# Patient Record
Sex: MALE | Race: WHITE | NOT HISPANIC OR LATINO | Employment: OTHER | URBAN - METROPOLITAN AREA
[De-identification: names, ages, dates, MRNs, and addresses within clinical notes are randomized per-mention and may not be internally consistent; named-entity substitution may affect disease eponyms.]

---

## 2017-01-07 RX ORDER — DIAZEPAM 5 MG/1
TABLET ORAL
Qty: 60 TABLET | Refills: 5 | Status: SHIPPED | OUTPATIENT
Start: 2017-01-07 | End: 2017-02-11 | Stop reason: SDUPTHER

## 2017-01-12 ENCOUNTER — TELEPHONE (OUTPATIENT)
Dept: FAMILY MEDICINE | Facility: CLINIC | Age: 82
End: 2017-01-12

## 2017-01-12 RX ORDER — DIAZEPAM 5 MG/1
TABLET ORAL
Qty: 60 TABLET | OUTPATIENT
Start: 2017-01-12

## 2017-01-12 NOTE — TELEPHONE ENCOUNTER
Looks like we received another request for his diazepam which was approved 5 or 6 days ago.  Probably was approved to be called in so please double check that refill from a few days ago and call in if necessary

## 2017-02-13 RX ORDER — DIAZEPAM 5 MG/1
TABLET ORAL
Qty: 60 TABLET | Refills: 5 | Status: SHIPPED | OUTPATIENT
Start: 2017-02-13 | End: 2017-07-03 | Stop reason: SDUPTHER

## 2017-03-16 DIAGNOSIS — E11.9 TYPE 2 DIABETES MELLITUS WITHOUT COMPLICATION: ICD-10-CM

## 2017-03-29 DIAGNOSIS — E11.9 TYPE 2 DIABETES MELLITUS WITHOUT COMPLICATION: ICD-10-CM

## 2017-03-30 DIAGNOSIS — E11.9 TYPE 2 DIABETES MELLITUS WITHOUT COMPLICATION: ICD-10-CM

## 2017-03-30 RX ORDER — SILDENAFIL CITRATE 20 MG/1
TABLET ORAL
Qty: 30 TABLET | Refills: 11 | Status: SHIPPED | OUTPATIENT
Start: 2017-03-30 | End: 2017-03-30 | Stop reason: SDUPTHER

## 2017-03-30 RX ORDER — SILDENAFIL CITRATE 20 MG/1
TABLET ORAL
Qty: 30 TABLET | Refills: 11 | Status: SHIPPED | OUTPATIENT
Start: 2017-03-30 | End: 2018-05-10 | Stop reason: SDUPTHER

## 2017-03-30 NOTE — TELEPHONE ENCOUNTER
----- Message from Ariadna Gamboa sent at 3/30/2017  9:40 AM CDT -----  Contact: self  Pt is requesting a refill for sildenafil (REVATIO) 20 mg Tab. [phone] [facility] pharmacy on [facility].

## 2017-03-31 DIAGNOSIS — E11.9 TYPE 2 DIABETES MELLITUS WITHOUT COMPLICATION: ICD-10-CM

## 2017-04-28 ENCOUNTER — HOSPITAL ENCOUNTER (EMERGENCY)
Facility: HOSPITAL | Age: 82
Discharge: HOME | End: 2017-04-28
Attending: EMERGENCY MEDICINE
Payer: MEDICARE

## 2017-04-28 VITALS
OXYGEN SATURATION: 97 % | HEART RATE: 52 BPM | DIASTOLIC BLOOD PRESSURE: 78 MMHG | HEIGHT: 67 IN | RESPIRATION RATE: 16 BRPM | WEIGHT: 160 LBS | SYSTOLIC BLOOD PRESSURE: 149 MMHG | BODY MASS INDEX: 25.11 KG/M2 | TEMPERATURE: 98 F

## 2017-04-28 DIAGNOSIS — M25.511 ACUTE PAIN OF RIGHT SHOULDER: Primary | ICD-10-CM

## 2017-04-28 DIAGNOSIS — R52 PAIN: ICD-10-CM

## 2017-04-28 LAB
ALBUMIN SERPL BCP-MCNC: 4 G/DL
ALP SERPL-CCNC: 82 U/L
ALT SERPL W/O P-5'-P-CCNC: 17 U/L
ANION GAP SERPL CALC-SCNC: 11 MMOL/L
AST SERPL-CCNC: 32 U/L
BASOPHILS # BLD AUTO: 0.04 K/UL
BASOPHILS NFR BLD: 0.4 %
BILIRUB SERPL-MCNC: 0.4 MG/DL
BUN SERPL-MCNC: 18 MG/DL
CALCIUM SERPL-MCNC: 9.8 MG/DL
CHLORIDE SERPL-SCNC: 104 MMOL/L
CO2 SERPL-SCNC: 22 MMOL/L
CREAT SERPL-MCNC: 1.4 MG/DL
DIFFERENTIAL METHOD: ABNORMAL
EOSINOPHIL # BLD AUTO: 0.3 K/UL
EOSINOPHIL NFR BLD: 3.4 %
ERYTHROCYTE [DISTWIDTH] IN BLOOD BY AUTOMATED COUNT: 13.8 %
EST. GFR  (AFRICAN AMERICAN): 55 ML/MIN/1.73 M^2
EST. GFR  (NON AFRICAN AMERICAN): 47 ML/MIN/1.73 M^2
GLUCOSE SERPL-MCNC: 122 MG/DL
HCT VFR BLD AUTO: 40.6 %
HGB BLD-MCNC: 14.1 G/DL
INR PPP: 0.9
LYMPHOCYTES # BLD AUTO: 2.2 K/UL
LYMPHOCYTES NFR BLD: 23.6 %
MCH RBC QN AUTO: 32.6 PG
MCHC RBC AUTO-ENTMCNC: 34.7 %
MCV RBC AUTO: 94 FL
MONOCYTES # BLD AUTO: 0.8 K/UL
MONOCYTES NFR BLD: 8.8 %
NEUTROPHILS # BLD AUTO: 5.8 K/UL
NEUTROPHILS NFR BLD: 63.6 %
PLATELET # BLD AUTO: 241 K/UL
PMV BLD AUTO: 9.9 FL
POTASSIUM SERPL-SCNC: 5.1 MMOL/L
PROT SERPL-MCNC: 8.8 G/DL
PROTHROMBIN TIME: 9.7 SEC
RBC # BLD AUTO: 4.32 M/UL
SODIUM SERPL-SCNC: 137 MMOL/L
TROPONIN I SERPL DL<=0.01 NG/ML-MCNC: 0.01 NG/ML
WBC # BLD AUTO: 9.1 K/UL

## 2017-04-28 PROCEDURE — 80053 COMPREHEN METABOLIC PANEL: CPT

## 2017-04-28 PROCEDURE — 96374 THER/PROPH/DIAG INJ IV PUSH: CPT

## 2017-04-28 PROCEDURE — 25000003 PHARM REV CODE 250: Performed by: EMERGENCY MEDICINE

## 2017-04-28 PROCEDURE — 85025 COMPLETE CBC W/AUTO DIFF WBC: CPT

## 2017-04-28 PROCEDURE — 84484 ASSAY OF TROPONIN QUANT: CPT

## 2017-04-28 PROCEDURE — 85610 PROTHROMBIN TIME: CPT

## 2017-04-28 PROCEDURE — 63600175 PHARM REV CODE 636 W HCPCS: Performed by: EMERGENCY MEDICINE

## 2017-04-28 PROCEDURE — 99284 EMERGENCY DEPT VISIT MOD MDM: CPT | Mod: 25

## 2017-04-28 PROCEDURE — 93005 ELECTROCARDIOGRAM TRACING: CPT

## 2017-04-28 RX ORDER — MORPHINE SULFATE 10 MG/ML
4 INJECTION INTRAMUSCULAR; INTRAVENOUS; SUBCUTANEOUS
Status: COMPLETED | OUTPATIENT
Start: 2017-04-28 | End: 2017-04-28

## 2017-04-28 RX ORDER — TRAMADOL HYDROCHLORIDE 50 MG/1
50 TABLET ORAL EVERY 6 HOURS PRN
Qty: 12 TABLET | Refills: 0 | Status: SHIPPED | OUTPATIENT
Start: 2017-04-28 | End: 2017-05-08

## 2017-04-28 RX ORDER — METHOCARBAMOL 500 MG/1
500 TABLET, FILM COATED ORAL
Status: COMPLETED | OUTPATIENT
Start: 2017-04-28 | End: 2017-04-28

## 2017-04-28 RX ORDER — HYDROCODONE BITARTRATE AND ACETAMINOPHEN 5; 325 MG/1; MG/1
1 TABLET ORAL
Status: COMPLETED | OUTPATIENT
Start: 2017-04-28 | End: 2017-04-28

## 2017-04-28 RX ORDER — METHOCARBAMOL 500 MG/1
500 TABLET, FILM COATED ORAL 3 TIMES DAILY PRN
Qty: 15 TABLET | Refills: 0 | Status: SHIPPED | OUTPATIENT
Start: 2017-04-28 | End: 2017-05-03

## 2017-04-28 RX ADMIN — MORPHINE SULFATE 4 MG: 10 INJECTION INTRAVENOUS at 08:04

## 2017-04-28 RX ADMIN — METHOCARBAMOL 500 MG: 500 TABLET ORAL at 10:04

## 2017-04-28 RX ADMIN — HYDROCODONE BITARTRATE AND ACETAMINOPHEN 1 TABLET: 5; 325 TABLET ORAL at 07:04

## 2017-04-28 NOTE — ED PROVIDER NOTES
"Encounter Date: 4/28/2017    SCRIBE #1 NOTE: I, Jaymie Bradford, am scribing for, and in the presence of,  Keo Chamorro MD. I have scribed the following portions of the note - Other sections scribed: HPI/ROS.       History     Chief Complaint   Patient presents with    Shoulder Pain     right side and spreads down to right arm. Denies trauma. Started yesterday.      Review of patient's allergies indicates:   Allergen Reactions    Demerol  [meperidine]      Other reaction(s): Unknown     HPI Comments: HPI: 81 y.o. M smoker with a PMHx of PVD, CAD, DM, HTN, HLD, TIA, and bypass surgery presents to the ED c/o worsening, intermitent R shoulder blade pain that wraps up to his R shoulder x 13 hours PTA. Pt states the pain "hits and then eases up for a little." Pain is severe. No prior tx reported. Pain is not exacerbated with palpation. No recent trauma. Pt denies weakness, numbness, neck pain, CP, abdominal pain, nausea, and emesis.     The history is provided by the patient.     Past Medical History:   Diagnosis Date    Actinic keratosis     Anxiety     B12 deficiency 12/8/2014    Dx 6/14    Cancer     skin    Cataract     Coronary artery disease     Diabetes mellitus type II     Diabetes mellitus with peripheral circulatory disorder 6/18/2014    ED (erectile dysfunction)     Hyperlipidemia     Hypertension     Kidney stone     Peripheral vascular disease      Past Surgical History:   Procedure Laterality Date    APPENDECTOMY      EYE SURGERY      cataracts     Family History   Problem Relation Age of Onset    Stroke Mother      Social History   Substance Use Topics    Smoking status: Current Every Day Smoker    Smokeless tobacco: None    Alcohol use No     Review of Systems   Constitutional: Negative for fever.   HENT: Negative for sore throat.    Respiratory: Negative for shortness of breath.    Cardiovascular: Negative for chest pain.   Gastrointestinal: Negative for nausea.   Genitourinary: " Negative for dysuria.   Musculoskeletal: Positive for arthralgias. Negative for back pain, joint swelling and neck pain.        (+)right shoulder pain   Skin: Negative for rash.   Neurological: Negative for weakness.   Hematological: Does not bruise/bleed easily.       Physical Exam   Initial Vitals   BP Pulse Resp Temp SpO2   04/28/17 0502 04/28/17 0502 04/28/17 0502 04/28/17 0502 04/28/17 0502   156/76 67 18 97.7 °F (36.5 °C) 93 %     Physical Exam    Constitutional: He appears well-developed and well-nourished. He is not diaphoretic. No distress.   HENT:   Head: Normocephalic and atraumatic.   Nose: Nose normal.   Mouth/Throat: Oropharynx is clear and moist. No oropharyngeal exudate.   Eyes: Conjunctivae and EOM are normal. Pupils are equal, round, and reactive to light. No scleral icterus.   Neck: Normal range of motion. Neck supple. No thyromegaly present. No tracheal deviation present.   Cardiovascular: Normal rate, regular rhythm and normal heart sounds. Exam reveals no gallop and no friction rub.    No murmur heard.  Pulmonary/Chest: Breath sounds normal. No respiratory distress. He has no wheezes. He has no rhonchi. He has no rales.   Abdominal: Soft. Bowel sounds are normal. He exhibits no distension and no mass. There is no tenderness. There is no rebound and no guarding.   Musculoskeletal: Normal range of motion. He exhibits no edema or tenderness.   Lymphadenopathy:     He has no cervical adenopathy.   Neurological: He is alert and oriented to person, place, and time. He has normal strength. No cranial nerve deficit or sensory deficit.   Skin: Skin is warm and dry. No rash noted. No erythema. No pallor.   Psychiatric: He has a normal mood and affect. His behavior is normal. Thought content normal.         ED Course   Procedures  Labs Reviewed   CBC W/ AUTO DIFFERENTIAL - Abnormal; Notable for the following:        Result Value    RBC 4.32 (*)     MCH 32.6 (*)     All other components within normal  limits    Narrative:     Recoll. [id number] by ERP at 04/28/2017 06:12, reason: Specimen   clotted; Specimen hemolyzed   COMPREHENSIVE METABOLIC PANEL - Abnormal; Notable for the following:     CO2 22 (*)     Glucose 122 (*)     Total Protein 8.8 (*)     eGFR if  55 (*)     eGFR if non  47 (*)     All other components within normal limits    Narrative:     Recoll. [id number] by CAG at 04/28/2017 07:35, reason: Specimen   [email or web address].Tube has been discarded. Called to Bree Isbell   04/28/2017  07:35   PROTIME-INR    Narrative:     Recoll. [id number] by CAG at 04/28/2017 07:35, reason: Specimen   [email or web address].Tube has been discarded. Called to Bree Isbell   04/28/2017  07:35   TROPONIN I    Narrative:     Recoll. [id number] by CAG at 04/28/2017 07:35, reason: Specimen   [email or web address].Tube has been discarded. Called to Bree Isbell   04/28/2017  07:35             Medical Decision Making:   Initial Assessment:   81-year-old male with a history of coronary artery disease, CABG, long-term smoking presents complaining of pain that begins under the right shoulder blade, wraps around under the axilla and then up into the anterior shoulder.  He denies chest pain, abdominal pain, nausea, vomiting, diaphoresis, shortness of breath, radiation of pain down the arm, numbness in the arm, weakness, neck pain.  Physical examination is not reveal any tenderness to palpation to the back, chest, abdomen, or right upper extremity.  Neurologic exam is unremarkable.  Differential Diagnosis:   Unclear etiology of symptoms.  Very low likelihood of referred pain from the abdomen, radiculopathy, neurovascular compromise.  No rash to suggest shingles.  Given patient's cardiac history will screen for acute coronary syndrome as a cause of his symptoms.  Will also screen for underlying pulmonary pathology/malignancy.       AOC 7:44 AM this is Dr. Van mckenzie, and I have assumed this patient's care from   Pedro Pablo.  He is a 81-year-old male who presents emergency department complaining of 1 day history of right shoulder pain that originates from the right scapula around to right lateral chest wall. He has history of CABG. therefore cardiac workup initiated.  He has required multiple lab re-draws, thus delaying final dispo. Po Norco ordered. Awaiting labs.     10:00 PM labs reveal troponin 0.006.  Coags are normal.  Hemoglobin and hematocrit are normal.  No leukocytosis.  Cr 1.4.  Chest x-ray independently interpreted by me is negative for focal consolidation.  No pneumothorax or pleural effusion.  No cardiomegaly.  No widened mediastinum. EKG independently interpreted by me, is negative for acute ischemia.  Patient states Fisher transiently improved his pain, but returned.  He notes the pain is worse with range of motion of right shoulder.  Right shoulder x-rays show no evidence of acute fracture or dislocation.  Patient given IV morphine and states that this is helpful.  I will add on muscle relaxant, first dose given in the emergency department.  Patient states he would like to go home.  Doubt acute life-threatening event at this time.  Pulse ox 97% on room air.  Heart rate in the 50s.  He specifically denies chest pain or shortness of breath.  Doubt acute cardiac cause of symptoms.  He is not tachypneic, tachycardic, or hypoxic at this time.  I have considered but doubt acute PE, as the patient's symptoms are reproducible on exam with range of motion.  He has no fever.  No leukocytosis.  Doubt infectious cause.  Septic joint and pneumonia unlikely.  Plan to treat with Robaxin, tramadol, and encouraged close follow-up with primary care doctor and orthopedist if not improved.  Patient given strict return warnings.  He states understanding discharge plan and is comfortable going home at this time.              Scribe Attestation:   Scribe #1: I performed the above scribed service and the documentation accurately  describes the services I performed. I attest to the accuracy of the note.    Attending Attestation:           Physician Attestation for Scribe:  Physician Attestation Statement for Scribe #1: I, Keo Chamorro MD, reviewed documentation, as scribed by Jaymie Bradford in my presence, and it is both accurate and complete.                 ED Course     Clinical Impression:   The primary encounter diagnosis was Acute pain of right shoulder. A diagnosis of Pain was also pertinent to this visit.          Claribel Krause MD  04/28/17 1032       Claribel Krause MD  04/28/17 1033

## 2017-04-28 NOTE — ED NOTES
Report recevied from Janessa GILLETTE RN. Pt rounding performed at this time. Pt assessed for pain, need of repositioning and given opportunity to use the restroom. Pt rates pain (8/10 to right shoulder), denies need to use the restroom and can position for comfort. Room assessed for safety measures and cleanliness, no action needed at this time. Pt denies any additional needs at this time. Pt is up to date on plan of care and denies questions or concerns. Pt informed I will return in one hour, if needed before then, pt given call bell and demonstrates use. Pt verbalizes understanding.   Family at the bedside.

## 2017-04-28 NOTE — ED AVS SNAPSHOT
[facility]  [address] [place] [address]               Narciso Yanez   2017  5:05 AM   ED    Description:  Male : 1935   Department:  [facility]           Your Care was Coordinated By:     Provider Role From To    Claribel Krause MD Attending Provider 17 0613 --      Reason for Visit     Shoulder Pain           Diagnoses this Visit        Comments    Acute pain of right shoulder    -  Primary     Pain           ED Disposition     ED Disposition Condition Comment    Discharge             To Do List           Follow-up Information     Follow up with Marcelino Del Toro MD In 3 days.    Specialty:  Internal Medicine    Why:  call today to schedule an appointment to be seen early next week or ASAP    Contact information:    [address] [place] [address]  [phone]          Follow up with Justin Suero MD In 1 week.    Specialty:  Orthopedic Surgery    Why:  If symptoms worsen or do not improve    Contact information:    [address]  [place] [address]  [phone]         These Medications        Disp Refills Start End    methocarbamol (ROBAXIN) 500 MG Tab 15 tablet 0 2017 5/3/2017    Take 1 tablet (500 mg total) by mouth 3 (three) times daily as needed. - Oral    Pharmacy: [facility] [address] Ph #: [phone]       tramadol (ULTRAM) 50 mg tablet 12 tablet 0 2017    Take 1 tablet (50 mg total) by mouth every 6 (six) hours as needed. - Oral    Pharmacy: [facility] [address] Ph #: [phone]         [facility]     [facility] -  Assistance  Unless otherwise directed by your provider, please contact [facility], our nurse care line that is available for  assistance.     Registered nurses in the [facility] provide: appointment scheduling,  clinical advisement, health education, and other advisory services.  Call: [phone] (toll free)               Medications           Message regarding Medications     Verify the changes and/or additions to your medication regime listed below are the same as discussed with your clinician today.  If any of these changes or additions are incorrect, please notify your healthcare provider.        START taking these NEW medications        Refills    methocarbamol (ROBAXIN) 500 MG Tab 0    Sig: Take 1 tablet (500 mg total) by mouth 3 (three) times daily as needed.    Class: Print    Route: Oral    tramadol (ULTRAM) 50 mg tablet 0    Sig: Take 1 tablet (50 mg total) by mouth every 6 (six) hours as needed.    Class: Print    Route: Oral      These medications were administered today        Dose Freq    hydrocodone-acetaminophen 5-325mg per tablet 1 tablet 1 tablet ED 1 Time    Sig: Take 1 tablet by mouth ED 1 Time.    Class: Normal    Route: Oral    morphine injection 4 mg 4 mg ED 1 Time    Sig: Inject 0.4 mLs (4 mg total) into the vein ED 1 Time.    Class: Normal    Route: Intravenous    methocarbamol tablet 500 mg 500 mg ED 1 Time    Sig: Take 1 tablet (500 mg total) by mouth ED 1 Time.    Class: Normal    Route: Oral           Verify that the below list of medications is an accurate representation of the medications you are currently taking.  If none reported, the list may be blank. If incorrect, please contact your healthcare provider. Carry this list with you in case of emergency.           Current Medications     clopidogrel (PLAVIX) 75 mg tablet Take 1 tablet (75 mg total) by mouth once daily.    CRESTOR 20 mg tablet TAKE ONE TABLET BY MOUTH EVERY EVENING    metformin (GLUCOPHAGE-XR) 750 MG 24 hr tablet Take 2 tablets (1,500 mg total) by mouth once daily.    metoprolol succinate (TOPROL-XL) 25 MG 24 hr tablet Take 1 tablet (25 mg total) by mouth once daily.    ciprofloxacin HCl (CILOXAN) 0.3 % ophthalmic solution  "    diazePAM (VALIUM) 5 MG tablet TAKE ONE TABLET BY MOUTH EVERY 8 HOURS AS NEEDED    FLUZONE HIGH-DOSE 2014-15, PF, 180 mcg/0.5 mL Syrg     methocarbamol (ROBAXIN) 500 MG Tab Take 1 tablet (500 mg total) by mouth 3 (three) times daily as needed.    methocarbamol tablet 500 mg Take 1 tablet (500 mg total) by mouth ED 1 Time.    sildenafil (REVATIO) 20 mg Tab 1-5 pills at a time per day prn    tramadol (ULTRAM) 50 mg tablet Take 1 tablet (50 mg total) by mouth every 6 (six) hours as needed.           Clinical Reference Information           Your Vitals Were     BP Pulse Temp Resp Height Weight    149/78 52 98 °F (36.7 °C) (Oral) 16 5' 7" (1.702 m) 72.6 kg (160 lb)    SpO2 BMI             97% 25.06 kg/m2         Allergies as of 4/28/2017        Reactions    Demerol  [Meperidine]     Other reaction(s): Unknown      Immunizations Administered on Date of Encounter - 4/28/2017     None      ED Micro, Lab, POCT     Start Ordered       Status Ordering Provider    04/28/17 0531 04/28/17 0531  CBC auto differential  STAT      Final result     04/28/17 0531 04/28/17 0531  Comprehensive metabolic panel  STAT      Final result     04/28/17 0531 04/28/17 0531  Protime-INR  STAT      Final result     04/28/17 0531 04/28/17 0531  Troponin I  STAT      Final result       ED Imaging Orders     Start Ordered       Status Ordering Provider    04/28/17 0756 04/28/17 0755  X-Ray Shoulder Trauma Right  1 time imaging      Final result     04/28/17 0531 04/28/17 0531  X-Ray Chest PA And Lateral  1 time imaging      Final result         Discharge Instructions       Follow-up with primary care doctor and orthopedic specialist if not improved.  Try Ultram and Robaxin as needed for pain.  Return to emergency department for worsening symptoms, severe pain, fever, chest pain, shortness of breath, or any other concerns.    Discharge References/Attachments     SHOULDER PAIN, UNCERTAIN CAUSE (ENGLISH)      [facility] Sign-Up     Activating your " [facility] account is as easy as 1-2-3!     1) Visit [email or web address], select Sign Up Now, enter this activation code and your date of birth, then select Next.  [email or web address]  Expires: 6/12/2017 10:11 AM      2) Create a username and password to use when you visit [facility] in the future and select a security question in case you lose your password and select Next.    3) Enter your e-mail address and click Sign Up!    Additional Information  If you have questions, please e-mail [email or web address] or call [phone] to talk to our [facility] staff. Remember, [facility] is NOT to be used for urgent needs. For medical emergencies, dial 911.         Smoking Cessation     If you would like to quit smoking:   You may be eligible for free services if you are a [place] resident and started smoking cigarettes before September 1, 1988.  Call the Smoking Cessation Trust ([facility]) toll free at [phone] or [phone].   Call 5-492-QUIT-NOW if you do not meet the above criteria.   Contact us via email: [email or web address]   View our website for more information: [email or web address]         [facility] complies with applicable Federal civil rights laws and does not discriminate on the basis of race, color, national origin, age, disability, or sex.        Language Assistance Services     ATTENTION: Language assistance services are available, free of charge. Please call [phone].      ATENCIÓN: Si habla español, tiene a rincon disposición servicios gratuitos de asistencia lingüística. Llame al [phone].     CHÚ Ý: N?u b?n nói Ti?ng Vi?t, có các d?ch v? h? tr? ngôn ng? mi?n phí dành cho b?n. G?i s? [phone].

## 2017-04-28 NOTE — DISCHARGE INSTRUCTIONS
Follow-up with primary care doctor and orthopedic specialist if not improved.  Try Ultram and Robaxin as needed for pain.  Return to emergency department for worsening symptoms, severe pain, fever, chest pain, shortness of breath, or any other concerns.

## 2017-04-28 NOTE — ED PROVIDER NOTES
Encounter Date: 4/28/2017    SCRIBE #1 NOTE: I, Jaymie Bradford, am scribing for, and in the presence of, Keo Chamorro .       History     Chief Complaint   Patient presents with    Shoulder Pain     right side and spreads down to right arm. Denies trauma. Started yesterday.      Review of patient's allergies indicates:   Allergen Reactions    Demerol  [meperidine]      Other reaction(s): Unknown     HPI  Past Medical History:   Diagnosis Date    Actinic keratosis     Anxiety     B12 deficiency 12/8/2014    Dx 6/14    Cancer     skin    Cataract     Coronary artery disease     Diabetes mellitus type II     Diabetes mellitus with peripheral circulatory disorder 6/18/2014    ED (erectile dysfunction)     Hyperlipidemia     Hypertension     Kidney stone     Peripheral vascular disease      Past Surgical History:   Procedure Laterality Date    APPENDECTOMY      EYE SURGERY      cataracts     Family History   Problem Relation Age of Onset    Stroke Mother      Social History   Substance Use Topics    Smoking status: Current Every Day Smoker    Smokeless tobacco: None    Alcohol use No     Review of Systems    Physical Exam   Initial Vitals   BP Pulse Resp Temp SpO2   04/28/17 0502 04/28/17 0502 04/28/17 0502 04/28/17 0502 04/28/17 0502   156/76 67 18 97.7 °F (36.5 °C) 93 %     Physical Exam    ED Course   Procedures  Labs Reviewed - No data to display                     Scribe Attestation:   Scribe #1: I performed the above scribed service and the documentation accurately describes the services I performed. I attest to the accuracy of the note.    Attending Attestation:           Physician Attestation for Scribe:  Physician Attestation Statement for Scribe #1: I, Keo Chamorro MD, reviewed documentation, as scribed by Jaymie Bradford in my presence, and it is both accurate and complete.                 ED Course     Clinical Impression:   There were no encounter diagnoses.

## 2017-04-28 NOTE — ED TRIAGE NOTES
Patient presents with shoulder pain on the right side. Patient states the pain radiates down his right arm. The patient denies chest pain and SOB. Patient denies any recent trauma. Patient denies any tingling or numbness.

## 2017-06-01 RX ORDER — METOPROLOL SUCCINATE 25 MG/1
TABLET, EXTENDED RELEASE ORAL
Qty: 90 TABLET | Refills: 1 | Status: SHIPPED | OUTPATIENT
Start: 2017-06-01 | End: 2017-08-28 | Stop reason: SDUPTHER

## 2017-07-05 RX ORDER — DIAZEPAM 5 MG/1
TABLET ORAL
Qty: 60 TABLET | Refills: 3 | Status: SHIPPED | OUTPATIENT
Start: 2017-07-05 | End: 2018-09-10 | Stop reason: SDUPTHER

## 2017-07-13 ENCOUNTER — HOSPITAL ENCOUNTER (EMERGENCY)
Facility: HOSPITAL | Age: 82
Discharge: HOME | End: 2017-07-14
Attending: EMERGENCY MEDICINE
Payer: MEDICARE

## 2017-07-13 DIAGNOSIS — N30.91 HEMORRHAGIC CYSTITIS: Primary | ICD-10-CM

## 2017-07-13 DIAGNOSIS — D72.829 LEUKOCYTOSIS, UNSPECIFIED: ICD-10-CM

## 2017-07-13 DIAGNOSIS — R00.0 SINUS TACHYCARDIA: ICD-10-CM

## 2017-07-13 LAB
ALBUMIN SERPL BCP-MCNC: 3.4 G/DL
ALP SERPL-CCNC: 59 U/L
ALT SERPL W/O P-5'-P-CCNC: 12 U/L
ANION GAP SERPL CALC-SCNC: 11 MMOL/L
APTT BLDCRRT: 30.3 SEC
AST SERPL-CCNC: 22 U/L
BACTERIA #/AREA URNS HPF: ABNORMAL /HPF
BASOPHILS # BLD AUTO: 0.03 K/UL
BASOPHILS NFR BLD: 0.2 %
BILIRUB SERPL-MCNC: 1 MG/DL
BILIRUB UR QL STRIP: NEGATIVE
BUN SERPL-MCNC: 14 MG/DL
CALCIUM SERPL-MCNC: 9.2 MG/DL
CHLORIDE SERPL-SCNC: 103 MMOL/L
CLARITY UR: ABNORMAL
CO2 SERPL-SCNC: 22 MMOL/L
COLOR UR: ABNORMAL
CREAT SERPL-MCNC: 1.1 MG/DL
DIFFERENTIAL METHOD: ABNORMAL
EOSINOPHIL # BLD AUTO: 0.1 K/UL
EOSINOPHIL NFR BLD: 0.4 %
ERYTHROCYTE [DISTWIDTH] IN BLOOD BY AUTOMATED COUNT: 13.8 %
EST. GFR  (AFRICAN AMERICAN): >60 ML/MIN/1.73 M^2
EST. GFR  (NON AFRICAN AMERICAN): >60 ML/MIN/1.73 M^2
GLUCOSE SERPL-MCNC: 119 MG/DL
GLUCOSE UR QL STRIP: NEGATIVE
HCT VFR BLD AUTO: 41.5 %
HGB BLD-MCNC: 14.5 G/DL
HGB UR QL STRIP: ABNORMAL
HYALINE CASTS #/AREA URNS LPF: 0 /LPF
INR PPP: 1
KETONES UR QL STRIP: ABNORMAL
LACTATE SERPL-SCNC: 0.9 MMOL/L
LEUKOCYTE ESTERASE UR QL STRIP: ABNORMAL
LYMPHOCYTES # BLD AUTO: 2.2 K/UL
LYMPHOCYTES NFR BLD: 13.1 %
MCH RBC QN AUTO: 33.3 PG
MCHC RBC AUTO-ENTMCNC: 34.9 %
MCV RBC AUTO: 95 FL
MICROSCOPIC COMMENT: ABNORMAL
MONOCYTES # BLD AUTO: 1.6 K/UL
MONOCYTES NFR BLD: 9.3 %
NEUTROPHILS # BLD AUTO: 13.1 K/UL
NEUTROPHILS NFR BLD: 77 %
NITRITE UR QL STRIP: NEGATIVE
PH UR STRIP: 5 [PH] (ref 5–8)
PLATELET # BLD AUTO: 186 K/UL
PMV BLD AUTO: 9.7 FL
POTASSIUM SERPL-SCNC: 4.1 MMOL/L
PROT SERPL-MCNC: 7.5 G/DL
PROT UR QL STRIP: ABNORMAL
PROTHROMBIN TIME: 10.9 SEC
RBC # BLD AUTO: 4.35 M/UL
RBC #/AREA URNS HPF: >100 /HPF (ref 0–4)
SODIUM SERPL-SCNC: 136 MMOL/L
SP GR UR STRIP: 1.02 (ref 1–1.03)
SQUAMOUS #/AREA URNS HPF: 4 /HPF
URN SPEC COLLECT METH UR: ABNORMAL
UROBILINOGEN UR STRIP-ACNC: NEGATIVE EU/DL
WBC # BLD AUTO: 16.96 K/UL
WBC #/AREA URNS HPF: 25 /HPF (ref 0–5)

## 2017-07-13 PROCEDURE — 25000003 PHARM REV CODE 250: Performed by: EMERGENCY MEDICINE

## 2017-07-13 PROCEDURE — 87077 CULTURE AEROBIC IDENTIFY: CPT

## 2017-07-13 PROCEDURE — 99283 EMERGENCY DEPT VISIT LOW MDM: CPT | Mod: 25

## 2017-07-13 PROCEDURE — 63600175 PHARM REV CODE 636 W HCPCS: Performed by: EMERGENCY MEDICINE

## 2017-07-13 PROCEDURE — 96365 THER/PROPH/DIAG IV INF INIT: CPT

## 2017-07-13 PROCEDURE — 87086 URINE CULTURE/COLONY COUNT: CPT

## 2017-07-13 PROCEDURE — 85610 PROTHROMBIN TIME: CPT

## 2017-07-13 PROCEDURE — 87040 BLOOD CULTURE FOR BACTERIA: CPT

## 2017-07-13 PROCEDURE — 96361 HYDRATE IV INFUSION ADD-ON: CPT

## 2017-07-13 PROCEDURE — 83605 ASSAY OF LACTIC ACID: CPT

## 2017-07-13 PROCEDURE — 81000 URINALYSIS NONAUTO W/SCOPE: CPT

## 2017-07-13 PROCEDURE — 87088 URINE BACTERIA CULTURE: CPT

## 2017-07-13 PROCEDURE — 87186 SC STD MICRODIL/AGAR DIL: CPT

## 2017-07-13 PROCEDURE — 80053 COMPREHEN METABOLIC PANEL: CPT

## 2017-07-13 PROCEDURE — 85025 COMPLETE CBC W/AUTO DIFF WBC: CPT

## 2017-07-13 PROCEDURE — 85730 THROMBOPLASTIN TIME PARTIAL: CPT

## 2017-07-13 RX ORDER — CEPHALEXIN 500 MG/1
1000 CAPSULE ORAL EVERY 12 HOURS
Qty: 28 CAPSULE | Refills: 0 | Status: SHIPPED | OUTPATIENT
Start: 2017-07-13 | End: 2017-07-20

## 2017-07-13 RX ORDER — CIPROFLOXACIN 500 MG/1
500 TABLET ORAL 2 TIMES DAILY
Qty: 20 TABLET | Refills: 0 | Status: SHIPPED | OUTPATIENT
Start: 2017-07-13 | End: 2017-07-23

## 2017-07-13 RX ADMIN — CEFTRIAXONE 2 G: 2 INJECTION, SOLUTION INTRAVENOUS at 11:07

## 2017-07-13 RX ADMIN — SODIUM CHLORIDE 1000 ML: 0.9 INJECTION, SOLUTION INTRAVENOUS at 11:07

## 2017-07-13 NOTE — ED TRIAGE NOTES
Pt states that he woke up last night and was dizzy.  Pt states was up every 10 min .  Pt states having history of kidney stones.

## 2017-07-14 VITALS
BODY MASS INDEX: 23.86 KG/M2 | OXYGEN SATURATION: 95 % | HEIGHT: 67 IN | SYSTOLIC BLOOD PRESSURE: 152 MMHG | WEIGHT: 152 LBS | HEART RATE: 78 BPM | DIASTOLIC BLOOD PRESSURE: 69 MMHG | RESPIRATION RATE: 20 BRPM | TEMPERATURE: 99 F

## 2017-07-14 NOTE — ED PROVIDER NOTES
Encounter Date: 7/13/2017    SCRIBE #1 NOTE: I, Minh Cabrera, am scribing for, and in the presence of,  Keo Amaral MD. I have scribed the following portions of the note - Other sections scribed: HPI, ROS.       History     Chief Complaint   Patient presents with    Hematuria     blood in urine this evening.  denies trauma.    Dizziness     woke up yesterday evening with dizziness which remains today.     CC: Hematuria; Dizziness     HPI: This 81 y.o. Male smoker with a medical history of Actinic keratosis; Anxiety; B12 deficiency (12/8/2014); Cancer; Cataract; Coronary artery disease; Diabetes mellitus type II; Diabetes mellitus with peripheral circulatory disorder (6/18/2014); ED (erectile dysfunction); Hyperlipidemia; Hypertension; Kidney stone; and Peripheral vascular disease presents to the ED c/o dizziness, urinary frequency increase, dysuria, and subjective fever that began 1 day ago with hematuria beginning 5 hours ago. Urinary frequency is an episode every 10 minutes. Pain is not similar to history of kidney stones. Pt is also c/o 1x episode of diarrhea earlier this morning. Symptoms are acute in onset. Denies drinking any water today. Denies ear pain, visual disturbance, headaches, or any other associated symptoms. Pt has no modifying factors. Pt has no prior treatment.       The history is provided by the patient. No  was used.     Review of patient's allergies indicates:   Allergen Reactions    Demerol  [meperidine]      Other reaction(s): Unknown     Past Medical History:   Diagnosis Date    Actinic keratosis     Anxiety     B12 deficiency 12/8/2014    Dx 6/14    Cancer     skin    Cataract     Coronary artery disease     Diabetes mellitus type II     Diabetes mellitus with peripheral circulatory disorder 6/18/2014    ED (erectile dysfunction)     Hyperlipidemia     Hypertension     Kidney stone     Peripheral vascular disease      Past Surgical History:    Procedure Laterality Date    APPENDECTOMY      CARDIAC SURGERY      EXTERNAL EAR SURGERY      EYE SURGERY      cataracts     Family History   Problem Relation Age of Onset    Stroke Mother      Social History   Substance Use Topics    Smoking status: Current Every Day Smoker     Types: Cigarettes    Smokeless tobacco: Never Used    Alcohol use No     Review of Systems   Constitutional: Positive for fever (subjective). Negative for chills.   HENT: Negative for congestion, ear pain, rhinorrhea and sore throat.    Eyes: Negative for pain and visual disturbance.   Respiratory: Negative for cough and shortness of breath.    Cardiovascular: Negative for chest pain.   Gastrointestinal: Positive for diarrhea (1x episode). Negative for abdominal pain, nausea and vomiting.   Genitourinary: Positive for dysuria, frequency and hematuria.   Musculoskeletal: Negative for back pain.        (-) arm or leg problems   Skin: Negative for rash.   Neurological: Positive for dizziness. Negative for headaches.       Physical Exam     Initial Vitals [07/13/17 1801]   BP Pulse Resp Temp SpO2   132/77 105 18 98.7 °F (37.1 °C) (!) 92 %      MAP       95.33         Physical Exam  The patient was examined specifically for the following:   General:No significant distress, Good color, Warm and dry. Head and neck:Scalp atraumatic, Neck supple. Neurological:Appropriate conversation, Gross motor deficits. Eyes:Conjugate gaze, Clear corneas. ENT: No epistaxis. Cardiac: Regular rate and rhythm, Grossly normal heart tones. Pulmonary: Wheezing, Rales. Gastrointestinal: Abdominal tenderness, Abdominal distention. Musculoskeletal: Extremity deformity, Apparent pain with range of motion of the joints. Skin: Rash.   The findings on examination were normal except for the following: The patient's heart rate is 105.  There is no evidence of any respiratory distress.  Lungs are clear and free wheezing rales of the rhonchi.  Heart tones are normal.   The patient has a regular rate and rhythm.  The abdomen is completely nontender there is no suprapubic tenderness.  ED Course   Procedures  Labs Reviewed   COMPREHENSIVE METABOLIC PANEL - Abnormal; Notable for the following:        Result Value    CO2 22 (*)     Glucose 119 (*)     Albumin 3.4 (*)     All other components within normal limits   CBC W/ AUTO DIFFERENTIAL - Abnormal; Notable for the following:     WBC 16.96 (*)     RBC 4.35 (*)     MCH 33.3 (*)     Gran # 13.1 (*)     Mono # 1.6 (*)     Gran% 77.0 (*)     Lymph% 13.1 (*)     All other components within normal limits   URINALYSIS - Abnormal; Notable for the following:     Color, UA Red (*)     Appearance, UA Cloudy (*)     Protein, UA 2+ (*)     Ketones, UA Trace (*)     Occult Blood UA 2+ (*)     Leukocytes, UA 2+ (*)     All other components within normal limits   URINALYSIS MICROSCOPIC - Abnormal; Notable for the following:     RBC, UA >100 (*)     WBC, UA 25 (*)     Bacteria, UA Few (*)     All other components within normal limits   CULTURE, URINE   CULTURE, BLOOD   CULTURE, BLOOD   APTT   PROTIME-INR   LACTIC ACID, PLASMA     EKG Readings: (Independently Interpreted)   This patient is in a normal sinus rhythm with a heart rate in 90.  The MT QRS and QT intervals are normal.  There is no evidence of acute myocardial infarction or malignant arrhythmia.  The patient has a normal axis.     Medical decision making: Given the above this patient presented emergency room with what appears to be a hemorrhagic cystitis.  His 25 leukocytes) field in his urine dysuria and red cells.  He had an elevated white blood count but no fever.  He had some tachycardia of arrival.  He was rehydrated treated with 2 g of Rocephin IV blood cultures were done.  The patient's lactate was normal.  The patient looks well felt well.  I consulted , who felt comfortable discharging the patient to home.  I offered the patient admission he declined.  I will discharge the  patient he is comfortable returning if he gets worse or if new problems develop.  I will treat outpatient with Cipro.  I will have him follow-up with primary care.  I will encourage lots of liquids.  His systolic blood pressure was 170 at discharge.  His heart rate was 78 at discharge.                  Scribe Attestation:   Scribe #1: I performed the above scribed service and the documentation accurately describes the services I performed. I attest to the accuracy of the note.    Attending Attestation:           Physician Attestation for Scribe:  Physician Attestation Statement for Scribe #1: I, Keo Amaral MD, reviewed documentation, as scribed by Minh Cabrera in my presence, and it is both accurate and complete.                 ED Course     Clinical Impression:   The primary encounter diagnosis was Hemorrhagic cystitis. Diagnoses of Leukocytosis, unspecified and Sinus tachycardia were also pertinent to this visit.                           Keo Amaral MD  07/15/17 0107

## 2017-07-14 NOTE — DISCHARGE INSTRUCTIONS
Lots of liquids.  Please return immediately if you get worse or if new problems develop, especially measured fever.  Please follow-up with her primary care doctor in 72 hours.  Rest.  Antibiotics as prescribed.

## 2017-07-15 ENCOUNTER — TELEPHONE (OUTPATIENT)
Dept: FAMILY MEDICINE | Facility: CLINIC | Age: 82
End: 2017-07-15

## 2017-07-15 LAB — BACTERIA UR CULT: NORMAL

## 2017-07-15 NOTE — TELEPHONE ENCOUNTER
----- Message from Rosemarie Chandler sent at 7/14/2017  1:26 PM CDT -----  Contact: Self   Patient says he went to the ED for a bladder infection and was told to call Dr. Del Toro today. Please call to advise at [phone]

## 2017-07-17 ENCOUNTER — OFFICE VISIT (OUTPATIENT)
Dept: FAMILY MEDICINE | Facility: CLINIC | Age: 82
End: 2017-07-17
Payer: MEDICARE

## 2017-07-17 VITALS
SYSTOLIC BLOOD PRESSURE: 136 MMHG | OXYGEN SATURATION: 95 % | WEIGHT: 152.13 LBS | TEMPERATURE: 98 F | HEART RATE: 82 BPM | DIASTOLIC BLOOD PRESSURE: 68 MMHG | BODY MASS INDEX: 23.88 KG/M2 | HEIGHT: 67 IN

## 2017-07-17 DIAGNOSIS — N30.90 CYSTITIS: Primary | ICD-10-CM

## 2017-07-17 PROCEDURE — 99499 UNLISTED E&M SERVICE: CPT | Mod: S$GLB,,, | Performed by: NURSE PRACTITIONER

## 2017-07-17 PROCEDURE — 99214 OFFICE O/P EST MOD 30 MIN: CPT | Mod: S$GLB,,, | Performed by: NURSE PRACTITIONER

## 2017-07-17 PROCEDURE — 99999 PR PBB SHADOW E&M-EST. PATIENT-LVL IV: CPT | Mod: PBBFAC,,, | Performed by: NURSE PRACTITIONER

## 2017-07-17 PROCEDURE — 1126F AMNT PAIN NOTED NONE PRSNT: CPT | Mod: S$GLB,,, | Performed by: NURSE PRACTITIONER

## 2017-07-17 PROCEDURE — 1159F MED LIST DOCD IN RCRD: CPT | Mod: S$GLB,,, | Performed by: NURSE PRACTITIONER

## 2017-07-17 NOTE — PROGRESS NOTES
"This dictation has been generated using Dragon Dictation some phonetic errors may occur.     Narciso was seen today for hospital follow up and urinary tract infection.    Diagnoses and all orders for this visit:    Cystitis    Uncontrolled type 2 diabetes mellitus with other circulatory complication, without long-term current use of insulin  -     Hemoglobin A1c; Future  -     Urinalysis; Future  -     TSH; Future  -     Lipid panel; Future    Other orders  -     Cancel: Pneumococcal Polysaccharide Vaccine (23 Valent) (SQ/IM)  -     Cancel: Ambulatory referral to Podiatry      No bld cont. abx  Diabetes reassess control.  Check labs as above.  I'll review and address accordingly.    Return if symptoms worsen or fail to improve.      ________________________________________________________________  ________________________________________________________________        Chief Complaint   Patient presents with    Hospital Follow Up    Urinary Tract Infection     History of present illness  This 81 y.o. presents today for complaint of follow up from the ER.  Recent visit dx cystitis.  At that visit he noted: "urinary frequency increase, dysuria, and subjective fever that began 1 day ago with hematuria beginning 5 hours ago. Urinary frequency is an episode every 10 minutes. Pain is not similar to history of kidney stones. Pt is also c/o 1x episode of diarrhea earlier this morning. Symptoms are acute in onset."     Patient presents for follow-up and states improvement.  He's feeling better.  He indicates to be taking a few days off.  We'll get fasting labs and check up on his diabetes.  Patient states home glucometers look good.  Review of systems  No fever chills malaise body aches  No chest pain or shortness of breath neck son no nausea vomiting or diarrhea  No urinary urgency frequency dysuria or hematuria  Patient denies a rash    Past medical and social history reviewed    Past Medical History:   Diagnosis Date    " Actinic keratosis     Anxiety     B12 deficiency 12/8/2014    Dx 6/14    Cancer     skin    Cataract     Coronary artery disease     Diabetes mellitus type II     Diabetes mellitus with peripheral circulatory disorder 6/18/2014    ED (erectile dysfunction)     Hyperlipidemia     Hypertension     Kidney stone     Peripheral vascular disease        Past Surgical History:   Procedure Laterality Date    APPENDECTOMY      CARDIAC SURGERY      EXTERNAL EAR SURGERY      EYE SURGERY      cataracts       Family History   Problem Relation Age of Onset    Stroke Mother        Social History     Social History    Marital status:      Spouse name: N/A    Number of children: N/A    Years of education: N/A     Social History Main Topics    Smoking status: Current Every Day Smoker     Types: Cigarettes    Smokeless tobacco: Never Used    Alcohol use No    Drug use: No    Sexual activity: Not Asked     Other Topics Concern    None     Social History Narrative    .  4 children.  Smokes 2 ppd.  Still drives trucks for entertainment industry.        Current Outpatient Prescriptions   Medication Sig Dispense Refill    cephALEXin (KEFLEX) 500 MG capsule Take 2 capsules (1,000 mg total) by mouth every 12 (twelve) hours. 28 capsule 0    ciprofloxacin HCl (CILOXAN) 0.3 % ophthalmic solution       ciprofloxacin HCl (CIPRO) 500 MG tablet Take 1 tablet (500 mg total) by mouth 2 (two) times daily. 20 tablet 0    clopidogrel (PLAVIX) 75 mg tablet Take 1 tablet (75 mg total) by mouth once daily. 90 tablet 4    CRESTOR 20 mg tablet TAKE ONE TABLET BY MOUTH EVERY EVENING 90 tablet 12    diazePAM (VALIUM) 5 MG tablet TAKE ONE Tablet BY MOUTH EVERY 8 HOURS AS NEEDED 60 tablet 3    FLUZONE HIGH-DOSE 2014-15, PF, 180 mcg/0.5 mL Syrg   0    metoprolol succinate (TOPROL-XL) 25 MG 24 hr tablet TAKE ONE Tablet BY MOUTH once DAILY 90 tablet 1    sildenafil (REVATIO) 20 mg Tab 1-5 pills at a time per day prn 30  tablet 11     No current facility-administered medications for this visit.        Review of patient's allergies indicates:   Allergen Reactions    Demerol  [meperidine]      Other reaction(s): Unknown       Physical examination  Vitals Reviewed  Gen. Well-dressed well-nourished no apparent distress  Skin warm dry and intact.  No rashes noted.  Neck is supple without adenopathy  Chest.  Respirations are even unlabored.   Abdomen is soft and not distended.  Bowel sounds are present.  No tenderness during palpation of the abdomen.  No Hepatosplenomegaly noted.  No hernia noted.  No CVA tenderness to percussion.    Neuro. Awake alert oriented x4.  Normal judgment and cognition noted.  Extremities no clubbing cyanosis or edema noted.     Call or return to clinic prn if these symptoms worsen or fail to improve as anticipated.

## 2017-07-19 ENCOUNTER — LAB VISIT (OUTPATIENT)
Dept: LAB | Facility: HOSPITAL | Age: 82
End: 2017-07-19
Attending: INTERNAL MEDICINE
Payer: MEDICARE

## 2017-07-19 ENCOUNTER — TELEPHONE (OUTPATIENT)
Dept: FAMILY MEDICINE | Facility: CLINIC | Age: 82
End: 2017-07-19

## 2017-07-19 DIAGNOSIS — E11.9 TYPE 2 DIABETES MELLITUS WITHOUT COMPLICATION: ICD-10-CM

## 2017-07-19 LAB
BACTERIA #/AREA URNS AUTO: ABNORMAL /HPF
BACTERIA BLD CULT: NORMAL
BACTERIA BLD CULT: NORMAL
BILIRUB UR QL STRIP: NEGATIVE
CLARITY UR REFRACT.AUTO: ABNORMAL
COLOR UR AUTO: YELLOW
CREAT UR-MCNC: 198 MG/DL
GLUCOSE UR QL STRIP: NEGATIVE
HGB UR QL STRIP: NEGATIVE
HYALINE CASTS UR QL AUTO: 4 /LPF
KETONES UR QL STRIP: NEGATIVE
LEUKOCYTE ESTERASE UR QL STRIP: ABNORMAL
MICROALBUMIN UR DL<=1MG/L-MCNC: 51 UG/ML
MICROALBUMIN/CREATININE RATIO: 25.8 UG/MG
MICROSCOPIC COMMENT: ABNORMAL
NITRITE UR QL STRIP: NEGATIVE
PH UR STRIP: 5 [PH] (ref 5–8)
PROT UR QL STRIP: NEGATIVE
RBC #/AREA URNS AUTO: 1 /HPF (ref 0–4)
SP GR UR STRIP: 1.02 (ref 1–1.03)
URN SPEC COLLECT METH UR: ABNORMAL
UROBILINOGEN UR STRIP-ACNC: NEGATIVE EU/DL
WBC #/AREA URNS AUTO: 6 /HPF (ref 0–5)

## 2017-07-19 PROCEDURE — 81001 URINALYSIS AUTO W/SCOPE: CPT

## 2017-07-19 PROCEDURE — 82570 ASSAY OF URINE CREATININE: CPT

## 2017-07-20 NOTE — TELEPHONE ENCOUNTER
Urine test was normal.   Blood test were ok except thyroid. TSH was UP. I am waiting on another blood test. The T4  We will likely repeat them again in 2-3 months.

## 2017-08-28 RX ORDER — METOPROLOL SUCCINATE 25 MG/1
TABLET, EXTENDED RELEASE ORAL
Qty: 90 TABLET | Refills: 1 | Status: SHIPPED | OUTPATIENT
Start: 2017-08-28 | End: 2018-06-28 | Stop reason: SDUPTHER

## 2017-09-07 ENCOUNTER — HOSPITAL ENCOUNTER (EMERGENCY)
Facility: HOSPITAL | Age: 82
Discharge: HOME | End: 2017-09-07
Attending: EMERGENCY MEDICINE
Payer: MEDICARE

## 2017-09-07 VITALS
SYSTOLIC BLOOD PRESSURE: 162 MMHG | WEIGHT: 151 LBS | HEART RATE: 64 BPM | BODY MASS INDEX: 23.65 KG/M2 | OXYGEN SATURATION: 98 % | DIASTOLIC BLOOD PRESSURE: 75 MMHG | TEMPERATURE: 100 F | RESPIRATION RATE: 22 BRPM

## 2017-09-07 DIAGNOSIS — S16.1XXA CERVICAL STRAIN, INITIAL ENCOUNTER: Primary | ICD-10-CM

## 2017-09-07 PROCEDURE — 99283 EMERGENCY DEPT VISIT LOW MDM: CPT

## 2017-09-07 PROCEDURE — 93005 ELECTROCARDIOGRAM TRACING: CPT

## 2017-09-07 PROCEDURE — 25000003 PHARM REV CODE 250: Performed by: EMERGENCY MEDICINE

## 2017-09-07 RX ORDER — HYDROCODONE BITARTRATE AND ACETAMINOPHEN 5; 325 MG/1; MG/1
1 TABLET ORAL EVERY 6 HOURS PRN
Qty: 20 TABLET | Refills: 0 | Status: SHIPPED | OUTPATIENT
Start: 2017-09-07 | End: 2017-09-13 | Stop reason: SDUPTHER

## 2017-09-07 RX ORDER — CYCLOBENZAPRINE HCL 5 MG
5 TABLET ORAL ONCE
Status: COMPLETED | OUTPATIENT
Start: 2017-09-07 | End: 2017-09-07

## 2017-09-07 RX ORDER — HYDROCODONE BITARTRATE AND ACETAMINOPHEN 5; 325 MG/1; MG/1
1 TABLET ORAL
Status: COMPLETED | OUTPATIENT
Start: 2017-09-07 | End: 2017-09-07

## 2017-09-07 RX ORDER — CYCLOBENZAPRINE HCL 5 MG
5 TABLET ORAL 3 TIMES DAILY PRN
Qty: 20 TABLET | Refills: 0 | Status: SHIPPED | OUTPATIENT
Start: 2017-09-07 | End: 2017-09-13 | Stop reason: SDUPTHER

## 2017-09-07 RX ADMIN — HYDROCODONE BITARTRATE AND ACETAMINOPHEN 1 TABLET: 5; 325 TABLET ORAL at 05:09

## 2017-09-07 RX ADMIN — CYCLOBENZAPRINE HYDROCHLORIDE 5 MG: 5 TABLET, FILM COATED ORAL at 05:09

## 2017-09-07 NOTE — ED PROVIDER NOTES
Encounter Date: 9/7/2017    SCRIBE #1 NOTE: I, Lora Santos, am scribing for, and in the presence of,  Brendan Virk MD. I have scribed the following portions of the note - Other sections scribed: HPI, ROS.       History     Chief Complaint   Patient presents with    Chest Pain     Pt reports midsternal chest pain onset 3 days ago radiating into neck & left shoulder becoming worse during the night. Pt reports SOB with N/V. Hx of CABG, b /l lower ext swelling pt also reports feeling weak.     CC: Neck Pain    HPI: 82 year old male smoker with HTN, HLD, DM type II, CAD, PVD, and anticoagulant use on Plavix presents to the ED c/o constant, worsening L-sided neck pain that radiates to the L shoulder x 4 days. Pain is 6/10. Pain is exacerbated with positional changes. Pt denies recent fall or trauma. He reports waking up with pain. Pt states he was followed by Dr. Christianson (ortho) for R-sided neck pain and tingling to the R arm 4 months ago and received a cortisone injection. Pt was scheduled to get an MRI done after but has not been able to. Pt is not on home O2. Per daughter, pt's baseline O2 sats range between 89-90% at rest and 95% with movement. Daughter reports pt appeared to be off balance when getting up today secondary to the pain. Pt otherwise denies fever, chills, chest pain, SOB, abdominal pain, N/V/D, headaches, numbness, weakness, and any other associated symptoms. No alleviating factors. No hx of neck problems and lung disease.      The history is provided by the patient. No  was used.     Review of patient's allergies indicates:   Allergen Reactions    Demerol  [meperidine]      Other reaction(s): Unknown     Past Medical History:   Diagnosis Date    Actinic keratosis     Anxiety     B12 deficiency 12/8/2014    Dx 6/14    Cancer     skin    Cataract     Coronary artery disease     Diabetes mellitus type II     Diabetes mellitus with peripheral circulatory disorder 6/18/2014     ED (erectile dysfunction)     Hyperlipidemia     Hypertension     Kidney stone     Peripheral vascular disease      Past Surgical History:   Procedure Laterality Date    APPENDECTOMY      CARDIAC SURGERY      EXTERNAL EAR SURGERY      EYE SURGERY      cataracts     Family History   Problem Relation Age of Onset    Stroke Mother      Social History   Substance Use Topics    Smoking status: Current Every Day Smoker     Types: Cigarettes    Smokeless tobacco: Never Used    Alcohol use No     Review of Systems   Constitutional: Negative for chills, diaphoresis and fever.   HENT: Negative for ear pain, sore throat and trouble swallowing.    Eyes: Negative for photophobia and visual disturbance.   Respiratory: Negative for cough and shortness of breath.    Cardiovascular: Negative for chest pain.   Gastrointestinal: Negative for abdominal pain, diarrhea, nausea and vomiting.   Genitourinary: Negative for dysuria.   Musculoskeletal: Positive for neck pain (L-sided neck pain that radiatees to the L shoulder). Negative for back pain.   Skin: Negative for rash.   Neurological: Negative for headaches.       Physical Exam     Initial Vitals [09/07/17 0358]   BP Pulse Resp Temp SpO2   (!) 134/94 80 (!) 22 99.9 °F (37.7 °C) (!) 90 %      MAP       107.33         Physical Exam    Nursing note and vitals reviewed.  Constitutional: He appears well-developed and well-nourished.   HENT:   Head: Atraumatic.   Eyes: EOM are normal. Pupils are equal, round, and reactive to light.   Neck: Normal range of motion. Neck supple. No JVD present.   FROM neck. Bilateral Neck muscle spasm and ttp. No meningeal signs.    Cardiovascular: Normal rate, regular rhythm, normal heart sounds and intact distal pulses. Exam reveals no gallop and no friction rub.    No murmur heard.  Pulmonary/Chest: Breath sounds normal.   Abdominal: Soft. Bowel sounds are normal. There is no tenderness.   Musculoskeletal: Normal range of motion.    Lymphadenopathy:     He has no cervical adenopathy.   Neurological: He is alert and oriented to person, place, and time. He has normal strength and normal reflexes. He displays normal reflexes. No cranial nerve deficit or sensory deficit.   Skin: Skin is warm and dry. Capillary refill takes less than 2 seconds.   Psychiatric: He has a normal mood and affect. Thought content normal.         ED Course   Procedures  Labs Reviewed - No data to display  EKG Readings: (Independently Interpreted)   Initial Reading: No STEMI. Previous EKG: Compared with most recent EKG Rhythm: Normal Sinus Rhythm. Heart Rate: 65. Ectopy: No Ectopy. Conduction: 1st Degree AV Block. ST Segments: Normal ST Segments.     Patient has a history of cardiopulmonary problems presents with no acute problems related to cardiopulmonary status.  Patient's initial oxygen saturation 90% this baseline for him per patient and per his daughter patient presents with neck pain.  History of neck pain .  History of cervical radiculopathy.  Saw a orthopedic doctor in May for similar.  Was supposed to get an MRI scheduled.  States he had improvement with Flexeril.  Patient given pain medication Flexeril with improvement tonight.  We'll prescribe these medications and have patient follow-up with the orthopedic.                Scribe Attestation:   Scribe #1: I performed the above scribed service and the documentation accurately describes the services I performed. I attest to the accuracy of the note.    Attending Attestation:           Physician Attestation for Scribe:  Physician Attestation Statement for Scribe #1: I, Brendan Virk MD, reviewed documentation, as scribed by Lora Santos in my presence, and it is both accurate and complete.                 ED Course      Clinical Impression:   The encounter diagnosis was Cervical strain, initial encounter.                           Brendan Virk MD  09/07/17 0553

## 2017-09-12 ENCOUNTER — TELEPHONE (OUTPATIENT)
Dept: FAMILY MEDICINE | Facility: CLINIC | Age: 82
End: 2017-09-12

## 2017-09-12 NOTE — TELEPHONE ENCOUNTER
Call patient and reassure him that Dr. ELDER reviewed the chart and all the recent appointments and the recent ER visit.  Does not look like there is anything in the system about an MRI.  The emergency room records indicate it looks like you had pulled your neck but did not mention any need for an MRI        Up until this point, there is nothing in the current chart about an MRI      What has been going on lately?  Anything new?

## 2017-09-12 NOTE — TELEPHONE ENCOUNTER
----- Message from Alanis Castro sent at 9/8/2017  2:00 PM CDT -----  Contact: self  Patient called upset that he can't speak directly to Dr ELDER Staff when he calls in. Patient says he is still having pain in neck and requesting a MRI. Please contact him at [phone].    Thanks!

## 2017-09-13 ENCOUNTER — OFFICE VISIT (OUTPATIENT)
Dept: FAMILY MEDICINE | Facility: CLINIC | Age: 82
End: 2017-09-13
Payer: MEDICARE

## 2017-09-13 VITALS
SYSTOLIC BLOOD PRESSURE: 134 MMHG | BODY MASS INDEX: 24.56 KG/M2 | HEIGHT: 67 IN | DIASTOLIC BLOOD PRESSURE: 70 MMHG | OXYGEN SATURATION: 95 % | WEIGHT: 156.5 LBS | TEMPERATURE: 98 F | HEART RATE: 76 BPM

## 2017-09-13 DIAGNOSIS — M54.12 CERVICAL RADICULOPATHY: ICD-10-CM

## 2017-09-13 DIAGNOSIS — S16.1XXA CERVICAL STRAIN, INITIAL ENCOUNTER: Primary | ICD-10-CM

## 2017-09-13 PROCEDURE — 99999 PR PBB SHADOW E&M-EST. PATIENT-LVL III: CPT | Mod: PBBFAC,,, | Performed by: INTERNAL MEDICINE

## 2017-09-13 PROCEDURE — 3078F DIAST BP <80 MM HG: CPT | Mod: S$GLB,,, | Performed by: INTERNAL MEDICINE

## 2017-09-13 PROCEDURE — 3008F BODY MASS INDEX DOCD: CPT | Mod: S$GLB,,, | Performed by: INTERNAL MEDICINE

## 2017-09-13 PROCEDURE — 3075F SYST BP GE 130 - 139MM HG: CPT | Mod: S$GLB,,, | Performed by: INTERNAL MEDICINE

## 2017-09-13 PROCEDURE — 99214 OFFICE O/P EST MOD 30 MIN: CPT | Mod: S$GLB,,, | Performed by: INTERNAL MEDICINE

## 2017-09-13 PROCEDURE — 1159F MED LIST DOCD IN RCRD: CPT | Mod: S$GLB,,, | Performed by: INTERNAL MEDICINE

## 2017-09-13 RX ORDER — CYCLOBENZAPRINE HCL 5 MG
5 TABLET ORAL 3 TIMES DAILY PRN
Qty: 60 TABLET | Refills: 0 | Status: SHIPPED | OUTPATIENT
Start: 2017-09-13 | End: 2017-09-23

## 2017-09-13 RX ORDER — HYDROCODONE BITARTRATE AND ACETAMINOPHEN 5; 325 MG/1; MG/1
1 TABLET ORAL EVERY 6 HOURS PRN
Qty: 45 TABLET | Refills: 0 | Status: SHIPPED | OUTPATIENT
Start: 2017-09-13 | End: 2017-10-30 | Stop reason: SDUPTHER

## 2017-09-13 NOTE — TELEPHONE ENCOUNTER
Pt called stated that he is still in pain and he does not like taking a lot of pain meds. Pt is almost of of meds and will have enough til tomorrow. Pt also stated that his paper does state that he needs a MRI.

## 2017-09-13 NOTE — PROGRESS NOTES
Chief complaint neck pain    81-year-old white male still smoking.  Patient called today stating he needed an MRI because the emergency room said he might need an MRI.  I had him come into an opening this afternoon.  Story appears to be that back in May.  Pain and numbness radiating down the right arm to the third fourth and fifth fingers.  Trigger point injection it sounds like we did not give him any help.  He then called back and they said he would be having an MRI.  The imaging center however did not call until this month and obviously it's fairly much better if not resolved by this point we just some very mild residual numbness.  His most recent problem however was about one week ago he awoke with pain in the left neck.  It is over the trapezius muscle.  Moderate severity.  He had no numbness or pain radiating down any further.  He was seen in the emergency room and diagnosed with cervical strain.  He was given some hydrocodone and Flexeril.  He works as a  and; take either the Flexeril or the hydrocodone during the day as both together cause sedation.  He'll take the other at night.  He was given a small supply and was about to run out of that was part of his concern and his call for the appointment.  Labs reviewed and his A1c in July was 6.5.  We discussed reassessment of his labs in mid October        ROS:   CONST: No other myalgias arthralgias, no perceived weakness or any other neurological symptoms except as above    PAST MEDICAL HISTORY:                                                        1. Hyperlipidemia.                                                           2. Coronary disease, seen prior by Dr. Roy, 4-vessel bypass in 1999.   3. Peripheral vascular disease, stented in both legs 2002 and been on Plavix since.                                                                4. Kidney stones, last episode January 2007.                                 5. Right renal cyst apparently.  Saw  Dr. Labadie and then second opinion at [facility] by a Dr. Shipman, currently going to be followed.                      6. Appendectomy.                                                             7. Actinic keratosis, saw Dr. Ambriz.                                      8. Left facial numbness, probable TIA, admit [facility] 2007. Carotid ultrasound apparently clear  9. Cataracts  10. Skin cancer  11. HYPERTENSION  12.  Diabetes, uncontrolled, with circulatory disease  13.  Low HDL  14. Declines Colonoscopy  15. Pneumovax after 67 done, Prevnar 13 given   16.  B12 deficiency diagnosed     17    early satiety                                                                                       SOCIAL HISTORY:  He smokes 1 pack per day and does not drink.  He was about   52 years but now a .  Retired deputy in the court, 4          children.                                                                                                                                                 FAMILY HISTORY:  Father  at 63 of heart disease and stroke.  Mother       at 86, 4 brothers, 2 sisters living. Brother with strokes.      Gen: no distress  Musculoskeletal: The the left trapezius muscle is tender but not tight.  He has minimally restricted range of motion left and right secondary to some stiffness but no pain.  Both arms full range of motion with no defects to Dr. phoenix and strength 5 out of 5.  His biceps triceps and patellar reflexes are +2.  He has clubbing of the digits but no cyanosis.    Narciso was seen today for discuss health.    Diagnoses and all orders for this visit:    Cervical strain, initial encounter, on the left, explained pathophysiology and that an MRI would not be helpful for this problem nor indicated.  Recommend he use an anti-inflammatory such as one or 2 Aleve in the daytime for pain and inflammation and he can reserve the Flexeril and hydrocodone at  nighttime.  He needs to apply frequent heat and explained him that may take 6-8 weeks to heal    Cervical radiculopathy, down the right arm and possibly a lower cervical nerve impingement-type picture.  Seems to be getting better as expected over the appropriate period of time.  We discussed that an MRI would be indicated if indeed it worsens in the MRI would then refer him to pain management.  Since at this point given the improved severity, he would not pursue an injection either way, this really no need for an MRI at this point he agrees.    Other orders  -     hydrocodone-acetaminophen 5-325mg (NORCO) 5-325 mg per tablet; Take 1 tablet by mouth every 6 (six) hours as needed for Pain.  -     cyclobenzaprine (FLEXERIL) 5 MG tablet; Take 1 tablet (5 mg total) by mouth 3 (three) times daily as needed for Muscle spasms.         Prior labs reviewed

## 2017-09-18 NOTE — TELEPHONE ENCOUNTER
"Reassure patient that "Dr. ELDER feels that you pulled a neck muscle which can take 6-8 weeks to fully heal.      The paper report stating that you needed an MRI was the opinion of the emergency room doctor and does not necessarily mean someone truly needs an MRI.      Dr. ELDER says that by the recent examination, the results of an MRI likely would not  but he would be happy to order one if you feel absolutely necessary.  The MRI is done to look for pinched nerves and that is NOT suspected in your case.    How often are you taking the pain medication?  Dr. ELDER gave you another 45 pills the other day, on the 12th.  Happy to give more if necessary until this heals  "

## 2017-09-29 ENCOUNTER — OFFICE VISIT (OUTPATIENT)
Dept: FAMILY MEDICINE | Facility: CLINIC | Age: 82
End: 2017-09-29
Payer: MEDICARE

## 2017-09-29 VITALS
DIASTOLIC BLOOD PRESSURE: 78 MMHG | OXYGEN SATURATION: 99 % | SYSTOLIC BLOOD PRESSURE: 122 MMHG | BODY MASS INDEX: 24.24 KG/M2 | HEART RATE: 67 BPM | WEIGHT: 154.75 LBS

## 2017-09-29 DIAGNOSIS — E11.51 DIABETES MELLITUS WITH PERIPHERAL CIRCULATORY DISORDER: ICD-10-CM

## 2017-09-29 DIAGNOSIS — I25.10 CORONARY ARTERY DISEASE, ANGINA PRESENCE UNSPECIFIED, UNSPECIFIED VESSEL OR LESION TYPE, UNSPECIFIED WHETHER NATIVE OR TRANSPLANTED HEART: ICD-10-CM

## 2017-09-29 DIAGNOSIS — Z00.00 ENCOUNTER FOR PREVENTIVE HEALTH EXAMINATION: Primary | ICD-10-CM

## 2017-09-29 DIAGNOSIS — I77.1 TORTUOUS AORTA: ICD-10-CM

## 2017-09-29 DIAGNOSIS — Z23 NEED FOR INFLUENZA VACCINATION: ICD-10-CM

## 2017-09-29 PROCEDURE — 90662 IIV NO PRSV INCREASED AG IM: CPT | Mod: S$GLB,,, | Performed by: NURSE PRACTITIONER

## 2017-09-29 PROCEDURE — 99499 UNLISTED E&M SERVICE: CPT | Mod: S$GLB,,, | Performed by: NURSE PRACTITIONER

## 2017-09-29 PROCEDURE — G0439 PPPS, SUBSEQ VISIT: HCPCS | Mod: S$GLB,,, | Performed by: NURSE PRACTITIONER

## 2017-09-29 PROCEDURE — 99999 PR PBB SHADOW E&M-EST. PATIENT-LVL IV: CPT | Mod: PBBFAC,,, | Performed by: NURSE PRACTITIONER

## 2017-09-29 PROCEDURE — G0008 ADMIN INFLUENZA VIRUS VAC: HCPCS | Mod: S$GLB,,, | Performed by: NURSE PRACTITIONER

## 2017-09-29 NOTE — PATIENT INSTRUCTIONS
Counseling and Referral of Other Preventative  (Italic type indicates deductible and co-insurance are waived)    Patient Name: Narciso Yanez  Today's Date: 9/29/2017      SERVICE LIMITATIONS RECOMMENDATION    Vaccines    · Pneumococcal (once after 65)    · Influenza (annually)    · Hepatitis B (if medium/high risk)    · Prevnar 13      Hepatitis B medium/high risk factors:       - End-stage renal disease       - Hemophiliacs who received Factor VII or         IX concentrates       - Clients of institutions for the mentally             retarded       - Persons who live in the same house as          a HepB carrier       - Homosexual men       - Illicit injectable drug abusers     Pneumococcal: discussed with patient     Influenza: discussed with patient     Hepatitis B: N/A     Prevnar 13: discussed with patient      Prostate cancer screening (annually to age 75)     Prostate specific antigen (PSA) Shared decision making with Provider. Sometimes a co-pay may be required if the patient decides to have this test. The USPSTF no longer recommends prostate cancer screening routinely in medicine:     Colorectal cancer screening (to age 75)    · Fecal occult blood test (annual)  · Flexible sigmoidoscopy (5y)  · Screening colonoscopy (10y)  · Barium enema  discussed with patient, refused.     Diabetes self-management training (no USPSTF recommendations)  Requires referral by treating physician for patient with diabetes or renal disease. 10 hours of initial DSMT sessions of no less than 30 minutes each in a continuous 12-month period. 2 hours of follow-up DSMT in subsequent years.  Requires referral by treating physician for patient with diabetes or renal disease.    Glaucoma screening (no USPSTF recommendation)  Diabetes mellitus, family history   , age 50 or over    American, age 65 or over  Recommend follow up with eye care professional regularly    Medical nutrition therapy for diabetes or renal  disease (no recommended schedule)  Requires referral by treating physician for patient with diabetes or renal disease or kidney transplant within the past 3 years.  Can be provided in same year as diabetes self-management training (DSMT), and CMS recommends medical nutrition therapy take place after DSMT. Up to 3 hours for initial year and 2 hours in subsequent years.  Requires referral by treating physician for patient with diabetes or renal disease.    Cardiovascular screening blood tests (every 5 years)  · Fasting lipid panel  Order as a panel if possible  Done this year, repeat every year    Diabetes screening tests (at least every 3 years, Medicare covers annually or at 6-month intervals for prediabetic patients)  · Fasting blood sugar (FBS) or glucose tolerance test (GTT)  Patient must be diagnosed with one of the following:       - Hypertension       - Dyslipidemia       - Obesity (BMI 30kg/m2)       - Previous elevated impaired FBS or GTT       ... or any two of the following:       - Overweight (BMI 25 but <30)       - Family history of diabetes       - Age 65 or older       - History of gestational diabetes or birth of baby weighing more than 9 pounds  Done this year, repeat every 6 months     Abdominal aortic aneurysm screening (once)  · Sonogram   Limited to patients who meet one of the following criteria:       - Men who are 65-75 years old and have smoked more than 100 cigarette in their lifetime       - Anyone with a family history of abdominal aortic aneurysm       - Anyone recommended for screening by the USPSTF      HIV screening (annually for increased risk patients)  · HIV-1 and HIV-2 by EIA, or RANDY, rapid antibody test or oral mucosa transudate  Patients must be at increased risk for HIV infection per USPSTF guidelines or pregnant. Tests covered annually for patient at increased risk or as requested by the patient. Pregnant patients may receive up to 3 tests during pregnancy.  no clinical risk  factors      Smoking cessation counseling (up to 8 sessions per year)  Patients must be asymptomatic of tobacco-related conditions to receive as a preventative service.  aware of risk factors     Subsequent annual wellness visit  At least 12 months since last AWV  Return in one year     The following information is provided to all patients.  This information is to help you find resources for any of the problems found today that may be affecting your health:                Living healthy guide: www.[facility].[place].[facility]      Understanding Diabetes: www.diabetes.org      Eating healthy: www.cdc.gov/healthyweight      [facility] home safety checklist: www.cdc.gov/steadi/patient.html      Agency on Aging: www.goea.[place].[facility]      Alcoholics anonymous (AA): www.aa.org      Physical Activity: www.javed.nih.gov/ca7djbd      Tobacco use: www.quitwithusla.org

## 2017-09-29 NOTE — PROGRESS NOTES
Patient tolerated injection well. VIS given to patient. Patient instructed to wait 15 min before leaving. Patient verbalized understanding.

## 2017-09-29 NOTE — PROGRESS NOTES
Narciso Yanez presented for a  Medicare AWV and comprehensive Health Risk Assessment today. The following components were reviewed and updated:    · Medical history  · Family History  · Social history  · Allergies and Current Medications  · Health Risk Assessment  · Health Maintenance  · Care Team     ** See Completed Assessments for Annual Wellness Visit within the encounter summary.**       The following assessments were completed:  · Living Situation  · CAGE  · Depression Screening  · Timed Get Up and Go  · Whisper Test  · Cognitive Function Screening  · Nutrition Screening  · ADL Screening  · PAQ Screening    Vitals:    09/29/17 1219   BP: 122/78   BP Location: Left arm   Patient Position: Sitting   BP Method: Medium (Manual)   Pulse: 67   SpO2: 99%   Weight: 70.2 kg (154 lb 12.2 oz)     Body mass index is 24.24 kg/m².  Physical Exam   Constitutional: He appears well-developed and well-nourished.   Cardiovascular: Normal rate, regular rhythm and normal heart sounds.    Pulses:       Dorsalis pedis pulses are 1+ on the right side, and 1+ on the left side.        Posterior tibial pulses are 1+ on the right side, and 1+ on the left side.   Pulmonary/Chest: Effort normal and breath sounds normal.   Musculoskeletal: Normal range of motion.        Right foot: There is normal range of motion and no deformity.        Left foot: There is normal range of motion and no deformity.   Feet:   Right Foot:   Protective Sensation: 6 sites tested. 6 sites sensed.   Skin Integrity: Negative for ulcer, blister, skin breakdown, erythema, warmth, callus or dry skin.   Left Foot:   Protective Sensation: 6 sites tested. 6 sites sensed.   Skin Integrity: Negative for ulcer, blister, skin breakdown, erythema, warmth, callus or dry skin.   Neurological: He is alert.   Psychiatric: He has a normal mood and affect. His behavior is normal. Thought content normal.   Vitals reviewed.        Diagnoses and health risks identified today and  associated recommendations/orders:    1. Encounter for preventive health examination  Education provided about preventive health examinations and procedures; addressed and discussed patient's health concerns. Additionally, reviewed medical record for risk factors and documented the results during this encounter.    2. Diabetes mellitus with peripheral circulatory disorder  Education provided about diabetes, management of blood glucose with diet and activities, monitoring for worsening effects of diabetes.  Reviewed most recent Ha1c and informed patient of complications associated with uncontrolled diabetes.     3. 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  Reviewed most recent Ha1c and informed patient of complications associated with uncontrolled diabetes.     4. Tortuous aorta  Stable, benefiting from clopidogrel and beta blocker; monitor    5. Coronary artery disease, angina presence unspecified, unspecified vessel or lesion type, unspecified whether native or transplanted heart  Stable, benefiting from clopidogrel and beta blocker; monitor    6. Need for influenza vaccination  - Influenza - High Dose (65+) (PF) (IM)      Reviewed health maintenance with patient, educated about recommended examinations, procedures (labs & images), and immunizations.      Provided Narciso with a 5-10 year written screening schedule and personal prevention plan. Recommendations were developed using the USPSTF age appropriate recommendations. Education, counseling, and referrals were provided as needed. After Visit Summary printed and given to patient which includes a list of additional screenings\tests needed.    Return in about 1 year (around 9/29/2018) for risk assessment .    Aleksey Olguin Jr, NP

## 2017-10-30 NOTE — TELEPHONE ENCOUNTER
LOV  9/13/17Spoke with patient.  With complaint of left neck pain radiating to the left shoulder x 2 weeks.  Denies chest pain.  Denies recent injury or trauma.  Also, requesting new prescription of Flexeril.

## 2017-10-30 NOTE — TELEPHONE ENCOUNTER
----- Message from Polly Ochoa sent at 10/27/2017  2:44 PM CDT -----  Contact: self  [phone]  Pt is requesting refill on Flexeril and Hydrocodone . Pls call pt [phone]. Thanks........Francheska

## 2017-11-01 RX ORDER — CYCLOBENZAPRINE HCL 10 MG
10 TABLET ORAL 3 TIMES DAILY PRN
Qty: 30 TABLET | Refills: 5 | Status: SHIPPED | OUTPATIENT
Start: 2017-11-01 | End: 2017-11-11

## 2017-11-01 RX ORDER — HYDROCODONE BITARTRATE AND ACETAMINOPHEN 5; 325 MG/1; MG/1
1 TABLET ORAL EVERY 6 HOURS PRN
Qty: 45 TABLET | Refills: 0 | Status: SHIPPED | OUTPATIENT
Start: 2017-11-01 | End: 2017-11-13 | Stop reason: ALTCHOICE

## 2017-11-06 ENCOUNTER — TELEPHONE (OUTPATIENT)
Dept: FAMILY MEDICINE | Facility: CLINIC | Age: 82
End: 2017-11-06

## 2017-11-06 NOTE — TELEPHONE ENCOUNTER
----- Message from Chhaya Hoffmann sent at 11/6/2017 10:39 AM CST -----  Contact: Pavithra/COLIN [facility]  Pavithra is requesting a copy of pt's stress and echo test within the past two years. Please contact her at [phone].    Thanks

## 2017-11-13 ENCOUNTER — OFFICE VISIT (OUTPATIENT)
Dept: CARDIOLOGY | Facility: CLINIC | Age: 82
End: 2017-11-13
Payer: MEDICARE

## 2017-11-13 VITALS
HEART RATE: 100 BPM | HEIGHT: 67 IN | SYSTOLIC BLOOD PRESSURE: 129 MMHG | BODY MASS INDEX: 23.94 KG/M2 | WEIGHT: 152.56 LBS | OXYGEN SATURATION: 95 % | RESPIRATION RATE: 15 BRPM | DIASTOLIC BLOOD PRESSURE: 82 MMHG

## 2017-11-13 DIAGNOSIS — I25.810 CORONARY ARTERY DISEASE INVOLVING CORONARY BYPASS GRAFT OF NATIVE HEART WITHOUT ANGINA PECTORIS: Primary | ICD-10-CM

## 2017-11-13 DIAGNOSIS — Z72.0 TOBACCO ABUSE: ICD-10-CM

## 2017-11-13 DIAGNOSIS — E78.6 HIGH-DENSITY LIPOPROTEIN DEFICIENCY: ICD-10-CM

## 2017-11-13 DIAGNOSIS — I73.9 PAD (PERIPHERAL ARTERY DISEASE): ICD-10-CM

## 2017-11-13 PROCEDURE — 99499 UNLISTED E&M SERVICE: CPT | Mod: S$GLB,,, | Performed by: INTERNAL MEDICINE

## 2017-11-13 PROCEDURE — 99204 OFFICE O/P NEW MOD 45 MIN: CPT | Mod: S$GLB,,, | Performed by: INTERNAL MEDICINE

## 2017-11-13 PROCEDURE — 93010 ELECTROCARDIOGRAM REPORT: CPT | Mod: S$GLB,,, | Performed by: INTERNAL MEDICINE

## 2017-11-13 PROCEDURE — 99999 PR PBB SHADOW E&M-EST. PATIENT-LVL III: CPT | Mod: PBBFAC,,, | Performed by: INTERNAL MEDICINE

## 2017-11-13 NOTE — PROGRESS NOTES
CARDIOVASCULAR CONSULTATION    REASON FOR CONSULT:   Narciso Yanez is a 82 y.o. male who presents for ongoing mgmt of CAD.    PCP: Ehrensing  HISTORY OF PRESENT ILLNESS:   Previously followed by JANE    The patient is a very pleasant 82-year-old  man who comes in today for cardiovascular evaluation.  He previously was followed by heart clinic, but has not been seen in some time.  He was also seen recently by occupational medicine for a 's license, was told that needed to see cardiology for stress testing.  The patient reports a generally symptom medic status without angina or dyspnea.  He has no palpitations, lightheadedness, dizziness, or syncope.  There is been no PND, orthopnea, or lower extremity edema.  He denies melena, hematuria, or claudicant symptoms.    Family history appears to be negative for premature CAD in first-degree relatives.    The patient is a current cigarette smoker, and appears to be wholly uninterested in quitting.    CARDIOVASCULAR HISTORY:   CAD/CABG 1999  PAD s/p LE PTA 2002    PAST MEDICAL HISTORY:     Past Medical History:   Diagnosis Date    Actinic keratosis     Anxiety     B12 deficiency 12/8/2014    Dx 6/14    Cancer     skin    Cataract     Coronary artery disease     Diabetes mellitus type II     Diabetes mellitus with peripheral circulatory disorder 6/18/2014    ED (erectile dysfunction)     Hyperlipidemia     Hypertension     Kidney stone     Peripheral vascular disease     Tobacco dependence        PAST SURGICAL HISTORY:     Past Surgical History:   Procedure Laterality Date    APPENDECTOMY      CARDIAC SURGERY      EXTERNAL EAR SURGERY      EYE SURGERY      cataracts       ALLERGIES AND MEDICATION:     Review of patient's allergies indicates:   Allergen Reactions    Demerol  [meperidine]      Other reaction(s): Unknown     Previous Medications    CIPROFLOXACIN HCL (CILOXAN) 0.3 % OPHTHALMIC SOLUTION        CLOPIDOGREL (PLAVIX) 75  MG TABLET    Take 1 tablet (75 mg total) by mouth once daily.    CRESTOR 20 MG TABLET    TAKE ONE TABLET BY MOUTH EVERY EVENING    DIAZEPAM (VALIUM) 5 MG TABLET    TAKE ONE Tablet BY MOUTH EVERY 8 HOURS AS NEEDED    FLUZONE HIGH-DOSE 2014-15, PF, 180 MCG/0.5 ML SYRG        METOPROLOL SUCCINATE (TOPROL-XL) 25 MG 24 HR TABLET    TAKE ONE Tablet BY MOUTH DAILY    SILDENAFIL (REVATIO) 20 MG TAB    1-5 pills at a time per day prn       SOCIAL HISTORY:     Social History     Social History    Marital status:      Spouse name: N/A    Number of children: N/A    Years of education: N/A     Occupational History    Not on file.     Social History Main Topics    Smoking status: Current Every Day Smoker     Types: Cigarettes    Smokeless tobacco: Never Used    Alcohol use No    Drug use: No    Sexual activity: Not Currently     Other Topics Concern    Not on file     Social History Narrative    .  4 children.  Smokes 2 ppd.  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        FAMILY HISTORY:     Family History   Problem Relation Age of Onset    Stroke Mother        REVIEW OF SYSTEMS:   Review of Systems   Constitutional: Negative for chills, diaphoresis and fever.   HENT: Negative for nosebleeds.    Eyes: Negative for blurred vision, double vision and photophobia.   Respiratory: Negative for hemoptysis, shortness of breath and wheezing.    Cardiovascular: Negative for chest pain, palpitations, orthopnea, claudication, leg swelling and PND.   Gastrointestinal: Negative for abdominal pain, blood in stool, heartburn, melena, nausea and vomiting.   Genitourinary: Negative for flank pain and hematuria.   Musculoskeletal: Negative for falls, myalgias and neck pain.   Skin: Negative for rash.   Neurological: Negative for dizziness, seizures, loss of consciousness, weakness and headaches.   Endo/Heme/Allergies: Negative for polydipsia. Does not bruise/bleed easily.   Psychiatric/Behavioral: Negative for  "depression and memory loss. The patient is not nervous/anxious.        PHYSICAL EXAM:     Vitals:    11/13/17 1504   BP: 129/82   Pulse: 100   Resp: 15    Body mass index is 23.89 kg/m².  Weight: 69.2 kg (152 lb 8.9 oz)   Height: 5' 7" (170.2 cm)     Physical Exam   Constitutional: He is oriented to person, place, and time. He appears well-developed and well-nourished. He is cooperative.  Non-toxic appearance. No distress.   HENT:   Head: Normocephalic and atraumatic.   Eyes: Conjunctivae and EOM are normal. Pupils are equal, round, and reactive to light. No scleral icterus.   Neck: Trachea normal. Neck supple. Normal carotid pulses and no JVD present. Carotid bruit is not present. No neck rigidity. No edema present. No thyromegaly present.   Cardiovascular: Normal rate, regular rhythm, S1 normal and S2 normal.  PMI is not displaced.  Exam reveals no gallop and no friction rub.    No murmur heard.  Pulses:       Carotid pulses are 2+ on the right side, and 2+ on the left side.  Pulmonary/Chest: Effort normal and breath sounds normal. No respiratory distress. He has no wheezes. He has no rales. He exhibits no tenderness.   Abdominal: Soft. Bowel sounds are normal. He exhibits no distension and no mass. There is no hepatosplenomegaly. There is no tenderness.   Musculoskeletal: He exhibits no edema or tenderness.   Feet:   Right Foot:   Skin Integrity: Negative for ulcer.   Left Foot:   Skin Integrity: Negative for ulcer.   Neurological: He is alert and oriented to person, place, and time. No cranial nerve deficit.   Skin: Skin is warm and dry. No rash noted. No erythema.   Psychiatric: He has a normal mood and affect. His speech is normal and behavior is normal.   Vitals reviewed.      DATA:   EKG: (personally reviewed tracing)  11/13/17 SR 97    Laboratory:  CBC:    Recent Labs  Lab 05/10/16  1551 04/28/17  0719 07/13/17  2051   WHITE BLOOD CELL COUNT 8.73 9.10 16.96 H   HEMOGLOBIN 14.4 14.1 14.5   HEMATOCRIT 42.7 " 40.6 41.5   PLATELETS 220 241 186       CHEMISTRIES:    Recent Labs  Lab 05/10/16  1551 04/28/17  0810 07/13/17 2051   GLUCOSE 95 122 H 119 H   SODIUM 141 137 136   POTASSIUM 4.4 5.1 4.1   BUN BLD 15 18 14   CREATININE 1.1 1.4 1.1   EGFR IF  >60.0 55 A >60   EGFR IF NON- >60.0 47 A >60   CALCIUM 9.5 9.8 9.2       CARDIAC BIOMARKERS:    Recent Labs  Lab 04/28/17  0810   TROPONIN I 0.006       COAGS:    Recent Labs  Lab 04/28/17  0810 07/13/17 2051   INR 0.9 1.0       LIPIDS/LFTS:    Recent Labs  Lab 08/26/15  1549 05/10/16  1551 04/28/17  0810 07/13/17 2051 07/19/17  0758   CHOLESTEROL 121  --   --   --  132   TRIGLYCERIDES 166 H  --   --   --  95   HDL 33 L  --   --   --  39 L   LDL CHOLESTEROL 54.8 L  --   --   --  74.0   NON-HDL CHOLESTEROL 88  --   --   --  93   AST  --  27 32 22  --    ALT  --  17 17 12  --        Cardiovascular Testing:  Ordered  Ex MPI  Echo  LE art US/LACIE    ASSESSMENT:   # CAD s/p CABG without recent ischemic assessment  # CDL clearance requested  # HTN, controlled  # HLP on crestor 20mg  # PAD s/p PTA (?iliac arteru stents per pt description)  # Tob abuse, pt uninterested in cessation    PLAN:   Cont med rx  Ex MPI  Echo  LE art US/LACIE  Obtain records from [facility]  RTC 1 month for CDL clearance      Wiley Vargas MD, FACC

## 2017-12-01 RX ORDER — ROSUVASTATIN CALCIUM 20 MG/1
20 TABLET, COATED ORAL NIGHTLY
Qty: 90 TABLET | Refills: 12 | Status: SHIPPED | OUTPATIENT
Start: 2017-12-01 | End: 2019-03-16 | Stop reason: SDUPTHER

## 2017-12-01 RX ORDER — CLOPIDOGREL BISULFATE 75 MG/1
75 TABLET ORAL DAILY
Qty: 90 TABLET | Refills: 4 | Status: SHIPPED | OUTPATIENT
Start: 2017-12-01 | End: 2019-01-05 | Stop reason: SDUPTHER

## 2017-12-01 NOTE — TELEPHONE ENCOUNTER
----- Message from Norah Bautista sent at 12/1/2017  3:34 PM CST -----  Contact: [phone]  Refill:clopidogrel (PLAVIX) 75 mg tablet and CRESTOR 20 mg tablet [place] [facility] [place] [address] [place] [address]

## 2017-12-18 ENCOUNTER — OFFICE VISIT (OUTPATIENT)
Dept: FAMILY MEDICINE | Facility: CLINIC | Age: 82
End: 2017-12-18
Payer: MEDICARE

## 2017-12-18 VITALS
SYSTOLIC BLOOD PRESSURE: 140 MMHG | WEIGHT: 155 LBS | HEART RATE: 94 BPM | BODY MASS INDEX: 24.33 KG/M2 | DIASTOLIC BLOOD PRESSURE: 78 MMHG | TEMPERATURE: 98 F | OXYGEN SATURATION: 93 % | HEIGHT: 67 IN

## 2017-12-18 DIAGNOSIS — I25.810 CORONARY ARTERY DISEASE INVOLVING CORONARY BYPASS GRAFT OF NATIVE HEART WITHOUT ANGINA PECTORIS: Primary | ICD-10-CM

## 2017-12-18 DIAGNOSIS — C44.90 SKIN CANCER: ICD-10-CM

## 2017-12-18 DIAGNOSIS — I73.9 PAD (PERIPHERAL ARTERY DISEASE): ICD-10-CM

## 2017-12-18 PROCEDURE — 99214 OFFICE O/P EST MOD 30 MIN: CPT | Mod: S$GLB,,, | Performed by: INTERNAL MEDICINE

## 2017-12-18 PROCEDURE — 99999 PR PBB SHADOW E&M-EST. PATIENT-LVL III: CPT | Mod: PBBFAC,,, | Performed by: INTERNAL MEDICINE

## 2017-12-18 PROCEDURE — 99499 UNLISTED E&M SERVICE: CPT | Mod: S$GLB,,, | Performed by: INTERNAL MEDICINE

## 2017-12-18 NOTE — PROGRESS NOTES
Chief complaint spot on head and cardiac testing    82-year-old white male still smoking.  Patient had a CDL physical with a new physician and he was recommended to have cardiac testing.  He is seen cardiology who ordered all appropriate testing for both his heart disease and his peripheral vascular disease.  He has history of bypass and peripheral stenting.  He has no symptoms referable to this.  He does have significant ongoing risk factors including diabetes hypertension hyperlipidemia and continued smoking with no desire or plans to quit.  We discussed all these issues at length.  He also for 6 months as an enlarging lump on the vertex of his scalp.  On exam today it is likely a 1 x 1 cm protruding skin cancer and he does have an appointment with Dr. Nicole.  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  We discussed all these issues at great length today and it was therapeutic for him to discuss all the issues he's had with prior cardiology and is frustration with getting his CDL physical done.  We discussed having repeat CDL physical with one of our providers here so that they can review all the cardiac information and so forth, review if and when he is cleared by cardiology etc.Total time over 25 minutes with over 50% counseling.      ROS:   CONST: No other myalgias arthralgias, no perceived weakness or any other neurological symptoms except as above    PAST MEDICAL HISTORY:                                                        1. Hyperlipidemia.                                                           2. Coronary disease, seen prior by Dr. Roy, 4-vessel bypass in 1999.   3. Peripheral vascular disease, stented in both legs 2002 and been on Plavix since.                                                                4. Kidney stones, last episode January 2007.                                 5. Right renal cyst apparently.  Saw Dr. Labadie and then second opinion at [facility] by a Dr. Shipman, currently  going to be followed.                      6. Appendectomy.                                                             7. Actinic keratosis, saw Dr. Ambriz.                                      8. Left facial numbness, probable TIA, admit [facility] 2007. Carotid ultrasound apparently clear  9. Cataracts  10. Skin cancer  11. HYPERTENSION  12.  Diabetes, uncontrolled, with circulatory disease  13.  Low HDL  14. Declines Colonoscopy  15. Pneumovax after 67 done, Prevnar 13 given   16.  B12 deficiency diagnosed 2014    early satiety                                                                                       SOCIAL HISTORY:  He smokes 1 pack per day and does not drink.  He was about   52 years but now a .  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                                                                                                                                                 FAMILY HISTORY:  Father  at 63 of heart disease and stroke.  Mother       at 86, 4 brothers, 2 sisters living. Brother with strokes.      Gen: no distress  Narciso was seen today for lump on head.    Diagnoses and all orders for this visit:    Coronary artery disease involving coronary bypass graft of native heart without angina pectoris, asymptomatic but agree with the cardiac workup    Skin cancer, new problem, likely will need to be removed    PAD (peripheral artery disease), agree with workup

## 2017-12-20 ENCOUNTER — HOSPITAL ENCOUNTER (OUTPATIENT)
Dept: RADIOLOGY | Facility: HOSPITAL | Age: 82
Discharge: HOME | End: 2017-12-20
Attending: INTERNAL MEDICINE
Payer: MEDICARE

## 2017-12-20 ENCOUNTER — HOSPITAL ENCOUNTER (OUTPATIENT)
Dept: CARDIOLOGY | Facility: HOSPITAL | Age: 82
Discharge: HOME | End: 2017-12-20
Attending: INTERNAL MEDICINE
Payer: MEDICARE

## 2017-12-20 DIAGNOSIS — I25.810 CORONARY ARTERY DISEASE INVOLVING CORONARY BYPASS GRAFT OF NATIVE HEART WITHOUT ANGINA PECTORIS: ICD-10-CM

## 2017-12-20 DIAGNOSIS — I73.9 PAD (PERIPHERAL ARTERY DISEASE): ICD-10-CM

## 2017-12-20 LAB
DIASTOLIC DYSFUNCTION: NO
DIASTOLIC DYSFUNCTION: NO
ESTIMATED PA SYSTOLIC PRESSURE: 26.62
GLOBAL PERICARDIAL EFFUSION: NORMAL
MITRAL VALVE MOBILITY: NORMAL
RETIRED EF AND QEF - SEE NOTES: 55 (ref 55–65)
TRICUSPID VALVE REGURGITATION: NORMAL

## 2017-12-20 PROCEDURE — 93925 LOWER EXTREMITY STUDY: CPT | Mod: TC

## 2017-12-20 PROCEDURE — 78452 HT MUSCLE IMAGE SPECT MULT: CPT | Mod: 26,,, | Performed by: INTERNAL MEDICINE

## 2017-12-20 PROCEDURE — 93306 TTE W/DOPPLER COMPLETE: CPT

## 2017-12-20 PROCEDURE — 93922 UPR/L XTREMITY ART 2 LEVELS: CPT | Mod: 26,,, | Performed by: RADIOLOGY

## 2017-12-20 PROCEDURE — 93925 LOWER EXTREMITY STUDY: CPT | Mod: 26,,, | Performed by: RADIOLOGY

## 2017-12-20 PROCEDURE — 93017 CV STRESS TEST TRACING ONLY: CPT

## 2017-12-20 PROCEDURE — 93016 CV STRESS TEST SUPVJ ONLY: CPT | Mod: ,,, | Performed by: INTERNAL MEDICINE

## 2017-12-20 PROCEDURE — 93018 CV STRESS TEST I&R ONLY: CPT | Mod: ,,, | Performed by: INTERNAL MEDICINE

## 2017-12-20 PROCEDURE — A9502 TC99M TETROFOSMIN: HCPCS

## 2017-12-20 PROCEDURE — 78452 HT MUSCLE IMAGE SPECT MULT: CPT | Mod: TC

## 2017-12-20 PROCEDURE — 93306 TTE W/DOPPLER COMPLETE: CPT | Mod: 26,,, | Performed by: INTERNAL MEDICINE

## 2017-12-22 ENCOUNTER — OFFICE VISIT (OUTPATIENT)
Dept: CARDIOLOGY | Facility: CLINIC | Age: 82
End: 2017-12-22
Payer: MEDICARE

## 2017-12-22 VITALS
HEIGHT: 67 IN | WEIGHT: 155.63 LBS | HEART RATE: 89 BPM | OXYGEN SATURATION: 96 % | RESPIRATION RATE: 15 BRPM | BODY MASS INDEX: 24.43 KG/M2 | DIASTOLIC BLOOD PRESSURE: 77 MMHG | SYSTOLIC BLOOD PRESSURE: 134 MMHG

## 2017-12-22 DIAGNOSIS — E11.51 DIABETES MELLITUS WITH PERIPHERAL CIRCULATORY DISORDER: ICD-10-CM

## 2017-12-22 DIAGNOSIS — E78.6 HIGH-DENSITY LIPOPROTEIN DEFICIENCY: ICD-10-CM

## 2017-12-22 DIAGNOSIS — I73.9 PAD (PERIPHERAL ARTERY DISEASE): ICD-10-CM

## 2017-12-22 DIAGNOSIS — Z72.0 TOBACCO ABUSE: ICD-10-CM

## 2017-12-22 DIAGNOSIS — I25.810 CORONARY ARTERY DISEASE INVOLVING CORONARY BYPASS GRAFT OF NATIVE HEART WITHOUT ANGINA PECTORIS: Primary | ICD-10-CM

## 2017-12-22 PROCEDURE — 99499 UNLISTED E&M SERVICE: CPT | Mod: S$GLB,,, | Performed by: INTERNAL MEDICINE

## 2017-12-22 PROCEDURE — 99215 OFFICE O/P EST HI 40 MIN: CPT | Mod: S$GLB,,, | Performed by: INTERNAL MEDICINE

## 2017-12-22 PROCEDURE — 99999 PR PBB SHADOW E&M-EST. PATIENT-LVL III: CPT | Mod: PBBFAC,,, | Performed by: INTERNAL MEDICINE

## 2017-12-22 NOTE — PROGRESS NOTES
CARDIOVASCULAR CONSULTATION    REASON FOR CONSULT:   Narciso Yanez is a 82 y.o. male who presents for ongoing mgmt of CAD, PAD, f/u testing.    PCP: Ehrensing  HISTORY OF PRESENT ILLNESS:   The patient returns for follow-up of his testing.  He reports an asymptomatic status without angina or dyspnea.  There's been no palpitations, lightheadedness, dizziness, or syncope.  He denies PND, orthopnea, melena, hematuria, or claudicant symptoms, or lower extremity edema.  The patient tells me that he did get short winded during his stress test, but not have any angina, and that he could've continued exercising, however the staff stopped him due to his dyspnea.    Records from [facility] were reviewed:  1/10/1999: CABG with LIMA-LAD, SVG-LCx, SVG-RCA  5/1999: Angio revealed Patent LIMA-LAD, SVGx2 occluded  6/2003: PTA with bilat YUE stents and R EIA stent    I reviewed the patient's cardiac and vascular testing.  His nuclear stress test revealed normal perfusion at a somewhat low workload.  The patient had no anginal symptoms during the treadmill stress test, but did have dyspnea, as well as a drop in his systolic blood pressure with exercise.  The echocardiogram was normal.  The lower extremity arterial ultrasound did not reveal any significant disease.    We reviewed the hypertensive response during treadmill exercise.  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    CARDIOVASCULAR HISTORY:   CAD/CABG 1/10/1999: CABG with LIMA-LAD, SVG-LCx, SVG-RCA ([facility] records)    5/1999: Angio revealed Patent LIMA-LAD, SVGx2 occluded ([facility] records)    PAD s/p LE PTA (6/2003: PTA with bilat YUE stents and R EIA stent)    PAST MEDICAL HISTORY:     Past Medical History:   Diagnosis Date    Actinic keratosis     Anxiety     B12 deficiency 12/8/2014    Dx 6/14    Cancer     skin     Cataract     Coronary artery disease     Diabetes mellitus type II     Diabetes mellitus with peripheral circulatory disorder 6/18/2014    ED (erectile dysfunction)     Hyperlipidemia     Hypertension     Kidney stone     Peripheral vascular disease     Tobacco dependence        PAST SURGICAL HISTORY:     Past Surgical History:   Procedure Laterality Date    APPENDECTOMY      CARDIAC SURGERY      EXTERNAL EAR SURGERY      EYE SURGERY      cataracts       ALLERGIES AND MEDICATION:     Review of patient's allergies indicates:   Allergen Reactions    Demerol  [meperidine]      Other reaction(s): Unknown     Previous Medications    CIPROFLOXACIN HCL (CILOXAN) 0.3 % OPHTHALMIC SOLUTION        CLOPIDOGREL (PLAVIX) 75 MG TABLET    Take 1 tablet (75 mg total) by mouth once daily.    DIAZEPAM (VALIUM) 5 MG TABLET    TAKE ONE Tablet BY MOUTH EVERY 8 HOURS AS NEEDED    FLUZONE HIGH-DOSE 2014-15, PF, 180 MCG/0.5 ML SYRG        METOPROLOL SUCCINATE (TOPROL-XL) 25 MG 24 HR TABLET    TAKE ONE Tablet BY MOUTH DAILY    ROSUVASTATIN (CRESTOR) 20 MG TABLET    Take 1 tablet (20 mg total) by mouth every evening.    SILDENAFIL (REVATIO) 20 MG TAB    1-5 pills at a time per day prn       SOCIAL HISTORY:     Social History     Social History    Marital status:      Spouse name: N/A    Number of children: N/A    Years of education: N/A     Occupational History    Not on file.     Social History Main Topics    Smoking status: Current Every Day Smoker     Types: Cigarettes    Smokeless tobacco: Never Used    Alcohol use No    Drug use: No    Sexual activity: Not Currently     Other Topics Concern    Not on file     Social History Narrative    .  4 children.  Smokes 2 ppd.  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        FAMILY HISTORY:     Family History   Problem Relation Age of Onset    Stroke Mother        REVIEW OF SYSTEMS:   Review of Systems   Constitutional: Negative for chills,  "diaphoresis and fever.   HENT: Negative for nosebleeds.    Eyes: Negative for blurred vision, double vision and photophobia.   Respiratory: Negative for hemoptysis, shortness of breath and wheezing.    Cardiovascular: Negative for chest pain, palpitations, orthopnea, claudication, leg swelling and PND.   Gastrointestinal: Negative for abdominal pain, blood in stool, heartburn, melena, nausea and vomiting.   Genitourinary: Negative for flank pain and hematuria.   Musculoskeletal: Negative for falls, myalgias and neck pain.   Skin: Negative for rash.   Neurological: Negative for dizziness, seizures, loss of consciousness, weakness and headaches.   Endo/Heme/Allergies: Negative for polydipsia. Does not bruise/bleed easily.   Psychiatric/Behavioral: Negative for depression and memory loss. The patient is not nervous/anxious.        PHYSICAL EXAM:     Vitals:    12/22/17 0927   BP: 134/77   Pulse: 89   Resp: 15    Body mass index is 24.38 kg/m².  Weight: 70.6 kg (155 lb 10.3 oz)   Height: 5' 7" (170.2 cm)     Physical Exam   Constitutional: He is oriented to person, place, and time. He appears well-developed and well-nourished. He is cooperative.  Non-toxic appearance. No distress.   HENT:   Head: Normocephalic and atraumatic.   Eyes: Conjunctivae and EOM are normal. Pupils are equal, round, and reactive to light. No scleral icterus.   Neck: Trachea normal. Neck supple. Normal carotid pulses and no JVD present. Carotid bruit is not present. No neck rigidity. No edema present. No thyromegaly present.   Cardiovascular: Normal rate, regular rhythm, S1 normal and S2 normal.  PMI is not displaced.  Exam reveals no gallop and no friction rub.    No murmur heard.  Pulses:       Carotid pulses are 2+ on the right side, and 2+ on the left side.  Pulmonary/Chest: Effort normal and breath sounds normal. No respiratory distress. He has no wheezes. He has no rales. He exhibits no tenderness.   Abdominal: Soft. Bowel sounds are " normal. He exhibits no distension and no mass. There is no hepatosplenomegaly. There is no tenderness.   Musculoskeletal: He exhibits no edema or tenderness.   Feet:   Right Foot:   Skin Integrity: Negative for ulcer.   Left Foot:   Skin Integrity: Negative for ulcer.   Neurological: He is alert and oriented to person, place, and time. No cranial nerve deficit.   Skin: Skin is warm and dry. No rash noted. No erythema.   Psychiatric: He has a normal mood and affect. His speech is normal and behavior is normal.   Vitals reviewed.      DATA:   EKG: (personally reviewed tracing)  11/13/17 SR 97    Laboratory:  CBC:    Recent Labs  Lab 05/10/16  1551 04/28/17  0719 07/13/17 2051   WHITE BLOOD CELL COUNT 8.73 9.10 16.96 H   HEMOGLOBIN 14.4 14.1 14.5   HEMATOCRIT 42.7 40.6 41.5   PLATELETS 220 241 186       CHEMISTRIES:    Recent Labs  Lab 05/10/16  1551 04/28/17  0810 07/13/17 2051   GLUCOSE 95 122 H 119 H   SODIUM 141 137 136   POTASSIUM 4.4 5.1 4.1   BUN BLD 15 18 14   CREATININE 1.1 1.4 1.1   EGFR IF  >60.0 55 A >60   EGFR IF NON- >60.0 47 A >60   CALCIUM 9.5 9.8 9.2       CARDIAC BIOMARKERS:    Recent Labs  Lab 04/28/17  0810   TROPONIN I 0.006       COAGS:    Recent Labs  Lab 04/28/17  0810 07/13/17 2051   INR 0.9 1.0       LIPIDS/LFTS:    Recent Labs  Lab 08/26/15  1549 05/10/16  1551 04/28/17  0810 07/13/17 2051 07/19/17  0758   CHOLESTEROL 121  --   --   --  132   TRIGLYCERIDES 166 H  --   --   --  95   HDL 33 L  --   --   --  39 L   LDL CHOLESTEROL 54.8 L  --   --   --  74.0   NON-HDL CHOLESTEROL 88  --   --   --  93   AST  --  27 32 22  --    ALT  --  17 17 12  --        Cardiovascular Testing:  Ex MPI 12/20/17 (low workload, drop in BP (144->105 systolic) noted during ETT)  The patient exercised for 3.02 minutes on a Sixto protocol, corresponding to a functional capacity of 5 estimated METS, achieving a peak heart rate of 126 bpm, which is 90% of the age predicted maximum  heart rate. -CP. At peak exercise, EKG revealed < 1mm of horizontal ST segment depression at a maximum heart rate of 126 bpm.   Nuclear Quantitative Functional Analysis:   LVEF: 61 %  LVED Volume: 59 ml  LVES Volume: 23 ml  Impression: NORMAL MYOCARDIAL PERFUSION  1. The perfusion scan is free of evidence for myocardial ischemia or injury.   2. Resting wall motion is physiologic.   3. Resting LV function is normal.   4. The ventricular volumes are normal at rest and stress.   5. The extracardiac distribution of radioactivity is normal.     Echo 12/20/17    1 - Normal left ventricular systolic function (EF 55-60%).     2 - No wall motion abnormalities.     3 - Concentric remodeling.     4 - Trivial tricuspid regurgitation.     5 - The estimated PA systolic pressure is 27 mmHg.     LE art US/LACIE 12/20/17  Right LACIE: DPA 0.99, PTA 0.72.  Left LACIE: DPA 0.73, PTA 0.91.  1.  Mild Moderate plaquing femoral popliteal arteries without stenosis greater than 50 percent on ultrasound.  2.  LACIE results: Right PTA and left DPA, suggest mild/moderate occlusive disease change, not confirmed on color Doppler, grayscale, duplex evaluation, most likely due to resistance of vascular walls.     [facility] Records reviewed:  1/10/1999: CABG with LIMA-LAD, SVG-LCx, SVG-RCA  5/1999: Angio revealed Patent LIMA-LAD, SVGx2 occluded  6/2003: PTA with bilat YUE stents and R EIA stent      ASSESSMENT:   # CAD s/p CABG, MPI/echo 12/2017 normal (although low exercise capacity and ?hypotensive response during TMET), pt reports no sxs with his usual activities.  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  RTC 6 months  If anginal/CHF sxs intervene, consider loan.    Wiley Vargas MD, FACC

## 2017-12-26 ENCOUNTER — OFFICE VISIT (OUTPATIENT)
Dept: FAMILY MEDICINE | Facility: CLINIC | Age: 82
End: 2017-12-26
Payer: MEDICARE

## 2017-12-26 VITALS
HEART RATE: 72 BPM | OXYGEN SATURATION: 97 % | WEIGHT: 156 LBS | HEIGHT: 67 IN | BODY MASS INDEX: 24.48 KG/M2 | RESPIRATION RATE: 18 BRPM | DIASTOLIC BLOOD PRESSURE: 74 MMHG | TEMPERATURE: 98 F | SYSTOLIC BLOOD PRESSURE: 146 MMHG

## 2017-12-26 DIAGNOSIS — Z02.4 ENCOUNTER FOR CDL (COMMERCIAL DRIVING LICENSE) EXAM: Primary | ICD-10-CM

## 2017-12-26 LAB
BILIRUB SERPL-MCNC: NEGATIVE MG/DL
BLOOD URINE, POC: NEGATIVE
COLOR, POC UA: YELLOW
GLUCOSE UR QL STRIP: NORMAL
KETONES UR QL STRIP: NEGATIVE
LEUKOCYTE ESTERASE URINE, POC: NEGATIVE
NITRITE, POC UA: NEGATIVE
PH, POC UA: 5
PROTEIN, POC: NORMAL
SPECIFIC GRAVITY, POC UA: 1.02
UROBILINOGEN, POC UA: NORMAL

## 2017-12-26 PROCEDURE — 99214 OFFICE O/P EST MOD 30 MIN: CPT | Mod: 25,S$GLB,, | Performed by: NURSE PRACTITIONER

## 2017-12-26 PROCEDURE — 81001 URINALYSIS AUTO W/SCOPE: CPT | Mod: S$GLB,,, | Performed by: NURSE PRACTITIONER

## 2017-12-26 PROCEDURE — 99999 PR PBB SHADOW E&M-EST. PATIENT-LVL III: CPT | Mod: PBBFAC,,, | Performed by: NURSE PRACTITIONER

## 2017-12-26 NOTE — PROGRESS NOTES
Subjective:       Patient ID: Narciso Yanez is a 82 y.o. male.    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  He feels well today, reports medication compliance, denies any h/o LOC, seizures or insulin use.  He drives locally.  Review of Systems   Constitutional: Negative for fever.   Respiratory: Negative.    Cardiovascular: Negative.        Objective:      Physical Exam   Constitutional: He is oriented to person, place, and time. He appears well-developed and well-nourished. He does not appear ill. No distress.   HENT:   Head: Normocephalic and atraumatic.   Right Ear: Tympanic membrane normal.   Left Ear: Tympanic membrane normal.   Mouth/Throat: Uvula is midline, oropharynx is clear and moist and mucous membranes are normal.   Forced whisper normal from 5 feet   Eyes: EOM are normal. Pupils are equal, round, and reactive to light.   Corrected visual acuity under hearing/vision tab  Horizontal field of vision 70 degrees  Able to distinguish colors on the flag   Neck: Normal range of motion. Neck supple.   Cardiovascular: Normal rate, regular rhythm and normal heart sounds.  Exam reveals no friction rub.    No murmur heard.  Pulmonary/Chest: Effort normal and breath sounds normal. No respiratory distress. He has no decreased breath sounds. He has no wheezes. He has no rhonchi. He has no rales.   Abdominal: Soft. Bowel sounds are normal. He exhibits no distension. There is no hepatosplenomegaly. There is no tenderness. There is no guarding.   Musculoskeletal: Normal range of motion.   BUE 5/5  BLE 5/5  b hand  equal and strong   Neurological: He is alert and oriented to person, place, and time.   Skin: Skin is warm and dry.   Vitals reviewed.      Assessment:       1. Encounter for CDL (commercial driving license) exam        Plan:       Encounter for CDL (commercial driving license) exam  -     POCT urinalysis, dipstick or tablet reag    He  is cleared for 2 years, he must wear his glasses when driving, report any new medications or conditions to provider    F/u with PCP as previously directed.  ER for new worse or concerning symptoms

## 2017-12-26 NOTE — PATIENT INSTRUCTIONS
Follow up with primary care provider as previously directed  Go to ER for new, worse or concerning symptoms    Why Do You Smoke?  The more you know about why you smoke, the easier it will be to quit. You may reach for a cigarette during a stressful commute. Or you may want to smoke when you first wake up in the morning. Learn what your smoking triggers are, and how to handle them.    Common triggers  · Frustration  · Fatigue  · Anger  · Stress  · Hunger  · Boredom or loneliness  · Drinking or socializing  · Watching others smoke  · Smelling cigarette smoke  Track your smoking habits  To learn about your smoking habits, track them for a week. Attach a small notebook or piece of paper to your cigarette pack. With each cigarette you smoke, write down the time, where you are, who youre with, and how you feel.  How to cope with your triggers  · Change the habits that lead you to smoke. For instance, if you often smoke at a morning break, go for a walk instead.  · Distract yourself from smoking. Keep your hands busy by playing with a paper clip or doodling. Keep your mouth busy by chewing on gum or a carrot stick.  · Limit contact with people who are smoking. When you eat out, sit in the nonsmoking section.     For more information  · https://smokefree.gov/cpqv-gy-zk-expert  · National [employer] [place] Smoking Quitline: 877-44U-QUIT (859-401-3374)      Date Last Reviewed: 2/1/2017  © 8857-2159 [employer] [address] [place] [address], [place] [address]. All rights reserved. This information is not intended as a substitute for professional medical care. Always follow your healthcare professional's instructions.

## 2018-05-07 ENCOUNTER — PES CALL (OUTPATIENT)
Dept: ADMINISTRATIVE | Facility: CLINIC | Age: 83
End: 2018-05-07

## 2018-05-11 RX ORDER — SILDENAFIL CITRATE 20 MG/1
TABLET ORAL
Qty: 30 TABLET | Refills: 1 | Status: SHIPPED | OUTPATIENT
Start: 2018-05-11 | End: 2018-07-10 | Stop reason: SDUPTHER

## 2018-06-28 RX ORDER — METOPROLOL SUCCINATE 25 MG/1
TABLET, EXTENDED RELEASE ORAL
Qty: 90 TABLET | Refills: 1 | Status: SHIPPED | OUTPATIENT
Start: 2018-06-28 | End: 2019-02-14 | Stop reason: SDUPTHER

## 2018-07-10 RX ORDER — SILDENAFIL CITRATE 20 MG/1
TABLET ORAL
Qty: 30 TABLET | Refills: 3 | Status: SHIPPED | OUTPATIENT
Start: 2018-07-10 | End: 2018-09-10 | Stop reason: SDUPTHER

## 2018-09-10 ENCOUNTER — LAB VISIT (OUTPATIENT)
Dept: LAB | Facility: HOSPITAL | Age: 83
End: 2018-09-10
Attending: INTERNAL MEDICINE
Payer: MEDICARE

## 2018-09-10 ENCOUNTER — OFFICE VISIT (OUTPATIENT)
Dept: FAMILY MEDICINE | Facility: CLINIC | Age: 83
End: 2018-09-10
Payer: MEDICARE

## 2018-09-10 VITALS
HEART RATE: 82 BPM | BODY MASS INDEX: 25.57 KG/M2 | OXYGEN SATURATION: 96 % | DIASTOLIC BLOOD PRESSURE: 70 MMHG | TEMPERATURE: 98 F | HEIGHT: 67 IN | WEIGHT: 162.94 LBS | SYSTOLIC BLOOD PRESSURE: 138 MMHG

## 2018-09-10 DIAGNOSIS — R79.89 ABNORMAL THYROID BLOOD TEST: ICD-10-CM

## 2018-09-10 DIAGNOSIS — Z00.00 ROUTINE MEDICAL EXAM: Primary | ICD-10-CM

## 2018-09-10 DIAGNOSIS — R42 VERTIGO: ICD-10-CM

## 2018-09-10 DIAGNOSIS — I73.9 PAD (PERIPHERAL ARTERY DISEASE): ICD-10-CM

## 2018-09-10 DIAGNOSIS — I77.1 TORTUOUS AORTA: ICD-10-CM

## 2018-09-10 DIAGNOSIS — E11.51 DIABETES MELLITUS WITH PERIPHERAL CIRCULATORY DISORDER: ICD-10-CM

## 2018-09-10 DIAGNOSIS — E53.8 B12 DEFICIENCY: ICD-10-CM

## 2018-09-10 DIAGNOSIS — Z00.00 ROUTINE MEDICAL EXAM: ICD-10-CM

## 2018-09-10 DIAGNOSIS — I25.10 CORONARY ARTERY DISEASE, ANGINA PRESENCE UNSPECIFIED, UNSPECIFIED VESSEL OR LESION TYPE, UNSPECIFIED WHETHER NATIVE OR TRANSPLANTED HEART: ICD-10-CM

## 2018-09-10 LAB
ALBUMIN SERPL BCP-MCNC: 3.6 G/DL
ALP SERPL-CCNC: 74 U/L
ALT SERPL W/O P-5'-P-CCNC: 14 U/L
ANION GAP SERPL CALC-SCNC: 8 MMOL/L
AST SERPL-CCNC: 24 U/L
BASOPHILS # BLD AUTO: 0.05 K/UL
BASOPHILS NFR BLD: 0.6 %
BILIRUB SERPL-MCNC: 0.4 MG/DL
BUN SERPL-MCNC: 15 MG/DL
CALCIUM SERPL-MCNC: 9.3 MG/DL
CHLORIDE SERPL-SCNC: 107 MMOL/L
CO2 SERPL-SCNC: 25 MMOL/L
CREAT SERPL-MCNC: 1.4 MG/DL
DIFFERENTIAL METHOD: ABNORMAL
EOSINOPHIL # BLD AUTO: 0.3 K/UL
EOSINOPHIL NFR BLD: 3.7 %
ERYTHROCYTE [DISTWIDTH] IN BLOOD BY AUTOMATED COUNT: 17.2 %
EST. GFR  (AFRICAN AMERICAN): 54.1 ML/MIN/1.73 M^2
EST. GFR  (NON AFRICAN AMERICAN): 46.8 ML/MIN/1.73 M^2
GLUCOSE SERPL-MCNC: 102 MG/DL
HCT VFR BLD AUTO: 34.6 %
HGB BLD-MCNC: 11 G/DL
IMM GRANULOCYTES # BLD AUTO: 0.02 K/UL
IMM GRANULOCYTES NFR BLD AUTO: 0.2 %
LYMPHOCYTES # BLD AUTO: 3.2 K/UL
LYMPHOCYTES NFR BLD: 36.5 %
MCH RBC QN AUTO: 28.5 PG
MCHC RBC AUTO-ENTMCNC: 31.8 G/DL
MCV RBC AUTO: 90 FL
MONOCYTES # BLD AUTO: 0.9 K/UL
MONOCYTES NFR BLD: 10.8 %
NEUTROPHILS # BLD AUTO: 4.2 K/UL
NEUTROPHILS NFR BLD: 48.2 %
NRBC BLD-RTO: 0 /100 WBC
PLATELET # BLD AUTO: 264 K/UL
PMV BLD AUTO: 10.2 FL
POTASSIUM SERPL-SCNC: 4.5 MMOL/L
PROT SERPL-MCNC: 7.8 G/DL
RBC # BLD AUTO: 3.86 M/UL
SODIUM SERPL-SCNC: 140 MMOL/L
T4 FREE SERPL-MCNC: 0.79 NG/DL
TSH SERPL DL<=0.005 MIU/L-ACNC: 5.04 UIU/ML
VIT B12 SERPL-MCNC: 345 PG/ML
WBC # BLD AUTO: 8.71 K/UL

## 2018-09-10 PROCEDURE — 83036 HEMOGLOBIN GLYCOSYLATED A1C: CPT

## 2018-09-10 PROCEDURE — 99214 OFFICE O/P EST MOD 30 MIN: CPT | Mod: 25,S$PBB,, | Performed by: INTERNAL MEDICINE

## 2018-09-10 PROCEDURE — 84439 ASSAY OF FREE THYROXINE: CPT

## 2018-09-10 PROCEDURE — 84443 ASSAY THYROID STIM HORMONE: CPT

## 2018-09-10 PROCEDURE — 36415 COLL VENOUS BLD VENIPUNCTURE: CPT | Mod: PO

## 2018-09-10 PROCEDURE — 80053 COMPREHEN METABOLIC PANEL: CPT

## 2018-09-10 PROCEDURE — 85025 COMPLETE CBC W/AUTO DIFF WBC: CPT

## 2018-09-10 PROCEDURE — 1101F PT FALLS ASSESS-DOCD LE1/YR: CPT | Mod: CPTII,,, | Performed by: INTERNAL MEDICINE

## 2018-09-10 PROCEDURE — 99397 PER PM REEVAL EST PAT 65+ YR: CPT | Mod: 25,S$PBB,, | Performed by: INTERNAL MEDICINE

## 2018-09-10 PROCEDURE — 3078F DIAST BP <80 MM HG: CPT | Mod: CPTII,,, | Performed by: INTERNAL MEDICINE

## 2018-09-10 PROCEDURE — 99213 OFFICE O/P EST LOW 20 MIN: CPT | Mod: PBBFAC,PO | Performed by: INTERNAL MEDICINE

## 2018-09-10 PROCEDURE — 99499 UNLISTED E&M SERVICE: CPT | Mod: HCNC,S$GLB,, | Performed by: INTERNAL MEDICINE

## 2018-09-10 PROCEDURE — 3075F SYST BP GE 130 - 139MM HG: CPT | Mod: CPTII,,, | Performed by: INTERNAL MEDICINE

## 2018-09-10 PROCEDURE — 99999 PR PBB SHADOW E&M-EST. PATIENT-LVL III: CPT | Mod: PBBFAC,,, | Performed by: INTERNAL MEDICINE

## 2018-09-10 PROCEDURE — 82607 VITAMIN B-12: CPT

## 2018-09-10 RX ORDER — DIAZEPAM 5 MG/1
5 TABLET ORAL EVERY 8 HOURS PRN
Qty: 60 TABLET | Refills: 3 | Status: SHIPPED | OUTPATIENT
Start: 2018-09-10 | End: 2019-10-11 | Stop reason: SDUPTHER

## 2018-09-10 RX ORDER — SILDENAFIL CITRATE 20 MG/1
TABLET ORAL
Qty: 30 TABLET | Refills: 11 | Status: SHIPPED | OUTPATIENT
Start: 2018-09-10 | End: 2020-07-23 | Stop reason: CLARIF

## 2018-09-10 NOTE — PROGRESS NOTES
Chief complaint physical exam    83-year-old white male still smoking.  No plans to quit.  Does not need any further colon or prostate cancer screening based on his age and he is always declined colonoscopy.  He is up-to-date on his pneumonia vaccines.      ROS:   CONST: weight stable. EYES: no vision change. ENT: no sore throat. CV: no chest pain w/ exertion. RESP: no shortness of breath. GI: no nausea, vomiting, diarrhea. No dysphagia. : no urinary issues. MUSCULOSKELETAL: no new myalgias or arthralgias. SKIN: no new changes. NEURO: no focal deficits. PSYCH: no new issues. ENDOCRINE: no polyuria. HEME: no lymph nodes. ALLERGY: no general pruritis.    PAST MEDICAL HISTORY:                                                        1. Hyperlipidemia.                                                           2. Coronary disease, seen prior by Dr. Roy, 4-vessel bypass in 1999.   3. Peripheral vascular disease, stented in both legs 2002 and been on Plavix since.                                                                4. Kidney stones, last episode January 2007.                                 5. Right renal cyst apparently.  Saw Dr. Labadie and then second opinion at [facility] by a Dr. Shipman, currently going to be followed.                      6. Appendectomy.                                                             7. Actinic keratosis, saw Dr. Ambriz.                                      8. Left facial numbness, probable TIA, admit [facility] June 2007. Carotid ultrasound apparently clear  9. Cataracts  10. Skin cancer  11. HYPERTENSION  12.  Diabetes, uncontrolled, with circulatory disease  13.  Low HDL  14. Declines Colonoscopy  15. Pneumovax after 67 done, Prevnar 13 given 2016  16.  B12 deficiency diagnosed 2014    17    early satiety 2016                                                                                      SOCIAL HISTORY:  He smokes 1 pack per day and does not drink.  He was  about   52 years but now a .  Retired deputy in the court, 4          children.                                                                                                                                                 FAMILY HISTORY:  Father  at 63 of heart disease and stroke.  Mother       at 86, 4 brothers, 2 sisters living. Brother with strokes.            Gen: no distress  EYES: conjunctiva clear, non-icteric, PERRL  ENT: nose clear, nasal mucosa normal, oropharynx clear and moist, teeth good  NECK:supple, thyroid non-palpable  RESP: effort is good, lungs clear  CV: heart RRR w/o murmur, gallops or rubs; no carotid bruits, no edema the, pedal pulses +2 bilaterally  GI: abdomen soft, non-distended, non-tender, no hepatosplenomegaly  MS: gait normal, no clubbing or cyanosis of the digits  SKIN: no rashes, warm to touch    Narciso was seen today for medication refill.    Diagnoses and all orders for this visit:    Routine medical exam, no plans to quit smoking and his age, update all his blood work  -     Hemoglobin A1c; Future  -     Comprehensive metabolic panel; Future  -     Vitamin B12; Future  -     CBC auto differential; Future  -     TSH; Future  -     T4, free; Future                                            Additional evaluation and management issues:    Additionally, patient has numerous other medical issues to address.  He does have diabetes and a year ago was his last blood work unfortunately stable then at 6.5 and hopefully still good.  He basically eats whatever he wants although less than in prior years.  He has heart disease with no active anginal symptoms.  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  He has peripheral vascular disease with a history of stenting [facility].  He has no symptoms of claudication in his pedal pulses are very good.  A tortuous aorta is noted in the records.  Labs last year  showed some elevation of the TSH which we will reassess.  For 3 weeks now he has had some vertigo symptoms.  He did not have any Valium on hand does need a refill.  It is getting better although a little bit still dizzy when he goes to get up and he chooses not to go up stairs that do not have a railing to hold onto and I think that would be mazariegos.  There may be other medications to suppress vertigo if indeed it continues to be symptomatic.  No other new focal neurological deficits.  I did point out to him that his B12 level  was low years ago and it does look like he took a supplement and the B12 level did rise.  He was not aware that her could remember that however and so has currently not been on a B12 supplement.  We will reassess the level today and advised him to start 1000 units of B12.  All the separate issues reviewed patient counseled and is evaluation and management will be based upon time counseling.Total time over 25 minutes with over 50% counseling.     Assessment and plan:     Diabetes mellitus with peripheral circulatory disorder, overdue for assessment  -     Hemoglobin A1c; Future  -     Comprehensive metabolic panel; Future  -     Vitamin B12; Future  -     CBC auto differential; Future  -     TSH; Future  -     T4, free; Future    Uncontrolled type 2 diabetes mellitus with other circulatory complication, without long-term current use of insulin, overdue    Coronary artery disease, angina presence unspecified, unspecified vessel or lesion type, unspecified whether native or transplanted heart, ongoing risk factors but no current symptoms of angina warrant any immediate workup    PAD (peripheral artery disease) no claudication    Tortuous aorta, noted    Abnormal thyroid blood test, reassess  -     TSH; Future  -     T4, free; Future    Vertigo, recently active and slowly resolving, refill Valium for both anxiety and vertigo    B12 deficiency, reassess  -     Vitamin B12; Future  -     CBC auto  differential; Future    Other orders  -     sildenafil (REVATIO) 20 mg Tab; TAKE 1-5 TABLETS BY MOUTH once per DAILY AS NEEDED  -     diazePAM (VALIUM) 5 MG tablet; Take 1 tablet (5 mg total) by mouth every 8 (eight) hours as needed.

## 2018-09-11 ENCOUNTER — TELEPHONE (OUTPATIENT)
Dept: FAMILY MEDICINE | Facility: CLINIC | Age: 83
End: 2018-09-11

## 2018-09-11 DIAGNOSIS — D64.9 ANEMIA, UNSPECIFIED TYPE: ICD-10-CM

## 2018-09-11 DIAGNOSIS — E11.51 DIABETES MELLITUS WITH PERIPHERAL CIRCULATORY DISORDER: Primary | ICD-10-CM

## 2018-09-11 LAB
ESTIMATED AVG GLUCOSE: 148 MG/DL
HBA1C MFR BLD HPLC: 6.8 %

## 2018-09-11 NOTE — TELEPHONE ENCOUNTER
Please call with results since there has been some changes    Dr. ELDER says the recent labs show that the blood count is a little bit lower this time, some anemia this time.  Need to watch for unseen blood loss and keep a closer eye on the blood count.  He recommends repeating some labs in 6 weeks    The B12 level was okay    Kidneys are still little bit elevated so drink more water during the summertime.  Electrolytes and liver all normal.    Diabetes test about the same as usual at 6.8    Lastly, the thyroid continues to get a little bit more underactive I this also needs to be rechecked in about 6 weeks.  Might need a pill to boost the thyroid.      Lab orders will be put in case patient wants to schedule but I know he has an unpredictable work schedule

## 2018-10-26 ENCOUNTER — LAB VISIT (OUTPATIENT)
Dept: LAB | Facility: HOSPITAL | Age: 83
End: 2018-10-26
Attending: INTERNAL MEDICINE
Payer: MEDICARE

## 2018-10-26 ENCOUNTER — PES CALL (OUTPATIENT)
Dept: ADMINISTRATIVE | Facility: CLINIC | Age: 83
End: 2018-10-26

## 2018-10-26 DIAGNOSIS — D64.9 ANEMIA, UNSPECIFIED TYPE: ICD-10-CM

## 2018-10-26 DIAGNOSIS — E11.51 DIABETES MELLITUS WITH PERIPHERAL CIRCULATORY DISORDER: ICD-10-CM

## 2018-10-26 LAB
ANION GAP SERPL CALC-SCNC: 4 MMOL/L
BASOPHILS # BLD AUTO: 0.06 K/UL
BASOPHILS NFR BLD: 0.7 %
BUN SERPL-MCNC: 15 MG/DL
CALCIUM SERPL-MCNC: 9 MG/DL
CHLORIDE SERPL-SCNC: 108 MMOL/L
CO2 SERPL-SCNC: 27 MMOL/L
CREAT SERPL-MCNC: 1.1 MG/DL
DIFFERENTIAL METHOD: ABNORMAL
EOSINOPHIL # BLD AUTO: 0.4 K/UL
EOSINOPHIL NFR BLD: 4.1 %
ERYTHROCYTE [DISTWIDTH] IN BLOOD BY AUTOMATED COUNT: 18.4 %
EST. GFR  (AFRICAN AMERICAN): >60 ML/MIN/1.73 M^2
EST. GFR  (NON AFRICAN AMERICAN): >60 ML/MIN/1.73 M^2
FERRITIN SERPL-MCNC: 19 NG/ML
GLUCOSE SERPL-MCNC: 136 MG/DL
HCT VFR BLD AUTO: 34.9 %
HGB BLD-MCNC: 11.3 G/DL
IMM GRANULOCYTES # BLD AUTO: 0.01 K/UL
IMM GRANULOCYTES NFR BLD AUTO: 0.1 %
IRON SERPL-MCNC: 29 UG/DL
LYMPHOCYTES # BLD AUTO: 3.1 K/UL
LYMPHOCYTES NFR BLD: 34.9 %
MCH RBC QN AUTO: 28.8 PG
MCHC RBC AUTO-ENTMCNC: 32.4 G/DL
MCV RBC AUTO: 89 FL
MONOCYTES # BLD AUTO: 1 K/UL
MONOCYTES NFR BLD: 11.4 %
NEUTROPHILS # BLD AUTO: 4.3 K/UL
NEUTROPHILS NFR BLD: 48.8 %
NRBC BLD-RTO: 0 /100 WBC
PLATELET # BLD AUTO: 284 K/UL
PMV BLD AUTO: 10.3 FL
POTASSIUM SERPL-SCNC: 4.2 MMOL/L
RBC # BLD AUTO: 3.93 M/UL
SATURATED IRON: 7 %
SODIUM SERPL-SCNC: 139 MMOL/L
T4 FREE SERPL-MCNC: 0.73 NG/DL
TOTAL IRON BINDING CAPACITY: 428 UG/DL
TRANSFERRIN SERPL-MCNC: 289 MG/DL
TSH SERPL DL<=0.005 MIU/L-ACNC: 4.9 UIU/ML
WBC # BLD AUTO: 8.87 K/UL

## 2018-10-26 PROCEDURE — 83540 ASSAY OF IRON: CPT

## 2018-10-26 PROCEDURE — 84439 ASSAY OF FREE THYROXINE: CPT

## 2018-10-26 PROCEDURE — 84443 ASSAY THYROID STIM HORMONE: CPT

## 2018-10-26 PROCEDURE — 80048 BASIC METABOLIC PNL TOTAL CA: CPT

## 2018-10-26 PROCEDURE — 36415 COLL VENOUS BLD VENIPUNCTURE: CPT | Mod: PO

## 2018-10-26 PROCEDURE — 85025 COMPLETE CBC W/AUTO DIFF WBC: CPT

## 2018-10-26 PROCEDURE — 84165 PROTEIN E-PHORESIS SERUM: CPT

## 2018-10-26 PROCEDURE — 82728 ASSAY OF FERRITIN: CPT

## 2018-10-26 PROCEDURE — 84165 PROTEIN E-PHORESIS SERUM: CPT | Mod: 26,,, | Performed by: PATHOLOGY

## 2018-10-29 LAB
ALBUMIN SERPL ELPH-MCNC: 3.74 G/DL
ALPHA1 GLOB SERPL ELPH-MCNC: 0.29 G/DL
ALPHA2 GLOB SERPL ELPH-MCNC: 0.9 G/DL
B-GLOBULIN SERPL ELPH-MCNC: 1.04 G/DL
GAMMA GLOB SERPL ELPH-MCNC: 1.43 G/DL
PATHOLOGIST INTERPRETATION SPE: NORMAL
PROT SERPL-MCNC: 7.4 G/DL

## 2018-11-07 ENCOUNTER — TELEPHONE (OUTPATIENT)
Dept: FAMILY MEDICINE | Facility: CLINIC | Age: 83
End: 2018-11-07

## 2018-11-07 DIAGNOSIS — Z12.11 SCREENING FOR COLORECTAL CANCER: Primary | ICD-10-CM

## 2018-11-07 DIAGNOSIS — Z12.12 SCREENING FOR COLORECTAL CANCER: Primary | ICD-10-CM

## 2018-11-07 NOTE — TELEPHONE ENCOUNTER
----- Message from Blessing Cheugn sent at 11/7/2018 10:22 AM CST -----  Contact: pt            Name of Who is Calling: pt      What is the request in detail: pt needs lab results. Please call pt      Can the clinic reply by [facility]: no      What Number to [facility] Back [facility]: [phone]

## 2018-11-08 ENCOUNTER — TELEPHONE (OUTPATIENT)
Dept: FAMILY MEDICINE | Facility: CLINIC | Age: 83
End: 2018-11-08

## 2018-11-08 NOTE — TELEPHONE ENCOUNTER
The anemia is slight and stable but IRON levels are low which may indicate losing blood slowly from somewhere    Dr ELDER wants you to come by and  a fit kit which you do at home and mail in to check the stool for unseen blood. (explain process)     You can start eating more dark green vegatables for iron and repeat the levels in 6-8 wks      Kidneys are back to normal.. Thyroid testing a little better than prior -back to your baseline

## 2018-11-08 NOTE — TELEPHONE ENCOUNTER
Informed patient of information below. Verbalized understanding. FitKit on desk for patient to  Saturday.

## 2018-11-08 NOTE — TELEPHONE ENCOUNTER
Marcelino Del Toro MD       6:26 AM   Note      The anemia is slight and stable but IRON levels are low which may indicate losing blood slowly from somewhere     Dr ELDER wants you to come by and  a fit kit which you do at home and mail in to check the stool for unseen blood. (explain process)      You can start eating more dark green vegatables for iron and repeat the levels in 6-8 wks        Kidneys are back to normal.. Thyroid testing a little better than prior -back to your baseline

## 2018-11-08 NOTE — TELEPHONE ENCOUNTER
----- Message from Renee Mirza sent at 11/8/2018  9:19 AM CST -----  Contact: Self/[phone]  Patient returned staffs call.  Thank you.

## 2018-11-23 ENCOUNTER — TELEPHONE (OUTPATIENT)
Dept: FAMILY MEDICINE | Facility: CLINIC | Age: 83
End: 2018-11-23

## 2018-11-23 ENCOUNTER — LAB VISIT (OUTPATIENT)
Dept: LAB | Facility: HOSPITAL | Age: 83
End: 2018-11-23
Attending: INTERNAL MEDICINE
Payer: MEDICARE

## 2018-11-23 DIAGNOSIS — Z12.11 SCREENING FOR COLORECTAL CANCER: ICD-10-CM

## 2018-11-23 DIAGNOSIS — Z12.12 SCREENING FOR COLORECTAL CANCER: ICD-10-CM

## 2018-11-23 LAB — HEMOCCULT STL QL IA: NEGATIVE

## 2018-11-23 PROCEDURE — 82274 ASSAY TEST FOR BLOOD FECAL: CPT | Mod: HCNC

## 2018-12-04 DIAGNOSIS — E11.9 DIABETES MELLITUS WITHOUT COMPLICATION: Primary | ICD-10-CM

## 2018-12-17 ENCOUNTER — OFFICE VISIT (OUTPATIENT)
Dept: FAMILY MEDICINE | Facility: CLINIC | Age: 83
End: 2018-12-17
Payer: MEDICARE

## 2018-12-17 ENCOUNTER — LAB VISIT (OUTPATIENT)
Dept: LAB | Facility: HOSPITAL | Age: 83
End: 2018-12-17
Attending: INTERNAL MEDICINE
Payer: MEDICARE

## 2018-12-17 VITALS
OXYGEN SATURATION: 95 % | DIASTOLIC BLOOD PRESSURE: 80 MMHG | TEMPERATURE: 98 F | HEART RATE: 73 BPM | BODY MASS INDEX: 25.19 KG/M2 | HEIGHT: 67 IN | SYSTOLIC BLOOD PRESSURE: 142 MMHG | WEIGHT: 160.5 LBS

## 2018-12-17 DIAGNOSIS — R79.89 ABNORMAL THYROID BLOOD TEST: ICD-10-CM

## 2018-12-17 DIAGNOSIS — E61.1 IRON DEFICIENCY: ICD-10-CM

## 2018-12-17 DIAGNOSIS — J41.0 SMOKERS' COUGH: ICD-10-CM

## 2018-12-17 DIAGNOSIS — E11.51 DIABETES MELLITUS WITH PERIPHERAL CIRCULATORY DISORDER: Primary | ICD-10-CM

## 2018-12-17 DIAGNOSIS — D64.9 ANEMIA, UNSPECIFIED TYPE: ICD-10-CM

## 2018-12-17 DIAGNOSIS — E11.51 DIABETES MELLITUS WITH PERIPHERAL CIRCULATORY DISORDER: ICD-10-CM

## 2018-12-17 LAB
ALBUMIN SERPL BCP-MCNC: 3.6 G/DL
ALP SERPL-CCNC: 77 U/L
ALT SERPL W/O P-5'-P-CCNC: 16 U/L
ANION GAP SERPL CALC-SCNC: 9 MMOL/L
AST SERPL-CCNC: 26 U/L
BASOPHILS # BLD AUTO: 0.07 K/UL
BASOPHILS NFR BLD: 0.7 %
BILIRUB SERPL-MCNC: 0.6 MG/DL
BUN SERPL-MCNC: 14 MG/DL
CALCIUM SERPL-MCNC: 9.2 MG/DL
CHLORIDE SERPL-SCNC: 105 MMOL/L
CHOLEST SERPL-MCNC: 130 MG/DL
CHOLEST/HDLC SERPL: 2.8 {RATIO}
CO2 SERPL-SCNC: 25 MMOL/L
CREAT SERPL-MCNC: 1 MG/DL
DIFFERENTIAL METHOD: ABNORMAL
EOSINOPHIL # BLD AUTO: 0.2 K/UL
EOSINOPHIL NFR BLD: 2 %
ERYTHROCYTE [DISTWIDTH] IN BLOOD BY AUTOMATED COUNT: 17.2 %
EST. GFR  (AFRICAN AMERICAN): >60 ML/MIN/1.73 M^2
EST. GFR  (NON AFRICAN AMERICAN): >60 ML/MIN/1.73 M^2
ESTIMATED AVG GLUCOSE: 151 MG/DL
FERRITIN SERPL-MCNC: 20 NG/ML
GLUCOSE SERPL-MCNC: 109 MG/DL
HBA1C MFR BLD HPLC: 6.9 %
HCT VFR BLD AUTO: 38.5 %
HDLC SERPL-MCNC: 46 MG/DL
HDLC SERPL: 35.4 %
HGB BLD-MCNC: 12 G/DL
IMM GRANULOCYTES # BLD AUTO: 0.03 K/UL
IMM GRANULOCYTES NFR BLD AUTO: 0.3 %
IRON SERPL-MCNC: 75 UG/DL
LDLC SERPL CALC-MCNC: 70.8 MG/DL
LYMPHOCYTES # BLD AUTO: 3 K/UL
LYMPHOCYTES NFR BLD: 27.7 %
MCH RBC QN AUTO: 28.6 PG
MCHC RBC AUTO-ENTMCNC: 31.2 G/DL
MCV RBC AUTO: 92 FL
MONOCYTES # BLD AUTO: 0.9 K/UL
MONOCYTES NFR BLD: 8 %
NEUTROPHILS # BLD AUTO: 6.6 K/UL
NEUTROPHILS NFR BLD: 61.3 %
NONHDLC SERPL-MCNC: 84 MG/DL
NRBC BLD-RTO: 0 /100 WBC
PLATELET # BLD AUTO: 293 K/UL
PMV BLD AUTO: 10.1 FL
POTASSIUM SERPL-SCNC: 4.3 MMOL/L
PROT SERPL-MCNC: 7.7 G/DL
RBC # BLD AUTO: 4.19 M/UL
SATURATED IRON: 16 %
SODIUM SERPL-SCNC: 139 MMOL/L
T4 FREE SERPL-MCNC: 0.78 NG/DL
TOTAL IRON BINDING CAPACITY: 456 UG/DL
TRANSFERRIN SERPL-MCNC: 308 MG/DL
TRIGL SERPL-MCNC: 66 MG/DL
TSH SERPL DL<=0.005 MIU/L-ACNC: 3.76 UIU/ML
WBC # BLD AUTO: 10.76 K/UL

## 2018-12-17 PROCEDURE — 80061 LIPID PANEL: CPT | Mod: HCNC

## 2018-12-17 PROCEDURE — 99214 OFFICE O/P EST MOD 30 MIN: CPT | Mod: HCNC,S$GLB,, | Performed by: INTERNAL MEDICINE

## 2018-12-17 PROCEDURE — 85025 COMPLETE CBC W/AUTO DIFF WBC: CPT | Mod: HCNC

## 2018-12-17 PROCEDURE — 84439 ASSAY OF FREE THYROXINE: CPT | Mod: HCNC

## 2018-12-17 PROCEDURE — 99999 PR PBB SHADOW E&M-EST. PATIENT-LVL III: CPT | Mod: PBBFAC,HCNC,, | Performed by: INTERNAL MEDICINE

## 2018-12-17 PROCEDURE — 80053 COMPREHEN METABOLIC PANEL: CPT | Mod: HCNC

## 2018-12-17 PROCEDURE — 84443 ASSAY THYROID STIM HORMONE: CPT | Mod: HCNC

## 2018-12-17 PROCEDURE — 3077F SYST BP >= 140 MM HG: CPT | Mod: CPTII,HCNC,S$GLB, | Performed by: INTERNAL MEDICINE

## 2018-12-17 PROCEDURE — 83540 ASSAY OF IRON: CPT | Mod: HCNC

## 2018-12-17 PROCEDURE — 36415 COLL VENOUS BLD VENIPUNCTURE: CPT | Mod: HCNC,PO

## 2018-12-17 PROCEDURE — 99499 UNLISTED E&M SERVICE: CPT | Mod: HCNC,S$GLB,, | Performed by: INTERNAL MEDICINE

## 2018-12-17 PROCEDURE — 83036 HEMOGLOBIN GLYCOSYLATED A1C: CPT | Mod: HCNC

## 2018-12-17 PROCEDURE — 82728 ASSAY OF FERRITIN: CPT | Mod: HCNC

## 2018-12-17 PROCEDURE — 1101F PT FALLS ASSESS-DOCD LE1/YR: CPT | Mod: CPTII,HCNC,S$GLB, | Performed by: INTERNAL MEDICINE

## 2018-12-17 PROCEDURE — 3079F DIAST BP 80-89 MM HG: CPT | Mod: CPTII,HCNC,S$GLB, | Performed by: INTERNAL MEDICINE

## 2018-12-17 NOTE — PROGRESS NOTES
Chief complaint follow-up on cough and labs    83-year-old white male still smoking.  No plans to quit.  Recently had some increasing cough but now it is back to his baseline smoker's cough production chills maybe every other night but no associated fever needed check.  None of that has recurred and it has been gone over a week.  He presented to labs today but only bulk orders were ordered so fortunately did not have the labs done and I will put in all necessary labs.    He does have diabetes and his last blood work was stable then at 6.5 and last 6.8.  He basically eats whatever he wants although less than in prior years.  He has heart disease with no active anginal symptoms.  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  He has peripheral vascular disease with a history of stenting [facility].  He has no symptoms of claudication in his pedal pulses are very good.  A tortuous aorta is noted in the records.  Labs last year showed some elevation of the TSH which is slight and was not worsening.         I did point out to him that his B12 level  was low years ago and it does look like he took a supplement and the B12 level did rise.  He was not aware that her could remember that however and so has currently not been on a B12 supplement.  Level ok 9/18.  He did have iron deficiency in his stool testing was negative.  Anemia was stable but will reassess blood level and iron levels today.      All issues reviewed patient counseled and is evaluation and management will be based upon time counseling.Total time over 25 minutes with over 50% counseling.    ROS:   CONST: weight stable. EYES: no vision change. ENT: no sore throat. CV: no chest pain w/ exertion. RESP: no shortness of breath. GI: no nausea, vomiting, diarrhea. No dysphagia. : no urinary issues. MUSCULOSKELETAL: no new myalgias or arthralgias. SKIN: no new changes. NEURO: no focal deficits.  PSYCH: no new issues. ENDOCRINE: no polyuria. HEME: no lymph nodes. ALLERGY: no general pruritis.    PAST MEDICAL HISTORY:                                                        1. Hyperlipidemia.                                                           2. Coronary disease, seen prior by Dr. Roy, 4-vessel bypass in .   3. Peripheral vascular disease, stented in both legs  and been on Plavix since.                                                                4. Kidney stones, last episode 2007.                                 5. Right renal cyst apparently.  Saw Dr. Labadie and then second opinion at [facility] by a Dr. Shipman, currently going to be followed.                      6. Appendectomy.                                                             7. Actinic keratosis, saw Dr. Ambriz.                                      8. Left facial numbness, probable TIA, admit [facility] 2007. Carotid ultrasound apparently clear  9. Cataracts  10. Skin cancer  11. HYPERTENSION  12.  Diabetes, uncontrolled, with circulatory disease  13.  Low HDL  14. Declines Colonoscopy  15. Pneumovax after 67 done, Prevnar 13 given   16.  B12 deficiency diagnosed 2014    early satiety                                                                                       SOCIAL HISTORY:  He smokes 1 pack per day and does not drink.  He was about   52 years but now a .  Retired deputy in the court, 4          children.                                                                                                                                                 FAMILY HISTORY:  Father  at 63 of heart disease and stroke.  Mother       at 86, 4 brothers, 2 sisters living. Brother with strokes.            Gen: no distress  EYES: conjunctiva clear, non-icteric, PERRL  ENT: nose clear, nasal mucosa normal, oropharynx clear and moist, teeth good  NECK:supple, thyroid  non-palpable  RESP: effort is good, lungs clear  CV: heart RRR w/o murmur, gallops or rubs; no carotid bruits, no edema   GI: abdomen soft, non-distended, non-tender, no hepatosplenomegaly  MS: gait normal, no clubbing or cyanosis of the digits  SKIN: no rashes, warm to touch             Assessment and plan:     Narciso was seen today for chills and cough.    Diagnoses and all orders for this visit:    Diabetes mellitus with peripheral circulatory disorder, due for reassessment  -     Hemoglobin A1c; Future  -     Comprehensive metabolic panel; Future  -     CBC auto differential; Future  -     Iron and TIBC; Future  -     Ferritin; Future  -     TSH; Future  -     T4, free; Future  -     Lipid panel; Future    Anemia, unspecified type, reassess  -     Hemoglobin A1c; Future  -     Comprehensive metabolic panel; Future  -     CBC auto differential; Future  -     Iron and TIBC; Future  -     Ferritin; Future  -     TSH; Future  -     T4, free; Future  -     Lipid panel; Future    Abnormal thyroid blood test, reassess  -     TSH; Future  -     T4, free; Future    Iron deficiency, reassess  -     Iron and TIBC; Future  -     Ferritin; Future    Smokers' cough, had some chills but no documented fever and all symptoms resolved with cough back to baseline.

## 2018-12-18 ENCOUNTER — TELEPHONE (OUTPATIENT)
Dept: FAMILY MEDICINE | Facility: CLINIC | Age: 83
End: 2018-12-18

## 2018-12-18 NOTE — TELEPHONE ENCOUNTER
----- Message from Marcelino Del Toro MD sent at 12/17/2018  5:53 PM CST -----  Please call with results    Dr. ELDER says everything on the blood work looks like it is getting better.  The thyroid is back to normal.  The A1c sugar test is about the same at 6.9.  The iron levels are getting better slowly.    The anemia is holding steady in the same as before.  Cholesterol looks excellent.  Kidney and liver function is all normal.

## 2019-01-06 RX ORDER — CLOPIDOGREL BISULFATE 75 MG/1
TABLET ORAL
Qty: 90 TABLET | Refills: 4 | Status: SHIPPED | OUTPATIENT
Start: 2019-01-06 | End: 2019-12-19 | Stop reason: SDUPTHER

## 2019-01-16 ENCOUNTER — PES CALL (OUTPATIENT)
Dept: ADMINISTRATIVE | Facility: CLINIC | Age: 84
End: 2019-01-16

## 2019-01-25 ENCOUNTER — OFFICE VISIT (OUTPATIENT)
Dept: FAMILY MEDICINE | Facility: CLINIC | Age: 84
End: 2019-01-25
Payer: MEDICARE

## 2019-01-25 ENCOUNTER — HOSPITAL ENCOUNTER (OUTPATIENT)
Dept: RADIOLOGY | Facility: HOSPITAL | Age: 84
Discharge: HOME | End: 2019-01-25
Attending: INTERNAL MEDICINE
Payer: MEDICARE

## 2019-01-25 ENCOUNTER — TELEPHONE (OUTPATIENT)
Dept: FAMILY MEDICINE | Facility: CLINIC | Age: 84
End: 2019-01-25

## 2019-01-25 VITALS
HEIGHT: 67 IN | TEMPERATURE: 98 F | DIASTOLIC BLOOD PRESSURE: 78 MMHG | HEART RATE: 92 BPM | WEIGHT: 161.63 LBS | SYSTOLIC BLOOD PRESSURE: 130 MMHG | OXYGEN SATURATION: 95 % | BODY MASS INDEX: 25.37 KG/M2

## 2019-01-25 DIAGNOSIS — R05.9 COUGH: ICD-10-CM

## 2019-01-25 DIAGNOSIS — I25.810 CORONARY ARTERY DISEASE INVOLVING CORONARY BYPASS GRAFT OF NATIVE HEART WITHOUT ANGINA PECTORIS: ICD-10-CM

## 2019-01-25 DIAGNOSIS — F17.210 NICOTINE DEPENDENCE, CIGARETTES, UNCOMPLICATED: ICD-10-CM

## 2019-01-25 DIAGNOSIS — J44.1 COPD WITH ACUTE EXACERBATION: Primary | ICD-10-CM

## 2019-01-25 DIAGNOSIS — E11.51 DIABETES MELLITUS WITH PERIPHERAL CIRCULATORY DISORDER: ICD-10-CM

## 2019-01-25 PROCEDURE — 71046 XR CHEST PA AND LATERAL: ICD-10-PCS | Mod: 26,HCNC,, | Performed by: RADIOLOGY

## 2019-01-25 PROCEDURE — 99999 PR PBB SHADOW E&M-EST. PATIENT-LVL IV: ICD-10-PCS | Mod: PBBFAC,HCNC,, | Performed by: INTERNAL MEDICINE

## 2019-01-25 PROCEDURE — 99999 PR PBB SHADOW E&M-EST. PATIENT-LVL IV: CPT | Mod: PBBFAC,HCNC,, | Performed by: INTERNAL MEDICINE

## 2019-01-25 PROCEDURE — 99214 PR OFFICE/OUTPT VISIT, EST, LEVL IV, 30-39 MIN: ICD-10-PCS | Mod: HCNC,S$GLB,, | Performed by: INTERNAL MEDICINE

## 2019-01-25 PROCEDURE — 1101F PR PT FALLS ASSESS DOC 0-1 FALLS W/OUT INJ PAST YR: ICD-10-PCS | Mod: HCNC,CPTII,S$GLB, | Performed by: INTERNAL MEDICINE

## 2019-01-25 PROCEDURE — 1101F PT FALLS ASSESS-DOCD LE1/YR: CPT | Mod: HCNC,CPTII,S$GLB, | Performed by: INTERNAL MEDICINE

## 2019-01-25 PROCEDURE — 99499 UNLISTED E&M SERVICE: CPT | Mod: HCNC,S$GLB,, | Performed by: INTERNAL MEDICINE

## 2019-01-25 PROCEDURE — 3075F PR MOST RECENT SYSTOLIC BLOOD PRESS GE 130-139MM HG: ICD-10-PCS | Mod: HCNC,CPTII,S$GLB, | Performed by: INTERNAL MEDICINE

## 2019-01-25 PROCEDURE — 3078F DIAST BP <80 MM HG: CPT | Mod: HCNC,CPTII,S$GLB, | Performed by: INTERNAL MEDICINE

## 2019-01-25 PROCEDURE — 3078F PR MOST RECENT DIASTOLIC BLOOD PRESSURE < 80 MM HG: ICD-10-PCS | Mod: HCNC,CPTII,S$GLB, | Performed by: INTERNAL MEDICINE

## 2019-01-25 PROCEDURE — 71046 X-RAY EXAM CHEST 2 VIEWS: CPT | Mod: TC,HCNC,FY,PO

## 2019-01-25 PROCEDURE — 99214 OFFICE O/P EST MOD 30 MIN: CPT | Mod: HCNC,S$GLB,, | Performed by: INTERNAL MEDICINE

## 2019-01-25 PROCEDURE — 71046 X-RAY EXAM CHEST 2 VIEWS: CPT | Mod: 26,HCNC,, | Performed by: RADIOLOGY

## 2019-01-25 PROCEDURE — 3075F SYST BP GE 130 - 139MM HG: CPT | Mod: HCNC,CPTII,S$GLB, | Performed by: INTERNAL MEDICINE

## 2019-01-25 PROCEDURE — 99499 RISK ADDL DX/OHS AUDIT: ICD-10-PCS | Mod: HCNC,S$GLB,, | Performed by: INTERNAL MEDICINE

## 2019-01-25 RX ORDER — ALBUTEROL SULFATE 90 UG/1
2 AEROSOL, METERED RESPIRATORY (INHALATION) EVERY 6 HOURS PRN
Qty: 18 G | Refills: 0 | Status: SHIPPED | OUTPATIENT
Start: 2019-01-25 | End: 2019-02-28 | Stop reason: SDUPTHER

## 2019-01-25 RX ORDER — PREDNISONE 20 MG/1
40 TABLET ORAL DAILY
Qty: 10 TABLET | Refills: 0 | Status: SHIPPED | OUTPATIENT
Start: 2019-01-25 | End: 2019-01-30

## 2019-01-25 RX ORDER — AZITHROMYCIN 250 MG/1
TABLET, FILM COATED ORAL
Qty: 6 TABLET | Refills: 0 | Status: SHIPPED | OUTPATIENT
Start: 2019-01-25 | End: 2019-01-30

## 2019-01-25 NOTE — TELEPHONE ENCOUNTER
Chest x-ray revealed no acute cardiopulmonary process  Called pt to instruct regarding acute bronchitis/COPD exacerbation  Patient expressed understanding

## 2019-01-25 NOTE — PROGRESS NOTES
Subjective:       Chief Complaint  Chief Complaint   Patient presents with    Cough    Fall       HPI  Narciso Yanez is a 83 y.o. male with multiple medical diagnoses as listed in the medical history and problem list that presents for cough and fall.     Fall  This morning fell out of the bed and ht his nightstand - bruised his left cheekbone, right arm, elbow, and kneees  Some mild soreness/discomfort    Cough  1 month of cough symptoms - using several OTC meds (Vicks, Alk-[place]  Productive of thick sputum, yellow    CAD  Still smoking cigarettes - last relapse in 2005  2 packs per day on average    Patient Care Team:  Marcelino Del Toro MD as PCP - General (Internal Medicine)  David Robles MD as Consulting Physician (Ophthalmology)      PAST MEDICAL HISTORY:  Past Medical History:   Diagnosis Date    Actinic keratosis     Anxiety     B12 deficiency 12/8/2014    Dx 6/14    Cancer     skin    Cataract     Coronary artery disease     Diabetes mellitus type II     Diabetes mellitus with peripheral circulatory disorder 6/18/2014    ED (erectile dysfunction)     Hyperlipidemia     Hypertension     Kidney stone     Peripheral vascular disease     Tobacco dependence        PAST SURGICAL HISTORY:  Past Surgical History:   Procedure Laterality Date    APPENDECTOMY      CARDIAC SURGERY      ESOPHAGOGASTRODUODENOSCOPY (EGD) N/A 10/10/2016    [facility]    EXTERNAL EAR SURGERY      EYE SURGERY      cataracts       SOCIAL HISTORY:  Social History     Socioeconomic History    Marital status:      Spouse name: Not on file    Number of children: Not on file    Years of education: Not on file    Highest education level: Not on file   Social Needs    Financial resource strain: Not on file    Food insecurity - worry: Not on file    Food insecurity - inability: Not on file    Transportation needs - medical: Not on file    Transportation needs -  non-medical: Not on file   Occupational History    Not on file   Tobacco Use    Smoking status: Current Every Day Smoker     Types: Cigarettes    Smokeless tobacco: Never Used   Substance and Sexual Activity    Alcohol use: No    Drug use: No    Sexual activity: Not Currently   Other Topics Concern    Not on file   Social History Narrative    .  4 children.  Smokes 2 ppd.  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        FAMILY HISTORY:  Family History   Problem Relation Age of Onset    Stroke Mother        ALLERGIES AND MEDICATIONS: updated and reviewed.  Review of patient's allergies indicates:   Allergen Reactions    Demerol  [meperidine]      Other reaction(s): Unknown     Current Outpatient Medications   Medication Sig Dispense Refill    clopidogrel (PLAVIX) 75 mg tablet TAKE ONE TABLET BY MOUTH EVERY DAY 90 tablet 4    diazePAM (VALIUM) 5 MG tablet Take 1 tablet (5 mg total) by mouth every 8 (eight) hours as needed. 60 tablet 3    FLUZONE HIGH-DOSE 2014-15, PF, 180 mcg/0.5 mL Syrg   0    FLUZONE HIGH-DOSE 2018-19, PF, 180 mcg/0.5 mL vaccine       metoprolol succinate (TOPROL-XL) 25 MG 24 hr tablet TAKE ONE TABLET BY MOUTH EVERY DAY 90 tablet 1    rosuvastatin (CRESTOR) 20 MG tablet Take 1 tablet (20 mg total) by mouth every evening. 90 tablet 12    sildenafil (REVATIO) 20 mg Tab TAKE 1-5 TABLETS BY MOUTH once per DAILY AS NEEDED 30 tablet 11    albuterol (VENTOLIN HFA) 90 mcg/actuation inhaler Inhale 2 puffs into the lungs every 6 (six) hours as needed for Wheezing or Shortness of Breath. Rescue 18 g 0    azithromycin (Z-JAYDEN) 250 MG tablet Take 2 tablets on day 1 then 1 tablet daily thereafter. For COPD exacerbation 6 tablet 0    inhalation spacing device Use as directed for inhalation. 1 Device 0    predniSONE (DELTASONE) 20 MG tablet Take 2 tablets (40 mg total) by mouth once daily. for 5 days 10 tablet 0     No current facility-administered medications for this visit.   "        ROS  Review of Systems   Constitutional: Negative for chills, diaphoresis and fever.   HENT: Negative for congestion and rhinorrhea.    Respiratory: Positive for cough.    Cardiovascular: Negative for chest pain.   Gastrointestinal: Negative for abdominal pain, constipation, diarrhea and nausea.   Genitourinary: Negative for difficulty urinating, dysuria and enuresis.   Musculoskeletal: Negative for arthralgias and joint swelling.   Skin: Negative for rash and wound.   Neurological: Negative for dizziness and headaches.   Psychiatric/Behavioral: Negative for dysphoric mood. The patient is not nervous/anxious.          Objective:       Physical Exam  Vitals:    01/25/19 1022   BP: 130/78   Pulse: 92   Temp: 97.6 °F (36.4 °C)   TempSrc: Oral   SpO2: 95%   Weight: 73.3 kg (161 lb 9.6 oz)   Height: 5' 7" (1.702 m)    Body mass index is 25.31 kg/m².  Weight: 73.3 kg (161 lb 9.6 oz)   Height: 5' 7" (170.2 cm)   Physical Exam   Constitutional: He appears well-developed and well-nourished. No distress.   HENT:   Head: Normocephalic and atraumatic.   Eyes: Conjunctivae and EOM are normal. Pupils are equal, round, and reactive to light.   Cardiovascular: Normal rate.   Pulmonary/Chest: No accessory muscle usage or stridor. No tachypnea. No respiratory distress. He has decreased breath sounds. He has no rales.   Musculoskeletal: He exhibits tenderness (L cheekbone).   Neurological: He is alert. He displays normal reflexes. No cranial nerve deficit or sensory deficit. He exhibits normal muscle tone.   Skin: Skin is warm and dry. No rash noted.   Bruising of right elbow region, right cheekbone   Psychiatric: He has a normal mood and affect. His behavior is normal.   Vitals reviewed.          Assessment:     1. COPD with acute exacerbation    2. Coronary artery disease involving coronary bypass graft of native heart without angina pectoris    3. Nicotine dependence, cigarettes, uncomplicated    4. Cough    5. Diabetes " mellitus with peripheral circulatory disorder      Plan:     Narciso was seen today for cough and fall.    Diagnoses and all orders for this visit:    COPD with acute exacerbation  Cough  Patient with obvious clinical history and signs/symptoms of emphysema with flare. Patient experiencing productive cough with yellow sputum  Discussed symptomatology of Acute exacerbation of chronic bronchitis and Emphysema, including education regarding potential triggers, assessment of chronic lung disease control/status, and role of short acting bronchodilator therapy. Rescue steroid prescribed as per orders. XR to rule out consolidation/occult cardiopulmonary process  -     X-Ray Chest PA And Lateral; Future    Coronary artery disease involving coronary bypass graft of native heart without angina pectoris  Asymtptomatic. The current medical regimen is effective;  continue present plan and medications.    Nicotine dependence, cigarettes, uncomplicated  The patient was counseled on the dangers of tobacco use, and was Counseled for 3-10 minutes.. The patient was advised to quit       Diabetes mellitus with peripheral circulatory disorder  Last A1c 6.9% December 2018    Health Maintenance       Date Due Completion Date    TETANUS VACCINE 09/03/1953 ---    Pneumococcal Vaccine (65+ High/Highest Risk) (2 of 2 - PPSV23) 07/05/2016 5/10/2016    Influenza Vaccine 08/01/2018 9/29/2017    Override on 9/6/2015: Done    Override on 11/8/2014: Done    Foot Exam 09/29/2018 9/29/2017    Override on 9/29/2017: Done    Eye Exam 01/02/2019 1/2/2018    Override on 4/11/2017: Done    Urine Microalbumin 12/26/2018 12/26/2017    Hemoglobin A1c 06/17/2019 12/17/2018    Lipid Panel 12/17/2019 12/17/2018    Aspirin/Antiplatelet Therapy 01/06/2020 1/6/2019            Health Maintenance reviewed, addressed as per orders    Follow-up if symptoms worsen or fail to improve.    The patient expressed understanding and no barriers to adherence were identified.      1. The patient indicates understanding of these issues and agrees with the plan. Brief care plan is updated and reviewed with the patient as applicable.     2. The patient is given an After Visit Summary that lists all medications with directions, allergies, orders placed during this encounter and follow-up instructions.     3. I have reviewed the patient's medical information including past medical, family, and social history sections including the medications and allergies.     4. We discussed the patient's current medications. I reconciled the patient's medication list and prepared and supplied needed refills.       Vega May MD  Internal Medicine-Pediatrics

## 2019-01-25 NOTE — PATIENT INSTRUCTIONS
I will contact you with your X-RAY results    Thank you!      COPD Flare    You have had a flare-up of your COPD.  COPD, or chronic obstructive pulmonary disease, is a common lung disease. It causes your airways to become irritated and narrower. This makes it harder for you to breathe. Emphysema and chronic bronchitis are both types of COPD. This is a chronic condition, which means you always have it. Sometimes it gets worse. When this happens, it is called a flare-up.  Symptoms of COPD  People with COPD may have symptoms most of the time. In a flare-up, your symptoms get worse. These symptoms may mean you are having a flare-up:  · Shortness of breath, shallow or rapid breathing, or wheezing that gets worse  · Lung infection  · Cough that gets worse  · More mucus, thicker mucus or mucus of a different color  · Tiredness, decreased energy, or trouble doing your usual activities  · Fever  · Chest tightness  · Your symptoms dont get better even when you use your usual medicines, inhalers, and nebulizer  · Trouble talking  · You feel confused  Causes of flare-ups  Unfortunately, a flare-up can happen even though you did everything right, and you followed your doctors instructions. Some causes of flare-ups are:  · Smoking or secondhand smoke  · Colds, the flu, or respiratory infections  · Air pollution  · Sudden change in the weather  · Dust, irritating chemicals, or strong fumes  · Not taking your medicines as prescribed  Home care  Here are some things you can do at home to treat a flare-up:  · Try not to panic. This makes it harder to breathe, and keeps you from doing the right things.  · Dont smoke or be around others who are smoking.  · Try to drink more fluids than usual during a flare-up, unless your doctor has told you not to because of heart and kidney problems. More fluids can help loosen the mucus.  · Use your inhalers and nebulizer, if you have one, as you have been told to.  · If you were given  antibiotics, take them until they are used up or your doctor tells you to stop. Its important to finish the antibiotics, even though you feel better. This will make sure the infection has cleared.  · If you were given prednisone or another steroid, finish it even if you feel better.  Preventing a flare-up  Even though flare-ups happen, the best way to treat one is to prevent it before it starts. Here are some pointers:  · Dont smoke or be around others who are smoking.  · Take your medicines as you have been told.  · Talk with your doctor about getting a flu shot every year. Also find out if you need a pneumonia shot.  · If there is a weather advisory warning to stay indoors, try to stay inside when possible.  · Try to eat healthy and get plenty of sleep.  · Try to avoid things that usually set you off, like dust, chemical fumes, hairsprays, or strong perfumes.  Follow-up care  Follow up with your healthcare provider, or as advised.  If a culture was done, you will be told if your treatment needs to be changed. You can call as directed for the results.  If X-rays were done, you will be notified of any new findings that may affect your care.  Call 911  Call 911 if any of these occur:  · You have trouble breathing  · You feel confused or its difficult to wake you up  · You faint or lose consciousness  · You have a rapid heart rate  · You have new pain in your chest, arm, shoulder, neck or upper back  When to seek medical advice  Call your healthcare provider right away if any of these occur:  · Wheezing or shortness of breath gets worse  · You need to use your inhalers more often than usual without relief  · Fever of 100.4°F (38ºC) or higher, or as directed by your healthcare provider  · Coughing up lots of dark-colored or bloody mucus (sputum)  · Chest pain with each breath  · You do not start to get better within 24 hours  · Swelling of your ankles gets worse  · Dizziness or weakness  Date Last Reviewed:  9/1/2016  © 9887-6882 [employer]. [address] [place] [address], [place] [address]. All rights reserved. This information is not intended as a substitute for professional medical care. Always follow your healthcare professional's instructions.

## 2019-01-31 ENCOUNTER — TELEPHONE (OUTPATIENT)
Dept: FAMILY MEDICINE | Facility: CLINIC | Age: 84
End: 2019-01-31

## 2019-01-31 DIAGNOSIS — J44.1 COPD EXACERBATION: Primary | ICD-10-CM

## 2019-01-31 RX ORDER — METHYLPREDNISOLONE 4 MG/1
TABLET ORAL
Qty: 1 PACKAGE | Refills: 0 | Status: SHIPPED | OUTPATIENT
Start: 2019-01-31 | End: 2019-02-28

## 2019-01-31 NOTE — TELEPHONE ENCOUNTER
Patient states that the prednisone and z-neal has provided partial relief of his symptoms, but he is still coughing.  He reports being advised to contact this office when he completed his medication.  Please advise.

## 2019-01-31 NOTE — TELEPHONE ENCOUNTER
----- Message from Blessing Cheung sent at 1/31/2019 11:08 AM CST -----  Contact: pt  Name of Who is Calling: pt      What is the request in detail: pt needs to discuss medications. Patient completed meds and the doctor told the patient to call him when finished. Call pt      Can the clinic reply by [facility]: no      [facility]: [phone]

## 2019-01-31 NOTE — TELEPHONE ENCOUNTER
Recommend resumption of steroid only - will provide MEDROL DOSE PACK for 7 day course in lieu of prednisone. Sent medication refill to patient's preferred pharmacy on file. RTC if symptoms still not improved after this time

## 2019-02-14 RX ORDER — METOPROLOL SUCCINATE 25 MG/1
25 TABLET, EXTENDED RELEASE ORAL DAILY
Qty: 90 TABLET | Refills: 1 | Status: SHIPPED | OUTPATIENT
Start: 2019-02-14 | End: 2019-09-07 | Stop reason: SDUPTHER

## 2019-02-18 ENCOUNTER — OFFICE VISIT (OUTPATIENT)
Dept: FAMILY MEDICINE | Facility: CLINIC | Age: 84
End: 2019-02-18
Payer: MEDICARE

## 2019-02-18 VITALS
SYSTOLIC BLOOD PRESSURE: 128 MMHG | OXYGEN SATURATION: 98 % | TEMPERATURE: 98 F | DIASTOLIC BLOOD PRESSURE: 86 MMHG | HEART RATE: 83 BPM | HEIGHT: 67 IN | WEIGHT: 161.63 LBS | BODY MASS INDEX: 25.37 KG/M2

## 2019-02-18 DIAGNOSIS — S76.212A GROIN STRAIN, LEFT, INITIAL ENCOUNTER: Primary | ICD-10-CM

## 2019-02-18 DIAGNOSIS — I77.1 TORTUOUS AORTA: ICD-10-CM

## 2019-02-18 DIAGNOSIS — E78.6 HIGH-DENSITY LIPOPROTEIN DEFICIENCY: ICD-10-CM

## 2019-02-18 PROCEDURE — 96372 THER/PROPH/DIAG INJ SC/IM: CPT | Mod: HCNC,S$GLB,, | Performed by: PHYSICIAN ASSISTANT

## 2019-02-18 PROCEDURE — 3074F SYST BP LT 130 MM HG: CPT | Mod: HCNC,CPTII,S$GLB, | Performed by: PHYSICIAN ASSISTANT

## 2019-02-18 PROCEDURE — 99214 OFFICE O/P EST MOD 30 MIN: CPT | Mod: 25,HCNC,S$GLB, | Performed by: PHYSICIAN ASSISTANT

## 2019-02-18 PROCEDURE — 99214 PR OFFICE/OUTPT VISIT, EST, LEVL IV, 30-39 MIN: ICD-10-PCS | Mod: 25,HCNC,S$GLB, | Performed by: PHYSICIAN ASSISTANT

## 2019-02-18 PROCEDURE — 1101F PT FALLS ASSESS-DOCD LE1/YR: CPT | Mod: HCNC,CPTII,S$GLB, | Performed by: PHYSICIAN ASSISTANT

## 2019-02-18 PROCEDURE — 99999 PR PBB SHADOW E&M-EST. PATIENT-LVL IV: ICD-10-PCS | Mod: PBBFAC,HCNC,, | Performed by: PHYSICIAN ASSISTANT

## 2019-02-18 PROCEDURE — 1101F PR PT FALLS ASSESS DOC 0-1 FALLS W/OUT INJ PAST YR: ICD-10-PCS | Mod: HCNC,CPTII,S$GLB, | Performed by: PHYSICIAN ASSISTANT

## 2019-02-18 PROCEDURE — 3074F PR MOST RECENT SYSTOLIC BLOOD PRESSURE < 130 MM HG: ICD-10-PCS | Mod: HCNC,CPTII,S$GLB, | Performed by: PHYSICIAN ASSISTANT

## 2019-02-18 PROCEDURE — 3079F PR MOST RECENT DIASTOLIC BLOOD PRESSURE 80-89 MM HG: ICD-10-PCS | Mod: HCNC,CPTII,S$GLB, | Performed by: PHYSICIAN ASSISTANT

## 2019-02-18 PROCEDURE — 99999 PR PBB SHADOW E&M-EST. PATIENT-LVL IV: CPT | Mod: PBBFAC,HCNC,, | Performed by: PHYSICIAN ASSISTANT

## 2019-02-18 PROCEDURE — 96372 PR INJECTION,THERAP/PROPH/DIAG2ST, IM OR SUBCUT: ICD-10-PCS | Mod: HCNC,S$GLB,, | Performed by: PHYSICIAN ASSISTANT

## 2019-02-18 PROCEDURE — 3079F DIAST BP 80-89 MM HG: CPT | Mod: HCNC,CPTII,S$GLB, | Performed by: PHYSICIAN ASSISTANT

## 2019-02-18 RX ORDER — CYCLOBENZAPRINE HCL 5 MG
5 TABLET ORAL 3 TIMES DAILY PRN
Qty: 30 TABLET | Refills: 0 | Status: SHIPPED | OUTPATIENT
Start: 2019-02-18 | End: 2019-02-28 | Stop reason: SDUPTHER

## 2019-02-18 RX ORDER — KETOROLAC TROMETHAMINE 30 MG/ML
30 INJECTION, SOLUTION INTRAMUSCULAR; INTRAVENOUS
Status: COMPLETED | OUTPATIENT
Start: 2019-02-18 | End: 2019-02-18

## 2019-02-18 RX ORDER — TIZANIDINE 2 MG/1
2 TABLET ORAL EVERY 8 HOURS PRN
Qty: 30 TABLET | Refills: 0 | Status: CANCELLED | OUTPATIENT
Start: 2019-02-18 | End: 2019-02-28

## 2019-02-18 RX ADMIN — KETOROLAC TROMETHAMINE 30 MG: 30 INJECTION, SOLUTION INTRAMUSCULAR; INTRAVENOUS at 12:02

## 2019-02-18 NOTE — PROGRESS NOTES
Patient Name: Narciso Yanez    : 1935  MRN: [id number]    Subjective:  Narciso is a 83 y.o. male who presents today for:    Chief Complaint   Patient presents with    Groin Pain    Shoulder Pain     both       HPI  Patient has multiple medical diagnoses as listed below in the history. Patient complains of pain in his left groin for approximately one week. He reports slipping at work causing his left leg to abduct. He says he did not immediately have pain. The pain was gradual over the course of the week. He describes it as a pulling while he is walking. He is walking with a slight limp. He reports the pain to get better as the day goes on and he moves around more. He has not tried anything for the pain. He reports the pain to be better today than over the weekend. He denies history of hernia, penile or scrotal pain, radiating pain, numbness or tingling.     Past Medical History  Past Medical History:   Diagnosis Date    Actinic keratosis     Anxiety     B12 deficiency 2014    Dx     Cancer     skin    Cataract     Coronary artery disease     Diabetes mellitus type II     Diabetes mellitus with peripheral circulatory disorder 2014    ED (erectile dysfunction)     Hyperlipidemia     Hypertension     Kidney stone     Peripheral vascular disease     Tobacco dependence        Past Surgical History  Past Surgical History:   Procedure Laterality Date    APPENDECTOMY      CARDIAC SURGERY      ESOPHAGOGASTRODUODENOSCOPY (EGD) N/A 10/10/2016    [facility] by [facility] at [facility]    EXTERNAL EAR SURGERY      EYE SURGERY      cataracts    ILIAC ARTERY STENT Bilateral 2003    also Right External Iliac stent       Family History  Family History   Problem Relation Age of Onset    Stroke Mother        Social History  Social History     Socioeconomic History    Marital status:      Spouse name: Not on file    Number of children: Not on file    Years of education:  Not on file    Highest education level: Not on file   Social Needs    Financial resource strain: Not on file    Food insecurity - worry: Not on file    Food insecurity - inability: Not on file    Transportation needs - medical: Not on file    Transportation needs - non-medical: Not on file   Occupational History    Not on file   Tobacco Use    Smoking status: Current Every Day Smoker     Types: Cigarettes    Smokeless tobacco: Never Used   Substance and Sexual Activity    Alcohol use: No    Drug use: No    Sexual activity: Not Currently   Other Topics Concern    Not on file   Social History Narrative    .  4 children.  Smokes 2 ppd.  Still drives trucks for entertainment industry.        Current Medications  Current Outpatient Medications on File Prior to Visit   Medication Sig Dispense Refill    albuterol (VENTOLIN HFA) 90 mcg/actuation inhaler Inhale 2 puffs into the lungs every 6 (six) hours as needed for Wheezing or Shortness of Breath. Rescue 18 g 0    clopidogrel (PLAVIX) 75 mg tablet TAKE ONE TABLET BY MOUTH EVERY DAY 90 tablet 4    diazePAM (VALIUM) 5 MG tablet Take 1 tablet (5 mg total) by mouth every 8 (eight) hours as needed. 60 tablet 3    FLUZONE HIGH-DOSE 2014-15, PF, 180 mcg/0.5 mL Syrg   0    FLUZONE HIGH-DOSE 2018-19, PF, 180 mcg/0.5 mL vaccine       inhalation spacing device Use as directed for inhalation. 1 Device 0    methylPREDNISolone (MEDROL DOSEPACK) 4 mg tablet use as directed 1 Package 0    metoprolol succinate (TOPROL-XL) 25 MG 24 hr tablet Take 1 tablet (25 mg total) by mouth once daily. 90 tablet 1    rosuvastatin (CRESTOR) 20 MG tablet Take 1 tablet (20 mg total) by mouth every evening. 90 tablet 12    sildenafil (REVATIO) 20 mg Tab TAKE 1-5 TABLETS BY MOUTH once per DAILY AS NEEDED 30 tablet 11     No current facility-administered medications on file prior to visit.        Allergies   Review of patient's allergies indicates:   Allergen Reactions    Demerol   "[meperidine]      Other reaction(s): Unknown         ROS  Review of Systems   Constitutional: Negative for fatigue, fever and unexpected weight change.   Respiratory: Negative for cough and shortness of breath.    Cardiovascular: Negative for chest pain and palpitations.   Gastrointestinal: Negative for abdominal pain and nausea.   Genitourinary: Negative for difficulty urinating, discharge, dysuria, hematuria, penile pain, scrotal swelling and testicular pain.   Musculoskeletal: Positive for gait problem and myalgias. Negative for arthralgias.        Ambulatory with a limp   Skin: Negative for rash.   Neurological: Negative for light-headedness and headaches.   Hematological: Negative for adenopathy. Does not bruise/bleed easily.   Psychiatric/Behavioral: Negative for agitation. The patient is not nervous/anxious.          Objective:    /86   Pulse 83   Temp 97.5 °F (36.4 °C) (Oral)   Ht 5' 7" (1.702 m)   Wt 73.3 kg (161 lb 9.6 oz)   SpO2 98%   BMI 25.31 kg/m²     Physical Exam   Constitutional: He is oriented to person, place, and time. Vital signs are normal. He does not appear ill.   HENT:   Head: Normocephalic.   Mouth/Throat: Uvula is midline, oropharynx is clear and moist and mucous membranes are normal. No tonsillar exudate.   Eyes: Conjunctivae, EOM and lids are normal. Pupils are equal, round, and reactive to light.   Neck: Full passive range of motion without pain. Carotid bruit is not present.   Cardiovascular: Normal rate, regular rhythm, S1 normal and S2 normal. Exam reveals no gallop and no friction rub.   No murmur heard.  Pulmonary/Chest: Effort normal and breath sounds normal. He has no wheezes. He has no rhonchi. He has no rales.   Abdominal: Soft. Normal appearance and bowel sounds are normal. There is no hepatosplenomegaly. There is no tenderness. No hernia. Hernia confirmed negative in the right inguinal area and confirmed negative in the left inguinal area.   Genitourinary: Testes " normal and penis normal. Circumcised.   Lymphadenopathy:     He has no cervical adenopathy.        Right: No inguinal and no supraclavicular adenopathy present.        Left: No inguinal and no supraclavicular adenopathy present.   Neurological: He is alert and oriented to person, place, and time.   Skin: Skin is warm and dry. No rash noted.   Psychiatric: He has a normal mood and affect.       Assessment/Plan:  Narciso Yanez is a 83 y.o. male who presents today for :    Narciso was seen today for groin pain and shoulder pain.    Diagnoses and all orders for this visit:    Groin strain, left, initial encounter, no evidence of hernia on exam  -     ketorolac injection 30 mg  -     cyclobenzaprine (FLEXERIL) 5 MG tablet; Take 1 tablet (5 mg total) by mouth 3 (three) times daily as needed for Muscle spasms.  Counseled patient on the clinical course of condition including symptomatology, treatment and prevention  Counseled regarding risks, benefits, and limitations of medications  Sent patient with informational material about diagnosis and proper home care  Advised patient to seek urgent/emergent care if symptoms intensify or new symptoms develop  Patient gave verbal understanding and agreement of plan  Home care  · Avoid heavy lifting, straining, or any activities that cause groin pain.  · You may use Tylenol/NSAIDs to control pain, unless another pain medicine was prescribed. If you have chronic liver or kidney disease or ever had a stomach ulcer or GI bleeding, talk with your healthcare provider before using these medicines.          Problem list issues addresses during this visit    High-density lipoprotein deficiency  chronic and stable as reviewed in record, controlled with medication   followed by PCP  Continue current treatment plan      Tortuous aorta  CXR 2010, stable as reviewed in record, on home medication   followed by PCP  Continue current treatment plan       Health maintenance reviewed and disussed,  up to date       Encouraged to call/return to clinic if symptoms persist or worsen    Chata Eckert PA-C  [facility] Family [facility]/ Internal Med/ Peds

## 2019-02-18 NOTE — ASSESSMENT & PLAN NOTE
CXR 2010, stable as reviewed in record, on home medication   followed by PCP  Continue current treatment plan

## 2019-02-18 NOTE — PATIENT INSTRUCTIONS
Groin Strain (Adult)     A groin strain is a stretching or partial tearing of the muscle in the lower abdomen or upper thigh. This may happen because of too much coughing, heavy lifting, or active sports. The pain may last for several days to weeks, depending on how bad the stretch or tear is. It will generally get better with rest, ice, and anti-inflammatory medicines.  A groin strain can lead to a groin hernia. This is also called an inguinal hernia. A hernia is a complete tear of the abdominal muscle. This allows fat or the intestines to bulge out and create a visible bump just above the thigh crease. This is a more serious problem and may need surgery to repair it. When you lie down, the bump should get smaller or disappear completely. If it doesnt, and you are not able to flatten it with your hand, you need medical attention right away.  Home care  · Avoid heavy lifting, straining, or any activities that cause groin pain.  · You may use over-the-counter pain medicine to control pain, unless another pain medicine was prescribed. If you have chronic liver or kidney disease or ever had a stomach ulcer or GI bleeding, talk with your healthcare provider before using these medicines.  Follow-up care  Follow up with your healthcare provider, or as advised. Make an appointment with your healthcare provider if you develop a bump in the area of the groin strain.  When to seek medical advice  Call your healthcare provider right away if any of these occur:  · Increasing pain in the area of the groin strain  · Tender bump just above the groin crease that does not flatten when you lie down or press on it  · Overall abdominal swelling or pain  · Fever of 100.4°F (38°C) or above lasting for 24 to 48 hours  · Repeated vomiting  · Pain that moves to the lower right abdomen, just below the waistline, or spreads to the back  Date Last Reviewed: 11/19/2015  © 2426-7397 [employer]. [address] [place] [address], [place] [address]. All rights reserved. This information is not intended as a substitute for professional medical care. Always follow your healthcare professional's instructions.

## 2019-02-28 ENCOUNTER — OFFICE VISIT (OUTPATIENT)
Dept: FAMILY MEDICINE | Facility: CLINIC | Age: 84
End: 2019-02-28
Payer: MEDICARE

## 2019-02-28 VITALS
TEMPERATURE: 98 F | HEART RATE: 89 BPM | OXYGEN SATURATION: 97 % | HEIGHT: 67 IN | WEIGHT: 161.25 LBS | BODY MASS INDEX: 25.31 KG/M2 | SYSTOLIC BLOOD PRESSURE: 132 MMHG | DIASTOLIC BLOOD PRESSURE: 80 MMHG | RESPIRATION RATE: 17 BRPM

## 2019-02-28 DIAGNOSIS — R29.898 LEG WEAKNESS, BILATERAL: ICD-10-CM

## 2019-02-28 DIAGNOSIS — D50.9 IRON DEFICIENCY ANEMIA, UNSPECIFIED IRON DEFICIENCY ANEMIA TYPE: ICD-10-CM

## 2019-02-28 DIAGNOSIS — I73.9 PAD (PERIPHERAL ARTERY DISEASE): Primary | ICD-10-CM

## 2019-02-28 DIAGNOSIS — S76.212A GROIN STRAIN, LEFT, INITIAL ENCOUNTER: ICD-10-CM

## 2019-02-28 DIAGNOSIS — J44.1 COPD WITH ACUTE EXACERBATION: ICD-10-CM

## 2019-02-28 PROCEDURE — 3079F DIAST BP 80-89 MM HG: CPT | Mod: HCNC,CPTII,S$GLB, | Performed by: NURSE PRACTITIONER

## 2019-02-28 PROCEDURE — 3288F PR FALLS RISK ASSESSMENT DOCUMENTED: ICD-10-PCS | Mod: HCNC,CPTII,S$GLB, | Performed by: NURSE PRACTITIONER

## 2019-02-28 PROCEDURE — 3075F SYST BP GE 130 - 139MM HG: CPT | Mod: HCNC,CPTII,S$GLB, | Performed by: NURSE PRACTITIONER

## 2019-02-28 PROCEDURE — 99999 PR PBB SHADOW E&M-EST. PATIENT-LVL IV: CPT | Mod: PBBFAC,HCNC,, | Performed by: NURSE PRACTITIONER

## 2019-02-28 PROCEDURE — 1100F PTFALLS ASSESS-DOCD GE2>/YR: CPT | Mod: HCNC,CPTII,S$GLB, | Performed by: NURSE PRACTITIONER

## 2019-02-28 PROCEDURE — 3075F PR MOST RECENT SYSTOLIC BLOOD PRESS GE 130-139MM HG: ICD-10-PCS | Mod: HCNC,CPTII,S$GLB, | Performed by: NURSE PRACTITIONER

## 2019-02-28 PROCEDURE — 99214 OFFICE O/P EST MOD 30 MIN: CPT | Mod: HCNC,S$GLB,, | Performed by: NURSE PRACTITIONER

## 2019-02-28 PROCEDURE — 99999 PR PBB SHADOW E&M-EST. PATIENT-LVL IV: ICD-10-PCS | Mod: PBBFAC,HCNC,, | Performed by: NURSE PRACTITIONER

## 2019-02-28 PROCEDURE — 3288F FALL RISK ASSESSMENT DOCD: CPT | Mod: HCNC,CPTII,S$GLB, | Performed by: NURSE PRACTITIONER

## 2019-02-28 PROCEDURE — 1100F PR PT FALLS ASSESS DOC 2+ FALLS/FALL W/INJURY/YR: ICD-10-PCS | Mod: HCNC,CPTII,S$GLB, | Performed by: NURSE PRACTITIONER

## 2019-02-28 PROCEDURE — 3079F PR MOST RECENT DIASTOLIC BLOOD PRESSURE 80-89 MM HG: ICD-10-PCS | Mod: HCNC,CPTII,S$GLB, | Performed by: NURSE PRACTITIONER

## 2019-02-28 PROCEDURE — 99214 PR OFFICE/OUTPT VISIT, EST, LEVL IV, 30-39 MIN: ICD-10-PCS | Mod: HCNC,S$GLB,, | Performed by: NURSE PRACTITIONER

## 2019-02-28 RX ORDER — FERROUS SULFATE 325(65) MG
325 TABLET ORAL
Qty: 120 TABLET | Refills: 0 | Status: SHIPPED | OUTPATIENT
Start: 2019-02-28 | End: 2019-06-28

## 2019-02-28 RX ORDER — CYCLOBENZAPRINE HCL 5 MG
5 TABLET ORAL 3 TIMES DAILY PRN
Qty: 30 TABLET | Refills: 0 | Status: SHIPPED | OUTPATIENT
Start: 2019-02-28 | End: 2019-03-10

## 2019-02-28 RX ORDER — ASPIRIN 81 MG/1
81 TABLET ORAL DAILY
Status: ON HOLD | COMMUNITY
End: 2021-05-26 | Stop reason: HOSPADM

## 2019-02-28 RX ORDER — ASCORBIC ACID 250 MG
250 TABLET ORAL DAILY
Qty: 120 TABLET | Refills: 0 | Status: ON HOLD | OUTPATIENT
Start: 2019-02-28 | End: 2023-10-23 | Stop reason: HOSPADM

## 2019-02-28 RX ORDER — ALBUTEROL SULFATE 90 UG/1
2 AEROSOL, METERED RESPIRATORY (INHALATION) EVERY 6 HOURS PRN
Qty: 18 G | Refills: 0 | Status: SHIPPED | OUTPATIENT
Start: 2019-02-28 | End: 2020-07-23 | Stop reason: CLARIF

## 2019-03-01 ENCOUNTER — TELEPHONE (OUTPATIENT)
Dept: PULMONOLOGY | Facility: CLINIC | Age: 84
End: 2019-03-01

## 2019-03-01 NOTE — TELEPHONE ENCOUNTER
----- Message from Bri Veloz NP sent at 3/1/2019  7:59 AM CST -----  Please schedule patient for PFT, six minute walk, ultrasound legs. Thanks.   Bri

## 2019-03-04 ENCOUNTER — HOSPITAL ENCOUNTER (OUTPATIENT)
Dept: RESPIRATORY THERAPY | Facility: HOSPITAL | Age: 84
Discharge: HOME | End: 2019-03-04
Attending: NURSE PRACTITIONER
Payer: MEDICARE

## 2019-03-04 ENCOUNTER — HOSPITAL ENCOUNTER (OUTPATIENT)
Dept: CARDIOLOGY | Facility: HOSPITAL | Age: 84
Discharge: HOME | End: 2019-03-04
Attending: NURSE PRACTITIONER
Payer: MEDICARE

## 2019-03-04 ENCOUNTER — TELEPHONE (OUTPATIENT)
Dept: FAMILY MEDICINE | Facility: CLINIC | Age: 84
End: 2019-03-04

## 2019-03-04 VITALS — RESPIRATION RATE: 18 BRPM | OXYGEN SATURATION: 98 % | HEART RATE: 91 BPM

## 2019-03-04 DIAGNOSIS — J44.1 COPD WITH ACUTE EXACERBATION: ICD-10-CM

## 2019-03-04 DIAGNOSIS — I73.9 PAD (PERIPHERAL ARTERY DISEASE): ICD-10-CM

## 2019-03-04 LAB
LEFT ANT TIBIAL SYS PSV: 72 CM/S
LEFT CFA PSV: 241 CM/S
LEFT EXTERNAL ILIAC PSV: 126 CM/S
LEFT PERONEAL SYS PSV: 76 CM/S
LEFT POPLITEAL PSV: 100 CM/S
LEFT POST TIBIAL SYS PSV: 33 CM/S
LEFT PROFUNDA SYS PSV: 101 CM/S
LEFT SUPER FEMORAL DIST SYS PSV: 71 CM/S
LEFT SUPER FEMORAL MID SYS PSV: 108 CM/S
LEFT SUPER FEMORAL OSTIAL SYS PSV: 100 CM/S
LEFT SUPER FEMORAL PROX SYS PSV: 108 CM/S
LEFT TIB/PER TRUNK SYS PSV: 97 CM/S
OHS CV LEFT LOWER EXTREMITY ABI (NO CALC): 1
OHS CV LOWER EXTREMITY STENT MEASUREMENTS - LEFT - CIL S1 - DIST: 175
OHS CV LOWER EXTREMITY STENT MEASUREMENTS - LEFT - CIL S1 - MID: 158
OHS CV LOWER EXTREMITY STENT MEASUREMENTS - LEFT - CIL S1 - PROX: 150
OHS CV LOWER EXTREMITY STENT MEASUREMENTS - RIGHT - CIL S1 - DIST: 133
OHS CV LOWER EXTREMITY STENT MEASUREMENTS - RIGHT - CIL S1 - MID: 92
OHS CV LOWER EXTREMITY STENT MEASUREMENTS - RIGHT - CIL S1 - PROX: 106
OHS CV RIGHT ABI LOWER EXTREMITY (NO CALC): 1
RIGHT ANT TIBIAL SYS PSV: 93 CM/S
RIGHT CFA PSV: 127 CM/S
RIGHT EXTERNAL ILLIAC PSV: 95 CM/S
RIGHT PERONEAL SYS PSV: 58 CM/S
RIGHT POPLITEAL PSV: 71 CM/S
RIGHT POST TIBIAL SYS PSV: 46 CM/S
RIGHT PROFUNDA SYS PSV: 181 CM/S
RIGHT SUPER FEMORAL DIST SYS PSV: 58 CM/S
RIGHT SUPER FEMORAL MID SYS PSV: 82 CM/S
RIGHT SUPER FEMORAL OSTIAL SYS PSV: 164 CM/S
RIGHT SUPER FEMORAL PROX SYS PSV: 143 CM/S
RIGHT TIB/PER TRUNK SYS PSV: 77 CM/S

## 2019-03-04 PROCEDURE — 93925 CV US DOPPLER ARTERIAL LEGS BILATERAL (CUPID ONLY): ICD-10-PCS | Mod: 26,HCNC,, | Performed by: INTERNAL MEDICINE

## 2019-03-04 PROCEDURE — 94618 PULMONARY STRESS TESTING: CPT | Mod: HCNC

## 2019-03-04 PROCEDURE — 93925 LOWER EXTREMITY STUDY: CPT | Mod: 26,HCNC,, | Performed by: INTERNAL MEDICINE

## 2019-03-04 PROCEDURE — 25000242 PHARM REV CODE 250 ALT 637 W/ HCPCS: Mod: HCNC | Performed by: NURSE PRACTITIONER

## 2019-03-04 PROCEDURE — 94060 EVALUATION OF WHEEZING: CPT | Mod: HCNC,59

## 2019-03-04 PROCEDURE — 93925 LOWER EXTREMITY STUDY: CPT | Mod: 50,HCNC

## 2019-03-04 RX ORDER — ALBUTEROL SULFATE 2.5 MG/.5ML
2.5 SOLUTION RESPIRATORY (INHALATION) ONCE
Status: COMPLETED | OUTPATIENT
Start: 2019-03-04 | End: 2019-03-04

## 2019-03-04 RX ADMIN — ALBUTEROL SULFATE 2.5 MG: 2.5 SOLUTION RESPIRATORY (INHALATION) at 02:03

## 2019-03-12 ENCOUNTER — TELEPHONE (OUTPATIENT)
Dept: PULMONOLOGY | Facility: CLINIC | Age: 84
End: 2019-03-12

## 2019-03-12 DIAGNOSIS — R06.02 SHORTNESS OF BREATH: ICD-10-CM

## 2019-03-12 NOTE — TELEPHONE ENCOUNTER
Pt inform of hypoxia on walk test, he just recently started his lama and states feeling better so we will repeat walk, given SOB on exertion and hypoxia will also repeat ECHO and obtain CT chest, pt declined smoking cessation

## 2019-03-18 ENCOUNTER — HOSPITAL ENCOUNTER (OUTPATIENT)
Dept: RADIOLOGY | Facility: HOSPITAL | Age: 84
Discharge: HOME | End: 2019-03-18
Attending: NURSE PRACTITIONER
Payer: MEDICARE

## 2019-03-18 ENCOUNTER — HOSPITAL ENCOUNTER (OUTPATIENT)
Dept: RESPIRATORY THERAPY | Facility: HOSPITAL | Age: 84
Discharge: HOME | End: 2019-03-18
Attending: NURSE PRACTITIONER
Payer: MEDICARE

## 2019-03-18 ENCOUNTER — HOSPITAL ENCOUNTER (OUTPATIENT)
Dept: CARDIOLOGY | Facility: HOSPITAL | Age: 84
Discharge: HOME | End: 2019-03-18
Attending: NURSE PRACTITIONER
Payer: MEDICARE

## 2019-03-18 DIAGNOSIS — R06.02 SHORTNESS OF BREATH: ICD-10-CM

## 2019-03-18 LAB
AORTIC ROOT ANNULUS: 3.17 CM
AORTIC VALVE CUSP SEPERATION: 1.81 CM
ASCENDING AORTA: 3.03 CM
AV INDEX (PROSTH): 0.86
AV MEAN GRADIENT: 2.15 MMHG
AV PEAK GRADIENT: 3.24 MMHG
AV VALVE AREA: 3.19 CM2
AV VELOCITY RATIO: 0.93
CV ECHO LV RWT: 0.52 CM
DLCO ADJ PRE: 7.43 ML/(MIN*MMHG) (ref 11.19–25.04)
DLCO SINGLE BREATH LLN: 11.19
DLCO SINGLE BREATH PRE REF: 41 %
DLCO SINGLE BREATH REF: 18.12
DLCOC SBVA LLN: 1.86
DLCOC SBVA PRE REF: 63.8 %
DLCOC SBVA REF: 3.32
DLCOC SINGLE BREATH LLN: 11.19
DLCOC SINGLE BREATH PRE REF: 41 %
DLCOC SINGLE BREATH REF: 18.12
DLCOVA LLN: 1.86
DLCOVA PRE REF: 63.8 %
DLCOVA PRE: 2.12 ML/(MIN*MMHG*L) (ref 1.86–4.77)
DLCOVA REF: 3.32
DLVAADJ PRE: 2.12 ML/(MIN*MMHG*L) (ref 1.86–4.77)
DOP CALC AO PEAK VEL: 0.9 M/S
DOP CALC AO VTI: 16.01 CM
DOP CALC LVOT AREA: 3.73 CM2
DOP CALC LVOT DIAMETER: 2.18 CM
DOP CALC LVOT PEAK VEL: 0.83 M/S
DOP CALC LVOT STROKE VOLUME: 51.11 CM3
DOP CALCLVOT PEAK VEL VTI: 13.7 CM
E WAVE DECELERATION TIME: 226.79 MSEC
E/A RATIO: 0.49
ECHO LV POSTERIOR WALL: 0.87 CM (ref 0.6–1.1)
ERV LLN: 0.7
ERV REF: 0.7
ERVN2 LLN: 0.7
ERVN2 PRE REF: 31.2 %
ERVN2 PRE: 0.22 L (ref 0.7–0.7)
ERVN2 REF: 0.7
FEF 25 75 LLN: 0.59
FEF 25 75 PRE REF: 101.2 %
FEF 25 75 REF: 1.6
FEV1 FVC LLN: 61
FEV1 FVC PRE REF: 104.3 %
FEV1 FVC REF: 76
FEV1 LLN: 1.48
FEV1 PRE REF: 89.5 %
FEV1 REF: 2.15
FEV6 LLN: 1.74
FEV6 REF: 2.45
FRACTIONAL SHORTENING: 17 % (ref 28–44)
FRCN2 LLN: 2.33
FRCN2 PRE REF: 167.8 %
FRCN2 REF: 3.31
FRCPLETH LLN: 2.33
FRCPLETH REF: 3.31
FVC LLN: 2.04
FVC PRE REF: 85.4 %
FVC REF: 2.85
INTERVENTRICULAR SEPTUM: 0.82 CM (ref 0.6–1.1)
IVC PRE: 1.62 L (ref 2.04–3.65)
IVC SINGLE BREATH LLN: 2.04
IVC SINGLE BREATH PRE REF: 57 %
IVC SINGLE BREATH REF: 2.85
IVRT: 0.12 MSEC
LA MAJOR: 3.3 CM
LEFT ATRIUM SIZE: 2.95 CM
LEFT INTERNAL DIMENSION IN SYSTOLE: 2.78 CM (ref 2.1–4)
LEFT VENTRICLE DIASTOLIC VOLUME: 45.8 ML
LEFT VENTRICLE SYSTOLIC VOLUME: 29.04 ML
LEFT VENTRICULAR INTERNAL DIMENSION IN DIASTOLE: 3.35 CM (ref 3.5–6)
LEFT VENTRICULAR MASS: 75.84 G
MV PEAK A VEL: 0.89 M/S
MV PEAK E VEL: 0.44 M/S
MVV LLN: 65
MVV REF: 76
PEF LLN: 3.56
PEF PRE REF: 71.6 %
PEF REF: 5.37
PISA TR MAX VEL: 2.1 M/S
PRE DLCO: 7.43 ML/(MIN*MMHG) (ref 11.19–25.04)
PRE FEF 25 75: 1.62 L/S (ref 0.59–2.61)
PRE FET 100: 4.52 SEC
PRE FEV1 FVC: 79.21 % (ref 60.69–91.23)
PRE FEV1: 1.92 L (ref 1.48–2.82)
PRE FRC N2: 5.56 L
PRE FVC: 2.43 L (ref 2.04–3.65)
PRE PEF: 3.85 L/S (ref 3.56–7.18)
RA MAJOR: 3.63 CM
RA PRESSURE: 3 MMHG
RA WIDTH: 3.22 CM
RAW LLN: 3.06
RAW REF: 3.06
RIGHT VENTRICULAR END-DIASTOLIC DIMENSION: 3.38 CM
RV LLN: 1.93
RV REF: 2.61
RV TISSUE DOPPLER FREE WALL SYSTOLIC VELOCITY 1 (APICAL 4 CHAMBER VIEW): 7.52 M/S
RVN2 LLN: 1.93
RVN2 PRE REF: 204.7 %
RVN2 PRE: 5.34 L (ref 1.93–3.28)
RVN2 REF: 2.61
RVN2TLCN2 LLN: 36.57
RVN2TLCN2 PRE REF: 159.7 %
RVN2TLCN2 PRE: 72.75 % (ref 36.57–54.53)
RVN2TLCN2 REF: 45.55
RVTLC LLN: 37
RVTLC REF: 46
SINUS: 3.42 CM
STJ: 2.36 CM
TLC LLN: 4.31
TLC REF: 5.46
TLCN2 LLN: 4.31
TLCN2 PRE REF: 134.3 %
TLCN2 PRE: 7.34 L (ref 4.31–6.62)
TLCN2 REF: 5.46
TR MAX PG: 17.64 MMHG
TRICUSPID ANNULAR PLANE SYSTOLIC EXCURSION: 1.44 CM
TV REST PULMONARY ARTERY PRESSURE: 21 MMHG
VA PRE: 3.53 L (ref 5.31–5.31)
VA SINGLE BREATH LLN: 5.31
VA SINGLE BREATH PRE REF: 66.4 %
VA SINGLE BREATH REF: 5.31
VC LLN: 2.04
VC REF: 2.85
VCMAXN2 LLN: 2.04
VCMAXN2 PRE REF: 70.3 %
VCMAXN2 PRE: 2 L (ref 2.04–3.65)
VCMAXN2 REF: 2.85

## 2019-03-18 PROCEDURE — 94618 PULMONARY STRESS TESTING: CPT | Mod: HCNC | Performed by: INTERNAL MEDICINE

## 2019-03-18 PROCEDURE — 93306 TRANSTHORACIC ECHO (TTE) COMPLETE (CUPID ONLY): ICD-10-PCS | Mod: 26,HCNC,, | Performed by: INTERNAL MEDICINE

## 2019-03-18 PROCEDURE — 94010 BREATHING CAPACITY TEST: CPT | Mod: HCNC

## 2019-03-18 PROCEDURE — 94010 BREATHING CAPACITY TEST: CPT | Mod: HCNC | Performed by: INTERNAL MEDICINE

## 2019-03-18 PROCEDURE — 93306 TTE W/DOPPLER COMPLETE: CPT | Mod: HCNC

## 2019-03-18 PROCEDURE — 71250 CT THORAX DX C-: CPT | Mod: 26,HCNC,, | Performed by: RADIOLOGY

## 2019-03-18 PROCEDURE — 94729 DIFFUSING CAPACITY: CPT | Mod: HCNC

## 2019-03-18 PROCEDURE — 71250 CT CHEST WITHOUT CONTRAST: ICD-10-PCS | Mod: 26,HCNC,, | Performed by: RADIOLOGY

## 2019-03-18 PROCEDURE — 93306 TTE W/DOPPLER COMPLETE: CPT | Mod: 26,HCNC,, | Performed by: INTERNAL MEDICINE

## 2019-03-18 PROCEDURE — 94727 GAS DIL/WSHOT DETER LNG VOL: CPT | Mod: HCNC | Performed by: INTERNAL MEDICINE

## 2019-03-18 PROCEDURE — 94729 DIFFUSING CAPACITY: CPT | Mod: HCNC | Performed by: INTERNAL MEDICINE

## 2019-03-18 PROCEDURE — 94727 GAS DIL/WSHOT DETER LNG VOL: CPT | Mod: HCNC

## 2019-03-18 PROCEDURE — 71250 CT THORAX DX C-: CPT | Mod: TC,HCNC

## 2019-03-18 RX ORDER — ROSUVASTATIN CALCIUM 20 MG/1
TABLET, COATED ORAL
Qty: 90 TABLET | Refills: 12 | Status: SHIPPED | OUTPATIENT
Start: 2019-03-18 | End: 2019-12-19 | Stop reason: SDUPTHER

## 2019-03-19 ENCOUNTER — TELEPHONE (OUTPATIENT)
Dept: PULMONOLOGY | Facility: CLINIC | Age: 84
End: 2019-03-19

## 2019-03-19 DIAGNOSIS — M54.41 CHRONIC LOW BACK PAIN WITH BILATERAL SCIATICA, UNSPECIFIED BACK PAIN LATERALITY: Primary | ICD-10-CM

## 2019-03-19 DIAGNOSIS — G89.29 CHRONIC LOW BACK PAIN WITH BILATERAL SCIATICA, UNSPECIFIED BACK PAIN LATERALITY: Primary | ICD-10-CM

## 2019-03-19 DIAGNOSIS — M54.42 CHRONIC LOW BACK PAIN WITH BILATERAL SCIATICA, UNSPECIFIED BACK PAIN LATERALITY: Primary | ICD-10-CM

## 2019-03-19 RX ORDER — METHOCARBAMOL 500 MG/1
500 TABLET, FILM COATED ORAL 2 TIMES DAILY PRN
Qty: 30 TABLET | Refills: 0 | Status: SHIPPED | OUTPATIENT
Start: 2019-03-19 | End: 2019-03-26 | Stop reason: ALTCHOICE

## 2019-03-19 NOTE — TELEPHONE ENCOUNTER
Pt recommended to obtain xray back, referral to pain and physical therapy, inform of normal US leg, pt has been on robaxin before, he states flexeril causes dry mouth and does not help.

## 2019-03-19 NOTE — TELEPHONE ENCOUNTER
Patient is calling for results of PFT, Echo, CT. Also patient c/o pain and numbness down his right leg. Please advise

## 2019-03-26 ENCOUNTER — TELEPHONE (OUTPATIENT)
Dept: PULMONOLOGY | Facility: CLINIC | Age: 84
End: 2019-03-26

## 2019-03-26 DIAGNOSIS — M54.32 BILATERAL SCIATICA: Primary | ICD-10-CM

## 2019-03-26 DIAGNOSIS — M54.31 BILATERAL SCIATICA: Primary | ICD-10-CM

## 2019-03-26 DIAGNOSIS — R91.1 PULMONARY NODULE: ICD-10-CM

## 2019-03-26 RX ORDER — METHOCARBAMOL 750 MG/1
750 TABLET, FILM COATED ORAL EVERY 8 HOURS PRN
Qty: 30 TABLET | Refills: 0 | Status: SHIPPED | OUTPATIENT
Start: 2019-03-26 | End: 2020-06-16

## 2019-03-26 RX ORDER — TRAMADOL HYDROCHLORIDE 50 MG/1
50 TABLET ORAL EVERY 12 HOURS PRN
Qty: 12 TABLET | Refills: 0 | Status: SHIPPED | OUTPATIENT
Start: 2019-03-26 | End: 2019-04-01 | Stop reason: SDUPTHER

## 2019-03-26 NOTE — TELEPHONE ENCOUNTER
----- Message from Arleen Kearney MA sent at 3/26/2019 11:56 AM CDT -----  Contact: self [phone]   Please advise  ----- Message -----  From: Polly Ochoa  Sent: 3/26/2019  10:46 AM  To: Magdiel Gregory Staff    Pt was seen on 2-28-19, he had the test that you had ordered, he would like to know that results. Pls call pt [phone]. Thanks......Francheska

## 2019-03-27 ENCOUNTER — HOSPITAL ENCOUNTER (OUTPATIENT)
Dept: RADIOLOGY | Facility: HOSPITAL | Age: 84
Discharge: HOME | End: 2019-03-27
Attending: NURSE PRACTITIONER
Payer: MEDICARE

## 2019-03-27 DIAGNOSIS — G89.29 CHRONIC LOW BACK PAIN WITH BILATERAL SCIATICA, UNSPECIFIED BACK PAIN LATERALITY: ICD-10-CM

## 2019-03-27 DIAGNOSIS — M54.41 CHRONIC LOW BACK PAIN WITH BILATERAL SCIATICA, UNSPECIFIED BACK PAIN LATERALITY: ICD-10-CM

## 2019-03-27 DIAGNOSIS — M54.42 CHRONIC LOW BACK PAIN WITH BILATERAL SCIATICA, UNSPECIFIED BACK PAIN LATERALITY: ICD-10-CM

## 2019-03-27 PROCEDURE — 72100 XR LUMBAR SPINE AP AND LATERAL: ICD-10-PCS | Mod: 26,HCNC,, | Performed by: RADIOLOGY

## 2019-03-27 PROCEDURE — 72100 X-RAY EXAM L-S SPINE 2/3 VWS: CPT | Mod: TC,HCNC,FY,PO

## 2019-03-27 PROCEDURE — 72100 X-RAY EXAM L-S SPINE 2/3 VWS: CPT | Mod: 26,HCNC,, | Performed by: RADIOLOGY

## 2019-03-29 ENCOUNTER — TELEPHONE (OUTPATIENT)
Dept: PULMONOLOGY | Facility: CLINIC | Age: 84
End: 2019-03-29

## 2019-03-29 DIAGNOSIS — N20.0 KIDNEY STONES: ICD-10-CM

## 2019-03-29 DIAGNOSIS — I71.40 ABDOMINAL AORTIC ANEURYSM (AAA) WITHOUT RUPTURE: ICD-10-CM

## 2019-03-29 DIAGNOSIS — R91.1 PULMONARY NODULE: ICD-10-CM

## 2019-03-29 DIAGNOSIS — M47.26 OSTEOARTHRITIS OF SPINE WITH RADICULOPATHY, LUMBAR REGION: ICD-10-CM

## 2019-03-29 DIAGNOSIS — J44.9 CHRONIC OBSTRUCTIVE PULMONARY DISEASE, UNSPECIFIED COPD TYPE: ICD-10-CM

## 2019-03-29 PROBLEM — M47.9 SPONDYLOSIS: Status: ACTIVE | Noted: 2019-03-29

## 2019-03-29 NOTE — TELEPHONE ENCOUNTER
----- Message from Arleen Kearney MA sent at 3/29/2019 11:05 AM CDT -----  Contact: Self   Please advise  ----- Message -----  From: Luzmaria Michele  Sent: 3/29/2019  10:01 AM  To: Magdiel Gregory Staff    Pt calling to speak to a nurse regarding being referred to another pain  [phone]

## 2019-03-29 NOTE — TELEPHONE ENCOUNTER
----- Message from Arleen Kearney MA sent at 3/29/2019  9:27 AM CDT -----  Contact: Narciso [phone]  Please advise  ----- Message -----  From: Emy Salter  Sent: 3/29/2019   9:22 AM  To: Magdiel Gregory Staff    Type:  Test Results    Who Called: Narciso     Name of Test (Lab/Mammo/Etc): xray of lower back    Date of Test: March 27, 2019    Ordering Provider: CATHY Veloz    Where the test was performed: [facility]    Would the patient rather a call back or a response via My [facility]? Call back     Best Call Back Number: [phone]

## 2019-03-29 NOTE — TELEPHONE ENCOUNTER
Spoke to daughterEtelvina. Inform of findings and plan. Discuss pulmonary nodule, kidney stones, spondylosis, COPD, aortic aneurysm. Questions answer

## 2019-03-29 NOTE — TELEPHONE ENCOUNTER
Spoke to pt, inform of arthritic changes, kidney stones, aortic aneurysm, instructed pt to contact referral for pain management, he reports history kidney stones and decline US and further workup of this because he would like to address his back first. He will call back if he develops symptoms or following back tx. He was advised to monitor aneurysm yearly. Will f/u pulmonary nodules with CT chest in 6 months in September

## 2019-04-01 DIAGNOSIS — M54.31 BILATERAL SCIATICA: ICD-10-CM

## 2019-04-01 DIAGNOSIS — M54.32 BILATERAL SCIATICA: ICD-10-CM

## 2019-04-01 RX ORDER — TRAMADOL HYDROCHLORIDE 50 MG/1
50 TABLET ORAL EVERY 12 HOURS PRN
Qty: 12 TABLET | Refills: 0 | Status: SHIPPED | OUTPATIENT
Start: 2019-04-01 | End: 2019-04-01 | Stop reason: SDUPTHER

## 2019-04-01 RX ORDER — TRAMADOL HYDROCHLORIDE 50 MG/1
50 TABLET ORAL EVERY 12 HOURS PRN
Qty: 28 TABLET | Refills: 0 | Status: SHIPPED | OUTPATIENT
Start: 2019-04-01 | End: 2019-04-08 | Stop reason: ALTCHOICE

## 2019-04-01 NOTE — TELEPHONE ENCOUNTER
----- Message from Whitley Ana sent at 4/1/2019  9:21 AM CDT -----  Contact: self [phone]  .Type: RX Refill Request    Who Called: self     Refill or New Rx:refill    RX Name and Strength: traMADol (ULTRAM) 50 mg tablet & methocarbamol (ROBAXIN) 750 MG Tab    Is this a 30 day or 90 day RX: 30 day    Preferred Pharmacy with phone number: .  [facility] [address] [address]  Phone: [phone] Fax: [phone]        Local or Mail Order: local    Ordering Provider:Bri    Would the patient rather a call back or a response via My [facility]? Call back    Best Call Back Number: [phone]    Additional Information:

## 2019-04-01 NOTE — TELEPHONE ENCOUNTER
[facility] requesting a refill on traMADol (ULTRAM) 50 mg tablet. Patient request a 30 day refill. Please advise.

## 2019-04-01 NOTE — TELEPHONE ENCOUNTER
Patient states he's out of traMADol (ULTRAM) 50 mg tablet. Would like another refill. Please advise

## 2019-04-08 ENCOUNTER — TELEPHONE (OUTPATIENT)
Dept: PULMONOLOGY | Facility: CLINIC | Age: 84
End: 2019-04-08

## 2019-04-08 DIAGNOSIS — M54.5 LOW BACK PAIN, UNSPECIFIED BACK PAIN LATERALITY, UNSPECIFIED CHRONICITY, WITH SCIATICA PRESENCE UNSPECIFIED: Primary | ICD-10-CM

## 2019-04-08 RX ORDER — HYDROCODONE BITARTRATE AND ACETAMINOPHEN 5; 325 MG/1; MG/1
1 TABLET ORAL 2 TIMES DAILY PRN
Qty: 14 TABLET | Refills: 0 | Status: SHIPPED | OUTPATIENT
Start: 2019-04-08 | End: 2019-04-15

## 2019-04-08 NOTE — TELEPHONE ENCOUNTER
Pt states tramadol not helping with pain, switch to vicodin until pt able to f/u with pain management,  not to combine with valium other sedating medication due to respiratory depression and risk of death. Pt verbalize understanding, states he has had recurrent kidney stones and does not have problems with vicodin.

## 2019-04-08 NOTE — TELEPHONE ENCOUNTER
----- Message from Arleen Kearney MA sent at 4/8/2019  4:41 PM CDT -----  Contact: Self/ [phone]  Please advise  ----- Message -----  From: Renee Mirza  Sent: 4/8/2019   3:49 PM  To: Magdiel Gregory Staff    Patient would like staff to give him a call, regarding his medication.  He doesn't have enough to get him through to his appointment.  Thank you

## 2019-04-08 NOTE — TELEPHONE ENCOUNTER
----- Message from Renee Mirza sent at 4/8/2019  3:49 PM CDT -----  Contact: Self/  [phone]  Patient would like staff to give him a call, regarding his medication.  He doesn't have enough to get him through to his appointment.  Thank you

## 2019-04-08 NOTE — TELEPHONE ENCOUNTER
----- Message from Cristina Rivera sent at 4/8/2019  4:54 PM CDT -----  Contact: self [phone]  .Patient Returning Call from [facility]    Who Left Message for Patient:Arleen HUGHES  Communication Preference:phone   Additional Information:

## 2019-04-13 NOTE — PROGRESS NOTES
Patient Name: Narciso Yanez    : 1935  MRN: [id number]    Subjective:  Narciso is a 83 y.o. male who presents today for     1.  Bilateral leg pain right greater than left x1 week.  He has a history of PVD/PAD requiring bilateral iliac stent.  He is concerned that this may be because again.  Reports his legs feel weak.  Hurts when he walks.    2.  He was noted to have wheezing rhonchi on exam.  Reports a chronic cough and shortness of breath on exertion.  He is a smoker for many years.  He is not motivated to quit.  He reports no activity limitation.  No hospitalization for respiratory exacerbation.    3.  Reports that his left groin hurts all the time.  He thinks he may have pulled it.  Started about 3 weeks ago.    Past Medical History  Past Medical History:   Diagnosis Date    Actinic keratosis     Anxiety     B12 deficiency 2014    Dx     Cancer     skin    Cataract     Coronary artery disease     Diabetes mellitus type II     Diabetes mellitus with peripheral circulatory disorder 2014    ED (erectile dysfunction)     Hyperlipidemia     Hypertension     Kidney stone     Peripheral vascular disease     Spondylosis 3/29/2019    Tobacco dependence        Past Surgical History  Past Surgical History:   Procedure Laterality Date    APPENDECTOMY      CARDIAC SURGERY      ESOPHAGOGASTRODUODENOSCOPY (EGD) N/A 10/10/2016    [facility]    EXTERNAL EAR SURGERY      EYE SURGERY      cataracts    ILIAC ARTERY STENT Bilateral 2003    also Right External Iliac stent       Family History  Family History   Problem Relation Age of Onset    Stroke Mother        Social History  Social History     Socioeconomic History    Marital status: Single     Spouse name: Not on file    Number of children: Not on file    Years of education: Not on file    Highest education level: Not on file   Occupational History    Not on file   Social Needs    Financial  resource strain: Not on file    Food insecurity:     Worry: Not on file     Inability: Not on file    Transportation needs:     Medical: Not on file     Non-medical: Not on file   Tobacco Use    Smoking status: Current Every Day Smoker     Types: Cigarettes    Smokeless tobacco: Never Used   Substance and Sexual Activity    Alcohol use: No    Drug use: No    Sexual activity: Not Currently   Lifestyle    Physical activity:     Days per week: Not on file     Minutes per session: Not on file    Stress: Not on file   Relationships    Social connections:     Talks on phone: Not on file     Gets together: Not on file     Attends Confucianist service: Not on file     Active member of club or organization: Not on file     Attends meetings of clubs or organizations: Not on file     Relationship status: Not on file   Other Topics Concern    Not on file   Social History Narrative    .  4 children.  Smokes 2 ppd.  Still drives trucks for entertainment industry.        Allergies  Review of patient's allergies indicates:   Allergen Reactions    Demerol  [meperidine]      Other reaction(s): Unknown    -reviewed and updated      Medications  Reviewed and updated.   Current Outpatient Medications   Medication Sig Dispense Refill    aspirin (ECOTRIN) 81 MG EC tablet Take 81 mg by mouth once daily.      clopidogrel (PLAVIX) 75 mg tablet TAKE ONE TABLET BY MOUTH EVERY DAY 90 tablet 4    diazePAM (VALIUM) 5 MG tablet Take 1 tablet (5 mg total) by mouth every 8 (eight) hours as needed. 60 tablet 3    inhalation spacing device Use as directed for inhalation. 1 Device 0    metoprolol succinate (TOPROL-XL) 25 MG 24 hr tablet Take 1 tablet (25 mg total) by mouth once daily. 90 tablet 1    sildenafil (REVATIO) 20 mg Tab TAKE 1-5 TABLETS BY MOUTH once per DAILY AS NEEDED 30 tablet 11    albuterol (VENTOLIN HFA) 90 mcg/actuation inhaler Inhale 2 puffs into the lungs every 6 (six) hours as needed for Wheezing or Shortness of  "Breath. Rescue 18 g 0    ascorbic acid, vitamin C, (VITAMIN C) 250 MG tablet Take 1 tablet (250 mg total) by mouth once daily. 120 tablet 0    ferrous sulfate (FEOSOL) 325 mg (65 mg iron) Tab tablet Take 1 tablet (325 mg total) by mouth daily with breakfast. 120 tablet 0    HYDROcodone-acetaminophen (NORCO) 5-325 mg per tablet Take 1 tablet by mouth 2 (two) times daily as needed for Pain. (no driving) 14 tablet 0    methocarbamol (ROBAXIN) 750 MG Tab Take 1 tablet (750 mg total) by mouth every 8 (eight) hours as needed. (caution sedation) 30 tablet 0    rosuvastatin (CRESTOR) 20 MG tablet TAKE ONE TABLET BY MOUTH EVERY EVENING 90 tablet 12    umeclidinium (INCRUSE ELLIPTA) 62.5 mcg/actuation DsDv Inhale 1 each into the lungs once daily. Controller 30 each 5     No current facility-administered medications for this visit.          Review of Systems   Constitutional: Negative for chills and fever.   HENT: Positive for congestion (pnd, hx broken nose).    Respiratory: Positive for cough and wheezing. Negative for sputum production.    Cardiovascular: Negative for chest pain.   Musculoskeletal: Positive for back pain (Chronic) and joint pain.        Bilateral leg pain weakness         Physical Exam  Blood Pressure 132/80 (BP Location: Left arm, Patient Position: Sitting, BP Method: Medium (Manual))   Pulse 89   Temperature 97.8 °F (36.6 °C) (Oral)   Respiration 17   Height 5' 7" (1.702 m)   Weight 73.1 kg (161 lb 4.3 oz)   Oxygen Saturation 97%   Body Mass Index 25.26 kg/m²   Physical Exam   Constitutional: He is oriented to person, place, and time. He appears well-developed. No distress.   HENT:   Head: Normocephalic.   Mouth/Throat: Oropharynx is clear and moist.   Mild nasal congestion   Eyes: Conjunctivae and EOM are normal.   Neck: Neck supple.   Cardiovascular: Normal rate and regular rhythm.   Pulmonary/Chest: Effort normal. He has wheezes.   Musculoskeletal: Normal range of motion. He exhibits no " edema.   Neurological: He is alert and oriented to person, place, and time.   Ambulatory, normal gait   Skin: He is not diaphoretic.   Psychiatric: He has a normal mood and affect. His behavior is normal.     Lab Results   Component Value Date    WBC 10.76 12/17/2018    HGB 12.0 (L) 12/17/2018    HCT 38.5 (L) 12/17/2018    MCV 92 12/17/2018     12/17/2018     Lab Results   Component Value Date    FERRITIN 20 12/17/2018     Lab Results   Component Value Date    IRON 75 12/17/2018    TIBC 456 (H) 12/17/2018    FERRITIN 20 12/17/2018       Assessment/Plan:  Narciso Yanez is a 83 y.o. male who presents today for :    PAD (peripheral artery disease)  Given history of peripheral arterial disease recommend patient obtain ultrasound to rule out pad  -     Ultrasound doppler arterial legs bilat (Cupid Only); Future    Leg weakness, bilateral  Suspects  this is secondary to chronic back pain referral to physical occupational therapy  -     Ambulatory Referral to Physical/Occupational Therapy    COPD with acute exacerbation  Patient long time smoker, and using his rescue twice a day presents with symptoms of wheezing, SOBOE and coughing, recommend a controller medication with LAMA  -     albuterol (VENTOLIN HFA) 90 mcg/actuation inhaler; Inhale 2 puffs into the lungs every 6 (six) hours as needed for Wheezing or Shortness of Breath. Rescue  Dispense: 18 g; Refill: 0  -     umeclidinium (INCRUSE ELLIPTA) 62.5 mcg/actuation DsDv; Inhale 1 each into the lungs once daily. Controller  Dispense: 30 each; Refill: 5  -     Complete PFT with bronchodilator; Future  -     Six Minute Walk Test to qualify for Home Oxygen; Future    Groin strain, left, initial encounter  Recommend warm compress hydration muscle relaxant and Tylenol p.r.n.  -     cyclobenzaprine (FLEXERIL) 5 MG tablet; Take 1 tablet (5 mg total) by mouth 3 (three) times daily as needed for Muscle spasms. (no driving)  Dispense: 30 tablet; Refill: 0    Iron  deficiency anemia, unspecified iron deficiency anemia type  Patient recommended to take iron supplement with vitamin-C  -     ferrous sulfate (FEOSOL) 325 mg (65 mg iron) Tab tablet; Take 1 tablet (325 mg total) by mouth daily with breakfast.  Dispense: 120 tablet; Refill: 0  -     ascorbic acid, vitamin C, (VITAMIN C) 250 MG tablet; Take 1 tablet (250 mg total) by mouth once daily.  Dispense: 120 tablet; Refill: 0        Follow-up if symptoms worsen or fail to improve in 2 weeks .

## 2019-04-15 ENCOUNTER — OFFICE VISIT (OUTPATIENT)
Dept: PAIN MEDICINE | Facility: CLINIC | Age: 84
End: 2019-04-15
Payer: MEDICARE

## 2019-04-15 VITALS
DIASTOLIC BLOOD PRESSURE: 74 MMHG | HEART RATE: 92 BPM | HEIGHT: 67 IN | RESPIRATION RATE: 18 BRPM | WEIGHT: 154.88 LBS | BODY MASS INDEX: 24.31 KG/M2 | OXYGEN SATURATION: 97 % | SYSTOLIC BLOOD PRESSURE: 144 MMHG

## 2019-04-15 DIAGNOSIS — M51.36 DDD (DEGENERATIVE DISC DISEASE), LUMBAR: Primary | ICD-10-CM

## 2019-04-15 DIAGNOSIS — M54.16 LUMBAR RADICULOPATHY: ICD-10-CM

## 2019-04-15 DIAGNOSIS — M47.816 LUMBAR SPONDYLOSIS: ICD-10-CM

## 2019-04-15 PROCEDURE — 99999 PR PBB SHADOW E&M-EST. PATIENT-LVL III: CPT | Mod: PBBFAC,HCNC,, | Performed by: PAIN MEDICINE

## 2019-04-15 PROCEDURE — 1101F PR PT FALLS ASSESS DOC 0-1 FALLS W/OUT INJ PAST YR: ICD-10-PCS | Mod: HCNC,CPTII,S$GLB, | Performed by: PAIN MEDICINE

## 2019-04-15 PROCEDURE — 99999 PR PBB SHADOW E&M-EST. PATIENT-LVL III: ICD-10-PCS | Mod: PBBFAC,HCNC,, | Performed by: PAIN MEDICINE

## 2019-04-15 PROCEDURE — 3077F SYST BP >= 140 MM HG: CPT | Mod: HCNC,CPTII,S$GLB, | Performed by: PAIN MEDICINE

## 2019-04-15 PROCEDURE — 99204 PR OFFICE/OUTPT VISIT, NEW, LEVL IV, 45-59 MIN: ICD-10-PCS | Mod: HCNC,S$GLB,, | Performed by: PAIN MEDICINE

## 2019-04-15 PROCEDURE — 3078F PR MOST RECENT DIASTOLIC BLOOD PRESSURE < 80 MM HG: ICD-10-PCS | Mod: HCNC,CPTII,S$GLB, | Performed by: PAIN MEDICINE

## 2019-04-15 PROCEDURE — 3078F DIAST BP <80 MM HG: CPT | Mod: HCNC,CPTII,S$GLB, | Performed by: PAIN MEDICINE

## 2019-04-15 PROCEDURE — 3077F PR MOST RECENT SYSTOLIC BLOOD PRESSURE >= 140 MM HG: ICD-10-PCS | Mod: HCNC,CPTII,S$GLB, | Performed by: PAIN MEDICINE

## 2019-04-15 PROCEDURE — 99204 OFFICE O/P NEW MOD 45 MIN: CPT | Mod: HCNC,S$GLB,, | Performed by: PAIN MEDICINE

## 2019-04-15 PROCEDURE — 1101F PT FALLS ASSESS-DOCD LE1/YR: CPT | Mod: HCNC,CPTII,S$GLB, | Performed by: PAIN MEDICINE

## 2019-04-15 RX ORDER — GABAPENTIN 300 MG/1
600 CAPSULE ORAL 3 TIMES DAILY
Qty: 180 CAPSULE | Refills: 5 | Status: SHIPPED | OUTPATIENT
Start: 2019-04-15 | End: 2020-06-16 | Stop reason: SDUPTHER

## 2019-04-15 RX ORDER — LORAZEPAM 0.5 MG/1
0.5 TABLET ORAL ONCE
Qty: 1 TABLET | Refills: 0 | Status: SHIPPED | OUTPATIENT
Start: 2019-04-15 | End: 2020-06-16

## 2019-04-15 NOTE — PROGRESS NOTES
Subjective:     Patient ID: Narciso Yanez is a 83 y.o. male    Chief Complaint: Low-back Pain (patient experiences chronic low back pain w/ bilateral sciatica- patient experiences pain radiating from R buttocks to R leg w/  numbness and R toes- patient experiences multiple falls in the last 12 months-patient dx w/ spondylosis- treatment w/ medication)      Referred by: Bri Veloz NP      HPI:    Initial Encounter (4/15/19):  Narciso Yanez is a 83 y.o. male who presents today with right lower extremity numbness and pain. This problem started about 5 weeks ago.  No specific inciting event or injury.  The pain/numbness starts in the right posterior hip region and radiates to the posterior thigh into the leg and foot.  He also reports weakness of the right lower extremity.  He denies any bowel or bladder dysfunction.  He denies any significant back pain associated with this problem.  The pain is not constant.  The pain is worsened with prolonged sitting and will improved with standing.   This pain is described in detail below.    Physical Therapy:  No.    Non-pharmacologic Treatment:  Standing helps         · TENS?  No    Pain Medications:         · Currently taking:  Norco, Tylenol, Robaxin    · Has tried in the past:  Flexeril, tramadol    · Has not tried:  NSAIDs,  TCAs, SNRIs, anticonvulsants, topical creams    Blood thinners:  Aspirin 81 mg a day    Interventional Therapies:  None    Relevant Surgeries:  None    Affecting sleep?  No    Affecting daily activities? yes    Depressive symptoms? no          · SI/HI? No    Work status: Employed  Pain Scores:    Best:       5/10  Worst:    10/10  Usually:  6 /10  Today:    6/10    Review of Systems   Constitutional: Negative for activity change, appetite change, chills, fatigue, fever and unexpected weight change.   HENT: Negative for hearing loss.    Eyes: Negative for visual disturbance.   Respiratory: Negative for chest tightness and shortness of breath.     Cardiovascular: Negative for chest pain.   Gastrointestinal: Negative for abdominal pain, constipation, diarrhea, nausea and vomiting.   Genitourinary: Negative for difficulty urinating.   Musculoskeletal: Positive for arthralgias and gait problem. Negative for back pain and neck pain.   Skin: Negative for rash.   Neurological: Positive for weakness and numbness. Negative for dizziness, light-headedness and headaches.   Psychiatric/Behavioral: Negative for hallucinations, sleep disturbance and suicidal ideas. The patient is not nervous/anxious.        Past Medical History:   Diagnosis Date    Actinic keratosis     Anxiety     B12 deficiency 12/8/2014    Dx 6/14    Cancer     skin    Cataract     Coronary artery disease     Diabetes mellitus type II     Diabetes mellitus with peripheral circulatory disorder 6/18/2014    ED (erectile dysfunction)     Hyperlipidemia     Hypertension     Kidney stone     Peripheral vascular disease     Spondylosis 3/29/2019    Tobacco dependence        Past Surgical History:   Procedure Laterality Date    APPENDECTOMY      CARDIAC SURGERY      ESOPHAGOGASTRODUODENOSCOPY (EGD) N/A 10/10/2016    [facility]    EXTERNAL EAR SURGERY      EYE SURGERY      cataracts    ILIAC ARTERY STENT Bilateral 06/2003    also Right External Iliac stent       Social History     Socioeconomic History    Marital status: Single     Spouse name: Not on file    Number of children: Not on file    Years of education: Not on file    Highest education level: Not on file   Occupational History    Not on file   Social Needs    Financial resource strain: Not on file    Food insecurity:     Worry: Not on file     Inability: Not on file    Transportation needs:     Medical: Not on file     Non-medical: Not on file   Tobacco Use    Smoking status: Current Every Day Smoker     Types: Cigarettes    Smokeless tobacco: Never Used   Substance and Sexual  Activity    Alcohol use: No    Drug use: No    Sexual activity: Not Currently   Lifestyle    Physical activity:     Days per week: Not on file     Minutes per session: Not on file    Stress: Not on file   Relationships    Social connections:     Talks on phone: Not on file     Gets together: Not on file     Attends Cheondoism service: Not on file     Active member of club or organization: Not on file     Attends meetings of clubs or organizations: Not on file     Relationship status: Not on file   Other Topics Concern    Not on file   Social History Narrative    .  4 children.  Smokes 2 ppd.  Still drives trucks for entertainment industry.        Review of patient's allergies indicates:   Allergen Reactions    Demerol  [meperidine]      Other reaction(s): Unknown       Current Outpatient Medications on File Prior to Visit   Medication Sig Dispense Refill    albuterol (VENTOLIN HFA) 90 mcg/actuation inhaler Inhale 2 puffs into the lungs every 6 (six) hours as needed for Wheezing or Shortness of Breath. Rescue 18 g 0    ascorbic acid, vitamin C, (VITAMIN C) 250 MG tablet Take 1 tablet (250 mg total) by mouth once daily. 120 tablet 0    aspirin (ECOTRIN) 81 MG EC tablet Take 81 mg by mouth once daily.      clopidogrel (PLAVIX) 75 mg tablet TAKE ONE TABLET BY MOUTH EVERY DAY 90 tablet 4    diazePAM (VALIUM) 5 MG tablet Take 1 tablet (5 mg total) by mouth every 8 (eight) hours as needed. 60 tablet 3    ferrous sulfate (FEOSOL) 325 mg (65 mg iron) Tab tablet Take 1 tablet (325 mg total) by mouth daily with breakfast. 120 tablet 0    HYDROcodone-acetaminophen (NORCO) 5-325 mg per tablet Take 1 tablet by mouth 2 (two) times daily as needed for Pain. (no driving) 14 tablet 0    inhalation spacing device Use as directed for inhalation. 1 Device 0    methocarbamol (ROBAXIN) 750 MG Tab Take 1 tablet (750 mg total) by mouth every 8 (eight) hours as needed. (caution sedation) 30 tablet 0    metoprolol  "succinate (TOPROL-XL) 25 MG 24 hr tablet Take 1 tablet (25 mg total) by mouth once daily. 90 tablet 1    rosuvastatin (CRESTOR) 20 MG tablet TAKE ONE TABLET BY MOUTH EVERY EVENING 90 tablet 12    sildenafil (REVATIO) 20 mg Tab TAKE 1-5 TABLETS BY MOUTH once per DAILY AS NEEDED 30 tablet 11    umeclidinium (INCRUSE ELLIPTA) 62.5 mcg/actuation DsDv Inhale 1 each into the lungs once daily. Controller 30 each 5     No current facility-administered medications on file prior to visit.        Objective:      BP (!) 144/74   Pulse 92   Resp 18   Ht 5' 7" (1.702 m)   Wt 70.3 kg (154 lb 14.4 oz)   SpO2 97%   BMI 24.26 kg/m²     Exam:  GEN:  Well developed, well nourished.  No acute distress.  Normal pain behavior.  HEENT:  No trauma.  Mucous membranes moist.  Nares patent bilaterally.  PSYCH: Normal affect. Thought content appropriate.  CHEST:  Breathing symmetric.  No audible wheezing.  ABD: Soft, non-distended.  SKIN:  Warm, pink, dry.  No rash on exposed areas.    EXT:  No cyanosis, clubbing, or edema.  No color change or changes in nail or hair growth.  NEURO/MUSCULOSKELETAL:  Fully alert, oriented, and appropriate. Speech normal dottie. No cranial nerve deficits.   Gait:  Normal.  No trendelenburg sign bilaterally.   Motor Strength:  4+ out of 5 right knee extension; otherwise 5/5 motor strength throughout lower extremities.   Sensory:  No sensory deficit in the lower extremities.   Reflexes:  2 + and symmetric throughout.  Downgoing Babinski's bilaterally.  No clonus or spasticity.  L-Spine:  Limited ROM without pain on flexion or extension. Negative facet loading bilaterally.  Positive SLR on the right.    SI Joint/Hip:  Negative SELIN bilaterally.  Negative FADIR bilaterally.  No TTP over lumbar paraspinals, bilateral SI joints, hips, piriformis muscles, or GTB.          Imaging:  Narrative     EXAMINATION:  XR LUMBAR SPINE AP AND LATERAL    CLINICAL HISTORY:  Low back pain, >6wks conservative tx, " persistent-progressive sx, surgical candidate;Lumbago with sciatica, right side    TECHNIQUE:  AP, lateral and spot images were performed of the lumbar spine.    COMPARISON:  None    FINDINGS:  There are 5 lumbar type vertebral bodies with mild dextrocurvature of the lumbar spine.  Ligamentous calcification extends from the right transverse process of L5 to the iliac crest.    Vertebral body heights and alignment are preserved.  There is loss of disc space height at T12-L1 and L5-S1.  Disc spaces are preserved at other levels.    There is facet arthropathy at L3-L4 through L5-S1.  I detect no spondylolysis or spondylolisthesis and no fracture, lytic or blastic lesion.    There is abundant abdominal aortic atherosclerosis.  Abdominal aorta measures 2.8 cm at the level of L4.  Stents are present in the iliac arteries.    Radiopaque material projects over the left upper quadrant raising the question of nephrolithiasis.   Impression       Please see above.      Electronically signed by: Jaymie Wahl MD  Date: 03/28/2019  Time: 08:20         Assessment:       Encounter Diagnoses   Name Primary?    DDD (degenerative disc disease), lumbar Yes    Lumbar spondylosis     Lumbar radiculopathy          Plan:       Narciso was seen today for low-back pain.    Diagnoses and all orders for this visit:    DDD (degenerative disc disease), lumbar  -     MRI Lumbar Spine Without Contrast; Future  -     LORazepam (ATIVAN) 0.5 MG tablet; Take 1 tablet (0.5 mg total) by mouth once. for 1 dose  -     gabapentin (NEURONTIN) 300 MG capsule; Take 2 capsules (600 mg total) by mouth 3 (three) times daily.    Lumbar spondylosis  -     MRI Lumbar Spine Without Contrast; Future  -     LORazepam (ATIVAN) 0.5 MG tablet; Take 1 tablet (0.5 mg total) by mouth once. for 1 dose  -     gabapentin (NEURONTIN) 300 MG capsule; Take 2 capsules (600 mg total) by mouth 3 (three) times daily.    Lumbar radiculopathy  -     MRI Lumbar Spine Without  Contrast; Future  -     LORazepam (ATIVAN) 0.5 MG tablet; Take 1 tablet (0.5 mg total) by mouth once. for 1 dose  -     gabapentin (NEURONTIN) 300 MG capsule; Take 2 capsules (600 mg total) by mouth 3 (three) times daily.        Narciso Yanez is a 83 y.o. male with right-sided lower extremity numbness and pain. Consistent with right lumbar radiculopathy the specific left lower radiculopathy is unclear at this time. No significant back pain. Low suspicion for facet or sacroiliac mediated pain..    1.  Pertinent imaging studies reviewed by me. Imaging results were discussed with patient.  2.  Lumbar MRI to evaluate for right-sided radiculopathy.  3.  Start gabapentin 300 mg q.h.s..  Gradually increase to 600 mg t.i.d. as tolerated/needed.  Patient was given instructions on how to do this.  4.  Okay to continue Norco 5-325 mg for the time being.  I do not feel as though this would be an appropriate long-term option for this problem.  5.  Return to clinic after MRI to discuss results.  May consider transforaminal epidural steroid injection and/or physical therapy referral.  We will also discuss efficacy of gabapentin and make any necessary adjustments.

## 2019-04-15 NOTE — LETTER
April 15, 2019      Bri Veloz, CATHY  [address] [facility]  [place] [id number]           [facility]  [address] [facility]  [place] [address]  Phone: [phone]  Fax: [phone]          Patient: Narciso Yanez   MR Number: [id number]   YOB: 1935   Date of Visit: 4/15/2019       Dear Bri Veloz:    Thank you for referring Narciso Yanez to me for evaluation. Attached you will find relevant portions of my assessment and plan of care.    If you have questions, please do not hesitate to call me. I look forward to following Narciso Yanez along with you.    Sincerely,    Wiley Crane Jr., MD    Enclosure  CC:  No Recipients    If you would like to receive this communication electronically, please contact [email or web address] or [phone] to request more information on [employer] access.    For providers and/or their staff who would like to refer a patient to [facility], please contact us through our one-stop-shop provider referral line, [facility], at [phone].    If you feel you have received this communication in error or would no longer like to receive these types of communications, please e-mail [email or web address]

## 2019-04-18 DIAGNOSIS — M54.16 LUMBAR RADICULOPATHY: ICD-10-CM

## 2019-04-18 DIAGNOSIS — M48.062 SPINAL STENOSIS OF LUMBAR REGION WITH NEUROGENIC CLAUDICATION: ICD-10-CM

## 2019-04-18 DIAGNOSIS — M51.36 DDD (DEGENERATIVE DISC DISEASE), LUMBAR: Primary | ICD-10-CM

## 2019-04-18 DIAGNOSIS — M47.816 LUMBAR SPONDYLOSIS: ICD-10-CM

## 2019-04-18 RX ORDER — HYDROCODONE BITARTRATE AND ACETAMINOPHEN 5; 325 MG/1; MG/1
1 TABLET ORAL EVERY 12 HOURS PRN
Qty: 14 TABLET | Refills: 0 | Status: SHIPPED | OUTPATIENT
Start: 2019-04-18 | End: 2019-04-25

## 2019-04-20 ENCOUNTER — HOSPITAL ENCOUNTER (OUTPATIENT)
Dept: RADIOLOGY | Facility: HOSPITAL | Age: 84
Discharge: HOME | End: 2019-04-20
Attending: PAIN MEDICINE
Payer: MEDICARE

## 2019-04-20 DIAGNOSIS — M54.16 LUMBAR RADICULOPATHY: ICD-10-CM

## 2019-04-20 DIAGNOSIS — M47.816 LUMBAR SPONDYLOSIS: ICD-10-CM

## 2019-04-20 DIAGNOSIS — M51.36 DDD (DEGENERATIVE DISC DISEASE), LUMBAR: ICD-10-CM

## 2019-04-20 PROCEDURE — 72148 MRI LUMBAR SPINE WITHOUT CONTRAST: ICD-10-PCS | Mod: 26,HCNC,, | Performed by: RADIOLOGY

## 2019-04-20 PROCEDURE — 72148 MRI LUMBAR SPINE W/O DYE: CPT | Mod: TC,HCNC

## 2019-04-20 PROCEDURE — 72148 MRI LUMBAR SPINE W/O DYE: CPT | Mod: 26,HCNC,, | Performed by: RADIOLOGY

## 2019-04-22 ENCOUNTER — OFFICE VISIT (OUTPATIENT)
Dept: PAIN MEDICINE | Facility: CLINIC | Age: 84
End: 2019-04-22
Payer: MEDICARE

## 2019-04-22 VITALS
HEART RATE: 96 BPM | WEIGHT: 156.81 LBS | DIASTOLIC BLOOD PRESSURE: 75 MMHG | RESPIRATION RATE: 18 BRPM | HEIGHT: 67 IN | BODY MASS INDEX: 24.61 KG/M2 | SYSTOLIC BLOOD PRESSURE: 146 MMHG | OXYGEN SATURATION: 100 %

## 2019-04-22 DIAGNOSIS — M47.816 LUMBAR SPONDYLOSIS: ICD-10-CM

## 2019-04-22 DIAGNOSIS — M51.36 DDD (DEGENERATIVE DISC DISEASE), LUMBAR: Primary | ICD-10-CM

## 2019-04-22 DIAGNOSIS — M54.16 LUMBAR RADICULOPATHY: ICD-10-CM

## 2019-04-22 PROCEDURE — 3078F PR MOST RECENT DIASTOLIC BLOOD PRESSURE < 80 MM HG: ICD-10-PCS | Mod: HCNC,CPTII,S$GLB, | Performed by: PAIN MEDICINE

## 2019-04-22 PROCEDURE — 99214 OFFICE O/P EST MOD 30 MIN: CPT | Mod: HCNC,S$GLB,, | Performed by: PAIN MEDICINE

## 2019-04-22 PROCEDURE — 99999 PR PBB SHADOW E&M-EST. PATIENT-LVL III: CPT | Mod: PBBFAC,HCNC,, | Performed by: PAIN MEDICINE

## 2019-04-22 PROCEDURE — 3077F PR MOST RECENT SYSTOLIC BLOOD PRESSURE >= 140 MM HG: ICD-10-PCS | Mod: HCNC,CPTII,S$GLB, | Performed by: PAIN MEDICINE

## 2019-04-22 PROCEDURE — 1101F PT FALLS ASSESS-DOCD LE1/YR: CPT | Mod: HCNC,CPTII,S$GLB, | Performed by: PAIN MEDICINE

## 2019-04-22 PROCEDURE — 3077F SYST BP >= 140 MM HG: CPT | Mod: HCNC,CPTII,S$GLB, | Performed by: PAIN MEDICINE

## 2019-04-22 PROCEDURE — 3078F DIAST BP <80 MM HG: CPT | Mod: HCNC,CPTII,S$GLB, | Performed by: PAIN MEDICINE

## 2019-04-22 PROCEDURE — 1101F PR PT FALLS ASSESS DOC 0-1 FALLS W/OUT INJ PAST YR: ICD-10-PCS | Mod: HCNC,CPTII,S$GLB, | Performed by: PAIN MEDICINE

## 2019-04-22 PROCEDURE — 99214 PR OFFICE/OUTPT VISIT, EST, LEVL IV, 30-39 MIN: ICD-10-PCS | Mod: HCNC,S$GLB,, | Performed by: PAIN MEDICINE

## 2019-04-22 PROCEDURE — 99999 PR PBB SHADOW E&M-EST. PATIENT-LVL III: ICD-10-PCS | Mod: PBBFAC,HCNC,, | Performed by: PAIN MEDICINE

## 2019-04-22 NOTE — PROGRESS NOTES
Subjective:     Patient ID: Narciso Yanez is a 83 y.o. male    Chief Complaint: Follow-up (F/U- MRI 4/17- discontinued gabapentin)      Referred by: No ref. provider found      HPI:    Interval History (4/22/19):  He returns today for follow up and MRI review.  He reports that his pain is unchanged in quality and location since last encounter.  He denies any new or worsening symptoms.  Patient states that he could not tolerate gabapentin and has discontinued his medication.      Initial Encounter (4/15/19):  Narciso Yanez is a 83 y.o. male who presents today with right lower extremity numbness and pain. This problem started about 5 weeks ago.  No specific inciting event or injury.  The pain/numbness starts in the right posterior hip region and radiates to the posterior thigh into the leg and foot.  He also reports weakness of the right lower extremity.  He denies any bowel or bladder dysfunction.  He denies any significant back pain associated with this problem.  The pain is not constant.  The pain is worsened with prolonged sitting and will improved with standing.   This pain is described in detail below.    Physical Therapy:  No.    Non-pharmacologic Treatment:  Standing helps         · TENS?  No    Pain Medications:         · Currently taking:  Norco, Tylenol, Robaxin    · Has tried in the past:  Flexeril, tramadol, gabapentin    · Has not tried:  NSAIDs,  TCAs, SNRIs, anticonvulsants, topical creams    Blood thinners:  Aspirin 81 mg a day    Interventional Therapies:  None    Relevant Surgeries:  None    Affecting sleep?  No    Affecting daily activities? yes    Depressive symptoms? no          · SI/HI? No    Work status: Employed  Pain Scores:    Best:       4/10  Worst:    10/10  Usually:  6 /10  Today:    4/10    Review of Systems   Constitutional: Negative for activity change, appetite change, chills, fatigue, fever and unexpected weight change.   HENT: Negative for hearing loss.    Eyes: Negative for  visual disturbance.   Respiratory: Negative for chest tightness and shortness of breath.    Cardiovascular: Negative for chest pain.   Gastrointestinal: Negative for abdominal pain, constipation, diarrhea, nausea and vomiting.   Genitourinary: Negative for difficulty urinating.   Musculoskeletal: Positive for arthralgias and gait problem. Negative for back pain and neck pain.   Skin: Negative for rash.   Neurological: Positive for weakness and numbness. Negative for dizziness, light-headedness and headaches.   Psychiatric/Behavioral: Negative for hallucinations, sleep disturbance and suicidal ideas. The patient is not nervous/anxious.        Past Medical History:   Diagnosis Date    Actinic keratosis     Anxiety     B12 deficiency 12/8/2014    Dx 6/14    Cancer     skin    Cataract     Coronary artery disease     Diabetes mellitus type II     Diabetes mellitus with peripheral circulatory disorder 6/18/2014    ED (erectile dysfunction)     Hyperlipidemia     Hypertension     Kidney stone     Peripheral vascular disease     Spondylosis 3/29/2019    Tobacco dependence        Past Surgical History:   Procedure Laterality Date    APPENDECTOMY      CARDIAC SURGERY      ESOPHAGOGASTRODUODENOSCOPY (EGD) N/A 10/10/2016    [facility]O    EXTERNAL EAR SURGERY      EYE SURGERY      cataracts    ILIAC ARTERY STENT Bilateral 06/2003    also Right External Iliac stent       Social History     Socioeconomic History    Marital status: Single     Spouse name: Not on file    Number of children: Not on file    Years of education: Not on file    Highest education level: Not on file   Occupational History    Not on file   Social Needs    Financial resource strain: Not on file    Food insecurity:     Worry: Not on file     Inability: Not on file    Transportation needs:     Medical: Not on file     Non-medical: Not on file   Tobacco Use    Smoking status: Current Every Day  Smoker     Types: Cigarettes    Smokeless tobacco: Never Used   Substance and Sexual Activity    Alcohol use: No    Drug use: No    Sexual activity: Not Currently   Lifestyle    Physical activity:     Days per week: Not on file     Minutes per session: Not on file    Stress: Not on file   Relationships    Social connections:     Talks on phone: Not on file     Gets together: Not on file     Attends Hinduism service: Not on file     Active member of club or organization: Not on file     Attends meetings of clubs or organizations: Not on file     Relationship status: Not on file   Other Topics Concern    Not on file   Social History Narrative    .  4 children.  Smokes 2 ppd.  Still drives trucks for entertainment industry.        Review of patient's allergies indicates:   Allergen Reactions    Demerol  [meperidine]      Other reaction(s): Unknown       Current Outpatient Medications on File Prior to Visit   Medication Sig Dispense Refill    ascorbic acid, vitamin C, (VITAMIN C) 250 MG tablet Take 1 tablet (250 mg total) by mouth once daily. 120 tablet 0    aspirin (ECOTRIN) 81 MG EC tablet Take 81 mg by mouth once daily.      clopidogrel (PLAVIX) 75 mg tablet TAKE ONE TABLET BY MOUTH EVERY DAY 90 tablet 4    diazePAM (VALIUM) 5 MG tablet Take 1 tablet (5 mg total) by mouth every 8 (eight) hours as needed. 60 tablet 3    ferrous sulfate (FEOSOL) 325 mg (65 mg iron) Tab tablet Take 1 tablet (325 mg total) by mouth daily with breakfast. 120 tablet 0    HYDROcodone-acetaminophen (NORCO) 5-325 mg per tablet Take 1 tablet by mouth every 12 (twelve) hours as needed for Pain. 14 tablet 0    inhalation spacing device Use as directed for inhalation. 1 Device 0    methocarbamol (ROBAXIN) 750 MG Tab Take 1 tablet (750 mg total) by mouth every 8 (eight) hours as needed. (caution sedation) 30 tablet 0    metoprolol succinate (TOPROL-XL) 25 MG 24 hr tablet Take 1 tablet (25 mg total) by mouth once daily. 90  "tablet 1    rosuvastatin (CRESTOR) 20 MG tablet TAKE ONE TABLET BY MOUTH EVERY EVENING 90 tablet 12    sildenafil (REVATIO) 20 mg Tab TAKE 1-5 TABLETS BY MOUTH once per DAILY AS NEEDED 30 tablet 11    umeclidinium (INCRUSE ELLIPTA) 62.5 mcg/actuation DsDv Inhale 1 each into the lungs once daily. Controller 30 each 5    albuterol (VENTOLIN HFA) 90 mcg/actuation inhaler Inhale 2 puffs into the lungs every 6 (six) hours as needed for Wheezing or Shortness of Breath. Rescue 18 g 0    gabapentin (NEURONTIN) 300 MG capsule Take 2 capsules (600 mg total) by mouth 3 (three) times daily. 180 capsule 5    LORazepam (ATIVAN) 0.5 MG tablet Take 1 tablet (0.5 mg total) by mouth once. for 1 dose 1 tablet 0     No current facility-administered medications on file prior to visit.        Objective:      BP (!) 146/75   Pulse 96   Resp 18   Ht 5' 7" (1.702 m)   Wt 71.1 kg (156 lb 12.8 oz)   SpO2 100%   BMI 24.56 kg/m²     Exam:  GEN:  Well developed, well nourished.  No acute distress.   HEENT:  No trauma.  Mucous membranes moist.  Nares patent bilaterally.  PSYCH: Normal affect. Thought content appropriate.  CHEST:  Breathing symmetric.  No audible wheezing.  ABD: Soft, non-distended.  SKIN:  Warm, pink, dry.  No rash on exposed areas.    EXT:  No cyanosis, clubbing, or edema.  No color change or changes in nail or hair growth.  NEURO/MUSCULOSKELETAL:  Fully alert, oriented, and appropriate. Speech normal dottie. No cranial nerve deficits.   Gait:  Antalgic.  No focal motor deficits.     Negative SLR bilaterally      Imaging:  Narrative     EXAMINATION:  MRI LUMBAR SPINE WITHOUT CONTRAST    CLINICAL HISTORY:  lumbar radiculopathy; Other intervertebral disc degeneration, lumbar region    TECHNIQUE:  Multiplanar, multisequence MR images were acquired from the thoracolumbar junction to the sacrum without the administration of contrast.  Motion degrades the images despite technologist " attempts.    COMPARISON:  None.    FINDINGS:  Lumbar spine alignment is within normal limits. The vertebral body heights are well maintained, with no fracture.  No marrow signal abnormality suspicious for an infiltrative process.    The conus is normal in appearance, and terminates at the L1 level.  RIGHT renal cyst lower pole.  Ultrasound could confirm.    There are findings of multi-level  lumbar spondylosis, as below.    L1-L2: There is no focal disc herniation. No significant central canal or neural foraminal narrowing.    L2-L3: There is no focal disc herniation.  Mild bulging of disc material.  No significant central canal or neural foraminal narrowing.    L3-L4: There is no focal disc herniation.Mild bulging of disc material.  No significant central canal or neural foraminal narrowing.    L4-L5: There is no focal disc herniation.Mild bulging of disc material.  Hypertrophic changes of facets.  Mild central canal narrowing with bilateral neural foraminal encroachment.    L5-S1: There is no focal disc herniation.  Mild disc space narrowing with broad-based bulging of disc material, which with associated hypertrophic changes of facets result in mild bilateral neural foraminal encroachment.  No significant central canal narrowing.   Impression       Mild degenerative changes predominantly L4-L5 and L5-S1 as above.    Examination limited by patient motion.      Electronically signed by: Chele Kerr MD  Date: 04/20/2019  Time: 10:50             Assessment:       Encounter Diagnoses   Name Primary?    DDD (degenerative disc disease), lumbar Yes    Lumbar spondylosis     Lumbar radiculopathy          Plan:       Narciso was seen today for follow-up.    Diagnoses and all orders for this visit:    DDD (degenerative disc disease), lumbar  -     Ambulatory Referral to Physical/Occupational Therapy    Lumbar spondylosis  -     Ambulatory Referral to Physical/Occupational Therapy    Lumbar radiculopathy  -      Ambulatory Referral to Physical/Occupational Therapy        Narciso Yanez is a 83 y.o. male with right-sided lower extremity numbness and pain. Consistent with right lumbar radiculopathy though specific level of radiculopathy is unclear at this time. Lumbar MRI with some neural foraminal narrowing though no specific nerve root impingement.  No significant back pain. Low suspicion for facet or sacroiliac mediated pain..    1.  Pertinent imaging studies reviewed by me. Imaging results were discussed with patient.  2.  Refer to PT for ROM, strengthening, stretching and HEP.  3.  Return to clinic in 8 weeks.  At that time we will discuss efficacy of physical therapy/home exercise program.  If no significant improvement at that time may consider right L4 and L5 transforaminal epidural steroid injections.

## 2019-05-07 ENCOUNTER — PATIENT OUTREACH (OUTPATIENT)
Dept: ADMINISTRATIVE | Facility: HOSPITAL | Age: 84
End: 2019-05-07

## 2019-05-21 ENCOUNTER — LAB VISIT (OUTPATIENT)
Dept: LAB | Facility: HOSPITAL | Age: 84
End: 2019-05-21
Attending: INTERNAL MEDICINE
Payer: MEDICARE

## 2019-05-21 ENCOUNTER — TELEPHONE (OUTPATIENT)
Dept: FAMILY MEDICINE | Facility: CLINIC | Age: 84
End: 2019-05-21

## 2019-05-21 ENCOUNTER — OFFICE VISIT (OUTPATIENT)
Dept: FAMILY MEDICINE | Facility: CLINIC | Age: 84
End: 2019-05-21
Payer: MEDICARE

## 2019-05-21 VITALS
TEMPERATURE: 98 F | OXYGEN SATURATION: 94 % | WEIGHT: 155.63 LBS | BODY MASS INDEX: 24.43 KG/M2 | DIASTOLIC BLOOD PRESSURE: 70 MMHG | SYSTOLIC BLOOD PRESSURE: 130 MMHG | HEART RATE: 93 BPM | HEIGHT: 67 IN

## 2019-05-21 DIAGNOSIS — E11.51 DIABETES MELLITUS WITH PERIPHERAL CIRCULATORY DISORDER: ICD-10-CM

## 2019-05-21 DIAGNOSIS — F43.9 SITUATIONAL STRESS: ICD-10-CM

## 2019-05-21 DIAGNOSIS — M35.3 POLYMYALGIA RHEUMATICA: Primary | ICD-10-CM

## 2019-05-21 DIAGNOSIS — D50.9 IRON DEFICIENCY ANEMIA, UNSPECIFIED IRON DEFICIENCY ANEMIA TYPE: ICD-10-CM

## 2019-05-21 DIAGNOSIS — R70.0 ELEVATED SED RATE: ICD-10-CM

## 2019-05-21 DIAGNOSIS — D64.9 ANEMIA, UNSPECIFIED TYPE: ICD-10-CM

## 2019-05-21 DIAGNOSIS — I25.810 CORONARY ARTERY DISEASE INVOLVING CORONARY BYPASS GRAFT OF NATIVE HEART WITHOUT ANGINA PECTORIS: ICD-10-CM

## 2019-05-21 DIAGNOSIS — M35.3 POLYMYALGIA: ICD-10-CM

## 2019-05-21 DIAGNOSIS — I73.9 PAD (PERIPHERAL ARTERY DISEASE): ICD-10-CM

## 2019-05-21 DIAGNOSIS — M35.3 POLYMYALGIA: Primary | ICD-10-CM

## 2019-05-21 DIAGNOSIS — E53.8 B12 DEFICIENCY: ICD-10-CM

## 2019-05-21 DIAGNOSIS — F39 MOOD DISORDER: ICD-10-CM

## 2019-05-21 DIAGNOSIS — R20.0 NUMBNESS IN RIGHT LEG: ICD-10-CM

## 2019-05-21 DIAGNOSIS — E11.65 UNCONTROLLED TYPE 2 DIABETES MELLITUS WITH HYPERGLYCEMIA: ICD-10-CM

## 2019-05-21 DIAGNOSIS — J44.9 CHRONIC OBSTRUCTIVE PULMONARY DISEASE, UNSPECIFIED COPD TYPE: ICD-10-CM

## 2019-05-21 LAB
ALBUMIN SERPL BCP-MCNC: 3 G/DL (ref 3.5–5.2)
ALP SERPL-CCNC: 84 U/L (ref 55–135)
ALT SERPL W/O P-5'-P-CCNC: 11 U/L (ref 10–44)
ANION GAP SERPL CALC-SCNC: 9 MMOL/L (ref 8–16)
AST SERPL-CCNC: 17 U/L (ref 10–40)
BASOPHILS # BLD AUTO: 0.05 K/UL (ref 0–0.2)
BASOPHILS NFR BLD: 0.4 % (ref 0–1.9)
BILIRUB SERPL-MCNC: 0.2 MG/DL (ref 0.1–1)
BUN SERPL-MCNC: 19 MG/DL (ref 8–23)
CALCIUM SERPL-MCNC: 9.4 MG/DL (ref 8.7–10.5)
CHLORIDE SERPL-SCNC: 106 MMOL/L (ref 95–110)
CO2 SERPL-SCNC: 24 MMOL/L (ref 23–29)
CREAT SERPL-MCNC: 1.2 MG/DL (ref 0.5–1.4)
CRP SERPL-MCNC: 28.5 MG/L (ref 0–8.2)
DIFFERENTIAL METHOD: ABNORMAL
EOSINOPHIL # BLD AUTO: 0.3 K/UL (ref 0–0.5)
EOSINOPHIL NFR BLD: 2.3 % (ref 0–8)
ERYTHROCYTE [DISTWIDTH] IN BLOOD BY AUTOMATED COUNT: 15.8 % (ref 11.5–14.5)
ERYTHROCYTE [SEDIMENTATION RATE] IN BLOOD BY WESTERGREN METHOD: 119 MM/HR (ref 0–23)
EST. GFR  (AFRICAN AMERICAN): >60 ML/MIN/1.73 M^2
EST. GFR  (NON AFRICAN AMERICAN): 56.4 ML/MIN/1.73 M^2
ESTIMATED AVG GLUCOSE: 166 MG/DL (ref 68–131)
FERRITIN SERPL-MCNC: 45 NG/ML (ref 20–300)
GLUCOSE SERPL-MCNC: 140 MG/DL (ref 70–110)
HBA1C MFR BLD HPLC: 7.4 % (ref 4–5.6)
HCT VFR BLD AUTO: 35.7 % (ref 40–54)
HGB BLD-MCNC: 11.5 G/DL (ref 14–18)
IMM GRANULOCYTES # BLD AUTO: 0.03 K/UL (ref 0–0.04)
IMM GRANULOCYTES NFR BLD AUTO: 0.3 % (ref 0–0.5)
IRON SERPL-MCNC: 44 UG/DL (ref 45–160)
LYMPHOCYTES # BLD AUTO: 2.4 K/UL (ref 1–4.8)
LYMPHOCYTES NFR BLD: 20.3 % (ref 18–48)
MCH RBC QN AUTO: 30 PG (ref 27–31)
MCHC RBC AUTO-ENTMCNC: 32.2 G/DL (ref 32–36)
MCV RBC AUTO: 93 FL (ref 82–98)
MONOCYTES # BLD AUTO: 1.1 K/UL (ref 0.3–1)
MONOCYTES NFR BLD: 9.2 % (ref 4–15)
NEUTROPHILS # BLD AUTO: 7.9 K/UL (ref 1.8–7.7)
NEUTROPHILS NFR BLD: 67.5 % (ref 38–73)
NRBC BLD-RTO: 0 /100 WBC
PLATELET # BLD AUTO: 356 K/UL (ref 150–350)
PMV BLD AUTO: 9.7 FL (ref 9.2–12.9)
POTASSIUM SERPL-SCNC: 4.1 MMOL/L (ref 3.5–5.1)
PROT SERPL-MCNC: 7.5 G/DL (ref 6–8.4)
RBC # BLD AUTO: 3.83 M/UL (ref 4.6–6.2)
SATURATED IRON: 12 % (ref 20–50)
SODIUM SERPL-SCNC: 139 MMOL/L (ref 136–145)
TOTAL IRON BINDING CAPACITY: 360 UG/DL (ref 250–450)
TRANSFERRIN SERPL-MCNC: 243 MG/DL (ref 200–375)
TSH SERPL DL<=0.005 MIU/L-ACNC: 3.17 UIU/ML (ref 0.4–4)
VIT B12 SERPL-MCNC: >2000 PG/ML (ref 210–950)
WBC # BLD AUTO: 11.7 K/UL (ref 3.9–12.7)

## 2019-05-21 PROCEDURE — 99499 UNLISTED E&M SERVICE: CPT | Mod: HCNC,S$GLB,, | Performed by: INTERNAL MEDICINE

## 2019-05-21 PROCEDURE — 82728 ASSAY OF FERRITIN: CPT

## 2019-05-21 PROCEDURE — 85652 RBC SED RATE AUTOMATED: CPT

## 2019-05-21 PROCEDURE — 1101F PR PT FALLS ASSESS DOC 0-1 FALLS W/OUT INJ PAST YR: ICD-10-PCS | Mod: CPTII,S$GLB,, | Performed by: INTERNAL MEDICINE

## 2019-05-21 PROCEDURE — 1101F PT FALLS ASSESS-DOCD LE1/YR: CPT | Mod: CPTII,S$GLB,, | Performed by: INTERNAL MEDICINE

## 2019-05-21 PROCEDURE — 99215 OFFICE O/P EST HI 40 MIN: CPT | Mod: S$GLB,,, | Performed by: INTERNAL MEDICINE

## 2019-05-21 PROCEDURE — 82607 VITAMIN B-12: CPT

## 2019-05-21 PROCEDURE — 3078F DIAST BP <80 MM HG: CPT | Mod: CPTII,S$GLB,, | Performed by: INTERNAL MEDICINE

## 2019-05-21 PROCEDURE — 36415 COLL VENOUS BLD VENIPUNCTURE: CPT | Mod: PO

## 2019-05-21 PROCEDURE — 86140 C-REACTIVE PROTEIN: CPT

## 2019-05-21 PROCEDURE — 85025 COMPLETE CBC W/AUTO DIFF WBC: CPT

## 2019-05-21 PROCEDURE — 3078F PR MOST RECENT DIASTOLIC BLOOD PRESSURE < 80 MM HG: ICD-10-PCS | Mod: CPTII,S$GLB,, | Performed by: INTERNAL MEDICINE

## 2019-05-21 PROCEDURE — 80053 COMPREHEN METABOLIC PANEL: CPT

## 2019-05-21 PROCEDURE — 83540 ASSAY OF IRON: CPT

## 2019-05-21 PROCEDURE — 84443 ASSAY THYROID STIM HORMONE: CPT

## 2019-05-21 PROCEDURE — 83036 HEMOGLOBIN GLYCOSYLATED A1C: CPT

## 2019-05-21 PROCEDURE — 99499 RISK ADDL DX/OHS AUDIT: ICD-10-PCS | Mod: HCNC,S$GLB,, | Performed by: INTERNAL MEDICINE

## 2019-05-21 PROCEDURE — 99999 PR PBB SHADOW E&M-EST. PATIENT-LVL III: CPT | Mod: PBBFAC,,, | Performed by: INTERNAL MEDICINE

## 2019-05-21 PROCEDURE — 99215 PR OFFICE/OUTPT VISIT, EST, LEVL V, 40-54 MIN: ICD-10-PCS | Mod: S$GLB,,, | Performed by: INTERNAL MEDICINE

## 2019-05-21 PROCEDURE — 99999 PR PBB SHADOW E&M-EST. PATIENT-LVL III: ICD-10-PCS | Mod: PBBFAC,,, | Performed by: INTERNAL MEDICINE

## 2019-05-21 PROCEDURE — 3075F PR MOST RECENT SYSTOLIC BLOOD PRESS GE 130-139MM HG: ICD-10-PCS | Mod: CPTII,S$GLB,, | Performed by: INTERNAL MEDICINE

## 2019-05-21 PROCEDURE — 3075F SYST BP GE 130 - 139MM HG: CPT | Mod: CPTII,S$GLB,, | Performed by: INTERNAL MEDICINE

## 2019-05-21 RX ORDER — HYDROCODONE BITARTRATE AND ACETAMINOPHEN 10; 325 MG/1; MG/1
1 TABLET ORAL EVERY 4 HOURS PRN
Qty: 42 TABLET | Refills: 0 | Status: SHIPPED | OUTPATIENT
Start: 2019-05-21 | End: 2020-06-16

## 2019-05-21 RX ORDER — PREDNISONE 10 MG/1
10 TABLET ORAL DAILY
Qty: 30 TABLET | Refills: 5 | Status: SHIPPED | OUTPATIENT
Start: 2019-05-21 | End: 2019-06-12 | Stop reason: SDUPTHER

## 2019-05-21 NOTE — TELEPHONE ENCOUNTER
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 ELDER says the results do look like the polymyalgia with the elevated inflammation test.  As the prednisone made any difference yet?    Dr. ELDER will put in a referral to Rheumatology which may take a couple of weeks but that appointment and establishing with Rheumatology will be very important to keep    The A1c diabetes test is somewhat elevated at 7.4 which means sugars have been averaging 166 Dr. ELDER does need you to start monitoring his sugar once or twice a day especially with the start of the prednisone.    Had you been on metformin in the past?  May need to restart that

## 2019-05-21 NOTE — PROGRESS NOTES
Chief complaint follow-up on weakness    83-year-old white male still smoking.  Over the past couple of weeks he developed some numbness in the right leg.  He has seen various other providers.  He did have an MRI which was fairly unimpressive for his age.  He was seen by pain management who advised he was not sure where he can give him injections.  Patient frustrated by his symptoms and limitations.  He is unable to drive and work.  Symptoms have now progressed in that both legs are very heavy.  Is very difficult for him to get up out of a seated position.  He also has pain and weakness in the proximal muscles and is also now spread to his arms.  He is unable to raise his arms over his head.  Two months ago his shoulders and arms were fine.  He had other testing included normal ABIs and symptoms are somewhat atypical for claudication.  Worse after sitting.  Becoming fairly debilitated.  He does have diabetes.  He is taking a B12 supplement.  He does not note any worsening shortness of breath or trouble keeping his eyes open.  He still in good spirits although frustrated.       I did point out to him that his B12 level  was low years ago and it does look like he took a supplement and the B12 level did rise.  He was not aware that her could remember that however and so has currently not been on a B12 supplement.  Level ok 9/18.  He did have iron deficiency in his stool testing was negative.  Anemia was stable but will reassess blood level and iron levels today.      All issues reviewed patient counseled and is evaluation and management will be based upon time counseling.Total time over 45 minutes with over 50% counseling.    ROS:   CONST: weight stable. EYES: no vision change. ENT: no sore throat. CV: no chest pain w/ exertion. RESP: no shortness of breath. GI: no nausea, vomiting, diarrhea. No dysphagia. : no urinary issues. MUSCULOSKELETAL: no new myalgias or arthralgias. SKIN: no new changes. NEURO: no focal  deficits. PSYCH: no new issues. ENDOCRINE: no polyuria. HEME: no lymph nodes. ALLERGY: no general pruritis.    PAST MEDICAL HISTORY:                                                        1. Hyperlipidemia.                                                           2. Coronary disease, seen prior by Dr. Roy, 4-vessel bypass in .   3. Peripheral vascular disease, stented in both legs  and been on Plavix since.                                                                4. Kidney stones, last episode 2007.                                 5. Right renal cyst apparently.  Saw Dr. Labadie and then second opinion at [facility] by a Dr. Shipman, currently going to be followed.                      6. Appendectomy.                                                             7. Actinic keratosis, saw Dr. Ambriz.                                      8. Left facial numbness, probable TIA, admit [facility] 2007. Carotid ultrasound apparently clear  9. Cataracts  10. Skin cancer  11. HYPERTENSION  12.  Diabetes, uncontrolled, with circulatory disease  13.  Low HDL  14. Declines Colonoscopy  15. Pneumovax after 67 done, Prevnar 13 given   16.  B12 deficiency diagnosed     17    early satiety                                                                                       SOCIAL HISTORY:  He smokes 1 pack per day and does not drink.  He was about   52 years but now a .  Retired deputy in the court, 4          children.                                                                                                                                                 FAMILY HISTORY:  Father  at 63 of heart disease and stroke.  Mother       at 86, 4 brothers, 2 sisters living. Brother with strokes.            Gen: no distress  His gait is very slow but normal.  It is impossible for him to arise from the seated position without the use of his hands.  His shoulders and  hips and lower extremities are fairly stiff but negative straight leg testing bilaterally.  Hips have some reduced internal external rotation but no pain within the hips themselves.  Shoulders have some stiffness as well but no reproducible pain is suggest rotator cuff issues.  Skin no rashes, warm to touch.  Affect is normal, face is normal, facial muscles normal with no ptosis.           Assessment and plan:      Narciso was seen today for follow-up.    Diagnoses and all orders for this visit:    Polymyalgia, overall progression very suspicious for polymyalgia rheumatica.  Check inflammatory markers.  Check for myasthenia gravis although not typical for that.  Begin prednisone 20 mg today then 10 mg daily with the discussion that if inflammatory markers are elevated he will continue prednisone 10 mg and we will get him to Rheumatology.  Reassured him that symptoms should improve fairly rapidly if this is polymyalgia rheumatica.  It does not appear to really be obvious nerve impingement at this time by review of the MRI.  ABIs also unremarkable.  Given smoking history always consider paraneoplastic syndromes and so forth.  CT scan of the chest reviewed and there is a pulmonary nodule with no other worrisome findings.  -     TSH; Future  -     Sedimentation rate; Future  -     C-reactive protein; Future  -     MYASTHENIA GRAVIS PANEL, ADULT; Future    Diabetes mellitus with peripheral circulatory disorder  -     Hemoglobin A1c; Future  -     Ferritin; Future  -     Iron and TIBC; Future  -     Comprehensive metabolic panel; Future  -     Vitamin B12; Future  -     CBC auto differential; Future  -     TSH; Future  -     Sedimentation rate; Future  -     C-reactive protein; Future    Chronic obstructive pulmonary disease, unspecified COPD type, evaluated by Pulmonary nurse practitioner    PAD (peripheral artery disease) normal ABIs    B12 deficiency  -     Vitamin B12; Future    Uncontrolled type 2 diabetes mellitus  with hyperglycemia, reassess and follow especially with the initiation of prednisone    Coronary artery disease involving coronary bypass graft of native heart without angina pectoris    Iron deficiency anemia, unspecified iron deficiency anemia type    Anemia, unspecified type    Mood disorder    Situational stress    Numbness in right leg    Other orders  -     predniSONE (DELTASONE) 10 MG tablet; Take 1 tablet (10 mg total) by mouth once daily.  -     HYDROcodone-acetaminophen (NORCO)  mg per tablet; Take 1 tablet by mouth every 4 (four) hours as needed for Pain.

## 2019-05-22 ENCOUNTER — TELEPHONE (OUTPATIENT)
Dept: FAMILY MEDICINE | Facility: CLINIC | Age: 84
End: 2019-05-22

## 2019-05-22 NOTE — TELEPHONE ENCOUNTER
----- Message from Leonie Rich sent at 5/22/2019 11:02 AM CDT -----  ..Type: Patient Call Back    Who called: pt     What is the request in detail: Pt states Dr. ELDER diagnosed him with something yesterday at [facility] and did not get the name of it. He is asking for a call back with the name of the diagnosis. Pt states he did not get a discharge paper to review on there.     Can the clinic reply by [facility]? No     Would the patient rather a call back or a response via [facility]? Call back     Best call back number: [phone]

## 2019-05-22 NOTE — TELEPHONE ENCOUNTER
Informed patient of information below. Verbalized understanding. Stated, he is doing 100% better. Was told during visit to restart Metformin. He already has and monitoring BS as instructed as well. Will wait for referral to contact for appointment with Rheumatology.

## 2019-06-12 RX ORDER — PREDNISONE 10 MG/1
10 TABLET ORAL DAILY
Qty: 30 TABLET | Refills: 4 | Status: SHIPPED | OUTPATIENT
Start: 2019-06-12 | End: 2020-03-20

## 2019-06-12 NOTE — TELEPHONE ENCOUNTER
----- Message from Rosemarie Chandler sent at 6/12/2019 10:46 AM CDT -----  Contact: Self  Type: RX Refill Request    Who Called: Self    Refill or New Rx:Refill    RX Name and Strength:predniSONE (DELTASONE) 10 MG tablet      Preferred Pharm[place] with phone number[place] [place] [address] [phone] (Phone)  [phone] (Fax)        Local or Mail Order:Local    Ordering Provider: Ehrensing    Would the patient rather a call back or a response via [facility]? Call    Best Call Back Number:[phone]

## 2019-08-16 ENCOUNTER — TELEPHONE (OUTPATIENT)
Dept: RHEUMATOLOGY | Facility: CLINIC | Age: 84
End: 2019-08-16

## 2019-08-16 ENCOUNTER — INITIAL CONSULT (OUTPATIENT)
Dept: RHEUMATOLOGY | Facility: CLINIC | Age: 84
End: 2019-08-16
Payer: MEDICARE

## 2019-08-16 VITALS
WEIGHT: 163.56 LBS | BODY MASS INDEX: 25.67 KG/M2 | SYSTOLIC BLOOD PRESSURE: 140 MMHG | HEIGHT: 67 IN | HEART RATE: 88 BPM | DIASTOLIC BLOOD PRESSURE: 76 MMHG

## 2019-08-16 DIAGNOSIS — Z79.52 ON CORTICOSTEROID THERAPY: ICD-10-CM

## 2019-08-16 DIAGNOSIS — M35.3 POLYMYALGIA RHEUMATICA: Primary | ICD-10-CM

## 2019-08-16 PROCEDURE — 3078F PR MOST RECENT DIASTOLIC BLOOD PRESSURE < 80 MM HG: ICD-10-PCS | Mod: HCNC,CPTII,S$GLB, | Performed by: INTERNAL MEDICINE

## 2019-08-16 PROCEDURE — 99204 OFFICE O/P NEW MOD 45 MIN: CPT | Mod: HCNC,S$GLB,, | Performed by: INTERNAL MEDICINE

## 2019-08-16 PROCEDURE — 1101F PR PT FALLS ASSESS DOC 0-1 FALLS W/OUT INJ PAST YR: ICD-10-PCS | Mod: HCNC,CPTII,S$GLB, | Performed by: INTERNAL MEDICINE

## 2019-08-16 PROCEDURE — 3077F PR MOST RECENT SYSTOLIC BLOOD PRESSURE >= 140 MM HG: ICD-10-PCS | Mod: HCNC,CPTII,S$GLB, | Performed by: INTERNAL MEDICINE

## 2019-08-16 PROCEDURE — 99999 PR PBB SHADOW E&M-EST. PATIENT-LVL IV: ICD-10-PCS | Mod: PBBFAC,HCNC,, | Performed by: INTERNAL MEDICINE

## 2019-08-16 PROCEDURE — 1101F PT FALLS ASSESS-DOCD LE1/YR: CPT | Mod: HCNC,CPTII,S$GLB, | Performed by: INTERNAL MEDICINE

## 2019-08-16 PROCEDURE — 99204 PR OFFICE/OUTPT VISIT, NEW, LEVL IV, 45-59 MIN: ICD-10-PCS | Mod: HCNC,S$GLB,, | Performed by: INTERNAL MEDICINE

## 2019-08-16 PROCEDURE — 99999 PR PBB SHADOW E&M-EST. PATIENT-LVL IV: CPT | Mod: PBBFAC,HCNC,, | Performed by: INTERNAL MEDICINE

## 2019-08-16 PROCEDURE — 3077F SYST BP >= 140 MM HG: CPT | Mod: HCNC,CPTII,S$GLB, | Performed by: INTERNAL MEDICINE

## 2019-08-16 PROCEDURE — 3078F DIAST BP <80 MM HG: CPT | Mod: HCNC,CPTII,S$GLB, | Performed by: INTERNAL MEDICINE

## 2019-08-16 RX ORDER — ALENDRONATE SODIUM 70 MG/1
70 TABLET ORAL
Qty: 4 TABLET | Refills: 11 | Status: SHIPPED | OUTPATIENT
Start: 2019-08-16 | End: 2020-08-24 | Stop reason: SDUPTHER

## 2019-08-16 ASSESSMENT — ROUTINE ASSESSMENT OF PATIENT INDEX DATA (RAPID3)
TOTAL RAPID3 SCORE: 1.67
MDHAQ FUNCTION SCORE: 0
PATIENT GLOBAL ASSESSMENT SCORE: 5
PSYCHOLOGICAL DISTRESS SCORE: 0
PAIN SCORE: 0
FATIGUE SCORE: 5
AM STIFFNESS SCORE: 0, NO

## 2019-08-16 NOTE — LETTER
August 16, 2019      Marcelino Del Toro MD  [address]  [place] [address]           [facility]  [address]  [place] [address]  Phone: [phone]  Fax: [phone]          Patient: Narciso Yanez   MR Number: [id number]   YOB: 1935   Date of Visit: 8/16/2019       Dear Dr. Marcelino Del Toro:    Thank you for referring Narciso Yanez to me for evaluation. Attached you will find relevant portions of my assessment and plan of care.    If you have questions, please do not hesitate to call me. I look forward to following Narciso Yanez along with you.    Sincerely,    Checo Huerta MD    Enclosure  CC:  No Recipients    If you would like to receive this communication electronically, please contact [email or web address] or [phone] to request more information on [employer] access.    For providers and/or their staff who would like to refer a patient to [facility], please contact us through our one-stop-shop provider referral line, [facility], at [phone].    If you feel you have received this communication in error or would no longer like to receive these types of communications, please e-mail [email or web address]

## 2019-08-16 NOTE — PROGRESS NOTES
Subjective:       Patient ID: Narciso Yanez is a 83 y.o. male.    Chief Complaint: initial consultation for recently diagnosed PMR    Mr. Yanez is an 83 year old male with CAD s/p CABG (1999), PAD s/p BL stents (2002), and HTN that was referred from his PCP for recently diagnosed polymyalgia rheumatica.    He presented to PCP (Dr. Del Toro) in May 2019 with ~2-month history of generalized muscle aches, morning stiffness (<30 mins), frequent falls, and R leg numbness. His symptoms were predominately in his lower extremities, including weakness, muscle aches, and BL hip stiffness. MRI was unremarkable for his age. At the time, inflammatory markers were elevated (, CRP 28.5). Was started on prednisone (20mg first day, 10mg every day after). His symptoms improved within 2 days after initiation of steroids. Since then, there has only been 1 day where symptoms were not controlled so he took another 10mg prednisone. Patient denies temporal artery tenderness, headache, jaw claudication, and visual changes.    Current smoker, 1.5 ppd for last 72 years. History of alcohol abuse. Denies illicit drug use.  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      Review of Systems   Constitutional: Negative for activity change, appetite change, fever and unexpected weight change.   HENT: Negative for mouth sores and trouble swallowing.         No jaw claudication. No temporal tenderness.   Eyes: Negative for pain and visual disturbance.   Respiratory: Positive for cough (chronic, nonproductive) and shortness of breath (chronic, contributes to smoking).    Cardiovascular: Negative for chest pain.   Gastrointestinal: Negative for abdominal pain, blood in stool, constipation, diarrhea, nausea and vomiting.   Genitourinary: Negative for dysuria and genital sores.   Musculoskeletal: Positive for arthralgias and myalgias. Negative for joint swelling.   Skin: Positive for color change (R hand since  "bypass). Negative for rash.   Neurological: Positive for headaches (2 mild, unilateral HA that self-resolve within few mintues, no visual changes). Negative for numbness.   Hematological: Bruises/bleeds easily (on plavix).         Objective:   BP (!) 140/76   Pulse 88   Ht 5' 7" (1.702 m)   Wt 74.2 kg (163 lb 9.3 oz)   BMI 25.62 kg/m²      Physical Exam   Constitutional: He is well-developed, well-nourished, and in no distress. No distress.   HENT:   Head: Normocephalic and atraumatic.   Mouth/Throat: Oropharynx is clear and moist. No oropharyngeal exudate.   Eyes: Conjunctivae are normal. Pupils are equal, round, and reactive to light.   Neck: Normal range of motion. Neck supple.   Cardiovascular: Normal rate, regular rhythm, normal heart sounds and intact distal pulses.    No murmur heard.  Pulmonary/Chest: Effort normal and breath sounds normal. No respiratory distress. He has no wheezes. He has no rales.   Abdominal: Soft. Bowel sounds are normal. He exhibits no distension. There is no tenderness. There is no guarding.       Right Side Rheumatological Exam     Examination finds the shoulder, elbow, wrist, knee, 1st PIP, 1st MCP, 2nd PIP, 2nd MCP, 3rd PIP, 3rd MCP, 4th PIP, 4th MCP, 5th PIP and 5th MCP normal.    Left Side Rheumatological Exam     Examination finds the shoulder, elbow, wrist, knee, 1st PIP, 1st MCP, 2nd PIP, 2nd MCP, 3rd PIP, 3rd MCP, 4th PIP, 4th MCP, 5th PIP and 5th MCP normal.      Neurological: He is alert. Gait normal.   Skin: No rash noted.     Significant bruising in forearms bilaterally   Musculoskeletal:   Clubbing in hands bilaterally           Assessment/Plan:       Polymyalgia rheumatica  Patient with typical symptoms of PMR with significant response to prednisone  Currently on prednisone 10mg  - Continue prednisone 10mg until completed 3 months of therapy  - Will likely need gradually taper (9mg x 1 month, 8mg x 1 month, etc.)  - ESR and CRP today    On corticosteroid therapy  -  "    Vitamin D; Future; Expected date: 08/16/2019  -     DXA Bone Density Spine And Hip; Future; Expected date: 08/16/2019  -     alendronate (FOSAMAX) 70 MG tablet; Take 1 tablet (70 mg total) by mouth every 7 days.  Dispense: 4 tablet; Refill: 11      Follow-up as needed        Checo Huerta MD  Medical Resident  [facility]

## 2019-08-16 NOTE — ASSESSMENT & PLAN NOTE
PMR  typical sx and ; sx responded appropriately to prednisone; no GCA sx    Rec:  1. Continue prednisone 10 mg/d for 3-6 months then start slow taper by 1 mg each month  2. Add Ca+D and alendronate 70 mg /week  3. DXA  4. Lab today  5. F/u as per Dr Del Toro

## 2019-08-16 NOTE — PROGRESS NOTES
I have personally taken the history and examined this patient and agree with the resident's note as stated.    Please see my note.  WD

## 2019-08-28 ENCOUNTER — HOSPITAL ENCOUNTER (OUTPATIENT)
Dept: RADIOLOGY | Facility: HOSPITAL | Age: 84
Discharge: HOME | End: 2019-08-28
Attending: INTERNAL MEDICINE
Payer: MEDICARE

## 2019-08-28 DIAGNOSIS — Z79.52 ON CORTICOSTEROID THERAPY: ICD-10-CM

## 2019-08-28 PROCEDURE — 77080 DXA BONE DENSITY AXIAL: CPT | Mod: TC,HCNC

## 2019-08-28 PROCEDURE — 77080 DEXA BONE DENSITY SPINE HIP: ICD-10-PCS | Mod: 26,HCNC,, | Performed by: RADIOLOGY

## 2019-08-28 PROCEDURE — 77080 DXA BONE DENSITY AXIAL: CPT | Mod: 26,HCNC,, | Performed by: RADIOLOGY

## 2019-08-30 ENCOUNTER — TELEPHONE (OUTPATIENT)
Dept: RHEUMATOLOGY | Facility: CLINIC | Age: 84
End: 2019-08-30

## 2019-09-03 ENCOUNTER — HOSPITAL ENCOUNTER (OUTPATIENT)
Dept: RADIOLOGY | Facility: HOSPITAL | Age: 84
Discharge: HOME | End: 2019-09-03
Attending: NURSE PRACTITIONER
Payer: MEDICARE

## 2019-09-03 DIAGNOSIS — R91.1 PULMONARY NODULE: ICD-10-CM

## 2019-09-03 PROCEDURE — 71250 CT THORAX DX C-: CPT | Mod: 26,HCNC,, | Performed by: RADIOLOGY

## 2019-09-03 PROCEDURE — 71250 CT THORAX DX C-: CPT | Mod: TC,HCNC

## 2019-09-03 PROCEDURE — 71250 CT CHEST WITHOUT CONTRAST: ICD-10-PCS | Mod: 26,HCNC,, | Performed by: RADIOLOGY

## 2019-09-08 RX ORDER — METOPROLOL SUCCINATE 25 MG/1
TABLET, EXTENDED RELEASE ORAL
Qty: 90 TABLET | Refills: 1 | Status: SHIPPED | OUTPATIENT
Start: 2019-09-08 | End: 2020-06-08

## 2019-10-11 ENCOUNTER — LAB VISIT (OUTPATIENT)
Dept: LAB | Facility: HOSPITAL | Age: 84
End: 2019-10-11
Attending: INTERNAL MEDICINE
Payer: MEDICARE

## 2019-10-11 ENCOUNTER — OFFICE VISIT (OUTPATIENT)
Dept: FAMILY MEDICINE | Facility: CLINIC | Age: 84
End: 2019-10-11
Payer: MEDICARE

## 2019-10-11 VITALS
HEIGHT: 67 IN | DIASTOLIC BLOOD PRESSURE: 80 MMHG | TEMPERATURE: 98 F | WEIGHT: 162.5 LBS | BODY MASS INDEX: 25.51 KG/M2 | HEART RATE: 83 BPM | OXYGEN SATURATION: 94 % | SYSTOLIC BLOOD PRESSURE: 123 MMHG

## 2019-10-11 DIAGNOSIS — M79.604 RIGHT LEG PAIN: ICD-10-CM

## 2019-10-11 DIAGNOSIS — D50.9 IRON DEFICIENCY ANEMIA, UNSPECIFIED IRON DEFICIENCY ANEMIA TYPE: ICD-10-CM

## 2019-10-11 DIAGNOSIS — F17.200 TOBACCO USE DISORDER: ICD-10-CM

## 2019-10-11 DIAGNOSIS — E11.51 DIABETES MELLITUS WITH PERIPHERAL CIRCULATORY DISORDER: ICD-10-CM

## 2019-10-11 DIAGNOSIS — R91.1 PULMONARY NODULE: ICD-10-CM

## 2019-10-11 DIAGNOSIS — E11.65 UNCONTROLLED TYPE 2 DIABETES MELLITUS WITH HYPERGLYCEMIA: ICD-10-CM

## 2019-10-11 DIAGNOSIS — E53.8 B12 DEFICIENCY: ICD-10-CM

## 2019-10-11 DIAGNOSIS — M35.3 POLYMYALGIA RHEUMATICA: Primary | ICD-10-CM

## 2019-10-11 DIAGNOSIS — M35.3 POLYMYALGIA RHEUMATICA: ICD-10-CM

## 2019-10-11 DIAGNOSIS — J44.9 CHRONIC OBSTRUCTIVE PULMONARY DISEASE, UNSPECIFIED COPD TYPE: ICD-10-CM

## 2019-10-11 LAB
ALBUMIN SERPL BCP-MCNC: 3.7 G/DL (ref 3.5–5.2)
ALP SERPL-CCNC: 76 U/L (ref 55–135)
ALT SERPL W/O P-5'-P-CCNC: 18 U/L (ref 10–44)
ANION GAP SERPL CALC-SCNC: 9 MMOL/L (ref 8–16)
AST SERPL-CCNC: 21 U/L (ref 10–40)
BASOPHILS # BLD AUTO: 0.05 K/UL (ref 0–0.2)
BASOPHILS NFR BLD: 0.5 % (ref 0–1.9)
BILIRUB SERPL-MCNC: 0.3 MG/DL (ref 0.1–1)
BUN SERPL-MCNC: 17 MG/DL (ref 8–23)
CALCIUM SERPL-MCNC: 9.4 MG/DL (ref 8.7–10.5)
CHLORIDE SERPL-SCNC: 104 MMOL/L (ref 95–110)
CO2 SERPL-SCNC: 27 MMOL/L (ref 23–29)
CREAT SERPL-MCNC: 1.2 MG/DL (ref 0.5–1.4)
CRP SERPL-MCNC: 1 MG/L (ref 0–8.2)
DIFFERENTIAL METHOD: ABNORMAL
EOSINOPHIL # BLD AUTO: 0.1 K/UL (ref 0–0.5)
EOSINOPHIL NFR BLD: 0.6 % (ref 0–8)
ERYTHROCYTE [DISTWIDTH] IN BLOOD BY AUTOMATED COUNT: 15.3 % (ref 11.5–14.5)
ERYTHROCYTE [SEDIMENTATION RATE] IN BLOOD BY WESTERGREN METHOD: 47 MM/HR (ref 0–23)
EST. GFR  (AFRICAN AMERICAN): >60 ML/MIN/1.73 M^2
EST. GFR  (NON AFRICAN AMERICAN): 56 ML/MIN/1.73 M^2
ESTIMATED AVG GLUCOSE: 197 MG/DL (ref 68–131)
GLUCOSE SERPL-MCNC: 191 MG/DL (ref 70–110)
HBA1C MFR BLD HPLC: 8.5 % (ref 4–5.6)
HCT VFR BLD AUTO: 41 % (ref 40–54)
HGB BLD-MCNC: 12.9 G/DL (ref 14–18)
IMM GRANULOCYTES # BLD AUTO: 0.09 K/UL (ref 0–0.04)
IMM GRANULOCYTES NFR BLD AUTO: 0.8 % (ref 0–0.5)
LYMPHOCYTES # BLD AUTO: 1.4 K/UL (ref 1–4.8)
LYMPHOCYTES NFR BLD: 13.3 % (ref 18–48)
MCH RBC QN AUTO: 30.9 PG (ref 27–31)
MCHC RBC AUTO-ENTMCNC: 31.5 G/DL (ref 32–36)
MCV RBC AUTO: 98 FL (ref 82–98)
MONOCYTES # BLD AUTO: 0.5 K/UL (ref 0.3–1)
MONOCYTES NFR BLD: 4.7 % (ref 4–15)
NEUTROPHILS # BLD AUTO: 8.5 K/UL (ref 1.8–7.7)
NEUTROPHILS NFR BLD: 80.1 % (ref 38–73)
NRBC BLD-RTO: 0 /100 WBC
PLATELET # BLD AUTO: 220 K/UL (ref 150–350)
PMV BLD AUTO: 10.2 FL (ref 9.2–12.9)
POTASSIUM SERPL-SCNC: 4.7 MMOL/L (ref 3.5–5.1)
PROT SERPL-MCNC: 7.1 G/DL (ref 6–8.4)
RBC # BLD AUTO: 4.18 M/UL (ref 4.6–6.2)
SODIUM SERPL-SCNC: 140 MMOL/L (ref 136–145)
WBC # BLD AUTO: 10.62 K/UL (ref 3.9–12.7)

## 2019-10-11 PROCEDURE — G0008 FLU VACCINE - HIGH DOSE (65+) PRESERVATIVE FREE IM: ICD-10-PCS | Mod: HCNC,59,S$GLB, | Performed by: INTERNAL MEDICINE

## 2019-10-11 PROCEDURE — 99499 RISK ADDL DX/OHS AUDIT: ICD-10-PCS | Mod: S$GLB,,, | Performed by: INTERNAL MEDICINE

## 2019-10-11 PROCEDURE — 1101F PT FALLS ASSESS-DOCD LE1/YR: CPT | Mod: HCNC,CPTII,S$GLB, | Performed by: INTERNAL MEDICINE

## 2019-10-11 PROCEDURE — 3079F PR MOST RECENT DIASTOLIC BLOOD PRESSURE 80-89 MM HG: ICD-10-PCS | Mod: HCNC,CPTII,S$GLB, | Performed by: INTERNAL MEDICINE

## 2019-10-11 PROCEDURE — 85025 COMPLETE CBC W/AUTO DIFF WBC: CPT | Mod: HCNC

## 2019-10-11 PROCEDURE — 90662 IIV NO PRSV INCREASED AG IM: CPT | Mod: HCNC,S$GLB,, | Performed by: INTERNAL MEDICINE

## 2019-10-11 PROCEDURE — 99499 UNLISTED E&M SERVICE: CPT | Mod: S$GLB,,, | Performed by: INTERNAL MEDICINE

## 2019-10-11 PROCEDURE — 80053 COMPREHEN METABOLIC PANEL: CPT | Mod: HCNC

## 2019-10-11 PROCEDURE — 85652 RBC SED RATE AUTOMATED: CPT | Mod: HCNC

## 2019-10-11 PROCEDURE — 86140 C-REACTIVE PROTEIN: CPT | Mod: HCNC

## 2019-10-11 PROCEDURE — 1101F PR PT FALLS ASSESS DOC 0-1 FALLS W/OUT INJ PAST YR: ICD-10-PCS | Mod: HCNC,CPTII,S$GLB, | Performed by: INTERNAL MEDICINE

## 2019-10-11 PROCEDURE — 90662 FLU VACCINE - HIGH DOSE (65+) PRESERVATIVE FREE IM: ICD-10-PCS | Mod: HCNC,S$GLB,, | Performed by: INTERNAL MEDICINE

## 2019-10-11 PROCEDURE — 3079F DIAST BP 80-89 MM HG: CPT | Mod: HCNC,CPTII,S$GLB, | Performed by: INTERNAL MEDICINE

## 2019-10-11 PROCEDURE — 99214 OFFICE O/P EST MOD 30 MIN: CPT | Mod: HCNC,25,S$GLB, | Performed by: INTERNAL MEDICINE

## 2019-10-11 PROCEDURE — 36415 COLL VENOUS BLD VENIPUNCTURE: CPT | Mod: HCNC,PO

## 2019-10-11 PROCEDURE — G0008 ADMIN INFLUENZA VIRUS VAC: HCPCS | Mod: HCNC,59,S$GLB, | Performed by: INTERNAL MEDICINE

## 2019-10-11 PROCEDURE — 3074F PR MOST RECENT SYSTOLIC BLOOD PRESSURE < 130 MM HG: ICD-10-PCS | Mod: HCNC,CPTII,S$GLB, | Performed by: INTERNAL MEDICINE

## 2019-10-11 PROCEDURE — 3074F SYST BP LT 130 MM HG: CPT | Mod: HCNC,CPTII,S$GLB, | Performed by: INTERNAL MEDICINE

## 2019-10-11 PROCEDURE — 99999 PR PBB SHADOW E&M-EST. PATIENT-LVL III: CPT | Mod: PBBFAC,HCNC,, | Performed by: INTERNAL MEDICINE

## 2019-10-11 PROCEDURE — 83036 HEMOGLOBIN GLYCOSYLATED A1C: CPT | Mod: HCNC

## 2019-10-11 PROCEDURE — 99214 PR OFFICE/OUTPT VISIT, EST, LEVL IV, 30-39 MIN: ICD-10-PCS | Mod: HCNC,25,S$GLB, | Performed by: INTERNAL MEDICINE

## 2019-10-11 PROCEDURE — 99999 PR PBB SHADOW E&M-EST. PATIENT-LVL III: ICD-10-PCS | Mod: PBBFAC,HCNC,, | Performed by: INTERNAL MEDICINE

## 2019-10-11 RX ORDER — PREDNISONE 1 MG/1
10 TABLET ORAL DAILY
Qty: 300 TABLET | Refills: 12 | Status: SHIPPED | OUTPATIENT
Start: 2019-10-11 | End: 2020-03-20 | Stop reason: SDUPTHER

## 2019-10-11 RX ORDER — PREDNISONE 5 MG/1
5 TABLET ORAL DAILY
Qty: 90 TABLET | Refills: 12 | Status: SHIPPED | OUTPATIENT
Start: 2019-10-11 | End: 2020-03-20

## 2019-10-11 RX ORDER — DIAZEPAM 5 MG/1
5 TABLET ORAL EVERY 8 HOURS PRN
Qty: 60 TABLET | Refills: 3 | Status: SHIPPED | OUTPATIENT
Start: 2019-10-11 | End: 2019-12-19 | Stop reason: SDUPTHER

## 2019-10-11 NOTE — PROGRESS NOTES
Chief complaint follow-up on weakness, right leg pain    84-year-old white male still smoking.  Over  weeks he developed some numbness in the right leg.  He has seen various other providers.  He did have an MRI which was fairly unimpressive for his age.  He was seen by pain management who advised he was not sure where he can give him injections.  Patient frustrated by his symptoms and limitations.  He is unable to drive and work.  On physical exam previously it look like he had polymyalgia any responded within 24 hr to prednisone.  I reviewed the Rheumatology note.  He has been doing well.  He still on the 10 mg pill since his initial diagnosis over three months ago.    For about three weeks he has had pain in the right inner leg that goes down to the toes for about three weeks.  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  It is better when he gets up and walks around.  He thinks it might be the polymyalgia returning but he does not have any other worsening or muscle pain      Seen Rheum  Polymyalgia rheumatica  Patient with typical symptoms of PMR with significant response to prednisone  Currently on prednisone 10mg  - Continue prednisone 10mg until completed 3 months of therapy  - Will likely need gradually taper (9mg x 1 month, 8mg x 1 month, etc.)  - ESR and CRP today     On corticosteroid therapy  -     Vitamin D; Future; Expected date: 08/16/2019  -     DXA Bone Density Spine And Hip; Future; Expected date: 08/16/2019  -     alendronate (FOSAMAX) 70 MG tablet; Take 1 tablet (70 mg total) by mouth every 7 days.  Dispense: 4 tablet; Refill: 11       I did point out to him that his B12 level  was low years ago and it does look like he took a supplement and the B12 level did rise.  He was not aware that her could remember that however and so has currently not been on a B12 supplement.  Level ok 9/18.  He did have iron deficiency in his stool testing was negative.       All issues reviewed  patient counseled and is evaluation and management will be based upon time counseling.Total time over 25 minutes with over 50% counseling.      CT 9/19  Impression       Nodule from 03/18/2019 has increased in size, previously 6 mm now measuring 7 mm.  Malignancy not excluded.  For a solid nodule 6-8 mm, Fleischner Society 2017 guidelines recommend follow up with non-contrast chest CT at 6-12 months and 18-24 months after discovery.    Cholelithiasis.    Prominence of interstitial markings with mild bronchiectasis.         ROS:   CONST: weight stable. EYES: no vision change. ENT: no sore throat. CV: no chest pain w/ exertion. RESP: no shortness of breath. GI: no nausea, vomiting, diarrhea. No dysphagia. : no urinary issues. MUSCULOSKELETAL: no new myalgias or arthralgias. SKIN: no new changes. NEURO: no focal deficits. PSYCH: no new issues. ENDOCRINE: no polyuria. HEME: no lymph nodes. ALLERGY: no general pruritis.    PAST MEDICAL HISTORY:                                                        1. Hyperlipidemia.                                                           2. Coronary disease, seen prior by Dr. Roy, 4-vessel bypass in 1999.   3. Peripheral vascular disease, stented in both legs 2002 and been on Plavix since.                                                                4. Kidney stones, last episode January 2007.                                 5. Right renal cyst apparently.  Saw Dr. Labadie and then second opinion at [facility] by a Dr. Shipman, currently going to be followed.                      6. Appendectomy.                                                             7. Actinic keratosis, saw Dr. Ambriz.                                      8. Left facial numbness, probable TIA, admit [facility] June 2007. Carotid ultrasound apparently clear  9. Cataracts  10. Skin cancer  11. HYPERTENSION  12.  Diabetes, uncontrolled, with circulatory disease  13.  Low HDL  14. Declines  Colonoscopy  15. Pneumovax after 67 done, Prevnar 13 given   16.  B12 deficiency diagnosed 2014    early satiety                                                                                       SOCIAL HISTORY:  He smokes 1 pack per day and does not drink.  He was about   52 years but now a .  Retired deputy in the court, 4          children.                                                                                                                                                 FAMILY HISTORY:  Father  at 63 of heart disease and stroke.  Mother       at 86, 4 brothers, 2 sisters living. Brother with strokes.            Gen: no distress  Musculoskeletal:  Slight decreased range of motion of the right hip compared to the left but no pain. Both lower extremities have full range of motion of knees and ankles.  Strength is 5/5, all joints are stable.  No edema.  Skin no rashes, warm to touch.  Gait is normal.  Negative straight leg testing.         Assessment and plan:    Narciso was seen today for leg pain.    Diagnoses and all orders for this visit:    Polymyalgia rheumatica, I do not suspect any aggravation of the polymyalgia but he is due for blood work and will send inflammatory markers.  It is interesting that some of his symptoms initiated this way but I suspect it is more some right in her muscle pain.  It has features of sciatica but does get better when he walks around which would be more consistent with a pulled muscle which is aggravated by his driving.  We discussed application of heat and stretching allow it to heal.  Obviously if he thought it was steroids we might increase prednisone if he thought was polymyalgia we might increase prednisone.  We did review the Rheumatology notes but it does not appear he has follow-up.  I will send in prescriptions of prednisone 5 mg and 1 mg in the near future will have him decrease to 9 mg and we discussed going down by 1 mg  per month  -     Sedimentation rate; Future  -     Hemoglobin A1c; Future  -     Comprehensive metabolic panel; Future  -     CBC auto differential; Future  -     C-reactive protein; Future    B12 deficiency, good control    Diabetes mellitus with peripheral circulatory disorder, overdue for assessment and suspect is a possibly be worse on the prednisone.  He does not have a glucometer at home and will send one in  -     Sedimentation rate; Future  -     Hemoglobin A1c; Future  -     Comprehensive metabolic panel; Future  -     CBC auto differential; Future  -     C-reactive protein; Future    Uncontrolled type 2 diabetes mellitus with hyperglycemia    Iron deficiency anemia, unspecified iron deficiency anemia type  -     CBC auto differential; Future    Chronic obstructive pulmonary disease, unspecified COPD type    Pulmonary nodule, will need a six month follow-up CT and he is open to that    Tobacco use disorder    Right leg pain, differential as above    Other orders  -     Influenza - High Dose (65+) (PF) (IM)  -     diazePAM (VALIUM) 5 MG tablet; Take 1 tablet (5 mg total) by mouth every 8 (eight) hours as needed.  -     blood sugar diagnostic Strp; 1 strip by Misc.(Non-Drug; Combo Route) route 2 (two) times daily. Disp glucometer and lancets as well  -     predniSONE (DELTASONE) 1 MG tablet; Take 10 tablets (10 mg total) by mouth once daily.  -     predniSONE (DELTASONE) 5 MG tablet; Take 1 tablet (5 mg total) by mouth once daily.

## 2019-10-14 ENCOUNTER — TELEPHONE (OUTPATIENT)
Dept: FAMILY MEDICINE | Facility: CLINIC | Age: 84
End: 2019-10-14

## 2019-10-14 NOTE — TELEPHONE ENCOUNTER
----- Message from Gayle Hernandez sent at 10/14/2019  9:01 AM CDT -----  Contact: Troy with [facility]  Name of Who is Calling:Troy with [facility]      What is the request in detail: clarify dosage and instructions for predniSONE (DELTASONE) medication. Please call    Can the clinic reply by [facility]: no    What Number to Call Back if not in [facility]: [phone]

## 2019-10-14 NOTE — TELEPHONE ENCOUNTER
They are confused by the 2 scripts- tell them we will be weening prednisone every month by 1 mg so he needs a 5mg pills and a bunch of the 1 mg pills

## 2019-10-21 ENCOUNTER — TELEPHONE (OUTPATIENT)
Dept: FAMILY MEDICINE | Facility: CLINIC | Age: 84
End: 2019-10-21

## 2019-10-21 RX ORDER — METFORMIN HYDROCHLORIDE 750 MG/1
TABLET, EXTENDED RELEASE ORAL
Qty: 90 TABLET | Refills: 3 | Status: SHIPPED | OUTPATIENT
Start: 2019-10-21 | End: 2019-12-20 | Stop reason: SDUPTHER

## 2019-10-21 NOTE — TELEPHONE ENCOUNTER
----- Message from Rosemarie Chandler sent at 10/21/2019  9:38 AM CDT -----  Contact: Self   Type:  Test Results    Who Called:  Self     Name of Test (Lab/Mammo/Etc):  Lab     Date of Test: 10/11/19    Ordering Provider:  Dr. Del Toro     Where the test was performed:  [facility]     Would the patient rather a call back or a response via [facility]?  Call     Best Call Back Number: [phone]        For Clinical Team:Has the provider reviewed the results?

## 2019-10-21 NOTE — TELEPHONE ENCOUNTER
The A1c diabetes testing however has gotten a bit worse going from 7.4 to 8.5    Obviously, it hopefully will get better with less prednisone but that will take time.  Improve the diet and watch the sweets but Dr. ELDER also will call in one metformin pill per day to help out.    Dr. ELDER says that the previous anemia is getting better    The electrolytes and kidneys are all stable.    Liver testing is normal as before.    The sed rate looks stable at 47, two months ago it was 50 so about the same        Metformin sent to the pharmacy, repeat labs in three months

## 2019-11-06 ENCOUNTER — PES CALL (OUTPATIENT)
Dept: ADMINISTRATIVE | Facility: CLINIC | Age: 84
End: 2019-11-06

## 2019-11-12 ENCOUNTER — TELEPHONE (OUTPATIENT)
Dept: PULMONOLOGY | Facility: CLINIC | Age: 84
End: 2019-11-12

## 2019-11-12 DIAGNOSIS — R91.1 LUNG NODULE: ICD-10-CM

## 2019-11-13 NOTE — TELEPHONE ENCOUNTER
Spoke to pt. States he never received a letter or call regarding CT chest test results. Pt inform of enlarging nodule on CT Sept 2019, recommend repeat CT chest  December. Pt instructed to follow up following test results to discuss.

## 2019-11-17 ENCOUNTER — HOSPITAL ENCOUNTER (EMERGENCY)
Facility: HOSPITAL | Age: 84
Discharge: HOME | End: 2019-11-17
Attending: EMERGENCY MEDICINE
Payer: MEDICARE

## 2019-11-17 VITALS
SYSTOLIC BLOOD PRESSURE: 157 MMHG | RESPIRATION RATE: 19 BRPM | WEIGHT: 170 LBS | BODY MASS INDEX: 25.76 KG/M2 | DIASTOLIC BLOOD PRESSURE: 73 MMHG | HEIGHT: 68 IN | HEART RATE: 71 BPM | TEMPERATURE: 98 F | OXYGEN SATURATION: 96 %

## 2019-11-17 DIAGNOSIS — H11.31 SUBCONJUNCTIVAL HEMORRHAGE OF RIGHT EYE: Primary | ICD-10-CM

## 2019-11-17 DIAGNOSIS — S00.11XA PERIORBITAL ECCHYMOSIS OF RIGHT EYE, INITIAL ENCOUNTER: ICD-10-CM

## 2019-11-17 PROCEDURE — 99284 EMERGENCY DEPT VISIT MOD MDM: CPT | Mod: 25,HCNC

## 2019-11-17 NOTE — ED TRIAGE NOTES
"Pt arrived to ER with complaints of " I am having trouble with my eyes. My eyes started itching Thursday and Friday it looked like a black eye". Pt denies complaints of pain at present. Pt reports blurred vision to the right eye that began last night. Pt denies dizziness, headaches, fall, foreign body or injury.   "

## 2019-11-17 NOTE — ED PROVIDER NOTES
"Encounter Date: 11/17/2019    SCRIBE #1 NOTE: I, Kiki Bradford, am scribing for, and in the presence of,  Nicolas Hong Jr., MD. I have scribed the following portions of the note - Other sections scribed: HPI, ROS.       History     Chief Complaint   Patient presents with    Eye Problem     Pt reports bilateral eye irritation. + periorbital ecchymosis. Right sclera red. Pt reports taking plavix.     CC: Right eye problem    HPI:  This is an 84 y.o. male with a PMHx of DM with peripheral circulatory disorder, Cancer, Cataracts, CAD, Hyperlipidemia, HTN, and PVD who presents to the Emergency Department complaining of right eye problems that began 3 days ago on Thursday. Pt states that his right eye was irritating him on Thursday but bruising under his eye came the next morning on Friday. Pt denies trauma or fall. Pt reports associated symptoms of eye itchiness, blurriness in right eye, and eye redness. Pt also reports feeling "neck pressure." Pt denies pain with eye movement. He denies headache or chest pain. No worsening  or alleviating factors were reported. Pt is compliant with Plavix but did not take it yesterday. Pt has a PSHx of appendectomy, cardiac surgery, eye surgery, and bilateral iliac artery stent placement.    The history is provided by the patient.     Review of patient's allergies indicates:   Allergen Reactions    Demerol [meperidine] Nausea Only     Other reaction(s): Unknown     Past Medical History:   Diagnosis Date    Actinic keratosis     Anxiety     B12 deficiency 12/8/2014    Dx 6/14    Cancer     skin    Cataract     Coronary artery disease     Diabetes mellitus type II     Diabetes mellitus with peripheral circulatory disorder 6/18/2014    ED (erectile dysfunction)     Hyperlipidemia     Hypertension     Kidney stone     Peripheral vascular disease     Spondylosis 3/29/2019    Tobacco dependence      Past Surgical History:   Procedure Laterality Date    APPENDECTOMY      " CARDIAC SURGERY      EXTERNAL EAR SURGERY      EYE SURGERY      cataracts    ILIAC ARTERY STENT Bilateral 06/2003    also Right External Iliac stent     Family History   Problem Relation Age of Onset    Stroke Mother      Social History     Tobacco Use    Smoking status: Current Every Day Smoker     Packs/day: 1.50     Years: 71.00     Pack years: 106.50     Types: Cigarettes    Smokeless tobacco: Never Used   Substance Use Topics    Alcohol use: No    Drug use: No     Review of Systems   Eyes: Positive for redness, itching and visual disturbance (blurriness in right eye). Negative for pain.   Cardiovascular: Negative for chest pain.   Neurological: Negative for headaches.   All other systems reviewed and are negative.      Physical Exam     Initial Vitals [11/17/19 0820]   BP Pulse Resp Temp SpO2   (!) 155/85 91 16 97.4 °F (36.3 °C) 97 %      MAP       --         Physical Exam    Nursing note and vitals reviewed.  Constitutional: He appears well-developed and well-nourished. He is not diaphoretic. No distress.   HENT:   Head: Normocephalic and atraumatic.   Right Ear: External ear normal.   Left Ear: External ear normal.   Nose: Nose normal.   Mouth/Throat: Oropharynx is clear and moist.   Eyes: EOM are normal. Pupils are equal, round, and reactive to light. Right eye exhibits no discharge. Left eye exhibits no discharge. No scleral icterus.   No proptosis.  Extraocular movements are intact bilaterally.  No hyphema or hypopnea noted bilaterally.  There is ecchymosis under the right orbit.  There is a medial subconjunctival hemorrhage in the right eye.  No pain with eye movements.   Neck: Normal range of motion. Neck supple. No JVD present.   Cardiovascular: Normal rate, regular rhythm, normal heart sounds and intact distal pulses. Exam reveals no gallop and no friction rub.    No murmur heard.  Pulmonary/Chest: Breath sounds normal. No stridor. No respiratory distress. He has no wheezes. He has no rhonchi.  "He has no rales. He exhibits no tenderness.   Abdominal: Soft. Bowel sounds are normal. He exhibits no distension and no mass. There is no tenderness. There is no rebound and no guarding.   Musculoskeletal: Normal range of motion. He exhibits no edema or tenderness.   Neurological: He is alert and oriented to person, place, and time. He has normal strength. No cranial nerve deficit or sensory deficit.   Skin: Skin is warm and dry. No rash noted. No erythema. No pallor.   Psychiatric: He has a normal mood and affect. His behavior is normal. Judgment and thought content normal.         ED Course   Procedures  Labs Reviewed - No data to display       Imaging Results    None       X-Rays:   Independently Interpreted Readings:   Other Readings:    CT of the orbits reveals no evidence of retro orbital hematoma.    Medical Decision Making:   ED Management:   This is the emergent evaluation of a  84-year-old male presents emergency department for evaluation of ecchymosis below his right orbit as well as bleeding to the sclera of the right eye.  Differential diagnosis at the time of initial evaluation included, but was not limited to:   Traumatic injury, spontaneous hemorrhage.  I considered a retro-orbital hematoma.  There is no proptosis or pain with eye movements.  Eye movements are intact.  No double vision.  The patient does complain of some "blurry vision "in that eye.  He is currently on Plavix.    This appears to be subconjunctival hemorrhage in addition to periorbital ecchymoses.  Patient stated that he had some blurry vision in the affected eye so CT of the orbits was obtained.  There is no retrobulbar hematoma.  The patient likely has ecchymosis to the area of his orbit as well as subconjunctival hemorrhage due to being on Plavix and to mild trauma when he was rubbing his eye due to a irritation.  There is no evidence of hyphema or hypopnea on examination.  Patient's visual acuity is 20/25 in both eyes " individually as well as both eyes together.    He does not have any pain with eye movements or proptosis.  He is stable for discharge.  He was advised to follow up with his regular doctor next week and return for any new or worsening symptoms such as double vision, loss of vision, severe eye pain, difficulty with eye movements.            Scribe Attestation:   Scribe #1: I performed the above scribed service and the documentation accurately describes the services I performed. I attest to the accuracy of the note.                          Clinical Impression:     Subconjunctival hemorrhage, periorbital ecchymosis.         Scribe attestation: I, Nicolas Hong MD, personally performed the services described in this documentation. All medical record entries made by the scribe were at my direction and in my presence.  I have reviewed the chart and agree that the record reflects my personal performance and is accurate and complete.             Nicolas Hong Jr., MD  11/17/19 [id number]

## 2019-11-17 NOTE — DISCHARGE INSTRUCTIONS
Please follow up with your regular doctor next week.  Please return if your symptoms get worse or if new symptoms develop such as  severe eye pain or pain with eye movements, vision loss, double vision.

## 2019-12-05 ENCOUNTER — PATIENT OUTREACH (OUTPATIENT)
Dept: ADMINISTRATIVE | Facility: HOSPITAL | Age: 84
End: 2019-12-05

## 2019-12-05 DIAGNOSIS — E11.9 DIABETES MELLITUS WITHOUT COMPLICATION: Primary | ICD-10-CM

## 2019-12-19 ENCOUNTER — OFFICE VISIT (OUTPATIENT)
Dept: FAMILY MEDICINE | Facility: CLINIC | Age: 84
End: 2019-12-19
Payer: MEDICARE

## 2019-12-19 ENCOUNTER — LAB VISIT (OUTPATIENT)
Dept: LAB | Facility: HOSPITAL | Age: 84
End: 2019-12-19
Attending: INTERNAL MEDICINE
Payer: MEDICARE

## 2019-12-19 VITALS
HEIGHT: 67 IN | BODY MASS INDEX: 26.26 KG/M2 | DIASTOLIC BLOOD PRESSURE: 78 MMHG | SYSTOLIC BLOOD PRESSURE: 136 MMHG | WEIGHT: 167.31 LBS | OXYGEN SATURATION: 96 % | HEART RATE: 86 BPM

## 2019-12-19 DIAGNOSIS — N18.30 STAGE 3 CHRONIC KIDNEY DISEASE: ICD-10-CM

## 2019-12-19 DIAGNOSIS — J44.9 CHRONIC OBSTRUCTIVE PULMONARY DISEASE, UNSPECIFIED COPD TYPE: ICD-10-CM

## 2019-12-19 DIAGNOSIS — R70.0 ELEVATED SED RATE: ICD-10-CM

## 2019-12-19 DIAGNOSIS — E11.65 UNCONTROLLED TYPE 2 DIABETES MELLITUS WITH HYPERGLYCEMIA: ICD-10-CM

## 2019-12-19 DIAGNOSIS — E53.8 B12 DEFICIENCY: ICD-10-CM

## 2019-12-19 DIAGNOSIS — E61.1 IRON DEFICIENCY: ICD-10-CM

## 2019-12-19 DIAGNOSIS — M35.3 POLYMYALGIA RHEUMATICA: ICD-10-CM

## 2019-12-19 DIAGNOSIS — D64.9 ANEMIA, UNSPECIFIED TYPE: ICD-10-CM

## 2019-12-19 DIAGNOSIS — R91.1 PULMONARY NODULE: ICD-10-CM

## 2019-12-19 DIAGNOSIS — M79.604 RIGHT LEG PAIN: ICD-10-CM

## 2019-12-19 DIAGNOSIS — E11.65 UNCONTROLLED TYPE 2 DIABETES MELLITUS WITH HYPERGLYCEMIA: Primary | ICD-10-CM

## 2019-12-19 LAB
ANION GAP SERPL CALC-SCNC: 7 MMOL/L (ref 8–16)
BUN SERPL-MCNC: 18 MG/DL (ref 8–23)
CALCIUM SERPL-MCNC: 9.5 MG/DL (ref 8.7–10.5)
CHLORIDE SERPL-SCNC: 105 MMOL/L (ref 95–110)
CO2 SERPL-SCNC: 26 MMOL/L (ref 23–29)
CREAT SERPL-MCNC: 1.3 MG/DL (ref 0.5–1.4)
ERYTHROCYTE [SEDIMENTATION RATE] IN BLOOD BY WESTERGREN METHOD: 48 MM/HR (ref 0–23)
EST. GFR  (AFRICAN AMERICAN): 58.7 ML/MIN/1.73 M^2
EST. GFR  (NON AFRICAN AMERICAN): 50.8 ML/MIN/1.73 M^2
ESTIMATED AVG GLUCOSE: 200 MG/DL (ref 68–131)
GLUCOSE SERPL-MCNC: 141 MG/DL (ref 70–110)
HBA1C MFR BLD HPLC: 8.6 % (ref 4–5.6)
POTASSIUM SERPL-SCNC: 4.5 MMOL/L (ref 3.5–5.1)
SODIUM SERPL-SCNC: 138 MMOL/L (ref 136–145)

## 2019-12-19 PROCEDURE — 36415 COLL VENOUS BLD VENIPUNCTURE: CPT | Mod: HCNC,PO

## 2019-12-19 PROCEDURE — 80048 BASIC METABOLIC PNL TOTAL CA: CPT | Mod: HCNC

## 2019-12-19 PROCEDURE — 99499 UNLISTED E&M SERVICE: CPT | Mod: S$GLB,,, | Performed by: INTERNAL MEDICINE

## 2019-12-19 PROCEDURE — 99999 PR PBB SHADOW E&M-EST. PATIENT-LVL III: CPT | Mod: PBBFAC,HCNC,, | Performed by: INTERNAL MEDICINE

## 2019-12-19 PROCEDURE — 99214 PR OFFICE/OUTPT VISIT, EST, LEVL IV, 30-39 MIN: ICD-10-PCS | Mod: HCNC,S$GLB,, | Performed by: INTERNAL MEDICINE

## 2019-12-19 PROCEDURE — 3075F PR MOST RECENT SYSTOLIC BLOOD PRESS GE 130-139MM HG: ICD-10-PCS | Mod: HCNC,CPTII,S$GLB, | Performed by: INTERNAL MEDICINE

## 2019-12-19 PROCEDURE — 3078F PR MOST RECENT DIASTOLIC BLOOD PRESSURE < 80 MM HG: ICD-10-PCS | Mod: HCNC,CPTII,S$GLB, | Performed by: INTERNAL MEDICINE

## 2019-12-19 PROCEDURE — 83036 HEMOGLOBIN GLYCOSYLATED A1C: CPT | Mod: HCNC

## 2019-12-19 PROCEDURE — 1126F AMNT PAIN NOTED NONE PRSNT: CPT | Mod: HCNC,S$GLB,, | Performed by: INTERNAL MEDICINE

## 2019-12-19 PROCEDURE — 3078F DIAST BP <80 MM HG: CPT | Mod: HCNC,CPTII,S$GLB, | Performed by: INTERNAL MEDICINE

## 2019-12-19 PROCEDURE — 1159F MED LIST DOCD IN RCRD: CPT | Mod: HCNC,S$GLB,, | Performed by: INTERNAL MEDICINE

## 2019-12-19 PROCEDURE — 3075F SYST BP GE 130 - 139MM HG: CPT | Mod: HCNC,CPTII,S$GLB, | Performed by: INTERNAL MEDICINE

## 2019-12-19 PROCEDURE — 1101F PR PT FALLS ASSESS DOC 0-1 FALLS W/OUT INJ PAST YR: ICD-10-PCS | Mod: HCNC,CPTII,S$GLB, | Performed by: INTERNAL MEDICINE

## 2019-12-19 PROCEDURE — 1126F PR PAIN SEVERITY QUANTIFIED, NO PAIN PRESENT: ICD-10-PCS | Mod: HCNC,S$GLB,, | Performed by: INTERNAL MEDICINE

## 2019-12-19 PROCEDURE — 99999 PR PBB SHADOW E&M-EST. PATIENT-LVL III: ICD-10-PCS | Mod: PBBFAC,HCNC,, | Performed by: INTERNAL MEDICINE

## 2019-12-19 PROCEDURE — 1159F PR MEDICATION LIST DOCUMENTED IN MEDICAL RECORD: ICD-10-PCS | Mod: HCNC,S$GLB,, | Performed by: INTERNAL MEDICINE

## 2019-12-19 PROCEDURE — 85652 RBC SED RATE AUTOMATED: CPT | Mod: HCNC

## 2019-12-19 PROCEDURE — 99499 RISK ADDL DX/OHS AUDIT: ICD-10-PCS | Mod: S$GLB,,, | Performed by: INTERNAL MEDICINE

## 2019-12-19 PROCEDURE — 99214 OFFICE O/P EST MOD 30 MIN: CPT | Mod: HCNC,S$GLB,, | Performed by: INTERNAL MEDICINE

## 2019-12-19 PROCEDURE — 1101F PT FALLS ASSESS-DOCD LE1/YR: CPT | Mod: HCNC,CPTII,S$GLB, | Performed by: INTERNAL MEDICINE

## 2019-12-19 RX ORDER — CLOPIDOGREL BISULFATE 75 MG/1
75 TABLET ORAL DAILY
Qty: 90 TABLET | Refills: 4 | Status: SHIPPED | OUTPATIENT
Start: 2019-12-19 | End: 2020-08-26 | Stop reason: SDUPTHER

## 2019-12-19 RX ORDER — DIAZEPAM 5 MG/1
5 TABLET ORAL EVERY 8 HOURS PRN
Qty: 60 TABLET | Refills: 3 | Status: SHIPPED | OUTPATIENT
Start: 2019-12-19 | End: 2020-07-23

## 2019-12-19 RX ORDER — ROSUVASTATIN CALCIUM 20 MG/1
20 TABLET, COATED ORAL NIGHTLY
Qty: 90 TABLET | Refills: 12 | Status: SHIPPED | OUTPATIENT
Start: 2019-12-19 | End: 2020-08-26 | Stop reason: SDUPTHER

## 2019-12-19 NOTE — PROGRESS NOTES
Chief complaint follow-up on weakness, right leg pain    84-year-old white male still smoking.  Patient continues with the right leg pain which is very consistent with a right hamstring strain.  Whenever he sits for long great time or drives when he goes to get up it is very tight in the more he walks it gets better.  He had it when he got up after sitting and talking to me for a while today.  We discussed stretching applying heat.  If it is a really ongoing problem I can send in the therapy specifically for this.  Pain management send him to therapy but I would imagine that was not for this specific problem.    Polymyalgia symptoms have not returned.  He is currently on prednisone 5 mg.  There may have been some pharmacy issues with getting proper prednisone doses but he is now has the 5 mg pill.  We will check his diabetes and his sed rate.  His glucometer is saying his sugars always running 45 but he has no symptoms of hypoglycemia so I suspect his glucometer is inaccurate.  He is eating cookies and so forth at night.    Does have a cough with allergy symptoms and rhinitis.  His lungs are clear on exam today.  We discussed Claritin and Delsym.    Also somewhat concerned because he was call to set up a CT scan for December and I imagine this might be a follow-up CT scan from March.  He was called by Pulmonary but says he was never told about the prior CT.  I did review it looks like we reviewed and discussed the September CT scan and the growth of the small nodule and so forth.  We had discussed at that time a six month follow-up CT which was recommended on the radiology report and that would be about three months from now.  We did discuss the possibility of malignancy.  He continues to smoke.  He is fine with following up in three months.  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  Dispense: 4 tablet; Refill: 11       I did point out to him that his B12 level  was low years ago and it does look like he took a supplement and the B12 level did rise.  He was not aware that her could remember that however and so has currently not been on a B12 supplement.  Level ok 9/18.  He did have iron deficiency in his stool testing was negative.       All issues reviewed patient counseled and is evaluation and management will be based upon time counseling.Total time over 25 minutes with over 50% counseling.      CT 9/19  Impression       Nodule from 03/18/2019 has increased in size, previously 6 mm now measuring 7 mm.  Malignancy not excluded.  For a solid nodule 6-8 mm, Fleischner Society 2017 guidelines recommend follow up with non-contrast chest CT at 6-12 months and 18-24 months after discovery.    Cholelithiasis.    Prominence of interstitial markings with mild bronchiectasis.         ROS:   CONST: weight stable. EYES: no vision change. ENT: no sore throat. CV: no chest pain w/ exertion. RESP: no shortness of breath. GI: no nausea, vomiting, diarrhea. No dysphagia. : no urinary issues. MUSCULOSKELETAL: no new myalgias or arthralgias. SKIN: no new changes. NEURO: no focal deficits. PSYCH: no new issues. ENDOCRINE: no polyuria. HEME: no lymph nodes. ALLERGY: no general pruritis.    PAST MEDICAL HISTORY:                                                        1. Hyperlipidemia.                                                           2. Coronary disease, seen prior by Dr. Roy,  4-vessel bypass in .   3. Peripheral vascular disease, stented in both legs  and been on Plavix since.                                                                4. Kidney stones, last episode 2007.                                 5. Right renal cyst apparently.  Saw Dr. Labadie and then second opinion at [facility] by a Dr. Shipman, currently going to be followed.                      6. Appendectomy.                                                             7. Actinic keratosis, saw Dr. Ambriz.                                      8. Left facial numbness, probable TIA, admit [facility] 2007. Carotid ultrasound apparently clear  9. Cataracts  10. Skin cancer  11. HYPERTENSION  12.  Diabetes, uncontrolled, with circulatory disease  13.  Low HDL  14. Declines Colonoscopy  15. Pneumovax after 67 done, Prevnar 13 given   16.  B12 deficiency diagnosed 2014    early satiety                                                                                       SOCIAL HISTORY:  He smokes 1 pack per day and does not drink.  He was about   52 years but now a .  Retired deputy in the court, 4          children.                                                                                                                                                 FAMILY HISTORY:  Father  at 63 of heart disease and stroke.  Mother       at 86, 4 brothers, 2 sisters living. Brother with strokes.            Gen: no distress  Musculoskeletal:  Negative straight leg testing bilaterally.  No reproducible pain or stiffness in the thighs her hamstrings on exam today while he is sitting but is hamstring was tight when he went to stand up causing  him to limp a little  Respiratory effort is good lungs are all clear without rhonchi or wheeze.  Smells strongly of smoke             Assessment and plan:  Narciso was seen today for diabetes and hospital follow up.    Diagnoses and all  "orders for this visit:    Uncontrolled type 2 diabetes mellitus with hyperglycemia, hopefully improving with less prednisone but diet is poor  -     Basic metabolic panel; Future  -     Hemoglobin A1c; Future  -     Sedimentation rate; Future    Polymyalgia rheumatica, reassess sed rate on prednisone 5 mg    Chronic obstructive pulmonary disease, unspecified COPD type, continues to smoke but no signs of COPD exacerbation and I think the recent cough is more upper respiratory and allergies and he agrees    Pulmonary nodule, discussed a follow-up six months from the prior which would be about March    Elevated sed rate    Stage 3 chronic kidney disease, reassess    Right leg pain    B12 deficiency    Anemia, unspecified type    Iron deficiency    Other orders  -     diazePAM (VALIUM) 5 MG tablet; Take 1 tablet (5 mg total) by mouth every 8 (eight) hours as needed.  -     clopidogrel (PLAVIX) 75 mg tablet; Take 1 tablet (75 mg total) by mouth once daily.  -     rosuvastatin (CRESTOR) 20 MG tablet; Take 1 tablet (20 mg total) by mouth every evening.    Patient admittedly not well with access and access would not be appropriate at this time for other"This note will not be shared with the patient."  "

## 2019-12-20 ENCOUNTER — TELEPHONE (OUTPATIENT)
Dept: FAMILY MEDICINE | Facility: CLINIC | Age: 84
End: 2019-12-20

## 2019-12-20 RX ORDER — METFORMIN HYDROCHLORIDE 750 MG/1
TABLET, EXTENDED RELEASE ORAL
Qty: 180 TABLET | Refills: 3 | Status: SHIPPED | OUTPATIENT
Start: 2019-12-20 | End: 2020-08-04 | Stop reason: SDUPTHER

## 2019-12-20 NOTE — TELEPHONE ENCOUNTER
Sent result but he may not be able to get it so call also and read message    Written by Marcelino Del Toro MD on 12/20/2019 10:02 AM   The a1c diabetes test the same as 2 mo ago at 8.6 which is high  . Kidneys stable. The SED rate is the same so stay on the same prednisone dose.     Start taking TWO metformin a day -two at one time ok. I will send new script

## 2019-12-23 ENCOUNTER — CLINICAL SUPPORT (OUTPATIENT)
Dept: FAMILY MEDICINE | Facility: CLINIC | Age: 84
End: 2019-12-23
Payer: MEDICARE

## 2019-12-23 VITALS
SYSTOLIC BLOOD PRESSURE: 134 MMHG | TEMPERATURE: 99 F | WEIGHT: 166 LBS | HEART RATE: 78 BPM | OXYGEN SATURATION: 97 % | BODY MASS INDEX: 26.06 KG/M2 | HEIGHT: 67 IN | DIASTOLIC BLOOD PRESSURE: 80 MMHG

## 2019-12-23 DIAGNOSIS — Z02.4 ENCOUNTER FOR CDL (COMMERCIAL DRIVING LICENSE) EXAM: Primary | ICD-10-CM

## 2019-12-23 LAB
BILIRUB SERPL-MCNC: NORMAL MG/DL
BLOOD URINE, POC: NORMAL
COLOR, POC UA: YELLOW
GLUCOSE UR QL STRIP: NORMAL
KETONES UR QL STRIP: NORMAL
LEUKOCYTE ESTERASE URINE, POC: NORMAL
NITRITE, POC UA: NORMAL
PH, POC UA: 5
PROTEIN, POC: NORMAL
SPECIFIC GRAVITY, POC UA: 1.02
UROBILINOGEN, POC UA: NORMAL

## 2019-12-23 PROCEDURE — 81002 POCT URINE DIPSTICK WITHOUT MICROSCOPE: ICD-10-PCS | Mod: HCNC,S$GLB,, | Performed by: NURSE PRACTITIONER

## 2019-12-23 PROCEDURE — 81002 URINALYSIS NONAUTO W/O SCOPE: CPT | Mod: HCNC,S$GLB,, | Performed by: NURSE PRACTITIONER

## 2019-12-23 PROCEDURE — 99499 UNLISTED E&M SERVICE: CPT | Mod: HCNC,S$GLB,, | Performed by: NURSE PRACTITIONER

## 2019-12-23 PROCEDURE — 99999 PR PBB SHADOW E&M-EST. PATIENT-LVL V: ICD-10-PCS | Mod: PBBFAC,HCNC,, | Performed by: NURSE PRACTITIONER

## 2019-12-23 PROCEDURE — 99499 NO LOS: ICD-10-PCS | Mod: HCNC,S$GLB,, | Performed by: NURSE PRACTITIONER

## 2019-12-23 PROCEDURE — 99999 PR PBB SHADOW E&M-EST. PATIENT-LVL V: CPT | Mod: PBBFAC,HCNC,, | Performed by: NURSE PRACTITIONER

## 2019-12-23 NOTE — PROGRESS NOTES
" Medical Examination     Narciso Yanez is a 84 y.o. male who presents today for a  fitness determination physical exam. The patient reports no problems.  The following portions of the patient's history were reviewed and updated as appropriate: allergies, current medications, past family history, past medical history, past social history, past surgical history and problem list.  Review of Systems  A comprehensive review of systems was negative.     Objective:      Vision:   Uncorrected Corrected Horizontal Field of Vision   Right Eye 20/40 20/25 70 degrees   Left Eye  20/40 20/25 70 degrees   Both Eyes  20/40 20/20      Applicant can recognize and distinguish among traffic control signals and devices showing standard red, green, and lindsay colors.    Applicant meets visual acuity requirement only when wearing corrective lenses.    Monocular Vision?: No      Hearing:    Forced whisper normal from 5 feet     /80 (BP Location: Right arm, Patient Position: Sitting, BP Method: Large (Manual))   Pulse 78   Temp 98.6 °F (37 °C) (Oral)   Ht 5' 7" (1.702 m)   Wt 75.3 kg (166 lb 0.1 oz)   SpO2 97%   BMI 26.00 kg/m²     General Appearance:    Alert, cooperative, no distress, appears stated age   Head:    Normocephalic, without obvious abnormality, atraumatic   Eyes:    PERRL, conjunctiva/corneas clear, EOM's intact, fundi     benign, both eyes        Ears:    Normal TM's and external ear canals, both ears   Nose:   Nares normal, septum midline, mucosa normal, no drainage    or sinus tenderness   Throat:   Lips, mucosa, and tongue normal; teeth and gums normal   Neck:   Supple, symmetrical, trachea midline, no adenopathy;        thyroid:  No enlargement/tenderness/nodules; no carotid    bruit or JVD   Back:     Symmetric, no curvature, ROM normal, no CVA tenderness   Lungs:     Clear to auscultation bilaterally, respirations unlabored   Chest wall:    No tenderness or deformity "   Heart:    Regular rate and rhythm, S1 and S2 normal, no murmur, rub   or gallop   Abdomen:     Soft, non-tender, bowel sounds active all four quadrants,     no masses, no organomegaly   Genitalia:    Normal male without lesion, discharge or tenderness   Rectal:    Normal tone, normal prostate, no masses or tenderness;    guaiac negative stool   Extremities:   Extremities normal, atraumatic, no cyanosis or edema  BUE 5/5  BLE 5/5  b hand  equal and strong    Pulses:   2+ and symmetric all extremities   Skin:   Skin color, texture, turgor normal, no rashes or lesions   Lymph nodes:   Cervical, supraclavicular, and axillary nodes normal   Neurologic:   CNII-XII intact. Normal strength, sensation and reflexes       throughout       Labs:  Lab Results   Component Value Date    SPECGRAV 1.025 12/23/2019    PROTEINUR postive 12/23/2019    BILIRUBINUR neg 12/23/2019          Assessment:      Healthy male exam.   Meets standards, but periodic monitoring required due to 1.   qualified only for 1 year.      Plan:         Narciso was seen today for 's license exam.    Diagnoses and all orders for this visit:    Encounter for CDL (commercial driving license) exam  -     POCT URINE DIPSTICK WITHOUT MICROSCOPE  -     Urinalysis; Future        Medical examiners certificate completed and printed.  Return as needed.

## 2020-03-20 ENCOUNTER — OFFICE VISIT (OUTPATIENT)
Dept: FAMILY MEDICINE | Facility: CLINIC | Age: 85
End: 2020-03-20
Payer: MEDICARE

## 2020-03-20 ENCOUNTER — LAB VISIT (OUTPATIENT)
Dept: LAB | Facility: HOSPITAL | Age: 85
End: 2020-03-20
Attending: INTERNAL MEDICINE
Payer: MEDICARE

## 2020-03-20 VITALS
HEART RATE: 91 BPM | HEIGHT: 67 IN | BODY MASS INDEX: 26.16 KG/M2 | TEMPERATURE: 98 F | DIASTOLIC BLOOD PRESSURE: 72 MMHG | SYSTOLIC BLOOD PRESSURE: 130 MMHG | OXYGEN SATURATION: 92 % | WEIGHT: 166.69 LBS

## 2020-03-20 DIAGNOSIS — E53.8 B12 DEFICIENCY: ICD-10-CM

## 2020-03-20 DIAGNOSIS — E11.65 UNCONTROLLED TYPE 2 DIABETES MELLITUS WITH HYPERGLYCEMIA: ICD-10-CM

## 2020-03-20 DIAGNOSIS — J41.0 SMOKERS' COUGH: ICD-10-CM

## 2020-03-20 DIAGNOSIS — I77.1 TORTUOUS AORTA: ICD-10-CM

## 2020-03-20 DIAGNOSIS — E11.65 UNCONTROLLED TYPE 2 DIABETES MELLITUS WITH HYPERGLYCEMIA: Primary | ICD-10-CM

## 2020-03-20 DIAGNOSIS — R35.0 URINE FREQUENCY: ICD-10-CM

## 2020-03-20 DIAGNOSIS — E11.51 DIABETES MELLITUS WITH PERIPHERAL CIRCULATORY DISORDER: ICD-10-CM

## 2020-03-20 DIAGNOSIS — M54.31 RIGHT SIDED SCIATICA: ICD-10-CM

## 2020-03-20 DIAGNOSIS — R91.1 PULMONARY NODULE: ICD-10-CM

## 2020-03-20 DIAGNOSIS — D64.9 ANEMIA, UNSPECIFIED TYPE: ICD-10-CM

## 2020-03-20 DIAGNOSIS — M35.3 POLYMYALGIA RHEUMATICA: ICD-10-CM

## 2020-03-20 DIAGNOSIS — R70.0 ELEVATED SED RATE: ICD-10-CM

## 2020-03-20 DIAGNOSIS — G57.01 PIRIFORMIS SYNDROME OF RIGHT SIDE: ICD-10-CM

## 2020-03-20 DIAGNOSIS — F39 MOOD DISORDER: ICD-10-CM

## 2020-03-20 DIAGNOSIS — F17.200 TOBACCO USE DISORDER: ICD-10-CM

## 2020-03-20 DIAGNOSIS — N18.30 STAGE 3 CHRONIC KIDNEY DISEASE: ICD-10-CM

## 2020-03-20 DIAGNOSIS — J44.9 CHRONIC OBSTRUCTIVE PULMONARY DISEASE, UNSPECIFIED COPD TYPE: ICD-10-CM

## 2020-03-20 DIAGNOSIS — M79.604 RIGHT LEG PAIN: ICD-10-CM

## 2020-03-20 LAB
ALBUMIN SERPL BCP-MCNC: 3.8 G/DL (ref 3.5–5.2)
ALP SERPL-CCNC: 75 U/L (ref 55–135)
ALT SERPL W/O P-5'-P-CCNC: 20 U/L (ref 10–44)
ANION GAP SERPL CALC-SCNC: 8 MMOL/L (ref 8–16)
AST SERPL-CCNC: 26 U/L (ref 10–40)
BASOPHILS # BLD AUTO: 0.04 K/UL (ref 0–0.2)
BASOPHILS NFR BLD: 0.4 % (ref 0–1.9)
BILIRUB SERPL-MCNC: 0.4 MG/DL (ref 0.1–1)
BUN SERPL-MCNC: 16 MG/DL (ref 8–23)
CALCIUM SERPL-MCNC: 9.2 MG/DL (ref 8.7–10.5)
CHLORIDE SERPL-SCNC: 105 MMOL/L (ref 95–110)
CHOLEST SERPL-MCNC: 147 MG/DL (ref 120–199)
CHOLEST/HDLC SERPL: 4 {RATIO} (ref 2–5)
CO2 SERPL-SCNC: 27 MMOL/L (ref 23–29)
CREAT SERPL-MCNC: 1.5 MG/DL (ref 0.5–1.4)
DIFFERENTIAL METHOD: ABNORMAL
EOSINOPHIL # BLD AUTO: 0.1 K/UL (ref 0–0.5)
EOSINOPHIL NFR BLD: 1.5 % (ref 0–8)
ERYTHROCYTE [DISTWIDTH] IN BLOOD BY AUTOMATED COUNT: 14.7 % (ref 11.5–14.5)
ERYTHROCYTE [SEDIMENTATION RATE] IN BLOOD BY WESTERGREN METHOD: 40 MM/HR (ref 0–10)
EST. GFR  (AFRICAN AMERICAN): 49 ML/MIN/1.73 M^2
EST. GFR  (NON AFRICAN AMERICAN): 43 ML/MIN/1.73 M^2
FERRITIN SERPL-MCNC: 30 NG/ML (ref 20–300)
GLUCOSE SERPL-MCNC: 176 MG/DL (ref 70–110)
HCT VFR BLD AUTO: 42 % (ref 40–54)
HDLC SERPL-MCNC: 37 MG/DL (ref 40–75)
HDLC SERPL: 25.2 % (ref 20–50)
HGB BLD-MCNC: 13.3 G/DL (ref 14–18)
IMM GRANULOCYTES # BLD AUTO: 0.03 K/UL (ref 0–0.04)
IMM GRANULOCYTES NFR BLD AUTO: 0.3 % (ref 0–0.5)
LDLC SERPL CALC-MCNC: 76.4 MG/DL (ref 63–159)
LYMPHOCYTES # BLD AUTO: 1.5 K/UL (ref 1–4.8)
LYMPHOCYTES NFR BLD: 16.6 % (ref 18–48)
MCH RBC QN AUTO: 30.4 PG (ref 27–31)
MCHC RBC AUTO-ENTMCNC: 31.7 G/DL (ref 32–36)
MCV RBC AUTO: 96 FL (ref 82–98)
MONOCYTES # BLD AUTO: 0.7 K/UL (ref 0.3–1)
MONOCYTES NFR BLD: 7.5 % (ref 4–15)
NEUTROPHILS # BLD AUTO: 6.7 K/UL (ref 1.8–7.7)
NEUTROPHILS NFR BLD: 73.7 % (ref 38–73)
NONHDLC SERPL-MCNC: 110 MG/DL
NRBC BLD-RTO: 0 /100 WBC
PLATELET # BLD AUTO: 252 K/UL (ref 150–350)
PMV BLD AUTO: 9.9 FL (ref 9.2–12.9)
POTASSIUM SERPL-SCNC: 4.2 MMOL/L (ref 3.5–5.1)
PROT SERPL-MCNC: 8 G/DL (ref 6–8.4)
RBC # BLD AUTO: 4.38 M/UL (ref 4.6–6.2)
SODIUM SERPL-SCNC: 140 MMOL/L (ref 136–145)
TRIGL SERPL-MCNC: 168 MG/DL (ref 30–150)
TSH SERPL DL<=0.005 MIU/L-ACNC: 2.69 UIU/ML (ref 0.4–4)
WBC # BLD AUTO: 9.05 K/UL (ref 3.9–12.7)

## 2020-03-20 PROCEDURE — 99499 UNLISTED E&M SERVICE: CPT | Mod: HCNC,S$GLB,, | Performed by: INTERNAL MEDICINE

## 2020-03-20 PROCEDURE — 80061 LIPID PANEL: CPT | Mod: HCNC

## 2020-03-20 PROCEDURE — 3075F PR MOST RECENT SYSTOLIC BLOOD PRESS GE 130-139MM HG: ICD-10-PCS | Mod: HCNC,CPTII,S$GLB, | Performed by: INTERNAL MEDICINE

## 2020-03-20 PROCEDURE — 80053 COMPREHEN METABOLIC PANEL: CPT | Mod: HCNC

## 2020-03-20 PROCEDURE — 84443 ASSAY THYROID STIM HORMONE: CPT | Mod: HCNC

## 2020-03-20 PROCEDURE — 1101F PR PT FALLS ASSESS DOC 0-1 FALLS W/OUT INJ PAST YR: ICD-10-PCS | Mod: HCNC,CPTII,S$GLB, | Performed by: INTERNAL MEDICINE

## 2020-03-20 PROCEDURE — 82728 ASSAY OF FERRITIN: CPT | Mod: HCNC

## 2020-03-20 PROCEDURE — 1159F PR MEDICATION LIST DOCUMENTED IN MEDICAL RECORD: ICD-10-PCS | Mod: HCNC,S$GLB,, | Performed by: INTERNAL MEDICINE

## 2020-03-20 PROCEDURE — 1126F AMNT PAIN NOTED NONE PRSNT: CPT | Mod: HCNC,S$GLB,, | Performed by: INTERNAL MEDICINE

## 2020-03-20 PROCEDURE — 99215 OFFICE O/P EST HI 40 MIN: CPT | Mod: HCNC,S$GLB,, | Performed by: INTERNAL MEDICINE

## 2020-03-20 PROCEDURE — 3052F PR MOST RECENT HEMOGLOBIN A1C LEVEL 8.0 - < 9.0%: ICD-10-PCS | Mod: HCNC,CPTII,S$GLB, | Performed by: INTERNAL MEDICINE

## 2020-03-20 PROCEDURE — 99215 PR OFFICE/OUTPT VISIT, EST, LEVL V, 40-54 MIN: ICD-10-PCS | Mod: HCNC,S$GLB,, | Performed by: INTERNAL MEDICINE

## 2020-03-20 PROCEDURE — 36415 COLL VENOUS BLD VENIPUNCTURE: CPT | Mod: HCNC,PO

## 2020-03-20 PROCEDURE — 1101F PT FALLS ASSESS-DOCD LE1/YR: CPT | Mod: HCNC,CPTII,S$GLB, | Performed by: INTERNAL MEDICINE

## 2020-03-20 PROCEDURE — 99499 RISK ADDL DX/OHS AUDIT: ICD-10-PCS | Mod: HCNC,S$GLB,, | Performed by: INTERNAL MEDICINE

## 2020-03-20 PROCEDURE — 3052F HG A1C>EQUAL 8.0%<EQUAL 9.0%: CPT | Mod: HCNC,CPTII,S$GLB, | Performed by: INTERNAL MEDICINE

## 2020-03-20 PROCEDURE — 3075F SYST BP GE 130 - 139MM HG: CPT | Mod: HCNC,CPTII,S$GLB, | Performed by: INTERNAL MEDICINE

## 2020-03-20 PROCEDURE — 3078F DIAST BP <80 MM HG: CPT | Mod: HCNC,CPTII,S$GLB, | Performed by: INTERNAL MEDICINE

## 2020-03-20 PROCEDURE — 85652 RBC SED RATE AUTOMATED: CPT | Mod: HCNC

## 2020-03-20 PROCEDURE — 85025 COMPLETE CBC W/AUTO DIFF WBC: CPT | Mod: HCNC

## 2020-03-20 PROCEDURE — 99999 PR PBB SHADOW E&M-EST. PATIENT-LVL IV: ICD-10-PCS | Mod: PBBFAC,HCNC,, | Performed by: INTERNAL MEDICINE

## 2020-03-20 PROCEDURE — 83036 HEMOGLOBIN GLYCOSYLATED A1C: CPT | Mod: HCNC

## 2020-03-20 PROCEDURE — 3078F PR MOST RECENT DIASTOLIC BLOOD PRESSURE < 80 MM HG: ICD-10-PCS | Mod: HCNC,CPTII,S$GLB, | Performed by: INTERNAL MEDICINE

## 2020-03-20 PROCEDURE — 1126F PR PAIN SEVERITY QUANTIFIED, NO PAIN PRESENT: ICD-10-PCS | Mod: HCNC,S$GLB,, | Performed by: INTERNAL MEDICINE

## 2020-03-20 PROCEDURE — 99999 PR PBB SHADOW E&M-EST. PATIENT-LVL IV: CPT | Mod: PBBFAC,HCNC,, | Performed by: INTERNAL MEDICINE

## 2020-03-20 PROCEDURE — 36415 COLL VENOUS BLD VENIPUNCTURE: CPT | Mod: HCNC

## 2020-03-20 PROCEDURE — 1159F MED LIST DOCD IN RCRD: CPT | Mod: HCNC,S$GLB,, | Performed by: INTERNAL MEDICINE

## 2020-03-20 RX ORDER — PREDNISONE 1 MG/1
5 TABLET ORAL DAILY
Qty: 150 TABLET | Refills: 12 | Status: SHIPPED | OUTPATIENT
Start: 2020-03-20 | End: 2020-06-11 | Stop reason: SDUPTHER

## 2020-03-20 RX ORDER — DOXYCYCLINE 100 MG/1
100 CAPSULE ORAL 2 TIMES DAILY
Qty: 20 CAPSULE | Refills: 0 | Status: SHIPPED | OUTPATIENT
Start: 2020-03-20 | End: 2020-07-23 | Stop reason: CLARIF

## 2020-03-20 NOTE — PROGRESS NOTES
Chief complaint follow-up on weakness, right leg pain    84-year-old white male still smoking.  Patient continues with the right leg pain which is very consistent with a right hamstring strain.  Whenever he sits for long great time or drives when he goes to get up it is very tight in the more he walks it gets better.  He had it when he got up after sitting and talking to me for a while today.  We discussed stretching applying heat.  If it is a really ongoing problem I can send in the therapy specifically for this.  Pain management send him to therapy but I would imagine that was not for this specific problem.    Recently ongoing for a month and a half.  He does a lot of driving but with the virus going around he is now out of work and not having to drive.  It is definitely worse with different cars.  We discussed piriformis syndrome.  I will send him to therapy.  Reassured him it does not appear to be sciatica.    Polymyalgia symptoms have not returned.  He is currently on prednisone 5 mg.  There may have been some pharmacy issues with getting proper prednisone doses but he is now has the 5 mg pill.  We will check his diabetes and his sed rate.  His glucometer is saying his sugars always running 45 but he has no symptoms of hypoglycemia so I suspect his glucometer is inaccurate.  He is eating cookies and so forth at night.  We discussed repeating his sed rate and reducing the prednisone to 4 mg and then going down 1 mg per month.  I will send in a new prescription.  He does have hisChronic smoker's cough without change.  We discussed monitoring for fever    Reviewed his repeat CT which was all clear    He also has urinary frequency for a month and a half.  It is all day and all night.  Some occasional dysuria.  We discussed using some doxycycline and sending urine studies to assess if it is a UTI versus prostatitis which could require a longer duration of treatment.    Counseled at length regarding the multitude of  these issues.Total time over 45 minutes with over 50% counseling.        Seen Rheum  Polymyalgia rheumatica  Patient with typical symptoms of PMR with significant response to prednisone  Currently on prednisone 10mg  - Continue prednisone 10mg until completed 3 months of therapy  - Will likely need gradually taper (9mg x 1 month, 8mg x 1 month, etc.)  - ESR and CRP today     On corticosteroid therapy  -     Vitamin D; Future; Expected date: 08/16/2019  -     DXA Bone Density Spine And Hip; Future; Expected date: 08/16/2019  -     alendronate (FOSAMAX) 70 MG tablet; Take 1 tablet (70 mg total) by mouth every 7 days.  Dispense: 4 tablet; Refill: 11       I did point out to him that his B12 level  was low years ago and it does look like he took a supplement and the B12 level did rise.  He was not aware that her could remember that however and so has currently not been on a B12 supplement.  Level ok 9/18.  He did have iron deficiency in his stool testing was negative.         CT 9/19  Impression       Nodule from 03/18/2019 has increased in size, previously 6 mm now measuring 7 mm.  Malignancy not excluded.  For a solid nodule 6-8 mm, Fleischner Society 2017 guidelines recommend follow up with non-contrast chest CT at 6-12 months and 18-24 months after discovery.    Cholelithiasis.    Prominence of interstitial markings with mild bronchiectasis.         ROS:   CONST: weight stable. EYES: no vision change. ENT: no sore throat. CV: no chest pain w/ exertion. RESP: no shortness of breath. GI: no nausea, vomiting, diarrhea. No dysphagia. : no urinary issues. MUSCULOSKELETAL: no new myalgias or arthralgias. SKIN: no new changes. NEURO: no focal deficits. PSYCH: no new issues. ENDOCRINE: no polyuria. HEME: no lymph nodes. ALLERGY: no general pruritis.    PAST MEDICAL HISTORY:                                                        1. Hyperlipidemia.                                                           2. Coronary  disease, seen prior by Dr. oRy, 4-vessel bypass in .   3. Peripheral vascular disease, stented in both legs  and been on Plavix since.                                                                4. Kidney stones, last episode 2007.                                 5. Right renal cyst apparently.  Saw Dr. Labadie and then second opinion at [facility] by a Dr. Shipman, currently going to be followed.                      6. Appendectomy.                                                             7. Actinic keratosis, saw Dr. Ambriz.                                      8. Left facial numbness, probable TIA, admit [facility] 2007. Carotid ultrasound apparently clear  9. Cataracts  10. Skin cancer  11. HYPERTENSION  12.  Diabetes, uncontrolled, with circulatory disease  13.  Low HDL  14. Declines Colonoscopy  15. Pneumovax after 67 done, Prevnar 13 given   16.  B12 deficiency diagnosed     17    early satiety                                                                                       SOCIAL HISTORY:  He smokes 1 pack per day and does not drink.  He was about   52 years but now a .  Retired deputy in the court, 4          children.                                                                                                                                                 FAMILY HISTORY:  Father  at 63 of heart disease and stroke.  Mother       at 86, 4 brothers, 2 sisters living. Brother with strokes.            Gen: no distress  Musculoskeletal:  Internal and external rotation of the right hip appears to be a little bit reduced but there is absolutely no pain.  There is no pain on palpation of the quadriceps or hamstring muscles on the right or the gluteal her back muscles.  Resisted strength testing is 5/5 without any reproducible pain in either leg.  Knees and ankles full range of motion on both sides without defects all joints stable in  strength 5/5.  Negative straight leg testing.  Skin no rashes, warm to touch.           Assessment and plan:    Narciso was seen today for leg pain, urinary frequency and fall.    Diagnoses and all orders for this visit:    Uncontrolled type 2 diabetes mellitus with hyperglycemia, reassess  -     Ferritin; Future  -     Hemoglobin A1c; Future  -     Comprehensive metabolic panel; Future  -     CBC auto differential; Future  -     TSH; Future  -     Lipid panel; Future  -     Sedimentation rate; Future    Polymyalgia rheumatica, no current symptoms on 5 mg and we will reduce by 1 mg per month following sed rate  -     Sedimentation rate; Future    Chronic obstructive pulmonary disease, unspecified COPD type, baseline cough, watch for symptoms of corona virus    Pulmonary nodule    Elevated sed rate, reassess    Stage 3 chronic kidney disease, reassess    B12 deficiency, continue supplement s    Anemia, unspecified type    Tobacco use disorder    Diabetes mellitus with peripheral circulatory disorder    Mood disorder    Smokers' cough    Tortuous aorta    Urine frequency, UTI versus prostatitis, doxycycline  -     Urinalysis; Future  -     Urine culture; Future    Right leg pain, suspect piriformis syndrome since sometimes with driving he does get numbness down the entire right leg.  Seems to be positional in ongoing and does not appear to be a direct muscle problem that I can palpate so consider piriformis syndrome and will refer him to therapy  -     Ambulatory referral/consult to Physical/Occupational Therapy; Future    Right sided sciatica  -     Ambulatory referral/consult to Physical/Occupational Therapy; Future    Piriformis syndrome of right side  -     Ambulatory referral/consult to Physical/Occupational Therapy; Future    Other orders  -     predniSONE (DELTASONE) 1 MG tablet; Take 5 tablets (5 mg total) by mouth once daily.            Patient admittedly not well with access and access would not be  "appropriate at this time for other"T----"This note will not be shared with the patient."  "

## 2020-03-21 LAB
ESTIMATED AVG GLUCOSE: 206 MG/DL (ref 68–131)
HBA1C MFR BLD HPLC: 8.8 % (ref 4–5.6)

## 2020-03-22 ENCOUNTER — PATIENT MESSAGE (OUTPATIENT)
Dept: FAMILY MEDICINE | Facility: CLINIC | Age: 85
End: 2020-03-22

## 2020-03-23 ENCOUNTER — PATIENT MESSAGE (OUTPATIENT)
Dept: FAMILY MEDICINE | Facility: CLINIC | Age: 85
End: 2020-03-23

## 2020-03-23 RX ORDER — GLIMEPIRIDE 2 MG/1
2 TABLET ORAL DAILY
Qty: 30 TABLET | Refills: 3 | Status: SHIPPED | OUTPATIENT
Start: 2020-03-23 | End: 2020-07-28

## 2020-03-24 ENCOUNTER — TELEPHONE (OUTPATIENT)
Dept: FAMILY MEDICINE | Facility: CLINIC | Age: 85
End: 2020-03-24

## 2020-03-24 NOTE — TELEPHONE ENCOUNTER
----- Message from Emy Salter sent at 3/24/2020  2:14 PM CDT -----  Contact: Narciso [phone]  Type:  Test Results    Who Called: Narciso    Name of Test (Lab/Mammo/Etc): labs    Date of Test: 03/20    Ordering Provider: Dr. Del Toro    Where the test was performed: [facility]    Would the patient rather a call back or a response via My [facility]? Call back     Best Call Back Number: [phone]

## 2020-03-25 ENCOUNTER — TELEPHONE (OUTPATIENT)
Dept: FAMILY MEDICINE | Facility: CLINIC | Age: 85
End: 2020-03-25

## 2020-03-25 NOTE — TELEPHONE ENCOUNTER
----- Message from Kierra Chin sent at 3/25/2020 11:19 AM CDT -----  Contact: Patient   Type: Patient Call Back    Who called: Patient     What is the request in detail: Pt is requesting a call back in regards to his prescriptions. Pt sates that he went to pick what he thought was one prescription and had several, pt want to know what the prescriptions are for.     Can the clinic reply by [facility]?    Would the patient rather a call back or a response via [facility]? Call back     Best call back number: [phone]

## 2020-03-25 NOTE — TELEPHONE ENCOUNTER
Patient informed of message below.  He then said that he received a bottle of Prednisone labeled 10 mg but was not clear on how he should take them.  I explained to Patient that Dr. Del Toro did not order a 10 mg tab and that he should return this med to his Pharm.  Patient also said that he was given gabapentin 300 mg tabs. but has had a negative reaction to them in the past so he stopped taking them.  I told Patient that Dr. Crane ordered this med., so he should call his office to discuss the neg effect before just stopping it because something different may need to be ordered.  He verbalized understanding and intent to comply with messages.

## 2020-03-25 NOTE — TELEPHONE ENCOUNTER
Or ask patient if he was able to review the results that we sent over the computer, he may have had a family member at doing it for him but not lately        The doxycycline is for what looks like a urinary infection so he should taking complete that    The prednisone is for his previous polymyalgia rheumatica muscle weakness condition and he should continue that      If he was not able to see the report on the blood work, it was as follows:    Written by Marcelino Del Toro MD on 3/23/2020  5:14 PM   The A1c diabetes test is still up there in the eight range.  We may need to add an additional medicine called glimepiride 2 mg per day along with the metformin and I will send that to the pharmacy.  The sed rate is still in the 40s as before but your symptoms are gone so you can tried slowly going down on the prednisone by 1 mg.  Follow-up in six weeks or so to get a repeat sed rate.  The creatinine kidney test went up a little bit indicating probably need to drink more water with the high sugars.

## 2020-04-16 ENCOUNTER — TELEPHONE (OUTPATIENT)
Dept: FAMILY MEDICINE | Facility: CLINIC | Age: 85
End: 2020-04-16

## 2020-04-16 ENCOUNTER — OFFICE VISIT (OUTPATIENT)
Dept: FAMILY MEDICINE | Facility: CLINIC | Age: 85
End: 2020-04-16
Payer: MEDICARE

## 2020-04-16 DIAGNOSIS — R70.0 ELEVATED SED RATE: ICD-10-CM

## 2020-04-16 DIAGNOSIS — R35.0 URINE FREQUENCY: ICD-10-CM

## 2020-04-16 DIAGNOSIS — M35.3 POLYMYALGIA RHEUMATICA: ICD-10-CM

## 2020-04-16 DIAGNOSIS — E11.65 UNCONTROLLED TYPE 2 DIABETES MELLITUS WITH HYPERGLYCEMIA: Primary | ICD-10-CM

## 2020-04-16 DIAGNOSIS — N39.41 URGE INCONTINENCE: ICD-10-CM

## 2020-04-16 DIAGNOSIS — R35.1 NOCTURIA: ICD-10-CM

## 2020-04-16 PROCEDURE — 1159F PR MEDICATION LIST DOCUMENTED IN MEDICAL RECORD: ICD-10-PCS | Mod: HCNC,95,, | Performed by: INTERNAL MEDICINE

## 2020-04-16 PROCEDURE — 99443 PR PHYSICIAN TELEPHONE EVALUATION 21-30 MIN: ICD-10-PCS | Mod: HCNC,95,, | Performed by: INTERNAL MEDICINE

## 2020-04-16 PROCEDURE — 3052F HG A1C>EQUAL 8.0%<EQUAL 9.0%: CPT | Mod: HCNC,CPTII,, | Performed by: INTERNAL MEDICINE

## 2020-04-16 PROCEDURE — 3052F PR MOST RECENT HEMOGLOBIN A1C LEVEL 8.0 - < 9.0%: ICD-10-PCS | Mod: HCNC,CPTII,, | Performed by: INTERNAL MEDICINE

## 2020-04-16 PROCEDURE — 1159F MED LIST DOCD IN RCRD: CPT | Mod: HCNC,95,, | Performed by: INTERNAL MEDICINE

## 2020-04-16 PROCEDURE — 1101F PT FALLS ASSESS-DOCD LE1/YR: CPT | Mod: HCNC,CPTII,95, | Performed by: INTERNAL MEDICINE

## 2020-04-16 PROCEDURE — 1101F PR PT FALLS ASSESS DOC 0-1 FALLS W/OUT INJ PAST YR: ICD-10-PCS | Mod: HCNC,CPTII,95, | Performed by: INTERNAL MEDICINE

## 2020-04-16 PROCEDURE — 99443 PR PHYSICIAN TELEPHONE EVALUATION 21-30 MIN: CPT | Mod: HCNC,95,, | Performed by: INTERNAL MEDICINE

## 2020-04-16 RX ORDER — OXYBUTYNIN CHLORIDE 5 MG/1
5 TABLET, EXTENDED RELEASE ORAL DAILY
Qty: 30 TABLET | Refills: 11 | Status: ON HOLD | OUTPATIENT
Start: 2020-04-16 | End: 2020-11-25

## 2020-04-16 RX ORDER — TAMSULOSIN HYDROCHLORIDE 0.4 MG/1
0.4 CAPSULE ORAL DAILY
Qty: 30 CAPSULE | Refills: 11 | Status: SHIPPED | OUTPATIENT
Start: 2020-04-16 | End: 2020-11-17

## 2020-04-16 NOTE — TELEPHONE ENCOUNTER
Spoke with the patient and he is follow up on his A1c results, being high .  Patient didn't want to schedule a virtual appt.  Patient said he won't be able to see nothing. Patient is asking  to give him a call, when he can schedule an office visit.  I did explain to the patient, that we are mostly seeing virtual visit and he was not interested.  Please advise

## 2020-04-16 NOTE — TELEPHONE ENCOUNTER
Always okay to offer patient has an audio appointment which is just a phone call which is I think what he is asking for so I did schedule him an audio appointment at 2:00 p.m.    Please call him ASAP so he can prepare and be sure he can do that phone call around 2:00 p.m..  Let me know otherwise or if another time is better

## 2020-04-16 NOTE — TELEPHONE ENCOUNTER
----- Message from Shayna Gillette sent at 4/16/2020  9:01 AM CDT -----  Contact: Self [phone]  Type: Patient Call Back    Who called: Self     What is the request in detail: pt. Is calling in regards to follow up visit with  .    Can the clinic reply by [facility]? Call back     Would the patient rather a call back or a response via [facility]? Call back     Best call back number: [phone]

## 2020-04-16 NOTE — PROGRESS NOTES
Chief complaint follow-up , review labs, discussed urinary problems    84-year-old white male still smoking..  Patient here to review labs.  His A1c is still above eight but still relatively stable.  He is reducing his prednisone down to 3 mg a day for his polymyalgia with no return of symptoms.  We discussed reassessment of his A1c in about two and half months.  He continues on metformin and Amaryl 2 mg.  We might increase the Amaryl but I am hesitant based upon his age and that we are reducing the prednisone at this time but we will do so if the A1c remains uncontrolled.  Is sed rate has improved down to 40 which is the best it is been having been over 100 at diagnosis.  Sed rate around 40 might be his baseline.    He has had few seconds of vertigo at times.  It never leads him to fall.  He does not think it is bad enough to warrant suppression with meclizine which we discussed.    Another issue is some urinary incontinence.  He has been drinking less water lately because of this and that may be part of the reason why he thinks his diabetes is up.  He gets an urge but then has incontinence before he can get to the bathroom.  He has no dysuria or any other UTI symptoms.  He has also had nocturia x3 develop over the last six or seven months.  Nothing acute to suggest prostatitis but that was considered.  We discussed BPH and overactive bladder.  I will try him on some low-dose oxybutynin 5 mg and he can increase it to 10 mg and we discussed the potential for dry mouth.  Will also start Flomax targeting urinary flow and any improvement in the nocturia.    Counseled at length regarding the multitude of these issues.Total time over 25 minutes with over 50% counseling.    Patient had an audio visit today.  He is well known to me since I am is PCP.  He is well aware of the limitations of this virtual visit.    Seen Rheum  Polymyalgia rheumatica  Patient with typical symptoms of PMR with significant response to  prednisone  Currently on prednisone 10mg  - Continue prednisone 10mg until completed 3 months of therapy  - Will likely need gradually taper (9mg x 1 month, 8mg x 1 month, etc.)  - ESR and CRP today     On corticosteroid therapy  -     Vitamin D; Future; Expected date: 08/16/2019  -     DXA Bone Density Spine And Hip; Future; Expected date: 08/16/2019  -     alendronate (FOSAMAX) 70 MG tablet; Take 1 tablet (70 mg total) by mouth every 7 days.  Dispense: 4 tablet; Refill: 11       I did point out to him that his B12 level  was low years ago and it does look like he took a supplement and the B12 level did rise.  He was not aware that her could remember that however and so has currently not been on a B12 supplement.  Level ok 9/18.  He did have iron deficiency in his stool testing was negative.         CT 9/19  Impression       Nodule from 03/18/2019 has increased in size, previously 6 mm now measuring 7 mm.  Malignancy not excluded.  For a solid nodule 6-8 mm, Fleischner Society 2017 guidelines recommend follow up with non-contrast chest CT at 6-12 months and 18-24 months after discovery.    Cholelithiasis.    Prominence of interstitial markings with mild bronchiectasis.         ROS:   CONST: weight stable. EYES: no vision change. ENT: no sore throat. CV: no chest pain w/ exertion. RESP: no shortness of breath. GI: no nausea, vomiting, diarrhea. No dysphagia. : no other urinary issues. MUSCULOSKELETAL: no new myalgias or arthralgias. SKIN: no new changes. NEURO: no focal deficits. PSYCH: no new issues. ENDOCRINE: no polyuria. HEME: no lymph nodes. ALLERGY: no general pruritis.    PAST MEDICAL HISTORY:                                                        1. Hyperlipidemia.                                                           2. Coronary disease, seen prior by Dr. Roy, 4-vessel bypass in 1999.   3. Peripheral vascular disease, stented in both legs 2002 and been on Plavix since.                                                                 4. Kidney stones, last episode 2007.                                 5. Right renal cyst apparently.  Saw Dr. Labadie and then second opinion at [facility] by a Dr. Shipman, currently going to be followed.                      6. Appendectomy.                                                             7. Actinic keratosis, saw Dr. Ambriz.                                      8. Left facial numbness, probable TIA, admit [facility] 2007. Carotid ultrasound apparently clear  9. Cataracts  10. Skin cancer  11. HYPERTENSION  12.  Diabetes, uncontrolled, with circulatory disease  13.  Low HDL  14. Declines Colonoscopy  15. Pneumovax after 67 done, Prevnar 13 given   16.  B12 deficiency diagnosed     17    early satiety                                                                                       SOCIAL HISTORY:  He smokes 1 pack per day and does not drink.  He was about   52 years but now a .  Retired deputy in the court, 4          children.                                                                                                                                                 FAMILY HISTORY:  Father  at 63 of heart disease and stroke.  Mother       at 86, 4 brothers, 2 sisters living. Brother with strokes.          Pleasant male on the phone.  No distress, normal speech        Diagnoses and all orders for this visit:    Uncontrolled type 2 diabetes mellitus with hyperglycemia, patient will try to eat better and drink more water if his urinary symptoms allow.  He is going down on the prednisone.  Continue current medications, follow-up in     Polymyalgia rheumatica, continue to reduce prednisone possibly 1 mg per month and watch for the return of symptoms, acute    Elevated sed rate    Urine frequency, does not appear to be UTI, with the associated nocturia and incontinence a suspect this may be overactive bladder  "and BPH.  Last PSA was normal in 2015 and we have not continue to check it based on his age.    Nocturia    Urge incontinence    Other orders  -     oxybutynin (DITROPAN-XL) 5 MG TR24; Take 1 tablet (5 mg total) by mouth once daily.  -     tamsulosin (FLOMAX) 0.4 mg Cap; Take 1 capsule (0.4 mg total) by mouth once daily.        Patient admittedly not well with access and access would not be appropriate at this time for other"=="This note will not be shared with the patient."  "

## 2020-04-20 RX ORDER — PREDNISONE 10 MG/1
TABLET ORAL
Qty: 30 TABLET | Refills: 4 | OUTPATIENT
Start: 2020-04-20

## 2020-05-11 ENCOUNTER — TELEPHONE (OUTPATIENT)
Dept: FAMILY MEDICINE | Facility: CLINIC | Age: 85
End: 2020-05-11

## 2020-05-11 DIAGNOSIS — M54.31 RIGHT SIDED SCIATICA: Primary | ICD-10-CM

## 2020-05-11 NOTE — TELEPHONE ENCOUNTER
----- Message from Sheridan De Souza sent at 5/11/2020 10:06 AM CDT -----  Contact: self  Type: Patient Call Back    Who called:self    What is the request in detail:pt is requesting a MRI or a CT SCAN    Can the clinic reply by [facility]?no    Would the patient rather a call back or a response via [facility]? call    Best call back number:

## 2020-05-11 NOTE — TELEPHONE ENCOUNTER
Let patient know that Dr. ELDER has ordered an MRI of the lumbar spine to find out if there is a new pinched nerve on the right

## 2020-05-11 NOTE — TELEPHONE ENCOUNTER
Patient states he has been treated for his leg x 1 year, when driving his right leg will go numb and he is very concerned as he will be going back to work in July and he has been active all of his life. Please advise

## 2020-05-15 ENCOUNTER — TELEPHONE (OUTPATIENT)
Dept: RADIOLOGY | Facility: HOSPITAL | Age: 85
End: 2020-05-15

## 2020-05-18 ENCOUNTER — TELEPHONE (OUTPATIENT)
Dept: FAMILY MEDICINE | Facility: CLINIC | Age: 85
End: 2020-05-18

## 2020-05-18 ENCOUNTER — HOSPITAL ENCOUNTER (OUTPATIENT)
Dept: RADIOLOGY | Facility: HOSPITAL | Age: 85
Discharge: HOME | End: 2020-05-18
Attending: INTERNAL MEDICINE
Payer: MEDICARE

## 2020-05-18 ENCOUNTER — DOCUMENTATION ONLY (OUTPATIENT)
Dept: RADIOLOGY | Facility: HOSPITAL | Age: 85
End: 2020-05-18

## 2020-05-18 DIAGNOSIS — M54.31 RIGHT SIDED SCIATICA: ICD-10-CM

## 2020-05-18 DIAGNOSIS — M54.31 RIGHT SIDED SCIATICA: Primary | ICD-10-CM

## 2020-05-18 DIAGNOSIS — M25.551 PAIN OF RIGHT HIP JOINT: ICD-10-CM

## 2020-05-18 NOTE — PROGRESS NOTES
Patient here for MRI order of the lumbar spine.  Patient upset because he does not believe his back is the problem.  He says it is his leg and hip that are bothering him.  Patient does not want to have MRI of spine because he does not believe the spine is the problem.   He had an MRI of his Lumbar Spine last year and says that it was normal and insisted that his spine was not the issue.  Patient refusing exam.

## 2020-05-18 NOTE — TELEPHONE ENCOUNTER
"Patient said that he refused to take the MRI of his lumbar spine as ordered by Dr. Del Toro because he wanted to have his hips and legs checked, not his back.  I explained that Dr. Del Toro was concerned that he may have a pinched nerve in his spine which is located in his back, which could be causing the symptoms in his leg.  Patient then said that he just had an MRI on his "spine" in April that was negative so he's not going to take another one now.    "

## 2020-05-18 NOTE — TELEPHONE ENCOUNTER
----- Message from Maya Irwin sent at 5/18/2020 11:50 AM CDT -----  Contact: self  Pt calling in regards to speak with nurse in provider office      Please advise pt can be contact at [phone]

## 2020-05-19 NOTE — TELEPHONE ENCOUNTER
He is just frustrated by symptoms    Carefully explain it looks like the MRI of the spine was April but LAST year, a year ago in 2019 unless you had one outside our system,  and the symptoms suggest something new may be going on in the spine    Dr ELDER says you can surely get Xrays of the hip if you feel that needs to be checked, xrays will show arthritis an so forth but will not send pain down the leg like a nerve.    Xrays in, prior message says RIGHT leg goes numb so put in RIGHT --confirm w pt    Ask if he wants to set an appt after to review it all

## 2020-05-21 ENCOUNTER — TELEPHONE (OUTPATIENT)
Dept: FAMILY MEDICINE | Facility: CLINIC | Age: 85
End: 2020-05-21

## 2020-05-21 ENCOUNTER — HOSPITAL ENCOUNTER (OUTPATIENT)
Dept: RADIOLOGY | Facility: HOSPITAL | Age: 85
Discharge: HOME | End: 2020-05-21
Attending: INTERNAL MEDICINE
Payer: MEDICARE

## 2020-05-21 DIAGNOSIS — M54.31 RIGHT SIDED SCIATICA: Primary | ICD-10-CM

## 2020-05-21 DIAGNOSIS — M25.551 PAIN OF RIGHT HIP JOINT: ICD-10-CM

## 2020-05-21 DIAGNOSIS — M54.31 RIGHT SIDED SCIATICA: ICD-10-CM

## 2020-05-21 NOTE — TELEPHONE ENCOUNTER
Spoke to  Beau did not want xrays done he refused. MRI set up for tomorrow at 8:45am. Called  Beau to let him know.

## 2020-05-21 NOTE — TELEPHONE ENCOUNTER
----- Message from Whitley Perez sent at 5/21/2020  8:24 AM CDT -----  Contact: salazar Alysha  [phone]  .Type: Patient Call Back    Who called: salazar Alysha Dept   [phone]    What is the request in detail:Pt went in to the office today for Xray and he  Stating it was suppose to be MRI ronny for today     Can the clinic reply by [facility]? Call back     Would the patient rather a call back or a response via [facility]?  Call back     Best call back number: [phone]    additional info call pt to clarify at [phone]

## 2020-05-22 ENCOUNTER — HOSPITAL ENCOUNTER (OUTPATIENT)
Dept: RADIOLOGY | Facility: HOSPITAL | Age: 85
Discharge: HOME | End: 2020-05-22
Attending: INTERNAL MEDICINE
Payer: MEDICARE

## 2020-05-22 PROCEDURE — 72148 MRI LUMBAR SPINE W/O DYE: CPT | Mod: 26,HCNC,, | Performed by: RADIOLOGY

## 2020-05-22 PROCEDURE — 72148 MRI LUMBAR SPINE W/O DYE: CPT | Mod: TC,HCNC

## 2020-05-22 PROCEDURE — 72148 MRI LUMBAR SPINE WITHOUT CONTRAST: ICD-10-PCS | Mod: 26,HCNC,, | Performed by: RADIOLOGY

## 2020-05-22 NOTE — TELEPHONE ENCOUNTER
----- Message from Hussein Chandler sent at 5/22/2020  2:38 PM CDT -----  Contact: Patient  Type:  Test Results    Who Called: PATIENT    Name of Test (Lab/Mammo/Etc): MRI    Date of Test: 05/22/2020    Ordering Provider: CAMRYN ALLRED    Where the test was performed: [facility]    Would the patient rather a call back or a response via [facility]? CALL    Best Call Back Number: [phone] ([place])     Additional Information:  N/A    For Clinical Team:Has the provider reviewed the results?

## 2020-05-25 NOTE — TELEPHONE ENCOUNTER
Patient informed of message below.  He verbalized understanding then said that he has already been contacted by the Referral Team and set up an appointment to see .

## 2020-05-25 NOTE — TELEPHONE ENCOUNTER
"Please call with MRI result, in the interval apparently he was having numbness and pain down the right leg    "Dr. ELDER says the MRI does show two bulging discs off to the right which may well be pinching the nerve down the right leg causing your recent symptoms.    It looks like you last saw Dr. Crane about a year ago, April 2019 and he is the doctor in pain management that could possibly give you an injection in this area and so forth to make it better.    Dr. ELDER says that would be a good place to start and he put in a referral to see Dr. Crane again, does that sound like a good idea?    If indeed you think it might actually be your hip joint causing some of the pain, Dr. ELDER can put in some x-rays of the hip, it is the right leg ?  "

## 2020-05-28 ENCOUNTER — PATIENT MESSAGE (OUTPATIENT)
Dept: FAMILY MEDICINE | Facility: CLINIC | Age: 85
End: 2020-05-28

## 2020-05-28 DIAGNOSIS — M25.559 ARTHRALGIA OF HIP, UNSPECIFIED LATERALITY: Primary | ICD-10-CM

## 2020-05-29 ENCOUNTER — TELEPHONE (OUTPATIENT)
Dept: ORTHOPEDICS | Facility: CLINIC | Age: 85
End: 2020-05-29

## 2020-05-29 DIAGNOSIS — M25.551 PAIN OF RIGHT HIP JOINT: Primary | ICD-10-CM

## 2020-05-29 NOTE — TELEPHONE ENCOUNTER
I called the patient regarding his appointment on Tuesday with Dr. Collier. The patient is having pain in the right hip. The patient verbalized understanding and has no further questions.

## 2020-06-01 ENCOUNTER — PATIENT OUTREACH (OUTPATIENT)
Dept: ADMINISTRATIVE | Facility: OTHER | Age: 85
End: 2020-06-01

## 2020-06-01 ENCOUNTER — TELEPHONE (OUTPATIENT)
Dept: ORTHOPEDICS | Facility: CLINIC | Age: 85
End: 2020-06-01

## 2020-06-01 NOTE — TELEPHONE ENCOUNTER
I called the patient back regarding his message. I informed him that the records from Dr. Del Toro is in the computer and we have access to them. The patient verbalized understanding and has no further questions.      ----- Message from Venkatesh Negron sent at 6/1/2020 10:07 AM CDT -----  Contact: self  Pt would like to make sure his records from the [facility] has been received.      Contact Info [phone] (rhgz)

## 2020-06-02 ENCOUNTER — HOSPITAL ENCOUNTER (OUTPATIENT)
Dept: RADIOLOGY | Facility: HOSPITAL | Age: 85
Discharge: HOME | End: 2020-06-02
Attending: ORTHOPAEDIC SURGERY
Payer: MEDICARE

## 2020-06-02 ENCOUNTER — OFFICE VISIT (OUTPATIENT)
Dept: ORTHOPEDICS | Facility: CLINIC | Age: 85
End: 2020-06-02
Payer: MEDICARE

## 2020-06-02 VITALS — WEIGHT: 166.69 LBS | BODY MASS INDEX: 26.16 KG/M2 | HEIGHT: 67 IN

## 2020-06-02 DIAGNOSIS — M25.551 PAIN OF RIGHT HIP JOINT: ICD-10-CM

## 2020-06-02 DIAGNOSIS — M54.41 CHRONIC RIGHT-SIDED LOW BACK PAIN WITH RIGHT-SIDED SCIATICA: Primary | ICD-10-CM

## 2020-06-02 DIAGNOSIS — M25.559 ARTHRALGIA OF HIP, UNSPECIFIED LATERALITY: ICD-10-CM

## 2020-06-02 DIAGNOSIS — G89.29 CHRONIC RIGHT-SIDED LOW BACK PAIN WITH RIGHT-SIDED SCIATICA: Primary | ICD-10-CM

## 2020-06-02 PROCEDURE — 99999 PR PBB SHADOW E&M-EST. PATIENT-LVL IV: ICD-10-PCS | Mod: PBBFAC,HCNC,, | Performed by: ORTHOPAEDIC SURGERY

## 2020-06-02 PROCEDURE — 1159F PR MEDICATION LIST DOCUMENTED IN MEDICAL RECORD: ICD-10-PCS | Mod: HCNC,S$GLB,, | Performed by: ORTHOPAEDIC SURGERY

## 2020-06-02 PROCEDURE — 1125F AMNT PAIN NOTED PAIN PRSNT: CPT | Mod: HCNC,S$GLB,, | Performed by: ORTHOPAEDIC SURGERY

## 2020-06-02 PROCEDURE — 99203 OFFICE O/P NEW LOW 30 MIN: CPT | Mod: HCNC,S$GLB,, | Performed by: ORTHOPAEDIC SURGERY

## 2020-06-02 PROCEDURE — 1101F PR PT FALLS ASSESS DOC 0-1 FALLS W/OUT INJ PAST YR: ICD-10-PCS | Mod: HCNC,CPTII,S$GLB, | Performed by: ORTHOPAEDIC SURGERY

## 2020-06-02 PROCEDURE — 1101F PT FALLS ASSESS-DOCD LE1/YR: CPT | Mod: HCNC,CPTII,S$GLB, | Performed by: ORTHOPAEDIC SURGERY

## 2020-06-02 PROCEDURE — 73502 XR HIP 2 VIEW RIGHT: ICD-10-PCS | Mod: 26,HCNC,RT, | Performed by: RADIOLOGY

## 2020-06-02 PROCEDURE — 99203 PR OFFICE/OUTPT VISIT, NEW, LEVL III, 30-44 MIN: ICD-10-PCS | Mod: HCNC,S$GLB,, | Performed by: ORTHOPAEDIC SURGERY

## 2020-06-02 PROCEDURE — 1125F PR PAIN SEVERITY QUANTIFIED, PAIN PRESENT: ICD-10-PCS | Mod: HCNC,S$GLB,, | Performed by: ORTHOPAEDIC SURGERY

## 2020-06-02 PROCEDURE — 1159F MED LIST DOCD IN RCRD: CPT | Mod: HCNC,S$GLB,, | Performed by: ORTHOPAEDIC SURGERY

## 2020-06-02 PROCEDURE — 73502 X-RAY EXAM HIP UNI 2-3 VIEWS: CPT | Mod: 26,HCNC,RT, | Performed by: RADIOLOGY

## 2020-06-02 PROCEDURE — 73502 X-RAY EXAM HIP UNI 2-3 VIEWS: CPT | Mod: TC,HCNC,RT

## 2020-06-02 PROCEDURE — 99999 PR PBB SHADOW E&M-EST. PATIENT-LVL IV: CPT | Mod: PBBFAC,HCNC,, | Performed by: ORTHOPAEDIC SURGERY

## 2020-06-08 ENCOUNTER — PATIENT OUTREACH (OUTPATIENT)
Dept: ADMINISTRATIVE | Facility: OTHER | Age: 85
End: 2020-06-08

## 2020-06-09 ENCOUNTER — TELEPHONE (OUTPATIENT)
Dept: PAIN MEDICINE | Facility: CLINIC | Age: 85
End: 2020-06-09

## 2020-06-09 ENCOUNTER — OFFICE VISIT (OUTPATIENT)
Dept: CARDIOLOGY | Facility: CLINIC | Age: 85
End: 2020-06-09
Payer: MEDICARE

## 2020-06-09 VITALS
WEIGHT: 168 LBS | BODY MASS INDEX: 26.37 KG/M2 | SYSTOLIC BLOOD PRESSURE: 135 MMHG | HEIGHT: 67 IN | OXYGEN SATURATION: 96 % | HEART RATE: 77 BPM | RESPIRATION RATE: 15 BRPM | DIASTOLIC BLOOD PRESSURE: 70 MMHG

## 2020-06-09 DIAGNOSIS — I10 ESSENTIAL HYPERTENSION: ICD-10-CM

## 2020-06-09 DIAGNOSIS — E78.6 HIGH-DENSITY LIPOPROTEIN DEFICIENCY: ICD-10-CM

## 2020-06-09 DIAGNOSIS — F17.210 NICOTINE DEPENDENCE, CIGARETTES, UNCOMPLICATED: ICD-10-CM

## 2020-06-09 DIAGNOSIS — I73.9 PAD (PERIPHERAL ARTERY DISEASE): Primary | ICD-10-CM

## 2020-06-09 DIAGNOSIS — N18.30 CKD (CHRONIC KIDNEY DISEASE) STAGE 3, GFR 30-59 ML/MIN: ICD-10-CM

## 2020-06-09 DIAGNOSIS — I25.810 CORONARY ARTERY DISEASE INVOLVING CORONARY BYPASS GRAFT OF NATIVE HEART WITHOUT ANGINA PECTORIS: ICD-10-CM

## 2020-06-09 DIAGNOSIS — E11.65 UNCONTROLLED TYPE 2 DIABETES MELLITUS WITH HYPERGLYCEMIA: ICD-10-CM

## 2020-06-09 PROCEDURE — 1101F PT FALLS ASSESS-DOCD LE1/YR: CPT | Mod: HCNC,CPTII,S$GLB, | Performed by: INTERNAL MEDICINE

## 2020-06-09 PROCEDURE — 1101F PR PT FALLS ASSESS DOC 0-1 FALLS W/OUT INJ PAST YR: ICD-10-PCS | Mod: HCNC,CPTII,S$GLB, | Performed by: INTERNAL MEDICINE

## 2020-06-09 PROCEDURE — 99999 PR PBB SHADOW E&M-EST. PATIENT-LVL III: ICD-10-PCS | Mod: PBBFAC,HCNC,, | Performed by: INTERNAL MEDICINE

## 2020-06-09 PROCEDURE — 3052F HG A1C>EQUAL 8.0%<EQUAL 9.0%: CPT | Mod: HCNC,CPTII,S$GLB, | Performed by: INTERNAL MEDICINE

## 2020-06-09 PROCEDURE — 93000 ELECTROCARDIOGRAM COMPLETE: CPT | Mod: HCNC,S$GLB,, | Performed by: INTERNAL MEDICINE

## 2020-06-09 PROCEDURE — 3075F SYST BP GE 130 - 139MM HG: CPT | Mod: HCNC,CPTII,S$GLB, | Performed by: INTERNAL MEDICINE

## 2020-06-09 PROCEDURE — 3078F DIAST BP <80 MM HG: CPT | Mod: HCNC,CPTII,S$GLB, | Performed by: INTERNAL MEDICINE

## 2020-06-09 PROCEDURE — 3078F PR MOST RECENT DIASTOLIC BLOOD PRESSURE < 80 MM HG: ICD-10-PCS | Mod: HCNC,CPTII,S$GLB, | Performed by: INTERNAL MEDICINE

## 2020-06-09 PROCEDURE — 3075F PR MOST RECENT SYSTOLIC BLOOD PRESS GE 130-139MM HG: ICD-10-PCS | Mod: HCNC,CPTII,S$GLB, | Performed by: INTERNAL MEDICINE

## 2020-06-09 PROCEDURE — 1126F AMNT PAIN NOTED NONE PRSNT: CPT | Mod: HCNC,S$GLB,, | Performed by: INTERNAL MEDICINE

## 2020-06-09 PROCEDURE — 3052F PR MOST RECENT HEMOGLOBIN A1C LEVEL 8.0 - < 9.0%: ICD-10-PCS | Mod: HCNC,CPTII,S$GLB, | Performed by: INTERNAL MEDICINE

## 2020-06-09 PROCEDURE — 99214 OFFICE O/P EST MOD 30 MIN: CPT | Mod: HCNC,S$GLB,, | Performed by: INTERNAL MEDICINE

## 2020-06-09 PROCEDURE — 93000 EKG 12-LEAD: ICD-10-PCS | Mod: HCNC,S$GLB,, | Performed by: INTERNAL MEDICINE

## 2020-06-09 PROCEDURE — 1126F PR PAIN SEVERITY QUANTIFIED, NO PAIN PRESENT: ICD-10-PCS | Mod: HCNC,S$GLB,, | Performed by: INTERNAL MEDICINE

## 2020-06-09 PROCEDURE — 99214 PR OFFICE/OUTPT VISIT, EST, LEVL IV, 30-39 MIN: ICD-10-PCS | Mod: HCNC,S$GLB,, | Performed by: INTERNAL MEDICINE

## 2020-06-09 PROCEDURE — 99999 PR PBB SHADOW E&M-EST. PATIENT-LVL III: CPT | Mod: PBBFAC,HCNC,, | Performed by: INTERNAL MEDICINE

## 2020-06-09 PROCEDURE — 1159F PR MEDICATION LIST DOCUMENTED IN MEDICAL RECORD: ICD-10-PCS | Mod: HCNC,S$GLB,, | Performed by: INTERNAL MEDICINE

## 2020-06-09 PROCEDURE — 1159F MED LIST DOCD IN RCRD: CPT | Mod: HCNC,S$GLB,, | Performed by: INTERNAL MEDICINE

## 2020-06-09 NOTE — PROGRESS NOTES
CARDIOVASCULAR CONSULTATION    REASON FOR CONSULT:   Narciso Yanez is a 84 y.o. male who presents for ongoing mgmt of CAD/PAD.    PCP: Alverto  HISTORY OF PRESENT ILLNESS:   Last seen 12/2017.    The patient returns now after a nearly 2-1/2 year hiatus.  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  He does not describe typical claudicant symptoms.  He seems to think that this is related to his history of peripheral arterial disease and bilateral iliac intervention.  He tells me he is able walk more than a block without having to stop.  He also tells me he seen multiple other physicians and they have suggested this may be claudicant/vascular in nature.  He otherwise denies intercurrent angina or dyspnea.  There has been no palpitations or syncope.  Denies any PND, orthopnea, or lower extremity edema.  There has been no melena, or hematuria.  He appears to be tolerating his dual antiplatelet therapy without complications or failure.  Unfortunately, he does continue to smoke cigarettes.  I have once again encouraged him to enroll in the Ambulatory smoking cessation program and I have given him the smoking cessation leaflet.  It is not entirely clear to me if he plans to enroll himself in the program.  He seems to think that it is too late to quit smoking and I have encouraged him to enroll nevertheless.  I have also encouraged him to increase his exercise in order to recruit collateralization.    CARDIOVASCULAR HISTORY:   CAD/CABG 1/10/1999: CABG with LIMA-LAD, SVG-LCx, SVG-RCA ([facility] records)    5/1999: Angio revealed Patent LIMA-LAD, SVGx2 occluded ([facility] records)    PAD s/p LE PTA (6/2003: PTA with bilat YUE stents and R EIA stent)    PAST MEDICAL HISTORY:     Past Medical History:   Diagnosis Date     Actinic keratosis     Anxiety     B12 deficiency 12/8/2014    Dx 6/14    Cancer     skin    Cataract     Coronary artery disease     Diabetes mellitus type II     Diabetes mellitus with peripheral circulatory disorder 6/18/2014    ED (erectile dysfunction)     Hyperlipidemia     Hypertension     Kidney stone     Peripheral vascular disease     Spondylosis 3/29/2019    Tobacco dependence        PAST SURGICAL HISTORY:     Past Surgical History:   Procedure Laterality Date    APPENDECTOMY      CARDIAC SURGERY      CATARACT EXTRACTION      EXTERNAL EAR SURGERY      EYE SURGERY      cataracts    ILIAC ARTERY STENT Bilateral 06/2003    also Right External Iliac stent       ALLERGIES AND MEDICATION:     Review of patient's allergies indicates:   Allergen Reactions    Demerol  [meperidine]      Other reaction(s): Unknown     Previous Medications    ALBUTEROL (VENTOLIN HFA) 90 MCG/ACTUATION INHALER    Inhale 2 puffs into the lungs every 6 (six) hours as needed for Wheezing or Shortness of Breath. Rescue    ALENDRONATE (FOSAMAX) 70 MG TABLET    Take 1 tablet (70 mg total) by mouth every 7 days.    ASCORBIC ACID, VITAMIN C, (VITAMIN C) 250 MG TABLET    Take 1 tablet (250 mg total) by mouth once daily.    ASPIRIN (ECOTRIN) 81 MG EC TABLET    Take 81 mg by mouth once daily.    BLOOD SUGAR DIAGNOSTIC STRP    1 strip by Misc.(Non-Drug; Combo Route) route 2 (two) times daily. Disp glucometer and lancets as well    CLOPIDOGREL (PLAVIX) 75 MG TABLET    Take 1 tablet (75 mg total) by mouth once daily.    DIAZEPAM (VALIUM) 5 MG TABLET    Take 1 tablet (5 mg total) by mouth every 8 (eight) hours as needed.    DOXYCYCLINE (MONODOX) 100 MG CAPSULE    Take 1 capsule (100 mg total) by mouth 2 (two) times daily.    GABAPENTIN (NEURONTIN) 300 MG CAPSULE    Take 2 capsules (600 mg total) by mouth 3 (three) times daily.    GLIMEPIRIDE (AMARYL) 2 MG TABLET    Take 1 tablet (2 mg total) by mouth once daily.     HYDROCODONE-ACETAMINOPHEN (NORCO)  MG PER TABLET    Take 1 tablet by mouth every 4 (four) hours as needed for Pain.    INHALATION SPACING DEVICE    Use as directed for inhalation.    LORAZEPAM (ATIVAN) 0.5 MG TABLET    Take 1 tablet (0.5 mg total) by mouth once. for 1 dose    METFORMIN (GLUCOPHAGE-XR) 750 MG 24 HR TABLET    2 a day    METHOCARBAMOL (ROBAXIN) 750 MG TAB    Take 1 tablet (750 mg total) by mouth every 8 (eight) hours as needed. (caution sedation)    METOPROLOL SUCCINATE (TOPROL-XL) 25 MG 24 HR TABLET    TAKE ONE TABLET BY MOUTH ONCE DAILY    OXYBUTYNIN (DITROPAN-XL) 5 MG TR24    Take 1 tablet (5 mg total) by mouth once daily.    PREDNISONE (DELTASONE) 1 MG TABLET    Take 5 tablets (5 mg total) by mouth once daily.    ROSUVASTATIN (CRESTOR) 20 MG TABLET    Take 1 tablet (20 mg total) by mouth every evening.    SILDENAFIL (REVATIO) 20 MG TAB    TAKE 1-5 TABLETS BY MOUTH once per DAILY AS NEEDED    TAMSULOSIN (FLOMAX) 0.4 MG CAP    Take 1 capsule (0.4 mg total) by mouth once daily.    UMECLIDINIUM (INCRUSE ELLIPTA) 62.5 MCG/ACTUATION DSDV    Inhale 1 each into the lungs once daily. Controller       SOCIAL HISTORY:     Social History     Socioeconomic History    Marital status: Single     Spouse name: Not on file    Number of children: Not on file    Years of education: Not on file    Highest education level: Not on file   Occupational History    Not on file   Social Needs    Financial resource strain: Not on file    Food insecurity:     Worry: Not on file     Inability: Not on file    Transportation needs:     Medical: Not on file     Non-medical: Not on file   Tobacco Use    Smoking status: Current Every Day Smoker     Packs/day: 1.50     Years: 71.00     Pack years: 106.50     Types: Cigarettes    Smokeless tobacco: Never Used   Substance and Sexual Activity    Alcohol use: No    Drug use: No    Sexual activity: Not Currently   Lifestyle    Physical activity:     Days per week: Not  "on file     Minutes per session: Not on file    Stress: Not on file   Relationships    Social connections:     Talks on phone: Not on file     Gets together: Not on file     Attends [employer]ism service: Not on file     Active member of club or organization: Not on file     Attends meetings of clubs or organizations: Not on file     Relationship status: Not on file   Other Topics Concern    Not on file   Social History Narrative    .  4 children.  Smokes 2 ppd.  Still drives trucks for entertainment industry.        FAMILY HISTORY:     Family History   Problem Relation Age of Onset    Stroke Mother        REVIEW OF SYSTEMS:   Review of Systems   Constitutional: Negative for chills, diaphoresis and fever.   HENT: Negative for nosebleeds.    Eyes: Negative for blurred vision, double vision and photophobia.   Respiratory: Negative for hemoptysis, shortness of breath and wheezing.    Cardiovascular: Positive for claudication (?atyp RLE pain). Negative for chest pain, palpitations, orthopnea, leg swelling and PND.   Gastrointestinal: Negative for abdominal pain, blood in stool, heartburn, melena, nausea and vomiting.   Genitourinary: Negative for flank pain and hematuria.   Musculoskeletal: Negative for falls, myalgias and neck pain.   Skin: Negative for rash.   Neurological: Negative for dizziness, seizures, loss of consciousness, weakness and headaches.   Endo/Heme/Allergies: Negative for polydipsia. Does not bruise/bleed easily.   Psychiatric/Behavioral: Negative for depression and memory loss. The patient is not nervous/anxious.        PHYSICAL EXAM:     Vitals:    06/09/20 0848   BP: 135/70   Pulse: 77   Resp: 15    Body mass index is 26.31 kg/m².  Weight: 76.2 kg (167 lb 15.9 oz)   Height: 5' 7" (170.2 cm)     Physical Exam   Constitutional: He is oriented to person, place, and time. He appears well-developed and well-nourished. He is cooperative.  Non-toxic appearance. No distress.   HENT:   Head: " Normocephalic and atraumatic.   Eyes: Pupils are equal, round, and reactive to light. Conjunctivae and EOM are normal. No scleral icterus.   Neck: Trachea normal and normal range of motion. Neck supple. Normal carotid pulses and no JVD present. Carotid bruit is not present. No neck rigidity. No tracheal deviation and no edema present. No thyromegaly present.   Cardiovascular: Normal rate, regular rhythm, S1 normal and S2 normal. PMI is not displaced. Exam reveals no gallop and no friction rub.   No murmur heard.  Pulses:       Carotid pulses are 2+ on the right side, and 2+ on the left side.       Posterior tibial pulses are 0 on the right side, and 0 on the left side.   Pulmonary/Chest: Effort normal and breath sounds normal. No stridor. No respiratory distress. He has no wheezes. He has no rales. He exhibits no tenderness.   Abdominal: Soft. He exhibits no distension. There is no hepatosplenomegaly.   Musculoskeletal: Normal range of motion. He exhibits no edema or tenderness.   Feet:   Right Foot:   Skin Integrity: Negative for ulcer.   Left Foot:   Skin Integrity: Negative for ulcer.   Neurological: He is alert and oriented to person, place, and time. No cranial nerve deficit.   Skin: Skin is warm and dry. No rash noted. No erythema.   Psychiatric: He has a normal mood and affect. His speech is normal and behavior is normal.   Vitals reviewed.      DATA:   EKG: (personally reviewed tracing)  6/9/20 SR 77    Laboratory:  CBC:  Recent Labs   Lab 05/21/19  1139 10/11/19  1411 03/20/20  1110   WBC 11.70 10.62 9.05   Hemoglobin 11.5 L 12.9 L 13.3 L   Hematocrit 35.7 L 41.0 42.0   Platelets 356 H 220 252       CHEMISTRIES:  Recent Labs   Lab 10/11/19  1411 12/19/19  1436 03/20/20  1110   Glucose 191 H 141 H 176 H   Sodium 140 138 140   Potassium 4.7 4.5 4.2   BUN, Bld 17 18 16   Creatinine 1.2 1.3 1.5 H   eGFR if  >60.0 58.7 A 49 A   eGFR if non  56.0 A 50.8 A 43 A   Calcium 9.4 9.5 9.2        CARDIAC BIOMARKERS:        COAGS:  Recent Labs   Lab 07/13/17  2051   INR 1.0       LIPIDS/LFTS:  Recent Labs   Lab 07/19/17  0758  12/17/18  1035 05/21/19  1139 10/11/19  1411 03/20/20  1110   Cholesterol 132  --  130  --   --  147   Triglycerides 95  --  66  --   --  168 H   HDL 39 L  --  46  --   --  37 L   LDL Cholesterol 74.0  --  70.8  --   --  76.4   Non-HDL Cholesterol 93  --  84  --   --  110   AST  --    < > 26 17 21 26   ALT  --    < > 16 11 18 20    < > = values in this interval not displayed.       Cardiovascular Testing:  Ex MPI 12/20/17 (low workload, drop in BP (144->105 systolic) noted during ETT)  The patient exercised for 3.02 minutes on a Sixto protocol, corresponding to a functional capacity of 5 estimated METS, achieving a peak heart rate of 126 bpm, which is 90% of the age predicted maximum heart rate. -CP. At peak exercise, EKG revealed < 1mm of horizontal ST segment depression at a maximum heart rate of 126 bpm.   Nuclear Quantitative Functional Analysis:   LVEF: 61 %  LVED Volume: 59 ml  LVES Volume: 23 ml  Impression: NORMAL MYOCARDIAL PERFUSION  1. The perfusion scan is free of evidence for myocardial ischemia or injury.   2. Resting wall motion is physiologic.   3. Resting LV function is normal.   4. The ventricular volumes are normal at rest and stress.   5. The extracardiac distribution of radioactivity is normal.     Echo 12/20/17    1 - Normal left ventricular systolic function (EF 55-60%).     2 - No wall motion abnormalities.     3 - Concentric remodeling.     4 - Trivial tricuspid regurgitation.     5 - The estimated PA systolic pressure is 27 mmHg.     LE art US/LACIE 12/20/17  Right LACIE: DPA 0.99, PTA 0.72.  Left LACIE: DPA 0.73, PTA 0.91.  1.  Mild Moderate plaquing femoral popliteal arteries without stenosis greater than 50 percent on ultrasound.  2.  LACIE results: Right PTA and left DPA, suggest mild/moderate occlusive disease change, not confirmed on color Doppler,  grayscale, duplex evaluation, most likely due to resistance of vascular walls.     [facility] Records reviewed:  1/10/1999: CABG with LIMA-LAD, SVG-LCx, SVG-RCA  5/1999: Angio revealed Patent LIMA-LAD, SVGx2 occluded  6/2003: PTA with bilat YUE stents and R EIA stent      ASSESSMENT:   # CAD s/p CABG, MPI/echo 12/2017 normal (although low exercise capacity and ?hypotensive response during TMET), asymptomatic.  # PAD s/p PTA bilat YUE and R EIA 6/2003.  LE art US/LACIE 12/2017 without significant stenosis.  Pt now complaining of atyp RLE claudicant sxs.  # HTN, controlled  # HLP on crestor 20mg  # Tob abuse, pt uninterested in cessation, but is willing to consider the smoking cessation program.  Again encouraged to quit.  # CKD3  # DM    PLAN:   Cont med rx  Amb smoking cessation program suggested again.  Aortic/LE art US/LACIE  RTC 1 month    Wiley Vargas MD, FACC

## 2020-06-09 NOTE — TELEPHONE ENCOUNTER
Called pt to rt schedule apt , pt declined at this time due to other apt's scheduled . States once those are completed he'll contact office to reschedule on his own

## 2020-06-09 NOTE — TELEPHONE ENCOUNTER
----- Message from Caesar Douglas MA sent at 6/9/2020 11:41 AM CDT -----  Please advise   ----- Message -----  From: Chikis Li  Sent: 6/9/2020  11:32 AM CDT  To: [facility] Staff    Pt has an appt on 6/11 and would like to reschedule it and would like for the nurse to give him a call back

## 2020-06-11 ENCOUNTER — OFFICE VISIT (OUTPATIENT)
Dept: FAMILY MEDICINE | Facility: CLINIC | Age: 85
End: 2020-06-11
Payer: MEDICARE

## 2020-06-11 VITALS
HEART RATE: 94 BPM | SYSTOLIC BLOOD PRESSURE: 132 MMHG | RESPIRATION RATE: 18 BRPM | HEIGHT: 67 IN | DIASTOLIC BLOOD PRESSURE: 80 MMHG | OXYGEN SATURATION: 96 % | BODY MASS INDEX: 26.12 KG/M2 | WEIGHT: 166.44 LBS | TEMPERATURE: 98 F

## 2020-06-11 DIAGNOSIS — I25.810 CORONARY ARTERY DISEASE INVOLVING CORONARY BYPASS GRAFT OF NATIVE HEART WITHOUT ANGINA PECTORIS: ICD-10-CM

## 2020-06-11 DIAGNOSIS — N18.30 STAGE 3 CHRONIC KIDNEY DISEASE: ICD-10-CM

## 2020-06-11 DIAGNOSIS — M35.3 POLYMYALGIA RHEUMATICA: ICD-10-CM

## 2020-06-11 DIAGNOSIS — E11.51 DIABETES MELLITUS WITH PERIPHERAL CIRCULATORY DISORDER: ICD-10-CM

## 2020-06-11 DIAGNOSIS — M79.604 RIGHT LEG PAIN: Primary | ICD-10-CM

## 2020-06-11 DIAGNOSIS — I71.40 ABDOMINAL AORTIC ANEURYSM (AAA) WITHOUT RUPTURE: ICD-10-CM

## 2020-06-11 DIAGNOSIS — E11.65 UNCONTROLLED TYPE 2 DIABETES MELLITUS WITH HYPERGLYCEMIA: ICD-10-CM

## 2020-06-11 DIAGNOSIS — I73.9 PAD (PERIPHERAL ARTERY DISEASE): ICD-10-CM

## 2020-06-11 PROCEDURE — 3075F PR MOST RECENT SYSTOLIC BLOOD PRESS GE 130-139MM HG: ICD-10-PCS | Mod: HCNC,CPTII,S$GLB, | Performed by: INTERNAL MEDICINE

## 2020-06-11 PROCEDURE — 99999 PR PBB SHADOW E&M-EST. PATIENT-LVL III: CPT | Mod: PBBFAC,HCNC,, | Performed by: INTERNAL MEDICINE

## 2020-06-11 PROCEDURE — 99999 PR PBB SHADOW E&M-EST. PATIENT-LVL III: ICD-10-PCS | Mod: PBBFAC,HCNC,, | Performed by: INTERNAL MEDICINE

## 2020-06-11 PROCEDURE — 3052F PR MOST RECENT HEMOGLOBIN A1C LEVEL 8.0 - < 9.0%: ICD-10-PCS | Mod: HCNC,CPTII,S$GLB, | Performed by: INTERNAL MEDICINE

## 2020-06-11 PROCEDURE — 3075F SYST BP GE 130 - 139MM HG: CPT | Mod: HCNC,CPTII,S$GLB, | Performed by: INTERNAL MEDICINE

## 2020-06-11 PROCEDURE — 1159F PR MEDICATION LIST DOCUMENTED IN MEDICAL RECORD: ICD-10-PCS | Mod: HCNC,S$GLB,, | Performed by: INTERNAL MEDICINE

## 2020-06-11 PROCEDURE — 99499 UNLISTED E&M SERVICE: CPT | Mod: HCNC,S$GLB,, | Performed by: INTERNAL MEDICINE

## 2020-06-11 PROCEDURE — 3052F HG A1C>EQUAL 8.0%<EQUAL 9.0%: CPT | Mod: HCNC,CPTII,S$GLB, | Performed by: INTERNAL MEDICINE

## 2020-06-11 PROCEDURE — 3288F PR FALLS RISK ASSESSMENT DOCUMENTED: ICD-10-PCS | Mod: HCNC,CPTII,S$GLB, | Performed by: INTERNAL MEDICINE

## 2020-06-11 PROCEDURE — 3288F FALL RISK ASSESSMENT DOCD: CPT | Mod: HCNC,CPTII,S$GLB, | Performed by: INTERNAL MEDICINE

## 2020-06-11 PROCEDURE — 99499 RISK ADDL DX/OHS AUDIT: ICD-10-PCS | Mod: HCNC,S$GLB,, | Performed by: INTERNAL MEDICINE

## 2020-06-11 PROCEDURE — 3079F DIAST BP 80-89 MM HG: CPT | Mod: HCNC,CPTII,S$GLB, | Performed by: INTERNAL MEDICINE

## 2020-06-11 PROCEDURE — 1100F PTFALLS ASSESS-DOCD GE2>/YR: CPT | Mod: HCNC,CPTII,S$GLB, | Performed by: INTERNAL MEDICINE

## 2020-06-11 PROCEDURE — 1159F MED LIST DOCD IN RCRD: CPT | Mod: HCNC,S$GLB,, | Performed by: INTERNAL MEDICINE

## 2020-06-11 PROCEDURE — 1125F PR PAIN SEVERITY QUANTIFIED, PAIN PRESENT: ICD-10-PCS | Mod: HCNC,S$GLB,, | Performed by: INTERNAL MEDICINE

## 2020-06-11 PROCEDURE — 99215 OFFICE O/P EST HI 40 MIN: CPT | Mod: HCNC,S$GLB,, | Performed by: INTERNAL MEDICINE

## 2020-06-11 PROCEDURE — 1100F PR PT FALLS ASSESS DOC 2+ FALLS/FALL W/INJURY/YR: ICD-10-PCS | Mod: HCNC,CPTII,S$GLB, | Performed by: INTERNAL MEDICINE

## 2020-06-11 PROCEDURE — 3079F PR MOST RECENT DIASTOLIC BLOOD PRESSURE 80-89 MM HG: ICD-10-PCS | Mod: HCNC,CPTII,S$GLB, | Performed by: INTERNAL MEDICINE

## 2020-06-11 PROCEDURE — 1125F AMNT PAIN NOTED PAIN PRSNT: CPT | Mod: HCNC,S$GLB,, | Performed by: INTERNAL MEDICINE

## 2020-06-11 PROCEDURE — 99215 PR OFFICE/OUTPT VISIT, EST, LEVL V, 40-54 MIN: ICD-10-PCS | Mod: HCNC,S$GLB,, | Performed by: INTERNAL MEDICINE

## 2020-06-11 RX ORDER — METFORMIN HYDROCHLORIDE 750 MG/1
TABLET, EXTENDED RELEASE ORAL
Qty: 180 TABLET | Refills: 3 | Status: CANCELLED
Start: 2020-06-11

## 2020-06-11 RX ORDER — PREDNISONE 1 MG/1
5 TABLET ORAL DAILY
Qty: 150 TABLET | Refills: 12 | Status: SHIPPED | OUTPATIENT
Start: 2020-06-11 | End: 2021-02-05

## 2020-06-11 NOTE — PROGRESS NOTES
Chief complaint follow-up on right leg pain    Patient is an 84-year-old white male who has been having some significant pain in the right leg.  He works as a  for the movie industry.  With prolonged driving his whole right leg goes numb and feels like pins and needles.  It is not so bad when he is up and walking but does have pain in the right leg.  He last saw pain management a year ago.  He did have an appointment today but was the same time as my appointment and so he had to cancel.  His repeat MRI did not appear to be any worse than previous and was rated as mild.  There was consideration for possible epidural trial at the last seen by pain management a year ago.  In the interval he did see Orthopedics in the note was not yet available but the x-rays are unremarkable and apparently was not felt referable to the right hip joint.  He also saw cardiology given his extensive peripheral vascular disease but I did point out to him that his claudication in the past prior to stenting was a different quality symptoms than his current neuropathic type symptoms.  He does have ultrasounds and so forth ordered by Cardiology as well as appropriate follow-up.  He does not really describe claudication with walking or exertion it is more so in a certain position with driving.  We discussed the possibility of piriformis muscle syndrome under the current circumstances and current evaluation.  He has a relative is a physical therapist and it would be fine for that person to instruct him on stretching exercises and so forth.  I put in a telephone message to Pain Management to reschedule patient so he can be evaluated to see if an epidural or more direct treatment for piriformis muscle is indicated if they agree with that diagnosis.    Regarding polymyalgia, he did reduce the dose he believes down to 3 mg but had some increasing pain so increase the dose.  He had issues with available pills and so did go up but up to 10 mg we  discussed again reducing downward and then reassessing inflammatory markers in the future and will also recheck diabetes which is uncontrolled and so forth.        Seen cards for PVD - LACIE etc pending  3/19  Right Lower Arterial YUE is stented.   Right YUE stent velocity origin: proximal: 106, mid: 92, distal: 133   Left Lower Arterial YUE is stented.   Left YUE stent velocity origin: proximal: 150, mid: 158, distal: 175   CFA demonstrates mild (<50%) stenosis.     .    Counseled at length regarding the multitude of these issues.Total time over 45 minutes with over 50% counseling.        Seen Rheum  Polymyalgia rheumatica  Patient with typical symptoms of PMR with significant response to prednisone  Currently on prednisone 10mg  - Continue prednisone 10mg until completed 3 months of therapy  - Will likely need gradually taper (9mg x 1 month, 8mg x 1 month, etc.)  - ESR and CRP today     On corticosteroid therapy  -     Vitamin D; Future; Expected date: 08/16/2019  -     DXA Bone Density Spine And Hip; Future; Expected date: 08/16/2019  -     alendronate (FOSAMAX) 70 MG tablet; Take 1 tablet (70 mg total) by mouth every 7 days.  Dispense: 4 tablet; Refill: 11       I did point out to him that his B12 level  was low years ago and it does look like he took a supplement and the B12 level did rise.  He was not aware that her could remember that however and so has currently not been on a B12 supplement.  Level ok 9/18.  He did have iron deficiency in his stool testing was negative.         CT 9/19  Impression       Nodule from 03/18/2019 has increased in size, previously 6 mm now measuring 7 mm.  Malignancy not excluded.  For a solid nodule 6-8 mm, Fleischner Society 2017 guidelines recommend follow up with non-contrast chest CT at 6-12 months and 18-24 months after discovery.    Cholelithiasis.    Prominence of interstitial markings with mild bronchiectasis.         ROS:   CONST: weight stable. EYES: no vision change.  ENT: no sore throat. CV: no chest pain w/ exertion. RESP: no shortness of breath. GI: no nausea, vomiting, diarrhea. No dysphagia. : no other urinary issues. MUSCULOSKELETAL: no new myalgias or arthralgias. SKIN: no new changes. NEURO: no focal deficits. PSYCH: no new issues. ENDOCRINE: no polyuria. HEME: no lymph nodes. ALLERGY: no general pruritis.    PAST MEDICAL HISTORY:                                                        1. Hyperlipidemia.                                                           2. Coronary disease, seen prior by Dr. Roy, 4-vessel bypass in .   3. Peripheral vascular disease, stented in both legs  and been on Plavix since.                                                                4. Kidney stones, last episode 2007.                                 5. Right renal cyst apparently.  Saw Dr. Labadie and then second opinion at [facility] by a Dr. Shipman, currently going to be followed.                      6. Appendectomy.                                                             7. Actinic keratosis, saw Dr. Ambriz.                                      8. Left facial numbness, probable TIA, admit [facility] 2007. Carotid ultrasound apparently clear  9. Cataracts  10. Skin cancer  11. HYPERTENSION  12.  Diabetes, uncontrolled, with circulatory disease  13.  Low HDL  14. Declines Colonoscopy  15. Pneumovax after 67 done, Prevnar 13 given 2016  16.  B12 deficiency diagnosed     17    early satiety                                                                                       SOCIAL HISTORY:  He smokes 1 pack per day and does not drink.  He was about   52 years but now a .  Retired deputy in the court, 4          children.                                                                                                                                                 FAMILY HISTORY:  Father  at 63 of heart disease and stroke.   "Mother       at 86, 4 brothers, 2 sisters living. Brother with strokes.          Patient does walk with a limp referable the some pain and stiffness in the right leg.  His pedal pulses are difficult to palpate.  There is no edema or cyanosis.    Labs, cardiac testing, interval clinic notes and MRIs reviewed      Narciso was seen today for follow-up.    Diagnoses and all orders for this visit:    Right leg pain, one of his neuropathic symptoms are piriformis syndrome.  MRI does not support an obvious nerve impingement by disc disease and so forth but always possible and there might be a place for an epidural trial.  As above, sent message to Pain Management to evaluate and assess what interventions might be needed and to help confirm if indeed there is support for diagnosis of piriformis syndrome    Uncontrolled type 2 diabetes mellitus with hyperglycemia, reassess in the next month or so    Polymyalgia rheumatica, wean down on steroids    Diabetes mellitus with peripheral circulatory disorder    Stage 3 chronic kidney disease    PAD (peripheral artery disease), repeat evaluation is pending    Coronary artery disease involving coronary bypass graft of native heart without angina pectoris    Abdominal aortic aneurysm (AAA) without rupture    Other orders  -     Cancel: Hemoglobin A1C; Future  -     Cancel: Basic metabolic panel; Future  -     Cancel: metFORMIN (GLUCOPHAGE-XR) 750 MG XR 24hr tablet; 2 a day  -     predniSONE (DELTASONE) 1 MG tablet; Take 5 tablets (5 mg total) by mouth once daily.                   Patient admittedly not well with access and access would not be appropriate at this time for other"This note will not be shared with the patient."  "

## 2020-06-11 NOTE — TELEPHONE ENCOUNTER
Pt had same time appt with me today. Right legs goes numb, pins and needles, more when driving.  MRi not much change, mild DJD etc. Wonder if pyriformis syndrome.    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

## 2020-06-14 ENCOUNTER — PATIENT OUTREACH (OUTPATIENT)
Dept: ADMINISTRATIVE | Facility: OTHER | Age: 85
End: 2020-06-14

## 2020-06-14 DIAGNOSIS — E11.9 DIABETES MELLITUS WITHOUT COMPLICATION: Primary | ICD-10-CM

## 2020-06-16 ENCOUNTER — TELEPHONE (OUTPATIENT)
Dept: FAMILY MEDICINE | Facility: CLINIC | Age: 85
End: 2020-06-16

## 2020-06-16 ENCOUNTER — HOSPITAL ENCOUNTER (OUTPATIENT)
Dept: CARDIOLOGY | Facility: HOSPITAL | Age: 85
Discharge: HOME | End: 2020-06-16
Attending: INTERNAL MEDICINE
Payer: MEDICARE

## 2020-06-16 ENCOUNTER — OFFICE VISIT (OUTPATIENT)
Dept: PAIN MEDICINE | Facility: CLINIC | Age: 85
End: 2020-06-16
Payer: MEDICARE

## 2020-06-16 VITALS
WEIGHT: 167.13 LBS | HEART RATE: 91 BPM | DIASTOLIC BLOOD PRESSURE: 71 MMHG | BODY MASS INDEX: 26.17 KG/M2 | OXYGEN SATURATION: 98 % | SYSTOLIC BLOOD PRESSURE: 128 MMHG

## 2020-06-16 DIAGNOSIS — M51.36 DDD (DEGENERATIVE DISC DISEASE), LUMBAR: ICD-10-CM

## 2020-06-16 DIAGNOSIS — I73.9 PAD (PERIPHERAL ARTERY DISEASE): ICD-10-CM

## 2020-06-16 DIAGNOSIS — M47.816 LUMBAR SPONDYLOSIS: ICD-10-CM

## 2020-06-16 DIAGNOSIS — M54.16 LUMBAR RADICULOPATHY: ICD-10-CM

## 2020-06-16 LAB
ABDOMINAL IMA AP: 2.1 CM
ABDOMINAL IMA ED VEL: 0 CM/S
ABDOMINAL IMA PS VEL: 40 CM/S
ABDOMINAL IMA TRANS: 2.3 CM
ABDOMINAL INFRARENAL AORTA AP: 2.4 CM
ABDOMINAL INFRARENAL AORTA ED VEL: 11 CM/S
ABDOMINAL INFRARENAL AORTA PS VEL: 49 CM/S
ABDOMINAL INFRARENAL AORTA TRANS: 2.5 CM
ABDOMINAL JUXTARENAL AORTA AP: 2.4 CM
ABDOMINAL JUXTARENAL AORTA ED VEL: 12 CM/S
ABDOMINAL JUXTARENAL AORTA PS VEL: 61 CM/S
ABDOMINAL JUXTARENAL AORTA TRANS: 2.5 CM
ABDOMINAL LT COM ILIAC AP: 0.7 CM
ABDOMINAL LT COM ILIAC TRANS: 0.7 CM
ABDOMINAL LT COM ILIAC VEL: 170 CM/S
ABDOMINAL LT COM ILLIAC ED VEL: 0 CM/S
ABDOMINAL RT COM ILIAC AP: 0.7 CM
ABDOMINAL RT COM ILIAC TRANS: 0.6 CM
ABDOMINAL RT COM ILIAC VEL: 111 CM/S
ABDOMINAL RT COM ILLIAC ED VEL: 0 CM/S
ABDOMINAL SUPRARENAL AORTA AP: 2.6 CM
ABDOMINAL SUPRARENAL AORTA ED VEL: 12 CM/S
ABDOMINAL SUPRARENAL AORTA PS VEL: 52 CM/S
ABDOMINAL SUPRARENAL AORTA TRANS: 2.6 CM
LEFT ANT TIBIAL SYS PSV: 46 CM/S
LEFT CFA PSV: 108 CM/S
LEFT EXTERNAL ILIAC PSV: 112 CM/S
LEFT PERONEAL SYS PSV: 11 CM/S
LEFT POPLITEAL PSV: 113 CM/S
LEFT POST TIBIAL SYS PSV: 19 CM/S
LEFT PROFUNDA SYS PSV: 77 CM/S
LEFT SUPER FEMORAL DIST SYS PSV: 113 CM/S
LEFT SUPER FEMORAL MID SYS PSV: 86 CM/S
LEFT SUPER FEMORAL OSTIAL SYS PSV: 100 CM/S
LEFT SUPER FEMORAL PROX SYS PSV: 74 CM/S
LEFT TIB/PER TRUNK SYS PSV: 90 CM/S
OHS CV LEFT COMMON ILIAC ARTERY PSV: 145 CM/S
OHS CV LEFT LOWER EXTREMITY ABI (NO CALC): 0.68
OHS CV LOWER EXTREMITY STENT MEASUREMENTS - LEFT - CIL S1 - DIST: 73
OHS CV LOWER EXTREMITY STENT MEASUREMENTS - LEFT - CIL S1 - MID: 117
OHS CV LOWER EXTREMITY STENT MEASUREMENTS - LEFT - CIL S1 - OST: 104
OHS CV LOWER EXTREMITY STENT MEASUREMENTS - LEFT - CIL S1 - PROX: 145
OHS CV LOWER EXTREMITY STENT MEASUREMENTS - RIGHT - CIL S1 - DIST: 74
OHS CV LOWER EXTREMITY STENT MEASUREMENTS - RIGHT - CIL S1 - MID: 97
OHS CV LOWER EXTREMITY STENT MEASUREMENTS - RIGHT - CIL S1 - OST: 142
OHS CV LOWER EXTREMITY STENT MEASUREMENTS - RIGHT - CIL S1 - PROX: 121
OHS CV RIGHT ABI LOWER EXTREMITY (NO CALC): 0.75
OHS CV US RIGHT COMMON ILIAC PSV: 142 CM/S
RIGHT ANT TIBIAL SYS PSV: 105 CM/S
RIGHT CFA PSV: 140 CM/S
RIGHT EXTERNAL ILLIAC PSV: 142 CM/S
RIGHT PERONEAL SYS PSV: 58 CM/S
RIGHT POPLITEAL PSV: 84 CM/S
RIGHT POST TIBIAL SYS PSV: 23 CM/S
RIGHT PROFUNDA SYS PSV: 162 CM/S
RIGHT SUPER FEMORAL DIST SYS PSV: 63 CM/S
RIGHT SUPER FEMORAL MID SYS PSV: 128 CM/S
RIGHT SUPER FEMORAL OSTIAL SYS PSV: 139 CM/S
RIGHT SUPER FEMORAL PROX SYS PSV: 104 CM/S
RIGHT TIB/PER TRUNK SYS PSV: 62 CM/S

## 2020-06-16 PROCEDURE — 1159F PR MEDICATION LIST DOCUMENTED IN MEDICAL RECORD: ICD-10-PCS | Mod: HCNC,S$GLB,, | Performed by: PAIN MEDICINE

## 2020-06-16 PROCEDURE — 93978 VASCULAR STUDY: CPT | Mod: HCNC

## 2020-06-16 PROCEDURE — 93925 LOWER EXTREMITY STUDY: CPT | Mod: 50,HCNC

## 2020-06-16 PROCEDURE — 93978 VASCULAR STUDY: CPT | Mod: 26,HCNC,, | Performed by: INTERNAL MEDICINE

## 2020-06-16 PROCEDURE — 3078F PR MOST RECENT DIASTOLIC BLOOD PRESSURE < 80 MM HG: ICD-10-PCS | Mod: HCNC,CPTII,S$GLB, | Performed by: PAIN MEDICINE

## 2020-06-16 PROCEDURE — 99214 PR OFFICE/OUTPT VISIT, EST, LEVL IV, 30-39 MIN: ICD-10-PCS | Mod: HCNC,S$GLB,, | Performed by: PAIN MEDICINE

## 2020-06-16 PROCEDURE — 93925 LOWER EXTREMITY STUDY: CPT | Mod: 26,HCNC,, | Performed by: INTERNAL MEDICINE

## 2020-06-16 PROCEDURE — 93925 CV US DOPPLER ARTERIAL LEGS BILATERAL (CUPID ONLY): ICD-10-PCS | Mod: 26,HCNC,, | Performed by: INTERNAL MEDICINE

## 2020-06-16 PROCEDURE — 3074F PR MOST RECENT SYSTOLIC BLOOD PRESSURE < 130 MM HG: ICD-10-PCS | Mod: HCNC,CPTII,S$GLB, | Performed by: PAIN MEDICINE

## 2020-06-16 PROCEDURE — 3078F DIAST BP <80 MM HG: CPT | Mod: HCNC,CPTII,S$GLB, | Performed by: PAIN MEDICINE

## 2020-06-16 PROCEDURE — 1101F PT FALLS ASSESS-DOCD LE1/YR: CPT | Mod: HCNC,CPTII,S$GLB, | Performed by: PAIN MEDICINE

## 2020-06-16 PROCEDURE — 93978 CV US ABDOMINAL AORTA EVALUATION (CUPID ONLY): ICD-10-PCS | Mod: 26,HCNC,, | Performed by: INTERNAL MEDICINE

## 2020-06-16 PROCEDURE — 3074F SYST BP LT 130 MM HG: CPT | Mod: HCNC,CPTII,S$GLB, | Performed by: PAIN MEDICINE

## 2020-06-16 PROCEDURE — 1125F AMNT PAIN NOTED PAIN PRSNT: CPT | Mod: HCNC,S$GLB,, | Performed by: PAIN MEDICINE

## 2020-06-16 PROCEDURE — 99999 PR PBB SHADOW E&M-EST. PATIENT-LVL IV: ICD-10-PCS | Mod: PBBFAC,HCNC,, | Performed by: PAIN MEDICINE

## 2020-06-16 PROCEDURE — 1159F MED LIST DOCD IN RCRD: CPT | Mod: HCNC,S$GLB,, | Performed by: PAIN MEDICINE

## 2020-06-16 PROCEDURE — 99214 OFFICE O/P EST MOD 30 MIN: CPT | Mod: HCNC,S$GLB,, | Performed by: PAIN MEDICINE

## 2020-06-16 PROCEDURE — 1101F PR PT FALLS ASSESS DOC 0-1 FALLS W/OUT INJ PAST YR: ICD-10-PCS | Mod: HCNC,CPTII,S$GLB, | Performed by: PAIN MEDICINE

## 2020-06-16 PROCEDURE — 1125F PR PAIN SEVERITY QUANTIFIED, PAIN PRESENT: ICD-10-PCS | Mod: HCNC,S$GLB,, | Performed by: PAIN MEDICINE

## 2020-06-16 PROCEDURE — 99999 PR PBB SHADOW E&M-EST. PATIENT-LVL IV: CPT | Mod: PBBFAC,HCNC,, | Performed by: PAIN MEDICINE

## 2020-06-16 RX ORDER — GABAPENTIN 300 MG/1
600 CAPSULE ORAL 3 TIMES DAILY
Qty: 180 CAPSULE | Refills: 5 | Status: SHIPPED | OUTPATIENT
Start: 2020-06-16 | End: 2020-06-16

## 2020-06-16 RX ORDER — GABAPENTIN 300 MG/1
600 CAPSULE ORAL 3 TIMES DAILY
Qty: 180 CAPSULE | Refills: 11 | Status: SHIPPED | OUTPATIENT
Start: 2020-06-16 | End: 2020-06-24

## 2020-06-16 NOTE — PROGRESS NOTES
Subjective:     Patient ID: Narciso Yanez is a 84 y.o. male    Chief Complaint: Follow-up      Referred by: No ref. provider found      HPI:    Interval History (6/16/20):  He returns today for follow up.  He reports that physical therapy has not been helpful for the right lower extremity pain.  He denies any changes in the quality or location of the pain.  He denies any new or worsening symptoms.  He does state that physical therapy helped for a short appeared of time but this relief for off.  He continues on regular home exercise program but does not notice any long-term relief.  He states that he did take gabapentin for short period of time and found it helpful.  Initially this medicine did cause him to feel funny, but he states that after while this feeling when away.  He is interested in trying this medication again.      Interval History (4/22/19):  He returns today for follow up and MRI review.  He reports that his pain is unchanged in quality and location since last encounter.  He denies any new or worsening symptoms.  Patient states that he could not tolerate gabapentin and has discontinued his medication.      Initial Encounter (4/15/19):  Narciso Yanez is a 84 y.o. male who presents today with right lower extremity numbness and pain. This problem started about 5 weeks ago.  No specific inciting event or injury.  The pain/numbness starts in the right posterior hip region and radiates to the posterior thigh into the leg and foot.  He also reports weakness of the right lower extremity.  He denies any bowel or bladder dysfunction.  He denies any significant back pain associated with this problem.  The pain is not constant.  The pain is worsened with prolonged sitting and will improved with standing.   This pain is described in detail below.    Physical Therapy:  No.    Non-pharmacologic Treatment:  Standing helps         · TENS?  No    Pain Medications:         · Currently taking:  Tylenol    · Has  tried in the past:  Flexeril, tramadol, gabapentin, Norco, Robaxin    · Has not tried:  NSAIDs,  TCAs, SNRIs, topical creams    Blood thinners:  Aspirin 81 mg a day, Plavix    Interventional Therapies:  None    Relevant Surgeries:  None    Affecting sleep?  No    Affecting daily activities? yes    Depressive symptoms? no          · SI/HI? No    Work status: Employed  Pain Scores:    Best:       4/10  Worst:    10/10  Usually:  6 /10  Today:    4/10    Review of Systems   Constitutional: Negative for activity change, appetite change, chills, fatigue, fever and unexpected weight change.   HENT: Negative for hearing loss.    Eyes: Negative for visual disturbance.   Respiratory: Negative for chest tightness and shortness of breath.    Cardiovascular: Negative for chest pain.   Gastrointestinal: Negative for abdominal pain, constipation, diarrhea, nausea and vomiting.   Genitourinary: Negative for difficulty urinating.   Musculoskeletal: Positive for arthralgias and gait problem. Negative for back pain and neck pain.   Skin: Negative for rash.   Neurological: Positive for weakness and numbness. Negative for dizziness, light-headedness and headaches.   Psychiatric/Behavioral: Negative for hallucinations, sleep disturbance and suicidal ideas. The patient is not nervous/anxious.        Past Medical History:   Diagnosis Date    Actinic keratosis     Anxiety     B12 deficiency 12/8/2014    Dx 6/14    Cancer     skin    Cataract     Coronary artery disease     Diabetes mellitus type II     Diabetes mellitus with peripheral circulatory disorder 6/18/2014    ED (erectile dysfunction)     Hyperlipidemia     Hypertension     Kidney stone     Peripheral vascular disease     Spondylosis 3/29/2019    Tobacco dependence        Past Surgical History:   Procedure Laterality Date    APPENDECTOMY      CARDIAC SURGERY      CATARACT EXTRACTION      EXTERNAL EAR SURGERY      EYE SURGERY      cataracts    ILIAC ARTERY  STENT Bilateral 06/2003    also Right External Iliac stent       Social History     Socioeconomic History    Marital status: Single     Spouse name: Not on file    Number of children: Not on file    Years of education: Not on file    Highest education level: Not on file   Occupational History    Not on file   Social Needs    Financial resource strain: Not on file    Food insecurity     Worry: Not on file     Inability: Not on file    Transportation needs     Medical: Not on file     Non-medical: Not on file   Tobacco Use    Smoking status: Current Every Day Smoker     Packs/day: 1.50     Years: 71.00     Pack years: 106.50     Types: Cigarettes    Smokeless tobacco: Never Used   Substance and Sexual Activity    Alcohol use: No    Drug use: No    Sexual activity: Not Currently   Lifestyle    Physical activity     Days per week: Not on file     Minutes per session: Not on file    Stress: Not on file   Relationships    Social connections     Talks on phone: Not on file     Gets together: Not on file     Attends Orthodox service: Not on file     Active member of club or organization: Not on file     Attends meetings of clubs or organizations: Not on file     Relationship status: Not on file   Other Topics Concern    Not on file   Social History Narrative    .  4 children.  Smokes 2 ppd.  Still drives trucks for entertainment industry.        Review of patient's allergies indicates:   Allergen Reactions    Demerol [meperidine] Nausea Only     Other reaction(s): Unknown       Current Outpatient Medications on File Prior to Visit   Medication Sig Dispense Refill    alendronate (FOSAMAX) 70 MG tablet Take 1 tablet (70 mg total) by mouth every 7 days. 4 tablet 11    ascorbic acid, vitamin C, (VITAMIN C) 250 MG tablet Take 1 tablet (250 mg total) by mouth once daily. 120 tablet 0    aspirin (ECOTRIN) 81 MG EC tablet Take 81 mg by mouth once daily.      blood sugar diagnostic Strp 1 strip by  Misc.(Non-Drug; Combo Route) route 2 (two) times daily. Disp glucometer and lancets as well 100 strip 12    clopidogrel (PLAVIX) 75 mg tablet Take 1 tablet (75 mg total) by mouth once daily. 90 tablet 4    diazePAM (VALIUM) 5 MG tablet Take 1 tablet (5 mg total) by mouth every 8 (eight) hours as needed. 60 tablet 3    doxycycline (MONODOX) 100 MG capsule Take 1 capsule (100 mg total) by mouth 2 (two) times daily. 20 capsule 0    HYDROcodone-acetaminophen (NORCO)  mg per tablet Take 1 tablet by mouth every 4 (four) hours as needed for Pain. 42 tablet 0    inhalation spacing device Use as directed for inhalation. 1 Device 0    metFORMIN (GLUCOPHAGE-XR) 750 MG 24 hr tablet 2 a day 180 tablet 3    methocarbamol (ROBAXIN) 750 MG Tab Take 1 tablet (750 mg total) by mouth every 8 (eight) hours as needed. (caution sedation) 30 tablet 0    metoprolol succinate (TOPROL-XL) 25 MG 24 hr tablet TAKE ONE TABLET BY MOUTH ONCE DAILY 90 tablet 1    oxybutynin (DITROPAN-XL) 5 MG TR24 Take 1 tablet (5 mg total) by mouth once daily. 30 tablet 11    predniSONE (DELTASONE) 1 MG tablet Take 5 tablets (5 mg total) by mouth once daily. 150 tablet 12    rosuvastatin (CRESTOR) 20 MG tablet Take 1 tablet (20 mg total) by mouth every evening. 90 tablet 12    sildenafil (REVATIO) 20 mg Tab TAKE 1-5 TABLETS BY MOUTH once per DAILY AS NEEDED 30 tablet 11    tamsulosin (FLOMAX) 0.4 mg Cap Take 1 capsule (0.4 mg total) by mouth once daily. 30 capsule 11    umeclidinium (INCRUSE ELLIPTA) 62.5 mcg/actuation DsDv Inhale 1 each into the lungs once daily. Controller 30 each 5    albuterol (VENTOLIN HFA) 90 mcg/actuation inhaler Inhale 2 puffs into the lungs every 6 (six) hours as needed for Wheezing or Shortness of Breath. Rescue 18 g 0    glimepiride (AMARYL) 2 MG tablet Take 1 tablet (2 mg total) by mouth once daily. 30 tablet 3    LORazepam (ATIVAN) 0.5 MG tablet Take 1 tablet (0.5 mg total) by mouth once. for 1 dose 1 tablet 0     [DISCONTINUED] gabapentin (NEURONTIN) 300 MG capsule Take 2 capsules (600 mg total) by mouth 3 (three) times daily. (Patient not taking: Reported on 12/23/2019) 180 capsule 5     No current facility-administered medications on file prior to visit.        Objective:      /71   Pulse 91   Wt 75.8 kg (167 lb 1.7 oz)   SpO2 98%   BMI 26.17 kg/m²     Exam:  GEN:  Well developed, well nourished.  No acute distress.   HEENT:  No trauma.  Mucous membranes moist.  Nares patent bilaterally.  PSYCH: Normal affect. Thought content appropriate.  CHEST:  Breathing symmetric.  No audible wheezing.  ABD: Soft, non-distended.  SKIN:  Warm, pink, dry.  No rash on exposed areas.    EXT:  No cyanosis, clubbing, or edema.  No color change or changes in nail or hair growth.  NEURO/MUSCULOSKELETAL:  Fully alert, oriented, and appropriate. Speech normal dottie. No cranial nerve deficits.   Gait:  Antalgic.  No focal motor deficits.     Negative SLR bilaterally      Imaging:      Narrative & Impression    EXAMINATION:  MRI LUMBAR SPINE WITHOUT CONTRAST     CLINICAL HISTORY:  RIGHT SCIATICA; Sciatica, right side     TECHNIQUE:  Multiplanar, multisequence MR images were acquired from the thoracolumbar junction to the sacrum without the administration of contrast.     COMPARISON:  04/20/2019     FINDINGS:  The distal cord/conus demonstrates normal size and signal.  No evidence of osteomyelitis, marrow replacement process, or acute fracture.  Right-sided parapelvic cyst noted.     At L3-4, there is asymmetric disc bulging to the right with small annular tear and mild facet hypertrophy.  This results in mild right-sided neural foraminal narrowing.  No spinal canal stenosis.     At L4-5, there is asymmetric disc bulging to the right with small annular tear and bilateral facet hypertrophy, resulting in mild spinal canal stenosis and mild bilateral neural foraminal narrowing.     At L5-S1, there is degenerative disc disease, noting  disc height loss and sub endplate marrow changes.  There is mild disc bulging.  This results in mild bilateral neural foraminal narrowing.     The findings are similar to the prior exam.     Impression:     Lumbar spondylosis, resulting in mild spinal canal stenosis at L4-5, and mild neural foraminal narrowing from L3-4 through L5-S1, as above.        Electronically signed by: Denilson Dwyer MD  Date:                                            05/22/2020  Time:                                           10:44           Assessment:       Encounter Diagnoses   Name Primary?    DDD (degenerative disc disease), lumbar     Lumbar spondylosis     Lumbar radiculopathy          Plan:       Narciso was seen today for follow-up.    Diagnoses and all orders for this visit:    DDD (degenerative disc disease), lumbar  -     Discontinue: gabapentin (NEURONTIN) 300 MG capsule; Take 2 capsules (600 mg total) by mouth 3 (three) times daily.  -     gabapentin (NEURONTIN) 300 MG capsule; Take 2 capsules (600 mg total) by mouth 3 (three) times daily.    Lumbar spondylosis  -     Discontinue: gabapentin (NEURONTIN) 300 MG capsule; Take 2 capsules (600 mg total) by mouth 3 (three) times daily.  -     gabapentin (NEURONTIN) 300 MG capsule; Take 2 capsules (600 mg total) by mouth 3 (three) times daily.    Lumbar radiculopathy  -     Discontinue: gabapentin (NEURONTIN) 300 MG capsule; Take 2 capsules (600 mg total) by mouth 3 (three) times daily.  -     gabapentin (NEURONTIN) 300 MG capsule; Take 2 capsules (600 mg total) by mouth 3 (three) times daily.        Narciso Yanez is a 84 y.o. male with right-sided lower extremity numbness and pain. Consistent with right lumbar radiculopathy though specific level of radiculopathy is unclear at this time. Lumbar MRI with some neural foraminal narrowing though no specific nerve root impingement.  No significant back pain. Low suspicion for facet or sacroiliac mediated pain..    1.  Pertinent  imaging studies reviewed by me. Imaging results were discussed with patient.  2.  Start gabapentin 300 mg q.h.s..  Gradually increase to 600 mg t.i.d. as tolerated/needed.  Patient was given instructions on how to do this.  3.  Return to clinic in 4 weeks.  At that time we will discuss efficacy of gabapentin.  May consider right L5 and S1 transforaminal epidural steroid injection at that time if pain persists.

## 2020-06-18 ENCOUNTER — CLINICAL SUPPORT (OUTPATIENT)
Dept: SMOKING CESSATION | Facility: CLINIC | Age: 85
End: 2020-06-18
Payer: COMMERCIAL

## 2020-06-18 DIAGNOSIS — F17.200 NICOTINE DEPENDENCE: Primary | ICD-10-CM

## 2020-06-18 PROCEDURE — 99404 PREV MED CNSL INDIV APPRX 60: CPT | Mod: S$GLB,,,

## 2020-06-18 PROCEDURE — 99999 PR PBB SHADOW E&M-EST. PATIENT-LVL I: CPT | Mod: PBBFAC,,,

## 2020-06-18 PROCEDURE — 99404 PR PREVENT COUNSEL,INDIV,60 MIN: ICD-10-PCS | Mod: S$GLB,,,

## 2020-06-18 PROCEDURE — 99999 PR PBB SHADOW E&M-EST. PATIENT-LVL I: ICD-10-PCS | Mod: PBBFAC,,,

## 2020-06-18 RX ORDER — DM/P-EPHED/ACETAMINOPH/DOXYLAM 30-7.5/3
LIQUID (ML) ORAL
Qty: 216 LOZENGE | Refills: 0 | Status: SHIPPED | OUTPATIENT
Start: 2020-06-18 | End: 2020-07-23 | Stop reason: CLARIF

## 2020-06-18 RX ORDER — IBUPROFEN 200 MG
1 TABLET ORAL DAILY
Qty: 28 PATCH | Refills: 0 | Status: SHIPPED | OUTPATIENT
Start: 2020-06-18 | End: 2020-07-18

## 2020-06-18 NOTE — Clinical Note
Patient will be participating in biweekly tobacco cessation meetings and will begin the prescribed tobacco cessation medication regimen of  21 mg nicotine patch QD .  He  currently smokes 40 cigarettes per day.  Pt started on rate reduction and wait time of 15 min prior to smoking. Pt's exhaled carbon monoxide level was 8  ppm as per [employer]oker[employer]. (non- smoker = 0-5 ppm.) Which is quite low for a 2 pack a day smoker, this could indicate habit.   Plan is to start off with patches and add lozenge next week based upon what he is smoking at that time. Patient seemed genuinely very  interested in quitting, but may be difficult for him because he has 2 family member who lives with him that smoke, I encouraged him to give them my card and we can do a group. Patient has my contact card and mobile number and encouraged him to call at any time.

## 2020-06-19 NOTE — PROGRESS NOTES
Subjective:     HPI:   Narciso Yanez is a 84 y.o. male who presents for evaluation right hip pain.    Says he has had back pain for about a year's been worse over the past 3 months.  No groin anterior thigh pain mostly lateral hip posterior thigh occasion the whole leg goes numb.  He says if he drives for more than 10 minutes his leg goes numb.    He has been on prednisone, he has been given some diazepam and some Norco by primary care he has also tried Robaxin.  No injections.  He has done physical therapy.  He is in using a cane as needed it is a four-post.  He can walk about 3 blocks.  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 A review of systems is updated and there is no reported vision changes, ear/nose/mouth/throat complaints,  chest pain, shortness of breath, abdominal pain, urological complaints, fevers or chills, psychiatric complaints. Musculoskeletal and neurologcial symptoms are as documented. All other systems are negative.      Objective:   Exam:  There were no vitals filed for this visit.  Body mass index is 26.1 kg/m².    Physical examination included assessment of the patient's general appearance with particular attention to development, nutrition, body habitus, attention to grooming, and any evidence of distress.  Constitutional: The patient is a well-developed, well-nourished patient in no acute distress.   Cardiovascular: Vascular examination included warmth and capillary refill as well inspection for edema and assessment of pedal pulses. Pulses are palpable and regular.  Musculoskeletal: Gait was assessed as to whether it was steady, non-antalgic, and/or required the use of an assist device. The patient was also asked to walk independently and get onto the examination table.  Skin: The skin was examined for any obvious rashes or lesions in the  extremity.  Neurologic: Sensation is intact to light touch in the extremity. The patient has good coordination without hyperreflexia and is alert and oriented to person, place and time and has normal mood and affect.     All of the above were examined and found to be within normal limits except for the following pertinent clinical findings:    Slow almost shuffling gait.  No limp nonantalgic gait negative Trendelenburg.  No groin pain that straight leg raise.  0-90 flexion 20 abduction 20 abduction 15 external 10 internal rotation without any discomfort.  Nontender palpation of the greater trochanter    He does have trace DP and PT pulses and 6 second capillary refill in his toes      Imaging:  Indication:  Right hip pain  Exam Ordered: Radiographs include an anteroposterior pelvis, an anteroposterior and lateral view of the proximal femur including the hip joint.  Details of Examination: Today's exam shows no evidence of joint space narrowing, fracture or dislocation.  No other significant findings are noted.  Impression: Normal Exam, Right Hip        Assessment:     Chronic right-sided low back pain with right-sided sciatica [M54.41, G89.29]  Likely referred spine pain versus vascular claudication, normal right hip    Prednisone 10 milligrams a day for polymyalgia rheumatica  Coronary artery disease status post CABG history of AFib and flutter  2 pack-a-day smoker, COPD  Poorly controlled diabetes, last A1c 8.8  BPH on Flomax  Peripheral vascular disease, bilateral iliac artery stents in 2002     Plan:       The above findings were discussed with patient length. At this point I do believe that the patients discomfort is mostly related to a lower back pain exacerbation.  The treatment of lower back pain was discussed with the patient and it includes a course of anti-inflammatory medications, rest, and physical therapy for lower back stretching and strengthening.  All of the patients questions were answered.    I  think his hip is fine he certainly does not need a hip replacement.    I think a lot of this is likely referred from his spine.  He has seen Pain Clinic in the past he can go back for continued evaluation for his with seems like radicular spine pain.    He also has a slow almost shuffling gait he may have a movement disorder he could consider neurologic evaluation for that as well as he complains of some balance issues.    Long-term he could also consider repeat vascular evaluation as he has had bilateral lower extremity stenting done in the past although his symptoms do not seem classic for claudication.      Recommend continued follow-up with per his primary care provider for his multiple medical comorbidities    No orders of the defined types were placed in this encounter.            Past Medical History:   Diagnosis Date    Actinic keratosis     Anxiety     B12 deficiency 12/8/2014    Dx 6/14    Cancer     skin    Cataract     Coronary artery disease     Diabetes mellitus type II     Diabetes mellitus with peripheral circulatory disorder 6/18/2014    ED (erectile dysfunction)     Hyperlipidemia     Hypertension     Kidney stone     Peripheral vascular disease     Spondylosis 3/29/2019    Tobacco dependence        Past Surgical History:   Procedure Laterality Date    APPENDECTOMY      CARDIAC SURGERY      CATARACT EXTRACTION      EXTERNAL EAR SURGERY      EYE SURGERY      cataracts    ILIAC ARTERY STENT Bilateral 06/2003    also Right External Iliac stent       Family History   Problem Relation Age of Onset    Stroke Mother        Social History     Socioeconomic History    Marital status: Single     Spouse name: Not on file    Number of children: Not on file    Years of education: Not on file    Highest education level: Not on file   Occupational History    Not on file   Social Needs    Financial resource strain: Not on file    Food insecurity     Worry: Not on file     Inability:  Not on file    Transportation needs     Medical: Not on file     Non-medical: Not on file   Tobacco Use    Smoking status: Current Every Day Smoker     Packs/day: 1.50     Years: 71.00     Pack years: 106.50     Types: Cigarettes    Smokeless tobacco: Never Used   Substance and Sexual Activity    Alcohol use: No    Drug use: No    Sexual activity: Not Currently   Lifestyle    Physical activity     Days per week: Not on file     Minutes per session: Not on file    Stress: Not on file   Relationships    Social connections     Talks on phone: Not on file     Gets together: Not on file     Attends Mormonism service: Not on file     Active member of club or organization: Not on file     Attends meetings of clubs or organizations: Not on file     Relationship status: Not on file   Other Topics Concern    Not on file   Social History Narrative    .  4 children.  Smokes 2 ppd.  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

## 2020-06-22 ENCOUNTER — TELEPHONE (OUTPATIENT)
Dept: PAIN MEDICINE | Facility: CLINIC | Age: 85
End: 2020-06-22

## 2020-06-22 NOTE — TELEPHONE ENCOUNTER
Called pt and instructed to discontinue gabapentin . Offered an ov to discuss injections on 6/24/2020 with CATHY Clifford . Pt accept and preferred 8:20 am

## 2020-06-22 NOTE — TELEPHONE ENCOUNTER
----- Message from Tamanna Villanueva, Patient Care Assistant sent at 6/22/2020 12:19 PM CDT -----  Regarding: medication side effects  Name of Who is Calling: PATRICIA LEE [[id number]]    What is the request in detail: Requesting a call back in regards of new medication stating he's seeing double and can't stay awake. Please contact to further discuss and advise      Can the clinic reply by [facility]: No    [facility]   [phone]

## 2020-06-22 NOTE — TELEPHONE ENCOUNTER
Pt states seeing double , blurred vision, headache and fatigue . States hasn't given any relief with his pain . Wants a different script sent to [facility] on [address] . Please advise

## 2020-06-24 ENCOUNTER — TELEPHONE (OUTPATIENT)
Dept: PAIN MEDICINE | Facility: CLINIC | Age: 85
End: 2020-06-24

## 2020-06-24 ENCOUNTER — OFFICE VISIT (OUTPATIENT)
Dept: PAIN MEDICINE | Facility: CLINIC | Age: 85
End: 2020-06-24
Payer: MEDICARE

## 2020-06-24 VITALS
SYSTOLIC BLOOD PRESSURE: 125 MMHG | HEIGHT: 67 IN | DIASTOLIC BLOOD PRESSURE: 67 MMHG | OXYGEN SATURATION: 96 % | TEMPERATURE: 98 F | WEIGHT: 160.25 LBS | HEART RATE: 82 BPM | BODY MASS INDEX: 25.15 KG/M2

## 2020-06-24 DIAGNOSIS — M51.36 DEGENERATIVE DISC DISEASE, LUMBAR: ICD-10-CM

## 2020-06-24 DIAGNOSIS — M54.16 LUMBAR RADICULOPATHY: ICD-10-CM

## 2020-06-24 DIAGNOSIS — M47.9 SPONDYLOSIS: ICD-10-CM

## 2020-06-24 DIAGNOSIS — M54.17 RADICULOPATHY OF LUMBOSACRAL REGION: ICD-10-CM

## 2020-06-24 DIAGNOSIS — M51.37 DDD (DEGENERATIVE DISC DISEASE), LUMBOSACRAL: Primary | ICD-10-CM

## 2020-06-24 PROCEDURE — 3074F SYST BP LT 130 MM HG: CPT | Mod: HCNC,CPTII,S$GLB, | Performed by: REGISTERED NURSE

## 2020-06-24 PROCEDURE — 99999 PR PBB SHADOW E&M-EST. PATIENT-LVL IV: CPT | Mod: PBBFAC,HCNC,, | Performed by: REGISTERED NURSE

## 2020-06-24 PROCEDURE — 3074F PR MOST RECENT SYSTOLIC BLOOD PRESSURE < 130 MM HG: ICD-10-PCS | Mod: HCNC,CPTII,S$GLB, | Performed by: REGISTERED NURSE

## 2020-06-24 PROCEDURE — 1125F AMNT PAIN NOTED PAIN PRSNT: CPT | Mod: HCNC,S$GLB,, | Performed by: REGISTERED NURSE

## 2020-06-24 PROCEDURE — 1159F MED LIST DOCD IN RCRD: CPT | Mod: HCNC,S$GLB,, | Performed by: REGISTERED NURSE

## 2020-06-24 PROCEDURE — 1159F PR MEDICATION LIST DOCUMENTED IN MEDICAL RECORD: ICD-10-PCS | Mod: HCNC,S$GLB,, | Performed by: REGISTERED NURSE

## 2020-06-24 PROCEDURE — 3078F DIAST BP <80 MM HG: CPT | Mod: HCNC,CPTII,S$GLB, | Performed by: REGISTERED NURSE

## 2020-06-24 PROCEDURE — 1101F PR PT FALLS ASSESS DOC 0-1 FALLS W/OUT INJ PAST YR: ICD-10-PCS | Mod: HCNC,CPTII,S$GLB, | Performed by: REGISTERED NURSE

## 2020-06-24 PROCEDURE — 3078F PR MOST RECENT DIASTOLIC BLOOD PRESSURE < 80 MM HG: ICD-10-PCS | Mod: HCNC,CPTII,S$GLB, | Performed by: REGISTERED NURSE

## 2020-06-24 PROCEDURE — 99999 PR PBB SHADOW E&M-EST. PATIENT-LVL IV: ICD-10-PCS | Mod: PBBFAC,HCNC,, | Performed by: REGISTERED NURSE

## 2020-06-24 PROCEDURE — 1101F PT FALLS ASSESS-DOCD LE1/YR: CPT | Mod: HCNC,CPTII,S$GLB, | Performed by: REGISTERED NURSE

## 2020-06-24 PROCEDURE — 99213 OFFICE O/P EST LOW 20 MIN: CPT | Mod: HCNC,S$GLB,, | Performed by: REGISTERED NURSE

## 2020-06-24 PROCEDURE — 99213 PR OFFICE/OUTPT VISIT, EST, LEVL III, 20-29 MIN: ICD-10-PCS | Mod: HCNC,S$GLB,, | Performed by: REGISTERED NURSE

## 2020-06-24 PROCEDURE — 1125F PR PAIN SEVERITY QUANTIFIED, PAIN PRESENT: ICD-10-PCS | Mod: HCNC,S$GLB,, | Performed by: REGISTERED NURSE

## 2020-06-24 NOTE — PROGRESS NOTES
Mr. Yanez will be scheduled for right L5 + S1 TF LEAH on 07/08/2020.  Reviewed the pre-procedure instructions listed below with the patient- copy also provided.  Instructed patient to notify clinic if he begins feeling ill, runs a fever, is prescribed antibiotics, and/or has any outpatient procedures within the two weeks leading up to this procedure.  Instructed patient to report to [facility], 2nd Floor Endoscopy Registration Desk.  All questions answered- patient verbalized understanding.  Clearance to hold ASA and Plavix will be sent to Dr. Vargas.    Day of Procedure  - Ensure you have obtained an arrival time from the Pain Management Staff  o Procedure Area will call 1-3 days in advance with your arrival time.  Please check any voicemails received.  o If you arrive past your scheduled procedure time, you may be asked to reschedule your procedure.  - Ensure you have a  with you to remain present throughout your procedure for your safety.  o If you arrive without a responsible adult to stay with you and drive you home, you may be asked to reschedule your procedure.  - Take all of your prescribed medications (exceptions noted below) with a small amount of water  - This is NOT a fasting procedure, you may have a light meal before coming.  - Wear comfortable clothing (loose fitting pants).  - You may wear glasses, dentures, contact lenses, and/or hearing aids.   - Notify the nurse during the intake process if you are allergic to any medications, if you are diabetic, or if you are not feeling well  - Contact the Pain Management Clinic with the following:  o A fever greater than 100° (degrees)   o Feel ill, have any type of infection, or are taking antibiotics now or within the two (2) weeks leading up to this procedure  o Have any outpatient procedures within the two (2) weeks leading up to this procedure (colonoscopy, major dental work, etc.)    Diabetic Patients  - Please check your blood  sugar prior to coming in for your procedure.  If the value is greater than 200, contact the clinic [phone] as the procedure may need to be rescheduled.  The procedure may not be performed if your blood sugar is above 200 even after checking into the hospital.      IF you are taking blood thinners: Only upon receiving clearance and notification from the Pain Management Department  7 Days Prior to Your Procedure:  - Stop taking Plavix/Clopridogrel, Effient/Prasugel, ASA  5 Days Prior to Your Procedure:  - Stop taking Coumadin/Warfarin.  An INR may be drawn prior to your procedure.  If labs are required, you will need to arrive earlier than your scheduled arrival time.  - Stop taking Pradaxa/Dabigatra,  Brilinta/ Ticagrelor  3 Days Prior to Your Procedure:  - Stop taking Xarelto/Rivaroxaban, Eliquis/Apixaban, Aggrenox/Dipyridamole, Reopro/Abciximab

## 2020-06-24 NOTE — PROGRESS NOTES
Subjective:     Patient ID: Narciso Yanez is a 84 y.o. male    Chief Complaint: Hip Pain (Right hip to bottom of right foot)      Referred by: No ref. provider found      HPI:    Interval History NP (6/24/20):    Pt returns today for follow up. He states that he is still having pain to his right leg, radiating to the foot. The pain is unchanged in quality or location. Denies new or worsening symptoms. He reports that gabapentin has not helped. He took one pill and became very dizzy. He wishes to discontinue this medication. He would like to try an injection for the pain. He is no longer able to drive or sit for long periods of time because of his right leg. His son drove him to the appointment today; using a can for balance. He is currently on Plavix and a daily 81 mg aspirin.       Interval History (6/16/20):  He returns today for follow up.  He reports that physical therapy has not been helpful for the right lower extremity pain.  He denies any changes in the quality or location of the pain.  He denies any new or worsening symptoms.  He does state that physical therapy helped for a short appeared of time but this relief for off.  He continues on regular home exercise program but does not notice any long-term relief.  He states that he did take gabapentin for short period of time and found it helpful.  Initially this medicine did cause him to feel funny, but he states that after while this feeling when away.  He is interested in trying this medication again.      Interval History (4/22/19):  He returns today for follow up and MRI review.  He reports that his pain is unchanged in quality and location since last encounter.  He denies any new or worsening symptoms.  Patient states that he could not tolerate gabapentin and has discontinued his medication.      Initial Encounter (4/15/19):  Narciso Yanez is a 84 y.o. male who presents today with right lower extremity numbness and pain. This problem started about 5  weeks ago.  No specific inciting event or injury.  The pain/numbness starts in the right posterior hip region and radiates to the posterior thigh into the leg and foot.  He also reports weakness of the right lower extremity.  He denies any bowel or bladder dysfunction.  He denies any significant back pain associated with this problem.  The pain is not constant.  The pain is worsened with prolonged sitting and will improved with standing.   This pain is described in detail below.    Physical Therapy:  No.    Non-pharmacologic Treatment:  Standing helps         · TENS?  No    Pain Medications:         · Currently taking:  Tylenol    · Has tried in the past:  Flexeril, tramadol, gabapentin, Norco, Robaxin    · Has not tried:  NSAIDs,  TCAs, SNRIs, topical creams    Blood thinners:  Aspirin 81 mg a day, Plavix    Interventional Therapies:  None    Relevant Surgeries:  None    Affecting sleep?  No    Affecting daily activities? yes    Depressive symptoms? no          · SI/HI? No    Work status: Employed  Pain Scores:    Best:       4/10  Worst:    10/10  Usually:  6 /10  Today:    8/10    Review of Systems   Constitutional: Negative for activity change, appetite change, chills, fatigue, fever and unexpected weight change.   HENT: Negative for hearing loss.    Eyes: Negative for visual disturbance.   Respiratory: Negative for chest tightness and shortness of breath.    Cardiovascular: Negative for chest pain.   Gastrointestinal: Negative for abdominal pain, constipation, diarrhea, nausea and vomiting.   Genitourinary: Negative for difficulty urinating.   Musculoskeletal: Positive for arthralgias and gait problem. Negative for back pain and neck pain.   Skin: Negative for rash.   Neurological: Positive for weakness and numbness. Negative for dizziness, light-headedness and headaches.   Psychiatric/Behavioral: Negative for hallucinations, sleep disturbance and suicidal ideas. The patient is not nervous/anxious.        Past  Medical History:   Diagnosis Date    Actinic keratosis     Anxiety     B12 deficiency 12/8/2014    Dx 6/14    Cancer     skin    Cataract     Coronary artery disease     Diabetes mellitus type II     Diabetes mellitus with peripheral circulatory disorder 6/18/2014    ED (erectile dysfunction)     Hyperlipidemia     Hypertension     Kidney stone     Peripheral vascular disease     Spondylosis 3/29/2019    Tobacco dependence        Past Surgical History:   Procedure Laterality Date    APPENDECTOMY      CARDIAC SURGERY      CATARACT EXTRACTION      EXTERNAL EAR SURGERY      EYE SURGERY      cataracts    ILIAC ARTERY STENT Bilateral 06/2003    also Right External Iliac stent       Social History     Socioeconomic History    Marital status: Single     Spouse name: Not on file    Number of children: Not on file    Years of education: Not on file    Highest education level: Not on file   Occupational History    Not on file   Social Needs    Financial resource strain: Not on file    Food insecurity     Worry: Not on file     Inability: Not on file    Transportation needs     Medical: Not on file     Non-medical: Not on file   Tobacco Use    Smoking status: Current Every Day Smoker     Packs/day: 1.50     Years: 71.00     Pack years: 106.50     Types: Cigarettes    Smokeless tobacco: Never Used   Substance and Sexual Activity    Alcohol use: No    Drug use: No    Sexual activity: Not Currently   Lifestyle    Physical activity     Days per week: Not on file     Minutes per session: Not on file    Stress: Not on file   Relationships    Social connections     Talks on phone: Not on file     Gets together: Not on file     Attends Spiritism service: Not on file     Active member of club or organization: Not on file     Attends meetings of clubs or organizations: Not on file     Relationship status: Not on file   Other Topics Concern    Not on file   Social History Narrative    .  4  children.  Smokes 2 ppd.  Still drives trucks for entertainment industry.        Review of patient's allergies indicates:   Allergen Reactions    Demerol [meperidine] Nausea Only     Other reaction(s): Unknown       Current Outpatient Medications on File Prior to Visit   Medication Sig Dispense Refill    alendronate (FOSAMAX) 70 MG tablet Take 1 tablet (70 mg total) by mouth every 7 days. 4 tablet 11    ascorbic acid, vitamin C, (VITAMIN C) 250 MG tablet Take 1 tablet (250 mg total) by mouth once daily. 120 tablet 0    aspirin (ECOTRIN) 81 MG EC tablet Take 81 mg by mouth once daily.      blood sugar diagnostic Strp 1 strip by Misc.(Non-Drug; Combo Route) route 2 (two) times daily. Disp glucometer and lancets as well 100 strip 12    clopidogrel (PLAVIX) 75 mg tablet Take 1 tablet (75 mg total) by mouth once daily. 90 tablet 4    diazePAM (VALIUM) 5 MG tablet Take 1 tablet (5 mg total) by mouth every 8 (eight) hours as needed. 60 tablet 3    doxycycline (MONODOX) 100 MG capsule Take 1 capsule (100 mg total) by mouth 2 (two) times daily. 20 capsule 0    metFORMIN (GLUCOPHAGE-XR) 750 MG 24 hr tablet 2 a day 180 tablet 3    metoprolol succinate (TOPROL-XL) 25 MG 24 hr tablet TAKE ONE TABLET BY MOUTH ONCE DAILY 90 tablet 1    oxybutynin (DITROPAN-XL) 5 MG TR24 Take 1 tablet (5 mg total) by mouth once daily. 30 tablet 11    predniSONE (DELTASONE) 1 MG tablet Take 5 tablets (5 mg total) by mouth once daily. 150 tablet 12    rosuvastatin (CRESTOR) 20 MG tablet Take 1 tablet (20 mg total) by mouth every evening. 90 tablet 12    sildenafil (REVATIO) 20 mg Tab TAKE 1-5 TABLETS BY MOUTH once per DAILY AS NEEDED 30 tablet 11    tamsulosin (FLOMAX) 0.4 mg Cap Take 1 capsule (0.4 mg total) by mouth once daily. 30 capsule 11    umeclidinium (INCRUSE ELLIPTA) 62.5 mcg/actuation DsDv Inhale 1 each into the lungs once daily. Controller 30 each 5    albuterol (VENTOLIN HFA) 90 mcg/actuation inhaler Inhale 2 puffs into  the lungs every 6 (six) hours as needed for Wheezing or Shortness of Breath. Rescue 18 g 0    gabapentin (NEURONTIN) 300 MG capsule Take 2 capsules (600 mg total) by mouth 3 (three) times daily. (Patient not taking: Reported on 6/24/2020) 180 capsule 11    glimepiride (AMARYL) 2 MG tablet Take 1 tablet (2 mg total) by mouth once daily. 30 tablet 3    inhalation spacing device Use as directed for inhalation. (Patient not taking: Reported on 6/24/2020) 1 Device 0    nicotine (NICODERM CQ) 21 mg/24 hr Place 1 patch onto the skin once daily. (Patient not taking: Reported on 6/24/2020) 28 patch 0    nicotine polacrilex 2 MG Lozg Take 1-2 per hour in place of a cigarette. Limit to 15 a day - oral. (Patient not taking: Reported on 6/24/2020) 216 lozenge 0     No current facility-administered medications on file prior to visit.        Objective:      See flowsheet.     Exam:  GEN:  Well developed, well nourished.  No acute distress.   HEENT:  No trauma.  Mucous membranes moist.  Nares patent bilaterally.  PSYCH: Normal affect. Thought content appropriate.  CHEST:  Breathing symmetric.  No audible wheezing.  ABD: Soft, non-distended.  SKIN:  Warm, pink, dry.  No rash on exposed areas.    EXT:  No cyanosis, clubbing, or edema.  No color change or changes in nail or hair growth.  NEURO/MUSCULOSKELETAL:  Fully alert, oriented, and appropriate. Speech normal dottie. No cranial nerve deficits.   Gait:  Antalgic. With cane today.  No focal motor deficits.         Imaging:      Narrative & Impression    EXAMINATION:  MRI LUMBAR SPINE WITHOUT CONTRAST     CLINICAL HISTORY:  RIGHT SCIATICA; Sciatica, right side     TECHNIQUE:  Multiplanar, multisequence MR images were acquired from the thoracolumbar junction to the sacrum without the administration of contrast.     COMPARISON:  04/20/2019     FINDINGS:  The distal cord/conus demonstrates normal size and signal.  No evidence of osteomyelitis, marrow replacement process, or acute  fracture.  Right-sided parapelvic cyst noted.     At L3-4, there is asymmetric disc bulging to the right with small annular tear and mild facet hypertrophy.  This results in mild right-sided neural foraminal narrowing.  No spinal canal stenosis.     At L4-5, there is asymmetric disc bulging to the right with small annular tear and bilateral facet hypertrophy, resulting in mild spinal canal stenosis and mild bilateral neural foraminal narrowing.     At L5-S1, there is degenerative disc disease, noting disc height loss and sub endplate marrow changes.  There is mild disc bulging.  This results in mild bilateral neural foraminal narrowing.     The findings are similar to the prior exam.     Impression:     Lumbar spondylosis, resulting in mild spinal canal stenosis at L4-5, and mild neural foraminal narrowing from L3-4 through L5-S1, as above.        Electronically signed by: Denilson Dwyer MD  Date:                                            05/22/2020  Time:                                           10:44           Assessment:       Encounter Diagnoses   Name Primary?    DDD (degenerative disc disease), lumbosacral Yes    Lumbar radiculopathy     Spondylosis     Degenerative disc disease, lumbar     Radiculopathy of lumbosacral region          Plan:       Narciso was seen today for hip pain.    Diagnoses and all orders for this visit:    DDD (degenerative disc disease), lumbosacral  -     Case request GI: Injection, Steroid, Epidural Transforaminal    Lumbar radiculopathy  -     Case request GI: Injection, Steroid, Epidural Transforaminal    Spondylosis    Degenerative disc disease, lumbar    Radiculopathy of lumbosacral region        Narciso Yanez is a 84 y.o. male with right-sided lower extremity numbness and pain. Consistent with right lumbar radiculopathy though specific level of radiculopathy is unclear at this time. Lumbar MRI with some neural foraminal narrowing though no specific nerve root impingement.   No significant back pain. Low suspicion for facet or sacroiliac mediated pain. Here today to discuss injection options as gabapentin is not tolerated. We discussed a right TESI at L5 & S1. His son was present for the visit as well, both verbalized understanding.     1. Schedule right TESI @ L5 & S1  2. Stop gabapentin due to side effects of medication.   3. Obtain clearance by cardiologist to stop Plavix and baby aspirin 81 mg 7 days prior to procedure.   4. Return to clinic 2 weeks post procedure for follow-up.         COLLIN Mason, FNP-C  [facility]  Interventional Pain Management

## 2020-06-25 ENCOUNTER — CLINICAL SUPPORT (OUTPATIENT)
Dept: SMOKING CESSATION | Facility: CLINIC | Age: 85
End: 2020-06-25
Payer: COMMERCIAL

## 2020-06-25 DIAGNOSIS — F17.200 NICOTINE DEPENDENCE: Primary | ICD-10-CM

## 2020-06-25 PROCEDURE — 99402 PREV MED CNSL INDIV APPRX 30: CPT | Mod: S$GLB,,,

## 2020-06-25 PROCEDURE — 99999 PR PBB SHADOW E&M-EST. PATIENT-LVL I: CPT | Mod: PBBFAC,,,

## 2020-06-25 PROCEDURE — 99402 PR PREVENT COUNSEL,INDIV,30 MIN: ICD-10-PCS | Mod: S$GLB,,,

## 2020-06-25 PROCEDURE — 99999 PR PBB SHADOW E&M-EST. PATIENT-LVL I: ICD-10-PCS | Mod: PBBFAC,,,

## 2020-06-25 NOTE — PROGRESS NOTES
Individual Follow-Up Form    6/25/2020    Quit Date: TBD    Clinical Status of Patient: Outpatient    Length of Service: 30 minutes    Continuing Medication: no    Other Medications: none     Target Symptoms: Withdrawal and medication side effects. The following were  rated moderate (3) to severe (4) on TCRS:  · Moderate (3): none  · Severe (4): none    Comments: Telephonic visit.  Patient has not started using nrt patches, he states he has not been feeling very well. He states that he is trying to adjust to his new medications first before trying the patch. However patient does report that he cut his cigarette use down to 1 pack a day. Congratulated him on that achievement.  Will check with him next week , he has my contact information and understands he can call me at anytime.     Diagnosis: F17.200    Next Visit: 1 week

## 2020-06-30 ENCOUNTER — TELEPHONE (OUTPATIENT)
Dept: PAIN MEDICINE | Facility: CLINIC | Age: 85
End: 2020-06-30

## 2020-06-30 ENCOUNTER — PATIENT OUTREACH (OUTPATIENT)
Dept: ADMINISTRATIVE | Facility: OTHER | Age: 85
End: 2020-06-30

## 2020-06-30 DIAGNOSIS — Z01.818 PRE-OP TESTING: Primary | ICD-10-CM

## 2020-06-30 NOTE — TELEPHONE ENCOUNTER
Message left informing patient that today will be the last day he takes ASA and Plavix until after the procedure scheduled on 7/8/2020.  Asked that he contact the clinic to review all of the instructions- phone number included.

## 2020-06-30 NOTE — TELEPHONE ENCOUNTER
----- Message from Radha Louise MA sent at 6/24/2020  9:55 AM CDT -----  Regarding: FW: Clearance to hold Anticoagulation    ----- Message -----  From: Wiley Vargas MD  Sent: 6/24/2020   9:45 AM CDT  To: Radha Louise MA  Subject: RE: Clearance to hold Anticoagulation            OK to hold ASA/Plavix x7 days as requested.    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  The pt has f/u planned with me on 7/1/20 to discuss this.  His claudication was described as atypical and was not horribly limiting (yet).    In regards to DAPTs, please ask whenever needing to withold antiplt rx moving forward.    Best,  [facility]  ----- Message -----  From: Radha Louise MA  Sent: 6/24/2020   9:12 AM CDT  To: Wiley Vargas MD  Subject: FW: Clearance to hold Anticoagulation              ----- Message -----  From: Kayla Pedro RN  Sent: 6/24/2020   8:57 AM CDT  To: Sam Sauer  Subject: Clearance to hold Anticoagulation                Dr. Vargas,    Mr. Yanez is scheduled to have right L5 + S1 TF [facility]AH on 07/08/2020.  Dr. Crane is requesting that the patient stop taking ASA and Plavix 7 days prior to this procedure.  Please indicate whether or not he is able to stop taking the medications listed above.  Additionally, should the medications need to be held for future procedures, please indicate if this is okay or if you would like to be notified prior to each procedure individually.     Feel free to contact the clinic with any questions or concerns you may have.      Thank you in advance for your time and expertise,    IAM Dudley, RN

## 2020-06-30 NOTE — PROGRESS NOTES
Requested updates within Care Everywhere.  Patient's chart was reviewed for overdue IVANA topics.  Immunizations reconciled.

## 2020-07-01 ENCOUNTER — TELEPHONE (OUTPATIENT)
Dept: PAIN MEDICINE | Facility: CLINIC | Age: 85
End: 2020-07-01

## 2020-07-01 ENCOUNTER — OFFICE VISIT (OUTPATIENT)
Dept: CARDIOLOGY | Facility: CLINIC | Age: 85
End: 2020-07-01
Payer: MEDICARE

## 2020-07-01 VITALS
RESPIRATION RATE: 15 BRPM | DIASTOLIC BLOOD PRESSURE: 68 MMHG | SYSTOLIC BLOOD PRESSURE: 135 MMHG | WEIGHT: 165.38 LBS | HEIGHT: 67 IN | HEART RATE: 68 BPM | BODY MASS INDEX: 25.96 KG/M2 | OXYGEN SATURATION: 95 %

## 2020-07-01 DIAGNOSIS — I10 ESSENTIAL HYPERTENSION: ICD-10-CM

## 2020-07-01 DIAGNOSIS — I73.9 PAD (PERIPHERAL ARTERY DISEASE): ICD-10-CM

## 2020-07-01 DIAGNOSIS — F17.210 NICOTINE DEPENDENCE, CIGARETTES, UNCOMPLICATED: ICD-10-CM

## 2020-07-01 DIAGNOSIS — I25.810 CORONARY ARTERY DISEASE INVOLVING CORONARY BYPASS GRAFT OF NATIVE HEART WITHOUT ANGINA PECTORIS: Primary | ICD-10-CM

## 2020-07-01 DIAGNOSIS — N18.30 CKD (CHRONIC KIDNEY DISEASE) STAGE 3, GFR 30-59 ML/MIN: ICD-10-CM

## 2020-07-01 PROCEDURE — 3078F PR MOST RECENT DIASTOLIC BLOOD PRESSURE < 80 MM HG: ICD-10-PCS | Mod: HCNC,CPTII,S$GLB, | Performed by: INTERNAL MEDICINE

## 2020-07-01 PROCEDURE — 1101F PR PT FALLS ASSESS DOC 0-1 FALLS W/OUT INJ PAST YR: ICD-10-PCS | Mod: HCNC,CPTII,S$GLB, | Performed by: INTERNAL MEDICINE

## 2020-07-01 PROCEDURE — 1126F AMNT PAIN NOTED NONE PRSNT: CPT | Mod: HCNC,S$GLB,, | Performed by: INTERNAL MEDICINE

## 2020-07-01 PROCEDURE — 1159F MED LIST DOCD IN RCRD: CPT | Mod: HCNC,S$GLB,, | Performed by: INTERNAL MEDICINE

## 2020-07-01 PROCEDURE — 1159F PR MEDICATION LIST DOCUMENTED IN MEDICAL RECORD: ICD-10-PCS | Mod: HCNC,S$GLB,, | Performed by: INTERNAL MEDICINE

## 2020-07-01 PROCEDURE — 99214 OFFICE O/P EST MOD 30 MIN: CPT | Mod: HCNC,S$GLB,, | Performed by: INTERNAL MEDICINE

## 2020-07-01 PROCEDURE — 99999 PR PBB SHADOW E&M-EST. PATIENT-LVL V: CPT | Mod: PBBFAC,HCNC,, | Performed by: INTERNAL MEDICINE

## 2020-07-01 PROCEDURE — 3078F DIAST BP <80 MM HG: CPT | Mod: HCNC,CPTII,S$GLB, | Performed by: INTERNAL MEDICINE

## 2020-07-01 PROCEDURE — 99214 PR OFFICE/OUTPT VISIT, EST, LEVL IV, 30-39 MIN: ICD-10-PCS | Mod: HCNC,S$GLB,, | Performed by: INTERNAL MEDICINE

## 2020-07-01 PROCEDURE — 1126F PR PAIN SEVERITY QUANTIFIED, NO PAIN PRESENT: ICD-10-PCS | Mod: HCNC,S$GLB,, | Performed by: INTERNAL MEDICINE

## 2020-07-01 PROCEDURE — 1101F PT FALLS ASSESS-DOCD LE1/YR: CPT | Mod: HCNC,CPTII,S$GLB, | Performed by: INTERNAL MEDICINE

## 2020-07-01 PROCEDURE — 3075F PR MOST RECENT SYSTOLIC BLOOD PRESS GE 130-139MM HG: ICD-10-PCS | Mod: HCNC,CPTII,S$GLB, | Performed by: INTERNAL MEDICINE

## 2020-07-01 PROCEDURE — 99999 PR PBB SHADOW E&M-EST. PATIENT-LVL V: ICD-10-PCS | Mod: PBBFAC,HCNC,, | Performed by: INTERNAL MEDICINE

## 2020-07-01 PROCEDURE — 3075F SYST BP GE 130 - 139MM HG: CPT | Mod: HCNC,CPTII,S$GLB, | Performed by: INTERNAL MEDICINE

## 2020-07-01 NOTE — PROGRESS NOTES
CARDIOVASCULAR PROGRESS NOTE    REASON FOR CONSULT:   Narciso Yanez is a 84 y.o. male who presents for ongoing mgmt of CAD/PAD.    PCP: Ehrensing  HISTORY OF PRESENT ILLNESS:   The patient returns for follow-up of his testing.  He continues to describe limiting pain of his right hamstring and calf with ambulation.  He denies any symptoms of the left leg.  He states he can only walk approximately half block before having to stop.  He otherwise denies intercurrent angina, dyspnea, palpitations, or syncope.  There has been no PND, orthopnea, or lower extremity edema.  Denies any melena, or hematuria.  He plans to undergo epidural steroid injection for which his aspirin and Plavix have been discontinued by pain medicine.  I suggested that if he continues to have symptoms after this procedure, we might consider moving forward with angiography.  Unfortunately, patient does continue to smoke cigarettes and I have strongly encouraged to quit smoking.  He tells me he has cut back from 2 packs a day to 1.    CARDIOVASCULAR HISTORY:   CAD/CABG 1/10/1999: CABG with LIMA-LAD, SVG-LCx, SVG-RCA ([facility] records)    5/1999: Angio revealed Patent LIMA-LAD, SVGx2 occluded ([facility] records)    PAD s/p LE PTA (6/2003: PTA with bilat YUE stents and R EIA stent)    PAST MEDICAL HISTORY:     Past Medical History:   Diagnosis Date    Actinic keratosis     Anxiety     B12 deficiency 12/8/2014    Dx 6/14    Cancer     skin    Cataract     Coronary artery disease     Diabetes mellitus type II     Diabetes mellitus with peripheral circulatory disorder 6/18/2014    ED (erectile dysfunction)     Hyperlipidemia     Hypertension     Kidney stone     Peripheral vascular disease     Spondylosis 3/29/2019    Tobacco dependence        PAST SURGICAL HISTORY:     Past Surgical History:   Procedure Laterality Date    APPENDECTOMY      CARDIAC SURGERY      CATARACT EXTRACTION      EXTERNAL EAR SURGERY      EYE SURGERY      cataracts     ILIAC ARTERY STENT Bilateral 06/2003    also Right External Iliac stent       ALLERGIES AND MEDICATION:     Review of patient's allergies indicates:   Allergen Reactions    Demerol  [meperidine]      Other reaction(s): Unknown     Previous Medications    ALBUTEROL (VENTOLIN HFA) 90 MCG/ACTUATION INHALER    Inhale 2 puffs into the lungs every 6 (six) hours as needed for Wheezing or Shortness of Breath. Rescue    ALENDRONATE (FOSAMAX) 70 MG TABLET    Take 1 tablet (70 mg total) by mouth every 7 days.    ASCORBIC ACID, VITAMIN C, (VITAMIN C) 250 MG TABLET    Take 1 tablet (250 mg total) by mouth once daily.    ASPIRIN (ECOTRIN) 81 MG EC TABLET    Take 81 mg by mouth once daily.    BLOOD SUGAR DIAGNOSTIC STRP    1 strip by Misc.(Non-Drug; Combo Route) route 2 (two) times daily. Disp glucometer and lancets as well    CLOPIDOGREL (PLAVIX) 75 MG TABLET    Take 1 tablet (75 mg total) by mouth once daily.    DIAZEPAM (VALIUM) 5 MG TABLET    Take 1 tablet (5 mg total) by mouth every 8 (eight) hours as needed.    DOXYCYCLINE (MONODOX) 100 MG CAPSULE    Take 1 capsule (100 mg total) by mouth 2 (two) times daily.    GLIMEPIRIDE (AMARYL) 2 MG TABLET    Take 1 tablet (2 mg total) by mouth once daily.    INHALATION SPACING DEVICE    Use as directed for inhalation.    METFORMIN (GLUCOPHAGE-XR) 750 MG 24 HR TABLET    2 a day    METOPROLOL SUCCINATE (TOPROL-XL) 25 MG 24 HR TABLET    TAKE ONE TABLET BY MOUTH ONCE DAILY    NICOTINE (NICODERM CQ) 21 MG/24 HR    Place 1 patch onto the skin once daily.    NICOTINE POLACRILEX 2 MG LOZG    Take 1-2 per hour in place of a cigarette. Limit to 15 a day - oral.    OXYBUTYNIN (DITROPAN-XL) 5 MG TR24    Take 1 tablet (5 mg total) by mouth once daily.    PREDNISONE (DELTASONE) 1 MG TABLET    Take 5 tablets (5 mg total) by mouth once daily.    ROSUVASTATIN (CRESTOR) 20 MG TABLET    Take 1 tablet (20 mg total) by mouth every evening.    SILDENAFIL (REVATIO) 20 MG TAB    TAKE 1-5 TABLETS BY MOUTH  once per DAILY AS NEEDED    TAMSULOSIN (FLOMAX) 0.4 MG CAP    Take 1 capsule (0.4 mg total) by mouth once daily.    UMECLIDINIUM (INCRUSE ELLIPTA) 62.5 MCG/ACTUATION DSDV    Inhale 1 each into the lungs once daily. Controller       SOCIAL HISTORY:     Social History     Socioeconomic History    Marital status: Single     Spouse name: Not on file    Number of children: Not on file    Years of education: Not on file    Highest education level: Not on file   Occupational History    Not on file   Social Needs    Financial resource strain: Not on file    Food insecurity     Worry: Not on file     Inability: Not on file    Transportation needs     Medical: Not on file     Non-medical: Not on file   Tobacco Use    Smoking status: Current Every Day Smoker     Packs/day: 1.50     Years: 71.00     Pack years: 106.50     Types: Cigarettes    Smokeless tobacco: Never Used   Substance and Sexual Activity    Alcohol use: No    Drug use: No    Sexual activity: Not Currently   Lifestyle    Physical activity     Days per week: Not on file     Minutes per session: Not on file    Stress: Not on file   Relationships    Social connections     Talks on phone: Not on file     Gets together: Not on file     Attends [employer] service: Not on file     Active member of club or organization: Not on file     Attends meetings of clubs or organizations: Not on file     Relationship status: Not on file   Other Topics Concern    Not on file   Social History Narrative    .  4 children.  Smokes 2 ppd.  Still drives trucks for entertainment industry.        FAMILY HISTORY:     Family History   Problem Relation Age of Onset    Stroke Mother        REVIEW OF SYSTEMS:   Review of Systems   Constitutional: Negative for chills, diaphoresis and fever.   HENT: Negative for nosebleeds.    Eyes: Negative for blurred vision, double vision and photophobia.   Respiratory: Negative for hemoptysis, shortness of breath and wheezing.   "  Cardiovascular: Positive for claudication (?atyp RLE pain). Negative for chest pain, palpitations, orthopnea, leg swelling and PND.   Gastrointestinal: Negative for abdominal pain, blood in stool, heartburn, melena, nausea and vomiting.   Genitourinary: Negative for flank pain and hematuria.   Musculoskeletal: Negative for falls, myalgias and neck pain.   Skin: Negative for rash.   Neurological: Negative for dizziness, seizures, loss of consciousness, weakness and headaches.   Endo/Heme/Allergies: Negative for polydipsia. Does not bruise/bleed easily.   Psychiatric/Behavioral: Negative for depression and memory loss. The patient is not nervous/anxious.        PHYSICAL EXAM:     Vitals:    07/01/20 1627   BP: 135/68   Pulse: 68   Resp: 15    Body mass index is 25.9 kg/m².  Weight: 75 kg (165 lb 5.5 oz)   Height: 5' 7" (170.2 cm)     Physical Exam  Vitals signs reviewed.   Constitutional:       General: He is not in acute distress.     Appearance: Normal appearance. He is well-developed and normal weight. He is not toxic-appearing or diaphoretic.   HENT:      Head: Normocephalic and atraumatic.   Eyes:      General: No scleral icterus.     Conjunctiva/sclera: Conjunctivae normal.      Pupils: Pupils are equal, round, and reactive to light.   Neck:      Musculoskeletal: Normal range of motion and neck supple. No edema or neck rigidity.      Thyroid: No thyromegaly.      Vascular: Normal carotid pulses. No carotid bruit or JVD.      Trachea: Trachea normal. No tracheal deviation.   Cardiovascular:      Rate and Rhythm: Normal rate and regular rhythm.      Chest Wall: PMI is not displaced.      Pulses:           Carotid pulses are 2+ on the right side and 2+ on the left side.       Posterior tibial pulses are 0 on the right side and 0 on the left side.      Heart sounds: S1 normal and S2 normal. No murmur. No friction rub. No gallop.    Pulmonary:      Effort: Pulmonary effort is normal. No respiratory distress.      " Breath sounds: Normal breath sounds. No stridor. No wheezing, rhonchi or rales.   Chest:      Chest wall: No tenderness.   Abdominal:      General: There is no distension.      Palpations: Abdomen is soft.   Musculoskeletal: Normal range of motion.         General: No swelling or tenderness.   Feet:      Right foot:      Skin integrity: No ulcer.      Left foot:      Skin integrity: No ulcer.   Skin:     General: Skin is warm and dry.      Findings: No erythema or rash.   Neurological:      Mental Status: He is alert and oriented to person, place, and time.      Cranial Nerves: No cranial nerve deficit.   Psychiatric:         Speech: Speech normal.         Behavior: Behavior normal. Behavior is cooperative.         DATA:   EKG: (personally reviewed tracing)  6/9/20 SR 77    Laboratory:  CBC:  Recent Labs   Lab 05/21/19  1139 10/11/19  1411 03/20/20  1110   WBC 11.70 10.62 9.05   Hemoglobin 11.5 L 12.9 L 13.3 L   Hematocrit 35.7 L 41.0 42.0   Platelets 356 H 220 252       CHEMISTRIES:  Recent Labs   Lab 10/11/19  1411 12/19/19  1436 03/20/20  1110   Glucose 191 H 141 H 176 H   Sodium 140 138 140   Potassium 4.7 4.5 4.2   BUN, Bld 17 18 16   Creatinine 1.2 1.3 1.5 H   eGFR if  >60.0 58.7 A 49 A   eGFR if non  56.0 A 50.8 A 43 A   Calcium 9.4 9.5 9.2       CARDIAC BIOMARKERS:        COAGS:  Recent Labs   Lab 07/13/17  2051   INR 1.0       LIPIDS/LFTS:  Recent Labs   Lab 07/19/17  0758  12/17/18  1035 05/21/19  1139 10/11/19  1411 03/20/20  1110   Cholesterol 132  --  130  --   --  147   Triglycerides 95  --  66  --   --  168 H   HDL 39 L  --  46  --   --  37 L   LDL Cholesterol 74.0  --  70.8  --   --  76.4   Non-HDL Cholesterol 93  --  84  --   --  110   AST  --    < > 26 17 21 26   ALT  --    < > 16 11 18 20    < > = values in this interval not displayed.       Cardiovascular Testing:  Aortic US 6/16/20  No evidence of AAA.  Increased flow velocity across bilat YUE stents suggesting  >50% stenosis.    LE art US/LACIE 6/16/20  Bilat YUE stents appear patent.  However, increased flow velocity noted across these stents (when compared to distal abd aortic velocity on Abd Aortic US of same date) suggesting >50% bilat YUE ISR.  Otherwise, no evidence of hemodynamically significant infrainguinal PAD bilaterally.  Predominantly tri- and biphasic waveforms throughout.  Mildly decreased LACIE bilaterally (R 0.75, L 0.68).  Consider CTA/MRA or angio for further eval if clinically warranted.    Ex MPI 12/20/17 (low workload, drop in BP (144->105 systolic) noted during ETT)  The patient exercised for 3.02 minutes on a Sixto protocol, corresponding to a functional capacity of 5 estimated METS, achieving a peak heart rate of 126 bpm, which is 90% of the age predicted maximum heart rate. -CP. At peak exercise, EKG revealed < 1mm of horizontal ST segment depression at a maximum heart rate of 126 bpm.   Nuclear Quantitative Functional Analysis:   LVEF: 61 %  LVED Volume: 59 ml  LVES Volume: 23 ml  Impression: NORMAL MYOCARDIAL PERFUSION  1. The perfusion scan is free of evidence for myocardial ischemia or injury.   2. Resting wall motion is physiologic.   3. Resting LV function is normal.   4. The ventricular volumes are normal at rest and stress.   5. The extracardiac distribution of radioactivity is normal.     Echo 12/20/17    1 - Normal left ventricular systolic function (EF 55-60%).     2 - No wall motion abnormalities.     3 - Concentric remodeling.     4 - Trivial tricuspid regurgitation.     5 - The estimated PA systolic pressure is 27 mmHg.     [facility]:  1/[facility]: CABG with LIMA-LAD, SVG-LCx, SVG-RCA  5/1999: Angio revealed Patent LIMA-LAD, SVGx2 occluded  6/2003: PTA with bilat YUE stents and R EIA stent      ASSESSMENT:   # CAD s/p CABG, MPI/echo 12/2017 normal (although low exercise capacity and ?hypotensive response during TMET), asymptomatic.  # PAD s/p PTA bilat YUE and R EIA 6/2003.  Pt  now complaining of atyp RLE claudicant sxs.  Aortic/LE art US 6/2020 suggest significant bilat YUE ISR.  # HTN, controlled  # HLP on crestor 20mg  # Tob abuse, pt uninterested in cessation, but is willing to consider the smoking cessation program.  Again encouraged to quit.  # CKD3  # DM    PLAN:   Cont med rx  OK to hold ASA/Plavix for planned LEAH.  Would ask that they both be resumed ASAP after procedure.  Amb smoking cessation program suggested again.  RTC 1 month.  If persistent limiting claudication will consider angio for eval of aortoiliac ISR.    Wiley Vargas MD, FACC

## 2020-07-01 NOTE — TELEPHONE ENCOUNTER
Called pt regarding stopping anticoagulants for procedure scheduled on 7/8, No answer, left vm with information. ASA and Plavix should have been held starting today.

## 2020-07-02 ENCOUNTER — TELEPHONE (OUTPATIENT)
Dept: PAIN MEDICINE | Facility: CLINIC | Age: 85
End: 2020-07-02

## 2020-07-02 NOTE — TELEPHONE ENCOUNTER
Mr. Yanez is having phone issues and is unable to use it at this time.  Spoke with patient's daughter regarding procedure scheduled on 7/8/2020.  Per cardiologist Mr. Yanez will need an angiogram due to vascular issue.  The TF LEAH will be cancelled at this time.  Encouraged daughter to have patient contact us in the future if needed.

## 2020-07-07 ENCOUNTER — TELEPHONE (OUTPATIENT)
Dept: SMOKING CESSATION | Facility: CLINIC | Age: 85
End: 2020-07-07

## 2020-07-07 NOTE — TELEPHONE ENCOUNTER
[facility]- called patient  for  telephonic appointment. Left message to call back to reschedule [phone] or  [phone].

## 2020-07-16 ENCOUNTER — PES CALL (OUTPATIENT)
Dept: ADMINISTRATIVE | Facility: CLINIC | Age: 85
End: 2020-07-16

## 2020-07-16 ENCOUNTER — OFFICE VISIT (OUTPATIENT)
Dept: CARDIOLOGY | Facility: CLINIC | Age: 85
End: 2020-07-16
Payer: MEDICARE

## 2020-07-16 VITALS
OXYGEN SATURATION: 96 % | RESPIRATION RATE: 15 BRPM | HEART RATE: 88 BPM | DIASTOLIC BLOOD PRESSURE: 60 MMHG | WEIGHT: 163.13 LBS | HEIGHT: 67 IN | SYSTOLIC BLOOD PRESSURE: 112 MMHG | BODY MASS INDEX: 25.6 KG/M2

## 2020-07-16 DIAGNOSIS — N18.30 CKD (CHRONIC KIDNEY DISEASE) STAGE 3, GFR 30-59 ML/MIN: ICD-10-CM

## 2020-07-16 DIAGNOSIS — R94.8 ABNORMAL RESULTS OF FUNCTION STUDIES OF OTHER ORGANS AND SYSTEMS: ICD-10-CM

## 2020-07-16 DIAGNOSIS — F17.210 NICOTINE DEPENDENCE, CIGARETTES, UNCOMPLICATED: ICD-10-CM

## 2020-07-16 DIAGNOSIS — E11.51 DIABETES MELLITUS WITH PERIPHERAL CIRCULATORY DISORDER: ICD-10-CM

## 2020-07-16 DIAGNOSIS — I73.9 PAD (PERIPHERAL ARTERY DISEASE): Primary | ICD-10-CM

## 2020-07-16 DIAGNOSIS — I73.9 CLAUDICATION OF BOTH LOWER EXTREMITIES: ICD-10-CM

## 2020-07-16 DIAGNOSIS — I10 ESSENTIAL HYPERTENSION: ICD-10-CM

## 2020-07-16 DIAGNOSIS — I25.810 CORONARY ARTERY DISEASE INVOLVING CORONARY BYPASS GRAFT OF NATIVE HEART WITHOUT ANGINA PECTORIS: ICD-10-CM

## 2020-07-16 DIAGNOSIS — I73.9 PAD (PERIPHERAL ARTERY DISEASE): ICD-10-CM

## 2020-07-16 PROCEDURE — 1125F PR PAIN SEVERITY QUANTIFIED, PAIN PRESENT: ICD-10-PCS | Mod: HCNC,S$GLB,, | Performed by: INTERNAL MEDICINE

## 2020-07-16 PROCEDURE — 3078F PR MOST RECENT DIASTOLIC BLOOD PRESSURE < 80 MM HG: ICD-10-PCS | Mod: HCNC,CPTII,S$GLB, | Performed by: INTERNAL MEDICINE

## 2020-07-16 PROCEDURE — 3074F PR MOST RECENT SYSTOLIC BLOOD PRESSURE < 130 MM HG: ICD-10-PCS | Mod: HCNC,CPTII,S$GLB, | Performed by: INTERNAL MEDICINE

## 2020-07-16 PROCEDURE — 1101F PR PT FALLS ASSESS DOC 0-1 FALLS W/OUT INJ PAST YR: ICD-10-PCS | Mod: HCNC,CPTII,S$GLB, | Performed by: INTERNAL MEDICINE

## 2020-07-16 PROCEDURE — 3078F DIAST BP <80 MM HG: CPT | Mod: HCNC,CPTII,S$GLB, | Performed by: INTERNAL MEDICINE

## 2020-07-16 PROCEDURE — 1101F PT FALLS ASSESS-DOCD LE1/YR: CPT | Mod: HCNC,CPTII,S$GLB, | Performed by: INTERNAL MEDICINE

## 2020-07-16 PROCEDURE — 1125F AMNT PAIN NOTED PAIN PRSNT: CPT | Mod: HCNC,S$GLB,, | Performed by: INTERNAL MEDICINE

## 2020-07-16 PROCEDURE — 3074F SYST BP LT 130 MM HG: CPT | Mod: HCNC,CPTII,S$GLB, | Performed by: INTERNAL MEDICINE

## 2020-07-16 PROCEDURE — 1159F PR MEDICATION LIST DOCUMENTED IN MEDICAL RECORD: ICD-10-PCS | Mod: HCNC,S$GLB,, | Performed by: INTERNAL MEDICINE

## 2020-07-16 PROCEDURE — 99999 PR PBB SHADOW E&M-EST. PATIENT-LVL V: CPT | Mod: PBBFAC,HCNC,, | Performed by: INTERNAL MEDICINE

## 2020-07-16 PROCEDURE — 99215 OFFICE O/P EST HI 40 MIN: CPT | Mod: HCNC,S$GLB,, | Performed by: INTERNAL MEDICINE

## 2020-07-16 PROCEDURE — 99215 PR OFFICE/OUTPT VISIT, EST, LEVL V, 40-54 MIN: ICD-10-PCS | Mod: HCNC,S$GLB,, | Performed by: INTERNAL MEDICINE

## 2020-07-16 PROCEDURE — 99999 PR PBB SHADOW E&M-EST. PATIENT-LVL V: ICD-10-PCS | Mod: PBBFAC,HCNC,, | Performed by: INTERNAL MEDICINE

## 2020-07-16 PROCEDURE — 3052F HG A1C>EQUAL 8.0%<EQUAL 9.0%: CPT | Mod: HCNC,CPTII,S$GLB, | Performed by: INTERNAL MEDICINE

## 2020-07-16 PROCEDURE — 1159F MED LIST DOCD IN RCRD: CPT | Mod: HCNC,S$GLB,, | Performed by: INTERNAL MEDICINE

## 2020-07-16 PROCEDURE — 3052F PR MOST RECENT HEMOGLOBIN A1C LEVEL 8.0 - < 9.0%: ICD-10-PCS | Mod: HCNC,CPTII,S$GLB, | Performed by: INTERNAL MEDICINE

## 2020-07-16 RX ORDER — SODIUM CHLORIDE 9 MG/ML
3 INJECTION, SOLUTION INTRAVENOUS CONTINUOUS
Status: CANCELLED | OUTPATIENT
Start: 2020-07-29 | End: 2020-07-29

## 2020-07-16 NOTE — PROGRESS NOTES
CARDIOVASCULAR PROGRESS NOTE    REASON FOR CONSULT:   Narciso Yanez is a 84 y.o. male who presents for ongoing mgmt of CAD/PAD.    PCP: Ehrensing  HISTORY OF PRESENT ILLNESS:   The patient returns for follow-up, accompanied by his daughter.  He continues to describe limiting right greater than left calf claudication at approximately 1/2 block.  He has had no resting pain.  He has been enrolled in the Ambulatory smoking cessation program, but has not started using the nicotine patches, nor has he stop smoking, although he states that he has cut down significantly.  I have strongly encouraged to quit smoking completely.  He otherwise denies intercurrent angina, dyspnea, palpitations, or syncope.  There has been no PND, orthopnea, melena, or hematuria.  He continues to take dual antiplatelet therapy without complications.    The pros and cons of lower extremity angiography were discussed with the patient and his daughter in attendance.  Given his renal insufficiency, I plan to perform diagnostic angiography via the radial approach, with likely staged intervention assuming we find significant PAD.    CARDIOVASCULAR HISTORY:   CAD/CABG 1/10/1999: CABG with LIMA-LAD, SVG-LCx, SVG-RCA ([facility] records)    5/1999: Angio revealed Patent LIMA-LAD, SVGx2 occluded ([facility] records)    PAD s/p LE PTA (6/2003: PTA with bilat YUE stents and R EIA stent)    PAST MEDICAL HISTORY:     Past Medical History:   Diagnosis Date    Actinic keratosis     Anxiety     B12 deficiency 12/8/2014    Dx 6/14    Cancer     skin    Cataract     Coronary artery disease     Diabetes mellitus type II     Diabetes mellitus with peripheral circulatory disorder 6/18/2014    ED (erectile dysfunction)     Hyperlipidemia     Hypertension     Kidney stone     Peripheral vascular disease     Spondylosis 3/29/2019    Tobacco dependence        PAST SURGICAL HISTORY:     Past Surgical History:   Procedure Laterality Date    APPENDECTOMY      CARDIAC  SURGERY      CATARACT EXTRACTION      EXTERNAL EAR SURGERY      EYE SURGERY      cataracts    ILIAC ARTERY STENT Bilateral 06/2003    also Right External Iliac stent       ALLERGIES AND MEDICATION:     Review of patient's allergies indicates:   Allergen Reactions    Demerol  [meperidine]      Other reaction(s): Unknown     Previous Medications    ALBUTEROL (VENTOLIN HFA) 90 MCG/ACTUATION INHALER    Inhale 2 puffs into the lungs every 6 (six) hours as needed for Wheezing or Shortness of Breath. Rescue    ALENDRONATE (FOSAMAX) 70 MG TABLET    Take 1 tablet (70 mg total) by mouth every 7 days.    ASCORBIC ACID, VITAMIN C, (VITAMIN C) 250 MG TABLET    Take 1 tablet (250 mg total) by mouth once daily.    ASPIRIN (ECOTRIN) 81 MG EC TABLET    Take 81 mg by mouth once daily.    BLOOD SUGAR DIAGNOSTIC STRP    1 strip by Misc.(Non-Drug; Combo Route) route 2 (two) times daily. Disp glucometer and lancets as well    CLOPIDOGREL (PLAVIX) 75 MG TABLET    Take 1 tablet (75 mg total) by mouth once daily.    DIAZEPAM (VALIUM) 5 MG TABLET    Take 1 tablet (5 mg total) by mouth every 8 (eight) hours as needed.    DOXYCYCLINE (MONODOX) 100 MG CAPSULE    Take 1 capsule (100 mg total) by mouth 2 (two) times daily.    GLIMEPIRIDE (AMARYL) 2 MG TABLET    Take 1 tablet (2 mg total) by mouth once daily.    INHALATION SPACING DEVICE    Use as directed for inhalation.    METFORMIN (GLUCOPHAGE-XR) 750 MG 24 HR TABLET    2 a day    METOPROLOL SUCCINATE (TOPROL-XL) 25 MG 24 HR TABLET    TAKE ONE TABLET BY MOUTH ONCE DAILY    NICOTINE (NICODERM CQ) 21 MG/24 HR    Place 1 patch onto the skin once daily.    NICOTINE POLACRILEX 2 MG LOZG    Take 1-2 per hour in place of a cigarette. Limit to 15 a day - oral.    OXYBUTYNIN (DITROPAN-XL) 5 MG TR24    Take 1 tablet (5 mg total) by mouth once daily.    PREDNISONE (DELTASONE) 1 MG TABLET    Take 5 tablets (5 mg total) by mouth once daily.    ROSUVASTATIN (CRESTOR) 20 MG TABLET    Take 1 tablet (20 mg  total) by mouth every evening.    SILDENAFIL (REVATIO) 20 MG TAB    TAKE 1-5 TABLETS BY MOUTH once per DAILY AS NEEDED    TAMSULOSIN (FLOMAX) 0.4 MG CAP    Take 1 capsule (0.4 mg total) by mouth once daily.    UMECLIDINIUM (INCRUSE ELLIPTA) 62.5 MCG/ACTUATION DSDV    Inhale 1 each into the lungs once daily. Controller       SOCIAL HISTORY:     Social History     Socioeconomic History    Marital status: Single     Spouse name: Not on file    Number of children: Not on file    Years of education: Not on file    Highest education level: Not on file   Occupational History    Not on file   Social Needs    Financial resource strain: Not on file    Food insecurity     Worry: Not on file     Inability: Not on file    Transportation needs     Medical: Not on file     Non-medical: Not on file   Tobacco Use    Smoking status: Current Every Day Smoker     Packs/day: 1.50     Years: 71.00     Pack years: 106.50     Types: Cigarettes    Smokeless tobacco: Never Used   Substance and Sexual Activity    Alcohol use: No    Drug use: No    Sexual activity: Not Currently   Lifestyle    Physical activity     Days per week: Not on file     Minutes per session: Not on file    Stress: Not on file   Relationships    Social connections     Talks on phone: Not on file     Gets together: Not on file     Attends Restorationist service: Not on file     Active member of club or organization: Not on file     Attends meetings of clubs or organizations: Not on file     Relationship status: Not on file   Other Topics Concern    Not on file   Social History Narrative    .  4 children.  Smokes 2 ppd.  Still drives trucks for entertainment industry.        FAMILY HISTORY:     Family History   Problem Relation Age of Onset    Stroke Mother        REVIEW OF SYSTEMS:   Review of Systems   Constitutional: Negative for chills, diaphoresis and fever.   HENT: Negative for nosebleeds.    Eyes: Negative for blurred vision, double vision and  "photophobia.   Respiratory: Negative for hemoptysis, shortness of breath and wheezing.    Cardiovascular: Positive for claudication (R>L). Negative for chest pain, palpitations, orthopnea, leg swelling and PND.   Gastrointestinal: Negative for abdominal pain, blood in stool, heartburn, melena, nausea and vomiting.   Genitourinary: Negative for flank pain and hematuria.   Musculoskeletal: Negative for falls, myalgias and neck pain.   Skin: Negative for rash.   Neurological: Negative for dizziness, seizures, loss of consciousness, weakness and headaches.   Endo/Heme/Allergies: Negative for polydipsia. Does not bruise/bleed easily.   Psychiatric/Behavioral: Negative for depression and memory loss. The patient is not nervous/anxious.        PHYSICAL EXAM:     Vitals:    07/16/20 1254   BP: 112/60   Pulse: 88   Resp: 15    Body mass index is 25.55 kg/m².  Weight: 74 kg (163 lb 2.3 oz)   Height: 5' 7" (170.2 cm)     Physical Exam  Vitals signs reviewed.   Constitutional:       General: He is not in acute distress.     Appearance: Normal appearance. He is well-developed and normal weight. He is not ill-appearing, toxic-appearing or diaphoretic.   HENT:      Head: Normocephalic and atraumatic.   Eyes:      General: No scleral icterus.     Conjunctiva/sclera: Conjunctivae normal.      Pupils: Pupils are equal, round, and reactive to light.   Neck:      Musculoskeletal: Normal range of motion and neck supple. No edema or neck rigidity.      Thyroid: No thyromegaly.      Vascular: Normal carotid pulses. No carotid bruit or JVD.      Trachea: Trachea normal. No tracheal deviation.   Cardiovascular:      Rate and Rhythm: Normal rate and regular rhythm.      Chest Wall: PMI is not displaced.      Pulses:           Carotid pulses are 2+ on the right side and 2+ on the left side.       Radial pulses are 2+ on the right side.        Posterior tibial pulses are 0 on the right side and 0 on the left side.      Heart sounds: S1 normal " and S2 normal. No murmur. No friction rub. No gallop.    Pulmonary:      Effort: Pulmonary effort is normal. No respiratory distress.      Breath sounds: Normal breath sounds. No stridor. No wheezing, rhonchi or rales.   Chest:      Chest wall: No tenderness.   Abdominal:      General: There is no distension.      Palpations: Abdomen is soft.   Musculoskeletal: Normal range of motion.         General: No swelling or tenderness.   Feet:      Right foot:      Skin integrity: No ulcer.      Left foot:      Skin integrity: No ulcer.   Skin:     General: Skin is warm and dry.      Findings: No erythema or rash.   Neurological:      Mental Status: He is alert and oriented to person, place, and time.      Cranial Nerves: No cranial nerve deficit.   Psychiatric:         Speech: Speech normal.         Behavior: Behavior normal. Behavior is cooperative.         DATA:   EKG: (personally reviewed tracing)  6/9/20 SR 77    Laboratory:  CBC:  Recent Labs   Lab 05/21/19  1139 10/11/19  1411 03/20/20  1110   WBC 11.70 10.62 9.05   Hemoglobin 11.5 L 12.9 L 13.3 L   Hematocrit 35.7 L 41.0 42.0   Platelets 356 H 220 252       CHEMISTRIES:  Recent Labs   Lab 10/11/19  1411 12/19/19  1436 03/20/20  1110   Glucose 191 H 141 H 176 H   Sodium 140 138 140   Potassium 4.7 4.5 4.2   BUN, Bld 17 18 16   Creatinine 1.2 1.3 1.5 H   eGFR if  >60.0 58.7 A 49 A   eGFR if non  56.0 A 50.8 A 43 A   Calcium 9.4 9.5 9.2       CARDIAC BIOMARKERS:        COAGS:        LIPIDS/LFTS:  Recent Labs   Lab 07/19/17  0758  12/17/18  1035 05/21/19  1139 10/11/19  1411 03/20/20  1110   Cholesterol 132  --  130  --   --  147   Triglycerides 95  --  66  --   --  168 H   HDL 39 L  --  46  --   --  37 L   LDL Cholesterol 74.0  --  70.8  --   --  76.4   Non-HDL Cholesterol 93  --  84  --   --  110   AST  --    < > 26 17 21 26   ALT  --    < > 16 11 18 20    < > = values in this interval not displayed.       Cardiovascular  Testing:  Aortic US 6/16/20  No evidence of AAA.  Increased flow velocity across bilat YUE stents suggesting >50% stenosis.    LE art US/LACIE 6/16/20  Bilat YUE stents appear patent.  However, increased flow velocity noted across these stents (when compared to distal abd aortic velocity on Abd Aortic US of same date) suggesting >50% bilat YUE ISR.  Otherwise, no evidence of hemodynamically significant infrainguinal PAD bilaterally.  Predominantly tri- and biphasic waveforms throughout.  Mildly decreased LACIE bilaterally (R 0.75, L 0.68).  Consider CTA/MRA or angio for further eval if clinically warranted.    Ex MPI 12/20/17 (low workload, drop in BP (144->105 systolic) noted during ETT)  The patient exercised for 3.02 minutes on a Sixto protocol, corresponding to a functional capacity of 5 estimated METS, achieving a peak heart rate of 126 bpm, which is 90% of the age predicted maximum heart rate. -CP. At peak exercise, EKG revealed < 1mm of horizontal ST segment depression at a maximum heart rate of 126 bpm.   Nuclear Quantitative Functional Analysis:   LVEF: 61 %  LVED Volume: 59 ml  LVES Volume: 23 ml  Impression: NORMAL MYOCARDIAL PERFUSION  1. The perfusion scan is free of evidence for myocardial ischemia or injury.   2. Resting wall motion is physiologic.   3. Resting LV function is normal.   4. The ventricular volumes are normal at rest and stress.   5. The extracardiac distribution of radioactivity is normal.     Echo 12/20/17    1 - Normal left ventricular systolic function (EF 55-60%).     2 - No wall motion abnormalities.     3 - Concentric remodeling.     4 - Trivial tricuspid regurgitation.     5 - The estimated PA systolic pressure is 27 mmHg.     [facility]:  [facility]: CABG with LIMA-LAD, SVG-LCx, SVG-RCA  5/1999: Angio revealed Patent LIMA-LAD, SVGx2 occluded  6/2003: PTA with bilat YUE stents and R EIA stent      ASSESSMENT:   # CAD s/p CABG, MPI/echo 12/2017 normal (although low exercise  capacity and ?hypotensive response during TMET), asymptomatic.  # PAD s/p PTA bilat YUE and R EIA 6/2003.  Limiting R>L claudication.  Aortic/LE art US 6/2020 suggest significant bilat YUE ISR.  # HTN, controlled  # HLP on crestor 20mg  # Tob abuse, still smoking but now enrolled in the smoking cessation program.  Again encouraged to quit.  # CKD3  # DM    PLAN:   Cont med rx  Cont ASA/Plavix  AFRO 7/29/20 vis R radial, likely staged PTA of aortoiliac bifurcation given CKD3  Amb smoking cessation program suggested again.  RTC post-cath    Risks, benefits and alternatives of the catheterization procedure were discussed with the patient and his daughter in attendance, which include but are not limited to: bleeding, infection, death, limb loss, kidney failure, need for emergency surgery, etc.  Patient understands and and agrees to proceed.  Consent was placed on the chart.  He understands the plan for likely staged procedures given CKD3.      Wiley Vargas MD, FACC

## 2020-07-16 NOTE — H&P (VIEW-ONLY)
CARDIOVASCULAR PROGRESS NOTE    REASON FOR CONSULT:   Narciso Yanez is a 84 y.o. male who presents for ongoing mgmt of CAD/PAD.    PCP: Ehrensing  HISTORY OF PRESENT ILLNESS:   The patient returns for follow-up, accompanied by his daughter.  He continues to describe limiting right greater than left calf claudication at approximately 1/2 block.  He has had no resting pain.  He has been enrolled in the Ambulatory smoking cessation program, but has not started using the nicotine patches, nor has he stop smoking, although he states that he has cut down significantly.  I have strongly encouraged to quit smoking completely.  He otherwise denies intercurrent angina, dyspnea, palpitations, or syncope.  There has been no PND, orthopnea, melena, or hematuria.  He continues to take dual antiplatelet therapy without complications.    The pros and cons of lower extremity angiography were discussed with the patient and his daughter in attendance.  Given his renal insufficiency, I plan to perform diagnostic angiography via the radial approach, with likely staged intervention assuming we find significant PAD.    CARDIOVASCULAR HISTORY:   CAD/CABG 1/10/1999: CABG with LIMA-LAD, SVG-LCx, SVG-RCA ([facility] records)    5/1999: Angio revealed Patent LIMA-LAD, SVGx2 occluded ([facility] records)    PAD s/p LE PTA (6/2003: PTA with bilat YUE stents and R EIA stent)    PAST MEDICAL HISTORY:     Past Medical History:   Diagnosis Date    Actinic keratosis     Anxiety     B12 deficiency 12/8/2014    Dx 6/14    Cancer     skin    Cataract     Coronary artery disease     Diabetes mellitus type II     Diabetes mellitus with peripheral circulatory disorder 6/18/2014    ED (erectile dysfunction)     Hyperlipidemia     Hypertension     Kidney stone     Peripheral vascular disease     Spondylosis 3/29/2019    Tobacco dependence        PAST SURGICAL HISTORY:     Past Surgical History:   Procedure Laterality Date    APPENDECTOMY      CARDIAC  SURGERY      CATARACT EXTRACTION      EXTERNAL EAR SURGERY      EYE SURGERY      cataracts    ILIAC ARTERY STENT Bilateral 06/2003    also Right External Iliac stent       ALLERGIES AND MEDICATION:     Review of patient's allergies indicates:   Allergen Reactions    Demerol  [meperidine]      Other reaction(s): Unknown     Previous Medications    ALBUTEROL (VENTOLIN HFA) 90 MCG/ACTUATION INHALER    Inhale 2 puffs into the lungs every 6 (six) hours as needed for Wheezing or Shortness of Breath. Rescue    ALENDRONATE (FOSAMAX) 70 MG TABLET    Take 1 tablet (70 mg total) by mouth every 7 days.    ASCORBIC ACID, VITAMIN C, (VITAMIN C) 250 MG TABLET    Take 1 tablet (250 mg total) by mouth once daily.    ASPIRIN (ECOTRIN) 81 MG EC TABLET    Take 81 mg by mouth once daily.    BLOOD SUGAR DIAGNOSTIC STRP    1 strip by Misc.(Non-Drug; Combo Route) route 2 (two) times daily. Disp glucometer and lancets as well    CLOPIDOGREL (PLAVIX) 75 MG TABLET    Take 1 tablet (75 mg total) by mouth once daily.    DIAZEPAM (VALIUM) 5 MG TABLET    Take 1 tablet (5 mg total) by mouth every 8 (eight) hours as needed.    DOXYCYCLINE (MONODOX) 100 MG CAPSULE    Take 1 capsule (100 mg total) by mouth 2 (two) times daily.    GLIMEPIRIDE (AMARYL) 2 MG TABLET    Take 1 tablet (2 mg total) by mouth once daily.    INHALATION SPACING DEVICE    Use as directed for inhalation.    METFORMIN (GLUCOPHAGE-XR) 750 MG 24 HR TABLET    2 a day    METOPROLOL SUCCINATE (TOPROL-XL) 25 MG 24 HR TABLET    TAKE ONE TABLET BY MOUTH ONCE DAILY    NICOTINE (NICODERM CQ) 21 MG/24 HR    Place 1 patch onto the skin once daily.    NICOTINE POLACRILEX 2 MG LOZG    Take 1-2 per hour in place of a cigarette. Limit to 15 a day - oral.    OXYBUTYNIN (DITROPAN-XL) 5 MG TR24    Take 1 tablet (5 mg total) by mouth once daily.    PREDNISONE (DELTASONE) 1 MG TABLET    Take 5 tablets (5 mg total) by mouth once daily.    ROSUVASTATIN (CRESTOR) 20 MG TABLET    Take 1 tablet (20 mg  total) by mouth every evening.    SILDENAFIL (REVATIO) 20 MG TAB    TAKE 1-5 TABLETS BY MOUTH once per DAILY AS NEEDED    TAMSULOSIN (FLOMAX) 0.4 MG CAP    Take 1 capsule (0.4 mg total) by mouth once daily.    UMECLIDINIUM (INCRUSE ELLIPTA) 62.5 MCG/ACTUATION DSDV    Inhale 1 each into the lungs once daily. Controller       SOCIAL HISTORY:     Social History     Socioeconomic History    Marital status: Single     Spouse name: Not on file    Number of children: Not on file    Years of education: Not on file    Highest education level: Not on file   Occupational History    Not on file   Social Needs    Financial resource strain: Not on file    Food insecurity     Worry: Not on file     Inability: Not on file    Transportation needs     Medical: Not on file     Non-medical: Not on file   Tobacco Use    Smoking status: Current Every Day Smoker     Packs/day: 1.50     Years: 71.00     Pack years: 106.50     Types: Cigarettes    Smokeless tobacco: Never Used   Substance and Sexual Activity    Alcohol use: No    Drug use: No    Sexual activity: Not Currently   Lifestyle    Physical activity     Days per week: Not on file     Minutes per session: Not on file    Stress: Not on file   Relationships    Social connections     Talks on phone: Not on file     Gets together: Not on file     Attends Denominational service: Not on file     Active member of club or organization: Not on file     Attends meetings of clubs or organizations: Not on file     Relationship status: Not on file   Other Topics Concern    Not on file   Social History Narrative    .  4 children.  Smokes 2 ppd.  Still drives trucks for entertainment industry.        FAMILY HISTORY:     Family History   Problem Relation Age of Onset    Stroke Mother        REVIEW OF SYSTEMS:   Review of Systems   Constitutional: Negative for chills, diaphoresis and fever.   HENT: Negative for nosebleeds.    Eyes: Negative for blurred vision, double vision and  "photophobia.   Respiratory: Negative for hemoptysis, shortness of breath and wheezing.    Cardiovascular: Positive for claudication (R>L). Negative for chest pain, palpitations, orthopnea, leg swelling and PND.   Gastrointestinal: Negative for abdominal pain, blood in stool, heartburn, melena, nausea and vomiting.   Genitourinary: Negative for flank pain and hematuria.   Musculoskeletal: Negative for falls, myalgias and neck pain.   Skin: Negative for rash.   Neurological: Negative for dizziness, seizures, loss of consciousness, weakness and headaches.   Endo/Heme/Allergies: Negative for polydipsia. Does not bruise/bleed easily.   Psychiatric/Behavioral: Negative for depression and memory loss. The patient is not nervous/anxious.        PHYSICAL EXAM:     Vitals:    07/16/20 1254   BP: 112/60   Pulse: 88   Resp: 15    Body mass index is 25.55 kg/m².  Weight: 74 kg (163 lb 2.3 oz)   Height: 5' 7" (170.2 cm)     Physical Exam  Vitals signs reviewed.   Constitutional:       General: He is not in acute distress.     Appearance: Normal appearance. He is well-developed and normal weight. He is not ill-appearing, toxic-appearing or diaphoretic.   HENT:      Head: Normocephalic and atraumatic.   Eyes:      General: No scleral icterus.     Conjunctiva/sclera: Conjunctivae normal.      Pupils: Pupils are equal, round, and reactive to light.   Neck:      Musculoskeletal: Normal range of motion and neck supple. No edema or neck rigidity.      Thyroid: No thyromegaly.      Vascular: Normal carotid pulses. No carotid bruit or JVD.      Trachea: Trachea normal. No tracheal deviation.   Cardiovascular:      Rate and Rhythm: Normal rate and regular rhythm.      Chest Wall: PMI is not displaced.      Pulses:           Carotid pulses are 2+ on the right side and 2+ on the left side.       Radial pulses are 2+ on the right side.        Posterior tibial pulses are 0 on the right side and 0 on the left side.      Heart sounds: S1 normal " and S2 normal. No murmur. No friction rub. No gallop.    Pulmonary:      Effort: Pulmonary effort is normal. No respiratory distress.      Breath sounds: Normal breath sounds. No stridor. No wheezing, rhonchi or rales.   Chest:      Chest wall: No tenderness.   Abdominal:      General: There is no distension.      Palpations: Abdomen is soft.   Musculoskeletal: Normal range of motion.         General: No swelling or tenderness.   Feet:      Right foot:      Skin integrity: No ulcer.      Left foot:      Skin integrity: No ulcer.   Skin:     General: Skin is warm and dry.      Findings: No erythema or rash.   Neurological:      Mental Status: He is alert and oriented to person, place, and time.      Cranial Nerves: No cranial nerve deficit.   Psychiatric:         Speech: Speech normal.         Behavior: Behavior normal. Behavior is cooperative.         DATA:   EKG: (personally reviewed tracing)  6/9/20 SR 77    Laboratory:  CBC:  Recent Labs   Lab 05/21/19  1139 10/11/19  1411 03/20/20  1110   WBC 11.70 10.62 9.05   Hemoglobin 11.5 L 12.9 L 13.3 L   Hematocrit 35.7 L 41.0 42.0   Platelets 356 H 220 252       CHEMISTRIES:  Recent Labs   Lab 10/11/19  1411 12/19/19  1436 03/20/20  1110   Glucose 191 H 141 H 176 H   Sodium 140 138 140   Potassium 4.7 4.5 4.2   BUN, Bld 17 18 16   Creatinine 1.2 1.3 1.5 H   eGFR if  >60.0 58.7 A 49 A   eGFR if non  56.0 A 50.8 A 43 A   Calcium 9.4 9.5 9.2       CARDIAC BIOMARKERS:        COAGS:        LIPIDS/LFTS:  Recent Labs   Lab 07/19/17  0758  12/17/18  1035 05/21/19  1139 10/11/19  1411 03/20/20  1110   Cholesterol 132  --  130  --   --  147   Triglycerides 95  --  66  --   --  168 H   HDL 39 L  --  46  --   --  37 L   LDL Cholesterol 74.0  --  70.8  --   --  76.4   Non-HDL Cholesterol 93  --  84  --   --  110   AST  --    < > 26 17 21 26   ALT  --    < > 16 11 18 20    < > = values in this interval not displayed.       Cardiovascular  Testing:  Aortic US 6/16/20  No evidence of AAA.  Increased flow velocity across bilat YUE stents suggesting >50% stenosis.    LE art US/LACIE 6/16/20  Bilat YUE stents appear patent.  However, increased flow velocity noted across these stents (when compared to distal abd aortic velocity on Abd Aortic US of same date) suggesting >50% bilat YUE ISR.  Otherwise, no evidence of hemodynamically significant infrainguinal PAD bilaterally.  Predominantly tri- and biphasic waveforms throughout.  Mildly decreased LACIE bilaterally (R 0.75, L 0.68).  Consider CTA/MRA or angio for further eval if clinically warranted.    Ex MPI 12/20/17 (low workload, drop in BP (144->105 systolic) noted during ETT)  The patient exercised for 3.02 minutes on a Sixto protocol, corresponding to a functional capacity of 5 estimated METS, achieving a peak heart rate of 126 bpm, which is 90% of the age predicted maximum heart rate. -CP. At peak exercise, EKG revealed < 1mm of horizontal ST segment depression at a maximum heart rate of 126 bpm.   Nuclear Quantitative Functional Analysis:   LVEF: 61 %  LVED Volume: 59 ml  LVES Volume: 23 ml  Impression: NORMAL MYOCARDIAL PERFUSION  1. The perfusion scan is free of evidence for myocardial ischemia or injury.   2. Resting wall motion is physiologic.   3. Resting LV function is normal.   4. The ventricular volumes are normal at rest and stress.   5. The extracardiac distribution of radioactivity is normal.     Echo 12/20/17    1 - Normal left ventricular systolic function (EF 55-60%).     2 - No wall motion abnormalities.     3 - Concentric remodeling.     4 - Trivial tricuspid regurgitation.     5 - The estimated PA systolic pressure is 27 mmHg.     [facility]:  [facility]: CABG with LIMA-LAD, SVG-LCx, SVG-RCA  5/1999: Angio revealed Patent LIMA-LAD, SVGx2 occluded  6/2003: PTA with bilat YUE stents and R EIA stent      ASSESSMENT:   # CAD s/p CABG, MPI/echo 12/2017 normal (although low exercise  capacity and ?hypotensive response during TMET), asymptomatic.  # PAD s/p PTA bilat YUE and R EIA 6/2003.  Limiting R>L claudication.  Aortic/LE art US 6/2020 suggest significant bilat YUE ISR.  # HTN, controlled  # HLP on crestor 20mg  # Tob abuse, still smoking but now enrolled in the smoking cessation program.  Again encouraged to quit.  # CKD3  # DM    PLAN:   Cont med rx  Cont ASA/Plavix  AFRO 7/29/20 vis R radial, likely staged PTA of aortoiliac bifurcation given CKD3  Amb smoking cessation program suggested again.  RTC post-cath    Risks, benefits and alternatives of the catheterization procedure were discussed with the patient and his daughter in attendance, which include but are not limited to: bleeding, infection, death, limb loss, kidney failure, need for emergency surgery, etc.  Patient understands and and agrees to proceed.  Consent was placed on the chart.  He understands the plan for likely staged procedures given CKD3.      Wiley Vargas MD, FACC

## 2020-07-23 ENCOUNTER — HOSPITAL ENCOUNTER (OUTPATIENT)
Dept: PREADMISSION TESTING | Facility: HOSPITAL | Age: 85
Discharge: HOME | End: 2020-07-23
Attending: INTERNAL MEDICINE
Payer: MEDICARE

## 2020-07-23 VITALS
HEIGHT: 67 IN | TEMPERATURE: 97 F | DIASTOLIC BLOOD PRESSURE: 74 MMHG | HEART RATE: 80 BPM | BODY MASS INDEX: 25.55 KG/M2 | OXYGEN SATURATION: 98 % | RESPIRATION RATE: 16 BRPM | SYSTOLIC BLOOD PRESSURE: 126 MMHG

## 2020-07-23 DIAGNOSIS — I73.9 CLAUDICATION OF BOTH LOWER EXTREMITIES: ICD-10-CM

## 2020-07-23 DIAGNOSIS — I73.9 PAD (PERIPHERAL ARTERY DISEASE): ICD-10-CM

## 2020-07-23 DIAGNOSIS — R94.8 ABNORMAL RESULTS OF FUNCTION STUDIES OF OTHER ORGANS AND SYSTEMS: ICD-10-CM

## 2020-07-23 LAB
ANION GAP SERPL CALC-SCNC: 9 MMOL/L (ref 8–16)
BASOPHILS # BLD AUTO: 0.04 K/UL (ref 0–0.2)
BASOPHILS NFR BLD: 0.5 % (ref 0–1.9)
BUN SERPL-MCNC: 14 MG/DL (ref 8–23)
CALCIUM SERPL-MCNC: 8.9 MG/DL (ref 8.7–10.5)
CHLORIDE SERPL-SCNC: 107 MMOL/L (ref 95–110)
CO2 SERPL-SCNC: 23 MMOL/L (ref 23–29)
CREAT SERPL-MCNC: 1.2 MG/DL (ref 0.5–1.4)
DIFFERENTIAL METHOD: ABNORMAL
EOSINOPHIL # BLD AUTO: 0 K/UL (ref 0–0.5)
EOSINOPHIL NFR BLD: 0.4 % (ref 0–8)
ERYTHROCYTE [DISTWIDTH] IN BLOOD BY AUTOMATED COUNT: 15.9 % (ref 11.5–14.5)
EST. GFR  (AFRICAN AMERICAN): >60 ML/MIN/1.73 M^2
EST. GFR  (NON AFRICAN AMERICAN): 56 ML/MIN/1.73 M^2
GLUCOSE SERPL-MCNC: 177 MG/DL (ref 70–110)
HCT VFR BLD AUTO: 40.6 % (ref 40–54)
HGB BLD-MCNC: 13.3 G/DL (ref 14–18)
IMM GRANULOCYTES # BLD AUTO: 0.04 K/UL (ref 0–0.04)
IMM GRANULOCYTES NFR BLD AUTO: 0.5 % (ref 0–0.5)
INR PPP: 1 (ref 0.8–1.2)
LYMPHOCYTES # BLD AUTO: 1.3 K/UL (ref 1–4.8)
LYMPHOCYTES NFR BLD: 16.1 % (ref 18–48)
MCH RBC QN AUTO: 30.9 PG (ref 27–31)
MCHC RBC AUTO-ENTMCNC: 32.8 G/DL (ref 32–36)
MCV RBC AUTO: 94 FL (ref 82–98)
MONOCYTES # BLD AUTO: 0.4 K/UL (ref 0.3–1)
MONOCYTES NFR BLD: 5.4 % (ref 4–15)
NEUTROPHILS # BLD AUTO: 6.2 K/UL (ref 1.8–7.7)
NEUTROPHILS NFR BLD: 77.1 % (ref 38–73)
NRBC BLD-RTO: 0 /100 WBC
PLATELET # BLD AUTO: 231 K/UL (ref 150–350)
PMV BLD AUTO: 9.6 FL (ref 9.2–12.9)
POTASSIUM SERPL-SCNC: 4.3 MMOL/L (ref 3.5–5.1)
PROTHROMBIN TIME: 10.6 SEC (ref 9–12.5)
RBC # BLD AUTO: 4.31 M/UL (ref 4.6–6.2)
SODIUM SERPL-SCNC: 139 MMOL/L (ref 136–145)
WBC # BLD AUTO: 8.01 K/UL (ref 3.9–12.7)

## 2020-07-23 PROCEDURE — 80048 BASIC METABOLIC PNL TOTAL CA: CPT | Mod: HCNC

## 2020-07-23 PROCEDURE — 85610 PROTHROMBIN TIME: CPT | Mod: HCNC

## 2020-07-23 PROCEDURE — 85025 COMPLETE CBC W/AUTO DIFF WBC: CPT | Mod: HCNC

## 2020-07-23 NOTE — DISCHARGE INSTRUCTIONS
Your surgery is scheduled for _Wednesday July 29, 2020__.    Call [phone] between 2 p.m. and 5 p.m. on   _Tuesday _ to find out your arrival time for the day of your surgery.      Please report to Emergency Room SAME DAY SURGERY UNIT on the 2nd FLOOR         INSTRUCTIONS IMPORTANT!!!  ¨ Do not eat or drink after 12 midnight-including water. OK to brush teeth, no   gum, candy or mints!    ¨ Take only these medicines with a small swallow of water-morning of surgery.  Take Aspirin, Plavix and Metoprolol with water.      _x___  Return to Hospital Lab on _Tuesday July 28, 2020 at 10:50 AM_for additionaltest.    _x___  Prep instructions:    SHOWER     _x___  Please shower using Hibiclens soap the night before AND  the morning of your surgery/procedure. Do not use Hibiclens on your face or genitals       __x__  No powder, lotions or creams to your body.  __x__  You may wear only deodorant on the day of surgery.  __x__  Please remove all jewelry, including piercings and leave at home.  __x__  No money or valuables needed. Please leave at home.  You may bring your  cell phone.  __x__  If going home the same day, arrange for a ride home. You will not be able to   drive if Anesthesia was used.    __x__  Wear loose fitting clothing. Allow for dressings, bandages.      x      You MAY use Tylenol/acetaminophen until day of surgery.  __x__  If you take diabetic medication, do not take am of surgery unless instructed by   Doctor.  __x__  Call MD for temperature above 101 degrees.        __x__ Stop taking any Fish Oil supplement or any Vitamins that contain Vitamin E at least 5 days prior to surgery.          I have read or had read and explained to me, and understand the above information.  Additional comments or instructions:Please call   [phone] if you have any questions regarding the instructions above.

## 2020-07-28 ENCOUNTER — HOSPITAL ENCOUNTER (OUTPATIENT)
Dept: PREADMISSION TESTING | Facility: HOSPITAL | Age: 85
Discharge: HOME | End: 2020-07-28
Attending: INTERNAL MEDICINE
Payer: MEDICARE

## 2020-07-28 DIAGNOSIS — Z01.818 PRE-OP TESTING: ICD-10-CM

## 2020-07-28 LAB — SARS-COV-2 RDRP RESP QL NAA+PROBE: NEGATIVE

## 2020-07-28 PROCEDURE — U0002 COVID-19 LAB TEST NON-CDC: HCPCS | Mod: HCNC

## 2020-07-29 ENCOUNTER — HOSPITAL ENCOUNTER (OUTPATIENT)
Facility: HOSPITAL | Age: 85
Discharge: HOME | End: 2020-07-29
Attending: INTERNAL MEDICINE | Admitting: INTERNAL MEDICINE
Payer: MEDICARE

## 2020-07-29 VITALS
OXYGEN SATURATION: 96 % | SYSTOLIC BLOOD PRESSURE: 148 MMHG | RESPIRATION RATE: 24 BRPM | TEMPERATURE: 98 F | HEART RATE: 62 BPM | BODY MASS INDEX: 25.55 KG/M2 | DIASTOLIC BLOOD PRESSURE: 72 MMHG | WEIGHT: 163.13 LBS

## 2020-07-29 DIAGNOSIS — Z01.818 PRE-OP TESTING: ICD-10-CM

## 2020-07-29 DIAGNOSIS — I25.119 CHEST PAIN DUE TO CAD: ICD-10-CM

## 2020-07-29 DIAGNOSIS — I73.9 PAD (PERIPHERAL ARTERY DISEASE): Primary | ICD-10-CM

## 2020-07-29 LAB — POCT GLUCOSE: 124 MG/DL (ref 70–110)

## 2020-07-29 PROCEDURE — 75716 PR  ANGIO EXTERMITY BILAT: ICD-10-PCS | Mod: 26,HCNC,, | Performed by: INTERNAL MEDICINE

## 2020-07-29 PROCEDURE — C1894 INTRO/SHEATH, NON-LASER: HCPCS | Mod: HCNC | Performed by: INTERNAL MEDICINE

## 2020-07-29 PROCEDURE — 99152 MOD SED SAME PHYS/QHP 5/>YRS: CPT | Mod: HCNC | Performed by: INTERNAL MEDICINE

## 2020-07-29 PROCEDURE — 75625 PR  ANGIO AORTOGRAM ABD SERIAL: ICD-10-PCS | Mod: 26,HCNC,, | Performed by: INTERNAL MEDICINE

## 2020-07-29 PROCEDURE — 63600175 PHARM REV CODE 636 W HCPCS: Mod: HCNC | Performed by: INTERNAL MEDICINE

## 2020-07-29 PROCEDURE — 75716 ARTERY X-RAYS ARMS/LEGS: CPT | Mod: 26,HCNC,, | Performed by: INTERNAL MEDICINE

## 2020-07-29 PROCEDURE — 99153 MOD SED SAME PHYS/QHP EA: CPT | Mod: HCNC | Performed by: INTERNAL MEDICINE

## 2020-07-29 PROCEDURE — 75625 CONTRAST EXAM ABDOMINL AORTA: CPT | Mod: HCNC | Performed by: INTERNAL MEDICINE

## 2020-07-29 PROCEDURE — C1887 CATHETER, GUIDING: HCPCS | Mod: HCNC | Performed by: INTERNAL MEDICINE

## 2020-07-29 PROCEDURE — 99152 PR MOD CONSCIOUS SEDATION, SAME PHYS, 5+ YRS, FIRST 15 MIN: ICD-10-PCS | Mod: HCNC,,, | Performed by: INTERNAL MEDICINE

## 2020-07-29 PROCEDURE — 25500020 PHARM REV CODE 255: Mod: HCNC | Performed by: INTERNAL MEDICINE

## 2020-07-29 PROCEDURE — 36246 PR INS CATH ABD/L-EXT ART 2ND ORDER: ICD-10-PCS | Mod: HCNC,59,51,LT | Performed by: INTERNAL MEDICINE

## 2020-07-29 PROCEDURE — 82962 GLUCOSE BLOOD TEST: CPT | Mod: HCNC | Performed by: INTERNAL MEDICINE

## 2020-07-29 PROCEDURE — 75625 CONTRAST EXAM ABDOMINL AORTA: CPT | Mod: 26,HCNC,, | Performed by: INTERNAL MEDICINE

## 2020-07-29 PROCEDURE — 75716 ARTERY X-RAYS ARMS/LEGS: CPT | Mod: HCNC | Performed by: INTERNAL MEDICINE

## 2020-07-29 PROCEDURE — C1769 GUIDE WIRE: HCPCS | Mod: HCNC | Performed by: INTERNAL MEDICINE

## 2020-07-29 PROCEDURE — 36246 INS CATH ABD/L-EXT ART 2ND: CPT | Mod: HCNC | Performed by: INTERNAL MEDICINE

## 2020-07-29 PROCEDURE — 36245 PR INS CATH ABD/L-EXT ART 1ST ORDER: ICD-10-PCS | Mod: HCNC,RT,, | Performed by: INTERNAL MEDICINE

## 2020-07-29 PROCEDURE — 25000003 PHARM REV CODE 250: Mod: HCNC | Performed by: INTERNAL MEDICINE

## 2020-07-29 PROCEDURE — 36246 INS CATH ABD/L-EXT ART 2ND: CPT | Mod: HCNC,59,51,LT | Performed by: INTERNAL MEDICINE

## 2020-07-29 PROCEDURE — 36245 INS CATH ABD/L-EXT ART 1ST: CPT | Mod: HCNC,RT,, | Performed by: INTERNAL MEDICINE

## 2020-07-29 PROCEDURE — 99152 MOD SED SAME PHYS/QHP 5/>YRS: CPT | Mod: HCNC,,, | Performed by: INTERNAL MEDICINE

## 2020-07-29 PROCEDURE — 36245 INS CATH ABD/L-EXT ART 1ST: CPT | Mod: HCNC,RT | Performed by: INTERNAL MEDICINE

## 2020-07-29 RX ORDER — ATROPINE SULFATE 0.1 MG/ML
0.5 INJECTION INTRAVENOUS
Status: DISCONTINUED | OUTPATIENT
Start: 2020-07-29 | End: 2020-07-29 | Stop reason: HOSPADM

## 2020-07-29 RX ORDER — LIDOCAINE HYDROCHLORIDE 10 MG/ML
INJECTION, SOLUTION EPIDURAL; INFILTRATION; INTRACAUDAL; PERINEURAL
Status: DISCONTINUED | OUTPATIENT
Start: 2020-07-29 | End: 2020-07-29 | Stop reason: HOSPADM

## 2020-07-29 RX ORDER — MORPHINE SULFATE 10 MG/ML
2 INJECTION INTRAMUSCULAR; INTRAVENOUS; SUBCUTANEOUS EVERY 10 MIN PRN
Status: DISCONTINUED | OUTPATIENT
Start: 2020-07-29 | End: 2020-07-29 | Stop reason: HOSPADM

## 2020-07-29 RX ORDER — NITROGLYCERIN 20 MG/100ML
INJECTION INTRAVENOUS
Status: DISCONTINUED | OUTPATIENT
Start: 2020-07-29 | End: 2020-07-29 | Stop reason: HOSPADM

## 2020-07-29 RX ORDER — HEPARIN SODIUM 1000 [USP'U]/ML
INJECTION, SOLUTION INTRAVENOUS; SUBCUTANEOUS
Status: DISCONTINUED | OUTPATIENT
Start: 2020-07-29 | End: 2020-07-29 | Stop reason: HOSPADM

## 2020-07-29 RX ORDER — IODIXANOL 320 MG/ML
INJECTION, SOLUTION INTRAVASCULAR
Status: DISCONTINUED | OUTPATIENT
Start: 2020-07-29 | End: 2020-07-29 | Stop reason: HOSPADM

## 2020-07-29 RX ORDER — HYDROCODONE BITARTRATE AND ACETAMINOPHEN 5; 325 MG/1; MG/1
1 TABLET ORAL EVERY 4 HOURS PRN
Status: DISCONTINUED | OUTPATIENT
Start: 2020-07-29 | End: 2020-07-29 | Stop reason: HOSPADM

## 2020-07-29 RX ORDER — FENTANYL CITRATE 50 UG/ML
INJECTION, SOLUTION INTRAMUSCULAR; INTRAVENOUS
Status: DISCONTINUED | OUTPATIENT
Start: 2020-07-29 | End: 2020-07-29 | Stop reason: HOSPADM

## 2020-07-29 RX ORDER — CILOSTAZOL 100 MG/1
100 TABLET ORAL 2 TIMES DAILY
Qty: 180 TABLET | Refills: 3 | Status: SHIPPED | OUTPATIENT
Start: 2020-07-29 | End: 2020-08-05

## 2020-07-29 RX ORDER — SODIUM CHLORIDE 9 MG/ML
3 INJECTION, SOLUTION INTRAVENOUS CONTINUOUS
Status: ACTIVE | OUTPATIENT
Start: 2020-07-29 | End: 2020-07-29

## 2020-07-29 RX ORDER — ACETAMINOPHEN 325 MG/1
650 TABLET ORAL EVERY 4 HOURS PRN
Status: DISCONTINUED | OUTPATIENT
Start: 2020-07-29 | End: 2020-07-29 | Stop reason: HOSPADM

## 2020-07-29 RX ORDER — MIDAZOLAM HYDROCHLORIDE 1 MG/ML
INJECTION INTRAMUSCULAR; INTRAVENOUS
Status: DISCONTINUED | OUTPATIENT
Start: 2020-07-29 | End: 2020-07-29 | Stop reason: HOSPADM

## 2020-07-29 RX ORDER — ONDANSETRON 2 MG/ML
4 INJECTION INTRAMUSCULAR; INTRAVENOUS EVERY 12 HOURS PRN
Status: DISCONTINUED | OUTPATIENT
Start: 2020-07-29 | End: 2020-07-29 | Stop reason: HOSPADM

## 2020-07-29 RX ORDER — VERAPAMIL HYDROCHLORIDE 2.5 MG/ML
INJECTION, SOLUTION INTRAVENOUS
Status: DISCONTINUED | OUTPATIENT
Start: 2020-07-29 | End: 2020-07-29 | Stop reason: HOSPADM

## 2020-07-29 RX ADMIN — SODIUM CHLORIDE 5 ML/KG/HR: 0.9 INJECTION, SOLUTION INTRAVENOUS at 09:07

## 2020-07-29 RX ADMIN — SODIUM CHLORIDE 3 ML/KG/HR: 0.9 INJECTION, SOLUTION INTRAVENOUS at 06:07

## 2020-07-29 NOTE — DISCHARGE INSTRUCTIONS
Limit movement of the wrist.  Do not bend wrist or perform heavy lifting for 24 hours.      NO blood pressure cuff or venipuncture to affected arm for 24 hours.    If bleeding occurs, apply pressure to site for 20 minutes. Apply band aid once bleeding stops.  Call 911 if unable to stop bleeding with pressure.     Keep your follow up appointment.      Do not drive, drink alcohol, or sign legal documents for 24 hours, or if taking narcotic pain medication      DIET: You may resume your home diet. If nausea is present, increase your diet gradually with fluids and bland foods    ACTIVITY LEVEL: You have received sedation or an anesthetic, you may feel sleepy for several hours. Rest until you are more awake. Gradually resume your normal activities    Medications: Pain medication should be taken only if needed and as directed. If antibiotics are prescribed, the medication should be taken until completed.     No driving, alcoholic beverages or signing legal documents for next 24 hours or while taking pain medication.       CALL THE DOCTOR:    For any obvious bleeding (some dried blood over the incision is normal).      Redness, swelling, foul smell around incision or fever over 101.   Shortness of breath, Coughing up Bloody sputum, Pains or Swelling in your Calves .   Persistent pain or nausea not relieved by medication.    If any unusual problems or difficulties occur contact your doctor. If you cannot contact your doctor but feel your signs and symptoms warrant a physicians attention return to the emergency room.    Fall Prevention  Millions of people fall every year and injure themselves. You may have had anesthesia or sedation which may increase your risk of falling. You may have health issues that put you at an increased risk of falling.     Here are ways to reduce your risk of falling.  ·   · Make your home safe by keeping walkways clear of objects you may trip over.  · Use non-slip pads under rugs. Do not use  area rugs or small throw rugs.  · Use non-slip mats in bathtubs and showers.  · Install handrails and lights on staircases.  · Do not walk in poorly lit areas.  · Do not stand on chairs or wobbly ladders.  · Use caution when reaching overhead or looking upward. This position can cause a loss of balance.  · Be sure your shoes fit properly, have non-slip bottoms and are in good condition.   · Wear shoes both inside and out. Avoid going barefoot or wearing slippers.  · Be cautious when going up and down stairs, curbs, and when walking on uneven sidewalks.  · If your balance is poor, consider using a cane or walker.  · If your fall was related to alcohol use, stop or limit alcohol intake.   · If your fall was related to use of sleeping medicines, talk to your doctor about this. You may need to reduce your dosage at bedtime if you awaken during the night to go to the bathroom.    · To reduce the need for nighttime bathroom trips:  ¨ Avoid drinking fluids for several hours before going to bed  ¨ Empty your bladder before going to bed  ¨ Men can keep a urinal at the bedside  · Stay as active as you can. Balance, flexibility, strength, and endurance all come from exercise. They all play a role in preventing falls. Ask your healthcare provider which types of activity are right for you.  · Get your vision checked on a regular basis.  · If you have pets, know where they are before you stand up or walk so you don't trip over them.  · Use night lights.  .

## 2020-07-29 NOTE — Clinical Note
Angiography performed of the ostial right iliac artery. via power injection 20 mL contrast at 10 mL/s.

## 2020-07-29 NOTE — Clinical Note
Angiography of the right lower extremity performed with the catheter   power injection 10 mL contrast at 4 mL/s.

## 2020-07-29 NOTE — Clinical Note
96 ml injected throughout the case. 104 mL total wasted during the case. 200 mL total used in the case.

## 2020-07-29 NOTE — BRIEF OP NOTE
[facility]  Brief Operative Note     SUMMARY     Surgery Date: 7/29/2020     Surgeon(s) and Role:     * Wiley Vargas MD - Primary    Assisting Surgeon: None    Pre-op Diagnosis:  PAD (peripheral artery disease) [I73.9]    Post-op Diagnosis:  Post-Op Diagnosis Codes:     * PAD (peripheral artery disease) [I73.9]    Procedure(s) (LRB):  Angiogram, Abdominal Aorta W/ Extremity Runoff 730am start, R radial access (N/A)    Anesthesia: RN IV Sedation    Description of the findings of the procedure: Uneventful AFRO via R radial.    Findings/Key Components:   Ao: 140/57/86  R CFA: 144/59/88  L CFA: 140/59/87  No evidence of gradient on CFA->Ao pullback across YUE stents bilaterally    Aorta: no aneurysm or stenosis  L Renal: patent  R Renal: patent    Right Leg:  YUE: patent stent without angiographic evidence of restenosis, pullback negative   EIA: patent  IIA: patent  CFA: MLI  PFA: patent  SFA: MLI, dist 30%  Pop: patent  KIRTI: MLI  TPT: MLI  PTA:MLI, mid vessel tapers, ?occluded at mid calf  Per: MLI  2-3 vessel runoff to Right foot    Left Leg:  YUE: patent stent without angiographic evidence of restenosis, pullback negative   EIA: patent   IIA: patent  CFA: patent  PFA: patent  SFA: MLI, dist 50%  Pop: patent  KIRTI: MLI  TPT: MLI  PTA: MLI, mid occlusion  Per: MLI  2 vessel runoff to Left foot    Hemostasis:  R rad vasband    Imp:  Limiting claudication with suggesting of ISR of aortoiliac stents  Patent YUE stents bilaterally (angiographically) without evidence of gradient on pullback  Mild dist SFA stenosis bilaterally  2-3V runoff to feet bilaterally  R rad vasband for hemostasis    Plan:  Cont med rx  Cont ASA/Plavix  Add Pletal  Smoking cessation  Exercise program  Home today  Follow up with Dr. Vargas as planned  Consider referral to ortho/spine or pain mgmt for eval of neurogenic claudication      Estimated Blood Loss: <50cc         Specimens: None    Diet: ADA/cardiac    Activity: ad  brant.    Discharge Note    SUMMARY     Admit Date: 7/29/2020    Discharge Date and Time:  07/29/2020 1PM    Hospital Course (synopsis of major diagnoses, care, treatment, and services provided during the course of the hospital stay): Uneventful AFRO via R radial    Final Diagnosis: Post-Op Diagnosis Codes:     * PAD (peripheral artery disease) [I73.9]    Disposition: Home or Self Care    Follow Up/Patient Instructions:     Medications:  Reconciled Home Medications:      Medication List      START taking these medications    cilostazoL 100 MG Tab  Commonly known as: PLETAL  Take 1 tablet (100 mg total) by mouth 2 (two) times daily.        CONTINUE taking these medications    alendronate 70 MG tablet  Commonly known as: FOSAMAX  Take 1 tablet (70 mg total) by mouth every 7 days.     ascorbic acid (vitamin C) 250 MG tablet  Commonly known as: VITAMIN C  Take 1 tablet (250 mg total) by mouth once daily.     aspirin 81 MG EC tablet  Commonly known as: ECOTRIN  Take 81 mg by mouth once daily.     blood sugar diagnostic Strp  1 strip by Misc.(Non-Drug; Combo Route) route 2 (two) times daily. Disp glucometer and lancets as well     clopidogreL 75 mg tablet  Commonly known as: PLAVIX  Take 1 tablet (75 mg total) by mouth once daily.     diazePAM 5 MG tablet  Commonly known as: VALIUM  TAKE ONE TABLET BY MOUTH EVERY 8 HOURS AS NEEDED     glimepiride 2 MG tablet  Commonly known as: AMARYL  TAKE 1 TABLET BY MOUTH EVERY DAY     metFORMIN 750 MG XR 24hr tablet  Commonly known as: GLUCOPHAGE-XR  2 a day    **RESUME ON 7/31/20**     metoprolol succinate 25 MG 24 hr tablet  Commonly known as: TOPROL-XL  TAKE ONE TABLET BY MOUTH ONCE DAILY     oxybutynin 5 MG Tr24  Commonly known as: DITROPAN-XL  Take 1 tablet (5 mg total) by mouth once daily.     predniSONE 1 MG tablet  Commonly known as: DELTASONE  Take 5 tablets (5 mg total) by mouth once daily.     rosuvastatin 20 MG tablet  Commonly known as: CRESTOR  Take 1 tablet (20 mg total)  by mouth every evening.     tamsulosin 0.4 mg Cap  Commonly known as: FLOMAX  Take 1 capsule (0.4 mg total) by mouth once daily.          Discharge Procedure Orders   Diet general     Call MD for:  temperature >100.4     Call MD for:  persistent nausea and vomiting     Call MD for:  severe uncontrolled pain     Call MD for:  difficulty breathing, headache or visual disturbances     Call MD for:  redness, tenderness, or signs of infection (pain, swelling, redness, odor or green/yellow discharge around incision site)     Call MD for:  hives     Call MD for:  persistent dizziness or light-headedness     Call MD for:  extreme fatigue     Remove dressing in 24 hours     Activity as tolerated     Follow-up Information     Wiley Vargas MD On 8/5/2020.    Specialties: Cardiology, INTERVENTIONAL CARDIOLOGY  Why: 140pm for follow up  Contact information:  [address]  [address]  [facility] [id number] [phone]                     Diet: ADA/cardiac    Activity: ad brant.

## 2020-07-29 NOTE — INTERVAL H&P NOTE
The patient has been examined and the H&P has been reviewed:    I concur with the findings and no changes have occurred since H&P was written.    Anesthesia/Surgery risks, benefits and alternative options discussed and understood by patient/family.    Lab Results   Component Value Date    WBC 8.01 07/23/2020    HGB 13.3 (L) 07/23/2020    HCT 40.6 07/23/2020    MCV 94 07/23/2020     07/23/2020       Lab Results   Component Value Date    INR 1.0 07/23/2020    INR 1.0 07/13/2017    INR 0.9 04/28/2017     BMP  Lab Results   Component Value Date     07/23/2020    K 4.3 07/23/2020     07/23/2020    CO2 23 07/23/2020    BUN 14 07/23/2020    CREATININE 1.2 07/23/2020    CALCIUM 8.9 07/23/2020    ANIONGAP 9 07/23/2020    ESTGFRAFRICA >60 07/23/2020    EGFRNONAA 56 (A) 07/23/2020           Active Hospital Problems    Diagnosis  POA    PAD (peripheral artery disease) [I73.9]  Yes      Resolved Hospital Problems   No resolved problems to display.

## 2020-07-29 NOTE — Clinical Note
The left DP pulse is 1+. The right DP pulse is detected w/ doppler.  The left PT pulse is detected w/ doppler. The right PT pulse is detected w/ doppler. The radial pulses are +2 bilaterally.

## 2020-07-29 NOTE — Clinical Note
Angiography of the left lower extremity performed with the catheter   power injection 10 mL contrast at 4 mL/s.

## 2020-08-04 RX ORDER — METFORMIN HYDROCHLORIDE 750 MG/1
TABLET, EXTENDED RELEASE ORAL
Qty: 180 TABLET | Refills: 0 | Status: SHIPPED | OUTPATIENT
Start: 2020-08-04 | End: 2020-08-25 | Stop reason: SDUPTHER

## 2020-08-04 RX ORDER — METFORMIN HYDROCHLORIDE 750 MG/1
TABLET, EXTENDED RELEASE ORAL
Qty: 180 TABLET | Refills: 3 | Status: SHIPPED | OUTPATIENT
Start: 2020-08-04 | End: 2020-08-04 | Stop reason: SDUPTHER

## 2020-08-04 NOTE — TELEPHONE ENCOUNTER
Thuy informed that  has asked that his Patients reach out to a pharmacy that has this medication in stock as it is only a certain lot number that has been recalled and this med. has been working over a long peroid.  She verbalized understanding.

## 2020-08-04 NOTE — TELEPHONE ENCOUNTER
----- Message from Blessing Cheung sent at 8/4/2020  9:06 AM CDT -----  Regarding: pt wants to discuss metformin  Type: Patient Call Back    Who called:pt    What is the request in detail:pt wants to discuss metformin being recalled. Call pt    Can the clinic reply by [facility]?    Would the patient rather a call back or a response via [facility]? call    Best call back number:[phone] (home) [phone] (work) [phone]      Additional Information:

## 2020-08-04 NOTE — TELEPHONE ENCOUNTER
----- Message from Leonie Rich sent at 8/4/2020 10:30 AM CDT -----  Type: Patient Call Back    Who called: Tayler with [employer]    What is the request in detail: Rep states --metFORMIN (GLUCOPHAGE-XR) 750 MG XR 24hr tablet- is no longer available due to being recalled. Asking for an alternate medication.     Can the clinic reply by [facility]?    Would the patient rather a call back or a response via [facility]? Call back     Best call back number: [phone]    Additional Information:

## 2020-08-04 NOTE — TELEPHONE ENCOUNTER
Patient informed of message below. He verbalized understanding and asked that I have Dr. Del [facility] [address] and [address], if it's in stock there.

## 2020-08-05 ENCOUNTER — OFFICE VISIT (OUTPATIENT)
Dept: CARDIOLOGY | Facility: CLINIC | Age: 85
End: 2020-08-05
Payer: MEDICARE

## 2020-08-05 VITALS
OXYGEN SATURATION: 97 % | WEIGHT: 161.19 LBS | HEIGHT: 67 IN | BODY MASS INDEX: 25.3 KG/M2 | SYSTOLIC BLOOD PRESSURE: 128 MMHG | HEART RATE: 89 BPM | DIASTOLIC BLOOD PRESSURE: 68 MMHG | RESPIRATION RATE: 15 BRPM

## 2020-08-05 DIAGNOSIS — N18.30 CKD (CHRONIC KIDNEY DISEASE) STAGE 3, GFR 30-59 ML/MIN: ICD-10-CM

## 2020-08-05 DIAGNOSIS — I25.810 CORONARY ARTERY DISEASE INVOLVING CORONARY BYPASS GRAFT OF NATIVE HEART WITHOUT ANGINA PECTORIS: ICD-10-CM

## 2020-08-05 DIAGNOSIS — R29.818 NEUROGENIC CLAUDICATION: ICD-10-CM

## 2020-08-05 DIAGNOSIS — I70.0 AORTIC ATHEROSCLEROSIS: ICD-10-CM

## 2020-08-05 DIAGNOSIS — J44.9 CHRONIC OBSTRUCTIVE PULMONARY DISEASE, UNSPECIFIED COPD TYPE: ICD-10-CM

## 2020-08-05 DIAGNOSIS — I10 ESSENTIAL HYPERTENSION: ICD-10-CM

## 2020-08-05 DIAGNOSIS — F17.210 NICOTINE DEPENDENCE, CIGARETTES, UNCOMPLICATED: ICD-10-CM

## 2020-08-05 DIAGNOSIS — I73.9 PAD (PERIPHERAL ARTERY DISEASE): Primary | ICD-10-CM

## 2020-08-05 PROCEDURE — 3074F SYST BP LT 130 MM HG: CPT | Mod: HCNC,CPTII,S$GLB, | Performed by: INTERNAL MEDICINE

## 2020-08-05 PROCEDURE — 99214 PR OFFICE/OUTPT VISIT, EST, LEVL IV, 30-39 MIN: ICD-10-PCS | Mod: HCNC,S$GLB,, | Performed by: INTERNAL MEDICINE

## 2020-08-05 PROCEDURE — 3074F PR MOST RECENT SYSTOLIC BLOOD PRESSURE < 130 MM HG: ICD-10-PCS | Mod: HCNC,CPTII,S$GLB, | Performed by: INTERNAL MEDICINE

## 2020-08-05 PROCEDURE — 1125F PR PAIN SEVERITY QUANTIFIED, PAIN PRESENT: ICD-10-PCS | Mod: HCNC,S$GLB,, | Performed by: INTERNAL MEDICINE

## 2020-08-05 PROCEDURE — 1159F PR MEDICATION LIST DOCUMENTED IN MEDICAL RECORD: ICD-10-PCS | Mod: HCNC,S$GLB,, | Performed by: INTERNAL MEDICINE

## 2020-08-05 PROCEDURE — 3078F PR MOST RECENT DIASTOLIC BLOOD PRESSURE < 80 MM HG: ICD-10-PCS | Mod: HCNC,CPTII,S$GLB, | Performed by: INTERNAL MEDICINE

## 2020-08-05 PROCEDURE — 99999 PR PBB SHADOW E&M-EST. PATIENT-LVL V: CPT | Mod: PBBFAC,HCNC,, | Performed by: INTERNAL MEDICINE

## 2020-08-05 PROCEDURE — 99214 OFFICE O/P EST MOD 30 MIN: CPT | Mod: HCNC,S$GLB,, | Performed by: INTERNAL MEDICINE

## 2020-08-05 PROCEDURE — 3078F DIAST BP <80 MM HG: CPT | Mod: HCNC,CPTII,S$GLB, | Performed by: INTERNAL MEDICINE

## 2020-08-05 PROCEDURE — 1101F PR PT FALLS ASSESS DOC 0-1 FALLS W/OUT INJ PAST YR: ICD-10-PCS | Mod: HCNC,CPTII,S$GLB, | Performed by: INTERNAL MEDICINE

## 2020-08-05 PROCEDURE — 1159F MED LIST DOCD IN RCRD: CPT | Mod: HCNC,S$GLB,, | Performed by: INTERNAL MEDICINE

## 2020-08-05 PROCEDURE — 1125F AMNT PAIN NOTED PAIN PRSNT: CPT | Mod: HCNC,S$GLB,, | Performed by: INTERNAL MEDICINE

## 2020-08-05 PROCEDURE — 1101F PT FALLS ASSESS-DOCD LE1/YR: CPT | Mod: HCNC,CPTII,S$GLB, | Performed by: INTERNAL MEDICINE

## 2020-08-05 PROCEDURE — 99999 PR PBB SHADOW E&M-EST. PATIENT-LVL V: ICD-10-PCS | Mod: PBBFAC,HCNC,, | Performed by: INTERNAL MEDICINE

## 2020-08-05 NOTE — PROGRESS NOTES
CARDIOVASCULAR PROGRESS NOTE    REASON FOR CONSULT:   Narciso Yanez is a 84 y.o. male who presents for ongoing mgmt of CAD/PAD, f/u AFRO.    PCP: Ehrensing  HISTORY OF PRESENT ILLNESS:   The patient returns for follow-up of his angiogram.  He continues to describe limiting bilateral calf claudication.  His angiogram did not reveal any significant peripheral arterial disease, and his common iliac stents were widely patent.  He otherwise had no angina, dyspnea, palpitations, or syncope.  There has been no PND, orthopnea, melena or hematuria.  He was unable to tolerate the cilostazol and I will remove this from his medication list.  He does continue to take dual antiplatelet therapy.  There were no complications related to the right radial access site for his angiogram.    CARDIOVASCULAR HISTORY:   CAD/CABG 1/10/1999: CABG with LIMA-LAD, SVG-LCx, SVG-RCA ([facility] records)    5/1999: Angio revealed Patent LIMA-LAD, SVGx2 occluded ([facility] records)    PAD s/p LE PTA (6/2003: PTA with bilat YUE stents and R EIA stent)    7/29/20 AFRO with patent bilat YUE stents    PAST MEDICAL HISTORY:     Past Medical History:   Diagnosis Date    Actinic keratosis     Anxiety     B12 deficiency 12/8/2014    Dx 6/14    Cancer     skin    Cataract     Coronary artery disease     Diabetes mellitus type II     Diabetes mellitus with peripheral circulatory disorder 6/18/2014    ED (erectile dysfunction)     Hyperlipidemia     Hypertension     Kidney stone     Peripheral vascular disease     Spondylosis 3/29/2019    Tobacco dependence        PAST SURGICAL HISTORY:     Past Surgical History:   Procedure Laterality Date    APPENDECTOMY      CARDIAC SURGERY  01/1999    CABG 4 vessels    CATARACT EXTRACTION      EXTERNAL EAR SURGERY      EYE SURGERY      cataracts    ILIAC ARTERY STENT Bilateral 06/2003    also Right External Iliac stent       ALLERGIES AND MEDICATION:     Review of patient's allergies indicates:   Allergen  Reactions    Demerol  [meperidine]      Other reaction(s): Unknown     Previous Medications    ALENDRONATE (FOSAMAX) 70 MG TABLET    Take 1 tablet (70 mg total) by mouth every 7 days.    ASCORBIC ACID, VITAMIN C, (VITAMIN C) 250 MG TABLET    Take 1 tablet (250 mg total) by mouth once daily.    ASPIRIN (ECOTRIN) 81 MG EC TABLET    Take 81 mg by mouth once daily.    BLOOD SUGAR DIAGNOSTIC STRP    1 strip by Misc.(Non-Drug; Combo Route) route 2 (two) times daily. Disp glucometer and lancets as well    CILOSTAZOL (PLETAL) 100 MG TAB    Take 1 tablet (100 mg total) by mouth 2 (two) times daily.    CLOPIDOGREL (PLAVIX) 75 MG TABLET    Take 1 tablet (75 mg total) by mouth once daily.    DIAZEPAM (VALIUM) 5 MG TABLET    TAKE ONE TABLET BY MOUTH EVERY 8 HOURS AS NEEDED    GLIMEPIRIDE (AMARYL) 2 MG TABLET    TAKE 1 TABLET BY MOUTH EVERY DAY    METFORMIN (GLUCOPHAGE-XR) 750 MG XR 24HR TABLET    2 a day    METOPROLOL SUCCINATE (TOPROL-XL) 25 MG 24 HR TABLET    TAKE ONE TABLET BY MOUTH ONCE DAILY    OXYBUTYNIN (DITROPAN-XL) 5 MG TR24    Take 1 tablet (5 mg total) by mouth once daily.    PREDNISONE (DELTASONE) 1 MG TABLET    Take 5 tablets (5 mg total) by mouth once daily.    ROSUVASTATIN (CRESTOR) 20 MG TABLET    Take 1 tablet (20 mg total) by mouth every evening.    TAMSULOSIN (FLOMAX) 0.4 MG CAP    Take 1 capsule (0.4 mg total) by mouth once daily.       SOCIAL HISTORY:     Social History     Socioeconomic History    Marital status: Single     Spouse name: Not on file    Number of children: Not on file    Years of education: Not on file    Highest education level: Not on file   Occupational History    Not on file   Social Needs    Financial resource strain: Not on file    Food insecurity     Worry: Not on file     Inability: Not on file    Transportation needs     Medical: Not on file     Non-medical: Not on file   Tobacco Use    Smoking status: Current Every Day Smoker     Packs/day: 1.50     Years: 71.00     Pack  years: 106.50     Types: Cigarettes    Smokeless tobacco: Never Used   Substance and Sexual Activity    Alcohol use: No    Drug use: No    Sexual activity: Not Currently     Partners: Female   Lifestyle    Physical activity     Days per week: Not on file     Minutes per session: Not on file    Stress: Not on file   Relationships    Social connections     Talks on phone: Not on file     Gets together: Not on file     Attends Worship service: Not on file     Active member of club or organization: Not on file     Attends meetings of clubs or organizations: Not on file     Relationship status: Not on file   Other Topics Concern    Not on file   Social History Narrative    .  4 children.  Smokes 2 ppd.  Still drives trucks for entertainment industry.        FAMILY HISTORY:     Family History   Problem Relation Age of Onset    Stroke Mother        REVIEW OF SYSTEMS:   Review of Systems   Constitutional: Negative for chills, diaphoresis and fever.   HENT: Negative for nosebleeds.    Eyes: Negative for blurred vision, double vision and photophobia.   Respiratory: Negative for hemoptysis, shortness of breath and wheezing.    Cardiovascular: Positive for claudication (R>L). Negative for chest pain, palpitations, orthopnea, leg swelling and PND.   Gastrointestinal: Negative for abdominal pain, blood in stool, heartburn, melena, nausea and vomiting.   Genitourinary: Negative for flank pain and hematuria.   Musculoskeletal: Negative for falls, myalgias and neck pain.   Skin: Negative for rash.   Neurological: Negative for dizziness, seizures, loss of consciousness, weakness and headaches.   Endo/Heme/Allergies: Negative for polydipsia. Does not bruise/bleed easily.   Psychiatric/Behavioral: Negative for depression and memory loss. The patient is not nervous/anxious.        PHYSICAL EXAM:     Vitals:    08/05/20 1336   BP: 128/68   Pulse: 89   Resp: 15    Body mass index is 25.24 kg/m².  Weight: 73.1 kg (161 lb  "2.5 oz)   Height: 5' 7" (170.2 cm)     Physical Exam  Vitals signs reviewed.   Constitutional:       General: He is not in acute distress.     Appearance: Normal appearance. He is well-developed and normal weight. He is not ill-appearing, toxic-appearing or diaphoretic.   HENT:      Head: Normocephalic and atraumatic.   Eyes:      General: No scleral icterus.     Conjunctiva/sclera: Conjunctivae normal.      Pupils: Pupils are equal, round, and reactive to light.   Neck:      Musculoskeletal: Normal range of motion and neck supple. No edema or neck rigidity.      Thyroid: No thyromegaly.      Vascular: Normal carotid pulses. No carotid bruit or JVD.      Trachea: Trachea normal. No tracheal deviation.   Cardiovascular:      Rate and Rhythm: Normal rate and regular rhythm.      Pulses:           Carotid pulses are 2+ on the right side and 2+ on the left side.       Radial pulses are 2+ on the right side.      Heart sounds: S1 normal and S2 normal. No murmur. No friction rub. No gallop.       Comments: R rad access site c/d/i  Pulmonary:      Effort: Pulmonary effort is normal. No respiratory distress.      Breath sounds: Normal breath sounds. No stridor. No wheezing, rhonchi or rales.   Chest:      Chest wall: No tenderness.   Abdominal:      General: There is no distension.      Palpations: Abdomen is soft.   Musculoskeletal: Normal range of motion.         General: No swelling or tenderness.   Feet:      Right foot:      Skin integrity: No ulcer.      Left foot:      Skin integrity: No ulcer.   Skin:     General: Skin is warm and dry.      Findings: No erythema or rash.   Neurological:      Mental Status: He is alert and oriented to person, place, and time.      Cranial Nerves: No cranial nerve deficit.   Psychiatric:         Speech: Speech normal.         Behavior: Behavior normal. Behavior is cooperative.         DATA:   EKG: (personally reviewed tracing)  6/9/20 SR 77    Laboratory:  CBC:  Recent Labs   Lab " 10/11/19  1411 03/20/20  1110 07/23/20  1435   WBC 10.62 9.05 8.01   Hemoglobin 12.9 L 13.3 L 13.3 L   Hematocrit 41.0 42.0 40.6   Platelets 220 252 231       CHEMISTRIES:  Recent Labs   Lab 12/19/19  1436 03/20/20  1110 07/23/20  1435   Glucose 141 H 176 H 177 H   Sodium 138 140 139   Potassium 4.5 4.2 4.3   BUN, Bld 18 16 14   Creatinine 1.3 1.5 H 1.2   eGFR if  58.7 A 49 A >60   eGFR if non  50.8 A 43 A 56 A   Calcium 9.5 9.2 8.9       CARDIAC BIOMARKERS:        COAGS:  Recent Labs   Lab 07/23/20  1435   INR 1.0       LIPIDS/LFTS:  Recent Labs   Lab 12/17/18  1035 05/21/19  1139 10/11/19  1411 03/20/20  1110   Cholesterol 130  --   --  147   Triglycerides 66  --   --  168 H   HDL 46  --   --  37 L   LDL Cholesterol 70.8  --   --  76.4   Non-HDL Cholesterol 84  --   --  110   AST 26 17 21 26   ALT 16 11 18 20       Cardiovascular Testing:  AFRO 7/29/20 (images personally reviewed and interpreted)  Ao: 140/57/86  R CFA: 144/59/88  L CFA: 140/59/87  No evidence of gradient on CFA->Ao pullback across YUE stents bilaterally  Aorta: no aneurysm or stenosis  L Renal: patent  R Renal: patent  Right Leg:  YUE: patent stent without angiographic evidence of restenosis, pullback negative   EIA: patent  IIA: patent  CFA: MLI  PFA: patent  SFA: MLI, dist 30%  Pop: patent  KIRTI: MLI  TPT: MLI  PTA:MLI, mid vessel tapers, ?occluded at mid calf  Per: MLI  2-3 vessel runoff to Right foot  Left Leg:  YUE: patent stent without angiographic evidence of restenosis, pullback negative   EIA: patent   IIA: patent  CFA: patent  PFA: patent  SFA: MLI, dist 50%  Pop: patent  KIRTI: MLI  TPT: MLI  PTA: MLI, mid occlusion  Per: MLI  2 vessel runoff to Left foot  Hemostasis:  R rad vasband  Imp:  Limiting claudication with suggesting of ISR of aortoiliac stents  Patent YUE stents bilaterally (angiographically) without evidence of gradient on pullback  Mild dist SFA stenosis bilaterally  2-3V runoff to feet  bilaterally  R rad vasband for hemostasis  Plan:  Cont med rx  Cont ASA/Plavix  Add Pletal  Smoking cessation  Exercise program  Home today  Follow up with Dr. Vargas as planned  Consider referral to ortho/spine or pain mgmt for eval of neurogenic claudication    Aortic US 6/16/20  No evidence of AAA.  Increased flow velocity across bilat YUE stents suggesting >50% stenosis.    LE art US/LACIE 6/16/20  Bilat YUE stents appear patent.  However, increased flow velocity noted across these stents (when compared to distal abd aortic velocity on Abd Aortic US of same date) suggesting >50% bilat YUE ISR.  Otherwise, no evidence of hemodynamically significant infrainguinal PAD bilaterally.  Predominantly tri- and biphasic waveforms throughout.  Mildly decreased LACIE bilaterally (R 0.75, L 0.68).  Consider CTA/MRA or angio for further eval if clinically warranted.    Ex MPI 12/20/17 (low workload, drop in BP (144->105 systolic) noted during ETT)  The patient exercised for 3.02 minutes on a Sixto protocol, corresponding to a functional capacity of 5 estimated METS, achieving a peak heart rate of 126 bpm, which is 90% of the age predicted maximum heart rate. -CP. At peak exercise, EKG revealed < 1mm of horizontal ST segment depression at a maximum heart rate of 126 bpm.   Nuclear Quantitative Functional Analysis:   LVEF: 61 %  LVED Volume: 59 ml  LVES Volume: 23 ml  Impression: NORMAL MYOCARDIAL PERFUSION  1. The perfusion scan is free of evidence for myocardial ischemia or injury.   2. Resting wall motion is physiologic.   3. Resting LV function is normal.   4. The ventricular volumes are normal at rest and stress.   5. The extracardiac distribution of radioactivity is normal.     Echo 12/20/17    1 - Normal left ventricular systolic function (EF 55-60%).     2 - No wall motion abnormalities.     3 - Concentric remodeling.     4 - Trivial tricuspid regurgitation.     5 - The estimated PA systolic pressure is 27 mmHg.     [facility]  Records reviewed:  1/10/1999: CABG with LIMA-LAD, SVG-LCx, SVG-RCA  5/1999: Angio revealed Patent LIMA-LAD, SVGx2 occluded  6/2003: PTA with bilat YUE stents and R EIA stent      ASSESSMENT:   # CAD s/p CABG, MPI/echo 12/2017 normal (although low exercise capacity and ?hypotensive response during TMET), asymptomatic.  # PAD s/p PTA bilat YUE and R EIA 6/2003.  Limiting R>L claudication.  Aortic/LE art US 6/2020 suggest significant bilat YUE ISR.  AFRO 7/29/20 with patent bilat YUE stents.  ?neurogenic claudication.  # HTN, controlled  # HLP on crestor 20mg  # Tob abuse, still smoking but now enrolled in the smoking cessation program.  Again encouraged to quit.  # CKD3  # DM    PLAN:   Cont med rx  Cont ASA/Plavix  Refer to pain mgmt for eval of neurogenic claudication  Amb smoking cessation program.  RTC 6 months    Wiley Vargas MD, FACC

## 2020-08-07 ENCOUNTER — OFFICE VISIT (OUTPATIENT)
Dept: PAIN MEDICINE | Facility: CLINIC | Age: 85
End: 2020-08-07
Payer: MEDICARE

## 2020-08-07 VITALS
SYSTOLIC BLOOD PRESSURE: 133 MMHG | DIASTOLIC BLOOD PRESSURE: 68 MMHG | BODY MASS INDEX: 25.44 KG/M2 | OXYGEN SATURATION: 94 % | HEART RATE: 90 BPM | HEIGHT: 67 IN | TEMPERATURE: 99 F | WEIGHT: 162.06 LBS

## 2020-08-07 DIAGNOSIS — M51.36 DDD (DEGENERATIVE DISC DISEASE), LUMBAR: Primary | ICD-10-CM

## 2020-08-07 DIAGNOSIS — M54.16 LUMBAR RADICULOPATHY: ICD-10-CM

## 2020-08-07 DIAGNOSIS — M47.816 LUMBAR SPONDYLOSIS: ICD-10-CM

## 2020-08-07 PROCEDURE — 1159F MED LIST DOCD IN RCRD: CPT | Mod: HCNC,S$GLB,, | Performed by: PAIN MEDICINE

## 2020-08-07 PROCEDURE — 1159F PR MEDICATION LIST DOCUMENTED IN MEDICAL RECORD: ICD-10-PCS | Mod: HCNC,S$GLB,, | Performed by: PAIN MEDICINE

## 2020-08-07 PROCEDURE — 99214 OFFICE O/P EST MOD 30 MIN: CPT | Mod: HCNC,S$GLB,, | Performed by: PAIN MEDICINE

## 2020-08-07 PROCEDURE — 99214 PR OFFICE/OUTPT VISIT, EST, LEVL IV, 30-39 MIN: ICD-10-PCS | Mod: HCNC,S$GLB,, | Performed by: PAIN MEDICINE

## 2020-08-07 PROCEDURE — 99999 PR PBB SHADOW E&M-EST. PATIENT-LVL IV: ICD-10-PCS | Mod: PBBFAC,HCNC,, | Performed by: PAIN MEDICINE

## 2020-08-07 PROCEDURE — 3075F PR MOST RECENT SYSTOLIC BLOOD PRESS GE 130-139MM HG: ICD-10-PCS | Mod: HCNC,CPTII,S$GLB, | Performed by: PAIN MEDICINE

## 2020-08-07 PROCEDURE — 1101F PR PT FALLS ASSESS DOC 0-1 FALLS W/OUT INJ PAST YR: ICD-10-PCS | Mod: HCNC,CPTII,S$GLB, | Performed by: PAIN MEDICINE

## 2020-08-07 PROCEDURE — 3075F SYST BP GE 130 - 139MM HG: CPT | Mod: HCNC,CPTII,S$GLB, | Performed by: PAIN MEDICINE

## 2020-08-07 PROCEDURE — 99999 PR PBB SHADOW E&M-EST. PATIENT-LVL IV: CPT | Mod: PBBFAC,HCNC,, | Performed by: PAIN MEDICINE

## 2020-08-07 PROCEDURE — 3078F DIAST BP <80 MM HG: CPT | Mod: HCNC,CPTII,S$GLB, | Performed by: PAIN MEDICINE

## 2020-08-07 PROCEDURE — 1101F PT FALLS ASSESS-DOCD LE1/YR: CPT | Mod: HCNC,CPTII,S$GLB, | Performed by: PAIN MEDICINE

## 2020-08-07 PROCEDURE — 3078F PR MOST RECENT DIASTOLIC BLOOD PRESSURE < 80 MM HG: ICD-10-PCS | Mod: HCNC,CPTII,S$GLB, | Performed by: PAIN MEDICINE

## 2020-08-07 RX ORDER — HYDROCODONE BITARTRATE AND ACETAMINOPHEN 5; 325 MG/1; MG/1
1 TABLET ORAL EVERY 12 HOURS PRN
Qty: 14 TABLET | Refills: 0 | Status: SHIPPED | OUTPATIENT
Start: 2020-08-07 | End: 2020-08-13 | Stop reason: SDUPTHER

## 2020-08-07 NOTE — PROGRESS NOTES
Mr. Yanez will be scheduled for Right L5 + S1 TF LEAH on 08/26/2020.  Reviewed the pre-procedure instructions listed below with the patient- copy also provided.  Instructed patient to notify clinic if he begins feeling ill, runs a fever, is prescribed antibiotics, and/or has any outpatient procedures within the two weeks leading up to this procedure.  Instructed patient to report to [facility], 2nd Floor Endoscopy Registration Desk.  All questions answered- patient verbalized understanding.  Clearance to hold ASA and Plavix x7 days will be sent to Dr. Vargas. Patient requests a rapid COVID test the day of the procedure.     Day of Procedure  - Ensure you have obtained an arrival time from the Pain Management Staff  o Procedure Area will call 1-3 days in advance with your arrival time.  Please check any voicemails received.  o If you arrive past your scheduled procedure time, you may be asked to reschedule your procedure.  - Ensure you have a  with you to remain present throughout your procedure for your safety.  o If you arrive without a responsible adult to stay with you and drive you home, you may be asked to reschedule your procedure.  - Take all of your prescribed medications (exceptions noted below) with a small amount of water  - This is NOT a fasting procedure, you may have a light meal before coming.  - Wear comfortable clothing (loose fitting pants).  - You may wear glasses, dentures, contact lenses, and/or hearing aids.   - Notify the nurse during the intake process if you are allergic to any medications, if you are diabetic, or if you are not feeling well  - Contact the Pain Management Clinic with the following:  o A fever greater than 100° (degrees)   o Feel ill, have any type of infection, or are taking antibiotics now or within the two (2) weeks leading up to this procedure  o Have any outpatient procedures within the two (2) weeks leading up to this procedure (colonoscopy,  major dental work, etc.)    Diabetic Patients  - Please check your blood sugar prior to coming in for your procedure.  If the value is greater than 200, contact the clinic [phone] as the procedure may need to be rescheduled.  The procedure may not be performed if your blood sugar is above 200 even after checking into the hospital.      IF you are taking blood thinners: Only upon receiving clearance and notification from the Pain Management Department  7 Days Prior to Your Procedure:  - Stop taking Plavix/Clopridogrel, Effient/Prasugel, ASA  5 Days Prior to Your Procedure:  - Stop taking Coumadin/Warfarin.  An INR may be drawn prior to your procedure.  If labs are required, you will need to arrive earlier than your scheduled arrival time.  - Stop taking Pradaxa/Dabigatra,  Brilinta/ Ticagrelor  3 Days Prior to Your Procedure:  - Stop taking Xarelto/Rivaroxaban, Eliquis/Apixaban, Aggrenox/Dipyridamole, Reopro/Abciximab

## 2020-08-07 NOTE — PROGRESS NOTES
Subjective:     Patient ID: Narciso Yanez is a 84 y.o. male    Chief Complaint: Back Pain      Referred by: No ref. provider found      HPI:    Interval History (8/7/20):  He returns today for follow up.  He reports that he continues to have right leg/lower extremity pain.  He denies any changes in the quality location of this pain.  He recently underwent angiography to evaluate for peripheral vascular disease/claudication.  Patient states that no treatable source of the symptoms have been found.  He was previously scheduled to undergo a right L5 and S1 transforaminal epidural steroid injection.  This was postponed secondary to the angiography.  Patient is interested in scheduling this procedure.  He also requests that something be prescribed for his pain.  He states that he does not want anything too strong his he does not like to feel sedated.  He states that tramadol was not effective.  Patient has tried gabapentin in the past but this caused him to feel dizzy so he discontinue this medication.  He has been prescribed hydrocodone in the past, but cannot recall this medication was effective or if he tolerated.      Interval History NP (6/24/20):    Pt returns today for follow up. He states that he is still having pain to his right leg, radiating to the foot. The pain is unchanged in quality or location. Denies new or worsening symptoms. He reports that gabapentin has not helped. He took one pill and became very dizzy. He wishes to discontinue this medication. He would like to try an injection for the pain. He is no longer able to drive or sit for long periods of time because of his right leg. His son drove him to the appointment today; using a can for balance. He is currently on Plavix and a daily 81 mg aspirin.       Interval History (6/16/20):  He returns today for follow up.  He reports that physical therapy has not been helpful for the right lower extremity pain.  He denies any changes in the quality or  location of the pain.  He denies any new or worsening symptoms.  He does state that physical therapy helped for a short appeared of time but this relief for off.  He continues on regular home exercise program but does not notice any long-term relief.  He states that he did take gabapentin for short period of time and found it helpful.  Initially this medicine did cause him to feel funny, but he states that after while this feeling when away.  He is interested in trying this medication again.      Interval History (4/22/19):  He returns today for follow up and MRI review.  He reports that his pain is unchanged in quality and location since last encounter.  He denies any new or worsening symptoms.  Patient states that he could not tolerate gabapentin and has discontinued his medication.      Initial Encounter (4/15/19):  Narciso Yanez is a 84 y.o. male who presents today with right lower extremity numbness and pain. This problem started about 5 weeks ago.  No specific inciting event or injury.  The pain/numbness starts in the right posterior hip region and radiates to the posterior thigh into the leg and foot.  He also reports weakness of the right lower extremity.  He denies any bowel or bladder dysfunction.  He denies any significant back pain associated with this problem.  The pain is not constant.  The pain is worsened with prolonged sitting and will improved with standing.   This pain is described in detail below.    Physical Therapy:  No.    Non-pharmacologic Treatment:  Standing helps         · TENS?  No    Pain Medications:         · Currently taking:  Tylenol    · Has tried in the past:  Flexeril, tramadol, gabapentin, Norco, Robaxin    · Has not tried:  NSAIDs,  TCAs, SNRIs, topical creams    Blood thinners:  Aspirin 81 mg a day, Plavix    Interventional Therapies:  None    Relevant Surgeries:  None    Affecting sleep?  No    Affecting daily activities? yes    Depressive symptoms? no          · SI/HI?  No    Work status: Employed  Pain Scores:    Best:       4/10  Worst:    10/10  Usually:  6 /10  Today:    4/10    Review of Systems   Constitutional: Negative for activity change, appetite change, chills, fatigue, fever and unexpected weight change.   HENT: Negative for hearing loss.    Eyes: Negative for visual disturbance.   Respiratory: Negative for chest tightness and shortness of breath.    Cardiovascular: Negative for chest pain.   Gastrointestinal: Negative for abdominal pain, constipation, diarrhea, nausea and vomiting.   Genitourinary: Negative for difficulty urinating.   Musculoskeletal: Positive for arthralgias and gait problem. Negative for back pain and neck pain.   Skin: Negative for rash.   Neurological: Positive for weakness and numbness. Negative for dizziness, light-headedness and headaches.   Psychiatric/Behavioral: Negative for hallucinations, sleep disturbance and suicidal ideas. The patient is not nervous/anxious.        Past Medical History:   Diagnosis Date    Actinic keratosis     Anxiety     B12 deficiency 12/8/2014    Dx 6/14    Cancer     skin    Cataract     Coronary artery disease     Diabetes mellitus type II     Diabetes mellitus with peripheral circulatory disorder 6/18/2014    ED (erectile dysfunction)     Hyperlipidemia     Hypertension     Kidney stone     Peripheral vascular disease     Spondylosis 3/29/2019    Tobacco dependence        Past Surgical History:   Procedure Laterality Date    APPENDECTOMY      CARDIAC SURGERY  01/1999    CABG 4 vessels    CATARACT EXTRACTION      EXTERNAL EAR SURGERY      EYE SURGERY      cataracts    ILIAC ARTERY STENT Bilateral 06/2003    also Right External Iliac stent       Social History     Socioeconomic History    Marital status: Single     Spouse name: Not on file    Number of children: Not on file    Years of education: Not on file    Highest education level: Not on file   Occupational History    Not on file    Social Needs    Financial resource strain: Not on file    Food insecurity     Worry: Not on file     Inability: Not on file    Transportation needs     Medical: Not on file     Non-medical: Not on file   Tobacco Use    Smoking status: Current Every Day Smoker     Packs/day: 1.50     Years: 71.00     Pack years: 106.50     Types: Cigarettes    Smokeless tobacco: Never Used   Substance and Sexual Activity    Alcohol use: No    Drug use: No    Sexual activity: Not Currently     Partners: Female   Lifestyle    Physical activity     Days per week: Not on file     Minutes per session: Not on file    Stress: Not on file   Relationships    Social connections     Talks on phone: Not on file     Gets together: Not on file     Attends Faith service: Not on file     Active member of club or organization: Not on file     Attends meetings of clubs or organizations: Not on file     Relationship status: Not on file   Other Topics Concern    Not on file   Social History Narrative    .  4 children.  Smokes 2 ppd.  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        Review of patient's allergies indicates:   Allergen Reactions    Demerol [meperidine] Nausea Only     Other reaction(s): Unknown       Current Outpatient Medications on File Prior to Visit   Medication Sig Dispense Refill    alendronate (FOSAMAX) 70 MG tablet Take 1 tablet (70 mg total) by mouth every 7 days. 4 tablet 11    ascorbic acid, vitamin C, (VITAMIN C) 250 MG tablet Take 1 tablet (250 mg total) by mouth once daily. 120 tablet 0    aspirin (ECOTRIN) 81 MG EC tablet Take 81 mg by mouth once daily.      blood sugar diagnostic Strp 1 strip by Misc.(Non-Drug; Combo Route) route 2 (two) times daily. Disp glucometer and lancets as well 100 strip 12    clopidogrel (PLAVIX) 75 mg tablet Take 1 tablet (75 mg total) by mouth once daily. 90 tablet 4    diazePAM (VALIUM) 5 MG tablet TAKE ONE TABLET BY MOUTH EVERY 8 HOURS AS NEEDED 60 tablet 5     glimepiride (AMARYL) 2 MG tablet TAKE 1 TABLET BY MOUTH EVERY DAY 30 tablet 3    metFORMIN (GLUCOPHAGE-XR) 750 MG XR 24hr tablet 2 a day 180 tablet 0    metoprolol succinate (TOPROL-XL) 25 MG 24 hr tablet TAKE ONE TABLET BY MOUTH ONCE DAILY 90 tablet 1    oxybutynin (DITROPAN-XL) 5 MG TR24 Take 1 tablet (5 mg total) by mouth once daily. 30 tablet 11    predniSONE (DELTASONE) 1 MG tablet Take 5 tablets (5 mg total) by mouth once daily. 150 tablet 12    rosuvastatin (CRESTOR) 20 MG tablet Take 1 tablet (20 mg total) by mouth every evening. 90 tablet 12    tamsulosin (FLOMAX) 0.4 mg Cap Take 1 capsule (0.4 mg total) by mouth once daily. 30 capsule 11     No current facility-administered medications on file prior to visit.        Objective:      See flowsheet.     Exam:  GEN:  Well developed, well nourished.  No acute distress.   HEENT:  No trauma.  Mucous membranes moist.  Nares patent bilaterally.  PSYCH: Normal affect. Thought content appropriate.  CHEST:  Breathing symmetric.  No audible wheezing.  ABD: Soft, non-distended.  SKIN:  Warm, pink, dry.  No rash on exposed areas.    EXT:  No cyanosis, clubbing, or edema.  No color change or changes in nail or hair growth.  NEURO/MUSCULOSKELETAL:  Fully alert, oriented, and appropriate. Speech normal dottie. No cranial nerve deficits.   Gait:  Antalgic. With cane today.  No focal motor deficits.         Imaging:      Narrative & Impression    EXAMINATION:  MRI LUMBAR SPINE WITHOUT CONTRAST     CLINICAL HISTORY:  RIGHT SCIATICA; Sciatica, right side     TECHNIQUE:  Multiplanar, multisequence MR images were acquired from the thoracolumbar junction to the sacrum without the administration of contrast.     COMPARISON:  04/20/2019     FINDINGS:  The distal cord/conus demonstrates normal size and signal.  No evidence of osteomyelitis, marrow replacement process, or acute fracture.  Right-sided parapelvic cyst noted.     At L3-4, there is asymmetric disc bulging to  the right with small annular tear and mild facet hypertrophy.  This results in mild right-sided neural foraminal narrowing.  No spinal canal stenosis.     At L4-5, there is asymmetric disc bulging to the right with small annular tear and bilateral facet hypertrophy, resulting in mild spinal canal stenosis and mild bilateral neural foraminal narrowing.     At L5-S1, there is degenerative disc disease, noting disc height loss and sub endplate marrow changes.  There is mild disc bulging.  This results in mild bilateral neural foraminal narrowing.     The findings are similar to the prior exam.     Impression:     Lumbar spondylosis, resulting in mild spinal canal stenosis at L4-5, and mild neural foraminal narrowing from L3-4 through L5-S1, as above.        Electronically signed by: Denilson Dwyer MD  Date:                                            05/22/2020  Time:                                           10:44           Assessment:       Encounter Diagnoses   Name Primary?    DDD (degenerative disc disease), lumbar Yes    Lumbar spondylosis     Lumbar radiculopathy          Plan:       Narciso was seen today for back pain.    Diagnoses and all orders for this visit:    DDD (degenerative disc disease), lumbar  -     HYDROcodone-acetaminophen (NORCO) 5-325 mg per tablet; Take 1 tablet by mouth every 12 (twelve) hours as needed for Pain.  -     Case request GI: Injection, Steroid, Epidural Transforaminal    Lumbar spondylosis  -     HYDROcodone-acetaminophen (NORCO) 5-325 mg per tablet; Take 1 tablet by mouth every 12 (twelve) hours as needed for Pain.    Lumbar radiculopathy  -     HYDROcodone-acetaminophen (NORCO) 5-325 mg per tablet; Take 1 tablet by mouth every 12 (twelve) hours as needed for Pain.  -     Case request GI: Injection, Steroid, Epidural Transforaminal        Narciso Yanez is a 84 y.o. male with right-sided lower extremity numbness and pain. Consistent with right lumbar radiculopathy though  specific level of radiculopathy is unclear at this time. Lumbar MRI with some neural foraminal narrowing though no specific nerve root impingement.  No significant back pain. Low suspicion for facet or sacroiliac mediated pain.\    1.  Schedule right TESI @ L5 & S1  2.  Prescription monitoring report reviewed today.  No inconsistencies noted.  3.  Start Norco 5-325 mg q.12 hours p.r.n..  7 day supply of 14 pills was provided today.  Patient does take Valium infrequently for anxiety.  We discussed that he should not take Valium and opioid pain medications concurrently as this could lead to a significantly increased risk of accidental overdose.  Patient expressed understanding and agreement.  He states that he would not take Valium while he is utilizing opioid pain medications.  4.  Return to clinic 2 weeks after procedure to discuss results.  May consider physical therapy referral.

## 2020-08-07 NOTE — H&P (VIEW-ONLY)
Subjective:     Patient ID: Narciso Yanez is a 84 y.o. male    Chief Complaint: Back Pain      Referred by: No ref. provider found      HPI:    Interval History (8/7/20):  He returns today for follow up.  He reports that he continues to have right leg/lower extremity pain.  He denies any changes in the quality location of this pain.  He recently underwent angiography to evaluate for peripheral vascular disease/claudication.  Patient states that no treatable source of the symptoms have been found.  He was previously scheduled to undergo a right L5 and S1 transforaminal epidural steroid injection.  This was postponed secondary to the angiography.  Patient is interested in scheduling this procedure.  He also requests that something be prescribed for his pain.  He states that he does not want anything too strong his he does not like to feel sedated.  He states that tramadol was not effective.  Patient has tried gabapentin in the past but this caused him to feel dizzy so he discontinue this medication.  He has been prescribed hydrocodone in the past, but cannot recall this medication was effective or if he tolerated.      Interval History NP (6/24/20):    Pt returns today for follow up. He states that he is still having pain to his right leg, radiating to the foot. The pain is unchanged in quality or location. Denies new or worsening symptoms. He reports that gabapentin has not helped. He took one pill and became very dizzy. He wishes to discontinue this medication. He would like to try an injection for the pain. He is no longer able to drive or sit for long periods of time because of his right leg. His son drove him to the appointment today; using a can for balance. He is currently on Plavix and a daily 81 mg aspirin.       Interval History (6/16/20):  He returns today for follow up.  He reports that physical therapy has not been helpful for the right lower extremity pain.  He denies any changes in the quality or  location of the pain.  He denies any new or worsening symptoms.  He does state that physical therapy helped for a short appeared of time but this relief for off.  He continues on regular home exercise program but does not notice any long-term relief.  He states that he did take gabapentin for short period of time and found it helpful.  Initially this medicine did cause him to feel funny, but he states that after while this feeling when away.  He is interested in trying this medication again.      Interval History (4/22/19):  He returns today for follow up and MRI review.  He reports that his pain is unchanged in quality and location since last encounter.  He denies any new or worsening symptoms.  Patient states that he could not tolerate gabapentin and has discontinued his medication.      Initial Encounter (4/15/19):  Narciso Yanez is a 84 y.o. male who presents today with right lower extremity numbness and pain. This problem started about 5 weeks ago.  No specific inciting event or injury.  The pain/numbness starts in the right posterior hip region and radiates to the posterior thigh into the leg and foot.  He also reports weakness of the right lower extremity.  He denies any bowel or bladder dysfunction.  He denies any significant back pain associated with this problem.  The pain is not constant.  The pain is worsened with prolonged sitting and will improved with standing.   This pain is described in detail below.    Physical Therapy:  No.    Non-pharmacologic Treatment:  Standing helps         · TENS?  No    Pain Medications:         · Currently taking:  Tylenol    · Has tried in the past:  Flexeril, tramadol, gabapentin, Norco, Robaxin    · Has not tried:  NSAIDs,  TCAs, SNRIs, topical creams    Blood thinners:  Aspirin 81 mg a day, Plavix    Interventional Therapies:  None    Relevant Surgeries:  None    Affecting sleep?  No    Affecting daily activities? yes    Depressive symptoms? no          · SI/HI?  No    Work status: Employed  Pain Scores:    Best:       4/10  Worst:    10/10  Usually:  6 /10  Today:    4/10    Review of Systems   Constitutional: Negative for activity change, appetite change, chills, fatigue, fever and unexpected weight change.   HENT: Negative for hearing loss.    Eyes: Negative for visual disturbance.   Respiratory: Negative for chest tightness and shortness of breath.    Cardiovascular: Negative for chest pain.   Gastrointestinal: Negative for abdominal pain, constipation, diarrhea, nausea and vomiting.   Genitourinary: Negative for difficulty urinating.   Musculoskeletal: Positive for arthralgias and gait problem. Negative for back pain and neck pain.   Skin: Negative for rash.   Neurological: Positive for weakness and numbness. Negative for dizziness, light-headedness and headaches.   Psychiatric/Behavioral: Negative for hallucinations, sleep disturbance and suicidal ideas. The patient is not nervous/anxious.        Past Medical History:   Diagnosis Date    Actinic keratosis     Anxiety     B12 deficiency 12/8/2014    Dx 6/14    Cancer     skin    Cataract     Coronary artery disease     Diabetes mellitus type II     Diabetes mellitus with peripheral circulatory disorder 6/18/2014    ED (erectile dysfunction)     Hyperlipidemia     Hypertension     Kidney stone     Peripheral vascular disease     Spondylosis 3/29/2019    Tobacco dependence        Past Surgical History:   Procedure Laterality Date    APPENDECTOMY      CARDIAC SURGERY  01/1999    CABG 4 vessels    CATARACT EXTRACTION      EXTERNAL EAR SURGERY      EYE SURGERY      cataracts    ILIAC ARTERY STENT Bilateral 06/2003    also Right External Iliac stent       Social History     Socioeconomic History    Marital status: Single     Spouse name: Not on file    Number of children: Not on file    Years of education: Not on file    Highest education level: Not on file   Occupational History    Not on file    Social Needs    Financial resource strain: Not on file    Food insecurity     Worry: Not on file     Inability: Not on file    Transportation needs     Medical: Not on file     Non-medical: Not on file   Tobacco Use    Smoking status: Current Every Day Smoker     Packs/day: 1.50     Years: 71.00     Pack years: 106.50     Types: Cigarettes    Smokeless tobacco: Never Used   Substance and Sexual Activity    Alcohol use: No    Drug use: No    Sexual activity: Not Currently     Partners: Female   Lifestyle    Physical activity     Days per week: Not on file     Minutes per session: Not on file    Stress: Not on file   Relationships    Social connections     Talks on phone: Not on file     Gets together: Not on file     Attends Quaker service: Not on file     Active member of club or organization: Not on file     Attends meetings of clubs or organizations: Not on file     Relationship status: Not on file   Other Topics Concern    Not on file   Social History Narrative    .  4 children.  Smokes 2 ppd.  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        Review of patient's allergies indicates:   Allergen Reactions    Demerol [meperidine] Nausea Only     Other reaction(s): Unknown       Current Outpatient Medications on File Prior to Visit   Medication Sig Dispense Refill    alendronate (FOSAMAX) 70 MG tablet Take 1 tablet (70 mg total) by mouth every 7 days. 4 tablet 11    ascorbic acid, vitamin C, (VITAMIN C) 250 MG tablet Take 1 tablet (250 mg total) by mouth once daily. 120 tablet 0    aspirin (ECOTRIN) 81 MG EC tablet Take 81 mg by mouth once daily.      blood sugar diagnostic Strp 1 strip by Misc.(Non-Drug; Combo Route) route 2 (two) times daily. Disp glucometer and lancets as well 100 strip 12    clopidogrel (PLAVIX) 75 mg tablet Take 1 tablet (75 mg total) by mouth once daily. 90 tablet 4    diazePAM (VALIUM) 5 MG tablet TAKE ONE TABLET BY MOUTH EVERY 8 HOURS AS NEEDED 60 tablet 5     glimepiride (AMARYL) 2 MG tablet TAKE 1 TABLET BY MOUTH EVERY DAY 30 tablet 3    metFORMIN (GLUCOPHAGE-XR) 750 MG XR 24hr tablet 2 a day 180 tablet 0    metoprolol succinate (TOPROL-XL) 25 MG 24 hr tablet TAKE ONE TABLET BY MOUTH ONCE DAILY 90 tablet 1    oxybutynin (DITROPAN-XL) 5 MG TR24 Take 1 tablet (5 mg total) by mouth once daily. 30 tablet 11    predniSONE (DELTASONE) 1 MG tablet Take 5 tablets (5 mg total) by mouth once daily. 150 tablet 12    rosuvastatin (CRESTOR) 20 MG tablet Take 1 tablet (20 mg total) by mouth every evening. 90 tablet 12    tamsulosin (FLOMAX) 0.4 mg Cap Take 1 capsule (0.4 mg total) by mouth once daily. 30 capsule 11     No current facility-administered medications on file prior to visit.        Objective:      See flowsheet.     Exam:  GEN:  Well developed, well nourished.  No acute distress.   HEENT:  No trauma.  Mucous membranes moist.  Nares patent bilaterally.  PSYCH: Normal affect. Thought content appropriate.  CHEST:  Breathing symmetric.  No audible wheezing.  ABD: Soft, non-distended.  SKIN:  Warm, pink, dry.  No rash on exposed areas.    EXT:  No cyanosis, clubbing, or edema.  No color change or changes in nail or hair growth.  NEURO/MUSCULOSKELETAL:  Fully alert, oriented, and appropriate. Speech normal dottie. No cranial nerve deficits.   Gait:  Antalgic. With cane today.  No focal motor deficits.         Imaging:      Narrative & Impression    EXAMINATION:  MRI LUMBAR SPINE WITHOUT CONTRAST     CLINICAL HISTORY:  RIGHT SCIATICA; Sciatica, right side     TECHNIQUE:  Multiplanar, multisequence MR images were acquired from the thoracolumbar junction to the sacrum without the administration of contrast.     COMPARISON:  04/20/2019     FINDINGS:  The distal cord/conus demonstrates normal size and signal.  No evidence of osteomyelitis, marrow replacement process, or acute fracture.  Right-sided parapelvic cyst noted.     At L3-4, there is asymmetric disc bulging to  the right with small annular tear and mild facet hypertrophy.  This results in mild right-sided neural foraminal narrowing.  No spinal canal stenosis.     At L4-5, there is asymmetric disc bulging to the right with small annular tear and bilateral facet hypertrophy, resulting in mild spinal canal stenosis and mild bilateral neural foraminal narrowing.     At L5-S1, there is degenerative disc disease, noting disc height loss and sub endplate marrow changes.  There is mild disc bulging.  This results in mild bilateral neural foraminal narrowing.     The findings are similar to the prior exam.     Impression:     Lumbar spondylosis, resulting in mild spinal canal stenosis at L4-5, and mild neural foraminal narrowing from L3-4 through L5-S1, as above.        Electronically signed by: Denilson Dwyer MD  Date:                                            05/22/2020  Time:                                           10:44           Assessment:       Encounter Diagnoses   Name Primary?    DDD (degenerative disc disease), lumbar Yes    Lumbar spondylosis     Lumbar radiculopathy          Plan:       Narciso was seen today for back pain.    Diagnoses and all orders for this visit:    DDD (degenerative disc disease), lumbar  -     HYDROcodone-acetaminophen (NORCO) 5-325 mg per tablet; Take 1 tablet by mouth every 12 (twelve) hours as needed for Pain.  -     Case request GI: Injection, Steroid, Epidural Transforaminal    Lumbar spondylosis  -     HYDROcodone-acetaminophen (NORCO) 5-325 mg per tablet; Take 1 tablet by mouth every 12 (twelve) hours as needed for Pain.    Lumbar radiculopathy  -     HYDROcodone-acetaminophen (NORCO) 5-325 mg per tablet; Take 1 tablet by mouth every 12 (twelve) hours as needed for Pain.  -     Case request GI: Injection, Steroid, Epidural Transforaminal        Narciso Yanez is a 84 y.o. male with right-sided lower extremity numbness and pain. Consistent with right lumbar radiculopathy though  specific level of radiculopathy is unclear at this time. Lumbar MRI with some neural foraminal narrowing though no specific nerve root impingement.  No significant back pain. Low suspicion for facet or sacroiliac mediated pain.\    1.  Schedule right TESI @ L5 & S1  2.  Prescription monitoring report reviewed today.  No inconsistencies noted.  3.  Start Norco 5-325 mg q.12 hours p.r.n..  7 day supply of 14 pills was provided today.  Patient does take Valium infrequently for anxiety.  We discussed that he should not take Valium and opioid pain medications concurrently as this could lead to a significantly increased risk of accidental overdose.  Patient expressed understanding and agreement.  He states that he would not take Valium while he is utilizing opioid pain medications.  4.  Return to clinic 2 weeks after procedure to discuss results.  May consider physical therapy referral.

## 2020-08-13 ENCOUNTER — TELEPHONE (OUTPATIENT)
Dept: PAIN MEDICINE | Facility: CLINIC | Age: 85
End: 2020-08-13

## 2020-08-13 DIAGNOSIS — M54.16 LUMBAR RADICULOPATHY: ICD-10-CM

## 2020-08-13 DIAGNOSIS — M51.36 DDD (DEGENERATIVE DISC DISEASE), LUMBAR: ICD-10-CM

## 2020-08-13 DIAGNOSIS — M47.816 LUMBAR SPONDYLOSIS: ICD-10-CM

## 2020-08-13 RX ORDER — HYDROCODONE BITARTRATE AND ACETAMINOPHEN 5; 325 MG/1; MG/1
1 TABLET ORAL EVERY 12 HOURS PRN
Qty: 30 TABLET | Refills: 0 | Status: SHIPPED | OUTPATIENT
Start: 2020-08-13 | End: 2020-10-13 | Stop reason: SDUPTHER

## 2020-08-13 NOTE — TELEPHONE ENCOUNTER
Informed Mr. Yanez that prescription refill was sent to his pharmacy.  He confirmed that he does NOT take the Valium and opioid pain medication concurrently.

## 2020-08-13 NOTE — TELEPHONE ENCOUNTER
----- Message from Beckie Yoshi sent at 8/13/2020  1:37 PM CDT -----  Contact: Self  [phone]  Type: RX Refill Request    Who Called: Self    Have you contacted your pharmacy:    Refill or New Rx: refill    RX Name and Strength: HYDROcodone-acetaminophen (NORCO) 5-325 mg per tablet    Preferred Pharmacy with phone number:   [facility] #[id number] - [place]  [address]  [address] [place] [address]  Phone: [phone] Fax: [phone]    Local or Mail Order: Local    Would the patient rather a call back or a response via My [facility]? Call back    Best Call Back Number: [phone]

## 2020-08-13 NOTE — TELEPHONE ENCOUNTER
Prescription monitoring report reviewed today.  No inconsistencies noted.  Patient scheduled for procedure on 8/26/20.  Will provide additional 15 day supply of Norco 5-325 mg q.12 hours.  30 pills provided.  We will reiterate that patient should not be taking Valium concurrently with opioid pain medications.

## 2020-08-13 NOTE — TELEPHONE ENCOUNTER
Patient is requesting a refill on pain medication.  He is scheduled for an injection on 8/26/2020.  Confirmed that the preferred pharmacy is [facility] on [facility].  Request sent to Dr. Crane.

## 2020-08-18 ENCOUNTER — TELEPHONE (OUTPATIENT)
Dept: PAIN MEDICINE | Facility: CLINIC | Age: 85
End: 2020-08-18

## 2020-08-18 NOTE — TELEPHONE ENCOUNTER
----- Message from Wiley Vargas MD sent at 8/10/2020  9:26 AM CDT -----  Regarding: FW: Clearance to Hold ASA & Plavix  OK to hold Plavix +/- ASA prior to procedure as requested and resume ASAP thereafter.    [facility]  ----- Message -----  From: Radha Louise MA  Sent: 8/7/2020   4:49 PM CDT  To: Wiley Vargas MD  Subject: FW: Clearance to Hold ASA & Plavix                 ----- Message -----  From: Kayla Pedro RN  Sent: 8/7/2020   4:04 PM CDT  To: Sam SHEPARD Staff  Subject: Clearance to Hold ASA & Plavix                   Dr. Vargas,    Mr. Yanez is scheduled to have Right L5 + S1 TF LEAH on 08/26/2020.  Dr. Crane is requesting that the patient stop taking ASA and Plavix seven days prior to this procedure.  Please indicate whether or not he is able to stop taking the medications listed above.  Feel free to contact the clinic with any questions or concerns you may have.      Thank you in advance for your time and expertise,    Kayla Pedro, HECTORN, RN

## 2020-08-18 NOTE — TELEPHONE ENCOUNTER
Reviewed pre-procedure instructions with  Narciso, as well as provided arrival time of 1115a.  Patient requested a rapid COVID test be done the day of procedure.  All questions answered- patient verbalized understanding.    Reminded patient that today will be the last day he takes ASA and Plavix until after the procedure- verbalized understanding.

## 2020-08-21 ENCOUNTER — TELEPHONE (OUTPATIENT)
Dept: FAMILY MEDICINE | Facility: CLINIC | Age: 85
End: 2020-08-21

## 2020-08-21 NOTE — TELEPHONE ENCOUNTER
Okay, please ask if we can have that called in rather than trying to make those prescriptions in the computer    12 refills okay

## 2020-08-21 NOTE — TELEPHONE ENCOUNTER
Please call pharmacy and ask them how the dex com prescription should be ordered.  When it is pulled up in epic there are multiple dex com devices and there may need to be a prescription for the device, sensors etcetera.  There might need to be three prescriptions for separate pieces of this equipment and it would be best use of time to find out what the pharmacy needs    In fact, if you want to verbally give the okay for all of the equipment to the pharmacy that would be okay as well but also try to put in his chart and let me know what they say

## 2020-08-21 NOTE — TELEPHONE ENCOUNTER
----- Message from Eli Espinoza sent at 8/21/2020 10:15 AM CDT -----  Type: RX Refill Request    Who Called: PATRICIA LEE [[id number]]    RX Name and Strength: Dexcom    Preferred Pharmacy with phone number:     [id number] - [place] - [address] AT [facility] [address]  [address]  [place] [address]  Phone: [phone] Fax: [phone]      Best Call Back Number: [phone]    Additional Information: N/A

## 2020-08-21 NOTE — TELEPHONE ENCOUNTER
Patient is requesting a new order for a dexcom glucometer to be sent to [facility].  Patient said that his insurance told him that they would pay for it.

## 2020-08-21 NOTE — TELEPHONE ENCOUNTER
Spoke with Kenyatta/ Pharmacist who said that there should be 3 separate Rx's as follows: Dexcom 6 G sensor. Dexcom 6 G transmitter & Derxcom 6 G  with a note in the comments stating: Patient's choice upgrade.

## 2020-08-24 DIAGNOSIS — Z79.52 ON CORTICOSTEROID THERAPY: ICD-10-CM

## 2020-08-24 RX ORDER — ALENDRONATE SODIUM 70 MG/1
70 TABLET ORAL
Qty: 4 TABLET | Refills: 11 | Status: SHIPPED | OUTPATIENT
Start: 2020-08-24 | End: 2021-03-17 | Stop reason: SDUPTHER

## 2020-08-25 RX ORDER — METFORMIN HYDROCHLORIDE 750 MG/1
TABLET, EXTENDED RELEASE ORAL
Qty: 180 TABLET | Refills: 0 | Status: CANCELLED | OUTPATIENT
Start: 2020-08-25

## 2020-08-26 ENCOUNTER — HOSPITAL ENCOUNTER (OUTPATIENT)
Facility: HOSPITAL | Age: 85
Discharge: HOME | End: 2020-08-26
Attending: PAIN MEDICINE | Admitting: PAIN MEDICINE
Payer: MEDICARE

## 2020-08-26 VITALS
SYSTOLIC BLOOD PRESSURE: 129 MMHG | HEART RATE: 76 BPM | OXYGEN SATURATION: 96 % | DIASTOLIC BLOOD PRESSURE: 68 MMHG | RESPIRATION RATE: 18 BRPM | TEMPERATURE: 98 F

## 2020-08-26 DIAGNOSIS — M51.36 DEGENERATIVE DISC DISEASE, LUMBAR: Primary | ICD-10-CM

## 2020-08-26 DIAGNOSIS — M51.36 DDD (DEGENERATIVE DISC DISEASE), LUMBAR: ICD-10-CM

## 2020-08-26 LAB — SARS-COV-2 RDRP RESP QL NAA+PROBE: NEGATIVE

## 2020-08-26 PROCEDURE — 64483 PR EPIDURAL INJ, ANES/STEROID, TRANSFORAMINAL, LUMB/SACR, SNGL LEVL: ICD-10-PCS | Mod: HCNC,RT,, | Performed by: PAIN MEDICINE

## 2020-08-26 PROCEDURE — 25000003 PHARM REV CODE 250: Mod: HCNC | Performed by: PAIN MEDICINE

## 2020-08-26 PROCEDURE — U0002 COVID-19 LAB TEST NON-CDC: HCPCS | Mod: HCNC

## 2020-08-26 PROCEDURE — 64484 PRA INJECT ANES/STEROID FORAMEN LUMBAR/SACRAL W IMG GUIDE ,EA ADD LEVEL: ICD-10-PCS | Mod: HCNC,RT,, | Performed by: PAIN MEDICINE

## 2020-08-26 PROCEDURE — 64484 NJX AA&/STRD TFRM EPI L/S EA: CPT | Mod: HCNC | Performed by: PAIN MEDICINE

## 2020-08-26 PROCEDURE — 64484 NJX AA&/STRD TFRM EPI L/S EA: CPT | Mod: HCNC,RT,, | Performed by: PAIN MEDICINE

## 2020-08-26 PROCEDURE — 64483 NJX AA&/STRD TFRM EPI L/S 1: CPT | Mod: HCNC,RT | Performed by: PAIN MEDICINE

## 2020-08-26 PROCEDURE — 64483 NJX AA&/STRD TFRM EPI L/S 1: CPT | Mod: HCNC,RT,, | Performed by: PAIN MEDICINE

## 2020-08-26 PROCEDURE — 63600175 PHARM REV CODE 636 W HCPCS: Mod: HCNC | Performed by: PAIN MEDICINE

## 2020-08-26 PROCEDURE — 25500020 PHARM REV CODE 255: Mod: HCNC | Performed by: PAIN MEDICINE

## 2020-08-26 RX ORDER — DEXAMETHASONE SODIUM PHOSPHATE 10 MG/ML
10 INJECTION INTRAMUSCULAR; INTRAVENOUS ONCE
Status: COMPLETED | OUTPATIENT
Start: 2020-08-26 | End: 2020-08-26

## 2020-08-26 RX ORDER — ALPRAZOLAM 0.5 MG/1
TABLET, ORALLY DISINTEGRATING ORAL
Status: DISCONTINUED
Start: 2020-08-26 | End: 2020-08-26 | Stop reason: HOSPADM

## 2020-08-26 RX ORDER — LIDOCAINE HYDROCHLORIDE 20 MG/ML
10 INJECTION, SOLUTION INFILTRATION; PERINEURAL ONCE
Status: COMPLETED | OUTPATIENT
Start: 2020-08-26 | End: 2020-08-26

## 2020-08-26 RX ORDER — DEXAMETHASONE SODIUM PHOSPHATE 10 MG/ML
INJECTION INTRAMUSCULAR; INTRAVENOUS
Status: DISCONTINUED
Start: 2020-08-26 | End: 2020-08-26 | Stop reason: HOSPADM

## 2020-08-26 RX ORDER — LIDOCAINE HYDROCHLORIDE 10 MG/ML
1 INJECTION, SOLUTION EPIDURAL; INFILTRATION; INTRACAUDAL; PERINEURAL ONCE
Status: COMPLETED | OUTPATIENT
Start: 2020-08-26 | End: 2020-08-26

## 2020-08-26 RX ORDER — METFORMIN HYDROCHLORIDE 750 MG/1
TABLET, EXTENDED RELEASE ORAL
Qty: 180 TABLET | Refills: 0 | Status: SHIPPED | OUTPATIENT
Start: 2020-08-26 | End: 2020-09-09 | Stop reason: SDUPTHER

## 2020-08-26 RX ORDER — LIDOCAINE HYDROCHLORIDE 20 MG/ML
INJECTION, SOLUTION INFILTRATION; PERINEURAL
Status: DISCONTINUED
Start: 2020-08-26 | End: 2020-08-26 | Stop reason: HOSPADM

## 2020-08-26 RX ORDER — LIDOCAINE HYDROCHLORIDE 10 MG/ML
INJECTION, SOLUTION EPIDURAL; INFILTRATION; INTRACAUDAL; PERINEURAL
Status: DISCONTINUED
Start: 2020-08-26 | End: 2020-08-26 | Stop reason: HOSPADM

## 2020-08-26 RX ORDER — ALPRAZOLAM 0.5 MG/1
1 TABLET, ORALLY DISINTEGRATING ORAL ONCE AS NEEDED
Status: COMPLETED | OUTPATIENT
Start: 2020-08-26 | End: 2020-08-26

## 2020-08-26 RX ADMIN — ALPRAZOLAM 1 MG: 0.5 TABLET, ORALLY DISINTEGRATING ORAL at 11:08

## 2020-08-26 NOTE — OP NOTE
Lumbar transforaminal LEAH    Time-out taken to identify patient and procedure side prior to starting the procedure.   I attest that I have reviewed the patient's home medications prior to the procedure and no contraindication have been identified. I  re-evaluated the patient after the patient was positioned for the procedure in the procedure room immediately before the procedural time-out. The vital signs are current and represent the current state of the patient which has not significantly changed since the preprocedure assessment.                              Date of Service: 08/26/2020    PCP: Marcelino Del Toro MD    Referring Physician:                                     PROCEDURE: Right L5 and S1 transforaminal epidural steroid injection under fluoroscopy    REASON FOR PROCEDURE: Right DDD (degenerative disc disease), lumbar [M51.36]  Lumbar radiculopathy [M54.16]    PHYSICIAN: Wiley Crane MD  ASSISTANTS:None    MEDICATIONS INJECTED:  Preservative-free dexamethasone 10mg, Xylocaine 1% MPF 3-5ml. 3ml per level. Preservative free, sterile normal saline is used to get larger volume as needed.  LOCAL ANESTHETIC INJECTED:  Xylocaine 2%  3ml per site.    SEDATION MEDICATIONS: None  ESTIMATED BLOOD LOSS:  None.    COMPLICATIONS:  None.    TECHNIQUE:   Laying in a prone position, the patient was prepped and draped in the usual sterile fashion using ChloraPrep and fenestrated drape.  The area to be injected was determined under fluoroscopic guidance.  Local anesthetic was given by raising a wheel and going down to the hub of a 27-gauge 1.25 inch needle.  The 3.5 inch 25-gauge spinal needle was introduced towards the transverse process of each above named nerve root level.  The needle was walked medially then hinged into the neural foramen.  Omnipaque was injected to confirm appropriate placement and that there was no vascular runoff.  The medication was then injected after applying negative pressure. The patient  tolerated the procedure well.    PAIN BEFORE THE PROCEDURE: 3/10.    PAIN AFTER THE PROCEDURE: 0/10.    The patient was monitored after the procedure.  Patient was given post procedure and discharge instructions to follow at home.  We will see the patient back in two weeks or the patient may call to inform of status. The patient was discharged in a stable condition.

## 2020-08-26 NOTE — DISCHARGE INSTRUCTIONS

## 2020-08-27 RX ORDER — METOPROLOL SUCCINATE 25 MG/1
25 TABLET, EXTENDED RELEASE ORAL DAILY
Qty: 90 TABLET | Refills: 12 | Status: SHIPPED | OUTPATIENT
Start: 2020-08-27 | End: 2021-03-17 | Stop reason: SDUPTHER

## 2020-08-27 RX ORDER — ROSUVASTATIN CALCIUM 20 MG/1
20 TABLET, COATED ORAL NIGHTLY
Qty: 90 TABLET | Refills: 12 | Status: SHIPPED | OUTPATIENT
Start: 2020-08-27 | End: 2021-04-15 | Stop reason: SDUPTHER

## 2020-08-27 RX ORDER — CLOPIDOGREL BISULFATE 75 MG/1
75 TABLET ORAL DAILY
Qty: 90 TABLET | Refills: 4 | Status: SHIPPED | OUTPATIENT
Start: 2020-08-27 | End: 2021-01-22 | Stop reason: SDUPTHER

## 2020-08-27 NOTE — TELEPHONE ENCOUNTER
----- Message from Karen Olguin sent at 8/27/2020  1:53 PM CDT -----  Contact: Beckie [facility] [phone]  Type:  Pharmacy Calling to Clarify an RX    Name of Caller: Beckie     Pharmacy Name: [facility]    Prescription Name:Multiply medications    What do they need to clarify? Calling to follow up on request that ws sent over on 08-24-2    Best Call Back Number: [phone]

## 2020-09-01 NOTE — TELEPHONE ENCOUNTER
----- Message from Whitley Perez sent at 9/1/2020 10:17 AM CDT -----  Regarding: self  [phone]  .Type: RX Refill Request    Who Called:  self     Have you contacted your pharmacy: no    Refill or New Rx refill     RX Name and Strength: glimepiride (AMARYL) 2 MG tablet    Is this a 30 day or 90 day RX 90 day    Preferred Pharmacy with phone number: .  [facility]  [phone]    Local or Mail Order: local    Would the patient rather a call back or a response via My [facility]?  Call back     Best Call Back Number: [phone]

## 2020-09-02 RX ORDER — GLIMEPIRIDE 2 MG/1
2 TABLET ORAL DAILY
Qty: 30 TABLET | Refills: 3 | Status: SHIPPED | OUTPATIENT
Start: 2020-09-02 | End: 2020-09-09 | Stop reason: SDUPTHER

## 2020-09-03 ENCOUNTER — PATIENT OUTREACH (OUTPATIENT)
Dept: ADMINISTRATIVE | Facility: OTHER | Age: 85
End: 2020-09-03

## 2020-09-04 ENCOUNTER — TELEPHONE (OUTPATIENT)
Dept: FAMILY MEDICINE | Facility: CLINIC | Age: 85
End: 2020-09-04

## 2020-09-04 ENCOUNTER — OFFICE VISIT (OUTPATIENT)
Dept: PAIN MEDICINE | Facility: CLINIC | Age: 85
End: 2020-09-04
Payer: MEDICARE

## 2020-09-04 VITALS
BODY MASS INDEX: 24.85 KG/M2 | OXYGEN SATURATION: 97 % | WEIGHT: 158.31 LBS | TEMPERATURE: 98 F | HEART RATE: 82 BPM | DIASTOLIC BLOOD PRESSURE: 72 MMHG | SYSTOLIC BLOOD PRESSURE: 140 MMHG | HEIGHT: 67 IN

## 2020-09-04 DIAGNOSIS — M54.16 LUMBAR RADICULOPATHY: Primary | ICD-10-CM

## 2020-09-04 DIAGNOSIS — M47.9 SPONDYLOSIS: ICD-10-CM

## 2020-09-04 DIAGNOSIS — M51.36 DDD (DEGENERATIVE DISC DISEASE), LUMBAR: ICD-10-CM

## 2020-09-04 DIAGNOSIS — M54.17 RADICULOPATHY OF LUMBOSACRAL REGION: ICD-10-CM

## 2020-09-04 PROCEDURE — 1159F PR MEDICATION LIST DOCUMENTED IN MEDICAL RECORD: ICD-10-PCS | Mod: HCNC,S$GLB,, | Performed by: REGISTERED NURSE

## 2020-09-04 PROCEDURE — 99999 PR PBB SHADOW E&M-EST. PATIENT-LVL V: ICD-10-PCS | Mod: PBBFAC,HCNC,, | Performed by: REGISTERED NURSE

## 2020-09-04 PROCEDURE — 3077F SYST BP >= 140 MM HG: CPT | Mod: HCNC,CPTII,S$GLB, | Performed by: REGISTERED NURSE

## 2020-09-04 PROCEDURE — 99999 PR PBB SHADOW E&M-EST. PATIENT-LVL V: CPT | Mod: PBBFAC,HCNC,, | Performed by: REGISTERED NURSE

## 2020-09-04 PROCEDURE — 3077F PR MOST RECENT SYSTOLIC BLOOD PRESSURE >= 140 MM HG: ICD-10-PCS | Mod: HCNC,CPTII,S$GLB, | Performed by: REGISTERED NURSE

## 2020-09-04 PROCEDURE — 1101F PT FALLS ASSESS-DOCD LE1/YR: CPT | Mod: HCNC,CPTII,S$GLB, | Performed by: REGISTERED NURSE

## 2020-09-04 PROCEDURE — 3078F PR MOST RECENT DIASTOLIC BLOOD PRESSURE < 80 MM HG: ICD-10-PCS | Mod: HCNC,CPTII,S$GLB, | Performed by: REGISTERED NURSE

## 2020-09-04 PROCEDURE — 1125F PR PAIN SEVERITY QUANTIFIED, PAIN PRESENT: ICD-10-PCS | Mod: HCNC,S$GLB,, | Performed by: REGISTERED NURSE

## 2020-09-04 PROCEDURE — 99214 OFFICE O/P EST MOD 30 MIN: CPT | Mod: HCNC,S$GLB,, | Performed by: REGISTERED NURSE

## 2020-09-04 PROCEDURE — 1125F AMNT PAIN NOTED PAIN PRSNT: CPT | Mod: HCNC,S$GLB,, | Performed by: REGISTERED NURSE

## 2020-09-04 PROCEDURE — 1159F MED LIST DOCD IN RCRD: CPT | Mod: HCNC,S$GLB,, | Performed by: REGISTERED NURSE

## 2020-09-04 PROCEDURE — 3078F DIAST BP <80 MM HG: CPT | Mod: HCNC,CPTII,S$GLB, | Performed by: REGISTERED NURSE

## 2020-09-04 PROCEDURE — 1101F PR PT FALLS ASSESS DOC 0-1 FALLS W/OUT INJ PAST YR: ICD-10-PCS | Mod: HCNC,CPTII,S$GLB, | Performed by: REGISTERED NURSE

## 2020-09-04 PROCEDURE — 99214 PR OFFICE/OUTPT VISIT, EST, LEVL IV, 30-39 MIN: ICD-10-PCS | Mod: HCNC,S$GLB,, | Performed by: REGISTERED NURSE

## 2020-09-04 RX ORDER — LANCETS
EACH MISCELLANEOUS
COMMUNITY
Start: 2020-08-28 | End: 2020-12-08 | Stop reason: SDUPTHER

## 2020-09-04 RX ORDER — MELOXICAM 15 MG/1
15 TABLET ORAL DAILY
Qty: 30 TABLET | Refills: 0 | Status: SHIPPED | OUTPATIENT
Start: 2020-09-04 | End: 2020-09-09 | Stop reason: SDUPTHER

## 2020-09-04 NOTE — TELEPHONE ENCOUNTER
Please always call to get more details about what the nature is of the paperwork since it might be something he can drop off such as clearance for cataract surgery which is simple.    Might just be a handicap tag or something that is easy.    Please gather such information 1st in the future

## 2020-09-04 NOTE — TELEPHONE ENCOUNTER
----- Message from Bonita Anglin sent at 9/4/2020 10:40 AM CDT -----  Regarding: Call Back  Name of Who is Calling : PATRICIA LEE [[id number]]    Patient is requesting a call from staff in regards to being seen sooner than what is next available on 9/29/ needing to speak with doctor about getting forms filled out    .....Please contact to further discuss and advise.    Can the clinic reply by [facility] : No    What Number to Call Back :  [phone]

## 2020-09-04 NOTE — PROGRESS NOTES
Subjective:     Patient ID: Narciso Yanez is a 85 y.o. male    Chief Complaint: Back Pain      Referred by: No ref. provider found      HPI:    Interval History NP (9/4/20):    Pt returns today for follow up. He states 0% relief from the epidural injection. His pain is unchanged and reports no new or worsening symptoms. Denies bowel or bladder dysfunction. Would like to try a non-opioid medication now for the pain. He enjoys working and does not like the sedating effects of opioids or neuropathic medications.       Interval History (8/7/20):  He returns today for follow up.  He reports that he continues to have right leg/lower extremity pain.  He denies any changes in the quality location of this pain.  He recently underwent angiography to evaluate for peripheral vascular disease/claudication.  Patient states that no treatable source of the symptoms have been found.  He was previously scheduled to undergo a right L5 and S1 transforaminal epidural steroid injection.  This was postponed secondary to the angiography.  Patient is interested in scheduling this procedure.  He also requests that something be prescribed for his pain.  He states that he does not want anything too strong his he does not like to feel sedated.  He states that tramadol was not effective.  Patient has tried gabapentin in the past but this caused him to feel dizzy so he discontinue this medication.  He has been prescribed hydrocodone in the past, but cannot recall this medication was effective or if he tolerated.      Interval History NP (6/24/20):    Pt returns today for follow up. He states that he is still having pain to his right leg, radiating to the foot. The pain is unchanged in quality or location. Denies new or worsening symptoms. He reports that gabapentin has not helped. He took one pill and became very dizzy. He wishes to discontinue this medication. He would like to try an injection for the pain. He is no longer able to drive or  sit for long periods of time because of his right leg. His son drove him to the appointment today; using a can for balance. He is currently on Plavix and a daily 81 mg aspirin.       Interval History (6/16/20):  He returns today for follow up.  He reports that physical therapy has not been helpful for the right lower extremity pain.  He denies any changes in the quality or location of the pain.  He denies any new or worsening symptoms.  He does state that physical therapy helped for a short appeared of time but this relief for off.  He continues on regular home exercise program but does not notice any long-term relief.  He states that he did take gabapentin for short period of time and found it helpful.  Initially this medicine did cause him to feel funny, but he states that after while this feeling when away.  He is interested in trying this medication again.      Interval History (4/22/19):  He returns today for follow up and MRI review.  He reports that his pain is unchanged in quality and location since last encounter.  He denies any new or worsening symptoms.  Patient states that he could not tolerate gabapentin and has discontinued his medication.      Initial Encounter (4/15/19):  Narciso Yanez is a 85 y.o. male who presents today with right lower extremity numbness and pain. This problem started about 5 weeks ago.  No specific inciting event or injury.  The pain/numbness starts in the right posterior hip region and radiates to the posterior thigh into the leg and foot.  He also reports weakness of the right lower extremity.  He denies any bowel or bladder dysfunction.  He denies any significant back pain associated with this problem.  The pain is not constant.  The pain is worsened with prolonged sitting and will improved with standing.   This pain is described in detail below.    Physical Therapy:  No.    Non-pharmacologic Treatment:  Standing helps         · TENS?  No    Pain Medications:          · Currently taking:  Tylenol    · Has tried in the past:  Flexeril, tramadol, gabapentin, Norco, Robaxin    · Has not tried:  NSAIDs,  TCAs, SNRIs, topical creams    Blood thinners:  Aspirin 81 mg a day, Plavix    Interventional Therapies:      8/26/20 - right L5 and S1 transforaminal epidural steroid injection    Relevant Surgeries:  None    Affecting sleep?  No    Affecting daily activities? yes    Depressive symptoms? no          · SI/HI? No    Work status: Employed    Pain Scores:    Best:       4/10  Worst:    10/10  Usually:  6 /10  Today:    7/10    Review of Systems   Constitutional: Negative for activity change, appetite change, chills, fatigue, fever and unexpected weight change.   HENT: Negative for hearing loss.    Eyes: Negative for visual disturbance.   Respiratory: Negative for chest tightness and shortness of breath.    Cardiovascular: Negative for chest pain.   Gastrointestinal: Negative for abdominal pain, constipation, diarrhea, nausea and vomiting.   Genitourinary: Negative for difficulty urinating.   Musculoskeletal: Positive for arthralgias and gait problem. Negative for back pain and neck pain.   Skin: Negative for rash.   Neurological: Positive for weakness and numbness. Negative for dizziness, light-headedness and headaches.   Psychiatric/Behavioral: Negative for hallucinations, sleep disturbance and suicidal ideas. The patient is not nervous/anxious.        Past Medical History:   Diagnosis Date    Actinic keratosis     Anxiety     B12 deficiency 12/8/2014    Dx 6/14    Cancer     skin    Cataract     Coronary artery disease     Diabetes mellitus type II     Diabetes mellitus with peripheral circulatory disorder 6/18/2014    ED (erectile dysfunction)     Hyperlipidemia     Hypertension     Kidney stone     Peripheral vascular disease     Spondylosis 3/29/2019    Tobacco dependence        Past Surgical History:   Procedure Laterality Date    APPENDECTOMY      CARDIAC  SURGERY  01/1999    CABG 4 vessels    CATARACT EXTRACTION      EPIDURAL STEROID INJECTION Right 8/26/2020    Procedure: Injection, Steroid, Epidural Transforaminal;  Surgeon: iWley Crane Jr., MD;  Location: [facility];  Service: Pain Management;  Laterality: Right;  Right L5 + S1 TF LEAH  Arrive @ 1115; ASA & Plavix last 8/18; Check BG; Rapid COVID test    EXTERNAL EAR SURGERY      EYE SURGERY      cataracts    ILIAC ARTERY STENT Bilateral 06/2003    also Right External Iliac stent       Social History     Socioeconomic History    Marital status: Single     Spouse name: Not on file    Number of children: Not on file    Years of education: Not on file    Highest education level: Not on file   Occupational History    Not on file   Social Needs    Financial resource strain: Not on file    Food insecurity     Worry: Not on file     Inability: Not on file    Transportation needs     Medical: Not on file     Non-medical: Not on file   Tobacco Use    Smoking status: Current Every Day Smoker     Packs/day: 1.50     Years: 71.00     Pack years: 106.50     Types: Cigarettes    Smokeless tobacco: Never Used   Substance and Sexual Activity    Alcohol use: No    Drug use: No    Sexual activity: Not Currently     Partners: Female   Lifestyle    Physical activity     Days per week: Not on file     Minutes per session: Not on file    Stress: Not on file   Relationships    Social connections     Talks on phone: Not on file     Gets together: Not on file     Attends Religion service: Not on file     Active member of club or organization: Not on file     Attends meetings of clubs or organizations: Not on file     Relationship status: Not on file   Other Topics Concern    Not on file   Social History Narrative    .  4 children.  Smokes 2 ppd.  Still drives trucks for entertainment industry.        Review of patient's allergies indicates:   Allergen Reactions    Demerol [meperidine] Nausea Only      Other reaction(s): Unknown       Current Outpatient Medications on File Prior to Visit   Medication Sig Dispense Refill    ACCU-CHEK JOAQUIN PLUS METER [facility]       ACCU-CHEK SOFTCLIX LANCETS Misc       alendronate (FOSAMAX) 70 MG tablet Take 1 tablet (70 mg total) by mouth every 7 days. 4 tablet 11    ascorbic acid, vitamin C, (VITAMIN C) 250 MG tablet Take 1 tablet (250 mg total) by mouth once daily. 120 tablet 0    aspirin (ECOTRIN) 81 MG EC tablet Take 81 mg by mouth once daily.      blood sugar diagnostic Strp 1 strip by Misc.(Non-Drug; Combo Route) route 2 (two) times daily. Disp glucometer and lancets as well 100 strip 12    clopidogreL (PLAVIX) 75 mg tablet Take 1 tablet (75 mg total) by mouth once daily. 90 tablet 4    diazePAM (VALIUM) 5 MG tablet TAKE ONE TABLET BY MOUTH EVERY 8 HOURS AS NEEDED 60 tablet 5    glimepiride (AMARYL) 2 MG tablet Take 1 tablet (2 mg total) by mouth once daily. 30 tablet 3    metFORMIN (GLUCOPHAGE-XR) 750 MG ER 24hr tablet 2 a day 180 tablet 0    metoprolol succinate (TOPROL-XL) 25 MG 24 hr tablet Take 1 tablet (25 mg total) by mouth once daily. 90 tablet 12    oxybutynin (DITROPAN-XL) 5 MG TR24 Take 1 tablet (5 mg total) by mouth once daily. 30 tablet 11    predniSONE (DELTASONE) 1 MG tablet Take 5 tablets (5 mg total) by mouth once daily. 150 tablet 12    rosuvastatin (CRESTOR) 20 MG tablet Take 1 tablet (20 mg total) by mouth every evening. 90 tablet 12    tamsulosin (FLOMAX) 0.4 mg Cap Take 1 capsule (0.4 mg total) by mouth once daily. 30 capsule 11     No current facility-administered medications on file prior to visit.        Objective:      See flowsheet.     Exam:  GEN:  Well developed, well nourished.  No acute distress.   HEENT:  No trauma.  Mucous membranes moist.  Nares patent bilaterally.  PSYCH: Normal affect. Thought content appropriate.  CHEST:  Breathing symmetric.  No audible wheezing.  ABD: Soft, non-distended.  SKIN:  Warm, pink, dry.  No rash on  exposed areas.    EXT:  No cyanosis, clubbing, or edema.  No color change or changes in nail or hair growth.  NEURO/MUSCULOSKELETAL:  Fully alert, oriented, and appropriate. Speech normal dottie. No cranial nerve deficits.   Gait:  Antalgic. With cane today.  No focal motor deficits.         Imaging:      Narrative & Impression    EXAMINATION:  MRI LUMBAR SPINE WITHOUT CONTRAST     CLINICAL HISTORY:  RIGHT SCIATICA; Sciatica, right side     TECHNIQUE:  Multiplanar, multisequence MR images were acquired from the thoracolumbar junction to the sacrum without the administration of contrast.     COMPARISON:  04/20/2019     FINDINGS:  The distal cord/conus demonstrates normal size and signal.  No evidence of osteomyelitis, marrow replacement process, or acute fracture.  Right-sided parapelvic cyst noted.     At L3-4, there is asymmetric disc bulging to the right with small annular tear and mild facet hypertrophy.  This results in mild right-sided neural foraminal narrowing.  No spinal canal stenosis.     At L4-5, there is asymmetric disc bulging to the right with small annular tear and bilateral facet hypertrophy, resulting in mild spinal canal stenosis and mild bilateral neural foraminal narrowing.     At L5-S1, there is degenerative disc disease, noting disc height loss and sub endplate marrow changes.  There is mild disc bulging.  This results in mild bilateral neural foraminal narrowing.     The findings are similar to the prior exam.     Impression:     Lumbar spondylosis, resulting in mild spinal canal stenosis at L4-5, and mild neural foraminal narrowing from L3-4 through L5-S1, as above.        Electronically signed by: Denilson Dwyer MD  Date:                                            05/22/2020  Time:                                           10:44           Assessment:       Encounter Diagnoses   Name Primary?    Lumbar radiculopathy Yes    DDD (degenerative disc disease), lumbar     Radiculopathy of  lumbosacral region     Spondylosis          Plan:       Narciso was seen today for back pain.    Diagnoses and all orders for this visit:    Lumbar radiculopathy  -     Ambulatory referral/consult to Physical/Occupational Therapy; Future  -     meloxicam (MOBIC) 15 MG tablet; Take 1 tablet (15 mg total) by mouth once daily.    DDD (degenerative disc disease), lumbar    Radiculopathy of lumbosacral region    Spondylosis        Narciso Yanez is a 85 y.o. male with right-sided lower extremity numbness and pain. Consistent with right lumbar radiculopathy though specific level of radiculopathy is unclear at this time. Lumbar MRI with some neural foraminal narrowing though no specific nerve root impingement.  No significant back pain. Low suspicion for facet or sacroiliac mediated pain.    1.  Referral placed for Physical Therapy. No relief obtained from right TESI.   2.  Start meloxicam 15mg qday x 2 weeks, then PRN for pain.   3.  Follow up in 8 weeks or sooner if needed to evaluate effectiveness of PT. May discuss additional interventional procedures depending on results.       COLLIN Mason, FNP-C  [facility]  Interventional Pain Management

## 2020-09-04 NOTE — TELEPHONE ENCOUNTER
Spoke to daughter to ask the patient about returning call to clinic to schedule an appointment this week.

## 2020-09-04 NOTE — TELEPHONE ENCOUNTER
Okay to schedule patient in one of the earliest virtual appointments on Wednesday or Friday, change the virtual to a regular appointment      Clarify with patient if this is disability paperwork from his work, like a disability policy he is activating.  We can help with that kind of paperwork.

## 2020-09-04 NOTE — TELEPHONE ENCOUNTER
Patient will be getting his disability paperwork sometime next week and he would like to schedule an appointment to have them filled out as soon as possible. Please advise.

## 2020-09-08 ENCOUNTER — TELEPHONE (OUTPATIENT)
Dept: PAIN MEDICINE | Facility: CLINIC | Age: 85
End: 2020-09-08

## 2020-09-08 NOTE — TELEPHONE ENCOUNTER
----- Message from Blessing Cheung sent at 9/8/2020  8:50 AM CDT -----  Regarding: pt  Type: Patient Call Back    Who called:pt    What is the request in detail:pt is calling to discuss PT on his leg. Call pt    Can the clinic reply by [facility]?    Would the patient rather a call back or a response via [facility]? call    Best call back number:[phone]      Additional Information:

## 2020-09-08 NOTE — TELEPHONE ENCOUNTER
Mr. Stuart reports that [facility] offered him a start date of 9/28/2020, which he felt was unacceptable.  He verbalized his frustration with [facility] at this time. He asked that I fax the order/referral to an external PT- he requested [facility] [address].  Message sent to CATHY Lester for order to be changed to External.

## 2020-09-09 DIAGNOSIS — M54.16 LUMBAR RADICULOPATHY: ICD-10-CM

## 2020-09-09 RX ORDER — GLIMEPIRIDE 2 MG/1
2 TABLET ORAL DAILY
Qty: 30 TABLET | Refills: 3 | Status: SHIPPED | OUTPATIENT
Start: 2020-09-09 | End: 2021-03-10

## 2020-09-09 RX ORDER — MELOXICAM 15 MG/1
15 TABLET ORAL DAILY
Qty: 30 TABLET | Refills: 0 | Status: SHIPPED | OUTPATIENT
Start: 2020-09-09 | End: 2020-10-09

## 2020-09-09 RX ORDER — DIAZEPAM 5 MG/1
5 TABLET ORAL EVERY 8 HOURS PRN
Qty: 60 TABLET | Refills: 5 | Status: SHIPPED | OUTPATIENT
Start: 2020-09-09 | End: 2021-01-22 | Stop reason: SDUPTHER

## 2020-09-09 RX ORDER — METFORMIN HYDROCHLORIDE 750 MG/1
TABLET, EXTENDED RELEASE ORAL
Qty: 180 TABLET | Refills: 0 | Status: SHIPPED | OUTPATIENT
Start: 2020-09-09 | End: 2021-02-22 | Stop reason: SDUPTHER

## 2020-09-09 NOTE — TELEPHONE ENCOUNTER
----- Message from Otto Chandler sent at 9/9/2020  9:36 AM CDT -----  Name of Who is Calling: PATRICIA LEE [[id number]]    What is the request in detail: PATRICIA LEE [[id number]] is calling in regards to paper work ... Please contact to further discuss and advise      Can the clinic reply by [facility]: yes     [facility]:  [phone]

## 2020-09-11 ENCOUNTER — OFFICE VISIT (OUTPATIENT)
Dept: FAMILY MEDICINE | Facility: CLINIC | Age: 85
End: 2020-09-11
Payer: MEDICARE

## 2020-09-11 VITALS
HEIGHT: 67 IN | BODY MASS INDEX: 24.88 KG/M2 | RESPIRATION RATE: 16 BRPM | SYSTOLIC BLOOD PRESSURE: 133 MMHG | HEART RATE: 79 BPM | WEIGHT: 158.5 LBS | DIASTOLIC BLOOD PRESSURE: 80 MMHG | OXYGEN SATURATION: 95 %

## 2020-09-11 DIAGNOSIS — E11.65 UNCONTROLLED TYPE 2 DIABETES MELLITUS WITH HYPERGLYCEMIA: ICD-10-CM

## 2020-09-11 DIAGNOSIS — N18.30 STAGE 3 CHRONIC KIDNEY DISEASE: ICD-10-CM

## 2020-09-11 DIAGNOSIS — E11.51 DIABETES MELLITUS WITH PERIPHERAL CIRCULATORY DISORDER: ICD-10-CM

## 2020-09-11 DIAGNOSIS — M79.604 RIGHT LEG PAIN: Primary | ICD-10-CM

## 2020-09-11 DIAGNOSIS — I73.9 PAD (PERIPHERAL ARTERY DISEASE): ICD-10-CM

## 2020-09-11 DIAGNOSIS — I25.810 CORONARY ARTERY DISEASE INVOLVING CORONARY BYPASS GRAFT OF NATIVE HEART WITHOUT ANGINA PECTORIS: ICD-10-CM

## 2020-09-11 DIAGNOSIS — G57.01 PIRIFORMIS SYNDROME OF RIGHT SIDE: ICD-10-CM

## 2020-09-11 DIAGNOSIS — M35.3 POLYMYALGIA RHEUMATICA: ICD-10-CM

## 2020-09-11 PROCEDURE — 1125F AMNT PAIN NOTED PAIN PRSNT: CPT | Mod: HCNC,S$GLB,, | Performed by: INTERNAL MEDICINE

## 2020-09-11 PROCEDURE — 1101F PR PT FALLS ASSESS DOC 0-1 FALLS W/OUT INJ PAST YR: ICD-10-PCS | Mod: HCNC,CPTII,S$GLB, | Performed by: INTERNAL MEDICINE

## 2020-09-11 PROCEDURE — 3052F HG A1C>EQUAL 8.0%<EQUAL 9.0%: CPT | Mod: HCNC,CPTII,S$GLB, | Performed by: INTERNAL MEDICINE

## 2020-09-11 PROCEDURE — 3052F PR MOST RECENT HEMOGLOBIN A1C LEVEL 8.0 - < 9.0%: ICD-10-PCS | Mod: HCNC,CPTII,S$GLB, | Performed by: INTERNAL MEDICINE

## 2020-09-11 PROCEDURE — 99999 PR PBB SHADOW E&M-EST. PATIENT-LVL III: CPT | Mod: PBBFAC,HCNC,, | Performed by: INTERNAL MEDICINE

## 2020-09-11 PROCEDURE — 3079F DIAST BP 80-89 MM HG: CPT | Mod: HCNC,CPTII,S$GLB, | Performed by: INTERNAL MEDICINE

## 2020-09-11 PROCEDURE — 1101F PT FALLS ASSESS-DOCD LE1/YR: CPT | Mod: HCNC,CPTII,S$GLB, | Performed by: INTERNAL MEDICINE

## 2020-09-11 PROCEDURE — 1125F PR PAIN SEVERITY QUANTIFIED, PAIN PRESENT: ICD-10-PCS | Mod: HCNC,S$GLB,, | Performed by: INTERNAL MEDICINE

## 2020-09-11 PROCEDURE — 99499 UNLISTED E&M SERVICE: CPT | Mod: HCNC,S$GLB,, | Performed by: INTERNAL MEDICINE

## 2020-09-11 PROCEDURE — 3075F PR MOST RECENT SYSTOLIC BLOOD PRESS GE 130-139MM HG: ICD-10-PCS | Mod: HCNC,CPTII,S$GLB, | Performed by: INTERNAL MEDICINE

## 2020-09-11 PROCEDURE — 99215 OFFICE O/P EST HI 40 MIN: CPT | Mod: HCNC,S$GLB,, | Performed by: INTERNAL MEDICINE

## 2020-09-11 PROCEDURE — 3075F SYST BP GE 130 - 139MM HG: CPT | Mod: HCNC,CPTII,S$GLB, | Performed by: INTERNAL MEDICINE

## 2020-09-11 PROCEDURE — 99215 PR OFFICE/OUTPT VISIT, EST, LEVL V, 40-54 MIN: ICD-10-PCS | Mod: HCNC,S$GLB,, | Performed by: INTERNAL MEDICINE

## 2020-09-11 PROCEDURE — 99999 PR PBB SHADOW E&M-EST. PATIENT-LVL III: ICD-10-PCS | Mod: PBBFAC,HCNC,, | Performed by: INTERNAL MEDICINE

## 2020-09-11 PROCEDURE — 3079F PR MOST RECENT DIASTOLIC BLOOD PRESSURE 80-89 MM HG: ICD-10-PCS | Mod: HCNC,CPTII,S$GLB, | Performed by: INTERNAL MEDICINE

## 2020-09-11 PROCEDURE — 99499 RISK ADDL DX/OHS AUDIT: ICD-10-PCS | Mod: HCNC,S$GLB,, | Performed by: INTERNAL MEDICINE

## 2020-09-11 PROCEDURE — 1159F PR MEDICATION LIST DOCUMENTED IN MEDICAL RECORD: ICD-10-PCS | Mod: HCNC,S$GLB,, | Performed by: INTERNAL MEDICINE

## 2020-09-11 PROCEDURE — 1159F MED LIST DOCD IN RCRD: CPT | Mod: HCNC,S$GLB,, | Performed by: INTERNAL MEDICINE

## 2020-09-11 NOTE — PROGRESS NOTES
Chief complaint follow-up on right leg pain    Patient is an 85-year-old white male who has been having some significant pain in the right leg.  He works as a  for the movie industry.  With prolonged driving his whole right leg goes numb and feels like pins and needles.  It is not so bad when he is up and walking but does have pain in the right leg.  He last saw pain management a year ago.  He did have an appointment today but was the same time as my appointment and so he had to cancel.  His repeat MRI did not appear to be any worse than previous and was rated as mild.  There was consideration for possible epidural trial at the last seen by pain management a year ago.  In the interval he did see Orthopedics in the note was not yet available but the x-rays are unremarkable and apparently was not felt referable to the right hip joint.  He also saw cardiology given his extensive peripheral vascular disease but I did point out to him that his claudication in the past prior to stenting was a different quality symptoms than his current neuropathic type symptoms.  He does have ultrasounds and so forth ordered by Cardiology as well as appropriate follow-up.  He does not really describe claudication with walking or exertion it is more so in a certain position with driving.  We discussed the possibility of piriformis muscle syndrome under the current circumstances and current evaluation.  He has a relative is a physical therapist and it would be fine for that person to instruct him on stretching exercises and so forth.  I put in a telephone message to Pain Management to reschedule patient so he can be evaluated to see if an epidural or more direct treatment for piriformis muscle is indicated if they agree with that diagnosis.    Regarding polymyalgia, he did reduce the dose he believes down to 3 mg but had some increasing pain so increase the dose.  He had issues with available pills and so did go up but up to 10 mg we  discussed again reducing downward and then reassessing inflammatory markers in the future and will also recheck diabetes which is uncontrolled and so forth.    Will repeat all of his labs next week    He does have a long disability form for a disability plan at his work.  He has been essentially unable to work since March of 2019.  He really cannot drive since involves his right leg and that was his primary job.  He has pain and weakness down the right leg.  He had no response whatsoever to his injection.  He still waiting to set up therapy but it sounds like maybe he wants to do therapy at rehab [facility] so I did give him a written prescription today for that since he has not heard.  I had considered piriformis syndrome so I will put that on there and have therapy target that to see if indeed that helps especially since he had a not too impressive MRI and lack of response with the injection.  Patient and I completed is form together.Total time over 45 minutes with over 50% counseling.      Seen Pain Mgmt  Interval History NP (9/4/20):   Pt returns today for follow up. He states 0% relief from the epidural injection. His pain is unchanged and reports no new or worsening symptoms. Denies bowel or bladder dysfunction. Would like to try a non-opioid medication now for the pain. He enjoys working and does not like the sedating effects of opioids or neuropathic medications  Narciso Yanez is a 85 y.o. male with right-sided lower extremity numbness and pain. Consistent with right lumbar radiculopathy though specific level of radiculopathy is unclear at this time. Lumbar MRI with some neural foraminal narrowing though no specific nerve root impingement.  No significant back pain. Low suspicion for facet or sacroiliac mediated pain.     1.  Referral placed for Physical Therapy. No relief obtained from right TESI.   2.  Start meloxicam 15mg qday x 2 weeks, then PRN for pain.   3.  Follow up in 8 weeks or sooner if needed to  evaluate effectiveness of PT. May discuss additional interventional procedures depending on results        Seen cards for PVD - LACIE etc and had Angio  3/19  Right Lower Arterial YUE is stented.   Right YUE stent velocity origin: proximal: 106, mid: 92, distal: 133   Left Lower Arterial YUE is stented.   Left YUE stent velocity origin: proximal: 150, mid: 158, distal: 175   CFA demonstrates mild (<50%) stenosis.     .    Counseled at length regarding the multitude of these issues.Total time over 45 minutes with over 50% counseling.        Seen Rheum  Polymyalgia rheumatica  Patient with typical symptoms of PMR with significant response to prednisone  Currently on prednisone 10mg  - Continue prednisone 10mg until completed 3 months of therapy  - Will likely need gradually taper (9mg x 1 month, 8mg x 1 month, etc.)  - ESR and CRP today     On corticosteroid therapy  -     Vitamin D; Future; Expected date: 08/16/2019  -     DXA Bone Density Spine And Hip; Future; Expected date: 08/16/2019  -     alendronate (FOSAMAX) 70 MG tablet; Take 1 tablet (70 mg total) by mouth every 7 days.  Dispense: 4 tablet; Refill: 11       I did point out to him that his B12 level  was low years ago and it does look like he took a supplement and the B12 level did rise.  He was not aware that her could remember that however and so has currently not been on a B12 supplement.  Level ok 9/18.  He did have iron deficiency in his stool testing was negative.         CT 9/19  Impression       Nodule from 03/18/2019 has increased in size, previously 6 mm now measuring 7 mm.  Malignancy not excluded.  For a solid nodule 6-8 mm, Fleischner Society 2017 guidelines recommend follow up with non-contrast chest CT at 6-12 months and 18-24 months after discovery.    Cholelithiasis.    Prominence of interstitial markings with mild bronchiectasis.         ROS:   CONST: weight stable. EYES: no vision change. ENT: no sore throat. CV: no chest pain w/ exertion.  RESP: no shortness of breath. GI: no nausea, vomiting, diarrhea. No dysphagia. : no other urinary issues. MUSCULOSKELETAL: no new myalgias or arthralgias. SKIN: no new changes. NEURO: no focal deficits. PSYCH: no new issues. ENDOCRINE: no polyuria. HEME: no lymph nodes. ALLERGY: no general pruritis.    PAST MEDICAL HISTORY:                                                        1. Hyperlipidemia.                                                           2. Coronary disease, seen prior by Dr. Roy, 4-vessel bypass in .   3. Peripheral vascular disease, stented in both legs  and been on Plavix since.                                                                4. Kidney stones, last episode 2007.                                 5. Right renal cyst apparently.  Saw Dr. Labadie and then second opinion at [place] by a Dr. Shipman, currently going to be followed.                      6. Appendectomy.                                                             7. Actinic keratosis, saw Dr. Ambriz.                                      8. Left facial numbness, probable TIA, admit [facility] 2007. Carotid ultrasound apparently clear  9. Cataracts  10. Skin cancer  11. HYPERTENSION  12.  Diabetes, uncontrolled, with circulatory disease  13.  Low HDL  14. Declines Colonoscopy  15. Pneumovax after 67 done, Prevnar 13 given   16.  B12 deficiency diagnosed     17    early satiety                                                                                       SOCIAL HISTORY:  He smokes 1 pack per day and does not drink.  He was about   52 years but now a .  Retired deputy in the court, 4          children.                                                                                                                                                 FAMILY HISTORY:  Father  at 63 of heart disease and stroke.  Mother       at 86, 4 brothers, 2 sisters living.  "Brother with strokes.          Patient does walk with a limp referable the some pain and stiffness in the right leg.  His pedal pulses are difficult to palpate.  There is no edema or cyanosis.    Labs, cardiac testing, interval clinic notes and MRIs reviewed      Narciso was seen today for disability paperwork.    Diagnoses and all orders for this visit:    Right leg pain common apparently he has had vascular issues ruled out, lack of response to injection and the MRI lack of definitive nerve impingement might suggest other cause of pain, nerve impingement and I would consider the piriformis syndrome and will direct therapy towards that.    Uncontrolled type 2 diabetes mellitus with hyperglycemia, reassess  -     Sedimentation rate; Future  -     Hemoglobin A1C; Future  -     Comprehensive metabolic panel; Future  -     CBC auto differential; Future  -     TSH; Future  -     C-reactive protein; Future    Polymyalgia rheumatica, no obvious worsening symptoms, still on steroids  -     Sedimentation rate; Future  -     C-reactive protein; Future    Stage 3 chronic kidney disease    Diabetes mellitus with peripheral circulatory disorder    PAD (peripheral artery disease)    Coronary artery disease involving coronary bypass graft of native heart without angina pectoris    Piriformis syndrome of right side  -     Ambulatory referral/consult to Physical/Occupational Therapy; Future                   Patient admittedly not well with access and access would not be appropriate at this time for other"This note will not be shared with the patient."  "

## 2020-09-14 ENCOUNTER — TELEPHONE (OUTPATIENT)
Dept: FAMILY MEDICINE | Facility: CLINIC | Age: 85
End: 2020-09-14

## 2020-09-14 NOTE — TELEPHONE ENCOUNTER
"----- Message from Jinny Michael sent at 9/14/2020 10:17 AM CDT -----  Regarding: Patient Advice  Name of Who is Calling:  Narciso Hutson      What is the request in detail:  Patient called requesting a call. Patient states, "his blood sugars have been dropping very low for the pass couple of days."       Reply by MY RUDYSNER: no      Call Back:  [phone]                                        "

## 2020-09-14 NOTE — TELEPHONE ENCOUNTER
Reassure patient is the 2 mg glimepiride driving the sugar down so he should discontinue that.  He can continue the metformin      Also, we did order a bunch of labs on him when he was seen recently last week 9/11.    If the lab work has not yet been scheduled, work with him to find a day later this week

## 2020-09-14 NOTE — TELEPHONE ENCOUNTER
Patient said that his blood sugars have been running low for the last few days: ie:   09/11/2020 - 51 ac breakfast  09/12/2020 - 53 ac breakfast  09/13/2020 - 52 ac breakfast  09/13/2020 - 61 at bedtime  This AM - 71 ac breakfast  Patient also said that he wakes up lethargic with dizziness but he continues to take the diabetic meds. as ordered.  Please advise.

## 2020-09-14 NOTE — TELEPHONE ENCOUNTER
Called Patient regarding message below.  Son answered.  Left verbal message requesting a call back.

## 2020-09-15 ENCOUNTER — TELEPHONE (OUTPATIENT)
Dept: FAMILY MEDICINE | Facility: CLINIC | Age: 85
End: 2020-09-15

## 2020-09-15 NOTE — TELEPHONE ENCOUNTER
----- Message from Karen Olguin sent at 9/14/2020  3:57 PM CDT -----  Contact: Patient [phone]  Type:  Patient Returning Call    Who Called: Patient    Who Left Message for Patient: Zelda    Does the patient know what this is regarding?: Missed call about sugar     Would the patient rather a call back or a response via [facility]? Call back    Best Call Back Number: [phone]

## 2020-09-15 NOTE — TELEPHONE ENCOUNTER
Patient informed of Dr. Del Toro's message below.  He verbalized understanding and lab appointment scheduled..

## 2020-09-16 ENCOUNTER — LAB VISIT (OUTPATIENT)
Dept: LAB | Facility: HOSPITAL | Age: 85
End: 2020-09-16
Attending: INTERNAL MEDICINE
Payer: MEDICARE

## 2020-09-16 DIAGNOSIS — E11.9 DIABETES MELLITUS WITHOUT COMPLICATION: ICD-10-CM

## 2020-09-16 LAB
ESTIMATED AVG GLUCOSE: 137 MG/DL (ref 68–131)
HBA1C MFR BLD HPLC: 6.4 % (ref 4–5.6)

## 2020-09-16 PROCEDURE — 83036 HEMOGLOBIN GLYCOSYLATED A1C: CPT | Mod: HCNC

## 2020-09-16 PROCEDURE — 36415 COLL VENOUS BLD VENIPUNCTURE: CPT | Mod: HCNC,PO

## 2020-09-22 ENCOUNTER — PES CALL (OUTPATIENT)
Dept: ADMINISTRATIVE | Facility: CLINIC | Age: 85
End: 2020-09-22

## 2020-09-22 ENCOUNTER — CLINICAL SUPPORT (OUTPATIENT)
Dept: REHABILITATION | Facility: HOSPITAL | Age: 85
End: 2020-09-22
Payer: MEDICARE

## 2020-09-22 DIAGNOSIS — R29.898 DECREASED STRENGTH OF LOWER EXTREMITY: ICD-10-CM

## 2020-09-22 DIAGNOSIS — R26.9 GAIT ABNORMALITY: ICD-10-CM

## 2020-09-22 DIAGNOSIS — R29.898 DECREASED STRENGTH OF TRUNK AND BACK: ICD-10-CM

## 2020-09-22 DIAGNOSIS — M25.662 DECREASED RANGE OF MOTION OF BOTH LOWER EXTREMITIES: ICD-10-CM

## 2020-09-22 DIAGNOSIS — M53.86 DECREASED ROM OF LUMBAR SPINE: ICD-10-CM

## 2020-09-22 DIAGNOSIS — M25.661 DECREASED RANGE OF MOTION OF BOTH LOWER EXTREMITIES: ICD-10-CM

## 2020-09-22 PROCEDURE — 97110 THERAPEUTIC EXERCISES: CPT | Mod: HCNC,PN

## 2020-09-22 PROCEDURE — 97161 PT EVAL LOW COMPLEX 20 MIN: CPT | Mod: HCNC,PN

## 2020-09-25 PROBLEM — R26.9 GAIT ABNORMALITY: Status: ACTIVE | Noted: 2020-09-25

## 2020-09-25 PROBLEM — M25.669 DECREASED ROM OF LOWER EXTREMITY: Status: ACTIVE | Noted: 2020-09-25

## 2020-09-25 PROBLEM — R29.898 DECREASED STRENGTH OF TRUNK AND BACK: Status: ACTIVE | Noted: 2020-09-25

## 2020-09-25 PROBLEM — M53.86 DECREASED ROM OF LUMBAR SPINE: Status: ACTIVE | Noted: 2020-09-25

## 2020-09-25 PROBLEM — R29.898 DECREASED STRENGTH OF LOWER EXTREMITY: Status: ACTIVE | Noted: 2020-09-25

## 2020-09-25 NOTE — PLAN OF CARE
"  [facility]  Physical Therapy Initial Evaluation    Name: Narciso Yanez  Clinic Number: [id number]    Therapy Diagnosis:   Encounter Diagnoses   Name Primary?    Decreased strength of lower extremity     Decreased strength of trunk and back     Decreased ROM of lumbar spine     Decreased range of motion of both lower extremities     Gait abnormality      Physician: Marcelino Del Toro MD    Physician Orders: PT Eval and Treat   Medical Diagnosis from Referral: G57.01 (ICD-10-CM) - Piriformis syndrome of right side  Evaluation Date: 9/22/2020  Authorization Period Expiration: 10/14/2020  Plan of Care Expiration: 12/22/2020  Visit # / Visits authorized: 1/ 1    Time In: 10:45 am   Time Out: 11:25 am   Total Billable Time: 40 minutes    Precautions: Standard    Subjective   Date of onset: 2 years  History of current condition - Narciso reports: pain in R leg since about March 2018. 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 Reports shooting pain down his R leg, back of the thigh all the way down to the toes. Pt reports that pain starts in his buttocks. Pt reports no significant pain in the lower back. Pt reports some numbness into R LE. Pt reports pain in the leg is worse with sitting. States that some days pain is not too bad some days, some days terrible.     Pain:  Current 6/10, worst 10/10, best 5/10   Location: right leg   Description: Burning and Sharp  Aggravating Factors: Sitting, driving  Easing Factors: none noted    Prior Therapy: no  Social History: lives with their family, daughter and grandson  Occupation: , unable to work due to pain  Prior Level of Function: driving for work, independent in all activities  Current Level of Function: limited in prolonged sitting, stair climbing, walking distances    Pts goals: "I would like to get rid of this pain."    Imaging, MRI studies: Lumbar Spine 5/22/2020         Medical " History:   Past Medical History:   Diagnosis Date    Actinic keratosis     Anxiety     B12 deficiency 12/8/2014    Dx 6/14    Cancer     skin    Cataract     Coronary artery disease     Diabetes mellitus type II     Diabetes mellitus with peripheral circulatory disorder 6/18/2014    ED (erectile dysfunction)     Hyperlipidemia     Hypertension     Kidney stone     Peripheral vascular disease     Spondylosis 3/29/2019    Tobacco dependence        Surgical History:   Narciso Yanez  has a past surgical history that includes Appendectomy; Eye surgery; External ear surgery; Iliac artery stent (Bilateral, 06/2003); Cataract extraction; Cardiac surgery (01/1999); and Epidural steroid injection (Right, 8/26/2020).    Medications:   Narciso has a current medication list which includes the following prescription(s): accu-chek francisco plus meter, accu-chek softclix lancets, alendronate, ascorbic acid (vitamin c), aspirin, blood sugar diagnostic, clopidogrel, diazepam, glimepiride, meloxicam, metformin, metoprolol succinate, oxybutynin, prednisone, rosuvastatin, and tamsulosin.    Allergies:   Review of patient's allergies indicates:   Allergen Reactions    Demerol [meperidine] Nausea Only     Other reaction(s): Unknown          Objective     Posture Alignment: forward trunk lean in standing, decreased lumbar lordosis    GAIT DEVIATIONS: Narciso displays decreased gait speed, decreased step length bilaterally, decreased trunk rotation throughout gait, ambulates into clinic with SPC    Lumbar Range of Motion:    %   Flexion 50   Extension Neutral (5 deg flexion at rest)     Left Side Bending 10% of motion   Right Side Bending 10% of motion    Left rotation   50% of motion   Right Rotation   50%  Of motion    *= pain    Lower Extremity Strength    Right LE  Left LE    Hip flexion: 4/5 Hip flexion: 5/5   Knee extension: 5/5 Knee extension: 5/5   Knee flexion: 4/5 Knee flexion: 4/5   Hip IR: 4/5 Hip IR: 5/5   Hip ER:  4/5 Hip ER: 5/5   Hip extension:  3/5 Hip extension: 3/5   Hip abduction: 3+/5 Hip abduction: 3+/5   Ankle dorsiflexion: 5/5 Ankle dorsiflexion: 5/5     Special Tests:  -Repeated Flexion: negative  -Repeated Ext:negative  -Piriformis Test: positive R LE  -Bridge Test: negative, no reported pain    Neuro Dynamic Testing:    Sciatic nerve:      SLR: positive R LE   Neural Tension:     Slump test: positive R LE  Palpation: Increased tone noted to B piriformis     Flexibility: Decreased flexibility to B piriformis, quadriceps, hip flexors, hamstrings    Popliteal Angle: R = 45 degrees ; L = 45 degrees    PT Evaluation Completed? Yes  Discussed Plan of Care with patient: Yes          TREATMENT   Treatment Time In: 11:15 am   Treatment Time Out: 11:25 am   Total Treatment time separate from Evaluation: 10 minutes    Narciso received therapeutic exercises to develop ROM and flexibility for 10 minutes including:  +Piriformis stretch 3x30  +Hamstring stretch 3x30    Home Exercises and Patient Education Provided    Education provided:   - role of PT, plan of care, goals, scheduling limitations, cancellation policies    Written Home Exercises Provided: yes.  Exercises were reviewed and Narciso was able to demonstrate them prior to the end of the session.  Narciso demonstrated good  understanding of the education provided.     See EMR under Patient Instructions for exercises provided 9/22/2020.    Assessment   Narciso is a 85 y.o. male referred to outpatient Physical Therapy with a medical diagnosis of piriformis syndrome of R side. Pt presents to evaluation with decreased lumbar ROM, decreased LE strength, limited LE flexibility, muscular imbalance, impaired core/trunk strength, and decreased tolerance to daily activities. Pt with positive neural tension testing on R LE. Pt with significant decreased flexibility to R piriformis, B hamstrings. Pt also with decreased core/trunk strength and decreased hip girdle strength. Pt with no  particular movement preference identified, however, significant limitations in lumbar extension ROM. Pt would benefit from skilled PT services in order to address listed deficits, establish HEP, improve functional mobility, improve tolerance to daily activities, decrease pain, and maximize functional independence. Pt is motivated to participate in therapy and is in agreement with POC.     Pt prognosis is Good.   Pt will benefit from skilled outpatient Physical Therapy to address the deficits stated above and in the chart below, provide pt/family education, and to maximize pt's level of independence.     Plan of care discussed with patient: Yes  Pt's spiritual, cultural and educational needs considered and patient is agreeable to the plan of care and goals as stated below:     Anticipated Barriers for therapy: none    Medical Necessity is demonstrated by the following  History  Co-morbidities and personal factors that may impact the plan of care Co-morbidities:   anxiety, CAD, diabetes, history of cancer, HTN and HLD, PVD    Personal Factors:   no deficits     low   Examination  Body Structures and Functions, activity limitations and participation restrictions that may impact the plan of care Body Regions:   back  lower extremities  trunk    Body Systems:    ROM  strength  gross coordinated movement  balance  gait  transfers  motor control    Participation Restrictions:   Ability to drive for work     Activity limitations:   Learning and applying knowledge  no deficits    General Tasks and Commands  no deficits    Communication  no deficits    Mobility  lifting and carrying objects  walking  driving (bike, car, motorcycle)    Self care  no deficits    Domestic Life  shopping  cooking  doing house work (cleaning house, washing dishes, laundry)    Interactions/Relationships  no deficits    Life Areas  no deficits    Community and Social Life  no deficits         Complexity: low  See FOTO outcome assessment   Clinical  Presentation stable and uncomplicated low   Decision Making/ Complexity Score: low     GOALS: Short Term Goals:  4 weeks  1. Report decreased in pain at worse less than  <   / =  8  /10  to increase tolerance for functional activities.On going  2. Pt to increase B popliteal angle by greater than > or = 5 degrees in order to improve flexibility and posture. On going  3. Increased B LE MMT 1/2  to increase tolerance for ADL and work activities.On going  4. Pt to report ability to drive for 15 mins with pain < / = 5/10 in order to improve tolerance to work activities.   5. Pt to tolerate HEP to improve ROM and independence with ADL's.On going  6. Pt to improve range of motion by 25% to allow for improved functional mobility to allow for improvement in IADLs. On going    Long Term Goals: 8 weeks  1. Report decreased in pain at worse less than  <   / =  3  /10  to increase tolerance for functional mobility.   2 .Pt to increase B popliteal angle by greater than > or = 10 degrees in order to improve flexibility and posture.   3. Increased B LE MMT 1 grade to increase tolerance for ADL and work activities.  4. Pt will report at 46% or less limitation on FOTO Lumbar spine survey  to demonstrate decrease in disability and improvement in back pain.  5. Pt to be Independent with HEP to improve ROM and independence with ADL's  7. Pt to report ability to drive for 30 mins with pain < / = 3/10 in order to improve tolerance to work activities.   8. Pt to improve range of motion by 75% to allow for improved functional mobility to allow for improvement in IADLs.       Plan   Plan of care Certification: 9/22/2020 to 12/22/2020.    Outpatient Physical Therapy 2 times weekly for 6 weeks to include the following interventions: Gait Training, Manual Therapy, Moist Heat/ Ice, Neuromuscular Re-ed, Patient Education, Therapeutic Activites and Therapeutic Exercise, dry needling     Elizabeth Prasad, PT, DPT  9/22/2020

## 2020-09-29 ENCOUNTER — PATIENT MESSAGE (OUTPATIENT)
Dept: OTHER | Facility: OTHER | Age: 85
End: 2020-09-29

## 2020-09-29 ENCOUNTER — TELEPHONE (OUTPATIENT)
Dept: FAMILY MEDICINE | Facility: CLINIC | Age: 85
End: 2020-09-29

## 2020-09-29 NOTE — TELEPHONE ENCOUNTER
----- Message from Karen Olguin sent at 9/29/2020 11:09 AM CDT -----  Contact: Patient [phone]  Type: Patient Call Back    Who called: Patient    What is the request in detail: Need to speak to nurse in regards to the wrong year being placed on his disability paperwork, which needs to be corrected. Need to find out if he can come in on Thurs., 10-01-20, as early as he can, if possible. Need to get this signed and returned to company ASAP! Please call pt in regards to this.     Would the patient rather a call back or a response via [facility]? Call back    Best call back number: [phone]

## 2020-09-29 NOTE — TELEPHONE ENCOUNTER
No problem, given the COVID issues, let patient know we are aware and that he can come in, get temperature check and then we will need to figure out how he can let us know he is here so that the  does not tell him he needs an appointment to get the paperwork initial.    Perhaps he can just come to the waiting room on the 2nd floor and knock on the door over by my work area?

## 2020-09-29 NOTE — TELEPHONE ENCOUNTER
Patient states he will be here Thursday early to have his paperwork re-signed. Patient states the date is wrong and needs to be corrected and initialed.

## 2020-10-05 ENCOUNTER — CLINICAL SUPPORT (OUTPATIENT)
Dept: REHABILITATION | Facility: HOSPITAL | Age: 85
End: 2020-10-05
Payer: MEDICARE

## 2020-10-05 DIAGNOSIS — M53.86 DECREASED ROM OF LUMBAR SPINE: ICD-10-CM

## 2020-10-05 DIAGNOSIS — M25.661 DECREASED RANGE OF MOTION OF BOTH LOWER EXTREMITIES: ICD-10-CM

## 2020-10-05 DIAGNOSIS — R29.898 DECREASED STRENGTH OF LOWER EXTREMITY: ICD-10-CM

## 2020-10-05 DIAGNOSIS — R29.898 DECREASED STRENGTH OF TRUNK AND BACK: ICD-10-CM

## 2020-10-05 DIAGNOSIS — R26.9 GAIT ABNORMALITY: ICD-10-CM

## 2020-10-05 DIAGNOSIS — M25.662 DECREASED RANGE OF MOTION OF BOTH LOWER EXTREMITIES: ICD-10-CM

## 2020-10-05 PROCEDURE — 97112 NEUROMUSCULAR REEDUCATION: CPT | Mod: HCNC,PN,CQ

## 2020-10-05 PROCEDURE — 97110 THERAPEUTIC EXERCISES: CPT | Mod: HCNC,PN,CQ

## 2020-10-05 NOTE — PROGRESS NOTES
"    Physical Therapy Treatment Note     Name: Narciso Yanez  Clinic Number: [id number]    Therapy Diagnosis:   Encounter Diagnoses   Name Primary?    Decreased strength of lower extremity     Decreased strength of trunk and back     Decreased ROM of lumbar spine     Decreased range of motion of both lower extremities     Gait abnormality      Physician: Marcelino Del Toro MD    Visit Date: 10/5/2020    Physician Orders: PT Eval and Treat   Medical Diagnosis from Referral: G57.01 (ICD-10-CM) - Piriformis syndrome of right side  Evaluation Date: 9/22/2020  Authorization Period Expiration: 10/14/2020  Plan of Care Expiration: 12/22/2020  Visit # / Visits authorized: 1/ 5     Time In: 1415  Time Out: 1500  Total time: 45 minutes  Total Billable Time: 40 minutes     Precautions: Standard    Subjective     Pt reports: having 3 falls over the weekend due to his RLE pain. Pt reports soreness for ~1-2 days with bruising to L hip, R medial biceps and skin tear to L forearm.   He was compliant with home exercise program.  Response to previous treatment: fair  Functional change: ongoing    Pain: 6/10  Location: right Hamstring region      Objective     Narciso received therapeutic exercises to develop strength, endurance, ROM, flexibility, posture and core stabilization for 30 minutes including:    NuStep x5 minutes     SUPINE:  LTRs x15  Ball squeezes x20 reps  Hip abduction with Yellow TB x20 reps  Pelvic tilts x20  R Piriformis stretch 3x30  R Hamstring stretch 3x30   R SKTC stretch 10x10"  R nerve glides x20 reps  +SAQs (next visit)    Ball rollouts (flexion) x10 (5" hold)    Narciso received the following manual therapy techniques: Soft tissue Mobilization were applied to the: RLE for 5 minutes, including:  MFR over jeans to R hamstring region in SL and with Hamstring stretches.     Narciso participated in neuromuscular re-education activities to improve: Balance, Coordination, Kinesthetic, Sense, Proprioception and " "Posture for 10 minutes. The following activities were included:    WBOS on blue foam EO 2x30"  NBOS on blue foam EO 2x30"  NBOS on blue foam with head turns 2x30"  Modified SLB (purple block) 2x30" each LE    Narciso participated in gait training to improve functional mobility and safety for 00  minutes, including:        Narciso received hot pack for 00 minutes to RLE.    Narciso received cold pack for 00 minutes to RLE.      Home Exercises Provided and Patient Education Provided     Education provided:   - Compliance with HEP  - Possibility of DOMs    Written Home Exercises Provided: Patient instructed to cont prior HEP.  Exercises were reviewed and Narciso was able to demonstrate them prior to the end of the session.  Narciso demonstrated good  understanding of the education provided.     See EMR under Patient Instructions for exercises provided prior visit.    Assessment     Pt entered clinic with antalgic gait pattern due to RLE pain. Pt informed therapist of 3 falls over the weekend. Ecchymosis to R medial biceps and a healing skin tear noted to L forearm. Pt reports bruising to L hip. Pt has SPC and RW at home of which is not being used. Encouraged pt to use RW to navigate in home an in environment. Pt verbalized understanding. Myofascial inconsistencies and muscle restriction to R hamstring noted. Challenged with new therex with no adverse reactions. Added balance training due to multiple falls over the weekend 2/2 RLE pain/weakness. Follow up with pt next visit in regards to falls and healing of L forearm skin tear.   Narciso is progressing well towards his goals.   Pt prognosis is Good.     Pt will continue to benefit from skilled outpatient physical therapy to address the deficits listed in the problem list box on initial evaluation, provide pt/family education and to maximize pt's level of independence in the home and community environment.     Pt's spiritual, cultural and educational needs considered and pt " agreeable to plan of care and goals.     Anticipated barriers to physical therapy: none    Goals:     Short Term Goals:  4 weeks  1. Report decreased in pain at worse less than  <   / =  8  /10  to increase tolerance for functional activities.On going  2. Pt to increase B popliteal angle by greater than > or = 5 degrees in order to improve flexibility and posture. On going  3. Increased B LE MMT 1/2  to increase tolerance for ADL and work activities.On going  4. Pt to report ability to drive for 15 mins with pain < / = 5/10 in order to improve tolerance to work activities.   5. Pt to tolerate HEP to improve ROM and independence with ADL's.On going  6. Pt to improve range of motion by 25% to allow for improved functional mobility to allow for improvement in IADLs. On going     Long Term Goals: 8 weeks  1. Report decreased in pain at worse less than  <   / =  3  /10  to increase tolerance for functional mobility.   2 .Pt to increase B popliteal angle by greater than > or = 10 degrees in order to improve flexibility and posture.   3. Increased B LE MMT 1 grade to increase tolerance for ADL and work activities.  4. Pt will report at 46% or less limitation on FOTO Lumbar spine survey  to demonstrate decrease in disability and improvement in back pain.  5. Pt to be Independent with HEP to improve ROM and independence with ADL's  7. Pt to report ability to drive for 30 mins with pain < / = 3/10 in order to improve tolerance to work activities.   8. Pt to improve range of motion by 75% to allow for improved functional mobility to allow for improvement in IADLs.   Plan   Continue to improve strength, ROM and functional mobility as tolerated by pt    Plan of care Certification: 9/22/2020 to 12/22/2020.     Outpatient Physical Therapy 2 times weekly for 6 weeks   Izabela Avila, CURT

## 2020-10-07 ENCOUNTER — DOCUMENTATION ONLY (OUTPATIENT)
Dept: REHABILITATION | Facility: HOSPITAL | Age: 85
End: 2020-10-07

## 2020-10-07 NOTE — PROGRESS NOTES
"Narciso presented for 3pm therapy session today at 2:45 pm. Pt with reports of dizziness, "feeling off balance, like he is moving" that has been going on since late yesterday. States that this has been a problem that has come and gone in the recent past. Reports it usually comes on when standing and lasts for about 1-2 days. States he has not alerted MD. Pt also reports one fall this morning. BP taken in seated today 130/78. BP taken in standing noted with drop to 112/68. Pt reporting he is not feeling as dizzy after rest, however, would like to cancel session today secondary to feeling unwell. Educated pt on importance of messaging/calling primary MD to alert him to ongoing issue with dizziness. Pt next scheduled appointment in clinic on 10/13/2020.     "

## 2020-10-13 ENCOUNTER — CLINICAL SUPPORT (OUTPATIENT)
Dept: REHABILITATION | Facility: HOSPITAL | Age: 85
End: 2020-10-13
Payer: MEDICARE

## 2020-10-13 DIAGNOSIS — R26.9 GAIT ABNORMALITY: ICD-10-CM

## 2020-10-13 DIAGNOSIS — M53.86 DECREASED ROM OF LUMBAR SPINE: ICD-10-CM

## 2020-10-13 DIAGNOSIS — M47.816 LUMBAR SPONDYLOSIS: ICD-10-CM

## 2020-10-13 DIAGNOSIS — M54.16 LUMBAR RADICULOPATHY: ICD-10-CM

## 2020-10-13 DIAGNOSIS — M25.661 DECREASED RANGE OF MOTION OF BOTH LOWER EXTREMITIES: ICD-10-CM

## 2020-10-13 DIAGNOSIS — M51.36 DDD (DEGENERATIVE DISC DISEASE), LUMBAR: ICD-10-CM

## 2020-10-13 DIAGNOSIS — R29.898 DECREASED STRENGTH OF LOWER EXTREMITY: ICD-10-CM

## 2020-10-13 DIAGNOSIS — R29.898 DECREASED STRENGTH OF TRUNK AND BACK: ICD-10-CM

## 2020-10-13 DIAGNOSIS — M25.662 DECREASED RANGE OF MOTION OF BOTH LOWER EXTREMITIES: ICD-10-CM

## 2020-10-13 PROCEDURE — 97112 NEUROMUSCULAR REEDUCATION: CPT | Mod: HCNC,PN

## 2020-10-13 PROCEDURE — 97110 THERAPEUTIC EXERCISES: CPT | Mod: HCNC,PN

## 2020-10-13 NOTE — PROGRESS NOTES
"    Physical Therapy Treatment Note     Name: Narciso Yanez  Clinic Number: [id number]    Therapy Diagnosis:   Encounter Diagnoses   Name Primary?    Decreased strength of lower extremity     Decreased strength of trunk and back     Decreased ROM of lumbar spine     Decreased range of motion of both lower extremities     Gait abnormality      Physician: Marcelino Del Toro MD    Visit Date: 10/13/2020    Physician Orders: PT Eval and Treat   Medical Diagnosis from Referral: G57.01 (ICD-10-CM) - Piriformis syndrome of right side  Evaluation Date: 9/22/2020  Authorization Period Expiration: 10/14/2020  Plan of Care Expiration: 12/22/2020  Visit # / Visits authorized: 2/ 5     Time In: 14:30  Time Out: 1515  Total time: 45 minutes  Total Billable Time: 45 minutes     Precautions: Standard    Subjective     Pt reports: Only had 1 fall from seated since last session. Was leaning to R while eating and fell over. States that he did have three attempts to stand from seated at the bed today and he fell backwards onto bed each time.     He was compliant with home exercise program.  Response to previous treatment: fair  Functional change: ongoing    Pain: 6/10  Location: right Hamstring region      Objective     Narciso received therapeutic exercises to develop strength, endurance, ROM, flexibility, posture and core stabilization for 40 minutes including:    NuStep x5 minutes     SUPINE:  LTRs x15  +DKTC 2x10  Ball squeezes x20 reps  Hip abduction with Yellow TB x20 reps  Pelvic tilts x20  R Piriformis stretch 3x30  R Hamstring stretch 3x30   R SKTC stretch 10x10"  R nerve glides x20 reps  +SAQs (next visit)    Ball rollouts (flexion) x10 (5" hold)    Narciso received the following manual therapy techniques: Soft tissue Mobilization were applied to the: RLE for 0 minutes, including:  MFR over jeans to R hamstring region in SL and with Hamstring stretches.     Narciso participated in neuromuscular re-education activities to " "improve: Balance, Coordination, Kinesthetic, Sense, Proprioception and Posture for 10 minutes. The following activities were included:     MCTSIB  1. Eyes open, NBOS, firm - 30 seconds  2. Eyes closed, NBOS, firm - 30 seconds  3. Eyes open, NBOS, foam- 22 seconds  4. Eyes closed, NBOS foam- 2 seconds    WBOS on blue foam EC 3x30"  NBOS on blue foam EO 2x30"  NBOS on blue foam with head turns 2x30"  Modified SLB (purple block) 2x30" each LE    Narciso participated in gait training to improve functional mobility and safety for 00  minutes, including:    Narciso received hot pack for 00 minutes to RLE.    Narciso received cold pack for 00 minutes to RLE.      Home Exercises Provided and Patient Education Provided     Education provided:   - Compliance with HEP  - Possibility of DOMs    Written Home Exercises Provided: Patient instructed to cont prior HEP.  Exercises were reviewed and Narciso was able to demonstrate them prior to the end of the session.  Narciso demonstrated good  understanding of the education provided.     See EMR under Patient Instructions for exercises provided prior visit.    Assessment     Pt entered clinic with antalgic gait pattern due to RLE pain. Pt ambulating with SBQC, however, does not contact cane to ground with each step. Pt encouraged to use walker at this time due to multiple falls, states it is hard to get walker in/out of car. Balance assessed today with MCTSIB. Pt failing condition 3 and 4, most difficulty when reliant on vestibular system. Balance issues likely multifactorial (vestibular, [place], R LE weakness). Pt with significant improvement in symptoms of R LE pain reported following session to 5/10. States he felt improved symptoms following last session, however, next day with increased pain. Plan to assess response to there-ex at next session. Continue to progress there-ex as tolerated with addition of vestibular system challenges to improve safety.     Narciso is progressing well " towards his goals.   Pt prognosis is Good.     Pt will continue to benefit from skilled outpatient physical therapy to address the deficits listed in the problem list box on initial evaluation, provide pt/family education and to maximize pt's level of independence in the home and community environment.     Pt's spiritual, cultural and educational needs considered and pt agreeable to plan of care and goals.     Anticipated barriers to physical therapy: none    Goals:     Short Term Goals:  4 weeks  1. Report decreased in pain at worse less than  <   / =  8  /10  to increase tolerance for functional activities.On going  2. Pt to increase B popliteal angle by greater than > or = 5 degrees in order to improve flexibility and posture. On going  3. Increased B LE MMT 1/2  to increase tolerance for ADL and work activities.On going  4. Pt to report ability to drive for 15 mins with pain < / = 5/10 in order to improve tolerance to work activities.   5. Pt to tolerate HEP to improve ROM and independence with ADL's.On going  6. Pt to improve range of motion by 25% to allow for improved functional mobility to allow for improvement in IADLs. On going     Long Term Goals: 8 weeks  1. Report decreased in pain at worse less than  <   / =  3  /10  to increase tolerance for functional mobility.   2 .Pt to increase B popliteal angle by greater than > or = 10 degrees in order to improve flexibility and posture.   3. Increased B LE MMT 1 grade to increase tolerance for ADL and work activities.  4. Pt will report at 46% or less limitation on FOTO Lumbar spine survey  to demonstrate decrease in disability and improvement in back pain.  5. Pt to be Independent with HEP to improve ROM and independence with ADL's  7. Pt to report ability to drive for 30 mins with pain < / = 3/10 in order to improve tolerance to work activities.   8. Pt to improve range of motion by 75% to allow for improved functional mobility to allow for improvement in  IADLs.   Plan   Continue to improve strength, ROM and functional mobility as tolerated by pt    Plan of care Certification: 9/22/2020 to 12/22/2020.     Outpatient Physical Therapy 2 times weekly for 6 weeks   Elizabeth Prasad, PT

## 2020-10-13 NOTE — TELEPHONE ENCOUNTER
----- Message from Ming Poe sent at 10/13/2020  1:55 PM CDT -----  Name of Who is Calling: PATRICIA LEE [[id number]]    What is the request in detail: Pt is requesting a pain meds ......Please contact to further discuss and advise      Can the clinic reply by [facility]: No     What Number to Call Back if not in [facility]:  [phone] (home) [phone] (work)

## 2020-10-15 RX ORDER — HYDROCODONE BITARTRATE AND ACETAMINOPHEN 5; 325 MG/1; MG/1
1 TABLET ORAL EVERY 12 HOURS PRN
Qty: 60 TABLET | Refills: 0 | Status: SHIPPED | OUTPATIENT
Start: 2020-10-15 | End: 2020-12-14

## 2020-10-22 ENCOUNTER — CLINICAL SUPPORT (OUTPATIENT)
Dept: REHABILITATION | Facility: HOSPITAL | Age: 85
End: 2020-10-22
Payer: MEDICARE

## 2020-10-22 DIAGNOSIS — M53.86 DECREASED ROM OF LUMBAR SPINE: ICD-10-CM

## 2020-10-22 DIAGNOSIS — R29.898 DECREASED STRENGTH OF LOWER EXTREMITY: Primary | ICD-10-CM

## 2020-10-22 DIAGNOSIS — M25.669 DECREASED RANGE OF MOTION OF LOWER EXTREMITY, UNSPECIFIED LATERALITY: ICD-10-CM

## 2020-10-22 DIAGNOSIS — R26.9 GAIT ABNORMALITY: ICD-10-CM

## 2020-10-22 DIAGNOSIS — R29.898 DECREASED STRENGTH OF TRUNK AND BACK: ICD-10-CM

## 2020-10-22 PROCEDURE — 97110 THERAPEUTIC EXERCISES: CPT | Mod: HCNC,PN,CQ

## 2020-10-22 NOTE — PROGRESS NOTES
Physical Therapy Treatment Note     Name: Narciso Yanez  Clinic Number: [id number]    Therapy Diagnosis:   Encounter Diagnoses   Name Primary?    Decreased strength of lower extremity Yes    Decreased strength of trunk and back     Decreased ROM of lumbar spine     Decreased range of motion of lower extremity, unspecified laterality     Gait abnormality      Physician: Marcelino Del Toro MD    Visit Date: 10/22/2020    Physician Orders: PT Eval and Treat   Medical Diagnosis from Referral: G57.01 (ICD-10-CM) - Piriformis syndrome of right side  Evaluation Date: 9/22/2020  Authorization Period Expiration: 10/14/2020  Plan of Care Expiration: 12/22/2020  Visit # / Visits authorized: 3/ 5     Time In: 8:35 am   Time Out: 9:20 am   Total time: 45 minutes  Total Billable Time: 45 minutes     Precautions: Standard    Subjective     Pt reports: No falls since last session. States his pain has been better overall since beginning therapy.     He was compliant with home exercise program.  Response to previous treatment: fair  Functional change: ongoing    Pain: 6/10  Location: right Hamstring region      Objective     Lumbar Range of Motion:     %   Flexion 50   Extension 10 (5 deg flexion at rest)      Left Side Bending 25% of motion   Right Side Bending 25% of motion    Left rotation    50% of motion   Right Rotation    50%  Of motion    *= pain     Lower Extremity Strength     Right LE   Left LE     Hip flexion: 4/5 Hip flexion: 5/5   Knee extension: 5/5 Knee extension: 5/5   Knee flexion: 4/5 Knee flexion: 4/5   Hip IR: 4/5 Hip IR: 5/5   Hip ER: 4/5 Hip ER: 5/5   Hip extension:  3/5 Hip extension: 3/5   Hip abduction: 3+/5 Hip abduction: 3+/5   Ankle dorsiflexion: 5/5 Ankle dorsiflexion: 5/5        Narciso received therapeutic exercises to develop strength, endurance, ROM, flexibility, posture and core stabilization for 40 minutes including:    NuStep x5 minutes     SUPINE:  LTRs x15  DKTC 2x10  Ball squeezes x20  "reps  Hip abduction with Yellow TB x20 reps  Pelvic tilts x20  R Piriformis stretch 3x30  R Hamstring stretch 3x30   R SKTC stretch 10x10"  R nerve glides x20 reps  +SAQs (next visit)    +Standing hip extension 2x10  +Standing hip abduction 2x10    Ball rollouts (flexion) x10 (5" hold)    Narciso received the following manual therapy techniques: Soft tissue Mobilization were applied to the: RLE for 0 minutes, including:  MFR over jeans to R hamstring region in SL and with Hamstring stretches.     Narciso participated in neuromuscular re-education activities to improve: Balance, Coordination, Kinesthetic, Sense, Proprioception and Posture for 00 minutes. The following activities were included:    WBOS on blue foam EC 3x30"  NBOS on blue foam EO 2x30"  NBOS on blue foam with head turns 2x30"  Modified SLB (purple block) 2x30" each LE    Narciso participated in gait training to improve functional mobility and safety for 00  minutes, including:    Narciso received hot pack for 00 minutes to RLE.    Narciso received cold pack for 00 minutes to RLE.      Home Exercises Provided and Patient Education Provided     Education provided:   - Compliance with HEP  - Possibility of DOMs    Written Home Exercises Provided: Patient instructed to cont prior HEP.  Exercises were reviewed and Narciso was able to demonstrate them prior to the end of the session.  Narciso demonstrated good  understanding of the education provided.     See EMR under Patient Instructions for exercises provided prior visit.    Assessment     Pt tolerated session well overall today. Pt presenting to session with lower level of pain compared to last sessio. Pt progress note performed today. Pt with some improvements in lumbar AROM, Minimal improvements noted in B LE strength secondary to limited sessions since evaluation. Pt remains appropriate for therapy at this time in order to address decreased lumbar ROM, limited LE strength, poor core/trunk stabilization, and " increased fall risk. Continue to progress there-ex as tolerated with addition of vestibular system challenges to improve safety.     Narciso is progressing well towards his goals.   Pt prognosis is Good.     Pt will continue to benefit from skilled outpatient physical therapy to address the deficits listed in the problem list box on initial evaluation, provide pt/family education and to maximize pt's level of independence in the home and community environment.     Pt's spiritual, cultural and educational needs considered and pt agreeable to plan of care and goals.     Anticipated barriers to physical therapy: none    Goals:     Short Term Goals:  4 weeks  1. Report decreased in pain at worse less than  <   / =  8  /10  to increase tolerance for functional activities.On going  2. Pt to increase B popliteal angle by greater than > or = 5 degrees in order to improve flexibility and posture. On going  3. Increased B LE MMT 1/2  to increase tolerance for ADL and work activities.On going  4. Pt to report ability to drive for 15 mins with pain < / = 5/10 in order to improve tolerance to work activities.   5. Pt to tolerate HEP to improve ROM and independence with ADL's. MET 10/22/20  6. Pt to improve range of motion by 25% to allow for improved functional mobility to allow for improvement in IADLs. Partially met 10/22/20     Long Term Goals: 8 weeks  1. Report decreased in pain at worse less than  <   / =  3  /10  to increase tolerance for functional mobility.   2 .Pt to increase B popliteal angle by greater than > or = 10 degrees in order to improve flexibility and posture.   3. Increased B LE MMT 1 grade to increase tolerance for ADL and work activities.  4. Pt will report at 46% or less limitation on FOTO Lumbar spine survey  to demonstrate decrease in disability and improvement in back pain.  5. Pt to be Independent with HEP to improve ROM and independence with ADL's  7. Pt to report ability to drive for 30 mins with  pain < / = 3/10 in order to improve tolerance to work activities.   8. Pt to improve range of motion by 75% to allow for improved functional mobility to allow for improvement in IADLs.   Plan   Continue to improve strength, ROM and functional mobility as tolerated by pt    Plan of care Certification: 9/22/2020 to 12/22/2020.     Outpatient Physical Therapy 2 times weekly for 6 weeks   Elizabeth Prasad, PT   Kary Hilliard, PTA

## 2020-10-28 ENCOUNTER — CLINICAL SUPPORT (OUTPATIENT)
Dept: REHABILITATION | Facility: HOSPITAL | Age: 85
End: 2020-10-28
Payer: MEDICARE

## 2020-10-28 DIAGNOSIS — R29.898 DECREASED STRENGTH OF TRUNK AND BACK: ICD-10-CM

## 2020-10-28 DIAGNOSIS — R29.898 DECREASED STRENGTH OF LOWER EXTREMITY: Primary | ICD-10-CM

## 2020-10-28 DIAGNOSIS — R26.9 GAIT ABNORMALITY: ICD-10-CM

## 2020-10-28 DIAGNOSIS — M25.669 DECREASED RANGE OF MOTION OF LOWER EXTREMITY, UNSPECIFIED LATERALITY: ICD-10-CM

## 2020-10-28 DIAGNOSIS — M53.86 DECREASED ROM OF LUMBAR SPINE: ICD-10-CM

## 2020-10-28 PROCEDURE — 97110 THERAPEUTIC EXERCISES: CPT | Mod: HCNC,PN

## 2020-10-28 NOTE — PROGRESS NOTES
"    Physical Therapy Treatment Note     Name: Narciso Yanez  Clinic Number: [id number]    Therapy Diagnosis:   Encounter Diagnoses   Name Primary?    Decreased strength of lower extremity Yes    Decreased strength of trunk and back     Decreased ROM of lumbar spine     Decreased range of motion of lower extremity, unspecified laterality     Gait abnormality      Physician: Marcelino Del Toro MD    Visit Date: 10/28/2020    Physician Orders: PT Eval and Treat   Medical Diagnosis from Referral: G57.01 (ICD-10-CM) - Piriformis syndrome of right side  Evaluation Date: 9/22/2020  Authorization Period Expiration: 10/14/2020  Plan of Care Expiration: 12/22/2020  Visit # / Visits authorized: 4/ 5     Time In: 8:05 am   Time Out: 8:45 am   Total time: 40 minutes  Total Billable Time: 40 minutes     Precautions: Standard    Subjective     Pt reports: Pt reports he is having a good day today, pain is not terrible. States no falls recently.     He was compliant with home exercise program.  Response to previous treatment: fair  Functional change: ongoing    Pain: 6/10  Location: right Hamstring region      Objective     Narciso received therapeutic exercises to develop strength, endurance, ROM, flexibility, posture and core stabilization for 40 minutes including:    NuStep x5 minutes     SUPINE:  LTRs x15  DKTC 2x10  Ball squeezes x20 reps  Hip abduction with Yellow TB x20 reps  Pelvic tilts x20  R Piriformis stretch 3x30  R Hamstring stretch 3x30   R SKTC stretch 10x10"  R nerve glides x20 reps   SAQs (next visit)    Standing hip extension 2x10  Standing hip abduction 2x10    Ball rollouts (flexion) x10 (5" hold)    Narciso received the following manual therapy techniques: Soft tissue Mobilization were applied to the: RLE for 0 minutes, including:  MFR over jeans to R hamstring region in SL and with Hamstring stretches.     Narciso participated in neuromuscular re-education activities to improve: Balance, Coordination, " "Kinesthetic, Sense, Proprioception and Posture for 00 minutes. The following activities were included:    WBOS on blue foam EC 3x30"  NBOS on blue foam EO 2x30"  NBOS on blue foam with head turns 2x30"  Modified SLB (purple block) 2x30" each LE    Narciso participated in gait training to improve functional mobility and safety for 00  minutes, including:    Narciso received hot pack for 00 minutes to RLE.    Narciso received cold pack for 00 minutes to RLE.      Home Exercises Provided and Patient Education Provided     Education provided:   - Compliance with HEP  - Possibility of DOMs    Written Home Exercises Provided: Patient instructed to cont prior HEP.  Exercises were reviewed and Narciso was able to demonstrate them prior to the end of the session.  Narciso demonstrated good  understanding of the education provided.     See EMR under Patient Instructions for exercises provided prior visit.    Assessment     Pt tolerated session well overall today. Pt with lower level of pain entering session today, continues to report decreased symptoms at end of sessions. Pt with minimal increased pain today throughout therex. Pt continues with decreased B LE strength, core/trunk stability, balance impairments, and increased R LE pain. Continue to progress there-ex as tolerated with addition of vestibular system challenges to improve safety.     Narciso is progressing well towards his goals.   Pt prognosis is Good.     Pt will continue to benefit from skilled outpatient physical therapy to address the deficits listed in the problem list box on initial evaluation, provide pt/family education and to maximize pt's level of independence in the home and community environment.     Pt's spiritual, cultural and educational needs considered and pt agreeable to plan of care and goals.     Anticipated barriers to physical therapy: none    Goals:     Short Term Goals:  4 weeks  1. Report decreased in pain at worse less than  <   / =  8  /10  " to increase tolerance for functional activities.On going  2. Pt to increase B popliteal angle by greater than > or = 5 degrees in order to improve flexibility and posture. On going  3. Increased B LE MMT 1/2  to increase tolerance for ADL and work activities.On going  4. Pt to report ability to drive for 15 mins with pain < / = 5/10 in order to improve tolerance to work activities.   5. Pt to tolerate HEP to improve ROM and independence with ADL's. MET 10/22/20  6. Pt to improve range of motion by 25% to allow for improved functional mobility to allow for improvement in IADLs. Partially met 10/22/20     Long Term Goals: 8 weeks  1. Report decreased in pain at worse less than  <   / =  3  /10  to increase tolerance for functional mobility.   2 .Pt to increase B popliteal angle by greater than > or = 10 degrees in order to improve flexibility and posture.   3. Increased B LE MMT 1 grade to increase tolerance for ADL and work activities.  4. Pt will report at 46% or less limitation on FOTO Lumbar spine survey  to demonstrate decrease in disability and improvement in back pain.  5. Pt to be Independent with HEP to improve ROM and independence with ADL's  7. Pt to report ability to drive for 30 mins with pain < / = 3/10 in order to improve tolerance to work activities.   8. Pt to improve range of motion by 75% to allow for improved functional mobility to allow for improvement in IADLs.   Plan   Continue to improve strength, ROM and functional mobility as tolerated by pt    Plan of care Certification: 9/22/2020 to 12/22/2020.     Outpatient Physical Therapy 2 times weekly for 6 weeks     Elizabeth Prasad, PT

## 2020-11-16 ENCOUNTER — PATIENT MESSAGE (OUTPATIENT)
Dept: PAIN MEDICINE | Facility: CLINIC | Age: 85
End: 2020-11-16

## 2020-11-16 ENCOUNTER — TELEPHONE (OUTPATIENT)
Dept: PAIN MEDICINE | Facility: CLINIC | Age: 85
End: 2020-11-16

## 2020-11-16 NOTE — TELEPHONE ENCOUNTER
----- Message from Lilly Sneed sent at 11/16/2020 10:43 AM CST -----  Type: Patient Call Back    Who called:Self    What is the request in detail: patient would like to speak with Kayla concerning a letter that he received.    Can the clinic reply by [facility]? no    Would the patient rather a call back or a response via [facility]? call    Best call back number:[phone]

## 2020-11-17 ENCOUNTER — TELEPHONE (OUTPATIENT)
Dept: PAIN MEDICINE | Facility: CLINIC | Age: 85
End: 2020-11-17

## 2020-11-17 ENCOUNTER — PATIENT OUTREACH (OUTPATIENT)
Dept: ADMINISTRATIVE | Facility: OTHER | Age: 85
End: 2020-11-17

## 2020-11-17 ENCOUNTER — OFFICE VISIT (OUTPATIENT)
Dept: PAIN MEDICINE | Facility: CLINIC | Age: 85
End: 2020-11-17
Payer: MEDICARE

## 2020-11-17 VITALS
WEIGHT: 150.69 LBS | OXYGEN SATURATION: 95 % | TEMPERATURE: 98 F | BODY MASS INDEX: 23.65 KG/M2 | DIASTOLIC BLOOD PRESSURE: 66 MMHG | SYSTOLIC BLOOD PRESSURE: 134 MMHG | HEART RATE: 86 BPM | HEIGHT: 67 IN

## 2020-11-17 DIAGNOSIS — Z01.818 PRE-OP TESTING: Primary | ICD-10-CM

## 2020-11-17 DIAGNOSIS — M54.16 LUMBAR RADICULOPATHY: ICD-10-CM

## 2020-11-17 DIAGNOSIS — M54.17 RADICULOPATHY OF LUMBOSACRAL REGION: ICD-10-CM

## 2020-11-17 DIAGNOSIS — M51.36 DDD (DEGENERATIVE DISC DISEASE), LUMBAR: ICD-10-CM

## 2020-11-17 DIAGNOSIS — M53.86 DECREASED ROM OF LUMBAR SPINE: ICD-10-CM

## 2020-11-17 PROCEDURE — 1159F MED LIST DOCD IN RCRD: CPT | Mod: HCNC,S$GLB,, | Performed by: REGISTERED NURSE

## 2020-11-17 PROCEDURE — 3078F PR MOST RECENT DIASTOLIC BLOOD PRESSURE < 80 MM HG: ICD-10-PCS | Mod: HCNC,CPTII,S$GLB, | Performed by: REGISTERED NURSE

## 2020-11-17 PROCEDURE — 1125F AMNT PAIN NOTED PAIN PRSNT: CPT | Mod: HCNC,S$GLB,, | Performed by: REGISTERED NURSE

## 2020-11-17 PROCEDURE — 3078F DIAST BP <80 MM HG: CPT | Mod: HCNC,CPTII,S$GLB, | Performed by: REGISTERED NURSE

## 2020-11-17 PROCEDURE — 1125F PR PAIN SEVERITY QUANTIFIED, PAIN PRESENT: ICD-10-PCS | Mod: HCNC,S$GLB,, | Performed by: REGISTERED NURSE

## 2020-11-17 PROCEDURE — 1100F PTFALLS ASSESS-DOCD GE2>/YR: CPT | Mod: HCNC,CPTII,S$GLB, | Performed by: REGISTERED NURSE

## 2020-11-17 PROCEDURE — 1159F PR MEDICATION LIST DOCUMENTED IN MEDICAL RECORD: ICD-10-PCS | Mod: HCNC,S$GLB,, | Performed by: REGISTERED NURSE

## 2020-11-17 PROCEDURE — 99999 PR PBB SHADOW E&M-EST. PATIENT-LVL III: CPT | Mod: PBBFAC,HCNC,, | Performed by: REGISTERED NURSE

## 2020-11-17 PROCEDURE — 99999 PR PBB SHADOW E&M-EST. PATIENT-LVL III: ICD-10-PCS | Mod: PBBFAC,HCNC,, | Performed by: REGISTERED NURSE

## 2020-11-17 PROCEDURE — 3075F SYST BP GE 130 - 139MM HG: CPT | Mod: HCNC,CPTII,S$GLB, | Performed by: REGISTERED NURSE

## 2020-11-17 PROCEDURE — 3288F FALL RISK ASSESSMENT DOCD: CPT | Mod: HCNC,CPTII,S$GLB, | Performed by: REGISTERED NURSE

## 2020-11-17 PROCEDURE — 3075F PR MOST RECENT SYSTOLIC BLOOD PRESS GE 130-139MM HG: ICD-10-PCS | Mod: HCNC,CPTII,S$GLB, | Performed by: REGISTERED NURSE

## 2020-11-17 PROCEDURE — 3288F PR FALLS RISK ASSESSMENT DOCUMENTED: ICD-10-PCS | Mod: HCNC,CPTII,S$GLB, | Performed by: REGISTERED NURSE

## 2020-11-17 PROCEDURE — 1100F PR PT FALLS ASSESS DOC 2+ FALLS/FALL W/INJURY/YR: ICD-10-PCS | Mod: HCNC,CPTII,S$GLB, | Performed by: REGISTERED NURSE

## 2020-11-17 PROCEDURE — 99214 PR OFFICE/OUTPT VISIT, EST, LEVL IV, 30-39 MIN: ICD-10-PCS | Mod: HCNC,S$GLB,, | Performed by: REGISTERED NURSE

## 2020-11-17 PROCEDURE — 99214 OFFICE O/P EST MOD 30 MIN: CPT | Mod: HCNC,S$GLB,, | Performed by: REGISTERED NURSE

## 2020-11-17 RX ORDER — INFLUENZA A VIRUS A/MICHIGAN/45/2015 X-275 (H1N1) ANTIGEN (FORMALDEHYDE INACTIVATED), INFLUENZA A VIRUS A/SINGAPORE/INFIMH-16-0019/2016 IVR-186 (H3N2) ANTIGEN (FORMALDEHYDE INACTIVATED), INFLUENZA B VIRUS B/PHUKET/3073/2013 ANTIGEN (FORMALDEHYDE INACTIVATED), AND INFLUENZA B VIRUS B/MARYLAND/15/2016 BX-69A ANTIGEN (FORMALDEHYDE INACTIVATED) 60; 60; 60; 60 UG/.7ML; UG/.7ML; UG/.7ML; UG/.7ML
INJECTION, SUSPENSION INTRAMUSCULAR
Status: ON HOLD | COMMUNITY
Start: 2020-09-24 | End: 2021-06-14 | Stop reason: HOSPADM

## 2020-11-17 NOTE — PROGRESS NOTES
Subjective:     Patient ID: Narciso Yanez is a 85 y.o. male    Chief Complaint: Pain      Referred by: No ref. provider found      HPI:    Interval History NP (11/17/20):    Pt returns today for follow up. He states that he still continues to experience right leg pain, radiating to the foot. It does at times go numb. He has been experiencing more falls, most recent was last week. The pain is worse when sitting. Denies new or worsening symptoms, bowel or bladder dysfunction. No medications have been helpful thus far. He does not find that PT is helping.     Interval History NP (9/4/20):    Pt returns today for follow up. He states 0% relief from the epidural injection. His pain is unchanged and reports no new or worsening symptoms. Denies bowel or bladder dysfunction. Would like to try a non-opioid medication now for the pain. He enjoys working and does not like the sedating effects of opioids or neuropathic medications.       Interval History (8/7/20):  He returns today for follow up.  He reports that he continues to have right leg/lower extremity pain.  He denies any changes in the quality location of this pain.  He recently underwent angiography to evaluate for peripheral vascular disease/claudication.  Patient states that no treatable source of the symptoms have been found.  He was previously scheduled to undergo a right L5 and S1 transforaminal epidural steroid injection.  This was postponed secondary to the angiography.  Patient is interested in scheduling this procedure.  He also requests that something be prescribed for his pain.  He states that he does not want anything too strong his he does not like to feel sedated.  He states that tramadol was not effective.  Patient has tried gabapentin in the past but this caused him to feel dizzy so he discontinue this medication.  He has been prescribed hydrocodone in the past, but cannot recall this medication was effective or if he tolerated.      Interval  History NP (6/24/20):    Pt returns today for follow up. He states that he is still having pain to his right leg, radiating to the foot. The pain is unchanged in quality or location. Denies new or worsening symptoms. He reports that gabapentin has not helped. He took one pill and became very dizzy. He wishes to discontinue this medication. He would like to try an injection for the pain. He is no longer able to drive or sit for long periods of time because of his right leg. His son drove him to the appointment today; using a can for balance. He is currently on Plavix and a daily 81 mg aspirin.       Interval History (6/16/20):  He returns today for follow up.  He reports that physical therapy has not been helpful for the right lower extremity pain.  He denies any changes in the quality or location of the pain.  He denies any new or worsening symptoms.  He does state that physical therapy helped for a short appeared of time but this relief for off.  He continues on regular home exercise program but does not notice any long-term relief.  He states that he did take gabapentin for short period of time and found it helpful.  Initially this medicine did cause him to feel funny, but he states that after while this feeling when away.  He is interested in trying this medication again.      Interval History (4/22/19):  He returns today for follow up and MRI review.  He reports that his pain is unchanged in quality and location since last encounter.  He denies any new or worsening symptoms.  Patient states that he could not tolerate gabapentin and has discontinued his medication.      Initial Encounter (4/15/19):  Narciso Yanez is a 85 y.o. male who presents today with right lower extremity numbness and pain. This problem started about 5 weeks ago.  No specific inciting event or injury.  The pain/numbness starts in the right posterior hip region and radiates to the posterior thigh into the leg and foot.  He also reports  weakness of the right lower extremity.  He denies any bowel or bladder dysfunction.  He denies any significant back pain associated with this problem.  The pain is not constant.  The pain is worsened with prolonged sitting and will improved with standing.   This pain is described in detail below.    Physical Therapy:  Yes    Non-pharmacologic Treatment:  Standing         · TENS?  No    Pain Medications:         · Currently taking:  Tylenol    · Has tried in the past:  Flexeril, tramadol, gabapentin, Norco, Robaxin, meloxicam    · Has not tried:  NSAIDs,  TCAs, SNRIs, topical creams    Blood thinners:  Aspirin 81 mg a day, Plavix    Interventional Therapies:      8/26/20 - right L5 and S1 transforaminal epidural steroid injection    Relevant Surgeries:  None    Affecting sleep?  No    Affecting daily activities? yes    Depressive symptoms? no          · SI/HI? No    Work status: Employed    Pain Scores:    Best:       4/10  Worst:    10/10  Usually:  6 /10  Today:   8/10    Review of Systems   Constitutional: Negative for activity change, appetite change, chills, fatigue, fever and unexpected weight change.   HENT: Negative for hearing loss.    Eyes: Negative for visual disturbance.   Respiratory: Negative for chest tightness and shortness of breath.    Cardiovascular: Negative for chest pain.   Gastrointestinal: Negative for abdominal pain, constipation, diarrhea, nausea and vomiting.   Genitourinary: Negative for difficulty urinating.   Musculoskeletal: Positive for arthralgias and gait problem. Negative for back pain and neck pain.   Skin: Negative for rash.   Neurological: Positive for weakness and numbness. Negative for dizziness, light-headedness and headaches.   Psychiatric/Behavioral: Negative for hallucinations, sleep disturbance and suicidal ideas. The patient is not nervous/anxious.        Past Medical History:   Diagnosis Date    Actinic keratosis     Anxiety     B12 deficiency 12/8/2014    Dx 6/14     Cancer     skin    Cataract     Coronary artery disease     Diabetes mellitus type II     Diabetes mellitus with peripheral circulatory disorder 6/18/2014    ED (erectile dysfunction)     Hyperlipidemia     Hypertension     Kidney stone     Peripheral vascular disease     Spondylosis 3/29/2019    Tobacco dependence        Past Surgical History:   Procedure Laterality Date    APPENDECTOMY      CARDIAC SURGERY  01/1999    CABG 4 vessels    CATARACT EXTRACTION      EPIDURAL STEROID INJECTION Right 8/26/2020    Procedure: Injection, Steroid, Epidural Transforaminal;  Surgeon: Wiley Crane Jr., MD;  Location: [facility];  Service: Pain Management;  Laterality: Right;  Right L5 + S1 TF LEAH  Arrive @ 1115; ASA & Plavix last 8/18; Check BG; Rapid COVID test    EXTERNAL EAR SURGERY      EYE SURGERY      cataracts    ILIAC ARTERY STENT Bilateral 06/2003    also Right External Iliac stent       Social History     Socioeconomic History    Marital status:      Spouse name: Not on file    Number of children: Not on file    Years of education: Not on file    Highest education level: Not on file   Occupational History    Not on file   Social Needs    Financial resource strain: Not on file    Food insecurity     Worry: Not on file     Inability: Not on file    Transportation needs     Medical: Not on file     Non-medical: Not on file   Tobacco Use    Smoking status: Current Every Day Smoker     Packs/day: 1.50     Years: 71.00     Pack years: 106.50     Types: Cigarettes    Smokeless tobacco: Never Used   Substance and Sexual Activity    Alcohol use: No    Drug use: No    Sexual activity: Not Currently     Partners: Female   Lifestyle    Physical activity     Days per week: Not on file     Minutes per session: Not on file    Stress: Not on file   Relationships    Social connections     Talks on phone: Not on file     Gets together: Not on file     Attends Voodoo service: Not on file      Active member of club or organization: Not on file     Attends meetings of clubs or organizations: Not on file     Relationship status: Not on file   Other Topics Concern    Not on file   Social History Narrative    .  4 children.  Smokes 2 ppd.  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        Review of patient's allergies indicates:   Allergen Reactions    Demerol [meperidine] Nausea Only     Other reaction(s): Unknown       Current Outpatient Medications on File Prior to Visit   Medication Sig Dispense Refill    ACCU-CHEK JOAQUIN PLUS METER [facility]       ACCU-CHEK SOFTCLIX LANCETS Misc       alendronate (FOSAMAX) 70 MG tablet Take 1 tablet (70 mg total) by mouth every 7 days. 4 tablet 11    ascorbic acid, vitamin C, (VITAMIN C) 250 MG tablet Take 1 tablet (250 mg total) by mouth once daily. 120 tablet 0    aspirin (ECOTRIN) 81 MG EC tablet Take 81 mg by mouth once daily.      blood sugar diagnostic Strp 1 strip by Misc.(Non-Drug; Combo Route) route 2 (two) times daily. Disp glucometer and lancets as well 100 strip 12    clopidogreL (PLAVIX) 75 mg tablet Take 1 tablet (75 mg total) by mouth once daily. 90 tablet 4    diazePAM (VALIUM) 5 MG tablet Take 1 tablet (5 mg total) by mouth every 8 (eight) hours as needed. 60 tablet 5    FLUZONE HIGHDOSE QUAD 20-21  mcg/0.7 mL Syrg PHARMACY ADMINISTERED      glimepiride (AMARYL) 2 MG tablet Take 1 tablet (2 mg total) by mouth once daily. 30 tablet 3    HYDROcodone-acetaminophen (NORCO) 5-325 mg per tablet Take 1 tablet by mouth every 12 (twelve) hours as needed for Pain. 60 tablet 0    metFORMIN (GLUCOPHAGE-XR) 750 MG ER 24hr tablet 2 a day 180 tablet 0    metoprolol succinate (TOPROL-XL) 25 MG 24 hr tablet Take 1 tablet (25 mg total) by mouth once daily. 90 tablet 12    oxybutynin (DITROPAN-XL) 5 MG TR24 Take 1 tablet (5 mg total) by mouth once daily. 30 tablet 11    predniSONE (DELTASONE) 1 MG tablet Take 5 tablets (5 mg total) by mouth  once daily. 150 tablet 12    rosuvastatin (CRESTOR) 20 MG tablet Take 1 tablet (20 mg total) by mouth every evening. 90 tablet 12    tamsulosin (FLOMAX) 0.4 mg Cap Take 1 capsule (0.4 mg total) by mouth once daily. 30 capsule 11     No current facility-administered medications on file prior to visit.        Objective:      See flowsheet.     Exam:  GEN:  Well developed, well nourished.  No acute distress. Normal pain behavior.  HEENT:  No trauma.  Mucous membranes moist.  Nares patent bilaterally.  PSYCH: Normal affect. Thought content appropriate.  CHEST:  Breathing symmetric.  No audible wheezing.  ABD: Soft, non-distended.  SKIN:  Warm, pink, dry.  No rash on exposed areas.    EXT:  No cyanosis, clubbing, or edema.  No color change or changes in nail or hair growth.  NEURO/MUSCULOSKELETAL:  Fully alert, oriented, and appropriate. Speech normal dottie. No cranial nerve deficits.   Gait: Antalgic. Single point cane. No trendelenburg sign bilaterally.   Motor Strength:  5/5 motor strength throughout lower extremities.   Sensory: No sensory deficit in the lower extremities.   Reflexes:  1+ and symmetric throughout.  Downgoing Babinski's bilaterally.  No clonus or spasticity.  L-Spine:  Limited ROM with pain on flexion. Negative pain with axial/facet loading bilaterally.  + SLR on right.    SI Joint/Hip: Negative SELIN bilaterally.  Negative FADIR bilaterally.  No TTP over lumbar paraspinals, bilateral SI joints, hips, piriformis muscles, or GTB.            Imaging:      Narrative & Impression    EXAMINATION:  MRI LUMBAR SPINE WITHOUT CONTRAST     CLINICAL HISTORY:  RIGHT SCIATICA; Sciatica, right side     TECHNIQUE:  Multiplanar, multisequence MR images were acquired from the thoracolumbar junction to the sacrum without the administration of contrast.     COMPARISON:  04/20/2019     FINDINGS:  The distal cord/conus demonstrates normal size and signal.  No evidence of osteomyelitis, marrow replacement process, or  acute fracture.  Right-sided parapelvic cyst noted.     At L3-4, there is asymmetric disc bulging to the right with small annular tear and mild facet hypertrophy.  This results in mild right-sided neural foraminal narrowing.  No spinal canal stenosis.     At L4-5, there is asymmetric disc bulging to the right with small annular tear and bilateral facet hypertrophy, resulting in mild spinal canal stenosis and mild bilateral neural foraminal narrowing.     At L5-S1, there is degenerative disc disease, noting disc height loss and sub endplate marrow changes.  There is mild disc bulging.  This results in mild bilateral neural foraminal narrowing.     The findings are similar to the prior exam.     Impression:     Lumbar spondylosis, resulting in mild spinal canal stenosis at L4-5, and mild neural foraminal narrowing from L3-4 through L5-S1, as above.        Electronically signed by: Denilson Dwyer MD  Date:                                            05/22/2020  Time:                                           10:44           Assessment:       Encounter Diagnoses   Name Primary?    Pre-op testing Yes    Lumbar radiculopathy     DDD (degenerative disc disease), lumbar     Decreased ROM of lumbar spine     Radiculopathy of lumbosacral region          Plan:       Narciso was seen today for pain.    Diagnoses and all orders for this visit:    Pre-op testing  -     COVID-19 Routine Screening; Future    Lumbar radiculopathy  -     Case request GI: Injection, Steroid, Epidural Transformainal    DDD (degenerative disc disease), lumbar  -     Case request GI: Injection, Steroid, Epidural Transformainal    Decreased ROM of lumbar spine    Radiculopathy of lumbosacral region        Narciso Yanez is a 85 y.o. male with right-sided lower extremity numbness and pain. Consistent with right lumbar radiculopathy though specific level of radiculopathy is unclear at this time. Lumbar MRI with some neural foraminal narrowing though no  specific nerve root impingement.  No significant back pain. Low suspicion for facet or sacroiliac mediated pain.    1.  Pt continues PT, but finds no significant improvement in pain.   2.  Stop Meloxicam as this does not provide him any relief.   3.  Repeat right TESI @ L5 and S1 with Dr. Crane. I do feel it is worth repeating considering subjective symptoms and repeat exam remain consistent with L5 and S1 radicular pain.   4.  Follow-up 2 weeks post procedure to evaluate pain.           The above plan and management options were discussed at length with patient. Patient is in agreement with the above and verbalized understanding.    COLLIN Mason, FNP-C  [facility]  Interventional Pain Management

## 2020-11-17 NOTE — H&P (VIEW-ONLY)
Subjective:     Patient ID: Narciso Yanez is a 85 y.o. male    Chief Complaint: Pain      Referred by: No ref. provider found      HPI:    Interval History NP (11/17/20):    Pt returns today for follow up. He states that he still continues to experience right leg pain, radiating to the foot. It does at times go numb. He has been experiencing more falls, most recent was last week. The pain is worse when sitting. Denies new or worsening symptoms, bowel or bladder dysfunction. No medications have been helpful thus far. He does not find that PT is helping.     Interval History NP (9/4/20):    Pt returns today for follow up. He states 0% relief from the epidural injection. His pain is unchanged and reports no new or worsening symptoms. Denies bowel or bladder dysfunction. Would like to try a non-opioid medication now for the pain. He enjoys working and does not like the sedating effects of opioids or neuropathic medications.       Interval History (8/7/20):  He returns today for follow up.  He reports that he continues to have right leg/lower extremity pain.  He denies any changes in the quality location of this pain.  He recently underwent angiography to evaluate for peripheral vascular disease/claudication.  Patient states that no treatable source of the symptoms have been found.  He was previously scheduled to undergo a right L5 and S1 transforaminal epidural steroid injection.  This was postponed secondary to the angiography.  Patient is interested in scheduling this procedure.  He also requests that something be prescribed for his pain.  He states that he does not want anything too strong his he does not like to feel sedated.  He states that tramadol was not effective.  Patient has tried gabapentin in the past but this caused him to feel dizzy so he discontinue this medication.  He has been prescribed hydrocodone in the past, but cannot recall this medication was effective or if he tolerated.      Interval  History NP (6/24/20):    Pt returns today for follow up. He states that he is still having pain to his right leg, radiating to the foot. The pain is unchanged in quality or location. Denies new or worsening symptoms. He reports that gabapentin has not helped. He took one pill and became very dizzy. He wishes to discontinue this medication. He would like to try an injection for the pain. He is no longer able to drive or sit for long periods of time because of his right leg. His son drove him to the appointment today; using a can for balance. He is currently on Plavix and a daily 81 mg aspirin.       Interval History (6/16/20):  He returns today for follow up.  He reports that physical therapy has not been helpful for the right lower extremity pain.  He denies any changes in the quality or location of the pain.  He denies any new or worsening symptoms.  He does state that physical therapy helped for a short appeared of time but this relief for off.  He continues on regular home exercise program but does not notice any long-term relief.  He states that he did take gabapentin for short period of time and found it helpful.  Initially this medicine did cause him to feel funny, but he states that after while this feeling when away.  He is interested in trying this medication again.      Interval History (4/22/19):  He returns today for follow up and MRI review.  He reports that his pain is unchanged in quality and location since last encounter.  He denies any new or worsening symptoms.  Patient states that he could not tolerate gabapentin and has discontinued his medication.      Initial Encounter (4/15/19):  Narciso Yanez is a 85 y.o. male who presents today with right lower extremity numbness and pain. This problem started about 5 weeks ago.  No specific inciting event or injury.  The pain/numbness starts in the right posterior hip region and radiates to the posterior thigh into the leg and foot.  He also reports  weakness of the right lower extremity.  He denies any bowel or bladder dysfunction.  He denies any significant back pain associated with this problem.  The pain is not constant.  The pain is worsened with prolonged sitting and will improved with standing.   This pain is described in detail below.    Physical Therapy:  Yes    Non-pharmacologic Treatment:  Standing         · TENS?  No    Pain Medications:         · Currently taking:  Tylenol    · Has tried in the past:  Flexeril, tramadol, gabapentin, Norco, Robaxin, meloxicam    · Has not tried:  NSAIDs,  TCAs, SNRIs, topical creams    Blood thinners:  Aspirin 81 mg a day, Plavix    Interventional Therapies:      8/26/20 - right L5 and S1 transforaminal epidural steroid injection    Relevant Surgeries:  None    Affecting sleep?  No    Affecting daily activities? yes    Depressive symptoms? no          · SI/HI? No    Work status: Employed    Pain Scores:    Best:       4/10  Worst:    10/10  Usually:  6 /10  Today:   8/10    Review of Systems   Constitutional: Negative for activity change, appetite change, chills, fatigue, fever and unexpected weight change.   HENT: Negative for hearing loss.    Eyes: Negative for visual disturbance.   Respiratory: Negative for chest tightness and shortness of breath.    Cardiovascular: Negative for chest pain.   Gastrointestinal: Negative for abdominal pain, constipation, diarrhea, nausea and vomiting.   Genitourinary: Negative for difficulty urinating.   Musculoskeletal: Positive for arthralgias and gait problem. Negative for back pain and neck pain.   Skin: Negative for rash.   Neurological: Positive for weakness and numbness. Negative for dizziness, light-headedness and headaches.   Psychiatric/Behavioral: Negative for hallucinations, sleep disturbance and suicidal ideas. The patient is not nervous/anxious.        Past Medical History:   Diagnosis Date    Actinic keratosis     Anxiety     B12 deficiency 12/8/2014    Dx 6/14     Cancer     skin    Cataract     Coronary artery disease     Diabetes mellitus type II     Diabetes mellitus with peripheral circulatory disorder 6/18/2014    ED (erectile dysfunction)     Hyperlipidemia     Hypertension     Kidney stone     Peripheral vascular disease     Spondylosis 3/29/2019    Tobacco dependence        Past Surgical History:   Procedure Laterality Date    APPENDECTOMY      CARDIAC SURGERY  01/1999    CABG 4 vessels    CATARACT EXTRACTION      EPIDURAL STEROID INJECTION Right 8/26/2020    Procedure: Injection, Steroid, Epidural Transforaminal;  Surgeon: Wiley Crane Jr., MD;  Location: [facility];  Service: Pain Management;  Laterality: Right;  Right L5 + S1 TF LEAH  Arrive @ 1115; ASA & Plavix last 8/18; Check BG; Rapid COVID test    EXTERNAL EAR SURGERY      EYE SURGERY      cataracts    ILIAC ARTERY STENT Bilateral 06/2003    also Right External Iliac stent       Social History     Socioeconomic History    Marital status:      Spouse name: Not on file    Number of children: Not on file    Years of education: Not on file    Highest education level: Not on file   Occupational History    Not on file   Social Needs    Financial resource strain: Not on file    Food insecurity     Worry: Not on file     Inability: Not on file    Transportation needs     Medical: Not on file     Non-medical: Not on file   Tobacco Use    Smoking status: Current Every Day Smoker     Packs/day: 1.50     Years: 71.00     Pack years: 106.50     Types: Cigarettes    Smokeless tobacco: Never Used   Substance and Sexual Activity    Alcohol use: No    Drug use: No    Sexual activity: Not Currently     Partners: Female   Lifestyle    Physical activity     Days per week: Not on file     Minutes per session: Not on file    Stress: Not on file   Relationships    Social connections     Talks on phone: Not on file     Gets together: Not on file     Attends [employer] service: Not on file      Active member of club or organization: Not on file     Attends meetings of clubs or organizations: Not on file     Relationship status: Not on file   Other Topics Concern    Not on file   Social History Narrative    .  4 children.  Smokes 2 ppd.  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        Review of patient's allergies indicates:   Allergen Reactions    Demerol [meperidine] Nausea Only     Other reaction(s): Unknown       Current Outpatient Medications on File Prior to Visit   Medication Sig Dispense Refill    ACCU-CHEK JOAQUIN PLUS METER [facility] [place], [place]       ACCU-CHEK SOFTCLIX LANCETS Misc       alendronate (FOSAMAX) 70 MG tablet Take 1 tablet (70 mg total) by mouth every 7 days. 4 tablet 11    ascorbic acid, vitamin C, (VITAMIN C) 250 MG tablet Take 1 tablet (250 mg total) by mouth once daily. 120 tablet 0    aspirin (ECOTRIN) 81 MG EC tablet Take 81 mg by mouth once daily.      blood sugar diagnostic Strp 1 strip by Misc.(Non-Drug; Combo Route) route 2 (two) times daily. Disp glucometer and lancets as well 100 strip 12    clopidogreL (PLAVIX) 75 mg tablet Take 1 tablet (75 mg total) by mouth once daily. 90 tablet 4    diazePAM (VALIUM) 5 MG tablet Take 1 tablet (5 mg total) by mouth every 8 (eight) hours as needed. 60 tablet 5    FLUZONE HIGHDOSE QUAD 20-21  mcg/0.7 mL Syrg PHARMACY ADMINISTERED      glimepiride (AMARYL) 2 MG tablet Take 1 tablet (2 mg total) by mouth once daily. 30 tablet 3    HYDROcodone-acetaminophen (NORCO) 5-325 mg per tablet Take 1 tablet by mouth every 12 (twelve) hours as needed for Pain. 60 tablet 0    metFORMIN (GLUCOPHAGE-XR) 750 MG ER 24hr tablet 2 a day 180 tablet 0    metoprolol succinate (TOPROL-XL) 25 MG 24 hr tablet Take 1 tablet (25 mg total) by mouth once daily. 90 tablet 12    oxybutynin (DITROPAN-XL) 5 MG TR24 Take 1 tablet (5 mg total) by mouth once daily. 30 tablet 11    predniSONE (DELTASONE) 1 MG tablet Take 5 tablets (5 mg total) by mouth  once daily. 150 tablet 12    rosuvastatin (CRESTOR) 20 MG tablet Take 1 tablet (20 mg total) by mouth every evening. 90 tablet 12    tamsulosin (FLOMAX) 0.4 mg Cap Take 1 capsule (0.4 mg total) by mouth once daily. 30 capsule 11     No current facility-administered medications on file prior to visit.        Objective:      See flowsheet.     Exam:  GEN:  Well developed, well nourished.  No acute distress. Normal pain behavior.  HEENT:  No trauma.  Mucous membranes moist.  Nares patent bilaterally.  PSYCH: Normal affect. Thought content appropriate.  CHEST:  Breathing symmetric.  No audible wheezing.  ABD: Soft, non-distended.  SKIN:  Warm, pink, dry.  No rash on exposed areas.    EXT:  No cyanosis, clubbing, or edema.  No color change or changes in nail or hair growth.  NEURO/MUSCULOSKELETAL:  Fully alert, oriented, and appropriate. Speech normal dottie. No cranial nerve deficits.   Gait: Antalgic. Single point cane. No trendelenburg sign bilaterally.   Motor Strength:  5/5 motor strength throughout lower extremities.   Sensory: No sensory deficit in the lower extremities.   Reflexes:  1+ and symmetric throughout.  Downgoing Babinski's bilaterally.  No clonus or spasticity.  L-Spine:  Limited ROM with pain on flexion. Negative pain with axial/facet loading bilaterally.  + SLR on right.    SI Joint/Hip: Negative SELIN bilaterally.  Negative FADIR bilaterally.  No TTP over lumbar paraspinals, bilateral SI joints, hips, piriformis muscles, or GTB.            Imaging:      Narrative & Impression    EXAMINATION:  MRI LUMBAR SPINE WITHOUT CONTRAST     CLINICAL HISTORY:  RIGHT SCIATICA; Sciatica, right side     TECHNIQUE:  Multiplanar, multisequence MR images were acquired from the thoracolumbar junction to the sacrum without the administration of contrast.     COMPARISON:  04/20/2019     FINDINGS:  The distal cord/conus demonstrates normal size and signal.  No evidence of osteomyelitis, marrow replacement process, or  acute fracture.  Right-sided parapelvic cyst noted.     At L3-4, there is asymmetric disc bulging to the right with small annular tear and mild facet hypertrophy.  This results in mild right-sided neural foraminal narrowing.  No spinal canal stenosis.     At L4-5, there is asymmetric disc bulging to the right with small annular tear and bilateral facet hypertrophy, resulting in mild spinal canal stenosis and mild bilateral neural foraminal narrowing.     At L5-S1, there is degenerative disc disease, noting disc height loss and sub endplate marrow changes.  There is mild disc bulging.  This results in mild bilateral neural foraminal narrowing.     The findings are similar to the prior exam.     Impression:     Lumbar spondylosis, resulting in mild spinal canal stenosis at L4-5, and mild neural foraminal narrowing from L3-4 through L5-S1, as above.        Electronically signed by: Denilson Dwyer MD  Date:                                            05/22/2020  Time:                                           10:44           Assessment:       Encounter Diagnoses   Name Primary?    Pre-op testing Yes    Lumbar radiculopathy     DDD (degenerative disc disease), lumbar     Decreased ROM of lumbar spine     Radiculopathy of lumbosacral region          Plan:       Narciso was seen today for pain.    Diagnoses and all orders for this visit:    Pre-op testing  -     COVID-19 Routine Screening; Future    Lumbar radiculopathy  -     Case request GI: Injection, Steroid, Epidural Transformainal    DDD (degenerative disc disease), lumbar  -     Case request GI: Injection, Steroid, Epidural Transformainal    Decreased ROM of lumbar spine    Radiculopathy of lumbosacral region        Narciso Yanez is a 85 y.o. male with right-sided lower extremity numbness and pain. Consistent with right lumbar radiculopathy though specific level of radiculopathy is unclear at this time. Lumbar MRI with some neural foraminal narrowing though no  specific nerve root impingement.  No significant back pain. Low suspicion for facet or sacroiliac mediated pain.    1.  Pt continues PT, but finds no significant improvement in pain.   2.  Stop Meloxicam as this does not provide him any relief.   3.  Repeat right TESI @ L5 and S1 with Dr. Crane. I do feel it is worth repeating considering subjective symptoms and repeat exam remain consistent with L5 and S1 radicular pain.   4.  Follow-up 2 weeks post procedure to evaluate pain.           The above plan and management options were discussed at length with patient. Patient is in agreement with the above and verbalized understanding.    COLLIN Mason, FNP-C  [facility]  Interventional Pain Management

## 2020-11-17 NOTE — PROGRESS NOTES
Mr. Sutart will be scheduled for Right L5 + S1 TF LEAH on 11/25/2020.  Reviewed the pre-procedure instructions listed below with the patient- copy also provided.  Instructed patient to notify clinic if he begins feeling ill, runs a fever, is prescribed antibiotics, and/or has any outpatient procedures within the two weeks leading up to this procedure.  Instructed patient to report to [facility], 2nd Floor Endoscopy Registration Desk.  All questions answered- patient verbalized understanding.  Clearance to hold ASA and Plavix will be sent to Dr. Vargas.    Day of Procedure  - Ensure you have obtained an arrival time from the Pain Management Staff  o Procedure Area will call 1-3 days in advance with your arrival time.  Please check any voicemails received.  o If you arrive past your scheduled procedure time, you may be asked to reschedule your procedure.  - Ensure you have a  with you to remain present throughout your procedure for your safety.  o If you arrive without a responsible adult to stay with you and drive you home, you may be asked to reschedule your procedure.  - Take all of your prescribed medications (exceptions noted below) with a small amount of water  - This is NOT a fasting procedure, you may have a light meal before coming.  - Wear comfortable clothing (loose fitting pants).  - You may wear glasses, dentures, contact lenses, and/or hearing aids.   - Notify the nurse during the intake process if you are allergic to any medications, if you are diabetic, or if you are not feeling well  - Contact the Pain Management Clinic with the following:  o A fever greater than 100° (degrees)   o Feel ill, have any type of infection, or are taking antibiotics now or within the two (2) weeks leading up to this procedure  o Have any outpatient procedures within the two (2) weeks leading up to this procedure (colonoscopy, major dental work, etc.)    Diabetic Patients  - Please check your blood  sugar prior to coming in for your procedure.  If the value is greater than 200, contact the clinic [phone] as the procedure may need to be rescheduled.  The procedure may not be performed if your blood sugar is above 200 even after checking into the hospital.      IF you are taking blood thinners: Only upon receiving clearance and notification from the Pain Management Department  7 Days Prior to Your Procedure:  - Stop taking Plavix/Clopridogrel, Effient/Prasugel, ASA  5 Days Prior to Your Procedure:  - Stop taking Coumadin/Warfarin.  An INR may be drawn prior to your procedure.  If labs are required, you will need to arrive earlier than your scheduled arrival time.  - Stop taking Pradaxa/Dabigatra,  Brilinta/ Ticagrelor  3 Days Prior to Your Procedure:  - Stop taking Xarelto/Rivaroxaban, Eliquis/Apixaban, Aggrenox/Dipyridamole, Reopro/Abciximab

## 2020-11-18 ENCOUNTER — CLINICAL SUPPORT (OUTPATIENT)
Dept: REHABILITATION | Facility: HOSPITAL | Age: 85
End: 2020-11-18
Payer: MEDICARE

## 2020-11-18 DIAGNOSIS — M25.669 DECREASED RANGE OF MOTION OF LOWER EXTREMITY, UNSPECIFIED LATERALITY: ICD-10-CM

## 2020-11-18 DIAGNOSIS — R29.898 DECREASED STRENGTH OF LOWER EXTREMITY: Primary | ICD-10-CM

## 2020-11-18 DIAGNOSIS — M53.86 DECREASED ROM OF LUMBAR SPINE: ICD-10-CM

## 2020-11-18 DIAGNOSIS — R29.898 DECREASED STRENGTH OF TRUNK AND BACK: ICD-10-CM

## 2020-11-18 DIAGNOSIS — R26.9 GAIT ABNORMALITY: ICD-10-CM

## 2020-11-18 PROCEDURE — 97112 NEUROMUSCULAR REEDUCATION: CPT | Mod: HCNC,PN

## 2020-11-18 PROCEDURE — 97110 THERAPEUTIC EXERCISES: CPT | Mod: HCNC,PN

## 2020-11-18 NOTE — PROGRESS NOTES
"    Physical Therapy Treatment Note     Name: Narciso Yanez  Clinic Number: [id number]    Therapy Diagnosis:   Encounter Diagnoses   Name Primary?    Decreased strength of lower extremity Yes    Decreased strength of trunk and back     Decreased ROM of lumbar spine     Decreased range of motion of lower extremity, unspecified laterality     Gait abnormality      Physician: Marcelino Del Toro MD    Visit Date: 11/18/2020    Physician Orders: PT Eval and Treat   Medical Diagnosis from Referral: G57.01 (ICD-10-CM) - Piriformis syndrome of right side  Evaluation Date: 9/22/2020  Authorization Period Expiration: 10/14/2020  Plan of Care Expiration: 12/22/2020  Visit # / Visits authorized: 5/20     Time In: 9:00 am   Time Out: 9:44 am   Total time: 44 minutes  Total Billable Time: 44 minutes     Precautions: Standard    Subjective     Pt reports: Pt reports he is not in too much pain this morning. States it is about a 4/10 this morning. States that his pain has been about 8-9/10 at the highest lately.     He was compliant with home exercise program.  Response to previous treatment: fair  Functional change: ongoing    Pain: 6/10  Location: right Hamstring region      Objective     Narciso received therapeutic exercises to develop strength, endurance, ROM, flexibility, posture and core stabilization for 36 minutes including:    NuStep x5 minutes     SUPINE:  LTRs x15  DKTC 2x10  Ball squeezes x20 reps  Hip abduction with Yellow TB x20 reps  Pelvic tilts x20  R Piriformis stretch 3x30  R Hamstring stretch 3x30   R SKTC stretch 10x10"  R nerve glides x10 reps   SAQs (next visit)    Standing hip extension 2x10  Standing hip abduction 2x10    Ball rollouts (flexion) x10 (5" hold)    Narciso received the following manual therapy techniques: Soft tissue Mobilization were applied to the: RLE for 0 minutes, including:  MFR over jeans to R hamstring region in SL and with Hamstring stretches.     Narciso participated in " "neuromuscular re-education activities to improve: Balance, Coordination, Kinesthetic, Sense, Proprioception and Posture for 8 minutes. The following activities were included:    WBOS on blue foam EC 3x30"  NBOS on blue foam EO 2x30"  NBOS on blue foam with head turns 2x30"  Modified SLB (purple block) 2x30" each LE    Narciso participated in gait training to improve functional mobility and safety for 00  minutes, including:    Narciso received hot pack for 00 minutes to RLE.    Narciso received cold pack for 00 minutes to RLE.      Home Exercises Provided and Patient Education Provided     Education provided:   - Compliance with HEP  - Possibility of DOMs    Written Home Exercises Provided: Patient instructed to cont prior HEP.  Exercises were reviewed and Narciso was able to demonstrate them prior to the end of the session.  Narciso demonstrated good  understanding of the education provided.     See EMR under Patient Instructions for exercises provided prior visit.    Assessment     Pt tolerated session well overall today. Pt with no significant reproduction of pain throughout there-ex and appropriate level of challenge noted. Pt continues with high level of pain in R LE, plans to have epidural injection next week. Plan to assess following injection and determine future course of therapy. Pt continues with decreased B LE strength, core/trunk stability, balance impairments, and increased R LE pain. Continue to progress there-ex as tolerated with addition of vestibular system challenges to improve safety.     Narciso is progressing well towards his goals.   Pt prognosis is Good.     Pt will continue to benefit from skilled outpatient physical therapy to address the deficits listed in the problem list box on initial evaluation, provide pt/family education and to maximize pt's level of independence in the home and community environment.     Pt's spiritual, cultural and educational needs considered and pt agreeable to plan of " care and goals.     Anticipated barriers to physical therapy: none    Goals:     Short Term Goals:  4 weeks  1. Report decreased in pain at worse less than  <   / =  8  /10  to increase tolerance for functional activities.On going  2. Pt to increase B popliteal angle by greater than > or = 5 degrees in order to improve flexibility and posture. On going  3. Increased B LE MMT 1/2  to increase tolerance for ADL and work activities.On going  4. Pt to report ability to drive for 15 mins with pain < / = 5/10 in order to improve tolerance to work activities.   5. Pt to tolerate HEP to improve ROM and independence with ADL's. MET 10/22/20  6. Pt to improve range of motion by 25% to allow for improved functional mobility to allow for improvement in IADLs. Partially met 10/22/20     Long Term Goals: 8 weeks  1. Report decreased in pain at worse less than  <   / =  3  /10  to increase tolerance for functional mobility.   2 .Pt to increase B popliteal angle by greater than > or = 10 degrees in order to improve flexibility and posture.   3. Increased B LE MMT 1 grade to increase tolerance for ADL and work activities.  4. Pt will report at 46% or less limitation on FOTO Lumbar spine survey  to demonstrate decrease in disability and improvement in back pain.  5. Pt to be Independent with HEP to improve ROM and independence with ADL's  7. Pt to report ability to drive for 30 mins with pain < / = 3/10 in order to improve tolerance to work activities.   8. Pt to improve range of motion by 75% to allow for improved functional mobility to allow for improvement in IADLs.   Plan   Continue to improve strength, ROM and functional mobility as tolerated by pt    Plan of care Certification: 9/22/2020 to 12/22/2020.     Outpatient Physical Therapy 2 times weekly for 6 weeks     Elizabeth Prasad, PT

## 2020-11-23 ENCOUNTER — LAB VISIT (OUTPATIENT)
Dept: FAMILY MEDICINE | Facility: CLINIC | Age: 85
End: 2020-11-23
Payer: MEDICARE

## 2020-11-23 DIAGNOSIS — Z01.818 PRE-OP TESTING: ICD-10-CM

## 2020-11-23 LAB — SARS-COV-2 RNA RESP QL NAA+PROBE: NOT DETECTED

## 2020-11-23 PROCEDURE — U0003 INFECTIOUS AGENT DETECTION BY NUCLEIC ACID (DNA OR RNA); SEVERE ACUTE RESPIRATORY SYNDROME CORONAVIRUS 2 (SARS-COV-2) (CORONAVIRUS DISEASE [COVID-19]), AMPLIFIED PROBE TECHNIQUE, MAKING USE OF HIGH THROUGHPUT TECHNOLOGIES AS DESCRIBED BY CMS-2020-01-R: HCPCS | Mod: HCNC

## 2020-11-25 ENCOUNTER — HOSPITAL ENCOUNTER (OUTPATIENT)
Facility: HOSPITAL | Age: 85
Discharge: HOME | End: 2020-11-25
Attending: PAIN MEDICINE | Admitting: PAIN MEDICINE
Payer: MEDICARE

## 2020-11-25 VITALS
HEART RATE: 86 BPM | OXYGEN SATURATION: 95 % | SYSTOLIC BLOOD PRESSURE: 117 MMHG | RESPIRATION RATE: 16 BRPM | TEMPERATURE: 98 F | DIASTOLIC BLOOD PRESSURE: 69 MMHG

## 2020-11-25 DIAGNOSIS — M51.36 DDD (DEGENERATIVE DISC DISEASE), LUMBAR: Primary | ICD-10-CM

## 2020-11-25 LAB — POCT GLUCOSE: 142 MG/DL (ref 70–110)

## 2020-11-25 PROCEDURE — 64483 PR EPIDURAL INJ, ANES/STEROID, TRANSFORAMINAL, LUMB/SACR, SNGL LEVL: ICD-10-PCS | Mod: HCNC,RT,, | Performed by: PAIN MEDICINE

## 2020-11-25 PROCEDURE — 64483 NJX AA&/STRD TFRM EPI L/S 1: CPT | Mod: HCNC,RT,, | Performed by: PAIN MEDICINE

## 2020-11-25 PROCEDURE — 63600175 PHARM REV CODE 636 W HCPCS: Mod: HCNC | Performed by: PAIN MEDICINE

## 2020-11-25 PROCEDURE — 64484 NJX AA&/STRD TFRM EPI L/S EA: CPT | Mod: HCNC,RT,, | Performed by: PAIN MEDICINE

## 2020-11-25 PROCEDURE — 64484 NJX AA&/STRD TFRM EPI L/S EA: CPT | Mod: HCNC | Performed by: PAIN MEDICINE

## 2020-11-25 PROCEDURE — 25500020 PHARM REV CODE 255: Mod: HCNC | Performed by: PAIN MEDICINE

## 2020-11-25 PROCEDURE — 25000003 PHARM REV CODE 250: Mod: HCNC | Performed by: PAIN MEDICINE

## 2020-11-25 PROCEDURE — 64483 NJX AA&/STRD TFRM EPI L/S 1: CPT | Mod: HCNC,RT | Performed by: PAIN MEDICINE

## 2020-11-25 PROCEDURE — 64484 PRA INJECT ANES/STEROID FORAMEN LUMBAR/SACRAL W IMG GUIDE ,EA ADD LEVEL: ICD-10-PCS | Mod: HCNC,RT,, | Performed by: PAIN MEDICINE

## 2020-11-25 PROCEDURE — 82962 GLUCOSE BLOOD TEST: CPT | Mod: HCNC | Performed by: PAIN MEDICINE

## 2020-11-25 RX ORDER — DEXAMETHASONE SODIUM PHOSPHATE 10 MG/ML
INJECTION INTRAMUSCULAR; INTRAVENOUS
Status: DISCONTINUED
Start: 2020-11-25 | End: 2020-11-25 | Stop reason: HOSPADM

## 2020-11-25 RX ORDER — LIDOCAINE HYDROCHLORIDE 20 MG/ML
10 INJECTION, SOLUTION INFILTRATION; PERINEURAL ONCE
Status: COMPLETED | OUTPATIENT
Start: 2020-11-25 | End: 2020-11-25

## 2020-11-25 RX ORDER — DEXAMETHASONE SODIUM PHOSPHATE 10 MG/ML
10 INJECTION INTRAMUSCULAR; INTRAVENOUS ONCE
Status: COMPLETED | OUTPATIENT
Start: 2020-11-25 | End: 2020-11-25

## 2020-11-25 RX ORDER — LIDOCAINE HYDROCHLORIDE 20 MG/ML
INJECTION, SOLUTION INFILTRATION; PERINEURAL
Status: DISCONTINUED
Start: 2020-11-25 | End: 2020-11-25 | Stop reason: HOSPADM

## 2020-11-25 RX ORDER — LIDOCAINE HYDROCHLORIDE 10 MG/ML
1 INJECTION, SOLUTION EPIDURAL; INFILTRATION; INTRACAUDAL; PERINEURAL ONCE
Status: COMPLETED | OUTPATIENT
Start: 2020-11-25 | End: 2020-11-25

## 2020-11-25 RX ORDER — ALPRAZOLAM 0.5 MG/1
1 TABLET, ORALLY DISINTEGRATING ORAL ONCE AS NEEDED
Status: COMPLETED | OUTPATIENT
Start: 2020-11-25 | End: 2020-11-25

## 2020-11-25 RX ORDER — LIDOCAINE HYDROCHLORIDE 10 MG/ML
INJECTION, SOLUTION EPIDURAL; INFILTRATION; INTRACAUDAL; PERINEURAL
Status: DISCONTINUED
Start: 2020-11-25 | End: 2020-11-25 | Stop reason: HOSPADM

## 2020-11-25 RX ADMIN — ALPRAZOLAM 1 MG: 0.5 TABLET, ORALLY DISINTEGRATING ORAL at 12:11

## 2020-11-25 NOTE — OP NOTE
Lumbar transforaminal LEAH    Time-out taken to identify patient and procedure side prior to starting the procedure.   I attest that I have reviewed the patient's home medications prior to the procedure and no contraindication have been identified. I  re-evaluated the patient after the patient was positioned for the procedure in the procedure room immediately before the procedural time-out. The vital signs are current and represent the current state of the patient which has not significantly changed since the preprocedure assessment.                              Date of Service: 11/25/2020    PCP: Marcelino Del Toro MD    Referring Physician:                                     PROCEDURE: Right L5 and S1 transforaminal epidural steroid injection under fluoroscopy    REASON FOR PROCEDURE: Right Lumbar radiculopathy [M54.16]  DDD (degenerative disc disease), lumbar [M51.36]    PHYSICIAN: Wiley Crane MD  ASSISTANTS:None    MEDICATIONS INJECTED:  Preservative-free dexamethasone 10mg, Xylocaine 1% MPF 3-5ml. 3ml per level. Preservative free, sterile normal saline is used to get larger volume as needed.  LOCAL ANESTHETIC INJECTED:  Xylocaine 2% 3ml per site.    SEDATION MEDICATIONS: None  ESTIMATED BLOOD LOSS:  None.    COMPLICATIONS:  None.    TECHNIQUE:   Laying in a prone position, the patient was prepped and draped in the usual sterile fashion using ChloraPrep and fenestrated drape.  The area to be injected was determined under fluoroscopic guidance.  Local anesthetic was given by raising a wheel and going down to the hub of a 27-gauge 1.25 inch needle.  The 3.5inch 25-gauge spinal needle was introduced towards the transverse process of each above named nerve root level.  The needle was walked medially then hinged into the neural foramen.  Omnipaque was injected to confirm appropriate placement and that there was no vascular runoff.  The medication was then injected after applying negative pressure. The patient  tolerated the procedure well.    PAIN BEFORE THE PROCEDURE: 6/10.    PAIN AFTER THE PROCEDURE: 1/10.    The patient was monitored after the procedure.  Patient was given post procedure and discharge instructions to follow at home.  We will see the patient back in two weeks or the patient may call to inform of status. The patient was discharged in a stable condition.

## 2020-11-25 NOTE — DISCHARGE SUMMARY
[facility]  Discharge Note  Short Stay    Procedure(s) (LRB):  Injection, Steroid, Epidural Transformainal (Right)    OUTCOME: Patient tolerated treatment/procedure well without complication and is now ready for discharge.    DISPOSITION: Home or Self Care    FINAL DIAGNOSIS:  <principal problem not specified>    FOLLOWUP: In clinic    DISCHARGE INSTRUCTIONS:  No discharge procedures on file.     Clinical Reference Documents Added to Patient Instructions       Document    INJECTION, LUMBAR EPIDURAL: RECOVERY AT HOME (ENGLISH)        Discharge Diagnosis:Lumbar radiculopathy [M54.16]  DDD (degenerative disc disease), lumbar [M51.36]  Condition on Discharge: Stable.  Diet on Discharge: Same as before.  Activity: as per instruction sheet.  Discharge to: Home with a responsible adult.  Follow up: as per Discharge instructions

## 2020-11-25 NOTE — DISCHARGE INSTRUCTIONS
Home Care Instructions Pain Management:    1. DIET:   You may resume your normal diet today.   2. BATHING:   You may shower with luke warm water.  3. DRESSING:   You may remove your bandage today.   4. ACTIVITY LEVEL:   You may resume your normal activities 24 hrs after your procedure.  5. MEDICATIONS:   You may resume your normal medications today.   6. SPECIAL INSTRUCTIONS:   No heat to the injection site for 24 hrs including, bath or shower, heating pad, moist heat, or hot tubs.    Use ice pack to injection site for any pain or discomfort.  Apply ice packs for 20 minute intervals as needed.   If you have received any sedatives by mouth today you may not drive for 12 hours.    If you have received any sedation through your IV, you may not drive for 24 hrs.     PLEASE CALL YOUR DOCTOR IF:  1. Redness or swelling around the injection site.  2. Fever of 101 degrees  3. Drainage (pus) from the injection site.  4. For any continuous bleeding (some dried blood over the incision is normal.)    FOR EMERGENCIES:   If any unusual problems or difficulties occur during clinic hours, call [phone] or [phone].           Fall Prevention  Millions of people fall every year and injure themselves. You may have had anesthesia or sedation which may increase your risk of falling. You may have health issues that put you at an increased risk of falling.     Here are ways to reduce your risk of falling.  ·   · Make your home safe by keeping walkways clear of objects you may trip over.  · Use non-slip pads under rugs. Do not use area rugs or small throw rugs.  · Use non-slip mats in bathtubs and showers.  · Install handrails and lights on staircases.  · Do not walk in poorly lit areas.  · Do not stand on chairs or wobbly ladders.  · Use caution when reaching overhead or looking upward. This position can cause a loss of balance.  · Be sure your shoes fit properly, have non-slip bottoms and are in good condition.   · Wear shoes both inside  and out. Avoid going barefoot or wearing slippers.  · Be cautious when going up and down stairs, curbs, and when walking on uneven sidewalks.  · If your balance is poor, consider using a cane or walker.  · If your fall was related to alcohol use, stop or limit alcohol intake.   · If your fall was related to use of sleeping medicines, talk to your doctor about this. You may need to reduce your dosage at bedtime if you awaken during the night to go to the bathroom.    · To reduce the need for nighttime bathroom trips:  ¨ Avoid drinking fluids for several hours before going to bed  ¨ Empty your bladder before going to bed  ¨ Men can keep a urinal at the bedside  · Stay as active as you can. Balance, flexibility, strength, and endurance all come from exercise. They all play a role in preventing falls. Ask your healthcare provider which types of activity are right for you.  · Get your vision checked on a regular basis.  · If you have pets, know where they are before you stand up or walk so you don't trip over them.  · Use night lights.

## 2020-12-01 ENCOUNTER — TELEPHONE (OUTPATIENT)
Dept: PAIN MEDICINE | Facility: CLINIC | Age: 85
End: 2020-12-01

## 2020-12-01 NOTE — TELEPHONE ENCOUNTER
Called pt and offered 12/21 3 week procedure f/u with Dr. Crane . Pt accepted and preferred 11:45 am .

## 2020-12-03 ENCOUNTER — TELEPHONE (OUTPATIENT)
Dept: FAMILY MEDICINE | Facility: CLINIC | Age: 85
End: 2020-12-03

## 2020-12-03 NOTE — TELEPHONE ENCOUNTER
----- Message from Lilly Sneed sent at 12/3/2020 10:32 AM CST -----  Type: Patient Call Back    Who called: self    What is the request in detail: patient would like to be see if he can be schedule before 1/13/2021 and patient would like to have all his script refills sent to [email or web address] from now on. Please call    Can the clinic reply by [facility]? no    Would the patient rather a call back or a response via [facility]? call    Best call back number:[phone]

## 2020-12-03 NOTE — TELEPHONE ENCOUNTER
Spoke to patient about message below. Patient requesting a sooner appointment. No spots available at this time. Instructed patient I will let him know if someone cancels. Patient also requesting all medications go to [facility].

## 2020-12-08 RX ORDER — LANCETS
EACH MISCELLANEOUS
Qty: 200 EACH | Refills: 12 | Status: SHIPPED | OUTPATIENT
Start: 2020-12-08 | End: 2023-05-23 | Stop reason: SDUPTHER

## 2020-12-09 ENCOUNTER — TELEPHONE (OUTPATIENT)
Dept: PAIN MEDICINE | Facility: CLINIC | Age: 85
End: 2020-12-09

## 2020-12-09 NOTE — TELEPHONE ENCOUNTER
----- Message from Ubaldo Duffy sent at 12/9/2020 11:36 AM CST -----  Type: Patient Call Back    Who called: pt     What is the request in detail: Pt would like to speak with the Dr about the charges form his Nov 17th visit, he states he received a letter from [employer] and would like to discuss this with the dr    Can the clinic reply by [facility]? No     Would the patient rather a call back or a response via [facility]? Call Back     Best call back number: [phone]    Additional Information:      Thank You

## 2020-12-09 NOTE — TELEPHONE ENCOUNTER
Pt states he had gotten a bill and let from his insurance for $380 in regards to 11/17/2020 visit .Says Teddy wants to know the purpose of the charge . Please advise

## 2020-12-11 ENCOUNTER — PATIENT MESSAGE (OUTPATIENT)
Dept: OTHER | Facility: OTHER | Age: 85
End: 2020-12-11

## 2020-12-21 ENCOUNTER — OFFICE VISIT (OUTPATIENT)
Dept: PAIN MEDICINE | Facility: CLINIC | Age: 85
End: 2020-12-21
Payer: MEDICARE

## 2020-12-21 ENCOUNTER — TELEPHONE (OUTPATIENT)
Dept: PAIN MEDICINE | Facility: CLINIC | Age: 85
End: 2020-12-21

## 2020-12-21 ENCOUNTER — PATIENT OUTREACH (OUTPATIENT)
Dept: ADMINISTRATIVE | Facility: OTHER | Age: 85
End: 2020-12-21

## 2020-12-21 ENCOUNTER — TELEPHONE (OUTPATIENT)
Dept: FAMILY MEDICINE | Facility: CLINIC | Age: 85
End: 2020-12-21

## 2020-12-21 VITALS
OXYGEN SATURATION: 98 % | HEART RATE: 93 BPM | BODY MASS INDEX: 23.6 KG/M2 | HEIGHT: 67 IN | DIASTOLIC BLOOD PRESSURE: 64 MMHG | SYSTOLIC BLOOD PRESSURE: 145 MMHG | TEMPERATURE: 98 F | WEIGHT: 150.38 LBS

## 2020-12-21 DIAGNOSIS — M54.16 LUMBAR RADICULOPATHY: ICD-10-CM

## 2020-12-21 DIAGNOSIS — M47.816 LUMBAR SPONDYLOSIS: ICD-10-CM

## 2020-12-21 DIAGNOSIS — M51.36 DDD (DEGENERATIVE DISC DISEASE), LUMBAR: Primary | ICD-10-CM

## 2020-12-21 PROCEDURE — 3077F PR MOST RECENT SYSTOLIC BLOOD PRESSURE >= 140 MM HG: ICD-10-PCS | Mod: HCNC,CPTII,S$GLB, | Performed by: PAIN MEDICINE

## 2020-12-21 PROCEDURE — 99213 PR OFFICE/OUTPT VISIT, EST, LEVL III, 20-29 MIN: ICD-10-PCS | Mod: HCNC,S$GLB,, | Performed by: PAIN MEDICINE

## 2020-12-21 PROCEDURE — 1159F MED LIST DOCD IN RCRD: CPT | Mod: HCNC,S$GLB,, | Performed by: PAIN MEDICINE

## 2020-12-21 PROCEDURE — 3078F PR MOST RECENT DIASTOLIC BLOOD PRESSURE < 80 MM HG: ICD-10-PCS | Mod: HCNC,CPTII,S$GLB, | Performed by: PAIN MEDICINE

## 2020-12-21 PROCEDURE — 1125F AMNT PAIN NOTED PAIN PRSNT: CPT | Mod: HCNC,S$GLB,, | Performed by: PAIN MEDICINE

## 2020-12-21 PROCEDURE — 1159F PR MEDICATION LIST DOCUMENTED IN MEDICAL RECORD: ICD-10-PCS | Mod: HCNC,S$GLB,, | Performed by: PAIN MEDICINE

## 2020-12-21 PROCEDURE — 99999 PR PBB SHADOW E&M-EST. PATIENT-LVL IV: ICD-10-PCS | Mod: PBBFAC,HCNC,, | Performed by: PAIN MEDICINE

## 2020-12-21 PROCEDURE — 99213 OFFICE O/P EST LOW 20 MIN: CPT | Mod: HCNC,S$GLB,, | Performed by: PAIN MEDICINE

## 2020-12-21 PROCEDURE — 1125F PR PAIN SEVERITY QUANTIFIED, PAIN PRESENT: ICD-10-PCS | Mod: HCNC,S$GLB,, | Performed by: PAIN MEDICINE

## 2020-12-21 PROCEDURE — 3077F SYST BP >= 140 MM HG: CPT | Mod: HCNC,CPTII,S$GLB, | Performed by: PAIN MEDICINE

## 2020-12-21 PROCEDURE — 99999 PR PBB SHADOW E&M-EST. PATIENT-LVL IV: CPT | Mod: PBBFAC,HCNC,, | Performed by: PAIN MEDICINE

## 2020-12-21 PROCEDURE — 3078F DIAST BP <80 MM HG: CPT | Mod: HCNC,CPTII,S$GLB, | Performed by: PAIN MEDICINE

## 2020-12-21 NOTE — PROGRESS NOTES
Subjective:     Patient ID: Narciso Yanez is a 85 y.o. male    Chief Complaint: Hip Pain (F/U post [place] Flouro)      Referred by: No ref. provider found      HPI:    Interval History (12/21/20):  He returns today for follow up.  He reports that right L5 and S1 transforaminal epidural steroid injection has been helpful for the right lower extremity pain.  He reports 80% relief immediately following the procedure for 2 weeks.  Currently reports roughly 65% relief.  He denies any changes in the quality or location of his pain.  He denies any new or worsening symptoms.  He continues to attend physical therapy.      Interval History NP (11/17/20):  Pt returns today for follow up. He states that he still continues to experience right leg pain, radiating to the foot. It does at times go numb. He has been experiencing more falls, most recent was last week. The pain is worse when sitting. Denies new or worsening symptoms, bowel or bladder dysfunction. No medications have been helpful thus far. He does not find that PT is helping.       Interval History NP (9/4/20):  Pt returns today for follow up. He states 0% relief from the epidural injection. His pain is unchanged and reports no new or worsening symptoms. Denies bowel or bladder dysfunction. Would like to try a non-opioid medication now for the pain. He enjoys working and does not like the sedating effects of opioids or neuropathic medications.       Interval History (8/7/20):  He returns today for follow up.  He reports that he continues to have right leg/lower extremity pain.  He denies any changes in the quality location of this pain.  He recently underwent angiography to evaluate for peripheral vascular disease/claudication.  Patient states that no treatable source of the symptoms have been found.  He was previously scheduled to undergo a right L5 and S1 transforaminal epidural steroid injection.  This was postponed secondary to the angiography.  Patient is  interested in scheduling this procedure.  He also requests that something be prescribed for his pain.  He states that he does not want anything too strong his he does not like to feel sedated.  He states that tramadol was not effective.  Patient has tried gabapentin in the past but this caused him to feel dizzy so he discontinue this medication.  He has been prescribed hydrocodone in the past, but cannot recall this medication was effective or if he tolerated.      Interval History NP (6/24/20):    Pt returns today for follow up. He states that he is still having pain to his right leg, radiating to the foot. The pain is unchanged in quality or location. Denies new or worsening symptoms. He reports that gabapentin has not helped. He took one pill and became very dizzy. He wishes to discontinue this medication. He would like to try an injection for the pain. He is no longer able to drive or sit for long periods of time because of his right leg. His son drove him to the appointment today; using a can for balance. He is currently on Plavix and a daily 81 mg aspirin.       Interval History (6/16/20):  He returns today for follow up.  He reports that physical therapy has not been helpful for the right lower extremity pain.  He denies any changes in the quality or location of the pain.  He denies any new or worsening symptoms.  He does state that physical therapy helped for a short appeared of time but this relief for off.  He continues on regular home exercise program but does not notice any long-term relief.  He states that he did take gabapentin for short period of time and found it helpful.  Initially this medicine did cause him to feel funny, but he states that after while this feeling when away.  He is interested in trying this medication again.      Interval History (4/22/19):  He returns today for follow up and MRI review.  He reports that his pain is unchanged in quality and location since last encounter.  He  denies any new or worsening symptoms.  Patient states that he could not tolerate gabapentin and has discontinued his medication.      Initial Encounter (4/15/19):  Narciso Yanez is a 85 y.o. male who presents today with right lower extremity numbness and pain. This problem started about 5 weeks ago.  No specific inciting event or injury.  The pain/numbness starts in the right posterior hip region and radiates to the posterior thigh into the leg and foot.  He also reports weakness of the right lower extremity.  He denies any bowel or bladder dysfunction.  He denies any significant back pain associated with this problem.  The pain is not constant.  The pain is worsened with prolonged sitting and will improved with standing.   This pain is described in detail below.    Physical Therapy:  Yes    Non-pharmacologic Treatment:  Standing         · TENS?  No    Pain Medications:         · Currently taking:  Tylenol    · Has tried in the past:  Flexeril, tramadol, gabapentin, Norco, Robaxin, meloxicam    · Has not tried:  NSAIDs,  TCAs, SNRIs, topical creams    Blood thinners:  Aspirin 81 mg a day, Plavix    Interventional Therapies:    8/26/20 - right L5 and S1 transforaminal epidural steroid injection  11/25/20 - right L5 and S1 transforaminal epidural steroid injection - 65% relief    Relevant Surgeries:  None    Affecting sleep?  No    Affecting daily activities? yes    Depressive symptoms? no          · SI/HI? No    Work status: Employed    Pain Scores:    Best:       2/10  Worst:    8/10  Usually:  5 /10  Today:   6/10    Review of Systems   Constitutional: Negative for activity change, appetite change, chills, fatigue, fever and unexpected weight change.   HENT: Negative for hearing loss.    Eyes: Negative for visual disturbance.   Respiratory: Negative for chest tightness and shortness of breath.    Cardiovascular: Negative for chest pain.   Gastrointestinal: Negative for abdominal pain, constipation, diarrhea,  nausea and vomiting.   Genitourinary: Negative for difficulty urinating.   Musculoskeletal: Positive for arthralgias and gait problem. Negative for back pain and neck pain.   Skin: Negative for rash.   Neurological: Positive for weakness and numbness. Negative for dizziness, light-headedness and headaches.   Psychiatric/Behavioral: Negative for hallucinations, sleep disturbance and suicidal ideas. The patient is not nervous/anxious.        Past Medical History:   Diagnosis Date    Actinic keratosis     Anxiety     B12 deficiency 12/8/2014    Dx 6/14    Cancer     skin    Cataract     Coronary artery disease     Diabetes mellitus type II     Diabetes mellitus with peripheral circulatory disorder 6/18/2014    ED (erectile dysfunction)     Hyperlipidemia     Hypertension     Kidney stone     Peripheral vascular disease     Spondylosis 3/29/2019    Tobacco dependence        Past Surgical History:   Procedure Laterality Date    APPENDECTOMY      CARDIAC SURGERY  01/1999    CABG 4 vessels    CATARACT EXTRACTION      EPIDURAL STEROID INJECTION Right 8/26/2020    Procedure: Injection, Steroid, Epidural Transforaminal;  Surgeon: Wiley Crane Jr., MD;  Location: [facility];  Service: Pain Management;  Laterality: Right;  Right L5 + S1 TF LEAH  Arrive @ 1115; ASA & Plavix last 8/18; Check BG; Rapid COVID test    EPIDURAL STEROID INJECTION Right 11/25/2020    Procedure: Injection, Steroid, Epidural Transformainal;  Surgeon: Wiley Crane Jr., MD;  Location: [facility] END[facility];  Service: Pain Management;  Laterality: Right;  Right L5 + S1 TF LEAH  Arrive @ 1245; ASA and Plavix last 11/17; Pre-DM; Needs MD Sign.    EXTERNAL EAR SURGERY      EYE SURGERY      cataracts    ILIAC ARTERY STENT Bilateral 06/2003    also Right External Iliac stent       Social History     Socioeconomic History    Marital status:      Spouse name: Not on file    Number of children: Not on file    Years of education: Not  on file    Highest education level: Not on file   Occupational History    Not on file   Social Needs    Financial resource strain: Not on file    Food insecurity     Worry: Not on file     Inability: Not on file    Transportation needs     Medical: Not on file     Non-medical: Not on file   Tobacco Use    Smoking status: Current Every Day Smoker     Packs/day: 1.50     Years: 71.00     Pack years: 106.50     Types: Cigarettes    Smokeless tobacco: Never Used   Substance and Sexual Activity    Alcohol use: No    Drug use: No    Sexual activity: Not Currently     Partners: Female   Lifestyle    Physical activity     Days per week: Not on file     Minutes per session: Not on file    Stress: Not on file   Relationships    Social connections     Talks on phone: Not on file     Gets together: Not on file     Attends Qu[employer] service: Not on file     Active member of club or organization: Not on file     Attends meetings of clubs or organizations: Not on file     Relationship status: Not on file   Other Topics Concern    Not on file   Social History Narrative    .  4 children.  Smokes 2 ppd.  Still drives trucks for entertainment industry.        Review of patient's allergies indicates:   Allergen Reactions    Demerol [meperidine] Nausea Only     Other reaction(s): Unknown       Current Outpatient Medications on File Prior to Visit   Medication Sig Dispense Refill    ACCU-CHEK JOAQUIN PLUS METER Misc       ACCU-CHEK SOFTCLIX LANCETS Misc Check tid 200 each 12    alendronate (FOSAMAX) 70 MG tablet Take 1 tablet (70 mg total) by mouth every 7 days. 4 tablet 11    ascorbic acid, vitamin C, (VITAMIN C) 250 MG tablet Take 1 tablet (250 mg total) by mouth once daily. 120 tablet 0    aspirin (ECOTRIN) 81 MG EC tablet Take 81 mg by mouth once daily.      blood sugar diagnostic Strp 1 strip by Misc.(Non-Drug; Combo Route) route 2 (two) times daily. Disp glucometer and lancets as well 100 strip 12     clopidogreL (PLAVIX) 75 mg tablet Take 1 tablet (75 mg total) by mouth once daily. 90 tablet 4    diazePAM (VALIUM) 5 MG tablet Take 1 tablet (5 mg total) by mouth every 8 (eight) hours as needed. 60 tablet 5    FLUZONE HIGHDOSE QUAD 20-21  mcg/0.7 mL Syrg PHARMACY ADMINISTERED      glimepiride (AMARYL) 2 MG tablet Take 1 tablet (2 mg total) by mouth once daily. 30 tablet 3    metFORMIN (GLUCOPHAGE-XR) 750 MG ER 24hr tablet 2 a day 180 tablet 0    metoprolol succinate (TOPROL-XL) 25 MG 24 hr tablet Take 1 tablet (25 mg total) by mouth once daily. 90 tablet 12    oxybutynin (DITROPAN-XL) 5 MG TR24 TAKE 1 TABLET BY MOUTH EVERY DAY 90 tablet 3    predniSONE (DELTASONE) 1 MG tablet Take 5 tablets (5 mg total) by mouth once daily. 150 tablet 12    rosuvastatin (CRESTOR) 20 MG tablet Take 1 tablet (20 mg total) by mouth every evening. 90 tablet 12    tamsulosin (FLOMAX) 0.4 mg Cap TAKE 1 CAPSULE BY MOUTH EVERY DAY 90 capsule 3     No current facility-administered medications on file prior to visit.        Objective:      See flowsheet.     Exam:  GEN:  Well developed, well nourished.  No acute distress.   HEENT:  No trauma.  Mucous membranes moist.  Nares patent bilaterally.  PSYCH: Normal affect. Thought content appropriate.  CHEST:  Breathing symmetric.  No audible wheezing.  ABD: Soft, non-distended.  SKIN:  Warm, pink, dry.  No rash on exposed areas.    EXT:  No cyanosis, clubbing, or edema.  No color change or changes in nail or hair growth.  NEURO/MUSCULOSKELETAL:  Fully alert, oriented, and appropriate. Speech normal dottie. No cranial nerve deficits.   Gait:  Normal.  No focal motor deficits.             Imaging:      Narrative & Impression    EXAMINATION:  MRI LUMBAR SPINE WITHOUT CONTRAST     CLINICAL HISTORY:  RIGHT SCIATICA; Sciatica, right side     TECHNIQUE:  Multiplanar, multisequence MR images were acquired from the thoracolumbar junction to the sacrum without the administration of  contrast.     COMPARISON:  04/20/2019     FINDINGS:  The distal cord/conus demonstrates normal size and signal.  No evidence of osteomyelitis, marrow replacement process, or acute fracture.  Right-sided parapelvic cyst noted.     At L3-4, there is asymmetric disc bulging to the right with small annular tear and mild facet hypertrophy.  This results in mild right-sided neural foraminal narrowing.  No spinal canal stenosis.     At L4-5, there is asymmetric disc bulging to the right with small annular tear and bilateral facet hypertrophy, resulting in mild spinal canal stenosis and mild bilateral neural foraminal narrowing.     At L5-S1, there is degenerative disc disease, noting disc height loss and sub endplate marrow changes.  There is mild disc bulging.  This results in mild bilateral neural foraminal narrowing.     The findings are similar to the prior exam.     Impression:     Lumbar spondylosis, resulting in mild spinal canal stenosis at L4-5, and mild neural foraminal narrowing from L3-4 through L5-S1, as above.        Electronically signed by: Denilson Dwyer MD  Date:                                            05/22/2020  Time:                                           10:44           Assessment:       No diagnosis found.      Plan:       There are no diagnoses linked to this encounter.    Narciso Yanez is a 85 y.o. male with right-sided lower extremity numbness and pain. Consistent with right lumbar radiculopathy though specific level of radiculopathy is unclear at this time. Lumbar MRI with some neural foraminal narrowing though no specific nerve root impingement.  Good relief following right L5 and S1 transforaminal epidural steroid injection.  No significant back pain. Low suspicion for facet or sacroiliac mediated pain.    1.  Continue physical therapy/home exercise program.  2.  May consider repeat right L5 and S1 transforaminal epidural steroid injection in the future if needed.  3.  Return to clinic  as needed.

## 2020-12-21 NOTE — TELEPHONE ENCOUNTER
----- Message from Radha Barrera sent at 12/21/2020 12:19 PM CST -----  Regarding: self [phone]  .Type: Patient Call Back    Who called: self     What is the request in detail: Requesting to speak with a nurse in regards to a form being filled out     Can the clinic reply by [facility]? Call back     Would the patient rather a call back or a response via [facility]? Call back     Best call back number: [phone]

## 2020-12-21 NOTE — TELEPHONE ENCOUNTER
----- Message from MattChandni Chandler sent at 12/21/2020  2:57 PM CST -----  Regarding: Documents  Contact: Patient  Type: Patient Call Back    Who called:Patient    What is the request in detail: He is requesting a call back. He needs documents for disability completed. Please advise.    Can the clinic reply by [facility]?Yes    Would the patient rather a call back or a response via [facility]? Call    Best call back number: [phone]    Additional Information:    Thanks

## 2020-12-30 ENCOUNTER — LAB VISIT (OUTPATIENT)
Dept: PRIMARY CARE CLINIC | Facility: OTHER | Age: 85
End: 2020-12-30
Attending: INTERNAL MEDICINE
Payer: MEDICARE

## 2020-12-30 DIAGNOSIS — Z03.818 ENCOUNTER FOR OBSERVATION FOR SUSPECTED EXPOSURE TO OTHER BIOLOGICAL AGENTS RULED OUT: ICD-10-CM

## 2020-12-30 PROCEDURE — U0003 INFECTIOUS AGENT DETECTION BY NUCLEIC ACID (DNA OR RNA); SEVERE ACUTE RESPIRATORY SYNDROME CORONAVIRUS 2 (SARS-COV-2) (CORONAVIRUS DISEASE [COVID-19]), AMPLIFIED PROBE TECHNIQUE, MAKING USE OF HIGH THROUGHPUT TECHNOLOGIES AS DESCRIBED BY CMS-2020-01-R: HCPCS | Mod: HCNC

## 2020-12-31 LAB — SARS-COV-2 RNA RESP QL NAA+PROBE: NOT DETECTED

## 2021-01-04 ENCOUNTER — PATIENT OUTREACH (OUTPATIENT)
Dept: ADMINISTRATIVE | Facility: HOSPITAL | Age: 86
End: 2021-01-04

## 2021-01-10 ENCOUNTER — IMMUNIZATION (OUTPATIENT)
Dept: OBSTETRICS AND GYNECOLOGY | Facility: CLINIC | Age: 86
End: 2021-01-10
Payer: MEDICARE

## 2021-01-10 DIAGNOSIS — Z23 NEED FOR VACCINATION: ICD-10-CM

## 2021-01-10 PROCEDURE — 91300 COVID-19, MRNA, LNP-S, PF, 30 MCG/0.3 ML DOSE VACCINE: CPT | Mod: HCNC,PBBFAC | Performed by: FAMILY MEDICINE

## 2021-01-22 ENCOUNTER — OFFICE VISIT (OUTPATIENT)
Dept: FAMILY MEDICINE | Facility: CLINIC | Age: 86
End: 2021-01-22
Payer: MEDICARE

## 2021-01-22 VITALS
HEART RATE: 87 BPM | BODY MASS INDEX: 23.56 KG/M2 | HEIGHT: 67 IN | OXYGEN SATURATION: 98 % | SYSTOLIC BLOOD PRESSURE: 110 MMHG | DIASTOLIC BLOOD PRESSURE: 60 MMHG

## 2021-01-22 DIAGNOSIS — I77.1 TORTUOUS AORTA: ICD-10-CM

## 2021-01-22 DIAGNOSIS — I25.119 CHEST PAIN DUE TO CAD: ICD-10-CM

## 2021-01-22 DIAGNOSIS — F17.200 TOBACCO USE DISORDER: ICD-10-CM

## 2021-01-22 DIAGNOSIS — J41.0 SMOKERS' COUGH: ICD-10-CM

## 2021-01-22 DIAGNOSIS — M79.604 RIGHT LEG PAIN: Primary | ICD-10-CM

## 2021-01-22 DIAGNOSIS — I70.0 AORTIC ATHEROSCLEROSIS: ICD-10-CM

## 2021-01-22 DIAGNOSIS — R70.0 ELEVATED SED RATE: ICD-10-CM

## 2021-01-22 DIAGNOSIS — E11.65 UNCONTROLLED TYPE 2 DIABETES MELLITUS WITH HYPERGLYCEMIA: ICD-10-CM

## 2021-01-22 DIAGNOSIS — D50.9 IRON DEFICIENCY ANEMIA, UNSPECIFIED IRON DEFICIENCY ANEMIA TYPE: ICD-10-CM

## 2021-01-22 DIAGNOSIS — E11.51 DIABETES MELLITUS WITH PERIPHERAL CIRCULATORY DISORDER: ICD-10-CM

## 2021-01-22 DIAGNOSIS — M35.3 POLYMYALGIA RHEUMATICA: ICD-10-CM

## 2021-01-22 DIAGNOSIS — N18.30 STAGE 3 CHRONIC KIDNEY DISEASE, UNSPECIFIED WHETHER STAGE 3A OR 3B CKD: ICD-10-CM

## 2021-01-22 DIAGNOSIS — I73.9 PAD (PERIPHERAL ARTERY DISEASE): ICD-10-CM

## 2021-01-22 DIAGNOSIS — J44.9 CHRONIC OBSTRUCTIVE PULMONARY DISEASE, UNSPECIFIED COPD TYPE: ICD-10-CM

## 2021-01-22 DIAGNOSIS — I25.810 CORONARY ARTERY DISEASE INVOLVING CORONARY BYPASS GRAFT OF NATIVE HEART WITHOUT ANGINA PECTORIS: ICD-10-CM

## 2021-01-22 DIAGNOSIS — I71.40 ABDOMINAL AORTIC ANEURYSM (AAA) WITHOUT RUPTURE: ICD-10-CM

## 2021-01-22 DIAGNOSIS — E53.8 B12 DEFICIENCY: ICD-10-CM

## 2021-01-22 DIAGNOSIS — F39 MOOD DISORDER: ICD-10-CM

## 2021-01-22 DIAGNOSIS — D64.9 ANEMIA, UNSPECIFIED TYPE: ICD-10-CM

## 2021-01-22 PROCEDURE — 3078F DIAST BP <80 MM HG: CPT | Mod: CPTII,S$GLB,, | Performed by: INTERNAL MEDICINE

## 2021-01-22 PROCEDURE — 3288F FALL RISK ASSESSMENT DOCD: CPT | Mod: CPTII,S$GLB,, | Performed by: INTERNAL MEDICINE

## 2021-01-22 PROCEDURE — 99499 RISK ADDL DX/OHS AUDIT: ICD-10-PCS | Mod: S$PBB,,, | Performed by: INTERNAL MEDICINE

## 2021-01-22 PROCEDURE — 3074F PR MOST RECENT SYSTOLIC BLOOD PRESSURE < 130 MM HG: ICD-10-PCS | Mod: CPTII,S$GLB,, | Performed by: INTERNAL MEDICINE

## 2021-01-22 PROCEDURE — 1125F PR PAIN SEVERITY QUANTIFIED, PAIN PRESENT: ICD-10-PCS | Mod: S$GLB,,, | Performed by: INTERNAL MEDICINE

## 2021-01-22 PROCEDURE — 99499 UNLISTED E&M SERVICE: CPT | Mod: S$PBB,,, | Performed by: INTERNAL MEDICINE

## 2021-01-22 PROCEDURE — 1100F PR PT FALLS ASSESS DOC 2+ FALLS/FALL W/INJURY/YR: ICD-10-PCS | Mod: CPTII,S$GLB,, | Performed by: INTERNAL MEDICINE

## 2021-01-22 PROCEDURE — 3288F PR FALLS RISK ASSESSMENT DOCUMENTED: ICD-10-PCS | Mod: CPTII,S$GLB,, | Performed by: INTERNAL MEDICINE

## 2021-01-22 PROCEDURE — 99999 PR PBB SHADOW E&M-EST. PATIENT-LVL III: CPT | Mod: PBBFAC,,, | Performed by: INTERNAL MEDICINE

## 2021-01-22 PROCEDURE — 1159F MED LIST DOCD IN RCRD: CPT | Mod: S$GLB,,, | Performed by: INTERNAL MEDICINE

## 2021-01-22 PROCEDURE — 3078F PR MOST RECENT DIASTOLIC BLOOD PRESSURE < 80 MM HG: ICD-10-PCS | Mod: CPTII,S$GLB,, | Performed by: INTERNAL MEDICINE

## 2021-01-22 PROCEDURE — 1100F PTFALLS ASSESS-DOCD GE2>/YR: CPT | Mod: CPTII,S$GLB,, | Performed by: INTERNAL MEDICINE

## 2021-01-22 PROCEDURE — 1125F AMNT PAIN NOTED PAIN PRSNT: CPT | Mod: S$GLB,,, | Performed by: INTERNAL MEDICINE

## 2021-01-22 PROCEDURE — 1159F PR MEDICATION LIST DOCUMENTED IN MEDICAL RECORD: ICD-10-PCS | Mod: S$GLB,,, | Performed by: INTERNAL MEDICINE

## 2021-01-22 PROCEDURE — 3074F SYST BP LT 130 MM HG: CPT | Mod: CPTII,S$GLB,, | Performed by: INTERNAL MEDICINE

## 2021-01-22 PROCEDURE — 99215 OFFICE O/P EST HI 40 MIN: CPT | Mod: S$GLB,,, | Performed by: INTERNAL MEDICINE

## 2021-01-22 PROCEDURE — 99215 PR OFFICE/OUTPT VISIT, EST, LEVL V, 40-54 MIN: ICD-10-PCS | Mod: S$GLB,,, | Performed by: INTERNAL MEDICINE

## 2021-01-22 PROCEDURE — 99999 PR PBB SHADOW E&M-EST. PATIENT-LVL III: ICD-10-PCS | Mod: PBBFAC,,, | Performed by: INTERNAL MEDICINE

## 2021-01-22 RX ORDER — CLOPIDOGREL BISULFATE 75 MG/1
75 TABLET ORAL DAILY
Qty: 90 TABLET | Refills: 4 | Status: ON HOLD | OUTPATIENT
Start: 2021-01-22 | End: 2021-05-26 | Stop reason: HOSPADM

## 2021-01-22 RX ORDER — DIAZEPAM 5 MG/1
5 TABLET ORAL EVERY 8 HOURS PRN
Qty: 270 TABLET | Refills: 5 | Status: ON HOLD | OUTPATIENT
Start: 2021-01-22 | End: 2021-06-07 | Stop reason: HOSPADM

## 2021-01-22 RX ORDER — DIAZEPAM 5 MG/1
5 TABLET ORAL EVERY 8 HOURS PRN
Qty: 270 TABLET | Refills: 5 | Status: SHIPPED | OUTPATIENT
Start: 2021-01-22 | End: 2021-01-22 | Stop reason: SDUPTHER

## 2021-01-27 ENCOUNTER — CLINICAL SUPPORT (OUTPATIENT)
Dept: SMOKING CESSATION | Facility: CLINIC | Age: 86
End: 2021-01-27
Payer: COMMERCIAL

## 2021-01-27 DIAGNOSIS — F17.200 NICOTINE DEPENDENCE: Primary | ICD-10-CM

## 2021-01-27 PROCEDURE — 99407 PR TOBACCO USE CESSATION INTENSIVE >10 MINUTES: ICD-10-PCS | Mod: S$GLB,,, | Performed by: INTERNAL MEDICINE

## 2021-01-27 PROCEDURE — 99407 BEHAV CHNG SMOKING > 10 MIN: CPT | Mod: S$GLB,,, | Performed by: INTERNAL MEDICINE

## 2021-01-31 ENCOUNTER — IMMUNIZATION (OUTPATIENT)
Dept: OBSTETRICS AND GYNECOLOGY | Facility: CLINIC | Age: 86
End: 2021-01-31
Payer: MEDICARE

## 2021-01-31 DIAGNOSIS — Z23 NEED FOR VACCINATION: Primary | ICD-10-CM

## 2021-01-31 PROCEDURE — 0002A PR IMMUNIZ ADMIN, SARS-COV-2 COVID-19 VACC, 30MCG/0.3ML, 2ND DOSE: CPT | Mod: HCNC,PBBFAC | Performed by: FAMILY MEDICINE

## 2021-01-31 PROCEDURE — 91300 PR SARS-COV- 2 COVID-19 VACCINE, NO PRSV, 30MCG/0.3ML, IM: CPT | Mod: HCNC,PBBFAC | Performed by: FAMILY MEDICINE

## 2021-01-31 RX ADMIN — RNA INGREDIENT BNT-162B2 0.3 ML: 0.23 INJECTION, SUSPENSION INTRAMUSCULAR at 08:01

## 2021-02-08 ENCOUNTER — TELEPHONE (OUTPATIENT)
Dept: PAIN MEDICINE | Facility: CLINIC | Age: 86
End: 2021-02-08

## 2021-02-09 ENCOUNTER — TELEPHONE (OUTPATIENT)
Dept: PAIN MEDICINE | Facility: CLINIC | Age: 86
End: 2021-02-09

## 2021-02-09 ENCOUNTER — OFFICE VISIT (OUTPATIENT)
Dept: PAIN MEDICINE | Facility: CLINIC | Age: 86
End: 2021-02-09
Payer: MEDICARE

## 2021-02-09 VITALS
HEART RATE: 80 BPM | DIASTOLIC BLOOD PRESSURE: 73 MMHG | HEIGHT: 67 IN | WEIGHT: 153.69 LBS | SYSTOLIC BLOOD PRESSURE: 124 MMHG | BODY MASS INDEX: 24.12 KG/M2

## 2021-02-09 DIAGNOSIS — Z01.818 PRE-OP TESTING: ICD-10-CM

## 2021-02-09 DIAGNOSIS — R39.15 URINARY URGENCY: ICD-10-CM

## 2021-02-09 DIAGNOSIS — M51.36 DDD (DEGENERATIVE DISC DISEASE), LUMBAR: Primary | ICD-10-CM

## 2021-02-09 DIAGNOSIS — M47.816 LUMBAR SPONDYLOSIS: ICD-10-CM

## 2021-02-09 DIAGNOSIS — M54.16 LUMBAR RADICULOPATHY: ICD-10-CM

## 2021-02-09 DIAGNOSIS — M51.36 DDD (DEGENERATIVE DISC DISEASE), LUMBAR: ICD-10-CM

## 2021-02-09 DIAGNOSIS — M54.16 LUMBAR RADICULOPATHY: Primary | ICD-10-CM

## 2021-02-09 PROCEDURE — 3074F PR MOST RECENT SYSTOLIC BLOOD PRESSURE < 130 MM HG: ICD-10-PCS | Mod: CPTII,S$GLB,, | Performed by: PAIN MEDICINE

## 2021-02-09 PROCEDURE — 1125F AMNT PAIN NOTED PAIN PRSNT: CPT | Mod: S$GLB,,, | Performed by: PAIN MEDICINE

## 2021-02-09 PROCEDURE — 3078F DIAST BP <80 MM HG: CPT | Mod: CPTII,S$GLB,, | Performed by: PAIN MEDICINE

## 2021-02-09 PROCEDURE — 1125F PR PAIN SEVERITY QUANTIFIED, PAIN PRESENT: ICD-10-PCS | Mod: S$GLB,,, | Performed by: PAIN MEDICINE

## 2021-02-09 PROCEDURE — 1159F MED LIST DOCD IN RCRD: CPT | Mod: S$GLB,,, | Performed by: PAIN MEDICINE

## 2021-02-09 PROCEDURE — 99213 PR OFFICE/OUTPT VISIT, EST, LEVL III, 20-29 MIN: ICD-10-PCS | Mod: S$GLB,,, | Performed by: PAIN MEDICINE

## 2021-02-09 PROCEDURE — 3074F SYST BP LT 130 MM HG: CPT | Mod: CPTII,S$GLB,, | Performed by: PAIN MEDICINE

## 2021-02-09 PROCEDURE — 99999 PR PBB SHADOW E&M-EST. PATIENT-LVL IV: CPT | Mod: PBBFAC,,, | Performed by: PAIN MEDICINE

## 2021-02-09 PROCEDURE — 99213 OFFICE O/P EST LOW 20 MIN: CPT | Mod: S$GLB,,, | Performed by: PAIN MEDICINE

## 2021-02-09 PROCEDURE — 1159F PR MEDICATION LIST DOCUMENTED IN MEDICAL RECORD: ICD-10-PCS | Mod: S$GLB,,, | Performed by: PAIN MEDICINE

## 2021-02-09 PROCEDURE — 3078F PR MOST RECENT DIASTOLIC BLOOD PRESSURE < 80 MM HG: ICD-10-PCS | Mod: CPTII,S$GLB,, | Performed by: PAIN MEDICINE

## 2021-02-09 PROCEDURE — 99999 PR PBB SHADOW E&M-EST. PATIENT-LVL IV: ICD-10-PCS | Mod: PBBFAC,,, | Performed by: PAIN MEDICINE

## 2021-02-10 ENCOUNTER — OFFICE VISIT (OUTPATIENT)
Dept: UROLOGY | Facility: CLINIC | Age: 86
End: 2021-02-10
Payer: MEDICARE

## 2021-02-10 VITALS
SYSTOLIC BLOOD PRESSURE: 122 MMHG | BODY MASS INDEX: 24.12 KG/M2 | HEIGHT: 67 IN | WEIGHT: 153.69 LBS | DIASTOLIC BLOOD PRESSURE: 72 MMHG

## 2021-02-10 DIAGNOSIS — R35.1 NOCTURIA MORE THAN TWICE PER NIGHT: ICD-10-CM

## 2021-02-10 DIAGNOSIS — N20.0 KIDNEY STONES: ICD-10-CM

## 2021-02-10 DIAGNOSIS — R39.15 URINARY URGENCY: ICD-10-CM

## 2021-02-10 DIAGNOSIS — N40.1 BPH WITH OBSTRUCTION/LOWER URINARY TRACT SYMPTOMS: Primary | ICD-10-CM

## 2021-02-10 DIAGNOSIS — N13.8 BPH WITH OBSTRUCTION/LOWER URINARY TRACT SYMPTOMS: Primary | ICD-10-CM

## 2021-02-10 LAB
BILIRUB SERPL-MCNC: NORMAL MG/DL
BLOOD URINE, POC: NORMAL
COLOR, POC UA: YELLOW
GLUCOSE UR QL STRIP: NORMAL
KETONES UR QL STRIP: NORMAL
LEUKOCYTE ESTERASE URINE, POC: NORMAL
NITRITE, POC UA: NORMAL
PH, POC UA: 5
PROTEIN, POC: NORMAL
SPECIFIC GRAVITY, POC UA: 1000
UROBILINOGEN, POC UA: NORMAL

## 2021-02-10 PROCEDURE — 3078F DIAST BP <80 MM HG: CPT | Mod: CPTII,S$GLB,, | Performed by: UROLOGY

## 2021-02-10 PROCEDURE — 87086 URINE CULTURE/COLONY COUNT: CPT

## 2021-02-10 PROCEDURE — 1159F PR MEDICATION LIST DOCUMENTED IN MEDICAL RECORD: ICD-10-PCS | Mod: S$GLB,,, | Performed by: UROLOGY

## 2021-02-10 PROCEDURE — 1159F MED LIST DOCD IN RCRD: CPT | Mod: S$GLB,,, | Performed by: UROLOGY

## 2021-02-10 PROCEDURE — 81001 POCT URINALYSIS, DIPSTICK OR TABLET REAGENT, AUTOMATED, WITH MICROSCOP: ICD-10-PCS | Mod: S$GLB,,, | Performed by: UROLOGY

## 2021-02-10 PROCEDURE — 1126F AMNT PAIN NOTED NONE PRSNT: CPT | Mod: S$GLB,,, | Performed by: UROLOGY

## 2021-02-10 PROCEDURE — 3078F PR MOST RECENT DIASTOLIC BLOOD PRESSURE < 80 MM HG: ICD-10-PCS | Mod: CPTII,S$GLB,, | Performed by: UROLOGY

## 2021-02-10 PROCEDURE — 99999 PR PBB SHADOW E&M-EST. PATIENT-LVL IV: ICD-10-PCS | Mod: PBBFAC,,, | Performed by: UROLOGY

## 2021-02-10 PROCEDURE — 99204 OFFICE O/P NEW MOD 45 MIN: CPT | Mod: 25,S$GLB,, | Performed by: UROLOGY

## 2021-02-10 PROCEDURE — 81001 URINALYSIS AUTO W/SCOPE: CPT | Mod: S$GLB,,, | Performed by: UROLOGY

## 2021-02-10 PROCEDURE — 99204 PR OFFICE/OUTPT VISIT, NEW, LEVL IV, 45-59 MIN: ICD-10-PCS | Mod: 25,S$GLB,, | Performed by: UROLOGY

## 2021-02-10 PROCEDURE — 1126F PR PAIN SEVERITY QUANTIFIED, NO PAIN PRESENT: ICD-10-PCS | Mod: S$GLB,,, | Performed by: UROLOGY

## 2021-02-10 PROCEDURE — 3074F SYST BP LT 130 MM HG: CPT | Mod: CPTII,S$GLB,, | Performed by: UROLOGY

## 2021-02-10 PROCEDURE — 99999 PR PBB SHADOW E&M-EST. PATIENT-LVL IV: CPT | Mod: PBBFAC,,, | Performed by: UROLOGY

## 2021-02-10 PROCEDURE — 3074F PR MOST RECENT SYSTOLIC BLOOD PRESSURE < 130 MM HG: ICD-10-PCS | Mod: CPTII,S$GLB,, | Performed by: UROLOGY

## 2021-02-10 RX ORDER — OXYBUTYNIN CHLORIDE 10 MG/1
10 TABLET, EXTENDED RELEASE ORAL DAILY
Qty: 30 TABLET | Refills: 11 | Status: SHIPPED | OUTPATIENT
Start: 2021-02-10 | End: 2021-03-17 | Stop reason: SDUPTHER

## 2021-02-11 ENCOUNTER — TELEPHONE (OUTPATIENT)
Dept: PAIN MEDICINE | Facility: CLINIC | Age: 86
End: 2021-02-11

## 2021-02-11 DIAGNOSIS — M47.816 LUMBAR SPONDYLOSIS: ICD-10-CM

## 2021-02-11 DIAGNOSIS — M54.16 LUMBAR RADICULOPATHY: ICD-10-CM

## 2021-02-11 DIAGNOSIS — M51.36 DDD (DEGENERATIVE DISC DISEASE), LUMBAR: ICD-10-CM

## 2021-02-11 RX ORDER — HYDROCODONE BITARTRATE AND ACETAMINOPHEN 5; 325 MG/1; MG/1
1 TABLET ORAL EVERY 12 HOURS PRN
Qty: 14 TABLET | Refills: 0 | Status: SHIPPED | OUTPATIENT
Start: 2021-02-11 | End: 2021-02-18

## 2021-02-12 LAB — BACTERIA UR CULT: NO GROWTH

## 2021-02-17 ENCOUNTER — LAB VISIT (OUTPATIENT)
Dept: FAMILY MEDICINE | Facility: CLINIC | Age: 86
End: 2021-02-17
Payer: MEDICARE

## 2021-02-17 DIAGNOSIS — Z01.818 PRE-OP TESTING: ICD-10-CM

## 2021-02-17 PROCEDURE — U0005 INFEC AGEN DETEC AMPLI PROBE: HCPCS

## 2021-02-17 PROCEDURE — U0003 INFECTIOUS AGENT DETECTION BY NUCLEIC ACID (DNA OR RNA); SEVERE ACUTE RESPIRATORY SYNDROME CORONAVIRUS 2 (SARS-COV-2) (CORONAVIRUS DISEASE [COVID-19]), AMPLIFIED PROBE TECHNIQUE, MAKING USE OF HIGH THROUGHPUT TECHNOLOGIES AS DESCRIBED BY CMS-2020-01-R: HCPCS

## 2021-02-18 LAB — SARS-COV-2 RNA RESP QL NAA+PROBE: NOT DETECTED

## 2021-02-19 ENCOUNTER — HOSPITAL ENCOUNTER (OUTPATIENT)
Facility: HOSPITAL | Age: 86
Discharge: HOME | End: 2021-02-19
Attending: PAIN MEDICINE | Admitting: PAIN MEDICINE
Payer: MEDICARE

## 2021-02-19 ENCOUNTER — HOSPITAL ENCOUNTER (OUTPATIENT)
Dept: RADIOLOGY | Facility: HOSPITAL | Age: 86
Discharge: HOME | End: 2021-02-19
Attending: PAIN MEDICINE | Admitting: PAIN MEDICINE
Payer: MEDICARE

## 2021-02-19 VITALS
RESPIRATION RATE: 18 BRPM | TEMPERATURE: 98 F | HEART RATE: 75 BPM | DIASTOLIC BLOOD PRESSURE: 64 MMHG | SYSTOLIC BLOOD PRESSURE: 139 MMHG | OXYGEN SATURATION: 98 %

## 2021-02-19 DIAGNOSIS — M51.36 DDD (DEGENERATIVE DISC DISEASE), LUMBAR: Primary | ICD-10-CM

## 2021-02-19 DIAGNOSIS — M51.36 DDD (DEGENERATIVE DISC DISEASE), LUMBAR: ICD-10-CM

## 2021-02-19 LAB — POCT GLUCOSE: 83 MG/DL (ref 70–110)

## 2021-02-19 PROCEDURE — 64484 NJX AA&/STRD TFRM EPI L/S EA: CPT | Mod: RT,,, | Performed by: PAIN MEDICINE

## 2021-02-19 PROCEDURE — 64483 NJX AA&/STRD TFRM EPI L/S 1: CPT | Mod: RT,,, | Performed by: PAIN MEDICINE

## 2021-02-19 PROCEDURE — 25500020 PHARM REV CODE 255: Performed by: PAIN MEDICINE

## 2021-02-19 PROCEDURE — 64483 PR EPIDURAL INJ, ANES/STEROID, TRANSFORAMINAL, LUMB/SACR, SNGL LEVL: ICD-10-PCS | Mod: RT,,, | Performed by: PAIN MEDICINE

## 2021-02-19 PROCEDURE — 25000003 PHARM REV CODE 250: Performed by: PAIN MEDICINE

## 2021-02-19 PROCEDURE — 64484 NJX AA&/STRD TFRM EPI L/S EA: CPT | Mod: RT | Performed by: PAIN MEDICINE

## 2021-02-19 PROCEDURE — 64483 NJX AA&/STRD TFRM EPI L/S 1: CPT | Mod: RT | Performed by: PAIN MEDICINE

## 2021-02-19 PROCEDURE — 64484 PRA INJECT ANES/STEROID FORAMEN LUMBAR/SACRAL W IMG GUIDE ,EA ADD LEVEL: ICD-10-PCS | Mod: RT,,, | Performed by: PAIN MEDICINE

## 2021-02-19 PROCEDURE — 63600175 PHARM REV CODE 636 W HCPCS: Performed by: PAIN MEDICINE

## 2021-02-19 RX ORDER — DEXAMETHASONE SODIUM PHOSPHATE 10 MG/ML
10 INJECTION INTRAMUSCULAR; INTRAVENOUS ONCE
Status: COMPLETED | OUTPATIENT
Start: 2021-02-19 | End: 2021-02-19

## 2021-02-19 RX ORDER — ALPRAZOLAM 0.5 MG/1
1 TABLET, ORALLY DISINTEGRATING ORAL ONCE AS NEEDED
Status: COMPLETED | OUTPATIENT
Start: 2021-02-19 | End: 2021-02-19

## 2021-02-19 RX ORDER — LIDOCAINE HYDROCHLORIDE 10 MG/ML
1 INJECTION, SOLUTION EPIDURAL; INFILTRATION; INTRACAUDAL; PERINEURAL ONCE
Status: COMPLETED | OUTPATIENT
Start: 2021-02-19 | End: 2021-02-19

## 2021-02-19 RX ORDER — LIDOCAINE HYDROCHLORIDE 20 MG/ML
INJECTION, SOLUTION INFILTRATION; PERINEURAL
Status: DISCONTINUED
Start: 2021-02-19 | End: 2021-02-19 | Stop reason: HOSPADM

## 2021-02-19 RX ORDER — DEXAMETHASONE SODIUM PHOSPHATE 10 MG/ML
INJECTION INTRAMUSCULAR; INTRAVENOUS
Status: DISCONTINUED
Start: 2021-02-19 | End: 2021-02-19 | Stop reason: HOSPADM

## 2021-02-19 RX ORDER — LIDOCAINE HYDROCHLORIDE 10 MG/ML
INJECTION, SOLUTION EPIDURAL; INFILTRATION; INTRACAUDAL; PERINEURAL
Status: DISCONTINUED
Start: 2021-02-19 | End: 2021-02-19 | Stop reason: HOSPADM

## 2021-02-19 RX ORDER — LIDOCAINE HYDROCHLORIDE 20 MG/ML
10 INJECTION, SOLUTION INFILTRATION; PERINEURAL ONCE
Status: COMPLETED | OUTPATIENT
Start: 2021-02-19 | End: 2021-02-19

## 2021-02-19 RX ADMIN — ALPRAZOLAM 1 MG: 0.5 TABLET, ORALLY DISINTEGRATING ORAL at 11:02

## 2021-02-22 ENCOUNTER — TELEPHONE (OUTPATIENT)
Dept: FAMILY MEDICINE | Facility: CLINIC | Age: 86
End: 2021-02-22

## 2021-02-22 RX ORDER — METFORMIN HYDROCHLORIDE 750 MG/1
TABLET, EXTENDED RELEASE ORAL
Qty: 180 TABLET | Refills: 0 | Status: SHIPPED | OUTPATIENT
Start: 2021-02-22 | End: 2021-02-26 | Stop reason: SDUPTHER

## 2021-02-23 ENCOUNTER — PATIENT MESSAGE (OUTPATIENT)
Dept: RHEUMATOLOGY | Facility: CLINIC | Age: 86
End: 2021-02-23

## 2021-02-24 ENCOUNTER — HOSPITAL ENCOUNTER (OUTPATIENT)
Dept: CARDIOLOGY | Facility: HOSPITAL | Age: 86
Discharge: HOME | End: 2021-02-24
Attending: INTERNAL MEDICINE
Payer: MEDICARE

## 2021-02-24 ENCOUNTER — HOSPITAL ENCOUNTER (OUTPATIENT)
Dept: RADIOLOGY | Facility: HOSPITAL | Age: 86
Discharge: HOME | End: 2021-02-24
Attending: UROLOGY
Payer: MEDICARE

## 2021-02-24 ENCOUNTER — TELEPHONE (OUTPATIENT)
Dept: PAIN MEDICINE | Facility: CLINIC | Age: 86
End: 2021-02-24

## 2021-02-24 DIAGNOSIS — R39.15 URINARY URGENCY: ICD-10-CM

## 2021-02-24 DIAGNOSIS — N20.0 KIDNEY STONES: ICD-10-CM

## 2021-02-24 DIAGNOSIS — I73.9 PAD (PERIPHERAL ARTERY DISEASE): ICD-10-CM

## 2021-02-24 LAB
LEFT ANT TIBIAL SYS PSV: 54 CM/S
LEFT CFA PSV: 143 CM/S
LEFT EXTERNAL ILIAC PSV: 152 CM/S
LEFT PERONEAL SYS PSV: 58 CM/S
LEFT POPLITEAL PSV: 102 CM/S
LEFT POST TIBIAL SYS PSV: 41 CM/S
LEFT PROFUNDA SYS PSV: 109 CM/S
LEFT SUPER FEMORAL DIST SYS PSV: 111 CM/S
LEFT SUPER FEMORAL MID SYS PSV: 131 CM/S
LEFT SUPER FEMORAL OSTIAL SYS PSV: 180 CM/S
LEFT SUPER FEMORAL PROX SYS PSV: 84 CM/S
LEFT TIB/PER TRUNK SYS PSV: 89 CM/S
OHS CV LEFT LOWER EXTREMITY ABI (NO CALC): 0.8
OHS CV RIGHT ABI LOWER EXTREMITY (NO CALC): 0.8
RIGHT ANT TIBIAL SYS PSV: 83 CM/S
RIGHT CFA PSV: 149 CM/S
RIGHT EXTERNAL ILLIAC PSV: 128 CM/S
RIGHT PERONEAL SYS PSV: 48 CM/S
RIGHT POPLITEAL PSV: 72 CM/S
RIGHT POST TIBIAL SYS PSV: 74 CM/S
RIGHT PROFUNDA SYS PSV: 61 CM/S
RIGHT SUPER FEMORAL DIST SYS PSV: 91 CM/S
RIGHT SUPER FEMORAL MID SYS PSV: 140 CM/S
RIGHT SUPER FEMORAL OSTIAL SYS PSV: 115 CM/S
RIGHT SUPER FEMORAL PROX SYS PSV: 75 CM/S
RIGHT TIB/PER TRUNK SYS PSV: 63 CM/S

## 2021-02-24 PROCEDURE — 93925 LOWER EXTREMITY STUDY: CPT

## 2021-02-24 PROCEDURE — 93925 LOWER EXTREMITY STUDY: CPT | Mod: 26,,, | Performed by: INTERNAL MEDICINE

## 2021-02-24 PROCEDURE — 76770 US EXAM ABDO BACK WALL COMP: CPT | Mod: 26,,, | Performed by: RADIOLOGY

## 2021-02-24 PROCEDURE — 76770 US EXAM ABDO BACK WALL COMP: CPT | Mod: TC

## 2021-02-24 PROCEDURE — 76770 US RETROPERITONEAL COMPLETE: ICD-10-PCS | Mod: 26,,, | Performed by: RADIOLOGY

## 2021-02-24 PROCEDURE — 93925 CV US DOPPLER ARTERIAL LEGS BILATERAL (CUPID ONLY): ICD-10-PCS | Mod: 26,,, | Performed by: INTERNAL MEDICINE

## 2021-02-26 RX ORDER — METFORMIN HYDROCHLORIDE 750 MG/1
TABLET, EXTENDED RELEASE ORAL
Qty: 180 TABLET | Refills: 12 | Status: ON HOLD | OUTPATIENT
Start: 2021-02-26 | End: 2021-06-14 | Stop reason: SDUPTHER

## 2021-03-01 ENCOUNTER — TELEPHONE (OUTPATIENT)
Dept: PAIN MEDICINE | Facility: CLINIC | Age: 86
End: 2021-03-01

## 2021-03-08 ENCOUNTER — HOSPITAL ENCOUNTER (EMERGENCY)
Facility: HOSPITAL | Age: 86
Discharge: HOME | End: 2021-03-08
Attending: EMERGENCY MEDICINE
Payer: MEDICARE

## 2021-03-08 ENCOUNTER — OFFICE VISIT (OUTPATIENT)
Dept: PAIN MEDICINE | Facility: CLINIC | Age: 86
End: 2021-03-08
Payer: MEDICARE

## 2021-03-08 VITALS
HEIGHT: 67 IN | RESPIRATION RATE: 17 BRPM | TEMPERATURE: 98 F | BODY MASS INDEX: 23.7 KG/M2 | HEART RATE: 72 BPM | SYSTOLIC BLOOD PRESSURE: 146 MMHG | WEIGHT: 151 LBS | OXYGEN SATURATION: 98 % | DIASTOLIC BLOOD PRESSURE: 86 MMHG

## 2021-03-08 VITALS
BODY MASS INDEX: 23.83 KG/M2 | DIASTOLIC BLOOD PRESSURE: 64 MMHG | SYSTOLIC BLOOD PRESSURE: 126 MMHG | OXYGEN SATURATION: 99 % | HEIGHT: 67 IN | WEIGHT: 151.81 LBS | HEART RATE: 80 BPM

## 2021-03-08 DIAGNOSIS — M25.512 SHOULDER PAIN, LEFT: ICD-10-CM

## 2021-03-08 DIAGNOSIS — S09.90XA INJURY OF HEAD, INITIAL ENCOUNTER: Primary | ICD-10-CM

## 2021-03-08 DIAGNOSIS — R63.4 UNINTENTIONAL WEIGHT LOSS: ICD-10-CM

## 2021-03-08 DIAGNOSIS — R29.898 DECREASED STRENGTH OF LOWER EXTREMITY: ICD-10-CM

## 2021-03-08 DIAGNOSIS — M25.661 DECREASED RANGE OF MOTION OF BOTH LOWER EXTREMITIES: ICD-10-CM

## 2021-03-08 DIAGNOSIS — M51.36 DDD (DEGENERATIVE DISC DISEASE), LUMBAR: ICD-10-CM

## 2021-03-08 DIAGNOSIS — S06.0X0A CONCUSSION WITHOUT LOSS OF CONSCIOUSNESS, INITIAL ENCOUNTER: ICD-10-CM
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
## 2021-03-08 DIAGNOSIS — R53.81 PHYSICAL DECONDITIONING: ICD-10-CM

## 2021-03-08 DIAGNOSIS — R29.898 LEG WEAKNESS, BILATERAL: ICD-10-CM

## 2021-03-08 DIAGNOSIS — M25.662 DECREASED RANGE OF MOTION OF BOTH LOWER EXTREMITIES: ICD-10-CM
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
## 2021-03-08 DIAGNOSIS — R29.898 DECREASED STRENGTH OF TRUNK AND BACK: ICD-10-CM

## 2021-03-08 DIAGNOSIS — R26.9 GAIT ABNORMALITY: ICD-10-CM

## 2021-03-08 DIAGNOSIS — M53.86 DECREASED ROM OF LUMBAR SPINE: Primary | ICD-10-CM

## 2021-03-08 LAB
ALBUMIN SERPL-MCNC: 3.5 G/DL (ref 3.3–5.5)
ALP SERPL-CCNC: 78 U/L (ref 42–141)
BILIRUB SERPL-MCNC: 0.5 MG/DL (ref 0.2–1.6)
BILIRUBIN, POC UA: NEGATIVE
BLOOD, POC UA: ABNORMAL
BUN SERPL-MCNC: 16 MG/DL (ref 7–22)
CALCIUM SERPL-MCNC: 9.4 MG/DL (ref 8–10.3)
CHLORIDE SERPL-SCNC: 104 MMOL/L (ref 98–108)
CLARITY, POC UA: CLEAR
COLOR, POC UA: YELLOW
CREAT SERPL-MCNC: 1.1 MG/DL (ref 0.6–1.2)
GLUCOSE SERPL-MCNC: 125 MG/DL (ref 73–118)
GLUCOSE, POC UA: NEGATIVE
KETONES, POC UA: NEGATIVE
LEUKOCYTE EST, POC UA: NEGATIVE
NITRITE, POC UA: NEGATIVE
PH UR STRIP: 5 [PH]
POC ALT (SGPT): 20 U/L (ref 10–47)
POC AST (SGOT): 36 U/L (ref 11–38)
POC CARDIAC TROPONIN I: 0.02 NG/ML
POC TCO2: 27 MMOL/L (ref 18–33)
POTASSIUM BLD-SCNC: 4.2 MMOL/L (ref 3.6–5.1)
PROTEIN, POC UA: NEGATIVE
PROTEIN, POC: 7.6 G/DL (ref 6.4–8.1)
SAMPLE: NORMAL
SODIUM BLD-SCNC: 138 MMOL/L (ref 128–145)
SPECIFIC GRAVITY, POC UA: 1.02
UROBILINOGEN, POC UA: 0.2 E.U./DL

## 2021-03-08 PROCEDURE — 81003 URINALYSIS AUTO W/O SCOPE: CPT | Mod: ER

## 2021-03-08 PROCEDURE — 3288F PR FALLS RISK ASSESSMENT DOCUMENTED: ICD-10-PCS | Mod: CPTII,S$GLB,, | Performed by: REGISTERED NURSE

## 2021-03-08 PROCEDURE — 84484 ASSAY OF TROPONIN QUANT: CPT | Mod: ER

## 2021-03-08 PROCEDURE — 93005 ELECTROCARDIOGRAM TRACING: CPT | Mod: ER

## 2021-03-08 PROCEDURE — 3074F PR MOST RECENT SYSTOLIC BLOOD PRESSURE < 130 MM HG: ICD-10-PCS | Mod: CPTII,S$GLB,, | Performed by: REGISTERED NURSE

## 2021-03-08 PROCEDURE — 3078F DIAST BP <80 MM HG: CPT | Mod: CPTII,S$GLB,, | Performed by: REGISTERED NURSE

## 2021-03-08 PROCEDURE — 1159F MED LIST DOCD IN RCRD: CPT | Mod: S$GLB,,, | Performed by: REGISTERED NURSE

## 2021-03-08 PROCEDURE — 1101F PR PT FALLS ASSESS DOC 0-1 FALLS W/OUT INJ PAST YR: ICD-10-PCS | Mod: CPTII,S$GLB,, | Performed by: REGISTERED NURSE

## 2021-03-08 PROCEDURE — 1159F PR MEDICATION LIST DOCUMENTED IN MEDICAL RECORD: ICD-10-PCS | Mod: S$GLB,,, | Performed by: REGISTERED NURSE

## 2021-03-08 PROCEDURE — 99999 PR PBB SHADOW E&M-EST. PATIENT-LVL III: ICD-10-PCS | Mod: PBBFAC,,, | Performed by: REGISTERED NURSE

## 2021-03-08 PROCEDURE — 99214 PR OFFICE/OUTPT VISIT, EST, LEVL IV, 30-39 MIN: ICD-10-PCS | Mod: S$GLB,,, | Performed by: REGISTERED NURSE

## 2021-03-08 PROCEDURE — 3078F PR MOST RECENT DIASTOLIC BLOOD PRESSURE < 80 MM HG: ICD-10-PCS | Mod: CPTII,S$GLB,, | Performed by: REGISTERED NURSE

## 2021-03-08 PROCEDURE — 99214 OFFICE O/P EST MOD 30 MIN: CPT | Mod: S$GLB,,, | Performed by: REGISTERED NURSE

## 2021-03-08 PROCEDURE — 1125F AMNT PAIN NOTED PAIN PRSNT: CPT | Mod: S$GLB,,, | Performed by: REGISTERED NURSE

## 2021-03-08 PROCEDURE — 99285 EMERGENCY DEPT VISIT HI MDM: CPT | Mod: 25,ER

## 2021-03-08 PROCEDURE — 93010 ELECTROCARDIOGRAM REPORT: CPT | Mod: ,,, | Performed by: INTERNAL MEDICINE

## 2021-03-08 PROCEDURE — 99999 PR PBB SHADOW E&M-EST. PATIENT-LVL III: CPT | Mod: PBBFAC,,, | Performed by: REGISTERED NURSE

## 2021-03-08 PROCEDURE — 1101F PT FALLS ASSESS-DOCD LE1/YR: CPT | Mod: CPTII,S$GLB,, | Performed by: REGISTERED NURSE

## 2021-03-08 PROCEDURE — 85025 COMPLETE CBC W/AUTO DIFF WBC: CPT | Mod: ER

## 2021-03-08 PROCEDURE — 1125F PR PAIN SEVERITY QUANTIFIED, PAIN PRESENT: ICD-10-PCS | Mod: S$GLB,,, | Performed by: REGISTERED NURSE

## 2021-03-08 PROCEDURE — 3074F SYST BP LT 130 MM HG: CPT | Mod: CPTII,S$GLB,, | Performed by: REGISTERED NURSE

## 2021-03-08 PROCEDURE — 80053 COMPREHEN METABOLIC PANEL: CPT | Mod: ER

## 2021-03-08 PROCEDURE — 93010 EKG 12-LEAD: ICD-10-PCS | Mod: ,,, | Performed by: INTERNAL MEDICINE

## 2021-03-08 PROCEDURE — 3288F FALL RISK ASSESSMENT DOCD: CPT | Mod: CPTII,S$GLB,, | Performed by: REGISTERED NURSE

## 2021-03-09 ENCOUNTER — TELEPHONE (OUTPATIENT)
Dept: ENDOCRINOLOGY | Facility: CLINIC | Age: 86
End: 2021-03-09

## 2021-03-10 ENCOUNTER — TELEPHONE (OUTPATIENT)
Dept: INTERNAL MEDICINE | Facility: CLINIC | Age: 86
End: 2021-03-10

## 2021-03-10 ENCOUNTER — OFFICE VISIT (OUTPATIENT)
Dept: ENDOCRINOLOGY | Facility: CLINIC | Age: 86
End: 2021-03-10
Payer: MEDICARE

## 2021-03-10 VITALS
WEIGHT: 151 LBS | HEART RATE: 80 BPM | DIASTOLIC BLOOD PRESSURE: 68 MMHG | BODY MASS INDEX: 23.7 KG/M2 | OXYGEN SATURATION: 91 % | SYSTOLIC BLOOD PRESSURE: 124 MMHG | HEIGHT: 67 IN | RESPIRATION RATE: 15 BRPM

## 2021-03-10 DIAGNOSIS — I25.810 CORONARY ARTERY DISEASE INVOLVING CORONARY BYPASS GRAFT OF NATIVE HEART WITHOUT ANGINA PECTORIS: ICD-10-CM

## 2021-03-10 DIAGNOSIS — E16.2 HYPOGLYCEMIA: ICD-10-CM

## 2021-03-10 DIAGNOSIS — E11.51 DIABETES MELLITUS WITH PERIPHERAL CIRCULATORY DISORDER: ICD-10-CM

## 2021-03-10 DIAGNOSIS — I73.9 PAD (PERIPHERAL ARTERY DISEASE): ICD-10-CM

## 2021-03-10 DIAGNOSIS — M35.3 POLYMYALGIA RHEUMATICA: ICD-10-CM

## 2021-03-10 DIAGNOSIS — E11.65 UNCONTROLLED TYPE 2 DIABETES MELLITUS WITH HYPERGLYCEMIA: Primary | ICD-10-CM

## 2021-03-10 DIAGNOSIS — E55.9 VITAMIN D DEFICIENCY: ICD-10-CM

## 2021-03-10 PROCEDURE — 99999 PR PBB SHADOW E&M-EST. PATIENT-LVL V: ICD-10-PCS | Mod: PBBFAC,,, | Performed by: INTERNAL MEDICINE

## 2021-03-10 PROCEDURE — 99204 PR OFFICE/OUTPT VISIT, NEW, LEVL IV, 45-59 MIN: ICD-10-PCS | Mod: S$GLB,,, | Performed by: INTERNAL MEDICINE

## 2021-03-10 PROCEDURE — 1159F PR MEDICATION LIST DOCUMENTED IN MEDICAL RECORD: ICD-10-PCS | Mod: S$GLB,,, | Performed by: INTERNAL MEDICINE

## 2021-03-10 PROCEDURE — 3288F FALL RISK ASSESSMENT DOCD: CPT | Mod: CPTII,S$GLB,, | Performed by: INTERNAL MEDICINE

## 2021-03-10 PROCEDURE — 1125F PR PAIN SEVERITY QUANTIFIED, PAIN PRESENT: ICD-10-PCS | Mod: S$GLB,,, | Performed by: INTERNAL MEDICINE

## 2021-03-10 PROCEDURE — 1100F PTFALLS ASSESS-DOCD GE2>/YR: CPT | Mod: CPTII,S$GLB,, | Performed by: INTERNAL MEDICINE

## 2021-03-10 PROCEDURE — 3074F PR MOST RECENT SYSTOLIC BLOOD PRESSURE < 130 MM HG: ICD-10-PCS | Mod: CPTII,S$GLB,, | Performed by: INTERNAL MEDICINE

## 2021-03-10 PROCEDURE — 3078F PR MOST RECENT DIASTOLIC BLOOD PRESSURE < 80 MM HG: ICD-10-PCS | Mod: CPTII,S$GLB,, | Performed by: INTERNAL MEDICINE

## 2021-03-10 PROCEDURE — 1125F AMNT PAIN NOTED PAIN PRSNT: CPT | Mod: S$GLB,,, | Performed by: INTERNAL MEDICINE

## 2021-03-10 PROCEDURE — 99999 PR PBB SHADOW E&M-EST. PATIENT-LVL V: CPT | Mod: PBBFAC,,, | Performed by: INTERNAL MEDICINE

## 2021-03-10 PROCEDURE — 99204 OFFICE O/P NEW MOD 45 MIN: CPT | Mod: S$GLB,,, | Performed by: INTERNAL MEDICINE

## 2021-03-10 PROCEDURE — 3078F DIAST BP <80 MM HG: CPT | Mod: CPTII,S$GLB,, | Performed by: INTERNAL MEDICINE

## 2021-03-10 PROCEDURE — 1159F MED LIST DOCD IN RCRD: CPT | Mod: S$GLB,,, | Performed by: INTERNAL MEDICINE

## 2021-03-10 PROCEDURE — 3288F PR FALLS RISK ASSESSMENT DOCUMENTED: ICD-10-PCS | Mod: CPTII,S$GLB,, | Performed by: INTERNAL MEDICINE

## 2021-03-10 PROCEDURE — 3074F SYST BP LT 130 MM HG: CPT | Mod: CPTII,S$GLB,, | Performed by: INTERNAL MEDICINE

## 2021-03-10 PROCEDURE — 1100F PR PT FALLS ASSESS DOC 2+ FALLS/FALL W/INJURY/YR: ICD-10-PCS | Mod: CPTII,S$GLB,, | Performed by: INTERNAL MEDICINE

## 2021-03-17 ENCOUNTER — OFFICE VISIT (OUTPATIENT)
Dept: FAMILY MEDICINE | Facility: CLINIC | Age: 86
End: 2021-03-17
Payer: MEDICARE

## 2021-03-17 ENCOUNTER — OFFICE VISIT (OUTPATIENT)
Dept: UROLOGY | Facility: CLINIC | Age: 86
End: 2021-03-17
Payer: MEDICARE

## 2021-03-17 ENCOUNTER — OFFICE VISIT (OUTPATIENT)
Dept: CARDIOLOGY | Facility: CLINIC | Age: 86
End: 2021-03-17
Payer: MEDICARE

## 2021-03-17 VITALS
SYSTOLIC BLOOD PRESSURE: 122 MMHG | BODY MASS INDEX: 23.24 KG/M2 | HEIGHT: 67 IN | HEIGHT: 67 IN | BODY MASS INDEX: 23.29 KG/M2 | RESPIRATION RATE: 15 BRPM | HEART RATE: 68 BPM | DIASTOLIC BLOOD PRESSURE: 72 MMHG | OXYGEN SATURATION: 95 % | WEIGHT: 148.38 LBS

## 2021-03-17 VITALS
BODY MASS INDEX: 23.29 KG/M2 | HEART RATE: 69 BPM | OXYGEN SATURATION: 99 % | SYSTOLIC BLOOD PRESSURE: 130 MMHG | HEIGHT: 67 IN | WEIGHT: 148.38 LBS | TEMPERATURE: 98 F | DIASTOLIC BLOOD PRESSURE: 68 MMHG

## 2021-03-17 DIAGNOSIS — I73.9 PAD (PERIPHERAL ARTERY DISEASE): ICD-10-CM

## 2021-03-17 DIAGNOSIS — N13.8 BPH WITH OBSTRUCTION/LOWER URINARY TRACT SYMPTOMS: ICD-10-CM

## 2021-03-17 DIAGNOSIS — M47.9 SPONDYLOSIS: Primary | ICD-10-CM
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
## 2021-03-17 DIAGNOSIS — R91.1 PULMONARY NODULE: ICD-10-CM

## 2021-03-17 DIAGNOSIS — E11.51 DIABETES MELLITUS WITH PERIPHERAL CIRCULATORY DISORDER: ICD-10-CM

## 2021-03-17 DIAGNOSIS — R39.15 URINARY URGENCY: Primary | ICD-10-CM

## 2021-03-17 DIAGNOSIS — I95.1 ORTHOSTASIS: Primary | ICD-10-CM

## 2021-03-17 DIAGNOSIS — R26.9 GAIT ABNORMALITY: ICD-10-CM

## 2021-03-17 DIAGNOSIS — I25.810 CORONARY ARTERY DISEASE INVOLVING CORONARY BYPASS GRAFT OF NATIVE HEART WITHOUT ANGINA PECTORIS: ICD-10-CM

## 2021-03-17 DIAGNOSIS — J44.9 CHRONIC OBSTRUCTIVE PULMONARY DISEASE, UNSPECIFIED COPD TYPE: ICD-10-CM

## 2021-03-17 DIAGNOSIS — R91.1 SOLITARY PULMONARY NODULE: ICD-10-CM

## 2021-03-17 DIAGNOSIS — I71.40 ABDOMINAL AORTIC ANEURYSM (AAA) WITHOUT RUPTURE: ICD-10-CM

## 2021-03-17 DIAGNOSIS — I77.1 TORTUOUS AORTA: ICD-10-CM

## 2021-03-17 DIAGNOSIS — I70.0 AORTIC ATHEROSCLEROSIS: ICD-10-CM

## 2021-03-17 DIAGNOSIS — I10 ESSENTIAL HYPERTENSION: ICD-10-CM

## 2021-03-17 DIAGNOSIS — R35.1 NOCTURIA MORE THAN TWICE PER NIGHT: ICD-10-CM

## 2021-03-17 DIAGNOSIS — N40.1 BPH WITH OBSTRUCTION/LOWER URINARY TRACT SYMPTOMS: ICD-10-CM

## 2021-03-17 DIAGNOSIS — N18.31 STAGE 3A CHRONIC KIDNEY DISEASE: ICD-10-CM

## 2021-03-17 DIAGNOSIS — N18.30 STAGE 3 CHRONIC KIDNEY DISEASE, UNSPECIFIED WHETHER STAGE 3A OR 3B CKD: ICD-10-CM

## 2021-03-17 DIAGNOSIS — E11.65 UNCONTROLLED TYPE 2 DIABETES MELLITUS WITH HYPERGLYCEMIA: ICD-10-CM

## 2021-03-17 DIAGNOSIS — F39 MOOD DISORDER: ICD-10-CM

## 2021-03-17 DIAGNOSIS — R35.0 URINARY FREQUENCY: ICD-10-CM

## 2021-03-17 DIAGNOSIS — M79.604 RIGHT LEG PAIN: ICD-10-CM

## 2021-03-17 DIAGNOSIS — Z79.52 ON CORTICOSTEROID THERAPY: ICD-10-CM

## 2021-03-17 DIAGNOSIS — J41.0 SMOKERS' COUGH: ICD-10-CM

## 2021-03-17 DIAGNOSIS — M35.3 POLYMYALGIA RHEUMATICA: ICD-10-CM

## 2021-03-17 DIAGNOSIS — N28.1 ACQUIRED COMPLEX CYST OF KIDNEY: ICD-10-CM

## 2021-03-17 DIAGNOSIS — E53.8 B12 DEFICIENCY: ICD-10-CM
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
## 2021-03-17 DIAGNOSIS — R39.15 URINARY URGENCY: ICD-10-CM

## 2021-03-17 PROCEDURE — 99214 OFFICE O/P EST MOD 30 MIN: CPT | Mod: S$GLB,,, | Performed by: UROLOGY

## 2021-03-17 PROCEDURE — 1159F MED LIST DOCD IN RCRD: CPT | Mod: S$GLB,,, | Performed by: INTERNAL MEDICINE

## 2021-03-17 PROCEDURE — 99214 OFFICE O/P EST MOD 30 MIN: CPT | Mod: S$GLB,,, | Performed by: INTERNAL MEDICINE

## 2021-03-17 PROCEDURE — 3078F DIAST BP <80 MM HG: CPT | Mod: CPTII,S$GLB,, | Performed by: INTERNAL MEDICINE

## 2021-03-17 PROCEDURE — 3074F PR MOST RECENT SYSTOLIC BLOOD PRESSURE < 130 MM HG: ICD-10-PCS | Mod: CPTII,S$GLB,, | Performed by: INTERNAL MEDICINE

## 2021-03-17 PROCEDURE — 99999 PR PBB SHADOW E&M-EST. PATIENT-LVL III: ICD-10-PCS | Mod: PBBFAC,,, | Performed by: UROLOGY

## 2021-03-17 PROCEDURE — 3288F FALL RISK ASSESSMENT DOCD: CPT | Mod: CPTII,S$GLB,, | Performed by: INTERNAL MEDICINE

## 2021-03-17 PROCEDURE — 99999 PR PBB SHADOW E&M-EST. PATIENT-LVL IV: ICD-10-PCS | Mod: PBBFAC,,, | Performed by: INTERNAL MEDICINE

## 2021-03-17 PROCEDURE — 1159F MED LIST DOCD IN RCRD: CPT | Mod: S$GLB,,, | Performed by: UROLOGY

## 2021-03-17 PROCEDURE — 99214 PR OFFICE/OUTPT VISIT, EST, LEVL IV, 30-39 MIN: ICD-10-PCS | Mod: S$GLB,,, | Performed by: UROLOGY

## 2021-03-17 PROCEDURE — 1100F PR PT FALLS ASSESS DOC 2+ FALLS/FALL W/INJURY/YR: ICD-10-PCS | Mod: CPTII,S$GLB,, | Performed by: INTERNAL MEDICINE

## 2021-03-17 PROCEDURE — 1159F PR MEDICATION LIST DOCUMENTED IN MEDICAL RECORD: ICD-10-PCS | Mod: S$GLB,,, | Performed by: INTERNAL MEDICINE

## 2021-03-17 PROCEDURE — 3074F SYST BP LT 130 MM HG: CPT | Mod: CPTII,S$GLB,, | Performed by: UROLOGY

## 2021-03-17 PROCEDURE — 1100F PTFALLS ASSESS-DOCD GE2>/YR: CPT | Mod: CPTII,S$GLB,, | Performed by: INTERNAL MEDICINE

## 2021-03-17 PROCEDURE — 3288F PR FALLS RISK ASSESSMENT DOCUMENTED: ICD-10-PCS | Mod: CPTII,S$GLB,, | Performed by: INTERNAL MEDICINE

## 2021-03-17 PROCEDURE — 1159F PR MEDICATION LIST DOCUMENTED IN MEDICAL RECORD: ICD-10-PCS | Mod: S$GLB,,, | Performed by: UROLOGY

## 2021-03-17 PROCEDURE — 3074F SYST BP LT 130 MM HG: CPT | Mod: CPTII,S$GLB,, | Performed by: INTERNAL MEDICINE

## 2021-03-17 PROCEDURE — 1125F PR PAIN SEVERITY QUANTIFIED, PAIN PRESENT: ICD-10-PCS | Mod: S$GLB,,, | Performed by: INTERNAL MEDICINE

## 2021-03-17 PROCEDURE — 3078F PR MOST RECENT DIASTOLIC BLOOD PRESSURE < 80 MM HG: ICD-10-PCS | Mod: CPTII,S$GLB,, | Performed by: INTERNAL MEDICINE

## 2021-03-17 PROCEDURE — 99215 PR OFFICE/OUTPT VISIT, EST, LEVL V, 40-54 MIN: ICD-10-PCS | Mod: S$GLB,,, | Performed by: INTERNAL MEDICINE

## 2021-03-17 PROCEDURE — 3288F PR FALLS RISK ASSESSMENT DOCUMENTED: ICD-10-PCS | Mod: CPTII,S$GLB,, | Performed by: UROLOGY

## 2021-03-17 PROCEDURE — 99999 PR PBB SHADOW E&M-EST. PATIENT-LVL IV: CPT | Mod: PBBFAC,,, | Performed by: INTERNAL MEDICINE

## 2021-03-17 PROCEDURE — 3075F PR MOST RECENT SYSTOLIC BLOOD PRESS GE 130-139MM HG: ICD-10-PCS | Mod: CPTII,S$GLB,, | Performed by: INTERNAL MEDICINE

## 2021-03-17 PROCEDURE — 3075F SYST BP GE 130 - 139MM HG: CPT | Mod: CPTII,S$GLB,, | Performed by: INTERNAL MEDICINE

## 2021-03-17 PROCEDURE — 99499 RISK ADDL DX/OHS AUDIT: ICD-10-PCS | Mod: S$PBB,,, | Performed by: INTERNAL MEDICINE

## 2021-03-17 PROCEDURE — 3288F FALL RISK ASSESSMENT DOCD: CPT | Mod: CPTII,S$GLB,, | Performed by: UROLOGY

## 2021-03-17 PROCEDURE — 1101F PT FALLS ASSESS-DOCD LE1/YR: CPT | Mod: CPTII,S$GLB,, | Performed by: UROLOGY

## 2021-03-17 PROCEDURE — 99214 PR OFFICE/OUTPT VISIT, EST, LEVL IV, 30-39 MIN: ICD-10-PCS | Mod: S$GLB,,, | Performed by: INTERNAL MEDICINE

## 2021-03-17 PROCEDURE — 1101F PT FALLS ASSESS-DOCD LE1/YR: CPT | Mod: CPTII,S$GLB,, | Performed by: INTERNAL MEDICINE

## 2021-03-17 PROCEDURE — 99499 UNLISTED E&M SERVICE: CPT | Mod: S$PBB,,, | Performed by: INTERNAL MEDICINE

## 2021-03-17 PROCEDURE — 1101F PR PT FALLS ASSESS DOC 0-1 FALLS W/OUT INJ PAST YR: ICD-10-PCS | Mod: CPTII,S$GLB,, | Performed by: INTERNAL MEDICINE

## 2021-03-17 PROCEDURE — 3074F PR MOST RECENT SYSTOLIC BLOOD PRESSURE < 130 MM HG: ICD-10-PCS | Mod: CPTII,S$GLB,, | Performed by: UROLOGY

## 2021-03-17 PROCEDURE — 99999 PR PBB SHADOW E&M-EST. PATIENT-LVL III: CPT | Mod: PBBFAC,,, | Performed by: UROLOGY

## 2021-03-17 PROCEDURE — 3078F PR MOST RECENT DIASTOLIC BLOOD PRESSURE < 80 MM HG: ICD-10-PCS | Mod: CPTII,S$GLB,, | Performed by: UROLOGY

## 2021-03-17 PROCEDURE — 3078F DIAST BP <80 MM HG: CPT | Mod: CPTII,S$GLB,, | Performed by: UROLOGY

## 2021-03-17 PROCEDURE — 1125F AMNT PAIN NOTED PAIN PRSNT: CPT | Mod: S$GLB,,, | Performed by: INTERNAL MEDICINE

## 2021-03-17 PROCEDURE — 1101F PR PT FALLS ASSESS DOC 0-1 FALLS W/OUT INJ PAST YR: ICD-10-PCS | Mod: CPTII,S$GLB,, | Performed by: UROLOGY

## 2021-03-17 PROCEDURE — 99215 OFFICE O/P EST HI 40 MIN: CPT | Mod: S$GLB,,, | Performed by: INTERNAL MEDICINE

## 2021-03-17 RX ORDER — OXYBUTYNIN CHLORIDE 15 MG/1
15 TABLET, EXTENDED RELEASE ORAL DAILY
Qty: 90 TABLET | Refills: 11 | Status: SHIPPED | OUTPATIENT
Start: 2021-03-17 | End: 2021-04-15 | Stop reason: SDUPTHER

## 2021-03-17 RX ORDER — ALENDRONATE SODIUM 70 MG/1
70 TABLET ORAL
Qty: 4 TABLET | Refills: 11 | Status: SHIPPED | OUTPATIENT
Start: 2021-03-17 | End: 2021-08-09 | Stop reason: SDUPTHER

## 2021-03-17 RX ORDER — METOPROLOL SUCCINATE 25 MG/1
25 TABLET, EXTENDED RELEASE ORAL DAILY
Qty: 90 TABLET | Refills: 12 | Status: SHIPPED | OUTPATIENT
Start: 2021-03-17 | End: 2021-03-17

## 2021-03-26 ENCOUNTER — TELEPHONE (OUTPATIENT)
Dept: RADIOLOGY | Facility: HOSPITAL | Age: 86
End: 2021-03-26

## 2021-03-30 ENCOUNTER — TELEPHONE (OUTPATIENT)
Dept: FAMILY MEDICINE | Facility: CLINIC | Age: 86
End: 2021-03-30

## 2021-03-31 ENCOUNTER — HOSPITAL ENCOUNTER (OUTPATIENT)
Dept: RADIOLOGY | Facility: HOSPITAL | Age: 86
Discharge: HOME | End: 2021-03-31
Attending: UROLOGY
Payer: MEDICARE

## 2021-03-31 ENCOUNTER — HOSPITAL ENCOUNTER (OUTPATIENT)
Dept: RADIOLOGY | Facility: HOSPITAL | Age: 86
Discharge: HOME | End: 2021-03-31
Attending: INTERNAL MEDICINE
Payer: MEDICARE

## 2021-03-31 DIAGNOSIS — N28.1 ACQUIRED COMPLEX CYST OF KIDNEY: ICD-10-CM

## 2021-03-31 DIAGNOSIS — R91.1 SOLITARY PULMONARY NODULE: ICD-10-CM

## 2021-03-31 PROCEDURE — 74178 CT ABDOMEN PELVIS W WO CONTRAST: ICD-10-PCS | Mod: 26,HCNC,, | Performed by: RADIOLOGY

## 2021-03-31 PROCEDURE — 74178 CT ABD&PLV WO CNTR FLWD CNTR: CPT | Mod: 26,HCNC,, | Performed by: RADIOLOGY

## 2021-03-31 PROCEDURE — 74178 CT ABD&PLV WO CNTR FLWD CNTR: CPT | Mod: TC,HCNC

## 2021-03-31 PROCEDURE — 71250 CT THORAX DX C-: CPT | Mod: TC,HCNC

## 2021-03-31 PROCEDURE — 71250 CT CHEST WITHOUT CONTRAST: ICD-10-PCS | Mod: 26,HCNC,, | Performed by: RADIOLOGY

## 2021-03-31 PROCEDURE — 71250 CT THORAX DX C-: CPT | Mod: 26,HCNC,, | Performed by: RADIOLOGY

## 2021-03-31 PROCEDURE — 25500020 PHARM REV CODE 255: Mod: HCNC | Performed by: UROLOGY

## 2021-03-31 RX ADMIN — IOHEXOL 75 ML: 350 INJECTION, SOLUTION INTRAVENOUS at 12:03

## 2021-04-01 ENCOUNTER — CLINICAL SUPPORT (OUTPATIENT)
Dept: FAMILY MEDICINE | Facility: CLINIC | Age: 86
End: 2021-04-01
Payer: MEDICARE

## 2021-04-01 VITALS — SYSTOLIC BLOOD PRESSURE: 124 MMHG | OXYGEN SATURATION: 98 % | DIASTOLIC BLOOD PRESSURE: 64 MMHG | HEART RATE: 91 BPM

## 2021-04-01 DIAGNOSIS — I10 ESSENTIAL HYPERTENSION: Primary | ICD-10-CM

## 2021-04-01 PROCEDURE — 99499 NO LOS: ICD-10-PCS | Mod: HCNC,S$GLB,, | Performed by: INTERNAL MEDICINE

## 2021-04-01 PROCEDURE — 99999 PR PBB SHADOW E&M-EST. PATIENT-LVL II: ICD-10-PCS | Mod: PBBFAC,HCNC,,

## 2021-04-01 PROCEDURE — 99999 PR PBB SHADOW E&M-EST. PATIENT-LVL II: CPT | Mod: PBBFAC,HCNC,,

## 2021-04-01 PROCEDURE — 99499 UNLISTED E&M SERVICE: CPT | Mod: HCNC,S$GLB,, | Performed by: INTERNAL MEDICINE

## 2021-04-02 ENCOUNTER — HOSPITAL ENCOUNTER (OUTPATIENT)
Facility: HOSPITAL | Age: 86
Discharge: HOME | End: 2021-04-03
Attending: EMERGENCY MEDICINE | Admitting: HOSPITALIST
Payer: MEDICARE

## 2021-04-02 DIAGNOSIS — R55 SYNCOPE: ICD-10-CM

## 2021-04-02 DIAGNOSIS — R07.9 CHEST PAIN: ICD-10-CM

## 2021-04-02 DIAGNOSIS — R55 NEAR SYNCOPE: Primary | ICD-10-CM

## 2021-04-02 PROBLEM — Z72.0 TOBACCO ABUSE: Status: ACTIVE | Noted: 2021-04-02

## 2021-04-02 PROBLEM — N17.9 AKI (ACUTE KIDNEY INJURY): Status: ACTIVE | Noted: 2021-04-02

## 2021-04-02 LAB
ALBUMIN SERPL BCP-MCNC: 3.7 G/DL (ref 3.5–5.2)
ALP SERPL-CCNC: 68 U/L (ref 55–135)
ALT SERPL W/O P-5'-P-CCNC: 16 U/L (ref 10–44)
ANION GAP SERPL CALC-SCNC: 12 MMOL/L (ref 8–16)
AST SERPL-CCNC: 21 U/L (ref 10–40)
BASOPHILS # BLD AUTO: 0.04 K/UL (ref 0–0.2)
BASOPHILS NFR BLD: 0.3 % (ref 0–1.9)
BILIRUB SERPL-MCNC: 0.3 MG/DL (ref 0.1–1)
BILIRUB UR QL STRIP: NEGATIVE
BUN SERPL-MCNC: 23 MG/DL (ref 8–23)
CALCIUM SERPL-MCNC: 9 MG/DL (ref 8.7–10.5)
CHLORIDE SERPL-SCNC: 106 MMOL/L (ref 95–110)
CLARITY UR: CLEAR
CO2 SERPL-SCNC: 20 MMOL/L (ref 23–29)
COLOR UR: YELLOW
CREAT SERPL-MCNC: 1.9 MG/DL (ref 0.5–1.4)
CTP QC/QA: YES
DIFFERENTIAL METHOD: ABNORMAL
EOSINOPHIL # BLD AUTO: 0.1 K/UL (ref 0–0.5)
EOSINOPHIL NFR BLD: 1.1 % (ref 0–8)
ERYTHROCYTE [DISTWIDTH] IN BLOOD BY AUTOMATED COUNT: 14.5 % (ref 11.5–14.5)
EST. GFR  (AFRICAN AMERICAN): 37 ML/MIN/1.73 M^2
EST. GFR  (NON AFRICAN AMERICAN): 32 ML/MIN/1.73 M^2
GLUCOSE SERPL-MCNC: 146 MG/DL (ref 70–110)
GLUCOSE UR QL STRIP: NEGATIVE
HCT VFR BLD AUTO: 37.4 % (ref 40–54)
HGB BLD-MCNC: 12.6 G/DL (ref 14–18)
HGB UR QL STRIP: NEGATIVE
IMM GRANULOCYTES # BLD AUTO: 0.06 K/UL (ref 0–0.04)
IMM GRANULOCYTES NFR BLD AUTO: 0.5 % (ref 0–0.5)
INR PPP: 1.1 (ref 0.8–1.2)
KETONES UR QL STRIP: NEGATIVE
LEUKOCYTE ESTERASE UR QL STRIP: NEGATIVE
LYMPHOCYTES # BLD AUTO: 1.5 K/UL (ref 1–4.8)
LYMPHOCYTES NFR BLD: 12.7 % (ref 18–48)
MCH RBC QN AUTO: 32.4 PG (ref 27–31)
MCHC RBC AUTO-ENTMCNC: 33.7 G/DL (ref 32–36)
MCV RBC AUTO: 96 FL (ref 82–98)
MONOCYTES # BLD AUTO: 0.8 K/UL (ref 0.3–1)
MONOCYTES NFR BLD: 6.9 % (ref 4–15)
NEUTROPHILS # BLD AUTO: 9.3 K/UL (ref 1.8–7.7)
NEUTROPHILS NFR BLD: 78.5 % (ref 38–73)
NITRITE UR QL STRIP: NEGATIVE
NRBC BLD-RTO: 0 /100 WBC
PH UR STRIP: 5 [PH] (ref 5–8)
PLATELET # BLD AUTO: 223 K/UL (ref 150–450)
PMV BLD AUTO: 9.9 FL (ref 9.2–12.9)
POCT GLUCOSE: 98 MG/DL (ref 70–110)
POTASSIUM SERPL-SCNC: 4.8 MMOL/L (ref 3.5–5.1)
PROT SERPL-MCNC: 7.2 G/DL (ref 6–8.4)
PROT UR QL STRIP: ABNORMAL
PROTHROMBIN TIME: 11.1 SEC (ref 9–12.5)
RBC # BLD AUTO: 3.89 M/UL (ref 4.6–6.2)
SARS-COV-2 RDRP RESP QL NAA+PROBE: NEGATIVE
SODIUM SERPL-SCNC: 138 MMOL/L (ref 136–145)
SP GR UR STRIP: 1.01 (ref 1–1.03)
TROPONIN I SERPL DL<=0.01 NG/ML-MCNC: 0.01 NG/ML (ref 0–0.03)
TROPONIN I SERPL DL<=0.01 NG/ML-MCNC: 0.02 NG/ML (ref 0–0.03)
URN SPEC COLLECT METH UR: ABNORMAL
UROBILINOGEN UR STRIP-ACNC: NEGATIVE EU/DL
WBC # BLD AUTO: 11.88 K/UL (ref 3.9–12.7)

## 2021-04-02 PROCEDURE — 85610 PROTHROMBIN TIME: CPT | Mod: HCNC | Performed by: PHYSICIAN ASSISTANT

## 2021-04-02 PROCEDURE — 36415 COLL VENOUS BLD VENIPUNCTURE: CPT | Mod: HCNC | Performed by: PHYSICIAN ASSISTANT

## 2021-04-02 PROCEDURE — 25000003 PHARM REV CODE 250: Mod: HCNC | Performed by: EMERGENCY MEDICINE

## 2021-04-02 PROCEDURE — 93010 ELECTROCARDIOGRAM REPORT: CPT | Mod: HCNC,,, | Performed by: INTERNAL MEDICINE

## 2021-04-02 PROCEDURE — U0002 COVID-19 LAB TEST NON-CDC: HCPCS | Mod: HCNC | Performed by: EMERGENCY MEDICINE

## 2021-04-02 PROCEDURE — 83036 HEMOGLOBIN GLYCOSYLATED A1C: CPT | Mod: HCNC | Performed by: PHYSICIAN ASSISTANT

## 2021-04-02 PROCEDURE — 25000003 PHARM REV CODE 250: Mod: HCNC | Performed by: PHYSICIAN ASSISTANT

## 2021-04-02 PROCEDURE — 80053 COMPREHEN METABOLIC PANEL: CPT | Mod: HCNC | Performed by: EMERGENCY MEDICINE

## 2021-04-02 PROCEDURE — 81003 URINALYSIS AUTO W/O SCOPE: CPT | Mod: HCNC | Performed by: EMERGENCY MEDICINE

## 2021-04-02 PROCEDURE — 99285 EMERGENCY DEPT VISIT HI MDM: CPT | Mod: 25,HCNC

## 2021-04-02 PROCEDURE — 96361 HYDRATE IV INFUSION ADD-ON: CPT

## 2021-04-02 PROCEDURE — 93005 ELECTROCARDIOGRAM TRACING: CPT | Mod: HCNC

## 2021-04-02 PROCEDURE — 93010 EKG 12-LEAD: ICD-10-PCS | Mod: HCNC,,, | Performed by: INTERNAL MEDICINE

## 2021-04-02 PROCEDURE — 96361 HYDRATE IV INFUSION ADD-ON: CPT | Mod: HCNC

## 2021-04-02 PROCEDURE — G0378 HOSPITAL OBSERVATION PER HR: HCPCS | Mod: HCNC

## 2021-04-02 PROCEDURE — 85025 COMPLETE CBC W/AUTO DIFF WBC: CPT | Mod: HCNC | Performed by: EMERGENCY MEDICINE

## 2021-04-02 PROCEDURE — 84484 ASSAY OF TROPONIN QUANT: CPT | Mod: 91,HCNC | Performed by: PHYSICIAN ASSISTANT

## 2021-04-02 PROCEDURE — 84484 ASSAY OF TROPONIN QUANT: CPT | Mod: HCNC | Performed by: EMERGENCY MEDICINE

## 2021-04-02 RX ORDER — GLUCAGON 1 MG
1 KIT INJECTION
Status: DISCONTINUED | OUTPATIENT
Start: 2021-04-02 | End: 2021-04-02

## 2021-04-02 RX ORDER — SODIUM CHLORIDE 9 MG/ML
INJECTION, SOLUTION INTRAVENOUS CONTINUOUS
Status: DISCONTINUED | OUTPATIENT
Start: 2021-04-02 | End: 2021-04-03

## 2021-04-02 RX ORDER — PREDNISONE 5 MG/1
5 TABLET ORAL DAILY
Status: DISCONTINUED | OUTPATIENT
Start: 2021-04-03 | End: 2021-04-03 | Stop reason: HOSPADM

## 2021-04-02 RX ORDER — IBUPROFEN 200 MG
16 TABLET ORAL
Status: DISCONTINUED | OUTPATIENT
Start: 2021-04-02 | End: 2021-04-02

## 2021-04-02 RX ORDER — ASPIRIN 81 MG/1
81 TABLET ORAL DAILY
Status: DISCONTINUED | OUTPATIENT
Start: 2021-04-03 | End: 2021-04-03 | Stop reason: HOSPADM

## 2021-04-02 RX ORDER — AMOXICILLIN 250 MG
1 CAPSULE ORAL DAILY PRN
Status: DISCONTINUED | OUTPATIENT
Start: 2021-04-02 | End: 2021-04-03 | Stop reason: HOSPADM

## 2021-04-02 RX ORDER — CLOPIDOGREL BISULFATE 75 MG/1
75 TABLET ORAL DAILY
Status: DISCONTINUED | OUTPATIENT
Start: 2021-04-03 | End: 2021-04-03 | Stop reason: HOSPADM

## 2021-04-02 RX ORDER — ROSUVASTATIN CALCIUM 10 MG/1
10 TABLET, COATED ORAL NIGHTLY
Status: DISCONTINUED | OUTPATIENT
Start: 2021-04-02 | End: 2021-04-03 | Stop reason: HOSPADM

## 2021-04-02 RX ORDER — SODIUM CHLORIDE 9 MG/ML
INJECTION, SOLUTION INTRAVENOUS
Status: COMPLETED | OUTPATIENT
Start: 2021-04-02 | End: 2021-04-02

## 2021-04-02 RX ORDER — IBUPROFEN 200 MG
24 TABLET ORAL
Status: DISCONTINUED | OUTPATIENT
Start: 2021-04-02 | End: 2021-04-03 | Stop reason: HOSPADM

## 2021-04-02 RX ORDER — IBUPROFEN 200 MG
16 TABLET ORAL
Status: DISCONTINUED | OUTPATIENT
Start: 2021-04-02 | End: 2021-04-03 | Stop reason: HOSPADM

## 2021-04-02 RX ORDER — GLUCAGON 1 MG
1 KIT INJECTION
Status: DISCONTINUED | OUTPATIENT
Start: 2021-04-02 | End: 2021-04-03 | Stop reason: HOSPADM

## 2021-04-02 RX ORDER — ACETAMINOPHEN 500 MG
500 TABLET ORAL EVERY 6 HOURS PRN
Status: DISCONTINUED | OUTPATIENT
Start: 2021-04-02 | End: 2021-04-03 | Stop reason: HOSPADM

## 2021-04-02 RX ORDER — SODIUM CHLORIDE 0.9 % (FLUSH) 0.9 %
10 SYRINGE (ML) INJECTION
Status: DISCONTINUED | OUTPATIENT
Start: 2021-04-02 | End: 2021-04-03 | Stop reason: HOSPADM

## 2021-04-02 RX ORDER — IBUPROFEN 200 MG
24 TABLET ORAL
Status: DISCONTINUED | OUTPATIENT
Start: 2021-04-02 | End: 2021-04-02

## 2021-04-02 RX ORDER — INSULIN ASPART 100 [IU]/ML
0-5 INJECTION, SOLUTION INTRAVENOUS; SUBCUTANEOUS
Status: DISCONTINUED | OUTPATIENT
Start: 2021-04-02 | End: 2021-04-03 | Stop reason: HOSPADM

## 2021-04-02 RX ADMIN — SODIUM CHLORIDE: 0.9 INJECTION, SOLUTION INTRAVENOUS at 06:04

## 2021-04-02 RX ADMIN — ROSUVASTATIN CALCIUM 10 MG: 10 TABLET, FILM COATED ORAL at 09:04

## 2021-04-02 RX ADMIN — DOCUSATE SODIUM 50 MG AND SENNOSIDES 8.6 MG 1 TABLET: 8.6; 5 TABLET, FILM COATED ORAL at 09:04

## 2021-04-02 RX ADMIN — SODIUM CHLORIDE 1000 ML: 0.9 INJECTION, SOLUTION INTRAVENOUS at 05:04

## 2021-04-02 RX ADMIN — SODIUM CHLORIDE: 0.9 INJECTION, SOLUTION INTRAVENOUS at 08:04

## 2021-04-03 VITALS
HEIGHT: 67 IN | WEIGHT: 148.13 LBS | TEMPERATURE: 98 F | OXYGEN SATURATION: 97 % | HEART RATE: 58 BPM | BODY MASS INDEX: 23.25 KG/M2 | RESPIRATION RATE: 17 BRPM | DIASTOLIC BLOOD PRESSURE: 68 MMHG | SYSTOLIC BLOOD PRESSURE: 136 MMHG

## 2021-04-03 LAB
ALBUMIN SERPL BCP-MCNC: 2.9 G/DL (ref 3.5–5.2)
ALP SERPL-CCNC: 52 U/L (ref 55–135)
ALT SERPL W/O P-5'-P-CCNC: 13 U/L (ref 10–44)
ANION GAP SERPL CALC-SCNC: 9 MMOL/L (ref 8–16)
AORTIC ROOT ANNULUS: 2.49 CM
AORTIC VALVE CUSP SEPERATION: 1.47 CM
AST SERPL-CCNC: 19 U/L (ref 10–40)
AV INDEX (PROSTH): 0.52
AV MEAN GRADIENT: 4 MMHG
AV PEAK GRADIENT: 7 MMHG
AV VALVE AREA: 2.82 CM2
AV VELOCITY RATIO: 0.57
BASOPHILS # BLD AUTO: 0.04 K/UL (ref 0–0.2)
BASOPHILS NFR BLD: 0.5 % (ref 0–1.9)
BILIRUB SERPL-MCNC: 0.3 MG/DL (ref 0.1–1)
BNP SERPL-MCNC: 47 PG/ML (ref 0–99)
BSA FOR ECHO PROCEDURE: 1.78 M2
BUN SERPL-MCNC: 20 MG/DL (ref 8–23)
CALCIUM SERPL-MCNC: 7.8 MG/DL (ref 8.7–10.5)
CHLORIDE SERPL-SCNC: 112 MMOL/L (ref 95–110)
CO2 SERPL-SCNC: 20 MMOL/L (ref 23–29)
CREAT SERPL-MCNC: 1.4 MG/DL (ref 0.5–1.4)
CV ECHO LV RWT: 0.52 CM
DIFFERENTIAL METHOD: ABNORMAL
DOP CALC AO PEAK VEL: 1.34 M/S
DOP CALC AO VTI: 28.65 CM
DOP CALC LVOT AREA: 5.5 CM2
DOP CALC LVOT DIAMETER: 2.64 CM
DOP CALC LVOT PEAK VEL: 0.76 M/S
DOP CALC LVOT STROKE VOLUME: 80.75 CM3
DOP CALCLVOT PEAK VEL VTI: 14.76 CM
E WAVE DECELERATION TIME: 279.8 MSEC
E/A RATIO: 0.87
E/E' RATIO: 10.67 M/S
ECHO LV POSTERIOR WALL: 1.05 CM (ref 0.6–1.1)
EJECTION FRACTION: 55 %
EOSINOPHIL # BLD AUTO: 0.4 K/UL (ref 0–0.5)
EOSINOPHIL NFR BLD: 4.7 % (ref 0–8)
ERYTHROCYTE [DISTWIDTH] IN BLOOD BY AUTOMATED COUNT: 14.7 % (ref 11.5–14.5)
EST. GFR  (AFRICAN AMERICAN): 53 ML/MIN/1.73 M^2
EST. GFR  (NON AFRICAN AMERICAN): 46 ML/MIN/1.73 M^2
ESTIMATED AVG GLUCOSE: 134 MG/DL (ref 68–131)
FRACTIONAL SHORTENING: 19 % (ref 28–44)
GLUCOSE SERPL-MCNC: 96 MG/DL (ref 70–110)
HBA1C MFR BLD: 6.3 % (ref 4–5.6)
HCT VFR BLD AUTO: 30.8 % (ref 40–54)
HGB BLD-MCNC: 10.1 G/DL (ref 14–18)
IMM GRANULOCYTES # BLD AUTO: 0.03 K/UL (ref 0–0.04)
IMM GRANULOCYTES NFR BLD AUTO: 0.4 % (ref 0–0.5)
INTERVENTRICULAR SEPTUM: 0.87 CM (ref 0.6–1.1)
IVRT: 99.9 MSEC
LA MAJOR: 4.2 CM
LA MINOR: 4.01 CM
LA WIDTH: 2.96 CM
LEFT ATRIUM SIZE: 2.97 CM
LEFT ATRIUM VOLUME INDEX: 17.2 ML/M2
LEFT ATRIUM VOLUME: 30.66 CM3
LEFT INTERNAL DIMENSION IN SYSTOLE: 3.28 CM (ref 2.1–4)
LEFT VENTRICLE DIASTOLIC VOLUME INDEX: 40.13 ML/M2
LEFT VENTRICLE DIASTOLIC VOLUME: 71.44 ML
LEFT VENTRICLE MASS INDEX: 68 G/M2
LEFT VENTRICLE SYSTOLIC VOLUME INDEX: 24.5 ML/M2
LEFT VENTRICLE SYSTOLIC VOLUME: 43.62 ML
LEFT VENTRICULAR INTERNAL DIMENSION IN DIASTOLE: 4.03 CM (ref 3.5–6)
LEFT VENTRICULAR MASS: 121.4 G
LV LATERAL E/E' RATIO: 8.89 M/S
LV SEPTAL E/E' RATIO: 13.33 M/S
LYMPHOCYTES # BLD AUTO: 1.9 K/UL (ref 1–4.8)
LYMPHOCYTES NFR BLD: 21.8 % (ref 18–48)
MAGNESIUM SERPL-MCNC: 1.6 MG/DL (ref 1.6–2.6)
MCH RBC QN AUTO: 31.4 PG (ref 27–31)
MCHC RBC AUTO-ENTMCNC: 32.8 G/DL (ref 32–36)
MCV RBC AUTO: 96 FL (ref 82–98)
MONOCYTES # BLD AUTO: 0.8 K/UL (ref 0.3–1)
MONOCYTES NFR BLD: 9.2 % (ref 4–15)
MV PEAK A VEL: 0.92 M/S
MV PEAK E VEL: 0.8 M/S
MV STENOSIS PRESSURE HALF TIME: 89.28 MS
MV VALVE AREA P 1/2 METHOD: 2.46 CM2
NEUTROPHILS # BLD AUTO: 5.4 K/UL (ref 1.8–7.7)
NEUTROPHILS NFR BLD: 63.4 % (ref 38–73)
NRBC BLD-RTO: 0 /100 WBC
PHOSPHATE SERPL-MCNC: 3.2 MG/DL (ref 2.7–4.5)
PISA TR MAX VEL: 1.45 M/S
PLATELET # BLD AUTO: 200 K/UL (ref 150–450)
PMV BLD AUTO: 10 FL (ref 9.2–12.9)
POCT GLUCOSE: 118 MG/DL (ref 70–110)
POCT GLUCOSE: 92 MG/DL (ref 70–110)
POTASSIUM SERPL-SCNC: 3.8 MMOL/L (ref 3.5–5.1)
PROT SERPL-MCNC: 5.8 G/DL (ref 6–8.4)
PULM VEIN S/D RATIO: 0.9
PV PEAK D VEL: 0.41 M/S
PV PEAK S VEL: 0.37 M/S
PV PEAK VELOCITY: 0.67 CM/S
RA MAJOR: 4.08 CM
RA WIDTH: 3.14 CM
RBC # BLD AUTO: 3.22 M/UL (ref 4.6–6.2)
RIGHT VENTRICULAR END-DIASTOLIC DIMENSION: 3.47 CM
RV TISSUE DOPPLER FREE WALL SYSTOLIC VELOCITY 1 (APICAL 4 CHAMBER VIEW): 7.08 CM/S
SINUS: 3.1 CM
SODIUM SERPL-SCNC: 141 MMOL/L (ref 136–145)
TDI LATERAL: 0.09 M/S
TDI SEPTAL: 0.06 M/S
TDI: 0.08 M/S
TR MAX PG: 8 MMHG
TRICUSPID ANNULAR PLANE SYSTOLIC EXCURSION: 1.5 CM
TROPONIN I SERPL DL<=0.01 NG/ML-MCNC: 0.01 NG/ML (ref 0–0.03)
TV PEAK E VEL: 0.67 M/S
WBC # BLD AUTO: 8.49 K/UL (ref 3.9–12.7)

## 2021-04-03 PROCEDURE — 36415 COLL VENOUS BLD VENIPUNCTURE: CPT | Mod: HCNC | Performed by: PHYSICIAN ASSISTANT

## 2021-04-03 PROCEDURE — 63600175 PHARM REV CODE 636 W HCPCS: Mod: HCNC | Performed by: PHYSICIAN ASSISTANT

## 2021-04-03 PROCEDURE — G0378 HOSPITAL OBSERVATION PER HR: HCPCS | Mod: HCNC

## 2021-04-03 PROCEDURE — 63600175 PHARM REV CODE 636 W HCPCS: Mod: HCNC | Performed by: NURSE PRACTITIONER

## 2021-04-03 PROCEDURE — 80053 COMPREHEN METABOLIC PANEL: CPT | Mod: HCNC | Performed by: PHYSICIAN ASSISTANT

## 2021-04-03 PROCEDURE — 83880 ASSAY OF NATRIURETIC PEPTIDE: CPT | Mod: HCNC | Performed by: EMERGENCY MEDICINE

## 2021-04-03 PROCEDURE — 84484 ASSAY OF TROPONIN QUANT: CPT | Mod: HCNC | Performed by: PHYSICIAN ASSISTANT

## 2021-04-03 PROCEDURE — 83735 ASSAY OF MAGNESIUM: CPT | Mod: HCNC | Performed by: NURSE PRACTITIONER

## 2021-04-03 PROCEDURE — 96374 THER/PROPH/DIAG INJ IV PUSH: CPT | Mod: 59

## 2021-04-03 PROCEDURE — 25000003 PHARM REV CODE 250: Mod: HCNC | Performed by: PHYSICIAN ASSISTANT

## 2021-04-03 PROCEDURE — 85025 COMPLETE CBC W/AUTO DIFF WBC: CPT | Mod: HCNC | Performed by: PHYSICIAN ASSISTANT

## 2021-04-03 PROCEDURE — 96361 HYDRATE IV INFUSION ADD-ON: CPT

## 2021-04-03 PROCEDURE — 84100 ASSAY OF PHOSPHORUS: CPT | Mod: HCNC | Performed by: NURSE PRACTITIONER

## 2021-04-03 RX ORDER — MAGNESIUM SULFATE HEPTAHYDRATE 40 MG/ML
2 INJECTION, SOLUTION INTRAVENOUS ONCE
Status: COMPLETED | OUTPATIENT
Start: 2021-04-03 | End: 2021-04-03

## 2021-04-03 RX ADMIN — SODIUM CHLORIDE: 0.9 INJECTION, SOLUTION INTRAVENOUS at 09:04

## 2021-04-03 RX ADMIN — MAGNESIUM SULFATE 2 G: 2 INJECTION INTRAVENOUS at 10:04

## 2021-04-03 RX ADMIN — CLOPIDOGREL 75 MG: 75 TABLET, FILM COATED ORAL at 09:04

## 2021-04-03 RX ADMIN — ASPIRIN 81 MG: 81 TABLET, COATED ORAL at 09:04

## 2021-04-03 RX ADMIN — PREDNISONE 5 MG: 5 TABLET ORAL at 09:04

## 2021-04-09 ENCOUNTER — OFFICE VISIT (OUTPATIENT)
Dept: NEUROSURGERY | Facility: CLINIC | Age: 86
End: 2021-04-09
Payer: MEDICARE

## 2021-04-09 ENCOUNTER — PATIENT OUTREACH (OUTPATIENT)
Dept: ADMINISTRATIVE | Facility: HOSPITAL | Age: 86
End: 2021-04-09

## 2021-04-09 VITALS
SYSTOLIC BLOOD PRESSURE: 150 MMHG | OXYGEN SATURATION: 99 % | WEIGHT: 148.13 LBS | HEIGHT: 67 IN | DIASTOLIC BLOOD PRESSURE: 67 MMHG | TEMPERATURE: 98 F | HEART RATE: 91 BPM | BODY MASS INDEX: 23.25 KG/M2

## 2021-04-09 DIAGNOSIS — R20.0 NUMBNESS OF RIGHT HAND: ICD-10-CM

## 2021-04-09 DIAGNOSIS — M47.9 SPONDYLOSIS: ICD-10-CM
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
## 2021-04-09 DIAGNOSIS — K59.00 CONSTIPATION, UNSPECIFIED CONSTIPATION TYPE: ICD-10-CM

## 2021-04-09 PROCEDURE — 3288F FALL RISK ASSESSMENT DOCD: CPT | Mod: HCNC,CPTII,S$GLB, | Performed by: PHYSICIAN ASSISTANT

## 2021-04-09 PROCEDURE — 99999 PR PBB SHADOW E&M-EST. PATIENT-LVL IV: CPT | Mod: PBBFAC,HCNC,, | Performed by: PHYSICIAN ASSISTANT

## 2021-04-09 PROCEDURE — 99999 PR PBB SHADOW E&M-EST. PATIENT-LVL IV: ICD-10-PCS | Mod: PBBFAC,HCNC,, | Performed by: PHYSICIAN ASSISTANT

## 2021-04-09 PROCEDURE — 1159F MED LIST DOCD IN RCRD: CPT | Mod: HCNC,S$GLB,, | Performed by: PHYSICIAN ASSISTANT

## 2021-04-09 PROCEDURE — 99205 OFFICE O/P NEW HI 60 MIN: CPT | Mod: HCNC,S$GLB,, | Performed by: PHYSICIAN ASSISTANT

## 2021-04-09 PROCEDURE — 1100F PTFALLS ASSESS-DOCD GE2>/YR: CPT | Mod: HCNC,CPTII,S$GLB, | Performed by: PHYSICIAN ASSISTANT

## 2021-04-09 PROCEDURE — 99205 PR OFFICE/OUTPT VISIT, NEW, LEVL V, 60-74 MIN: ICD-10-PCS | Mod: HCNC,S$GLB,, | Performed by: PHYSICIAN ASSISTANT

## 2021-04-09 PROCEDURE — 1159F PR MEDICATION LIST DOCUMENTED IN MEDICAL RECORD: ICD-10-PCS | Mod: HCNC,S$GLB,, | Performed by: PHYSICIAN ASSISTANT

## 2021-04-09 PROCEDURE — 1125F PR PAIN SEVERITY QUANTIFIED, PAIN PRESENT: ICD-10-PCS | Mod: HCNC,S$GLB,, | Performed by: PHYSICIAN ASSISTANT

## 2021-04-09 PROCEDURE — 1100F PR PT FALLS ASSESS DOC 2+ FALLS/FALL W/INJURY/YR: ICD-10-PCS | Mod: HCNC,CPTII,S$GLB, | Performed by: PHYSICIAN ASSISTANT

## 2021-04-09 PROCEDURE — 1125F AMNT PAIN NOTED PAIN PRSNT: CPT | Mod: HCNC,S$GLB,, | Performed by: PHYSICIAN ASSISTANT

## 2021-04-09 PROCEDURE — 3288F PR FALLS RISK ASSESSMENT DOCUMENTED: ICD-10-PCS | Mod: HCNC,CPTII,S$GLB, | Performed by: PHYSICIAN ASSISTANT

## 2021-04-15 DIAGNOSIS — R39.15 URINARY URGENCY: ICD-10-CM

## 2021-04-15 RX ORDER — TAMSULOSIN HYDROCHLORIDE 0.4 MG/1
1 CAPSULE ORAL DAILY
Qty: 90 CAPSULE | Refills: 3 | Status: ON HOLD | OUTPATIENT
Start: 2021-04-15 | End: 2021-06-14 | Stop reason: HOSPADM

## 2021-04-15 RX ORDER — ROSUVASTATIN CALCIUM 20 MG/1
20 TABLET, COATED ORAL NIGHTLY
Qty: 90 TABLET | Refills: 12 | Status: SHIPPED | OUTPATIENT
Start: 2021-04-15 | End: 2022-06-03

## 2021-04-15 RX ORDER — OXYBUTYNIN CHLORIDE 15 MG/1
15 TABLET, EXTENDED RELEASE ORAL DAILY
Qty: 90 TABLET | Refills: 11 | Status: ON HOLD | OUTPATIENT
Start: 2021-04-15 | End: 2021-06-14 | Stop reason: HOSPADM

## 2021-04-20 ENCOUNTER — TELEPHONE (OUTPATIENT)
Dept: ADMINISTRATIVE | Facility: HOSPITAL | Age: 86
End: 2021-04-20

## 2021-04-21 ENCOUNTER — HOSPITAL ENCOUNTER (OUTPATIENT)
Dept: RADIOLOGY | Facility: HOSPITAL | Age: 86
Discharge: HOME | End: 2021-04-21
Attending: PHYSICIAN ASSISTANT
Payer: MEDICARE

## 2021-04-21 ENCOUNTER — HOSPITAL ENCOUNTER (OUTPATIENT)
Dept: RADIOLOGY | Facility: HOSPITAL | Age: 86
Discharge: HOME | End: 2021-04-21
Attending: INTERNAL MEDICINE
Payer: MEDICARE

## 2021-04-21 DIAGNOSIS — K59.00 CONSTIPATION, UNSPECIFIED CONSTIPATION TYPE: ICD-10-CM
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
## 2021-04-21 DIAGNOSIS — R20.0 NUMBNESS OF RIGHT HAND: ICD-10-CM

## 2021-04-21 DIAGNOSIS — M47.9 SPONDYLOSIS: ICD-10-CM

## 2021-04-21 PROCEDURE — 72148 MRI LUMBAR SPINE W/O DYE: CPT | Mod: 26,HCNC,, | Performed by: RADIOLOGY

## 2021-04-21 PROCEDURE — 72141 MRI NECK SPINE W/O DYE: CPT | Mod: TC,HCNC

## 2021-04-21 PROCEDURE — 72141 MRI CERVICAL SPINE WITHOUT CONTRAST: ICD-10-PCS | Mod: 26,HCNC,, | Performed by: RADIOLOGY

## 2021-04-21 PROCEDURE — 72148 MRI LUMBAR SPINE W/O DYE: CPT | Mod: TC,HCNC

## 2021-04-21 PROCEDURE — 72148 MRI LUMBAR SPINE WITHOUT CONTRAST: ICD-10-PCS | Mod: 26,HCNC,, | Performed by: RADIOLOGY

## 2021-04-21 PROCEDURE — 72141 MRI NECK SPINE W/O DYE: CPT | Mod: 26,HCNC,, | Performed by: RADIOLOGY
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
## 2021-04-22 DIAGNOSIS — R26.9 GAIT ABNORMALITY: ICD-10-CM

## 2021-04-28 ENCOUNTER — TELEPHONE (OUTPATIENT)
Dept: NEUROLOGY | Facility: CLINIC | Age: 86
End: 2021-04-28

## 2021-05-03 ENCOUNTER — LAB VISIT (OUTPATIENT)
Dept: LAB | Facility: HOSPITAL | Age: 86
End: 2021-05-03
Payer: MEDICARE

## 2021-05-03 ENCOUNTER — OFFICE VISIT (OUTPATIENT)
Dept: NEUROLOGY | Facility: CLINIC | Age: 86
End: 2021-05-03
Payer: MEDICARE

## 2021-05-03 VITALS
BODY MASS INDEX: 23.2 KG/M2 | SYSTOLIC BLOOD PRESSURE: 117 MMHG | DIASTOLIC BLOOD PRESSURE: 68 MMHG | WEIGHT: 148.13 LBS | HEART RATE: 81 BPM

## 2021-05-03 DIAGNOSIS — R32 URINARY INCONTINENCE, UNSPECIFIED TYPE: ICD-10-CM

## 2021-05-03 DIAGNOSIS — R26.9 GAIT ABNORMALITY: Primary | ICD-10-CM
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
## 2021-05-03 DIAGNOSIS — R26.9 GAIT ABNORMALITY: ICD-10-CM

## 2021-05-03 LAB
CREAT SERPL-MCNC: 1.2 MG/DL (ref 0.5–1.4)
EST. GFR  (AFRICAN AMERICAN): >60 ML/MIN/1.73 M^2
EST. GFR  (NON AFRICAN AMERICAN): 55.6 ML/MIN/1.73 M^2
VIT B12 SERPL-MCNC: 485 PG/ML (ref 210–950)

## 2021-05-03 PROCEDURE — 3288F PR FALLS RISK ASSESSMENT DOCUMENTED: ICD-10-PCS | Mod: HCNC,CPTII,S$GLB, | Performed by: PHYSICIAN ASSISTANT

## 2021-05-03 PROCEDURE — 1159F MED LIST DOCD IN RCRD: CPT | Mod: HCNC,S$GLB,, | Performed by: PHYSICIAN ASSISTANT

## 2021-05-03 PROCEDURE — 99205 OFFICE O/P NEW HI 60 MIN: CPT | Mod: HCNC,S$GLB,, | Performed by: PHYSICIAN ASSISTANT

## 2021-05-03 PROCEDURE — 3288F FALL RISK ASSESSMENT DOCD: CPT | Mod: HCNC,CPTII,S$GLB, | Performed by: PHYSICIAN ASSISTANT

## 2021-05-03 PROCEDURE — 99205 PR OFFICE/OUTPT VISIT, NEW, LEVL V, 60-74 MIN: ICD-10-PCS | Mod: HCNC,S$GLB,, | Performed by: PHYSICIAN ASSISTANT

## 2021-05-03 PROCEDURE — 99999 PR PBB SHADOW E&M-EST. PATIENT-LVL IV: ICD-10-PCS | Mod: PBBFAC,HCNC,, | Performed by: PHYSICIAN ASSISTANT

## 2021-05-03 PROCEDURE — 84425 ASSAY OF VITAMIN B-1: CPT | Mod: HCNC | Performed by: PHYSICIAN ASSISTANT

## 2021-05-03 PROCEDURE — 1100F PTFALLS ASSESS-DOCD GE2>/YR: CPT | Mod: HCNC,CPTII,S$GLB, | Performed by: PHYSICIAN ASSISTANT

## 2021-05-03 PROCEDURE — 82565 ASSAY OF CREATININE: CPT | Mod: HCNC | Performed by: PHYSICIAN ASSISTANT

## 2021-05-03 PROCEDURE — 86255 FLUORESCENT ANTIBODY SCREEN: CPT | Mod: 59,HCNC | Performed by: PHYSICIAN ASSISTANT

## 2021-05-03 PROCEDURE — 83519 RIA NONANTIBODY: CPT | Mod: 59,HCNC | Performed by: PHYSICIAN ASSISTANT

## 2021-05-03 PROCEDURE — 99999 PR PBB SHADOW E&M-EST. PATIENT-LVL IV: CPT | Mod: PBBFAC,HCNC,, | Performed by: PHYSICIAN ASSISTANT

## 2021-05-03 PROCEDURE — 1159F PR MEDICATION LIST DOCUMENTED IN MEDICAL RECORD: ICD-10-PCS | Mod: HCNC,S$GLB,, | Performed by: PHYSICIAN ASSISTANT

## 2021-05-03 PROCEDURE — 82607 VITAMIN B-12: CPT | Mod: HCNC | Performed by: PHYSICIAN ASSISTANT

## 2021-05-03 PROCEDURE — 1100F PR PT FALLS ASSESS DOC 2+ FALLS/FALL W/INJURY/YR: ICD-10-PCS | Mod: HCNC,CPTII,S$GLB, | Performed by: PHYSICIAN ASSISTANT

## 2021-05-03 PROCEDURE — 86592 SYPHILIS TEST NON-TREP QUAL: CPT | Mod: HCNC | Performed by: PHYSICIAN ASSISTANT

## 2021-05-03 PROCEDURE — 1126F AMNT PAIN NOTED NONE PRSNT: CPT | Mod: HCNC,S$GLB,, | Performed by: PHYSICIAN ASSISTANT

## 2021-05-03 PROCEDURE — 1126F PR PAIN SEVERITY QUANTIFIED, NO PAIN PRESENT: ICD-10-PCS | Mod: HCNC,S$GLB,, | Performed by: PHYSICIAN ASSISTANT

## 2021-05-04 PROBLEM — R32 URINARY INCONTINENCE: Status: ACTIVE | Noted: 2021-05-04

## 2021-05-04 LAB — RPR SER QL: NORMAL

## 2021-05-06 LAB — VIT B1 BLD-MCNC: 83 UG/L (ref 38–122)

## 2021-05-12 ENCOUNTER — HOSPITAL ENCOUNTER (OUTPATIENT)
Dept: RADIOLOGY | Facility: HOSPITAL | Age: 86
Discharge: HOME | End: 2021-05-12
Attending: PHYSICIAN ASSISTANT
Payer: MEDICARE
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
## 2021-05-12 DIAGNOSIS — R26.9 GAIT ABNORMALITY: ICD-10-CM

## 2021-05-12 PROCEDURE — 25500020 PHARM REV CODE 255: Mod: HCNC | Performed by: PHYSICIAN ASSISTANT

## 2021-05-12 PROCEDURE — A9585 GADOBUTROL INJECTION: HCPCS | Mod: HCNC | Performed by: PHYSICIAN ASSISTANT

## 2021-05-12 PROCEDURE — 70553 MRI BRAIN W WO CONTRAST: ICD-10-PCS | Mod: 26,HCNC,, | Performed by: RADIOLOGY

## 2021-05-12 PROCEDURE — 70553 MRI BRAIN STEM W/O & W/DYE: CPT | Mod: 26,HCNC,, | Performed by: RADIOLOGY

## 2021-05-12 PROCEDURE — 70553 MRI BRAIN STEM W/O & W/DYE: CPT | Mod: TC,HCNC

## 2021-05-12 RX ORDER — GADOBUTROL 604.72 MG/ML
7 INJECTION INTRAVENOUS
Status: COMPLETED | OUTPATIENT
Start: 2021-05-12 | End: 2021-05-12

## 2021-05-12 RX ADMIN — GADOBUTROL 7 ML: 604.72 INJECTION INTRAVENOUS at 02:05

## 2021-05-17 ENCOUNTER — PATIENT MESSAGE (OUTPATIENT)
Dept: FAMILY MEDICINE | Facility: CLINIC | Age: 86
End: 2021-05-17

## 2021-05-17 ENCOUNTER — TELEPHONE (OUTPATIENT)
Dept: FAMILY MEDICINE | Facility: CLINIC | Age: 86
End: 2021-05-17

## 2021-05-17 ENCOUNTER — TELEPHONE (OUTPATIENT)
Dept: NEUROSURGERY | Facility: CLINIC | Age: 86
End: 2021-05-17

## 2021-05-17 ENCOUNTER — OFFICE VISIT (OUTPATIENT)
Dept: NEUROSURGERY | Facility: CLINIC | Age: 86
End: 2021-05-17
Payer: MEDICARE

## 2021-05-17 VITALS
TEMPERATURE: 98 F | DIASTOLIC BLOOD PRESSURE: 76 MMHG | HEART RATE: 82 BPM | HEIGHT: 67 IN | BODY MASS INDEX: 23.2 KG/M2 | SYSTOLIC BLOOD PRESSURE: 147 MMHG

## 2021-05-17 DIAGNOSIS — G91.2 NPH (NORMAL PRESSURE HYDROCEPHALUS): Primary | ICD-10-CM

## 2021-05-17 DIAGNOSIS — G93.89 CEREBRAL VENTRICULOMEGALY: ICD-10-CM

## 2021-05-17 DIAGNOSIS — Z01.818 PRE-OP TESTING: ICD-10-CM

## 2021-05-17 LAB
AMPHIPHYSIN AB TITR SER: NEGATIVE TITER
CV2 IGG TITR SER: NEGATIVE TITER
GLIAL NUC TYPE 1 AB TITR SER: NEGATIVE TITER
HU1 AB TITR SER: NEGATIVE TITER
HU2 AB TITR SER IF: NEGATIVE TITER
HU3 AB TITR SER: NEGATIVE TITER
IMMUNOLOGIST REVIEW: ABNORMAL
NACHR AB SER-SCNC: 0 NMOL/L
PAVAL REFLEX TEST ADDED: ABNORMAL
PCA-1 AB TITR SER: NEGATIVE TITER
PCA-2 AB TITR SER: NEGATIVE TITER
PCA-TR AB TITR SER: NEGATIVE TITER
STRIA MUS AB TITR SER: NEGATIVE TITER
VGCC-N BIND AB SER-SCNC: ABNORMAL PMOL/L
VGCC-P/Q BIND AB SER-SCNC: 0.05 NMOL/L
VGKC AB SER-SCNC: 0 NMOL/L

## 2021-05-17 PROCEDURE — 99214 PR OFFICE/OUTPT VISIT, EST, LEVL IV, 30-39 MIN: ICD-10-PCS | Mod: HCNC,S$GLB,, | Performed by: NEUROLOGICAL SURGERY

## 2021-05-17 PROCEDURE — 99999 PR PBB SHADOW E&M-EST. PATIENT-LVL III: CPT | Mod: PBBFAC,HCNC,, | Performed by: NEUROLOGICAL SURGERY

## 2021-05-17 PROCEDURE — 99999 PR PBB SHADOW E&M-EST. PATIENT-LVL III: ICD-10-PCS | Mod: PBBFAC,HCNC,, | Performed by: NEUROLOGICAL SURGERY

## 2021-05-17 PROCEDURE — 1159F MED LIST DOCD IN RCRD: CPT | Mod: HCNC,S$GLB,, | Performed by: NEUROLOGICAL SURGERY

## 2021-05-17 PROCEDURE — 1126F PR PAIN SEVERITY QUANTIFIED, NO PAIN PRESENT: ICD-10-PCS | Mod: HCNC,S$GLB,, | Performed by: NEUROLOGICAL SURGERY

## 2021-05-17 PROCEDURE — 99214 OFFICE O/P EST MOD 30 MIN: CPT | Mod: HCNC,S$GLB,, | Performed by: NEUROLOGICAL SURGERY

## 2021-05-17 PROCEDURE — 1159F PR MEDICATION LIST DOCUMENTED IN MEDICAL RECORD: ICD-10-PCS | Mod: HCNC,S$GLB,, | Performed by: NEUROLOGICAL SURGERY

## 2021-05-17 PROCEDURE — 1126F AMNT PAIN NOTED NONE PRSNT: CPT | Mod: HCNC,S$GLB,, | Performed by: NEUROLOGICAL SURGERY

## 2021-05-17 PROCEDURE — 3288F FALL RISK ASSESSMENT DOCD: CPT | Mod: HCNC,CPTII,S$GLB, | Performed by: NEUROLOGICAL SURGERY

## 2021-05-17 PROCEDURE — 1100F PR PT FALLS ASSESS DOC 2+ FALLS/FALL W/INJURY/YR: ICD-10-PCS | Mod: HCNC,CPTII,S$GLB, | Performed by: NEUROLOGICAL SURGERY

## 2021-05-17 PROCEDURE — 1100F PTFALLS ASSESS-DOCD GE2>/YR: CPT | Mod: HCNC,CPTII,S$GLB, | Performed by: NEUROLOGICAL SURGERY

## 2021-05-17 PROCEDURE — 3288F PR FALLS RISK ASSESSMENT DOCUMENTED: ICD-10-PCS | Mod: HCNC,CPTII,S$GLB, | Performed by: NEUROLOGICAL SURGERY

## 2021-05-18 ENCOUNTER — PATIENT MESSAGE (OUTPATIENT)
Dept: FAMILY MEDICINE | Facility: CLINIC | Age: 86
End: 2021-05-18

## 2021-05-18 ENCOUNTER — PATIENT MESSAGE (OUTPATIENT)
Dept: SURGERY | Facility: OTHER | Age: 86
End: 2021-05-18

## 2021-05-18 DIAGNOSIS — I10 ESSENTIAL HYPERTENSION: ICD-10-CM

## 2021-05-18 DIAGNOSIS — D68.9 COAGULOPATHY: ICD-10-CM

## 2021-05-18 DIAGNOSIS — I25.810 CORONARY ARTERY DISEASE INVOLVING CORONARY BYPASS GRAFT OF NATIVE HEART WITHOUT ANGINA PECTORIS: Primary | ICD-10-CM

## 2021-05-19 ENCOUNTER — PATIENT MESSAGE (OUTPATIENT)
Dept: FAMILY MEDICINE | Facility: CLINIC | Age: 86
End: 2021-05-19

## 2021-05-19 DIAGNOSIS — M35.3 POLYMYALGIA RHEUMATICA: Primary | ICD-10-CM

## 2021-05-19 DIAGNOSIS — R53.81 DEBILITY: ICD-10-CM

## 2021-05-19 PROBLEM — G93.89 CEREBRAL VENTRICULOMEGALY: Status: ACTIVE | Noted: 2021-05-19

## 2021-05-20 ENCOUNTER — TELEPHONE (OUTPATIENT)
Dept: FAMILY MEDICINE | Facility: CLINIC | Age: 86
End: 2021-05-20

## 2021-05-20 ENCOUNTER — LAB VISIT (OUTPATIENT)
Dept: LAB | Facility: HOSPITAL | Age: 86
End: 2021-05-20
Attending: INTERNAL MEDICINE
Payer: MEDICARE

## 2021-05-20 ENCOUNTER — OFFICE VISIT (OUTPATIENT)
Dept: FAMILY MEDICINE | Facility: CLINIC | Age: 86
End: 2021-05-20
Payer: MEDICARE

## 2021-05-20 VITALS
SYSTOLIC BLOOD PRESSURE: 118 MMHG | TEMPERATURE: 98 F | DIASTOLIC BLOOD PRESSURE: 60 MMHG | HEIGHT: 67 IN | WEIGHT: 144.19 LBS | BODY MASS INDEX: 22.63 KG/M2

## 2021-05-20 DIAGNOSIS — M35.3 POLYMYALGIA RHEUMATICA: ICD-10-CM

## 2021-05-20 DIAGNOSIS — I10 ESSENTIAL HYPERTENSION: ICD-10-CM

## 2021-05-20 DIAGNOSIS — N18.30 STAGE 3 CHRONIC KIDNEY DISEASE, UNSPECIFIED WHETHER STAGE 3A OR 3B CKD: ICD-10-CM

## 2021-05-20 DIAGNOSIS — I71.40 ABDOMINAL AORTIC ANEURYSM (AAA) WITHOUT RUPTURE: ICD-10-CM

## 2021-05-20 DIAGNOSIS — R91.1 PULMONARY NODULE: ICD-10-CM

## 2021-05-20 DIAGNOSIS — I70.0 AORTIC ATHEROSCLEROSIS: ICD-10-CM

## 2021-05-20 DIAGNOSIS — F39 MOOD DISORDER: ICD-10-CM

## 2021-05-20 DIAGNOSIS — I73.9 PAD (PERIPHERAL ARTERY DISEASE): ICD-10-CM

## 2021-05-20 DIAGNOSIS — I25.810 CORONARY ARTERY DISEASE INVOLVING CORONARY BYPASS GRAFT OF NATIVE HEART WITHOUT ANGINA PECTORIS: ICD-10-CM

## 2021-05-20 DIAGNOSIS — Z01.818 PREOPERATIVE EXAMINATION: ICD-10-CM

## 2021-05-20 DIAGNOSIS — G93.89 CEREBRAL VENTRICULOMEGALY: Primary | ICD-10-CM

## 2021-05-20 DIAGNOSIS — J41.0 SMOKERS' COUGH: ICD-10-CM

## 2021-05-20 DIAGNOSIS — D64.9 ANEMIA, UNSPECIFIED TYPE: ICD-10-CM

## 2021-05-20 DIAGNOSIS — E11.65 UNCONTROLLED TYPE 2 DIABETES MELLITUS WITH HYPERGLYCEMIA: ICD-10-CM

## 2021-05-20 DIAGNOSIS — E53.8 B12 DEFICIENCY: ICD-10-CM

## 2021-05-20 DIAGNOSIS — D68.9 COAGULOPATHY: ICD-10-CM

## 2021-05-20 DIAGNOSIS — I77.1 TORTUOUS AORTA: ICD-10-CM

## 2021-05-20 DIAGNOSIS — J44.9 CHRONIC OBSTRUCTIVE PULMONARY DISEASE, UNSPECIFIED COPD TYPE: ICD-10-CM

## 2021-05-20 DIAGNOSIS — R32 URINARY INCONTINENCE, UNSPECIFIED TYPE: ICD-10-CM

## 2021-05-20 DIAGNOSIS — E11.51 DIABETES MELLITUS WITH PERIPHERAL CIRCULATORY DISORDER: ICD-10-CM

## 2021-05-20 LAB
ALBUMIN SERPL BCP-MCNC: 3.4 G/DL (ref 3.5–5.2)
ALP SERPL-CCNC: 68 U/L (ref 55–135)
ALT SERPL W/O P-5'-P-CCNC: 16 U/L (ref 10–44)
ANION GAP SERPL CALC-SCNC: 10 MMOL/L (ref 8–16)
AST SERPL-CCNC: 30 U/L (ref 10–40)
BASOPHILS # BLD AUTO: 0.07 K/UL (ref 0–0.2)
BASOPHILS NFR BLD: 0.8 % (ref 0–1.9)
BILIRUB SERPL-MCNC: 0.2 MG/DL (ref 0.1–1)
BUN SERPL-MCNC: 22 MG/DL (ref 8–23)
CALCIUM SERPL-MCNC: 10 MG/DL (ref 8.7–10.5)
CHLORIDE SERPL-SCNC: 106 MMOL/L (ref 95–110)
CO2 SERPL-SCNC: 22 MMOL/L (ref 23–29)
CREAT SERPL-MCNC: 1.1 MG/DL (ref 0.5–1.4)
DIFFERENTIAL METHOD: ABNORMAL
EOSINOPHIL # BLD AUTO: 0.7 K/UL (ref 0–0.5)
EOSINOPHIL NFR BLD: 8.1 % (ref 0–8)
ERYTHROCYTE [DISTWIDTH] IN BLOOD BY AUTOMATED COUNT: 14.7 % (ref 11.5–14.5)
EST. GFR  (AFRICAN AMERICAN): >60 ML/MIN/1.73 M^2
EST. GFR  (NON AFRICAN AMERICAN): >60 ML/MIN/1.73 M^2
GLUCOSE SERPL-MCNC: 106 MG/DL (ref 70–110)
HCT VFR BLD AUTO: 33.8 % (ref 40–54)
HGB BLD-MCNC: 11.2 G/DL (ref 14–18)
IMM GRANULOCYTES # BLD AUTO: 0.02 K/UL (ref 0–0.04)
IMM GRANULOCYTES NFR BLD AUTO: 0.2 % (ref 0–0.5)
INR PPP: 0.9 (ref 0.8–1.2)
LYMPHOCYTES # BLD AUTO: 2.2 K/UL (ref 1–4.8)
LYMPHOCYTES NFR BLD: 24.6 % (ref 18–48)
MCH RBC QN AUTO: 32.6 PG (ref 27–31)
MCHC RBC AUTO-ENTMCNC: 33.1 G/DL (ref 32–36)
MCV RBC AUTO: 98 FL (ref 82–98)
MONOCYTES # BLD AUTO: 0.8 K/UL (ref 0.3–1)
MONOCYTES NFR BLD: 9.3 % (ref 4–15)
NEUTROPHILS # BLD AUTO: 5.1 K/UL (ref 1.8–7.7)
NEUTROPHILS NFR BLD: 57 % (ref 38–73)
NRBC BLD-RTO: 0 /100 WBC
PLATELET # BLD AUTO: 259 K/UL (ref 150–450)
PMV BLD AUTO: 10.7 FL (ref 9.2–12.9)
POTASSIUM SERPL-SCNC: 4.7 MMOL/L (ref 3.5–5.1)
PROT SERPL-MCNC: 7.4 G/DL (ref 6–8.4)
PROTHROMBIN TIME: 10.4 SEC (ref 9–12.5)
RBC # BLD AUTO: 3.44 M/UL (ref 4.6–6.2)
SODIUM SERPL-SCNC: 138 MMOL/L (ref 136–145)
WBC # BLD AUTO: 9 K/UL (ref 3.9–12.7)

## 2021-05-20 PROCEDURE — 99999 PR PBB SHADOW E&M-EST. PATIENT-LVL III: ICD-10-PCS | Mod: PBBFAC,HCNC,, | Performed by: INTERNAL MEDICINE

## 2021-05-20 PROCEDURE — 99215 OFFICE O/P EST HI 40 MIN: CPT | Mod: HCNC,S$GLB,, | Performed by: INTERNAL MEDICINE

## 2021-05-20 PROCEDURE — 99215 PR OFFICE/OUTPT VISIT, EST, LEVL V, 40-54 MIN: ICD-10-PCS | Mod: HCNC,S$GLB,, | Performed by: INTERNAL MEDICINE

## 2021-05-20 PROCEDURE — 3288F PR FALLS RISK ASSESSMENT DOCUMENTED: ICD-10-PCS | Mod: HCNC,CPTII,S$GLB, | Performed by: INTERNAL MEDICINE

## 2021-05-20 PROCEDURE — 85025 COMPLETE CBC W/AUTO DIFF WBC: CPT | Mod: HCNC | Performed by: INTERNAL MEDICINE

## 2021-05-20 PROCEDURE — 99999 PR PBB SHADOW E&M-EST. PATIENT-LVL III: CPT | Mod: PBBFAC,HCNC,, | Performed by: INTERNAL MEDICINE

## 2021-05-20 PROCEDURE — 1101F PR PT FALLS ASSESS DOC 0-1 FALLS W/OUT INJ PAST YR: ICD-10-PCS | Mod: HCNC,CPTII,S$GLB, | Performed by: INTERNAL MEDICINE

## 2021-05-20 PROCEDURE — 99499 RISK ADDL DX/OHS AUDIT: ICD-10-PCS | Mod: S$PBB,,, | Performed by: INTERNAL MEDICINE

## 2021-05-20 PROCEDURE — 1159F PR MEDICATION LIST DOCUMENTED IN MEDICAL RECORD: ICD-10-PCS | Mod: HCNC,S$GLB,, | Performed by: INTERNAL MEDICINE

## 2021-05-20 PROCEDURE — 36415 COLL VENOUS BLD VENIPUNCTURE: CPT | Mod: HCNC,PO | Performed by: INTERNAL MEDICINE

## 2021-05-20 PROCEDURE — 3288F FALL RISK ASSESSMENT DOCD: CPT | Mod: HCNC,CPTII,S$GLB, | Performed by: INTERNAL MEDICINE

## 2021-05-20 PROCEDURE — 1159F MED LIST DOCD IN RCRD: CPT | Mod: HCNC,S$GLB,, | Performed by: INTERNAL MEDICINE

## 2021-05-20 PROCEDURE — 99499 UNLISTED E&M SERVICE: CPT | Mod: S$PBB,,, | Performed by: INTERNAL MEDICINE

## 2021-05-20 PROCEDURE — 1101F PT FALLS ASSESS-DOCD LE1/YR: CPT | Mod: HCNC,CPTII,S$GLB, | Performed by: INTERNAL MEDICINE

## 2021-05-20 PROCEDURE — 85610 PROTHROMBIN TIME: CPT | Mod: HCNC | Performed by: INTERNAL MEDICINE

## 2021-05-20 PROCEDURE — 80053 COMPREHEN METABOLIC PANEL: CPT | Mod: HCNC | Performed by: INTERNAL MEDICINE

## 2021-05-25 ENCOUNTER — TELEPHONE (OUTPATIENT)
Dept: NEUROSURGERY | Facility: CLINIC | Age: 86
End: 2021-05-25

## 2021-05-25 ENCOUNTER — ANESTHESIA EVENT (OUTPATIENT)
Dept: SURGERY | Facility: OTHER | Age: 86
End: 2021-05-25
Payer: MEDICARE

## 2021-05-26 ENCOUNTER — HOSPITAL ENCOUNTER (OUTPATIENT)
Facility: OTHER | Age: 86
Discharge: HOME | End: 2021-05-26
Attending: NEUROLOGICAL SURGERY | Admitting: NEUROLOGICAL SURGERY
Payer: MEDICARE

## 2021-05-26 ENCOUNTER — ANESTHESIA (OUTPATIENT)
Dept: SURGERY | Facility: OTHER | Age: 86
End: 2021-05-26
Payer: MEDICARE

## 2021-05-26 ENCOUNTER — TELEPHONE (OUTPATIENT)
Dept: NEUROSURGERY | Facility: CLINIC | Age: 86
End: 2021-05-26

## 2021-05-26 VITALS
HEART RATE: 79 BPM | WEIGHT: 143 LBS | SYSTOLIC BLOOD PRESSURE: 117 MMHG | DIASTOLIC BLOOD PRESSURE: 64 MMHG | TEMPERATURE: 98 F | BODY MASS INDEX: 22.44 KG/M2 | OXYGEN SATURATION: 93 % | RESPIRATION RATE: 18 BRPM | HEIGHT: 67 IN

## 2021-05-26 DIAGNOSIS — Z01.818 PREOP TESTING: Primary | ICD-10-CM

## 2021-05-26 DIAGNOSIS — G91.2 NPH (NORMAL PRESSURE HYDROCEPHALUS): Primary | ICD-10-CM

## 2021-05-26 DIAGNOSIS — R26.9 GAIT ABNORMALITY: ICD-10-CM

## 2021-05-26 DIAGNOSIS — G93.89 CEREBRAL VENTRICULOMEGALY: ICD-10-CM

## 2021-05-26 LAB
APTT BLDCRRT: 28.2 SEC (ref 21–32)
BASOPHILS NFR CSF MANUAL: 2 %
CLARITY CSF: CLEAR
COLOR CSF: ABNORMAL
EOSINOPHIL NFR CSF MANUAL: 4 %
GLUCOSE CSF-MCNC: 81 MG/DL (ref 40–70)
LYMPHOCYTES NFR CSF MANUAL: 22 % (ref 40–80)
MONOS+MACROS NFR CSF MANUAL: 15 % (ref 15–45)
NEUTROPHILS NFR CSF MANUAL: 57 % (ref 0–6)
POCT GLUCOSE: 131 MG/DL (ref 70–110)
PROT CSF-MCNC: 47 MG/DL (ref 15–40)
RBC # CSF: 3 /CU MM
SPECIMEN VOL CSF: 10 ML
WBC # CSF: 10 /CU MM (ref 0–5)

## 2021-05-26 PROCEDURE — 87070 CULTURE OTHR SPECIMN AEROBIC: CPT | Mod: HCNC | Performed by: NEUROLOGICAL SURGERY

## 2021-05-26 PROCEDURE — 37000009 HC ANESTHESIA EA ADD 15 MINS: Mod: HCNC | Performed by: NEUROLOGICAL SURGERY

## 2021-05-26 PROCEDURE — 82962 GLUCOSE BLOOD TEST: CPT | Mod: HCNC | Performed by: NEUROLOGICAL SURGERY

## 2021-05-26 PROCEDURE — 37000008 HC ANESTHESIA 1ST 15 MINUTES: Mod: HCNC | Performed by: NEUROLOGICAL SURGERY

## 2021-05-26 PROCEDURE — 25000003 PHARM REV CODE 250: Mod: HCNC | Performed by: PHYSICIAN ASSISTANT

## 2021-05-26 PROCEDURE — 71000016 HC POSTOP RECOV ADDL HR: Mod: HCNC | Performed by: NEUROLOGICAL SURGERY

## 2021-05-26 PROCEDURE — 89051 BODY FLUID CELL COUNT: CPT | Mod: HCNC | Performed by: NEUROLOGICAL SURGERY

## 2021-05-26 PROCEDURE — 82945 GLUCOSE OTHER FLUID: CPT | Mod: HCNC | Performed by: NEUROLOGICAL SURGERY

## 2021-05-26 PROCEDURE — 63600175 PHARM REV CODE 636 W HCPCS: Mod: HCNC | Performed by: NURSE ANESTHETIST, CERTIFIED REGISTERED

## 2021-05-26 PROCEDURE — 36415 COLL VENOUS BLD VENIPUNCTURE: CPT | Mod: HCNC | Performed by: NEUROLOGICAL SURGERY

## 2021-05-26 PROCEDURE — 85730 THROMBOPLASTIN TIME PARTIAL: CPT | Mod: HCNC | Performed by: NEUROLOGICAL SURGERY

## 2021-05-26 PROCEDURE — 62272 PR SPINAL PUNCTURE,THERAPEUTIC DRAINAGE: ICD-10-PCS | Mod: HCNC,,, | Performed by: NEUROLOGICAL SURGERY

## 2021-05-26 PROCEDURE — 87205 SMEAR GRAM STAIN: CPT | Mod: HCNC | Performed by: NEUROLOGICAL SURGERY

## 2021-05-26 PROCEDURE — 25000003 PHARM REV CODE 250: Mod: HCNC | Performed by: NURSE ANESTHETIST, CERTIFIED REGISTERED

## 2021-05-26 PROCEDURE — 97161 PT EVAL LOW COMPLEX 20 MIN: CPT | Mod: HCNC

## 2021-05-26 PROCEDURE — 36000704 HC OR TIME LEV I 1ST 15 MIN: Mod: HCNC | Performed by: NEUROLOGICAL SURGERY

## 2021-05-26 PROCEDURE — 97530 THERAPEUTIC ACTIVITIES: CPT | Mod: HCNC

## 2021-05-26 PROCEDURE — 36000705 HC OR TIME LEV I EA ADD 15 MIN: Mod: HCNC | Performed by: NEUROLOGICAL SURGERY

## 2021-05-26 PROCEDURE — 71000015 HC POSTOP RECOV 1ST HR: Mod: HCNC | Performed by: NEUROLOGICAL SURGERY

## 2021-05-26 PROCEDURE — 84157 ASSAY OF PROTEIN OTHER: CPT | Mod: HCNC | Performed by: NEUROLOGICAL SURGERY

## 2021-05-26 PROCEDURE — 62272 THER SPI PNXR DRG CSF: CPT | Mod: HCNC,,, | Performed by: NEUROLOGICAL SURGERY

## 2021-05-26 RX ORDER — MUPIROCIN 20 MG/G
OINTMENT TOPICAL
Status: DISCONTINUED | OUTPATIENT
Start: 2021-05-26 | End: 2021-05-26 | Stop reason: HOSPADM

## 2021-05-26 RX ORDER — PROPOFOL 10 MG/ML
VIAL (ML) INTRAVENOUS
Status: DISCONTINUED | OUTPATIENT
Start: 2021-05-26 | End: 2021-05-26

## 2021-05-26 RX ORDER — HYDROMORPHONE HYDROCHLORIDE 2 MG/ML
0.4 INJECTION, SOLUTION INTRAMUSCULAR; INTRAVENOUS; SUBCUTANEOUS EVERY 5 MIN PRN
Status: CANCELLED | OUTPATIENT
Start: 2021-05-26

## 2021-05-26 RX ORDER — MUPIROCIN 20 MG/G
1 OINTMENT TOPICAL 2 TIMES DAILY
Status: DISCONTINUED | OUTPATIENT
Start: 2021-05-26 | End: 2021-05-26 | Stop reason: ALTCHOICE

## 2021-05-26 RX ORDER — ACETAMINOPHEN 325 MG/1
325 TABLET ORAL EVERY 6 HOURS PRN
Status: DISCONTINUED | OUTPATIENT
Start: 2021-05-26 | End: 2021-05-26 | Stop reason: HOSPADM

## 2021-05-26 RX ORDER — FENTANYL CITRATE 50 UG/ML
INJECTION, SOLUTION INTRAMUSCULAR; INTRAVENOUS
Status: DISCONTINUED | OUTPATIENT
Start: 2021-05-26 | End: 2021-05-26

## 2021-05-26 RX ORDER — MEPERIDINE HYDROCHLORIDE 25 MG/ML
12.5 INJECTION INTRAMUSCULAR; INTRAVENOUS; SUBCUTANEOUS ONCE AS NEEDED
Status: CANCELLED | OUTPATIENT
Start: 2021-05-26 | End: 2021-05-27

## 2021-05-26 RX ORDER — ONDANSETRON 2 MG/ML
4 INJECTION INTRAMUSCULAR; INTRAVENOUS DAILY PRN
Status: CANCELLED | OUTPATIENT
Start: 2021-05-26

## 2021-05-26 RX ORDER — LIDOCAINE HYDROCHLORIDE 10 MG/ML
INJECTION, SOLUTION INTRAVENOUS
Status: DISCONTINUED | OUTPATIENT
Start: 2021-05-26 | End: 2021-05-26

## 2021-05-26 RX ORDER — PHENYLEPHRINE HYDROCHLORIDE 10 MG/ML
INJECTION INTRAVENOUS
Status: DISCONTINUED | OUTPATIENT
Start: 2021-05-26 | End: 2021-05-26

## 2021-05-26 RX ORDER — MUPIROCIN 20 MG/G
OINTMENT TOPICAL 2 TIMES DAILY
Status: DISCONTINUED | OUTPATIENT
Start: 2021-05-26 | End: 2021-05-26 | Stop reason: HOSPADM

## 2021-05-26 RX ORDER — EPHEDRINE SULFATE 50 MG/ML
INJECTION, SOLUTION INTRAVENOUS
Status: DISCONTINUED | OUTPATIENT
Start: 2021-05-26 | End: 2021-05-26

## 2021-05-26 RX ORDER — ONDANSETRON 2 MG/ML
INJECTION INTRAMUSCULAR; INTRAVENOUS
Status: DISCONTINUED | OUTPATIENT
Start: 2021-05-26 | End: 2021-05-26

## 2021-05-26 RX ORDER — OXYCODONE HYDROCHLORIDE 5 MG/1
5 TABLET ORAL
Status: CANCELLED | OUTPATIENT
Start: 2021-05-26

## 2021-05-26 RX ORDER — SODIUM CHLORIDE 0.9 % (FLUSH) 0.9 %
3 SYRINGE (ML) INJECTION
Status: DISCONTINUED | OUTPATIENT
Start: 2021-05-26 | End: 2021-05-26 | Stop reason: HOSPADM

## 2021-05-26 RX ORDER — PROPOFOL 10 MG/ML
VIAL (ML) INTRAVENOUS CONTINUOUS PRN
Status: DISCONTINUED | OUTPATIENT
Start: 2021-05-26 | End: 2021-05-26

## 2021-05-26 RX ADMIN — EPHEDRINE SULFATE 15 MG: 50 INJECTION INTRAVENOUS at 08:05

## 2021-05-26 RX ADMIN — FENTANYL CITRATE 50 MCG: 50 INJECTION, SOLUTION INTRAMUSCULAR; INTRAVENOUS at 08:05

## 2021-05-26 RX ADMIN — LIDOCAINE HYDROCHLORIDE 50 MG: 10 INJECTION, SOLUTION INTRAVENOUS at 08:05

## 2021-05-26 RX ADMIN — PROPOFOL 30 MG: 10 INJECTION, EMULSION INTRAVENOUS at 08:05

## 2021-05-26 RX ADMIN — PHENYLEPHRINE HYDROCHLORIDE 100 MCG: 10 INJECTION INTRAVENOUS at 08:05

## 2021-05-26 RX ADMIN — EPHEDRINE SULFATE 10 MG: 50 INJECTION INTRAVENOUS at 08:05

## 2021-05-26 RX ADMIN — MUPIROCIN: 20 OINTMENT TOPICAL at 07:05

## 2021-05-26 RX ADMIN — ONDANSETRON HYDROCHLORIDE 4 MG: 2 INJECTION INTRAMUSCULAR; INTRAVENOUS at 08:05

## 2021-05-26 RX ADMIN — DEXTROSE 2 G: 50 INJECTION, SOLUTION INTRAVENOUS at 08:05

## 2021-05-26 RX ADMIN — PROPOFOL 50 MCG/KG/MIN: 10 INJECTION, EMULSION INTRAVENOUS at 08:05

## 2021-05-26 RX ADMIN — PROPOFOL 20 MG: 10 INJECTION, EMULSION INTRAVENOUS at 08:05

## 2021-05-27 ENCOUNTER — PATIENT MESSAGE (OUTPATIENT)
Dept: NEUROSURGERY | Facility: CLINIC | Age: 86
End: 2021-05-27

## 2021-05-27 ENCOUNTER — OFFICE VISIT (OUTPATIENT)
Dept: SPINE | Facility: CLINIC | Age: 86
End: 2021-05-27
Payer: MEDICARE

## 2021-05-27 ENCOUNTER — PATIENT MESSAGE (OUTPATIENT)
Dept: SURGERY | Facility: HOSPITAL | Age: 86
End: 2021-05-27

## 2021-05-27 ENCOUNTER — TELEPHONE (OUTPATIENT)
Dept: NEUROSURGERY | Facility: CLINIC | Age: 86
End: 2021-05-27

## 2021-05-27 DIAGNOSIS — G91.2 NPH (NORMAL PRESSURE HYDROCEPHALUS): Primary | ICD-10-CM

## 2021-05-27 DIAGNOSIS — G91.2 NORMAL PRESSURE HYDROCEPHALUS: Primary | ICD-10-CM

## 2021-05-27 PROCEDURE — 99442 PR PHYSICIAN TELEPHONE EVALUATION 11-20 MIN: ICD-10-PCS | Mod: HCNC,95,, | Performed by: NEUROLOGICAL SURGERY

## 2021-05-27 PROCEDURE — 99442 PR PHYSICIAN TELEPHONE EVALUATION 11-20 MIN: CPT | Mod: HCNC,95,, | Performed by: NEUROLOGICAL SURGERY

## 2021-05-28 ENCOUNTER — ANESTHESIA EVENT (OUTPATIENT)
Dept: SURGERY | Facility: HOSPITAL | Age: 86
DRG: 032 | End: 2021-05-28
Payer: MEDICARE

## 2021-05-28 RX ORDER — CLOPIDOGREL BISULFATE 75 MG/1
75 TABLET ORAL DAILY
Status: ON HOLD | COMMUNITY
End: 2021-06-03 | Stop reason: HOSPADM

## 2021-05-28 RX ORDER — ASPIRIN 81 MG/1
81 TABLET ORAL DAILY
Status: ON HOLD | COMMUNITY
End: 2021-06-03 | Stop reason: HOSPADM

## 2021-05-30 ENCOUNTER — TELEPHONE (OUTPATIENT)
Dept: FAMILY MEDICINE | Facility: CLINIC | Age: 86
End: 2021-05-30

## 2021-05-30 ENCOUNTER — PATIENT MESSAGE (OUTPATIENT)
Dept: SURGERY | Facility: HOSPITAL | Age: 86
End: 2021-05-30

## 2021-05-30 RX ORDER — OXYCODONE AND ACETAMINOPHEN 5; 325 MG/1; MG/1
1 TABLET ORAL EVERY 4 HOURS PRN
Qty: 30 TABLET | Refills: 0 | Status: SHIPPED | OUTPATIENT
Start: 2021-05-30 | End: 2021-06-03 | Stop reason: HOSPADM

## 2021-05-31 ENCOUNTER — HOSPITAL ENCOUNTER (INPATIENT)
Facility: HOSPITAL | Age: 86
LOS: 7 days | Discharge: NURSING, REHAB OR LONG-TERM CARE | DRG: 032 | End: 2021-06-07
Attending: NEUROLOGICAL SURGERY | Admitting: NEUROLOGICAL SURGERY
Payer: MEDICARE

## 2021-05-31 ENCOUNTER — ANESTHESIA (OUTPATIENT)
Dept: SURGERY | Facility: HOSPITAL | Age: 86
DRG: 032 | End: 2021-05-31
Payer: MEDICARE

## 2021-05-31 ENCOUNTER — HOSPITAL ENCOUNTER (OUTPATIENT)
Dept: RADIOLOGY | Facility: HOSPITAL | Age: 86
Discharge: HOME | DRG: 032 | End: 2021-05-31
Attending: NEUROLOGICAL SURGERY | Admitting: NEUROLOGICAL SURGERY
Payer: MEDICARE

## 2021-05-31 DIAGNOSIS — G91.2 NORMAL PRESSURE HYDROCEPHALUS: Primary | ICD-10-CM

## 2021-05-31 DIAGNOSIS — R07.9 CHEST PAIN: ICD-10-CM

## 2021-05-31 DIAGNOSIS — G91.2 NPH (NORMAL PRESSURE HYDROCEPHALUS): ICD-10-CM

## 2021-05-31 DIAGNOSIS — Z74.09 IMPAIRED MOBILITY AND ADLS: ICD-10-CM

## 2021-05-31 DIAGNOSIS — Z78.9 IMPAIRED MOBILITY AND ADLS: ICD-10-CM

## 2021-05-31 LAB
BACTERIA CSF CULT: NO GROWTH
GRAM STN SPEC: NORMAL
POCT GLUCOSE: 140 MG/DL (ref 70–110)
POCT GLUCOSE: 170 MG/DL (ref 70–110)

## 2021-05-31 PROCEDURE — 27800903 OPTIME MED/SURG SUP & DEVICES OTHER IMPLANTS: Mod: HCNC | Performed by: NEUROLOGICAL SURGERY

## 2021-05-31 PROCEDURE — 61781 PR STEREOTACTIC COMP ASSIST PROC,CRANIAL,INTRADURAL: ICD-10-PCS | Mod: HCNC,,, | Performed by: NEUROLOGICAL SURGERY

## 2021-05-31 PROCEDURE — 62223 ESTABLISH BRAIN CAVITY SHUNT: CPT | Mod: 62,,, | Performed by: SURGERY

## 2021-05-31 PROCEDURE — 25000003 PHARM REV CODE 250: Mod: HCNC | Performed by: SURGERY

## 2021-05-31 PROCEDURE — D9220A PRA ANESTHESIA: ICD-10-PCS | Mod: HCNC,,, | Performed by: ANESTHESIOLOGY

## 2021-05-31 PROCEDURE — 11000001 HC ACUTE MED/SURG PRIVATE ROOM: Mod: HCNC

## 2021-05-31 PROCEDURE — 94667 MNPJ CHEST WALL 1ST: CPT | Mod: HCNC

## 2021-05-31 PROCEDURE — 25000003 PHARM REV CODE 250: Mod: HCNC | Performed by: STUDENT IN AN ORGANIZED HEALTH CARE EDUCATION/TRAINING PROGRAM

## 2021-05-31 PROCEDURE — 62223 PR CREATE SHUNT:VENTRIC-PERITONEAL: ICD-10-PCS | Mod: 62,HCNC,, | Performed by: NEUROLOGICAL SURGERY

## 2021-05-31 PROCEDURE — 71000033 HC RECOVERY, INTIAL HOUR: Mod: HCNC | Performed by: NEUROLOGICAL SURGERY

## 2021-05-31 PROCEDURE — 36000712 HC OR TIME LEV V 1ST 15 MIN: Mod: HCNC | Performed by: NEUROLOGICAL SURGERY

## 2021-05-31 PROCEDURE — 71000015 HC POSTOP RECOV 1ST HR: Mod: HCNC | Performed by: NEUROLOGICAL SURGERY

## 2021-05-31 PROCEDURE — 82962 GLUCOSE BLOOD TEST: CPT | Mod: HCNC | Performed by: NEUROLOGICAL SURGERY

## 2021-05-31 PROCEDURE — 62223 ESTABLISH BRAIN CAVITY SHUNT: CPT | Mod: 62,HCNC,, | Performed by: NEUROLOGICAL SURGERY

## 2021-05-31 PROCEDURE — 63600175 PHARM REV CODE 636 W HCPCS: Mod: HCNC | Performed by: STUDENT IN AN ORGANIZED HEALTH CARE EDUCATION/TRAINING PROGRAM

## 2021-05-31 PROCEDURE — 37000008 HC ANESTHESIA 1ST 15 MINUTES: Mod: HCNC | Performed by: NEUROLOGICAL SURGERY

## 2021-05-31 PROCEDURE — 25000242 PHARM REV CODE 250 ALT 637 W/ HCPCS: Mod: HCNC | Performed by: STUDENT IN AN ORGANIZED HEALTH CARE EDUCATION/TRAINING PROGRAM

## 2021-05-31 PROCEDURE — 71000016 HC POSTOP RECOV ADDL HR: Mod: HCNC | Performed by: NEUROLOGICAL SURGERY

## 2021-05-31 PROCEDURE — 70450 CT HEAD/BRAIN W/O DYE: CPT | Mod: TC,HCNC

## 2021-05-31 PROCEDURE — 36000713 HC OR TIME LEV V EA ADD 15 MIN: Mod: HCNC | Performed by: NEUROLOGICAL SURGERY

## 2021-05-31 PROCEDURE — 99900035 HC TECH TIME PER 15 MIN (STAT): Mod: HCNC

## 2021-05-31 PROCEDURE — 63600175 PHARM REV CODE 636 W HCPCS: Mod: HCNC | Performed by: NEUROLOGICAL SURGERY

## 2021-05-31 PROCEDURE — 62223 PR CREATE SHUNT:VENTRIC-PERITONEAL: ICD-10-PCS | Mod: 62,,, | Performed by: SURGERY

## 2021-05-31 PROCEDURE — 94640 AIRWAY INHALATION TREATMENT: CPT | Mod: HCNC

## 2021-05-31 PROCEDURE — 61781 SCAN PROC CRANIAL INTRA: CPT | Mod: HCNC,,, | Performed by: NEUROLOGICAL SURGERY

## 2021-05-31 PROCEDURE — 27201423 OPTIME MED/SURG SUP & DEVICES STERILE SUPPLY: Mod: HCNC | Performed by: NEUROLOGICAL SURGERY

## 2021-05-31 PROCEDURE — 25000003 PHARM REV CODE 250: Mod: HCNC | Performed by: NEUROLOGICAL SURGERY

## 2021-05-31 PROCEDURE — D9220A PRA ANESTHESIA: Mod: HCNC,,, | Performed by: ANESTHESIOLOGY

## 2021-05-31 PROCEDURE — 70450 CT HEAD/BRAIN W/O DYE: CPT | Mod: 26,HCNC,, | Performed by: RADIOLOGY

## 2021-05-31 PROCEDURE — 70450 CT HEAD STEALTH W/O CONTRAST: ICD-10-PCS | Mod: 26,HCNC,, | Performed by: RADIOLOGY

## 2021-05-31 PROCEDURE — C1729 CATH, DRAINAGE: HCPCS | Mod: HCNC | Performed by: NEUROLOGICAL SURGERY

## 2021-05-31 PROCEDURE — 25000003 PHARM REV CODE 250: Mod: HCNC

## 2021-05-31 PROCEDURE — 37000009 HC ANESTHESIA EA ADD 15 MINS: Mod: HCNC | Performed by: NEUROLOGICAL SURGERY

## 2021-05-31 PROCEDURE — 27100108: Mod: HCNC

## 2021-05-31 PROCEDURE — 94761 N-INVAS EAR/PLS OXIMETRY MLT: CPT | Mod: HCNC

## 2021-05-31 PROCEDURE — 71000039 HC RECOVERY, EACH ADD'L HOUR: Mod: HCNC | Performed by: NEUROLOGICAL SURGERY

## 2021-05-31 DEVICE — KIT CATH WITH BACTISEAL: Type: IMPLANTABLE DEVICE | Site: BRAIN | Status: FUNCTIONAL

## 2021-05-31 DEVICE — SHUNT PROGRAMMABLE CHPU: Type: IMPLANTABLE DEVICE | Site: BRAIN | Status: FUNCTIONAL

## 2021-05-31 RX ORDER — FENTANYL CITRATE 50 UG/ML
INJECTION, SOLUTION INTRAMUSCULAR; INTRAVENOUS
Status: DISCONTINUED | OUTPATIENT
Start: 2021-05-31 | End: 2021-05-31

## 2021-05-31 RX ORDER — LIDOCAINE HYDROCHLORIDE AND EPINEPHRINE 10; 10 MG/ML; UG/ML
INJECTION, SOLUTION INFILTRATION; PERINEURAL
Status: DISCONTINUED | OUTPATIENT
Start: 2021-05-31 | End: 2021-05-31 | Stop reason: HOSPADM

## 2021-05-31 RX ORDER — CEFTRIAXONE 1 G/1
INJECTION, POWDER, FOR SOLUTION INTRAMUSCULAR; INTRAVENOUS
Status: DISCONTINUED | OUTPATIENT
Start: 2021-05-31 | End: 2021-05-31 | Stop reason: HOSPADM

## 2021-05-31 RX ORDER — MEPERIDINE HYDROCHLORIDE 50 MG/ML
12.5 INJECTION INTRAMUSCULAR; INTRAVENOUS; SUBCUTANEOUS ONCE AS NEEDED
Status: DISCONTINUED | OUTPATIENT
Start: 2021-05-31 | End: 2021-05-31 | Stop reason: HOSPADM

## 2021-05-31 RX ORDER — NICOTINE 7MG/24HR
1 PATCH, TRANSDERMAL 24 HOURS TRANSDERMAL DAILY
Status: DISCONTINUED | OUTPATIENT
Start: 2021-06-01 | End: 2021-06-08 | Stop reason: HOSPADM

## 2021-05-31 RX ORDER — IBUPROFEN 200 MG
16 TABLET ORAL
Status: DISCONTINUED | OUTPATIENT
Start: 2021-05-31 | End: 2021-06-08 | Stop reason: HOSPADM

## 2021-05-31 RX ORDER — HYDROCODONE BITARTRATE AND ACETAMINOPHEN 5; 325 MG/1; MG/1
1 TABLET ORAL EVERY 4 HOURS PRN
Status: DISCONTINUED | OUTPATIENT
Start: 2021-05-31 | End: 2021-06-08 | Stop reason: HOSPADM

## 2021-05-31 RX ORDER — TAMSULOSIN HYDROCHLORIDE 0.4 MG/1
1 CAPSULE ORAL DAILY
Status: DISCONTINUED | OUTPATIENT
Start: 2021-06-01 | End: 2021-06-08 | Stop reason: HOSPADM

## 2021-05-31 RX ORDER — DOCUSATE SODIUM 100 MG/1
100 CAPSULE, LIQUID FILLED ORAL 2 TIMES DAILY
Status: DISCONTINUED | OUTPATIENT
Start: 2021-05-31 | End: 2021-06-08 | Stop reason: HOSPADM

## 2021-05-31 RX ORDER — CEFAZOLIN SODIUM 1 G/3ML
INJECTION, POWDER, FOR SOLUTION INTRAMUSCULAR; INTRAVENOUS
Status: DISCONTINUED | OUTPATIENT
Start: 2021-05-31 | End: 2021-05-31

## 2021-05-31 RX ORDER — ONDANSETRON 2 MG/ML
4 INJECTION INTRAMUSCULAR; INTRAVENOUS ONCE AS NEEDED
Status: DISCONTINUED | OUTPATIENT
Start: 2021-05-31 | End: 2021-05-31 | Stop reason: HOSPADM

## 2021-05-31 RX ORDER — HYDROMORPHONE HYDROCHLORIDE 1 MG/ML
1 INJECTION, SOLUTION INTRAMUSCULAR; INTRAVENOUS; SUBCUTANEOUS EVERY 4 HOURS PRN
Status: DISCONTINUED | OUTPATIENT
Start: 2021-05-31 | End: 2021-06-01

## 2021-05-31 RX ORDER — BACITRACIN ZINC 500 UNIT/G
OINTMENT (GRAM) TOPICAL
Status: DISCONTINUED | OUTPATIENT
Start: 2021-05-31 | End: 2021-05-31 | Stop reason: HOSPADM

## 2021-05-31 RX ORDER — PROPOFOL 10 MG/ML
VIAL (ML) INTRAVENOUS
Status: DISCONTINUED | OUTPATIENT
Start: 2021-05-31 | End: 2021-05-31

## 2021-05-31 RX ORDER — ONDANSETRON 2 MG/ML
4 INJECTION INTRAMUSCULAR; INTRAVENOUS EVERY 6 HOURS PRN
Status: DISCONTINUED | OUTPATIENT
Start: 2021-05-31 | End: 2021-06-08 | Stop reason: HOSPADM

## 2021-05-31 RX ORDER — VANCOMYCIN HYDROCHLORIDE 1 G/20ML
INJECTION, POWDER, LYOPHILIZED, FOR SOLUTION INTRAVENOUS
Status: DISCONTINUED | OUTPATIENT
Start: 2021-05-31 | End: 2021-05-31 | Stop reason: HOSPADM

## 2021-05-31 RX ORDER — ROSUVASTATIN CALCIUM 20 MG/1
20 TABLET, COATED ORAL DAILY
Status: DISCONTINUED | OUTPATIENT
Start: 2021-05-31 | End: 2021-06-08 | Stop reason: HOSPADM

## 2021-05-31 RX ORDER — LIDOCAINE HCL/PF 100 MG/5ML
SYRINGE (ML) INTRAVENOUS
Status: DISCONTINUED | OUTPATIENT
Start: 2021-05-31 | End: 2021-05-31

## 2021-05-31 RX ORDER — HYDROMORPHONE HYDROCHLORIDE 1 MG/ML
0.4 INJECTION, SOLUTION INTRAMUSCULAR; INTRAVENOUS; SUBCUTANEOUS EVERY 5 MIN PRN
Status: DISCONTINUED | OUTPATIENT
Start: 2021-05-31 | End: 2021-05-31 | Stop reason: HOSPADM

## 2021-05-31 RX ORDER — MUPIROCIN 20 MG/G
1 OINTMENT TOPICAL 2 TIMES DAILY
Status: DISCONTINUED | OUTPATIENT
Start: 2021-05-31 | End: 2021-05-31 | Stop reason: HOSPADM

## 2021-05-31 RX ORDER — LABETALOL HCL 20 MG/4 ML
10 SYRINGE (ML) INTRAVENOUS EVERY 10 MIN PRN
Status: DISCONTINUED | OUTPATIENT
Start: 2021-05-31 | End: 2021-06-08 | Stop reason: HOSPADM

## 2021-05-31 RX ORDER — LABETALOL HCL 20 MG/4 ML
SYRINGE (ML) INTRAVENOUS
Status: COMPLETED
Start: 2021-05-31 | End: 2021-05-31

## 2021-05-31 RX ORDER — FENTANYL CITRATE 50 UG/ML
25 INJECTION, SOLUTION INTRAMUSCULAR; INTRAVENOUS EVERY 5 MIN PRN
Status: DISCONTINUED | OUTPATIENT
Start: 2021-05-31 | End: 2021-05-31 | Stop reason: HOSPADM

## 2021-05-31 RX ORDER — METHOCARBAMOL 750 MG/1
750 TABLET, FILM COATED ORAL EVERY 8 HOURS PRN
Status: DISCONTINUED | OUTPATIENT
Start: 2021-05-31 | End: 2021-06-08 | Stop reason: HOSPADM

## 2021-05-31 RX ORDER — OXYCODONE HYDROCHLORIDE 5 MG/1
5 TABLET ORAL
Status: DISCONTINUED | OUTPATIENT
Start: 2021-05-31 | End: 2021-05-31 | Stop reason: HOSPADM

## 2021-05-31 RX ORDER — GENTAMICIN SULFATE 40 MG/ML
INJECTION, SOLUTION INTRAMUSCULAR; INTRAVENOUS
Status: DISCONTINUED | OUTPATIENT
Start: 2021-05-31 | End: 2021-05-31 | Stop reason: HOSPADM

## 2021-05-31 RX ORDER — CEFAZOLIN SODIUM 1 G/3ML
1 INJECTION, POWDER, FOR SOLUTION INTRAMUSCULAR; INTRAVENOUS
Status: COMPLETED | OUTPATIENT
Start: 2021-05-31 | End: 2021-06-01

## 2021-05-31 RX ORDER — BUPIVACAINE HYDROCHLORIDE 5 MG/ML
INJECTION, SOLUTION EPIDURAL; INTRACAUDAL
Status: DISCONTINUED | OUTPATIENT
Start: 2021-05-31 | End: 2021-05-31 | Stop reason: HOSPADM

## 2021-05-31 RX ORDER — MUPIROCIN 20 MG/G
OINTMENT TOPICAL
Status: DISCONTINUED | OUTPATIENT
Start: 2021-05-31 | End: 2021-05-31 | Stop reason: HOSPADM

## 2021-05-31 RX ORDER — HYDROMORPHONE HYDROCHLORIDE 1 MG/ML
0.2 INJECTION, SOLUTION INTRAMUSCULAR; INTRAVENOUS; SUBCUTANEOUS EVERY 5 MIN PRN
Status: DISCONTINUED | OUTPATIENT
Start: 2021-05-31 | End: 2021-05-31 | Stop reason: HOSPADM

## 2021-05-31 RX ORDER — ONDANSETRON 2 MG/ML
4 INJECTION INTRAMUSCULAR; INTRAVENOUS DAILY PRN
Status: DISCONTINUED | OUTPATIENT
Start: 2021-05-31 | End: 2021-05-31 | Stop reason: HOSPADM

## 2021-05-31 RX ORDER — IBUPROFEN 200 MG
24 TABLET ORAL
Status: DISCONTINUED | OUTPATIENT
Start: 2021-05-31 | End: 2021-06-08 | Stop reason: HOSPADM

## 2021-05-31 RX ORDER — NEOSTIGMINE METHYLSULFATE 1 MG/ML
INJECTION, SOLUTION INTRAVENOUS
Status: DISCONTINUED | OUTPATIENT
Start: 2021-05-31 | End: 2021-05-31

## 2021-05-31 RX ORDER — ACETAMINOPHEN 325 MG/1
650 TABLET ORAL EVERY 4 HOURS PRN
Status: DISCONTINUED | OUTPATIENT
Start: 2021-05-31 | End: 2021-06-08 | Stop reason: HOSPADM

## 2021-05-31 RX ORDER — ROCURONIUM BROMIDE 10 MG/ML
INJECTION, SOLUTION INTRAVENOUS
Status: DISCONTINUED | OUTPATIENT
Start: 2021-05-31 | End: 2021-05-31

## 2021-05-31 RX ORDER — ONDANSETRON 2 MG/ML
INJECTION INTRAMUSCULAR; INTRAVENOUS
Status: DISCONTINUED | OUTPATIENT
Start: 2021-05-31 | End: 2021-05-31

## 2021-05-31 RX ORDER — HEPARIN SODIUM 5000 [USP'U]/ML
5000 INJECTION, SOLUTION INTRAVENOUS; SUBCUTANEOUS EVERY 8 HOURS
Status: DISCONTINUED | OUTPATIENT
Start: 2021-06-01 | End: 2021-06-08 | Stop reason: HOSPADM

## 2021-05-31 RX ORDER — DEXAMETHASONE SODIUM PHOSPHATE 4 MG/ML
INJECTION, SOLUTION INTRA-ARTICULAR; INTRALESIONAL; INTRAMUSCULAR; INTRAVENOUS; SOFT TISSUE
Status: DISCONTINUED | OUTPATIENT
Start: 2021-05-31 | End: 2021-05-31

## 2021-05-31 RX ORDER — PHENYLEPHRINE HYDROCHLORIDE 10 MG/ML
INJECTION INTRAVENOUS
Status: DISCONTINUED | OUTPATIENT
Start: 2021-05-31 | End: 2021-05-31

## 2021-05-31 RX ORDER — IPRATROPIUM BROMIDE AND ALBUTEROL SULFATE 2.5; .5 MG/3ML; MG/3ML
3 SOLUTION RESPIRATORY (INHALATION)
Status: DISCONTINUED | OUTPATIENT
Start: 2021-05-31 | End: 2021-06-08 | Stop reason: HOSPADM

## 2021-05-31 RX ORDER — INSULIN ASPART 100 [IU]/ML
0-5 INJECTION, SOLUTION INTRAVENOUS; SUBCUTANEOUS
Status: DISCONTINUED | OUTPATIENT
Start: 2021-05-31 | End: 2021-06-08 | Stop reason: HOSPADM

## 2021-05-31 RX ORDER — ACETAMINOPHEN 10 MG/ML
INJECTION, SOLUTION INTRAVENOUS
Status: DISCONTINUED | OUTPATIENT
Start: 2021-05-31 | End: 2021-05-31

## 2021-05-31 RX ORDER — SODIUM CHLORIDE 9 MG/ML
INJECTION, SOLUTION INTRAVENOUS CONTINUOUS
Status: DISCONTINUED | OUTPATIENT
Start: 2021-05-31 | End: 2021-06-03

## 2021-05-31 RX ORDER — CEFAZOLIN SODIUM 1 G/3ML
2 INJECTION, POWDER, FOR SOLUTION INTRAMUSCULAR; INTRAVENOUS
Status: DISCONTINUED | OUTPATIENT
Start: 2021-05-31 | End: 2021-05-31

## 2021-05-31 RX ADMIN — Medication 10 MG: at 03:05

## 2021-05-31 RX ADMIN — PHENYLEPHRINE HYDROCHLORIDE 100 MCG: 10 INJECTION INTRAVENOUS at 02:05

## 2021-05-31 RX ADMIN — DEXAMETHASONE SODIUM PHOSPHATE 8 MG: 4 INJECTION, SOLUTION INTRAMUSCULAR; INTRAVENOUS at 01:05

## 2021-05-31 RX ADMIN — PHENYLEPHRINE HYDROCHLORIDE 200 MCG: 10 INJECTION INTRAVENOUS at 02:05

## 2021-05-31 RX ADMIN — PROPOFOL 20 MG: 10 INJECTION, EMULSION INTRAVENOUS at 02:05

## 2021-05-31 RX ADMIN — GLYCOPYRROLATE 0.5 MG: 0.2 INJECTION, SOLUTION INTRAMUSCULAR; INTRAVITREAL at 02:05

## 2021-05-31 RX ADMIN — CEFAZOLIN 1 G: 330 INJECTION, POWDER, FOR SOLUTION INTRAMUSCULAR; INTRAVENOUS at 09:05

## 2021-05-31 RX ADMIN — FENTANYL CITRATE 50 MCG: 50 INJECTION, SOLUTION INTRAMUSCULAR; INTRAVENOUS at 01:05

## 2021-05-31 RX ADMIN — FENTANYL CITRATE 25 MCG: 50 INJECTION, SOLUTION INTRAMUSCULAR; INTRAVENOUS at 01:05

## 2021-05-31 RX ADMIN — PROPOFOL 80 MG: 10 INJECTION, EMULSION INTRAVENOUS at 01:05

## 2021-05-31 RX ADMIN — IPRATROPIUM BROMIDE AND ALBUTEROL SULFATE 3 ML: .5; 2.5 SOLUTION RESPIRATORY (INHALATION) at 08:05

## 2021-05-31 RX ADMIN — ONDANSETRON 4 MG: 2 INJECTION INTRAMUSCULAR; INTRAVENOUS at 02:05

## 2021-05-31 RX ADMIN — CEFAZOLIN 2 G: 330 INJECTION, POWDER, FOR SOLUTION INTRAMUSCULAR; INTRAVENOUS at 01:05

## 2021-05-31 RX ADMIN — NEOSTIGMINE METHYLSULFATE 4.5 MG: 1 INJECTION INTRAVENOUS at 02:05

## 2021-05-31 RX ADMIN — ACETAMINOPHEN 1000 MG: 10 INJECTION, SOLUTION INTRAVENOUS at 02:05

## 2021-05-31 RX ADMIN — LABETALOL HYDROCHLORIDE 10 MG: 5 INJECTION, SOLUTION INTRAVENOUS at 03:05

## 2021-05-31 RX ADMIN — LIDOCAINE HYDROCHLORIDE 60 MG: 20 INJECTION, SOLUTION INTRAVENOUS at 01:05

## 2021-05-31 RX ADMIN — MUPIROCIN 1 G: 20 OINTMENT TOPICAL at 11:05

## 2021-05-31 RX ADMIN — ROCURONIUM BROMIDE 50 MG: 10 INJECTION, SOLUTION INTRAVENOUS at 01:05

## 2021-05-31 RX ADMIN — PHENYLEPHRINE HYDROCHLORIDE 50 MCG: 10 INJECTION INTRAVENOUS at 01:05

## 2021-05-31 RX ADMIN — DOCUSATE SODIUM 100 MG: 100 CAPSULE, LIQUID FILLED ORAL at 09:05

## 2021-05-31 RX ADMIN — PHENYLEPHRINE HYDROCHLORIDE 100 MCG: 10 INJECTION INTRAVENOUS at 01:05

## 2021-05-31 RX ADMIN — SODIUM CHLORIDE: 9 INJECTION, SOLUTION INTRAVENOUS at 12:05

## 2021-06-01 ENCOUNTER — OUTPATIENT CASE MANAGEMENT (OUTPATIENT)
Dept: ADMINISTRATIVE | Facility: OTHER | Age: 86
End: 2021-06-01

## 2021-06-01 LAB
ANION GAP SERPL CALC-SCNC: 12 MMOL/L (ref 8–16)
BACTERIA #/AREA URNS AUTO: ABNORMAL /HPF
BASOPHILS # BLD AUTO: 0.01 K/UL (ref 0–0.2)
BASOPHILS NFR BLD: 0.1 % (ref 0–1.9)
BILIRUB UR QL STRIP: NEGATIVE
BUN SERPL-MCNC: 12 MG/DL (ref 8–23)
CALCIUM SERPL-MCNC: 8.9 MG/DL (ref 8.7–10.5)
CHLORIDE SERPL-SCNC: 104 MMOL/L (ref 95–110)
CLARITY UR REFRACT.AUTO: CLEAR
CO2 SERPL-SCNC: 20 MMOL/L (ref 23–29)
COLOR UR AUTO: YELLOW
CREAT SERPL-MCNC: 1 MG/DL (ref 0.5–1.4)
DIFFERENTIAL METHOD: ABNORMAL
EOSINOPHIL # BLD AUTO: 0 K/UL (ref 0–0.5)
EOSINOPHIL NFR BLD: 0 % (ref 0–8)
ERYTHROCYTE [DISTWIDTH] IN BLOOD BY AUTOMATED COUNT: 14.2 % (ref 11.5–14.5)
EST. GFR  (AFRICAN AMERICAN): >60 ML/MIN/1.73 M^2
EST. GFR  (NON AFRICAN AMERICAN): >60 ML/MIN/1.73 M^2
GLUCOSE SERPL-MCNC: 132 MG/DL (ref 70–110)
GLUCOSE UR QL STRIP: NEGATIVE
HCT VFR BLD AUTO: 34.6 % (ref 40–54)
HGB BLD-MCNC: 11.5 G/DL (ref 14–18)
HGB UR QL STRIP: ABNORMAL
IMM GRANULOCYTES # BLD AUTO: 0.03 K/UL (ref 0–0.04)
IMM GRANULOCYTES NFR BLD AUTO: 0.3 % (ref 0–0.5)
KETONES UR QL STRIP: ABNORMAL
LEUKOCYTE ESTERASE UR QL STRIP: NEGATIVE
LYMPHOCYTES # BLD AUTO: 0.8 K/UL (ref 1–4.8)
LYMPHOCYTES NFR BLD: 7.2 % (ref 18–48)
MCH RBC QN AUTO: 32.1 PG (ref 27–31)
MCHC RBC AUTO-ENTMCNC: 33.2 G/DL (ref 32–36)
MCV RBC AUTO: 97 FL (ref 82–98)
MICROSCOPIC COMMENT: ABNORMAL
MONOCYTES # BLD AUTO: 0.9 K/UL (ref 0.3–1)
MONOCYTES NFR BLD: 8.1 % (ref 4–15)
NEUTROPHILS # BLD AUTO: 9.7 K/UL (ref 1.8–7.7)
NEUTROPHILS NFR BLD: 84.3 % (ref 38–73)
NITRITE UR QL STRIP: NEGATIVE
NRBC BLD-RTO: 0 /100 WBC
PH UR STRIP: 5 [PH] (ref 5–8)
PLATELET # BLD AUTO: 256 K/UL (ref 150–450)
PMV BLD AUTO: 9.9 FL (ref 9.2–12.9)
POCT GLUCOSE: 113 MG/DL (ref 70–110)
POCT GLUCOSE: 138 MG/DL (ref 70–110)
POCT GLUCOSE: 148 MG/DL (ref 70–110)
POCT GLUCOSE: 155 MG/DL (ref 70–110)
POTASSIUM SERPL-SCNC: 5 MMOL/L (ref 3.5–5.1)
PROT UR QL STRIP: NEGATIVE
RBC # BLD AUTO: 3.58 M/UL (ref 4.6–6.2)
RBC #/AREA URNS AUTO: >100 /HPF (ref 0–4)
SODIUM SERPL-SCNC: 136 MMOL/L (ref 136–145)
SP GR UR STRIP: 1.01 (ref 1–1.03)
SQUAMOUS #/AREA URNS AUTO: 0 /HPF
URN SPEC COLLECT METH UR: ABNORMAL
WBC # BLD AUTO: 11.53 K/UL (ref 3.9–12.7)
WBC #/AREA URNS AUTO: 4 /HPF (ref 0–5)

## 2021-06-01 PROCEDURE — 97162 PT EVAL MOD COMPLEX 30 MIN: CPT | Mod: HCNC

## 2021-06-01 PROCEDURE — 63600175 PHARM REV CODE 636 W HCPCS: Mod: HCNC | Performed by: STUDENT IN AN ORGANIZED HEALTH CARE EDUCATION/TRAINING PROGRAM

## 2021-06-01 PROCEDURE — 27000221 HC OXYGEN, UP TO 24 HOURS: Mod: HCNC

## 2021-06-01 PROCEDURE — 25000003 PHARM REV CODE 250: Mod: HCNC | Performed by: STUDENT IN AN ORGANIZED HEALTH CARE EDUCATION/TRAINING PROGRAM

## 2021-06-01 PROCEDURE — 36415 COLL VENOUS BLD VENIPUNCTURE: CPT | Mod: HCNC | Performed by: STUDENT IN AN ORGANIZED HEALTH CARE EDUCATION/TRAINING PROGRAM

## 2021-06-01 PROCEDURE — 99024 POSTOP FOLLOW-UP VISIT: CPT | Mod: HCNC,,, | Performed by: PHYSICIAN ASSISTANT

## 2021-06-01 PROCEDURE — 99024 PR POST-OP FOLLOW-UP VISIT: ICD-10-PCS | Mod: HCNC,,, | Performed by: PHYSICIAN ASSISTANT

## 2021-06-01 PROCEDURE — 85025 COMPLETE CBC W/AUTO DIFF WBC: CPT | Mod: HCNC | Performed by: STUDENT IN AN ORGANIZED HEALTH CARE EDUCATION/TRAINING PROGRAM

## 2021-06-01 PROCEDURE — 25000242 PHARM REV CODE 250 ALT 637 W/ HCPCS: Mod: HCNC | Performed by: STUDENT IN AN ORGANIZED HEALTH CARE EDUCATION/TRAINING PROGRAM

## 2021-06-01 PROCEDURE — 81001 URINALYSIS AUTO W/SCOPE: CPT | Mod: HCNC | Performed by: NEUROLOGICAL SURGERY

## 2021-06-01 PROCEDURE — 94640 AIRWAY INHALATION TREATMENT: CPT | Mod: HCNC

## 2021-06-01 PROCEDURE — 94761 N-INVAS EAR/PLS OXIMETRY MLT: CPT | Mod: HCNC

## 2021-06-01 PROCEDURE — 80048 BASIC METABOLIC PNL TOTAL CA: CPT | Mod: HCNC | Performed by: STUDENT IN AN ORGANIZED HEALTH CARE EDUCATION/TRAINING PROGRAM

## 2021-06-01 PROCEDURE — S4991 NICOTINE PATCH NONLEGEND: HCPCS | Mod: HCNC | Performed by: STUDENT IN AN ORGANIZED HEALTH CARE EDUCATION/TRAINING PROGRAM

## 2021-06-01 PROCEDURE — 99900035 HC TECH TIME PER 15 MIN (STAT): Mod: HCNC

## 2021-06-01 PROCEDURE — 94668 MNPJ CHEST WALL SBSQ: CPT | Mod: HCNC

## 2021-06-01 PROCEDURE — 97530 THERAPEUTIC ACTIVITIES: CPT | Mod: HCNC

## 2021-06-01 PROCEDURE — 97116 GAIT TRAINING THERAPY: CPT | Mod: HCNC

## 2021-06-01 PROCEDURE — 11000001 HC ACUTE MED/SURG PRIVATE ROOM: Mod: HCNC

## 2021-06-01 PROCEDURE — 97166 OT EVAL MOD COMPLEX 45 MIN: CPT | Mod: HCNC

## 2021-06-01 RX ADMIN — IPRATROPIUM BROMIDE AND ALBUTEROL SULFATE 3 ML: .5; 2.5 SOLUTION RESPIRATORY (INHALATION) at 09:06

## 2021-06-01 RX ADMIN — ROSUVASTATIN CALCIUM 20 MG: 20 TABLET, FILM COATED ORAL at 10:06

## 2021-06-01 RX ADMIN — HEPARIN SODIUM 5000 UNITS: 5000 INJECTION INTRAVENOUS; SUBCUTANEOUS at 01:06

## 2021-06-01 RX ADMIN — CEFAZOLIN 1 G: 330 INJECTION, POWDER, FOR SOLUTION INTRAMUSCULAR; INTRAVENOUS at 06:06

## 2021-06-01 RX ADMIN — SODIUM CHLORIDE: 0.9 INJECTION, SOLUTION INTRAVENOUS at 11:06

## 2021-06-01 RX ADMIN — OXYBUTYNIN CHLORIDE 15 MG: 10 TABLET, EXTENDED RELEASE ORAL at 09:06

## 2021-06-01 RX ADMIN — CEFAZOLIN 1 G: 330 INJECTION, POWDER, FOR SOLUTION INTRAMUSCULAR; INTRAVENOUS at 01:06

## 2021-06-01 RX ADMIN — DOCUSATE SODIUM 100 MG: 100 CAPSULE, LIQUID FILLED ORAL at 09:06

## 2021-06-01 RX ADMIN — NICOTINE 1 PATCH: 7 PATCH TRANSDERMAL at 09:06

## 2021-06-01 RX ADMIN — HEPARIN SODIUM 5000 UNITS: 5000 INJECTION INTRAVENOUS; SUBCUTANEOUS at 09:06

## 2021-06-01 RX ADMIN — DOCUSATE SODIUM 100 MG: 100 CAPSULE, LIQUID FILLED ORAL at 08:06

## 2021-06-01 RX ADMIN — HEPARIN SODIUM 5000 UNITS: 5000 INJECTION INTRAVENOUS; SUBCUTANEOUS at 06:06

## 2021-06-01 RX ADMIN — TAMSULOSIN HYDROCHLORIDE 0.4 MG: 0.4 CAPSULE ORAL at 09:06

## 2021-06-01 RX ADMIN — IPRATROPIUM BROMIDE AND ALBUTEROL SULFATE 3 ML: .5; 2.5 SOLUTION RESPIRATORY (INHALATION) at 02:06

## 2021-06-01 RX ADMIN — IPRATROPIUM BROMIDE AND ALBUTEROL SULFATE 3 ML: .5; 2.5 SOLUTION RESPIRATORY (INHALATION) at 08:06

## 2021-06-01 RX ADMIN — ONDANSETRON 4 MG: 2 INJECTION INTRAMUSCULAR; INTRAVENOUS at 12:06

## 2021-06-01 RX ADMIN — SODIUM CHLORIDE: 0.9 INJECTION, SOLUTION INTRAVENOUS at 10:06

## 2021-06-02 ENCOUNTER — PATIENT MESSAGE (OUTPATIENT)
Dept: NEUROSURGERY | Facility: CLINIC | Age: 86
End: 2021-06-02

## 2021-06-02 LAB
ANION GAP SERPL CALC-SCNC: 13 MMOL/L (ref 8–16)
BASOPHILS # BLD AUTO: 0.04 K/UL (ref 0–0.2)
BASOPHILS NFR BLD: 0.4 % (ref 0–1.9)
BUN SERPL-MCNC: 10 MG/DL (ref 8–23)
CALCIUM SERPL-MCNC: 8.7 MG/DL (ref 8.7–10.5)
CHLORIDE SERPL-SCNC: 106 MMOL/L (ref 95–110)
CO2 SERPL-SCNC: 20 MMOL/L (ref 23–29)
CREAT SERPL-MCNC: 0.9 MG/DL (ref 0.5–1.4)
DIFFERENTIAL METHOD: ABNORMAL
EOSINOPHIL # BLD AUTO: 0.2 K/UL (ref 0–0.5)
EOSINOPHIL NFR BLD: 2 % (ref 0–8)
ERYTHROCYTE [DISTWIDTH] IN BLOOD BY AUTOMATED COUNT: 14.4 % (ref 11.5–14.5)
EST. GFR  (AFRICAN AMERICAN): >60 ML/MIN/1.73 M^2
EST. GFR  (NON AFRICAN AMERICAN): >60 ML/MIN/1.73 M^2
GLUCOSE SERPL-MCNC: 107 MG/DL (ref 70–110)
HCT VFR BLD AUTO: 30.9 % (ref 40–54)
HGB BLD-MCNC: 10.2 G/DL (ref 14–18)
IMM GRANULOCYTES # BLD AUTO: 0.04 K/UL (ref 0–0.04)
IMM GRANULOCYTES NFR BLD AUTO: 0.4 % (ref 0–0.5)
LYMPHOCYTES # BLD AUTO: 2.2 K/UL (ref 1–4.8)
LYMPHOCYTES NFR BLD: 20.7 % (ref 18–48)
MCH RBC QN AUTO: 32.4 PG (ref 27–31)
MCHC RBC AUTO-ENTMCNC: 33 G/DL (ref 32–36)
MCV RBC AUTO: 98 FL (ref 82–98)
MONOCYTES # BLD AUTO: 1.1 K/UL (ref 0.3–1)
MONOCYTES NFR BLD: 10 % (ref 4–15)
NEUTROPHILS # BLD AUTO: 7 K/UL (ref 1.8–7.7)
NEUTROPHILS NFR BLD: 66.5 % (ref 38–73)
NRBC BLD-RTO: 0 /100 WBC
PLATELET # BLD AUTO: 281 K/UL (ref 150–450)
PMV BLD AUTO: 10 FL (ref 9.2–12.9)
POCT GLUCOSE: 120 MG/DL (ref 70–110)
POCT GLUCOSE: 130 MG/DL (ref 70–110)
POCT GLUCOSE: 188 MG/DL (ref 70–110)
POTASSIUM SERPL-SCNC: 3.7 MMOL/L (ref 3.5–5.1)
RBC # BLD AUTO: 3.15 M/UL (ref 4.6–6.2)
SODIUM SERPL-SCNC: 139 MMOL/L (ref 136–145)
WBC # BLD AUTO: 10.5 K/UL (ref 3.9–12.7)

## 2021-06-02 PROCEDURE — 36415 COLL VENOUS BLD VENIPUNCTURE: CPT | Mod: HCNC | Performed by: STUDENT IN AN ORGANIZED HEALTH CARE EDUCATION/TRAINING PROGRAM

## 2021-06-02 PROCEDURE — 93010 EKG 12-LEAD: ICD-10-PCS | Mod: HCNC,,, | Performed by: INTERNAL MEDICINE

## 2021-06-02 PROCEDURE — 93010 ELECTROCARDIOGRAM REPORT: CPT | Mod: HCNC,,, | Performed by: INTERNAL MEDICINE

## 2021-06-02 PROCEDURE — 25000003 PHARM REV CODE 250: Mod: HCNC | Performed by: PHYSICIAN ASSISTANT

## 2021-06-02 PROCEDURE — 94668 MNPJ CHEST WALL SBSQ: CPT | Mod: HCNC

## 2021-06-02 PROCEDURE — 63600175 PHARM REV CODE 636 W HCPCS: Mod: HCNC | Performed by: STUDENT IN AN ORGANIZED HEALTH CARE EDUCATION/TRAINING PROGRAM

## 2021-06-02 PROCEDURE — 94761 N-INVAS EAR/PLS OXIMETRY MLT: CPT | Mod: HCNC

## 2021-06-02 PROCEDURE — 25000003 PHARM REV CODE 250: Mod: HCNC | Performed by: STUDENT IN AN ORGANIZED HEALTH CARE EDUCATION/TRAINING PROGRAM

## 2021-06-02 PROCEDURE — 99024 PR POST-OP FOLLOW-UP VISIT: ICD-10-PCS | Mod: HCNC,,, | Performed by: PHYSICIAN ASSISTANT

## 2021-06-02 PROCEDURE — 27000221 HC OXYGEN, UP TO 24 HOURS: Mod: HCNC

## 2021-06-02 PROCEDURE — 99900035 HC TECH TIME PER 15 MIN (STAT): Mod: HCNC

## 2021-06-02 PROCEDURE — 25000242 PHARM REV CODE 250 ALT 637 W/ HCPCS: Mod: HCNC | Performed by: STUDENT IN AN ORGANIZED HEALTH CARE EDUCATION/TRAINING PROGRAM

## 2021-06-02 PROCEDURE — 80048 BASIC METABOLIC PNL TOTAL CA: CPT | Mod: HCNC | Performed by: STUDENT IN AN ORGANIZED HEALTH CARE EDUCATION/TRAINING PROGRAM

## 2021-06-02 PROCEDURE — 85025 COMPLETE CBC W/AUTO DIFF WBC: CPT | Mod: HCNC | Performed by: STUDENT IN AN ORGANIZED HEALTH CARE EDUCATION/TRAINING PROGRAM

## 2021-06-02 PROCEDURE — 94640 AIRWAY INHALATION TREATMENT: CPT | Mod: HCNC

## 2021-06-02 PROCEDURE — 97530 THERAPEUTIC ACTIVITIES: CPT | Mod: HCNC

## 2021-06-02 PROCEDURE — S4991 NICOTINE PATCH NONLEGEND: HCPCS | Mod: HCNC | Performed by: STUDENT IN AN ORGANIZED HEALTH CARE EDUCATION/TRAINING PROGRAM

## 2021-06-02 PROCEDURE — 11000001 HC ACUTE MED/SURG PRIVATE ROOM: Mod: HCNC

## 2021-06-02 PROCEDURE — 93005 ELECTROCARDIOGRAM TRACING: CPT | Mod: HCNC

## 2021-06-02 PROCEDURE — 99024 POSTOP FOLLOW-UP VISIT: CPT | Mod: HCNC,,, | Performed by: PHYSICIAN ASSISTANT

## 2021-06-02 RX ORDER — CEPHALEXIN 500 MG/1
500 CAPSULE ORAL EVERY 6 HOURS
Status: COMPLETED | OUTPATIENT
Start: 2021-06-02 | End: 2021-06-07

## 2021-06-02 RX ADMIN — CEPHALEXIN 500 MG: 500 CAPSULE ORAL at 05:06

## 2021-06-02 RX ADMIN — HEPARIN SODIUM 5000 UNITS: 5000 INJECTION INTRAVENOUS; SUBCUTANEOUS at 05:06

## 2021-06-02 RX ADMIN — TAMSULOSIN HYDROCHLORIDE 0.4 MG: 0.4 CAPSULE ORAL at 08:06

## 2021-06-02 RX ADMIN — ONDANSETRON 4 MG: 2 INJECTION INTRAMUSCULAR; INTRAVENOUS at 07:06

## 2021-06-02 RX ADMIN — IPRATROPIUM BROMIDE AND ALBUTEROL SULFATE 3 ML: .5; 2.5 SOLUTION RESPIRATORY (INHALATION) at 08:06

## 2021-06-02 RX ADMIN — SODIUM CHLORIDE: 0.9 INJECTION, SOLUTION INTRAVENOUS at 01:06

## 2021-06-02 RX ADMIN — HEPARIN SODIUM 5000 UNITS: 5000 INJECTION INTRAVENOUS; SUBCUTANEOUS at 09:06

## 2021-06-02 RX ADMIN — DOCUSATE SODIUM 100 MG: 100 CAPSULE, LIQUID FILLED ORAL at 08:06

## 2021-06-02 RX ADMIN — HEPARIN SODIUM 5000 UNITS: 5000 INJECTION INTRAVENOUS; SUBCUTANEOUS at 02:06

## 2021-06-02 RX ADMIN — HYDROCODONE BITARTRATE AND ACETAMINOPHEN 1 TABLET: 5; 325 TABLET ORAL at 04:06

## 2021-06-02 RX ADMIN — NICOTINE 1 PATCH: 7 PATCH TRANSDERMAL at 08:06

## 2021-06-02 RX ADMIN — ROSUVASTATIN CALCIUM 20 MG: 20 TABLET, FILM COATED ORAL at 08:06

## 2021-06-02 RX ADMIN — IPRATROPIUM BROMIDE AND ALBUTEROL SULFATE 3 ML: .5; 2.5 SOLUTION RESPIRATORY (INHALATION) at 01:06

## 2021-06-02 RX ADMIN — OXYBUTYNIN CHLORIDE 15 MG: 10 TABLET, EXTENDED RELEASE ORAL at 08:06

## 2021-06-03 PROBLEM — Z78.9 IMPAIRED MOBILITY AND ADLS: Status: ACTIVE | Noted: 2021-06-03

## 2021-06-03 PROBLEM — Z74.09 IMPAIRED MOBILITY AND ADLS: Status: ACTIVE | Noted: 2021-06-03

## 2021-06-03 LAB
ANION GAP SERPL CALC-SCNC: 9 MMOL/L (ref 8–16)
BASOPHILS # BLD AUTO: 0.04 K/UL (ref 0–0.2)
BASOPHILS NFR BLD: 0.4 % (ref 0–1.9)
BUN SERPL-MCNC: 9 MG/DL (ref 8–23)
CA-I BLDV-SCNC: 1.1 MMOL/L (ref 1.06–1.42)
CALCIUM SERPL-MCNC: 8.5 MG/DL (ref 8.7–10.5)
CHLORIDE SERPL-SCNC: 104 MMOL/L (ref 95–110)
CO2 SERPL-SCNC: 24 MMOL/L (ref 23–29)
CREAT SERPL-MCNC: 0.8 MG/DL (ref 0.5–1.4)
DIFFERENTIAL METHOD: ABNORMAL
EOSINOPHIL # BLD AUTO: 0.5 K/UL (ref 0–0.5)
EOSINOPHIL NFR BLD: 5.7 % (ref 0–8)
ERYTHROCYTE [DISTWIDTH] IN BLOOD BY AUTOMATED COUNT: 14.3 % (ref 11.5–14.5)
EST. GFR  (AFRICAN AMERICAN): >60 ML/MIN/1.73 M^2
EST. GFR  (NON AFRICAN AMERICAN): >60 ML/MIN/1.73 M^2
GLUCOSE SERPL-MCNC: 128 MG/DL (ref 70–110)
HCT VFR BLD AUTO: 33.6 % (ref 40–54)
HGB BLD-MCNC: 11.2 G/DL (ref 14–18)
IMM GRANULOCYTES # BLD AUTO: 0.02 K/UL (ref 0–0.04)
IMM GRANULOCYTES NFR BLD AUTO: 0.2 % (ref 0–0.5)
LYMPHOCYTES # BLD AUTO: 1.6 K/UL (ref 1–4.8)
LYMPHOCYTES NFR BLD: 17.6 % (ref 18–48)
MCH RBC QN AUTO: 31.7 PG (ref 27–31)
MCHC RBC AUTO-ENTMCNC: 33.3 G/DL (ref 32–36)
MCV RBC AUTO: 95 FL (ref 82–98)
MONOCYTES # BLD AUTO: 0.9 K/UL (ref 0.3–1)
MONOCYTES NFR BLD: 9.5 % (ref 4–15)
NEUTROPHILS # BLD AUTO: 5.9 K/UL (ref 1.8–7.7)
NEUTROPHILS NFR BLD: 66.6 % (ref 38–73)
NRBC BLD-RTO: 0 /100 WBC
PLATELET # BLD AUTO: 259 K/UL (ref 150–450)
PMV BLD AUTO: 9.5 FL (ref 9.2–12.9)
POCT GLUCOSE: 145 MG/DL (ref 70–110)
POCT GLUCOSE: 150 MG/DL (ref 70–110)
POTASSIUM SERPL-SCNC: 3.8 MMOL/L (ref 3.5–5.1)
RBC # BLD AUTO: 3.53 M/UL (ref 4.6–6.2)
SARS-COV-2 RNA RESP QL NAA+PROBE: NOT DETECTED
SODIUM SERPL-SCNC: 137 MMOL/L (ref 136–145)
WBC # BLD AUTO: 8.91 K/UL (ref 3.9–12.7)

## 2021-06-03 PROCEDURE — 25000242 PHARM REV CODE 250 ALT 637 W/ HCPCS: Mod: HCNC | Performed by: STUDENT IN AN ORGANIZED HEALTH CARE EDUCATION/TRAINING PROGRAM

## 2021-06-03 PROCEDURE — 99024 PR POST-OP FOLLOW-UP VISIT: ICD-10-PCS | Mod: HCNC,,, | Performed by: PHYSICIAN ASSISTANT

## 2021-06-03 PROCEDURE — 36415 COLL VENOUS BLD VENIPUNCTURE: CPT | Mod: HCNC | Performed by: PHYSICIAN ASSISTANT

## 2021-06-03 PROCEDURE — S4991 NICOTINE PATCH NONLEGEND: HCPCS | Mod: HCNC | Performed by: STUDENT IN AN ORGANIZED HEALTH CARE EDUCATION/TRAINING PROGRAM

## 2021-06-03 PROCEDURE — 99024 POSTOP FOLLOW-UP VISIT: CPT | Mod: HCNC,,, | Performed by: PHYSICIAN ASSISTANT

## 2021-06-03 PROCEDURE — 94760 N-INVAS EAR/PLS OXIMETRY 1: CPT | Mod: HCNC

## 2021-06-03 PROCEDURE — 94668 MNPJ CHEST WALL SBSQ: CPT | Mod: HCNC

## 2021-06-03 PROCEDURE — 25000003 PHARM REV CODE 250: Mod: HCNC | Performed by: STUDENT IN AN ORGANIZED HEALTH CARE EDUCATION/TRAINING PROGRAM

## 2021-06-03 PROCEDURE — 94761 N-INVAS EAR/PLS OXIMETRY MLT: CPT | Mod: HCNC

## 2021-06-03 PROCEDURE — 11000001 HC ACUTE MED/SURG PRIVATE ROOM: Mod: HCNC

## 2021-06-03 PROCEDURE — 80048 BASIC METABOLIC PNL TOTAL CA: CPT | Mod: HCNC | Performed by: STUDENT IN AN ORGANIZED HEALTH CARE EDUCATION/TRAINING PROGRAM

## 2021-06-03 PROCEDURE — 99222 1ST HOSP IP/OBS MODERATE 55: CPT | Mod: HCNC,,, | Performed by: NURSE PRACTITIONER

## 2021-06-03 PROCEDURE — U0003 INFECTIOUS AGENT DETECTION BY NUCLEIC ACID (DNA OR RNA); SEVERE ACUTE RESPIRATORY SYNDROME CORONAVIRUS 2 (SARS-COV-2) (CORONAVIRUS DISEASE [COVID-19]), AMPLIFIED PROBE TECHNIQUE, MAKING USE OF HIGH THROUGHPUT TECHNOLOGIES AS DESCRIBED BY CMS-2020-01-R: HCPCS | Mod: HCNC | Performed by: NEUROLOGICAL SURGERY

## 2021-06-03 PROCEDURE — 85025 COMPLETE CBC W/AUTO DIFF WBC: CPT | Mod: HCNC | Performed by: STUDENT IN AN ORGANIZED HEALTH CARE EDUCATION/TRAINING PROGRAM

## 2021-06-03 PROCEDURE — 63600175 PHARM REV CODE 636 W HCPCS: Mod: HCNC | Performed by: STUDENT IN AN ORGANIZED HEALTH CARE EDUCATION/TRAINING PROGRAM

## 2021-06-03 PROCEDURE — 27000221 HC OXYGEN, UP TO 24 HOURS: Mod: HCNC

## 2021-06-03 PROCEDURE — U0005 INFEC AGEN DETEC AMPLI PROBE: HCPCS | Performed by: NEUROLOGICAL SURGERY

## 2021-06-03 PROCEDURE — 94640 AIRWAY INHALATION TREATMENT: CPT | Mod: HCNC

## 2021-06-03 PROCEDURE — 36415 COLL VENOUS BLD VENIPUNCTURE: CPT | Mod: HCNC | Performed by: STUDENT IN AN ORGANIZED HEALTH CARE EDUCATION/TRAINING PROGRAM

## 2021-06-03 PROCEDURE — 99900035 HC TECH TIME PER 15 MIN (STAT): Mod: HCNC

## 2021-06-03 PROCEDURE — 99222 PR INITIAL HOSPITAL CARE,LEVL II: ICD-10-PCS | Mod: HCNC,,, | Performed by: NURSE PRACTITIONER

## 2021-06-03 PROCEDURE — 25000003 PHARM REV CODE 250: Mod: HCNC | Performed by: PHYSICIAN ASSISTANT

## 2021-06-03 PROCEDURE — 82330 ASSAY OF CALCIUM: CPT | Mod: HCNC | Performed by: PHYSICIAN ASSISTANT

## 2021-06-03 RX ORDER — HYDROCODONE BITARTRATE AND ACETAMINOPHEN 5; 325 MG/1; MG/1
1 TABLET ORAL EVERY 8 HOURS PRN
Qty: 42 TABLET | Refills: 0 | Status: SHIPPED | OUTPATIENT
Start: 2021-06-03 | End: 2021-06-07

## 2021-06-03 RX ADMIN — TAMSULOSIN HYDROCHLORIDE 0.4 MG: 0.4 CAPSULE ORAL at 08:06

## 2021-06-03 RX ADMIN — CEPHALEXIN 500 MG: 500 CAPSULE ORAL at 06:06

## 2021-06-03 RX ADMIN — HEPARIN SODIUM 5000 UNITS: 5000 INJECTION INTRAVENOUS; SUBCUTANEOUS at 06:06

## 2021-06-03 RX ADMIN — IPRATROPIUM BROMIDE AND ALBUTEROL SULFATE 3 ML: .5; 2.5 SOLUTION RESPIRATORY (INHALATION) at 07:06

## 2021-06-03 RX ADMIN — HEPARIN SODIUM 5000 UNITS: 5000 INJECTION INTRAVENOUS; SUBCUTANEOUS at 01:06

## 2021-06-03 RX ADMIN — IPRATROPIUM BROMIDE AND ALBUTEROL SULFATE 3 ML: .5; 2.5 SOLUTION RESPIRATORY (INHALATION) at 08:06

## 2021-06-03 RX ADMIN — DOCUSATE SODIUM 100 MG: 100 CAPSULE, LIQUID FILLED ORAL at 08:06

## 2021-06-03 RX ADMIN — IPRATROPIUM BROMIDE AND ALBUTEROL SULFATE 3 ML: .5; 2.5 SOLUTION RESPIRATORY (INHALATION) at 01:06

## 2021-06-03 RX ADMIN — CEPHALEXIN 500 MG: 500 CAPSULE ORAL at 12:06

## 2021-06-03 RX ADMIN — ROSUVASTATIN CALCIUM 20 MG: 20 TABLET, FILM COATED ORAL at 08:06

## 2021-06-03 RX ADMIN — HEPARIN SODIUM 5000 UNITS: 5000 INJECTION INTRAVENOUS; SUBCUTANEOUS at 10:06

## 2021-06-03 RX ADMIN — NICOTINE 1 PATCH: 7 PATCH TRANSDERMAL at 08:06

## 2021-06-03 RX ADMIN — OXYBUTYNIN CHLORIDE 15 MG: 10 TABLET, EXTENDED RELEASE ORAL at 08:06

## 2021-06-03 RX ADMIN — CEPHALEXIN 500 MG: 500 CAPSULE ORAL at 01:06

## 2021-06-04 ENCOUNTER — OUTPATIENT CASE MANAGEMENT (OUTPATIENT)
Dept: ADMINISTRATIVE | Facility: OTHER | Age: 86
End: 2021-06-04

## 2021-06-04 LAB
ANION GAP SERPL CALC-SCNC: 10 MMOL/L (ref 8–16)
BASOPHILS # BLD AUTO: 0.05 K/UL (ref 0–0.2)
BASOPHILS NFR BLD: 0.6 % (ref 0–1.9)
BUN SERPL-MCNC: 10 MG/DL (ref 8–23)
CALCIUM SERPL-MCNC: 8.7 MG/DL (ref 8.7–10.5)
CHLORIDE SERPL-SCNC: 106 MMOL/L (ref 95–110)
CO2 SERPL-SCNC: 22 MMOL/L (ref 23–29)
CREAT SERPL-MCNC: 0.8 MG/DL (ref 0.5–1.4)
DIFFERENTIAL METHOD: ABNORMAL
EOSINOPHIL # BLD AUTO: 0.9 K/UL (ref 0–0.5)
EOSINOPHIL NFR BLD: 10.8 % (ref 0–8)
ERYTHROCYTE [DISTWIDTH] IN BLOOD BY AUTOMATED COUNT: 14.3 % (ref 11.5–14.5)
EST. GFR  (AFRICAN AMERICAN): >60 ML/MIN/1.73 M^2
EST. GFR  (NON AFRICAN AMERICAN): >60 ML/MIN/1.73 M^2
GLUCOSE SERPL-MCNC: 106 MG/DL (ref 70–110)
HCT VFR BLD AUTO: 31.9 % (ref 40–54)
HGB BLD-MCNC: 10.1 G/DL (ref 14–18)
IMM GRANULOCYTES # BLD AUTO: 0.03 K/UL (ref 0–0.04)
IMM GRANULOCYTES NFR BLD AUTO: 0.4 % (ref 0–0.5)
LYMPHOCYTES # BLD AUTO: 1.5 K/UL (ref 1–4.8)
LYMPHOCYTES NFR BLD: 18.5 % (ref 18–48)
MCH RBC QN AUTO: 31.2 PG (ref 27–31)
MCHC RBC AUTO-ENTMCNC: 31.7 G/DL (ref 32–36)
MCV RBC AUTO: 99 FL (ref 82–98)
MONOCYTES # BLD AUTO: 0.8 K/UL (ref 0.3–1)
MONOCYTES NFR BLD: 9.7 % (ref 4–15)
NEUTROPHILS # BLD AUTO: 5 K/UL (ref 1.8–7.7)
NEUTROPHILS NFR BLD: 60 % (ref 38–73)
NRBC BLD-RTO: 0 /100 WBC
PLATELET # BLD AUTO: 249 K/UL (ref 150–450)
PMV BLD AUTO: 9.9 FL (ref 9.2–12.9)
POCT GLUCOSE: 148 MG/DL (ref 70–110)
POTASSIUM SERPL-SCNC: 3.8 MMOL/L (ref 3.5–5.1)
RBC # BLD AUTO: 3.24 M/UL (ref 4.6–6.2)
SODIUM SERPL-SCNC: 138 MMOL/L (ref 136–145)
WBC # BLD AUTO: 8.32 K/UL (ref 3.9–12.7)

## 2021-06-04 PROCEDURE — 97112 NEUROMUSCULAR REEDUCATION: CPT | Mod: HCNC

## 2021-06-04 PROCEDURE — 97116 GAIT TRAINING THERAPY: CPT | Mod: HCNC

## 2021-06-04 PROCEDURE — 94761 N-INVAS EAR/PLS OXIMETRY MLT: CPT | Mod: HCNC

## 2021-06-04 PROCEDURE — 97530 THERAPEUTIC ACTIVITIES: CPT | Mod: HCNC

## 2021-06-04 PROCEDURE — 63600175 PHARM REV CODE 636 W HCPCS: Mod: HCNC | Performed by: STUDENT IN AN ORGANIZED HEALTH CARE EDUCATION/TRAINING PROGRAM

## 2021-06-04 PROCEDURE — 85025 COMPLETE CBC W/AUTO DIFF WBC: CPT | Mod: HCNC | Performed by: STUDENT IN AN ORGANIZED HEALTH CARE EDUCATION/TRAINING PROGRAM

## 2021-06-04 PROCEDURE — 94668 MNPJ CHEST WALL SBSQ: CPT | Mod: HCNC

## 2021-06-04 PROCEDURE — 94760 N-INVAS EAR/PLS OXIMETRY 1: CPT | Mod: HCNC

## 2021-06-04 PROCEDURE — 25000003 PHARM REV CODE 250: Mod: HCNC | Performed by: PHYSICIAN ASSISTANT

## 2021-06-04 PROCEDURE — 99024 PR POST-OP FOLLOW-UP VISIT: ICD-10-PCS | Mod: HCNC,,, | Performed by: PHYSICIAN ASSISTANT

## 2021-06-04 PROCEDURE — 11000001 HC ACUTE MED/SURG PRIVATE ROOM: Mod: HCNC

## 2021-06-04 PROCEDURE — 80048 BASIC METABOLIC PNL TOTAL CA: CPT | Mod: HCNC | Performed by: STUDENT IN AN ORGANIZED HEALTH CARE EDUCATION/TRAINING PROGRAM

## 2021-06-04 PROCEDURE — S4991 NICOTINE PATCH NONLEGEND: HCPCS | Mod: HCNC | Performed by: STUDENT IN AN ORGANIZED HEALTH CARE EDUCATION/TRAINING PROGRAM

## 2021-06-04 PROCEDURE — 25000003 PHARM REV CODE 250: Mod: HCNC | Performed by: STUDENT IN AN ORGANIZED HEALTH CARE EDUCATION/TRAINING PROGRAM

## 2021-06-04 PROCEDURE — 25000242 PHARM REV CODE 250 ALT 637 W/ HCPCS: Mod: HCNC | Performed by: STUDENT IN AN ORGANIZED HEALTH CARE EDUCATION/TRAINING PROGRAM

## 2021-06-04 PROCEDURE — 94640 AIRWAY INHALATION TREATMENT: CPT | Mod: HCNC

## 2021-06-04 PROCEDURE — 99024 POSTOP FOLLOW-UP VISIT: CPT | Mod: HCNC,,, | Performed by: PHYSICIAN ASSISTANT

## 2021-06-04 PROCEDURE — 36415 COLL VENOUS BLD VENIPUNCTURE: CPT | Mod: HCNC | Performed by: STUDENT IN AN ORGANIZED HEALTH CARE EDUCATION/TRAINING PROGRAM

## 2021-06-04 RX ADMIN — IPRATROPIUM BROMIDE AND ALBUTEROL SULFATE 3 ML: .5; 2.5 SOLUTION RESPIRATORY (INHALATION) at 03:06

## 2021-06-04 RX ADMIN — CEPHALEXIN 500 MG: 500 CAPSULE ORAL at 12:06

## 2021-06-04 RX ADMIN — ROSUVASTATIN CALCIUM 20 MG: 20 TABLET, FILM COATED ORAL at 10:06

## 2021-06-04 RX ADMIN — HEPARIN SODIUM 5000 UNITS: 5000 INJECTION INTRAVENOUS; SUBCUTANEOUS at 05:06

## 2021-06-04 RX ADMIN — DOCUSATE SODIUM 100 MG: 100 CAPSULE, LIQUID FILLED ORAL at 09:06

## 2021-06-04 RX ADMIN — NICOTINE 1 PATCH: 7 PATCH TRANSDERMAL at 10:06

## 2021-06-04 RX ADMIN — ACETAMINOPHEN 650 MG: 325 TABLET ORAL at 12:06

## 2021-06-04 RX ADMIN — HEPARIN SODIUM 5000 UNITS: 5000 INJECTION INTRAVENOUS; SUBCUTANEOUS at 12:06

## 2021-06-04 RX ADMIN — TAMSULOSIN HYDROCHLORIDE 0.4 MG: 0.4 CAPSULE ORAL at 10:06

## 2021-06-04 RX ADMIN — DOCUSATE SODIUM 100 MG: 100 CAPSULE, LIQUID FILLED ORAL at 10:06

## 2021-06-04 RX ADMIN — CEPHALEXIN 500 MG: 500 CAPSULE ORAL at 05:06

## 2021-06-04 RX ADMIN — IPRATROPIUM BROMIDE AND ALBUTEROL SULFATE 3 ML: .5; 2.5 SOLUTION RESPIRATORY (INHALATION) at 07:06

## 2021-06-04 RX ADMIN — OXYBUTYNIN CHLORIDE 15 MG: 10 TABLET, EXTENDED RELEASE ORAL at 10:06

## 2021-06-04 RX ADMIN — HEPARIN SODIUM 5000 UNITS: 5000 INJECTION INTRAVENOUS; SUBCUTANEOUS at 09:06

## 2021-06-05 LAB
ANION GAP SERPL CALC-SCNC: 10 MMOL/L (ref 8–16)
BASOPHILS # BLD AUTO: 0.06 K/UL (ref 0–0.2)
BASOPHILS NFR BLD: 0.7 % (ref 0–1.9)
BUN SERPL-MCNC: 11 MG/DL (ref 8–23)
CALCIUM SERPL-MCNC: 9 MG/DL (ref 8.7–10.5)
CHLORIDE SERPL-SCNC: 105 MMOL/L (ref 95–110)
CO2 SERPL-SCNC: 23 MMOL/L (ref 23–29)
CREAT SERPL-MCNC: 0.8 MG/DL (ref 0.5–1.4)
DIFFERENTIAL METHOD: ABNORMAL
EOSINOPHIL # BLD AUTO: 0.9 K/UL (ref 0–0.5)
EOSINOPHIL NFR BLD: 10.4 % (ref 0–8)
ERYTHROCYTE [DISTWIDTH] IN BLOOD BY AUTOMATED COUNT: 14.1 % (ref 11.5–14.5)
EST. GFR  (AFRICAN AMERICAN): >60 ML/MIN/1.73 M^2
EST. GFR  (NON AFRICAN AMERICAN): >60 ML/MIN/1.73 M^2
GLUCOSE SERPL-MCNC: 112 MG/DL (ref 70–110)
HCT VFR BLD AUTO: 32.7 % (ref 40–54)
HGB BLD-MCNC: 10.9 G/DL (ref 14–18)
IMM GRANULOCYTES # BLD AUTO: 0.04 K/UL (ref 0–0.04)
IMM GRANULOCYTES NFR BLD AUTO: 0.4 % (ref 0–0.5)
LYMPHOCYTES # BLD AUTO: 1.7 K/UL (ref 1–4.8)
LYMPHOCYTES NFR BLD: 18.8 % (ref 18–48)
MCH RBC QN AUTO: 32 PG (ref 27–31)
MCHC RBC AUTO-ENTMCNC: 33.3 G/DL (ref 32–36)
MCV RBC AUTO: 96 FL (ref 82–98)
MONOCYTES # BLD AUTO: 1 K/UL (ref 0.3–1)
MONOCYTES NFR BLD: 11.6 % (ref 4–15)
NEUTROPHILS # BLD AUTO: 5.2 K/UL (ref 1.8–7.7)
NEUTROPHILS NFR BLD: 58.1 % (ref 38–73)
NRBC BLD-RTO: 0 /100 WBC
PLATELET # BLD AUTO: 284 K/UL (ref 150–450)
PMV BLD AUTO: 10.2 FL (ref 9.2–12.9)
POCT GLUCOSE: 124 MG/DL (ref 70–110)
POTASSIUM SERPL-SCNC: 3.6 MMOL/L (ref 3.5–5.1)
RBC # BLD AUTO: 3.41 M/UL (ref 4.6–6.2)
SODIUM SERPL-SCNC: 138 MMOL/L (ref 136–145)
WBC # BLD AUTO: 8.95 K/UL (ref 3.9–12.7)

## 2021-06-05 PROCEDURE — 36415 COLL VENOUS BLD VENIPUNCTURE: CPT | Mod: HCNC | Performed by: STUDENT IN AN ORGANIZED HEALTH CARE EDUCATION/TRAINING PROGRAM

## 2021-06-05 PROCEDURE — 94640 AIRWAY INHALATION TREATMENT: CPT | Mod: HCNC

## 2021-06-05 PROCEDURE — 25000242 PHARM REV CODE 250 ALT 637 W/ HCPCS: Mod: HCNC | Performed by: STUDENT IN AN ORGANIZED HEALTH CARE EDUCATION/TRAINING PROGRAM

## 2021-06-05 PROCEDURE — 94668 MNPJ CHEST WALL SBSQ: CPT | Mod: HCNC

## 2021-06-05 PROCEDURE — 85025 COMPLETE CBC W/AUTO DIFF WBC: CPT | Mod: HCNC | Performed by: STUDENT IN AN ORGANIZED HEALTH CARE EDUCATION/TRAINING PROGRAM

## 2021-06-05 PROCEDURE — S4991 NICOTINE PATCH NONLEGEND: HCPCS | Mod: HCNC | Performed by: STUDENT IN AN ORGANIZED HEALTH CARE EDUCATION/TRAINING PROGRAM

## 2021-06-05 PROCEDURE — 94761 N-INVAS EAR/PLS OXIMETRY MLT: CPT | Mod: HCNC

## 2021-06-05 PROCEDURE — 63600175 PHARM REV CODE 636 W HCPCS: Mod: HCNC | Performed by: STUDENT IN AN ORGANIZED HEALTH CARE EDUCATION/TRAINING PROGRAM

## 2021-06-05 PROCEDURE — 80048 BASIC METABOLIC PNL TOTAL CA: CPT | Mod: HCNC | Performed by: STUDENT IN AN ORGANIZED HEALTH CARE EDUCATION/TRAINING PROGRAM

## 2021-06-05 PROCEDURE — 11000001 HC ACUTE MED/SURG PRIVATE ROOM: Mod: HCNC

## 2021-06-05 PROCEDURE — 25000003 PHARM REV CODE 250: Mod: HCNC | Performed by: PHYSICIAN ASSISTANT

## 2021-06-05 PROCEDURE — 99024 POSTOP FOLLOW-UP VISIT: CPT | Mod: HCNC,,, | Performed by: PHYSICIAN ASSISTANT

## 2021-06-05 PROCEDURE — 25000003 PHARM REV CODE 250: Mod: HCNC | Performed by: STUDENT IN AN ORGANIZED HEALTH CARE EDUCATION/TRAINING PROGRAM

## 2021-06-05 PROCEDURE — 99024 PR POST-OP FOLLOW-UP VISIT: ICD-10-PCS | Mod: HCNC,,, | Performed by: PHYSICIAN ASSISTANT

## 2021-06-05 PROCEDURE — 94760 N-INVAS EAR/PLS OXIMETRY 1: CPT | Mod: HCNC

## 2021-06-05 RX ORDER — SENNOSIDES 8.6 MG/1
8.6 TABLET ORAL DAILY PRN
Status: DISCONTINUED | OUTPATIENT
Start: 2021-06-05 | End: 2021-06-05

## 2021-06-05 RX ORDER — ASPIRIN 81 MG/1
81 TABLET ORAL DAILY
Status: DISCONTINUED | OUTPATIENT
Start: 2021-06-05 | End: 2021-06-08 | Stop reason: HOSPADM

## 2021-06-05 RX ORDER — CLOPIDOGREL BISULFATE 75 MG/1
75 TABLET ORAL DAILY
Status: DISCONTINUED | OUTPATIENT
Start: 2021-06-05 | End: 2021-06-08 | Stop reason: HOSPADM

## 2021-06-05 RX ORDER — SENNOSIDES 8.6 MG/1
8.6 TABLET ORAL DAILY
Status: DISCONTINUED | OUTPATIENT
Start: 2021-06-05 | End: 2021-06-08 | Stop reason: HOSPADM

## 2021-06-05 RX ORDER — POLYETHYLENE GLYCOL 3350 17 G/17G
17 POWDER, FOR SOLUTION ORAL DAILY
Status: DISCONTINUED | OUTPATIENT
Start: 2021-06-05 | End: 2021-06-08 | Stop reason: HOSPADM

## 2021-06-05 RX ADMIN — IPRATROPIUM BROMIDE AND ALBUTEROL SULFATE 3 ML: .5; 2.5 SOLUTION RESPIRATORY (INHALATION) at 12:06

## 2021-06-05 RX ADMIN — TAMSULOSIN HYDROCHLORIDE 0.4 MG: 0.4 CAPSULE ORAL at 09:06

## 2021-06-05 RX ADMIN — HEPARIN SODIUM 5000 UNITS: 5000 INJECTION INTRAVENOUS; SUBCUTANEOUS at 05:06

## 2021-06-05 RX ADMIN — CEPHALEXIN 500 MG: 500 CAPSULE ORAL at 06:06

## 2021-06-05 RX ADMIN — ROSUVASTATIN CALCIUM 20 MG: 20 TABLET, FILM COATED ORAL at 09:06

## 2021-06-05 RX ADMIN — OXYBUTYNIN CHLORIDE 15 MG: 10 TABLET, EXTENDED RELEASE ORAL at 09:06

## 2021-06-05 RX ADMIN — POLYETHYLENE GLYCOL 3350 17 G: 17 POWDER, FOR SOLUTION ORAL at 11:06

## 2021-06-05 RX ADMIN — SENNOSIDES 8.6 MG: 8.6 TABLET, FILM COATED ORAL at 11:06

## 2021-06-05 RX ADMIN — DOCUSATE SODIUM 100 MG: 100 CAPSULE, LIQUID FILLED ORAL at 09:06

## 2021-06-05 RX ADMIN — HEPARIN SODIUM 5000 UNITS: 5000 INJECTION INTRAVENOUS; SUBCUTANEOUS at 09:06

## 2021-06-05 RX ADMIN — NICOTINE 1 PATCH: 7 PATCH TRANSDERMAL at 09:06

## 2021-06-05 RX ADMIN — IPRATROPIUM BROMIDE AND ALBUTEROL SULFATE 3 ML: .5; 2.5 SOLUTION RESPIRATORY (INHALATION) at 08:06

## 2021-06-05 RX ADMIN — IPRATROPIUM BROMIDE AND ALBUTEROL SULFATE 3 ML: .5; 2.5 SOLUTION RESPIRATORY (INHALATION) at 07:06

## 2021-06-05 RX ADMIN — CEPHALEXIN 500 MG: 500 CAPSULE ORAL at 11:06

## 2021-06-05 RX ADMIN — ASPIRIN 81 MG: 81 TABLET, COATED ORAL at 11:06

## 2021-06-05 RX ADMIN — CLOPIDOGREL 75 MG: 75 TABLET, FILM COATED ORAL at 11:06

## 2021-06-05 RX ADMIN — CEPHALEXIN 500 MG: 500 CAPSULE ORAL at 12:06

## 2021-06-05 RX ADMIN — HYDROCODONE BITARTRATE AND ACETAMINOPHEN 1 TABLET: 5; 325 TABLET ORAL at 06:06

## 2021-06-05 RX ADMIN — CEPHALEXIN 500 MG: 500 CAPSULE ORAL at 05:06

## 2021-06-05 RX ADMIN — HEPARIN SODIUM 5000 UNITS: 5000 INJECTION INTRAVENOUS; SUBCUTANEOUS at 02:06

## 2021-06-06 LAB
ANION GAP SERPL CALC-SCNC: 10 MMOL/L (ref 8–16)
BASOPHILS # BLD AUTO: 0.06 K/UL (ref 0–0.2)
BASOPHILS NFR BLD: 0.6 % (ref 0–1.9)
BUN SERPL-MCNC: 12 MG/DL (ref 8–23)
CALCIUM SERPL-MCNC: 9.2 MG/DL (ref 8.7–10.5)
CHLORIDE SERPL-SCNC: 103 MMOL/L (ref 95–110)
CO2 SERPL-SCNC: 25 MMOL/L (ref 23–29)
CREAT SERPL-MCNC: 0.8 MG/DL (ref 0.5–1.4)
DIFFERENTIAL METHOD: ABNORMAL
EOSINOPHIL # BLD AUTO: 1 K/UL (ref 0–0.5)
EOSINOPHIL NFR BLD: 9.8 % (ref 0–8)
ERYTHROCYTE [DISTWIDTH] IN BLOOD BY AUTOMATED COUNT: 14.6 % (ref 11.5–14.5)
EST. GFR  (AFRICAN AMERICAN): >60 ML/MIN/1.73 M^2
EST. GFR  (NON AFRICAN AMERICAN): >60 ML/MIN/1.73 M^2
GLUCOSE SERPL-MCNC: 106 MG/DL (ref 70–110)
HCT VFR BLD AUTO: 33 % (ref 40–54)
HGB BLD-MCNC: 10.7 G/DL (ref 14–18)
IMM GRANULOCYTES # BLD AUTO: 0.03 K/UL (ref 0–0.04)
IMM GRANULOCYTES NFR BLD AUTO: 0.3 % (ref 0–0.5)
LYMPHOCYTES # BLD AUTO: 1.8 K/UL (ref 1–4.8)
LYMPHOCYTES NFR BLD: 16.7 % (ref 18–48)
MCH RBC QN AUTO: 31.5 PG (ref 27–31)
MCHC RBC AUTO-ENTMCNC: 32.4 G/DL (ref 32–36)
MCV RBC AUTO: 97 FL (ref 82–98)
MONOCYTES # BLD AUTO: 1.2 K/UL (ref 0.3–1)
MONOCYTES NFR BLD: 10.9 % (ref 4–15)
NEUTROPHILS # BLD AUTO: 6.5 K/UL (ref 1.8–7.7)
NEUTROPHILS NFR BLD: 61.7 % (ref 38–73)
NRBC BLD-RTO: 0 /100 WBC
PLATELET # BLD AUTO: 281 K/UL (ref 150–450)
PMV BLD AUTO: 10.4 FL (ref 9.2–12.9)
POTASSIUM SERPL-SCNC: 3.7 MMOL/L (ref 3.5–5.1)
RBC # BLD AUTO: 3.4 M/UL (ref 4.6–6.2)
SODIUM SERPL-SCNC: 138 MMOL/L (ref 136–145)
WBC # BLD AUTO: 10.52 K/UL (ref 3.9–12.7)

## 2021-06-06 PROCEDURE — 94761 N-INVAS EAR/PLS OXIMETRY MLT: CPT | Mod: HCNC

## 2021-06-06 PROCEDURE — S4991 NICOTINE PATCH NONLEGEND: HCPCS | Mod: HCNC | Performed by: STUDENT IN AN ORGANIZED HEALTH CARE EDUCATION/TRAINING PROGRAM

## 2021-06-06 PROCEDURE — 11000001 HC ACUTE MED/SURG PRIVATE ROOM: Mod: HCNC

## 2021-06-06 PROCEDURE — 25000003 PHARM REV CODE 250: Mod: HCNC | Performed by: STUDENT IN AN ORGANIZED HEALTH CARE EDUCATION/TRAINING PROGRAM

## 2021-06-06 PROCEDURE — 63600175 PHARM REV CODE 636 W HCPCS: Mod: HCNC | Performed by: STUDENT IN AN ORGANIZED HEALTH CARE EDUCATION/TRAINING PROGRAM

## 2021-06-06 PROCEDURE — 99024 POSTOP FOLLOW-UP VISIT: CPT | Mod: HCNC,,, | Performed by: PHYSICIAN ASSISTANT

## 2021-06-06 PROCEDURE — 25000242 PHARM REV CODE 250 ALT 637 W/ HCPCS: Mod: HCNC | Performed by: STUDENT IN AN ORGANIZED HEALTH CARE EDUCATION/TRAINING PROGRAM

## 2021-06-06 PROCEDURE — 27000221 HC OXYGEN, UP TO 24 HOURS: Mod: HCNC

## 2021-06-06 PROCEDURE — 94640 AIRWAY INHALATION TREATMENT: CPT | Mod: HCNC

## 2021-06-06 PROCEDURE — 80048 BASIC METABOLIC PNL TOTAL CA: CPT | Mod: HCNC | Performed by: STUDENT IN AN ORGANIZED HEALTH CARE EDUCATION/TRAINING PROGRAM

## 2021-06-06 PROCEDURE — 25000003 PHARM REV CODE 250: Mod: HCNC | Performed by: PHYSICIAN ASSISTANT

## 2021-06-06 PROCEDURE — 85025 COMPLETE CBC W/AUTO DIFF WBC: CPT | Mod: HCNC | Performed by: STUDENT IN AN ORGANIZED HEALTH CARE EDUCATION/TRAINING PROGRAM

## 2021-06-06 PROCEDURE — 94668 MNPJ CHEST WALL SBSQ: CPT | Mod: HCNC

## 2021-06-06 PROCEDURE — 99900035 HC TECH TIME PER 15 MIN (STAT): Mod: HCNC

## 2021-06-06 PROCEDURE — 99024 PR POST-OP FOLLOW-UP VISIT: ICD-10-PCS | Mod: HCNC,,, | Performed by: PHYSICIAN ASSISTANT

## 2021-06-06 PROCEDURE — 36415 COLL VENOUS BLD VENIPUNCTURE: CPT | Mod: HCNC | Performed by: STUDENT IN AN ORGANIZED HEALTH CARE EDUCATION/TRAINING PROGRAM

## 2021-06-06 RX ADMIN — DOCUSATE SODIUM 100 MG: 100 CAPSULE, LIQUID FILLED ORAL at 08:06

## 2021-06-06 RX ADMIN — DOCUSATE SODIUM 100 MG: 100 CAPSULE, LIQUID FILLED ORAL at 09:06

## 2021-06-06 RX ADMIN — IPRATROPIUM BROMIDE AND ALBUTEROL SULFATE 3 ML: .5; 2.5 SOLUTION RESPIRATORY (INHALATION) at 01:06

## 2021-06-06 RX ADMIN — OXYBUTYNIN CHLORIDE 15 MG: 10 TABLET, EXTENDED RELEASE ORAL at 08:06

## 2021-06-06 RX ADMIN — CEPHALEXIN 500 MG: 500 CAPSULE ORAL at 11:06

## 2021-06-06 RX ADMIN — TAMSULOSIN HYDROCHLORIDE 0.4 MG: 0.4 CAPSULE ORAL at 08:06

## 2021-06-06 RX ADMIN — POLYETHYLENE GLYCOL 3350 17 G: 17 POWDER, FOR SOLUTION ORAL at 08:06

## 2021-06-06 RX ADMIN — CEPHALEXIN 500 MG: 500 CAPSULE ORAL at 06:06

## 2021-06-06 RX ADMIN — HEPARIN SODIUM 5000 UNITS: 5000 INJECTION INTRAVENOUS; SUBCUTANEOUS at 06:06

## 2021-06-06 RX ADMIN — SENNOSIDES 8.6 MG: 8.6 TABLET, FILM COATED ORAL at 08:06

## 2021-06-06 RX ADMIN — CEPHALEXIN 500 MG: 500 CAPSULE ORAL at 01:06

## 2021-06-06 RX ADMIN — IPRATROPIUM BROMIDE AND ALBUTEROL SULFATE 3 ML: .5; 2.5 SOLUTION RESPIRATORY (INHALATION) at 08:06

## 2021-06-06 RX ADMIN — CEPHALEXIN 500 MG: 500 CAPSULE ORAL at 05:06

## 2021-06-06 RX ADMIN — CEPHALEXIN 500 MG: 500 CAPSULE ORAL at 12:06

## 2021-06-06 RX ADMIN — CLOPIDOGREL 75 MG: 75 TABLET, FILM COATED ORAL at 08:06

## 2021-06-06 RX ADMIN — HEPARIN SODIUM 5000 UNITS: 5000 INJECTION INTRAVENOUS; SUBCUTANEOUS at 09:06

## 2021-06-06 RX ADMIN — HEPARIN SODIUM 5000 UNITS: 5000 INJECTION INTRAVENOUS; SUBCUTANEOUS at 02:06

## 2021-06-06 RX ADMIN — ASPIRIN 81 MG: 81 TABLET, COATED ORAL at 08:06

## 2021-06-06 RX ADMIN — ROSUVASTATIN CALCIUM 20 MG: 20 TABLET, FILM COATED ORAL at 08:06

## 2021-06-06 RX ADMIN — NICOTINE 1 PATCH: 7 PATCH TRANSDERMAL at 08:06

## 2021-06-07 VITALS
RESPIRATION RATE: 18 BRPM | SYSTOLIC BLOOD PRESSURE: 142 MMHG | DIASTOLIC BLOOD PRESSURE: 75 MMHG | WEIGHT: 143.06 LBS | TEMPERATURE: 98 F | HEIGHT: 67 IN | HEART RATE: 100 BPM | BODY MASS INDEX: 22.45 KG/M2 | OXYGEN SATURATION: 97 %

## 2021-06-07 LAB
ANION GAP SERPL CALC-SCNC: 12 MMOL/L (ref 8–16)
BASOPHILS # BLD AUTO: 0.07 K/UL (ref 0–0.2)
BASOPHILS NFR BLD: 0.8 % (ref 0–1.9)
BUN SERPL-MCNC: 12 MG/DL (ref 8–23)
CALCIUM SERPL-MCNC: 9.7 MG/DL (ref 8.7–10.5)
CHLORIDE SERPL-SCNC: 103 MMOL/L (ref 95–110)
CO2 SERPL-SCNC: 20 MMOL/L (ref 23–29)
CREAT SERPL-MCNC: 0.9 MG/DL (ref 0.5–1.4)
DIFFERENTIAL METHOD: ABNORMAL
EOSINOPHIL # BLD AUTO: 1 K/UL (ref 0–0.5)
EOSINOPHIL NFR BLD: 11.4 % (ref 0–8)
ERYTHROCYTE [DISTWIDTH] IN BLOOD BY AUTOMATED COUNT: 14.4 % (ref 11.5–14.5)
EST. GFR  (AFRICAN AMERICAN): >60 ML/MIN/1.73 M^2
EST. GFR  (NON AFRICAN AMERICAN): >60 ML/MIN/1.73 M^2
GLUCOSE SERPL-MCNC: 119 MG/DL (ref 70–110)
HCT VFR BLD AUTO: 36.6 % (ref 40–54)
HGB BLD-MCNC: 11.8 G/DL (ref 14–18)
IMM GRANULOCYTES # BLD AUTO: 0.04 K/UL (ref 0–0.04)
IMM GRANULOCYTES NFR BLD AUTO: 0.4 % (ref 0–0.5)
LYMPHOCYTES # BLD AUTO: 1.9 K/UL (ref 1–4.8)
LYMPHOCYTES NFR BLD: 20.7 % (ref 18–48)
MCH RBC QN AUTO: 31 PG (ref 27–31)
MCHC RBC AUTO-ENTMCNC: 32.2 G/DL (ref 32–36)
MCV RBC AUTO: 96 FL (ref 82–98)
MONOCYTES # BLD AUTO: 1 K/UL (ref 0.3–1)
MONOCYTES NFR BLD: 10.8 % (ref 4–15)
NEUTROPHILS # BLD AUTO: 5.1 K/UL (ref 1.8–7.7)
NEUTROPHILS NFR BLD: 55.9 % (ref 38–73)
NRBC BLD-RTO: 0 /100 WBC
PLATELET # BLD AUTO: 298 K/UL (ref 150–450)
PMV BLD AUTO: 10.4 FL (ref 9.2–12.9)
POCT GLUCOSE: 119 MG/DL (ref 70–110)
POCT GLUCOSE: 150 MG/DL (ref 70–110)
POTASSIUM SERPL-SCNC: 4 MMOL/L (ref 3.5–5.1)
RBC # BLD AUTO: 3.81 M/UL (ref 4.6–6.2)
SARS-COV-2 RNA RESP QL NAA+PROBE: NOT DETECTED
SODIUM SERPL-SCNC: 135 MMOL/L (ref 136–145)
WBC # BLD AUTO: 9.04 K/UL (ref 3.9–12.7)

## 2021-06-07 PROCEDURE — 86580 TB INTRADERMAL TEST: CPT | Mod: HCNC | Performed by: NEUROLOGICAL SURGERY

## 2021-06-07 PROCEDURE — 25000242 PHARM REV CODE 250 ALT 637 W/ HCPCS: Mod: HCNC | Performed by: STUDENT IN AN ORGANIZED HEALTH CARE EDUCATION/TRAINING PROGRAM

## 2021-06-07 PROCEDURE — 97116 GAIT TRAINING THERAPY: CPT | Mod: HCNC

## 2021-06-07 PROCEDURE — S4991 NICOTINE PATCH NONLEGEND: HCPCS | Mod: HCNC | Performed by: STUDENT IN AN ORGANIZED HEALTH CARE EDUCATION/TRAINING PROGRAM

## 2021-06-07 PROCEDURE — 94761 N-INVAS EAR/PLS OXIMETRY MLT: CPT | Mod: HCNC

## 2021-06-07 PROCEDURE — 94640 AIRWAY INHALATION TREATMENT: CPT | Mod: HCNC

## 2021-06-07 PROCEDURE — 94668 MNPJ CHEST WALL SBSQ: CPT | Mod: HCNC

## 2021-06-07 PROCEDURE — 63600175 PHARM REV CODE 636 W HCPCS: Mod: HCNC | Performed by: STUDENT IN AN ORGANIZED HEALTH CARE EDUCATION/TRAINING PROGRAM

## 2021-06-07 PROCEDURE — 25000003 PHARM REV CODE 250: Mod: HCNC | Performed by: STUDENT IN AN ORGANIZED HEALTH CARE EDUCATION/TRAINING PROGRAM

## 2021-06-07 PROCEDURE — 25000003 PHARM REV CODE 250: Mod: HCNC | Performed by: PHYSICIAN ASSISTANT

## 2021-06-07 PROCEDURE — U0005 INFEC AGEN DETEC AMPLI PROBE: HCPCS | Performed by: NEUROLOGICAL SURGERY

## 2021-06-07 PROCEDURE — 27000221 HC OXYGEN, UP TO 24 HOURS: Mod: HCNC

## 2021-06-07 PROCEDURE — U0003 INFECTIOUS AGENT DETECTION BY NUCLEIC ACID (DNA OR RNA); SEVERE ACUTE RESPIRATORY SYNDROME CORONAVIRUS 2 (SARS-COV-2) (CORONAVIRUS DISEASE [COVID-19]), AMPLIFIED PROBE TECHNIQUE, MAKING USE OF HIGH THROUGHPUT TECHNOLOGIES AS DESCRIBED BY CMS-2020-01-R: HCPCS | Mod: HCNC | Performed by: NEUROLOGICAL SURGERY

## 2021-06-07 PROCEDURE — 36415 COLL VENOUS BLD VENIPUNCTURE: CPT | Mod: HCNC | Performed by: STUDENT IN AN ORGANIZED HEALTH CARE EDUCATION/TRAINING PROGRAM

## 2021-06-07 PROCEDURE — 99900035 HC TECH TIME PER 15 MIN (STAT): Mod: HCNC

## 2021-06-07 PROCEDURE — 97530 THERAPEUTIC ACTIVITIES: CPT | Mod: HCNC

## 2021-06-07 PROCEDURE — 85025 COMPLETE CBC W/AUTO DIFF WBC: CPT | Mod: HCNC | Performed by: STUDENT IN AN ORGANIZED HEALTH CARE EDUCATION/TRAINING PROGRAM

## 2021-06-07 PROCEDURE — 80048 BASIC METABOLIC PNL TOTAL CA: CPT | Mod: HCNC | Performed by: STUDENT IN AN ORGANIZED HEALTH CARE EDUCATION/TRAINING PROGRAM

## 2021-06-07 PROCEDURE — 99024 PR POST-OP FOLLOW-UP VISIT: ICD-10-PCS | Mod: HCNC,,, | Performed by: PHYSICIAN ASSISTANT

## 2021-06-07 PROCEDURE — 99024 POSTOP FOLLOW-UP VISIT: CPT | Mod: HCNC,,, | Performed by: PHYSICIAN ASSISTANT

## 2021-06-07 PROCEDURE — 30200315 PPD INTRADERMAL TEST REV CODE 302: Mod: HCNC | Performed by: NEUROLOGICAL SURGERY

## 2021-06-07 RX ORDER — ASPIRIN 81 MG/1
81 TABLET ORAL DAILY
Refills: 0
Start: 2021-06-07 | End: 2023-12-12

## 2021-06-07 RX ORDER — LACTULOSE 10 G/15ML
30 SOLUTION ORAL ONCE
Status: COMPLETED | OUTPATIENT
Start: 2021-06-07 | End: 2021-06-07

## 2021-06-07 RX ORDER — CEPHALEXIN 500 MG/1
500 CAPSULE ORAL EVERY 6 HOURS
Qty: 8 CAPSULE | Refills: 0 | Status: ON HOLD
Start: 2021-06-07 | End: 2021-06-14 | Stop reason: HOSPADM

## 2021-06-07 RX ORDER — HYDROCODONE BITARTRATE AND ACETAMINOPHEN 5; 325 MG/1; MG/1
1 TABLET ORAL EVERY 8 HOURS PRN
Qty: 42 TABLET | Refills: 0 | Status: ON HOLD | OUTPATIENT
Start: 2021-06-07 | End: 2021-06-14 | Stop reason: HOSPADM

## 2021-06-07 RX ORDER — INSULIN ASPART 100 [IU]/ML
0-5 INJECTION, SOLUTION INTRAVENOUS; SUBCUTANEOUS
Refills: 0
Start: 2021-06-07 | End: 2023-07-27

## 2021-06-07 RX ORDER — CLOPIDOGREL BISULFATE 75 MG/1
75 TABLET ORAL DAILY
Qty: 30 TABLET | Refills: 11
Start: 2021-06-07 | End: 2021-11-09 | Stop reason: SDUPTHER

## 2021-06-07 RX ORDER — POLYETHYLENE GLYCOL 3350 17 G/17G
17 POWDER, FOR SOLUTION ORAL DAILY
Refills: 0
Start: 2021-06-07 | End: 2021-08-06

## 2021-06-07 RX ORDER — SENNOSIDES 8.6 MG/1
1 TABLET ORAL DAILY
Status: ON HOLD
Start: 2021-06-07 | End: 2023-10-23 | Stop reason: HOSPADM

## 2021-06-07 RX ADMIN — CEPHALEXIN 500 MG: 500 CAPSULE ORAL at 11:06

## 2021-06-07 RX ADMIN — CLOPIDOGREL 75 MG: 75 TABLET, FILM COATED ORAL at 08:06

## 2021-06-07 RX ADMIN — ACETAMINOPHEN 650 MG: 325 TABLET ORAL at 04:06

## 2021-06-07 RX ADMIN — DOCUSATE SODIUM 100 MG: 100 CAPSULE, LIQUID FILLED ORAL at 08:06

## 2021-06-07 RX ADMIN — SENNOSIDES 8.6 MG: 8.6 TABLET, FILM COATED ORAL at 08:06

## 2021-06-07 RX ADMIN — POLYETHYLENE GLYCOL 3350 17 G: 17 POWDER, FOR SOLUTION ORAL at 08:06

## 2021-06-07 RX ADMIN — IPRATROPIUM BROMIDE AND ALBUTEROL SULFATE 3 ML: .5; 2.5 SOLUTION RESPIRATORY (INHALATION) at 07:06

## 2021-06-07 RX ADMIN — NICOTINE 1 PATCH: 7 PATCH TRANSDERMAL at 08:06

## 2021-06-07 RX ADMIN — IPRATROPIUM BROMIDE AND ALBUTEROL SULFATE 3 ML: .5; 2.5 SOLUTION RESPIRATORY (INHALATION) at 01:06

## 2021-06-07 RX ADMIN — ROSUVASTATIN CALCIUM 20 MG: 20 TABLET, FILM COATED ORAL at 08:06

## 2021-06-07 RX ADMIN — CEPHALEXIN 500 MG: 500 CAPSULE ORAL at 05:06

## 2021-06-07 RX ADMIN — Medication 1 G: at 11:06

## 2021-06-07 RX ADMIN — OXYBUTYNIN CHLORIDE 15 MG: 10 TABLET, EXTENDED RELEASE ORAL at 08:06

## 2021-06-07 RX ADMIN — HEPARIN SODIUM 5000 UNITS: 5000 INJECTION INTRAVENOUS; SUBCUTANEOUS at 05:06

## 2021-06-07 RX ADMIN — TAMSULOSIN HYDROCHLORIDE 0.4 MG: 0.4 CAPSULE ORAL at 08:06

## 2021-06-07 RX ADMIN — HEPARIN SODIUM 5000 UNITS: 5000 INJECTION INTRAVENOUS; SUBCUTANEOUS at 01:06

## 2021-06-07 RX ADMIN — LACTULOSE 30 G: 10 SOLUTION ORAL at 11:06

## 2021-06-07 RX ADMIN — ASPIRIN 81 MG: 81 TABLET, COATED ORAL at 08:06

## 2021-06-08 ENCOUNTER — HOSPITAL ENCOUNTER (OUTPATIENT)
Facility: HOSPITAL | Age: 86
Discharge: HOME | End: 2021-06-14
Attending: EMERGENCY MEDICINE | Admitting: NEUROLOGICAL SURGERY
Payer: MEDICARE

## 2021-06-08 DIAGNOSIS — T85.09XA OBSTRUCTED VENTRICULOPERITONEAL SHUNT, INITIAL ENCOUNTER: ICD-10-CM

## 2021-06-08 DIAGNOSIS — R42 DIZZINESS: ICD-10-CM

## 2021-06-08 DIAGNOSIS — G91.2 NORMAL PRESSURE HYDROCEPHALUS: ICD-10-CM

## 2021-06-08 DIAGNOSIS — R11.2 NAUSEA & VOMITING: ICD-10-CM

## 2021-06-08 DIAGNOSIS — R33.9 URINARY RETENTION: ICD-10-CM

## 2021-06-08 DIAGNOSIS — K59.00 CONSTIPATION, UNSPECIFIED CONSTIPATION TYPE: ICD-10-CM

## 2021-06-08 LAB
ABO + RH BLD: NORMAL
ALBUMIN SERPL BCP-MCNC: 3.2 G/DL (ref 3.5–5.2)
ALP SERPL-CCNC: 71 U/L (ref 55–135)
ALT SERPL W/O P-5'-P-CCNC: 20 U/L (ref 10–44)
ANION GAP SERPL CALC-SCNC: 11 MMOL/L (ref 8–16)
APTT BLDCRRT: 25.1 SEC (ref 21–32)
AST SERPL-CCNC: 26 U/L (ref 10–40)
BASOPHILS # BLD AUTO: 0.04 K/UL (ref 0–0.2)
BASOPHILS NFR BLD: 0.4 % (ref 0–1.9)
BILIRUB SERPL-MCNC: 0.5 MG/DL (ref 0.1–1)
BLD GP AB SCN CELLS X3 SERPL QL: NORMAL
BUN SERPL-MCNC: 17 MG/DL (ref 8–23)
CALCIUM SERPL-MCNC: 9.2 MG/DL (ref 8.7–10.5)
CHLORIDE SERPL-SCNC: 104 MMOL/L (ref 95–110)
CO2 SERPL-SCNC: 23 MMOL/L (ref 23–29)
CREAT SERPL-MCNC: 1.2 MG/DL (ref 0.5–1.4)
DIFFERENTIAL METHOD: ABNORMAL
EOSINOPHIL # BLD AUTO: 0.1 K/UL (ref 0–0.5)
EOSINOPHIL NFR BLD: 1.2 % (ref 0–8)
ERYTHROCYTE [DISTWIDTH] IN BLOOD BY AUTOMATED COUNT: 14.6 % (ref 11.5–14.5)
EST. GFR  (AFRICAN AMERICAN): >60 ML/MIN/1.73 M^2
EST. GFR  (NON AFRICAN AMERICAN): 56 ML/MIN/1.73 M^2
GLUCOSE SERPL-MCNC: 139 MG/DL (ref 70–110)
HCT VFR BLD AUTO: 35.7 % (ref 40–54)
HGB BLD-MCNC: 12.1 G/DL (ref 14–18)
IMM GRANULOCYTES # BLD AUTO: 0.05 K/UL (ref 0–0.04)
IMM GRANULOCYTES NFR BLD AUTO: 0.5 % (ref 0–0.5)
INR PPP: 1 (ref 0.8–1.2)
LIPASE SERPL-CCNC: 4 U/L (ref 4–60)
LYMPHOCYTES # BLD AUTO: 1.2 K/UL (ref 1–4.8)
LYMPHOCYTES NFR BLD: 12.1 % (ref 18–48)
MCH RBC QN AUTO: 32.4 PG (ref 27–31)
MCHC RBC AUTO-ENTMCNC: 33.9 G/DL (ref 32–36)
MCV RBC AUTO: 96 FL (ref 82–98)
MONOCYTES # BLD AUTO: 0.7 K/UL (ref 0.3–1)
MONOCYTES NFR BLD: 7.1 % (ref 4–15)
NEUTROPHILS # BLD AUTO: 7.9 K/UL (ref 1.8–7.7)
NEUTROPHILS NFR BLD: 78.7 % (ref 38–73)
NRBC BLD-RTO: 0 /100 WBC
PLATELET # BLD AUTO: 279 K/UL (ref 150–450)
PMV BLD AUTO: 9.6 FL (ref 9.2–12.9)
POTASSIUM SERPL-SCNC: 4.7 MMOL/L (ref 3.5–5.1)
PROT SERPL-MCNC: 7.3 G/DL (ref 6–8.4)
PROTHROMBIN TIME: 10.9 SEC (ref 9–12.5)
RBC # BLD AUTO: 3.73 M/UL (ref 4.6–6.2)
SODIUM SERPL-SCNC: 138 MMOL/L (ref 136–145)
WBC # BLD AUTO: 10.09 K/UL (ref 3.9–12.7)

## 2021-06-08 PROCEDURE — 63600175 PHARM REV CODE 636 W HCPCS: Mod: HCNC | Performed by: STUDENT IN AN ORGANIZED HEALTH CARE EDUCATION/TRAINING PROGRAM

## 2021-06-08 PROCEDURE — 93010 ELECTROCARDIOGRAM REPORT: CPT | Mod: HCNC,,, | Performed by: INTERNAL MEDICINE

## 2021-06-08 PROCEDURE — 96361 HYDRATE IV INFUSION ADD-ON: CPT

## 2021-06-08 PROCEDURE — 86900 BLOOD TYPING SEROLOGIC ABO: CPT | Mod: HCNC | Performed by: EMERGENCY MEDICINE

## 2021-06-08 PROCEDURE — 63600175 PHARM REV CODE 636 W HCPCS: Mod: HCNC | Performed by: EMERGENCY MEDICINE

## 2021-06-08 PROCEDURE — 99285 EMERGENCY DEPT VISIT HI MDM: CPT | Mod: 25

## 2021-06-08 PROCEDURE — 80053 COMPREHEN METABOLIC PANEL: CPT | Mod: HCNC | Performed by: EMERGENCY MEDICINE

## 2021-06-08 PROCEDURE — 85730 THROMBOPLASTIN TIME PARTIAL: CPT | Mod: HCNC | Performed by: EMERGENCY MEDICINE

## 2021-06-08 PROCEDURE — 25000003 PHARM REV CODE 250: Mod: HCNC | Performed by: EMERGENCY MEDICINE

## 2021-06-08 PROCEDURE — 96372 THER/PROPH/DIAG INJ SC/IM: CPT | Mod: 59

## 2021-06-08 PROCEDURE — 83690 ASSAY OF LIPASE: CPT | Mod: HCNC | Performed by: EMERGENCY MEDICINE

## 2021-06-08 PROCEDURE — G0378 HOSPITAL OBSERVATION PER HR: HCPCS | Mod: HCNC

## 2021-06-08 PROCEDURE — 85025 COMPLETE CBC W/AUTO DIFF WBC: CPT | Mod: HCNC | Performed by: EMERGENCY MEDICINE

## 2021-06-08 PROCEDURE — 96374 THER/PROPH/DIAG INJ IV PUSH: CPT

## 2021-06-08 PROCEDURE — 85610 PROTHROMBIN TIME: CPT | Mod: HCNC | Performed by: EMERGENCY MEDICINE

## 2021-06-08 PROCEDURE — 25000003 PHARM REV CODE 250: Mod: HCNC | Performed by: STUDENT IN AN ORGANIZED HEALTH CARE EDUCATION/TRAINING PROGRAM

## 2021-06-08 PROCEDURE — 93010 EKG 12-LEAD: ICD-10-PCS | Mod: HCNC,,, | Performed by: INTERNAL MEDICINE

## 2021-06-08 PROCEDURE — 93005 ELECTROCARDIOGRAM TRACING: CPT | Mod: HCNC

## 2021-06-08 RX ORDER — ACETAMINOPHEN 325 MG/1
650 TABLET ORAL EVERY 4 HOURS PRN
Status: DISCONTINUED | OUTPATIENT
Start: 2021-06-08 | End: 2021-06-15 | Stop reason: HOSPADM

## 2021-06-08 RX ORDER — HEPARIN SODIUM 5000 [USP'U]/ML
5000 INJECTION, SOLUTION INTRAVENOUS; SUBCUTANEOUS EVERY 8 HOURS
Status: DISCONTINUED | OUTPATIENT
Start: 2021-06-08 | End: 2021-06-15 | Stop reason: HOSPADM

## 2021-06-08 RX ORDER — GLUCAGON 1 MG
1 KIT INJECTION
Status: DISCONTINUED | OUTPATIENT
Start: 2021-06-08 | End: 2021-06-15 | Stop reason: HOSPADM

## 2021-06-08 RX ORDER — LABETALOL HCL 20 MG/4 ML
10 SYRINGE (ML) INTRAVENOUS EVERY 10 MIN PRN
Status: DISCONTINUED | OUTPATIENT
Start: 2021-06-08 | End: 2021-06-15 | Stop reason: HOSPADM

## 2021-06-08 RX ORDER — POLYETHYLENE GLYCOL 3350 17 G/17G
17 POWDER, FOR SOLUTION ORAL DAILY
Status: DISCONTINUED | OUTPATIENT
Start: 2021-06-09 | End: 2021-06-15 | Stop reason: HOSPADM

## 2021-06-08 RX ORDER — IBUPROFEN 200 MG
16 TABLET ORAL
Status: DISCONTINUED | OUTPATIENT
Start: 2021-06-08 | End: 2021-06-15 | Stop reason: HOSPADM

## 2021-06-08 RX ORDER — IPRATROPIUM BROMIDE AND ALBUTEROL SULFATE 2.5; .5 MG/3ML; MG/3ML
3 SOLUTION RESPIRATORY (INHALATION)
Status: DISCONTINUED | OUTPATIENT
Start: 2021-06-09 | End: 2021-06-15 | Stop reason: HOSPADM

## 2021-06-08 RX ORDER — INSULIN ASPART 100 [IU]/ML
0-5 INJECTION, SOLUTION INTRAVENOUS; SUBCUTANEOUS
Status: DISCONTINUED | OUTPATIENT
Start: 2021-06-08 | End: 2021-06-15 | Stop reason: HOSPADM

## 2021-06-08 RX ORDER — IBUPROFEN 200 MG
24 TABLET ORAL
Status: DISCONTINUED | OUTPATIENT
Start: 2021-06-08 | End: 2021-06-08

## 2021-06-08 RX ORDER — ATORVASTATIN CALCIUM 10 MG/1
10 TABLET, FILM COATED ORAL DAILY
Status: DISCONTINUED | OUTPATIENT
Start: 2021-06-09 | End: 2021-06-15 | Stop reason: HOSPADM

## 2021-06-08 RX ORDER — IBUPROFEN 200 MG
16 TABLET ORAL
Status: DISCONTINUED | OUTPATIENT
Start: 2021-06-08 | End: 2021-06-08

## 2021-06-08 RX ORDER — IBUPROFEN 200 MG
24 TABLET ORAL
Status: DISCONTINUED | OUTPATIENT
Start: 2021-06-08 | End: 2021-06-15 | Stop reason: HOSPADM

## 2021-06-08 RX ORDER — SENNOSIDES 8.6 MG/1
8.6 TABLET ORAL DAILY
Status: DISCONTINUED | OUTPATIENT
Start: 2021-06-09 | End: 2021-06-15 | Stop reason: HOSPADM

## 2021-06-08 RX ORDER — ONDANSETRON 2 MG/ML
4 INJECTION INTRAMUSCULAR; INTRAVENOUS
Status: COMPLETED | OUTPATIENT
Start: 2021-06-08 | End: 2021-06-08

## 2021-06-08 RX ORDER — SODIUM CHLORIDE 9 MG/ML
INJECTION, SOLUTION INTRAVENOUS CONTINUOUS
Status: DISCONTINUED | OUTPATIENT
Start: 2021-06-08 | End: 2021-06-11

## 2021-06-08 RX ORDER — TAMSULOSIN HYDROCHLORIDE 0.4 MG/1
1 CAPSULE ORAL DAILY
Status: DISCONTINUED | OUTPATIENT
Start: 2021-06-09 | End: 2021-06-11

## 2021-06-08 RX ORDER — DOCUSATE SODIUM 100 MG/1
100 CAPSULE, LIQUID FILLED ORAL 2 TIMES DAILY
Status: DISCONTINUED | OUTPATIENT
Start: 2021-06-08 | End: 2021-06-15 | Stop reason: HOSPADM

## 2021-06-08 RX ORDER — ONDANSETRON 2 MG/ML
4 INJECTION INTRAMUSCULAR; INTRAVENOUS EVERY 6 HOURS PRN
Status: DISCONTINUED | OUTPATIENT
Start: 2021-06-08 | End: 2021-06-15 | Stop reason: HOSPADM

## 2021-06-08 RX ORDER — NICOTINE 7MG/24HR
1 PATCH, TRANSDERMAL 24 HOURS TRANSDERMAL DAILY
Status: DISCONTINUED | OUTPATIENT
Start: 2021-06-09 | End: 2021-06-09

## 2021-06-08 RX ADMIN — DOCUSATE SODIUM 100 MG: 100 CAPSULE, LIQUID FILLED ORAL at 11:06

## 2021-06-08 RX ADMIN — HEPARIN SODIUM 5000 UNITS: 5000 INJECTION INTRAVENOUS; SUBCUTANEOUS at 11:06

## 2021-06-08 RX ADMIN — SODIUM CHLORIDE 500 ML: 0.9 INJECTION, SOLUTION INTRAVENOUS at 02:06

## 2021-06-08 RX ADMIN — SODIUM CHLORIDE: 0.9 INJECTION, SOLUTION INTRAVENOUS at 05:06

## 2021-06-08 RX ADMIN — ONDANSETRON 4 MG: 2 INJECTION INTRAMUSCULAR; INTRAVENOUS at 02:06

## 2021-06-09 PROBLEM — E78.5 HLD (HYPERLIPIDEMIA): Status: ACTIVE | Noted: 2021-06-09

## 2021-06-09 PROBLEM — K59.00 CONSTIPATION: Status: ACTIVE | Noted: 2021-06-09

## 2021-06-09 LAB
BILIRUB UR QL STRIP: NEGATIVE
CLARITY UR REFRACT.AUTO: CLEAR
COLOR UR AUTO: YELLOW
GLUCOSE UR QL STRIP: NEGATIVE
HGB UR QL STRIP: ABNORMAL
KETONES UR QL STRIP: ABNORMAL
LEUKOCYTE ESTERASE UR QL STRIP: ABNORMAL
MICROSCOPIC COMMENT: ABNORMAL
NITRITE UR QL STRIP: NEGATIVE
PH UR STRIP: 5 [PH] (ref 5–8)
PROT UR QL STRIP: NEGATIVE
RBC #/AREA URNS AUTO: 5 /HPF (ref 0–4)
SP GR UR STRIP: 1.01 (ref 1–1.03)
SQUAMOUS #/AREA URNS AUTO: 0 /HPF
URN SPEC COLLECT METH UR: ABNORMAL
WBC #/AREA URNS AUTO: 23 /HPF (ref 0–5)

## 2021-06-09 PROCEDURE — S4991 NICOTINE PATCH NONLEGEND: HCPCS | Mod: HCNC | Performed by: STUDENT IN AN ORGANIZED HEALTH CARE EDUCATION/TRAINING PROGRAM

## 2021-06-09 PROCEDURE — 25000242 PHARM REV CODE 250 ALT 637 W/ HCPCS: Mod: HCNC | Performed by: STUDENT IN AN ORGANIZED HEALTH CARE EDUCATION/TRAINING PROGRAM

## 2021-06-09 PROCEDURE — 63600175 PHARM REV CODE 636 W HCPCS: Mod: HCNC | Performed by: PHYSICIAN ASSISTANT

## 2021-06-09 PROCEDURE — G0378 HOSPITAL OBSERVATION PER HR: HCPCS | Mod: HCNC

## 2021-06-09 PROCEDURE — 96361 HYDRATE IV INFUSION ADD-ON: CPT

## 2021-06-09 PROCEDURE — 99024 POSTOP FOLLOW-UP VISIT: CPT | Mod: HCNC,,, | Performed by: PHYSICIAN ASSISTANT

## 2021-06-09 PROCEDURE — 99220 PR INITIAL OBSERVATION CARE,LEVL III: CPT | Mod: HCNC,,, | Performed by: STUDENT IN AN ORGANIZED HEALTH CARE EDUCATION/TRAINING PROGRAM

## 2021-06-09 PROCEDURE — C1751 CATH, INF, PER/CENT/MIDLINE: HCPCS | Mod: HCNC

## 2021-06-09 PROCEDURE — 87186 SC STD MICRODIL/AGAR DIL: CPT | Mod: HCNC | Performed by: STUDENT IN AN ORGANIZED HEALTH CARE EDUCATION/TRAINING PROGRAM

## 2021-06-09 PROCEDURE — 99220 PR INITIAL OBSERVATION CARE,LEVL III: ICD-10-PCS | Mod: HCNC,,, | Performed by: STUDENT IN AN ORGANIZED HEALTH CARE EDUCATION/TRAINING PROGRAM

## 2021-06-09 PROCEDURE — 96372 THER/PROPH/DIAG INJ SC/IM: CPT

## 2021-06-09 PROCEDURE — 96375 TX/PRO/DX INJ NEW DRUG ADDON: CPT

## 2021-06-09 PROCEDURE — 76937 US GUIDE VASCULAR ACCESS: CPT | Mod: HCNC

## 2021-06-09 PROCEDURE — 25000003 PHARM REV CODE 250: Mod: HCNC | Performed by: PHYSICIAN ASSISTANT

## 2021-06-09 PROCEDURE — 99900035 HC TECH TIME PER 15 MIN (STAT): Mod: HCNC

## 2021-06-09 PROCEDURE — 99024 PR POST-OP FOLLOW-UP VISIT: ICD-10-PCS | Mod: HCNC,,, | Performed by: PHYSICIAN ASSISTANT

## 2021-06-09 PROCEDURE — 81001 URINALYSIS AUTO W/SCOPE: CPT | Mod: HCNC | Performed by: STUDENT IN AN ORGANIZED HEALTH CARE EDUCATION/TRAINING PROGRAM

## 2021-06-09 PROCEDURE — 25000003 PHARM REV CODE 250: Mod: HCNC | Performed by: STUDENT IN AN ORGANIZED HEALTH CARE EDUCATION/TRAINING PROGRAM

## 2021-06-09 PROCEDURE — 87077 CULTURE AEROBIC IDENTIFY: CPT | Mod: HCNC | Performed by: STUDENT IN AN ORGANIZED HEALTH CARE EDUCATION/TRAINING PROGRAM

## 2021-06-09 PROCEDURE — 94640 AIRWAY INHALATION TREATMENT: CPT | Mod: HCNC

## 2021-06-09 PROCEDURE — 63600175 PHARM REV CODE 636 W HCPCS: Mod: HCNC | Performed by: STUDENT IN AN ORGANIZED HEALTH CARE EDUCATION/TRAINING PROGRAM

## 2021-06-09 PROCEDURE — 94761 N-INVAS EAR/PLS OXIMETRY MLT: CPT | Mod: HCNC

## 2021-06-09 PROCEDURE — 25000003 PHARM REV CODE 250: Mod: HCNC | Performed by: EMERGENCY MEDICINE

## 2021-06-09 PROCEDURE — 87088 URINE BACTERIA CULTURE: CPT | Mod: HCNC | Performed by: STUDENT IN AN ORGANIZED HEALTH CARE EDUCATION/TRAINING PROGRAM

## 2021-06-09 PROCEDURE — 87086 URINE CULTURE/COLONY COUNT: CPT | Mod: HCNC | Performed by: STUDENT IN AN ORGANIZED HEALTH CARE EDUCATION/TRAINING PROGRAM

## 2021-06-09 RX ORDER — GLYCERIN 1 G/1
1 SUPPOSITORY RECTAL ONCE
Status: DISCONTINUED | OUTPATIENT
Start: 2021-06-09 | End: 2021-06-09

## 2021-06-09 RX ORDER — IBUPROFEN 200 MG
1 TABLET ORAL DAILY
Status: DISCONTINUED | OUTPATIENT
Start: 2021-06-09 | End: 2021-06-15 | Stop reason: HOSPADM

## 2021-06-09 RX ORDER — CEFTRIAXONE 1 G/1
1 INJECTION, POWDER, FOR SOLUTION INTRAMUSCULAR; INTRAVENOUS
Status: DISCONTINUED | OUTPATIENT
Start: 2021-06-09 | End: 2021-06-13

## 2021-06-09 RX ADMIN — NICOTINE 1 PATCH: 14 PATCH TRANSDERMAL at 04:06

## 2021-06-09 RX ADMIN — OXYBUTYNIN CHLORIDE 15 MG: 10 TABLET, EXTENDED RELEASE ORAL at 09:06

## 2021-06-09 RX ADMIN — IPRATROPIUM BROMIDE AND ALBUTEROL SULFATE 3 ML: .5; 2.5 SOLUTION RESPIRATORY (INHALATION) at 01:06

## 2021-06-09 RX ADMIN — SODIUM CHLORIDE: 0.9 INJECTION, SOLUTION INTRAVENOUS at 09:06

## 2021-06-09 RX ADMIN — HEPARIN SODIUM 5000 UNITS: 5000 INJECTION INTRAVENOUS; SUBCUTANEOUS at 06:06

## 2021-06-09 RX ADMIN — ATORVASTATIN CALCIUM 10 MG: 10 TABLET, FILM COATED ORAL at 09:06

## 2021-06-09 RX ADMIN — NICOTINE 1 PATCH: 7 PATCH TRANSDERMAL at 09:06

## 2021-06-09 RX ADMIN — SODIUM CHLORIDE: 0.9 INJECTION, SOLUTION INTRAVENOUS at 12:06

## 2021-06-09 RX ADMIN — IPRATROPIUM BROMIDE AND ALBUTEROL SULFATE 3 ML: .5; 2.5 SOLUTION RESPIRATORY (INHALATION) at 08:06

## 2021-06-09 RX ADMIN — CEFTRIAXONE 1 G: 1 INJECTION, POWDER, FOR SOLUTION INTRAMUSCULAR; INTRAVENOUS at 07:06

## 2021-06-09 RX ADMIN — SODIUM PHOSPHATE, DIBASIC AND SODIUM PHOSPHATE, MONOBASIC 1 ENEMA: 7; 19 ENEMA RECTAL at 12:06

## 2021-06-09 RX ADMIN — POLYETHYLENE GLYCOL 3350 17 G: 17 POWDER, FOR SOLUTION ORAL at 09:06

## 2021-06-09 RX ADMIN — SENNOSIDES 8.6 MG: 8.6 TABLET, FILM COATED ORAL at 09:06

## 2021-06-09 RX ADMIN — DOCUSATE SODIUM 100 MG: 100 CAPSULE, LIQUID FILLED ORAL at 08:06

## 2021-06-09 RX ADMIN — GLYCERIN 1 SUPPOSITORY: 2 SUPPOSITORY RECTAL at 11:06

## 2021-06-09 RX ADMIN — HEPARIN SODIUM 5000 UNITS: 5000 INJECTION INTRAVENOUS; SUBCUTANEOUS at 03:06

## 2021-06-09 RX ADMIN — DOCUSATE SODIUM 100 MG: 100 CAPSULE, LIQUID FILLED ORAL at 09:06

## 2021-06-09 RX ADMIN — HEPARIN SODIUM 5000 UNITS: 5000 INJECTION INTRAVENOUS; SUBCUTANEOUS at 09:06

## 2021-06-09 RX ADMIN — TAMSULOSIN HYDROCHLORIDE 0.4 MG: 0.4 CAPSULE ORAL at 09:06

## 2021-06-10 ENCOUNTER — PATIENT MESSAGE (OUTPATIENT)
Dept: NEUROSURGERY | Facility: CLINIC | Age: 86
End: 2021-06-10

## 2021-06-10 LAB
ALBUMIN SERPL BCP-MCNC: 2.9 G/DL (ref 3.5–5.2)
ALP SERPL-CCNC: 66 U/L (ref 55–135)
ALT SERPL W/O P-5'-P-CCNC: 17 U/L (ref 10–44)
ANION GAP SERPL CALC-SCNC: 12 MMOL/L (ref 8–16)
AST SERPL-CCNC: 26 U/L (ref 10–40)
BASOPHILS # BLD AUTO: 0.08 K/UL (ref 0–0.2)
BASOPHILS NFR BLD: 0.8 % (ref 0–1.9)
BILIRUB SERPL-MCNC: 0.4 MG/DL (ref 0.1–1)
BUN SERPL-MCNC: 14 MG/DL (ref 8–23)
CALCIUM SERPL-MCNC: 8.6 MG/DL (ref 8.7–10.5)
CHLORIDE SERPL-SCNC: 109 MMOL/L (ref 95–110)
CO2 SERPL-SCNC: 15 MMOL/L (ref 23–29)
CREAT SERPL-MCNC: 1.1 MG/DL (ref 0.5–1.4)
DIFFERENTIAL METHOD: ABNORMAL
EOSINOPHIL # BLD AUTO: 0.7 K/UL (ref 0–0.5)
EOSINOPHIL NFR BLD: 6.8 % (ref 0–8)
ERYTHROCYTE [DISTWIDTH] IN BLOOD BY AUTOMATED COUNT: 14.7 % (ref 11.5–14.5)
EST. GFR  (AFRICAN AMERICAN): >60 ML/MIN/1.73 M^2
EST. GFR  (NON AFRICAN AMERICAN): >60 ML/MIN/1.73 M^2
GLUCOSE SERPL-MCNC: 151 MG/DL (ref 70–110)
HCT VFR BLD AUTO: 36.4 % (ref 40–54)
HGB BLD-MCNC: 11.3 G/DL (ref 14–18)
IMM GRANULOCYTES # BLD AUTO: 0.04 K/UL (ref 0–0.04)
IMM GRANULOCYTES NFR BLD AUTO: 0.4 % (ref 0–0.5)
LYMPHOCYTES # BLD AUTO: 2.4 K/UL (ref 1–4.8)
LYMPHOCYTES NFR BLD: 24.5 % (ref 18–48)
MCH RBC QN AUTO: 32 PG (ref 27–31)
MCHC RBC AUTO-ENTMCNC: 31 G/DL (ref 32–36)
MCV RBC AUTO: 103 FL (ref 82–98)
MONOCYTES # BLD AUTO: 1 K/UL (ref 0.3–1)
MONOCYTES NFR BLD: 10 % (ref 4–15)
NEUTROPHILS # BLD AUTO: 5.5 K/UL (ref 1.8–7.7)
NEUTROPHILS NFR BLD: 57.5 % (ref 38–73)
NRBC BLD-RTO: 0 /100 WBC
PLATELET # BLD AUTO: 246 K/UL (ref 150–450)
PMV BLD AUTO: 9.9 FL (ref 9.2–12.9)
POTASSIUM SERPL-SCNC: 4.2 MMOL/L (ref 3.5–5.1)
PROT SERPL-MCNC: 6.6 G/DL (ref 6–8.4)
RBC # BLD AUTO: 3.53 M/UL (ref 4.6–6.2)
SODIUM SERPL-SCNC: 136 MMOL/L (ref 136–145)
WBC # BLD AUTO: 9.58 K/UL (ref 3.9–12.7)

## 2021-06-10 PROCEDURE — 96376 TX/PRO/DX INJ SAME DRUG ADON: CPT

## 2021-06-10 PROCEDURE — 97530 THERAPEUTIC ACTIVITIES: CPT | Mod: HCNC

## 2021-06-10 PROCEDURE — 96372 THER/PROPH/DIAG INJ SC/IM: CPT

## 2021-06-10 PROCEDURE — 25000003 PHARM REV CODE 250: Mod: HCNC | Performed by: STUDENT IN AN ORGANIZED HEALTH CARE EDUCATION/TRAINING PROGRAM

## 2021-06-10 PROCEDURE — 63600175 PHARM REV CODE 636 W HCPCS: Mod: HCNC | Performed by: STUDENT IN AN ORGANIZED HEALTH CARE EDUCATION/TRAINING PROGRAM

## 2021-06-10 PROCEDURE — 85025 COMPLETE CBC W/AUTO DIFF WBC: CPT | Mod: HCNC | Performed by: PHYSICIAN ASSISTANT

## 2021-06-10 PROCEDURE — 97165 OT EVAL LOW COMPLEX 30 MIN: CPT | Mod: HCNC

## 2021-06-10 PROCEDURE — 63600175 PHARM REV CODE 636 W HCPCS: Mod: HCNC | Performed by: PHYSICIAN ASSISTANT

## 2021-06-10 PROCEDURE — 94640 AIRWAY INHALATION TREATMENT: CPT | Mod: HCNC

## 2021-06-10 PROCEDURE — 97116 GAIT TRAINING THERAPY: CPT | Mod: HCNC

## 2021-06-10 PROCEDURE — 80053 COMPREHEN METABOLIC PANEL: CPT | Mod: HCNC | Performed by: PHYSICIAN ASSISTANT

## 2021-06-10 PROCEDURE — 97161 PT EVAL LOW COMPLEX 20 MIN: CPT | Mod: HCNC

## 2021-06-10 PROCEDURE — 25000003 PHARM REV CODE 250: Mod: HCNC | Performed by: EMERGENCY MEDICINE

## 2021-06-10 PROCEDURE — 36415 COLL VENOUS BLD VENIPUNCTURE: CPT | Mod: HCNC | Performed by: PHYSICIAN ASSISTANT

## 2021-06-10 PROCEDURE — 25000242 PHARM REV CODE 250 ALT 637 W/ HCPCS: Mod: HCNC | Performed by: STUDENT IN AN ORGANIZED HEALTH CARE EDUCATION/TRAINING PROGRAM

## 2021-06-10 PROCEDURE — 94761 N-INVAS EAR/PLS OXIMETRY MLT: CPT | Mod: HCNC

## 2021-06-10 PROCEDURE — 99024 POSTOP FOLLOW-UP VISIT: CPT | Mod: HCNC,,, | Performed by: PHYSICIAN ASSISTANT

## 2021-06-10 PROCEDURE — G0378 HOSPITAL OBSERVATION PER HR: HCPCS | Mod: HCNC

## 2021-06-10 PROCEDURE — S4991 NICOTINE PATCH NONLEGEND: HCPCS | Mod: HCNC | Performed by: STUDENT IN AN ORGANIZED HEALTH CARE EDUCATION/TRAINING PROGRAM

## 2021-06-10 PROCEDURE — 96361 HYDRATE IV INFUSION ADD-ON: CPT

## 2021-06-10 PROCEDURE — 99024 PR POST-OP FOLLOW-UP VISIT: ICD-10-PCS | Mod: HCNC,,, | Performed by: PHYSICIAN ASSISTANT

## 2021-06-10 RX ADMIN — ATORVASTATIN CALCIUM 10 MG: 10 TABLET, FILM COATED ORAL at 10:06

## 2021-06-10 RX ADMIN — TAMSULOSIN HYDROCHLORIDE 0.4 MG: 0.4 CAPSULE ORAL at 10:06

## 2021-06-10 RX ADMIN — POLYETHYLENE GLYCOL 3350 17 G: 17 POWDER, FOR SOLUTION ORAL at 10:06

## 2021-06-10 RX ADMIN — IPRATROPIUM BROMIDE AND ALBUTEROL SULFATE 3 ML: .5; 2.5 SOLUTION RESPIRATORY (INHALATION) at 08:06

## 2021-06-10 RX ADMIN — SENNOSIDES 8.6 MG: 8.6 TABLET, FILM COATED ORAL at 10:06

## 2021-06-10 RX ADMIN — DOCUSATE SODIUM 100 MG: 100 CAPSULE, LIQUID FILLED ORAL at 09:06

## 2021-06-10 RX ADMIN — DOCUSATE SODIUM 100 MG: 100 CAPSULE, LIQUID FILLED ORAL at 10:06

## 2021-06-10 RX ADMIN — SODIUM CHLORIDE: 0.9 INJECTION, SOLUTION INTRAVENOUS at 04:06

## 2021-06-10 RX ADMIN — CEFTRIAXONE 1 G: 1 INJECTION, POWDER, FOR SOLUTION INTRAMUSCULAR; INTRAVENOUS at 04:06

## 2021-06-10 RX ADMIN — NICOTINE 1 PATCH: 14 PATCH TRANSDERMAL at 10:06

## 2021-06-10 RX ADMIN — HEPARIN SODIUM 5000 UNITS: 5000 INJECTION INTRAVENOUS; SUBCUTANEOUS at 05:06

## 2021-06-10 RX ADMIN — HEPARIN SODIUM 5000 UNITS: 5000 INJECTION INTRAVENOUS; SUBCUTANEOUS at 03:06

## 2021-06-10 RX ADMIN — HEPARIN SODIUM 5000 UNITS: 5000 INJECTION INTRAVENOUS; SUBCUTANEOUS at 09:06

## 2021-06-10 RX ADMIN — OXYBUTYNIN CHLORIDE 15 MG: 10 TABLET, EXTENDED RELEASE ORAL at 10:06

## 2021-06-10 RX ADMIN — IPRATROPIUM BROMIDE AND ALBUTEROL SULFATE 3 ML: .5; 2.5 SOLUTION RESPIRATORY (INHALATION) at 07:06

## 2021-06-10 RX ADMIN — IPRATROPIUM BROMIDE AND ALBUTEROL SULFATE 3 ML: .5; 2.5 SOLUTION RESPIRATORY (INHALATION) at 02:06

## 2021-06-11 ENCOUNTER — TELEPHONE (OUTPATIENT)
Dept: NEUROSURGERY | Facility: CLINIC | Age: 86
End: 2021-06-11

## 2021-06-11 ENCOUNTER — TELEPHONE (OUTPATIENT)
Dept: NEUROLOGY | Facility: CLINIC | Age: 86
End: 2021-06-11

## 2021-06-11 LAB
ALBUMIN SERPL BCP-MCNC: 2.7 G/DL (ref 3.5–5.2)
ALP SERPL-CCNC: 68 U/L (ref 55–135)
ALT SERPL W/O P-5'-P-CCNC: 16 U/L (ref 10–44)
ANION GAP SERPL CALC-SCNC: 8 MMOL/L (ref 8–16)
AST SERPL-CCNC: 21 U/L (ref 10–40)
BASOPHILS # BLD AUTO: 0.07 K/UL (ref 0–0.2)
BASOPHILS NFR BLD: 0.7 % (ref 0–1.9)
BILIRUB SERPL-MCNC: 0.4 MG/DL (ref 0.1–1)
BUN SERPL-MCNC: 10 MG/DL (ref 8–23)
CALCIUM SERPL-MCNC: 8.6 MG/DL (ref 8.7–10.5)
CHLORIDE SERPL-SCNC: 107 MMOL/L (ref 95–110)
CO2 SERPL-SCNC: 21 MMOL/L (ref 23–29)
CREAT SERPL-MCNC: 0.9 MG/DL (ref 0.5–1.4)
DIFFERENTIAL METHOD: ABNORMAL
EOSINOPHIL # BLD AUTO: 1 K/UL (ref 0–0.5)
EOSINOPHIL NFR BLD: 9.7 % (ref 0–8)
ERYTHROCYTE [DISTWIDTH] IN BLOOD BY AUTOMATED COUNT: 14.3 % (ref 11.5–14.5)
EST. GFR  (AFRICAN AMERICAN): >60 ML/MIN/1.73 M^2
EST. GFR  (NON AFRICAN AMERICAN): >60 ML/MIN/1.73 M^2
GLUCOSE SERPL-MCNC: 143 MG/DL (ref 70–110)
HCT VFR BLD AUTO: 31.6 % (ref 40–54)
HGB BLD-MCNC: 10.3 G/DL (ref 14–18)
IMM GRANULOCYTES # BLD AUTO: 0.03 K/UL (ref 0–0.04)
IMM GRANULOCYTES NFR BLD AUTO: 0.3 % (ref 0–0.5)
LYMPHOCYTES # BLD AUTO: 2.2 K/UL (ref 1–4.8)
LYMPHOCYTES NFR BLD: 20.3 % (ref 18–48)
MCH RBC QN AUTO: 32.2 PG (ref 27–31)
MCHC RBC AUTO-ENTMCNC: 32.6 G/DL (ref 32–36)
MCV RBC AUTO: 99 FL (ref 82–98)
MONOCYTES # BLD AUTO: 1.1 K/UL (ref 0.3–1)
MONOCYTES NFR BLD: 10.3 % (ref 4–15)
NEUTROPHILS # BLD AUTO: 6.2 K/UL (ref 1.8–7.7)
NEUTROPHILS NFR BLD: 58.7 % (ref 38–73)
NRBC BLD-RTO: 0 /100 WBC
PLATELET # BLD AUTO: 257 K/UL (ref 150–450)
PMV BLD AUTO: 9.7 FL (ref 9.2–12.9)
POCT GLUCOSE: 182 MG/DL (ref 70–110)
POCT GLUCOSE: 192 MG/DL (ref 70–110)
POTASSIUM SERPL-SCNC: 4.1 MMOL/L (ref 3.5–5.1)
PROT SERPL-MCNC: 6.3 G/DL (ref 6–8.4)
RBC # BLD AUTO: 3.2 M/UL (ref 4.6–6.2)
SODIUM SERPL-SCNC: 136 MMOL/L (ref 136–145)
WBC # BLD AUTO: 10.6 K/UL (ref 3.9–12.7)

## 2021-06-11 PROCEDURE — 94761 N-INVAS EAR/PLS OXIMETRY MLT: CPT | Mod: HCNC

## 2021-06-11 PROCEDURE — 25000003 PHARM REV CODE 250: Mod: HCNC | Performed by: STUDENT IN AN ORGANIZED HEALTH CARE EDUCATION/TRAINING PROGRAM

## 2021-06-11 PROCEDURE — 96372 THER/PROPH/DIAG INJ SC/IM: CPT | Mod: 59

## 2021-06-11 PROCEDURE — 85025 COMPLETE CBC W/AUTO DIFF WBC: CPT | Mod: HCNC | Performed by: PHYSICIAN ASSISTANT

## 2021-06-11 PROCEDURE — 63600175 PHARM REV CODE 636 W HCPCS: Mod: HCNC | Performed by: STUDENT IN AN ORGANIZED HEALTH CARE EDUCATION/TRAINING PROGRAM

## 2021-06-11 PROCEDURE — 25000242 PHARM REV CODE 250 ALT 637 W/ HCPCS: Mod: HCNC | Performed by: STUDENT IN AN ORGANIZED HEALTH CARE EDUCATION/TRAINING PROGRAM

## 2021-06-11 PROCEDURE — 25000003 PHARM REV CODE 250: Mod: HCNC | Performed by: PHYSICIAN ASSISTANT

## 2021-06-11 PROCEDURE — 99024 POSTOP FOLLOW-UP VISIT: CPT | Mod: HCNC,,, | Performed by: PHYSICIAN ASSISTANT

## 2021-06-11 PROCEDURE — 94640 AIRWAY INHALATION TREATMENT: CPT | Mod: HCNC

## 2021-06-11 PROCEDURE — 25000003 PHARM REV CODE 250: Mod: HCNC | Performed by: EMERGENCY MEDICINE

## 2021-06-11 PROCEDURE — 36415 COLL VENOUS BLD VENIPUNCTURE: CPT | Mod: HCNC | Performed by: PHYSICIAN ASSISTANT

## 2021-06-11 PROCEDURE — 80053 COMPREHEN METABOLIC PANEL: CPT | Mod: HCNC | Performed by: PHYSICIAN ASSISTANT

## 2021-06-11 PROCEDURE — 99024 PR POST-OP FOLLOW-UP VISIT: ICD-10-PCS | Mod: HCNC,,, | Performed by: PHYSICIAN ASSISTANT

## 2021-06-11 PROCEDURE — S4991 NICOTINE PATCH NONLEGEND: HCPCS | Mod: HCNC | Performed by: STUDENT IN AN ORGANIZED HEALTH CARE EDUCATION/TRAINING PROGRAM

## 2021-06-11 PROCEDURE — G0378 HOSPITAL OBSERVATION PER HR: HCPCS | Mod: HCNC

## 2021-06-11 PROCEDURE — 96374 THER/PROPH/DIAG INJ IV PUSH: CPT | Mod: 59

## 2021-06-11 PROCEDURE — 96361 HYDRATE IV INFUSION ADD-ON: CPT

## 2021-06-11 PROCEDURE — 63600175 PHARM REV CODE 636 W HCPCS: Mod: HCNC | Performed by: PHYSICIAN ASSISTANT

## 2021-06-11 RX ORDER — TAMSULOSIN HYDROCHLORIDE 0.4 MG/1
0.8 CAPSULE ORAL DAILY
Status: DISCONTINUED | OUTPATIENT
Start: 2021-06-11 | End: 2021-06-15 | Stop reason: HOSPADM

## 2021-06-11 RX ADMIN — IPRATROPIUM BROMIDE AND ALBUTEROL SULFATE 3 ML: .5; 2.5 SOLUTION RESPIRATORY (INHALATION) at 08:06

## 2021-06-11 RX ADMIN — POLYETHYLENE GLYCOL 3350 17 G: 17 POWDER, FOR SOLUTION ORAL at 09:06

## 2021-06-11 RX ADMIN — HEPARIN SODIUM 5000 UNITS: 5000 INJECTION INTRAVENOUS; SUBCUTANEOUS at 10:06

## 2021-06-11 RX ADMIN — HEPARIN SODIUM 5000 UNITS: 5000 INJECTION INTRAVENOUS; SUBCUTANEOUS at 05:06

## 2021-06-11 RX ADMIN — NICOTINE 1 PATCH: 14 PATCH TRANSDERMAL at 09:06

## 2021-06-11 RX ADMIN — HEPARIN SODIUM 5000 UNITS: 5000 INJECTION INTRAVENOUS; SUBCUTANEOUS at 02:06

## 2021-06-11 RX ADMIN — DOCUSATE SODIUM 100 MG: 100 CAPSULE, LIQUID FILLED ORAL at 09:06

## 2021-06-11 RX ADMIN — IPRATROPIUM BROMIDE AND ALBUTEROL SULFATE 3 ML: .5; 2.5 SOLUTION RESPIRATORY (INHALATION) at 10:06

## 2021-06-11 RX ADMIN — SENNOSIDES 8.6 MG: 8.6 TABLET, FILM COATED ORAL at 09:06

## 2021-06-11 RX ADMIN — DOCUSATE SODIUM 100 MG: 100 CAPSULE, LIQUID FILLED ORAL at 10:06

## 2021-06-11 RX ADMIN — CEFTRIAXONE 1 G: 1 INJECTION, POWDER, FOR SOLUTION INTRAMUSCULAR; INTRAVENOUS at 05:06

## 2021-06-11 RX ADMIN — TAMSULOSIN HYDROCHLORIDE 0.8 MG: 0.4 CAPSULE ORAL at 09:06

## 2021-06-11 RX ADMIN — SODIUM CHLORIDE: 0.9 INJECTION, SOLUTION INTRAVENOUS at 05:06

## 2021-06-11 RX ADMIN — IPRATROPIUM BROMIDE AND ALBUTEROL SULFATE 3 ML: .5; 2.5 SOLUTION RESPIRATORY (INHALATION) at 02:06

## 2021-06-11 RX ADMIN — ATORVASTATIN CALCIUM 10 MG: 10 TABLET, FILM COATED ORAL at 09:06

## 2021-06-12 LAB
ALBUMIN SERPL BCP-MCNC: 2.8 G/DL (ref 3.5–5.2)
ALP SERPL-CCNC: 69 U/L (ref 55–135)
ALT SERPL W/O P-5'-P-CCNC: 18 U/L (ref 10–44)
ANION GAP SERPL CALC-SCNC: 9 MMOL/L (ref 8–16)
AST SERPL-CCNC: 20 U/L (ref 10–40)
BACTERIA UR CULT: ABNORMAL
BASOPHILS # BLD AUTO: 0.06 K/UL (ref 0–0.2)
BASOPHILS NFR BLD: 0.6 % (ref 0–1.9)
BILIRUB SERPL-MCNC: 0.4 MG/DL (ref 0.1–1)
BUN SERPL-MCNC: 8 MG/DL (ref 8–23)
CALCIUM SERPL-MCNC: 8.9 MG/DL (ref 8.7–10.5)
CHLORIDE SERPL-SCNC: 105 MMOL/L (ref 95–110)
CO2 SERPL-SCNC: 22 MMOL/L (ref 23–29)
CREAT SERPL-MCNC: 0.9 MG/DL (ref 0.5–1.4)
DIFFERENTIAL METHOD: ABNORMAL
EOSINOPHIL # BLD AUTO: 1 K/UL (ref 0–0.5)
EOSINOPHIL NFR BLD: 11 % (ref 0–8)
ERYTHROCYTE [DISTWIDTH] IN BLOOD BY AUTOMATED COUNT: 14.3 % (ref 11.5–14.5)
EST. GFR  (AFRICAN AMERICAN): >60 ML/MIN/1.73 M^2
EST. GFR  (NON AFRICAN AMERICAN): >60 ML/MIN/1.73 M^2
GLUCOSE SERPL-MCNC: 119 MG/DL (ref 70–110)
HCT VFR BLD AUTO: 31.2 % (ref 40–54)
HGB BLD-MCNC: 10.4 G/DL (ref 14–18)
IMM GRANULOCYTES # BLD AUTO: 0.03 K/UL (ref 0–0.04)
IMM GRANULOCYTES NFR BLD AUTO: 0.3 % (ref 0–0.5)
LYMPHOCYTES # BLD AUTO: 2.3 K/UL (ref 1–4.8)
LYMPHOCYTES NFR BLD: 24.4 % (ref 18–48)
MCH RBC QN AUTO: 31.9 PG (ref 27–31)
MCHC RBC AUTO-ENTMCNC: 33.3 G/DL (ref 32–36)
MCV RBC AUTO: 96 FL (ref 82–98)
MONOCYTES # BLD AUTO: 0.9 K/UL (ref 0.3–1)
MONOCYTES NFR BLD: 10.1 % (ref 4–15)
NEUTROPHILS # BLD AUTO: 5 K/UL (ref 1.8–7.7)
NEUTROPHILS NFR BLD: 53.6 % (ref 38–73)
NRBC BLD-RTO: 0 /100 WBC
PLATELET # BLD AUTO: 272 K/UL (ref 150–450)
PMV BLD AUTO: 10 FL (ref 9.2–12.9)
POTASSIUM SERPL-SCNC: 3.8 MMOL/L (ref 3.5–5.1)
PROT SERPL-MCNC: 6.4 G/DL (ref 6–8.4)
RBC # BLD AUTO: 3.26 M/UL (ref 4.6–6.2)
SODIUM SERPL-SCNC: 136 MMOL/L (ref 136–145)
WBC # BLD AUTO: 9.27 K/UL (ref 3.9–12.7)

## 2021-06-12 PROCEDURE — 25000242 PHARM REV CODE 250 ALT 637 W/ HCPCS: Mod: HCNC | Performed by: STUDENT IN AN ORGANIZED HEALTH CARE EDUCATION/TRAINING PROGRAM

## 2021-06-12 PROCEDURE — 25000003 PHARM REV CODE 250: Mod: HCNC | Performed by: STUDENT IN AN ORGANIZED HEALTH CARE EDUCATION/TRAINING PROGRAM

## 2021-06-12 PROCEDURE — 94640 AIRWAY INHALATION TREATMENT: CPT | Mod: HCNC

## 2021-06-12 PROCEDURE — 99900035 HC TECH TIME PER 15 MIN (STAT): Mod: HCNC

## 2021-06-12 PROCEDURE — 63600175 PHARM REV CODE 636 W HCPCS: Mod: HCNC | Performed by: STUDENT IN AN ORGANIZED HEALTH CARE EDUCATION/TRAINING PROGRAM

## 2021-06-12 PROCEDURE — 80053 COMPREHEN METABOLIC PANEL: CPT | Mod: HCNC | Performed by: PHYSICIAN ASSISTANT

## 2021-06-12 PROCEDURE — 25000003 PHARM REV CODE 250: Mod: HCNC | Performed by: PHYSICIAN ASSISTANT

## 2021-06-12 PROCEDURE — 36415 COLL VENOUS BLD VENIPUNCTURE: CPT | Mod: HCNC | Performed by: PHYSICIAN ASSISTANT

## 2021-06-12 PROCEDURE — G0378 HOSPITAL OBSERVATION PER HR: HCPCS | Mod: HCNC

## 2021-06-12 PROCEDURE — S4991 NICOTINE PATCH NONLEGEND: HCPCS | Mod: HCNC | Performed by: STUDENT IN AN ORGANIZED HEALTH CARE EDUCATION/TRAINING PROGRAM

## 2021-06-12 PROCEDURE — 99024 POSTOP FOLLOW-UP VISIT: CPT | Mod: HCNC,,, | Performed by: PHYSICIAN ASSISTANT

## 2021-06-12 PROCEDURE — 85025 COMPLETE CBC W/AUTO DIFF WBC: CPT | Mod: HCNC | Performed by: PHYSICIAN ASSISTANT

## 2021-06-12 PROCEDURE — 96372 THER/PROPH/DIAG INJ SC/IM: CPT

## 2021-06-12 PROCEDURE — 94761 N-INVAS EAR/PLS OXIMETRY MLT: CPT | Mod: HCNC

## 2021-06-12 PROCEDURE — 96376 TX/PRO/DX INJ SAME DRUG ADON: CPT

## 2021-06-12 PROCEDURE — 99024 PR POST-OP FOLLOW-UP VISIT: ICD-10-PCS | Mod: HCNC,,, | Performed by: PHYSICIAN ASSISTANT

## 2021-06-12 PROCEDURE — 63600175 PHARM REV CODE 636 W HCPCS: Mod: HCNC | Performed by: PHYSICIAN ASSISTANT

## 2021-06-12 RX ADMIN — DOCUSATE SODIUM 100 MG: 100 CAPSULE, LIQUID FILLED ORAL at 09:06

## 2021-06-12 RX ADMIN — TAMSULOSIN HYDROCHLORIDE 0.8 MG: 0.4 CAPSULE ORAL at 08:06

## 2021-06-12 RX ADMIN — HEPARIN SODIUM 5000 UNITS: 5000 INJECTION INTRAVENOUS; SUBCUTANEOUS at 06:06

## 2021-06-12 RX ADMIN — DOCUSATE SODIUM 100 MG: 100 CAPSULE, LIQUID FILLED ORAL at 10:06

## 2021-06-12 RX ADMIN — IPRATROPIUM BROMIDE AND ALBUTEROL SULFATE 3 ML: .5; 2.5 SOLUTION RESPIRATORY (INHALATION) at 02:06

## 2021-06-12 RX ADMIN — IPRATROPIUM BROMIDE AND ALBUTEROL SULFATE 3 ML: .5; 2.5 SOLUTION RESPIRATORY (INHALATION) at 07:06

## 2021-06-12 RX ADMIN — ATORVASTATIN CALCIUM 10 MG: 10 TABLET, FILM COATED ORAL at 08:06

## 2021-06-12 RX ADMIN — POLYETHYLENE GLYCOL 3350 17 G: 17 POWDER, FOR SOLUTION ORAL at 08:06

## 2021-06-12 RX ADMIN — SENNOSIDES 8.6 MG: 8.6 TABLET, FILM COATED ORAL at 08:06

## 2021-06-12 RX ADMIN — NICOTINE 1 PATCH: 14 PATCH TRANSDERMAL at 08:06

## 2021-06-12 RX ADMIN — HEPARIN SODIUM 5000 UNITS: 5000 INJECTION INTRAVENOUS; SUBCUTANEOUS at 02:06

## 2021-06-12 RX ADMIN — HEPARIN SODIUM 5000 UNITS: 5000 INJECTION INTRAVENOUS; SUBCUTANEOUS at 10:06

## 2021-06-12 RX ADMIN — CEFTRIAXONE 1 G: 1 INJECTION, POWDER, FOR SOLUTION INTRAMUSCULAR; INTRAVENOUS at 04:06

## 2021-06-13 LAB
ALBUMIN SERPL BCP-MCNC: 3 G/DL (ref 3.5–5.2)
ALP SERPL-CCNC: 81 U/L (ref 55–135)
ALT SERPL W/O P-5'-P-CCNC: 18 U/L (ref 10–44)
ANION GAP SERPL CALC-SCNC: 9 MMOL/L (ref 8–16)
AST SERPL-CCNC: 21 U/L (ref 10–40)
BASOPHILS # BLD AUTO: 0.09 K/UL (ref 0–0.2)
BASOPHILS NFR BLD: 0.7 % (ref 0–1.9)
BILIRUB SERPL-MCNC: 0.3 MG/DL (ref 0.1–1)
BUN SERPL-MCNC: 12 MG/DL (ref 8–23)
CALCIUM SERPL-MCNC: 9.8 MG/DL (ref 8.7–10.5)
CHLORIDE SERPL-SCNC: 102 MMOL/L (ref 95–110)
CO2 SERPL-SCNC: 24 MMOL/L (ref 23–29)
CREAT SERPL-MCNC: 1 MG/DL (ref 0.5–1.4)
DIFFERENTIAL METHOD: ABNORMAL
EOSINOPHIL # BLD AUTO: 0.9 K/UL (ref 0–0.5)
EOSINOPHIL NFR BLD: 7.3 % (ref 0–8)
ERYTHROCYTE [DISTWIDTH] IN BLOOD BY AUTOMATED COUNT: 14.4 % (ref 11.5–14.5)
EST. GFR  (AFRICAN AMERICAN): >60 ML/MIN/1.73 M^2
EST. GFR  (NON AFRICAN AMERICAN): >60 ML/MIN/1.73 M^2
GLUCOSE SERPL-MCNC: 116 MG/DL (ref 70–110)
HCT VFR BLD AUTO: 32.8 % (ref 40–54)
HGB BLD-MCNC: 11 G/DL (ref 14–18)
IMM GRANULOCYTES # BLD AUTO: 0.05 K/UL (ref 0–0.04)
IMM GRANULOCYTES NFR BLD AUTO: 0.4 % (ref 0–0.5)
LYMPHOCYTES # BLD AUTO: 1.9 K/UL (ref 1–4.8)
LYMPHOCYTES NFR BLD: 15.4 % (ref 18–48)
MCH RBC QN AUTO: 32.5 PG (ref 27–31)
MCHC RBC AUTO-ENTMCNC: 33.5 G/DL (ref 32–36)
MCV RBC AUTO: 97 FL (ref 82–98)
MONOCYTES # BLD AUTO: 1.2 K/UL (ref 0.3–1)
MONOCYTES NFR BLD: 9.2 % (ref 4–15)
NEUTROPHILS # BLD AUTO: 8.4 K/UL (ref 1.8–7.7)
NEUTROPHILS NFR BLD: 67 % (ref 38–73)
NRBC BLD-RTO: 0 /100 WBC
PLATELET # BLD AUTO: 305 K/UL (ref 150–450)
PMV BLD AUTO: 10 FL (ref 9.2–12.9)
POCT GLUCOSE: 154 MG/DL (ref 70–110)
POTASSIUM SERPL-SCNC: 4.6 MMOL/L (ref 3.5–5.1)
PROT SERPL-MCNC: 6.9 G/DL (ref 6–8.4)
RBC # BLD AUTO: 3.38 M/UL (ref 4.6–6.2)
SODIUM SERPL-SCNC: 135 MMOL/L (ref 136–145)
WBC # BLD AUTO: 12.49 K/UL (ref 3.9–12.7)

## 2021-06-13 PROCEDURE — 94640 AIRWAY INHALATION TREATMENT: CPT | Mod: HCNC

## 2021-06-13 PROCEDURE — G0378 HOSPITAL OBSERVATION PER HR: HCPCS | Mod: HCNC

## 2021-06-13 PROCEDURE — 94761 N-INVAS EAR/PLS OXIMETRY MLT: CPT | Mod: HCNC

## 2021-06-13 PROCEDURE — 36415 COLL VENOUS BLD VENIPUNCTURE: CPT | Mod: HCNC | Performed by: PHYSICIAN ASSISTANT

## 2021-06-13 PROCEDURE — 99024 POSTOP FOLLOW-UP VISIT: CPT | Mod: HCNC,,, | Performed by: PHYSICIAN ASSISTANT

## 2021-06-13 PROCEDURE — 99024 PR POST-OP FOLLOW-UP VISIT: ICD-10-PCS | Mod: HCNC,,, | Performed by: PHYSICIAN ASSISTANT

## 2021-06-13 PROCEDURE — 63600175 PHARM REV CODE 636 W HCPCS: Mod: HCNC | Performed by: STUDENT IN AN ORGANIZED HEALTH CARE EDUCATION/TRAINING PROGRAM

## 2021-06-13 PROCEDURE — 85025 COMPLETE CBC W/AUTO DIFF WBC: CPT | Mod: HCNC | Performed by: PHYSICIAN ASSISTANT

## 2021-06-13 PROCEDURE — 25000003 PHARM REV CODE 250: Mod: HCNC | Performed by: STUDENT IN AN ORGANIZED HEALTH CARE EDUCATION/TRAINING PROGRAM

## 2021-06-13 PROCEDURE — 25000003 PHARM REV CODE 250: Mod: HCNC | Performed by: PHYSICIAN ASSISTANT

## 2021-06-13 PROCEDURE — 80053 COMPREHEN METABOLIC PANEL: CPT | Mod: HCNC | Performed by: PHYSICIAN ASSISTANT

## 2021-06-13 PROCEDURE — 25000242 PHARM REV CODE 250 ALT 637 W/ HCPCS: Mod: HCNC | Performed by: STUDENT IN AN ORGANIZED HEALTH CARE EDUCATION/TRAINING PROGRAM

## 2021-06-13 PROCEDURE — S4991 NICOTINE PATCH NONLEGEND: HCPCS | Mod: HCNC | Performed by: STUDENT IN AN ORGANIZED HEALTH CARE EDUCATION/TRAINING PROGRAM

## 2021-06-13 PROCEDURE — 96372 THER/PROPH/DIAG INJ SC/IM: CPT

## 2021-06-13 RX ORDER — CIPROFLOXACIN 500 MG/1
500 TABLET ORAL EVERY 12 HOURS
Status: DISCONTINUED | OUTPATIENT
Start: 2021-06-13 | End: 2021-06-15 | Stop reason: HOSPADM

## 2021-06-13 RX ADMIN — CIPROFLOXACIN 500 MG: 500 TABLET, FILM COATED ORAL at 09:06

## 2021-06-13 RX ADMIN — HEPARIN SODIUM 5000 UNITS: 5000 INJECTION INTRAVENOUS; SUBCUTANEOUS at 02:06

## 2021-06-13 RX ADMIN — POLYETHYLENE GLYCOL 3350 17 G: 17 POWDER, FOR SOLUTION ORAL at 09:06

## 2021-06-13 RX ADMIN — SENNOSIDES 8.6 MG: 8.6 TABLET, FILM COATED ORAL at 09:06

## 2021-06-13 RX ADMIN — HEPARIN SODIUM 5000 UNITS: 5000 INJECTION INTRAVENOUS; SUBCUTANEOUS at 09:06

## 2021-06-13 RX ADMIN — TAMSULOSIN HYDROCHLORIDE 0.8 MG: 0.4 CAPSULE ORAL at 09:06

## 2021-06-13 RX ADMIN — HEPARIN SODIUM 5000 UNITS: 5000 INJECTION INTRAVENOUS; SUBCUTANEOUS at 05:06

## 2021-06-13 RX ADMIN — IPRATROPIUM BROMIDE AND ALBUTEROL SULFATE 3 ML: .5; 2.5 SOLUTION RESPIRATORY (INHALATION) at 07:06

## 2021-06-13 RX ADMIN — DOCUSATE SODIUM 100 MG: 100 CAPSULE, LIQUID FILLED ORAL at 09:06

## 2021-06-13 RX ADMIN — NICOTINE 1 PATCH: 14 PATCH TRANSDERMAL at 09:06

## 2021-06-13 RX ADMIN — ATORVASTATIN CALCIUM 10 MG: 10 TABLET, FILM COATED ORAL at 09:06

## 2021-06-13 RX ADMIN — IPRATROPIUM BROMIDE AND ALBUTEROL SULFATE 3 ML: .5; 2.5 SOLUTION RESPIRATORY (INHALATION) at 01:06

## 2021-06-14 ENCOUNTER — TELEPHONE (OUTPATIENT)
Dept: NEUROSURGERY | Facility: CLINIC | Age: 86
End: 2021-06-14

## 2021-06-14 ENCOUNTER — TELEPHONE (OUTPATIENT)
Dept: UROLOGY | Facility: CLINIC | Age: 86
End: 2021-06-14

## 2021-06-14 VITALS
HEIGHT: 67 IN | SYSTOLIC BLOOD PRESSURE: 130 MMHG | HEART RATE: 78 BPM | DIASTOLIC BLOOD PRESSURE: 66 MMHG | BODY MASS INDEX: 21.52 KG/M2 | OXYGEN SATURATION: 96 % | WEIGHT: 137.13 LBS | TEMPERATURE: 98 F | RESPIRATION RATE: 20 BRPM

## 2021-06-14 LAB — SARS-COV-2 RNA RESP QL NAA+PROBE: NOT DETECTED

## 2021-06-14 PROCEDURE — 99024 POSTOP FOLLOW-UP VISIT: CPT | Mod: HCNC,,, | Performed by: PHYSICIAN ASSISTANT

## 2021-06-14 PROCEDURE — 25000242 PHARM REV CODE 250 ALT 637 W/ HCPCS: Mod: HCNC | Performed by: STUDENT IN AN ORGANIZED HEALTH CARE EDUCATION/TRAINING PROGRAM

## 2021-06-14 PROCEDURE — G0378 HOSPITAL OBSERVATION PER HR: HCPCS | Mod: HCNC

## 2021-06-14 PROCEDURE — 94640 AIRWAY INHALATION TREATMENT: CPT | Mod: HCNC

## 2021-06-14 PROCEDURE — 94761 N-INVAS EAR/PLS OXIMETRY MLT: CPT | Mod: HCNC

## 2021-06-14 PROCEDURE — 25000003 PHARM REV CODE 250: Mod: HCNC | Performed by: STUDENT IN AN ORGANIZED HEALTH CARE EDUCATION/TRAINING PROGRAM

## 2021-06-14 PROCEDURE — 63600175 PHARM REV CODE 636 W HCPCS: Mod: HCNC | Performed by: STUDENT IN AN ORGANIZED HEALTH CARE EDUCATION/TRAINING PROGRAM

## 2021-06-14 PROCEDURE — 96372 THER/PROPH/DIAG INJ SC/IM: CPT

## 2021-06-14 PROCEDURE — 25000003 PHARM REV CODE 250: Mod: HCNC | Performed by: PHYSICIAN ASSISTANT

## 2021-06-14 PROCEDURE — 97535 SELF CARE MNGMENT TRAINING: CPT | Mod: HCNC

## 2021-06-14 PROCEDURE — S4991 NICOTINE PATCH NONLEGEND: HCPCS | Mod: HCNC | Performed by: STUDENT IN AN ORGANIZED HEALTH CARE EDUCATION/TRAINING PROGRAM

## 2021-06-14 PROCEDURE — U0003 INFECTIOUS AGENT DETECTION BY NUCLEIC ACID (DNA OR RNA); SEVERE ACUTE RESPIRATORY SYNDROME CORONAVIRUS 2 (SARS-COV-2) (CORONAVIRUS DISEASE [COVID-19]), AMPLIFIED PROBE TECHNIQUE, MAKING USE OF HIGH THROUGHPUT TECHNOLOGIES AS DESCRIBED BY CMS-2020-01-R: HCPCS | Mod: HCNC | Performed by: NEUROLOGICAL SURGERY

## 2021-06-14 PROCEDURE — 97530 THERAPEUTIC ACTIVITIES: CPT | Mod: HCNC

## 2021-06-14 PROCEDURE — U0005 INFEC AGEN DETEC AMPLI PROBE: HCPCS | Performed by: NEUROLOGICAL SURGERY

## 2021-06-14 PROCEDURE — 99024 PR POST-OP FOLLOW-UP VISIT: ICD-10-PCS | Mod: HCNC,,, | Performed by: PHYSICIAN ASSISTANT

## 2021-06-14 RX ORDER — PSEUDOEPHEDRINE/ACETAMINOPHEN 30MG-500MG
100 TABLET ORAL
Status: COMPLETED | OUTPATIENT
Start: 2021-06-14 | End: 2021-06-14

## 2021-06-14 RX ORDER — METFORMIN HYDROCHLORIDE 750 MG/1
750 TABLET, EXTENDED RELEASE ORAL 2 TIMES DAILY WITH MEALS
Qty: 180 TABLET | Refills: 12
Start: 2021-06-14 | End: 2021-07-18

## 2021-06-14 RX ORDER — IPRATROPIUM BROMIDE AND ALBUTEROL SULFATE 2.5; .5 MG/3ML; MG/3ML
3 SOLUTION RESPIRATORY (INHALATION)
Qty: 1 BOX | Refills: 0
Start: 2021-06-14 | End: 2021-12-16

## 2021-06-14 RX ORDER — IBUPROFEN 200 MG
1 TABLET ORAL DAILY
Refills: 0
Start: 2021-06-15 | End: 2023-06-05

## 2021-06-14 RX ORDER — HEPARIN SODIUM 5000 [USP'U]/ML
5000 INJECTION, SOLUTION INTRAVENOUS; SUBCUTANEOUS EVERY 8 HOURS
Start: 2021-06-14 | End: 2023-06-05

## 2021-06-14 RX ORDER — SYRING-NEEDL,DISP,INSUL,0.3 ML 29 G X1/2"
296 SYRINGE, EMPTY DISPOSABLE MISCELLANEOUS
Status: COMPLETED | OUTPATIENT
Start: 2021-06-14 | End: 2021-06-14

## 2021-06-14 RX ORDER — TAMSULOSIN HYDROCHLORIDE 0.4 MG/1
0.8 CAPSULE ORAL DAILY
Qty: 60 CAPSULE | Refills: 11
Start: 2021-06-15 | End: 2021-08-13 | Stop reason: SDUPTHER

## 2021-06-14 RX ORDER — ACETAMINOPHEN 325 MG/1
650 TABLET ORAL EVERY 4 HOURS PRN
Refills: 0
Start: 2021-06-14 | End: 2022-06-23

## 2021-06-14 RX ORDER — CIPROFLOXACIN 500 MG/1
500 TABLET ORAL EVERY 12 HOURS
Qty: 14 TABLET | Refills: 0
Start: 2021-06-14 | End: 2021-06-21

## 2021-06-14 RX ADMIN — IPRATROPIUM BROMIDE AND ALBUTEROL SULFATE 3 ML: .5; 2.5 SOLUTION RESPIRATORY (INHALATION) at 01:06

## 2021-06-14 RX ADMIN — CIPROFLOXACIN 500 MG: 500 TABLET, FILM COATED ORAL at 08:06

## 2021-06-14 RX ADMIN — TAMSULOSIN HYDROCHLORIDE 0.8 MG: 0.4 CAPSULE ORAL at 08:06

## 2021-06-14 RX ADMIN — POLYETHYLENE GLYCOL 3350 17 G: 17 POWDER, FOR SOLUTION ORAL at 08:06

## 2021-06-14 RX ADMIN — DOCUSATE SODIUM 100 MG: 100 CAPSULE, LIQUID FILLED ORAL at 08:06

## 2021-06-14 RX ADMIN — SENNOSIDES 8.6 MG: 8.6 TABLET, FILM COATED ORAL at 08:06

## 2021-06-14 RX ADMIN — HEPARIN SODIUM 5000 UNITS: 5000 INJECTION INTRAVENOUS; SUBCUTANEOUS at 05:06

## 2021-06-14 RX ADMIN — HEPARIN SODIUM 5000 UNITS: 5000 INJECTION INTRAVENOUS; SUBCUTANEOUS at 02:06

## 2021-06-14 RX ADMIN — SODIUM CHLORIDE 500 ML: 0.9 INJECTION, SOLUTION INTRAVENOUS at 01:06

## 2021-06-14 RX ADMIN — NICOTINE 1 PATCH: 14 PATCH TRANSDERMAL at 08:06

## 2021-06-14 RX ADMIN — SODIUM CHLORIDE TAB 1 GM 1 G: 1 TAB at 04:06

## 2021-06-14 RX ADMIN — MAGNESIUM CITRATE 296 ML: 1.75 LIQUID ORAL at 01:06

## 2021-06-14 RX ADMIN — Medication 100 ML: at 01:06

## 2021-06-14 RX ADMIN — ATORVASTATIN CALCIUM 10 MG: 10 TABLET, FILM COATED ORAL at 08:06

## 2021-06-15 ENCOUNTER — LAB VISIT (OUTPATIENT)
Dept: LAB | Facility: OTHER | Age: 86
End: 2021-06-15
Payer: MEDICARE

## 2021-06-15 DIAGNOSIS — Z20.822 ENCOUNTER FOR LABORATORY TESTING FOR COVID-19 VIRUS: ICD-10-CM

## 2021-06-15 PROCEDURE — U0003 INFECTIOUS AGENT DETECTION BY NUCLEIC ACID (DNA OR RNA); SEVERE ACUTE RESPIRATORY SYNDROME CORONAVIRUS 2 (SARS-COV-2) (CORONAVIRUS DISEASE [COVID-19]), AMPLIFIED PROBE TECHNIQUE, MAKING USE OF HIGH THROUGHPUT TECHNOLOGIES AS DESCRIBED BY CMS-2020-01-R: HCPCS | Performed by: NURSE PRACTITIONER

## 2021-06-16 LAB — SARS-COV-2 RNA RESP QL NAA+PROBE: NOT DETECTED

## 2021-06-22 ENCOUNTER — OFFICE VISIT (OUTPATIENT)
Dept: UROLOGY | Facility: CLINIC | Age: 86
End: 2021-06-22
Payer: MEDICARE

## 2021-06-22 VITALS — BODY MASS INDEX: 21.52 KG/M2 | HEIGHT: 67 IN | WEIGHT: 137.13 LBS

## 2021-06-22 DIAGNOSIS — N40.1 BPH WITH OBSTRUCTION/LOWER URINARY TRACT SYMPTOMS: ICD-10-CM

## 2021-06-22 DIAGNOSIS — N28.1 ACQUIRED COMPLEX CYST OF KIDNEY: ICD-10-CM

## 2021-06-22 DIAGNOSIS — N13.8 BPH WITH OBSTRUCTION/LOWER URINARY TRACT SYMPTOMS: ICD-10-CM

## 2021-06-22 DIAGNOSIS — R35.1 NOCTURIA MORE THAN TWICE PER NIGHT: ICD-10-CM

## 2021-06-22 DIAGNOSIS — R33.9 URINARY RETENTION: Primary | ICD-10-CM

## 2021-06-22 DIAGNOSIS — Z20.822 ENCOUNTER FOR LABORATORY TESTING FOR COVID-19 VIRUS: ICD-10-CM

## 2021-06-22 PROCEDURE — 1126F PR PAIN SEVERITY QUANTIFIED, NO PAIN PRESENT: ICD-10-PCS | Mod: S$GLB,,, | Performed by: UROLOGY

## 2021-06-22 PROCEDURE — 99214 OFFICE O/P EST MOD 30 MIN: CPT | Mod: S$GLB,,, | Performed by: UROLOGY

## 2021-06-22 PROCEDURE — 1159F PR MEDICATION LIST DOCUMENTED IN MEDICAL RECORD: ICD-10-PCS | Mod: S$GLB,,, | Performed by: UROLOGY

## 2021-06-22 PROCEDURE — 1111F DSCHRG MED/CURRENT MED MERGE: CPT | Mod: CPTII,S$GLB,, | Performed by: UROLOGY

## 2021-06-22 PROCEDURE — 3288F PR FALLS RISK ASSESSMENT DOCUMENTED: ICD-10-PCS | Mod: CPTII,S$GLB,, | Performed by: UROLOGY

## 2021-06-22 PROCEDURE — 1101F PT FALLS ASSESS-DOCD LE1/YR: CPT | Mod: CPTII,S$GLB,, | Performed by: UROLOGY

## 2021-06-22 PROCEDURE — 99999 PR PBB SHADOW E&M-EST. PATIENT-LVL III: CPT | Mod: PBBFAC,,, | Performed by: UROLOGY

## 2021-06-22 PROCEDURE — 1111F PR DISCHARGE MEDS RECONCILED W/ CURRENT OUTPATIENT MED LIST: ICD-10-PCS | Mod: CPTII,S$GLB,, | Performed by: UROLOGY

## 2021-06-22 PROCEDURE — 1101F PR PT FALLS ASSESS DOC 0-1 FALLS W/OUT INJ PAST YR: ICD-10-PCS | Mod: CPTII,S$GLB,, | Performed by: UROLOGY

## 2021-06-22 PROCEDURE — 3288F FALL RISK ASSESSMENT DOCD: CPT | Mod: CPTII,S$GLB,, | Performed by: UROLOGY

## 2021-06-22 PROCEDURE — 99214 PR OFFICE/OUTPT VISIT, EST, LEVL IV, 30-39 MIN: ICD-10-PCS | Mod: S$GLB,,, | Performed by: UROLOGY

## 2021-06-22 PROCEDURE — 1159F MED LIST DOCD IN RCRD: CPT | Mod: S$GLB,,, | Performed by: UROLOGY

## 2021-06-22 PROCEDURE — 99999 PR PBB SHADOW E&M-EST. PATIENT-LVL III: ICD-10-PCS | Mod: PBBFAC,,, | Performed by: UROLOGY

## 2021-06-22 PROCEDURE — 1126F AMNT PAIN NOTED NONE PRSNT: CPT | Mod: S$GLB,,, | Performed by: UROLOGY

## 2021-06-23 ENCOUNTER — TELEPHONE (OUTPATIENT)
Dept: NEUROSURGERY | Facility: CLINIC | Age: 86
End: 2021-06-23

## 2021-06-23 ENCOUNTER — HOSPITAL ENCOUNTER (OUTPATIENT)
Dept: RADIOLOGY | Facility: HOSPITAL | Age: 86
Discharge: HOME | End: 2021-06-23
Attending: NEUROLOGICAL SURGERY
Payer: MEDICARE

## 2021-06-23 DIAGNOSIS — G91.2 NPH (NORMAL PRESSURE HYDROCEPHALUS): ICD-10-CM

## 2021-06-23 PROCEDURE — 72020 X-RAY EXAM OF SPINE 1 VIEW: CPT | Mod: 26,,, | Performed by: RADIOLOGY

## 2021-06-23 PROCEDURE — 71045 X-RAY EXAM CHEST 1 VIEW: CPT | Mod: 26,,, | Performed by: RADIOLOGY

## 2021-06-23 PROCEDURE — 70450 CT HEAD/BRAIN W/O DYE: CPT | Mod: TC

## 2021-06-23 PROCEDURE — 70450 CT HEAD/BRAIN W/O DYE: CPT | Mod: 26,,, | Performed by: RADIOLOGY

## 2021-06-23 PROCEDURE — 70250 X-RAY EXAM OF SKULL: CPT | Mod: TC,FY

## 2021-06-23 PROCEDURE — 72020 XR SHUNT SERIES: ICD-10-PCS | Mod: 26,,, | Performed by: RADIOLOGY

## 2021-06-23 PROCEDURE — 70250 XR SHUNT SERIES: ICD-10-PCS | Mod: 26,,, | Performed by: RADIOLOGY

## 2021-06-23 PROCEDURE — 74018 RADEX ABDOMEN 1 VIEW: CPT | Mod: 26,,, | Performed by: RADIOLOGY

## 2021-06-23 PROCEDURE — 71045 X-RAY EXAM CHEST 1 VIEW: CPT | Mod: TC,FY

## 2021-06-23 PROCEDURE — 71045 XR SHUNT SERIES: ICD-10-PCS | Mod: 26,,, | Performed by: RADIOLOGY

## 2021-06-23 PROCEDURE — 70450 CT HEAD WITHOUT CONTRAST: ICD-10-PCS | Mod: 26,,, | Performed by: RADIOLOGY

## 2021-06-23 PROCEDURE — 70250 X-RAY EXAM OF SKULL: CPT | Mod: 26,,, | Performed by: RADIOLOGY

## 2021-06-23 PROCEDURE — 74018 XR SHUNT SERIES: ICD-10-PCS | Mod: 26,,, | Performed by: RADIOLOGY

## 2021-06-24 ENCOUNTER — HOSPITAL ENCOUNTER (OUTPATIENT)
Dept: RADIOLOGY | Facility: HOSPITAL | Age: 86
Discharge: HOME | End: 2021-06-24
Attending: NEUROLOGICAL SURGERY
Payer: MEDICARE

## 2021-06-24 ENCOUNTER — OFFICE VISIT (OUTPATIENT)
Dept: NEUROSURGERY | Facility: CLINIC | Age: 86
End: 2021-06-24
Payer: MEDICARE

## 2021-06-24 VITALS
HEIGHT: 67 IN | TEMPERATURE: 98 F | DIASTOLIC BLOOD PRESSURE: 78 MMHG | SYSTOLIC BLOOD PRESSURE: 138 MMHG | BODY MASS INDEX: 21.48 KG/M2 | HEART RATE: 86 BPM

## 2021-06-24 DIAGNOSIS — R33.9 URINARY RETENTION: ICD-10-CM

## 2021-06-24 DIAGNOSIS — G91.2 NORMAL PRESSURE HYDROCEPHALUS: ICD-10-CM

## 2021-06-24 DIAGNOSIS — G91.2 NORMAL PRESSURE HYDROCEPHALUS: Primary | ICD-10-CM

## 2021-06-24 PROCEDURE — 1100F PTFALLS ASSESS-DOCD GE2>/YR: CPT | Mod: CPTII,S$GLB,, | Performed by: NEUROLOGICAL SURGERY

## 2021-06-24 PROCEDURE — 1126F AMNT PAIN NOTED NONE PRSNT: CPT | Mod: S$GLB,,, | Performed by: NEUROLOGICAL SURGERY

## 2021-06-24 PROCEDURE — 99024 POSTOP FOLLOW-UP VISIT: CPT | Mod: S$GLB,,, | Performed by: NEUROLOGICAL SURGERY

## 2021-06-24 PROCEDURE — 3288F FALL RISK ASSESSMENT DOCD: CPT | Mod: CPTII,S$GLB,, | Performed by: NEUROLOGICAL SURGERY

## 2021-06-24 PROCEDURE — 70250 X-RAY EXAM OF SKULL: CPT | Mod: TC

## 2021-06-24 PROCEDURE — 70250 X-RAY EXAM OF SKULL: CPT | Mod: 26,,, | Performed by: RADIOLOGY

## 2021-06-24 PROCEDURE — 1126F PR PAIN SEVERITY QUANTIFIED, NO PAIN PRESENT: ICD-10-PCS | Mod: S$GLB,,, | Performed by: NEUROLOGICAL SURGERY

## 2021-06-24 PROCEDURE — 99999 PR PBB SHADOW E&M-EST. PATIENT-LVL III: CPT | Mod: PBBFAC,,, | Performed by: NEUROLOGICAL SURGERY

## 2021-06-24 PROCEDURE — 99999 PR PBB SHADOW E&M-EST. PATIENT-LVL III: ICD-10-PCS | Mod: PBBFAC,,, | Performed by: NEUROLOGICAL SURGERY

## 2021-06-24 PROCEDURE — 3288F PR FALLS RISK ASSESSMENT DOCUMENTED: ICD-10-PCS | Mod: CPTII,S$GLB,, | Performed by: NEUROLOGICAL SURGERY

## 2021-06-24 PROCEDURE — 1100F PR PT FALLS ASSESS DOC 2+ FALLS/FALL W/INJURY/YR: ICD-10-PCS | Mod: CPTII,S$GLB,, | Performed by: NEUROLOGICAL SURGERY

## 2021-06-24 PROCEDURE — 70250 XR SKULL SHUNT PLACEMENT 1 VIEW: ICD-10-PCS | Mod: 26,,, | Performed by: RADIOLOGY

## 2021-06-24 PROCEDURE — 99024 PR POST-OP FOLLOW-UP VISIT: ICD-10-PCS | Mod: S$GLB,,, | Performed by: NEUROLOGICAL SURGERY

## 2021-06-25 PROBLEM — T85.09XA OBSTRUCTED VENTRICULOPERITONEAL SHUNT: Status: RESOLVED | Noted: 2021-06-08 | Resolved: 2021-06-25

## 2021-06-27 ENCOUNTER — PATIENT MESSAGE (OUTPATIENT)
Dept: ENDOCRINOLOGY | Facility: CLINIC | Age: 86
End: 2021-06-27

## 2021-06-29 ENCOUNTER — PATIENT MESSAGE (OUTPATIENT)
Dept: NEUROSURGERY | Facility: CLINIC | Age: 86
End: 2021-06-29

## 2021-07-01 ENCOUNTER — OFFICE VISIT (OUTPATIENT)
Dept: NEUROSURGERY | Facility: CLINIC | Age: 86
End: 2021-07-01
Payer: MEDICARE

## 2021-07-01 ENCOUNTER — CLINICAL SUPPORT (OUTPATIENT)
Dept: SMOKING CESSATION | Facility: CLINIC | Age: 86
End: 2021-07-01
Payer: COMMERCIAL

## 2021-07-01 ENCOUNTER — LAB VISIT (OUTPATIENT)
Dept: LAB | Facility: HOSPITAL | Age: 86
End: 2021-07-01
Attending: NEUROLOGICAL SURGERY
Payer: MEDICARE

## 2021-07-01 VITALS
HEART RATE: 84 BPM | BODY MASS INDEX: 21.45 KG/M2 | HEIGHT: 67 IN | DIASTOLIC BLOOD PRESSURE: 70 MMHG | SYSTOLIC BLOOD PRESSURE: 135 MMHG | WEIGHT: 136.69 LBS

## 2021-07-01 DIAGNOSIS — L03.031 ABSCESS OR CELLULITIS OF TOE, RIGHT: ICD-10-CM

## 2021-07-01 DIAGNOSIS — L02.611 ABSCESS OR CELLULITIS OF TOE, RIGHT: ICD-10-CM

## 2021-07-01 DIAGNOSIS — F17.200 NICOTINE DEPENDENCE: Primary | ICD-10-CM

## 2021-07-01 DIAGNOSIS — G91.2 NORMAL PRESSURE HYDROCEPHALUS: Primary | ICD-10-CM

## 2021-07-01 LAB
BASOPHILS # BLD AUTO: 0.07 K/UL (ref 0–0.2)
BASOPHILS NFR BLD: 0.8 % (ref 0–1.9)
DIFFERENTIAL METHOD: ABNORMAL
EOSINOPHIL # BLD AUTO: 0.4 K/UL (ref 0–0.5)
EOSINOPHIL NFR BLD: 4.8 % (ref 0–8)
ERYTHROCYTE [DISTWIDTH] IN BLOOD BY AUTOMATED COUNT: 13.9 % (ref 11.5–14.5)
HCT VFR BLD AUTO: 37 % (ref 40–54)
HGB BLD-MCNC: 12.1 G/DL (ref 14–18)
IMM GRANULOCYTES # BLD AUTO: 0.03 K/UL (ref 0–0.04)
IMM GRANULOCYTES NFR BLD AUTO: 0.3 % (ref 0–0.5)
LYMPHOCYTES # BLD AUTO: 2 K/UL (ref 1–4.8)
LYMPHOCYTES NFR BLD: 21.2 % (ref 18–48)
MCH RBC QN AUTO: 32.1 PG (ref 27–31)
MCHC RBC AUTO-ENTMCNC: 32.7 G/DL (ref 32–36)
MCV RBC AUTO: 98 FL (ref 82–98)
MONOCYTES # BLD AUTO: 0.7 K/UL (ref 0.3–1)
MONOCYTES NFR BLD: 7.5 % (ref 4–15)
NEUTROPHILS # BLD AUTO: 6 K/UL (ref 1.8–7.7)
NEUTROPHILS NFR BLD: 65.4 % (ref 38–73)
NRBC BLD-RTO: 0 /100 WBC
PLATELET # BLD AUTO: 248 K/UL (ref 150–450)
PMV BLD AUTO: 10 FL (ref 9.2–12.9)
PROCALCITONIN SERPL IA-MCNC: 0.04 NG/ML
RBC # BLD AUTO: 3.77 M/UL (ref 4.6–6.2)
WBC # BLD AUTO: 9.2 K/UL (ref 3.9–12.7)

## 2021-07-01 PROCEDURE — 85025 COMPLETE CBC W/AUTO DIFF WBC: CPT | Performed by: NEUROLOGICAL SURGERY

## 2021-07-01 PROCEDURE — 1101F PR PT FALLS ASSESS DOC 0-1 FALLS W/OUT INJ PAST YR: ICD-10-PCS | Mod: CPTII,S$GLB,, | Performed by: NEUROLOGICAL SURGERY

## 2021-07-01 PROCEDURE — 1126F PR PAIN SEVERITY QUANTIFIED, NO PAIN PRESENT: ICD-10-PCS | Mod: S$GLB,,, | Performed by: NEUROLOGICAL SURGERY

## 2021-07-01 PROCEDURE — 87040 BLOOD CULTURE FOR BACTERIA: CPT | Performed by: NEUROLOGICAL SURGERY

## 2021-07-01 PROCEDURE — 84145 PROCALCITONIN (PCT): CPT | Performed by: NEUROLOGICAL SURGERY

## 2021-07-01 PROCEDURE — 99024 POSTOP FOLLOW-UP VISIT: CPT | Mod: S$GLB,,, | Performed by: NEUROLOGICAL SURGERY

## 2021-07-01 PROCEDURE — 99024 PR POST-OP FOLLOW-UP VISIT: ICD-10-PCS | Mod: S$GLB,,, | Performed by: NEUROLOGICAL SURGERY

## 2021-07-01 PROCEDURE — 3288F PR FALLS RISK ASSESSMENT DOCUMENTED: ICD-10-PCS | Mod: CPTII,S$GLB,, | Performed by: NEUROLOGICAL SURGERY

## 2021-07-01 PROCEDURE — 1101F PT FALLS ASSESS-DOCD LE1/YR: CPT | Mod: CPTII,S$GLB,, | Performed by: NEUROLOGICAL SURGERY

## 2021-07-01 PROCEDURE — 99999 PR PBB SHADOW E&M-EST. PATIENT-LVL III: ICD-10-PCS | Mod: PBBFAC,,, | Performed by: NEUROLOGICAL SURGERY

## 2021-07-01 PROCEDURE — 99407 BEHAV CHNG SMOKING > 10 MIN: CPT | Mod: S$GLB,,,

## 2021-07-01 PROCEDURE — 3288F FALL RISK ASSESSMENT DOCD: CPT | Mod: CPTII,S$GLB,, | Performed by: NEUROLOGICAL SURGERY

## 2021-07-01 PROCEDURE — 99407 PR TOBACCO USE CESSATION INTENSIVE >10 MINUTES: ICD-10-PCS | Mod: S$GLB,,,

## 2021-07-01 PROCEDURE — 1126F AMNT PAIN NOTED NONE PRSNT: CPT | Mod: S$GLB,,, | Performed by: NEUROLOGICAL SURGERY

## 2021-07-01 PROCEDURE — 99999 PR PBB SHADOW E&M-EST. PATIENT-LVL III: CPT | Mod: PBBFAC,,, | Performed by: NEUROLOGICAL SURGERY

## 2021-07-01 RX ORDER — CEPHALEXIN 500 MG/1
500 CAPSULE ORAL 4 TIMES DAILY
Qty: 20 CAPSULE | Refills: 0 | Status: SHIPPED | OUTPATIENT
Start: 2021-07-01 | End: 2021-07-06

## 2021-07-01 RX ORDER — CEPHALEXIN 500 MG/1
500 CAPSULE ORAL 4 TIMES DAILY
Qty: 20 CAPSULE | Refills: 0 | Status: SHIPPED | OUTPATIENT
Start: 2021-07-01 | End: 2021-07-01

## 2021-07-06 LAB — BACTERIA BLD CULT: NORMAL

## 2021-07-07 ENCOUNTER — TELEPHONE (OUTPATIENT)
Dept: NEUROSURGERY | Facility: CLINIC | Age: 86
End: 2021-07-07

## 2021-07-07 ENCOUNTER — PATIENT MESSAGE (OUTPATIENT)
Dept: NEUROSURGERY | Facility: CLINIC | Age: 86
End: 2021-07-07

## 2021-07-08 ENCOUNTER — OFFICE VISIT (OUTPATIENT)
Dept: PODIATRY | Facility: CLINIC | Age: 86
End: 2021-07-08
Payer: MEDICARE

## 2021-07-08 VITALS
SYSTOLIC BLOOD PRESSURE: 140 MMHG | DIASTOLIC BLOOD PRESSURE: 64 MMHG | HEIGHT: 67 IN | HEART RATE: 78 BPM | WEIGHT: 136 LBS | BODY MASS INDEX: 21.35 KG/M2

## 2021-07-08 DIAGNOSIS — E11.51 DIABETES MELLITUS WITH PERIPHERAL CIRCULATORY DISORDER: ICD-10-CM

## 2021-07-08 DIAGNOSIS — B35.1 ONYCHOMYCOSIS DUE TO DERMATOPHYTE: ICD-10-CM

## 2021-07-08 DIAGNOSIS — E11.42 TYPE 2 DIABETES MELLITUS WITH DIABETIC POLYNEUROPATHY, UNSPECIFIED WHETHER LONG TERM INSULIN USE: Primary | ICD-10-CM

## 2021-07-08 PROCEDURE — 1125F PR PAIN SEVERITY QUANTIFIED, PAIN PRESENT: ICD-10-PCS | Mod: HCNC,S$GLB,, | Performed by: PODIATRIST

## 2021-07-08 PROCEDURE — 1159F PR MEDICATION LIST DOCUMENTED IN MEDICAL RECORD: ICD-10-PCS | Mod: HCNC,S$GLB,, | Performed by: PODIATRIST

## 2021-07-08 PROCEDURE — 1125F AMNT PAIN NOTED PAIN PRSNT: CPT | Mod: HCNC,S$GLB,, | Performed by: PODIATRIST

## 2021-07-08 PROCEDURE — 11721 PR DEBRIDEMENT OF NAILS, 6 OR MORE: ICD-10-PCS | Mod: Q9,HCNC,S$GLB, | Performed by: PODIATRIST

## 2021-07-08 PROCEDURE — 3288F PR FALLS RISK ASSESSMENT DOCUMENTED: ICD-10-PCS | Mod: HCNC,CPTII,S$GLB, | Performed by: PODIATRIST

## 2021-07-08 PROCEDURE — 99999 PR PBB SHADOW E&M-EST. PATIENT-LVL IV: CPT | Mod: PBBFAC,,, | Performed by: PODIATRIST

## 2021-07-08 PROCEDURE — 1100F PTFALLS ASSESS-DOCD GE2>/YR: CPT | Mod: HCNC,CPTII,S$GLB, | Performed by: PODIATRIST

## 2021-07-08 PROCEDURE — 3288F FALL RISK ASSESSMENT DOCD: CPT | Mod: HCNC,CPTII,S$GLB, | Performed by: PODIATRIST

## 2021-07-08 PROCEDURE — 1100F PR PT FALLS ASSESS DOC 2+ FALLS/FALL W/INJURY/YR: ICD-10-PCS | Mod: HCNC,CPTII,S$GLB, | Performed by: PODIATRIST

## 2021-07-08 PROCEDURE — 99999 PR PBB SHADOW E&M-EST. PATIENT-LVL IV: ICD-10-PCS | Mod: PBBFAC,,, | Performed by: PODIATRIST

## 2021-07-08 PROCEDURE — 1159F MED LIST DOCD IN RCRD: CPT | Mod: HCNC,S$GLB,, | Performed by: PODIATRIST

## 2021-07-08 PROCEDURE — 99204 PR OFFICE/OUTPT VISIT, NEW, LEVL IV, 45-59 MIN: ICD-10-PCS | Mod: 25,HCNC,S$GLB, | Performed by: PODIATRIST

## 2021-07-08 PROCEDURE — 99204 OFFICE O/P NEW MOD 45 MIN: CPT | Mod: 25,HCNC,S$GLB, | Performed by: PODIATRIST

## 2021-07-08 PROCEDURE — 11721 DEBRIDE NAIL 6 OR MORE: CPT | Mod: Q9,HCNC,S$GLB, | Performed by: PODIATRIST

## 2021-07-08 RX ORDER — METOPROLOL SUCCINATE 25 MG/1
TABLET, EXTENDED RELEASE ORAL
Status: ON HOLD | COMMUNITY
Start: 2021-03-18 | End: 2023-10-23 | Stop reason: HOSPADM

## 2021-07-08 RX ORDER — MAGNESIUM SULFATE HEPTAHYDRATE 40 MG/ML
INJECTION, SOLUTION INTRAVENOUS
Status: ON HOLD | COMMUNITY
Start: 2021-04-03 | End: 2023-10-23 | Stop reason: HOSPADM

## 2021-07-08 RX ORDER — IOHEXOL 350 MG/ML
INJECTION, SOLUTION INTRAVENOUS
Status: ON HOLD | COMMUNITY
Start: 2021-03-31 | End: 2023-10-11 | Stop reason: HOSPADM

## 2021-07-08 RX ORDER — IOHEXOL 300 MG/ML
INJECTION, SOLUTION INTRAVENOUS
Status: ON HOLD | COMMUNITY
Start: 2021-02-19 | End: 2023-10-23 | Stop reason: HOSPADM

## 2021-07-08 RX ORDER — CICLOPIROX 80 MG/ML
SOLUTION TOPICAL NIGHTLY
Qty: 6.6 ML | Refills: 11 | Status: SHIPPED | OUTPATIENT
Start: 2021-07-08 | End: 2022-06-29

## 2021-07-20 ENCOUNTER — OFFICE VISIT (OUTPATIENT)
Dept: UROLOGY | Facility: CLINIC | Age: 86
End: 2021-07-20
Payer: MEDICARE

## 2021-07-20 VITALS — BODY MASS INDEX: 21.8 KG/M2 | WEIGHT: 138.88 LBS | HEIGHT: 67 IN

## 2021-07-20 DIAGNOSIS — R35.1 NOCTURIA MORE THAN TWICE PER NIGHT: ICD-10-CM

## 2021-07-20 DIAGNOSIS — R33.9 URINARY RETENTION: ICD-10-CM

## 2021-07-20 DIAGNOSIS — N13.8 BPH WITH OBSTRUCTION/LOWER URINARY TRACT SYMPTOMS: Primary | ICD-10-CM

## 2021-07-20 DIAGNOSIS — N28.1 ACQUIRED COMPLEX CYST OF KIDNEY: ICD-10-CM

## 2021-07-20 DIAGNOSIS — N40.1 BPH WITH OBSTRUCTION/LOWER URINARY TRACT SYMPTOMS: Primary | ICD-10-CM

## 2021-07-20 LAB
BILIRUB SERPL-MCNC: NORMAL MG/DL
BLOOD URINE, POC: NORMAL
COLOR, POC UA: YELLOW
GLUCOSE UR QL STRIP: NORMAL
KETONES UR QL STRIP: NORMAL
LEUKOCYTE ESTERASE URINE, POC: NORMAL
NITRITE, POC UA: NORMAL
PH, POC UA: 5
POC RESIDUAL URINE VOLUME: 90 ML (ref 0–100)
PROTEIN, POC: NORMAL
SPECIFIC GRAVITY, POC UA: 1005
UROBILINOGEN, POC UA: NORMAL

## 2021-07-20 PROCEDURE — 3288F PR FALLS RISK ASSESSMENT DOCUMENTED: ICD-10-PCS | Mod: HCNC,CPTII,S$GLB, | Performed by: UROLOGY

## 2021-07-20 PROCEDURE — 81001 POCT URINALYSIS, DIPSTICK OR TABLET REAGENT, AUTOMATED, WITH MICROSCOP: ICD-10-PCS | Mod: HCNC,S$GLB,, | Performed by: UROLOGY

## 2021-07-20 PROCEDURE — 1159F PR MEDICATION LIST DOCUMENTED IN MEDICAL RECORD: ICD-10-PCS | Mod: HCNC,CPTII,S$GLB, | Performed by: UROLOGY

## 2021-07-20 PROCEDURE — 3288F FALL RISK ASSESSMENT DOCD: CPT | Mod: HCNC,CPTII,S$GLB, | Performed by: UROLOGY

## 2021-07-20 PROCEDURE — 1101F PR PT FALLS ASSESS DOC 0-1 FALLS W/OUT INJ PAST YR: ICD-10-PCS | Mod: HCNC,CPTII,S$GLB, | Performed by: UROLOGY

## 2021-07-20 PROCEDURE — 99213 OFFICE O/P EST LOW 20 MIN: CPT | Mod: HCNC,25,S$GLB, | Performed by: UROLOGY

## 2021-07-20 PROCEDURE — 1126F PR PAIN SEVERITY QUANTIFIED, NO PAIN PRESENT: ICD-10-PCS | Mod: HCNC,CPTII,S$GLB, | Performed by: UROLOGY

## 2021-07-20 PROCEDURE — 51798 POCT BLADDER SCAN: ICD-10-PCS | Mod: HCNC,S$GLB,, | Performed by: UROLOGY

## 2021-07-20 PROCEDURE — 81001 URINALYSIS AUTO W/SCOPE: CPT | Mod: HCNC,S$GLB,, | Performed by: UROLOGY

## 2021-07-20 PROCEDURE — 99999 PR PBB SHADOW E&M-EST. PATIENT-LVL IV: CPT | Mod: PBBFAC,HCNC,, | Performed by: UROLOGY

## 2021-07-20 PROCEDURE — 1126F AMNT PAIN NOTED NONE PRSNT: CPT | Mod: HCNC,CPTII,S$GLB, | Performed by: UROLOGY

## 2021-07-20 PROCEDURE — 1159F MED LIST DOCD IN RCRD: CPT | Mod: HCNC,CPTII,S$GLB, | Performed by: UROLOGY

## 2021-07-20 PROCEDURE — 51798 US URINE CAPACITY MEASURE: CPT | Mod: HCNC,S$GLB,, | Performed by: UROLOGY

## 2021-07-20 PROCEDURE — 99999 PR PBB SHADOW E&M-EST. PATIENT-LVL IV: ICD-10-PCS | Mod: PBBFAC,HCNC,, | Performed by: UROLOGY

## 2021-07-20 PROCEDURE — 1101F PT FALLS ASSESS-DOCD LE1/YR: CPT | Mod: HCNC,CPTII,S$GLB, | Performed by: UROLOGY

## 2021-07-20 PROCEDURE — 99213 PR OFFICE/OUTPT VISIT, EST, LEVL III, 20-29 MIN: ICD-10-PCS | Mod: HCNC,25,S$GLB, | Performed by: UROLOGY

## 2021-07-28 ENCOUNTER — OFFICE VISIT (OUTPATIENT)
Dept: FAMILY MEDICINE | Facility: CLINIC | Age: 86
End: 2021-07-28
Payer: MEDICARE

## 2021-07-28 VITALS
WEIGHT: 138.44 LBS | OXYGEN SATURATION: 97 % | HEIGHT: 67 IN | HEART RATE: 93 BPM | TEMPERATURE: 98 F | SYSTOLIC BLOOD PRESSURE: 118 MMHG | DIASTOLIC BLOOD PRESSURE: 62 MMHG | BODY MASS INDEX: 21.73 KG/M2

## 2021-07-28 DIAGNOSIS — M35.3 POLYMYALGIA RHEUMATICA: ICD-10-CM

## 2021-07-28 DIAGNOSIS — J44.9 CHRONIC OBSTRUCTIVE PULMONARY DISEASE, UNSPECIFIED COPD TYPE: ICD-10-CM

## 2021-07-28 DIAGNOSIS — H91.90 HEARING LOSS, UNSPECIFIED HEARING LOSS TYPE, UNSPECIFIED LATERALITY: ICD-10-CM

## 2021-07-28 DIAGNOSIS — G91.2 NORMAL PRESSURE HYDROCEPHALUS: ICD-10-CM

## 2021-07-28 DIAGNOSIS — R42 VERTIGO: Primary | ICD-10-CM

## 2021-07-28 DIAGNOSIS — N40.0 BENIGN PROSTATIC HYPERPLASIA, UNSPECIFIED WHETHER LOWER URINARY TRACT SYMPTOMS PRESENT: ICD-10-CM

## 2021-07-28 DIAGNOSIS — K59.00 CONSTIPATION, UNSPECIFIED CONSTIPATION TYPE: ICD-10-CM

## 2021-07-28 DIAGNOSIS — E11.65 UNCONTROLLED TYPE 2 DIABETES MELLITUS WITH HYPERGLYCEMIA: ICD-10-CM

## 2021-07-28 DIAGNOSIS — I10 ESSENTIAL HYPERTENSION: ICD-10-CM

## 2021-07-28 PROCEDURE — 99499 RISK ADDL DX/OHS AUDIT: ICD-10-PCS | Mod: HCNC,S$GLB,, | Performed by: INTERNAL MEDICINE

## 2021-07-28 PROCEDURE — 99999 PR PBB SHADOW E&M-EST. PATIENT-LVL IV: ICD-10-PCS | Mod: PBBFAC,HCNC,, | Performed by: INTERNAL MEDICINE

## 2021-07-28 PROCEDURE — 3288F PR FALLS RISK ASSESSMENT DOCUMENTED: ICD-10-PCS | Mod: HCNC,CPTII,S$GLB, | Performed by: INTERNAL MEDICINE

## 2021-07-28 PROCEDURE — 1159F MED LIST DOCD IN RCRD: CPT | Mod: HCNC,CPTII,S$GLB, | Performed by: INTERNAL MEDICINE

## 2021-07-28 PROCEDURE — 3288F FALL RISK ASSESSMENT DOCD: CPT | Mod: HCNC,CPTII,S$GLB, | Performed by: INTERNAL MEDICINE

## 2021-07-28 PROCEDURE — 1160F RVW MEDS BY RX/DR IN RCRD: CPT | Mod: HCNC,CPTII,S$GLB, | Performed by: INTERNAL MEDICINE

## 2021-07-28 PROCEDURE — 99499 UNLISTED E&M SERVICE: CPT | Mod: HCNC,S$GLB,, | Performed by: INTERNAL MEDICINE

## 2021-07-28 PROCEDURE — 3078F DIAST BP <80 MM HG: CPT | Mod: HCNC,CPTII,S$GLB, | Performed by: INTERNAL MEDICINE

## 2021-07-28 PROCEDURE — 1101F PT FALLS ASSESS-DOCD LE1/YR: CPT | Mod: HCNC,CPTII,S$GLB, | Performed by: INTERNAL MEDICINE

## 2021-07-28 PROCEDURE — 1101F PR PT FALLS ASSESS DOC 0-1 FALLS W/OUT INJ PAST YR: ICD-10-PCS | Mod: HCNC,CPTII,S$GLB, | Performed by: INTERNAL MEDICINE

## 2021-07-28 PROCEDURE — 1160F PR REVIEW ALL MEDS BY PRESCRIBER/CLIN PHARMACIST DOCUMENTED: ICD-10-PCS | Mod: HCNC,CPTII,S$GLB, | Performed by: INTERNAL MEDICINE

## 2021-07-28 PROCEDURE — 99999 PR PBB SHADOW E&M-EST. PATIENT-LVL IV: CPT | Mod: PBBFAC,HCNC,, | Performed by: INTERNAL MEDICINE

## 2021-07-28 PROCEDURE — 3074F PR MOST RECENT SYSTOLIC BLOOD PRESSURE < 130 MM HG: ICD-10-PCS | Mod: HCNC,CPTII,S$GLB, | Performed by: INTERNAL MEDICINE

## 2021-07-28 PROCEDURE — 99215 OFFICE O/P EST HI 40 MIN: CPT | Mod: HCNC,S$GLB,, | Performed by: INTERNAL MEDICINE

## 2021-07-28 PROCEDURE — 99215 PR OFFICE/OUTPT VISIT, EST, LEVL V, 40-54 MIN: ICD-10-PCS | Mod: HCNC,S$GLB,, | Performed by: INTERNAL MEDICINE

## 2021-07-28 PROCEDURE — 3078F PR MOST RECENT DIASTOLIC BLOOD PRESSURE < 80 MM HG: ICD-10-PCS | Mod: HCNC,CPTII,S$GLB, | Performed by: INTERNAL MEDICINE

## 2021-07-28 PROCEDURE — 3074F SYST BP LT 130 MM HG: CPT | Mod: HCNC,CPTII,S$GLB, | Performed by: INTERNAL MEDICINE

## 2021-07-28 PROCEDURE — 1159F PR MEDICATION LIST DOCUMENTED IN MEDICAL RECORD: ICD-10-PCS | Mod: HCNC,CPTII,S$GLB, | Performed by: INTERNAL MEDICINE

## 2021-07-28 RX ORDER — MECLIZINE HYDROCHLORIDE 25 MG/1
25 TABLET ORAL 3 TIMES DAILY PRN
Qty: 30 TABLET | Refills: 12 | Status: SHIPPED | OUTPATIENT
Start: 2021-07-28 | End: 2023-06-05

## 2021-07-29 ENCOUNTER — OFFICE VISIT (OUTPATIENT)
Dept: NEUROSURGERY | Facility: CLINIC | Age: 86
End: 2021-07-29
Payer: MEDICARE

## 2021-07-29 VITALS — HEART RATE: 83 BPM | SYSTOLIC BLOOD PRESSURE: 106 MMHG | DIASTOLIC BLOOD PRESSURE: 65 MMHG | TEMPERATURE: 98 F

## 2021-07-29 DIAGNOSIS — R42 DIZZINESS: ICD-10-CM

## 2021-07-29 DIAGNOSIS — R11.2 INTRACTABLE VOMITING WITH NAUSEA, UNSPECIFIED VOMITING TYPE: ICD-10-CM

## 2021-07-29 DIAGNOSIS — G91.2 NPH (NORMAL PRESSURE HYDROCEPHALUS): Primary | ICD-10-CM

## 2021-07-29 PROCEDURE — 3074F SYST BP LT 130 MM HG: CPT | Mod: HCNC,CPTII,S$GLB, | Performed by: PHYSICIAN ASSISTANT

## 2021-07-29 PROCEDURE — 3288F FALL RISK ASSESSMENT DOCD: CPT | Mod: HCNC,CPTII,S$GLB, | Performed by: PHYSICIAN ASSISTANT

## 2021-07-29 PROCEDURE — 3074F PR MOST RECENT SYSTOLIC BLOOD PRESSURE < 130 MM HG: ICD-10-PCS | Mod: HCNC,CPTII,S$GLB, | Performed by: PHYSICIAN ASSISTANT

## 2021-07-29 PROCEDURE — 99024 PR POST-OP FOLLOW-UP VISIT: ICD-10-PCS | Mod: HCNC,S$GLB,, | Performed by: PHYSICIAN ASSISTANT

## 2021-07-29 PROCEDURE — 1101F PR PT FALLS ASSESS DOC 0-1 FALLS W/OUT INJ PAST YR: ICD-10-PCS | Mod: HCNC,CPTII,S$GLB, | Performed by: PHYSICIAN ASSISTANT

## 2021-07-29 PROCEDURE — 1126F AMNT PAIN NOTED NONE PRSNT: CPT | Mod: HCNC,CPTII,S$GLB, | Performed by: PHYSICIAN ASSISTANT

## 2021-07-29 PROCEDURE — 1159F MED LIST DOCD IN RCRD: CPT | Mod: HCNC,CPTII,S$GLB, | Performed by: PHYSICIAN ASSISTANT

## 2021-07-29 PROCEDURE — 3078F DIAST BP <80 MM HG: CPT | Mod: HCNC,CPTII,S$GLB, | Performed by: PHYSICIAN ASSISTANT

## 2021-07-29 PROCEDURE — 1126F PR PAIN SEVERITY QUANTIFIED, NO PAIN PRESENT: ICD-10-PCS | Mod: HCNC,CPTII,S$GLB, | Performed by: PHYSICIAN ASSISTANT

## 2021-07-29 PROCEDURE — 1159F PR MEDICATION LIST DOCUMENTED IN MEDICAL RECORD: ICD-10-PCS | Mod: HCNC,CPTII,S$GLB, | Performed by: PHYSICIAN ASSISTANT

## 2021-07-29 PROCEDURE — 3288F PR FALLS RISK ASSESSMENT DOCUMENTED: ICD-10-PCS | Mod: HCNC,CPTII,S$GLB, | Performed by: PHYSICIAN ASSISTANT

## 2021-07-29 PROCEDURE — 99024 POSTOP FOLLOW-UP VISIT: CPT | Mod: HCNC,S$GLB,, | Performed by: PHYSICIAN ASSISTANT

## 2021-07-29 PROCEDURE — 99999 PR PBB SHADOW E&M-EST. PATIENT-LVL IV: ICD-10-PCS | Mod: PBBFAC,HCNC,, | Performed by: PHYSICIAN ASSISTANT

## 2021-07-29 PROCEDURE — 1101F PT FALLS ASSESS-DOCD LE1/YR: CPT | Mod: HCNC,CPTII,S$GLB, | Performed by: PHYSICIAN ASSISTANT

## 2021-07-29 PROCEDURE — 99999 PR PBB SHADOW E&M-EST. PATIENT-LVL IV: CPT | Mod: PBBFAC,HCNC,, | Performed by: PHYSICIAN ASSISTANT

## 2021-07-29 PROCEDURE — 3078F PR MOST RECENT DIASTOLIC BLOOD PRESSURE < 80 MM HG: ICD-10-PCS | Mod: HCNC,CPTII,S$GLB, | Performed by: PHYSICIAN ASSISTANT

## 2021-08-06 ENCOUNTER — OFFICE VISIT (OUTPATIENT)
Dept: NEUROLOGY | Facility: CLINIC | Age: 86
End: 2021-08-06
Payer: MEDICARE

## 2021-08-06 DIAGNOSIS — R32 URINARY INCONTINENCE, UNSPECIFIED TYPE: ICD-10-CM

## 2021-08-06 DIAGNOSIS — G91.2 NPH (NORMAL PRESSURE HYDROCEPHALUS): Primary | ICD-10-CM

## 2021-08-06 DIAGNOSIS — Z71.89 COUNSELING REGARDING GOALS OF CARE: ICD-10-CM

## 2021-08-06 DIAGNOSIS — R42 DIZZINESS: ICD-10-CM

## 2021-08-06 PROCEDURE — 1160F RVW MEDS BY RX/DR IN RCRD: CPT | Mod: CPTII,95,, | Performed by: PHYSICIAN ASSISTANT

## 2021-08-06 PROCEDURE — 99214 PR OFFICE/OUTPT VISIT, EST, LEVL IV, 30-39 MIN: ICD-10-PCS | Mod: 95,,, | Performed by: PHYSICIAN ASSISTANT

## 2021-08-06 PROCEDURE — 1159F PR MEDICATION LIST DOCUMENTED IN MEDICAL RECORD: ICD-10-PCS | Mod: CPTII,95,, | Performed by: PHYSICIAN ASSISTANT

## 2021-08-06 PROCEDURE — 1159F MED LIST DOCD IN RCRD: CPT | Mod: CPTII,95,, | Performed by: PHYSICIAN ASSISTANT

## 2021-08-06 PROCEDURE — 1160F PR REVIEW ALL MEDS BY PRESCRIBER/CLIN PHARMACIST DOCUMENTED: ICD-10-PCS | Mod: CPTII,95,, | Performed by: PHYSICIAN ASSISTANT

## 2021-08-06 PROCEDURE — 99214 OFFICE O/P EST MOD 30 MIN: CPT | Mod: 95,,, | Performed by: PHYSICIAN ASSISTANT

## 2021-08-09 DIAGNOSIS — Z79.52 ON CORTICOSTEROID THERAPY: ICD-10-CM

## 2021-08-09 RX ORDER — ONDANSETRON 4 MG/1
4 TABLET, FILM COATED ORAL EVERY 6 HOURS PRN
Qty: 45 TABLET | Refills: 12 | Status: SHIPPED | OUTPATIENT
Start: 2021-08-09 | End: 2021-08-12 | Stop reason: SDUPTHER

## 2021-08-09 RX ORDER — ONDANSETRON 2 MG/ML
INJECTION INTRAMUSCULAR; INTRAVENOUS
COMMUNITY
Start: 2021-05-26 | End: 2021-09-30

## 2021-08-09 RX ORDER — ALENDRONATE SODIUM 70 MG/1
70 TABLET ORAL
Qty: 4 TABLET | Refills: 11 | Status: SHIPPED | OUTPATIENT
Start: 2021-08-09 | End: 2021-08-12 | Stop reason: SDUPTHER

## 2021-08-09 RX ORDER — ONDANSETRON 2 MG/ML
INJECTION INTRAMUSCULAR; INTRAVENOUS
Status: CANCELLED | OUTPATIENT
Start: 2021-08-09

## 2021-08-12 ENCOUNTER — OUTPATIENT CASE MANAGEMENT (OUTPATIENT)
Dept: ADMINISTRATIVE | Facility: OTHER | Age: 86
End: 2021-08-12

## 2021-08-12 DIAGNOSIS — Z79.52 ON CORTICOSTEROID THERAPY: ICD-10-CM

## 2021-08-12 DIAGNOSIS — E11.65 UNCONTROLLED TYPE 2 DIABETES MELLITUS WITH HYPERGLYCEMIA: Primary | ICD-10-CM

## 2021-08-12 DIAGNOSIS — R42 VERTIGO: Primary | ICD-10-CM

## 2021-08-12 NOTE — TELEPHONE ENCOUNTER
LOV 7/28/2021. Medication pend. Please advise.   no anemia, no easy bruising, no jaundice, no swollen lymph nodes.

## 2021-08-13 RX ORDER — OXYBUTYNIN CHLORIDE 15 MG/1
5 TABLET, EXTENDED RELEASE ORAL DAILY
COMMUNITY
End: 2021-08-13 | Stop reason: SDUPTHER

## 2021-08-15 RX ORDER — METFORMIN HYDROCHLORIDE 750 MG/1
750 TABLET, EXTENDED RELEASE ORAL 2 TIMES DAILY WITH MEALS
Qty: 180 TABLET | Refills: 0 | Status: SHIPPED | OUTPATIENT
Start: 2021-08-15 | End: 2021-12-23

## 2021-08-15 RX ORDER — OXYBUTYNIN CHLORIDE 15 MG/1
15 TABLET, EXTENDED RELEASE ORAL DAILY
Qty: 90 TABLET | Refills: 90 | Status: SHIPPED | OUTPATIENT
Start: 2021-08-15 | End: 2022-03-04

## 2021-08-15 RX ORDER — ALENDRONATE SODIUM 70 MG/1
70 TABLET ORAL
Qty: 4 TABLET | Refills: 11 | Status: SHIPPED | OUTPATIENT
Start: 2021-08-15 | End: 2022-07-13

## 2021-08-15 RX ORDER — TAMSULOSIN HYDROCHLORIDE 0.4 MG/1
CAPSULE ORAL
Refills: 0 | OUTPATIENT
Start: 2021-08-15

## 2021-08-15 RX ORDER — OXYBUTYNIN CHLORIDE 15 MG/1
TABLET, EXTENDED RELEASE ORAL
Refills: 0 | OUTPATIENT
Start: 2021-08-15

## 2021-08-15 RX ORDER — TAMSULOSIN HYDROCHLORIDE 0.4 MG/1
0.4 CAPSULE ORAL DAILY
Qty: 90 CAPSULE | Refills: 11 | Status: SHIPPED | OUTPATIENT
Start: 2021-08-15 | End: 2022-04-04

## 2021-08-15 RX ORDER — ALENDRONATE SODIUM 70 MG/1
70 TABLET ORAL
Qty: 4 TABLET | Refills: 11 | Status: SHIPPED | OUTPATIENT
Start: 2021-08-15 | End: 2022-08-15

## 2021-08-15 RX ORDER — ONDANSETRON 4 MG/1
4 TABLET, FILM COATED ORAL EVERY 6 HOURS PRN
Qty: 45 TABLET | Refills: 12 | Status: SHIPPED | OUTPATIENT
Start: 2021-08-15 | End: 2021-09-30

## 2021-08-18 RX ORDER — DIAZEPAM 5 MG/1
5 TABLET ORAL EVERY 8 HOURS PRN
Qty: 270 TABLET | Refills: 5 | Status: SHIPPED | OUTPATIENT
Start: 2021-08-18 | End: 2022-03-04 | Stop reason: SDUPTHER

## 2021-08-25 ENCOUNTER — TELEPHONE (OUTPATIENT)
Dept: FAMILY MEDICINE | Facility: CLINIC | Age: 86
End: 2021-08-25

## 2021-08-26 ENCOUNTER — PATIENT OUTREACH (OUTPATIENT)
Dept: ADMINISTRATIVE | Facility: OTHER | Age: 86
End: 2021-08-26

## 2021-08-31 PROCEDURE — G0179 MD RECERTIFICATION HHA PT: HCPCS | Mod: ,,, | Performed by: INTERNAL MEDICINE

## 2021-08-31 PROCEDURE — G0179 PR HOME HEALTH MD RECERTIFICATION: ICD-10-PCS | Mod: ,,, | Performed by: INTERNAL MEDICINE

## 2021-09-03 ENCOUNTER — PATIENT MESSAGE (OUTPATIENT)
Dept: NEUROLOGY | Facility: CLINIC | Age: 86
End: 2021-09-03

## 2021-09-03 ENCOUNTER — PATIENT MESSAGE (OUTPATIENT)
Dept: NEUROSURGERY | Facility: CLINIC | Age: 86
End: 2021-09-03

## 2021-09-13 ENCOUNTER — TELEPHONE (OUTPATIENT)
Dept: OTOLARYNGOLOGY | Facility: CLINIC | Age: 86
End: 2021-09-13

## 2021-09-13 ENCOUNTER — PES CALL (OUTPATIENT)
Dept: ADMINISTRATIVE | Facility: CLINIC | Age: 86
End: 2021-09-13

## 2021-09-14 ENCOUNTER — PES CALL (OUTPATIENT)
Dept: ADMINISTRATIVE | Facility: CLINIC | Age: 86
End: 2021-09-14

## 2021-09-22 ENCOUNTER — EXTERNAL HOME HEALTH (OUTPATIENT)
Dept: HOME HEALTH SERVICES | Facility: HOSPITAL | Age: 86
End: 2021-09-22
Payer: MEDICARE

## 2021-09-27 ENCOUNTER — PATIENT MESSAGE (OUTPATIENT)
Dept: GASTROENTEROLOGY | Facility: CLINIC | Age: 86
End: 2021-09-27

## 2021-09-30 ENCOUNTER — HOSPITAL ENCOUNTER (OUTPATIENT)
Dept: RADIOLOGY | Facility: HOSPITAL | Age: 86
Discharge: HOME | End: 2021-09-30
Attending: NEUROLOGICAL SURGERY
Payer: MEDICARE

## 2021-09-30 ENCOUNTER — OFFICE VISIT (OUTPATIENT)
Dept: NEUROSURGERY | Facility: CLINIC | Age: 86
End: 2021-09-30
Payer: MEDICARE

## 2021-09-30 ENCOUNTER — OFFICE VISIT (OUTPATIENT)
Dept: GASTROENTEROLOGY | Facility: CLINIC | Age: 86
End: 2021-09-30
Payer: MEDICARE

## 2021-09-30 VITALS — BODY MASS INDEX: 20.82 KG/M2 | WEIGHT: 132.63 LBS | HEIGHT: 67 IN

## 2021-09-30 VITALS
HEART RATE: 79 BPM | SYSTOLIC BLOOD PRESSURE: 104 MMHG | DIASTOLIC BLOOD PRESSURE: 60 MMHG | BODY MASS INDEX: 20.72 KG/M2 | WEIGHT: 132 LBS | HEIGHT: 67 IN

## 2021-09-30 DIAGNOSIS — R11.2 INTRACTABLE VOMITING WITH NAUSEA, UNSPECIFIED VOMITING TYPE: Primary | ICD-10-CM

## 2021-09-30 DIAGNOSIS — G91.2 NORMAL PRESSURE HYDROCEPHALUS: ICD-10-CM

## 2021-09-30 DIAGNOSIS — G91.2 NPH (NORMAL PRESSURE HYDROCEPHALUS): Primary | ICD-10-CM

## 2021-09-30 DIAGNOSIS — G91.2 NPH (NORMAL PRESSURE HYDROCEPHALUS): ICD-10-CM

## 2021-09-30 PROCEDURE — 70450 CT HEAD/BRAIN W/O DYE: CPT | Mod: TC,HCNC

## 2021-09-30 PROCEDURE — 99999 PR PBB SHADOW E&M-EST. PATIENT-LVL IV: ICD-10-PCS | Mod: PBBFAC,HCNC,GC, | Performed by: STUDENT IN AN ORGANIZED HEALTH CARE EDUCATION/TRAINING PROGRAM

## 2021-09-30 PROCEDURE — 70450 CT HEAD/BRAIN W/O DYE: CPT | Mod: 26,HCNC,, | Performed by: RADIOLOGY

## 2021-09-30 PROCEDURE — 99999 PR PBB SHADOW E&M-EST. PATIENT-LVL IV: CPT | Mod: PBBFAC,HCNC,GC, | Performed by: STUDENT IN AN ORGANIZED HEALTH CARE EDUCATION/TRAINING PROGRAM

## 2021-09-30 PROCEDURE — 1100F PR PT FALLS ASSESS DOC 2+ FALLS/FALL W/INJURY/YR: ICD-10-PCS | Mod: HCNC,CPTII,S$GLB, | Performed by: NEUROLOGICAL SURGERY

## 2021-09-30 PROCEDURE — 99205 OFFICE O/P NEW HI 60 MIN: CPT | Mod: HCNC,GC,S$GLB, | Performed by: STUDENT IN AN ORGANIZED HEALTH CARE EDUCATION/TRAINING PROGRAM

## 2021-09-30 PROCEDURE — 3288F FALL RISK ASSESSMENT DOCD: CPT | Mod: HCNC,CPTII,S$GLB, | Performed by: NEUROLOGICAL SURGERY

## 2021-09-30 PROCEDURE — 1126F AMNT PAIN NOTED NONE PRSNT: CPT | Mod: HCNC,CPTII,S$GLB, | Performed by: NEUROLOGICAL SURGERY

## 2021-09-30 PROCEDURE — 1126F PR PAIN SEVERITY QUANTIFIED, NO PAIN PRESENT: ICD-10-PCS | Mod: HCNC,CPTII,S$GLB, | Performed by: NEUROLOGICAL SURGERY

## 2021-09-30 PROCEDURE — 99205 PR OFFICE/OUTPT VISIT, NEW, LEVL V, 60-74 MIN: ICD-10-PCS | Mod: HCNC,GC,S$GLB, | Performed by: STUDENT IN AN ORGANIZED HEALTH CARE EDUCATION/TRAINING PROGRAM

## 2021-09-30 PROCEDURE — 3288F PR FALLS RISK ASSESSMENT DOCUMENTED: ICD-10-PCS | Mod: HCNC,CPTII,S$GLB, | Performed by: NEUROLOGICAL SURGERY

## 2021-09-30 PROCEDURE — 70450 CT HEAD WITHOUT CONTRAST: ICD-10-PCS | Mod: 26,HCNC,, | Performed by: RADIOLOGY

## 2021-09-30 PROCEDURE — 70250 X-RAY EXAM OF SKULL: CPT | Mod: 26,HCNC,, | Performed by: RADIOLOGY

## 2021-09-30 PROCEDURE — 99999 PR PBB SHADOW E&M-EST. PATIENT-LVL II: ICD-10-PCS | Mod: PBBFAC,HCNC,, | Performed by: NEUROLOGICAL SURGERY

## 2021-09-30 PROCEDURE — 99999 PR PBB SHADOW E&M-EST. PATIENT-LVL II: CPT | Mod: PBBFAC,HCNC,, | Performed by: NEUROLOGICAL SURGERY

## 2021-09-30 PROCEDURE — 99213 PR OFFICE/OUTPT VISIT, EST, LEVL III, 20-29 MIN: ICD-10-PCS | Mod: HCNC,S$GLB,, | Performed by: NEUROLOGICAL SURGERY

## 2021-09-30 PROCEDURE — 1100F PTFALLS ASSESS-DOCD GE2>/YR: CPT | Mod: HCNC,CPTII,S$GLB, | Performed by: NEUROLOGICAL SURGERY

## 2021-09-30 PROCEDURE — 70250 X-RAY EXAM OF SKULL: CPT | Mod: TC,HCNC

## 2021-09-30 PROCEDURE — 70250 XR SKULL SHUNT PLACEMENT 1 VIEW: ICD-10-PCS | Mod: 26,HCNC,, | Performed by: RADIOLOGY

## 2021-09-30 PROCEDURE — 99213 OFFICE O/P EST LOW 20 MIN: CPT | Mod: HCNC,S$GLB,, | Performed by: NEUROLOGICAL SURGERY

## 2021-09-30 RX ORDER — ONDANSETRON 4 MG/1
8 TABLET, ORALLY DISINTEGRATING ORAL EVERY 8 HOURS PRN
Qty: 90 TABLET | Refills: 0 | Status: SHIPPED | OUTPATIENT
Start: 2021-09-30 | End: 2022-07-19 | Stop reason: SDUPTHER

## 2021-10-05 ENCOUNTER — TELEPHONE (OUTPATIENT)
Dept: ENDOSCOPY | Facility: HOSPITAL | Age: 86
End: 2021-10-05
Payer: MEDICARE

## 2021-10-05 ENCOUNTER — TELEPHONE (OUTPATIENT)
Dept: ENDOSCOPY | Facility: HOSPITAL | Age: 86
End: 2021-10-05

## 2021-10-05 ENCOUNTER — TELEPHONE (OUTPATIENT)
Dept: FAMILY MEDICINE | Facility: CLINIC | Age: 86
End: 2021-10-05

## 2021-10-06 ENCOUNTER — TELEPHONE (OUTPATIENT)
Dept: ENDOSCOPY | Facility: HOSPITAL | Age: 86
End: 2021-10-06

## 2021-10-08 ENCOUNTER — PATIENT MESSAGE (OUTPATIENT)
Dept: ENDOSCOPY | Facility: HOSPITAL | Age: 86
End: 2021-10-08

## 2021-10-08 DIAGNOSIS — Z01.818 PRE-OP TESTING: ICD-10-CM

## 2021-10-08 DIAGNOSIS — Z12.11 SPECIAL SCREENING FOR MALIGNANT NEOPLASMS, COLON: Primary | ICD-10-CM

## 2021-10-08 RX ORDER — SODIUM, POTASSIUM,MAG SULFATES 17.5-3.13G
1 SOLUTION, RECONSTITUTED, ORAL ORAL DAILY
Qty: 1 KIT | Refills: 0 | Status: SHIPPED | OUTPATIENT
Start: 2021-10-08 | End: 2021-10-10

## 2021-10-12 ENCOUNTER — DOCUMENT SCAN (OUTPATIENT)
Dept: HOME HEALTH SERVICES | Facility: HOSPITAL | Age: 86
End: 2021-10-12
Payer: MEDICARE

## 2021-10-29 ENCOUNTER — TELEPHONE (OUTPATIENT)
Dept: FAMILY MEDICINE | Facility: CLINIC | Age: 86
End: 2021-10-29
Payer: MEDICARE

## 2021-10-29 RX ORDER — METHOCARBAMOL 500 MG/1
500 TABLET, FILM COATED ORAL 3 TIMES DAILY PRN
Qty: 40 TABLET | Refills: 3 | Status: SHIPPED | OUTPATIENT
Start: 2021-10-29 | End: 2022-01-05 | Stop reason: SDUPTHER

## 2021-11-09 ENCOUNTER — TELEPHONE (OUTPATIENT)
Dept: FAMILY MEDICINE | Facility: CLINIC | Age: 86
End: 2021-11-09
Payer: MEDICARE

## 2021-11-09 RX ORDER — CLOPIDOGREL BISULFATE 75 MG/1
75 TABLET ORAL DAILY
Qty: 30 TABLET | Refills: 12 | Status: SHIPPED | OUTPATIENT
Start: 2021-11-09 | End: 2022-11-18

## 2021-11-09 RX ORDER — CLOPIDOGREL BISULFATE 75 MG/1
75 TABLET ORAL DAILY
Qty: 30 TABLET | Refills: 11 | Status: SHIPPED | OUTPATIENT
Start: 2021-11-09 | End: 2021-11-09 | Stop reason: SDUPTHER

## 2021-11-12 ENCOUNTER — TELEPHONE (OUTPATIENT)
Dept: FAMILY MEDICINE | Facility: CLINIC | Age: 86
End: 2021-11-12
Payer: MEDICARE

## 2021-11-18 ENCOUNTER — HOSPITAL ENCOUNTER (OUTPATIENT)
Dept: RADIOLOGY | Facility: HOSPITAL | Age: 86
Discharge: HOME | End: 2021-11-18
Attending: NEUROLOGICAL SURGERY
Payer: MEDICARE

## 2021-11-18 ENCOUNTER — OFFICE VISIT (OUTPATIENT)
Dept: NEUROSURGERY | Facility: CLINIC | Age: 86
End: 2021-11-18
Payer: MEDICARE

## 2021-11-18 VITALS
SYSTOLIC BLOOD PRESSURE: 142 MMHG | TEMPERATURE: 98 F | WEIGHT: 130.81 LBS | BODY MASS INDEX: 20.53 KG/M2 | HEIGHT: 67 IN | HEART RATE: 79 BPM | DIASTOLIC BLOOD PRESSURE: 75 MMHG

## 2021-11-18 DIAGNOSIS — G91.2 NPH (NORMAL PRESSURE HYDROCEPHALUS): Primary | ICD-10-CM

## 2021-11-18 DIAGNOSIS — G91.2 NPH (NORMAL PRESSURE HYDROCEPHALUS): ICD-10-CM

## 2021-11-18 PROCEDURE — 99214 OFFICE O/P EST MOD 30 MIN: CPT | Mod: HCNC,S$GLB,, | Performed by: NEUROLOGICAL SURGERY

## 2021-11-18 PROCEDURE — 99499 RISK ADDL DX/OHS AUDIT: ICD-10-PCS | Mod: HCNC,S$GLB,, | Performed by: NEUROLOGICAL SURGERY

## 2021-11-18 PROCEDURE — 1159F PR MEDICATION LIST DOCUMENTED IN MEDICAL RECORD: ICD-10-PCS | Mod: HCNC,CPTII,S$GLB, | Performed by: NEUROLOGICAL SURGERY

## 2021-11-18 PROCEDURE — 3288F PR FALLS RISK ASSESSMENT DOCUMENTED: ICD-10-PCS | Mod: HCNC,CPTII,S$GLB, | Performed by: NEUROLOGICAL SURGERY

## 2021-11-18 PROCEDURE — 1101F PT FALLS ASSESS-DOCD LE1/YR: CPT | Mod: HCNC,CPTII,S$GLB, | Performed by: NEUROLOGICAL SURGERY

## 2021-11-18 PROCEDURE — 1101F PR PT FALLS ASSESS DOC 0-1 FALLS W/OUT INJ PAST YR: ICD-10-PCS | Mod: HCNC,CPTII,S$GLB, | Performed by: NEUROLOGICAL SURGERY

## 2021-11-18 PROCEDURE — 1159F MED LIST DOCD IN RCRD: CPT | Mod: HCNC,CPTII,S$GLB, | Performed by: NEUROLOGICAL SURGERY

## 2021-11-18 PROCEDURE — 70250 X-RAY EXAM OF SKULL: CPT | Mod: TC,HCNC

## 2021-11-18 PROCEDURE — 1125F PR PAIN SEVERITY QUANTIFIED, PAIN PRESENT: ICD-10-PCS | Mod: HCNC,CPTII,S$GLB, | Performed by: NEUROLOGICAL SURGERY

## 2021-11-18 PROCEDURE — 70450 CT HEAD/BRAIN W/O DYE: CPT | Mod: TC,HCNC

## 2021-11-18 PROCEDURE — 70250 X-RAY EXAM OF SKULL: CPT | Mod: 26,HCNC,, | Performed by: RADIOLOGY

## 2021-11-18 PROCEDURE — 99999 PR PBB SHADOW E&M-EST. PATIENT-LVL IV: CPT | Mod: PBBFAC,HCNC,, | Performed by: NEUROLOGICAL SURGERY

## 2021-11-18 PROCEDURE — 3078F DIAST BP <80 MM HG: CPT | Mod: HCNC,CPTII,S$GLB, | Performed by: NEUROLOGICAL SURGERY

## 2021-11-18 PROCEDURE — 3077F SYST BP >= 140 MM HG: CPT | Mod: HCNC,CPTII,S$GLB, | Performed by: NEUROLOGICAL SURGERY

## 2021-11-18 PROCEDURE — 3078F PR MOST RECENT DIASTOLIC BLOOD PRESSURE < 80 MM HG: ICD-10-PCS | Mod: HCNC,CPTII,S$GLB, | Performed by: NEUROLOGICAL SURGERY

## 2021-11-18 PROCEDURE — 3077F PR MOST RECENT SYSTOLIC BLOOD PRESSURE >= 140 MM HG: ICD-10-PCS | Mod: HCNC,CPTII,S$GLB, | Performed by: NEUROLOGICAL SURGERY

## 2021-11-18 PROCEDURE — 1125F AMNT PAIN NOTED PAIN PRSNT: CPT | Mod: HCNC,CPTII,S$GLB, | Performed by: NEUROLOGICAL SURGERY

## 2021-11-18 PROCEDURE — 99499 UNLISTED E&M SERVICE: CPT | Mod: HCNC,S$GLB,, | Performed by: NEUROLOGICAL SURGERY

## 2021-11-18 PROCEDURE — 99214 PR OFFICE/OUTPT VISIT, EST, LEVL IV, 30-39 MIN: ICD-10-PCS | Mod: HCNC,S$GLB,, | Performed by: NEUROLOGICAL SURGERY

## 2021-11-18 PROCEDURE — 99999 PR PBB SHADOW E&M-EST. PATIENT-LVL IV: ICD-10-PCS | Mod: PBBFAC,HCNC,, | Performed by: NEUROLOGICAL SURGERY

## 2021-11-18 PROCEDURE — 70450 CT HEAD/BRAIN W/O DYE: CPT | Mod: 26,HCNC,, | Performed by: RADIOLOGY

## 2021-11-18 PROCEDURE — 3288F FALL RISK ASSESSMENT DOCD: CPT | Mod: HCNC,CPTII,S$GLB, | Performed by: NEUROLOGICAL SURGERY

## 2021-11-18 PROCEDURE — 70450 CT HEAD WITHOUT CONTRAST: ICD-10-PCS | Mod: 26,HCNC,, | Performed by: RADIOLOGY

## 2021-11-18 PROCEDURE — 70250 XR SKULL SHUNT PLACEMENT 1 VIEW: ICD-10-PCS | Mod: 26,HCNC,, | Performed by: RADIOLOGY

## 2021-11-19 ENCOUNTER — TELEPHONE (OUTPATIENT)
Dept: FAMILY MEDICINE | Facility: CLINIC | Age: 86
End: 2021-11-19
Payer: MEDICARE

## 2021-12-01 ENCOUNTER — TELEPHONE (OUTPATIENT)
Dept: FAMILY MEDICINE | Facility: CLINIC | Age: 86
End: 2021-12-01
Payer: MEDICARE

## 2021-12-02 ENCOUNTER — TELEPHONE (OUTPATIENT)
Dept: NEUROSURGERY | Facility: CLINIC | Age: 86
End: 2021-12-02
Payer: MEDICARE

## 2021-12-11 ENCOUNTER — PATIENT MESSAGE (OUTPATIENT)
Dept: NEUROSURGERY | Facility: CLINIC | Age: 86
End: 2021-12-11
Payer: MEDICARE

## 2021-12-13 ENCOUNTER — TELEPHONE (OUTPATIENT)
Dept: NEUROSURGERY | Facility: CLINIC | Age: 86
End: 2021-12-13
Payer: MEDICARE

## 2021-12-13 DIAGNOSIS — G91.2 NPH (NORMAL PRESSURE HYDROCEPHALUS): Primary | ICD-10-CM

## 2021-12-16 RX ORDER — IPRATROPIUM BROMIDE AND ALBUTEROL SULFATE 2.5; .5 MG/3ML; MG/3ML
3 SOLUTION RESPIRATORY (INHALATION) EVERY 4 HOURS PRN
Qty: 9 ML | Refills: 12 | Status: SHIPPED | OUTPATIENT
Start: 2021-12-16 | End: 2023-03-14

## 2021-12-22 DIAGNOSIS — E11.65 UNCONTROLLED TYPE 2 DIABETES MELLITUS WITH HYPERGLYCEMIA: ICD-10-CM

## 2021-12-23 RX ORDER — METFORMIN HYDROCHLORIDE 750 MG/1
TABLET, EXTENDED RELEASE ORAL
Qty: 180 TABLET | Refills: 0 | Status: SHIPPED | OUTPATIENT
Start: 2021-12-23 | End: 2022-03-03

## 2021-12-28 DIAGNOSIS — H90.3 SENSORINEURAL HEARING LOSS (SNHL) OF BOTH EARS: Primary | ICD-10-CM

## 2021-12-29 ENCOUNTER — TELEPHONE (OUTPATIENT)
Dept: NEUROSURGERY | Facility: CLINIC | Age: 86
End: 2021-12-29
Payer: MEDICARE

## 2021-12-30 ENCOUNTER — HOSPITAL ENCOUNTER (OUTPATIENT)
Dept: RADIOLOGY | Facility: HOSPITAL | Age: 86
Discharge: HOME | End: 2021-12-30
Attending: NEUROLOGICAL SURGERY
Payer: MEDICARE

## 2021-12-30 ENCOUNTER — OFFICE VISIT (OUTPATIENT)
Dept: NEUROSURGERY | Facility: CLINIC | Age: 86
End: 2021-12-30
Payer: MEDICARE

## 2021-12-30 DIAGNOSIS — G91.2 NPH (NORMAL PRESSURE HYDROCEPHALUS): Primary | ICD-10-CM

## 2021-12-30 DIAGNOSIS — H90.3 SENSORINEURAL HEARING LOSS (SNHL) OF BOTH EARS: ICD-10-CM

## 2021-12-30 LAB
CLARITY CSF: CLEAR
COLOR CSF: COLORLESS
GLUCOSE CSF-MCNC: 69 MG/DL (ref 40–70)
LYMPHOCYTES NFR CSF MANUAL: 74 % (ref 40–80)
MONOS+MACROS NFR CSF MANUAL: 26 % (ref 15–45)
PROT CSF-MCNC: 48 MG/DL (ref 15–40)
RBC # CSF: 1 /CU MM
SPECIMEN VOL CSF: 4 ML
WBC # CSF: 1 /CU MM (ref 0–5)

## 2021-12-30 PROCEDURE — 25500020 PHARM REV CODE 255: Performed by: NEUROLOGICAL SURGERY

## 2021-12-30 PROCEDURE — 61070 BRAIN CANAL SHUNT PROCEDURE: CPT | Mod: S$GLB,,, | Performed by: PHYSICIAN ASSISTANT

## 2021-12-30 PROCEDURE — 99214 OFFICE O/P EST MOD 30 MIN: CPT | Mod: 25,S$GLB,, | Performed by: NEUROLOGICAL SURGERY

## 2021-12-30 PROCEDURE — 70250 X-RAY EXAM OF SKULL: CPT | Mod: 26,,, | Performed by: RADIOLOGY

## 2021-12-30 PROCEDURE — 87205 SMEAR GRAM STAIN: CPT | Performed by: NEUROLOGICAL SURGERY

## 2021-12-30 PROCEDURE — 87070 CULTURE OTHR SPECIMN AEROBIC: CPT | Performed by: NEUROLOGICAL SURGERY

## 2021-12-30 PROCEDURE — 61070 PR BRAIN SHUNT TUBE/RESERV INJECTN: ICD-10-PCS | Mod: S$GLB,,, | Performed by: PHYSICIAN ASSISTANT

## 2021-12-30 PROCEDURE — 70553 MRI BRAIN STEM W/O & W/DYE: CPT | Mod: 26,,, | Performed by: RADIOLOGY

## 2021-12-30 PROCEDURE — 70553 MRI BRAIN STEM W/O & W/DYE: CPT | Mod: TC

## 2021-12-30 PROCEDURE — 89051 BODY FLUID CELL COUNT: CPT | Performed by: NEUROLOGICAL SURGERY

## 2021-12-30 PROCEDURE — 84157 ASSAY OF PROTEIN OTHER: CPT | Performed by: NEUROLOGICAL SURGERY

## 2021-12-30 PROCEDURE — 70553 MRI IAC/TEMPORAL BONES W W/O CONTRAST: ICD-10-PCS | Mod: 26,,, | Performed by: RADIOLOGY

## 2021-12-30 PROCEDURE — 99214 PR OFFICE/OUTPT VISIT, EST, LEVL IV, 30-39 MIN: ICD-10-PCS | Mod: 25,S$GLB,, | Performed by: NEUROLOGICAL SURGERY

## 2021-12-30 PROCEDURE — 82945 GLUCOSE OTHER FLUID: CPT | Performed by: NEUROLOGICAL SURGERY

## 2021-12-30 PROCEDURE — 70250 X-RAY EXAM OF SKULL: CPT | Mod: TC

## 2021-12-30 PROCEDURE — 70250 XR SKULL SHUNT PLACEMENT 1 VIEW: ICD-10-PCS | Mod: 26,,, | Performed by: RADIOLOGY

## 2021-12-30 PROCEDURE — A9585 GADOBUTROL INJECTION: HCPCS | Performed by: NEUROLOGICAL SURGERY

## 2021-12-30 RX ORDER — GADOBUTROL 604.72 MG/ML
6 INJECTION INTRAVENOUS
Status: COMPLETED | OUTPATIENT
Start: 2021-12-30 | End: 2021-12-30

## 2021-12-30 RX ADMIN — GADOBUTROL 6 ML: 604.72 INJECTION INTRAVENOUS at 01:12

## 2021-12-30 NOTE — PROGRESS NOTES
"Neurosurgery  Follow up    SUBJECTIVE:     Chief Complaint: query normal pressure hydrocephalus     History of Present Illness:  "Narciso Yanez is a 85 y.o. male smoker with below listed PMH who presents with recent increase in falls as a referral from pcp for lumbar stenosis. Lumbar MRI in 3/2020 revealed very mild lumbar stenosis. He has a history of back and right leg pain that has been treated with LEAH and PT up until this point. He presents today with his two daughters. They report periodic falls last year, 3 at the most. However, he has been falling several times per week since Christmas 2020. He was driving and walking independently last year. He can no longer drive and must use a walker (most of the time) for ambulation now. They endorse head trauma on at least one fall but cannot recall a specific fall that precipitated his worsened condition.      "He reports right leg pain for at least 15 months. Pain is present in his low back and radiates down his posterior right leg. Pain increases with prolonged standing, sitting, and walking. Sitting down relieves the pain as long as he doesn't sit too long. He further endorses numbness in his posterior thigh, stopping at the knee. He feels like his right leg is weak and often contributes to the falls.      "It is not clear if he has an underlying gait disturbance unrelated to his right leg symptoms. He feels like he loses his balance without much warning. He uses his walker most days but tries to avoid it on "good days". We discussed the danger of waiting for a fall to determine whether a day is good or bad.      "He denies arm pain and weakness. Denies neck pain. Endorses a several year history of numbness in the first 3 fingers of his right hand. More recently he has been dropping items from his hands, most notably his cigarettes if he is not paying attention. He has chronic urge urinary incontinence. As far as he can tell, he is emptying his bladder when he " "uses the restroom. Newer onset of constipation.      "Chronic everyday smoker. Takes ASA81 and plavix daily.   Diabetic, CKD3  CAD s/p CABG (1999)  PAD"     As of 5/17/21, the patient is referred to see me (after having seen Jennifer Kaur PA-C as above) as a referral for possible NPH per Thuy Perez PA-C. The patient reports that as of a year ago, he was able to climb in and out of 18-wheelers without issue; he has had a relatively rapid decline over that time. He now has urinary incontinence because he cannot make it to the bathroom in time (improved with oxybutynin). He is falling frequently, about once or twice a week.     He has had multiple epidurals, but these have not been of significant help. He has been falling frequently, most recently Wednesday.      He has had some confusion, but not too bad. More trouble with short-term memory. Usually ok once he "gathers his thoughts"     He lives with youngest daughter, but it is an older daughter who is with him today and brings him to appointments.     No bleeding, infection, or anesthetic complications with any of his previous surgeries.     MRI of the brain is personally reviewed and demonstrates ventriculomegaly with mild transependymal flow.     Of note, he is completely uninterested in smoking cessation     As of 5/27/21, the patient underwent 30cc lumbar puncture yesterday. PT evaluation showed objective improvement in 3 out of 4 measures yesterday. His daughter reports that he was also more talkative than usual after the LP. He remains off of ASA and Plavix.     As of 6/24/21, the patient underwent placement of  shunt on 5/31/21. Etelvina reports that he has been cognitively much improved since the shunt was placed; however, since Saturday, he has had significant daily headache and vomiting. CT head and shunt series obtained prior to today's appointment are personally reviewed and demonstrate interval improvement in 3rd ventricular size without " "apparent overlying hygroma or hematoma. No ileus on shunt series. He has been having regular BM, he has not been smoking. Etelvina and Mr. Yanez do endorse unintentional weight loss over the past few months. His walls has been DCed.     As of 7/1/21, the patient reports that his nausea has completely resolved since reprogramming. He has been discharged home. His daughter is pleased with how he is doing, but thinks that his mental status is not quite as brisk as it was when the shunt was draining more. He also has what appears to be cellulitis of the right great toe. He is scheduled to see podiatry next week.     As of 9/20/21, the patient's daughter reports that the dizziness and emesis were present well before the shunt and have not significantly worsened since. They have pursued workup with multiple ENT without significant improvement. His daughter feels that he was doing much better when the shunt was draining. He has worse bruising on his arms today. He has also obviously lost weight. They are planning to see the dermatologist.     His CT head does not demonstrate any concern for blood or extraaxial collection     As of 11/18/21, the patient reports that he had a 50% improvement in symptoms, though he has had some worsening again over the past 2 weeks.     He still has decreased appetite and fluid. The dizziness was coming from his "bathroom" medications and has resolved since stopping them.     He is currently in the process of getting hearing aids s/p ruptured eardrum.     As of 12/30/21, the patient has been having rapid hearing loss. Over 2 months, he has had significant loss in hearing. He is accompanied today by his daughter Norah, who wears hearing aids herself.     He has gained about 2 lbs.     Review of patient's allergies indicates:   Allergen Reactions    Demerol [meperidine] Nausea Only       Current Outpatient Medications   Medication Sig Dispense Refill    ACCU-CHEK JOAQUIN PLUS METER Misc " Three times a day with meals  0    ACCU-CHEK SOFTCLIX LANCETS Misc Check tid 200 each 12    acetaminophen (TYLENOL) 325 MG tablet Take 2 tablets (650 mg total) by mouth every 4 (four) hours as needed for Pain.  0    albuterol-ipratropium (DUO-NEB) 2.5 mg-0.5 mg/3 mL nebulizer solution Take 3 mLs by nebulization every 4 (four) hours as needed for Wheezing. 9 mL 12    alendronate (FOSAMAX) 70 MG tablet Take 1 tablet (70 mg total) by mouth every 7 days. Patient takes on Tuesdays 4 tablet 11    alendronate (FOSAMAX) 70 MG tablet Take 1 tablet (70 mg total) by mouth every 7 days. Patient takes on Tuesdays 4 tablet 11    ascorbic acid, vitamin C, (VITAMIN C) 250 MG tablet Take 1 tablet (250 mg total) by mouth once daily. 120 tablet 0    aspirin (ECOTRIN) 81 MG EC tablet Take 1 tablet (81 mg total) by mouth once daily.  0    blood sugar diagnostic (TRUE METRIX GLUCOSE TEST STRIP) Strp  strip 12    blood sugar diagnostic (TRUE METRIX GLUCOSE TEST STRIP) Strp  each 12    ciclopirox (PENLAC) 8 % Soln Apply topically nightly. 6.6 mL 11    clopidogreL (PLAVIX) 75 mg tablet Take 1 tablet (75 mg total) by mouth once daily. 30 tablet 12    diazePAM (VALIUM) 5 MG tablet Take 1 tablet (5 mg total) by mouth every 8 (eight) hours as needed. 270 tablet 5    heparin sodium,porcine (HEPARIN, PORCINE,) 5,000 unit/mL injection Inject 1 mL (5,000 Units total) into the skin every 8 (eight) hours.      insulin aspart U-100 (NOVOLOG) 100 unit/mL (3 mL) InPn pen Inject 0-5 Units into the skin before meals and at bedtime as needed (Hyperglycemia).  0    magnesium sulfate in water (MAGNESIUM SULFATE 2 GRAM/50 ML) 2 gram/50 mL PgBk       meclizine (ANTIVERT) 25 mg tablet Take 1 tablet (25 mg total) by mouth 3 (three) times daily as needed for Dizziness (or at least one HS for 1 week when vertigo active). 30 tablet 12    metFORMIN (GLUCOPHAGE-XR) 750 MG ER 24hr tablet TAKE 1 TABLET TWICE DAILY WITH MEALS 180 tablet 0     metoprolol succinate (TOPROL-XL) 25 MG 24 hr tablet       nicotine (NICODERM CQ) 14 mg/24 hr Place 1 patch onto the skin once daily.  0    OMNIPAQUE 300 300 mg iodine/mL injection       OMNIPAQUE 350 350 mg iodine/mL Soln injection       ondansetron (ZOFRAN-ODT) 4 MG TbDL Take 2 tablets (8 mg total) by mouth every 8 (eight) hours as needed (nausea). 90 tablet 0    oxybutynin (DITROPAN XL) 15 MG TR24 Take 1 tablet (15 mg total) by mouth once daily. Take 5 mg by mouth once daily. 90 tablet 90    rosuvastatin (CRESTOR) 20 MG tablet Take 1 tablet (20 mg total) by mouth every evening. (Patient taking differently: Take 20 mg by mouth once daily.) 90 tablet 12    senna (SENOKOT) 8.6 mg tablet Take 1 tablet by mouth once daily.      tamsulosin (FLOMAX) 0.4 mg Cap Take 1 capsule (0.4 mg total) by mouth once daily. 90 capsule 11     No current facility-administered medications for this visit.       Past Medical History:   Diagnosis Date    Actinic keratosis     Anxiety     B12 deficiency 12/8/2014    Dx 6/14    Cancer     skin    Cataract     Coronary artery disease     Diabetes mellitus type II     Diabetes mellitus with peripheral circulatory disorder 6/18/2014    ED (erectile dysfunction)     Hyperlipidemia     Kidney stone     Peripheral vascular disease     Spondylosis 3/29/2019    Tobacco dependence     Urinary incontinence 5/4/2021     Past Surgical History:   Procedure Laterality Date    APPENDECTOMY      CARDIAC SURGERY  01/1999    CABG 4 vessels    CATARACT EXTRACTION      EPIDURAL STEROID INJECTION Right 8/26/2020    Procedure: Injection, Steroid, Epidural Transforaminal;  Surgeon: Wiley Crane Jr., MD;  Location: [facility];  Service: Pain Management;  Laterality: Right;  Right L5 + S1 TF LEAH  Arrive @ 1115; ASA & Plavix last 8/18; Check BG; Rapid COVID test    EPIDURAL STEROID INJECTION Right 11/25/2020    Procedure: Injection, Steroid, Epidural Transformainal;  Surgeon:  Wiley Crane Jr., MD;  Location: [facility];  Service: Pain Management;  Laterality: Right;  Right L5 + S1 TF LEAH  Arrive @ 1245; ASA and Plavix last 11/17; Pre-DM; Needs MD Sign.    EPIDURAL STEROID INJECTION Right 2/19/2021    Procedure: Injection, Steroid, Epidural Transforaminal;  Surgeon: Wiley Crane Jr., MD;  Location: [facility] END[facility];  Service: Pain Management;  Laterality: Right;  Right L5 + S1 TF LEAH  Arrive @ 1115; ASA & Plavix last 2/11; Check BG; Needs Consent    EXTERNAL EAR SURGERY      EYE SURGERY      cataracts    ILIAC ARTERY STENT Bilateral 06/2003    also Right External Iliac stent     Family History     Problem Relation (Age of Onset)    Stroke Mother        Social History     Socioeconomic History    Marital status:    Tobacco Use    Smoking status: Former Smoker     Packs/day: 1.50     Years: 71.00     Pack years: 106.50     Types: Cigarettes    Smokeless tobacco: Former User   Substance and Sexual Activity    Alcohol use: No    Drug use: No    Sexual activity: Not Currently     Partners: Female   Social History Narrative    .  4 children.  Smokes 2 ppd.  Still drives trucks for entertainment industry.        Review of Systems    Constitutional: Negative for chills, fever and malaise/fatigue.   HENT: Negative for hearing loss.    Eyes: Negative for blurred vision and double vision.   Respiratory: Negative for cough, shortness of breath and stridor.    Cardiovascular: Negative for chest pain and leg swelling.   Gastrointestinal: Negative for constipation, diarrhea. Positive for emesis.    Genitourinary: Positive for urgency. Negative for frequency.   Musculoskeletal: Positive for falls.   Skin: Negative for itching and rash.   Neurological: Negative for dizziness, tremors, loss of consciousness and weakness.   Psychiatric/Behavioral: Negative for hallucinations and memory loss.         OBJECTIVE:     Vital Signs     There is no height or weight on file to calculate  "BMI.    General: well developed, well nourished, no distress.   Head: normocephalic, atraumatic  Neurologic: Alert and oriented. Thought content appropriate.  GCS: Motor: 6/Verbal: 5/Eyes: 4 GCS Total: 15  Mental Status: Awake, Alert, Oriented x 4  Language: No aphasia  Speech: No dysarthria  Cranial nerves: face symmetric, tongue midline, CN II-XII grossly intact.   Eyes: pupils equal, round, reactive to light with accommodation, EOMI.   Pulmonary: normal respirations, no signs of respiratory distress  Abdomen: soft, non-distended, not tender to palpation. Abdominal incisions well healed.  Skin: Skin is warm, dry and intact.  Sensory: intact to light touch throughout   Motor Strength: Moves all extremities spontaneously with good tone.  Full strength upper and lower extremities. No abnormal movements seen.    Strength   Deltoids Triceps Biceps Wrist Extension Wrist Flexion Hand    Upper: R 5/5 5/5 5/5 5/5 5/5 5/5     L 5/5 5/5 5/5 5/5 5/5 5/5       Iliopsoas Quadriceps Knee  Flexion Tibialis  anterior Gastro- cnemius EHL   Lower: R 5/5 5/5 5/5 5/5 5/5 5/5     L 5/5 5/5 5/5 5/5 5/5 5/5         Cerebellar:   Finger-to-nose: intact bilaterally   Pronator drift: absent bilaterally  Cranial incision: well healed, no signs of erythema, edema, or drainage. No underlying fluid collection palpable.     Right great toe appears to have cellulitis     Diagnostic Results:  Imaging reviewed   MRI does not reveal any obvious cause of hearing loss  Skull Xray demonstrates stable setting at 160     ASSESSMENT/PLAN:     Narciso Yanez is a 86 y.o. male who presents as a referral from [facility] for consideration of possible NPH. He is now s/p  shunt on 5/31/21. His daughter was pleased with the "remarkable" improvement in his cognition. His nausea and vomiting have now resolved since changing medications.     I would like to keep him at 160 today while he continues workup for his hearing loss with ENT. I will plan to " see him back in 6-8 weeks to assess dialing him down further at that time.     Finally, we will tap the shunt today to ensure no infection given the meningeal enhancement.     I have encouraged them to contact the clinic in interim with any questions, concerns, or adverse clinical change.

## 2021-12-30 NOTE — PROGRESS NOTES
Procedure:  Shunt tap   Indication: Rule out infection  Estimated Blood Loss: 0 cc     Patient in supine position with head turned to left to access Right  Shunt. Sterile gloves were donned. Shunt area was generously cleansed with adequate amounts of betadine and chloroprep and draped in a sterile fashion. Vacuum suction was broken on 10cc syringe. 23 gauge butterfly needle attached to syringe was placed into  shunt reservoir. 5 cc of clear cerebrospinal fluid drawn from reservoir. No signs of bleeding from site; sterile pressure held. 5 cc of clear CSF transferred to sterile container. All CSF sent for CSF culture, CSF protein, CSF glucose, CSF cell count with differential.

## 2021-12-31 ENCOUNTER — PATIENT MESSAGE (OUTPATIENT)
Dept: ADMINISTRATIVE | Facility: HOSPITAL | Age: 86
End: 2021-12-31
Payer: MEDICARE

## 2022-01-02 LAB
CREAT SERPL-MCNC: 1.3 MG/DL (ref 0.5–1.4)
SAMPLE: NORMAL

## 2022-01-04 LAB
BACTERIA CSF CULT: NO GROWTH
GRAM STN SPEC: NORMAL

## 2022-01-05 RX ORDER — METHOCARBAMOL 500 MG/1
500 TABLET, FILM COATED ORAL 3 TIMES DAILY PRN
Qty: 40 TABLET | Refills: 1 | Status: SHIPPED | OUTPATIENT
Start: 2022-01-05 | End: 2022-01-31

## 2022-01-05 NOTE — TELEPHONE ENCOUNTER
No new care gaps identified.  Powered by [employer] by [facility]. Reference number: [id number].   1/05/2022 10:36:01 AM CST

## 2022-01-05 NOTE — TELEPHONE ENCOUNTER
----- Message from Liliam Kimmfield sent at 1/5/2022  9:44 AM CST -----  Who Called: PATRICIA LEE     Refill or New Rx: Refill    RX Name and Strength: methocarbamoL (ROBAXIN) 500 MG Tab    How is the patient currently taking it? (ex. 1XDay): 3xday    Is this a 30 day or 90 day RX: 90 day    Preferred Pharmacy with phone number: [facility] - [facility] [address]    Local or Mail Order: Mail Order    Ordering Provider: Marcelino Del Toro MD    Best Call Back Number: [phone]    Additional Information:

## 2022-01-12 ENCOUNTER — HOSPITAL ENCOUNTER (OUTPATIENT)
Dept: RADIOLOGY | Facility: HOSPITAL | Age: 87
Discharge: HOME | End: 2022-01-12
Attending: UROLOGY
Payer: MEDICARE

## 2022-01-12 DIAGNOSIS — N28.1 ACQUIRED COMPLEX CYST OF KIDNEY: ICD-10-CM

## 2022-01-12 PROCEDURE — 76770 US EXAM ABDO BACK WALL COMP: CPT | Mod: 26,,, | Performed by: RADIOLOGY

## 2022-01-12 PROCEDURE — 76770 US RETROPERITONEAL COMPLETE: ICD-10-PCS | Mod: 26,,, | Performed by: RADIOLOGY

## 2022-01-12 PROCEDURE — 76770 US EXAM ABDO BACK WALL COMP: CPT | Mod: TC

## 2022-01-29 NOTE — TELEPHONE ENCOUNTER
No new care gaps identified.  Powered by [employer]. Reference number: [id number].   1/29/2022 1:37:04 PM CST

## 2022-01-31 ENCOUNTER — TELEPHONE (OUTPATIENT)
Dept: FAMILY MEDICINE | Facility: CLINIC | Age: 87
End: 2022-01-31
Payer: MEDICARE

## 2022-01-31 DIAGNOSIS — D64.9 ANEMIA, UNSPECIFIED TYPE: ICD-10-CM

## 2022-01-31 DIAGNOSIS — I25.810 CORONARY ARTERY DISEASE INVOLVING CORONARY BYPASS GRAFT OF NATIVE HEART WITHOUT ANGINA PECTORIS: Primary | ICD-10-CM

## 2022-01-31 DIAGNOSIS — E78.5 HYPERLIPIDEMIA, UNSPECIFIED HYPERLIPIDEMIA TYPE: ICD-10-CM

## 2022-01-31 DIAGNOSIS — N18.31 STAGE 3A CHRONIC KIDNEY DISEASE: ICD-10-CM

## 2022-01-31 DIAGNOSIS — E53.8 B12 DEFICIENCY: ICD-10-CM

## 2022-01-31 DIAGNOSIS — R63.4 UNINTENTIONAL WEIGHT LOSS: ICD-10-CM

## 2022-01-31 DIAGNOSIS — E78.6 HIGH-DENSITY LIPOPROTEIN DEFICIENCY: ICD-10-CM

## 2022-01-31 DIAGNOSIS — E11.65 UNCONTROLLED TYPE 2 DIABETES MELLITUS WITH HYPERGLYCEMIA: ICD-10-CM

## 2022-01-31 RX ORDER — METHOCARBAMOL 500 MG/1
TABLET, FILM COATED ORAL
Qty: 40 TABLET | Refills: 1 | Status: SHIPPED | OUTPATIENT
Start: 2022-01-31 | End: 2022-02-25

## 2022-01-31 RX ORDER — PROCHLORPERAZINE MALEATE 5 MG
TABLET ORAL
Status: ON HOLD | COMMUNITY
Start: 2022-01-05 | End: 2023-10-23 | Stop reason: HOSPADM

## 2022-01-31 NOTE — TELEPHONE ENCOUNTER
----- Message from Whitley Perez sent at 1/31/2022  9:48 AM CST -----  Regarding: self  .Type: Lab    Caller is requesting to schedule their Lab appointment prior to annual appointment.    Order is not listed in EPIC.  Please enter order and contact patient to schedule.    Name of Caller: self     Preferred Date and Time of Labs: anytime   Date of EPP Appointment: 03/04    Where would they like the lab performed [place]   Would the patient rather a call back or a response via [facility]? Call back     Best Call Back Number: .[phone]

## 2022-02-03 ENCOUNTER — OFFICE VISIT (OUTPATIENT)
Dept: NEUROSURGERY | Facility: CLINIC | Age: 87
End: 2022-02-03
Payer: MEDICARE

## 2022-02-03 ENCOUNTER — HOSPITAL ENCOUNTER (OUTPATIENT)
Dept: RADIOLOGY | Facility: HOSPITAL | Age: 87
Discharge: HOME | End: 2022-02-03
Attending: NEUROLOGICAL SURGERY
Payer: MEDICARE

## 2022-02-03 ENCOUNTER — TELEPHONE (OUTPATIENT)
Dept: NEUROSURGERY | Facility: CLINIC | Age: 87
End: 2022-02-03
Payer: MEDICARE

## 2022-02-03 VITALS — DIASTOLIC BLOOD PRESSURE: 72 MMHG | HEART RATE: 93 BPM | SYSTOLIC BLOOD PRESSURE: 117 MMHG

## 2022-02-03 DIAGNOSIS — G91.2 NPH (NORMAL PRESSURE HYDROCEPHALUS): Primary | ICD-10-CM

## 2022-02-03 DIAGNOSIS — G91.2 NPH (NORMAL PRESSURE HYDROCEPHALUS): ICD-10-CM

## 2022-02-03 DIAGNOSIS — G91.2 NORMAL PRESSURE HYDROCEPHALUS: Primary | ICD-10-CM

## 2022-02-03 PROCEDURE — 99214 OFFICE O/P EST MOD 30 MIN: CPT | Mod: S$GLB,,, | Performed by: NEUROLOGICAL SURGERY

## 2022-02-03 PROCEDURE — 99214 PR OFFICE/OUTPT VISIT, EST, LEVL IV, 30-39 MIN: ICD-10-PCS | Mod: S$GLB,,, | Performed by: NEUROLOGICAL SURGERY

## 2022-02-03 PROCEDURE — 70250 X-RAY EXAM OF SKULL: CPT | Mod: TC

## 2022-02-03 PROCEDURE — 70450 CT HEAD/BRAIN W/O DYE: CPT | Mod: TC

## 2022-02-03 PROCEDURE — 3078F DIAST BP <80 MM HG: CPT | Mod: CPTII,S$GLB,, | Performed by: NEUROLOGICAL SURGERY

## 2022-02-03 PROCEDURE — 3074F PR MOST RECENT SYSTOLIC BLOOD PRESSURE < 130 MM HG: ICD-10-PCS | Mod: CPTII,S$GLB,, | Performed by: NEUROLOGICAL SURGERY

## 2022-02-03 PROCEDURE — 70450 CT HEAD/BRAIN W/O DYE: CPT | Mod: 26,,, | Performed by: RADIOLOGY

## 2022-02-03 PROCEDURE — 3074F SYST BP LT 130 MM HG: CPT | Mod: CPTII,S$GLB,, | Performed by: NEUROLOGICAL SURGERY

## 2022-02-03 PROCEDURE — 70250 XR SKULL SHUNT PLACEMENT 1 VIEW: ICD-10-PCS | Mod: 26,,, | Performed by: RADIOLOGY

## 2022-02-03 PROCEDURE — 70250 X-RAY EXAM OF SKULL: CPT | Mod: 26,,, | Performed by: RADIOLOGY

## 2022-02-03 PROCEDURE — 99999 PR PBB SHADOW E&M-EST. PATIENT-LVL II: ICD-10-PCS | Mod: PBBFAC,,, | Performed by: NEUROLOGICAL SURGERY

## 2022-02-03 PROCEDURE — 3078F PR MOST RECENT DIASTOLIC BLOOD PRESSURE < 80 MM HG: ICD-10-PCS | Mod: CPTII,S$GLB,, | Performed by: NEUROLOGICAL SURGERY

## 2022-02-03 PROCEDURE — 70450 CT HEAD STEALTH W/O CONTRAST: ICD-10-PCS | Mod: 26,,, | Performed by: RADIOLOGY

## 2022-02-03 PROCEDURE — 99999 PR PBB SHADOW E&M-EST. PATIENT-LVL II: CPT | Mod: PBBFAC,,, | Performed by: NEUROLOGICAL SURGERY

## 2022-02-03 NOTE — PROGRESS NOTES
"Neurosurgery  Follow up    SUBJECTIVE:     Chief Complaint: query normal pressure hydrocephalus     History of Present Illness:  "Narciso Yanez is a 85 y.o. male smoker with below listed PMH who presents with recent increase in falls as a referral from pcp for lumbar stenosis. Lumbar MRI in 3/2020 revealed very mild lumbar stenosis. He has a history of back and right leg pain that has been treated with LEAH and PT up until this point. He presents today with his two daughters. They report periodic falls last year, 3 at the most. However, he has been falling several times per week since Christmas 2020. He was driving and walking independently last year. He can no longer drive and must use a walker (most of the time) for ambulation now. They endorse head trauma on at least one fall but cannot recall a specific fall that precipitated his worsened condition.      "He reports right leg pain for at least 15 months. Pain is present in his low back and radiates down his posterior right leg. Pain increases with prolonged standing, sitting, and walking. Sitting down relieves the pain as long as he doesn't sit too long. He further endorses numbness in his posterior thigh, stopping at the knee. He feels like his right leg is weak and often contributes to the falls.      "It is not clear if he has an underlying gait disturbance unrelated to his right leg symptoms. He feels like he loses his balance without much warning. He uses his walker most days but tries to avoid it on "good days". We discussed the danger of waiting for a fall to determine whether a day is good or bad.      "He denies arm pain and weakness. Denies neck pain. Endorses a several year history of numbness in the first 3 fingers of his right hand. More recently he has been dropping items from his hands, most notably his cigarettes if he is not paying attention. He has chronic urge urinary incontinence. As far as he can tell, he is emptying his bladder when he " "uses the restroom. Newer onset of constipation.      "Chronic everyday smoker. Takes ASA81 and plavix daily.   Diabetic, CKD3  CAD s/p CABG (1999)  PAD"     As of 5/17/21, the patient is referred to see me (after having seen Jennifer Kaur PA-C as above) as a referral for possible NPH per Thuy Perez PA-C. The patient reports that as of a year ago, he was able to climb in and out of 18-wheelers without issue; he has had a relatively rapid decline over that time. He now has urinary incontinence because he cannot make it to the bathroom in time (improved with oxybutynin). He is falling frequently, about once or twice a week.     He has had multiple epidurals, but these have not been of significant help. He has been falling frequently, most recently Wednesday.      He has had some confusion, but not too bad. More trouble with short-term memory. Usually ok once he "gathers his thoughts"     He lives with youngest daughter, but it is an older daughter who is with him today and brings him to appointments.     No bleeding, infection, or anesthetic complications with any of his previous surgeries.     MRI of the brain is personally reviewed and demonstrates ventriculomegaly with mild transependymal flow.     Of note, he is completely uninterested in smoking cessation     As of 5/27/21, the patient underwent 30cc lumbar puncture yesterday. PT evaluation showed objective improvement in 3 out of 4 measures yesterday. His daughter reports that he was also more talkative than usual after the LP. He remains off of ASA and Plavix.     As of 6/24/21, the patient underwent placement of  shunt on 5/31/21. Etelvina reports that he has been cognitively much improved since the shunt was placed; however, since Saturday, he has had significant daily headache and vomiting. CT head and shunt series obtained prior to today's appointment are personally reviewed and demonstrate interval improvement in 3rd ventricular size without " "apparent overlying hygroma or hematoma. No ileus on shunt series. He has been having regular BM, he has not been smoking. Etelvina and Mr. Yanez do endorse unintentional weight loss over the past few months. His walls has been DCed.     As of 7/1/21, the patient reports that his nausea has completely resolved since reprogramming. He has been discharged home. His daughter is pleased with how he is doing, but thinks that his mental status is not quite as brisk as it was when the shunt was draining more. He also has what appears to be cellulitis of the right great toe. He is scheduled to see podiatry next week.     As of 9/20/21, the patient's daughter reports that the dizziness and emesis were present well before the shunt and have not significantly worsened since. They have pursued workup with multiple ENT without significant improvement. His daughter feels that he was doing much better when the shunt was draining. He has worse bruising on his arms today. He has also obviously lost weight. They are planning to see the dermatologist.     His CT head does not demonstrate any concern for blood or extraaxial collection     As of 11/18/21, the patient reports that he had a 50% improvement in symptoms, though he has had some worsening again over the past 2 weeks.     He still has decreased appetite and fluid. The dizziness was coming from his "bathroom" medications and has resolved since stopping them.     He is currently in the process of getting hearing aids s/p ruptured eardrum.     As of 12/30/21, the patient has been having rapid hearing loss. Over 2 months, he has had significant loss in hearing. He is accompanied today by his daughter Norah, who wears hearing aids herself.     He has gained about 2 lbs.     As of 2/3/22, the patient is now using a hearing aid, which is helpful. There has been no other etiology identified for his hearing loss. He had some nausea (attributed to stomach virus) and coughing during " the week. He is reportedly covid negative and eager to return to work.     He feels well enough to try shunt adjustment today.       Review of patient's allergies indicates:   Allergen Reactions    Demerol [meperidine] Nausea Only       Current Outpatient Medications   Medication Sig Dispense Refill    ACCU-CHEK JOAQUIN PLUS METER Misc Three times a day with meals  0    ACCU-CHEK SOFTCLIX LANCETS Misc Check tid 200 each 12    acetaminophen (TYLENOL) 325 MG tablet Take 2 tablets (650 mg total) by mouth every 4 (four) hours as needed for Pain.  0    albuterol-ipratropium (DUO-NEB) 2.5 mg-0.5 mg/3 mL nebulizer solution Take 3 mLs by nebulization every 4 (four) hours as needed for Wheezing. 9 mL 12    alendronate (FOSAMAX) 70 MG tablet Take 1 tablet (70 mg total) by mouth every 7 days. Patient takes on Tuesdays 4 tablet 11    alendronate (FOSAMAX) 70 MG tablet Take 1 tablet (70 mg total) by mouth every 7 days. Patient takes on Tuesdays 4 tablet 11    ascorbic acid, vitamin C, (VITAMIN C) 250 MG tablet Take 1 tablet (250 mg total) by mouth once daily. 120 tablet 0    aspirin (ECOTRIN) 81 MG EC tablet Take 1 tablet (81 mg total) by mouth once daily.  0    blood sugar diagnostic (TRUE METRIX GLUCOSE TEST STRIP) Strp  strip 12    blood sugar diagnostic (TRUE METRIX GLUCOSE TEST STRIP) Strp  each 12    ciclopirox (PENLAC) 8 % Soln Apply topically nightly. 6.6 mL 11    clopidogreL (PLAVIX) 75 mg tablet Take 1 tablet (75 mg total) by mouth once daily. 30 tablet 12    diazePAM (VALIUM) 5 MG tablet Take 1 tablet (5 mg total) by mouth every 8 (eight) hours as needed. 270 tablet 5    heparin sodium,porcine (HEPARIN, PORCINE,) 5,000 unit/mL injection Inject 1 mL (5,000 Units total) into the skin every 8 (eight) hours.      insulin aspart U-100 (NOVOLOG) 100 unit/mL (3 mL) InPn pen Inject 0-5 Units into the skin before meals and at bedtime as needed (Hyperglycemia).  0    magnesium sulfate in water  (MAGNESIUM SULFATE 2 GRAM/50 ML) 2 gram/50 mL PgBk       meclizine (ANTIVERT) 25 mg tablet Take 1 tablet (25 mg total) by mouth 3 (three) times daily as needed for Dizziness (or at least one HS for 1 week when vertigo active). 30 tablet 12    metFORMIN (GLUCOPHAGE-XR) 750 MG ER 24hr tablet TAKE 1 TABLET TWICE DAILY WITH MEALS 180 tablet 0    methocarbamoL (ROBAXIN) 500 MG Tab TAKE 1 TABLET BY MOUTH 3 (THREE) TIMES DAILY AS NEEDED (SPASM). 40 tablet 1    metoprolol succinate (TOPROL-XL) 25 MG 24 hr tablet       nicotine (NICODERM CQ) 14 mg/24 hr Place 1 patch onto the skin once daily.  0    OMNIPAQUE 300 300 mg iodine/mL injection       OMNIPAQUE 350 350 mg iodine/mL Soln injection       ondansetron (ZOFRAN-ODT) 4 MG TbDL Take 2 tablets (8 mg total) by mouth every 8 (eight) hours as needed (nausea). 90 tablet 0    oxybutynin (DITROPAN XL) 15 MG TR24 Take 1 tablet (15 mg total) by mouth once daily. Take 5 mg by mouth once daily. 90 tablet 90    prochlorperazine (COMPAZINE) 5 MG tablet       rosuvastatin (CRESTOR) 20 MG tablet Take 1 tablet (20 mg total) by mouth every evening. (Patient taking differently: Take 20 mg by mouth once daily.) 90 tablet 12    senna (SENOKOT) 8.6 mg tablet Take 1 tablet by mouth once daily.      tamsulosin (FLOMAX) 0.4 mg Cap Take 1 capsule (0.4 mg total) by mouth once daily. 90 capsule 11     No current facility-administered medications for this visit.       Past Medical History:   Diagnosis Date    Actinic keratosis     Anxiety     B12 deficiency 12/8/2014    Dx 6/14    Cancer     skin    Cataract     Coronary artery disease     Diabetes mellitus type II     Diabetes mellitus with peripheral circulatory disorder 6/18/2014    ED (erectile dysfunction)     Hyperlipidemia     Kidney stone     Peripheral vascular disease     Spondylosis 3/29/2019    Tobacco dependence     Urinary incontinence 5/4/2021     Past Surgical History:   Procedure Laterality Date     APPENDECTOMY      CARDIAC SURGERY  01/1999    CABG 4 vessels    CATARACT EXTRACTION      EPIDURAL STEROID INJECTION Right 8/26/2020    Procedure: Injection, Steroid, Epidural Transforaminal;  Surgeon: Wiley Crane Jr., MD;  Location: [facility] ENDO;  Service: Pain Management;  Laterality: Right;  Right L5 + S1 TF LEAH  Arrive @ 1115; ASA & Plavix last 8/18; Check BG; Rapid COVID test    EPIDURAL STEROID INJECTION Right 11/25/2020    Procedure: Injection, Steroid, Epidural Transformainal;  Surgeon: Wiley Crane Jr., MD;  Location: [facility] ENDO;  Service: Pain Management;  Laterality: Right;  Right L5 + S1 TF LEAH  Arrive @ 1245; ASA and Plavix last 11/17; Pre-DM; Needs MD Sign.    EPIDURAL STEROID INJECTION Right 2/19/2021    Procedure: Injection, Steroid, Epidural Transforaminal;  Surgeon: Wiley Crane Jr., MD;  Location: [facility] ENDO;  Service: Pain Management;  Laterality: Right;  Right L5 + S1 TF LEAH  Arrive @ 1115; ASA & Plavix last 2/11; Check BG; Needs Consent    EXTERNAL EAR SURGERY      EYE SURGERY      cataracts    ILIAC ARTERY STENT Bilateral 06/2003    also Right External Iliac stent     Family History     Problem Relation (Age of Onset)    Stroke Mother        Social History     Socioeconomic History    Marital status:    Tobacco Use    Smoking status: Former Smoker     Packs/day: 1.50     Years: 71.00     Pack years: 106.50     Types: Cigarettes    Smokeless tobacco: Former User   Substance and Sexual Activity    Alcohol use: No    Drug use: No    Sexual activity: Not Currently     Partners: Female   Social History Narrative    .  4 children.  Smokes 2 ppd.  Still drives trucks for entertainment industry.        Review of Systems    Constitutional: Negative for chills, fever and malaise/fatigue.   HENT: Negative for hearing loss.    Eyes: Negative for blurred vision and double vision.   Respiratory: Negative for cough, shortness of breath and stridor.    Cardiovascular:  Negative for chest pain and leg swelling.   Gastrointestinal: Negative for constipation, diarrhea. Positive for emesis.    Genitourinary: Positive for urgency. Negative for frequency.   Musculoskeletal: Positive for falls.   Skin: Negative for itching and rash.   Neurological: Negative for dizziness, tremors, loss of consciousness and weakness.   Psychiatric/Behavioral: Negative for hallucinations and memory loss.         OBJECTIVE:     Vital Signs     There is no height or weight on file to calculate BMI.    General: well developed, well nourished, no distress.   Head: normocephalic, atraumatic  Neurologic: Alert and oriented. Thought content appropriate.  GCS: Motor: 6/Verbal: 5/Eyes: 4 GCS Total: 15  Mental Status: Awake, Alert, Oriented x 4  Language: No aphasia  Speech: No dysarthria  Cranial nerves: face symmetric, tongue midline, CN II-XII grossly intact.   Eyes: pupils equal, round, reactive to light with accommodation, EOMI.   Pulmonary: normal respirations, no signs of respiratory distress  Abdomen: soft, non-distended, not tender to palpation. Abdominal incisions well healed.  Skin: Skin is warm, dry and intact.  Sensory: intact to light touch throughout   Motor Strength: Moves all extremities spontaneously with good tone.  Full strength upper and lower extremities. No abnormal movements seen.    Strength   Deltoids Triceps Biceps Wrist Extension Wrist Flexion Hand    Upper: R 5/5 5/5 5/5 5/5 5/5 5/5     L 5/5 5/5 5/5 5/5 5/5 5/5       Iliopsoas Quadriceps Knee  Flexion Tibialis  anterior Gastro- cnemius EHL   Lower: R 5/5 5/5 5/5 5/5 5/5 5/5     L 5/5 5/5 5/5 5/5 5/5 5/5         Cerebellar:   Finger-to-nose: intact bilaterally   Pronator drift: absent bilaterally  Cranial incision: well healed, no signs of erythema, edema, or drainage. No underlying fluid collection palpable.       Diagnostic Results:  Imaging reviewed   CT head stable     Component 1 mo ago   CSF CULTURE No Growth    Gram Stain  "Result Cytospin indicates:    Gram Stain Result No WBC's    Gram Stain Result No organisms seen      ASSESSMENT/PLAN:     Narciso Yanez is a 86 y.o. male who presents as a referral from Thuy Perez for consideration of possible NPH. He is now s/p  shunt on 5/31/21. His daughter was pleased with the "remarkable" improvement in his cognition. His nausea and vomiting have now resolved since changing medications.     I would like to dial him down to 140 today. We will check a skull xray afterwards to ensure that it is at the correct setting. I will plan to see him back in 6-8 weeks with repeat CT head to assess dialing him down further at that time.     I have encouraged them to contact the clinic in interim with any questions, concerns, or adverse clinical change.                     "

## 2022-02-28 ENCOUNTER — LAB VISIT (OUTPATIENT)
Dept: LAB | Facility: HOSPITAL | Age: 87
End: 2022-02-28
Payer: MEDICARE

## 2022-02-28 DIAGNOSIS — E11.65 UNCONTROLLED TYPE 2 DIABETES MELLITUS WITH HYPERGLYCEMIA: ICD-10-CM

## 2022-02-28 DIAGNOSIS — E78.6 HIGH-DENSITY LIPOPROTEIN DEFICIENCY: ICD-10-CM

## 2022-02-28 DIAGNOSIS — E53.8 B12 DEFICIENCY: ICD-10-CM

## 2022-02-28 DIAGNOSIS — R63.4 UNINTENTIONAL WEIGHT LOSS: ICD-10-CM

## 2022-02-28 DIAGNOSIS — D64.9 ANEMIA, UNSPECIFIED TYPE: ICD-10-CM

## 2022-02-28 DIAGNOSIS — I25.810 CORONARY ARTERY DISEASE INVOLVING CORONARY BYPASS GRAFT OF NATIVE HEART WITHOUT ANGINA PECTORIS: ICD-10-CM

## 2022-02-28 DIAGNOSIS — E78.5 HYPERLIPIDEMIA, UNSPECIFIED HYPERLIPIDEMIA TYPE: ICD-10-CM

## 2022-02-28 DIAGNOSIS — N18.31 STAGE 3A CHRONIC KIDNEY DISEASE: ICD-10-CM

## 2022-02-28 LAB
ALBUMIN SERPL BCP-MCNC: 3.8 G/DL (ref 3.5–5.2)
ALP SERPL-CCNC: 71 U/L (ref 55–135)
ALT SERPL W/O P-5'-P-CCNC: 22 U/L (ref 10–44)
ANION GAP SERPL CALC-SCNC: 14 MMOL/L (ref 8–16)
AST SERPL-CCNC: 25 U/L (ref 10–40)
BASOPHILS # BLD AUTO: 0.04 K/UL (ref 0–0.2)
BASOPHILS NFR BLD: 0.4 % (ref 0–1.9)
BILIRUB SERPL-MCNC: 0.4 MG/DL (ref 0.1–1)
BUN SERPL-MCNC: 32 MG/DL (ref 8–23)
CALCIUM SERPL-MCNC: 10 MG/DL (ref 8.7–10.5)
CHLORIDE SERPL-SCNC: 102 MMOL/L (ref 95–110)
CHOLEST SERPL-MCNC: 123 MG/DL (ref 120–199)
CHOLEST/HDLC SERPL: 2.5 {RATIO} (ref 2–5)
CO2 SERPL-SCNC: 22 MMOL/L (ref 23–29)
CREAT SERPL-MCNC: 1.5 MG/DL (ref 0.5–1.4)
CRP SERPL-MCNC: 5.2 MG/L (ref 0–8.2)
DIFFERENTIAL METHOD: ABNORMAL
EOSINOPHIL # BLD AUTO: 0.2 K/UL (ref 0–0.5)
EOSINOPHIL NFR BLD: 2.1 % (ref 0–8)
ERYTHROCYTE [DISTWIDTH] IN BLOOD BY AUTOMATED COUNT: 15.9 % (ref 11.5–14.5)
ERYTHROCYTE [SEDIMENTATION RATE] IN BLOOD BY WESTERGREN METHOD: 42 MM/HR (ref 0–23)
EST. GFR  (AFRICAN AMERICAN): 48.7 ML/MIN/1.73 M^2
EST. GFR  (NON AFRICAN AMERICAN): 42.1 ML/MIN/1.73 M^2
ESTIMATED AVG GLUCOSE: 143 MG/DL (ref 68–131)
GLUCOSE SERPL-MCNC: 124 MG/DL (ref 70–110)
HBA1C MFR BLD: 6.6 % (ref 4–5.6)
HCT VFR BLD AUTO: 38.8 % (ref 40–54)
HDLC SERPL-MCNC: 49 MG/DL (ref 40–75)
HDLC SERPL: 39.8 % (ref 20–50)
HGB BLD-MCNC: 12.3 G/DL (ref 14–18)
IMM GRANULOCYTES # BLD AUTO: 0.04 K/UL (ref 0–0.04)
IMM GRANULOCYTES NFR BLD AUTO: 0.4 % (ref 0–0.5)
IRON SERPL-MCNC: 59 UG/DL (ref 45–160)
LDLC SERPL CALC-MCNC: 54.8 MG/DL (ref 63–159)
LYMPHOCYTES # BLD AUTO: 1.6 K/UL (ref 1–4.8)
LYMPHOCYTES NFR BLD: 14.2 % (ref 18–48)
MCH RBC QN AUTO: 30.3 PG (ref 27–31)
MCHC RBC AUTO-ENTMCNC: 31.7 G/DL (ref 32–36)
MCV RBC AUTO: 96 FL (ref 82–98)
MONOCYTES # BLD AUTO: 0.9 K/UL (ref 0.3–1)
MONOCYTES NFR BLD: 7.7 % (ref 4–15)
NEUTROPHILS # BLD AUTO: 8.5 K/UL (ref 1.8–7.7)
NEUTROPHILS NFR BLD: 75.2 % (ref 38–73)
NONHDLC SERPL-MCNC: 74 MG/DL
NRBC BLD-RTO: 0 /100 WBC
PLATELET # BLD AUTO: 306 K/UL (ref 150–450)
PMV BLD AUTO: 10.1 FL (ref 9.2–12.9)
POTASSIUM SERPL-SCNC: 4.9 MMOL/L (ref 3.5–5.1)
PROT SERPL-MCNC: 8.5 G/DL (ref 6–8.4)
RBC # BLD AUTO: 4.06 M/UL (ref 4.6–6.2)
SATURATED IRON: 12 % (ref 20–50)
SODIUM SERPL-SCNC: 138 MMOL/L (ref 136–145)
TOTAL IRON BINDING CAPACITY: 475 UG/DL (ref 250–450)
TRANSFERRIN SERPL-MCNC: 321 MG/DL (ref 200–375)
TRIGL SERPL-MCNC: 96 MG/DL (ref 30–150)
VIT B12 SERPL-MCNC: >2000 PG/ML (ref 210–950)
WBC # BLD AUTO: 11.34 K/UL (ref 3.9–12.7)

## 2022-02-28 PROCEDURE — 84466 ASSAY OF TRANSFERRIN: CPT | Mod: HCNC | Performed by: NURSE PRACTITIONER

## 2022-02-28 PROCEDURE — 85025 COMPLETE CBC W/AUTO DIFF WBC: CPT | Mod: HCNC | Performed by: NURSE PRACTITIONER

## 2022-02-28 PROCEDURE — 85652 RBC SED RATE AUTOMATED: CPT | Mod: HCNC | Performed by: NURSE PRACTITIONER

## 2022-02-28 PROCEDURE — 80061 LIPID PANEL: CPT | Mod: HCNC | Performed by: NURSE PRACTITIONER

## 2022-02-28 PROCEDURE — 36415 COLL VENOUS BLD VENIPUNCTURE: CPT | Mod: HCNC,PO | Performed by: NURSE PRACTITIONER

## 2022-02-28 PROCEDURE — 86140 C-REACTIVE PROTEIN: CPT | Mod: HCNC | Performed by: NURSE PRACTITIONER

## 2022-02-28 PROCEDURE — 80053 COMPREHEN METABOLIC PANEL: CPT | Mod: HCNC | Performed by: NURSE PRACTITIONER

## 2022-02-28 PROCEDURE — 83036 HEMOGLOBIN GLYCOSYLATED A1C: CPT | Mod: HCNC | Performed by: NURSE PRACTITIONER

## 2022-02-28 PROCEDURE — 82607 VITAMIN B-12: CPT | Mod: HCNC | Performed by: NURSE PRACTITIONER

## 2022-02-28 NOTE — TELEPHONE ENCOUNTER
No new care gaps identified.  Powered by [employer] by Triage. Reference number: [id number].   2/28/2022 4:44:39 PM CST

## 2022-03-03 RX ORDER — METFORMIN HYDROCHLORIDE 750 MG/1
TABLET, EXTENDED RELEASE ORAL
Qty: 180 TABLET | Refills: 12 | Status: SHIPPED | OUTPATIENT
Start: 2022-03-03 | End: 2023-04-25

## 2022-03-04 ENCOUNTER — OFFICE VISIT (OUTPATIENT)
Dept: FAMILY MEDICINE | Facility: CLINIC | Age: 87
End: 2022-03-04
Payer: MEDICARE

## 2022-03-04 VITALS
TEMPERATURE: 98 F | SYSTOLIC BLOOD PRESSURE: 110 MMHG | HEIGHT: 67 IN | WEIGHT: 135.13 LBS | HEART RATE: 92 BPM | BODY MASS INDEX: 21.21 KG/M2 | DIASTOLIC BLOOD PRESSURE: 62 MMHG | OXYGEN SATURATION: 97 %

## 2022-03-04 DIAGNOSIS — J44.9 CHRONIC OBSTRUCTIVE PULMONARY DISEASE, UNSPECIFIED COPD TYPE: ICD-10-CM

## 2022-03-04 DIAGNOSIS — N18.31 STAGE 3A CHRONIC KIDNEY DISEASE: ICD-10-CM

## 2022-03-04 DIAGNOSIS — E53.8 B12 DEFICIENCY: ICD-10-CM

## 2022-03-04 DIAGNOSIS — G91.2 NORMAL PRESSURE HYDROCEPHALUS: ICD-10-CM

## 2022-03-04 DIAGNOSIS — R53.81 DEBILITY: ICD-10-CM

## 2022-03-04 DIAGNOSIS — E78.5 HYPERLIPIDEMIA, UNSPECIFIED HYPERLIPIDEMIA TYPE: ICD-10-CM

## 2022-03-04 DIAGNOSIS — I70.0 AORTIC ATHEROSCLEROSIS: ICD-10-CM

## 2022-03-04 DIAGNOSIS — I25.810 CORONARY ARTERY DISEASE INVOLVING CORONARY BYPASS GRAFT OF NATIVE HEART WITHOUT ANGINA PECTORIS: ICD-10-CM

## 2022-03-04 DIAGNOSIS — E11.65 UNCONTROLLED TYPE 2 DIABETES MELLITUS WITH HYPERGLYCEMIA: ICD-10-CM

## 2022-03-04 DIAGNOSIS — E11.51 DIABETES MELLITUS WITH PERIPHERAL CIRCULATORY DISORDER: ICD-10-CM

## 2022-03-04 DIAGNOSIS — E78.6 HIGH-DENSITY LIPOPROTEIN DEFICIENCY: ICD-10-CM

## 2022-03-04 DIAGNOSIS — I71.40 ABDOMINAL AORTIC ANEURYSM (AAA) WITHOUT RUPTURE: ICD-10-CM

## 2022-03-04 DIAGNOSIS — D64.9 ANEMIA, UNSPECIFIED TYPE: ICD-10-CM

## 2022-03-04 DIAGNOSIS — R42 VERTIGO: ICD-10-CM

## 2022-03-04 DIAGNOSIS — Z00.00 ROUTINE MEDICAL EXAM: Primary | ICD-10-CM

## 2022-03-04 DIAGNOSIS — M35.3 POLYMYALGIA RHEUMATICA: ICD-10-CM

## 2022-03-04 DIAGNOSIS — I77.1 TORTUOUS AORTA: ICD-10-CM

## 2022-03-04 DIAGNOSIS — F39 MOOD DISORDER: ICD-10-CM

## 2022-03-04 DIAGNOSIS — N62 GYNECOMASTIA, MALE: ICD-10-CM

## 2022-03-04 DIAGNOSIS — I10 ESSENTIAL HYPERTENSION: ICD-10-CM

## 2022-03-04 DIAGNOSIS — I73.9 PAD (PERIPHERAL ARTERY DISEASE): ICD-10-CM

## 2022-03-04 DIAGNOSIS — J41.0 SMOKERS' COUGH: ICD-10-CM

## 2022-03-04 DIAGNOSIS — R91.1 PULMONARY NODULE: ICD-10-CM

## 2022-03-04 PROCEDURE — 3074F SYST BP LT 130 MM HG: CPT | Mod: HCNC,CPTII,S$GLB, | Performed by: INTERNAL MEDICINE

## 2022-03-04 PROCEDURE — 1126F AMNT PAIN NOTED NONE PRSNT: CPT | Mod: HCNC,CPTII,S$GLB, | Performed by: INTERNAL MEDICINE

## 2022-03-04 PROCEDURE — 1159F PR MEDICATION LIST DOCUMENTED IN MEDICAL RECORD: ICD-10-PCS | Mod: HCNC,CPTII,S$GLB, | Performed by: INTERNAL MEDICINE

## 2022-03-04 PROCEDURE — 99214 OFFICE O/P EST MOD 30 MIN: CPT | Mod: 25,HCNC,S$GLB, | Performed by: INTERNAL MEDICINE

## 2022-03-04 PROCEDURE — 1100F PR PT FALLS ASSESS DOC 2+ FALLS/FALL W/INJURY/YR: ICD-10-PCS | Mod: HCNC,CPTII,S$GLB, | Performed by: INTERNAL MEDICINE

## 2022-03-04 PROCEDURE — 3078F DIAST BP <80 MM HG: CPT | Mod: HCNC,CPTII,S$GLB, | Performed by: INTERNAL MEDICINE

## 2022-03-04 PROCEDURE — 99214 PR OFFICE/OUTPT VISIT, EST, LEVL IV, 30-39 MIN: ICD-10-PCS | Mod: 25,HCNC,S$GLB, | Performed by: INTERNAL MEDICINE

## 2022-03-04 PROCEDURE — 3078F PR MOST RECENT DIASTOLIC BLOOD PRESSURE < 80 MM HG: ICD-10-PCS | Mod: HCNC,CPTII,S$GLB, | Performed by: INTERNAL MEDICINE

## 2022-03-04 PROCEDURE — 1126F PR PAIN SEVERITY QUANTIFIED, NO PAIN PRESENT: ICD-10-PCS | Mod: HCNC,CPTII,S$GLB, | Performed by: INTERNAL MEDICINE

## 2022-03-04 PROCEDURE — 99499 RISK ADDL DX/OHS AUDIT: ICD-10-PCS | Mod: S$GLB,,, | Performed by: INTERNAL MEDICINE

## 2022-03-04 PROCEDURE — 3288F PR FALLS RISK ASSESSMENT DOCUMENTED: ICD-10-PCS | Mod: HCNC,CPTII,S$GLB, | Performed by: INTERNAL MEDICINE

## 2022-03-04 PROCEDURE — 99397 PER PM REEVAL EST PAT 65+ YR: CPT | Mod: 25,HCNC,S$GLB, | Performed by: INTERNAL MEDICINE

## 2022-03-04 PROCEDURE — 3074F PR MOST RECENT SYSTOLIC BLOOD PRESSURE < 130 MM HG: ICD-10-PCS | Mod: HCNC,CPTII,S$GLB, | Performed by: INTERNAL MEDICINE

## 2022-03-04 PROCEDURE — 1159F MED LIST DOCD IN RCRD: CPT | Mod: HCNC,CPTII,S$GLB, | Performed by: INTERNAL MEDICINE

## 2022-03-04 PROCEDURE — 99499 UNLISTED E&M SERVICE: CPT | Mod: S$GLB,,, | Performed by: INTERNAL MEDICINE

## 2022-03-04 PROCEDURE — 99397 PR PREVENTIVE VISIT,EST,65 & OVER: ICD-10-PCS | Mod: 25,HCNC,S$GLB, | Performed by: INTERNAL MEDICINE

## 2022-03-04 PROCEDURE — 1100F PTFALLS ASSESS-DOCD GE2>/YR: CPT | Mod: HCNC,CPTII,S$GLB, | Performed by: INTERNAL MEDICINE

## 2022-03-04 PROCEDURE — 99999 PR PBB SHADOW E&M-EST. PATIENT-LVL III: CPT | Mod: PBBFAC,HCNC,, | Performed by: INTERNAL MEDICINE

## 2022-03-04 PROCEDURE — 3288F FALL RISK ASSESSMENT DOCD: CPT | Mod: HCNC,CPTII,S$GLB, | Performed by: INTERNAL MEDICINE

## 2022-03-04 PROCEDURE — 99999 PR PBB SHADOW E&M-EST. PATIENT-LVL III: ICD-10-PCS | Mod: PBBFAC,HCNC,, | Performed by: INTERNAL MEDICINE

## 2022-03-04 RX ORDER — DIAZEPAM 5 MG/1
5 TABLET ORAL EVERY 8 HOURS PRN
Qty: 270 TABLET | Refills: 5 | Status: SHIPPED | OUTPATIENT
Start: 2022-03-04 | End: 2022-03-18 | Stop reason: SDUPTHER

## 2022-03-04 RX ORDER — ONDANSETRON 8 MG/1
TABLET, ORALLY DISINTEGRATING ORAL
Status: ON HOLD | COMMUNITY
Start: 2021-11-12 | End: 2023-10-11 | Stop reason: HOSPADM

## 2022-03-04 NOTE — PROGRESS NOTES
Chief complaint  physical exam    Patient is an 86-year-old white male here for his physical exam.  He is no plans to quit smoking.  Labs reviewed.  No interest in pursuing any further colon cancer screening.      ROS:   CONST: weight stable. EYES: no vision change. ENT: no sore throat. CV: no chest pain w/ exertion. RESP: no shortness of breath. GI: no nausea, vomiting, diarrhea. No dysphagia. : no other urinary issues. MUSCULOSKELETAL: no new myalgias or arthralgias. SKIN: no new changes. NEURO: no focal deficits. PSYCH: no new issues. ENDOCRINE: no polyuria. HEME: no lymph nodes. ALLERGY: no general pruritis.    PAST MEDICAL HISTORY:                                                        1. Hyperlipidemia.                                                           2. Coronary disease, seen prior by Dr. Roy, 4-vessel bypass in 1999.   3. Peripheral vascular disease, stented in both legs 2002 and been on Plavix since.                                                                4. Kidney stones, last episode January 2007.                                 5. Right renal cyst apparently.  Saw Dr. Labadie and then second opinion at [facility] by a Dr. Shipman, currently going to be followed.                      6. Appendectomy.                                                             7. Actinic keratosis, saw Dr. Ambriz.                                      8. Left facial numbness, probable TIA, admit [facility] June 2007. Carotid ultrasound apparently clear  9. Cataracts  10. Skin cancer  11. HYPERTENSION  12.  Diabetes, uncontrolled, with circulatory disease  13.  Low HDL  14. Declines Colonoscopy  15. Pneumovax after 67 done, Prevnar 13 given 2016  16.  B12 deficiency diagnosed 2014    17    early satiety 2016   18. NPH, shunt 5/21                                                                                     SOCIAL HISTORY:  He smokes 1 pack per day and does not drink.  He was about    52 years but now a .  Retired deputy in the court, 4          children.                                                                                                                                                 FAMILY HISTORY:  Father  at 63 of heart disease and stroke.  Mother       at 86, 4 brothers, 2 sisters living. Brother with strokes.            Labs, cardiac testing, interval clinic notes and MRIs reviewed      Vitals as above  General pleasant, talkative in joking in a wheelchair  Gen: no distress  EYES: conjunctiva clear, non-icteric, PERRL, ear canals are clear and tympanic membranes described above, no nystagmus  ENT: nose clear, nasal mucosa normal, oropharynx clear and moist, teeth good  NECK:supple, thyroid non-palpable  RESP: effort is good, lungs clear  CV: heart RRR w/o murmur, gallops or rubs; no carotid bruits, no edema  GI: abdomen soft, non-distended, non-tender, no hepatosplenomegaly  MS: gait with rolling walker, no clubbing or cyanosis of the digits  SKIN: no rashes, warm to touch,  Breast:  Symmetrical enlargement of the tissues behind both nipples as a little bit tender.    Labs, x-rays and EKG reviewed    Narciso was seen today for annual exam and dizziness.    Diagnoses and all orders for this visit:    Routine medical exam, no plans to quit although he has reduced from probably three packs per day to less than one pack per day.  He is trying to stay active.  No further prostate or colon cancer screening needed based on his age                                                      Additional evaluation and management issues:    Additionally patient has numerous other medical issues to address separate from his physical.        Patient does have diabetes.  Recent A1c of 6.6 is acceptable      Patient had issues with vertigo.  Thought it might have been his normal pressure hydrocephalus.  The positional vertigo still is intermittent and continues and probably independent  of the hydrocephalus.  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  After about a week he turned to the side then look back to got some vertigo briefly so he went home that day.  He has had another recurrence.  He takes a Valium 5 mg for anxiety.  I discussed and explained him it also can suppress vertigo so he definitely needs to continue to take one in the morning and six or 8 hours later he may need to take another if he feels any vertigo coming back.  Taking it twice a day I do not feel have any problems with addiction and so forth.    Recent creatinine up as of about four days ago.  Discussed increasing fluids and rechecking on Saturday March 12th.  He still trying to work during the week.    He does note somewhat enlargement of the breast right behind the nipples on both sides.  It has been sore for about two months.  He is on no medications that I can see could cause that.  I will check his testosterone level      He has had some decreased vision but nothing acute.  He has had cataracts removed.  We discussed there are various causes for decreased visual acuity over time any needs to see his outside ophthalmologist.  He will do so.    History of constipation but no recurrence    History of significant hearing loss but now benefiting from hearing aids.    .  Evaluation and management of all the separate issues will be based upon medical decision making.    Regarding polymyalgia, he did reduce the dose he believes down to 3 mg but had some increasing pain so increase the dose.  He had issues with available pills and so did go up but up to 10 mg we discussed again reducing downward and then reassessing inflammatory markers in the future and will also recheck diabetes which is uncontrolled and so forth.      Endocrine did discontinue Amaryl secondary to morning low sugars.  His last A1c about six months ago was much improved.  He will be weaning down on the steroids and we discussed watching for rise  in sugar that could indicated adjustment in therapy.        Seen cards for PVD - LACIE etc and had Angio  3/19  Right Lower Arterial YUE is stented.   Right YUE stent velocity origin: proximal: 106, mid: 92, distal: 133   Left Lower Arterial YUE is stented.   Left YUE stent velocity origin: proximal: 150, mid: 158, distal: 175   CFA demonstrates mild (<50%) stenosis.     .As of 5/27/21, the patient underwent 30cc lumbar puncture yesterday. PT evaluation showed objective improvement in 3 out of 4 measures yesterday. His daughter reports that he was also more talkative than usual after the LP. He remains off of ASA and Plavix.      As of 6/24/21, the patient underwent placement of  shunt on 5/31/21. Etelvina reports that he has been cognitively much improved since the shunt was placed; however, since Saturday, he has had significant daily headache and vomiting. CT head and shunt series obtained prior to today's appointment are personally reviewed and demonstrate interval improvement in 3rd ventricular size without apparent overlying hygroma or hematoma. No ileus on shunt series. He has been having regular BM, he has not been smoking. Etelvina and Mr. Yanez do endorse unintentional weight loss over the past few months. His walls has been DCed.      As of 7/1/21, the patient reports that his nausea has completely resolved since reprogramming. He has been discharged home. His daughter is pleased with how he is doing, but thinks that his mental status is not quite as brisk as it was when the shunt was draining more. He also has what appears to be cellulitis of the right great toe. He is scheduled to see podiatry next week.      As of 9/20/21, the patient's daughter reports that the dizziness and emesis were present well before the shunt and have not significantly worsened since. They have pursued workup with multiple ENT without significant improvement. His daughter feels that he was doing much better when the shunt was  "draining. He has worse bruising on his arms today. He has also obviously lost weight. They are planning to see the dermatologist.      His CT head does not demonstrate any concern for blood or extraaxial collection      As of 11/18/21, the patient reports that he had a 50% improvement in symptoms, though he has had some worsening again over the past 2 weeks.      He still has decreased appetite and fluid. The dizziness was coming from his "bathroom" medications and has resolved since stopping them.      He is currently in the process of getting hearing aids s/p ruptured eardrum.      As of 12/30/21, the patient has been having rapid hearing loss. Over 2 months, he has had significant loss in hearing. He is accompanied today by his daughter Norah, who wears hearing aids herself.      He has gained about 2 lbs.      As of 2/3/22, the patient is now using a hearing aid, which is helpful. There has been no other etiology identified for his hearing loss. He had some nausea (attributed to stomach virus) and coughing during the week. He is reportedly covid negative and eager to return to work. 1       I did point out to him that his B12 level  was low years ago and it does look like he took a supplement and the B12 level did rise.  He was not aware that her could remember that however and so has currently not been on a B12 supplement.  Level ok 9/18.  He did have iron deficiency in his stool testing was negative.         CT 9/19  Impression       Nodule from 03/18/2019 has increased in size, previously 6 mm now measuring 7 mm.  Malignancy not excluded.  For a solid nodule 6-8 mm, Fleischner Society 2017 guidelines recommend follow up with non-contrast chest CT at 6-12 months and 18-24 months after discovery.    Cholelithiasis.    Prominence of interstitial markings with mild bronchiectasis.     CT 3/21  Unchanged right lower lobe pulmonary nodule since 03/18/2019 consistent with a benign nodule.   Abnormal subpleural " fibrotic lines, centrilobular emphysema and traction bronchiectasis concerning for underlying chronic interstitial lung disease.  It is not significantly changed.                  Assessment and plan:           Uncontrolled type 2 diabetes mellitus with hyperglycemia, acceptable    Stage 3a chronic kidney disease, slightly worse, increase fluids and recheck on 03/12  -     Basic Metabolic Panel; Future    Essential hypertension, chronic and stable    Normal pressure hydrocephalus, apparently appears to be improving after shunting    Anemia, unspecified type , chronic and stable    Hyperlipidemia, unspecified hyperlipidemia type    Coronary artery disease involving coronary bypass graft of native heart without angina pectoris, no angina    High-density lipoprotein deficiency    B12 deficiency    Polymyalgia rheumatica, appears stable    Chronic obstructive pulmonary disease, unspecified COPD type, needs a nebulizer but has medication  -     NEBULIZER FOR HOME USE    Vertigo, still positional and intermittent, try suppressing with regular dosing of the Valium which will also help with anxiety    Abdominal aortic aneurysm (AAA) without rupture    PAD (peripheral artery disease)    Tortuous aorta    Aortic atherosclerosis    Mood disorder, still requiring Valium for anxiety    Diabetes mellitus with peripheral circulatory disorder    Smokers' cough, chronic and stable    Pulmonary nodule    Debility    Gynecomastia, male, new issue, check testosterone  -     Testosterone; Future    Other orders  -     diazePAM (VALIUM) 5 MG tablet; Take 1 tablet (5 mg total) by mouth every 8 (eight) hours as needed.

## 2022-03-12 ENCOUNTER — LAB VISIT (OUTPATIENT)
Dept: LAB | Facility: HOSPITAL | Age: 87
End: 2022-03-12
Attending: INTERNAL MEDICINE
Payer: MEDICARE

## 2022-03-12 DIAGNOSIS — N18.31 STAGE 3A CHRONIC KIDNEY DISEASE: ICD-10-CM

## 2022-03-12 DIAGNOSIS — N62 GYNECOMASTIA, MALE: ICD-10-CM

## 2022-03-12 LAB
ANION GAP SERPL CALC-SCNC: 12 MMOL/L (ref 8–16)
BUN SERPL-MCNC: 32 MG/DL (ref 8–23)
CALCIUM SERPL-MCNC: 9.3 MG/DL (ref 8.7–10.5)
CHLORIDE SERPL-SCNC: 103 MMOL/L (ref 95–110)
CO2 SERPL-SCNC: 22 MMOL/L (ref 23–29)
CREAT SERPL-MCNC: 1.3 MG/DL (ref 0.5–1.4)
EST. GFR  (AFRICAN AMERICAN): 57.9 ML/MIN/1.73 M^2
EST. GFR  (NON AFRICAN AMERICAN): 50.1 ML/MIN/1.73 M^2
GLUCOSE SERPL-MCNC: 147 MG/DL (ref 70–110)
POTASSIUM SERPL-SCNC: 4.9 MMOL/L (ref 3.5–5.1)
SODIUM SERPL-SCNC: 137 MMOL/L (ref 136–145)
TESTOST SERPL-MCNC: 144 NG/DL (ref 304–1227)

## 2022-03-12 PROCEDURE — 84403 ASSAY OF TOTAL TESTOSTERONE: CPT | Mod: HCNC | Performed by: INTERNAL MEDICINE

## 2022-03-12 PROCEDURE — 80048 BASIC METABOLIC PNL TOTAL CA: CPT | Mod: HCNC | Performed by: INTERNAL MEDICINE

## 2022-03-12 PROCEDURE — 36415 COLL VENOUS BLD VENIPUNCTURE: CPT | Mod: HCNC,PO | Performed by: INTERNAL MEDICINE

## 2022-03-16 ENCOUNTER — PATIENT MESSAGE (OUTPATIENT)
Dept: FAMILY MEDICINE | Facility: CLINIC | Age: 87
End: 2022-03-16
Payer: MEDICARE

## 2022-03-16 RX ORDER — TESTOSTERONE CYPIONATE 200 MG/ML
400 INJECTION, SOLUTION INTRAMUSCULAR
Status: SHIPPED | OUTPATIENT
Start: 2022-03-17 | End: 2022-09-01

## 2022-03-18 RX ORDER — DIAZEPAM 5 MG/1
5 TABLET ORAL EVERY 8 HOURS PRN
Qty: 270 TABLET | Refills: 5 | Status: SHIPPED | OUTPATIENT
Start: 2022-03-18 | End: 2022-09-22
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
## 2022-03-18 NOTE — TELEPHONE ENCOUNTER
No new care gaps identified.  Powered by [employer]. Reference number: [id number].   3/18/2022 3:22:28 PM CDT

## 2022-03-21 ENCOUNTER — TELEPHONE (OUTPATIENT)
Dept: FAMILY MEDICINE | Facility: CLINIC | Age: 87
End: 2022-03-21

## 2022-03-21 ENCOUNTER — TELEPHONE (OUTPATIENT)
Dept: FAMILY MEDICINE | Facility: CLINIC | Age: 87
End: 2022-03-21
Payer: MEDICARE

## 2022-03-21 NOTE — TELEPHONE ENCOUNTER
----- Message from Angelita Street sent at 3/21/2022  9:35 AM CDT -----  Regarding: self [phone]  Type: Patient Call Back    Who called: self    What is the request in detail: Patient needs to speak to the office wouldn't give details.     Can the clinic reply by [facility]?    Would the patient rather a call back or a response via [facility]?  Call back    Best call back number: [phone]

## 2022-03-21 NOTE — TELEPHONE ENCOUNTER
No new care gaps identified.  Powered by [employer]. Reference number: [id number].   3/21/2022 12:33:21 PM CDT

## 2022-03-21 NOTE — TELEPHONE ENCOUNTER
No new care gaps identified.  Powered by [employer]. Reference number: [id number].   3/21/2022 4:55:56 PM CDT
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
## 2022-03-22 RX ORDER — DIAZEPAM 5 MG/1
TABLET ORAL
Qty: 1 TABLET | Refills: 0 | OUTPATIENT
Start: 2022-03-22

## 2022-03-22 RX ORDER — METHOCARBAMOL 500 MG/1
TABLET, FILM COATED ORAL
Qty: 40 TABLET | Refills: 1 | Status: SHIPPED | OUTPATIENT
Start: 2022-03-22 | End: 2022-04-04

## 2022-03-22 NOTE — TELEPHONE ENCOUNTER
Please call patient to just clarify his refill of the diazepam/Valium.    Looks like Dr. Del Toro sent to the local pharmacy March 8th.  Did you get it filled there?  If so it is probably good to keep getting it locally.       we got a refill request from mail order but it did not have any of the details.    If patient did not get the prescription local and once it sent to the mail order, please change the pharmacy send his refill back with this message directly to me and not to the refill basket, thanks

## 2022-03-23 ENCOUNTER — HOSPITAL ENCOUNTER (EMERGENCY)
Facility: HOSPITAL | Age: 87
Discharge: HOME | End: 2022-03-23
Attending: EMERGENCY MEDICINE
Payer: MEDICARE

## 2022-03-23 VITALS
BODY MASS INDEX: 20.72 KG/M2 | DIASTOLIC BLOOD PRESSURE: 66 MMHG | WEIGHT: 132 LBS | HEIGHT: 67 IN | HEART RATE: 65 BPM | OXYGEN SATURATION: 97 % | SYSTOLIC BLOOD PRESSURE: 155 MMHG | RESPIRATION RATE: 16 BRPM | TEMPERATURE: 98 F

## 2022-03-23 DIAGNOSIS — R42 DIZZINESS: ICD-10-CM
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
## 2022-03-23 DIAGNOSIS — S51.811A LACERATION OF RIGHT FOREARM, INITIAL ENCOUNTER: Primary | ICD-10-CM

## 2022-03-23 DIAGNOSIS — S09.90XA CLOSED HEAD INJURY: ICD-10-CM

## 2022-03-23 DIAGNOSIS — S09.90XA CLOSED HEAD INJURY, INITIAL ENCOUNTER: ICD-10-CM

## 2022-03-23 LAB
ALBUMIN SERPL BCP-MCNC: 3.6 G/DL (ref 3.5–5.2)
ALP SERPL-CCNC: 85 U/L (ref 55–135)
ALT SERPL W/O P-5'-P-CCNC: 15 U/L (ref 10–44)
ANION GAP SERPL CALC-SCNC: 11 MMOL/L (ref 8–16)
AST SERPL-CCNC: 19 U/L (ref 10–40)
BASOPHILS # BLD AUTO: 0.05 K/UL (ref 0–0.2)
BASOPHILS NFR BLD: 0.4 % (ref 0–1.9)
BILIRUB SERPL-MCNC: 0.2 MG/DL (ref 0.1–1)
BUN SERPL-MCNC: 52 MG/DL (ref 8–23)
CALCIUM SERPL-MCNC: 10.1 MG/DL (ref 8.7–10.5)
CHLORIDE SERPL-SCNC: 104 MMOL/L (ref 95–110)
CO2 SERPL-SCNC: 21 MMOL/L (ref 23–29)
CREAT SERPL-MCNC: 1.3 MG/DL (ref 0.5–1.4)
DIFFERENTIAL METHOD: ABNORMAL
EOSINOPHIL # BLD AUTO: 0.5 K/UL (ref 0–0.5)
EOSINOPHIL NFR BLD: 3.6 % (ref 0–8)
ERYTHROCYTE [DISTWIDTH] IN BLOOD BY AUTOMATED COUNT: 14.9 % (ref 11.5–14.5)
EST. GFR  (AFRICAN AMERICAN): 58 ML/MIN/1.73 M^2
EST. GFR  (NON AFRICAN AMERICAN): 50 ML/MIN/1.73 M^2
GLUCOSE SERPL-MCNC: 128 MG/DL (ref 70–110)
HCT VFR BLD AUTO: 37.2 % (ref 40–54)
HGB BLD-MCNC: 12.1 G/DL (ref 14–18)
IMM GRANULOCYTES # BLD AUTO: 0.05 K/UL (ref 0–0.04)
IMM GRANULOCYTES NFR BLD AUTO: 0.4 % (ref 0–0.5)
LYMPHOCYTES # BLD AUTO: 1.8 K/UL (ref 1–4.8)
LYMPHOCYTES NFR BLD: 13.3 % (ref 18–48)
MCH RBC QN AUTO: 29.8 PG (ref 27–31)
MCHC RBC AUTO-ENTMCNC: 32.5 G/DL (ref 32–36)
MCV RBC AUTO: 92 FL (ref 82–98)
MONOCYTES # BLD AUTO: 1 K/UL (ref 0.3–1)
MONOCYTES NFR BLD: 7.3 % (ref 4–15)
NEUTROPHILS # BLD AUTO: 9.9 K/UL (ref 1.8–7.7)
NEUTROPHILS NFR BLD: 75 % (ref 38–73)
NRBC BLD-RTO: 0 /100 WBC
PLATELET # BLD AUTO: 280 K/UL (ref 150–450)
PMV BLD AUTO: 9.5 FL (ref 9.2–12.9)
POCT GLUCOSE: 135 MG/DL (ref 70–110)
POTASSIUM SERPL-SCNC: 4.7 MMOL/L (ref 3.5–5.1)
PROT SERPL-MCNC: 8.3 G/DL (ref 6–8.4)
RBC # BLD AUTO: 4.06 M/UL (ref 4.6–6.2)
SODIUM SERPL-SCNC: 136 MMOL/L (ref 136–145)
WBC # BLD AUTO: 13.19 K/UL (ref 3.9–12.7)

## 2022-03-23 PROCEDURE — 90715 TDAP VACCINE 7 YRS/> IM: CPT | Performed by: EMERGENCY MEDICINE

## 2022-03-23 PROCEDURE — 12005 RPR S/N/A/GEN/TRK12.6-20.0CM: CPT

## 2022-03-23 PROCEDURE — 93010 ELECTROCARDIOGRAM REPORT: CPT | Mod: ,,, | Performed by: INTERNAL MEDICINE

## 2022-03-23 PROCEDURE — 85025 COMPLETE CBC W/AUTO DIFF WBC: CPT | Performed by: EMERGENCY MEDICINE

## 2022-03-23 PROCEDURE — 90471 IMMUNIZATION ADMIN: CPT | Performed by: EMERGENCY MEDICINE

## 2022-03-23 PROCEDURE — 93005 ELECTROCARDIOGRAM TRACING: CPT

## 2022-03-23 PROCEDURE — 96360 HYDRATION IV INFUSION INIT: CPT | Mod: 59

## 2022-03-23 PROCEDURE — 96361 HYDRATE IV INFUSION ADD-ON: CPT

## 2022-03-23 PROCEDURE — 63600175 PHARM REV CODE 636 W HCPCS: Performed by: EMERGENCY MEDICINE

## 2022-03-23 PROCEDURE — 80053 COMPREHEN METABOLIC PANEL: CPT | Performed by: EMERGENCY MEDICINE

## 2022-03-23 PROCEDURE — 25000003 PHARM REV CODE 250: Performed by: EMERGENCY MEDICINE

## 2022-03-23 PROCEDURE — 93010 EKG 12-LEAD: ICD-10-PCS | Mod: ,,, | Performed by: INTERNAL MEDICINE

## 2022-03-23 PROCEDURE — 99285 EMERGENCY DEPT VISIT HI MDM: CPT | Mod: 25

## 2022-03-23 RX ADMIN — SODIUM CHLORIDE 1000 ML: 0.9 INJECTION, SOLUTION INTRAVENOUS at 08:03

## 2022-03-23 RX ADMIN — NEOMYCIN AND POLYMYXIN B SULFATES AND BACITRACIN ZINC 1 EACH: 400; 3.5; 5 OINTMENT TOPICAL at 12:03

## 2022-03-23 RX ADMIN — TETANUS TOXOID, REDUCED DIPHTHERIA TOXOID AND ACELLULAR PERTUSSIS VACCINE, ADSORBED 0.5 ML: 5; 2.5; 8; 8; 2.5 SUSPENSION INTRAMUSCULAR at 12:03

## 2022-03-23 NOTE — ED PROVIDER NOTES
Encounter Date: 3/23/2022    SCRIBE #1 NOTE: I, Bret Turcios, am scribing for, and in the presence of, Keo Amaral MD.       History     Chief Complaint   Patient presents with    Fall     Patient reports a fall d/t dizziness in the middle of the night. Patient had skin tear to right forearm. Patient states that he did hit the back of his head. Patient's grandson states that patient has been dehydrated lately. Denies headache, vision changes, difficulty speaking, numbness/tingling/ unilateral weakness.     Dizziness     86 y.o. male with past medical history of DM, CAD presenting for laceration to right forearm s/p fall today. Patient reports he was going to the restroom when he started experiencing the room spinning causing him to loose balance and fall back on his head. Patient reports he has been evaluated by many providers for ongoing chronic dizziness. He states he has been having frequent falls secondary to dizziness. He reports the same dizziness today. He reports dizziness has mostly resolved at the moment. He denies chest pain or SOB prior to fall. No fever, vision changes, difficult speaking, numbness, weakness, tingling, neck pain, back pain, or further complaints.    The history is provided by the patient. No  was used.     Review of patient's allergies indicates:   Allergen Reactions    Demerol [meperidine] Nausea Only     Past Medical History:   Diagnosis Date    Actinic keratosis     Anxiety     B12 deficiency 12/8/2014    Dx 6/14    Cancer     skin    Cataract     Coronary artery disease     Diabetes mellitus type II     Diabetes mellitus with peripheral circulatory disorder 6/18/2014    ED (erectile dysfunction)     Hyperlipidemia     Kidney stone     Peripheral vascular disease     Spondylosis 3/29/2019    Tobacco dependence     Urinary incontinence 5/4/2021     Past Surgical History:   Procedure Laterality Date    APPENDECTOMY      CARDIAC SURGERY  01/1999     CABG 4 vessels    CATARACT EXTRACTION      EPIDURAL STEROID INJECTION Right 8/26/2020    Procedure: Injection, Steroid, Epidural Transforaminal;  Surgeon: Wiley Crane Jr., MD;  Location: [facility] ENDO;  Service: Pain Management;  Laterality: Right;  Right L5 + S1 TF LEAH  Arrive @ 1115; ASA & Plavix last 8/18; Check BG; Rapid COVID test    EPIDURAL STEROID INJECTION Right 11/25/2020    Procedure: Injection, Steroid, Epidural Transformainal;  Surgeon: Wiley Crane Jr., MD;  Location: [facility] ENDO;  Service: Pain Management;  Laterality: Right;  Right L5 + S1 TF LEAH  Arrive @ 1245; ASA and Plavix last 11/17; Pre-DM; Needs MD Sign.    EPIDURAL STEROID INJECTION Right 2/19/2021    Procedure: Injection, Steroid, Epidural Transforaminal;  Surgeon: Wiley Crane Jr., MD;  Location: [facility] ENDO;  Service: Pain Management;  Laterality: Right;  Right L5 + S1 TF LEAH  Arrive @ 1115; ASA & Plavix last 2/11; Check BG; Needs Consent    EXTERNAL EAR SURGERY      EYE SURGERY      cataracts    ILIAC ARTERY STENT Bilateral 06/2003    also Right External Iliac stent     Family History   Problem Relation Age of Onset    Stroke Mother      Social History     Tobacco Use    Smoking status: Current Every Day Smoker     Packs/day: 1.50     Years: 71.00     Pack years: 106.50     Types: Cigarettes    Smokeless tobacco: Former User   Substance Use Topics    Alcohol use: No    Drug use: No     Review of Systems   Constitutional: Negative for chills, diaphoresis and fever.   HENT: Negative for ear pain and sore throat.    Eyes: Negative for pain and visual disturbance.   Respiratory: Negative for cough and shortness of breath.    Cardiovascular: Negative for chest pain.   Gastrointestinal: Negative for abdominal pain, diarrhea, nausea and vomiting.   Genitourinary: Negative for dysuria.   Musculoskeletal: Negative for back pain and neck pain.   Skin: Positive for wound. Negative for rash.   Neurological: Negative for  dizziness, speech difficulty, weakness and headaches.        (-)tingling       Physical Exam     Initial Vitals [03/23/22 0809]   BP Pulse Resp Temp SpO2   118/61 80 16 97.7 °F (36.5 °C) 98 %      MAP       --         Physical Exam  The patient was examined specifically for the following:   General:No significant distress, Good color, Warm and dry. Head and neck:Scalp atraumatic, Neck supple. Neurological:Appropriate conversation, Gross motor deficits. Eyes:Conjugate gaze, Clear corneas. ENT: No epistaxis. Cardiac: Regular rate and rhythm, Grossly normal heart tones. Pulmonary: Wheezing, Rales. Gastrointestinal: Abdominal tenderness, Abdominal distention. Musculoskeletal: Extremity deformity, Apparent pain with range of motion of the joints. Skin: Rash.   The findings on examination were normal except for the following:  The patient has a 15 cm skin tear on the right forearm.  There is surrounding ecchymosis.  There is no long bone instability there is no pain with range of motion of the wrist to the elbow.  The lungs are clear.  The heart tones are normal.  The abdomen is soft.  The scalp appears to be atraumatic.  The lungs are clear and free of wheezing rales rubs or rhonchi.  Heart tones are normal.  The patient has regular rate and rhythm.  The abdomen is soft.  Vital signs are stable.  Mental status examination, cranial nerves, motor and sensory examination are normal.   ED Course   Lac Repair    Date/Time: 3/23/2022 3:23 PM  Performed by: Keo Amaral MD  Authorized by: Keo Amaral MD     Consent:     Consent obtained:  Verbal    Consent given by:  Patient  Universal protocol:     Procedure explained and questions answered to patient or proxy's satisfaction: yes      Relevant documents present and verified: yes      Patient identity confirmed:  Verbally with patient  Anesthesia:     Anesthesia method:  Local infiltration  Laceration details:     Location:  Shoulder/arm    Shoulder/arm location:  R  lower arm    Length (cm):  15    Depth (mm):  0.3  Exploration:     Hemostasis achieved with:  Direct pressure    Wound extent: no foreign bodies/material noted    Treatment:     Area cleansed with:  Saline    Amount of cleaning:  Standard    Irrigation solution:  Sterile saline    Debridement:  None  Skin repair:     Repair method:  Staples  Approximation:     Approximation:  Loose  Repair type:     Repair type:  Simple  Post-procedure details:     Dressing:  Antibiotic ointment and non-adherent dressing      Labs Reviewed   COMPREHENSIVE METABOLIC PANEL - Abnormal; Notable for the following components:       Result Value    CO2 21 (*)     Glucose 128 (*)     BUN 52 (*)     eGFR if  58 (*)     eGFR if non  50 (*)     All other components within normal limits   CBC W/ AUTO DIFFERENTIAL - Abnormal; Notable for the following components:    WBC 13.19 (*)     RBC 4.06 (*)     Hemoglobin 12.1 (*)     Hematocrit 37.2 (*)     RDW 14.9 (*)     Gran # (ANC) 9.9 (*)     Immature Grans (Abs) 0.05 (*)     Gran % 75.0 (*)     Lymph % 13.3 (*)     All other components within normal limits   POCT GLUCOSE - Abnormal; Notable for the following components:    POCT Glucose 135 (*)     All other components within normal limits     EKG Readings: (Independently Interpreted)   This patient is in a normal sinus rhythm heart rate of 72.  There are no significant ST segment or T-wave changes.  There is no evidence of acute myocardial infarction or malignant arrhythmia.  Patient has a normal axis.     ECG Results          EKG 12-lead (Final result)  Result time 03/23/22 18:55:54    Final result by Interface, Lab In [facility] (03/23/22 18:55:54)                 Narrative:    Test Reason : R42,    Vent. Rate : 072 BPM     Atrial Rate : 072 BPM     P-R Int : 178 ms          QRS Dur : 094 ms      QT Int : 410 ms       P-R-T Axes : 045 062 056 degrees     QTc Int : 448 ms    Normal sinus rhythm  Normal ECG  When  compared with ECG of 08-JUN-2021 13:50,  No significant change was found  Confirmed by Samuel Calderon MD ([id number]) on 3/23/2022 6:55:45 PM    Referred By: [email or web address]   SELF           Confirmed By:Samuel Calderon MD                            Imaging Results          X-Ray Shunt Series (Final result)  Result time 03/23/22 14:59:05    Final result by Alex Neff MD (03/23/22 14:59:05)                 Impression:      As above.      Electronically signed by: Alex Neff  Date:    03/23/2022  Time:    14:59             Narrative:    EXAMINATION:  XR SHUNT SERIES    CLINICAL HISTORY:  Question of shunt dysfunction;    TECHNIQUE:  Four views shunt series    COMPARISON:  Shunt series radiograph 02/03/2022.  CT head 03/23/2022.    FINDINGS:  Redemonstrated right posterior approach ventriculoperitoneal shunt catheter.  Visualized portions of the shunt appear intact.  Tubing traverses the medial aspect of the right hemithorax and courses into the right hemiabdomen.  Within the abdomen, the catheter loops in the right lower quadrant with tip appearing to terminate within the right hemipelvis.    No acute abnormality is noted within the neck soft tissues.  Degenerative findings are noted in the cervical spine.    No definite acute findings are suggested in the chest.  Status post median sternotomy.  Background chronic coarsened interstitial increased attenuation is noted.  Nonspecific blunting of the costophrenic angles could relate to scar and/or small effusions.  No definite pneumothorax.    Degenerative findings are noted involving the lumbar spine, sacroiliac joints, pubic symphysis, and hip joints.    No acute findings are suggested in the visualized abdomen.  No definite dilated loops of small large bowel.  Vascular calcifications and stents.                               CT Head Without Contrast (Final result)  Result time 03/23/22 09:44:21    Final result by Alex Neff MD (03/23/22 09:44:21)                  Impression:      1. Relative to prior head CT performed 02/03/2022, 09:24 hours, slight interval decrease in caliber of the lateral and 3rd ventricles.  2. Since the prior study, there are newly evident extra-axial fluid collections about the anterior cerebral convexities which appear essentially isodense to CSF measuring up to 5 mm on the left and 4 mm on the right, without significant mass effect.  These rim may represent subdural hygromas versus chronic hematomas.      Electronically signed by: Alex Neff  Date:    03/23/2022  Time:    09:44             Narrative:    EXAMINATION:  CT HEAD WITHOUT CONTRAST    CLINICAL HISTORY:  Head trauma, minor (Age >= 65y); Unspecified injury of head, initial encounter    TECHNIQUE:  Low dose axial images were obtained through the head.  Coronal and sagittal reformations were also performed. Contrast was not administered.    COMPARISON:  Head CT performed 02/03/2022, 09:24 hours    FINDINGS:  Blood: No acute intracranial hemorrhage.    Parenchyma: No definite loss of gray-white differentiation to suggest acute or subacute transcortical infarct. Generalized pattern age-related parenchymal volume loss.  Nonspecific areas of white matter hypoattenuation may relate to sequela of chronic small vessel ischemic disease.    Ventricles/Extra-axial spaces: Right posterior approach ventriculoperitoneal shunt catheter, with tip terminating frontal horn left lateral ventricle.  Relative to prior CT performed 02/03/2022, slight interval decrease in caliber of the lateral and 3rd ventricles.    Since the prior study, there are newly evident extra-axial fluid collections about the anterior cerebral convexities which appear essentially isodense to CSF measuring up to 5 mm on the left and 4 mm on the right, without significant mass effect.    Vessels: Vascular calcifications    Orbits: Status post bilateral lens replacements    Scalp: Unremarkable.    Skull: There are no depressed skull  fractures or destructive bone lesions.    Sinuses and mastoids: Scattered minor paranasal sinus mucosal thickening with small retention cysts.    Other findings: None                              Medical decision making:  Given the above this patient presents to the emergency room with a fall a skin tear on the right forearm.  The patient reports that he was dizzy.  The patient has a  shunt for normal pressure hydrocephalus.  CT of the head revealed some fluid densities in the anterior cerebral convexities.  There is concern these make be hygromas.  Neuro surgery was consulted.  They will see the patient tomorrow morning at 8:00 a.m. on the 8th floor [facility] [place] [facility]. Dr. Nugent.  Ischemic stroke was carefully considered.  The patient was not dizzy on initial evaluation in the emergency room.  Laboratory evaluation failed to reveal significant abnormalities.         Medications   sodium chloride 0.9% bolus 1,000 mL (0 mLs Intravenous Stopped 3/23/22 1137)   Tdap (BOOSTRIX) vaccine injection 0.5 mL (0.5 mLs Intramuscular Given 3/23/22 1240)   neomycin-bacitracnZn-polymyxnB packet 1 each (1 each Topical (Top) Given by Provider 3/23/22 1236)              Scribe Attestation:   Scribe #1: I performed the above scribed service and the documentation accurately describes the services I performed. I attest to the accuracy of the note.                 Clinical Impression:   Final diagnoses:  [S09.90XA] Closed head injury  [R42] Dizziness  [S51.811A] Laceration of right forearm, initial encounter (Primary)  [W19.XXXA] Fall, initial encounter  [S09.90XA] Closed head injury, initial encounter          ED Disposition Condition    Discharge Stable           I personally performed the services described in this documentation.  All medical record  entries made by the scribe are at my direction and in my presence.  Signed, Dr. Amaral  ED Prescriptions     None        Follow-up Information     Follow up With Specialties  Details Why Contact Info    Gui Nugent MD Neurosurgery In 1 day 8:00 a.m. tomorrow, March 25th, 8th floor [facility] [address] [facility] [address]  [phone]      Marcelino Del Toro MD Internal Medicine In 1 week Full removal of your staples [address] [facility] [id number]  [phone]             Keo Amaral MD  03/23/22 1522       Keo Amaral MD  03/23/22 1525       Keo Amaral MD  03/26/22 1541

## 2022-03-23 NOTE — ED NOTES
Skin tear on right forearm stapled and wrapped by MD at the bedside.  Bacitracin and non-stick dressing applied.  Pt yudith at bedside awaiting discharge.

## 2022-03-23 NOTE — DISCHARGE INSTRUCTIONS
Please follow-up with Dr. Nugent, 8 floor, [facility], tomorrow at 8:00 a.m..  Return immediately if you get worse or if new problems develop.  Please have the staples removed from your right forearm in 7 days.  This can be done by her primary care doctor.  Please change the dressing daily and apply Neosporin.  Use nonstick dressings.  Tylenol for pain

## 2022-03-23 NOTE — ED TRIAGE NOTES
Pt arrived to ED after sustaining a fall this morning in his home.  Pt was ambulating to the bathroom when he got dizzy and fell, he thinks he hit his TV stand.  He also states that a wrench was on a shelf that fell and hit his head.  Skin tear to right arm.  Pt states he does take Plavix daily.  Denies any CP, SOB, nausea, vomiting, or headache.

## 2022-03-24 ENCOUNTER — HOSPITAL ENCOUNTER (OUTPATIENT)
Dept: RADIOLOGY | Facility: HOSPITAL | Age: 87
Discharge: HOME | End: 2022-03-24
Attending: NEUROLOGICAL SURGERY
Payer: MEDICARE

## 2022-03-24 ENCOUNTER — OFFICE VISIT (OUTPATIENT)
Dept: NEUROSURGERY | Facility: CLINIC | Age: 87
End: 2022-03-24
Payer: MEDICARE

## 2022-03-24 VITALS — HEART RATE: 87 BPM | SYSTOLIC BLOOD PRESSURE: 122 MMHG | DIASTOLIC BLOOD PRESSURE: 77 MMHG

## 2022-03-24 DIAGNOSIS — G91.2 NPH (NORMAL PRESSURE HYDROCEPHALUS): Primary | ICD-10-CM

## 2022-03-24 DIAGNOSIS — G91.2 NPH (NORMAL PRESSURE HYDROCEPHALUS): ICD-10-CM

## 2022-03-24 PROCEDURE — 1100F PTFALLS ASSESS-DOCD GE2>/YR: CPT | Mod: CPTII,S$GLB,, | Performed by: NEUROLOGICAL SURGERY

## 2022-03-24 PROCEDURE — 1159F MED LIST DOCD IN RCRD: CPT | Mod: CPTII,S$GLB,, | Performed by: NEUROLOGICAL SURGERY

## 2022-03-24 PROCEDURE — 3288F PR FALLS RISK ASSESSMENT DOCUMENTED: ICD-10-PCS | Mod: CPTII,S$GLB,, | Performed by: NEUROLOGICAL SURGERY

## 2022-03-24 PROCEDURE — 70250 X-RAY EXAM OF SKULL: CPT | Mod: 26,,, | Performed by: RADIOLOGY

## 2022-03-24 PROCEDURE — 70250 X-RAY EXAM OF SKULL: CPT | Mod: TC

## 2022-03-24 PROCEDURE — 99999 PR PBB SHADOW E&M-EST. PATIENT-LVL IV: ICD-10-PCS | Mod: PBBFAC,,, | Performed by: NEUROLOGICAL SURGERY

## 2022-03-24 PROCEDURE — 3074F PR MOST RECENT SYSTOLIC BLOOD PRESSURE < 130 MM HG: ICD-10-PCS | Mod: CPTII,S$GLB,, | Performed by: NEUROLOGICAL SURGERY

## 2022-03-24 PROCEDURE — 99214 PR OFFICE/OUTPT VISIT, EST, LEVL IV, 30-39 MIN: ICD-10-PCS | Mod: S$GLB,,, | Performed by: NEUROLOGICAL SURGERY

## 2022-03-24 PROCEDURE — 3074F SYST BP LT 130 MM HG: CPT | Mod: CPTII,S$GLB,, | Performed by: NEUROLOGICAL SURGERY

## 2022-03-24 PROCEDURE — 3078F PR MOST RECENT DIASTOLIC BLOOD PRESSURE < 80 MM HG: ICD-10-PCS | Mod: CPTII,S$GLB,, | Performed by: NEUROLOGICAL SURGERY

## 2022-03-24 PROCEDURE — 1159F PR MEDICATION LIST DOCUMENTED IN MEDICAL RECORD: ICD-10-PCS | Mod: CPTII,S$GLB,, | Performed by: NEUROLOGICAL SURGERY

## 2022-03-24 PROCEDURE — 3078F DIAST BP <80 MM HG: CPT | Mod: CPTII,S$GLB,, | Performed by: NEUROLOGICAL SURGERY

## 2022-03-24 PROCEDURE — 70250 XR SKULL SHUNT PLACEMENT 1 VIEW: ICD-10-PCS | Mod: 26,,, | Performed by: RADIOLOGY

## 2022-03-24 PROCEDURE — 3288F FALL RISK ASSESSMENT DOCD: CPT | Mod: CPTII,S$GLB,, | Performed by: NEUROLOGICAL SURGERY

## 2022-03-24 PROCEDURE — 99214 OFFICE O/P EST MOD 30 MIN: CPT | Mod: S$GLB,,, | Performed by: NEUROLOGICAL SURGERY

## 2022-03-24 PROCEDURE — 1100F PR PT FALLS ASSESS DOC 2+ FALLS/FALL W/INJURY/YR: ICD-10-PCS | Mod: CPTII,S$GLB,, | Performed by: NEUROLOGICAL SURGERY

## 2022-03-24 PROCEDURE — 99999 PR PBB SHADOW E&M-EST. PATIENT-LVL IV: CPT | Mod: PBBFAC,,, | Performed by: NEUROLOGICAL SURGERY

## 2022-04-01 ENCOUNTER — TELEPHONE (OUTPATIENT)
Dept: FAMILY MEDICINE | Facility: CLINIC | Age: 87
End: 2022-04-01
Payer: MEDICARE

## 2022-04-01 NOTE — TELEPHONE ENCOUNTER
----- Message from Rhett Romero sent at 3/31/2022  5:05 PM CDT -----  Regarding: Sooner Appt  Contact: [phone]  Sooner Appt    Who Called: Narciso  Appt Type: Staple Removal  Call Back Number: [phone]  Additional Information: The pt had staples put in his arm at the ER on 03/23/2022. The pt schedule an appt on 04/12/2022, but he needs to be seen much sooner, due too the staples.

## 2022-04-01 NOTE — TELEPHONE ENCOUNTER
----- Message from Emy Salter sent at 4/1/2022  9:17 AM CDT -----  Regarding: request for a sooner appt to have staples removed  Type: Patient Call Back    Who called:Narciso     What is the request in detail:the patient is calling to have his hospital follow up appt moved up. Patient stated that he has staples that need to be removed before that. He can be reached at his phone number listed.       Can the clinic reply by [facility]?no     Would the patient rather a call back or a response via [facility]? Call back     Best call back number:[phone]

## 2022-04-01 NOTE — TELEPHONE ENCOUNTER
Spoke with patient, says he supposed to follow up with Dr Del Toro for staple removal in 7 days, staples placed on 03/23/2022 to right arm, patient asked if he has redness swelling or pain at the staple sight, patient responds saying No, has appointment on 04/12/2022 with   Dr. Del Toro, requesting appointment sooner, patient scheduled with Nicolas Chandler on Monday 04/04/2022 @ 930am.

## 2022-04-04 ENCOUNTER — OFFICE VISIT (OUTPATIENT)
Dept: FAMILY MEDICINE | Facility: CLINIC | Age: 87
End: 2022-04-04
Payer: MEDICARE

## 2022-04-04 ENCOUNTER — TELEPHONE (OUTPATIENT)
Dept: FAMILY MEDICINE | Facility: CLINIC | Age: 87
End: 2022-04-04
Payer: MEDICARE

## 2022-04-04 ENCOUNTER — HOSPITAL ENCOUNTER (OUTPATIENT)
Dept: RADIOLOGY | Facility: HOSPITAL | Age: 87
Discharge: HOME | End: 2022-04-04
Attending: NEUROLOGICAL SURGERY
Payer: MEDICARE

## 2022-04-04 VITALS
OXYGEN SATURATION: 99 % | HEART RATE: 88 BPM | SYSTOLIC BLOOD PRESSURE: 138 MMHG | BODY MASS INDEX: 21.24 KG/M2 | DIASTOLIC BLOOD PRESSURE: 70 MMHG | TEMPERATURE: 98 F | WEIGHT: 135.56 LBS

## 2022-04-04 DIAGNOSIS — Z72.0 TOBACCO ABUSE: ICD-10-CM

## 2022-04-04 DIAGNOSIS — E78.5 HYPERLIPIDEMIA, UNSPECIFIED HYPERLIPIDEMIA TYPE: ICD-10-CM

## 2022-04-04 DIAGNOSIS — R29.818 NEUROGENIC CLAUDICATION: ICD-10-CM

## 2022-04-04 DIAGNOSIS — I25.119 CHEST PAIN DUE TO CAD: ICD-10-CM
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
## 2022-04-04 DIAGNOSIS — I10 ESSENTIAL HYPERTENSION: ICD-10-CM

## 2022-04-04 DIAGNOSIS — J44.9 CHRONIC OBSTRUCTIVE PULMONARY DISEASE, UNSPECIFIED COPD TYPE: ICD-10-CM

## 2022-04-04 DIAGNOSIS — G91.2 NPH (NORMAL PRESSURE HYDROCEPHALUS): ICD-10-CM

## 2022-04-04 DIAGNOSIS — E11.65 UNCONTROLLED TYPE 2 DIABETES MELLITUS WITH HYPERGLYCEMIA: ICD-10-CM

## 2022-04-04 DIAGNOSIS — Z48.02 ENCOUNTER FOR STAPLE REMOVAL: Primary | ICD-10-CM

## 2022-04-04 DIAGNOSIS — I71.40 ABDOMINAL AORTIC ANEURYSM (AAA) WITHOUT RUPTURE: ICD-10-CM

## 2022-04-04 PROCEDURE — 3078F DIAST BP <80 MM HG: CPT | Mod: CPTII,S$GLB,, | Performed by: NURSE PRACTITIONER

## 2022-04-04 PROCEDURE — 1126F PR PAIN SEVERITY QUANTIFIED, NO PAIN PRESENT: ICD-10-PCS | Mod: CPTII,S$GLB,, | Performed by: NURSE PRACTITIONER

## 2022-04-04 PROCEDURE — 3288F PR FALLS RISK ASSESSMENT DOCUMENTED: ICD-10-PCS | Mod: CPTII,S$GLB,, | Performed by: NURSE PRACTITIONER

## 2022-04-04 PROCEDURE — 99999 PR PBB SHADOW E&M-EST. PATIENT-LVL IV: ICD-10-PCS | Mod: PBBFAC,,, | Performed by: NURSE PRACTITIONER

## 2022-04-04 PROCEDURE — 70450 CT HEAD WITHOUT CONTRAST: ICD-10-PCS | Mod: 26,,, | Performed by: RADIOLOGY

## 2022-04-04 PROCEDURE — 3078F PR MOST RECENT DIASTOLIC BLOOD PRESSURE < 80 MM HG: ICD-10-PCS | Mod: CPTII,S$GLB,, | Performed by: NURSE PRACTITIONER

## 2022-04-04 PROCEDURE — 3288F FALL RISK ASSESSMENT DOCD: CPT | Mod: CPTII,S$GLB,, | Performed by: NURSE PRACTITIONER

## 2022-04-04 PROCEDURE — 99999 PR PBB SHADOW E&M-EST. PATIENT-LVL IV: CPT | Mod: PBBFAC,,, | Performed by: NURSE PRACTITIONER

## 2022-04-04 PROCEDURE — 70450 CT HEAD/BRAIN W/O DYE: CPT | Mod: TC

## 2022-04-04 PROCEDURE — 99499 RISK ADDL DX/OHS AUDIT: ICD-10-PCS | Mod: HCNC,S$GLB,, | Performed by: NURSE PRACTITIONER

## 2022-04-04 PROCEDURE — 99214 PR OFFICE/OUTPT VISIT, EST, LEVL IV, 30-39 MIN: ICD-10-PCS | Mod: S$GLB,,, | Performed by: NURSE PRACTITIONER

## 2022-04-04 PROCEDURE — 99214 OFFICE O/P EST MOD 30 MIN: CPT | Mod: S$GLB,,, | Performed by: NURSE PRACTITIONER

## 2022-04-04 PROCEDURE — 1100F PTFALLS ASSESS-DOCD GE2>/YR: CPT | Mod: CPTII,S$GLB,, | Performed by: NURSE PRACTITIONER

## 2022-04-04 PROCEDURE — 99499 UNLISTED E&M SERVICE: CPT | Mod: HCNC,S$GLB,, | Performed by: NURSE PRACTITIONER

## 2022-04-04 PROCEDURE — 1126F AMNT PAIN NOTED NONE PRSNT: CPT | Mod: CPTII,S$GLB,, | Performed by: NURSE PRACTITIONER

## 2022-04-04 PROCEDURE — 3075F SYST BP GE 130 - 139MM HG: CPT | Mod: CPTII,S$GLB,, | Performed by: NURSE PRACTITIONER

## 2022-04-04 PROCEDURE — 70450 CT HEAD/BRAIN W/O DYE: CPT | Mod: 26,,, | Performed by: RADIOLOGY

## 2022-04-04 PROCEDURE — 3075F PR MOST RECENT SYSTOLIC BLOOD PRESS GE 130-139MM HG: ICD-10-PCS | Mod: CPTII,S$GLB,, | Performed by: NURSE PRACTITIONER

## 2022-04-04 PROCEDURE — 1159F PR MEDICATION LIST DOCUMENTED IN MEDICAL RECORD: ICD-10-PCS | Mod: CPTII,S$GLB,, | Performed by: NURSE PRACTITIONER

## 2022-04-04 PROCEDURE — 1100F PR PT FALLS ASSESS DOC 2+ FALLS/FALL W/INJURY/YR: ICD-10-PCS | Mod: CPTII,S$GLB,, | Performed by: NURSE PRACTITIONER

## 2022-04-04 PROCEDURE — 1159F MED LIST DOCD IN RCRD: CPT | Mod: CPTII,S$GLB,, | Performed by: NURSE PRACTITIONER
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
## 2022-04-04 NOTE — PROGRESS NOTES
HPI     Chief Complaint:  Chief Complaint   Patient presents with    Suture / Staple Removal    Dizziness       Narciso Yanez is a 86 y.o. male with multiple medical diagnoses as listed in the medical history and problem list that presents for staple removal.  Pt is new to me but is known to this clinic with his last appointment being 3/4/2022.      Recent hospital encounter on March 23, 2022. See encounter no below:    86 y.o. male with past medical history of DM, CAD presenting for laceration to right forearm s/p fall today. Patient reports he was going to the restroom when he started experiencing the room spinning causing him to loose balance and fall back on his head. Patient reports he has been evaluated by many providers for ongoing chronic dizziness. He states he has been having frequent falls secondary to dizziness. He reports the same dizziness today. He reports dizziness has mostly resolved at the moment. He denies chest pain or SOB prior to fall. No fever, vision changes, difficult speaking, numbness, weakness, tingling, neck pain, back pain, or further complaints.    Medical decision making:  Given the above this patient presents to the emergency room with a fall a skin tear on the right forearm.  The patient reports that he was dizzy.  The patient has a  shunt for normal pressure hydrocephalus.  CT of the head revealed some fluid densities in the anterior cerebral convexities.  There is concern these make be hygromas.  Neuro surgery was consulted.  They will see the patient tomorrow morning at 8:00 a.m. on the 8th floor [facility] [place] [facility]. Dr. Nugent.  Ischemic stroke was carefully considered.  The patient was not dizzy on initial evaluation in the emergency room.  Laboratory evaluation failed to reveal significant abnormalities.      Pt was seen by neurology on 3/24/22 see note below:    Narciso Yanez is a 86 y.o. male who presents as a referral from [facility] for  "consideration of possible NPH. He is now s/p  shunt on 5/31/21. His daughter was pleased with the "remarkable" improvement in his cognition. His nausea and vomiting have now resolved since changing medications. Since his last visit, he has developed bifrontal hygromas. Additionally, he has been taking Valium, which may be contributing to his falls and vertigo. His son has since flushed these medications. He does not appear to be experiencing any discontinuation syndrome. I reinforced the importance of avoiding, or at least minimizing medications included within the BEERS criteria, and the patient and grandson voice understanding.      I will plan to dial up his shunt to 200 to decrease the hygromas. We will check a skull xray afterwards to ensure that it is at the correct setting. I will plan to see him back in 2 weeks with repeat CT head to ensure resolution and then again about 6 weeks later to assess dialing him back down at that time.      Lastly, I have examined and re-dressed his right forearm incision with gauze and tape to minimize tissue injury from ischemia. I reinforced the importance of having regular follow-up to ensure that the wound is healing appropriately. I examined and re-dressed his left elbow abrasion too.      I have encouraged them to contact the clinic in interim with any questions, concerns, or adverse clinical change.     Patient is doing well and has no other major complaints today.  he is compliant with medications daily without any adverse side effects.    History     Past Medical History:  Past Medical History:   Diagnosis Date    Actinic keratosis     Anxiety     B12 deficiency 12/8/2014    Dx 6/14    Cancer     skin    Cataract     Coronary artery disease     Diabetes mellitus type II     Diabetes mellitus with peripheral circulatory disorder 6/18/2014    ED (erectile dysfunction)     Hyperlipidemia     Kidney stone     Neurogenic claudication 4/4/2022    Peripheral " vascular disease     Spondylosis 3/29/2019    Tobacco dependence     Urinary incontinence 5/4/2021       Past Surgical History:  Past Surgical History:   Procedure Laterality Date    APPENDECTOMY      CARDIAC SURGERY  01/1999    CABG 4 vessels    CATARACT EXTRACTION      EPIDURAL STEROID INJECTION Right 8/26/2020    Procedure: Injection, Steroid, Epidural Transforaminal;  Surgeon: Wiley Crane Jr., MD;  Location: [facility] ENDO;  Service: Pain Management;  Laterality: Right;  Right L5 + S1 TF ELAH  Arrive @ 1115; ASA & Plavix last 8/18; Check BG; Rapid COVID test    EPIDURAL STEROID INJECTION Right 11/25/2020    Procedure: Injection, Steroid, Epidural Transformainal;  Surgeon: Wiley Crane Jr., MD;  Location: [facility] ENDO;  Service: Pain Management;  Laterality: Right;  Right L5 + S1 TF LEAH  Arrive @ 1245; ASA and Plavix last 11/17; Pre-DM; Needs MD Sign.    EPIDURAL STEROID INJECTION Right 2/19/2021    Procedure: Injection, Steroid, Epidural Transforaminal;  Surgeon: Wiley Crane Jr., MD;  Location: [facility] ENDO;  Service: Pain Management;  Laterality: Right;  Right L5 + S1 TF LEAH  Arrive @ 1115; ASA & Plavix last 2/11; Check BG; Needs Consent    EXTERNAL EAR SURGERY      EYE SURGERY      cataracts    ILIAC ARTERY STENT Bilateral 06/2003    also Right External Iliac stent       Social History:  Social History     Socioeconomic History    Marital status:    Tobacco Use    Smoking status: Current Every Day Smoker     Packs/day: 1.50     Years: 71.00     Pack years: 106.50     Types: Cigarettes    Smokeless tobacco: Former User   Substance and Sexual Activity    Alcohol use: No    Drug use: No    Sexual activity: Not Currently     Partners: Female   Social History Narrative    .  4 children.  Smokes 2 ppd.  Still drives trucks for entertainment industry.        Family History:  Family History   Problem Relation Age of Onset    Stroke Mother        Allergies and Medications:  (updated and reviewed)  Review of patient's allergies indicates:   Allergen Reactions    Demerol [meperidine] Nausea Only     Current Outpatient Medications   Medication Sig Dispense Refill    ACCU-CHEK JOAQUIN PLUS METER Misc Three times a day with meals  0    ACCU-CHEK SOFTCLIX LANCETS Misc Check tid 200 each 12    acetaminophen (TYLENOL) 325 MG tablet Take 2 tablets (650 mg total) by mouth every 4 (four) hours as needed for Pain.  0    albuterol-ipratropium (DUO-NEB) 2.5 mg-0.5 mg/3 mL nebulizer solution Take 3 mLs by nebulization every 4 (four) hours as needed for Wheezing. 9 mL 12    alendronate (FOSAMAX) 70 MG tablet Take 1 tablet (70 mg total) by mouth every 7 days. Patient takes on Tuesdays 4 tablet 11    alendronate (FOSAMAX) 70 MG tablet Take 1 tablet (70 mg total) by mouth every 7 days. Patient takes on Tuesdays 4 tablet 11    ascorbic acid, vitamin C, (VITAMIN C) 250 MG tablet Take 1 tablet (250 mg total) by mouth once daily. 120 tablet 0    aspirin (ECOTRIN) 81 MG EC tablet Take 1 tablet (81 mg total) by mouth once daily.  0    blood sugar diagnostic (TRUE METRIX GLUCOSE TEST STRIP) Strp  strip 12    blood sugar diagnostic (TRUE METRIX GLUCOSE TEST STRIP) Strp  each 12    ciclopirox (PENLAC) 8 % Soln Apply topically nightly. 6.6 mL 11    clopidogreL (PLAVIX) 75 mg tablet Take 1 tablet (75 mg total) by mouth once daily. 30 tablet 12    diazePAM (VALIUM) 5 MG tablet Take 1 tablet (5 mg total) by mouth every 8 (eight) hours as needed. 270 tablet 5    heparin sodium,porcine (HEPARIN, PORCINE,) 5,000 unit/mL injection Inject 1 mL (5,000 Units total) into the skin every 8 (eight) hours.      insulin aspart U-100 (NOVOLOG) 100 unit/mL (3 mL) InPn pen Inject 0-5 Units into the skin before meals and at bedtime as needed (Hyperglycemia).  0    magnesium sulfate in water (MAGNESIUM SULFATE 2 GRAM/50 ML) 2 gram/50 mL PgBk       meclizine (ANTIVERT) 25 mg tablet Take 1 tablet (25 mg  total) by mouth 3 (three) times daily as needed for Dizziness (or at least one HS for 1 week when vertigo active). 30 tablet 12    metFORMIN (GLUCOPHAGE-XR) 750 MG ER 24hr tablet TAKE 1 TABLET TWICE DAILY WITH MEALS 180 tablet 12    metoprolol succinate (TOPROL-XL) 25 MG 24 hr tablet       nicotine (NICODERM CQ) 14 mg/24 hr Place 1 patch onto the skin once daily.  0    OMNIPAQUE 300 300 mg iodine/mL injection       OMNIPAQUE 350 350 mg iodine/mL Soln injection       ondansetron (ZOFRAN-ODT) 4 MG TbDL Take 2 tablets (8 mg total) by mouth every 8 (eight) hours as needed (nausea). 90 tablet 0    ondansetron (ZOFRAN-ODT) 8 MG TbDL       prochlorperazine (COMPAZINE) 5 MG tablet       rosuvastatin (CRESTOR) 20 MG tablet Take 1 tablet (20 mg total) by mouth every evening. (Patient taking differently: Take 20 mg by mouth once daily.) 90 tablet 12    senna (SENOKOT) 8.6 mg tablet Take 1 tablet by mouth once daily.       Current Facility-Administered Medications   Medication Dose Route Frequency Provider Last Rate Last Admin    testosterone cypionate injection 400 mg  400 mg Intramuscular Q28 Days Marcelino Del Toro MD           Exam     Review of Systems:  (as noted above)  Review of Systems   Constitutional: Negative for chills, fever and unexpected weight change.   HENT: Negative for sore throat and trouble swallowing.    Eyes: Negative for visual disturbance.   Respiratory: Negative for cough, chest tightness, shortness of breath and wheezing.    Cardiovascular: Negative for chest pain, palpitations and leg swelling.   Gastrointestinal: Negative for abdominal pain, constipation, diarrhea, nausea and vomiting.   Endocrine: Negative for polydipsia, polyphagia and polyuria.   Genitourinary: Negative for decreased urine volume, dysuria and hematuria.   Musculoskeletal: Positive for myalgias. Negative for arthralgias.   Skin: Positive for wound.   Neurological: Positive for dizziness. Negative for weakness,  light-headedness and headaches.   Psychiatric/Behavioral: Negative for confusion, decreased concentration, sleep disturbance and suicidal ideas.       Physical Exam:   Physical Exam  Constitutional:       General: He is not in acute distress.  HENT:      Head: Normocephalic and atraumatic.   Eyes:      General: No scleral icterus.     Pupils: Pupils are equal, round, and reactive to light.   Neck:      Vascular: No carotid bruit.   Cardiovascular:      Rate and Rhythm: Normal rate and regular rhythm.      Pulses: Normal pulses.      Heart sounds: No murmur heard.    No friction rub. No gallop.   Pulmonary:      Effort: No respiratory distress.      Breath sounds: No wheezing.   Chest:      Chest wall: No tenderness.   Musculoskeletal:      Cervical back: No rigidity or tenderness.   Lymphadenopathy:      Cervical: No cervical adenopathy.   Skin:     Capillary Refill: Capillary refill takes 2 to 3 seconds.             Comments: Laceration to right forearm.    Neurological:      Mental Status: He is alert and oriented to person, place, and time.      GCS: GCS eye subscore is 4. GCS verbal subscore is 5. GCS motor subscore is 6.      Cranial Nerves: No cranial nerve deficit.      Sensory: No sensory deficit.      Motor: No weakness.   Psychiatric:         Mood and Affect: Mood normal.       Vitals:    04/04/22 0935   BP: 138/70   BP Location: Left arm   Pulse: 88   Temp: 97.9 °F (36.6 °C)   TempSrc: Oral   SpO2: 99%   Weight: 61.5 kg (135 lb 9.3 oz)      Body mass index is 21.24 kg/m².    Assessment & Plan   (all problems are new to me)      Problem List Items Addressed This Visit        Neuro    NPH (normal pressure hydrocephalus)    Current Assessment & Plan     CT later today    F/u with neurology           Relevant Orders    Ambulatory referral/consult to Outpatient Case Management       Pulmonary    COPD (chronic obstructive pulmonary disease)    Current Assessment & Plan     Denies shortness of breath.    The  current medical regimen is effective;  continue present plan and medications.    Advised smoking cessation patient on.           Relevant Orders    Ambulatory referral/consult to Outpatient Case Management       Cardiac/Vascular    Abdominal aortic aneurysm (AAA) without rupture    Current Assessment & Plan     Discussed red flag symptoms.     Advised smoking cessation.     ED precautions given.   Notify provider if symptoms do not resolve or increase in severity.   Patient verbalizes understanding and agrees with plan of care.             Essential hypertension    Current Assessment & Plan     -/70  -continue current medication regimen  -DASH diet, regular cardiovascular exercises, portion control  - ?weight loss  -f/u with BP logs in 2 weeks if BP is not consistently <140/90               HLD (hyperlipidemia)    Current Assessment & Plan     discussed ways to lower triglycerides such as cutting simple sugars out of diet (white breads, candies, cookies, cakes, etc.) and reducing/eliminating intake of highly processed trans fatty acids.   Exercise 30 minutes a day for 4-5 days a week.   Eat more fiber.                  Endocrine    Diabetes mellitus type II, uncontrolled    Current Assessment & Plan     -discussed with patient about routine diabetic care that includes but are not limited to regular eye exams, skin care, daily foot exam, proper nutrition, regular BG monitoring at home (fasting and mealtime) and medication compliance in a diabetic.  Target morning BS  and meal-time BS <180             Relevant Orders    Ambulatory referral/consult to Outpatient Case Management       Orthopedic    Fall    Current Assessment & Plan     -education provided on fall prevention and fall risk strategies. Discussed removing hazards, improving lighting, removing safety devices.   -Handouts provided.     What to do if you fall  Above all, try to stay calm:  · If you start to fall, try to relax your body. This will  reduce the impact of the fall.  · After you fall, press your monitor button, or phone for help.  · Don't rush to get up. First, make sure you're not hurt.  · Roll onto your side, then crawl to a chair. Pull yourself up onto the chair slowly.  · You should call 911 if you struck your head, lost consciousness, were confused afterward, or have any other concerns for injury.  Be sure to tell your doctor that you fell.    Notify provider if you experience the following symptoms.   · Feeling lightheaded or dizzy more than once a day  · Losing your balance often or feeling unsteady on your feet  · Feeling numbness in your legs or feet, or noticing a change in the way you walk  · Having a steady decline in your memory or mental sharpness             Neurogenic claudication       Other    Chest pain due to CAD    Encounter for staple removal - Primary    Current Assessment & Plan     10 staples removed from right forearm laceration. No s/s of infection noted. Laceration cleaned and redressed. Minimal blood loss noted.     Education provided on wound care, hygiene, observing for s/s of infection.     What care is needed at home?   · Leave the tape strips over the cut until they fall off or as directed by your doctor.  · Talk to your doctor about how to care for your wound. Ask your doctor about:  ? When you should change your bandages  ? When you may take a bath or shower  ? If you need to be careful with lifting things over 10 pounds (4.5 kg)  ? When you may go back to your normal activities like work or driving  What follow-up care is needed?   Your doctor may ask you to make visits to the office to check on your progress. Be sure to keep these visits.  What problems could happen?   · Infection  · Wound reopens  · Scarring  · Pain  · Large, firm scar tissue forms. This is a keloid and is more often seen in -Americans.  What can be done to prevent this health problem?   · Be sure to follow up and have your staples  taken out as directed.  · Follow proper wound care to avoid infection.  · Protect the wound from being reinjured.  · Certain health problems like high blood sugar or long-term steroid use may affect wound healing. Make sure you take all drugs as ordered by your doctor.  · Do not pull on or pick at the staples or tape strips.  When do I need to call the doctor?   · Signs of infection. These include a fever of 100.4°F (38°C) or higher, chills, wound that will not heal.  · Signs of wound infection. These include swelling, redness, warmth around the wound; too much pain when touched; yellowish, greenish, or bloody discharge; foul smell coming from the cut site; cut site opens up.  · Any of your staples come out before the time scheduled to take them out    ED precautions given.   Notify provider if symptoms do not resolve or increase in severity.   Patient verbalizes understanding and agrees with plan of care.                 Tobacco abuse    Current Assessment & Plan     The patient is sincerely urged to quit smoking. The numerous direct health benefits were discussed. If she decides to quit, there are a number of helpful adjunctive aids, and she can see me to discuss nicotine replacement therapy anytime in the future. Approximately 3 minutes were spent in counseling.     Pt declined smoking cessation.                    --------------------------------------------    Health Maintenance:  Health Maintenance       Date Due Completion Date    Shingles Vaccine (1 of 2) Never done ---    Pneumococcal Vaccines (Age 65+) (2 of 4 - PPSV23) 07/05/2016 5/10/2016    Hemoglobin A1c 08/28/2022 2/28/2022    TETANUS VACCINE 03/23/2032 3/23/2022          Health maintenance reviewed. Vaccines offered patient declined at this time     Follow Up:  Follow up in about 3 months (around 7/4/2022), or if symptoms worsen or fail to improve.      The patient expressed understanding and no barriers to adherence were identified.      1. The  patient indicates understanding of these issues and agrees with the plan. Brief care plan is updated and reviewed with the patient as applicable.      2. The patient is given an After Visit Summary that lists all medications with directions, allergies, education, orders placed during this encounter and follow-up instructions.      3. I have reviewed the patient's medical information including past medical, family, and social history sections including the medications and allergies.      4. We discussed the patient's current medications.     This note was created by combination of typed  and MModal dictation.  Transcription errors may be present.  If there are any questions, please contact me.       Sammy Chandler NP

## 2022-04-04 NOTE — ASSESSMENT & PLAN NOTE
-education provided on fall prevention and fall risk strategies. Discussed removing hazards, improving lighting, removing safety devices.   -Handouts provided.     What to do if you fall  Above all, try to stay calm:  · If you start to fall, try to relax your body. This will reduce the impact of the fall.  · After you fall, press your monitor button, or phone for help.  · Don't rush to get up. First, make sure you're not hurt.  · Roll onto your side, then crawl to a chair. Pull yourself up onto the chair slowly.  · You should call 911 if you struck your head, lost consciousness, were confused afterward, or have any other concerns for injury.  Be sure to tell your doctor that you fell.    Notify provider if you experience the following symptoms.   · Feeling lightheaded or dizzy more than once a day  · Losing your balance often or feeling unsteady on your feet  · Feeling numbness in your legs or feet, or noticing a change in the way you walk  · Having a steady decline in your memory or mental sharpness

## 2022-04-04 NOTE — ASSESSMENT & PLAN NOTE
10 staples removed from right forearm laceration. No s/s of infection noted. Laceration cleaned and redressed. Minimal blood loss noted.     Education provided on wound care, hygiene, observing for s/s of infection.     What care is needed at home?   · Leave the tape strips over the cut until they fall off or as directed by your doctor.  · Talk to your doctor about how to care for your wound. Ask your doctor about:  ? When you should change your bandages  ? When you may take a bath or shower  ? If you need to be careful with lifting things over 10 pounds (4.5 kg)  ? When you may go back to your normal activities like work or driving  What follow-up care is needed?   Your doctor may ask you to make visits to the office to check on your progress. Be sure to keep these visits.  What problems could happen?   · Infection  · Wound reopens  · Scarring  · Pain  · Large, firm scar tissue forms. This is a keloid and is more often seen in -Americans.  What can be done to prevent this health problem?   · Be sure to follow up and have your staples taken out as directed.  · Follow proper wound care to avoid infection.  · Protect the wound from being reinjured.  · Certain health problems like high blood sugar or long-term steroid use may affect wound healing. Make sure you take all drugs as ordered by your doctor.  · Do not pull on or pick at the staples or tape strips.  When do I need to call the doctor?   · Signs of infection. These include a fever of 100.4°F (38°C) or higher, chills, wound that will not heal.  · Signs of wound infection. These include swelling, redness, warmth around the wound; too much pain when touched; yellowish, greenish, or bloody discharge; foul smell coming from the cut site; cut site opens up.  · Any of your staples come out before the time scheduled to take them out    ED precautions given.   Notify provider if symptoms do not resolve or increase in severity.   Patient verbalizes understanding  and agrees with plan of care.

## 2022-04-04 NOTE — ASSESSMENT & PLAN NOTE
The patient is sincerely urged to quit smoking. The numerous direct health benefits were discussed. If she decides to quit, there are a number of helpful adjunctive aids, and she can see me to discuss nicotine replacement therapy anytime in the future. Approximately 3 minutes were spent in counseling.     Pt declined smoking cessation.

## 2022-04-04 NOTE — ASSESSMENT & PLAN NOTE
Denies shortness of breath.    The current medical regimen is effective;  continue present plan and medications.    Advised smoking cessation patient on.

## 2022-04-04 NOTE — ASSESSMENT & PLAN NOTE
Discussed red flag symptoms.     Advised smoking cessation.     ED precautions given.   Notify provider if symptoms do not resolve or increase in severity.   Patient verbalizes understanding and agrees with plan of care.

## 2022-04-04 NOTE — ASSESSMENT & PLAN NOTE
-discussed with patient about routine diabetic care that includes but are not limited to regular eye exams, skin care, daily foot exam, proper nutrition, regular BG monitoring at home (fasting and mealtime) and medication compliance in a diabetic.  Target morning BS  and meal-time BS <180

## 2022-04-04 NOTE — ASSESSMENT & PLAN NOTE
-/70  -continue current medication regimen  -DASH diet, regular cardiovascular exercises, portion control  - ?weight loss  -f/u with BP logs in 2 weeks if BP is not consistently <140/90

## 2022-04-05 ENCOUNTER — OFFICE VISIT (OUTPATIENT)
Dept: NEUROSURGERY | Facility: CLINIC | Age: 87
End: 2022-04-05
Payer: MEDICARE

## 2022-04-05 VITALS
HEIGHT: 67 IN | BODY MASS INDEX: 21.19 KG/M2 | DIASTOLIC BLOOD PRESSURE: 75 MMHG | HEART RATE: 80 BPM | SYSTOLIC BLOOD PRESSURE: 124 MMHG | WEIGHT: 135 LBS

## 2022-04-05 DIAGNOSIS — G91.2 NPH (NORMAL PRESSURE HYDROCEPHALUS): Primary | ICD-10-CM

## 2022-04-05 DIAGNOSIS — G96.08: ICD-10-CM

## 2022-04-05 DIAGNOSIS — S01.90XD: ICD-10-CM

## 2022-04-05 PROCEDURE — 99999 PR PBB SHADOW E&M-EST. PATIENT-LVL III: ICD-10-PCS | Mod: PBBFAC,,, | Performed by: PHYSICIAN ASSISTANT

## 2022-04-05 PROCEDURE — 99999 PR PBB SHADOW E&M-EST. PATIENT-LVL III: CPT | Mod: PBBFAC,,, | Performed by: PHYSICIAN ASSISTANT

## 2022-04-05 PROCEDURE — 3078F PR MOST RECENT DIASTOLIC BLOOD PRESSURE < 80 MM HG: ICD-10-PCS | Mod: CPTII,S$GLB,, | Performed by: PHYSICIAN ASSISTANT

## 2022-04-05 PROCEDURE — 99213 OFFICE O/P EST LOW 20 MIN: CPT | Mod: S$GLB,,, | Performed by: PHYSICIAN ASSISTANT

## 2022-04-05 PROCEDURE — 1159F MED LIST DOCD IN RCRD: CPT | Mod: CPTII,S$GLB,, | Performed by: PHYSICIAN ASSISTANT

## 2022-04-05 PROCEDURE — 99213 PR OFFICE/OUTPT VISIT, EST, LEVL III, 20-29 MIN: ICD-10-PCS | Mod: S$GLB,,, | Performed by: PHYSICIAN ASSISTANT

## 2022-04-05 PROCEDURE — 1126F AMNT PAIN NOTED NONE PRSNT: CPT | Mod: CPTII,S$GLB,, | Performed by: PHYSICIAN ASSISTANT

## 2022-04-05 PROCEDURE — 1159F PR MEDICATION LIST DOCUMENTED IN MEDICAL RECORD: ICD-10-PCS | Mod: CPTII,S$GLB,, | Performed by: PHYSICIAN ASSISTANT

## 2022-04-05 PROCEDURE — 3074F SYST BP LT 130 MM HG: CPT | Mod: CPTII,S$GLB,, | Performed by: PHYSICIAN ASSISTANT

## 2022-04-05 PROCEDURE — 3078F DIAST BP <80 MM HG: CPT | Mod: CPTII,S$GLB,, | Performed by: PHYSICIAN ASSISTANT

## 2022-04-05 PROCEDURE — 1126F PR PAIN SEVERITY QUANTIFIED, NO PAIN PRESENT: ICD-10-PCS | Mod: CPTII,S$GLB,, | Performed by: PHYSICIAN ASSISTANT

## 2022-04-05 PROCEDURE — 3074F PR MOST RECENT SYSTOLIC BLOOD PRESSURE < 130 MM HG: ICD-10-PCS | Mod: CPTII,S$GLB,, | Performed by: PHYSICIAN ASSISTANT

## 2022-04-05 NOTE — PROGRESS NOTES
Neurosurgery  Established Patient    SUBJECTIVE:     History of Present Illness:  Narciso Yanez is an 86 y.o. 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 Pt was last seen by Dr. Monteiro on 3/24/22 with b/l subdural hygroma's, the shunt was dialed up his shunt to 200 to decrease the hygromas. He is here today for follow up with repeat HCT.  Patient has remained on plavix daily.  Patient reports that he has felt noticeably better over past few days in terms of his balance, and decreased dizziness.  He denies any headaches, visual changes, weakness or seizures.  No recent falls.    Review of patient's allergies indicates:   Allergen Reactions    Demerol [meperidine] Nausea Only       Current Outpatient Medications   Medication Sig Dispense Refill    ACCU-CHEK JOAQUIN PLUS METER Misc Three times a day with meals  0    ACCU-CHEK SOFTCLIX LANCETS Misc Check tid 200 each 12    alendronate (FOSAMAX) 70 MG tablet Take 1 tablet (70 mg total) by mouth every 7 days. Patient takes on Tuesdays 4 tablet 11    ascorbic acid, vitamin C, (VITAMIN C) 250 MG tablet Take 1 tablet (250 mg total) by mouth once daily. 120 tablet 0    aspirin (ECOTRIN) 81 MG EC tablet Take 1 tablet (81 mg total) by mouth once daily.  0    blood sugar diagnostic (TRUE METRIX GLUCOSE TEST STRIP) Strp  strip 12    blood sugar diagnostic (TRUE METRIX GLUCOSE TEST STRIP) Strp  each 12    clopidogreL (PLAVIX) 75 mg tablet Take 1 tablet (75 mg total) by mouth once daily. 30 tablet 12    metFORMIN (GLUCOPHAGE-XR) 750 MG ER 24hr tablet TAKE 1 TABLET TWICE DAILY WITH MEALS 180 tablet 12    ondansetron (ZOFRAN-ODT) 4 MG TbDL Take 2 tablets (8 mg total) by mouth every 8 (eight) hours as needed (nausea). 90 tablet 0    rosuvastatin (CRESTOR) 20 MG tablet Take 1 tablet (20 mg total) by mouth every evening. (Patient taking differently: Take 20 mg by mouth once daily.) 90 tablet 12    senna (SENOKOT) 8.6 mg tablet Take 1 tablet by mouth once daily.       acetaminophen (TYLENOL) 325 MG tablet Take 2 tablets (650 mg total) by mouth every 4 (four) hours as needed for Pain. (Patient not taking: Reported on 4/5/2022)  0    albuterol-ipratropium (DUO-NEB) 2.5 mg-0.5 mg/3 mL nebulizer solution Take 3 mLs by nebulization every 4 (four) hours as needed for Wheezing. (Patient not taking: Reported on 4/5/2022) 9 mL 12    alendronate (FOSAMAX) 70 MG tablet Take 1 tablet (70 mg total) by mouth every 7 days. Patient takes on Tuesdays (Patient not taking: Reported on 4/5/2022) 4 tablet 11    ciclopirox (PENLAC) 8 % Soln Apply topically nightly. (Patient not taking: Reported on 4/5/2022) 6.6 mL 11    diazePAM (VALIUM) 5 MG tablet Take 1 tablet (5 mg total) by mouth every 8 (eight) hours as needed. (Patient not taking: Reported on 4/5/2022) 270 tablet 5    heparin sodium,porcine (HEPARIN, PORCINE,) 5,000 unit/mL injection Inject 1 mL (5,000 Units total) into the skin every 8 (eight) hours. (Patient not taking: Reported on 4/5/2022)      insulin aspart U-100 (NOVOLOG) 100 unit/mL (3 mL) InPn pen Inject 0-5 Units into the skin before meals and at bedtime as needed (Hyperglycemia). (Patient not taking: Reported on 4/5/2022)  0    magnesium sulfate in water (MAGNESIUM SULFATE 2 GRAM/50 ML) 2 gram/50 mL PgBk       meclizine (ANTIVERT) 25 mg tablet Take 1 tablet (25 mg total) by mouth 3 (three) times daily as needed for Dizziness (or at least one HS for 1 week when vertigo active). (Patient not taking: Reported on 4/5/2022) 30 tablet 12    metoprolol succinate (TOPROL-XL) 25 MG 24 hr tablet       nicotine (NICODERM CQ) 14 mg/24 hr Place 1 patch onto the skin once daily. (Patient not taking: Reported on 4/5/2022)  0    OMNIPAQUE 300 300 mg iodine/mL injection       OMNIPAQUE 350 350 mg iodine/mL Soln injection       ondansetron (ZOFRAN-ODT) 8 MG TbDL       prochlorperazine (COMPAZINE) 5 MG tablet        Current Facility-Administered Medications   Medication Dose Route  Frequency Provider Last Rate Last Admin    testosterone cypionate injection 400 mg  400 mg Intramuscular Q28 Days Marcelino Del Toro MD           Past Medical History:   Diagnosis Date    Actinic keratosis     Anxiety     B12 deficiency 12/8/2014    Dx 6/14    Cancer     skin    Cataract     Coronary artery disease     Diabetes mellitus type II     Diabetes mellitus with peripheral circulatory disorder 6/18/2014    ED (erectile dysfunction)     Hyperlipidemia     Kidney stone     Neurogenic claudication 4/4/2022    Peripheral vascular disease     Spondylosis 3/29/2019    Tobacco dependence     Urinary incontinence 5/4/2021     Past Surgical History:   Procedure Laterality Date    APPENDECTOMY      CARDIAC SURGERY  01/1999    CABG 4 vessels    CATARACT EXTRACTION      EPIDURAL STEROID INJECTION Right 8/26/2020    Procedure: Injection, Steroid, Epidural Transforaminal;  Surgeon: Wiley Crane Jr., MD;  Location: [facility] ENDO;  Service: Pain Management;  Laterality: Right;  Right L5 + S1 TF LEAH  Arrive @ 1115; ASA & Plavix last 8/18; Check BG; Rapid COVID test    EPIDURAL STEROID INJECTION Right 11/25/2020    Procedure: Injection, Steroid, Epidural Transformainal;  Surgeon: Wiley Crane Jr., MD;  Location: [facility] ENDO;  Service: Pain Management;  Laterality: Right;  Right L5 + S1 TF LEAH  Arrive @ 1245; ASA and Plavix last 11/17; Pre-DM; Needs MD Sign.    EPIDURAL STEROID INJECTION Right 2/19/2021    Procedure: Injection, Steroid, Epidural Transforaminal;  Surgeon: Wiley Crane Jr., MD;  Location: [facility] ENDO;  Service: Pain Management;  Laterality: Right;  Right L5 + S1 TF LEAH  Arrive @ 1115; ASA & Plavix last 2/11; Check BG; Needs Consent    EXTERNAL EAR SURGERY      EYE SURGERY      cataracts    ILIAC ARTERY STENT Bilateral 06/2003    also Right External Iliac stent     Family History     Problem Relation (Age of Onset)    Stroke Mother        Social History     Socioeconomic  "History    Marital status:    Tobacco Use    Smoking status: Current Every Day Smoker     Packs/day: 1.50     Years: 71.00     Pack years: 106.50     Types: Cigarettes    Smokeless tobacco: Former User   Substance and Sexual Activity    Alcohol use: No    Drug use: No    Sexual activity: Not Currently     Partners: Female   Social History Narrative    .  4 children.  Smokes 2 ppd.  Still drives trucks for entertainment industry.        Review of Systems  Constitutional: no fever or chills  Eyes: no visual changes  ENT: no nasal congestion or sore throat  Respiratory: no cough or shortness of breath  Cardiovascular: no chest pain or palpitations  Gastrointestinal: no nausea or vomiting  Genitourinary: no hematuria or dysuria  Integument/Breast: no rash or pruritis  Hematologic/Lymphatic: no easy bruising or lymphadenopathy  Musculoskeletal: no arthralgias or myalgias  Neurological: no seizures or tremors    OBJECTIVE:     Vital Signs  Pulse: 80  BP: 124/75  Pain Score: 0-No pain  Height: 5' 7" (170.2 cm)  Weight: 61.2 kg (135 lb)  Body mass index is 21.14 kg/m².    Neurosurgery Physical Exam  General: no distress  Neurologic: Alert and oriented. Thought content appropriate.  Head: normocephalic  GCS: Motor: 6/Verbal: 5/Eyes: 4 GCS Total: 15  Cranial nerves: face symmetric, tongue midline, pupils equal, round, reactive to light with accomodation, extraocular muscles intact  Sensory: response to light touch throughout  Motor Strength:full strength upper and lower extremities  Pronator Drift: no drift noted  Finger to nose normal  No focal numbness or weakness  Lungs:  normal respiratory effort  Abdomen: soft, non-tender   Extremities: no cyanosis or edema, or clubbing      Diagnostic Results: personally reviewed  EXAMINATION:  CT HEAD WITHOUT CONTRAST     CLINICAL HISTORY:  Intracranial shunt placement, follow up; (Idiopathic) normal pressure hydrocephalus     TECHNIQUE:  Low dose axial images were " obtained through the head.  Coronal and sagittal reformations were also performed. Contrast was not administered.     COMPARISON:  Prior studies with the most recent dated 3-     FINDINGS:  Intraventricular catheter is identified with its tip within the left frontal horn, unchanged in position.  The ventricles are stable in appearance.  No midline shift, mass effect, intracranial hemorrhage, or acute territorial infarct.  Decreased attenuation within the periventricular white matter is nonspecific but stable and may reflect mild chronic small vessel ischemic change.     Atherosclerotic vascular calcifications are noted at the skull base.     The visualized sinuses and mastoid air cells are clear.     Impression:     Stable appearance of the brain and ventricles when compared to the prior study.        Electronically signed by: Emiliano Correa  Date:                                            04/04/2022  Time:                                           11:13    ASSESSMENT/PLAN:     Narciso Yanez is an 86 y.o. 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  Head CT shows marked interval reduction in size of subdural hygroma's.  Will keep shunt setting at 200, plan for f/u in 6 weeks with repeat HCT.  Can discuss potentially dialing the shunt down at that time if scan is stable. Patient/Son was encouraged to call the clinic with any questions or concerns.      Alex Kathleen Jr. PA-C  [facility]  Department of Neurosurgery      Note dictated with voice recognition software, please excuse any grammatical errors.

## 2022-04-07 ENCOUNTER — TELEPHONE (OUTPATIENT)
Dept: FAMILY MEDICINE | Facility: CLINIC | Age: 87
End: 2022-04-07
Payer: MEDICARE

## 2022-04-07 NOTE — TELEPHONE ENCOUNTER
----- Message from Luzmaria Michele sent at 4/7/2022 11:26 AM CDT -----  Regarding: surekha jones  Type: Patient Call Back       What is the request in detail: Wiley calling requesting a new script for diazePAM (VALIUM) 5 MG tablet.      Can the clinic reply by [facility]? No       Would the patient rather a call back or a response via [facility]? Call back       Best call back number:    [facility] - [place], [place] - [address]  [address] [address]  Phone: [phone] Fax: [phone]

## 2022-04-07 NOTE — TELEPHONE ENCOUNTER
----- Message from Cong Velázquez sent at 4/7/2022  1:18 PM CDT -----  Type:  Patient Returning Call    Who Called: Self    Who Left Message for Patient: Chika    Does the patient know what this is regarding?:Pt states the  medication isn't there    Would the patient rather a call back or a response via [facility]? Call back    Best Call Back Number:[phone]

## 2022-04-18 ENCOUNTER — OUTPATIENT CASE MANAGEMENT (OUTPATIENT)
Dept: ADMINISTRATIVE | Facility: OTHER | Age: 87
End: 2022-04-18
Payer: MEDICARE

## 2022-04-18 NOTE — PROGRESS NOTES
Outpatient Care Management  Patient Does Not Consent    Patient: Narciso Yanez  MRN:  [id number]  Date of Service:  4/18/2022  Completed by:  Sailaja Davila, RN    Chief Complaint   Patient presents with    OPCM RN First Assessment Attempt    OPCM Chart Review    OPCM Enrollment Call       Patient Summary     Program:  OPCM High Risk  Qualification Status:  No   Pt voiced that he is about to go back to work and will be located in areas without cell service. Pt states that he would appreciate a callback possibly.

## 2022-05-02 NOTE — TELEPHONE ENCOUNTER
No new care gaps identified.  Powered by [employer]. Reference number: [id number].   5/02/2022 3:47:00 PM CDT

## 2022-05-03 RX ORDER — LANCETS 33 GAUGE
EACH MISCELLANEOUS
Qty: 200 EACH | Refills: 12 | Status: SHIPPED | OUTPATIENT
Start: 2022-05-03 | End: 2023-05-23

## 2022-05-03 NOTE — TELEPHONE ENCOUNTER
Refill Routing Note   Medication(s) are not appropriate for processing by [facility] for the following reason(s):      - Patient has been seen in the ED/Hospital since the last PCP visit    [facility] action(s):  Route          Medication reconciliation completed: No     Appointments  past 12m or future 3m with PCP    Date Provider   Last Visit   3/4/2022 Marcelino Del Toro MD   Next Visit   Visit date not found Marcelino Del Toro MD   ED visits in past 90 days: 1        Note composed:8:38 AM 05/03/2022

## 2022-05-04 ENCOUNTER — TELEPHONE (OUTPATIENT)
Dept: NEUROSURGERY | Facility: CLINIC | Age: 87
End: 2022-05-04
Payer: MEDICARE

## 2022-05-04 NOTE — TELEPHONE ENCOUNTER
----- Message from Hernandez Gutierres MA sent at 5/3/2022  5:07 PM CDT -----    ----- Message -----  From: Maia Peguero  Sent: 5/3/2022   8:07 AM CDT  To: Frannie RAINEY Staff    Narciso Yanez calling regarding his appt at CT head and appt w/ Dr. Alvarez @ 11am today, he stated he is not feeling well, can you reschedule it to another day and call pt to let him know when you schedule it for   call back [phone]

## 2022-05-31 ENCOUNTER — HOSPITAL ENCOUNTER (OUTPATIENT)
Dept: RADIOLOGY | Facility: HOSPITAL | Age: 87
Discharge: HOME | End: 2022-05-31
Attending: NEUROLOGICAL SURGERY
Payer: MEDICARE

## 2022-05-31 ENCOUNTER — HOSPITAL ENCOUNTER (OUTPATIENT)
Dept: RADIOLOGY | Facility: HOSPITAL | Age: 87
Discharge: HOME | End: 2022-05-31
Attending: PHYSICIAN ASSISTANT
Payer: MEDICARE

## 2022-05-31 ENCOUNTER — OFFICE VISIT (OUTPATIENT)
Dept: NEUROSURGERY | Facility: CLINIC | Age: 87
End: 2022-05-31
Payer: MEDICARE

## 2022-05-31 VITALS
DIASTOLIC BLOOD PRESSURE: 73 MMHG | HEART RATE: 89 BPM | BODY MASS INDEX: 21.19 KG/M2 | SYSTOLIC BLOOD PRESSURE: 113 MMHG | WEIGHT: 135 LBS | HEIGHT: 67 IN

## 2022-05-31 DIAGNOSIS — G91.2 NPH (NORMAL PRESSURE HYDROCEPHALUS): ICD-10-CM

## 2022-05-31 DIAGNOSIS — G91.2 NPH (NORMAL PRESSURE HYDROCEPHALUS): Primary | ICD-10-CM

## 2022-05-31 PROCEDURE — 99213 OFFICE O/P EST LOW 20 MIN: CPT | Mod: S$GLB,,, | Performed by: PHYSICIAN ASSISTANT

## 2022-05-31 PROCEDURE — 99213 PR OFFICE/OUTPT VISIT, EST, LEVL III, 20-29 MIN: ICD-10-PCS | Mod: S$GLB,,, | Performed by: PHYSICIAN ASSISTANT

## 2022-05-31 PROCEDURE — 3078F DIAST BP <80 MM HG: CPT | Mod: CPTII,S$GLB,, | Performed by: PHYSICIAN ASSISTANT

## 2022-05-31 PROCEDURE — 1159F MED LIST DOCD IN RCRD: CPT | Mod: CPTII,S$GLB,, | Performed by: PHYSICIAN ASSISTANT

## 2022-05-31 PROCEDURE — 99999 PR PBB SHADOW E&M-EST. PATIENT-LVL V: CPT | Mod: PBBFAC,,, | Performed by: PHYSICIAN ASSISTANT

## 2022-05-31 PROCEDURE — 70250 X-RAY EXAM OF SKULL: CPT | Mod: TC

## 2022-05-31 PROCEDURE — 70450 CT HEAD WITHOUT CONTRAST: ICD-10-PCS | Mod: 26,,, | Performed by: RADIOLOGY

## 2022-05-31 PROCEDURE — 70250 XR SKULL SHUNT PLACEMENT 1 VIEW: ICD-10-PCS | Mod: 26,,, | Performed by: RADIOLOGY

## 2022-05-31 PROCEDURE — 1126F PR PAIN SEVERITY QUANTIFIED, NO PAIN PRESENT: ICD-10-PCS | Mod: CPTII,S$GLB,, | Performed by: PHYSICIAN ASSISTANT

## 2022-05-31 PROCEDURE — 1159F PR MEDICATION LIST DOCUMENTED IN MEDICAL RECORD: ICD-10-PCS | Mod: CPTII,S$GLB,, | Performed by: PHYSICIAN ASSISTANT

## 2022-05-31 PROCEDURE — 3074F SYST BP LT 130 MM HG: CPT | Mod: CPTII,S$GLB,, | Performed by: PHYSICIAN ASSISTANT

## 2022-05-31 PROCEDURE — 70450 CT HEAD/BRAIN W/O DYE: CPT | Mod: 26,,, | Performed by: RADIOLOGY

## 2022-05-31 PROCEDURE — 70250 X-RAY EXAM OF SKULL: CPT | Mod: 26,,, | Performed by: RADIOLOGY

## 2022-05-31 PROCEDURE — 3074F PR MOST RECENT SYSTOLIC BLOOD PRESSURE < 130 MM HG: ICD-10-PCS | Mod: CPTII,S$GLB,, | Performed by: PHYSICIAN ASSISTANT

## 2022-05-31 PROCEDURE — 70450 CT HEAD/BRAIN W/O DYE: CPT | Mod: TC

## 2022-05-31 PROCEDURE — 99999 PR PBB SHADOW E&M-EST. PATIENT-LVL V: ICD-10-PCS | Mod: PBBFAC,,, | Performed by: PHYSICIAN ASSISTANT

## 2022-05-31 PROCEDURE — 1126F AMNT PAIN NOTED NONE PRSNT: CPT | Mod: CPTII,S$GLB,, | Performed by: PHYSICIAN ASSISTANT

## 2022-05-31 PROCEDURE — 3078F PR MOST RECENT DIASTOLIC BLOOD PRESSURE < 80 MM HG: ICD-10-PCS | Mod: CPTII,S$GLB,, | Performed by: PHYSICIAN ASSISTANT

## 2022-05-31 PROCEDURE — 1160F PR REVIEW ALL MEDS BY PRESCRIBER/CLIN PHARMACIST DOCUMENTED: ICD-10-PCS | Mod: CPTII,S$GLB,, | Performed by: PHYSICIAN ASSISTANT

## 2022-05-31 PROCEDURE — 1160F RVW MEDS BY RX/DR IN RCRD: CPT | Mod: CPTII,S$GLB,, | Performed by: PHYSICIAN ASSISTANT

## 2022-05-31 RX ORDER — TETANUS TOXOID, REDUCED DIPHTHERIA TOXOID AND ACELLULAR PERTUSSIS VACCINE, ADSORBED 5; 2.5; 8; 8; 2.5 [IU]/.5ML; [IU]/.5ML; UG/.5ML; UG/.5ML; UG/.5ML
SUSPENSION INTRAMUSCULAR
Status: ON HOLD | COMMUNITY
Start: 2022-03-23 | End: 2023-10-23 | Stop reason: HOSPADM

## 2022-05-31 NOTE — PROGRESS NOTES
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 The patient's shunt was previously dialed up to 200 on 3/24/22 to decrease b/l subdural hygromas. He presents today for a follow up with a repeat HCT to ensure resolution of hygromas. Patient reports that he has recently felt his balance is decreased. His daughter aids with the history and she reports that he has gait instability and difficulty with ambulating. He denies recent falls. He remains on Plavix daily.      Review of patient's allergies indicates:   Allergen Reactions    Demerol [meperidine] Nausea Only       Current Outpatient Medications   Medication Sig Dispense Refill    ACCU-CHEK JOAQUIN PLUS METER Misc Three times a day with meals  0    ACCU-CHEK SOFTCLIX LANCETS Misc Check tid 200 each 12    acetaminophen (TYLENOL) 325 MG tablet Take 2 tablets (650 mg total) by mouth every 4 (four) hours as needed for Pain.  0    albuterol-ipratropium (DUO-NEB) 2.5 mg-0.5 mg/3 mL nebulizer solution Take 3 mLs by nebulization every 4 (four) hours as needed for Wheezing. 9 mL 12    alendronate (FOSAMAX) 70 MG tablet Take 1 tablet (70 mg total) by mouth every 7 days. Patient takes on Tuesdays 4 tablet 11    ascorbic acid, vitamin C, (VITAMIN C) 250 MG tablet Take 1 tablet (250 mg total) by mouth once daily. 120 tablet 0    aspirin (ECOTRIN) 81 MG EC tablet Take 1 tablet (81 mg total) by mouth once daily.  0    blood sugar diagnostic (TRUE METRIX GLUCOSE TEST STRIP) Strp  strip 12    blood sugar diagnostic (TRUE METRIX GLUCOSE TEST STRIP) Strp  each 12    BOOSTRIX TDAP 2.5-8-5 Lf-mcg-Lf/0.5mL Syrg injection       ciclopirox (PENLAC) 8 % Soln Apply topically nightly. 6.6 mL 11    clopidogreL (PLAVIX) 75 mg tablet Take 1 tablet (75 mg total) by mouth once daily. 30 tablet 12    diazePAM (VALIUM) 5 MG tablet Take 1 tablet (5 mg total) by mouth every 8  (eight) hours as needed. 270 tablet 5    magnesium sulfate in water (MAGNESIUM SULFATE 2 GRAM/50 ML) 2 gram/50 mL PgBk       metFORMIN (GLUCOPHAGE-XR) 750 MG ER 24hr tablet TAKE 1 TABLET TWICE DAILY WITH MEALS 180 tablet 12    ondansetron (ZOFRAN-ODT) 4 MG TbDL Take 2 tablets (8 mg total) by mouth every 8 (eight) hours as needed (nausea). 90 tablet 0    ondansetron (ZOFRAN-ODT) 8 MG TbDL       rosuvastatin (CRESTOR) 20 MG tablet Take 1 tablet (20 mg total) by mouth every evening. (Patient taking differently: Take 20 mg by mouth once daily.) 90 tablet 12    TRUEPLUS LANCETS 33 gauge Misc TEST BLOOD SUGAR TWICE DAILY 200 each 12    alendronate (FOSAMAX) 70 MG tablet Take 1 tablet (70 mg total) by mouth every 7 days. Patient takes on Tuesdays (Patient not taking: No sig reported) 4 tablet 11    heparin sodium,porcine (HEPARIN, PORCINE,) 5,000 unit/mL injection Inject 1 mL (5,000 Units total) into the skin every 8 (eight) hours. (Patient not taking: No sig reported)      insulin aspart U-100 (NOVOLOG) 100 unit/mL (3 mL) InPn pen Inject 0-5 Units into the skin before meals and at bedtime as needed (Hyperglycemia). (Patient not taking: No sig reported)  0    meclizine (ANTIVERT) 25 mg tablet Take 1 tablet (25 mg total) by mouth 3 (three) times daily as needed for Dizziness (or at least one HS for 1 week when vertigo active). (Patient not taking: No sig reported) 30 tablet 12    metoprolol succinate (TOPROL-XL) 25 MG 24 hr tablet       nicotine (NICODERM CQ) 14 mg/24 hr Place 1 patch onto the skin once daily. (Patient not taking: No sig reported)  0    OMNIPAQUE 300 300 mg iodine/mL injection       OMNIPAQUE 350 350 mg iodine/mL Soln injection       prochlorperazine (COMPAZINE) 5 MG tablet       senna (SENOKOT) 8.6 mg tablet Take 1 tablet by mouth once daily. (Patient not taking: Reported on 5/31/2022)       Current Facility-Administered Medications   Medication Dose Route Frequency Provider Last Rate Last  Admin    testosterone cypionate injection 400 mg  400 mg Intramuscular Q28 Days Marcelino Del Toro MD           Past Medical History:   Diagnosis Date    Actinic keratosis     Anxiety     B12 deficiency 12/8/2014    Dx 6/14    Cancer     skin    Cataract     Coronary artery disease     Diabetes mellitus type II     Diabetes mellitus with peripheral circulatory disorder 6/18/2014    ED (erectile dysfunction)     Hyperlipidemia     Kidney stone     Neurogenic claudication 4/4/2022    Peripheral vascular disease     Spondylosis 3/29/2019    Tobacco dependence     Urinary incontinence 5/4/2021     Past Surgical History:   Procedure Laterality Date    APPENDECTOMY      CARDIAC SURGERY  01/1999    CABG 4 vessels    CATARACT EXTRACTION      EPIDURAL STEROID INJECTION Right 8/26/2020    Procedure: Injection, Steroid, Epidural Transforaminal;  Surgeon: Wiley Crane Jr., MD;  Location: [facility] ENDO;  Service: Pain Management;  Laterality: Right;  Right L5 + S1 TF LEAH  Arrive @ 1115; ASA & Plavix last 8/18; Check BG; Rapid COVID test    EPIDURAL STEROID INJECTION Right 11/25/2020    Procedure: Injection, Steroid, Epidural Transformainal;  Surgeon: Wiley Crane Jr., MD;  Location: [facility] ENDO;  Service: Pain Management;  Laterality: Right;  Right L5 + S1 TF LEAH  Arrive @ 1245; ASA and Plavix last 11/17; Pre-DM; Needs MD Sign.    EPIDURAL STEROID INJECTION Right 2/19/2021    Procedure: Injection, Steroid, Epidural Transforaminal;  Surgeon: Wiley Crane Jr., MD;  Location: [facility] ENDO;  Service: Pain Management;  Laterality: Right;  Right L5 + S1 TF LEAH  Arrive @ 1115; ASA & Plavix last 2/11; Check BG; Needs Consent    EXTERNAL EAR SURGERY      EYE SURGERY      cataracts    ILIAC ARTERY STENT Bilateral 06/2003    also Right External Iliac stent     Family History     Problem Relation (Age of Onset)    Stroke Mother        Social History     Socioeconomic History    Marital status:   "  Tobacco Use    Smoking status: Current Every Day Smoker     Packs/day: 1.50     Years: 71.00     Pack years: 106.50     Types: Cigarettes    Smokeless tobacco: Former User   Substance and Sexual Activity    Alcohol use: No    Drug use: No    Sexual activity: Not Currently     Partners: Female   Social History Narrative    .  4 children.  Smokes 2 ppd.  Still drives trucks for entertainment industry.        Review of Systems  Constitutional: no fever or chills  Eyes: no visual changes  ENT: no nasal congestion or sore throat  Respiratory: no cough or shortness of breath  Cardiovascular: no chest pain or palpitations  Gastrointestinal: no nausea or vomiting  Genitourinary: no hematuria or dysuria  Integument/Breast: no rash or pruritis  Hematologic/Lymphatic: no easy bruising or lymphadenopathy  Musculoskeletal: no arthralgias or myalgias  Neurological: no seizures or tremors. Reports gait difficulty and decreased balance.     OBJECTIVE:     Vital Signs  Pulse: 89  BP: 113/73  Pain Score: 0-No pain  Height: 5' 7" (170.2 cm)  Weight: 61.2 kg (135 lb)  Body mass index is 21.14 kg/m².    Neurosurgery Physical Exam  General: no distress  Neurologic: Alert and oriented. Thought content appropriate.  Head: normocephalic  GCS: Motor: 6/Verbal: 5/Eyes: 4 GCS Total: 15  Cranial nerves: face symmetric, tongue midline, pupils equal, round, reactive to light with accomodation, extraocular muscles intact  Sensory: response to light touch throughout  Motor Strength:full strength upper and lower extremities  Pronator Drift: no drift noted  Finger to nose normal  No focal numbness or weakness  Lungs:  normal respiratory effort   Extremities: no cyanosis or edema, or clubbing      Diagnostic Results:  EXAMINATION:  CT HEAD WITHOUT CONTRAST     CLINICAL HISTORY:  follow up hygroma; (Idiopathic) normal pressure hydrocephalus     TECHNIQUE:  Low dose axial CT images obtained throughout the head without intravenous contrast. " Sagittal and coronal reconstructions were performed.     COMPARISON:  CT head 04/04/2022, 03/23/2022, 02/03/2022     FINDINGS:  Right parietal ventricular shunt catheter in stable position with the tip in the left lateral ventricle.  Stable configuration of the ventricular system.  The 3rd ventricle measures 1 cm, similar to prior.     No extra-axial blood or fluid collections.     Stable patchy periventricular white matter hypoattenuation which is nonspecific, however likely related to chronic ischemic microvascular changes.  No acute intraparenchymal hemorrhage or major vascular distribution infarction no significant mass effect or midline shift     No fracture. Mastoid air cells and paranasal sinuses are essentially clear.     Impression:     Right parietal ventricular catheter with stable configuration of the ventricular system.     No acute intraparenchymal hemorrhage or major vascular distribution infarction.     Electronically signed by resident: Elaine Oconnell MD  Date:                                            05/31/2022  Time:                                           13:13    ASSESSMENT/PLAN:     Naricso Yanez is an 86 y.o. 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 He reports a recent increase in balance difficulty. HCT from today shows stable configuration of the ventricular system. Subdural hygromas have resolved and no acute intraparenchymal hemorrhage or major vascular distribution infarction present. Shunt dialed down to 180 today in office. Follow up in 1mo with HCT to ensure scan is stable. Patient and daughter encouraged to follow up with any questions or concerns in the meantime.       Alex Kathleen Jr. KARLEY  [facility]  Department of Neurosurgery      Note dictated with voice recognition software, please excuse any grammatical errors.

## 2022-06-03 RX ORDER — ROSUVASTATIN CALCIUM 20 MG/1
20 TABLET, COATED ORAL NIGHTLY
Qty: 90 TABLET | Refills: 12 | Status: SHIPPED | OUTPATIENT
Start: 2022-06-03 | End: 2023-08-27

## 2022-06-03 NOTE — TELEPHONE ENCOUNTER
Care Due:                  Date            Visit Type   Department     Provider  --------------------------------------------------------------------------------                                [facility]                              PRIMARY      MED/ INTERNAL  Marcelino Jacobo  Last Visit: 03-      CARE ([facility])   MED/ PEDS      Ehren[place]  Next Visit: None Scheduled  None         None Found                                                            Last  Test          Frequency    Reason                     Performed    Due Date  --------------------------------------------------------------------------------    HBA1C.......  6 months...  metFORMIN................  02- 08-    [facility] Embedded Care Gaps. Reference number: [id number]. 6/03/2022   2:23:07 PM CDT

## 2022-06-03 NOTE — TELEPHONE ENCOUNTER
Refill Routing Note   Medication(s) are not appropriate for processing by [facility] for the following reason(s):      - Patient has been seen in the ED/Hospital since the last PCP visit    [facility] action(s):  Route Medication-related problems identified: Requires labs        Medication reconciliation completed: No     Appointments  past 12m or future 3m with PCP    Date Provider   Last Visit   3/4/2022 Marcelino Del Toro MD   Next Visit   Visit date not found Marcelino Del Toro MD   ED visits in past 90 days: 1        Note composed:3:09 PM 06/03/2022

## 2022-06-07 ENCOUNTER — TELEPHONE (OUTPATIENT)
Dept: FAMILY MEDICINE | Facility: CLINIC | Age: 87
End: 2022-06-07
Payer: MEDICARE

## 2022-06-07 NOTE — TELEPHONE ENCOUNTER
----- Message from Prabha Galindo sent at 6/7/2022  2:57 PM CDT -----  Type: Patient Call Back    Who called: self     What is the request in detail: Patient would like to speak with a nurse regarding his medications. Would not go into further detail.     Can the clinic reply by [facility]? No     Would the patient rather a call back or a response via [facility]? Call back     Best call back number: [phone]

## 2022-06-08 ENCOUNTER — OFFICE VISIT (OUTPATIENT)
Dept: FAMILY MEDICINE | Facility: CLINIC | Age: 87
End: 2022-06-08
Payer: MEDICARE

## 2022-06-08 ENCOUNTER — PATIENT MESSAGE (OUTPATIENT)
Dept: FAMILY MEDICINE | Facility: CLINIC | Age: 87
End: 2022-06-08

## 2022-06-08 VITALS
TEMPERATURE: 97 F | WEIGHT: 127.19 LBS | DIASTOLIC BLOOD PRESSURE: 64 MMHG | BODY MASS INDEX: 19.96 KG/M2 | HEART RATE: 96 BPM | OXYGEN SATURATION: 96 % | SYSTOLIC BLOOD PRESSURE: 120 MMHG | HEIGHT: 67 IN

## 2022-06-08 DIAGNOSIS — R42 VERTIGO: ICD-10-CM

## 2022-06-08 DIAGNOSIS — M35.3 POLYMYALGIA RHEUMATICA: ICD-10-CM

## 2022-06-08 DIAGNOSIS — F39 MOOD DISORDER: ICD-10-CM

## 2022-06-08 DIAGNOSIS — I73.9 PAD (PERIPHERAL ARTERY DISEASE): ICD-10-CM

## 2022-06-08 DIAGNOSIS — I25.810 CORONARY ARTERY DISEASE INVOLVING CORONARY BYPASS GRAFT OF NATIVE HEART WITHOUT ANGINA PECTORIS: ICD-10-CM

## 2022-06-08 DIAGNOSIS — J41.0 SMOKERS' COUGH: ICD-10-CM

## 2022-06-08 DIAGNOSIS — Z72.0 TOBACCO ABUSE: ICD-10-CM

## 2022-06-08 DIAGNOSIS — G91.2 NPH (NORMAL PRESSURE HYDROCEPHALUS): ICD-10-CM

## 2022-06-08 DIAGNOSIS — I10 ESSENTIAL HYPERTENSION: ICD-10-CM

## 2022-06-08 DIAGNOSIS — K59.00 CONSTIPATION, UNSPECIFIED CONSTIPATION TYPE: Primary | ICD-10-CM

## 2022-06-08 DIAGNOSIS — J44.9 CHRONIC OBSTRUCTIVE PULMONARY DISEASE, UNSPECIFIED COPD TYPE: ICD-10-CM

## 2022-06-08 DIAGNOSIS — N18.31 STAGE 3A CHRONIC KIDNEY DISEASE: ICD-10-CM

## 2022-06-08 DIAGNOSIS — E11.65 UNCONTROLLED TYPE 2 DIABETES MELLITUS WITH HYPERGLYCEMIA: ICD-10-CM

## 2022-06-08 PROCEDURE — 3288F FALL RISK ASSESSMENT DOCD: CPT | Mod: CPTII,S$GLB,, | Performed by: INTERNAL MEDICINE

## 2022-06-08 PROCEDURE — 3078F DIAST BP <80 MM HG: CPT | Mod: CPTII,S$GLB,, | Performed by: INTERNAL MEDICINE

## 2022-06-08 PROCEDURE — 3288F PR FALLS RISK ASSESSMENT DOCUMENTED: ICD-10-PCS | Mod: CPTII,S$GLB,, | Performed by: INTERNAL MEDICINE

## 2022-06-08 PROCEDURE — 1101F PR PT FALLS ASSESS DOC 0-1 FALLS W/OUT INJ PAST YR: ICD-10-PCS | Mod: CPTII,S$GLB,, | Performed by: INTERNAL MEDICINE

## 2022-06-08 PROCEDURE — 1101F PT FALLS ASSESS-DOCD LE1/YR: CPT | Mod: CPTII,S$GLB,, | Performed by: INTERNAL MEDICINE

## 2022-06-08 PROCEDURE — 1159F MED LIST DOCD IN RCRD: CPT | Mod: CPTII,S$GLB,, | Performed by: INTERNAL MEDICINE

## 2022-06-08 PROCEDURE — 3074F PR MOST RECENT SYSTOLIC BLOOD PRESSURE < 130 MM HG: ICD-10-PCS | Mod: CPTII,S$GLB,, | Performed by: INTERNAL MEDICINE

## 2022-06-08 PROCEDURE — 3078F PR MOST RECENT DIASTOLIC BLOOD PRESSURE < 80 MM HG: ICD-10-PCS | Mod: CPTII,S$GLB,, | Performed by: INTERNAL MEDICINE

## 2022-06-08 PROCEDURE — 99999 PR PBB SHADOW E&M-EST. PATIENT-LVL IV: ICD-10-PCS | Mod: PBBFAC,,, | Performed by: INTERNAL MEDICINE

## 2022-06-08 PROCEDURE — 1159F PR MEDICATION LIST DOCUMENTED IN MEDICAL RECORD: ICD-10-PCS | Mod: CPTII,S$GLB,, | Performed by: INTERNAL MEDICINE

## 2022-06-08 PROCEDURE — 99499 RISK ADDL DX/OHS AUDIT: ICD-10-PCS | Mod: S$GLB,,, | Performed by: INTERNAL MEDICINE

## 2022-06-08 PROCEDURE — 99499 UNLISTED E&M SERVICE: CPT | Mod: S$GLB,,, | Performed by: INTERNAL MEDICINE

## 2022-06-08 PROCEDURE — 3074F SYST BP LT 130 MM HG: CPT | Mod: CPTII,S$GLB,, | Performed by: INTERNAL MEDICINE

## 2022-06-08 PROCEDURE — 99215 PR OFFICE/OUTPT VISIT, EST, LEVL V, 40-54 MIN: ICD-10-PCS | Mod: S$GLB,,, | Performed by: INTERNAL MEDICINE

## 2022-06-08 PROCEDURE — 99999 PR PBB SHADOW E&M-EST. PATIENT-LVL IV: CPT | Mod: PBBFAC,,, | Performed by: INTERNAL MEDICINE

## 2022-06-08 PROCEDURE — 99215 OFFICE O/P EST HI 40 MIN: CPT | Mod: S$GLB,,, | Performed by: INTERNAL MEDICINE

## 2022-06-08 RX ORDER — LACTULOSE 10 G/15ML
30 SOLUTION ORAL 3 TIMES DAILY PRN
Qty: 1500 ML | Refills: 12 | Status: ON HOLD | OUTPATIENT
Start: 2022-06-08 | End: 2023-10-10

## 2022-06-08 NOTE — PROGRESS NOTES
Chief complaint  discussed prescription issues, intermittent weakness and instability, constipation    physical exam 3/22    Patient is an 86-year-old white male .  He has no plans to quit smoking.  Labs reviewed.  No interest in pursuing any further colon cancer screening.  Patient has had about five months to a year of some new constipation ever since he had the shunt put in.  He will sometimes go days and then pass large hard bowel movements.  Lately he has been taking milk of magnesia every day and two stool softeners twice a day along with some fiber pills.  We discussed holding off on the fiber.  He tried MiraLax but said it did not work and we discussed he probably did not use enough.  He could retry the MiraLax but also will try him on some lactulose while continuing stool softeners and so forth.  Never had a colonoscopy and discussed that obviously if it worsens or he gets abdominal pain or distension we may need to pursue some form of imaging or scope.    Some mornings he wakes up and is a little bit off balance and can not walk but is not necessarily because his legs do not work more just unstable.  Encouraged him to keep his regular follow-up with the [facility] as it does appear by review that they are adjusting the pressure and so forth.    Continues to use his Valium maybe just a couple of times per week for anxiety and stress.  Recently had issues with the refill.  We reviewed the monitoring site and he was due for refill but apparently pharmacy was saying otherwise but he has since had that cleared up and has a prescription pending at the pharmacy that he will .  Thereafter he would like to switch to a new pharmacy away from [facility].  He will keep it at [facility] since he has some refills they are currently but thereafter would like to use a different pharmacy listed.      Patient does have diabetes.  2/22  A1c of 6.6 is acceptable      Patient had issues with vertigo.  Thought it might  have been his normal pressure hydrocephalus.  The positional vertigo still is intermittent and continues and probably independent of the hydrocephalus.  He went back to work as a  for the [employer].  After about a week he turned to the side then look back to got some vertigo briefly so he went home that day.  He has had another recurrence.  He takes a Valium 5 mg for anxiety.  I discussed and explained him it also can suppress vertigo so he definitely needs to continue to take one in the morning and six or 8 hours later he may need to take another if he feels any vertigo coming back.  Taking it twice a day I do not feel have any problems with addiction and so forth.    Labs reviewed, creatinine up 1.5 but then improved three months ago to 1.3..          History of significant hearing loss but now benefiting from hearing aids.    .  Evaluation and management of all the  issues will be based upon medical decision making.    Regarding polymyalgia, looks like he is off the prednisone with no major recurrence of symptoms.     Endocrine did discontinue Amaryl secondary to morning low sugars.  His last A1c in February was 6.6..        Seen cards for PVD - LACIE etc and had Angio  3/19  Right Lower Arterial YUE is stented.   Right YUE stent velocity origin: proximal: 106, mid: 92, distal: 133   Left Lower Arterial YUE is stented.   Left YUE stent velocity origin: proximal: 150, mid: 158, distal: 175   CFA demonstrates mild (<50%) stenosis.     As of 5/27/21, the patient underwent 30cc lumbar puncture yesterday. PT evaluation showed objective improvement in 3 out of 4 measures yesterday. His daughter reports that he was also more talkative than usual after the LP. He remains off of ASA and Plavix.    As of 6/24/21, the patient underwent placement of  shunt on 5/31/21. Etelvina reports that he has been cognitively much improved since the shunt was placed; however, since Saturday, he has had significant daily headache  "and vomiting. CT head and shunt series obtained prior to today's appointment are personally reviewed and demonstrate interval improvement in 3rd ventricular size without apparent overlying hygroma or hematoma. No ileus on shunt series. He has been having regular BM, he has not been smoking. Etelvina and Mr. Yanez do endorse unintentional weight loss over the past few months. His walls has been DCed.    As of 7/1/21, the patient reports that his nausea has completely resolved since reprogramming. He has been discharged home. His daughter is pleased with how he is doing, but thinks that his mental status is not quite as brisk as it was when the shunt was draining more. He also has what appears to be cellulitis of the right great toe. He is scheduled to see podiatry next week.    As of 9/20/21, the patient's daughter reports that the dizziness and emesis were present well before the shunt and have not significantly worsened since. They have pursued workup with multiple ENT without significant improvement. His daughter feels that he was doing much better when the shunt was draining. He has worse bruising on his arms today. He has also obviously lost weight. They are planning to see the dermatologist.    His CT head does not demonstrate any concern for blood or extraaxial collection    As of 11/18/21, the patient reports that he had a 50% improvement in symptoms, though he has had some worsening again over the past 2 weeks.    He still has decreased appetite and fluid. The dizziness was coming from his "bathroom" medications and has resolved since stopping them.    He is currently in the process of getting hearing aids s/p ruptured eardrum.    As of 12/30/21, the patient has been having rapid hearing loss. Over 2 months, he has had significant loss in hearing. He is accompanied today by his daughter Norah, who wears hearing aids herself.    He has gained about 2 lbs.    As of 2/3/22, the patient is now using a hearing " aid, which is helpful. There has been no other etiology identified for his hearing loss. He had some nausea (attributed to stomach virus) and coughing during the week. He is reportedly covid negative and eager to return to work. 1       I did point out to him that his B12 level  was low years ago and it does look like he took a supplement and the B12 level did rise.  He was not aware that her could remember that however and so has currently not been on a B12 supplement.  Level ok 9/18.  He did have iron deficiency in his stool testing was negative.         CT 9/19  Impression       Nodule from 03/18/2019 has increased in size, previously 6 mm now measuring 7 mm.  Malignancy not excluded.  For a solid nodule 6-8 mm, Fleischner Society 2017 guidelines recommend follow up with non-contrast chest CT at 6-12 months and 18-24 months after discovery.    Cholelithiasis.    Prominence of interstitial markings with mild bronchiectasis.     CT 3/21  Unchanged right lower lobe pulmonary nodule since 03/18/2019 consistent with a benign nodule.   Abnormal subpleural fibrotic lines, centrilobular emphysema and traction bronchiectasis concerning for underlying chronic interstitial lung disease.  It is not significantly changed.          ROS:   CONST: weight stable. EYES: no vision change. ENT: no sore throat. CV: no chest pain w/ exertion. RESP: no shortness of breath. GI: no nausea, vomiting, diarrhea. No dysphagia. : no other urinary issues. MUSCULOSKELETAL: no new myalgias or arthralgias. SKIN: no new changes. NEURO: no focal deficits. PSYCH: no new issues. ENDOCRINE: no polyuria. HEME: no lymph nodes. ALLERGY: no general pruritis.    PAST MEDICAL HISTORY:                                                        1. Hyperlipidemia.                                                           2. Coronary disease, seen prior by Dr. Roy, 4-vessel bypass in 1999.   3. Peripheral vascular disease, stented in both legs 2002 and been  on Plavix since.                                                                4. Kidney stones, last episode 2007.                                 5. Right renal cyst apparently.  Saw Dr. Labadie and then second opinion at [facility] by a Dr. Shipman, currently going to be followed.                      6. Appendectomy.                                                             7. Actinic keratosis, saw Dr. Ambriz.                                      8. Left facial numbness, probable TIA, admit [facility] 2007. Carotid ultrasound apparently clear  9. Cataracts  10. Skin cancer  11. HYPERTENSION  12.  Diabetes, uncontrolled, with circulatory disease  13.  Low HDL  14. Declines Colonoscopy  15. Pneumovax after 67 done, Prevnar 13 given   16.  B12 deficiency diagnosed     17    early satiety    18. NPH, shunt                                                                                      SOCIAL HISTORY:  He smokes 1 pack per day and does not drink.  He was about   52 years but now a .  Retired deputy in the court, 4          children.                                                                                                                                                 FAMILY HISTORY:  Father  at 63 of heart disease and stroke.  Mother       at 86, 4 brothers, 2 sisters living. Brother with strokes.            Labs, cardiac testing, interval clinic notes and MRIs reviewed      Vitals as above  General pleasant, talkative,joking  Gen: no distress, gait is normal without ataxia      Labs, x-rays and EKG reviewed       Over 70 min  minutes of total time spent on the encounter, which includes face to face time and non-face to face time preparing to see the patient (eg, review of tests), Obtaining and/or reviewing separately obtained history, Documenting clinical information in the electronic or other health record, Independently interpreting results  (not separately reported) and communicating results to the patient/family/caregiver, or Care coordination (not separately reported).            Assessment and plan:    Narciso was seen today for follow-up.    Diagnoses and all orders for this visit:    Constipation, unspecified constipation type, ongoing, plan as above    Mood disorder, chronic and stable with intermittent use of the Valium does not suggest an overall worsened mood disorder    Essential hypertension usual, chronic and stable    Tobacco abuse    Uncontrolled type 2 diabetes mellitus with hyperglycemia, chronic and stable    NPH (normal pressure hydrocephalus), continued have neurosurgery monitor    Chronic obstructive pulmonary disease, unspecified COPD type    Polymyalgia rheumatica    Vertigo, does not appear to be overt vertigo causing his problems in the morning but consider a component of some vertigo    Coronary artery disease involving coronary bypass graft of native heart without angina pectoris    PAD (peripheral artery disease)    Smokers' cough    Stage 3a chronic kidney disease    Other orders  -     lactulose (CHRONULAC) 20 gram/30 mL Soln; Take 45 mLs (30 g total) by mouth 3 (three) times daily as needed.

## 2022-06-21 ENCOUNTER — TELEPHONE (OUTPATIENT)
Dept: FAMILY MEDICINE | Facility: CLINIC | Age: 87
End: 2022-06-21
Payer: MEDICARE

## 2022-06-21 RX ORDER — MIRTAZAPINE 15 MG/1
15 TABLET, FILM COATED ORAL NIGHTLY
Qty: 30 TABLET | Refills: 11 | Status: SHIPPED | OUTPATIENT
Start: 2022-06-21 | End: 2023-06-05

## 2022-06-21 NOTE — TELEPHONE ENCOUNTER
----- Message from Narciso Brown LPN sent at 6/20/2022  9:50 AM CDT -----    Pt requesting  something to increase his appetite  ----- Message -----  From: Luke Cool MA  Sent: 6/20/2022   9:43 AM CDT  To: [facility] Staff    Type: Patient Call Back    Who called:self    What is the request in detail:pt. States he would like to get something for his appetite.. he is losing weight and does not have the urge to eat..     Can the clinic reply by [facility]?no    Would the patient rather a call back or a response via [facility]? yes    Best call back number:[phone]

## 2022-06-21 NOTE — TELEPHONE ENCOUNTER
Please give patient a call and let him know that Dr. Del Toro does have a medication called Remeron which can increase appetite and also helps people sleep so it is taken at night.  He will call that in.  It does come in some escalating doses so let us know how it works            Please let patient know I sent to his local pharmacy listed since we want to give this medicine a try 1st and possibly go up on the dose

## 2022-06-22 NOTE — TELEPHONE ENCOUNTER
Patient informed of new medication Remeron, instructions given, patient acknowledges understanding. Will give patient a follow up on new medication

## 2022-06-23 ENCOUNTER — HOSPITAL ENCOUNTER (EMERGENCY)
Facility: HOSPITAL | Age: 87
Discharge: HOME | End: 2022-06-23
Attending: EMERGENCY MEDICINE
Payer: MEDICARE

## 2022-06-23 VITALS
DIASTOLIC BLOOD PRESSURE: 67 MMHG | OXYGEN SATURATION: 96 % | WEIGHT: 123 LBS | SYSTOLIC BLOOD PRESSURE: 141 MMHG | TEMPERATURE: 98 F | HEIGHT: 67 IN | RESPIRATION RATE: 19 BRPM | HEART RATE: 58 BPM | BODY MASS INDEX: 19.3 KG/M2

## 2022-06-23 DIAGNOSIS — K59.00 CONSTIPATION, UNSPECIFIED CONSTIPATION TYPE: ICD-10-CM

## 2022-06-23 DIAGNOSIS — N40.0 BENIGN PROSTATIC HYPERPLASIA, UNSPECIFIED WHETHER LOWER URINARY TRACT SYMPTOMS PRESENT: ICD-10-CM

## 2022-06-23 DIAGNOSIS — E86.0 DEHYDRATION: ICD-10-CM

## 2022-06-23 DIAGNOSIS — R10.9 FLANK PAIN: Primary | ICD-10-CM

## 2022-06-23 LAB
ALBUMIN SERPL BCP-MCNC: 2.7 G/DL (ref 3.5–5.2)
ALP SERPL-CCNC: 44 U/L (ref 55–135)
ALT SERPL W/O P-5'-P-CCNC: 17 U/L (ref 10–44)
ANION GAP SERPL CALC-SCNC: 6 MMOL/L (ref 8–16)
AST SERPL-CCNC: 24 U/L (ref 10–40)
BASOPHILS # BLD AUTO: 0.07 K/UL (ref 0–0.2)
BASOPHILS NFR BLD: 0.7 % (ref 0–1.9)
BILIRUB SERPL-MCNC: 0.2 MG/DL (ref 0.1–1)
BILIRUB UR QL STRIP: NEGATIVE
BUN SERPL-MCNC: 35 MG/DL (ref 8–23)
CALCIUM SERPL-MCNC: 7.3 MG/DL (ref 8.7–10.5)
CHLORIDE SERPL-SCNC: 113 MMOL/L (ref 95–110)
CLARITY UR: CLEAR
CO2 SERPL-SCNC: 21 MMOL/L (ref 23–29)
COLOR UR: YELLOW
CREAT SERPL-MCNC: 0.9 MG/DL (ref 0.5–1.4)
DIFFERENTIAL METHOD: ABNORMAL
EOSINOPHIL # BLD AUTO: 0.6 K/UL (ref 0–0.5)
EOSINOPHIL NFR BLD: 6.1 % (ref 0–8)
ERYTHROCYTE [DISTWIDTH] IN BLOOD BY AUTOMATED COUNT: 16.8 % (ref 11.5–14.5)
EST. GFR  (AFRICAN AMERICAN): >60 ML/MIN/1.73 M^2
EST. GFR  (NON AFRICAN AMERICAN): >60 ML/MIN/1.73 M^2
GLUCOSE SERPL-MCNC: 81 MG/DL (ref 70–110)
GLUCOSE UR QL STRIP: NEGATIVE
HCT VFR BLD AUTO: 35.3 % (ref 40–54)
HGB BLD-MCNC: 11.9 G/DL (ref 14–18)
HGB UR QL STRIP: NEGATIVE
IMM GRANULOCYTES # BLD AUTO: 0.02 K/UL (ref 0–0.04)
IMM GRANULOCYTES NFR BLD AUTO: 0.2 % (ref 0–0.5)
KETONES UR QL STRIP: NEGATIVE
LEUKOCYTE ESTERASE UR QL STRIP: NEGATIVE
LIPASE SERPL-CCNC: 8 U/L (ref 4–60)
LYMPHOCYTES # BLD AUTO: 2.2 K/UL (ref 1–4.8)
LYMPHOCYTES NFR BLD: 21.7 % (ref 18–48)
MCH RBC QN AUTO: 31 PG (ref 27–31)
MCHC RBC AUTO-ENTMCNC: 33.7 G/DL (ref 32–36)
MCV RBC AUTO: 92 FL (ref 82–98)
MONOCYTES # BLD AUTO: 0.7 K/UL (ref 0.3–1)
MONOCYTES NFR BLD: 7 % (ref 4–15)
NEUTROPHILS # BLD AUTO: 6.5 K/UL (ref 1.8–7.7)
NEUTROPHILS NFR BLD: 64.3 % (ref 38–73)
NITRITE UR QL STRIP: NEGATIVE
NRBC BLD-RTO: 0 /100 WBC
PH UR STRIP: 6 [PH] (ref 5–8)
PLATELET # BLD AUTO: 248 K/UL (ref 150–450)
PMV BLD AUTO: 9.8 FL (ref 9.2–12.9)
POTASSIUM SERPL-SCNC: 4.2 MMOL/L (ref 3.5–5.1)
PROT SERPL-MCNC: 5.9 G/DL (ref 6–8.4)
PROT UR QL STRIP: ABNORMAL
RBC # BLD AUTO: 3.84 M/UL (ref 4.6–6.2)
SODIUM SERPL-SCNC: 140 MMOL/L (ref 136–145)
SP GR UR STRIP: 1.02 (ref 1–1.03)
URN SPEC COLLECT METH UR: ABNORMAL
UROBILINOGEN UR STRIP-ACNC: NEGATIVE EU/DL
WBC # BLD AUTO: 10.12 K/UL (ref 3.9–12.7)

## 2022-06-23 PROCEDURE — 96360 HYDRATION IV INFUSION INIT: CPT

## 2022-06-23 PROCEDURE — 83690 ASSAY OF LIPASE: CPT | Performed by: EMERGENCY MEDICINE

## 2022-06-23 PROCEDURE — 63600175 PHARM REV CODE 636 W HCPCS: Performed by: EMERGENCY MEDICINE

## 2022-06-23 PROCEDURE — 85025 COMPLETE CBC W/AUTO DIFF WBC: CPT | Performed by: EMERGENCY MEDICINE

## 2022-06-23 PROCEDURE — 81003 URINALYSIS AUTO W/O SCOPE: CPT | Performed by: EMERGENCY MEDICINE

## 2022-06-23 PROCEDURE — 80053 COMPREHEN METABOLIC PANEL: CPT | Performed by: EMERGENCY MEDICINE

## 2022-06-23 PROCEDURE — 99284 EMERGENCY DEPT VISIT MOD MDM: CPT | Mod: 25

## 2022-06-23 RX ORDER — DOCUSATE SODIUM 100 MG/1
100 CAPSULE, LIQUID FILLED ORAL 2 TIMES DAILY
Qty: 60 CAPSULE | Refills: 0 | Status: ON HOLD | OUTPATIENT
Start: 2022-06-23 | End: 2023-10-10

## 2022-06-23 RX ORDER — ACETAMINOPHEN 500 MG
500 TABLET ORAL EVERY 6 HOURS PRN
Qty: 13 TABLET | Refills: 0 | Status: SHIPPED | OUTPATIENT
Start: 2022-06-23

## 2022-06-23 RX ADMIN — SODIUM CHLORIDE, SODIUM LACTATE, POTASSIUM CHLORIDE, AND CALCIUM CHLORIDE 1000 ML: .6; .31; .03; .02 INJECTION, SOLUTION INTRAVENOUS at 12:06

## 2022-06-23 NOTE — ED PROVIDER NOTES
"Encounter Date: 6/23/2022       History     Chief Complaint   Patient presents with    Abdominal Pain     Pt c/c abd and back pain starting this morning constant 9/10. Pt says "it feels like something is trying to cut me in half." Pt has also had a big decrease in weight according to family.     Back Pain     86-year-old male presenting secondary to left flank pain that radiated to his abdomen that happened earlier this morning.  On arrival to emergency department pain is improved greatly.  No fevers chills.  No urinary complaints or increased frequency of urination or burning on urination.  No blood in his urine.  No coughing runny nose cough congestion.  No trauma to the area.  No rashes or lesions.  He does not want anything for pain at this time.  No change in bowel movements.  Reported lost weight recently.        Review of patient's allergies indicates:   Allergen Reactions    Demerol [meperidine] Nausea Only     Past Medical History:   Diagnosis Date    Actinic keratosis     Anxiety     B12 deficiency 12/8/2014    Dx 6/14    Cancer     skin    Cataract     Coronary artery disease     Diabetes mellitus type II     Diabetes mellitus with peripheral circulatory disorder 6/18/2014    ED (erectile dysfunction)     Hyperlipidemia     Kidney stone     Neurogenic claudication 4/4/2022    Peripheral vascular disease     Spondylosis 3/29/2019    Tobacco dependence     Urinary incontinence 5/4/2021     Past Surgical History:   Procedure Laterality Date    APPENDECTOMY      CARDIAC SURGERY  01/1999    CABG 4 vessels    CATARACT EXTRACTION      EPIDURAL STEROID INJECTION Right 8/26/2020    Procedure: Injection, Steroid, Epidural Transforaminal;  Surgeon: Wiley Crane Jr., MD;  Location: [facility];  Service: Pain Management;  Laterality: Right;  Right L5 + S1 TF LEAH  Arrive @ 1115; ASA & Plavix last 8/18; Check BG; Rapid COVID test    EPIDURAL STEROID INJECTION Right 11/25/2020    Procedure: " He has been having dizzy spells. He has been taking meclizine and doesn't last very long. It makes it difficult to walk. He can't drive. He is tired with the combination of meclizine and the prochlorperazine. He says that his blood sugar was at 150. THey have been running over 200-225. He is checking it at least 4 times. Last meclizine 2.5 hours ago. He says as long as he is not moving around he is fine. We will do a CBC, Urinalysis, CMP. He will come in to get this done. 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 He will need some time for healing. We discussed the brain adjusting to lower sugar as his A1c was 16.5. Wait on any changes to his medication.    Injection, Steroid, Epidural Transformainal;  Surgeon: Wiley Crane Jr., MD;  Location: [facility];  Service: Pain Management;  Laterality: Right;  Right L5 + S1 TF LEAH  Arrive @ 1245; ASA and Plavix last 11/17; Pre-DM; Needs MD Sign.    EPIDURAL STEROID INJECTION Right 2/19/2021    Procedure: Injection, Steroid, Epidural Transforaminal;  Surgeon: Wiley Crane Jr., MD;  Location: [facility] END[facility];  Service: Pain Management;  Laterality: Right;  Right L5 + S1 TF LEAH  Arrive @ 1115; ASA & Plavix last 2/11; Check BG; Needs Consent    EXTERNAL EAR SURGERY      EYE SURGERY      cataracts    ILIAC ARTERY STENT Bilateral 06/2003    also Right External Iliac stent     Family History   Problem Relation Age of Onset    Stroke Mother      Social History     Tobacco Use    Smoking status: Current Every Day Smoker     Packs/day: 1.50     Years: 71.00     Pack years: 106.50     Types: Cigarettes    Smokeless tobacco: Former User   Substance Use Topics    Alcohol use: No    Drug use: No     Review of Systems   Constitutional: Negative for fever.   HENT: Negative for sore throat.    Respiratory: Negative for shortness of breath.    Cardiovascular: Negative for chest pain.   Gastrointestinal: Positive for abdominal pain. Negative for nausea.   Genitourinary: Positive for flank pain. Negative for dysuria.   Musculoskeletal: Negative for back pain.   Skin: Negative for rash.   Neurological: Negative for weakness.   Hematological: Does not bruise/bleed easily.   Psychiatric/Behavioral: Negative for agitation.   All other systems reviewed and are negative.      Physical Exam     Initial Vitals [06/23/22 1051]   BP Pulse Resp Temp SpO2   121/70 96 16 97.4 °F (36.3 °C) 97 %      MAP       --         Physical Exam    Nursing note and vitals reviewed.  Constitutional: He appears well-developed and well-nourished.   HENT:   Head: Normocephalic and atraumatic.   Mouth/Throat: Oropharynx is clear and moist.   Eyes: EOM are  normal. Pupils are equal, round, and reactive to light.   Neck:   Normal range of motion.  Cardiovascular: Normal rate and regular rhythm.   Pulmonary/Chest: Breath sounds normal. No stridor. No respiratory distress. He has no wheezes.   Abdominal: Abdomen is soft. Bowel sounds are normal. He exhibits no distension.   Minimal left flank tenderness.  No real abdominal tenderness.  No discharge from the penis or blood from the urethral meatus.   Musculoskeletal:         General: No tenderness or edema. Normal range of motion.      Cervical back: Normal range of motion.     Neurological: He is alert and oriented to person, place, and time. He has normal strength. GCS score is 15. GCS eye subscore is 4. GCS verbal subscore is 5. GCS motor subscore is 6.   Skin: Skin is warm and dry. Capillary refill takes less than 2 seconds.   Psychiatric: He has a normal mood and affect. Thought content normal.         ED Course   Procedures  Labs Reviewed   CBC W/ AUTO DIFFERENTIAL - Abnormal; Notable for the following components:       Result Value    RBC 3.84 (*)     Hemoglobin 11.9 (*)     Hematocrit 35.3 (*)     RDW 16.8 (*)     Eos # 0.6 (*)     All other components within normal limits   URINALYSIS, REFLEX TO URINE CULTURE - Abnormal; Notable for the following components:    Protein, UA Trace (*)     All other components within normal limits    Narrative:     Specimen Source->Urine   COMPREHENSIVE METABOLIC PANEL - Abnormal; Notable for the following components:    Chloride 113 (*)     CO2 21 (*)     BUN 35 (*)     Calcium 7.3 (*)     Total Protein 5.9 (*)     Albumin 2.7 (*)     Alkaline Phosphatase 44 (*)     Anion Gap 6 (*)     All other components within normal limits   LIPASE          Imaging Results          CT Abdomen Pelvis  Without Contrast (Final result)  Result time 06/23/22 12:41:32    Final result by Zeferino Hopkins MD (06/23/22 12:41:32)                 Impression:      1. Bilateral nonobstructive  nephrolithiasis, no findings to suggest obstructive uropathy.  Right renal cysts are noted, 1 of which has calcification within the septation.  This would be classified as a Bosniak class 2 cyst, follow-up as warranted.  2. Thickening of the urinary bladder walls, may be on the basis of outlet obstruction from prostatomegaly however correlation with urinalysis is advised to exclude changes of cystitis.  3. Large amount of stool within the colon, may reflect developing constipation.  4. Chronic appearing pulmonary fibrotic changes.  5. Cholelithiasis without secondary findings to suggest acute cholecystitis.  6. Please see above for additional findings.      Electronically signed by: Zeferino Hopkins MD  Date:    06/23/2022  Time:    12:41             Narrative:    EXAMINATION:  CT ABDOMEN PELVIS WITHOUT CONTRAST    CLINICAL HISTORY:  Flank pain, kidney stone suspected;    TECHNIQUE:  Low dose axial images, sagittal and coronal reformations were obtained from the lung bases to the pubic symphysis.  Oral contrast was not administered.    COMPARISON:  03/31/2021    FINDINGS:  Images of the lower thorax are remarkable for bilateral interlobular septal thickening and fibrotic change involving the periphery of the visualized parenchyma particularly the bilateral lower lobes.  There is a pulmonary nodule versus atelectasis within the lateral aspect of the lingula measuring 3-4 mm, similar to the previous examination.  Pulmonary findings appear similar to the prior exam.  There is a pulmonary nodule within the right lower lobe measuring 8-9 mm, stable.    The liver, spleen, and adrenal glands have a grossly unremarkable noncontrast appearance.  There is fatty atrophy of the pancreas without pancreatic ductal dilation.  There is cholelithiasis without secondary findings to suggest acute cholecystitis.  The stomach is decompressed without wall thickening.  There are a few scattered nonenlarged abdominal lymph nodes.    There  is bilateral nonobstructive nephrolithiasis.  No left hydronephrosis.  There is left renal vascular calcification.  There is a low attenuating lesion arising from the interpolar region of the left kidney measuring 1.2 cm, attenuation of which is higher than would be expected for simple cyst although artifact in the region limits accurate measurement.  The left ureter is unable to be followed in its entirety to the urinary bladder, no definite calculi seen or secondary findings to suggest left obstructive uropathy.  There is mild right perinephric fat stranding.  No right hydronephrosis.  There are scattered right parapelvic cysts, largest potentially septated cyst measures 5.1 cm.  The right ureter is unremarkable without calculi seen although the ureter cannot be followed in its entirety to the urinary bladder.  The urinary bladder is decompressed noting mild wall thickening.  The prostate is enlarged.    There are several scattered colonic diverticula without inflammation.  There is a large amount of stool within the colon.  The terminal ileum is unremarkable.  The appendix is not confidently identified, no pericecal inflammation.  The small bowel is grossly unremarkable.  Several scattered shotty periaortic and paracaval lymph nodes are noted.  There is atherosclerotic calcification of the aorta and its branches noting infrarenal abdominal aortic ectasia.  There is an aorto bi-iliac stent graft.    Ventriculostomy catheter tubing is noted within the pelvis, no findings to suggest kink, disruption, or adjacent fluid collection.    Degenerative changes are noted of the spine.  No significant inguinal lymphadenopathy.                                 Medications   lactated ringers bolus 1,000 mL (0 mLs Intravenous Stopped 6/23/22 1418)     Medical Decision Making:   Initial Assessment:   86-year-old male presenting secondary to left-sided flank pain that resolved for arrival to the emergency department.  Abdominal  exam shows some mild tenderness to the left flank.  Does not want anything for pain.  Vitals reassuring.  IV fluids.    Also considered but less likely:     AAA: location inconsistent, no pulsatile mass  Cholecystitis: location inconsistent, no relation with meals, negative fan's  SBO: normal BM and flatus. No vomiting  Appendicitis: location inconsistent, no fever, no rebound/guarding  Mesenteric ischemia: HPI inconsistent, does not coincide with meals, other dx more likely  Kidney stone: no radiation to back or cva tenderness, no dysuria, no hematuria  Pyelonephritis: no cva tenderness, no dysuria, no fever  Pancreatitis: no history of alcohol abuse, unlikely gallstone obstructing, location inconsistent  Diverticulitis: age and location not most common, no history of diverticulitis, no fever, no wbc  Epididymitis: no testicular swelling or redness  UTI: UA negative, no dysuria or increased frequency of urination  Testicular torsion: no testicular pain/swelling. Cremasteric reflexes intact    Labs reassuring other than consistent with dehydration.  Imaging shows large stool burden.  Patient recent bowel movement.  Starting on Colace. I discussed with the patient/family the diagnosis, treatment plan, indications for return to the emergency department, and for expected follow-up. The patient/family verbalized an understanding. The patient/family is asked if there are any questions or concerns. We discuss the case, until all issues are addressed to the patient/familys satisfaction. Patient/family understands and is agreeable to the plan.   Cliff Leonard    Comfortable with plan.  Tolerating p.o..  Clinical Tests:   Lab Tests: Reviewed and Ordered  Radiological Study: Ordered and Reviewed                      Clinical Impression:   Final diagnoses:  [R10.9] Flank pain (Primary)  [K59.00] Constipation, unspecified constipation type  [E86.0] Dehydration  [N40.0] Benign prostatic hyperplasia, unspecified whether lower  urinary tract symptoms present          ED Disposition Condition    Discharge Stable        ED Prescriptions     Medication Sig Dispense Start Date End Date Auth. Provider    docusate sodium (COLACE) 100 MG capsule Take 1 capsule (100 mg total) by mouth 2 (two) times daily. 60 capsule 6/23/2022  Cliff Leonard MD    acetaminophen (TYLENOL) 500 MG tablet Take 1 tablet (500 mg total) by mouth every 6 (six) hours as needed for Pain. 13 tablet 6/23/2022  Cliff Leonard MD        Follow-up Information     Follow up With Specialties Details Why Contact Info    Marcelino Del Toro MD Internal Medicine Schedule an appointment as soon as possible for a visit in 2 days  [address] [facility]  [place] [id number]  [phone]             Cliff Leonard MD  06/23/22 2567

## 2022-06-23 NOTE — DISCHARGE INSTRUCTIONS
Thank you for coming to our Emergency Department today. It is important to remember that some problems are difficult to diagnose and may not be found during your first visit. Be sure to follow up with your primary care doctor and review any labs/imaging that was performed with them. If you do not have a primary care doctor, you may contact the one listed on your discharge paperwork or you may also call the [facility] Appointment Desk at [phone] to schedule an appointment with one.     All medications may potentially have side effects and it is impossible to predict which medications may give you side effects. If you feel that you are having a negative effect of any medication you should immediately stop taking them and seek medical attention.    Return to the ER with any questions/concerns, new/concerning symptoms, worsening or failure to improve. Do not drive or make any important decisions for 24 hours if you have received any pain medications, sedatives or mood altering drugs during your ER visit.

## 2022-06-23 NOTE — ED TRIAGE NOTES
"Pt reports to the ED with C/O lumbar back pain that radiates to his ABD that started at 0400 this AM. Pt reports "I was woken up from my sleep and it felt like someone was going to cut me in half." Pt reports constipation that is relieved by stool softeners. Pt denies any changes in urination. Pt denies any SOB or CP. Pt denies any N/V. Pt AAOx4, RESP easy and unlabored. ED workup in progress. Will monitor.   "

## 2022-06-29 ENCOUNTER — OFFICE VISIT (OUTPATIENT)
Dept: PODIATRY | Facility: CLINIC | Age: 87
End: 2022-06-29
Payer: MEDICARE

## 2022-06-29 VITALS
SYSTOLIC BLOOD PRESSURE: 126 MMHG | DIASTOLIC BLOOD PRESSURE: 58 MMHG | HEIGHT: 67 IN | HEART RATE: 97 BPM | BODY MASS INDEX: 19.3 KG/M2 | WEIGHT: 123 LBS

## 2022-06-29 DIAGNOSIS — B35.1 ONYCHOMYCOSIS DUE TO DERMATOPHYTE: ICD-10-CM

## 2022-06-29 DIAGNOSIS — E11.42 TYPE 2 DIABETES MELLITUS WITH DIABETIC POLYNEUROPATHY, UNSPECIFIED WHETHER LONG TERM INSULIN USE: Primary | ICD-10-CM

## 2022-06-29 PROCEDURE — 1160F PR REVIEW ALL MEDS BY PRESCRIBER/CLIN PHARMACIST DOCUMENTED: ICD-10-PCS | Mod: CPTII,S$GLB,, | Performed by: PODIATRIST

## 2022-06-29 PROCEDURE — 1126F AMNT PAIN NOTED NONE PRSNT: CPT | Mod: CPTII,S$GLB,, | Performed by: PODIATRIST

## 2022-06-29 PROCEDURE — 1126F PR PAIN SEVERITY QUANTIFIED, NO PAIN PRESENT: ICD-10-PCS | Mod: CPTII,S$GLB,, | Performed by: PODIATRIST

## 2022-06-29 PROCEDURE — 3074F SYST BP LT 130 MM HG: CPT | Mod: CPTII,S$GLB,, | Performed by: PODIATRIST

## 2022-06-29 PROCEDURE — 1160F RVW MEDS BY RX/DR IN RCRD: CPT | Mod: CPTII,S$GLB,, | Performed by: PODIATRIST

## 2022-06-29 PROCEDURE — 3078F DIAST BP <80 MM HG: CPT | Mod: CPTII,S$GLB,, | Performed by: PODIATRIST

## 2022-06-29 PROCEDURE — 3078F PR MOST RECENT DIASTOLIC BLOOD PRESSURE < 80 MM HG: ICD-10-PCS | Mod: CPTII,S$GLB,, | Performed by: PODIATRIST

## 2022-06-29 PROCEDURE — 1159F MED LIST DOCD IN RCRD: CPT | Mod: CPTII,S$GLB,, | Performed by: PODIATRIST

## 2022-06-29 PROCEDURE — 99214 OFFICE O/P EST MOD 30 MIN: CPT | Mod: 25,S$GLB,, | Performed by: PODIATRIST

## 2022-06-29 PROCEDURE — 1101F PT FALLS ASSESS-DOCD LE1/YR: CPT | Mod: CPTII,S$GLB,, | Performed by: PODIATRIST

## 2022-06-29 PROCEDURE — 99999 PR PBB SHADOW E&M-EST. PATIENT-LVL IV: CPT | Mod: PBBFAC,,, | Performed by: PODIATRIST

## 2022-06-29 PROCEDURE — 99214 PR OFFICE/OUTPT VISIT, EST, LEVL IV, 30-39 MIN: ICD-10-PCS | Mod: 25,S$GLB,, | Performed by: PODIATRIST

## 2022-06-29 PROCEDURE — 1101F PR PT FALLS ASSESS DOC 0-1 FALLS W/OUT INJ PAST YR: ICD-10-PCS | Mod: CPTII,S$GLB,, | Performed by: PODIATRIST

## 2022-06-29 PROCEDURE — 3074F PR MOST RECENT SYSTOLIC BLOOD PRESSURE < 130 MM HG: ICD-10-PCS | Mod: CPTII,S$GLB,, | Performed by: PODIATRIST

## 2022-06-29 PROCEDURE — 99999 PR PBB SHADOW E&M-EST. PATIENT-LVL IV: ICD-10-PCS | Mod: PBBFAC,,, | Performed by: PODIATRIST

## 2022-06-29 PROCEDURE — 3288F PR FALLS RISK ASSESSMENT DOCUMENTED: ICD-10-PCS | Mod: CPTII,S$GLB,, | Performed by: PODIATRIST

## 2022-06-29 PROCEDURE — 1159F PR MEDICATION LIST DOCUMENTED IN MEDICAL RECORD: ICD-10-PCS | Mod: CPTII,S$GLB,, | Performed by: PODIATRIST

## 2022-06-29 PROCEDURE — 3288F FALL RISK ASSESSMENT DOCD: CPT | Mod: CPTII,S$GLB,, | Performed by: PODIATRIST

## 2022-06-29 RX ORDER — CICLOPIROX 80 MG/ML
SOLUTION TOPICAL NIGHTLY
Qty: 6.6 ML | Refills: 11 | Status: SHIPPED | OUTPATIENT
Start: 2022-06-29 | End: 2023-05-19

## 2022-06-29 NOTE — PROGRESS NOTES
Subjective:      Patient ID: Narciso Yanez is a 86 y.o. male.    Chief Complaint: Diabetic Foot Exam (PCP Marcelino Del Toro MD 6/8/2022)    Narciso is a 86 y.o. male who presents to the clinic for evaluation and treatment of high risk feet. Narciso has a past medical history of Actinic keratosis, Anxiety, B12 deficiency (12/8/2014), Cancer, Cataract, Coronary artery disease, Diabetes mellitus type II, Diabetes mellitus with peripheral circulatory disorder (6/18/2014), ED (erectile dysfunction), Hyperlipidemia, Kidney stone, Neurogenic claudication (4/4/2022), Peripheral vascular disease, Spondylosis (3/29/2019), Tobacco dependence, and Urinary incontinence (5/4/2021). The patient's chief complaint is long, thick toenails. Gradual onset, worsening over past several weeks, aggravated by increased weight bearing, shoe gear, pressure.  Periodic debridement helps symptoms.  PCP: Marcelino Del Toro MD    Date Last Seen by PCP:   Chief Complaint   Patient presents with    Diabetic Foot Exam     PCP Marcelino Del Toro MD 6/8/2022        Current shoe gear:  Affected Foot: Casual shoes     Unaffected Foot: Casual shoes    Hemoglobin A1C   Date Value Ref Range Status   02/28/2022 6.6 (H) 4.0 - 5.6 % Final     Comment:     ADA Screening Guidelines:  5.7-6.4%  Consistent with prediabetes  >or=6.5%  Consistent with diabetes    High levels of fetal hemoglobin interfere with the HbA1C  assay. Heterozygous hemoglobin variants (HbS, HgC, etc)do  not significantly interfere with this assay.   However, presence of multiple variants may affect accuracy.     04/02/2021 6.3 (H) 4.0 - 5.6 % Final     Comment:     ADA Screening Guidelines:  5.7-6.4%  Consistent with prediabetes  >or=6.5%  Consistent with diabetes    High levels of fetal hemoglobin interfere with the HbA1C  assay. Heterozygous hemoglobin variants (HbS, HgC, etc)do  not significantly interfere with this assay.   However, presence of multiple variants may affect  accuracy.     09/16/2020 6.4 (H) 4.0 - 5.6 % Final     Comment:     ADA Screening Guidelines:  5.7-6.4%  Consistent with prediabetes  >or=6.5%  Consistent with diabetes  High levels of fetal hemoglobin interfere with the HbA1C  assay. Heterozygous hemoglobin variants (HbS, HgC, etc)do  not significantly interfere with this assay.   However, presence of multiple variants may affect accuracy.         Review of Systems   Constitutional: Negative for chills, diaphoresis, fever, malaise/fatigue and night sweats.   Cardiovascular: Negative for claudication, cyanosis, leg swelling and syncope.   Skin: Positive for nail changes. Negative for color change, dry skin, rash, suspicious lesions and unusual hair distribution.   Musculoskeletal: Negative for falls, joint pain, joint swelling, muscle cramps, muscle weakness and stiffness.   Gastrointestinal: Negative for constipation, diarrhea, nausea and vomiting.   Neurological: Positive for paresthesias and sensory change. Negative for brief paralysis, disturbances in coordination, focal weakness, numbness and tremors.           Objective:      Physical Exam  Constitutional:       Appearance: He is well-developed. He is not diaphoretic.      Comments: Oriented to time, place, and person.   Cardiovascular:      Pulses:           Dorsalis pedis pulses are 1+ on the right side and 1+ on the left side.        Posterior tibial pulses are 1+ on the right side and 1+ on the left side.      Comments: Capillary fill time 3-5 seconds.  All toes warm to touch.      Negative lower extremity edema bilateral.    Negative elevational pallor and dependent rubor bilateral.    Musculoskeletal:      Comments: Normal angle, base, station of gait. Decreased stride length, early heel off, moderately propulsive toe off bilateral.    All ten toes without clubbing, cyanosis, or signs of ischemia.      No pain to palpation bilateral lower extremities.      Range of motion, stability, muscle strength, and  muscle tone are age and health appropriate normal bilateral feet and legs.       Lymphadenopathy:      Comments: Negative lymphadenopathy bilateral popliteal fossa and tarsal tunnel.  Negative lymphangitic streaking bilateral foot/ankle bilateral.     Skin:     General: Skin is warm and dry.      Coloration: Skin is not pale.      Findings: No abrasion, bruising, burn, ecchymosis, erythema, laceration, lesion, petechiae or rash.      Nails: There is no clubbing.      Comments: Skin thin, atrophic, with decreased density and distribution of pedal hair bilateral, but without hyperpigmentation, helio discoloration,  ulcers, masses, nodules or cords palpated bilateral feet and legs.      Toenails 1st, 2nd, 3rd, 4th, 5th  bilateral are hypertrophic thickened 2-3 mm, dystrophic, discolored tanish brown with tan, gray crumbly subungual debris.  Tender to distal nail plate pressure, without periungual skin abnormality of each.     Neurological:      Mental Status: He is alert and oriented to person, place, and time. He is not disoriented.      Sensory: Sensory deficit present.      Motor: No tremor, atrophy or abnormal muscle tone.      Deep Tendon Reflexes:      Reflex Scores:       Patellar reflexes are 2+ on the right side and 2+ on the left side.       Achilles reflexes are 2+ on the right side and 2+ on the left side.     Comments: Decreased/absent vibratory sensation bilateral feet to 128Hz tuning fork.    Paresthesias, and burning bilateral feet with no clearly identified trigger or source.       Psychiatric:         Behavior: Behavior is cooperative.               Assessment:       Encounter Diagnoses   Name Primary?    Type 2 diabetes mellitus with diabetic polyneuropathy, unspecified whether long term insulin use Yes    Onychomycosis due to dermatophyte          Plan:       Narciso was seen today for diabetic foot exam.    Diagnoses and all orders for this visit:    Type 2 diabetes mellitus with diabetic  polyneuropathy, unspecified whether long term insulin use    Onychomycosis due to dermatophyte      I counseled the patient on his conditions, their implications and medical management.        - Shoe inspection. Diabetic Foot Education. Patient reminded of the importance of good nutrition and blood sugar control to help prevent podiatric complications of diabetes. Patient instructed on proper foot hygeine. We discussed wearing proper shoe gear, daily foot inspections, never walking without protective shoe gear, never putting sharp instruments to feet, routine podiatric visits at least annually.    Discussed conservative treatment with shoes of adequate dimensions, material, and style to alleviate symptoms and delay or prevent surgical intervention.    penlac      - With patient's permission, nails were aggressively reduced and debrided x 10 to their soft tissue attachment mechanically and with electric , removing all offending nail and debris. Patient relates relief following the procedure. He will continue to monitor the areas daily, inspect his feet, wear protective shoe gear when ambulatory, moisturizer to maintain skin integrity and follow in this office  p.r.n.          No follow-ups on file.

## 2022-07-05 ENCOUNTER — OFFICE VISIT (OUTPATIENT)
Dept: NEUROSURGERY | Facility: CLINIC | Age: 87
End: 2022-07-05
Payer: MEDICARE

## 2022-07-05 ENCOUNTER — HOSPITAL ENCOUNTER (OUTPATIENT)
Dept: RADIOLOGY | Facility: HOSPITAL | Age: 87
Discharge: HOME | End: 2022-07-05
Attending: PHYSICIAN ASSISTANT
Payer: MEDICARE

## 2022-07-05 VITALS — DIASTOLIC BLOOD PRESSURE: 75 MMHG | SYSTOLIC BLOOD PRESSURE: 130 MMHG | HEART RATE: 88 BPM

## 2022-07-05 DIAGNOSIS — G91.2 NPH (NORMAL PRESSURE HYDROCEPHALUS): ICD-10-CM

## 2022-07-05 DIAGNOSIS — G91.2 NPH (NORMAL PRESSURE HYDROCEPHALUS): Primary | ICD-10-CM

## 2022-07-05 PROCEDURE — 70450 CT HEAD/BRAIN W/O DYE: CPT | Mod: TC

## 2022-07-05 PROCEDURE — 99999 PR PBB SHADOW E&M-EST. PATIENT-LVL V: CPT | Mod: PBBFAC,,, | Performed by: PHYSICIAN ASSISTANT

## 2022-07-05 PROCEDURE — 3078F DIAST BP <80 MM HG: CPT | Mod: CPTII,S$GLB,, | Performed by: PHYSICIAN ASSISTANT

## 2022-07-05 PROCEDURE — 1100F PR PT FALLS ASSESS DOC 2+ FALLS/FALL W/INJURY/YR: ICD-10-PCS | Mod: CPTII,S$GLB,, | Performed by: PHYSICIAN ASSISTANT

## 2022-07-05 PROCEDURE — 1100F PTFALLS ASSESS-DOCD GE2>/YR: CPT | Mod: CPTII,S$GLB,, | Performed by: PHYSICIAN ASSISTANT

## 2022-07-05 PROCEDURE — 70450 CT HEAD WITHOUT CONTRAST: ICD-10-PCS | Mod: 26,,, | Performed by: RADIOLOGY

## 2022-07-05 PROCEDURE — 70250 X-RAY EXAM OF SKULL: CPT | Mod: 26,,, | Performed by: RADIOLOGY

## 2022-07-05 PROCEDURE — 99213 OFFICE O/P EST LOW 20 MIN: CPT | Mod: S$GLB,,, | Performed by: PHYSICIAN ASSISTANT

## 2022-07-05 PROCEDURE — 3075F PR MOST RECENT SYSTOLIC BLOOD PRESS GE 130-139MM HG: ICD-10-PCS | Mod: CPTII,S$GLB,, | Performed by: PHYSICIAN ASSISTANT

## 2022-07-05 PROCEDURE — 3288F FALL RISK ASSESSMENT DOCD: CPT | Mod: CPTII,S$GLB,, | Performed by: PHYSICIAN ASSISTANT

## 2022-07-05 PROCEDURE — 3078F PR MOST RECENT DIASTOLIC BLOOD PRESSURE < 80 MM HG: ICD-10-PCS | Mod: CPTII,S$GLB,, | Performed by: PHYSICIAN ASSISTANT

## 2022-07-05 PROCEDURE — 99999 PR PBB SHADOW E&M-EST. PATIENT-LVL V: ICD-10-PCS | Mod: PBBFAC,,, | Performed by: PHYSICIAN ASSISTANT

## 2022-07-05 PROCEDURE — 70250 X-RAY EXAM OF SKULL: CPT | Mod: TC

## 2022-07-05 PROCEDURE — 70250 XR SKULL SHUNT PLACEMENT 1 VIEW: ICD-10-PCS | Mod: 26,,, | Performed by: RADIOLOGY

## 2022-07-05 PROCEDURE — 3075F SYST BP GE 130 - 139MM HG: CPT | Mod: CPTII,S$GLB,, | Performed by: PHYSICIAN ASSISTANT

## 2022-07-05 PROCEDURE — 1160F RVW MEDS BY RX/DR IN RCRD: CPT | Mod: CPTII,S$GLB,, | Performed by: PHYSICIAN ASSISTANT

## 2022-07-05 PROCEDURE — 1126F PR PAIN SEVERITY QUANTIFIED, NO PAIN PRESENT: ICD-10-PCS | Mod: CPTII,S$GLB,, | Performed by: PHYSICIAN ASSISTANT

## 2022-07-05 PROCEDURE — 1159F PR MEDICATION LIST DOCUMENTED IN MEDICAL RECORD: ICD-10-PCS | Mod: CPTII,S$GLB,, | Performed by: PHYSICIAN ASSISTANT

## 2022-07-05 PROCEDURE — 1160F PR REVIEW ALL MEDS BY PRESCRIBER/CLIN PHARMACIST DOCUMENTED: ICD-10-PCS | Mod: CPTII,S$GLB,, | Performed by: PHYSICIAN ASSISTANT

## 2022-07-05 PROCEDURE — 3288F PR FALLS RISK ASSESSMENT DOCUMENTED: ICD-10-PCS | Mod: CPTII,S$GLB,, | Performed by: PHYSICIAN ASSISTANT

## 2022-07-05 PROCEDURE — 1126F AMNT PAIN NOTED NONE PRSNT: CPT | Mod: CPTII,S$GLB,, | Performed by: PHYSICIAN ASSISTANT

## 2022-07-05 PROCEDURE — 1159F MED LIST DOCD IN RCRD: CPT | Mod: CPTII,S$GLB,, | Performed by: PHYSICIAN ASSISTANT

## 2022-07-05 PROCEDURE — 70450 CT HEAD/BRAIN W/O DYE: CPT | Mod: 26,,, | Performed by: RADIOLOGY

## 2022-07-05 PROCEDURE — 99213 PR OFFICE/OUTPT VISIT, EST, LEVL III, 20-29 MIN: ICD-10-PCS | Mod: S$GLB,,, | Performed by: PHYSICIAN ASSISTANT

## 2022-07-05 NOTE — PROGRESS NOTES
Neurosurgery  Established Patient    SUBJECTIVE:     History of Present Illness:  Mr. Yanez is here today for f/u with repeat HCT. He is accompanied by daughter who aids in the history.  Pt with some improvement in balance and memory, not back at baseline.  Denies any recent falls.  Denies HAs, visual changes, weakness, or seizures.     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 The patient's shunt was previously dialed up to 200 on 3/24/22 to decrease b/l subdural hygromas. He presents today for a follow up with a repeat HCT to ensure resolution of hygromas. Patient reports that he has recently felt his balance is decreased. His daughter aids with the history and she reports that he has gait instability and difficulty with ambulating. He denies recent falls. He remains on Plavix daily.      Review of patient's allergies indicates:   Allergen Reactions    Demerol [meperidine] Nausea Only       Current Outpatient Medications   Medication Sig Dispense Refill    ACCU-CHEK JOAQUIN PLUS METER Misc Three times a day with meals  0    ACCU-CHEK SOFTCLIX LANCETS Misc Check tid 200 each 12    acetaminophen (TYLENOL) 500 MG tablet Take 1 tablet (500 mg total) by mouth every 6 (six) hours as needed for Pain. 13 tablet 0    albuterol-ipratropium (DUO-NEB) 2.5 mg-0.5 mg/3 mL nebulizer solution Take 3 mLs by nebulization every 4 (four) hours as needed for Wheezing. 9 mL 12    alendronate (FOSAMAX) 70 MG tablet Take 1 tablet (70 mg total) by mouth every 7 days. Patient takes on Tuesdays 4 tablet 11    alendronate (FOSAMAX) 70 MG tablet Take 1 tablet (70 mg total) by mouth every 7 days. Patient takes on Tuesdays 4 tablet 11    ascorbic acid, vitamin C, (VITAMIN C) 250 MG tablet Take 1 tablet (250 mg total) by mouth once daily. 120 tablet 0    blood sugar diagnostic (TRUE METRIX GLUCOSE TEST STRIP) Strp  strip 12    blood sugar diagnostic (TRUE METRIX  GLUCOSE TEST STRIP) Strp  each 12    BOOSTRIX TDAP 2.5-8-5 Lf-mcg-Lf/0.5mL Syrg injection       ciclopirox (PENLAC) 8 % Soln Apply topically nightly. 6.6 mL 11    clopidogreL (PLAVIX) 75 mg tablet Take 1 tablet (75 mg total) by mouth once daily. 30 tablet 12    diazePAM (VALIUM) 5 MG tablet Take 1 tablet (5 mg total) by mouth every 8 (eight) hours as needed. 270 tablet 5    docusate sodium (COLACE) 100 MG capsule Take 1 capsule (100 mg total) by mouth 2 (two) times daily. 60 capsule 0    heparin sodium,porcine (HEPARIN, PORCINE,) 5,000 unit/mL injection Inject 1 mL (5,000 Units total) into the skin every 8 (eight) hours.      lactulose (CHRONULAC) 20 gram/30 mL Soln Take 45 mLs (30 g total) by mouth 3 (three) times daily as needed. 1500 mL 12    magnesium sulfate in water (MAGNESIUM SULFATE 2 GRAM/50 ML) 2 gram/50 mL PgBk       meclizine (ANTIVERT) 25 mg tablet Take 1 tablet (25 mg total) by mouth 3 (three) times daily as needed for Dizziness (or at least one HS for 1 week when vertigo active). 30 tablet 12    metFORMIN (GLUCOPHAGE-XR) 750 MG ER 24hr tablet TAKE 1 TABLET TWICE DAILY WITH MEALS 180 tablet 12    metoprolol succinate (TOPROL-XL) 25 MG 24 hr tablet       mirtazapine (REMERON) 15 MG tablet Take 1 tablet (15 mg total) by mouth every evening. 30 tablet 11    nicotine (NICODERM CQ) 14 mg/24 hr Place 1 patch onto the skin once daily.  0    OMNIPAQUE 300 300 mg iodine/mL injection       OMNIPAQUE 350 350 mg iodine/mL Soln injection       ondansetron (ZOFRAN-ODT) 4 MG TbDL Take 2 tablets (8 mg total) by mouth every 8 (eight) hours as needed (nausea). 90 tablet 0    ondansetron (ZOFRAN-ODT) 8 MG TbDL       prochlorperazine (COMPAZINE) 5 MG tablet       rosuvastatin (CRESTOR) 20 MG tablet TAKE 1 TABLET (20 MG TOTAL) BY MOUTH EVERY EVENING. 90 tablet 12    senna (SENOKOT) 8.6 mg tablet Take 1 tablet by mouth once daily.      TRUEPLUS LANCETS 33 gauge Misc TEST BLOOD SUGAR TWICE DAILY  200 each 12    aspirin (ECOTRIN) 81 MG EC tablet Take 1 tablet (81 mg total) by mouth once daily.  0    insulin aspart U-100 (NOVOLOG) 100 unit/mL (3 mL) InPn pen Inject 0-5 Units into the skin before meals and at bedtime as needed (Hyperglycemia). (Patient not taking: No sig reported)  0     Current Facility-Administered Medications   Medication Dose Route Frequency Provider Last Rate Last Admin    testosterone cypionate injection 400 mg  400 mg Intramuscular Q28 Days Marcelino Del Toro MD           Past Medical History:   Diagnosis Date    Actinic keratosis     Anxiety     B12 deficiency 12/8/2014    Dx 6/14    Cancer     skin    Cataract     Coronary artery disease     Diabetes mellitus type II     Diabetes mellitus with peripheral circulatory disorder 6/18/2014    ED (erectile dysfunction)     Hyperlipidemia     Kidney stone     Neurogenic claudication 4/4/2022    Peripheral vascular disease     Spondylosis 3/29/2019    Tobacco dependence     Urinary incontinence 5/4/2021     Past Surgical History:   Procedure Laterality Date    APPENDECTOMY      CARDIAC SURGERY  01/1999    CABG 4 vessels    CATARACT EXTRACTION      EPIDURAL STEROID INJECTION Right 8/26/2020    Procedure: Injection, Steroid, Epidural Transforaminal;  Surgeon: Wiley Crane Jr., MD;  Location: [facility];  Service: Pain Management;  Laterality: Right;  Right L5 + S1 TF LEAH  Arrive @ 1115; ASA & Plavix last 8/18; Check BG; Rapid COVID test    EPIDURAL STEROID INJECTION Right 11/25/2020    Procedure: Injection, Steroid, Epidural Transformainal;  Surgeon: Wiley Crane Jr., MD;  Location: [facility];  Service: Pain Management;  Laterality: Right;  Right L5 + S1 TF LEAH  Arrive @ 1245; ASA and Plavix last 11/17; Pre-DM; Needs MD Sign.    EPIDURAL STEROID INJECTION Right 2/19/2021    Procedure: Injection, Steroid, Epidural Transforaminal;  Surgeon: Wiley Crane Jr., MD;  Location: [facility];  Service: Pain  Management;  Laterality: Right;  Right L5 + S1 TF LEAH  Arrive @ 1115; ASA & Plavix last 2/11; Check BG; Needs Consent    EXTERNAL EAR SURGERY      EYE SURGERY      cataracts    ILIAC ARTERY STENT Bilateral 06/2003    also Right External Iliac stent     Family History     Problem Relation (Age of Onset)    Stroke Mother        Social History     Socioeconomic History    Marital status:    Tobacco Use    Smoking status: Current Every Day Smoker     Packs/day: 1.50     Years: 71.00     Pack years: 106.50     Types: Cigarettes    Smokeless tobacco: Former User   Substance and Sexual Activity    Alcohol use: No    Drug use: No    Sexual activity: Not Currently     Partners: Female   Social History Narrative    .  4 children.  Smokes 2 ppd.  Still drives trucks for entertainment industry.        Review of Systems  Constitutional: no fever or chills  Eyes: no visual changes  ENT: no nasal congestion or sore throat  Respiratory: no cough or shortness of breath  Cardiovascular: no chest pain or palpitations  Gastrointestinal: no nausea or vomiting  Genitourinary: no hematuria or dysuria  Integument/Breast: no rash or pruritis  Hematologic/Lymphatic: no easy bruising or lymphadenopathy  Musculoskeletal: no arthralgias or myalgias  Neurological: no seizures or tremors    OBJECTIVE:     Vital Signs  Pulse: 88  BP: 130/75  Pain Score: 0-No pain  There is no height or weight on file to calculate BMI.    Neurosurgery Physical Exam  General: no distress  Neurologic: Alert and oriented. Thought content appropriate.  Head: normocephalic  GCS: Motor: 6/Verbal: 5/Eyes: 4 GCS Total: 15  Cranial nerves: face symmetric, tongue midline, pupils equal, round, reactive to light with accomodation, extraocular muscles intact  Sensory: response to light touch throughout  Motor Strength:full strength upper and lower extremities  Pronator Drift: no drift noted      Diagnostic Results: personally reviewed   EXAMINATION:  CT  HEAD WITHOUT CONTRAST     CLINICAL HISTORY:  NPH,  shunt; (Idiopathic) normal pressure hydrocephalus     TECHNIQUE:  Low dose axial images were obtained through the head.  Coronal and sagittal reformations were also performed. Contrast was not administered.     COMPARISON:  05/31/2022     FINDINGS:  No midline shift or mass effect.  Right parietal  shunt in stable position.  Ventricular system is stable when compared to prior.  No acute intracranial hemorrhage or acute major vascular territory infarct.  Periventricular white matter hypoattenuation stable from prior likely relating to chronic microvascular ischemic changes.  No abnormal extra-axial fluid collections.  Calvarium shows no fracture.  Paranasal sinuses and mastoid air cells are essentially clear.     Impression:     No acute intracranial abnormalities.  No significant detrimental changes when compared to prior.        Electronically signed by: Jai Harmon MD  Date:                                            07/05/2022  Time:                                           12:48    ASSESSMENT/PLAN:     86-year-old male with history of normal-pressure hydrocephalus and  shunt, with prior subdural hematomas that have since resolved.  Patient still has some continued balance difficulty not quite at baseline.  Head CT shows stable configuration of ventricular system without acute abnormality.   shunt dialed from 180-160 mm of water today.  Will get lateral skull x-ray to confirm setting.  PT ordered for gait training.  Will plan to see back in 6 weeks with repeat head CT.  Patient encouraged to call clinic questions or concerns the meantime.       Alex Kathleen Jr. KARLEY  [facility]  Department of Neurosurgery      Note dictated with voice recognition software, please excuse any grammatical errors.

## 2022-07-06 ENCOUNTER — CLINICAL SUPPORT (OUTPATIENT)
Dept: REHABILITATION | Facility: HOSPITAL | Age: 87
End: 2022-07-06
Payer: MEDICARE

## 2022-07-06 DIAGNOSIS — G91.2 NPH (NORMAL PRESSURE HYDROCEPHALUS): ICD-10-CM

## 2022-07-06 DIAGNOSIS — G89.29 CHRONIC BILATERAL LOW BACK PAIN WITH BILATERAL SCIATICA: ICD-10-CM

## 2022-07-06 DIAGNOSIS — M54.42 CHRONIC BILATERAL LOW BACK PAIN WITH BILATERAL SCIATICA: ICD-10-CM

## 2022-07-06 DIAGNOSIS — Z74.09 IMPAIRED FUNCTIONAL MOBILITY, BALANCE, GAIT, AND ENDURANCE: ICD-10-CM

## 2022-07-06 DIAGNOSIS — M54.41 CHRONIC BILATERAL LOW BACK PAIN WITH BILATERAL SCIATICA: ICD-10-CM

## 2022-07-06 PROCEDURE — 97162 PT EVAL MOD COMPLEX 30 MIN: CPT | Mod: PN | Performed by: PHYSICAL THERAPIST

## 2022-07-06 NOTE — PROGRESS NOTES
See POC for PT evaluation    Elba Menchaca, PT, DPT, CBIS  Board-Certified Clinical Specialist in Neurologic Physical Therapy

## 2022-07-08 DIAGNOSIS — Z79.52 ON CORTICOSTEROID THERAPY: ICD-10-CM

## 2022-07-08 PROBLEM — Z74.09 IMPAIRED FUNCTIONAL MOBILITY, BALANCE, GAIT, AND ENDURANCE: Status: ACTIVE | Noted: 2022-07-08

## 2022-07-08 PROBLEM — G89.29 CHRONIC BILATERAL LOW BACK PAIN WITH BILATERAL SCIATICA: Status: ACTIVE | Noted: 2022-07-08

## 2022-07-08 PROBLEM — M54.42 CHRONIC BILATERAL LOW BACK PAIN WITH BILATERAL SCIATICA: Status: ACTIVE | Noted: 2022-07-08

## 2022-07-08 PROBLEM — M54.41 CHRONIC BILATERAL LOW BACK PAIN WITH BILATERAL SCIATICA: Status: ACTIVE | Noted: 2022-07-08

## 2022-07-08 NOTE — PLAN OF CARE
[facility]  Physical Therapy Neurological Rehabilitation Initial Evaluation    Name: Narciso Yanez  Clinic Number: [id number]    Therapy Diagnosis:   Encounter Diagnoses   Name Primary?    NPH (normal pressure hydrocephalus)     Impaired functional mobility, balance, gait, and endurance     Chronic bilateral low back pain with bilateral sciatica      Physician: Alex Kathleen, *    Physician Orders: PT Eval and Treat gait training  Medical Diagnosis from Referral: G91.2 (ICD-10-CM) - NPH (normal pressure hydrocephalus)  Evaluation Date: 7/6/2022  Authorization Period Expiration: 7/6/2023  Plan of Care Expiration: 9/16/2022  Visit # / Visits authorized: 1/ 20    Time In: 1704  Time Out: 1740  Total Billable Time: 36 minutes    Precautions: Standard and Fall    Subjective   Date of onset: 5/31/2022  History of current condition - Narciso reports: my neurosurgeon recommended that I come to therapy.  shunt placed 5/31/22. Adjustment to  shunt yesterday. pt reports multiple falls, most recent 5 months ago. Pt reports that he has lost a lot of weight recent (40 lbs) in the last year. PMHx: DM II (metformin 2x/day). CABG x 4 1999, PVD, neurogenic claudication, spinal stenosis. H/o epidural injections in spine.      Medical History:   Past Medical History:   Diagnosis Date    Actinic keratosis     Anxiety     B12 deficiency 12/8/2014    Dx 6/14    Cancer     skin    Cataract     Coronary artery disease     Diabetes mellitus type II     Diabetes mellitus with peripheral circulatory disorder 6/18/2014    ED (erectile dysfunction)     Hyperlipidemia     Kidney stone     Neurogenic claudication 4/4/2022    Peripheral vascular disease     Spondylosis 3/29/2019    Tobacco dependence     Urinary incontinence 5/4/2021       Surgical History:   Narciso Yanez  has a past surgical history that includes Appendectomy; Eye surgery; External ear surgery; Iliac artery stent  (Bilateral, 06/2003); Cataract extraction; Cardiac surgery (01/1999); Epidural steroid injection (Right, 8/26/2020); Epidural steroid injection (Right, 11/25/2020); and Epidural steroid injection (Right, 2/19/2021).    Medications:   Narciso has a current medication list which includes the following prescription(s): accu-chek francisco plus meter, accu-chek softclix lancets, acetaminophen, albuterol-ipratropium, alendronate, alendronate, ascorbic acid (vitamin c), aspirin, blood sugar diagnostic, blood sugar diagnostic, boostrix tdap, ciclopirox, clopidogrel, diazepam, docusate sodium, heparin (porcine), insulin aspart u-100, lactulose, magnesium sulfate in water, meclizine, metformin, metoprolol succinate, mirtazapine, nicotine, omnipaque 300, omnipaque 350, ondansetron, ondansetron, prochlorperazine, rosuvastatin, senna, and trueplus lancets, and the following Facility-Administered Medications: testosterone cypionate.    Allergies:   Review of patient's allergies indicates:   Allergen Reactions    Demerol [meperidine] Nausea Only        Imaging, CT scan films, head, 7/5/22: No midline shift or mass effect.  Right parietal  shunt in stable position.  Ventricular system is stable when compared to prior.  No acute intracranial hemorrhage or acute major vascular territory infarct.  Periventricular white matter hypoattenuation stable from prior likely relating to chronic microvascular ischemic changes.  No abnormal extra-axial fluid collections.  Calvarium shows no fracture.  Paranasal sinuses and mastoid air cells are essentially clear  X Ray, lumbar, 4/21/21: Lumbar spondylosis, resulting in mild spinal canal stenosis at L4-5 and mild neural foraminal narrowing from L3-4 through L5-S1, as above.  The findings are similar to the 05/22/2020 exam.     Prior Therapy: PT for back pain,   Social History:  lives with their family, daughter  Falls:  last fall 5 months ago  DME: Walker    Home Environment: 1 story home, 1 step  "to enter   Exercise Routine / History:  recently purchased a cubii  Family Present at time of Eval:  none  Occupation:  for [employer]- retired due to COVID. Wants to return to work  Prior Level of Function: working 12-18 hrs/ day  Current Level of Function: uses RW almost daily for mobility (did not bring into session), unable to drive far distances, reports imbalance as "misstep" a few times/ day. LBP associated with standing or sitting for a long time. Pt reports that this pain can radiate into thighs.     Pain:  Current 7/10, worst 10/10, best 0/10   Location: LBP    Description: Aching  Aggravating Factors: Sitting and Standing  Easing Factors: lying down    Pts goals: I need to work on everything    Objective     - Follows commands: yes   - Speech: no deficits     Mental status: alert, oriented to person, place, and time  Appearance: Casually dressed  Behavior:  calm and cooperative  Attention Span and Concentration:  Normal    Dominant hand:  Did not state  Posture Alignment :no significant deviations noted    Skin integrity:  Intact    Sensation:  Light Touch: Intact           Proprioception:   Intact    Tone: 0 - No increase in muscle tone  Limbs/muscles affected: NA    Cranial Nerve Assessment:   NT    Visual/Auditory: denies changes     Coordination:   - fine motor: WFL  - UE coordination: finger to nose: NT                       Pronation/ supination: WFL  - LE coordination:  alternating toe taps: slower speed, but good coordination                                Heel to shin: equal ROVERTO    ROM:   UPPER EXTREMITY--AROM/PROM  (R) UE: WFLs  (L) UE: WFLs           RANGE OF MOTION--LOWER EXTREMITIES  (R) LE Hip: tightness in HS but WFLs   Knee: equal bilaterally   Ankle: equal bilaterally    (L) LE: Hip: tightness in HS but WFLs   Knee: equal bilaterally   Ankle: equal bilaterally    Strength: manual muscle test grades below   Upper Extremity Strength  Grossly WFLs, no reports of impairment by pt "     Lower Extremity Strength   RLE LLE   Hip Flexion: 4+/5 4/5   Hip Extension:  Able to bridge Able to bridge   Hip Abduction: NT NT   Hip Adduction: NT NT   Knee Extension: 4+/5 4/5   Knee Flexion: NT NT   Ankle Dorsiflexion: 4+/5 4+/5   Ankle Plantarflexion: 4/5 4/5   Ankle Inversion: NT NT   Ankle Eversion: NT NT     Abdominal Strength: NT       Evaluation   Single Limb Stance R LE NT  (<10 sec = HIGH FALL RISK)   Single Limb Stance L LE NT  (<10 sec = HIGH FALL RISK)   5 times sit-stand 32 seconds w/ UE  >12 sec= fall risk for general elderly  >16 sec= fall risk for Parkinson's disease  >10 sec= balance/vestibular dysfunction (<59 y/o)  >14.2 sec= balance/vestibular dysfunction (>59 y/o)  >12 sec= fall risk for CVA   Casey/ FGA/ Tinetti NT     Postural control:  MCTSIB:  1. Eyes Open/feet together/Firm: 30 seconds, min sway  2. Eyes Closed/feet together/Firm: 30 seconds, min-mod sway  3. Eyes Open/feet together/Foam: 30 seconds, min-mod sway  4. Eyes Closed/feet together/Foam: unable    Gait Assessment:   - AD used: none (during eval), pt reports use of RW at home  - Assistance: S  - Distance: at least 50 ft  - Curb: NT  - Ramp:  NT  - Stairs: NT      GAIT DEVIATIONS:  Slower dottie with decreased foot clearance ROVERTO  * Above impairments indicate decreased gait safety and increased fall risk.      Impairments contributing to deviations: impaired motor control, impaired balance    Endurance Deficit: fair for evaluation      Evaluation   Timed Up and Go 13.3 sec + 14.4 sec + 12.5 sec= 13.5 sec  < 20 sec safe for independent transfers,     < 30 sec assist required for transfers   6 meter walk test 0.73 m/sec (6m/8.2s)   6 min walk test NT     Functional Mobility (Bed mobility, transfers)  Bed mobility:  I  Supine to sit:  I  Sit to supine: I  Rolling: I  Transfers to bed: Mod I  Transfers to toilet: NT  Sit to stand:  Mod I  Stand pivot:  Mod I  Car transfers: NT  Wheelchair mobility: NA  Floor transfers:  NT      CMS Impairment/Limitation/Restriction for FOTO Survey    Not captured         TREATMENT     No treatment provided today, evaluation only.     Home Exercises and Patient Education Provided    Education provided:   - role of PT/POC    Written Home Exercises Provided: to be provided at follow up.   Narciso demonstrated good  understanding of the education provided.     See EMR under NA for exercises provided to be provided.    Assessment   Narciso is a 86 y.o. male referred to outpatient Physical Therapy with a medical diagnosis of normal pressure hydocephalus. Pt is experiencing multiple falls/ near fall. Last fall was ~ 5 months ago, but pt has near falls daily. Pt recently began to use a rolling walker for daily mobility.  He reports a recent adjustment of  shunt. Pt presents with decreased strength of LLE as compared to R, tightness in bilateral hamstrings, and impaired balance. low back pain also affects standing and mobility. Gait velocity is slower than recommended for community mobility. Pt demonstrates decreased speed of mobility and activity tolerance per 5 times sit<>stand test. Pt is unable to perform sit<>stand without UE support. Balance worsens with decreased visual support and on compliant surfaces. Pt can benefit from skilled PT services to address balance, mobility, gait, and pain for improved safety with mobility.    Pt prognosis is Good.   Pt will benefit from skilled outpatient Physical Therapy to address the deficits stated above and in the chart below, provide pt/family education, and to maximize pt's level of independence.     Plan of care discussed with patient: Yes  Pt's spiritual, cultural and educational needs considered and patient is agreeable to the plan of care and goals as stated below:     Anticipated Barriers for therapy: transportation/ scheduling availability.     Medical Necessity is demonstrated by the following  History  Co-morbidities and personal factors that may impact  the plan of care Co-morbidities:   DM II, CABG x 4, PVD, neurogenic claudication, spinal stenosis.    Personal Factors:   age   transportation     high   Examination  Body Structures and Functions, activity limitations and participation restrictions that may impact the plan of care Body Regions:   lower extremities    Body Systems:    gross symmetry  ROM  strength  gross coordinated movement  balance  gait  transfers  transitions  motor control  edema  skin integrity    Participation Restrictions:   Difficulty walking- uses RW  Impaired balance- fall risk  Limited tolerance to standing and sitting  Requires skilled PT to issue and progress HEP as needed     Activity limitations:   Learning and applying knowledge  no deficits    General Tasks and Commands  no deficits    Communication  no deficits    Mobility  walking  driving (bike, car, motorcycle)    Self care  no deficits    Domestic Life  doing house work (cleaning house, washing dishes, laundry)    Interactions/Relationships  no deficits    Life Areas  employment    Community and Social Life  recreation and leisure         high   Clinical Presentation evolving clinical presentation with changing clinical characteristics moderate   Decision Making/ Complexity Score: moderate     Goals:  Short Term Goals: 5 weeks   1. Pt will perform HEP at least 2x/week to progress ability to maintain benefits of PT.   2. Pt will demonstrate improvement in mobility by performing 5 times sit<>stand test w UE support in 24 sec.   3. Assess MCKEON or FGA and set goals as needed for decreased fall risk.   4. Pt will report use of RW only 50% of the time at home to demonstrate improved balance.     Long Term Goals: 10 weeks   1. Pt will demonstrate improvement in mobility and decreased fall risk by performing 5 times sit<>stand test w UE support in 16 sec.   2. Pt will improve gait velocity to improve community negotiation by ambulating at 0.8 m/s.  3. Pt will demonstrate improved  balance responses for improved safety in dim environments by performing NBOS on foam w/ EC x 10 sec.   4. Pt will report maximal LBP level to 7/10 to demonstrate improved tolerance to daily mobility.     Plan   Plan of care Certification: 7/6/2022 to 9/16/2022.    Outpatient Physical Therapy 2 times weekly for 10 weeks (16 visits) to include the following interventions: Neuromuscular Re-ed, Patient Education, Therapeutic Activities and Therapeutic Exercise.     Elba Menchaca, PT, DPT, CBIS  Board-Certified Clinical Specialist in Neurologic Physical Therapy    7/6/2022

## 2022-07-11 ENCOUNTER — CLINICAL SUPPORT (OUTPATIENT)
Dept: REHABILITATION | Facility: HOSPITAL | Age: 87
End: 2022-07-11
Payer: MEDICARE

## 2022-07-11 DIAGNOSIS — M54.41 CHRONIC BILATERAL LOW BACK PAIN WITH BILATERAL SCIATICA: ICD-10-CM

## 2022-07-11 DIAGNOSIS — G89.29 CHRONIC BILATERAL LOW BACK PAIN WITH BILATERAL SCIATICA: ICD-10-CM

## 2022-07-11 DIAGNOSIS — M54.42 CHRONIC BILATERAL LOW BACK PAIN WITH BILATERAL SCIATICA: ICD-10-CM

## 2022-07-11 DIAGNOSIS — Z74.09 IMPAIRED FUNCTIONAL MOBILITY, BALANCE, GAIT, AND ENDURANCE: Primary | ICD-10-CM

## 2022-07-11 PROCEDURE — 97110 THERAPEUTIC EXERCISES: CPT | Mod: PN | Performed by: PHYSICAL THERAPIST

## 2022-07-11 PROCEDURE — 97112 NEUROMUSCULAR REEDUCATION: CPT | Mod: PN | Performed by: PHYSICAL THERAPIST

## 2022-07-11 NOTE — PROGRESS NOTES
Physical Therapy Daily Treatment Note     Name: Narciso Yanez  Clinic Number: [id number]    Therapy Diagnosis:   Encounter Diagnoses   Name Primary?    Impaired functional mobility, balance, gait, and endurance Yes    Chronic bilateral low back pain with bilateral sciatica      Physician: Alex Kathleen, *    Visit Date: 7/11/2022    Physician Orders: PT Eval and Treat gait training  Medical Diagnosis from Referral: G91.2 (ICD-10-CM) - NPH (normal pressure hydrocephalus)  Evaluation Date: 7/6/2022  Authorization Period Expiration: 12/31/2022  Plan of Care Expiration: 9/16/2022  Visit # / Visits authorized: 2/ 20    Time In: 1445  Time Out: 1530  Total Billable Time: 45 minutes    Precautions: Standard and Fall    Subjective     Pt reports: the day after the evaluation I was playing video poker and became very dizzy. Reports he has not been using his Cubii.  He will be issued  home exercise program.  Response to previous treatment: no adverse effects reported  Functional change: ongoing    Pain: 5/10 prior to session, 6/10 at conclusion of session  Location: bilateral back      Objective     Narciso received therapeutic exercises to develop strength, endurance and flexibility for 10 minutes including:  Recumbent stepper BUE/LE L2 x 5 min for improved strength and endurance    Supine: bridging 10x   Hip abd RTB 10x  Sitting:    HSS 2 x 30 sec       Narciso participated in neuromuscular re-education activities to improve: Balance for 35 minutes. The following activities were included:    Functional Gait Assessment:   1. Gait on level surface =  1, 14.5   (3) Normal: less than 5.5 sec, no A.D., no imbalance, normal gait pattern, deviates< 6in   (2) Mild impairment: 7-5.6 sec, uses A.D., mild gait deviations, or deviates 6-10 in   (1) Moderate impairment: > 7 sec, slow speed, imbalance, deviates 10-15 in.   (0) Severe impairment: needs assist, deviates >15 in, reach/touch wall  2. Change in Gait Speed = 2   (3)  Normal: smooth change w/o loss of balance or gait deviation, deviates < 6 in, significant difference between speeds   (2) Mild impairment: changes speed, but demonstrates mild gait deviations, deviates 6-10 in, OR no deviations but unable to significantly speed, OR uses A.D.   (1) Moderate impairment: minor changes to speed, OR changes speed w/ significant deviations, deviates 10-15 in, OR  Changes speed , but loses balance & recovers   (0) Severe impairment: cannot change speed, deviates >15 in, or loses balance & needs assist  3. Gait with horizontal head turns  = 2   (3) Normal: no change in gait, deviates <6 in   (2) Mild impairment: slight change in speed, deviates 6-10 in, OR uses A.D.   (1) Moderate impairment: moderate change in speed, deviates 10-15 in   (0) Severe impairment: severe disruption of gait, deviates >15in  4. Gait with vertical head turns = 2   (3) Normal: no change in gait, deviates <6 in   (2) Mild impairment: slight change in speed, deviates 6-10 in OR uses A.D.   (1) Moderate impairment: moderate change in speed, deviates 10-15 in   (0) Severe impairment: severe disruption of gait, deviates >15 in  5. Gait with pivot turns = 2   (3) Normal: performs safely in 3 sec, no LOB   (2) Mild impairment: performs in >3 sec & no LOB, OR turns safely & requires several steps to regain LOB   (1) Moderate impairment: turns slow, OR requires several small steps for balance following turn & stop   (0) Severe impairment: cannot turn safely, needs assist  6. Step over obstacle = 2   (3) Normal: steps over 2 stacked boxes w/o change in speed or LOB   (2) Mild impairment: able to step over 1 box w/o change in speed or LOB   (1) Moderate impairment: steps over 1 box but must slow down, may require VC   (0) Severe impairment: cannot perform w/o assist  7. Gait with Narrow ANTOINETTE = 0   (3) Normal: 10 steps no staggering   (2) Mild impairment: 7-9 steps   (1) Moderate impairment: 4-7 steps   (0) Severe impairment: <  4 steps or cannot perform w/o assist  8. Gait with eyes closed = 1   (3) Normal: < 7 sec, no A.D., no LOB, normal gait pattern, deviates <6 in   (2) Mild impairment: 7.1-9 sec, mild gait deviations, deviates 6-10 in   (1) Moderate impairment: > 9 sec, abnormal pattern, LOB, deviates 10-15 in   (0) Severe impairment: cannot perform w/o assist, LOB, deviates >15in  9. Ambulating Backwards = 2   (3) Normal: no A.D., no LOB, normal gait pattern, deviates <6in   (2) Mild impairment: uses A.D., slower speed, mild gait deviations, deviates 6-10 in   (1) Moderate impairment: slow speed, abnormal gait pattern, LOB, deviates 10-15 in   (0) Severe impairment: severe gait deviations or LOB, deviates >15in  10. Steps = 1   (3) Normal: alternating feet, no rail   (2) Mild Impairment: alternating feet, uses rail   (1) Moderate impairment: step-to, uses rail   (0) Severe impairment: cannot perform safely    Score 15/30   Score:   <22/30 fall risk   <20/30 fall risk in older adults   <18/30 fall risk in Parkinsons       Parallel bar: NBOS, firm, head turns x 30 sec= min sway   NBOS, firm, EC x 30 sec= min sway   NBOS, foam, EO 2 x 30 sec= min-mod sway   NBOS, foam, head turns 2 x 30 sec= min-mod sway   NBOS, foam, EC 2 x 30 sec= mod sway   Floor ladder, forward, reciprocal, intermittent UE touch- 3 laps       Home Exercises Provided and Patient Education Provided     Education provided:   - reviewed and issued HEP for strengthening  - use Cubii x 10 min daily    Written Home Exercises Provided: yes.  Exercises were reviewed and Narciso was able to demonstrate them prior to the end of the session.  Narciso demonstrated good  understanding of the education provided.     See EMR under Patient Instructions for exercises provided 7/11/2022.    Assessment     Nile tolerated first follow up PT session well. Pt was instructed in an HEP for core strengthening to address reports of back pain. Ambulatory balance was assessed today via  Functional Gait Assessment. Per score, pt is at risk for falls. Pt demonstrates impairments in ability to change gait velocity, gait with head turns, turning around, stepping over obstacles, gait with narrow Base of support, stepping backwards, and gait with eyes closed. Pt ambulates at a slow velocity. He also demonstrates difficulty negotiating stairs. During balance interventions, to demonstrates the most difficulty balancing on compliant surfaces and with eyes closed. Pt can benefit form continued skilled PT to improve balance to decrease fall risk.      Narciso Is progressing well towards his goals.   Pt prognosis is Good.     Pt will continue to benefit from skilled outpatient physical therapy to address the deficits listed in the problem list box on initial evaluation, provide pt/family education and to maximize pt's level of independence in the home and community environment.     Pt's spiritual, cultural and educational needs considered and pt agreeable to plan of care and goals.     Anticipated barriers to physical therapy: transportation/ scheduling availability.    Goals:   Short Term Goals: 5 weeks   1. Pt will perform HEP at least 2x/week to progress ability to maintain benefits of PT. ongoing  2. Pt will demonstrate improvement in mobility by performing 5 times sit<>stand test w UE support in 24 sec. ongoing  3. Assess MCKEON or FGA and set goals as needed for decreased fall risk. ongoing  4. Pt will report use of RW only 50% of the time at home to demonstrate improved balance. Ongoing     Long Term Goals: 10 weeks   1. Pt will demonstrate improvement in mobility and decreased fall risk by performing 5 times sit<>stand test w UE support in 16 sec. ongoing  2. Pt will improve gait velocity to improve community negotiation by ambulating at 0.8 m/s.ongoing  3. Pt will demonstrate improved balance responses for improved safety in dim environments by performing NBOS on foam w/ EC x 10 sec. ongoing  4. Pt will  report maximal LBP level to 7/10 to demonstrate improved tolerance to daily mobility. Ongoing  5. New 7/11/22: pt will demonstrate improved balance and decreased fall risk to decrease need for RW by scoring 20/30 on FGA.    Plan   Plan of care Certification: 7/6/2022 to 9/16/2022.     Outpatient Physical Therapy 2 times weekly for 10 weeks (16 visits) to include the following interventions: Neuromuscular Re-ed, Patient Education, Therapeutic Activities and Therapeutic Exercise.    Continue with core strengthening and balance training. Increase step length    Elba Menchaca, PT, DPT, CBIS  Board-Certified Clinical Specialist in Neurologic Physical Therapy    7/11/2022

## 2022-07-11 NOTE — PATIENT INSTRUCTIONS
Extension: Bridging - Supine        Lie with hips and knees bent, feet flat. Lift hips off surface.  Repeat 10 times per set. Do 1 sets per session. Do 1 sessions per day.  Copyright © [employer]. All rights reserved.   Chair Sitting        Sit at edge of seat, spine straight, one leg extended. Put a hand on each thigh and bend forward from the hip, keeping spine straight. Allow hand on extended leg to reach toward toes. Support upper body with other arm. Hold 30 seconds.  Repeat 2 times per session. Do 1 sessions per day.  Copyright © [employer]. All rights reserved.   Heel Raise: Bilateral (Standing)        Hold onto counter for support. Rise on balls of feet.  Repeat 10 times per set. Do 2 sets per session. Do 1 sessions per day.  [email or web address]   Copyright © [employer]. All rights reserved.

## 2022-07-13 RX ORDER — ALENDRONATE SODIUM 70 MG/1
TABLET ORAL
Qty: 12 TABLET | Refills: 12 | Status: SHIPPED | OUTPATIENT
Start: 2022-07-13 | End: 2023-08-09

## 2022-07-18 ENCOUNTER — DOCUMENTATION ONLY (OUTPATIENT)
Dept: REHABILITATION | Facility: HOSPITAL | Age: 87
End: 2022-07-18
Payer: MEDICARE

## 2022-07-18 NOTE — PROGRESS NOTES
Missed Visit/Cancellation      Date: 7/18/2022         Canceled Number: 1  No Show Number: 0                                                                                                                Pt initially had visit scheduled for today for 0845.   Reason for cancellation: sick.    Pt's next scheduled physical therapy visit is 7/29/22.    Elba Menchaca, PT, DPT  7/18/2022

## 2022-07-19 RX ORDER — ONDANSETRON 4 MG/1
8 TABLET, ORALLY DISINTEGRATING ORAL EVERY 8 HOURS PRN
Qty: 90 TABLET | Refills: 0 | Status: SHIPPED | OUTPATIENT
Start: 2022-07-19 | End: 2023-03-14

## 2022-07-19 NOTE — TELEPHONE ENCOUNTER
No new care gaps identified.  [facility] Embedded Care Gaps. Reference number: [id number]. 7/19/2022   11:43:50 AM CDT

## 2022-08-02 ENCOUNTER — CLINICAL SUPPORT (OUTPATIENT)
Dept: REHABILITATION | Facility: HOSPITAL | Age: 87
End: 2022-08-02
Payer: MEDICARE

## 2022-08-02 DIAGNOSIS — Z74.09 IMPAIRED FUNCTIONAL MOBILITY, BALANCE, GAIT, AND ENDURANCE: Primary | ICD-10-CM

## 2022-08-02 DIAGNOSIS — M54.42 CHRONIC BILATERAL LOW BACK PAIN WITH BILATERAL SCIATICA: ICD-10-CM

## 2022-08-02 DIAGNOSIS — G89.29 CHRONIC BILATERAL LOW BACK PAIN WITH BILATERAL SCIATICA: ICD-10-CM

## 2022-08-02 DIAGNOSIS — M54.41 CHRONIC BILATERAL LOW BACK PAIN WITH BILATERAL SCIATICA: ICD-10-CM

## 2022-08-02 PROCEDURE — 97112 NEUROMUSCULAR REEDUCATION: CPT | Mod: PN | Performed by: PHYSICAL THERAPIST

## 2022-08-02 PROCEDURE — 97110 THERAPEUTIC EXERCISES: CPT | Mod: PN | Performed by: PHYSICAL THERAPIST

## 2022-08-02 NOTE — PROGRESS NOTES
"  Physical Therapy Daily Treatment Note     Name: Narciso Yanez  Clinic Number: [id number]    Therapy Diagnosis:   Encounter Diagnoses   Name Primary?    Impaired functional mobility, balance, gait, and endurance Yes    Chronic bilateral low back pain with bilateral sciatica      Physician: Alex Kathleen, *    Visit Date: 8/2/2022    Physician Orders: PT Eval and Treat gait training  Medical Diagnosis from Referral: G91.2 (ICD-10-CM) - NPH (normal pressure hydrocephalus)  Evaluation Date: 7/6/2022  Authorization Period Expiration: 12/31/2022  Plan of Care Expiration: 9/16/2022  Visit # / Visits authorized: 3/ 20    Time In: 0845  Time Out: 0928  Total Billable Time: 43 minutes    Precautions: Standard and Fall    Subjective     Pt reports: has had at least 2 dizzy spells since last visit. I had a fall last Thursday- resulting in abrasions on both arms. Pt had to wait for assistance to rise.   He was partially compliant with home exercise program.  Response to previous treatment: reported severe dizzy spell the next day  Functional change: ongoing    Pain: 7/10 prior to session, no change at conclusion of session  Location: bilateral hips     Objective     Narciso received therapeutic exercises to develop strength, endurance and flexibility for 28 minutes including:  Recumbent stepper BUE/LE L2 x 6 min for improved strength and endurance    Supine: bridging 2 x 10   Hip abd RTB 2 x 10   Pelvic tilt with march 20x    Parallel bar: step ups 4", BUE- 10x, CGA    Sit<>Stands from chair with UE- 2 x 5    Sitting:    HSS 2 x 30 sec   Hip ER stretch 2 x 30 sec       Narciso participated in neuromuscular re-education activities to improve: Balance for 15 minutes. The following activities were included:    Parallel bar:     WBOS, foam, EO 2 x 30 sec   NBOS, foam, EO 2 x 30 sec= mod sway   WBOS, foam, EC 2 x 30 sec= mod sway, CGA   NBOS, firm, head turns x 30 sec= min-mod sway   Semi tandem stance 2 x 30 sec   Floor " ladder, forward, reciprocal, intermittent UE touch- 3 laps   Floor ladder, side stepping, CGA- 3 laps       Home Exercises Provided and Patient Education Provided     Education provided:   - encouraged compliance with HEP for strengthening  - use Cubii x 10 min daily    Written Home Exercises Provided: Patient instructed to cont prior HEP.  Exercises were reviewed and Narciso was able to demonstrate them prior to the end of the session.  Narciso demonstrated good  understanding of the education provided.     See EMR under Patient Instructions for exercises provided 7/11/2022.    Assessment     Nile tolerated PT session well. Pt reports intermittent severe bouts of dizziness since last session. First bout occurring 1 day after session. Pt also reports a fall last week. He required assistance to rise. Pt reports poor compliance with HEP. Pt arrived to today's session with reports of hip pain R>L. PT addressed pain with stretching and strengthening. Pt demonstrates impaired balance on compliant surfaces, in semi tandem stance, and with challenged visual support. Pt demonstrates difficulty consistently performing reciprocal gait over floor ladder. He required intermittent UE support to steady self. He also demonstrates decreased balance with side stepping. Good performance of sit<>stands noted with UE support. No change in hip pain noted at conclusion of session. Pt denied any dizziness at conclusion of session.  Pt can benefit form continued skilled PT to improve balance to decrease fall risk.      Narciso Is progressing well towards his goals.   Pt prognosis is Good.     Pt will continue to benefit from skilled outpatient physical therapy to address the deficits listed in the problem list box on initial evaluation, provide pt/family education and to maximize pt's level of independence in the home and community environment.     Pt's spiritual, cultural and educational needs considered and pt agreeable to plan of care  and goals.     Anticipated barriers to physical therapy: transportation/ scheduling availability.    Goals:   Short Term Goals: 5 weeks   1. Pt will perform HEP at least 2x/week to progress ability to maintain benefits of PT. ongoing  2. Pt will demonstrate improvement in mobility by performing 5 times sit<>stand test w UE support in 24 sec. ongoing  3. Assess MCKEON or FGA and set goals as needed for decreased fall risk. ongoing  4. Pt will report use of RW only 50% of the time at home to demonstrate improved balance. Ongoing     Long Term Goals: 10 weeks   1. Pt will demonstrate improvement in mobility and decreased fall risk by performing 5 times sit<>stand test w UE support in 16 sec. ongoing  2. Pt will improve gait velocity to improve community negotiation by ambulating at 0.8 m/s.ongoing  3. Pt will demonstrate improved balance responses for improved safety in dim environments by performing NBOS on foam w/ EC x 10 sec. ongoing  4. Pt will report maximal LBP level to 7/10 to demonstrate improved tolerance to daily mobility. Ongoing  5. New 7/11/22: pt will demonstrate improved balance and decreased fall risk to decrease need for RW by scoring 20/30 on FGA.    Plan   Plan of care Certification: 7/6/2022 to 9/16/2022.     Outpatient Physical Therapy 2 times weekly for 10 weeks (16 visits) to include the following interventions: Neuromuscular Re-ed, Patient Education, Therapeutic Activities and Therapeutic Exercise.    Continue with core strengthening and balance training. Increase step length    Elba Menchaca, PT, DPT, CBIS  Board-Certified Clinical Specialist in Neurologic Physical Therapy    8/2/2022

## 2022-08-05 ENCOUNTER — CLINICAL SUPPORT (OUTPATIENT)
Dept: REHABILITATION | Facility: HOSPITAL | Age: 87
End: 2022-08-05
Payer: MEDICARE

## 2022-08-05 DIAGNOSIS — G89.29 CHRONIC BILATERAL LOW BACK PAIN WITH BILATERAL SCIATICA: ICD-10-CM

## 2022-08-05 DIAGNOSIS — Z74.09 IMPAIRED FUNCTIONAL MOBILITY, BALANCE, GAIT, AND ENDURANCE: Primary | ICD-10-CM

## 2022-08-05 DIAGNOSIS — M54.42 CHRONIC BILATERAL LOW BACK PAIN WITH BILATERAL SCIATICA: ICD-10-CM

## 2022-08-05 DIAGNOSIS — M54.41 CHRONIC BILATERAL LOW BACK PAIN WITH BILATERAL SCIATICA: ICD-10-CM

## 2022-08-05 PROCEDURE — 97110 THERAPEUTIC EXERCISES: CPT | Mod: PN

## 2022-08-05 PROCEDURE — 97112 NEUROMUSCULAR REEDUCATION: CPT | Mod: PN

## 2022-08-05 NOTE — PROGRESS NOTES
"  Physical Therapy Daily Treatment Note     Name: Narciso Yanez  Clinic Number: [id number]    Therapy Diagnosis:   Encounter Diagnoses   Name Primary?    Impaired functional mobility, balance, gait, and endurance Yes    Chronic bilateral low back pain with bilateral sciatica      Physician: Alex Kathleen, *    Visit Date: 8/5/2022    Physician Orders: PT Eval and Treat gait training  Medical Diagnosis from Referral: G91.2 (ICD-10-CM) - NPH (normal pressure hydrocephalus)  Evaluation Date: 7/6/2022  Authorization Period Expiration: 12/31/2022  Plan of Care Expiration: 9/16/2022  Visit # / Visits authorized: 4/ 20    Time In: 8:46  Time Out: 9:30  Total Billable Time: 44 minutes    Precautions: Standard and Fall    Subjective     Pt reports: no dizziness or falls since last session.     He was partially compliant with home exercise program.  Response to previous treatment: reported severe dizzy spell the next day  Functional change: ongoing    Pain: 3/10 prior to session  Location: bilateral hips     Objective     Narciso received therapeutic exercises to develop strength, endurance and flexibility for 20 minutes including:    Recumbent stepper BUE/LE L2 x 7 min for improved strength and endurance    Parallel bar:    step ups 4", UUE PRN - 10x2, CGA    Sit<>Stands from chair, BUE - 5x2, CGA  Sit<>Stands from chair, BUE - 5x2, CGA       Narciso participated in neuromuscular re-education activities to improve: Balance for 25minutes. The following activities were included:    Parallel bar:     WBOS, foam, EO 2 x 30 sec   NBOS, foam, EO 2 x 30 sec= mod sway   WBOS, foam, EC 2 x 30 sec= mod sway, CGA   NBOS, foam, EC 2 x 30 sec= mod sway, CGA   NBOS, firm, pitch/yaw  2x30 sec= min-mod sway   Semi tandem stance 2 x 30 sec     Stepping on/off foam 10x2, UUE      Step taps 4", no upper extremity, 10x2, 20x1 alt, CGA   Lateral stepping with x1 small kia, x20, BUE    Lateral stepping with x1 small kia, x20, " "UUE      FORWARD/BWD, stepping with x1 small kia, x20, BUE    FORWARD/BWD, stepping with x1 small kia, x20, UUE      Home Exercises Provided and Patient Education Provided     Education provided:   - encouraged compliance with HEP for strengthening  - use Cubii x 10 min daily    Written Home Exercises Provided: Patient instructed to cont prior HEP.  Exercises were reviewed and Narciso was able to demonstrate them prior to the end of the session.  Narciso demonstrated good  understanding of the education provided.     See EMR under Patient Instructions for exercises provided 7/11/2022.    Assessment   Sullyivin tolerating session well without adverse response to treatment. Pt without dizziness bouts this session and able to complete increased number of balance exercises statically and dynamically with decreased upper extremity support. Observed posterior sway on foam and with eyes closed and with step down off of 4" step with step to recover or Trinity from therapist at upper back. Moderate verbal and tactile cues given to correct posterior during balance tasks. Pt can benefit form continued skilled PT to improve balance to decrease fall risk.        Narciso Is progressing well towards his goals.   Pt prognosis is Good.     Pt will continue to benefit from skilled outpatient physical therapy to address the deficits listed in the problem list box on initial evaluation, provide pt/family education and to maximize pt's level of independence in the home and community environment.     Pt's spiritual, cultural and educational needs considered and pt agreeable to plan of care and goals.     Anticipated barriers to physical therapy: transportation/ scheduling availability.    Goals:   Short Term Goals: 5 weeks   1. Pt will perform HEP at least 2x/week to progress ability to maintain benefits of PT. ongoing  2. Pt will demonstrate improvement in mobility by performing 5 times sit<>stand test w UE support in 24 sec. ongoing  3. " Assess MCKEON or FGA and set goals as needed for decreased fall risk. ongoing  4. Pt will report use of RW only 50% of the time at home to demonstrate improved balance. Ongoing     Long Term Goals: 10 weeks   1. Pt will demonstrate improvement in mobility and decreased fall risk by performing 5 times sit<>stand test w UE support in 16 sec. ongoing  2. Pt will improve gait velocity to improve community negotiation by ambulating at 0.8 m/s.ongoing  3. Pt will demonstrate improved balance responses for improved safety in dim environments by performing NBOS on foam w/ EC x 10 sec. ongoing  4. Pt will report maximal LBP level to 7/10 to demonstrate improved tolerance to daily mobility. Ongoing  5. New 7/11/22: pt will demonstrate improved balance and decreased fall risk to decrease need for RW by scoring 20/30 on FGA.    Plan   Plan of care Certification: 7/6/2022 to 9/16/2022.     Outpatient Physical Therapy 2 times weekly for 10 weeks (16 visits) to include the following interventions: Neuromuscular Re-ed, Patient Education, Therapeutic Activities and Therapeutic Exercise.    Continue with core strengthening and balance training. Increase step length    Thuy Jacobo, PT, DPT, NCS  8/5/2022

## 2022-08-09 ENCOUNTER — DOCUMENTATION ONLY (OUTPATIENT)
Dept: REHABILITATION | Facility: HOSPITAL | Age: 87
End: 2022-08-09
Payer: MEDICARE

## 2022-08-09 NOTE — PROGRESS NOTES
Missed Visit/Cancellation      Date: 8/9/2022         Canceled Number: 2  No Show Number: 0                                                                                                                Pt initially had visit scheduled for today for 0800.   Reason for cancellation: kidney stone  Pt's next scheduled physical therapy visit is 8/12/22.     Elba Menchaca, PT, DPT  8/9/2022

## 2022-08-11 ENCOUNTER — PATIENT MESSAGE (OUTPATIENT)
Dept: NEUROSURGERY | Facility: CLINIC | Age: 87
End: 2022-08-11
Payer: MEDICARE

## 2022-08-12 ENCOUNTER — DOCUMENTATION ONLY (OUTPATIENT)
Dept: REHABILITATION | Facility: HOSPITAL | Age: 87
End: 2022-08-12
Payer: MEDICARE

## 2022-08-12 NOTE — PROGRESS NOTES
Missed Visit/Cancellation      Date: 8/12/2022          Canceled Number: 3  No Show Number: 0                                                                                                                Pt initially had visit scheduled for today for 0800.   Reason for cancellation: Pt arrived to PT session and reported nausea and vomiting occurring on the ride to clinic. Pt stated that he would be unable to participate due to nausea; today's session cancelled. Message sent to referring MD regarding frequent nausea.   Pt's next scheduled physical therapy visit is 8/16/22.     Elba Menchaca, PT, DPT  8/12/2022

## 2022-08-16 ENCOUNTER — CLINICAL SUPPORT (OUTPATIENT)
Dept: REHABILITATION | Facility: HOSPITAL | Age: 87
End: 2022-08-16
Payer: MEDICARE

## 2022-08-16 DIAGNOSIS — M54.42 CHRONIC BILATERAL LOW BACK PAIN WITH BILATERAL SCIATICA: ICD-10-CM

## 2022-08-16 DIAGNOSIS — M54.41 CHRONIC BILATERAL LOW BACK PAIN WITH BILATERAL SCIATICA: ICD-10-CM

## 2022-08-16 DIAGNOSIS — G89.29 CHRONIC BILATERAL LOW BACK PAIN WITH BILATERAL SCIATICA: ICD-10-CM

## 2022-08-16 DIAGNOSIS — Z74.09 IMPAIRED FUNCTIONAL MOBILITY, BALANCE, GAIT, AND ENDURANCE: Primary | ICD-10-CM

## 2022-08-16 PROCEDURE — 97112 NEUROMUSCULAR REEDUCATION: CPT | Mod: PN | Performed by: PHYSICAL THERAPIST

## 2022-08-16 PROCEDURE — 97110 THERAPEUTIC EXERCISES: CPT | Mod: PN | Performed by: PHYSICAL THERAPIST

## 2022-08-16 NOTE — PROGRESS NOTES
See POC for PT reassessment    Elba Menchaca, PT, DPT, CBIS  Board-Certified Clinical Specialist in Neurologic Physical Therapy

## 2022-08-16 NOTE — PLAN OF CARE
Physical Therapy Daily Treatment Note     Name: Narciso Yanez  Clinic Number: [id number]    Therapy Diagnosis:   Encounter Diagnoses   Name Primary?    Impaired functional mobility, balance, gait, and endurance Yes    Chronic bilateral low back pain with bilateral sciatica      Physician: Alex Kathleen, *    Visit Date: 8/16/2022    Physician Orders: PT Eval and Treat gait training  Medical Diagnosis from Referral: G91.2 (ICD-10-CM) - NPH (normal pressure hydrocephalus)  Evaluation Date: 7/6/2022  Authorization Period Expiration: 12/31/2022  Plan of Care Expiration: 9/16/2022  Visit # / Visits authorized: 5/ 20    Time In: 0800  Time Out: 0845  Total Billable Time: 45 minutes    Precautions: Standard and Fall    Subjective     Pt reports: not sleeping at night due to pain in hip  He was not compliant with home exercise program.  Response to previous treatment: reported severe dizzy spell the next day  Functional change: ongoing    Pain: 8/10 prior to session, conclusion of session= 2/10 hip pain, 7-8/10 R HS pain  Location: R hip     Objective     Narciso received therapeutic exercises to develop strength, endurance and flexibility for 29 minutes including:      Evaluation 8/16/22   Timed Up and Go 13.3 sec + 14.4 sec + 12.5 sec= 13.5 sec  < 20 sec safe for independent transfers,     < 30 sec assist required for transfers 21.2s + 20.1s + 20.1s   6 meter walk test 0.73 m/sec (6m/8.2s) NT   6 min walk test NT NT     Supine: bridging 2 x 10              Hip abd RTB 2 x 10              Pelvic tilt with march 20x   SKTC 3 x 10 sec     Sitting:               HSS 2 x 20 sec              Hip ER stretch 3 x 20 sec    Recumbent stepper BUE/LE L2 x 8 min for improved strength and endurance      Narciso participated in neuromuscular re-education activities to improve: Balance for 16 minutes. The following activities were included:   Evaluation 8/16/22   Single Limb Stance R LE NT  (<10 sec = HIGH FALL RISK) NT    Single Limb Stance L LE NT  (<10 sec = HIGH FALL RISK) NT   5 times sit-stand 32 seconds w/ UE  >12 sec= fall risk for general elderly  >16 sec= fall risk for Parkinson's disease  >10 sec= balance/vestibular dysfunction (<61 y/o)  >14.2 sec= balance/vestibular dysfunction (>61 y/o)  >12 sec= fall risk for CVA 24 sec w/ UE   Mckeon  NT 41/56   FGA 15/30 NT     Postural control:  MCTSIB:  1. Eyes Open/feet together/Firm: >30 seconds, min sway  2. Eyes Closed/feet together/Firm: 30 seconds, min-mod sway  3. Eyes Open/feet together/Foam: 30 seconds, min-mod sway  4. Eyes Closed/feet together/Foam: 8 sec, LOB posterior    MCKEON Balance Assessment  1. Sitting to Standing   3 - able to stand independely using hands  2. Standing Unsupported   4 - able to stand safely 2 minutes without hold  3. Sitting Unsupported   4 - able to sit safely and securely 2 minutes  4. Standing to Sitting   3 - controls descent by using hands  5. Pivot Transfer   3 - able to transfer safely with definite use of hands  6. Standing with Eyes Closed   4 - albe to stand 10 seconds safely  7. Standing with Feet Together   4 - able to place feet together independently and stand 1 minute safely  8. Reaching Forward with Outstretched Arm   2 - can reach forward 5 cm/2 inches safely  9. Retrieving Object from Floor   4 - able to  slipper safely and easily  10. Turning to Look Behind   3 - looks behind one side only, other side less weight shift  11. Turning 360 Degrees   2 - able to turn 360 safely but slowly  12. Placing Alternate Foot on Step   2 - able to complete 4 steps without aid with supervision  13. Standing with One Foot in Front   2 - able to take small step indenpendently and hold 30 seconds  14. Standing on One Foot   1 - tries to lift leg and unable to hold 3 seconds but remains standing independently  Total: 41  Maximum: 56    Score:   0-20= high fall risk   21-40 moderate fall risk   41-56 low fall risk   Fall risk cut-off scores:    Elderly and History of falls: <51/56   Elderly and No history of falls: <42/56   CVA: <45/56        Parallel bar:     WBOS, foam, horizontal head turns x 30 sec, CGA        Home Exercises Provided and Patient Education Provided     Education provided:   - encouraged compliance with HEP for strengthening  - use Cubii x 10 min daily    Written Home Exercises Provided: Patient instructed to cont prior HEP.  Exercises were reviewed and Narciso was able to demonstrate them prior to the end of the session.  Narciso demonstrated good  understanding of the education provided.     See EMR under Patient Instructions for exercises provided 7/11/2022.    Assessment   Assessment period: 7/6/2022 to 8/16/2022    Nile tolerates PT sessions well, but has had intermittent attendance due to nasuea and reports of a kidney stone. Pt arrived to today's session and denied compliance with HEP as he has not been sleeping well due to R hip pain. PT reassessed functional status today. Pt demonstrated improved strength by performing 5 times sit<>stand test in less time. His mobility was slowed per TUG assessment likely due to reports of increased pain. Improved balance was noted on complaint surface with eyes closed, but pt demonstrated a loss of balance backwards and was unable to correct without assistance. Pt also demonstrates decreased balance with poor balance responses with head turns when on compliant surface. Pt participated in core/ LE strengthening and stretching during today's session. He reported this improved pain in hip, but indicated R hamstring pain. Pt was instructed to perform hamstring stretching to address. Pt can benefit form continued skilled PT to improve balance to decrease fall risk and address pain.        Narciso Is progressing well towards his goals.   Pt prognosis is Good.     Pt will continue to benefit from skilled outpatient physical therapy to address the deficits listed in the problem list box on initial  evaluation, provide pt/family education and to maximize pt's level of independence in the home and community environment.     Pt's spiritual, cultural and educational needs considered and pt agreeable to plan of care and goals.     Anticipated barriers to physical therapy: transportation/ scheduling availability.    Goals:  Status as of 8/16/22   Short Term Goals: 5 weeks   1. Pt will perform HEP at least 2x/week to progress ability to maintain benefits of PT. ongoing  2. Pt will demonstrate improvement in mobility by performing 5 times sit<>stand test w UE support in 24 sec. Met 8/16/22  3. Assess MCKEON or FGA and set goals as needed for decreased fall risk. Met- Mckeon 41/56, FGA 15/30   Revised 8/16/22: pt will demonstrate decreased fall risk and improved balance by scoring 46/56 on Mckeon- ongoing  4. Pt will report use of RW only 50% of the time at home to demonstrate improved balance. Ongoing     Long Term Goals: 10 weeks   1. Pt will demonstrate improvement in mobility and decreased fall risk by performing 5 times sit<>stand test w UE support in 16 sec. ongoing  2. Pt will improve gait velocity to improve community negotiation by ambulating at 0.8 m/s.ongoing  3. Pt will demonstrate improved balance responses for improved safety in dim environments by performing NBOS on foam w/ EC x 10 sec. Improved- 8 sec  4. Pt will report maximal LBP level to 7/10 to demonstrate improved tolerance to daily mobility. Ongoing  5. New 7/11/22: pt will demonstrate improved balance and decreased fall risk to decrease need for RW by scoring 20/30 on FGA. ongoing    Plan   Plan of care Certification: 7/6/2022 to 9/16/2022.     Outpatient Physical Therapy 2 times weekly for 10 weeks (16 visits) to include the following interventions: Neuromuscular Re-ed, Patient Education, Therapeutic Activities and Therapeutic Exercise.    Continue with core strengthening and balance training. Increase step length    Elba Menchaca, PT, DPT,  CBIS  Board-Certified Clinical Specialist in Neurologic Physical Therapy    8/16/2022

## 2022-08-17 NOTE — TELEPHONE ENCOUNTER
Care Due:                  Date            Visit Type   Department     Provider  --------------------------------------------------------------------------------                                EP [facility]                              PRIMARY      MED/ INTERNAL  Marcelino Jacobo  Last Visit: 06-      CARE ([facility])   MED/ PEDS      Ehren[place]  Next Visit: None Scheduled  None         None Found                                                            Last  Test          Frequency    Reason                     Performed    Due Date  --------------------------------------------------------------------------------    HBA1C.......  6 months...  metFORMIN................  02- 08-    [facility] Embedded Care Gaps. Reference number: [id number]. 8/17/2022   5:10:35 PM CDT

## 2022-08-19 ENCOUNTER — DOCUMENTATION ONLY (OUTPATIENT)
Dept: REHABILITATION | Facility: HOSPITAL | Age: 87
End: 2022-08-19
Payer: MEDICARE

## 2022-08-19 RX ORDER — CALCIUM CITRATE/VITAMIN D3 200MG-6.25
TABLET ORAL
Qty: 300 STRIP | Refills: 12 | Status: ON HOLD | OUTPATIENT
Start: 2022-08-19 | End: 2023-10-23 | Stop reason: HOSPADM

## 2022-08-19 NOTE — PROGRESS NOTES
Missed Visit/Cancellation      Date: 8/19/2022           Canceled Number: 3  No Show Number: 1                                                                                                                Pt initially had visit scheduled for today for 0800.   Reason for cancellation: no show  Pt's next scheduled physical therapy visit is 8/30/22.     Elba Menchaca, PT, DPT  8/19/2022

## 2022-08-23 ENCOUNTER — HOSPITAL ENCOUNTER (OUTPATIENT)
Dept: RADIOLOGY | Facility: HOSPITAL | Age: 87
Discharge: HOME | End: 2022-08-23
Attending: PHYSICIAN ASSISTANT
Payer: MEDICARE

## 2022-08-23 ENCOUNTER — OFFICE VISIT (OUTPATIENT)
Dept: NEUROSURGERY | Facility: CLINIC | Age: 87
End: 2022-08-23
Payer: MEDICARE

## 2022-08-23 VITALS
HEART RATE: 85 BPM | SYSTOLIC BLOOD PRESSURE: 116 MMHG | TEMPERATURE: 98 F | HEIGHT: 67 IN | BODY MASS INDEX: 19.3 KG/M2 | WEIGHT: 123 LBS | DIASTOLIC BLOOD PRESSURE: 67 MMHG

## 2022-08-23 DIAGNOSIS — M48.061 SPINAL STENOSIS OF LUMBAR REGION WITHOUT NEUROGENIC CLAUDICATION: ICD-10-CM

## 2022-08-23 DIAGNOSIS — G91.2 NPH (NORMAL PRESSURE HYDROCEPHALUS): ICD-10-CM

## 2022-08-23 DIAGNOSIS — G91.2 NPH (NORMAL PRESSURE HYDROCEPHALUS): Primary | ICD-10-CM

## 2022-08-23 PROCEDURE — 1160F RVW MEDS BY RX/DR IN RCRD: CPT | Mod: CPTII,S$GLB,, | Performed by: PHYSICIAN ASSISTANT

## 2022-08-23 PROCEDURE — 70450 CT HEAD/BRAIN W/O DYE: CPT | Mod: TC

## 2022-08-23 PROCEDURE — 70450 CT HEAD WITHOUT CONTRAST: ICD-10-PCS | Mod: 26,,, | Performed by: RADIOLOGY

## 2022-08-23 PROCEDURE — 70450 CT HEAD/BRAIN W/O DYE: CPT | Mod: 26,,, | Performed by: RADIOLOGY

## 2022-08-23 PROCEDURE — 1159F MED LIST DOCD IN RCRD: CPT | Mod: CPTII,S$GLB,, | Performed by: PHYSICIAN ASSISTANT

## 2022-08-23 PROCEDURE — 1159F PR MEDICATION LIST DOCUMENTED IN MEDICAL RECORD: ICD-10-PCS | Mod: CPTII,S$GLB,, | Performed by: PHYSICIAN ASSISTANT

## 2022-08-23 PROCEDURE — 1100F PR PT FALLS ASSESS DOC 2+ FALLS/FALL W/INJURY/YR: ICD-10-PCS | Mod: CPTII,S$GLB,, | Performed by: PHYSICIAN ASSISTANT

## 2022-08-23 PROCEDURE — 3288F FALL RISK ASSESSMENT DOCD: CPT | Mod: CPTII,S$GLB,, | Performed by: PHYSICIAN ASSISTANT

## 2022-08-23 PROCEDURE — 1160F PR REVIEW ALL MEDS BY PRESCRIBER/CLIN PHARMACIST DOCUMENTED: ICD-10-PCS | Mod: CPTII,S$GLB,, | Performed by: PHYSICIAN ASSISTANT

## 2022-08-23 PROCEDURE — 1126F AMNT PAIN NOTED NONE PRSNT: CPT | Mod: CPTII,S$GLB,, | Performed by: PHYSICIAN ASSISTANT

## 2022-08-23 PROCEDURE — 3078F PR MOST RECENT DIASTOLIC BLOOD PRESSURE < 80 MM HG: ICD-10-PCS | Mod: CPTII,S$GLB,, | Performed by: PHYSICIAN ASSISTANT

## 2022-08-23 PROCEDURE — 3074F SYST BP LT 130 MM HG: CPT | Mod: CPTII,S$GLB,, | Performed by: PHYSICIAN ASSISTANT

## 2022-08-23 PROCEDURE — 1126F PR PAIN SEVERITY QUANTIFIED, NO PAIN PRESENT: ICD-10-PCS | Mod: CPTII,S$GLB,, | Performed by: PHYSICIAN ASSISTANT

## 2022-08-23 PROCEDURE — 1100F PTFALLS ASSESS-DOCD GE2>/YR: CPT | Mod: CPTII,S$GLB,, | Performed by: PHYSICIAN ASSISTANT

## 2022-08-23 PROCEDURE — 3074F PR MOST RECENT SYSTOLIC BLOOD PRESSURE < 130 MM HG: ICD-10-PCS | Mod: CPTII,S$GLB,, | Performed by: PHYSICIAN ASSISTANT

## 2022-08-23 PROCEDURE — 3288F PR FALLS RISK ASSESSMENT DOCUMENTED: ICD-10-PCS | Mod: CPTII,S$GLB,, | Performed by: PHYSICIAN ASSISTANT

## 2022-08-23 PROCEDURE — 99213 OFFICE O/P EST LOW 20 MIN: CPT | Mod: S$GLB,,, | Performed by: PHYSICIAN ASSISTANT

## 2022-08-23 PROCEDURE — 99213 PR OFFICE/OUTPT VISIT, EST, LEVL III, 20-29 MIN: ICD-10-PCS | Mod: S$GLB,,, | Performed by: PHYSICIAN ASSISTANT

## 2022-08-23 PROCEDURE — 3078F DIAST BP <80 MM HG: CPT | Mod: CPTII,S$GLB,, | Performed by: PHYSICIAN ASSISTANT

## 2022-08-23 PROCEDURE — 99999 PR PBB SHADOW E&M-EST. PATIENT-LVL V: ICD-10-PCS | Mod: PBBFAC,,, | Performed by: PHYSICIAN ASSISTANT

## 2022-08-23 PROCEDURE — 99999 PR PBB SHADOW E&M-EST. PATIENT-LVL V: CPT | Mod: PBBFAC,,, | Performed by: PHYSICIAN ASSISTANT

## 2022-08-23 NOTE — PROGRESS NOTES
Neurosurgery  Established Patient    SUBJECTIVE:     History of Present Illness:  86-year-old male status post  shunt for NPH on 05/31/2021.  Patient developed subdural hygromas and shunt was ultimately dialed up to 200 on in March.  Subdurals have subsequently resolved.  He is now here today for follow-up after last shunt adjustment from 180-160 mm of water.  Doing well from a shunt standpoint, balance has improved since last visit.  No recent falls.  Patient still having low back issues and intermittent paresthesias of his lower extremities, has been doing physical therapy which has been helping him.  Denies any new weakness bowel bladder changes.      Review of patient's allergies indicates:   Allergen Reactions    Demerol [meperidine] Nausea Only       Current Outpatient Medications   Medication Sig Dispense Refill    ACCU-CHEK JOAQUIN PLUS METER Misc Three times a day with meals  0    ACCU-CHEK SOFTCLIX LANCETS Misc Check tid 200 each 12    acetaminophen (TYLENOL) 500 MG tablet Take 1 tablet (500 mg total) by mouth every 6 (six) hours as needed for Pain. 13 tablet 0    albuterol-ipratropium (DUO-NEB) 2.5 mg-0.5 mg/3 mL nebulizer solution Take 3 mLs by nebulization every 4 (four) hours as needed for Wheezing. 9 mL 12    alendronate (FOSAMAX) 70 MG tablet TAKE 1 TABLET EVERY 7 DAYS ON TUESDAYS 12 tablet 12    ascorbic acid, vitamin C, (VITAMIN C) 250 MG tablet Take 1 tablet (250 mg total) by mouth once daily. 120 tablet 0    blood sugar diagnostic (TRUE METRIX GLUCOSE TEST STRIP) Strp  each 12    BOOSTRIX TDAP 2.5-8-5 Lf-mcg-Lf/0.5mL Syrg injection       ciclopirox (PENLAC) 8 % Soln Apply topically nightly. 6.6 mL 11    clopidogreL (PLAVIX) 75 mg tablet Take 1 tablet (75 mg total) by mouth once daily. 30 tablet 12    diazePAM (VALIUM) 5 MG tablet Take 1 tablet (5 mg total) by mouth every 8 (eight) hours as needed. 270 tablet 5    docusate sodium (COLACE) 100 MG capsule Take 1 capsule (100 mg  total) by mouth 2 (two) times daily. 60 capsule 0    heparin sodium,porcine (HEPARIN, PORCINE,) 5,000 unit/mL injection Inject 1 mL (5,000 Units total) into the skin every 8 (eight) hours.      lactulose (CHRONULAC) 20 gram/30 mL Soln Take 45 mLs (30 g total) by mouth 3 (three) times daily as needed. 1500 mL 12    magnesium sulfate in water (MAGNESIUM SULFATE 2 GRAM/50 ML) 2 gram/50 mL PgBk       meclizine (ANTIVERT) 25 mg tablet Take 1 tablet (25 mg total) by mouth 3 (three) times daily as needed for Dizziness (or at least one HS for 1 week when vertigo active). 30 tablet 12    metFORMIN (GLUCOPHAGE-XR) 750 MG ER 24hr tablet TAKE 1 TABLET TWICE DAILY WITH MEALS 180 tablet 12    metoprolol succinate (TOPROL-XL) 25 MG 24 hr tablet       mirtazapine (REMERON) 15 MG tablet Take 1 tablet (15 mg total) by mouth every evening. 30 tablet 11    nicotine (NICODERM CQ) 14 mg/24 hr Place 1 patch onto the skin once daily.  0    OMNIPAQUE 300 300 mg iodine/mL injection       OMNIPAQUE 350 350 mg iodine/mL Soln injection       ondansetron (ZOFRAN-ODT) 4 MG TbDL Take 2 tablets (8 mg total) by mouth every 8 (eight) hours as needed (nausea). 90 tablet 0    ondansetron (ZOFRAN-ODT) 8 MG TbDL       prochlorperazine (COMPAZINE) 5 MG tablet       rosuvastatin (CRESTOR) 20 MG tablet TAKE 1 TABLET (20 MG TOTAL) BY MOUTH EVERY EVENING. 90 tablet 12    senna (SENOKOT) 8.6 mg tablet Take 1 tablet by mouth once daily.      TRUE METRIX GLUCOSE TEST STRIP Strp TEST BLOOD SUGAR THREE TIMES DAILY. 300 strip 12    TRUEPLUS LANCETS 33 gauge Misc TEST BLOOD SUGAR TWICE DAILY 200 each 12    aspirin (ECOTRIN) 81 MG EC tablet Take 1 tablet (81 mg total) by mouth once daily.  0    insulin aspart U-100 (NOVOLOG) 100 unit/mL (3 mL) InPn pen Inject 0-5 Units into the skin before meals and at bedtime as needed (Hyperglycemia). (Patient not taking: No sig reported)  0     Current Facility-Administered Medications   Medication Dose Route  Frequency Provider Last Rate Last Admin    testosterone cypionate injection 400 mg  400 mg Intramuscular Q28 Days Marcelino Del Toro MD           Past Medical History:   Diagnosis Date    Actinic keratosis     Anxiety     B12 deficiency 12/8/2014    Dx 6/14    Cancer     skin    Cataract     Coronary artery disease     Diabetes mellitus type II     Diabetes mellitus with peripheral circulatory disorder 6/18/2014    ED (erectile dysfunction)     Hyperlipidemia     Kidney stone     Neurogenic claudication 4/4/2022    Peripheral vascular disease     Spondylosis 3/29/2019    Tobacco dependence     Urinary incontinence 5/4/2021     Past Surgical History:   Procedure Laterality Date    APPENDECTOMY      CARDIAC SURGERY  01/1999    CABG 4 vessels    CATARACT EXTRACTION      EPIDURAL STEROID INJECTION Right 8/26/2020    Procedure: Injection, Steroid, Epidural Transforaminal;  Surgeon: Wiley Crane Jr., MD;  Location: [facility];  Service: Pain Management;  Laterality: Right;  Right L5 + S1 TF LEAH  Arrive @ 1115; ASA & Plavix last 8/18; Check BG; Rapid COVID test    EPIDURAL STEROID INJECTION Right 11/25/2020    Procedure: Injection, Steroid, Epidural Transformainal;  Surgeon: Wiley Crane Jr., MD;  Location: [facility];  Service: Pain Management;  Laterality: Right;  Right L5 + S1 TF LEAH  Arrive @ 1245; ASA and Plavix last 11/17; Pre-DM; Needs MD Sign.    EPIDURAL STEROID INJECTION Right 2/19/2021    Procedure: Injection, Steroid, Epidural Transforaminal;  Surgeon: Wiley Crane Jr., MD;  Location: [facility];  Service: Pain Management;  Laterality: Right;  Right L5 + S1 TF LEAH  Arrive @ 1115; ASA & Plavix last 2/11; Check BG; Needs Consent    EXTERNAL EAR SURGERY      EYE SURGERY      cataracts    ILIAC ARTERY STENT Bilateral 06/2003    also Right External Iliac stent     Family History     Problem Relation (Age of Onset)    Stroke Mother        Social History     Socioeconomic  "History    Marital status:    Tobacco Use    Smoking status: Current Every Day Smoker     Packs/day: 1.50     Years: 71.00     Pack years: 106.50     Types: Cigarettes    Smokeless tobacco: Former User   Substance and Sexual Activity    Alcohol use: No    Drug use: No    Sexual activity: Not Currently     Partners: Female   Social History Narrative    .  4 children.  Smokes 2 ppd.  Still drives trucks for entertainment industry.        Review of Systems  Constitutional: no fever or chills  Eyes: no visual changes  ENT: no nasal congestion or sore throat  Respiratory: no cough or shortness of breath  Cardiovascular: no chest pain or palpitations  Gastrointestinal: no nausea or vomiting  Genitourinary: no hematuria or dysuria  Integument/Breast: no rash or pruritis  Hematologic/Lymphatic: no easy bruising or lymphadenopathy  Musculoskeletal: no arthralgias or myalgias  Neurological: no seizures or tremors    OBJECTIVE:     Vital Signs  Temp: 97.8 °F (36.6 °C)  Pulse: 85  BP: 116/67  Pain Score: 0-No pain  Height: 5' 7" (170.2 cm)  Weight: 55.8 kg (123 lb 0.3 oz)  Body mass index is 19.27 kg/m².    Neurosurgery Physical Exam  General: no apparent distress  Neurologic: Alert and oriented. Thought content appropriate.  GCS: Motor: 6/Verbal: 5/Eyes: 4 GCS Total: 15  Cranial nerves: face symmetric, tongue midline, pupils equal, round, reactive to light with accomodation, extraocular muscles intact  Sensory: response to light touch throughout  Motor Strength:full strength upper and lower extremities  Pronator Drift: no drift noted    Diagnostic Results: personally reviewed  EXAMINATION:  CT HEAD WITHOUT CONTRAST     CLINICAL HISTORY:   shunt, NPH;  (Idiopathic) normal pressure hydrocephalus     TECHNIQUE:  Multiple sequential 5 mm axial images of the head without contrast.  Coronal and sagittal reformatted imaging from the axial acquisition.     COMPARISON:  07/05/2022     FINDINGS:  Remote operative " change with right parietal coursing ventricular catheter placement stable course and positioning catheter tip extending to the left frontal horn lateral ventricle.  There is reduced caliber of the lateral and 3rd ventricles compatible with improved hydrocephalus.  There is no evidence for acute intracranial hemorrhage or sulcal effacement to suggest large territory recent infarction.  Visualized paranasal sinuses and mastoid air cells are clear     Impression:     Stable course and positioning right parietal ventricular catheter with reduced distension of the ventricles compatible with improved hydrocephalus.     No evidence for acute intracranial hemorrhage or new abnormal parenchymal attenuation.  Clinical correlation and continued follow-up advised.        Electronically signed by: Karl Glass DO  Date:                                            08/23/2022  Time:                                           13:10    ASSESSMENT/PLAN:     86-year-old male with low-pressure hydrocephalus and  shunt with prior subdural hematomas is since resolved.  Head CT shows stable configuration of ventricular system without acute intracranial abnormality.  He seems to be doing well clinically from a shunt standpoint will continue keep shunt valve at 160 mm of water.  PT ordered for gait training as well as lumbar spine cm back and 3 months she is encouraged to call clinic with questions or concerns in meantime.      Alex Kathleen Jr. PA-C  [facility]  Department of Neurosurgery      Note dictated with voice recognition software, please excuse any grammatical errors.

## 2022-08-30 ENCOUNTER — CLINICAL SUPPORT (OUTPATIENT)
Dept: REHABILITATION | Facility: HOSPITAL | Age: 87
End: 2022-08-30
Payer: MEDICARE

## 2022-08-30 DIAGNOSIS — G89.29 CHRONIC BILATERAL LOW BACK PAIN WITH BILATERAL SCIATICA: ICD-10-CM

## 2022-08-30 DIAGNOSIS — M54.42 CHRONIC BILATERAL LOW BACK PAIN WITH BILATERAL SCIATICA: ICD-10-CM

## 2022-08-30 DIAGNOSIS — Z74.09 IMPAIRED FUNCTIONAL MOBILITY, BALANCE, GAIT, AND ENDURANCE: Primary | ICD-10-CM

## 2022-08-30 DIAGNOSIS — M54.41 CHRONIC BILATERAL LOW BACK PAIN WITH BILATERAL SCIATICA: ICD-10-CM

## 2022-08-30 PROCEDURE — 97112 NEUROMUSCULAR REEDUCATION: CPT | Mod: PN

## 2022-08-30 PROCEDURE — 97110 THERAPEUTIC EXERCISES: CPT | Mod: PN

## 2022-08-30 NOTE — PROGRESS NOTES
"  Physical Therapy Daily Treatment Note     Name: Narciso Yanez  Clinic Number: [id number]    Therapy Diagnosis:   Encounter Diagnoses   Name Primary?    Impaired functional mobility, balance, gait, and endurance Yes    Chronic bilateral low back pain with bilateral sciatica        Physician: Alex Kathleen, *    Visit Date: 8/30/2022    Physician Orders: PT Eval and Treat gait training  Medical Diagnosis from Referral: G91.2 (ICD-10-CM) - NPH (normal pressure hydrocephalus)  Evaluation Date: 7/6/2022  Authorization Period Expiration: 12/31/2022  Plan of Care Expiration: 9/16/2022  Visit # / Visits authorized: 6/ 20    Time In: 8:05 AM  Time Out: 8:45 AM  Total Billable Time: 40 minutes    Precautions: Standard and Fall    Subjective     Pt reports: no falls to report. Saw Neurosurgeon     He was partially compliant with home exercise program.  Response to previous treatment: reported severe dizzy spell the next day  Functional change: ongoing    Pain: 3/10 prior to session  Location: bilateral hips     Objective     Narciso received therapeutic exercises to develop strength, endurance and flexibility for 20 minutes including:    Recumbent stepper BUE/LE L2 x 7 min for improved strength and endurance    Parallel bar:    step ups 4", UUE PRN - 10x2, CGA    Sit<>Stands from chair, BUE - 5x2, CGA  Sit<>Stands from chair, BUE - 5x2, CGA    Narciso participated in neuromuscular re-education activities to improve: Balance for 20 minutes. The following activities were included:    Parallel bar:     WBOS, foam, EO 2 x 30 sec   NBOS, foam, EO 2 x 30 sec= mod sway   WBOS, foam, EC 2 x 30 sec= mod sway, CGA   NBOS, foam, EC 2 x 30 sec= mod sway, CGA   NBOS, firm, pitch/yaw  2x30 sec= min-mod sway   Semi tandem stance 2 x 30 sec     Stepping on/off foam 10x2, UUE      Step taps 4", no upper extremity, 10x2, 20x1 alt, CGA   Lateral stepping with x1 small kia, x20, BUE    Lateral stepping with x1 small kia, x20, " UUE      FORWARD/BWD, stepping with x1 small kia, x20, BUE    FORWARD/BWD, stepping with x1 small kia, x20, UUE      Home Exercises Provided and Patient Education Provided     Education provided:   - encouraged compliance with HEP for strengthening  - use Cubii x 10 min daily    Written Home Exercises Provided: Patient instructed to cont prior HEP.  Exercises were reviewed and Narciso was able to demonstrate them prior to the end of the session.  Narciso demonstrated good  understanding of the education provided.     See EMR under Patient Instructions for exercises provided 7/11/2022.    Assessment   Sullyivin tolerating session well without adverse response to treatment. Pt with good challenge to all prescribed activities this session. Intermittent UE support utilized during static balance activities for safety. No outright LOB noted during step ups or kia navigation today. Pt remains appropriate for therapy at this time.       Narciso Is progressing well towards his goals.   Pt prognosis is Good.     Pt will continue to benefit from skilled outpatient physical therapy to address the deficits listed in the problem list box on initial evaluation, provide pt/family education and to maximize pt's level of independence in the home and community environment.     Pt's spiritual, cultural and educational needs considered and pt agreeable to plan of care and goals.     Anticipated barriers to physical therapy: transportation/ scheduling availability.    Goals:   Short Term Goals: 5 weeks   1. Pt will perform HEP at least 2x/week to progress ability to maintain benefits of PT. ongoing  2. Pt will demonstrate improvement in mobility by performing 5 times sit<>stand test w UE support in 24 sec. ongoing  3. Assess MCKEON or FGA and set goals as needed for decreased fall risk. ongoing  4. Pt will report use of RW only 50% of the time at home to demonstrate improved balance. Ongoing     Long Term Goals: 10 weeks   1. Pt will  demonstrate improvement in mobility and decreased fall risk by performing 5 times sit<>stand test w UE support in 16 sec. ongoing  2. Pt will improve gait velocity to improve community negotiation by ambulating at 0.8 m/s.ongoing  3. Pt will demonstrate improved balance responses for improved safety in dim environments by performing NBOS on foam w/ EC x 10 sec. ongoing  4. Pt will report maximal LBP level to 7/10 to demonstrate improved tolerance to daily mobility. Ongoing  5. New 7/11/22: pt will demonstrate improved balance and decreased fall risk to decrease need for RW by scoring 20/30 on FGA.    Plan   Plan of care Certification: 7/6/2022 to 9/16/2022.     Outpatient Physical Therapy 2 times weekly for 10 weeks (16 visits) to include the following interventions: Neuromuscular Re-ed, Patient Education, Therapeutic Activities and Therapeutic Exercise.    Continue with core strengthening and balance training. Increase step length    Elizabeth Prasad, PT, DPT  8/30/2022

## 2022-09-07 NOTE — TELEPHONE ENCOUNTER
----- Message from Wiley Vargas MD sent at 11/17/2020  2:28 PM CST -----  Regarding: FW: Clearance to hold Anticoagulants  OK to hold ASA/Plavix as requested and resume ASAP after procedure.    [facility]  ----- Message -----  From: Radha Louise MA  Sent: 11/17/2020   9:53 AM CST  To: Wiley Vargas MD  Subject: FW: Clearance to hold Anticoagulants               ----- Message -----  From: Kayla Pedro RN  Sent: 11/17/2020   9:48 AM CST  To: Sam Sauer  Subject: Clearance to hold Anticoagulants                 Dr. Vargas,    Mr. Yanez is scheduled to have right L5 + S1 TF LEAH on 11/25/2020.  Dr. Crane is requesting that the patient stop taking ASA and Plavix seven days prior to this procedure.  Please indicate whether or not he is able to stop taking the medications listed above.  Feel free to contact the clinic with any questions or concerns you may have.      Thank you in advance for your time and expertise,    Kayla Pedro, HECTORN, RN          
Informed Mr. Yanez that we did receive clearance from Dr. Vargas.  He will begin holding the ASA and Plavix starting tomorrow and will resume after the procedure.   
General Sunscreen Counseling: I recommended a broad spectrum sunscreen with a SPF of 30 or higher.  Sunscreens should be applied at least 15 minutes prior to expected sun exposure and then every 2 hours after that as long as sun exposure continues. One ounce, or the equivalent of a shot glass full of sunscreen, is adequate to protect the skin not covered by a bathing suit. I also recommended a lip balm with a sunscreen as well. Sun protective clothing can be used in lieu of sunscreen but must be worn the entire time you are exposed to the sun's rays.
Detail Level: Detailed

## 2022-09-13 ENCOUNTER — DOCUMENTATION ONLY (OUTPATIENT)
Dept: REHABILITATION | Facility: HOSPITAL | Age: 87
End: 2022-09-13
Payer: MEDICARE

## 2022-09-13 NOTE — PROGRESS NOTES
Missed Visit/Cancellation      Date: 9/13/2022           Canceled Number: 3  No Show Number: 2                                                                                                                Pt initially had visit scheduled for today for 0800.   Reason for cancellation: no show  Pt's next scheduled physical therapy visit is 9/15/22.     Elba Menchaca, PT, DPT  9/13/2022

## 2022-09-18 NOTE — TELEPHONE ENCOUNTER
No new care gaps identified.  [facility] Embedded Care Gaps. Reference number: [id number]. 9/18/2022   10:47:45 AM ALEXANDERT

## 2022-09-20 ENCOUNTER — TELEPHONE (OUTPATIENT)
Dept: REHABILITATION | Facility: HOSPITAL | Age: 87
End: 2022-09-20
Payer: MEDICARE

## 2022-09-20 ENCOUNTER — DOCUMENTATION ONLY (OUTPATIENT)
Dept: REHABILITATION | Facility: HOSPITAL | Age: 87
End: 2022-09-20
Payer: MEDICARE

## 2022-09-20 NOTE — PROGRESS NOTES
Date: 9/13/2022   Signed  Missed Visit/Cancellation      Date: 9/20/2022           Canceled Number: 3  No Show Number: 3                                                                                                                Pt initially had visit scheduled for today for 0800.   Reason for cancellation: no show- called pt and he stated he was not feeling well. Pt thought he cancelled session  Pt's next scheduled physical therapy visit is 9/23/22.     Elba Menchaca, PT, DPT  9/20/2022           
13-Sep-2022 19:04

## 2022-09-22 RX ORDER — DIAZEPAM 5 MG/1
TABLET ORAL
Qty: 270 TABLET | Refills: 5 | Status: SHIPPED | OUTPATIENT
Start: 2022-09-22 | End: 2022-09-22 | Stop reason: SDUPTHER

## 2022-09-22 NOTE — TELEPHONE ENCOUNTER
No new care gaps identified.  [facility] Embedded Care Gaps. Reference number: [id number]. 9/22/2022   3:27:35 PM CDT

## 2022-09-22 NOTE — TELEPHONE ENCOUNTER
----- Message from Luke Cool MA sent at 9/22/2022  3:07 PM CDT -----  Type: Patient Call Back    Who called: Self    What is the request in detail: pt. Has been trying since Monday to get his refill for diazePAM (VALIUM) 5 MG tablet 270 tablet .. the medication has been sent to the wrong pharmacy.. It needs to go to [facility]..    Can the clinic reply by [facility]?NO    Would the patient rather a call back or a response via [facility]? Yes    Best call back number: [phone]

## 2022-09-23 RX ORDER — DIAZEPAM 5 MG/1
TABLET ORAL
Qty: 270 TABLET | Refills: 5 | Status: SHIPPED | OUTPATIENT
Start: 2022-09-23 | End: 2022-09-30 | Stop reason: SDUPTHER

## 2022-09-27 ENCOUNTER — DOCUMENTATION ONLY (OUTPATIENT)
Dept: REHABILITATION | Facility: HOSPITAL | Age: 87
End: 2022-09-27
Payer: MEDICARE

## 2022-09-27 NOTE — PROGRESS NOTES
Missed Visit/Cancellation      Date: 9/27/2022         Canceled Number: 4  No Show Number: 3  >5 consecutive missed visits                                                                                                                Pt initially had visit scheduled for today for 0800.   Reason for cancellation: sick  Pt's remaining PT visits will be cx per attendance contract.      Elba Menchaca, PT, DPT  9/27/2022

## 2022-09-30 RX ORDER — DIAZEPAM 5 MG/1
TABLET ORAL
Qty: 270 TABLET | Refills: 5 | Status: SHIPPED | OUTPATIENT
Start: 2022-09-30 | End: 2023-04-06 | Stop reason: SDUPTHER

## 2022-09-30 NOTE — TELEPHONE ENCOUNTER
----- Message from Madonna Gaston sent at 9/30/2022  8:09 AM CDT -----  .Type: Patient Call Back    Who called:self    What is the request in detail: diazePAM (VALIUM) 5 MG tablet needs to be sent to [facility] pharmacy because [facility] has medication on back order. Please call    [facility]/pharmacy #[id number] - [place] - [address]  [address] [place] [address]  Phone: [phone] Fax: [phone]        Can the clinic reply by [facility]?    Would the patient rather a call back or a response via [facility]? call    Best call back number:.[phone] (home) [phone] (work)

## 2022-09-30 NOTE — TELEPHONE ENCOUNTER
No new care gaps identified.  [facility] Embedded Care Gaps. Reference number: [id number]. 9/30/2022   9:20:59 AM CDT

## 2022-11-18 RX ORDER — CLOPIDOGREL BISULFATE 75 MG/1
TABLET ORAL
Qty: 90 TABLET | Refills: 12 | Status: SHIPPED | OUTPATIENT
Start: 2022-11-18 | End: 2023-01-25

## 2022-11-18 NOTE — TELEPHONE ENCOUNTER
Care Due:                  Date            Visit Type   Department     Provider  --------------------------------------------------------------------------------                                [facility]/ INTERNAL  [place]  Last Visit: 06-      CARE ([facility])   MED/ PEDS      [place]                              [facility]/ INTERNAL  [facility]  Next Visit: 12-      CARE ([facility])   MED/ PEDS      Ehrensing                                                            Last  Test          Frequency    Reason                     Performed    Due Date  --------------------------------------------------------------------------------    HBA1C.......  6 months...  metFORMIN................  02- 08-    [facility] Embedded Care Gaps. Reference number: [id number]. 11/18/2022   1:44:18 AM CST

## 2022-12-01 ENCOUNTER — OFFICE VISIT (OUTPATIENT)
Dept: FAMILY MEDICINE | Facility: CLINIC | Age: 87
End: 2022-12-01
Payer: MEDICARE

## 2022-12-01 VITALS
SYSTOLIC BLOOD PRESSURE: 118 MMHG | HEIGHT: 67 IN | HEART RATE: 77 BPM | TEMPERATURE: 98 F | WEIGHT: 125 LBS | OXYGEN SATURATION: 99 % | BODY MASS INDEX: 19.62 KG/M2 | DIASTOLIC BLOOD PRESSURE: 72 MMHG

## 2022-12-01 DIAGNOSIS — I10 ESSENTIAL HYPERTENSION: ICD-10-CM

## 2022-12-01 DIAGNOSIS — G91.2 NPH (NORMAL PRESSURE HYDROCEPHALUS): ICD-10-CM

## 2022-12-01 DIAGNOSIS — E11.65 UNCONTROLLED TYPE 2 DIABETES MELLITUS WITH HYPERGLYCEMIA: ICD-10-CM

## 2022-12-01 DIAGNOSIS — R42 VERTIGO: ICD-10-CM

## 2022-12-01 DIAGNOSIS — K59.00 CONSTIPATION, UNSPECIFIED CONSTIPATION TYPE: ICD-10-CM

## 2022-12-01 DIAGNOSIS — N18.31 STAGE 3A CHRONIC KIDNEY DISEASE: ICD-10-CM

## 2022-12-01 DIAGNOSIS — I73.9 PVD (PERIPHERAL VASCULAR DISEASE): ICD-10-CM

## 2022-12-01 DIAGNOSIS — M35.3 POLYMYALGIA RHEUMATICA: ICD-10-CM

## 2022-12-01 DIAGNOSIS — E53.8 B12 DEFICIENCY: ICD-10-CM

## 2022-12-01 DIAGNOSIS — J41.0 SMOKERS' COUGH: ICD-10-CM

## 2022-12-01 DIAGNOSIS — J44.9 CHRONIC OBSTRUCTIVE PULMONARY DISEASE, UNSPECIFIED COPD TYPE: ICD-10-CM

## 2022-12-01 DIAGNOSIS — F39 MOOD DISORDER: ICD-10-CM

## 2022-12-01 DIAGNOSIS — D50.9 IRON DEFICIENCY ANEMIA, UNSPECIFIED IRON DEFICIENCY ANEMIA TYPE: ICD-10-CM

## 2022-12-01 DIAGNOSIS — I25.810 CORONARY ARTERY DISEASE INVOLVING CORONARY BYPASS GRAFT OF NATIVE HEART WITHOUT ANGINA PECTORIS: ICD-10-CM

## 2022-12-01 DIAGNOSIS — E78.5 HYPERLIPIDEMIA, UNSPECIFIED HYPERLIPIDEMIA TYPE: ICD-10-CM

## 2022-12-01 DIAGNOSIS — M79.604 RIGHT LEG PAIN: Primary | ICD-10-CM

## 2022-12-01 PROCEDURE — 1125F AMNT PAIN NOTED PAIN PRSNT: CPT | Mod: CPTII,S$GLB,, | Performed by: INTERNAL MEDICINE

## 2022-12-01 PROCEDURE — 3078F DIAST BP <80 MM HG: CPT | Mod: CPTII,S$GLB,, | Performed by: INTERNAL MEDICINE

## 2022-12-01 PROCEDURE — 99499 RISK ADDL DX/OHS AUDIT: ICD-10-PCS | Mod: S$GLB,,, | Performed by: INTERNAL MEDICINE

## 2022-12-01 PROCEDURE — 99499 UNLISTED E&M SERVICE: CPT | Mod: S$GLB,,, | Performed by: INTERNAL MEDICINE

## 2022-12-01 PROCEDURE — 1100F PR PT FALLS ASSESS DOC 2+ FALLS/FALL W/INJURY/YR: ICD-10-PCS | Mod: CPTII,S$GLB,, | Performed by: INTERNAL MEDICINE

## 2022-12-01 PROCEDURE — 3288F FALL RISK ASSESSMENT DOCD: CPT | Mod: CPTII,S$GLB,, | Performed by: INTERNAL MEDICINE

## 2022-12-01 PROCEDURE — 3074F PR MOST RECENT SYSTOLIC BLOOD PRESSURE < 130 MM HG: ICD-10-PCS | Mod: CPTII,S$GLB,, | Performed by: INTERNAL MEDICINE

## 2022-12-01 PROCEDURE — 99215 OFFICE O/P EST HI 40 MIN: CPT | Mod: S$GLB,,, | Performed by: INTERNAL MEDICINE

## 2022-12-01 PROCEDURE — 3288F PR FALLS RISK ASSESSMENT DOCUMENTED: ICD-10-PCS | Mod: CPTII,S$GLB,, | Performed by: INTERNAL MEDICINE

## 2022-12-01 PROCEDURE — 3078F PR MOST RECENT DIASTOLIC BLOOD PRESSURE < 80 MM HG: ICD-10-PCS | Mod: CPTII,S$GLB,, | Performed by: INTERNAL MEDICINE

## 2022-12-01 PROCEDURE — 99999 PR PBB SHADOW E&M-EST. PATIENT-LVL III: CPT | Mod: PBBFAC,,, | Performed by: INTERNAL MEDICINE

## 2022-12-01 PROCEDURE — 1125F PR PAIN SEVERITY QUANTIFIED, PAIN PRESENT: ICD-10-PCS | Mod: CPTII,S$GLB,, | Performed by: INTERNAL MEDICINE

## 2022-12-01 PROCEDURE — 99999 PR PBB SHADOW E&M-EST. PATIENT-LVL III: ICD-10-PCS | Mod: PBBFAC,,, | Performed by: INTERNAL MEDICINE

## 2022-12-01 PROCEDURE — 1100F PTFALLS ASSESS-DOCD GE2>/YR: CPT | Mod: CPTII,S$GLB,, | Performed by: INTERNAL MEDICINE

## 2022-12-01 PROCEDURE — 3074F SYST BP LT 130 MM HG: CPT | Mod: CPTII,S$GLB,, | Performed by: INTERNAL MEDICINE

## 2022-12-01 PROCEDURE — 99215 PR OFFICE/OUTPT VISIT, EST, LEVL V, 40-54 MIN: ICD-10-PCS | Mod: S$GLB,,, | Performed by: INTERNAL MEDICINE

## 2022-12-01 NOTE — PROGRESS NOTES
Chief complaint  right leg pain    physical exam 3/22    Patient is an 87-year-old white male .  Since 2020 patient has had a pain in the back of the right leg.  It seems to bother him at night or when he is sitting.  It also bothers him when he was driving and he would drive for a few hours.  It seems to be in the back of the right leg.  We thought it might be a hamstring and send him to physical therapy.  It continues to bother him.  When he puts a heating pad on it in his recliner it seems to help.  He thought he might need to see vascular and we discussed it is probably time for him to see Cardiology again and repeat testing but he has no ambulatory exacerbation of the problem so it seems to be that would be opposite of vascular or neurogenic claudication.  I asked him to assess next time he gets it if indeed when he grabs her squeezes the back of the right leg if the hamstring is tight and tender and that again would support a more muscular cause.    He did lose about 50 lb.  He was working on the [employer] where he had access to a lot more food.  He still thinks he is eating okay but definitely admits to be eating less.  His weight loss has stabilized.  He was concerned about cancer but says today he would not take any treatment for cancer as he would not want to feel any worse at his age.  He has no plans to quit smoking as he has a fear that if he quit smoking he will get cancer which is happened to other people    Last seen Sam 3/21  Conclusion    Previously noted bilat YUE stents not visualized on the current exam.  Otherwise, No evidence of hemodynamically significant infrainguinal PAD bilaterally.  Predominantly biphasic waveforms throughout.  Mildly decreased LACIE bilaterally (0.8).  Similar findings noted on report 6/16/20.    Seen cards for PVD - LACIE etc and had Angio 3/19  Right Lower Arterial YUE is stented.   Right YUE stent velocity origin: proximal: 106, mid: 92, distal: 133   Left Lower  Arterial YUE is stented.   Left YUE stent velocity origin: proximal: 150, mid: 158, distal: 175   CFA demonstrates mild (<50%) stenosis.       He has no plans to quit smoking.  Labs reviewed.  No interest in pursuing any further colon cancer screening.  Patient has had about five months to a year of some new constipation ever since he had the shunt put in.  He will sometimes go days and then pass large hard bowel movements.  Lately he has been taking milk of magnesia every day and two stool softeners twice a day along with some fiber pills.  We discussed holding off on the fiber.  He tried MiraLax but said it did not work and we discussed he probably did not use enough.  He could retry the MiraLax but also will try him on some lactulose while continuing stool softeners and so forth.  Never had a colonoscopy and discussed that obviously if it worsens or he gets abdominal pain or distension we may need to pursue some form of imaging or scope.    Some mornings he wakes up and is a little bit off balance and can not walk but is not necessarily because his legs do not work more just unstable.  Encouraged him to keep his regular follow-up with the [facility] as it does appear by review that they are adjusting the pressure and so forth.    status post  shunt for NPH on 05/31/2021.  Patient developed subdural hygromas and shunt was ultimately dialed up to 200 on in March.  Subdurals have subsequently resolved.    Continues to use his Valium maybe just a couple of times per week for anxiety and stress.  Recently had issues with the refill.  We reviewed the monitoring site and he was due for refill but apparently pharmacy was saying otherwise but he has since had that cleared up and has a prescription pending at the pharmacy that he will .  Thereafter he would like to switch to a new pharmacy away from [facility].  He will keep it at [facility] since he has some refills they are currently but thereafter would like to  use a different pharmacy listed.      Patient does have diabetes.  2/22  A1c of 6.6 is acceptable      Patient had issues with vertigo.  Thought it might have been his normal pressure hydrocephalus.  The positional vertigo still is intermittent and continues and probably independent of the hydrocephalus.  He went back to work as a  for the [employer].  After about a week he turned to the side then look back to got some vertigo briefly so he went home that day.  He has had another recurrence.  He takes a Valium 5 mg for anxiety.  I discussed and explained him it also can suppress vertigo so he definitely needs to continue to take one in the morning and six or 8 hours later he may need to take another if he feels any vertigo coming back.  Taking it twice a day I do not feel have any problems with addiction and so forth.    Labs reviewed, creatinine up 1.5 but then improved three months ago to 1.3..          History of significant hearing loss but now benefiting from hearing aids.    .  Evaluation and management of all the  issues will be based upon medical decision making. Over 70 min  minutes of total time spent on the encounter, which includes face to face time and non-face to face time preparing to see the patient (eg, review of tests), Obtaining and/or reviewing separately obtained history, Documenting clinical information in the electronic or other health record, Independently interpreting results (not separately reported) and communicating results to the patient/family/caregiver, or Care coordination (not separately reported).     Regarding polymyalgia, looks like he is off the prednisone with no major recurrence of symptoms.     Endocrine did discontinue Amaryl secondary to morning low sugars.  His last A1c in February 2022 was 6.6..          As of 5/27/21, the patient underwent 30cc lumbar puncture yesterday. PT evaluation showed objective improvement in 3 out of 4 measures yesterday. His daughter  "reports that he was also more talkative than usual after the LP. He remains off of ASA and Plavix.    As of 6/24/21, the patient underwent placement of  shunt on 5/31/21. Etelvian reports that he has been cognitively much improved since the shunt was placed; however, since Saturday, he has had significant daily headache and vomiting. CT head and shunt series obtained prior to today's appointment are personally reviewed and demonstrate interval improvement in 3rd ventricular size without apparent overlying hygroma or hematoma. No ileus on shunt series. He has been having regular BM, he has not been smoking. Etelvina and Mr. Yanez do endorse unintentional weight loss over the past few months. His walls has been DCed.    As of 7/1/21, the patient reports that his nausea has completely resolved since reprogramming. He has been discharged home. His daughter is pleased with how he is doing, but thinks that his mental status is not quite as brisk as it was when the shunt was draining more. He also has what appears to be cellulitis of the right great toe. He is scheduled to see podiatry next week.    As of 9/20/21, the patient's daughter reports that the dizziness and emesis were present well before the shunt and have not significantly worsened since. They have pursued workup with multiple ENT without significant improvement. His daughter feels that he was doing much better when the shunt was draining. He has worse bruising on his arms today. He has also obviously lost weight. They are planning to see the dermatologist.    His CT head does not demonstrate any concern for blood or extraaxial collection    As of 11/18/21, the patient reports that he had a 50% improvement in symptoms, though he has had some worsening again over the past 2 weeks.    He still has decreased appetite and fluid. The dizziness was coming from his "bathroom" medications and has resolved since stopping them.    He is currently in the process of " getting hearing aids s/p ruptured eardrum.    As of 12/30/21, the patient has been having rapid hearing loss. Over 2 months, he has had significant loss in hearing. He is accompanied today by his daughter Norah, who wears hearing aids herself.    He has gained about 2 lbs.    As of 2/3/22, the patient is now using a hearing aid, which is helpful. There has been no other etiology identified for his hearing loss. He had some nausea (attributed to stomach virus) and coughing during the week. He is reportedly covid negative and eager to return to work. 1       I did point out to him that his B12 level  was low years ago and it does look like he took a supplement and the B12 level did rise.  He was not aware that her could remember that however and so has currently not been on a B12 supplement.  Level ok 9/18.  He did have iron deficiency in his stool testing was negative.         CT 9/19  Impression       Nodule from 03/18/2019 has increased in size, previously 6 mm now measuring 7 mm.  Malignancy not excluded.  For a solid nodule 6-8 mm, Fleischner Society 2017 guidelines recommend follow up with non-contrast chest CT at 6-12 months and 18-24 months after discovery.    Cholelithiasis.    Prominence of interstitial markings with mild bronchiectasis.     CT 3/21  Unchanged right lower lobe pulmonary nodule since 03/18/2019 consistent with a benign nodule.   Abnormal subpleural fibrotic lines, centrilobular emphysema and traction bronchiectasis concerning for underlying chronic interstitial lung disease.  It is not significantly changed.          ROS:   CONST: weight stable. EYES: no vision change. ENT: no sore throat. CV: no chest pain w/ exertion. RESP: no shortness of breath. GI: no nausea, vomiting, diarrhea. No dysphagia. : no other urinary issues. MUSCULOSKELETAL: no new myalgias or arthralgias. SKIN: no new changes. NEURO: no focal deficits. PSYCH: no new issues. ENDOCRINE: no polyuria. HEME: no lymph nodes.  ALLERGY: no general pruritis.    PAST MEDICAL HISTORY:                                                        1. Hyperlipidemia.                                                           2. Coronary disease, seen prior by Dr. Roy, 4-vessel bypass in .   3. Peripheral vascular disease, stented in both legs  and been on Plavix since.                                                                4. Kidney stones, last episode 2007.                                 5. Right renal cyst apparently.  Saw Dr. Labadie and then second opinion at [facility] by a Dr. Shipman, currently going to be followed.                      6. Appendectomy.                                                             7. Actinic keratosis, saw Dr. Ambriz.                                      8. Left facial numbness, probable TIA, admit [facility] 2007. Carotid ultrasound apparently clear  9. Cataracts  10. Skin cancer  11. HYPERTENSION  12.  Diabetes, uncontrolled, with circulatory disease  13.  Low HDL  14. Declines Colonoscopy  15. Pneumovax after 67 done, Prevnar 13 given   16.  B12 deficiency diagnosed     17    early satiety    18. NPH, shunt    status post  shunt for NPH on 2021.  Patient developed subdural hygromas and shunt was ultimately dialed up to 200 on in March.  Subdurals have subsequently resolved.                                                                                  SOCIAL HISTORY:  He smokes 1 pack per day and does not drink.  He was about   52 years but now a .  Retired deputy in the court, 4          children.                                                                                                                                                 FAMILY HISTORY:  Father  at 63 of heart disease and stroke.  Mother       at 86, 4 brothers, 2 sisters living. Brother with strokes.            Labs, cardiac testing, interval clinic notes  and MRIs reviewed      Vitals as above  General pleasant, talkative,joking  Gen: no distress, gait is normal without ataxia  EYES: conjunctiva clear, non-icteric, PERRL  ENT: nose clear, nasal mucosa normal, oropharynx clear and moist, teeth good  NECK:supple, thyroid non-palpable  RESP: effort is good, lungs clear  CV: heart RRR w/o murmur, gallops or rubs; no carotid bruits, no edema  GI: abdomen soft, non-distended, non-tender, no hepatosplenomegaly  MS: gait normal, no clubbing or cyanosis of the digits, negative straight leg testing on either side.  Both legs full range of motion no defects all joints stable in strength 5/5.  SKIN: no rashes, warm to touch     Labs, x-rays and EKG reviewed            Assessment and plan:    Narciso was seen today for leg pain.    Diagnoses and all orders for this visit:    Right leg pain, pain in the back of the right leg does not appear to be neurogenic or vascular claudication but he is due for follow-up with his cardiologist.  Again I will have him try to assess if it is truly muscular.  -     Ambulatory referral/consult to Cardiology; Future    Coronary artery disease involving coronary bypass graft of native heart without angina pectoris, no angina or shortness of breath    Uncontrolled type 2 diabetes mellitus with hyperglycemia, chronic and stable    Essential hypertension, chronic and stable    Mood disorder, chronic and stable    Chronic obstructive pulmonary disease, unspecified COPD type, no limiting shortness of breath, continues to smoke    Polymyalgia rheumatica, no diffuse muscle pain or weakness    Vertigo, balance appears to be good after treatment of NPH so perhaps more of his balance issue was the NPH    Stage 3a chronic kidney disease    Smokers' cough    Constipation, unspecified constipation type, chronic and stable    B12 deficiency    Hyperlipidemia, unspecified hyperlipidemia type    NPH (normal pressure hydrocephalus)    Iron deficiency anemia,  unspecified iron deficiency anemia type    PVD (peripheral vascular disease)  -     Ambulatory referral/consult to Cardiology; Future

## 2022-12-02 ENCOUNTER — OFFICE VISIT (OUTPATIENT)
Dept: CARDIOLOGY | Facility: CLINIC | Age: 87
End: 2022-12-02
Payer: MEDICARE

## 2022-12-02 VITALS
DIASTOLIC BLOOD PRESSURE: 74 MMHG | RESPIRATION RATE: 18 BRPM | HEART RATE: 89 BPM | OXYGEN SATURATION: 97 % | BODY MASS INDEX: 19.54 KG/M2 | SYSTOLIC BLOOD PRESSURE: 120 MMHG | WEIGHT: 124.75 LBS

## 2022-12-02 DIAGNOSIS — I73.9 PVD (PERIPHERAL VASCULAR DISEASE): ICD-10-CM

## 2022-12-02 DIAGNOSIS — I25.810 CORONARY ARTERY DISEASE INVOLVING CORONARY BYPASS GRAFT OF NATIVE HEART WITHOUT ANGINA PECTORIS: ICD-10-CM

## 2022-12-02 DIAGNOSIS — M79.604 RIGHT LEG PAIN: Primary | ICD-10-CM

## 2022-12-02 DIAGNOSIS — I70.0 AORTIC ATHEROSCLEROSIS: ICD-10-CM

## 2022-12-02 DIAGNOSIS — I73.9 PAD (PERIPHERAL ARTERY DISEASE): ICD-10-CM

## 2022-12-02 DIAGNOSIS — N18.31 STAGE 3A CHRONIC KIDNEY DISEASE: ICD-10-CM

## 2022-12-02 DIAGNOSIS — I10 ESSENTIAL HYPERTENSION: ICD-10-CM

## 2022-12-02 PROCEDURE — 1101F PT FALLS ASSESS-DOCD LE1/YR: CPT | Mod: CPTII,S$GLB,, | Performed by: INTERNAL MEDICINE

## 2022-12-02 PROCEDURE — 1160F PR REVIEW ALL MEDS BY PRESCRIBER/CLIN PHARMACIST DOCUMENTED: ICD-10-PCS | Mod: CPTII,S$GLB,, | Performed by: INTERNAL MEDICINE

## 2022-12-02 PROCEDURE — 99214 OFFICE O/P EST MOD 30 MIN: CPT | Mod: S$GLB,,, | Performed by: INTERNAL MEDICINE

## 2022-12-02 PROCEDURE — 3288F FALL RISK ASSESSMENT DOCD: CPT | Mod: CPTII,S$GLB,, | Performed by: INTERNAL MEDICINE

## 2022-12-02 PROCEDURE — 1126F AMNT PAIN NOTED NONE PRSNT: CPT | Mod: CPTII,S$GLB,, | Performed by: INTERNAL MEDICINE

## 2022-12-02 PROCEDURE — 3078F PR MOST RECENT DIASTOLIC BLOOD PRESSURE < 80 MM HG: ICD-10-PCS | Mod: CPTII,S$GLB,, | Performed by: INTERNAL MEDICINE

## 2022-12-02 PROCEDURE — 93000 ELECTROCARDIOGRAM COMPLETE: CPT | Mod: S$GLB,,, | Performed by: INTERNAL MEDICINE

## 2022-12-02 PROCEDURE — 1159F PR MEDICATION LIST DOCUMENTED IN MEDICAL RECORD: ICD-10-PCS | Mod: CPTII,S$GLB,, | Performed by: INTERNAL MEDICINE

## 2022-12-02 PROCEDURE — 3078F DIAST BP <80 MM HG: CPT | Mod: CPTII,S$GLB,, | Performed by: INTERNAL MEDICINE

## 2022-12-02 PROCEDURE — 3074F PR MOST RECENT SYSTOLIC BLOOD PRESSURE < 130 MM HG: ICD-10-PCS | Mod: CPTII,S$GLB,, | Performed by: INTERNAL MEDICINE

## 2022-12-02 PROCEDURE — 1159F MED LIST DOCD IN RCRD: CPT | Mod: CPTII,S$GLB,, | Performed by: INTERNAL MEDICINE

## 2022-12-02 PROCEDURE — 93000 EKG 12-LEAD: ICD-10-PCS | Mod: S$GLB,,, | Performed by: INTERNAL MEDICINE

## 2022-12-02 PROCEDURE — 1160F RVW MEDS BY RX/DR IN RCRD: CPT | Mod: CPTII,S$GLB,, | Performed by: INTERNAL MEDICINE

## 2022-12-02 PROCEDURE — 99999 PR PBB SHADOW E&M-EST. PATIENT-LVL IV: ICD-10-PCS | Mod: PBBFAC,,, | Performed by: INTERNAL MEDICINE

## 2022-12-02 PROCEDURE — 3288F PR FALLS RISK ASSESSMENT DOCUMENTED: ICD-10-PCS | Mod: CPTII,S$GLB,, | Performed by: INTERNAL MEDICINE

## 2022-12-02 PROCEDURE — 1126F PR PAIN SEVERITY QUANTIFIED, NO PAIN PRESENT: ICD-10-PCS | Mod: CPTII,S$GLB,, | Performed by: INTERNAL MEDICINE

## 2022-12-02 PROCEDURE — 1101F PR PT FALLS ASSESS DOC 0-1 FALLS W/OUT INJ PAST YR: ICD-10-PCS | Mod: CPTII,S$GLB,, | Performed by: INTERNAL MEDICINE

## 2022-12-02 PROCEDURE — 99214 PR OFFICE/OUTPT VISIT, EST, LEVL IV, 30-39 MIN: ICD-10-PCS | Mod: S$GLB,,, | Performed by: INTERNAL MEDICINE

## 2022-12-02 PROCEDURE — 99999 PR PBB SHADOW E&M-EST. PATIENT-LVL IV: CPT | Mod: PBBFAC,,, | Performed by: INTERNAL MEDICINE

## 2022-12-02 PROCEDURE — 3074F SYST BP LT 130 MM HG: CPT | Mod: CPTII,S$GLB,, | Performed by: INTERNAL MEDICINE

## 2022-12-02 NOTE — PROGRESS NOTES
CARDIOVASCULAR PROGRESS NOTE    REASON FOR CONSULT:   Narciso Yanez is a 87 y.o. male who presents for ongoing mgmt of CAD/PAD.    PCP: Ehrensing  HISTORY OF PRESENT ILLNESS:   Last seen March 2021.    The patient returns for follow-up.  He reports generally stable status without angina, dyspnea, palpitations, or syncope.  There has been no PND, orthopnea, or lower extremity edema.  He denies melena, hematuria, or claudicant symptoms.  His main complaint today appears to be related to pain along the posterior aspect of his distal thigh.  He has point tenderness over the distal lateral right hamstrings tendon.    CARDIOVASCULAR HISTORY:   CAD/CABG 1/10/1999: CABG with LIMA-LAD, SVG-LCx, SVG-RCA ([facility] records)    5/1999: Angio revealed Patent LIMA-LAD, SVGx2 occluded ([facility] records)    PAD s/p LE PTA (6/2003: PTA with bilat YUE stents and R EIA stent)    7/29/20 AFRO with patent bilat YUE stents    PAST MEDICAL HISTORY:     Past Medical History:   Diagnosis Date    Actinic keratosis     Anxiety     B12 deficiency 12/8/2014    Dx 6/14    Cancer     skin    Cataract     Coronary artery disease     Diabetes mellitus type II     Diabetes mellitus with peripheral circulatory disorder 6/18/2014    ED (erectile dysfunction)     Hyperlipidemia     Kidney stone     Neurogenic claudication 4/4/2022    Peripheral vascular disease     Spondylosis 3/29/2019    Tobacco dependence     Urinary incontinence 5/4/2021       PAST SURGICAL HISTORY:     Past Surgical History:   Procedure Laterality Date    APPENDECTOMY      CARDIAC SURGERY  01/1999    CABG 4 vessels    CATARACT EXTRACTION      EPIDURAL STEROID INJECTION Right 8/26/2020    Procedure: Injection, Steroid, Epidural Transforaminal;  Surgeon: Wiley Crane Jr., MD;  Location: [facility];  Service: Pain Management;  Laterality: Right;  Right L5 + S1 TF LEAH  Arrive @ 1115; ASA & Plavix last 8/18; Check BG; Rapid COVID test    EPIDURAL STEROID INJECTION Right 11/25/2020     Procedure: Injection, Steroid, Epidural Transformainal;  Surgeon: Wiley Crane Jr., MD;  Location: [facility];  Service: Pain Management;  Laterality: Right;  Right L5 + S1 TF LEAH  Arrive @ 1245; ASA and Plavix last 11/17; Pre-DM; Needs MD Sign.    EPIDURAL STEROID INJECTION Right 2/19/2021    Procedure: Injection, Steroid, Epidural Transforaminal;  Surgeon: Wiley Crane Jr., MD;  Location: [facility] END[facility];  Service: Pain Management;  Laterality: Right;  Right L5 + S1 TF LEAH  Arrive @ 1115; ASA & Plavix last 2/11; Check BG; Needs Consent    EXTERNAL EAR SURGERY      EYE SURGERY      cataracts    ILIAC ARTERY STENT Bilateral 06/2003    also Right External Iliac stent       ALLERGIES AND MEDICATION:     Review of patient's allergies indicates:   Allergen Reactions    Demerol  [meperidine]      Other reaction(s): Unknown     Previous Medications    ACCU-CHEK JOAQUIN PLUS METER MISC    Three times a day with meals    ACCU-CHEK SOFTCLIX LANCETS MISC    Check tid    ACETAMINOPHEN (TYLENOL) 500 MG TABLET    Take 1 tablet (500 mg total) by mouth every 6 (six) hours as needed for Pain.    ALBUTEROL-IPRATROPIUM (DUO-NEB) 2.5 MG-0.5 MG/3 ML NEBULIZER SOLUTION    Take 3 mLs by nebulization every 4 (four) hours as needed for Wheezing.    ALENDRONATE (FOSAMAX) 70 MG TABLET    TAKE 1 TABLET EVERY 7 DAYS ON TUESDAYS    ASCORBIC ACID, VITAMIN C, (VITAMIN C) 250 MG TABLET    Take 1 tablet (250 mg total) by mouth once daily.    ASPIRIN (ECOTRIN) 81 MG EC TABLET    Take 1 tablet (81 mg total) by mouth once daily.    BLOOD SUGAR DIAGNOSTIC (TRUE METRIX GLUCOSE TEST STRIP) STRP    TID    BOOSTRIX TDAP 2.5-8-5 LF-MCG-LF/0.5ML SYRG INJECTION        CICLOPIROX (PENLAC) 8 % SOLN    Apply topically nightly.    CLOPIDOGREL (PLAVIX) 75 MG TABLET    TAKE 1 TABLET BY MOUTH EVERY DAY    DIAZEPAM (VALIUM) 5 MG TABLET    TAKE 1 TABLET BY MOUTH EVERY 8 HOURS AS NEEDED. Strength: 5 mg    DOCUSATE SODIUM (COLACE) 100 MG CAPSULE    Take 1 capsule  (100 mg total) by mouth 2 (two) times daily.    HEPARIN SODIUM,PORCINE (HEPARIN, PORCINE,) 5,000 UNIT/ML INJECTION    Inject 1 mL (5,000 Units total) into the skin every 8 (eight) hours.    INSULIN ASPART U-100 (NOVOLOG) 100 UNIT/ML (3 ML) INPN PEN    Inject 0-5 Units into the skin before meals and at bedtime as needed (Hyperglycemia).    LACTULOSE (CHRONULAC) 20 GRAM/30 ML SOLN    Take 45 mLs (30 g total) by mouth 3 (three) times daily as needed.    MAGNESIUM SULFATE IN WATER (MAGNESIUM SULFATE 2 GRAM/50 ML) 2 GRAM/50 ML PGBK        MECLIZINE (ANTIVERT) 25 MG TABLET    Take 1 tablet (25 mg total) by mouth 3 (three) times daily as needed for Dizziness (or at least one HS for 1 week when vertigo active).    METFORMIN (GLUCOPHAGE-XR) 750 MG ER 24HR TABLET    TAKE 1 TABLET TWICE DAILY WITH MEALS    METOPROLOL SUCCINATE (TOPROL-XL) 25 MG 24 HR TABLET        MIRTAZAPINE (REMERON) 15 MG TABLET    Take 1 tablet (15 mg total) by mouth every evening.    NICOTINE (NICODERM CQ) 14 MG/24 HR    Place 1 patch onto the skin once daily.    OMNIPAQUE 300 300 MG IODINE/ML INJECTION        OMNIPAQUE 350 350 MG IODINE/ML SOLN INJECTION        ONDANSETRON (ZOFRAN-ODT) 4 MG TBDL    Take 2 tablets (8 mg total) by mouth every 8 (eight) hours as needed (nausea).    ONDANSETRON (ZOFRAN-ODT) 8 MG TBDL        PROCHLORPERAZINE (COMPAZINE) 5 MG TABLET        ROSUVASTATIN (CRESTOR) 20 MG TABLET    TAKE 1 TABLET (20 MG TOTAL) BY MOUTH EVERY EVENING.    SENNA (SENOKOT) 8.6 MG TABLET    Take 1 tablet by mouth once daily.    TRUE METRIX GLUCOSE TEST STRIP STRP    TEST BLOOD SUGAR THREE TIMES DAILY.    TRUEPLUS LANCETS 33 GAUGE MISC    TEST BLOOD SUGAR TWICE DAILY       SOCIAL HISTORY:     Social History     Socioeconomic History    Marital status:    Tobacco Use    Smoking status: Every Day     Packs/day: 1.50     Years: 71.00     Pack years: 106.50     Types: Cigarettes    Smokeless tobacco: Former   Substance and Sexual Activity    Alcohol  use: No    Drug use: No    Sexual activity: Not Currently     Partners: Female   Social History Narrative    .  4 children.  Smokes 2 ppd.  Still drives trucks for entertainment industry.        FAMILY HISTORY:     Family History   Problem Relation Age of Onset    Stroke Mother        REVIEW OF SYSTEMS:   Review of Systems   Constitutional:  Negative for chills, diaphoresis and fever.   HENT:  Negative for nosebleeds.    Eyes:  Negative for blurred vision, double vision and photophobia.   Respiratory:  Negative for hemoptysis, shortness of breath and wheezing.    Cardiovascular:  Negative for chest pain, palpitations, orthopnea, claudication, leg swelling and PND.   Gastrointestinal:  Negative for abdominal pain, blood in stool, heartburn, melena, nausea and vomiting.   Genitourinary:  Negative for flank pain and hematuria.   Musculoskeletal:  Negative for falls, myalgias and neck pain.   Skin:  Negative for rash.   Neurological:  Positive for dizziness. Negative for seizures, loss of consciousness, weakness and headaches.   Endo/Heme/Allergies:  Negative for polydipsia. Does not bruise/bleed easily.   Psychiatric/Behavioral:  Negative for depression and memory loss. The patient is not nervous/anxious.      PHYSICAL EXAM:     Vitals:    12/02/22 1308   BP: 120/74   Pulse: 89   Resp: 18      Body mass index is 19.54 kg/m².  Weight: 56.6 kg (124 lb 12.5 oz)         Physical Exam  Vitals reviewed.   Constitutional:       General: He is not in acute distress.     Appearance: Normal appearance. He is well-developed and normal weight. He is not ill-appearing, toxic-appearing or diaphoretic.   HENT:      Head: Normocephalic and atraumatic.   Eyes:      General: No scleral icterus.     Conjunctiva/sclera: Conjunctivae normal.      Pupils: Pupils are equal, round, and reactive to light.   Neck:      Thyroid: No thyromegaly.      Vascular: Normal carotid pulses. No carotid bruit or JVD.      Trachea: Trachea normal. No  tracheal deviation.   Cardiovascular:      Rate and Rhythm: Normal rate and regular rhythm.      Pulses:           Carotid pulses are 2+ on the right side and 2+ on the left side.     Heart sounds: S1 normal and S2 normal. Heart sounds are distant. No murmur heard.    No friction rub. No gallop.   Pulmonary:      Effort: Pulmonary effort is normal. No respiratory distress.      Breath sounds: Normal breath sounds. No stridor. No wheezing, rhonchi or rales.   Chest:      Chest wall: No tenderness.   Abdominal:      General: There is no distension.      Palpations: Abdomen is soft.   Musculoskeletal:         General: Tenderness (Right lateral hamstrings distal tendon) present. No swelling. Normal range of motion.      Cervical back: Normal range of motion and neck supple. No edema or rigidity.      Right lower leg: No edema.      Left lower leg: No edema.   Feet:      Right foot:      Skin integrity: No ulcer.      Left foot:      Skin integrity: No ulcer.   Skin:     General: Skin is warm and dry.      Findings: No erythema or rash.   Neurological:      Mental Status: He is alert and oriented to person, place, and time.      Cranial Nerves: Cranial nerve deficit (presbycusis) present.   Psychiatric:         Speech: Speech normal.         Behavior: Behavior normal. Behavior is cooperative.       DATA:   EKG: (personally reviewed tracing)  12/2/22 SR 92, NSSTTW changes    Laboratory:  CBC:  Recent Labs   Lab 02/28/22  0740 03/23/22  0858 06/23/22  1115   WBC 11.34 13.19 H 10.12   Hemoglobin 12.3 L 12.1 L 11.9 L   Hematocrit 38.8 L 37.2 L 35.3 L   Platelets 306 280 248       CHEMISTRIES:  Recent Labs   Lab 04/03/21  0633 05/03/21  0950 03/12/22  1052 03/23/22  0858 06/23/22  1210   Glucose  --    < > 147 H 128 H 81   Sodium  --    < > 137 136 140   Potassium  --    < > 4.9 4.7 4.2   BUN  --    < > 32 H 52 H 35 H   Creatinine  --    < > 1.3 1.3 0.9   eGFR if   --    < > 57.9 A 58 A >60   eGFR if non    --    < > 50.1 A 50 A >60   Calcium  --    < > 9.3 10.1 7.3 L   Magnesium 1.6  --   --   --   --     < > = values in this interval not displayed.       CARDIAC BIOMARKERS:  Recent Labs   Lab 04/02/21  1705 04/02/21 2013 04/03/21  0156   Troponin I 0.009 0.017 0.013       COAGS:  Recent Labs   Lab 04/02/21  2247 05/20/21  1455 06/08/21  2102   INR 1.1 0.9 1.0       LIPIDS/LFTS:  Recent Labs   Lab 03/20/20  1110 03/31/21  1033 02/28/22  0740 03/23/22  0858 06/23/22  1210   Cholesterol 147  --  123  --   --    Triglycerides 168 H  --  96  --   --    HDL 37 L  --  49  --   --    LDL Cholesterol 76.4  --  54.8 L  --   --    Non-HDL Cholesterol 110  --  74  --   --    AST 26   < > 25 19 24   ALT 20   < > 22 15 17    < > = values in this interval not displayed.       Cardiovascular Testing:  Echo 4/3/21  The left ventricle is normal in size with concentric remodeling and normal systolic function.  The estimated ejection fraction is 55%.  Normal right ventricular size with normal right ventricular systolic function.    AFRO 7/29/20  Ao: 140/57/86  R CFA: 144/59/88  L CFA: 140/59/87  No evidence of gradient on CFA->Ao pullback across YUE stents bilaterally  Aorta: no aneurysm or stenosis  L Renal: patent  R Renal: patent  Right Leg:  YUE: patent stent without angiographic evidence of restenosis, pullback negative   EIA: patent  IIA: patent  CFA: MLI  PFA: patent  SFA: MLI, dist 30%  Pop: patent  KIRIT: MLI  TPT: MLI  PTA:MLI, mid vessel tapers, ?occluded at mid calf  Per: MLI  2-3 vessel runoff to Right foot  Left Leg:  YUE: patent stent without angiographic evidence of restenosis, pullback negative   EIA: patent   IIA: patent  CFA: patent  PFA: patent  SFA: MLI, dist 50%  Pop: patent  KIRTI: MLI  TPT: MLI  PTA: MLI, mid occlusion  Per: MLI  2 vessel runoff to Left foot  Hemostasis:  R rad vasband  Imp:  Limiting claudication with suggesting of ISR of aortoiliac stents  Patent YUE stents bilaterally  (angiographically) without evidence of gradient on pullback  Mild dist SFA stenosis bilaterally  2-3V runoff to feet bilaterally  R rad vasband for hemostasis  Plan:  Cont med rx  Cont ASA/Plavix  Add Pletal  Smoking cessation  Exercise program  Home today  Follow up with Dr. Vargas as planned  Consider referral to ortho/spine or pain mgmt for eval of neurogenic claudication    Aortic US 6/16/20  No evidence of AAA.  Increased flow velocity across bilat YUE stents suggesting >50% stenosis.    LE art US/LACIE 6/16/20  Bilat YUE stents appear patent.  However, increased flow velocity noted across these stents (when compared to distal abd aortic velocity on Abd Aortic US of same date) suggesting >50% bilat YUE ISR.  Otherwise, no evidence of hemodynamically significant infrainguinal PAD bilaterally.  Predominantly tri- and biphasic waveforms throughout.  Mildly decreased LACIE bilaterally (R 0.75, L 0.68).  Consider CTA/MRA or angio for further eval if clinically warranted.    Ex MPI 12/20/17 (low workload, drop in BP (144->105 systolic) noted during ETT)  The patient exercised for 3.02 minutes on a Sixto protocol, corresponding to a functional capacity of 5 estimated METS, achieving a peak heart rate of 126 bpm, which is 90% of the age predicted maximum heart rate. -CP. At peak exercise, EKG revealed < 1mm of horizontal ST segment depression at a maximum heart rate of 126 bpm.   Nuclear Quantitative Functional Analysis:   LVEF: 61 %  LVED Volume: 59 ml  LVES Volume: 23 ml  Impression: NORMAL MYOCARDIAL PERFUSION  1. The perfusion scan is free of evidence for myocardial ischemia or injury.   2. Resting wall motion is physiologic.   3. Resting LV function is normal.   4. The ventricular volumes are normal at rest and stress.   5. The extracardiac distribution of radioactivity is normal.     [facility] Records reviewed:  1/10/1999: CABG with LIMA-LAD, SVG-LCx, SVG-RCA  5/1999: Angio revealed Patent LIMA-LAD, SVGx2  occluded  6/2003: PTA with bilat YUE stents and R EIA stent      ASSESSMENT:   # CAD s/p CABG, MPI/echo 12/2017 normal (although low exercise capacity and ?hypotensive response during TMET), asymptomatic.  # PAD s/p PTA bilat YUE and R EIA 6/2003.  Limiting R>L claudication.  Aortic/LE art US 6/2020 suggest significant bilat YUE ISR.  AFRO 7/29/20 with patent bilat YUE stents.  ?MSK complaint of distal R hamstrings tendon.  # HTN, controlled  # HLP on crestor 20mg  # Tob abuse, still smoking and previously enrolled in the smoking cessation program.  Again encouraged to quit.  Patient appears on interested in stopping smoking.  # CKD3a  # DM  # aortic atherosclerosis (CT Chest 9/3/19)    PLAN:   Cont med rx  Cont ASA/Plavix  Refer to pain mgmt, ?injection to distal R hamstrings tendon.  RTC 6 months with surveillance aortic/LE art US/LACIE    Wiley Vargas MD, FACC

## 2022-12-19 ENCOUNTER — PATIENT MESSAGE (OUTPATIENT)
Dept: NEUROSURGERY | Facility: CLINIC | Age: 87
End: 2022-12-19
Payer: MEDICARE

## 2022-12-20 ENCOUNTER — OFFICE VISIT (OUTPATIENT)
Dept: NEUROSURGERY | Facility: CLINIC | Age: 87
End: 2022-12-20
Payer: MEDICARE

## 2022-12-20 VITALS
SYSTOLIC BLOOD PRESSURE: 141 MMHG | HEART RATE: 89 BPM | BODY MASS INDEX: 19.46 KG/M2 | DIASTOLIC BLOOD PRESSURE: 76 MMHG | HEIGHT: 67 IN | WEIGHT: 124 LBS

## 2022-12-20 DIAGNOSIS — G91.2 NPH (NORMAL PRESSURE HYDROCEPHALUS): Primary | ICD-10-CM

## 2022-12-20 DIAGNOSIS — G91.2 NORMAL PRESSURE HYDROCEPHALUS: ICD-10-CM

## 2022-12-20 PROCEDURE — 99213 PR OFFICE/OUTPT VISIT, EST, LEVL III, 20-29 MIN: ICD-10-PCS | Mod: HCNC,S$GLB,, | Performed by: PHYSICIAN ASSISTANT

## 2022-12-20 PROCEDURE — 1159F MED LIST DOCD IN RCRD: CPT | Mod: HCNC,CPTII,S$GLB, | Performed by: PHYSICIAN ASSISTANT

## 2022-12-20 PROCEDURE — 3077F SYST BP >= 140 MM HG: CPT | Mod: HCNC,CPTII,S$GLB, | Performed by: PHYSICIAN ASSISTANT

## 2022-12-20 PROCEDURE — 3078F DIAST BP <80 MM HG: CPT | Mod: HCNC,CPTII,S$GLB, | Performed by: PHYSICIAN ASSISTANT

## 2022-12-20 PROCEDURE — 99213 OFFICE O/P EST LOW 20 MIN: CPT | Mod: HCNC,S$GLB,, | Performed by: PHYSICIAN ASSISTANT

## 2022-12-20 PROCEDURE — 3078F PR MOST RECENT DIASTOLIC BLOOD PRESSURE < 80 MM HG: ICD-10-PCS | Mod: HCNC,CPTII,S$GLB, | Performed by: PHYSICIAN ASSISTANT

## 2022-12-20 PROCEDURE — 1160F RVW MEDS BY RX/DR IN RCRD: CPT | Mod: HCNC,CPTII,S$GLB, | Performed by: PHYSICIAN ASSISTANT

## 2022-12-20 PROCEDURE — 1125F PR PAIN SEVERITY QUANTIFIED, PAIN PRESENT: ICD-10-PCS | Mod: HCNC,CPTII,S$GLB, | Performed by: PHYSICIAN ASSISTANT

## 2022-12-20 PROCEDURE — 99999 PR PBB SHADOW E&M-EST. PATIENT-LVL IV: CPT | Mod: PBBFAC,HCNC,, | Performed by: PHYSICIAN ASSISTANT

## 2022-12-20 PROCEDURE — 1160F PR REVIEW ALL MEDS BY PRESCRIBER/CLIN PHARMACIST DOCUMENTED: ICD-10-PCS | Mod: HCNC,CPTII,S$GLB, | Performed by: PHYSICIAN ASSISTANT

## 2022-12-20 PROCEDURE — 99999 PR PBB SHADOW E&M-EST. PATIENT-LVL IV: ICD-10-PCS | Mod: PBBFAC,HCNC,, | Performed by: PHYSICIAN ASSISTANT

## 2022-12-20 PROCEDURE — 1125F AMNT PAIN NOTED PAIN PRSNT: CPT | Mod: HCNC,CPTII,S$GLB, | Performed by: PHYSICIAN ASSISTANT

## 2022-12-20 PROCEDURE — 1159F PR MEDICATION LIST DOCUMENTED IN MEDICAL RECORD: ICD-10-PCS | Mod: HCNC,CPTII,S$GLB, | Performed by: PHYSICIAN ASSISTANT

## 2022-12-20 PROCEDURE — 3077F PR MOST RECENT SYSTOLIC BLOOD PRESSURE >= 140 MM HG: ICD-10-PCS | Mod: HCNC,CPTII,S$GLB, | Performed by: PHYSICIAN ASSISTANT

## 2022-12-20 NOTE — PROGRESS NOTES
Neurosurgery  Established Patient    SUBJECTIVE:     History of Present Illness:  Patient reports overall doing well since last visit.  Was able to do physical therapy and feels as though that was helpful.  He reports balance and cognitive issues have been stable.  He is accompanied by his son who agrees.  Denies worsening falls or recent head trauma.  Patient does have some ongoing right leg pain that he states is giving him some trouble in makes it a little bit more difficult when he is walking, he is scheduled to see pain management for this.  Denies any new or worsening headaches, visual changes, weakness, seizures.    Review of patient's allergies indicates:   Allergen Reactions    Demerol [meperidine] Nausea Only       Current Outpatient Medications   Medication Sig Dispense Refill    ACCU-CHEK JOAQUIN PLUS METER Misc Three times a day with meals  0    ACCU-CHEK SOFTCLIX LANCETS Misc Check tid 200 each 12    acetaminophen (TYLENOL) 500 MG tablet Take 1 tablet (500 mg total) by mouth every 6 (six) hours as needed for Pain. 13 tablet 0    alendronate (FOSAMAX) 70 MG tablet TAKE 1 TABLET EVERY 7 DAYS ON TUESDAYS 12 tablet 12    ascorbic acid, vitamin C, (VITAMIN C) 250 MG tablet Take 1 tablet (250 mg total) by mouth once daily. 120 tablet 0    aspirin (ECOTRIN) 81 MG EC tablet Take 1 tablet (81 mg total) by mouth once daily.  0    blood sugar diagnostic (TRUE METRIX GLUCOSE TEST STRIP) Strp  each 12    BOOSTRIX TDAP 2.5-8-5 Lf-mcg-Lf/0.5mL Syrg injection       ciclopirox (PENLAC) 8 % Soln Apply topically nightly. 6.6 mL 11    clopidogreL (PLAVIX) 75 mg tablet TAKE 1 TABLET BY MOUTH EVERY DAY 90 tablet 12    diazePAM (VALIUM) 5 MG tablet TAKE 1 TABLET BY MOUTH EVERY 8 HOURS AS NEEDED. Strength: 5 mg 270 tablet 5    docusate sodium (COLACE) 100 MG capsule Take 1 capsule (100 mg total) by mouth 2 (two) times daily. 60 capsule 0    metFORMIN (GLUCOPHAGE-XR) 750 MG ER 24hr tablet TAKE 1 TABLET TWICE DAILY WITH  MEALS 180 tablet 12    ondansetron (ZOFRAN-ODT) 4 MG TbDL Take 2 tablets (8 mg total) by mouth every 8 (eight) hours as needed (nausea). 90 tablet 0    rosuvastatin (CRESTOR) 20 MG tablet TAKE 1 TABLET (20 MG TOTAL) BY MOUTH EVERY EVENING. 90 tablet 12    TRUE METRIX GLUCOSE TEST STRIP Strp TEST BLOOD SUGAR THREE TIMES DAILY. 300 strip 12    TRUEPLUS LANCETS 33 gauge Misc TEST BLOOD SUGAR TWICE DAILY 200 each 12    albuterol-ipratropium (DUO-NEB) 2.5 mg-0.5 mg/3 mL nebulizer solution Take 3 mLs by nebulization every 4 (four) hours as needed for Wheezing. (Patient not taking: Reported on 12/20/2022) 9 mL 12    heparin sodium,porcine (HEPARIN, PORCINE,) 5,000 unit/mL injection Inject 1 mL (5,000 Units total) into the skin every 8 (eight) hours. (Patient not taking: Reported on 12/20/2022)      insulin aspart U-100 (NOVOLOG) 100 unit/mL (3 mL) InPn pen Inject 0-5 Units into the skin before meals and at bedtime as needed (Hyperglycemia).  0    lactulose (CHRONULAC) 20 gram/30 mL Soln Take 45 mLs (30 g total) by mouth 3 (three) times daily as needed. (Patient not taking: Reported on 12/20/2022) 1500 mL 12    magnesium sulfate in water (MAGNESIUM SULFATE 2 GRAM/50 ML) 2 gram/50 mL PgBk       meclizine (ANTIVERT) 25 mg tablet Take 1 tablet (25 mg total) by mouth 3 (three) times daily as needed for Dizziness (or at least one HS for 1 week when vertigo active). (Patient not taking: Reported on 12/20/2022) 30 tablet 12    metoprolol succinate (TOPROL-XL) 25 MG 24 hr tablet       mirtazapine (REMERON) 15 MG tablet Take 1 tablet (15 mg total) by mouth every evening. (Patient not taking: Reported on 12/20/2022) 30 tablet 11    nicotine (NICODERM CQ) 14 mg/24 hr Place 1 patch onto the skin once daily. (Patient not taking: Reported on 12/20/2022)  0    OMNIPAQUE 300 300 mg iodine/mL injection       OMNIPAQUE 350 350 mg iodine/mL Soln injection       ondansetron (ZOFRAN-ODT) 8 MG TbDL       prochlorperazine (COMPAZINE) 5 MG tablet        senna (SENOKOT) 8.6 mg tablet Take 1 tablet by mouth once daily. (Patient not taking: Reported on 12/20/2022)       No current facility-administered medications for this visit.       Past Medical History:   Diagnosis Date    Actinic keratosis     Anxiety     B12 deficiency 12/8/2014    Dx 6/14    Cancer     skin    Cataract     Coronary artery disease     Diabetes mellitus type II     Diabetes mellitus with peripheral circulatory disorder 6/18/2014    ED (erectile dysfunction)     Hyperlipidemia     Kidney stone     Neurogenic claudication 4/4/2022    Peripheral vascular disease     Spondylosis 3/29/2019    Tobacco dependence     Urinary incontinence 5/4/2021     Past Surgical History:   Procedure Laterality Date    APPENDECTOMY      CARDIAC SURGERY  01/1999    CABG 4 vessels    CATARACT EXTRACTION      EPIDURAL STEROID INJECTION Right 8/26/2020    Procedure: Injection, Steroid, Epidural Transforaminal;  Surgeon: Wiley Crane Jr., MD;  Location: [facility] END[facility];  Service: Pain Management;  Laterality: Right;  Right L5 + S1 TF LEAH  Arrive @ 1115; ASA & Plavix last 8/18; Check BG; Rapid COVID test    EPIDURAL STEROID INJECTION Right 11/25/2020    Procedure: Injection, Steroid, Epidural Transformainal;  Surgeon: Wiley Crane Jr., MD;  Location: [facility] END[facility];  Service: Pain Management;  Laterality: Right;  Right L5 + S1 TF LEAH  Arrive @ 1245; ASA and Plavix last 11/17; Pre-DM; Needs MD Sign.    EPIDURAL STEROID INJECTION Right 2/19/2021    Procedure: Injection, Steroid, Epidural Transforaminal;  Surgeon: Wiley Crane Jr., MD;  Location: [facility] END[facility];  Service: Pain Management;  Laterality: Right;  Right L5 + S1 TF LEAH  Arrive @ 1115; ASA & Plavix last 2/11; Check BG; Needs Consent    EXTERNAL EAR SURGERY      EYE SURGERY      cataracts    ILIAC ARTERY STENT Bilateral 06/2003    also Right External Iliac stent     Family History       Problem Relation (Age of Onset)    Stroke Mother          Social History  "    Socioeconomic History    Marital status:    Tobacco Use    Smoking status: Every Day     Packs/day: 1.50     Years: 71.00     Pack years: 106.50     Types: Cigarettes    Smokeless tobacco: Former   Substance and Sexual Activity    Alcohol use: No    Drug use: No    Sexual activity: Not Currently     Partners: Female   Social History Narrative    .  4 children.  Smokes 2 ppd.  Still drives trucks for entertainment industry.        Review of Systems  Constitutional: no fever or chills  Eyes: no visual changes  ENT: no nasal congestion or sore throat  Respiratory: no cough or shortness of breath  Cardiovascular: no chest pain or palpitations  Gastrointestinal: no nausea or vomiting  Genitourinary: no hematuria or dysuria  Integument/Breast: no rash or pruritis  Hematologic/Lymphatic: no easy bruising or lymphadenopathy  Musculoskeletal: no arthralgias or myalgias  Neurological: no seizures or tremors    OBJECTIVE:     Vital Signs  Pulse: 89  BP: (!) 141/76  Pain Score:   5  Height: 5' 7" (170.2 cm)  Weight: 56.2 kg (124 lb)  Body mass index is 19.42 kg/m².    Neurosurgery Physical Exam  General: no distress  Neurologic: Alert and oriented. Thought content appropriate.  Head: normocephalic  GCS: Motor: 6/Verbal: 5/Eyes: 4 GCS Total: 15  Cranial nerves: face symmetric, tongue midline, pupils equal, round, reactive to light with accomodation, extraocular muscles intact  Sensory: response to light touch throughout  Motor Strength:full strength upper and lower extremities  Gait: Ambulates in room without assistance, wide based antalgic gait 2/2 leg pain       Diagnostic Results:  No recent    ASSESSMENT/PLAN:     86-year-old male with Normal pressure hydrocephalus and  shunt on 05/31/2021 with prior subdural hematomas is since resolved.  Patient has clinically stable from NPH standpoint without worsening balance or cognitive issues.  Will plan to follow-up in 6 months with head CT.  If he is doing well at " that time will plan to follow on annual basis.  If he has any questions or concerns in the meantime he is encouraged to give us a call.      Alex Kathleen Jr. KARLEY  [facility]  Department of Neurosurgery      Note dictated with voice recognition software, please excuse any grammatical errors.

## 2022-12-28 ENCOUNTER — DOCUMENTATION ONLY (OUTPATIENT)
Dept: REHABILITATION | Facility: HOSPITAL | Age: 87
End: 2022-12-28
Payer: MEDICARE

## 2022-12-28 NOTE — PROGRESS NOTES
Outpatient Therapy Discharge Summary     Name: Narciso Yanez  Clinic Number: [id number]    Therapy Diagnosis:   Encounter Diagnoses   Name Primary?    Impaired functional mobility, balance, gait, and endurance Yes    Chronic bilateral low back pain with bilateral sciatica           Physician: Alex Kathleen, *  Physician Orders: PT Eval and Treat gait training  Medical Diagnosis from Referral: G91.2 (ICD-10-CM) - NPH (normal pressure hydrocephalus)    Evaluation Date: 7/6/2022  Authorization Period Expiration: 12/31/2022  Plan of Care Expiration: 9/16/2022    Date of Last visit: 8/30/2022  Total Visits Received: 6  Cancelled Visits: 4  No Show Visits: 3      OBJECTIVE     ROM:   UPPER EXTREMITY--AROM/PROM  (R) UE: WFLs  (L) UE: WFLs           RANGE OF MOTION--LOWER EXTREMITIES  Not tested due to unexpected d/c     Strength: manual muscle test grades below   Upper Extremity Strength  Not tested due to unexpected d/c     Lower Extremity Strength  Not tested due to unexpected d/c      Evaluation 8/16/22   Single Limb Stance R LE NT  (<10 sec = HIGH FALL RISK) NT   Single Limb Stance L LE NT  (<10 sec = HIGH FALL RISK) NT   5 times sit-stand 32 seconds w/ UE  >12 sec= fall risk for general elderly  >16 sec= fall risk for Parkinson's disease  >10 sec= balance/vestibular dysfunction (<59 y/o)  >14.2 sec= balance/vestibular dysfunction (>59 y/o)  >12 sec= fall risk for CVA 24 sec w/ UE   Casey  NT 41/56   FGA 15/30 NT        Gait Assessment:   - AD used: RW  - Assistance: modified independent   - Distance: household  - Curb: Not tested due to unexpected d/c   - Ramp:  Mod I     Evaluation 8/16/22   Timed Up and Go 13.3 sec + 14.4 sec + 12.5 sec= 13.5 sec  < 20 sec safe for independent transfers,     < 30 sec assist required for transfers 21.2s + 20.1s + 20.1s   6 meter walk test 0.73 m/sec (6m/8.2s) NT   6 min walk test NT NT       Functional Mobility (Bed mobility, transfers)  Bed mobility: Mod I  Supine to sit:  Mod I  Sit to supine: Mod I  Rolling: Mod I  Transfers to bed: Mod I  Transfers to toilet: not tested   Sit to stand:  Mod I  Stand pivot:  Mod I  Car transfers: Mod I  Wheelchair mobility: not tested   Floor transfers: not tested     Written Home Exercises Provided: Patient instructed to cont prior HEP.   Narciso demonstrated good  understanding of the education provided.     See EMR under Patient Instructions for exercises provided prior visit.      Assessment    87 year old male with diagnosis of normal pressure hydrocephalus referred to [facility] received skilled outpatient PT 7/6/2022 to 8/30/2022. Within this time frame, patient only participated in 6 physical therapy sessions. Patient's attendance was affected by frequent illness. Patient's inconsistent attendance limited progress with strength, balance, and mobility. Patient was able to demonstrate some improvements in static balance and lower extremity strength. Continued use of rolling walker was recommended due to patient being at risk for falls. Objective information obtained from last physical therapy reassessment on 8/16/2022. Physical therapy recommended that patient follow up with MD regarding recent weight loss and frequent illness/ nausea. Patient d/c from physical therapy as he was unable to consistently participate.     Goals:   Status as of 8/16/22   Short Term Goals:    1. Pt will perform HEP at least 2x/week to progress ability to maintain benefits of PT. Not met  2. Pt will demonstrate improvement in mobility by performing 5 times sit<>stand test w UE support in 24 sec. Met   3. Assess MCKEON or FGA and set goals as needed for decreased fall risk. Met              Revised 8/16/22: pt will demonstrate decreased fall risk and improved balance by scoring 46/56 on Mckeon- not met  4. Pt will report use of RW only 50% of the time at home to demonstrate improved balance. Not met     Long Term Goals: 10 weeks   1. Pt will demonstrate  improvement in mobility and decreased fall risk by performing 5 times sit<>stand test w UE support in 16 sec. Not met  2. Pt will improve gait velocity to improve community negotiation by ambulating at 0.8 m/s.not met  3. Pt will demonstrate improved balance responses for improved safety in dim environments by performing NBOS on foam w/ EC x 10 sec. Improved/ not met  4. Pt will report maximal LBP level to 7/10 to demonstrate improved tolerance to daily mobility. Not met  5. New 7/11/22: pt will demonstrate improved balance and decreased fall risk to decrease need for RW by scoring 20/30 on FGA. Not met    Discharge reason: Patient has not attended therapy since 8/30/2022    Plan   This patient is discharged from Physical Therapy       Elba Menchaca, PT, DPT,   Board-Certified Clinical Specialist in Neurologic Physical Therapy   Certified Brain Injury Specialist    12/28/2022    Pt with history of cirrhosis, sent by PMD for fluid leaking from abdomen.

## 2023-01-25 RX ORDER — CLOPIDOGREL BISULFATE 75 MG/1
TABLET ORAL
Qty: 90 TABLET | Refills: 1 | Status: ON HOLD | OUTPATIENT
Start: 2023-01-25 | End: 2023-10-23 | Stop reason: SDUPTHER

## 2023-01-25 NOTE — TELEPHONE ENCOUNTER
Care Due:                  Date            Visit Type   Department     Provider  --------------------------------------------------------------------------------                                [facility]/ INTERNAL  [place] Odalis  Last Visit: 12-      CARE ([facility])   MED/ PEDS      Ehrensing                              [facility]/ INTERNAL  [facility]  Next Visit: 03-      CARE ([facility])   MED/ PEDS      Ehrensing                                                            Last  Test          Frequency    Reason                     Performed    Due Date  --------------------------------------------------------------------------------    HBA1C.......  6 months...  metFORMIN................  02- 08-    Lipid Panel.  12 months..  mirtazapine, rosuvastatin  02- 02-    [facility] Embedded Care Gaps. Reference number: [id number]. 1/25/2023   5:10:46 PM CST

## 2023-01-25 NOTE — TELEPHONE ENCOUNTER
Refill Decision Note   Narciso Yanez  is requesting a refill authorization.  Brief Assessment and Rationale for Refill:  Approve    -Medication-Related Problems Identified: Requires labs  Medication Therapy Plan:  Previous order matches.    Medication Reconciliation Completed: No   Comments:     No Care Gaps recommended.     Note composed:5:15 PM 01/25/2023

## 2023-02-07 DIAGNOSIS — Z00.00 ENCOUNTER FOR MEDICARE ANNUAL WELLNESS EXAM: ICD-10-CM

## 2023-02-09 DIAGNOSIS — Z00.00 ENCOUNTER FOR MEDICARE ANNUAL WELLNESS EXAM: ICD-10-CM

## 2023-02-28 ENCOUNTER — PATIENT MESSAGE (OUTPATIENT)
Dept: NEUROSURGERY | Facility: CLINIC | Age: 88
End: 2023-02-28
Payer: MEDICARE

## 2023-02-28 ENCOUNTER — TELEPHONE (OUTPATIENT)
Dept: NEUROSURGERY | Facility: CLINIC | Age: 88
End: 2023-02-28
Payer: MEDICARE

## 2023-02-28 DIAGNOSIS — G91.2 NORMAL PRESSURE HYDROCEPHALUS: Primary | ICD-10-CM

## 2023-02-28 DIAGNOSIS — Z98.2 VP (VENTRICULOPERITONEAL) SHUNT STATUS: ICD-10-CM

## 2023-03-06 ENCOUNTER — TELEPHONE (OUTPATIENT)
Dept: FAMILY MEDICINE | Facility: CLINIC | Age: 88
End: 2023-03-06
Payer: MEDICARE

## 2023-03-07 ENCOUNTER — OFFICE VISIT (OUTPATIENT)
Dept: FAMILY MEDICINE | Facility: CLINIC | Age: 88
End: 2023-03-07
Payer: MEDICARE

## 2023-03-07 ENCOUNTER — LAB VISIT (OUTPATIENT)
Dept: LAB | Facility: HOSPITAL | Age: 88
End: 2023-03-07
Attending: INTERNAL MEDICINE
Payer: MEDICARE

## 2023-03-07 VITALS
SYSTOLIC BLOOD PRESSURE: 106 MMHG | TEMPERATURE: 98 F | DIASTOLIC BLOOD PRESSURE: 64 MMHG | HEART RATE: 94 BPM | BODY MASS INDEX: 19.69 KG/M2 | HEIGHT: 67 IN | WEIGHT: 125.44 LBS | OXYGEN SATURATION: 97 %

## 2023-03-07 DIAGNOSIS — M54.42 CHRONIC BILATERAL LOW BACK PAIN WITH BILATERAL SCIATICA: ICD-10-CM

## 2023-03-07 DIAGNOSIS — E53.8 B12 DEFICIENCY: ICD-10-CM

## 2023-03-07 DIAGNOSIS — E11.51 DIABETES MELLITUS WITH PERIPHERAL CIRCULATORY DISORDER: ICD-10-CM

## 2023-03-07 DIAGNOSIS — D64.9 ANEMIA, UNSPECIFIED TYPE: ICD-10-CM

## 2023-03-07 DIAGNOSIS — I70.0 AORTIC ATHEROSCLEROSIS: ICD-10-CM

## 2023-03-07 DIAGNOSIS — E11.65 UNCONTROLLED TYPE 2 DIABETES MELLITUS WITH HYPERGLYCEMIA: ICD-10-CM

## 2023-03-07 DIAGNOSIS — N18.31 STAGE 3A CHRONIC KIDNEY DISEASE: ICD-10-CM

## 2023-03-07 DIAGNOSIS — M54.41 CHRONIC BILATERAL LOW BACK PAIN WITH BILATERAL SCIATICA: ICD-10-CM

## 2023-03-07 DIAGNOSIS — Z00.00 ROUTINE MEDICAL EXAM: ICD-10-CM

## 2023-03-07 DIAGNOSIS — M35.3 POLYMYALGIA RHEUMATICA: ICD-10-CM

## 2023-03-07 DIAGNOSIS — Z00.00 ROUTINE MEDICAL EXAM: Primary | ICD-10-CM

## 2023-03-07 DIAGNOSIS — R42 VERTIGO: ICD-10-CM

## 2023-03-07 DIAGNOSIS — G89.29 CHRONIC BILATERAL LOW BACK PAIN WITH BILATERAL SCIATICA: ICD-10-CM

## 2023-03-07 DIAGNOSIS — J41.0 SMOKERS' COUGH: ICD-10-CM

## 2023-03-07 DIAGNOSIS — I73.9 PVD (PERIPHERAL VASCULAR DISEASE): ICD-10-CM

## 2023-03-07 DIAGNOSIS — J44.9 CHRONIC OBSTRUCTIVE PULMONARY DISEASE, UNSPECIFIED COPD TYPE: ICD-10-CM

## 2023-03-07 DIAGNOSIS — R29.818 NEUROGENIC CLAUDICATION: ICD-10-CM

## 2023-03-07 DIAGNOSIS — D50.9 IRON DEFICIENCY ANEMIA, UNSPECIFIED IRON DEFICIENCY ANEMIA TYPE: ICD-10-CM

## 2023-03-07 DIAGNOSIS — I10 ESSENTIAL HYPERTENSION: ICD-10-CM

## 2023-03-07 DIAGNOSIS — E78.5 HYPERLIPIDEMIA, UNSPECIFIED HYPERLIPIDEMIA TYPE: ICD-10-CM

## 2023-03-07 DIAGNOSIS — I77.1 TORTUOUS AORTA: ICD-10-CM

## 2023-03-07 DIAGNOSIS — I25.810 CORONARY ARTERY DISEASE INVOLVING CORONARY BYPASS GRAFT OF NATIVE HEART WITHOUT ANGINA PECTORIS: ICD-10-CM

## 2023-03-07 DIAGNOSIS — G91.2 NPH (NORMAL PRESSURE HYDROCEPHALUS): ICD-10-CM

## 2023-03-07 DIAGNOSIS — K59.00 CONSTIPATION, UNSPECIFIED CONSTIPATION TYPE: ICD-10-CM

## 2023-03-07 DIAGNOSIS — F39 MOOD DISORDER: ICD-10-CM

## 2023-03-07 PROCEDURE — 85025 COMPLETE CBC W/AUTO DIFF WBC: CPT | Mod: HCNC | Performed by: INTERNAL MEDICINE

## 2023-03-07 PROCEDURE — 3078F DIAST BP <80 MM HG: CPT | Mod: HCNC,CPTII,S$GLB, | Performed by: INTERNAL MEDICINE

## 2023-03-07 PROCEDURE — 80061 LIPID PANEL: CPT | Mod: HCNC | Performed by: INTERNAL MEDICINE

## 2023-03-07 PROCEDURE — 3288F FALL RISK ASSESSMENT DOCD: CPT | Mod: HCNC,CPTII,S$GLB, | Performed by: INTERNAL MEDICINE

## 2023-03-07 PROCEDURE — 80053 COMPREHEN METABOLIC PANEL: CPT | Mod: HCNC | Performed by: INTERNAL MEDICINE

## 2023-03-07 PROCEDURE — 99999 PR PBB SHADOW E&M-EST. PATIENT-LVL III: ICD-10-PCS | Mod: PBBFAC,HCNC,, | Performed by: INTERNAL MEDICINE

## 2023-03-07 PROCEDURE — 84439 ASSAY OF FREE THYROXINE: CPT | Mod: HCNC | Performed by: INTERNAL MEDICINE

## 2023-03-07 PROCEDURE — 1101F PR PT FALLS ASSESS DOC 0-1 FALLS W/OUT INJ PAST YR: ICD-10-PCS | Mod: HCNC,CPTII,S$GLB, | Performed by: INTERNAL MEDICINE

## 2023-03-07 PROCEDURE — 84443 ASSAY THYROID STIM HORMONE: CPT | Mod: HCNC | Performed by: INTERNAL MEDICINE

## 2023-03-07 PROCEDURE — 83036 HEMOGLOBIN GLYCOSYLATED A1C: CPT | Mod: HCNC | Performed by: INTERNAL MEDICINE

## 2023-03-07 PROCEDURE — 99397 PR PREVENTIVE VISIT,EST,65 & OVER: ICD-10-PCS | Mod: 25,HCNC,S$GLB, | Performed by: INTERNAL MEDICINE

## 2023-03-07 PROCEDURE — 99214 OFFICE O/P EST MOD 30 MIN: CPT | Mod: 25,HCNC,S$GLB, | Performed by: INTERNAL MEDICINE

## 2023-03-07 PROCEDURE — 99397 PER PM REEVAL EST PAT 65+ YR: CPT | Mod: 25,HCNC,S$GLB, | Performed by: INTERNAL MEDICINE

## 2023-03-07 PROCEDURE — 3288F PR FALLS RISK ASSESSMENT DOCUMENTED: ICD-10-PCS | Mod: HCNC,CPTII,S$GLB, | Performed by: INTERNAL MEDICINE

## 2023-03-07 PROCEDURE — 36415 COLL VENOUS BLD VENIPUNCTURE: CPT | Mod: HCNC,PO | Performed by: INTERNAL MEDICINE

## 2023-03-07 PROCEDURE — 1126F PR PAIN SEVERITY QUANTIFIED, NO PAIN PRESENT: ICD-10-PCS | Mod: HCNC,CPTII,S$GLB, | Performed by: INTERNAL MEDICINE

## 2023-03-07 PROCEDURE — 3074F PR MOST RECENT SYSTOLIC BLOOD PRESSURE < 130 MM HG: ICD-10-PCS | Mod: HCNC,CPTII,S$GLB, | Performed by: INTERNAL MEDICINE

## 2023-03-07 PROCEDURE — 99999 PR PBB SHADOW E&M-EST. PATIENT-LVL III: CPT | Mod: PBBFAC,HCNC,, | Performed by: INTERNAL MEDICINE

## 2023-03-07 PROCEDURE — 3074F SYST BP LT 130 MM HG: CPT | Mod: HCNC,CPTII,S$GLB, | Performed by: INTERNAL MEDICINE

## 2023-03-07 PROCEDURE — 1101F PT FALLS ASSESS-DOCD LE1/YR: CPT | Mod: HCNC,CPTII,S$GLB, | Performed by: INTERNAL MEDICINE

## 2023-03-07 PROCEDURE — 82607 VITAMIN B-12: CPT | Mod: HCNC | Performed by: INTERNAL MEDICINE

## 2023-03-07 PROCEDURE — 85652 RBC SED RATE AUTOMATED: CPT | Mod: HCNC | Performed by: INTERNAL MEDICINE

## 2023-03-07 PROCEDURE — 1126F AMNT PAIN NOTED NONE PRSNT: CPT | Mod: HCNC,CPTII,S$GLB, | Performed by: INTERNAL MEDICINE

## 2023-03-07 PROCEDURE — 3078F PR MOST RECENT DIASTOLIC BLOOD PRESSURE < 80 MM HG: ICD-10-PCS | Mod: HCNC,CPTII,S$GLB, | Performed by: INTERNAL MEDICINE

## 2023-03-07 PROCEDURE — 99214 PR OFFICE/OUTPT VISIT, EST, LEVL IV, 30-39 MIN: ICD-10-PCS | Mod: 25,HCNC,S$GLB, | Performed by: INTERNAL MEDICINE

## 2023-03-07 NOTE — PROGRESS NOTES
Chief complaint  physical exam    Patient is an 87-year-old white male here for his physical exam.  He is no plans to quit smoking.  Labs reviewed.  No interest in pursuing any further colon cancer screening.      ROS:   CONST: weight stable. EYES: no vision change. ENT: no sore throat. CV: no chest pain w/ exertion. RESP: no shortness of breath. GI: no nausea, vomiting, diarrhea. No dysphagia. : no other urinary issues. MUSCULOSKELETAL: no new myalgias or arthralgias. SKIN: no new changes. NEURO: no focal deficits. PSYCH: no new issues. ENDOCRINE: no polyuria. HEME: no lymph nodes. ALLERGY: no general pruritis.    PAST MEDICAL HISTORY:                                                        1. Hyperlipidemia.                                                           2. Coronary disease, seen prior by Dr. Roy, 4-vessel bypass in 1999.   3. Peripheral vascular disease, stented in both legs 2002 and been on Plavix since.                                                                4. Kidney stones, last episode January 2007.                                 5. Right renal cyst apparently.  Saw Dr. Labadie and then second opinion at [facility] by a Dr. Shipman, currently going to be followed.                      6. Appendectomy.                                                             7. Actinic keratosis, saw Dr. Ambriz.                                      8. Left facial numbness, probable TIA, admit [facility] June 2007. Carotid ultrasound apparently clear  9. Cataracts  10. Skin cancer  11. HYPERTENSION  12.  Diabetes, uncontrolled, with circulatory disease  13.  Low HDL  14. Declines Colonoscopy  15. Pneumovax after 67 done, Prevnar 13 given 2016  16.  B12 deficiency diagnosed 2014    17    early satiety 2016   18. NPH, shunt 5/21                                                                                     SOCIAL HISTORY:  He smokes 1 pack per day and does not drink.  He was about    52 years but now a .  Retired deputy in the court, 4          children.                                                                                                                                                 FAMILY HISTORY:  Father  at 63 of heart disease and stroke.  Mother       at 86, 4 brothers, 2 sisters living. Brother with strokes.            Labs, cardiac testing, interval clinic notes and MRIs reviewed      Vitals as above  General pleasant, talkative in joking in a wheelchair  Gen: no distress  EYES: conjunctiva clear, non-icteric, PERRL, no nystagmus  ENT: nose clear, nasal mucosa normal, oropharynx clear and moist, teeth good  NECK:supple, thyroid non-palpable  RESP: effort is good, lungs clear  CV: heart RRR w/o murmur, gallops or rubs; no carotid bruits, no edema  GI: abdomen soft, non-distended, non-tender, no hepatosplenomegaly  MS: gait with rolling walker, no clubbing or cyanosis of the digits  SKIN: no rashes, warm to touch, prob skin cancer right neck      Labs, x-rays and EKG reviewed    Narciso was seen today for annual exam    Diagnoses and all orders for this visit:    Routine medical exam, no plans to quit although he has reduced from probably three packs per day to less than one pack per day.  He is trying to stay active.  No further prostate or colon cancer screening needed based on his age                                                      Additional evaluation and management issues:    Additionally patient has numerous other medical issues to address separate from his physical.  Patient apparently did see an outside neurologist who recommended that he have sleep study as there is a high suspicion for sleep apnea.  Patient does have clinical features consistent with sleep apnea could be a source of his fatigue.  We will arrange a home sleep study.      Good morning, Norah Alvarez for my dad Narciso.  My dad went to the neuro doctor yesterday.  They suggested a Cpap  machine.   I know he's needed one for years, but didn't get one.   What do we need to do to go about getting a sleep study and machine ordered?  Thank you & have a nice day.        Patient does have diabetes.  Last 2/22  A1c of 6.6 is acceptable but due to repeat. Last day or so sugars reading error but ok prior and no steroids      Patient had issues with vertigo.  Thought it might have been his normal pressure hydrocephalus.  The positional vertigo still is intermittent and continues and probably independent of the hydrocephalus.  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  After about a week he turned to the side then look back to got some vertigo briefly so he went home that day.  He has had another recurrence.  He takes a Valium 5 mg for anxiety.  I discussed and explained him it also can suppress vertigo so he definitely needs to continue to take one in the morning and six or 8 hours later he may need to take another if he feels any vertigo coming back.  Taking it twice a day I do not feel have any problems with addiction and so forth.    Last  creatinine up 1.3 then 0.9.   Discussed increasing fluids     He does note somewhat enlargement of the breast right behind the nipples on both sides.  It has been sore for about two months.  He is on no medications that I can see could cause that.  his testosterone level was low      He has had some decreased vision but nothing acute.  He has had cataracts removed.  We discussed there are various causes for decreased visual acuity over time any needs to see his outside ophthalmologist.  He will do so.    History of constipation but no recurrence    History of significant hearing loss but now benefiting from hearing aids.    .  Evaluation and management of all the separate issues will be based upon medical decision making.    Regarding polymyalgia, he did reduce the dose he believes down to 3 mg but had some increasing pain so increase the dose.  He had  issues with available pills and so did go up but up to 10 mg we discussed again reducing downward and then reassessing inflammatory markers in the future and will also recheck diabetes which is uncontrolled and so forth.      Endocrine did discontinue Amaryl secondary to morning low sugars.   He will be weaning down on the steroids and we discussed watching for rise in sugar that could indicated adjustment in therapy.        Seen cards for PVD - LACIE etc and had Angio  3/19  Right Lower Arterial YUE is stented.   Right YUE stent velocity origin: proximal: 106, mid: 92, distal: 133   Left Lower Arterial YUE is stented.   Left YUE stent velocity origin: proximal: 150, mid: 158, distal: 175   CFA demonstrates mild (<50%) stenosis.     .As of 5/27/21, the patient underwent 30cc lumbar puncture yesterday. PT evaluation showed objective improvement in 3 out of 4 measures yesterday. His daughter reports that he was also more talkative than usual after the LP. He remains off of ASA and Plavix.      As of 6/24/21, the patient underwent placement of  shunt on 5/31/21. Etelvina reports that he has been cognitively much improved since the shunt was placed; however, since Saturday, he has had significant daily headache and vomiting. CT head and shunt series obtained prior to today's appointment are personally reviewed and demonstrate interval improvement in 3rd ventricular size without apparent overlying hygroma or hematoma. No ileus on shunt series. He has been having regular BM, he has not been smoking. Etelvina and Mr. Yanez do endorse unintentional weight loss over the past few months. His walls has been DCed.      As of 7/1/21, the patient reports that his nausea has completely resolved since reprogramming. He has been discharged home. His daughter is pleased with how he is doing, but thinks that his mental status is not quite as brisk as it was when the shunt was draining more. He also has what appears to be cellulitis of  "the right great toe. He is scheduled to see podiatry next week.      As of 9/20/21, the patient's daughter reports that the dizziness and emesis were present well before the shunt and have not significantly worsened since. They have pursued workup with multiple ENT without significant improvement. His daughter feels that he was doing much better when the shunt was draining. He has worse bruising on his arms today. He has also obviously lost weight. They are planning to see the dermatologist.      His CT head does not demonstrate any concern for blood or extraaxial collection      As of 11/18/21, the patient reports that he had a 50% improvement in symptoms, though he has had some worsening again over the past 2 weeks.      He still has decreased appetite and fluid. The dizziness was coming from his "bathroom" medications and has resolved since stopping them.      He got hearing aids s/p ruptured eardrum.      As of 12/30/21, the patient has been having rapid hearing loss. Over 2 months, he has had significant loss in hearing. He is accompanied in the past by his daughter Norah, who wears hearing aids herself.           As of 2/3/22, the patient is now using a hearing aid, which is helpful. There has been no other etiology identified for his hearing loss. He had some nausea (attributed to stomach virus) and coughing during the week. He is reportedly covid negative and eager to return to work. 1       I did point out to him that his B12 level  was low years ago and it does look like he took a supplement and the B12 level did rise.  He was not aware that her could remember that however and so has currently not been on a B12 supplement.  Level ok 9/18.  He did have iron deficiency in his stool testing was negative.         CT 9/19  Impression       Nodule from 03/18/2019 has increased in size, previously 6 mm now measuring 7 mm.  Malignancy not excluded.  For a solid nodule 6-8 mm, Fleischner Society 2017 guidelines " recommend follow up with non-contrast chest CT at 6-12 months and 18-24 months after discovery.    Cholelithiasis.    Prominence of interstitial markings with mild bronchiectasis.     CT 3/21  Unchanged right lower lobe pulmonary nodule since 03/18/2019 consistent with a benign nodule.   Abnormal subpleural fibrotic lines, centrilobular emphysema and traction bronchiectasis concerning for underlying chronic interstitial lung disease.  It is not significantly changed.                  Assessment and plan:        Uncontrolled type 2 diabetes mellitus with hyperglycemia ?control  -     Hemoglobin A1C; Future  -     Comprehensive Metabolic Panel; Future  -     Vitamin B12; Future  -     TSH; Future  -     Lipid Panel; Future  -     CBC Auto Differential; Future    Essential hypertension ,chronic condition and stable.     Mood disorder ,chronic condition and stable.     Chronic obstructive pulmonary disease, unspecified COPD type ,chronic condition and stable.     Polymyalgia rheumatica  -     Sedimentation rate; Future    Coronary artery disease involving coronary bypass graft of native heart without angina pectoris ,chronic condition and stable.     Stage 3a chronic kidney disease    Vertigo ,chronic condition and stable.     Smokers' cough ,chronic condition and stable.     Constipation, unspecified constipation type- better w 4 softeners BID    B12 deficiency?  -     Vitamin B12; Future    Hyperlipidemia, unspecified hyperlipidemia type    NPH (normal pressure hydrocephalus)sees NS    Iron deficiency anemia, unspecified iron deficiency anemia type    PVD (peripheral vascular disease)    Anemia, unspecified type  -     Vitamin B12; Future    Tortuous aorta    Aortic atherosclerosis    Diabetes mellitus with peripheral circulatory disorder    Chronic bilateral low back pain with bilateral sciatica,     Neurogenic claudication      Skin cancer, see Nicole/Derm    Clinical obstructive sleep apnea, pursue diagnosis and  treatment with home sleep study

## 2023-03-09 LAB
ALBUMIN SERPL BCP-MCNC: 3.9 G/DL (ref 3.5–5.2)
ALP SERPL-CCNC: 62 U/L (ref 55–135)
ALT SERPL W/O P-5'-P-CCNC: 15 U/L (ref 10–44)
ANION GAP SERPL CALC-SCNC: 13 MMOL/L (ref 8–16)
AST SERPL-CCNC: 19 U/L (ref 10–40)
BASOPHILS # BLD AUTO: 0.06 K/UL (ref 0–0.2)
BASOPHILS NFR BLD: 0.4 % (ref 0–1.9)
BILIRUB SERPL-MCNC: 0.3 MG/DL (ref 0.1–1)
BUN SERPL-MCNC: 36 MG/DL (ref 8–23)
CALCIUM SERPL-MCNC: 9.8 MG/DL (ref 8.7–10.5)
CHLORIDE SERPL-SCNC: 107 MMOL/L (ref 95–110)
CHOLEST SERPL-MCNC: 127 MG/DL (ref 120–199)
CHOLEST/HDLC SERPL: 2.5 {RATIO} (ref 2–5)
CO2 SERPL-SCNC: 21 MMOL/L (ref 23–29)
CREAT SERPL-MCNC: 1.4 MG/DL (ref 0.5–1.4)
DIFFERENTIAL METHOD: ABNORMAL
EOSINOPHIL # BLD AUTO: 0.6 K/UL (ref 0–0.5)
EOSINOPHIL NFR BLD: 4.6 % (ref 0–8)
ERYTHROCYTE [DISTWIDTH] IN BLOOD BY AUTOMATED COUNT: 14.8 % (ref 11.5–14.5)
ERYTHROCYTE [SEDIMENTATION RATE] IN BLOOD BY PHOTOMETRIC METHOD: 26 MM/HR (ref 0–23)
EST. GFR  (NO RACE VARIABLE): 49.3 ML/MIN/1.73 M^2
ESTIMATED AVG GLUCOSE: 123 MG/DL (ref 68–131)
GLUCOSE SERPL-MCNC: 102 MG/DL (ref 70–110)
HBA1C MFR BLD: 5.9 % (ref 4–5.6)
HCT VFR BLD AUTO: 36.6 % (ref 40–54)
HDLC SERPL-MCNC: 50 MG/DL (ref 40–75)
HDLC SERPL: 39.4 % (ref 20–50)
HGB BLD-MCNC: 11.4 G/DL (ref 14–18)
IMM GRANULOCYTES # BLD AUTO: 0.03 K/UL (ref 0–0.04)
IMM GRANULOCYTES NFR BLD AUTO: 0.2 % (ref 0–0.5)
LDLC SERPL CALC-MCNC: 60 MG/DL (ref 63–159)
LYMPHOCYTES # BLD AUTO: 2.4 K/UL (ref 1–4.8)
LYMPHOCYTES NFR BLD: 18 % (ref 18–48)
MCH RBC QN AUTO: 31.7 PG (ref 27–31)
MCHC RBC AUTO-ENTMCNC: 31.1 G/DL (ref 32–36)
MCV RBC AUTO: 102 FL (ref 82–98)
MONOCYTES # BLD AUTO: 1.2 K/UL (ref 0.3–1)
MONOCYTES NFR BLD: 8.9 % (ref 4–15)
NEUTROPHILS # BLD AUTO: 9.1 K/UL (ref 1.8–7.7)
NEUTROPHILS NFR BLD: 67.9 % (ref 38–73)
NONHDLC SERPL-MCNC: 77 MG/DL
NRBC BLD-RTO: 0 /100 WBC
PLATELET # BLD AUTO: 256 K/UL (ref 150–450)
PMV BLD AUTO: 10.5 FL (ref 9.2–12.9)
POTASSIUM SERPL-SCNC: 5.1 MMOL/L (ref 3.5–5.1)
PROT SERPL-MCNC: 8 G/DL (ref 6–8.4)
RBC # BLD AUTO: 3.6 M/UL (ref 4.6–6.2)
SODIUM SERPL-SCNC: 141 MMOL/L (ref 136–145)
T4 FREE SERPL-MCNC: 0.83 NG/DL (ref 0.71–1.51)
TRIGL SERPL-MCNC: 85 MG/DL (ref 30–150)
TSH SERPL DL<=0.005 MIU/L-ACNC: 5.5 UIU/ML (ref 0.4–4)
VIT B12 SERPL-MCNC: 591 PG/ML (ref 210–950)
WBC # BLD AUTO: 13.38 K/UL (ref 3.9–12.7)

## 2023-03-14 RX ORDER — IPRATROPIUM BROMIDE AND ALBUTEROL SULFATE 2.5; .5 MG/3ML; MG/3ML
SOLUTION RESPIRATORY (INHALATION)
Qty: 90 ML | Refills: 12 | Status: SHIPPED | OUTPATIENT
Start: 2023-03-14 | End: 2023-06-05

## 2023-03-14 RX ORDER — ONDANSETRON 4 MG/1
TABLET, ORALLY DISINTEGRATING ORAL
Qty: 90 TABLET | Refills: 12 | Status: ON HOLD | OUTPATIENT
Start: 2023-03-14 | End: 2023-10-23 | Stop reason: HOSPADM

## 2023-03-14 NOTE — TELEPHONE ENCOUNTER
Refill Routing Note   Medication(s) are not appropriate for processing by [facility] for the following reason(s):         Medication outside of protocol  Due for refill >6 months ago    [facility] action(s):  Defer  Route    Medication Therapy Plan: ZOFRAN(OP)    Appointments  past 12m or future 3m with PCP    Date Provider   Last Visit   3/7/2023 Marcelino Del Toro MD   Next Visit   Visit date not found Marcelino Del Toro MD   ED visits in past 90 days: 0        Note composed:1:36 PM 03/14/2023

## 2023-03-14 NOTE — TELEPHONE ENCOUNTER
No new care gaps identified.  [facility] Embedded Care Gaps. Reference number: [id number]. 3/14/2023   12:23:21 PM CDT

## 2023-03-21 ENCOUNTER — OFFICE VISIT (OUTPATIENT)
Dept: NEUROSURGERY | Facility: CLINIC | Age: 88
End: 2023-03-21
Payer: MEDICARE

## 2023-03-21 ENCOUNTER — HOSPITAL ENCOUNTER (OUTPATIENT)
Dept: RADIOLOGY | Facility: HOSPITAL | Age: 88
Discharge: HOME | End: 2023-03-21
Attending: PHYSICIAN ASSISTANT
Payer: MEDICARE

## 2023-03-21 VITALS
HEIGHT: 67 IN | BODY MASS INDEX: 19.62 KG/M2 | HEART RATE: 80 BPM | SYSTOLIC BLOOD PRESSURE: 150 MMHG | WEIGHT: 125 LBS | DIASTOLIC BLOOD PRESSURE: 74 MMHG

## 2023-03-21 DIAGNOSIS — G91.2 NORMAL PRESSURE HYDROCEPHALUS: ICD-10-CM

## 2023-03-21 DIAGNOSIS — Z98.2 VP (VENTRICULOPERITONEAL) SHUNT STATUS: ICD-10-CM

## 2023-03-21 DIAGNOSIS — G91.2 NORMAL PRESSURE HYDROCEPHALUS: Primary | ICD-10-CM

## 2023-03-21 PROCEDURE — 3077F PR MOST RECENT SYSTOLIC BLOOD PRESSURE >= 140 MM HG: ICD-10-PCS | Mod: HCNC,CPTII,S$GLB, | Performed by: PHYSICIAN ASSISTANT

## 2023-03-21 PROCEDURE — 1160F PR REVIEW ALL MEDS BY PRESCRIBER/CLIN PHARMACIST DOCUMENTED: ICD-10-PCS | Mod: HCNC,CPTII,S$GLB, | Performed by: PHYSICIAN ASSISTANT

## 2023-03-21 PROCEDURE — 1159F PR MEDICATION LIST DOCUMENTED IN MEDICAL RECORD: ICD-10-PCS | Mod: HCNC,CPTII,S$GLB, | Performed by: PHYSICIAN ASSISTANT

## 2023-03-21 PROCEDURE — 99213 OFFICE O/P EST LOW 20 MIN: CPT | Mod: HCNC,S$GLB,, | Performed by: PHYSICIAN ASSISTANT

## 2023-03-21 PROCEDURE — 99213 PR OFFICE/OUTPT VISIT, EST, LEVL III, 20-29 MIN: ICD-10-PCS | Mod: HCNC,S$GLB,, | Performed by: PHYSICIAN ASSISTANT

## 2023-03-21 PROCEDURE — 3078F DIAST BP <80 MM HG: CPT | Mod: HCNC,CPTII,S$GLB, | Performed by: PHYSICIAN ASSISTANT

## 2023-03-21 PROCEDURE — 3078F PR MOST RECENT DIASTOLIC BLOOD PRESSURE < 80 MM HG: ICD-10-PCS | Mod: HCNC,CPTII,S$GLB, | Performed by: PHYSICIAN ASSISTANT

## 2023-03-21 PROCEDURE — 1159F MED LIST DOCD IN RCRD: CPT | Mod: HCNC,CPTII,S$GLB, | Performed by: PHYSICIAN ASSISTANT

## 2023-03-21 PROCEDURE — 1126F AMNT PAIN NOTED NONE PRSNT: CPT | Mod: HCNC,CPTII,S$GLB, | Performed by: PHYSICIAN ASSISTANT

## 2023-03-21 PROCEDURE — 70450 CT HEAD WITHOUT CONTRAST: ICD-10-PCS | Mod: 26,HCNC,, | Performed by: RADIOLOGY

## 2023-03-21 PROCEDURE — 1126F PR PAIN SEVERITY QUANTIFIED, NO PAIN PRESENT: ICD-10-PCS | Mod: HCNC,CPTII,S$GLB, | Performed by: PHYSICIAN ASSISTANT

## 2023-03-21 PROCEDURE — 70450 CT HEAD/BRAIN W/O DYE: CPT | Mod: 26,HCNC,, | Performed by: RADIOLOGY

## 2023-03-21 PROCEDURE — 70450 CT HEAD/BRAIN W/O DYE: CPT | Mod: TC,HCNC

## 2023-03-21 PROCEDURE — 3077F SYST BP >= 140 MM HG: CPT | Mod: HCNC,CPTII,S$GLB, | Performed by: PHYSICIAN ASSISTANT

## 2023-03-21 PROCEDURE — 99999 PR PBB SHADOW E&M-EST. PATIENT-LVL IV: CPT | Mod: PBBFAC,HCNC,, | Performed by: PHYSICIAN ASSISTANT

## 2023-03-21 PROCEDURE — 99999 PR PBB SHADOW E&M-EST. PATIENT-LVL IV: ICD-10-PCS | Mod: PBBFAC,HCNC,, | Performed by: PHYSICIAN ASSISTANT

## 2023-03-21 PROCEDURE — 1160F RVW MEDS BY RX/DR IN RCRD: CPT | Mod: HCNC,CPTII,S$GLB, | Performed by: PHYSICIAN ASSISTANT

## 2023-03-21 NOTE — PROGRESS NOTES
Neurosurgery  Established Patient    SUBJECTIVE:     History of Present Illness:  Patient accompanied by daughter who aids in the history.  They state that over the past month or so patient has experienced confusion and some speech difficulty that always occurs in the morning and last for a period of time and then resolves by the end of the day.  They deny any new weakness visual changes, seizures, constant or progressive headaches.  Balance and memory have been stable without any increased recent falls.  Patient does have history of sleep apnea, he is not been compliant with this.  Denies any recent medication changes.    Review of patient's allergies indicates:   Allergen Reactions    Demerol [meperidine] Nausea Only       Current Outpatient Medications   Medication Sig Dispense Refill    ACCU-CHEK JOAQUIN PLUS METER Misc Three times a day with meals  0    ACCU-CHEK SOFTCLIX LANCETS Misc Check tid 200 each 12    acetaminophen (TYLENOL) 500 MG tablet Take 1 tablet (500 mg total) by mouth every 6 (six) hours as needed for Pain. 13 tablet 0    alendronate (FOSAMAX) 70 MG tablet TAKE 1 TABLET EVERY 7 DAYS ON TUESDAYS 12 tablet 12    ascorbic acid, vitamin C, (VITAMIN C) 250 MG tablet Take 1 tablet (250 mg total) by mouth once daily. 120 tablet 0    aspirin (ECOTRIN) 81 MG EC tablet Take 1 tablet (81 mg total) by mouth once daily.  0    blood sugar diagnostic (TRUE METRIX GLUCOSE TEST STRIP) Strp  each 12    BOOSTRIX TDAP 2.5-8-5 Lf-mcg-Lf/0.5mL Syrg injection       ciclopirox (PENLAC) 8 % Soln Apply topically nightly. 6.6 mL 11    clopidogreL (PLAVIX) 75 mg tablet TAKE 1 TABLET EVERY DAY 90 tablet 1    diazePAM (VALIUM) 5 MG tablet TAKE 1 TABLET BY MOUTH EVERY 8 HOURS AS NEEDED. Strength: 5 mg 270 tablet 5    docusate sodium (COLACE) 100 MG capsule Take 1 capsule (100 mg total) by mouth 2 (two) times daily. 60 capsule 0    lactulose (CHRONULAC) 20 gram/30 mL Soln Take 45 mLs (30 g total) by mouth 3 (three) times  daily as needed. 1500 mL 12    metFORMIN (GLUCOPHAGE-XR) 750 MG ER 24hr tablet TAKE 1 TABLET TWICE DAILY WITH MEALS 180 tablet 12    ondansetron (ZOFRAN-ODT) 4 MG TbDL DISSOLVE 2 TABLETS (8 MG TOTAL) ON THE TONGUE EVERY 8 (EIGHT) HOURS AS NEEDED (NAUSEA). 90 tablet 12    rosuvastatin (CRESTOR) 20 MG tablet TAKE 1 TABLET (20 MG TOTAL) BY MOUTH EVERY EVENING. 90 tablet 12    senna (SENOKOT) 8.6 mg tablet Take 1 tablet by mouth once daily.      TRUE METRIX GLUCOSE TEST STRIP Strp TEST BLOOD SUGAR THREE TIMES DAILY. 300 strip 12    TRUEPLUS LANCETS 33 gauge Misc TEST BLOOD SUGAR TWICE DAILY 200 each 12    albuterol-ipratropium (DUO-NEB) 2.5 mg-0.5 mg/3 mL nebulizer solution Take 3 mLs by nebulization every 4 (four) hours as needed for Wheezing. (Patient not taking: Reported on 3/21/2023) 90 mL 12    heparin sodium,porcine (HEPARIN, PORCINE,) 5,000 unit/mL injection Inject 1 mL (5,000 Units total) into the skin every 8 (eight) hours. (Patient not taking: Reported on 12/20/2022)      insulin aspart U-100 (NOVOLOG) 100 unit/mL (3 mL) InPn pen Inject 0-5 Units into the skin before meals and at bedtime as needed (Hyperglycemia).  0    magnesium sulfate in water (MAGNESIUM SULFATE 2 GRAM/50 ML) 2 gram/50 mL PgBk       meclizine (ANTIVERT) 25 mg tablet Take 1 tablet (25 mg total) by mouth 3 (three) times daily as needed for Dizziness (or at least one HS for 1 week when vertigo active). (Patient not taking: Reported on 12/20/2022) 30 tablet 12    metoprolol succinate (TOPROL-XL) 25 MG 24 hr tablet       mirtazapine (REMERON) 15 MG tablet Take 1 tablet (15 mg total) by mouth every evening. (Patient not taking: Reported on 12/20/2022) 30 tablet 11    nicotine (NICODERM CQ) 14 mg/24 hr Place 1 patch onto the skin once daily. (Patient not taking: Reported on 12/20/2022)  0    OMNIPAQUE 300 300 mg iodine/mL injection       OMNIPAQUE 350 350 mg iodine/mL Soln injection       ondansetron (ZOFRAN-ODT) 8 MG TbDL       prochlorperazine  (COMPAZINE) 5 MG tablet        No current facility-administered medications for this visit.       Past Medical History:   Diagnosis Date    Actinic keratosis     Anxiety     B12 deficiency 12/8/2014    Dx 6/14    Cancer     skin    Cataract     Coronary artery disease     Diabetes mellitus type II     Diabetes mellitus with peripheral circulatory disorder 6/18/2014    ED (erectile dysfunction)     Hyperlipidemia     Kidney stone     Neurogenic claudication 4/4/2022    Peripheral vascular disease     Spondylosis 3/29/2019    Tobacco dependence     Urinary incontinence 5/4/2021     Past Surgical History:   Procedure Laterality Date    APPENDECTOMY      CARDIAC SURGERY  01/1999    CABG 4 vessels    CATARACT EXTRACTION      EPIDURAL STEROID INJECTION Right 8/26/2020    Procedure: Injection, Steroid, Epidural Transforaminal;  Surgeon: Wiley Crane Jr., MD;  Location: [facility] ENDO;  Service: Pain Management;  Laterality: Right;  Right L5 + S1 TF LEAH  Arrive @ 1115; ASA & Plavix last 8/18; Check BG; Rapid COVID test    EPIDURAL STEROID INJECTION Right 11/25/2020    Procedure: Injection, Steroid, Epidural Transformainal;  Surgeon: Wiley Crane Jr., MD;  Location: [facility] ENDO;  Service: Pain Management;  Laterality: Right;  Right L5 + S1 TF LEAH  Arrive @ 1245; ASA and Plavix last 11/17; Pre-DM; Needs MD Sign.    EPIDURAL STEROID INJECTION Right 2/19/2021    Procedure: Injection, Steroid, Epidural Transforaminal;  Surgeon: Wiley Crane Jr., MD;  Location: [facility] ENDO;  Service: Pain Management;  Laterality: Right;  Right L5 + S1 TF LEAH  Arrive @ 1115; ASA & Plavix last 2/11; Check BG; Needs Consent    EXTERNAL EAR SURGERY      EYE SURGERY      cataracts    ILIAC ARTERY STENT Bilateral 06/2003    also Right External Iliac stent     Family History       Problem Relation (Age of Onset)    Stroke Mother          Social History     Socioeconomic History    Marital status:    Tobacco Use    Smoking status:  "Every Day     Packs/day: 1.50     Years: 71.00     Pack years: 106.50     Types: Cigarettes    Smokeless tobacco: Former   Substance and Sexual Activity    Alcohol use: No    Drug use: No    Sexual activity: Not Currently     Partners: Female   Social History Narrative    .  4 children.  Smokes 2 ppd.  Still drives trucks for entertainment industry.        Review of Systems  Constitutional: no fever or chills  Eyes: no visual changes  ENT: no nasal congestion or sore throat  Respiratory: no cough or shortness of breath  Cardiovascular: no chest pain or palpitations  Gastrointestinal: no nausea or vomiting  Genitourinary: no hematuria or dysuria  Integument/Breast: no rash or pruritis  Hematologic/Lymphatic: no easy bruising or lymphadenopathy  Musculoskeletal: no arthralgias or myalgias  Neurological: no seizures or tremors    OBJECTIVE:     Vital Signs  Pulse: 80  BP: (!) 150/74  Pain Score: 0-No pain  Height: 5' 7" (170.2 cm)  Weight: 56.7 kg (125 lb)  Body mass index is 19.58 kg/m².    Neurosurgery Physical Exam  General: no distress  Neurologic: Alert and oriented. Thought content appropriate.  Head: normocephalic  GCS: Motor: 6/Verbal: 5/Eyes: 4 GCS Total: 15  Cranial nerves: face symmetric, tongue midline, pupils equal, round, reactive to light with accomodation, extraocular muscles intact  Sensory: response to light touch throughout  Motor Strength:no focal deficits       Diagnostic Results: personally reviewed  EXAMINATION:  CT HEAD WITHOUT CONTRAST     CLINICAL HISTORY:  1MOFU/ SHUNT; (Idiopathic) normal pressure hydrocephalus     TECHNIQUE:  Low dose axial CT images obtained throughout the head without the use of intravenous contrast.  Axial, sagittal and coronal reconstructions were performed.     COMPARISON:  08/23/2022     FINDINGS:  Intracranial compartment:     Right-sided ventricular shunt in place.  Tip of the catheter in stable position.  Ventricles are stable in size, with 3rd ventricle " diameter again measuring on the order of 0.8 cm.  Stable sulcal definition over the cerebral convexities.     The brain parenchyma appears unchanged. Mild chronic small vessel ischemic disease.  No new parenchymal mass, hemorrhage, edema or major vascular distribution infarct.     No new extra-axial blood or fluid collections.     Skull/extracranial contents (limited evaluation):     No fracture. Mastoid air cells and paranasal sinuses are essentially clear.     Bilateral pseudophakia.     Impression:     Ventricular shunt in place with stable size and configuration of the ventricles as above.        Electronically signed by: Rubén Presley MD  Date:                                            03/21/2023  Time:                                           12:40    ASSESSMENT/PLAN:     86-year-old male with Normal pressure hydrocephalus and  shunt on 05/31/2021 with prior subdural hematomas is since resolved. Head CT is stable without evidence of increased ventricular size compared to prior study, no acute intracranial abnormality.  Patient has neurologically stable at baseline.  Do not have concern for shunt failure.  Will maintain shunt setting at 160 mm of water.  Advised to follow up with PCP to obtain CPAP machine for sleep apnea.  We will continue to follow with Neurosurgery in about 6 months with repeat CT scan.  There were encouraged to call clinic questions questions or concerns the meantime.      Alex Kathleen Jr. KARLEY  [facility]  Department of Neurosurgery      Note dictated with voice recognition software, please excuse any grammatical errors.

## 2023-03-22 ENCOUNTER — PATIENT MESSAGE (OUTPATIENT)
Dept: FAMILY MEDICINE | Facility: CLINIC | Age: 88
End: 2023-03-22
Payer: MEDICARE

## 2023-03-22 DIAGNOSIS — G47.33 OSA (OBSTRUCTIVE SLEEP APNEA): Primary | ICD-10-CM

## 2023-03-22 DIAGNOSIS — G47.30 SLEEP APNEA, UNSPECIFIED TYPE: ICD-10-CM

## 2023-03-27 ENCOUNTER — TELEPHONE (OUTPATIENT)
Dept: PULMONOLOGY | Facility: HOSPITAL | Age: 88
End: 2023-03-27
Payer: MEDICARE

## 2023-04-06 RX ORDER — DIAZEPAM 5 MG/1
TABLET ORAL
Qty: 270 TABLET | Refills: 5 | Status: SHIPPED | OUTPATIENT
Start: 2023-04-06 | End: 2023-04-20 | Stop reason: SDUPTHER

## 2023-04-06 NOTE — TELEPHONE ENCOUNTER
No new care gaps identified.  [facility] Embedded Care Gaps. Reference number: [id number]. 4/06/2023   9:45:17 AM ALEXANDERT

## 2023-04-06 NOTE — TELEPHONE ENCOUNTER
----- Message from Wendy Yao sent at 4/6/2023  9:31 AM CDT -----  .Type: RX Refill Request    Who Called: pt     Have you contacted your pharmacy:yes     Refill or New Rx:refill     RX Name and Strength:diazePAM (VALIUM) 5 MG tablet    How is the patient currently taking it? (ex. 1XDay):    Is this a 30 day or 90 day RX:    Preferred Pharmacy with phone number:   [facility] - [place], [place] - [address] [facility].  [address] [place] [address]  Phone: [phone] Fax: [phone]    Local or Mail Order:local    Ordering Provider:    Would the patient rather a call back or a response via [facility]? call    Best Call Back Number [phone]      Additional Information:

## 2023-04-10 ENCOUNTER — TELEPHONE (OUTPATIENT)
Dept: FAMILY MEDICINE | Facility: CLINIC | Age: 88
End: 2023-04-10
Payer: MEDICARE

## 2023-04-10 NOTE — TELEPHONE ENCOUNTER
"----- Message from Jalen Osei sent at 4/10/2023  1:05 PM CDT -----  Regarding: Self [phone]  Type: Patient Call Back    Who called: Self     What is the request in detail: called to ask the doctor some questions, said about "several things" refused to give more information.     Can the clinic reply by [facility]?  No     Would the patient rather a call back or a response via [facility]?call back     Best call back number: [phone]     Additional Information:    Thank you.    "

## 2023-04-17 ENCOUNTER — TELEPHONE (OUTPATIENT)
Dept: FAMILY MEDICINE | Facility: CLINIC | Age: 88
End: 2023-04-17
Payer: MEDICARE

## 2023-04-17 NOTE — TELEPHONE ENCOUNTER
----- Message from Monica Baptiste MA sent at 4/17/2023  3:11 PM CDT -----    ----- Message -----  From: Wendy Yao  Sent: 4/17/2023   3:00 PM CDT  To: Alverto Sauer    .Type: RX Refill Request    Who Called: pt     Have you contacted your pharmacy:yes    Refill or New Rx:refill     RX Name and Strength:diazePAM (VALIUM) 5 MG tablet    How is the patient currently taking it? (ex. 1XDay):    Is this a 30 day or 90 day RX:    Preferred Pharmacy with phone number:       [facility] - [place], [place] - [address]  [address] [place] [address]  Phone: [phone] Fax: [phone]      Local or Mail Order:local    Ordering Provider:    Would the patient rather a call back or a response via [facility]? call    Best Call Back Number [phone]      Additional Information:

## 2023-04-19 ENCOUNTER — TELEPHONE (OUTPATIENT)
Dept: FAMILY MEDICINE | Facility: CLINIC | Age: 88
End: 2023-04-19
Payer: MEDICARE

## 2023-04-19 NOTE — TELEPHONE ENCOUNTER
----- Message from Blessing Cheung sent at 4/19/2023  9:16 AM CDT -----  Type: RX Refill Request    Who Called: pt     Have you contacted your pharmacy:    Refill or New Rx:diazePAM (VALIUM) 5 MG tablet    RX Name and Strength:    How is the patient currently taking it? (ex. 1XDay):    Is this a 30 day or 90 day RX:    Preferred Pharmacy with phone number:    [facility] - [place], [place] - [address]  [address] [place] [address]  Phone: [phone] Fax: [phone]        Local or Mail Order:    Ordering Provider:    Would the patient rather a call back or a response via [facility]? call    Best Call Back Number:[phone] (home) [phone] (work)      Additional Information:

## 2023-04-20 RX ORDER — DIAZEPAM 5 MG/1
TABLET ORAL
Qty: 270 TABLET | Refills: 5 | Status: ON HOLD | OUTPATIENT
Start: 2023-04-20 | End: 2023-10-10

## 2023-04-20 NOTE — TELEPHONE ENCOUNTER
----- Message from Luke Cool MA sent at 4/20/2023  8:57 AM CDT -----  Type: Patient Call Back    Who called:Self    What is the request in detail:pt. States that [facility] doesn't have his medication (diazePAM (VALIUM) 5 MG tablet)  that he needs It sent to:    [facility]/pharmacy #[id number] - [place], [place] - [address]   Phone:  [phone]  Fax:  [phone]          Can the clinic reply by [facility]? No    Would the patient rather a call back or a response via [facility]? yes    Best call back number: .[phone]

## 2023-04-20 NOTE — TELEPHONE ENCOUNTER
Patient is asking for his diazepam to be sent to [facility]. [facility] does not have the medication. Please advise!

## 2023-04-20 NOTE — TELEPHONE ENCOUNTER
No new care gaps identified.  [facility] Embedded Care Gaps. Reference number: [id number]. 4/20/2023   9:22:40 AM CDT

## 2023-04-25 DIAGNOSIS — E11.65 UNCONTROLLED TYPE 2 DIABETES MELLITUS WITH HYPERGLYCEMIA: ICD-10-CM

## 2023-04-25 RX ORDER — METFORMIN HYDROCHLORIDE 750 MG/1
TABLET, EXTENDED RELEASE ORAL
Qty: 180 TABLET | Refills: 1 | Status: ON HOLD | OUTPATIENT
Start: 2023-04-25 | End: 2023-10-10

## 2023-04-25 NOTE — TELEPHONE ENCOUNTER
No new care gaps identified.  [facility] Embedded Care Gaps. Reference number: [id number]. 4/25/2023   10:44:00 AM CDT

## 2023-04-26 NOTE — TELEPHONE ENCOUNTER
Refill Decision Note   Narciso Beau  is requesting a refill authorization.  Brief Assessment and Rationale for Refill:  Approve     Medication Therapy Plan:       Medication Reconciliation Completed: No   Comments:     No Care Gaps recommended.     Note composed:11:32 PM 04/25/2023

## 2023-05-02 ENCOUNTER — PES CALL (OUTPATIENT)
Dept: ADMINISTRATIVE | Facility: CLINIC | Age: 88
End: 2023-05-02
Payer: MEDICARE

## 2023-05-19 ENCOUNTER — OFFICE VISIT (OUTPATIENT)
Dept: PODIATRY | Facility: CLINIC | Age: 88
End: 2023-05-19
Payer: MEDICARE

## 2023-05-19 VITALS — HEIGHT: 67 IN | WEIGHT: 125 LBS | BODY MASS INDEX: 19.62 KG/M2

## 2023-05-19 DIAGNOSIS — B35.1 ONYCHOMYCOSIS DUE TO DERMATOPHYTE: ICD-10-CM

## 2023-05-19 DIAGNOSIS — E11.42 TYPE 2 DIABETES MELLITUS WITH DIABETIC POLYNEUROPATHY, UNSPECIFIED WHETHER LONG TERM INSULIN USE: Primary | ICD-10-CM

## 2023-05-19 DIAGNOSIS — B35.3 TINEA PEDIS, UNSPECIFIED LATERALITY: ICD-10-CM

## 2023-05-19 PROCEDURE — 3288F FALL RISK ASSESSMENT DOCD: CPT | Mod: CPTII,,, | Performed by: PODIATRIST

## 2023-05-19 PROCEDURE — 1126F AMNT PAIN NOTED NONE PRSNT: CPT | Mod: CPTII,,, | Performed by: PODIATRIST

## 2023-05-19 PROCEDURE — 1101F PT FALLS ASSESS-DOCD LE1/YR: CPT | Mod: CPTII,,, | Performed by: PODIATRIST

## 2023-05-19 PROCEDURE — 99999 PR PBB SHADOW E&M-EST. PATIENT-LVL IV: CPT | Mod: PBBFAC,,, | Performed by: PODIATRIST

## 2023-05-19 PROCEDURE — 99214 OFFICE O/P EST MOD 30 MIN: CPT | Mod: 25,S$GLB,, | Performed by: PODIATRIST

## 2023-05-19 PROCEDURE — 1159F MED LIST DOCD IN RCRD: CPT | Mod: CPTII,,, | Performed by: PODIATRIST

## 2023-05-19 PROCEDURE — 3288F PR FALLS RISK ASSESSMENT DOCUMENTED: ICD-10-PCS | Mod: CPTII,,, | Performed by: PODIATRIST

## 2023-05-19 PROCEDURE — 1160F RVW MEDS BY RX/DR IN RCRD: CPT | Mod: CPTII,,, | Performed by: PODIATRIST

## 2023-05-19 PROCEDURE — 1101F PR PT FALLS ASSESS DOC 0-1 FALLS W/OUT INJ PAST YR: ICD-10-PCS | Mod: CPTII,,, | Performed by: PODIATRIST

## 2023-05-19 PROCEDURE — 99214 PR OFFICE/OUTPT VISIT, EST, LEVL IV, 30-39 MIN: ICD-10-PCS | Mod: 25,S$GLB,, | Performed by: PODIATRIST

## 2023-05-19 PROCEDURE — 99999 PR PBB SHADOW E&M-EST. PATIENT-LVL IV: ICD-10-PCS | Mod: PBBFAC,,, | Performed by: PODIATRIST

## 2023-05-19 PROCEDURE — 99214 OFFICE O/P EST MOD 30 MIN: CPT | Mod: PO | Performed by: PODIATRIST

## 2023-05-19 PROCEDURE — 11721 PR DEBRIDEMENT OF NAILS, 6 OR MORE: ICD-10-PCS | Mod: Q9,S$GLB,, | Performed by: PODIATRIST

## 2023-05-19 PROCEDURE — 1126F PR PAIN SEVERITY QUANTIFIED, NO PAIN PRESENT: ICD-10-PCS | Mod: CPTII,,, | Performed by: PODIATRIST

## 2023-05-19 PROCEDURE — 1159F PR MEDICATION LIST DOCUMENTED IN MEDICAL RECORD: ICD-10-PCS | Mod: CPTII,,, | Performed by: PODIATRIST

## 2023-05-19 PROCEDURE — 11721 DEBRIDE NAIL 6 OR MORE: CPT | Mod: Q9,S$GLB,, | Performed by: PODIATRIST

## 2023-05-19 PROCEDURE — 1160F PR REVIEW ALL MEDS BY PRESCRIBER/CLIN PHARMACIST DOCUMENTED: ICD-10-PCS | Mod: CPTII,,, | Performed by: PODIATRIST

## 2023-05-19 RX ORDER — CICLOPIROX OLAMINE 7.7 MG/G
CREAM TOPICAL 2 TIMES DAILY
Qty: 90 G | Refills: 2 | Status: ON HOLD | OUTPATIENT
Start: 2023-05-19 | End: 2023-10-10

## 2023-05-19 RX ORDER — CICLOPIROX 80 MG/ML
SOLUTION TOPICAL NIGHTLY
Qty: 6.6 ML | Refills: 11 | Status: ON HOLD | OUTPATIENT
Start: 2023-05-19 | End: 2023-10-10

## 2023-05-19 NOTE — PROGRESS NOTES
Subjective:      Patient ID: Narciso Yanez is a 87 y.o. male.    Chief Complaint: Diabetic Foot Exam (PCP Marcelino Del Toro MD 3/7/23)    Narciso is a 87 y.o. male who presents to the clinic for evaluation and treatment of high risk feet. Narciso has a past medical history of Actinic keratosis, Anxiety, B12 deficiency (12/8/2014), Cancer, Cataract, Coronary artery disease, Diabetes mellitus type II, Diabetes mellitus with peripheral circulatory disorder (6/18/2014), ED (erectile dysfunction), Hyperlipidemia, Kidney stone, Neurogenic claudication (4/4/2022), Peripheral vascular disease, Spondylosis (3/29/2019), Tobacco dependence, and Urinary incontinence (5/4/2021). The patient's chief complaint is long, thick toenails. Gradual onset, worsening over past several weeks, aggravated by increased weight bearing, shoe gear, pressure.  Periodic debridement helps symptoms.  PCP: Marcelino Del Toro MD    Date Last Seen by PCP:   Chief Complaint   Patient presents with    Diabetic Foot Exam     PCP Marcelino Del Toro MD 3/7/23        Current shoe gear:  Affected Foot: Casual shoes     Unaffected Foot: Casual shoes    Hemoglobin A1C   Date Value Ref Range Status   03/09/2023 5.9 (H) 4.0 - 5.6 % Final     Comment:     ADA Screening Guidelines:  5.7-6.4%  Consistent with prediabetes  >or=6.5%  Consistent with diabetes    High levels of fetal hemoglobin interfere with the HbA1C  assay. Heterozygous hemoglobin variants (HbS, HgC, etc)do  not significantly interfere with this assay.   However, presence of multiple variants may affect accuracy.     02/28/2022 6.6 (H) 4.0 - 5.6 % Final     Comment:     ADA Screening Guidelines:  5.7-6.4%  Consistent with prediabetes  >or=6.5%  Consistent with diabetes    High levels of fetal hemoglobin interfere with the HbA1C  assay. Heterozygous hemoglobin variants (HbS, HgC, etc)do  not significantly interfere with this assay.   However, presence of multiple variants may affect accuracy.      04/02/2021 6.3 (H) 4.0 - 5.6 % Final     Comment:     ADA Screening Guidelines:  5.7-6.4%  Consistent with prediabetes  >or=6.5%  Consistent with diabetes    High levels of fetal hemoglobin interfere with the HbA1C  assay. Heterozygous hemoglobin variants (HbS, HgC, etc)do  not significantly interfere with this assay.   However, presence of multiple variants may affect accuracy.         Review of Systems   Constitutional: Negative for chills, diaphoresis, fever, malaise/fatigue and night sweats.   Cardiovascular:  Negative for claudication, cyanosis, leg swelling and syncope.   Skin:  Positive for dry skin, itching and nail changes. Negative for color change, rash, suspicious lesions and unusual hair distribution.   Musculoskeletal:  Negative for falls, joint pain, joint swelling, muscle cramps, muscle weakness and stiffness.   Gastrointestinal:  Negative for constipation, diarrhea, nausea and vomiting.   Neurological:  Positive for paresthesias and sensory change. Negative for brief paralysis, disturbances in coordination, focal weakness, numbness and tremors.         Objective:      Physical Exam  Constitutional:       Appearance: He is well-developed. He is not diaphoretic.      Comments: Oriented to time, place, and person.   Cardiovascular:      Pulses:           Dorsalis pedis pulses are 1+ on the right side and 1+ on the left side.        Posterior tibial pulses are 1+ on the right side and 1+ on the left side.      Comments: Capillary fill time 3-5 seconds.  All toes warm to touch.      Negative lower extremity edema bilateral.    Negative elevational pallor and dependent rubor bilateral.    Musculoskeletal:      Comments: Normal angle, base, station of gait. Decreased stride length, early heel off, moderately propulsive toe off bilateral.    All ten toes without clubbing, cyanosis, or signs of ischemia.      No pain to palpation bilateral lower extremities.      Range of motion, stability, muscle  strength, and muscle tone are age and health appropriate normal bilateral feet and legs.       Lymphadenopathy:      Comments: Negative lymphadenopathy bilateral popliteal fossa and tarsal tunnel.  Negative lymphangitic streaking bilateral foot/ankle bilateral.     Skin:     General: Skin is warm and dry.      Coloration: Skin is not pale.      Findings: No abrasion, bruising, burn, ecchymosis, erythema, laceration, lesion, petechiae or rash.      Nails: There is no clubbing.      Comments: Dry scale with superficial flakes over an erythematous base both feet in moccasin distribution without ulceration, drainage, pus, tracking, fluctuance, malodor, or cardinal signs infection.    Skin thin, atrophic, with decreased density and distribution of pedal hair bilateral, but without hyperpigmentation, helio discoloration,  ulcers, masses, nodules or cords palpated bilateral feet and legs.      Toenails 1st, 2nd, 3rd, 4th, 5th  bilateral are hypertrophic thickened 2-3 mm, dystrophic, discolored tanish brown with tan, gray crumbly subungual debris.  Tender to distal nail plate pressure, without periungual skin abnormality of each.     Neurological:      Mental Status: He is alert and oriented to person, place, and time. He is not disoriented.      Sensory: Sensory deficit present.      Motor: No tremor, atrophy or abnormal muscle tone.      Deep Tendon Reflexes:      Reflex Scores:       Patellar reflexes are 2+ on the right side and 2+ on the left side.       Achilles reflexes are 2+ on the right side and 2+ on the left side.     Comments: Decreased/absent vibratory sensation bilateral feet to 128Hz tuning fork.    Paresthesias, and burning bilateral feet with no clearly identified trigger or source.       Psychiatric:         Behavior: Behavior is cooperative.             Assessment:       Encounter Diagnoses   Name Primary?    Type 2 diabetes mellitus with diabetic polyneuropathy, unspecified whether long term insulin use  Yes    Onychomycosis due to dermatophyte     Tinea pedis, unspecified laterality          Plan:       Narciso was seen today for diabetic foot exam.    Diagnoses and all orders for this visit:    Type 2 diabetes mellitus with diabetic polyneuropathy, unspecified whether long term insulin use  -      DIABETES FOOT EXAM    Onychomycosis due to dermatophyte  -      DIABETES FOOT EXAM    Tinea pedis, unspecified laterality  -      DIABETES FOOT EXAM    Other orders  -     ciclopirox (LOPROX) 0.77 % Crea; Apply topically 2 (two) times daily.  -     ciclopirox (PENLAC) 8 % Soln; Apply topically nightly.      I counseled the patient on his conditions, their implications and medical management.        - Shoe inspection. Diabetic Foot Education. Patient reminded of the importance of good nutrition and blood sugar control to help prevent podiatric complications of diabetes. Patient instructed on proper foot hygeine. We discussed wearing proper shoe gear, daily foot inspections, never walking without protective shoe gear, never putting sharp instruments to feet, routine podiatric visits at least annually.    Discussed conservative treatment with shoes of adequate dimensions, material, and style to alleviate symptoms and delay or prevent surgical intervention.    penlac  , loprox cream bid 6 weeks.    - With patient's permission, nails were aggressively reduced and debrided x 10 to their soft tissue attachment mechanically and with electric , removing all offending nail and debris. Patient relates relief following the procedure. He will continue to monitor the areas daily, inspect his feet, wear protective shoe gear when ambulatory, moisturizer to maintain skin integrity and follow in this office  p.r.n.          No follow-ups on file.

## 2023-05-23 ENCOUNTER — TELEPHONE (OUTPATIENT)
Dept: FAMILY MEDICINE | Facility: CLINIC | Age: 88
End: 2023-05-23
Payer: MEDICARE

## 2023-05-23 DIAGNOSIS — E11.51 DIABETES MELLITUS WITH PERIPHERAL CIRCULATORY DISORDER: Primary | ICD-10-CM

## 2023-05-23 RX ORDER — LANCETS 33 GAUGE
EACH MISCELLANEOUS
Qty: 300 EACH | Refills: 3 | Status: ON HOLD | OUTPATIENT
Start: 2023-05-23 | End: 2023-10-10

## 2023-05-23 NOTE — TELEPHONE ENCOUNTER
No care due was identified.  [facility] Embedded Care Due Messages. Reference number: [id number].   5/23/2023 9:21:25 AM CDT

## 2023-05-23 NOTE — TELEPHONE ENCOUNTER
Refill Decision Note   Narciso Beau  is requesting a refill authorization.  Brief Assessment and Rationale for Refill:  Approve     Medication Therapy Plan:  Every other glucose testing supply order, issued before and after the previous orders, are for TID testing. Adjusting up to meet other supply instructions.    Medication Reconciliation Completed: No    Comments:     No Care Gaps recommended.     Note composed:11:58 AM 05/23/2023

## 2023-05-23 NOTE — TELEPHONE ENCOUNTER
----- Message from Jalen Osei sent at 5/23/2023  8:03 AM CDT -----  Regarding: Self  [phone]  Type: Patient Call Back    Who called: Self     What is the request in detail: called to find out if he can take his medication before an US that he has on 05/26/2023 because he has to fast for it.     Can the clinic reply by [facility]? No     Would the patient rather a call back or a response via [facility]? Call back     Best call back number: [phone]     Additional Information:    Thank you.

## 2023-05-26 ENCOUNTER — HOSPITAL ENCOUNTER (OUTPATIENT)
Dept: CARDIOLOGY | Facility: HOSPITAL | Age: 88
Discharge: HOME | End: 2023-05-26
Attending: INTERNAL MEDICINE
Payer: MEDICARE

## 2023-05-26 DIAGNOSIS — I73.9 PVD (PERIPHERAL VASCULAR DISEASE): ICD-10-CM

## 2023-05-26 LAB
ABDOMINAL IMA AP: 2.5 CM
ABDOMINAL IMA ED VEL: 0 CM/S
ABDOMINAL IMA PS VEL: 39 CM/S
ABDOMINAL IMA TRANS: 2.4 CM
ABDOMINAL INFRARENAL AORTA AP: 2.1 CM
ABDOMINAL INFRARENAL AORTA ED VEL: 0 CM/S
ABDOMINAL INFRARENAL AORTA PS VEL: 48 CM/S
ABDOMINAL INFRARENAL AORTA TRANS: 2.2 CM
ABDOMINAL JUXTARENAL AORTA AP: 2.3 CM
ABDOMINAL JUXTARENAL AORTA ED VEL: 0 CM/S
ABDOMINAL JUXTARENAL AORTA PS VEL: 49 CM/S
ABDOMINAL JUXTARENAL AORTA TRANS: 2.5 CM
ABDOMINAL LT COM ILIAC AP: 0.8 CM
ABDOMINAL LT COM ILIAC TRANS: 1 CM
ABDOMINAL LT COM ILIAC VEL: 160 CM/S
ABDOMINAL LT COM ILLIAC ED VEL: 0 CM/S
ABDOMINAL RT COM ILIAC AP: 0.9 CM
ABDOMINAL RT COM ILIAC TRANS: 1.1 CM
ABDOMINAL RT COM ILIAC VEL: 79 CM/S
ABDOMINAL RT COM ILLIAC ED VEL: 0 CM/S
ABDOMINAL SUPRARENAL AORTA AP: 2.3 CM
ABDOMINAL SUPRARENAL AORTA ED VEL: 0 CM/S
ABDOMINAL SUPRARENAL AORTA PS VEL: 53 CM/S
ABDOMINAL SUPRARENAL AORTA TRANS: 2.2 CM
LEFT ANT TIBIAL SYS PSV: 55 CM/S
LEFT CFA PSV: 81 CM/S
LEFT EXTERNAL ILIAC PSV: 94 CM/S
LEFT PERONEAL SYS PSV: 47 CM/S
LEFT POPLITEAL PSV: 79 CM/S
LEFT POST TIBIAL SYS PSV: 59 CM/S
LEFT PROFUNDA SYS PSV: 84 CM/S
LEFT SUPER FEMORAL DIST SYS PSV: 65 CM/S
LEFT SUPER FEMORAL MID SYS PSV: 101 CM/S
LEFT SUPER FEMORAL OSTIAL SYS PSV: 73 CM/S
LEFT SUPER FEMORAL PROX SYS PSV: 95 CM/S
LEFT TIB/PER TRUNK SYS PSV: 81 CM/S
OHS CV LEFT COMMON ILIAC ARTERY PSV: 160 CM/S
OHS CV LEFT LOWER EXTREMITY ABI (NO CALC): 0.8
OHS CV RIGHT ABI LOWER EXTREMITY (NO CALC): 0.75
OHS CV US RIGHT COMMON ILIAC PSV: 79 CM/S
RIGHT ANT TIBIAL SYS PSV: 69 CM/S
RIGHT CFA PSV: 78 CM/S
RIGHT EXTERNAL ILLIAC PSV: 98 CM/S
RIGHT PERONEAL SYS PSV: 37 CM/S
RIGHT POPLITEAL PSV: 70 CM/S
RIGHT POST TIBIAL SYS PSV: 25 CM/S
RIGHT PROFUNDA SYS PSV: 131 CM/S
RIGHT SUPER FEMORAL DIST SYS PSV: 75 CM/S
RIGHT SUPER FEMORAL MID SYS PSV: 103 CM/S
RIGHT SUPER FEMORAL OSTIAL SYS PSV: 109 CM/S
RIGHT SUPER FEMORAL PROX SYS PSV: 78 CM/S
RIGHT TIB/PER TRUNK SYS PSV: 47 CM/S

## 2023-05-26 PROCEDURE — 93925 LOWER EXTREMITY STUDY: CPT | Mod: 26,,, | Performed by: INTERNAL MEDICINE

## 2023-05-26 PROCEDURE — 93978 VASCULAR STUDY: CPT

## 2023-05-26 PROCEDURE — 93925 LOWER EXTREMITY STUDY: CPT

## 2023-05-26 PROCEDURE — 93925 CV US DOPPLER ARTERIAL LEGS BILATERAL (CUPID ONLY): ICD-10-PCS | Mod: 26,,, | Performed by: INTERNAL MEDICINE

## 2023-05-26 PROCEDURE — 93978 CV US ABDOMINAL AORTA EVALUATION (CUPID ONLY): ICD-10-PCS | Mod: 26,,, | Performed by: INTERNAL MEDICINE

## 2023-05-26 PROCEDURE — 93978 VASCULAR STUDY: CPT | Mod: 26,,, | Performed by: INTERNAL MEDICINE

## 2023-05-31 ENCOUNTER — PATIENT MESSAGE (OUTPATIENT)
Dept: FAMILY MEDICINE | Facility: CLINIC | Age: 88
End: 2023-05-31
Payer: MEDICARE

## 2023-06-01 ENCOUNTER — PATIENT MESSAGE (OUTPATIENT)
Dept: FAMILY MEDICINE | Facility: CLINIC | Age: 88
End: 2023-06-01
Payer: MEDICARE

## 2023-06-05 ENCOUNTER — OFFICE VISIT (OUTPATIENT)
Dept: CARDIOLOGY | Facility: CLINIC | Age: 88
End: 2023-06-05
Payer: MEDICARE

## 2023-06-05 VITALS
HEART RATE: 93 BPM | HEIGHT: 67 IN | DIASTOLIC BLOOD PRESSURE: 62 MMHG | OXYGEN SATURATION: 99 % | RESPIRATION RATE: 18 BRPM | BODY MASS INDEX: 20.46 KG/M2 | WEIGHT: 130.38 LBS | SYSTOLIC BLOOD PRESSURE: 122 MMHG

## 2023-06-05 DIAGNOSIS — Z72.0 TOBACCO ABUSE: ICD-10-CM

## 2023-06-05 DIAGNOSIS — Z09 FOLLOW-UP EXAM: ICD-10-CM

## 2023-06-05 DIAGNOSIS — M79.604 RIGHT LEG PAIN: ICD-10-CM

## 2023-06-05 DIAGNOSIS — I25.810 CORONARY ARTERY DISEASE INVOLVING CORONARY BYPASS GRAFT OF NATIVE HEART WITHOUT ANGINA PECTORIS: Primary | ICD-10-CM

## 2023-06-05 DIAGNOSIS — I10 ESSENTIAL HYPERTENSION: ICD-10-CM

## 2023-06-05 DIAGNOSIS — I70.0 AORTIC ATHEROSCLEROSIS: ICD-10-CM

## 2023-06-05 DIAGNOSIS — E11.51 DIABETES MELLITUS WITH PERIPHERAL CIRCULATORY DISORDER: ICD-10-CM

## 2023-06-05 DIAGNOSIS — I73.9 PAD (PERIPHERAL ARTERY DISEASE): ICD-10-CM

## 2023-06-05 PROBLEM — I25.119 CHEST PAIN DUE TO CAD: Status: RESOLVED | Noted: 2021-01-22 | Resolved: 2023-06-05

## 2023-06-05 PROCEDURE — 1159F MED LIST DOCD IN RCRD: CPT | Mod: CPTII,S$GLB,, | Performed by: INTERNAL MEDICINE

## 2023-06-05 PROCEDURE — 3074F SYST BP LT 130 MM HG: CPT | Mod: CPTII,S$GLB,, | Performed by: INTERNAL MEDICINE

## 2023-06-05 PROCEDURE — 3078F DIAST BP <80 MM HG: CPT | Mod: CPTII,S$GLB,, | Performed by: INTERNAL MEDICINE

## 2023-06-05 PROCEDURE — 1126F AMNT PAIN NOTED NONE PRSNT: CPT | Mod: CPTII,S$GLB,, | Performed by: INTERNAL MEDICINE

## 2023-06-05 PROCEDURE — 99214 PR OFFICE/OUTPT VISIT, EST, LEVL IV, 30-39 MIN: ICD-10-PCS | Mod: S$GLB,,, | Performed by: INTERNAL MEDICINE

## 2023-06-05 PROCEDURE — 93000 ELECTROCARDIOGRAM COMPLETE: CPT | Mod: S$GLB,,, | Performed by: INTERNAL MEDICINE

## 2023-06-05 PROCEDURE — 1101F PT FALLS ASSESS-DOCD LE1/YR: CPT | Mod: CPTII,S$GLB,, | Performed by: INTERNAL MEDICINE

## 2023-06-05 PROCEDURE — 1160F RVW MEDS BY RX/DR IN RCRD: CPT | Mod: CPTII,S$GLB,, | Performed by: INTERNAL MEDICINE

## 2023-06-05 PROCEDURE — 1126F PR PAIN SEVERITY QUANTIFIED, NO PAIN PRESENT: ICD-10-PCS | Mod: CPTII,S$GLB,, | Performed by: INTERNAL MEDICINE

## 2023-06-05 PROCEDURE — 99999 PR PBB SHADOW E&M-EST. PATIENT-LVL V: ICD-10-PCS | Mod: PBBFAC,,, | Performed by: INTERNAL MEDICINE

## 2023-06-05 PROCEDURE — 99999 PR PBB SHADOW E&M-EST. PATIENT-LVL V: CPT | Mod: PBBFAC,,, | Performed by: INTERNAL MEDICINE

## 2023-06-05 PROCEDURE — 1160F PR REVIEW ALL MEDS BY PRESCRIBER/CLIN PHARMACIST DOCUMENTED: ICD-10-PCS | Mod: CPTII,S$GLB,, | Performed by: INTERNAL MEDICINE

## 2023-06-05 PROCEDURE — 3288F PR FALLS RISK ASSESSMENT DOCUMENTED: ICD-10-PCS | Mod: CPTII,S$GLB,, | Performed by: INTERNAL MEDICINE

## 2023-06-05 PROCEDURE — 1159F PR MEDICATION LIST DOCUMENTED IN MEDICAL RECORD: ICD-10-PCS | Mod: CPTII,S$GLB,, | Performed by: INTERNAL MEDICINE

## 2023-06-05 PROCEDURE — 99214 OFFICE O/P EST MOD 30 MIN: CPT | Mod: S$GLB,,, | Performed by: INTERNAL MEDICINE

## 2023-06-05 PROCEDURE — 1101F PR PT FALLS ASSESS DOC 0-1 FALLS W/OUT INJ PAST YR: ICD-10-PCS | Mod: CPTII,S$GLB,, | Performed by: INTERNAL MEDICINE

## 2023-06-05 PROCEDURE — 93000 EKG 12-LEAD: ICD-10-PCS | Mod: S$GLB,,, | Performed by: INTERNAL MEDICINE

## 2023-06-05 PROCEDURE — 3078F PR MOST RECENT DIASTOLIC BLOOD PRESSURE < 80 MM HG: ICD-10-PCS | Mod: CPTII,S$GLB,, | Performed by: INTERNAL MEDICINE

## 2023-06-05 PROCEDURE — 3074F PR MOST RECENT SYSTOLIC BLOOD PRESSURE < 130 MM HG: ICD-10-PCS | Mod: CPTII,S$GLB,, | Performed by: INTERNAL MEDICINE

## 2023-06-05 PROCEDURE — 3288F FALL RISK ASSESSMENT DOCD: CPT | Mod: CPTII,S$GLB,, | Performed by: INTERNAL MEDICINE

## 2023-06-05 NOTE — PROGRESS NOTES
CARDIOVASCULAR PROGRESS NOTE    REASON FOR CONSULT:   Narciso Yanez is a 87 y.o. male who presents for ongoing mgmt of CAD/PAD.    PCP: Ehrensing  HISTORY OF PRESENT ILLNESS:   The patient returns for follow-up.  He continues to complain of right hamstrings pain.  He has no calf claudication.  Had no angina, dyspnea, palpitations, or syncope.  There has been no PND, orthopnea, melena, or hematuria.  He does complain of frequent urination.  Continues take his dual antiplatelet therapy without any issues.  I suggested he follow up with his primary care physician for further management of his right hamstrings pain as this does not appear to be vascular in nature.  I have also suggested he follow-up with his primary care physician for frequent urination.    CARDIOVASCULAR HISTORY:   CAD/CABG 1/10/1999: CABG with LIMA-LAD, SVG-LCx, SVG-RCA ([facility] records)    5/1999: Angio revealed Patent LIMA-LAD, SVGx2 occluded ([facility] records)    PAD s/p LE PTA (6/2003: PTA with bilat YUE stents and R EIA stent)    7/29/20 AFRO with patent bilat YUE stents    PAST MEDICAL HISTORY:     Past Medical History:   Diagnosis Date    Actinic keratosis     Anxiety     B12 deficiency 12/8/2014    Dx 6/14    Cancer     skin    Cataract     Coronary artery disease     Diabetes mellitus type II     Diabetes mellitus with peripheral circulatory disorder 6/18/2014    ED (erectile dysfunction)     Hyperlipidemia     Kidney stone     Neurogenic claudication 4/4/2022    Peripheral vascular disease     Spondylosis 3/29/2019    Tobacco dependence     Urinary incontinence 5/4/2021       PAST SURGICAL HISTORY:     Past Surgical History:   Procedure Laterality Date    APPENDECTOMY      CARDIAC SURGERY  01/1999    CABG 4 vessels    CATARACT EXTRACTION      EPIDURAL STEROID INJECTION Right 8/26/2020    Procedure: Injection, Steroid, Epidural Transforaminal;  Surgeon: Wiley Crane Jr., MD;  Location: [facility];  Service: Pain Management;  Laterality:  Right;  Right L5 + S1 TF LEAH  Arrive @ 1115; ASA & Plavix last 8/18; Check BG; Rapid COVID test    EPIDURAL STEROID INJECTION Right 11/25/2020    Procedure: Injection, Steroid, Epidural Transformainal;  Surgeon: Wiley Crane Jr., MD;  Location: [facility] END[facility];  Service: Pain Management;  Laterality: Right;  Right L5 + S1 TF LEAH  Arrive @ 1245; ASA and Plavix last 11/17; Pre-DM; Needs MD Sign.    EPIDURAL STEROID INJECTION Right 2/19/2021    Procedure: Injection, Steroid, Epidural Transforaminal;  Surgeon: Wiley Crane Jr., MD;  Location: [facility] ENDO;  Service: Pain Management;  Laterality: Right;  Right L5 + S1 TF LEAH  Arrive @ 1115; ASA & Plavix last 2/11; Check BG; Needs Consent    EXTERNAL EAR SURGERY      EYE SURGERY      cataracts    ILIAC ARTERY STENT Bilateral 06/2003    also Right External Iliac stent       ALLERGIES AND MEDICATION:     Review of patient's allergies indicates:   Allergen Reactions    Demerol  [meperidine]      Other reaction(s): Unknown     Previous Medications    ACCU-CHEK JOAQUIN PLUS METER MISC    Three times a day with meals    ACETAMINOPHEN (TYLENOL) 500 MG TABLET    Take 1 tablet (500 mg total) by mouth every 6 (six) hours as needed for Pain.    ALENDRONATE (FOSAMAX) 70 MG TABLET    TAKE 1 TABLET EVERY 7 DAYS ON TUESDAYS    ASCORBIC ACID, VITAMIN C, (VITAMIN C) 250 MG TABLET    Take 1 tablet (250 mg total) by mouth once daily.    ASPIRIN (ECOTRIN) 81 MG EC TABLET    Take 1 tablet (81 mg total) by mouth once daily.    BLOOD SUGAR DIAGNOSTIC (TRUE METRIX GLUCOSE TEST STRIP) STRP    TID    BOOSTRIX TDAP 2.5-8-5 LF-MCG-LF/0.5ML SYRG INJECTION        CICLOPIROX (LOPROX) 0.77 % CREA    Apply topically 2 (two) times daily.    CICLOPIROX (PENLAC) 8 % SOLN    Apply topically nightly.    CLOPIDOGREL (PLAVIX) 75 MG TABLET    TAKE 1 TABLET EVERY DAY    DIAZEPAM (VALIUM) 5 MG TABLET    TAKE 1 TABLET BY MOUTH EVERY 8 HOURS AS NEEDED. Strength: 5 mg    DOCUSATE SODIUM (COLACE) 100 MG CAPSULE     Take 1 capsule (100 mg total) by mouth 2 (two) times daily.    INSULIN ASPART U-100 (NOVOLOG) 100 UNIT/ML (3 ML) INPN PEN    Inject 0-5 Units into the skin before meals and at bedtime as needed (Hyperglycemia).    LACTULOSE (CHRONULAC) 20 GRAM/30 ML SOLN    Take 45 mLs (30 g total) by mouth 3 (three) times daily as needed.    LANCETS (TRUEPLUS LANCETS) 33 GAUGE MISC    Use to test blood glucose three (3) times a day, discard lancet after each use    MAGNESIUM SULFATE IN WATER (MAGNESIUM SULFATE 2 GRAM/50 ML) 2 GRAM/50 ML PGBK        METFORMIN (GLUCOPHAGE-XR) 750 MG ER 24HR TABLET    TAKE 1 TABLET TWICE DAILY WITH MEALS    METOPROLOL SUCCINATE (TOPROL-XL) 25 MG 24 HR TABLET        OMNIPAQUE 300 300 MG IODINE/ML INJECTION        OMNIPAQUE 350 350 MG IODINE/ML SOLN INJECTION        ONDANSETRON (ZOFRAN-ODT) 4 MG TBDL    DISSOLVE 2 TABLETS (8 MG TOTAL) ON THE TONGUE EVERY 8 (EIGHT) HOURS AS NEEDED (NAUSEA).    ONDANSETRON (ZOFRAN-ODT) 8 MG TBDL        PROCHLORPERAZINE (COMPAZINE) 5 MG TABLET        ROSUVASTATIN (CRESTOR) 20 MG TABLET    TAKE 1 TABLET (20 MG TOTAL) BY MOUTH EVERY EVENING.    SENNA (SENOKOT) 8.6 MG TABLET    Take 1 tablet by mouth once daily.    TRUE METRIX GLUCOSE TEST STRIP STRP    TEST BLOOD SUGAR THREE TIMES DAILY.       SOCIAL HISTORY:     Social History     Socioeconomic History    Marital status:    Tobacco Use    Smoking status: Every Day     Packs/day: 1.50     Years: 71.00     Pack years: 106.50     Types: Cigarettes    Smokeless tobacco: Former   Substance and Sexual Activity    Alcohol use: No    Drug use: No    Sexual activity: Not Currently     Partners: Female   Social History Narrative    .  4 children.  Smokes 2 ppd.  Still drives trucks for entertainment industry.        FAMILY HISTORY:     Family History   Problem Relation Age of Onset    Stroke Mother        REVIEW OF SYSTEMS:   Review of Systems   Constitutional:  Negative for chills, diaphoresis and fever.   HENT:   "Negative for nosebleeds.    Eyes:  Negative for blurred vision, double vision and photophobia.   Respiratory:  Negative for hemoptysis, shortness of breath and wheezing.    Cardiovascular:  Negative for chest pain, palpitations, orthopnea, claudication, leg swelling and PND.   Gastrointestinal:  Negative for abdominal pain, blood in stool, heartburn, melena, nausea and vomiting.   Genitourinary:  Negative for flank pain and hematuria.   Musculoskeletal:  Negative for falls, myalgias and neck pain.   Skin:  Negative for rash.   Neurological:  Negative for dizziness, seizures, loss of consciousness, weakness and headaches.   Endo/Heme/Allergies:  Negative for polydipsia. Does not bruise/bleed easily.   Psychiatric/Behavioral:  Negative for depression and memory loss. The patient is not nervous/anxious.      PHYSICAL EXAM:     Vitals:    06/05/23 0900   BP: 122/62   Pulse: 93   Resp: 18      Body mass index is 20.42 kg/m².  Weight: 59.1 kg (130 lb 6.4 oz)   Height: 5' 7" (170.2 cm)     Physical Exam  Vitals reviewed.   Constitutional:       General: He is not in acute distress.     Appearance: Normal appearance. He is well-developed and normal weight. He is not ill-appearing, toxic-appearing or diaphoretic.   HENT:      Head: Normocephalic and atraumatic.   Eyes:      General: No scleral icterus.     Conjunctiva/sclera: Conjunctivae normal.      Pupils: Pupils are equal, round, and reactive to light.   Neck:      Thyroid: No thyromegaly.      Vascular: Normal carotid pulses. No carotid bruit or JVD.      Trachea: Trachea normal. No tracheal deviation.   Cardiovascular:      Rate and Rhythm: Normal rate and regular rhythm.      Pulses:           Carotid pulses are 2+ on the right side and 2+ on the left side.     Heart sounds: S1 normal and S2 normal. Heart sounds are distant. No murmur heard.    No friction rub. No gallop.   Pulmonary:      Effort: Pulmonary effort is normal. No respiratory distress.      Breath " sounds: Normal breath sounds. No stridor. No wheezing, rhonchi or rales.   Chest:      Chest wall: No tenderness.   Abdominal:      General: There is no distension.      Palpations: Abdomen is soft.   Musculoskeletal:         General: Tenderness (Right lateral hamstrings distal tendon) present. No swelling. Normal range of motion.      Cervical back: Normal range of motion and neck supple. No edema or rigidity.      Right lower leg: No edema.      Left lower leg: No edema.   Feet:      Right foot:      Skin integrity: No ulcer.      Left foot:      Skin integrity: No ulcer.   Skin:     General: Skin is warm and dry.      Findings: No erythema or rash.   Neurological:      Mental Status: He is alert and oriented to person, place, and time.      Cranial Nerves: Cranial nerve deficit (presbycusis) present.   Psychiatric:         Speech: Speech normal.         Behavior: Behavior normal. Behavior is cooperative.       DATA:   EKG: (personally reviewed tracing)  6/5/23 SR 93    Laboratory:  CBC:  Recent Labs   Lab 03/23/22  0858 06/23/22  1115 03/09/23  0745   WBC 13.19 H 10.12 13.38 H   Hemoglobin 12.1 L 11.9 L 11.4 L   Hematocrit 37.2 L 35.3 L 36.6 L   Platelets 280 248 256         CHEMISTRIES:  Recent Labs   Lab 04/03/21  0633 05/03/21  0950 03/12/22  1052 03/23/22  0858 06/23/22  1210 03/09/23  0745   Glucose  --    < > 147 H 128 H 81 102   Sodium  --    < > 137 136 140 141   Potassium  --    < > 4.9 4.7 4.2 5.1   BUN  --    < > 32 H 52 H 35 H 36 H   Creatinine  --    < > 1.3 1.3 0.9 1.4   eGFR if African American  --    < > 57.9 A 58 A >60  --    eGFR if non   --    < > 50.1 A 50 A >60  --    Calcium  --    < > 9.3 10.1 7.3 L 9.8   Magnesium 1.6  --   --   --   --   --     < > = values in this interval not displayed.         CARDIAC BIOMARKERS:  Recent Labs   Lab 04/02/21  1705 04/02/21 2013 04/03/21  0156   Troponin I 0.009 0.017 0.013         COAGS:  Recent Labs   Lab 04/02/21  2247 05/20/21  1450  06/08/21  2102   INR 1.1 0.9 1.0         LIPIDS/LFTS:  Recent Labs   Lab 02/28/22  0740 03/23/22  0858 06/23/22  1210 03/09/23  0745   Cholesterol 123  --   --  127   Triglycerides 96  --   --  85   HDL 49  --   --  50   LDL Cholesterol 54.8 L  --   --  60.0 L   Non-HDL Cholesterol 74  --   --  77   AST 25 19 24 19   ALT 22 15 17 15         Cardiovascular Testing:  Aortic US 5/26/23  Juxta/infrarenal aortic ectasia without evidence of AAA, maximum diameter 2.5 cm.    Aortic atherosclerosis.    Increased flow velocity across bilateral common iliac artery suggesting >50% aortoiliac stenosis.    LE art US/LACIE 5/26/23  Previously noted bilat YUE stents not visualized on the current exam.  Otherwise, No evidence of hemodynamically significant infrainguinal PAD bilaterally.  Predominantly biphasic waveforms throughout.  Mildly decreased LACIE bilaterally (0.8).  Similar findings noted on report 2/24/21.    Echo 4/3/21  The left ventricle is normal in size with concentric remodeling and normal systolic function.  The estimated ejection fraction is 55%.  Normal right ventricular size with normal right ventricular systolic function.    AFRO 7/29/20  Ao: 140/57/86  R CFA: 144/59/88  L CFA: 140/59/87  No evidence of gradient on CFA->Ao pullback across YUE stents bilaterally  Aorta: no aneurysm or stenosis  L Renal: patent  R Renal: patent  Right Leg:  YUE: patent stent without angiographic evidence of restenosis, pullback negative   EIA: patent  IIA: patent  CFA: MLI  PFA: patent  SFA: MLI, dist 30%  Pop: patent  KIRTI: MLI  TPT: MLI  PTA:MLI, mid vessel tapers, ?occluded at mid calf  Per: MLI  2-3 vessel runoff to Right foot  Left Leg:  YUE: patent stent without angiographic evidence of restenosis, pullback negative   EIA: patent   IIA: patent  CFA: patent  PFA: patent  SFA: MLI, dist 50%  Pop: patent  KIRTI: MLI  TPT: MLI  PTA: MLI, mid occlusion  Per: MLI  2 vessel runoff to Left foot  Hemostasis:  R rad vasband  Imp:  Limiting  claudication with suggesting of ISR of aortoiliac stents  Patent YUE stents bilaterally (angiographically) without evidence of gradient on pullback  Mild dist SFA stenosis bilaterally  2-3V runoff to feet bilaterally  R rad vasband for hemostasis  Plan:  Cont med rx  Cont ASA/Plavix  Add Pletal  Smoking cessation  Exercise program  Home today  Follow up with Dr. Vargas as planned  Consider referral to ortho/spine or pain mgmt for eval of neurogenic claudication    Ex MPI 12/20/17 (low workload, drop in BP (144->105 systolic) noted during ETT)  The patient exercised for 3.02 minutes on a Sixto protocol, corresponding to a functional capacity of 5 estimated METS, achieving a peak heart rate of 126 bpm, which is 90% of the age predicted maximum heart rate. -CP. At peak exercise, EKG revealed < 1mm of horizontal ST segment depression at a maximum heart rate of 126 bpm.   Nuclear Quantitative Functional Analysis:   LVEF: 61 %  LVED Volume: 59 ml  LVES Volume: 23 ml  Impression: NORMAL MYOCARDIAL PERFUSION  1. The perfusion scan is free of evidence for myocardial ischemia or injury.   2. Resting wall motion is physiologic.   3. Resting LV function is normal.   4. The ventricular volumes are normal at rest and stress.   5. The extracardiac distribution of radioactivity is normal.     [facility]:  [facility]: CABG with LIMA-LAD, SVG-LCx, SVG-RCA  5/1999: Angio revealed Patent LIMA-LAD, SVGx2 occluded  6/2003: PTA with bilat YUE stents and R EIA stent      ASSESSMENT:   # CAD s/p CABG, MPI/echo 12/2017 normal (although low exercise capacity and ?hypotensive response during TMET), asymptomatic.  # PAD s/p PTA bilat YUE and R EIA 6/2003.  Limiting R>L claudication.  Aortic/LE art US 6/2020 suggest significant bilat YUE ISR.  AFRO 7/29/20 with patent bilat YUE stents.  ?MSK complaint of distal R hamstrings tendon, persistent.  Aortic/LE art US 5/2023 neg.   # HTN, controlled  # HLP on crestor 20mg  # Tob abuse, still  smoking and previously enrolled in the smoking cessation program.  Again encouraged to quit.  Patient appears on interested in stopping smoking.  # CKD3a  # DM  # aortic atherosclerosis (CT Chest 9/3/19)    PLAN:   Cont med rx  Cont ASA/Plavix  Refer back to PCO re: R leg MSK and freq urination complaints.  RTC 1 year with surveillance aortic US (June 2024).    Wiley Vargas MD, FACC

## 2023-07-06 ENCOUNTER — HOSPITAL ENCOUNTER (EMERGENCY)
Facility: HOSPITAL | Age: 88
Discharge: HOME | End: 2023-07-06
Attending: EMERGENCY MEDICINE
Payer: MEDICARE

## 2023-07-06 ENCOUNTER — TELEPHONE (OUTPATIENT)
Dept: UROLOGY | Facility: CLINIC | Age: 88
End: 2023-07-06
Payer: MEDICARE

## 2023-07-06 VITALS
BODY MASS INDEX: 19.62 KG/M2 | TEMPERATURE: 98 F | SYSTOLIC BLOOD PRESSURE: 156 MMHG | OXYGEN SATURATION: 99 % | DIASTOLIC BLOOD PRESSURE: 70 MMHG | HEIGHT: 67 IN | RESPIRATION RATE: 16 BRPM | HEART RATE: 74 BPM | WEIGHT: 125 LBS

## 2023-07-06 DIAGNOSIS — R30.0 DYSURIA: Primary | ICD-10-CM

## 2023-07-06 LAB
BACTERIA #/AREA URNS HPF: ABNORMAL /HPF
BILIRUB UR QL STRIP: NEGATIVE
CLARITY UR: CLEAR
COLOR UR: YELLOW
GLUCOSE UR QL STRIP: NEGATIVE
HGB UR QL STRIP: NEGATIVE
HYALINE CASTS #/AREA URNS LPF: 12 /LPF
KETONES UR QL STRIP: NEGATIVE
LEUKOCYTE ESTERASE UR QL STRIP: NEGATIVE
MICROSCOPIC COMMENT: ABNORMAL
NITRITE UR QL STRIP: NEGATIVE
NON-SQ EPI CELLS #/AREA URNS HPF: 5 /HPF
PH UR STRIP: 6 [PH] (ref 5–8)
PROT UR QL STRIP: ABNORMAL
RBC #/AREA URNS HPF: 4 /HPF (ref 0–4)
SP GR UR STRIP: 1.02 (ref 1–1.03)
URN SPEC COLLECT METH UR: ABNORMAL
UROBILINOGEN UR STRIP-ACNC: NEGATIVE EU/DL
WBC #/AREA URNS HPF: 2 /HPF (ref 0–5)

## 2023-07-06 PROCEDURE — 99283 EMERGENCY DEPT VISIT LOW MDM: CPT | Mod: HCNC

## 2023-07-06 PROCEDURE — 81000 URINALYSIS NONAUTO W/SCOPE: CPT | Mod: HCNC | Performed by: PHYSICIAN ASSISTANT

## 2023-07-06 PROCEDURE — 51798 US URINE CAPACITY MEASURE: CPT | Mod: HCNC

## 2023-07-06 NOTE — LETTER
[address] [address]  Phone: [phone] July 6, 2023     Marcelino Del Toro MD  [address] [facility] [place] [address]    Patient: Narciso Yanez   Patient ID: [id number]   YOB: 1935   Date of Visit: 7/6/2023        Dear Marcelino Del Toro:    Your patient, Narciso Yanez, was recently seen and treated in our emergency department. Attached to this letter is a summary of that visit.    Sincerely,        Johanna Vu MD     Enclosure

## 2023-07-06 NOTE — ED PROVIDER NOTES
"SCRIBE #1 NOTE: I, Janine Cespedes, am scribing for, and in the presence of,  Johanna Vu MD. I have scribed the following portions of the note - Other sections scribed: HPI, ROS, PE.         EM PHYSICIAN NOTE       This patient presents with a complaint of   Chief Complaint   Patient presents with    Dysuria     Pt reports to the ED with C/O frequent urination and foul smelling urine x 3 weeks. Pt reports "when I have the urgency I have to go or else I will pee on myself." Pt also reports lower back pain. Pt AAOx4, RESP easy and unlabored. Pt awaiting GYPSY bed.        HPI: Narciso Yanez is an 88 y/o male with PMHx of nephrolithiasis, who presents to the ED with urinary frequency for the past 3 weeks. Pt also c/o malodorous urine. Pt denies Hx of similar symptoms. Pt denies dysuria, fever, abdominal distention and any other associated symptoms.     Review of patient's allergies indicates:   Allergen Reactions    Demerol [meperidine] Nausea Only       Preferred pharmacy: [facility].       Pertinent REVIEW of SYSTEMS  Source: Patient  The nurse's notes and triage vital signs were reviewed.  GENERAL/CONSTITUTIONAL: There is not a report of fever   CARDIOVASCULAR: There is not a report of chest pain   RESPIRATORY: There is not a report of cough or SOB  GASTROINTESTINAL: There is not a report of  vomiting, diarrhea, abdominal distention  GENITOURINARY: There is report of urinary frequency and odorous urine. There is not a report of dysuria  HEMATOLOGIC/LYMPHATIC: There is not a report of anticoagulant/antithrombotic use.         PHYSICAL EXAMINATION    ED Triage Vitals [07/06/23 1616]   Enc Vitals Group      /68      Pulse 95      Resp (!) 22      Temp 97.5 °F (36.4 °C)      Temp Source Oral      SpO2 99 %      Weight 125 lb      Height 5' 7"      Head Circumference       Peak Flow       Pain Score       Pain Loc       Pain Edu?       Excl. in GC?      Vital signs and Pulse Ox reviewed in clinical context. " Abnormalities noted:  Within normal limits  Pt's level of consciousness is Awake and Alert, and the patient is in no distress.  Skin: warm, pink and dry.  Capillary refill is less than 2 seconds.  Mucosa: normal  Head and Neck: no JVD, neck supple  Cardiac exam: RRR   Pulmonary exam: unlabored and clear  Abd Exam: soft nontender, no suprapubic tenderness  Musculoskeletal: no joint tenderness, deformity or swelling   Neurologic: GCS 15; moving all extremities equally, no facial droop       Medical decision making:   Nurses notes and Vital Signs reviewed.     Problems: Today's visi        See ER course below for lab test ordered, results reviewed, independent interpretation of images or EKG, discussion with consultants, data obtained from sources other than patient:  ED Course as of 07/06/23 2157   Thu Jul 06, 2023 1927 UA is normal [MH]   2042 Urine is normal and discussed with the patient the family member the history and associated symptoms.  Patient has chronic back pain but over the past 3 weeks he feels that his legs have become more weak and he has developed an abnormal rash that started on the palms of his hands and then included the soles of his feet and seemed to involve his buttocks as well. [MH]   2047 Patient refused to stay for further workup.  He reports that he will his doctor tomorrow.  He demonstrates the capacity understand versus benefits and the uncertainty his diagnosis. [MH]   2050 I left a message with the patient's primary care physician about concerns need for close follow-up [MH]      ED Course User Index  [MH] Johanna Vu MD               Orders Placed This Encounter   Procedures    Urinalysis, Reflex to Urine Culture Urine, Clean Catch    Urinalysis Catheterized    Urinalysis Microscopic    Comprehensive Metabolic Panel    CBC Auto Differential    TSH    C-reactive protein    Sedimentation rate    RPR    Measure post void residual    Bladder scan         Diagnoses that have been  ruled out:   None   Diagnoses that are still under consideration:   None   Final diagnoses:   Dysuria               Referral for follow-up  Follow-up Information       Follow up With Specialties Details Why Contact Info    Marcelino Del Toro MD Internal Medicine  Call to schedule an appointment [id number] [address] [phone]              Prescription management:  ED Prescriptions    None         Johanna Vu      This note was created using [employer].  This program may occasionally misinterpret certain words and phrases.      SCRIBE ATTESTATION NOTE:   I attest that I personally performed the services documented by the scribe and acknowledged and confirm the content of the note.   Nurses notes were reviewed.  Johanna Vu MD  07/06/23 6031

## 2023-07-06 NOTE — TELEPHONE ENCOUNTER
Pt advised we do not have anything this week.  Pt is having urgency and accidents.  Pt said he is either going to see his PCP or go to the ER.      ----- Message from Jalen Osei sent at 7/6/2023 11:11 AM CDT -----  Regarding: Self  [phone]  Type:  Sooner Appointment Request    Patient is requesting a sooner appointment.  Patient declined first available appointment listed as well as another facility and provider .  Patient will not accept being placed on the waitlist and is requesting a message be sent to doctor.    Name of Caller:  Self     When is the first available appointment?   August 2023     Symptoms: urinating too frequently     Would the patient rather a call back or a response via [facility]?  Call back     Best Call Back Number: [phone]     Additional Information:

## 2023-07-07 ENCOUNTER — PATIENT OUTREACH (OUTPATIENT)
Dept: EMERGENCY MEDICINE | Facility: HOSPITAL | Age: 88
End: 2023-07-07
Payer: MEDICARE

## 2023-07-07 ENCOUNTER — TELEPHONE (OUTPATIENT)
Dept: FAMILY MEDICINE | Facility: CLINIC | Age: 88
End: 2023-07-07
Payer: MEDICARE

## 2023-07-07 DIAGNOSIS — I10 ESSENTIAL HYPERTENSION: ICD-10-CM

## 2023-07-07 DIAGNOSIS — M35.3 POLYMYALGIA RHEUMATICA: ICD-10-CM

## 2023-07-07 DIAGNOSIS — E11.65 UNCONTROLLED TYPE 2 DIABETES MELLITUS WITH HYPERGLYCEMIA: Primary | ICD-10-CM

## 2023-07-07 NOTE — TELEPHONE ENCOUNTER
Patient declined scheduling ER f/u appointment. Patient states he would like to get his blood work and get his urine checked.

## 2023-07-07 NOTE — TELEPHONE ENCOUNTER
----- Message from Jennifer Luke sent at 7/7/2023  1:36 PM CDT -----  Regarding: Post ED visit follow up appt within 7 days of d/c date 7/6/23  Good afternoon: Pt was seen in ED on 7/6/23 and requires a Post ED visit follow up appt within 7 days of d/c date. Please contact pt to schedule a follow up appt by 7/13/23 if possible.    Thank you  Jennifer Luke

## 2023-07-10 ENCOUNTER — TELEPHONE (OUTPATIENT)
Dept: FAMILY MEDICINE | Facility: CLINIC | Age: 88
End: 2023-07-10
Payer: MEDICARE

## 2023-07-10 ENCOUNTER — PATIENT MESSAGE (OUTPATIENT)
Dept: FAMILY MEDICINE | Facility: CLINIC | Age: 88
End: 2023-07-10
Payer: MEDICARE

## 2023-07-10 NOTE — TELEPHONE ENCOUNTER
----- Message from Effie Lucas sent at 7/10/2023 11:48 AM CDT -----  Name of Who is Calling:PATRICIA LEE [[id number]]           What is the request in detail:pt stated that he would like to speak with the office directly in regards to his questions/concerns.Please contact to further discuss and advise.           Can the clinic reply by [facility]:[phone]           What Number to Call Back if not in [facility]:[phone]

## 2023-07-10 NOTE — TELEPHONE ENCOUNTER
Spoke to pts daughter. She stated rash is spreading. Pt has been seen by derm and given 3 different types of topical creams that daughter feels is not working. Daughter stated hands aren't as bad anymore but his feet are horrible. She stated pts feet are cracking and has bad odor. Rash is also on legs, backside, and back. Lab appt has been scheduled for 7/11/2023.

## 2023-07-10 NOTE — TELEPHONE ENCOUNTER
LIGIA,    Dr Nicole was the dermatologist and he thinks the problem is fungal. Daughter says her daddy has other problems they want to talk to you about.

## 2023-07-10 NOTE — TELEPHONE ENCOUNTER
----- Message from Jalen Osei sent at 7/10/2023 11:08 AM CDT -----  Regarding: Self [phone]  Type:  Patient Returning Call    Who Called:  Self     Who Left Message for Patient:  Rebekah Louise MA     Does the patient know what this is regarding?: yes     Would the patient rather a call back or a response via [facility]?  Call back     Best Call Back Number: [phone]     Additional Information:  pt's labs have been scheduled but he still wants to talk to the nurse.     Thank you.

## 2023-07-10 NOTE — TELEPHONE ENCOUNTER
Yes, needs labs asap and set appt next available just to set and let pt know we will see how labs turn out and go from there      Had rash on hands etc in ER, ask how rash is, did you have any new exposures to explain the rash  ?

## 2023-07-10 NOTE — TELEPHONE ENCOUNTER
----- Message from Alex Shipman sent at 7/10/2023  2:47 PM CDT -----  Regarding: self [phone]   Type: Patient Returning Call    Who Called: Self       Who Left Message for Patient:  Marcelino Del Toro MD      Does the patient know what this is regarding?: no    Would the patient rather a call back or a response via [facility]? Call back     Best Call Back Number: .[phone]      Additional Information: Pt would like to speak Dr Del Toro     Thank you.

## 2023-07-10 NOTE — TELEPHONE ENCOUNTER
Please call Back     Dr. Del Toro is concerned about the rash but not quite sure what he will be able to do if the dermatologist were not able to find something     What dermatologist did you see and what diagnosis for the rash did they give?    Did Dr. Nicole do UV light treatment that cause the rash?

## 2023-07-11 ENCOUNTER — LAB VISIT (OUTPATIENT)
Dept: LAB | Facility: HOSPITAL | Age: 88
End: 2023-07-11
Attending: INTERNAL MEDICINE
Payer: MEDICARE

## 2023-07-11 ENCOUNTER — TELEPHONE (OUTPATIENT)
Dept: FAMILY MEDICINE | Facility: CLINIC | Age: 88
End: 2023-07-11
Payer: MEDICARE

## 2023-07-11 DIAGNOSIS — M35.3 POLYMYALGIA RHEUMATICA: ICD-10-CM

## 2023-07-11 DIAGNOSIS — I10 ESSENTIAL HYPERTENSION: ICD-10-CM

## 2023-07-11 DIAGNOSIS — E11.65 UNCONTROLLED TYPE 2 DIABETES MELLITUS WITH HYPERGLYCEMIA: ICD-10-CM

## 2023-07-11 LAB
ALBUMIN SERPL BCP-MCNC: 3.8 G/DL (ref 3.5–5.2)
ALP SERPL-CCNC: 59 U/L (ref 55–135)
ALT SERPL W/O P-5'-P-CCNC: 13 U/L (ref 10–44)
ANION GAP SERPL CALC-SCNC: 14 MMOL/L (ref 8–16)
AST SERPL-CCNC: 20 U/L (ref 10–40)
BASOPHILS # BLD AUTO: 0.07 K/UL (ref 0–0.2)
BASOPHILS NFR BLD: 0.7 % (ref 0–1.9)
BILIRUB SERPL-MCNC: 0.2 MG/DL (ref 0.1–1)
BUN SERPL-MCNC: 36 MG/DL (ref 8–23)
CALCIUM SERPL-MCNC: 9.6 MG/DL (ref 8.7–10.5)
CHLORIDE SERPL-SCNC: 107 MMOL/L (ref 95–110)
CK SERPL-CCNC: 59 U/L (ref 20–200)
CO2 SERPL-SCNC: 21 MMOL/L (ref 23–29)
CREAT SERPL-MCNC: 1.4 MG/DL (ref 0.5–1.4)
CRP SERPL-MCNC: 1.9 MG/L (ref 0–8.2)
DIFFERENTIAL METHOD: ABNORMAL
EOSINOPHIL # BLD AUTO: 0.3 K/UL (ref 0–0.5)
EOSINOPHIL NFR BLD: 2.8 % (ref 0–8)
ERYTHROCYTE [DISTWIDTH] IN BLOOD BY AUTOMATED COUNT: 14.6 % (ref 11.5–14.5)
ERYTHROCYTE [SEDIMENTATION RATE] IN BLOOD BY PHOTOMETRIC METHOD: 37 MM/HR (ref 0–23)
EST. GFR  (NO RACE VARIABLE): 49.3 ML/MIN/1.73 M^2
ESTIMATED AVG GLUCOSE: 128 MG/DL (ref 68–131)
GLUCOSE SERPL-MCNC: 89 MG/DL (ref 70–110)
HBA1C MFR BLD: 6.1 % (ref 4–5.6)
HCT VFR BLD AUTO: 36.1 % (ref 40–54)
HGB BLD-MCNC: 11.6 G/DL (ref 14–18)
IMM GRANULOCYTES # BLD AUTO: 0.02 K/UL (ref 0–0.04)
IMM GRANULOCYTES NFR BLD AUTO: 0.2 % (ref 0–0.5)
LYMPHOCYTES # BLD AUTO: 2.5 K/UL (ref 1–4.8)
LYMPHOCYTES NFR BLD: 27.1 % (ref 18–48)
MCH RBC QN AUTO: 32.1 PG (ref 27–31)
MCHC RBC AUTO-ENTMCNC: 32.1 G/DL (ref 32–36)
MCV RBC AUTO: 100 FL (ref 82–98)
MONOCYTES # BLD AUTO: 0.7 K/UL (ref 0.3–1)
MONOCYTES NFR BLD: 7.6 % (ref 4–15)
NEUTROPHILS # BLD AUTO: 5.8 K/UL (ref 1.8–7.7)
NEUTROPHILS NFR BLD: 61.6 % (ref 38–73)
NRBC BLD-RTO: 0 /100 WBC
PLATELET # BLD AUTO: 241 K/UL (ref 150–450)
PMV BLD AUTO: 10.5 FL (ref 9.2–12.9)
POTASSIUM SERPL-SCNC: 4.7 MMOL/L (ref 3.5–5.1)
PROT SERPL-MCNC: 8 G/DL (ref 6–8.4)
RBC # BLD AUTO: 3.61 M/UL (ref 4.6–6.2)
SODIUM SERPL-SCNC: 142 MMOL/L (ref 136–145)
T4 FREE SERPL-MCNC: 0.78 NG/DL (ref 0.71–1.51)
TSH SERPL DL<=0.005 MIU/L-ACNC: 5.11 UIU/ML (ref 0.4–4)
WBC # BLD AUTO: 9.35 K/UL (ref 3.9–12.7)

## 2023-07-11 PROCEDURE — 84443 ASSAY THYROID STIM HORMONE: CPT | Mod: HCNC | Performed by: INTERNAL MEDICINE

## 2023-07-11 PROCEDURE — 36415 COLL VENOUS BLD VENIPUNCTURE: CPT | Mod: HCNC,PO | Performed by: INTERNAL MEDICINE

## 2023-07-11 PROCEDURE — 80053 COMPREHEN METABOLIC PANEL: CPT | Mod: HCNC | Performed by: INTERNAL MEDICINE

## 2023-07-11 PROCEDURE — 86140 C-REACTIVE PROTEIN: CPT | Mod: HCNC | Performed by: INTERNAL MEDICINE

## 2023-07-11 PROCEDURE — 85652 RBC SED RATE AUTOMATED: CPT | Mod: HCNC | Performed by: INTERNAL MEDICINE

## 2023-07-11 PROCEDURE — 85025 COMPLETE CBC W/AUTO DIFF WBC: CPT | Mod: HCNC | Performed by: INTERNAL MEDICINE

## 2023-07-11 PROCEDURE — 82550 ASSAY OF CK (CPK): CPT | Mod: HCNC | Performed by: INTERNAL MEDICINE

## 2023-07-11 PROCEDURE — 84439 ASSAY OF FREE THYROXINE: CPT | Mod: HCNC | Performed by: INTERNAL MEDICINE

## 2023-07-11 PROCEDURE — 83036 HEMOGLOBIN GLYCOSYLATED A1C: CPT | Mod: HCNC | Performed by: INTERNAL MEDICINE

## 2023-07-11 NOTE — TELEPHONE ENCOUNTER
Patient is schedule,spoke with daughterBrenda seen 2 dermatologist 1 states that it is indeed fungus another said it was Psoriasis, next derm visit 07/17.

## 2023-07-13 ENCOUNTER — PATIENT MESSAGE (OUTPATIENT)
Dept: ADMINISTRATIVE | Facility: HOSPITAL | Age: 88
End: 2023-07-13
Payer: MEDICARE

## 2023-07-13 ENCOUNTER — PATIENT OUTREACH (OUTPATIENT)
Dept: ADMINISTRATIVE | Facility: HOSPITAL | Age: 88
End: 2023-07-13
Payer: MEDICARE

## 2023-07-13 ENCOUNTER — PATIENT MESSAGE (OUTPATIENT)
Dept: FAMILY MEDICINE | Facility: CLINIC | Age: 88
End: 2023-07-13
Payer: MEDICARE

## 2023-07-17 ENCOUNTER — TELEPHONE (OUTPATIENT)
Dept: FAMILY MEDICINE | Facility: CLINIC | Age: 88
End: 2023-07-17
Payer: MEDICARE

## 2023-07-18 ENCOUNTER — TELEPHONE (OUTPATIENT)
Dept: FAMILY MEDICINE | Facility: CLINIC | Age: 88
End: 2023-07-18
Payer: MEDICARE

## 2023-07-27 ENCOUNTER — OFFICE VISIT (OUTPATIENT)
Dept: FAMILY MEDICINE | Facility: CLINIC | Age: 88
End: 2023-07-27
Payer: MEDICARE

## 2023-07-27 VITALS
OXYGEN SATURATION: 96 % | TEMPERATURE: 98 F | BODY MASS INDEX: 20.45 KG/M2 | DIASTOLIC BLOOD PRESSURE: 62 MMHG | HEART RATE: 98 BPM | SYSTOLIC BLOOD PRESSURE: 128 MMHG | WEIGHT: 130.31 LBS | HEIGHT: 67 IN

## 2023-07-27 DIAGNOSIS — D64.9 ANEMIA, UNSPECIFIED TYPE: ICD-10-CM

## 2023-07-27 DIAGNOSIS — E53.8 B12 DEFICIENCY: ICD-10-CM

## 2023-07-27 DIAGNOSIS — R42 VERTIGO: ICD-10-CM

## 2023-07-27 DIAGNOSIS — E11.65 UNCONTROLLED TYPE 2 DIABETES MELLITUS WITH HYPERGLYCEMIA: ICD-10-CM

## 2023-07-27 DIAGNOSIS — N18.31 STAGE 3A CHRONIC KIDNEY DISEASE: ICD-10-CM

## 2023-07-27 DIAGNOSIS — J44.9 CHRONIC OBSTRUCTIVE PULMONARY DISEASE, UNSPECIFIED COPD TYPE: ICD-10-CM

## 2023-07-27 DIAGNOSIS — G91.2 NPH (NORMAL PRESSURE HYDROCEPHALUS): ICD-10-CM

## 2023-07-27 DIAGNOSIS — I25.810 CORONARY ARTERY DISEASE INVOLVING CORONARY BYPASS GRAFT OF NATIVE HEART WITHOUT ANGINA PECTORIS: ICD-10-CM

## 2023-07-27 DIAGNOSIS — M35.3 POLYMYALGIA RHEUMATICA: ICD-10-CM

## 2023-07-27 DIAGNOSIS — L30.9 DERMATITIS: Primary | ICD-10-CM

## 2023-07-27 DIAGNOSIS — R29.818 NEUROGENIC CLAUDICATION: ICD-10-CM

## 2023-07-27 DIAGNOSIS — I10 ESSENTIAL HYPERTENSION: ICD-10-CM

## 2023-07-27 PROCEDURE — 99214 PR OFFICE/OUTPT VISIT, EST, LEVL IV, 30-39 MIN: ICD-10-PCS | Mod: HCNC,S$GLB,, | Performed by: INTERNAL MEDICINE

## 2023-07-27 PROCEDURE — 3074F PR MOST RECENT SYSTOLIC BLOOD PRESSURE < 130 MM HG: ICD-10-PCS | Mod: HCNC,CPTII,S$GLB, | Performed by: INTERNAL MEDICINE

## 2023-07-27 PROCEDURE — 3288F FALL RISK ASSESSMENT DOCD: CPT | Mod: HCNC,CPTII,S$GLB, | Performed by: INTERNAL MEDICINE

## 2023-07-27 PROCEDURE — 3078F PR MOST RECENT DIASTOLIC BLOOD PRESSURE < 80 MM HG: ICD-10-PCS | Mod: HCNC,CPTII,S$GLB, | Performed by: INTERNAL MEDICINE

## 2023-07-27 PROCEDURE — 1125F AMNT PAIN NOTED PAIN PRSNT: CPT | Mod: HCNC,CPTII,S$GLB, | Performed by: INTERNAL MEDICINE

## 2023-07-27 PROCEDURE — 99214 OFFICE O/P EST MOD 30 MIN: CPT | Mod: HCNC,S$GLB,, | Performed by: INTERNAL MEDICINE

## 2023-07-27 PROCEDURE — 1100F PTFALLS ASSESS-DOCD GE2>/YR: CPT | Mod: HCNC,CPTII,S$GLB, | Performed by: INTERNAL MEDICINE

## 2023-07-27 PROCEDURE — 1100F PR PT FALLS ASSESS DOC 2+ FALLS/FALL W/INJURY/YR: ICD-10-PCS | Mod: HCNC,CPTII,S$GLB, | Performed by: INTERNAL MEDICINE

## 2023-07-27 PROCEDURE — 1159F MED LIST DOCD IN RCRD: CPT | Mod: HCNC,CPTII,S$GLB, | Performed by: INTERNAL MEDICINE

## 2023-07-27 PROCEDURE — 3078F DIAST BP <80 MM HG: CPT | Mod: HCNC,CPTII,S$GLB, | Performed by: INTERNAL MEDICINE

## 2023-07-27 PROCEDURE — 1159F PR MEDICATION LIST DOCUMENTED IN MEDICAL RECORD: ICD-10-PCS | Mod: HCNC,CPTII,S$GLB, | Performed by: INTERNAL MEDICINE

## 2023-07-27 PROCEDURE — 99999 PR PBB SHADOW E&M-EST. PATIENT-LVL V: ICD-10-PCS | Mod: PBBFAC,HCNC,, | Performed by: INTERNAL MEDICINE

## 2023-07-27 PROCEDURE — 3288F PR FALLS RISK ASSESSMENT DOCUMENTED: ICD-10-PCS | Mod: HCNC,CPTII,S$GLB, | Performed by: INTERNAL MEDICINE

## 2023-07-27 PROCEDURE — 1125F PR PAIN SEVERITY QUANTIFIED, PAIN PRESENT: ICD-10-PCS | Mod: HCNC,CPTII,S$GLB, | Performed by: INTERNAL MEDICINE

## 2023-07-27 PROCEDURE — 3074F SYST BP LT 130 MM HG: CPT | Mod: HCNC,CPTII,S$GLB, | Performed by: INTERNAL MEDICINE

## 2023-07-27 PROCEDURE — 99999 PR PBB SHADOW E&M-EST. PATIENT-LVL V: CPT | Mod: PBBFAC,HCNC,, | Performed by: INTERNAL MEDICINE

## 2023-07-27 RX ORDER — FLUCONAZOLE 200 MG/1
200 TABLET ORAL DAILY
Qty: 10 TABLET | Refills: 0 | Status: SHIPPED | OUTPATIENT
Start: 2023-07-27 | End: 2023-08-06

## 2023-07-27 RX ORDER — MOMETASONE FUROATE 1 MG/G
CREAM TOPICAL 2 TIMES DAILY
Status: ON HOLD | COMMUNITY
Start: 2023-07-07 | End: 2023-10-23 | Stop reason: SDUPTHER

## 2023-07-27 RX ORDER — CLOTRIMAZOLE AND BETAMETHASONE DIPROPIONATE 10; .64 MG/G; MG/G
CREAM TOPICAL 2 TIMES DAILY
Qty: 90 G | Refills: 3 | Status: SHIPPED | OUTPATIENT
Start: 2023-07-27 | End: 2023-08-02 | Stop reason: SDUPTHER

## 2023-07-27 RX ORDER — KETOCONAZOLE 20 MG/G
CREAM TOPICAL 2 TIMES DAILY
Status: ON HOLD | COMMUNITY
Start: 2023-06-28 | End: 2023-10-23 | Stop reason: HOSPADM

## 2023-07-27 RX ORDER — FLUCONAZOLE 200 MG/1
200 TABLET ORAL
COMMUNITY
Start: 2023-06-28 | End: 2023-07-27 | Stop reason: SDUPTHER

## 2023-07-27 NOTE — PROGRESS NOTES
Chief complaint      physical exam 3/23    Patient is an 87-year-old white male .  He is no plans to quit smoking.  Labs reviewed.  No interest in pursuing any further colon cancer screening.  Here with his daughter who I am meeting for the 1st time.  Recently developed a diffuse itchy rash.  It started out as flaking peeling on the hands now involves the feet.  The cracks very painful on hands and feet.  Was so painful it made it difficult for him to walk.  He now has a flaky rash and itchy rash all over his body.  Daughter shows me a picture of his buttock and there is a very well circumcised pink flaky rash all around his buttock involving the entire crease.  It is now extending down the back of his leg.  He has various meds at home and was given 10 days of Diflucan about 20 days ago and it did seem to clear with that.  He was on Valium did not have any excessive Valium side effects we did discuss it looks like Diflucan could increase the potency of the Valium so watch for that are break the pill in half.  Will have him hold the Crestor as well for 10 days and I will retreat him.  Will also put in a referral to different dermatologist for 2nd opinion.  Will give him some Lotrisone cream assuming this could be some form of allergic reaction but also a distinct possibility of fungus and that will cover for both.  Will also recommend CeraVe anti-itch specifically for some specific treatment.  We reviewed labs and does not appears his any other immune system issue in his CK is normal arguing against any dermatomyositis type issue and he has no increase in pain or weakness.  No symptoms to suggest shunt dysfunction      Phone call -Spoke to pts daughter. She stated rash is spreading. Pt has been seen by derm and given 3 different types of topical creams that daughter feels is not working. Daughter stated hands aren't as bad anymore but his feet are horrible. She stated pts feet are cracking and has bad odor. Rash is  also on legs, backside, and back      ROS:   CONST: weight stable. EYES: no vision change. ENT: no sore throat. CV: no chest pain w/ exertion. RESP: no shortness of breath. GI: no nausea, vomiting, diarrhea. No dysphagia. : no other urinary issues. MUSCULOSKELETAL: no new myalgias or arthralgias. SKIN: no new changes. NEURO: no focal deficits. PSYCH: no new issues. ENDOCRINE: no polyuria. HEME: no lymph nodes. ALLERGY: no general pruritis.    PAST MEDICAL HISTORY:                                                        1. Hyperlipidemia.                                                           2. Coronary disease, seen prior by Dr. Roy, 4-vessel bypass in 1999.   3. Peripheral vascular disease, stented in both legs 2002 and been on Plavix since.                                                                4. Kidney stones, last episode January 2007.                                 5. Right renal cyst apparently.  Saw Dr. Labadie and then second opinion at [facility] by a Dr. Shipman, currently going to be followed.                      6. Appendectomy.                                                             7. Actinic keratosis, saw Dr. Ambriz.                                      8. Left facial numbness, probable TIA, admit [facility] June 2007. Carotid ultrasound apparently clear  9. Cataracts  10. Skin cancer  11. HYPERTENSION  12.  Diabetes, uncontrolled, with circulatory disease  13.  Low HDL  14. Declines Colonoscopy  15. Pneumovax after 67 done, Prevnar 13 given 2016  16.  B12 deficiency diagnosed 2014    17    early satiety 2016   18. NPH, shunt 5/21                                                                                     SOCIAL HISTORY:  He smokes 1 pack per day and does not drink.  He was about   52 years but now a .  Retired deputy in the court, 4          children.                                                                                                                                                  FAMILY HISTORY:  Father  at 63 of heart disease and stroke.  Mother       at 86, 4 brothers, 2 sisters living. Brother with strokes.            Labs, cardiac testing, interval clinic notes and MRIs reviewed      Vitals as above  Skin examined as above, walking with a rolling walker, pleasant and mental status is normal for him    Labs, x-rays and EKG reviewed                        Assessment and plan:      Diagnoses and all orders for this visit:    Dermatitis, diffuse, starting on the hands and feet with significant peeling and cracking.  No obvious contact exposures, does not appear to be infectious other than possibly fungal so will retreat with the 10 days of Diflucan making other medication adjustments and use some Lotrisone cream.  Will also get him to bring all the creams tried currently and previously and get another opinion from Dermatology.  -     Ambulatory referral/consult to Dermatology; Future    Uncontrolled type 2 diabetes mellitus with hyperglycemia, A1c under good control now and so a course of steroids could be used but I am not quite sure this is reactionary or allergic and it would potentially worsen a fungal infection so hold off on any systemic steroids at this time    Essential hypertension, chronic and stable    Polymyalgia rheumatica, chronic and stable    Chronic obstructive pulmonary disease, unspecified COPD type, chronic and stable    Stage 3a chronic kidney disease, chronic and stable    Coronary artery disease involving coronary bypass graft of native heart without angina pectoris    B12 deficiency    Vertigo, appears to be somewhat quiet at this time    NPH (normal pressure hydrocephalus), appears to be stable    Neurogenic claudication, back pain and neurogenic claudication appear to be under good control    Anemia, unspecified type, chronic and stable    Other orders  -     fluconazole (DIFLUCAN) 200 MG Tab; Take 1 tablet (200  mg total) by mouth once daily. for 10 days  -     clotrimazole-betamethasone 1-0.05% (LOTRISONE) cream; Apply topically 2 (two) times daily.

## 2023-08-02 ENCOUNTER — PATIENT MESSAGE (OUTPATIENT)
Dept: FAMILY MEDICINE | Facility: CLINIC | Age: 88
End: 2023-08-02
Payer: MEDICARE

## 2023-08-02 ENCOUNTER — TELEPHONE (OUTPATIENT)
Dept: FAMILY MEDICINE | Facility: CLINIC | Age: 88
End: 2023-08-02
Payer: MEDICARE

## 2023-08-02 RX ORDER — CLOTRIMAZOLE AND BETAMETHASONE DIPROPIONATE 10; .64 MG/G; MG/G
CREAM TOPICAL 2 TIMES DAILY
Qty: 180 G | Refills: 12 | Status: ON HOLD | OUTPATIENT
Start: 2023-08-02 | End: 2023-10-10

## 2023-08-02 NOTE — TELEPHONE ENCOUNTER
Please call patient and let him know that I did get his voicemail and that I sent another prescription of the cream to his local pharmacy with a larger quantity and ask them to fill it today.  Insurance may not cover to prescription refills in one month but the pharmacy can give you the cash price.      The bigger pharmacies participate in the discount program like [facility]    Dr. ELDER looked it up and for 1   45 g tube it is about nine dollars at [facility], 11 dollars at [facility], nine dollars it [facility].    Check with the local pharmacy and the price and then let us know if you would like it sent to one of the other pharmacies    Dr. ELDER change the prescription to say 4 tubes      Lastly, is the rash improving a little bit?

## 2023-08-02 NOTE — TELEPHONE ENCOUNTER
FYI,     Please go ahead & call the creams into [facility] like last time.  The rash is spreading & unfortunately not getting any better.  My dad talked to Dr Davidson's office, and sadly he's not on my dad's insurance.  Any other recommendation?   Thank you.

## 2023-08-03 ENCOUNTER — OFFICE VISIT (OUTPATIENT)
Dept: PODIATRY | Facility: CLINIC | Age: 88
End: 2023-08-03
Payer: MEDICARE

## 2023-08-03 VITALS
WEIGHT: 130 LBS | DIASTOLIC BLOOD PRESSURE: 72 MMHG | HEART RATE: 87 BPM | HEIGHT: 67 IN | BODY MASS INDEX: 20.4 KG/M2 | SYSTOLIC BLOOD PRESSURE: 130 MMHG

## 2023-08-03 DIAGNOSIS — B35.3 TINEA PEDIS, UNSPECIFIED LATERALITY: ICD-10-CM

## 2023-08-03 DIAGNOSIS — E11.42 TYPE 2 DIABETES MELLITUS WITH DIABETIC POLYNEUROPATHY, UNSPECIFIED WHETHER LONG TERM INSULIN USE: Primary | ICD-10-CM

## 2023-08-03 DIAGNOSIS — B35.1 ONYCHOMYCOSIS DUE TO DERMATOPHYTE: ICD-10-CM

## 2023-08-03 DIAGNOSIS — R21 RASH AND NONSPECIFIC SKIN ERUPTION: ICD-10-CM

## 2023-08-03 PROCEDURE — 11721 PR DEBRIDEMENT OF NAILS, 6 OR MORE: ICD-10-PCS | Mod: Q9,HCNC,S$GLB, | Performed by: PODIATRIST

## 2023-08-03 PROCEDURE — 99999 PR PBB SHADOW E&M-EST. PATIENT-LVL III: CPT | Mod: PBBFAC,HCNC,, | Performed by: PODIATRIST

## 2023-08-03 PROCEDURE — 99999 PR PBB SHADOW E&M-EST. PATIENT-LVL III: ICD-10-PCS | Mod: PBBFAC,HCNC,, | Performed by: PODIATRIST

## 2023-08-03 PROCEDURE — 99499 UNLISTED E&M SERVICE: CPT | Mod: HCNC,S$GLB,, | Performed by: PODIATRIST

## 2023-08-03 PROCEDURE — 99499 NO LOS: ICD-10-PCS | Mod: HCNC,S$GLB,, | Performed by: PODIATRIST

## 2023-08-03 PROCEDURE — 11721 DEBRIDE NAIL 6 OR MORE: CPT | Mod: Q9,HCNC,S$GLB, | Performed by: PODIATRIST

## 2023-08-03 NOTE — PROGRESS NOTES
Subjective:      Patient ID: Narciso Yanez is a 87 y.o. male.    Chief Complaint: Diabetic Foot Exam (Foot Exam/PCP Marcelino Del Toro MD  07/27/23)    Narciso is a 87 y.o. male who presents to the clinic for evaluation and treatment of high risk feet. Narciso has a past medical history of Actinic keratosis, Anxiety, B12 deficiency (12/8/2014), Cancer, Cataract, Coronary artery disease, Diabetes mellitus type II, Diabetes mellitus with peripheral circulatory disorder (6/18/2014), ED (erectile dysfunction), Hyperlipidemia, Kidney stone, Neurogenic claudication (4/4/2022), Peripheral vascular disease, Spondylosis (3/29/2019), Tobacco dependence, and Urinary incontinence (5/4/2021). The patient's chief complaint is long, thick toenails. Gradual onset, worsening over past several weeks, aggravated by increased weight bearing, shoe gear, pressure.  Periodic debridement helps symptoms.    Skin slough redness flaking skin bottoms of both feet.  Topical Loprox has not helped.  He is since developed body rash and palm epidermal office for which he is seeing Dermatology.    PCP: Marcelino Del Toro MD    Date Last Seen by PCP:   Chief Complaint   Patient presents with    Diabetic Foot Exam     Foot Exam/PCP Marcelino Del Toro MD  07/27/23        Current shoe gear:  Affected Foot: Casual shoes     Unaffected Foot: Casual shoes    Hemoglobin A1C   Date Value Ref Range Status   07/11/2023 6.1 (H) 4.0 - 5.6 % Final     Comment:     ADA Screening Guidelines:  5.7-6.4%  Consistent with prediabetes  >or=6.5%  Consistent with diabetes    High levels of fetal hemoglobin interfere with the HbA1C  assay. Heterozygous hemoglobin variants (HbS, HgC, etc)do  not significantly interfere with this assay.   However, presence of multiple variants may affect accuracy.     03/09/2023 5.9 (H) 4.0 - 5.6 % Final     Comment:     ADA Screening Guidelines:  5.7-6.4%  Consistent with prediabetes  >or=6.5%  Consistent with diabetes    High levels  of fetal hemoglobin interfere with the HbA1C  assay. Heterozygous hemoglobin variants (HbS, HgC, etc)do  not significantly interfere with this assay.   However, presence of multiple variants may affect accuracy.     02/28/2022 6.6 (H) 4.0 - 5.6 % Final     Comment:     ADA Screening Guidelines:  5.7-6.4%  Consistent with prediabetes  >or=6.5%  Consistent with diabetes    High levels of fetal hemoglobin interfere with the HbA1C  assay. Heterozygous hemoglobin variants (HbS, HgC, etc)do  not significantly interfere with this assay.   However, presence of multiple variants may affect accuracy.         Review of Systems   Constitutional: Negative for chills, diaphoresis, fever, malaise/fatigue and night sweats.   Cardiovascular:  Negative for claudication, cyanosis, leg swelling and syncope.   Skin:  Positive for dry skin, itching, nail changes and rash. Negative for color change, suspicious lesions and unusual hair distribution.   Musculoskeletal:  Negative for falls, joint pain, joint swelling, muscle cramps, muscle weakness and stiffness.   Gastrointestinal:  Negative for constipation, diarrhea, nausea and vomiting.   Neurological:  Positive for paresthesias and sensory change. Negative for brief paralysis, disturbances in coordination, focal weakness, numbness and tremors.           Objective:      Physical Exam  Constitutional:       Appearance: He is well-developed. He is not diaphoretic.      Comments: Oriented to time, place, and person.   Cardiovascular:      Pulses:           Dorsalis pedis pulses are 1+ on the right side and 1+ on the left side.        Posterior tibial pulses are 1+ on the right side and 1+ on the left side.      Comments: Capillary fill time 3-5 seconds.  All toes warm to touch.      Negative lower extremity edema bilateral.    Negative elevational pallor and dependent rubor bilateral.    Musculoskeletal:      Comments: Normal angle, base, station of gait. Decreased stride length, early heel  off, moderately propulsive toe off bilateral.    All ten toes without clubbing, cyanosis, or signs of ischemia.      No pain to palpation bilateral lower extremities.      Range of motion, stability, muscle strength, and muscle tone are age and health appropriate normal bilateral feet and legs.       Lymphadenopathy:      Comments: Negative lymphadenopathy bilateral popliteal fossa and tarsal tunnel.  Negative lymphangitic streaking bilateral foot/ankle bilateral.     Skin:     General: Skin is warm and dry.      Coloration: Skin is not pale.      Findings: No abrasion, bruising, burn, ecchymosis, erythema, laceration, lesion, petechiae or rash.      Nails: There is no clubbing.      Comments: Dry scale with superficial flakes over an erythematous base both feet in moccasin distribution without ulceration, drainage, pus, tracking, fluctuance, malodor, or cardinal signs infection.  There are no vesicles or discernible leading edge at this point.  Over the past 2 months, he is also developed a full body rash with erythema and similar skin findings on the palms of both hands.  Also without ulceration or signs of infection.    Skin thin, atrophic, with decreased density and distribution of pedal hair bilateral, but without hyperpigmentation, helio discoloration,  ulcers, masses, nodules or cords palpated bilateral feet and legs.      Toenails 1st, 2nd, 3rd, 4th, 5th  bilateral are hypertrophic thickened 2-3 mm, dystrophic, discolored tanish brown with tan, gray crumbly subungual debris.  Tender to distal nail plate pressure, without periungual skin abnormality of each.     Neurological:      Mental Status: He is alert and oriented to person, place, and time. He is not disoriented.      Sensory: Sensory deficit present.      Motor: No tremor, atrophy or abnormal muscle tone.      Deep Tendon Reflexes:      Reflex Scores:       Patellar reflexes are 2+ on the right side and 2+ on the left side.       Achilles reflexes are  2+ on the right side and 2+ on the left side.     Comments: Decreased/absent vibratory sensation bilateral feet to 128Hz tuning fork.    Paresthesias, and burning bilateral feet with no clearly identified trigger or source.       Psychiatric:         Behavior: Behavior is cooperative.               Assessment:       Encounter Diagnoses   Name Primary?    Type 2 diabetes mellitus with diabetic polyneuropathy, unspecified whether long term insulin use Yes    Onychomycosis due to dermatophyte     Tinea pedis, unspecified laterality     Rash and nonspecific skin eruption          Plan:       Narciso was seen today for diabetic foot exam.    Diagnoses and all orders for this visit:    Type 2 diabetes mellitus with diabetic polyneuropathy, unspecified whether long term insulin use    Onychomycosis due to dermatophyte    Tinea pedis, unspecified laterality    Rash and nonspecific skin eruption      I counseled the patient on his conditions, their implications and medical management.            Continue penlac.      discontinue Loprox.  Defer to Dermatology for management palmar plantar and body rash.  Patient should keep all follow-up report any new developments right away.    - With patient's permission, nails were aggressively reduced and debrided x 10 to their soft tissue attachment mechanically and with electric , removing all offending nail and debris. Patient relates relief following the procedure. He will continue to monitor the areas daily, inspect his feet, wear protective shoe gear when ambulatory, moisturizer to maintain skin integrity and follow in this office  p.r.n.          Follow up in about 1 year (around 8/3/2024).

## 2023-08-09 DIAGNOSIS — Z79.52 ON CORTICOSTEROID THERAPY: ICD-10-CM

## 2023-08-09 RX ORDER — ALENDRONATE SODIUM 70 MG/1
TABLET ORAL
Qty: 12 TABLET | Refills: 12 | Status: ON HOLD | OUTPATIENT
Start: 2023-08-09 | End: 2023-10-23 | Stop reason: HOSPADM

## 2023-08-11 ENCOUNTER — PATIENT MESSAGE (OUTPATIENT)
Dept: FAMILY MEDICINE | Facility: CLINIC | Age: 88
End: 2023-08-11
Payer: MEDICARE

## 2023-08-13 ENCOUNTER — PATIENT MESSAGE (OUTPATIENT)
Dept: FAMILY MEDICINE | Facility: CLINIC | Age: 88
End: 2023-08-13
Payer: MEDICARE

## 2023-08-14 ENCOUNTER — TELEPHONE (OUTPATIENT)
Dept: NEUROSURGERY | Facility: CLINIC | Age: 88
End: 2023-08-14
Payer: MEDICARE

## 2023-08-14 DIAGNOSIS — Z98.2 VP (VENTRICULOPERITONEAL) SHUNT STATUS: ICD-10-CM

## 2023-08-14 DIAGNOSIS — G91.2 NORMAL PRESSURE HYDROCEPHALUS: Primary | ICD-10-CM

## 2023-08-14 RX ORDER — FLUCONAZOLE 150 MG/1
150 TABLET ORAL DAILY
Qty: 7 TABLET | Refills: 0 | Status: SHIPPED | OUTPATIENT
Start: 2023-08-14 | End: 2023-08-21 | Stop reason: SDUPTHER

## 2023-08-21 ENCOUNTER — PATIENT MESSAGE (OUTPATIENT)
Dept: FAMILY MEDICINE | Facility: CLINIC | Age: 88
End: 2023-08-21
Payer: MEDICARE

## 2023-08-21 RX ORDER — FLUCONAZOLE 150 MG/1
150 TABLET ORAL DAILY
Qty: 7 TABLET | Refills: 0 | Status: SHIPPED | OUTPATIENT
Start: 2023-08-21 | End: 2023-08-29

## 2023-08-26 NOTE — TELEPHONE ENCOUNTER
No care due was identified.  [facility] Embedded Care Due Messages. Reference number: [id number].   8/26/2023 10:35:42 AM CDT

## 2023-08-27 RX ORDER — ROSUVASTATIN CALCIUM 20 MG/1
20 TABLET, COATED ORAL NIGHTLY
Qty: 90 TABLET | Refills: 12 | Status: ON HOLD | OUTPATIENT
Start: 2023-08-27 | End: 2023-10-23 | Stop reason: HOSPADM

## 2023-08-27 NOTE — TELEPHONE ENCOUNTER
Refill Routing Note   Medication(s) are not appropriate for processing by [facility] for the following reason(s):      Drug-disease interaction    [facility] action(s):  Defer Care Due:  None identified     Medication Therapy Plan: LATRICE      Appointments  past 12m or future 3m with PCP    Date Provider   Last Visit   7/27/2023 Marcelino Del Toro MD   Next Visit   Visit date not found Marcelino Del Toro MD   ED visits in past 90 days: 1        Note composed:1:38 PM 08/27/2023

## 2023-08-28 ENCOUNTER — HOSPITAL ENCOUNTER (OUTPATIENT)
Dept: RADIOLOGY | Facility: HOSPITAL | Age: 88
Discharge: HOME | End: 2023-08-28
Attending: PHYSICIAN ASSISTANT
Payer: MEDICARE

## 2023-08-28 DIAGNOSIS — G91.2 NORMAL PRESSURE HYDROCEPHALUS: ICD-10-CM

## 2023-08-28 DIAGNOSIS — Z98.2 VP (VENTRICULOPERITONEAL) SHUNT STATUS: ICD-10-CM

## 2023-08-28 PROCEDURE — 70450 CT HEAD WITHOUT CONTRAST: ICD-10-PCS | Mod: 26,HCNC,, | Performed by: INTERNAL MEDICINE

## 2023-08-28 PROCEDURE — 70450 CT HEAD/BRAIN W/O DYE: CPT | Mod: 26,HCNC,, | Performed by: INTERNAL MEDICINE

## 2023-08-28 PROCEDURE — 70450 CT HEAD/BRAIN W/O DYE: CPT | Mod: TC,HCNC

## 2023-08-29 ENCOUNTER — OFFICE VISIT (OUTPATIENT)
Dept: NEUROSURGERY | Facility: CLINIC | Age: 88
End: 2023-08-29
Payer: MEDICARE

## 2023-08-29 ENCOUNTER — HOSPITAL ENCOUNTER (OUTPATIENT)
Dept: RADIOLOGY | Facility: HOSPITAL | Age: 88
Discharge: HOME | End: 2023-08-29
Attending: PHYSICIAN ASSISTANT
Payer: MEDICARE

## 2023-08-29 VITALS
SYSTOLIC BLOOD PRESSURE: 128 MMHG | WEIGHT: 130 LBS | DIASTOLIC BLOOD PRESSURE: 85 MMHG | HEART RATE: 86 BPM | HEIGHT: 67 IN | BODY MASS INDEX: 20.4 KG/M2

## 2023-08-29 DIAGNOSIS — G91.2 NORMAL PRESSURE HYDROCEPHALUS: Primary | ICD-10-CM

## 2023-08-29 DIAGNOSIS — Z98.2 VP (VENTRICULOPERITONEAL) SHUNT STATUS: ICD-10-CM

## 2023-08-29 DIAGNOSIS — G91.2 NORMAL PRESSURE HYDROCEPHALUS: ICD-10-CM

## 2023-08-29 PROCEDURE — 70250 XR SKULL SHUNT PLACEMENT 1 VIEW: ICD-10-PCS | Mod: 26,HCNC,, | Performed by: RADIOLOGY

## 2023-08-29 PROCEDURE — 3079F PR MOST RECENT DIASTOLIC BLOOD PRESSURE 80-89 MM HG: ICD-10-PCS | Mod: HCNC,CPTII,S$GLB, | Performed by: PHYSICIAN ASSISTANT

## 2023-08-29 PROCEDURE — 62252 PR REPROGRAMMING,PROGRAMMABLE CSF SHUNT: ICD-10-PCS | Mod: HCNC,S$GLB,, | Performed by: PHYSICIAN ASSISTANT

## 2023-08-29 PROCEDURE — 99213 OFFICE O/P EST LOW 20 MIN: CPT | Mod: HCNC,S$GLB,, | Performed by: PHYSICIAN ASSISTANT

## 2023-08-29 PROCEDURE — 1125F AMNT PAIN NOTED PAIN PRSNT: CPT | Mod: HCNC,CPTII,S$GLB, | Performed by: PHYSICIAN ASSISTANT

## 2023-08-29 PROCEDURE — 3079F DIAST BP 80-89 MM HG: CPT | Mod: HCNC,CPTII,S$GLB, | Performed by: PHYSICIAN ASSISTANT

## 2023-08-29 PROCEDURE — 62252 CSF SHUNT REPROGRAM: CPT | Mod: HCNC,S$GLB,, | Performed by: PHYSICIAN ASSISTANT

## 2023-08-29 PROCEDURE — 70250 X-RAY EXAM OF SKULL: CPT | Mod: TC,HCNC

## 2023-08-29 PROCEDURE — 3074F SYST BP LT 130 MM HG: CPT | Mod: HCNC,CPTII,S$GLB, | Performed by: PHYSICIAN ASSISTANT

## 2023-08-29 PROCEDURE — 99213 PR OFFICE/OUTPT VISIT, EST, LEVL III, 20-29 MIN: ICD-10-PCS | Mod: HCNC,S$GLB,, | Performed by: PHYSICIAN ASSISTANT

## 2023-08-29 PROCEDURE — 99999 PR PBB SHADOW E&M-EST. PATIENT-LVL III: ICD-10-PCS | Mod: PBBFAC,HCNC,, | Performed by: PHYSICIAN ASSISTANT

## 2023-08-29 PROCEDURE — 70250 X-RAY EXAM OF SKULL: CPT | Mod: 26,HCNC,, | Performed by: RADIOLOGY

## 2023-08-29 PROCEDURE — 3074F PR MOST RECENT SYSTOLIC BLOOD PRESSURE < 130 MM HG: ICD-10-PCS | Mod: HCNC,CPTII,S$GLB, | Performed by: PHYSICIAN ASSISTANT

## 2023-08-29 PROCEDURE — 99999 PR PBB SHADOW E&M-EST. PATIENT-LVL III: CPT | Mod: PBBFAC,HCNC,, | Performed by: PHYSICIAN ASSISTANT

## 2023-08-29 PROCEDURE — 1125F PR PAIN SEVERITY QUANTIFIED, PAIN PRESENT: ICD-10-PCS | Mod: HCNC,CPTII,S$GLB, | Performed by: PHYSICIAN ASSISTANT

## 2023-08-29 RX ORDER — FLUCONAZOLE 150 MG/1
TABLET ORAL
Status: ON HOLD | COMMUNITY
Start: 2023-08-29 | End: 2023-10-23 | Stop reason: HOSPADM

## 2023-08-29 NOTE — PROGRESS NOTES
Neurosurgery  Established Patient    SUBJECTIVE:     History of Present Illness:  88 yo male with history of NPH status post shunt placement in 2021.  Patient here today accompanied by his daughter.  They report that since last visit he is had increased falls as well as balance difficulty.  He also report ongoing memory difficulty. Denies HA, blurred vision or diplopia, speech difficulty, weakness, numbness, seizures, or balance difficulty.       Review of patient's allergies indicates:   Allergen Reactions    Demerol [meperidine] Nausea Only       Current Outpatient Medications   Medication Sig Dispense Refill    ACCU-CHEK JOAQUIN PLUS METER Misc Three times a day with meals  0    alendronate (FOSAMAX) 70 MG tablet TAKE 1 TABLET EVERY 7 DAYS ON TUESDAYS (Patient not taking: Reported on 9/26/2023) 12 tablet 12    ascorbic acid, vitamin C, (VITAMIN C) 250 MG tablet Take 1 tablet (250 mg total) by mouth once daily. (Patient not taking: Reported on 9/26/2023) 120 tablet 0    aspirin (ECOTRIN) 81 MG EC tablet Take 1 tablet (81 mg total) by mouth once daily.  0    blood sugar diagnostic (TRUE METRIX GLUCOSE TEST STRIP) Strp  each 12    BOOSTRIX TDAP 2.5-8-5 Lf-mcg-Lf/0.5mL Syrg injection       clopidogreL (PLAVIX) 75 mg tablet TAKE 1 TABLET EVERY DAY (Patient not taking: Reported on 9/26/2023) 90 tablet 1    clotrimazole-betamethasone 1-0.05% (LOTRISONE) cream Apply topically 2 (two) times daily. (Patient not taking: Reported on 9/26/2023) 180 g 12    diazePAM (VALIUM) 5 MG tablet TAKE 1 TABLET BY MOUTH EVERY 8 HOURS AS NEEDED. Strength: 5 mg (Patient not taking: Reported on 9/26/2023) 270 tablet 5    docusate sodium (COLACE) 100 MG capsule Take 1 capsule (100 mg total) by mouth 2 (two) times daily. (Patient not taking: Reported on 9/26/2023) 60 capsule 0    fluconazole (DIFLUCAN) 150 MG Tab Take by mouth.      ketoconazole (NIZORAL) 2 % cream Apply topically 2 (two) times daily.      lancets (TRUEPLUS LANCETS) 33  gauge Misc Use to test blood glucose three (3) times a day, discard lancet after each use (Patient not taking: Reported on 9/26/2023) 300 each 3    metFORMIN (GLUCOPHAGE-XR) 750 MG ER 24hr tablet TAKE 1 TABLET TWICE DAILY WITH MEALS (Patient not taking: Reported on 9/26/2023) 180 tablet 1    ondansetron (ZOFRAN-ODT) 4 MG TbDL DISSOLVE 2 TABLETS (8 MG TOTAL) ON THE TONGUE EVERY 8 (EIGHT) HOURS AS NEEDED (NAUSEA). 90 tablet 12    TRUE METRIX GLUCOSE TEST STRIP Strp TEST BLOOD SUGAR THREE TIMES DAILY. (Patient not taking: Reported on 9/26/2023) 300 strip 12    acetaminophen (TYLENOL) 500 MG tablet Take 1 tablet (500 mg total) by mouth every 6 (six) hours as needed for Pain. 13 tablet 0    ciclopirox (LOPROX) 0.77 % Crea Apply topically 2 (two) times daily. (Patient not taking: Reported on 9/26/2023) 90 g 2    ciclopirox (PENLAC) 8 % Soln Apply topically nightly. (Patient not taking: Reported on 9/26/2023) 6.6 mL 11    DUPIXENT  mg/2 mL PnIj       fluconazole (DIFLUCAN) 100 MG tablet Take 1 tablet (100 mg total) by mouth once a week. for 4 doses (Patient not taking: Reported on 9/26/2023) 4 tablet 0    hydrocortisone 1 % cream Apply topically 2 (two) times daily. for 5 days 20 g 0    lactulose (CHRONULAC) 20 gram/30 mL Soln Take 45 mLs (30 g total) by mouth 3 (three) times daily as needed. (Patient not taking: Reported on 9/26/2023) 1500 mL 12    magnesium sulfate in water (MAGNESIUM SULFATE 2 GRAM/50 ML) 2 gram/50 mL PgBk       metoprolol succinate (TOPROL-XL) 25 MG 24 hr tablet       mometasone 0.1% (ELOCON) 0.1 % cream Apply topically 2 (two) times daily.      olopatadine (PATANOL) 0.1 % ophthalmic solution Place 1 drop into both eyes 2 (two) times daily. (Patient not taking: Reported on 9/26/2023) 5 mL 0    OMNIPAQUE 300 300 mg iodine/mL injection       OMNIPAQUE 350 350 mg iodine/mL Soln injection       ondansetron (ZOFRAN-ODT) 8 MG TbDL       predniSONE (DELTASONE) 10 MG tablet Take 4 tabs x 3 days, then  take 2 tabs x 3 days, then take 1 tab x 3 days. (Patient not taking: Reported on 9/26/2023) 21 tablet 0    prochlorperazine (COMPAZINE) 5 MG tablet       rosuvastatin (CRESTOR) 20 MG tablet TAKE 1 TABLET (20 MG TOTAL) BY MOUTH EVERY EVENING. (Patient not taking: Reported on 9/26/2023) 90 tablet 12    senna (SENOKOT) 8.6 mg tablet Take 1 tablet by mouth once daily. (Patient not taking: Reported on 9/26/2023)       No current facility-administered medications for this visit.       Past Medical History:   Diagnosis Date    Actinic keratosis     Anxiety     B12 deficiency 12/8/2014    Dx 6/14    Cancer     skin    Cataract     Coronary artery disease     Diabetes mellitus type II     Diabetes mellitus with peripheral circulatory disorder 6/18/2014    ED (erectile dysfunction)     Hyperlipidemia     Kidney stone     Neurogenic claudication 4/4/2022    Peripheral vascular disease     Spondylosis 3/29/2019    Tobacco dependence     Urinary incontinence 5/4/2021     Past Surgical History:   Procedure Laterality Date    APPENDECTOMY      CARDIAC SURGERY  01/1999    CABG 4 vessels    CATARACT EXTRACTION      EPIDURAL STEROID INJECTION Right 8/26/2020    Procedure: Injection, Steroid, Epidural Transforaminal;  Surgeon: Wiley Crane Jr., MD;  Location: [facility] ENDO;  Service: Pain Management;  Laterality: Right;  Right L5 + S1 TF LEAH  Arrive @ 1115; ASA & Plavix last 8/18; Check BG; Rapid COVID test    EPIDURAL STEROID INJECTION Right 11/25/2020    Procedure: Injection, Steroid, Epidural Transformainal;  Surgeon: Wiley Crane Jr., MD;  Location: [facility] ENDO;  Service: Pain Management;  Laterality: Right;  Right L5 + S1 TF LEAH  Arrive @ 1245; ASA and Plavix last 11/17; Pre-DM; Needs MD Sign.    EPIDURAL STEROID INJECTION Right 2/19/2021    Procedure: Injection, Steroid, Epidural Transforaminal;  Surgeon: Wiley Crane Jr., MD;  Location: [facility] ENDO;  Service: Pain Management;  Laterality: Right;  Right L5 + S1 TF  LEAH  Arrive @ 1115; ASA & Plavix last 2/11; Check BG; Needs Consent    EXTERNAL EAR SURGERY      EYE SURGERY      cataracts    ILIAC ARTERY STENT Bilateral 06/2003    also Right External Iliac stent     Family History       Problem Relation (Age of Onset)    Stroke Mother          Social History     Socioeconomic History    Marital status:    Tobacco Use    Smoking status: Every Day     Current packs/day: 1.50     Average packs/day: 1.5 packs/day for 71.0 years (106.5 ttl pk-yrs)     Types: Cigarettes    Smokeless tobacco: Former   Substance and Sexual Activity    Alcohol use: No    Drug use: No    Sexual activity: Not Currently     Partners: Female   Social History Narrative    .  4 children.  Smokes 2 ppd.  Still drives trucks for entertainment industry.      Social Determinants of Health     Financial Resource Strain: Low Risk  (9/15/2023)    Overall Financial Resource Strain (CARDIA)     Difficulty of Paying Living Expenses: Not hard at all   Food Insecurity: No Food Insecurity (9/15/2023)    Hunger Vital Sign     Worried About Running Out of Food in the Last Year: Never true     Ran Out of Food in the Last Year: Never true   Transportation Needs: No Transportation Needs (9/15/2023)    PRAPARE - Transportation     Lack of Transportation (Medical): No     Lack of Transportation (Non-Medical): No   Physical Activity: Inactive (9/15/2023)    Exercise Vital Sign     Days of Exercise per Week: 0 days     Minutes of Exercise per Session: 0 min   Stress: No Stress Concern Present (9/15/2023)    [employer] [place] - Occupational Stress Questionnaire     Feeling of Stress : Not at all   Social Connections: Moderately Isolated (9/15/2023)    Social Connection and Isolation Panel [NHANES]     Frequency of Communication with Friends and Family: More than three times a week     Frequency of Social Gatherings with Friends and Family: More than three times a week     Attends Bahai  "Services: 1 to 4 times per year     Active Member of Clubs or Organizations: No     Attends Club or Organization Meetings: Never     Marital Status:    Housing Stability: Low Risk  (9/15/2023)    Housing Stability Vital Sign     Unable to Pay for Housing in the Last Year: No     Number of Places Lived in the Last Year: 1     Unstable Housing in the Last Year: No       Review of Systems  Constitutional: no fever or chills  Eyes: no visual changes  ENT: no nasal congestion or sore throat  Respiratory: no cough or shortness of breath  Cardiovascular: no chest pain or palpitations  Gastrointestinal: no nausea or vomiting  Genitourinary: no hematuria or dysuria  Integument/Breast: no rash or pruritis  Hematologic/Lymphatic: no easy bruising or lymphadenopathy  Musculoskeletal: no arthralgias or myalgias  Neurological: no seizures or tremors    OBJECTIVE:     Vital Signs  Pulse: 86  BP: 128/85  Pain Score:   7  Height: 5' 7" (170.2 cm)  Weight: 59 kg (130 lb)  Body mass index is 20.36 kg/m².      Neurosurgery Physical Exam   General: no distress  Neurologic: Alert and oriented. Thought content appropriate.  Head: shunt reservoir pump/refills  GCS: Motor: 6/Verbal: 5/Eyes: 4 GCS Total: 15  Cranial nerves: face symmetric, tongue midline, pupils equal, round, reactive to light with accomodation, extraocular muscles intact  Sensory: response to light touch throughout  Motor Strength:no focal      Diagnostic Results: personally reviewed  EXAMINATION:  CT HEAD WITHOUT CONTRAST     CLINICAL HISTORY:  Hydrocephalus, shunted, follow up;6MOFU/ SHUNT; (Idiopathic) normal pressure hydrocephalus     TECHNIQUE:  Low dose axial images were obtained through the head.  Coronal and sagittal reformations were also performed. Contrast was not administered.     COMPARISON:  CT head 03/21/2023     FINDINGS:  BRAIN: Stable hypodense lesions in the periventricular white matter, which are nonspecific but can be seen in the setting of " chronic microvascular ischemia.  No evidence of acute infarction.  No intracranial mass or hemorrhage.  No midline shift or herniation.     CSF SPACES: Right parietal approach ventricular shunt catheter, which crosses the midline and terminates in the frontal horn of the left lateral ventricle, unchanged in position.  The lateral and 3rd ventricles have increased slightly in size from prior.  For example the 3rd ventricle now measures 12 mm in diameter compared to 8 mm previously (02:13).  Remaining CSF spaces are unchanged in size and configuration.     VESSELS: Calcifications of the intracranial internal carotid and vertebral arteries.  No hyperdense vessel segment.     BONES: No fracture or focal osseous lesion. Paranasal sinuses and mastoid air cells are well aerated.     SOFT TISSUES: Extracranial soft tissues are unremarkable.     Impression:     Slight interval enlargement of the lateral and 3rd ventricles.  The ventricular shunt is unchanged in position.        Electronically signed by: Marcelino Daley  Date:                                            08/28/2023  Time:                                           13:42    ASSESSMENT/PLAN:     86 yo male with  shunt place in 2021 for NPH, HCT shows slight interval enlargement of ventricular system compared to prior study.   shunt dialed from 160-140 mm of water.  We will confirm setting with lateral skull x-ray today.  See back in 1 month with updated head CT.  Encouraged to call the clinic with any questions or concerns in the meantime.      Alex Kathleen Jr. PA-C  [facility]  Department of Neurosurgery     Note dictated with voice recognition software, please excuse any grammatical errors.

## 2023-09-01 ENCOUNTER — HOSPITAL ENCOUNTER (EMERGENCY)
Facility: HOSPITAL | Age: 88
Discharge: HOME | End: 2023-09-02
Attending: STUDENT IN AN ORGANIZED HEALTH CARE EDUCATION/TRAINING PROGRAM
Payer: MEDICARE
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
## 2023-09-01 DIAGNOSIS — N17.9 AKI (ACUTE KIDNEY INJURY): ICD-10-CM

## 2023-09-01 DIAGNOSIS — S09.90XA INJURY OF HEAD, INITIAL ENCOUNTER: Primary | ICD-10-CM

## 2023-09-01 LAB
BASOPHILS # BLD AUTO: 0.05 K/UL (ref 0–0.2)
BASOPHILS NFR BLD: 0.6 % (ref 0–1.9)
DIFFERENTIAL METHOD: ABNORMAL
EOSINOPHIL # BLD AUTO: 0.8 K/UL (ref 0–0.5)
EOSINOPHIL NFR BLD: 10.2 % (ref 0–8)
ERYTHROCYTE [DISTWIDTH] IN BLOOD BY AUTOMATED COUNT: 15.9 % (ref 11.5–14.5)
HCT VFR BLD AUTO: 31.7 % (ref 40–54)
HGB BLD-MCNC: 10.7 G/DL (ref 14–18)
IMM GRANULOCYTES # BLD AUTO: 0.02 K/UL (ref 0–0.04)
IMM GRANULOCYTES NFR BLD AUTO: 0.2 % (ref 0–0.5)
LYMPHOCYTES # BLD AUTO: 2.1 K/UL (ref 1–4.8)
LYMPHOCYTES NFR BLD: 24.9 % (ref 18–48)
MCH RBC QN AUTO: 33.4 PG (ref 27–31)
MCHC RBC AUTO-ENTMCNC: 33.8 G/DL (ref 32–36)
MCV RBC AUTO: 99 FL (ref 82–98)
MONOCYTES # BLD AUTO: 0.7 K/UL (ref 0.3–1)
MONOCYTES NFR BLD: 8.6 % (ref 4–15)
NEUTROPHILS # BLD AUTO: 4.6 K/UL (ref 1.8–7.7)
NEUTROPHILS NFR BLD: 55.5 % (ref 38–73)
NRBC BLD-RTO: 0 /100 WBC
PLATELET # BLD AUTO: 235 K/UL (ref 150–450)
PMV BLD AUTO: 10 FL (ref 9.2–12.9)
RBC # BLD AUTO: 3.2 M/UL (ref 4.6–6.2)
WBC # BLD AUTO: 8.22 K/UL (ref 3.9–12.7)

## 2023-09-01 PROCEDURE — 93010 ELECTROCARDIOGRAM REPORT: CPT | Mod: HCNC,,, | Performed by: INTERNAL MEDICINE

## 2023-09-01 PROCEDURE — 99285 EMERGENCY DEPT VISIT HI MDM: CPT | Mod: 25,HCNC

## 2023-09-01 PROCEDURE — 93010 EKG 12-LEAD: ICD-10-PCS | Mod: HCNC,,, | Performed by: INTERNAL MEDICINE

## 2023-09-01 PROCEDURE — 80053 COMPREHEN METABOLIC PANEL: CPT | Mod: HCNC

## 2023-09-01 PROCEDURE — 85025 COMPLETE CBC W/AUTO DIFF WBC: CPT | Mod: HCNC

## 2023-09-01 PROCEDURE — 93005 ELECTROCARDIOGRAM TRACING: CPT | Mod: HCNC

## 2023-09-02 VITALS
TEMPERATURE: 98 F | DIASTOLIC BLOOD PRESSURE: 70 MMHG | HEART RATE: 72 BPM | OXYGEN SATURATION: 95 % | RESPIRATION RATE: 18 BRPM | SYSTOLIC BLOOD PRESSURE: 122 MMHG | BODY MASS INDEX: 20.36 KG/M2 | WEIGHT: 130 LBS

## 2023-09-02 LAB
ALBUMIN SERPL BCP-MCNC: 3.5 G/DL (ref 3.5–5.2)
ALP SERPL-CCNC: 84 U/L (ref 55–135)
ALT SERPL W/O P-5'-P-CCNC: 12 U/L (ref 10–44)
ANION GAP SERPL CALC-SCNC: 15 MMOL/L (ref 8–16)
AST SERPL-CCNC: 29 U/L (ref 10–40)
BILIRUB SERPL-MCNC: 0.1 MG/DL (ref 0.1–1)
BUN SERPL-MCNC: 35 MG/DL (ref 8–23)
CALCIUM SERPL-MCNC: 9.2 MG/DL (ref 8.7–10.5)
CHLORIDE SERPL-SCNC: 107 MMOL/L (ref 95–110)
CO2 SERPL-SCNC: 20 MMOL/L (ref 23–29)
CREAT SERPL-MCNC: 1.8 MG/DL (ref 0.5–1.4)
EST. GFR  (NO RACE VARIABLE): 36.4 ML/MIN/1.73 M^2
GLUCOSE SERPL-MCNC: 52 MG/DL (ref 70–110)
POTASSIUM SERPL-SCNC: 5.7 MMOL/L (ref 3.5–5.1)
PROT SERPL-MCNC: 8.2 G/DL (ref 6–8.4)
SODIUM SERPL-SCNC: 142 MMOL/L (ref 136–145)

## 2023-09-02 PROCEDURE — 96360 HYDRATION IV INFUSION INIT: CPT | Mod: HCNC

## 2023-09-02 PROCEDURE — 63600175 PHARM REV CODE 636 W HCPCS: Mod: HCNC

## 2023-09-02 PROCEDURE — 82962 GLUCOSE BLOOD TEST: CPT | Mod: HCNC

## 2023-09-02 RX ADMIN — SODIUM CHLORIDE, POTASSIUM CHLORIDE, SODIUM LACTATE AND CALCIUM CHLORIDE 1000 ML: 600; 310; 30; 20 INJECTION, SOLUTION INTRAVENOUS at 01:09

## 2023-09-02 NOTE — ED PROVIDER NOTES
"Encounter Date: 9/1/2023       History     Chief Complaint   Patient presents with    Fall     Fell around 5:50, after leaning down to pick something up. No LOC. Family reports he was hard to arouse, pt GCS 15 at this time. Denies complaints. On eliquis.      Patient is an 87-year-old male anticoagulated Eliquis, with a  shunt, who is here with complaints of fall around 5:00 p.m..  Patient states he was bending over to  something, lost his balance and fell and hit the back of his head.  Patient's daughters accompany him today state this is not as only fall within the last week.  Patient states that after the fall he had a cigarette and passed out on the sofa.  It is unclear how long he was asleep for.  Patient's daughter states that her father will from her nap.  He was slurring his words and speaking funny.  This has since resolved, unsure of how long this dysarthria occurred 4. Patient does have a  shunt for NPH, states he had it, "adjusted" this week.    Patient has no complaints at this time.  Patient denies any shortness a breath or chest pain.  Patient denies any other body pains.  Patient denies any nausea and vomiting.      Patient complains of a rash.    The history is provided by the patient, a relative and medical records.     Review of patient's allergies indicates:   Allergen Reactions    Demerol [meperidine] Nausea Only     Past Medical History:   Diagnosis Date    Actinic keratosis     Anxiety     B12 deficiency 12/8/2014    Dx 6/14    Cancer     skin    Cataract     Coronary artery disease     Diabetes mellitus type II     Diabetes mellitus with peripheral circulatory disorder 6/18/2014    ED (erectile dysfunction)     Hyperlipidemia     Kidney stone     Neurogenic claudication 4/4/2022    Peripheral vascular disease     Spondylosis 3/29/2019    Tobacco dependence     Urinary incontinence 5/4/2021     Past Surgical History:   Procedure Laterality Date    APPENDECTOMY   "    CARDIAC SURGERY  01/1999    CABG 4 vessels    CATARACT EXTRACTION      EPIDURAL STEROID INJECTION Right 8/26/2020    Procedure: Injection, Steroid, Epidural Transforaminal;  Surgeon: Wiley Crane Jr., MD;  Location: [facility] ENDO;  Service: Pain Management;  Laterality: Right;  Right L5 + S1 TF LEAH  Arrive @ 1115; ASA & Plavix last 8/18; Check BG; Rapid COVID test    EPIDURAL STEROID INJECTION Right 11/25/2020    Procedure: Injection, Steroid, Epidural Transformainal;  Surgeon: Wiley Crane Jr., MD;  Location: [facility] ENDO;  Service: Pain Management;  Laterality: Right;  Right L5 + S1 TF LEAH  Arrive @ 1245; ASA and Plavix last 11/17; Pre-DM; Needs MD Sign.    EPIDURAL STEROID INJECTION Right 2/19/2021    Procedure: Injection, Steroid, Epidural Transforaminal;  Surgeon: Wiley Crane Jr., MD;  Location: [facility] ENDO;  Service: Pain Management;  Laterality: Right;  Right L5 + S1 TF LEAH  Arrive @ 1115; ASA & Plavix last 2/11; Check BG; Needs Consent    EXTERNAL EAR SURGERY      EYE SURGERY      cataracts    ILIAC ARTERY STENT Bilateral 06/2003    also Right External Iliac stent     Family History   Problem Relation Age of Onset    Stroke Mother      Social History     Tobacco Use    Smoking status: Every Day     Current packs/day: 1.50     Average packs/day: 1.5 packs/day for 71.0 years (106.5 ttl pk-yrs)     Types: Cigarettes    Smokeless tobacco: Former   Substance Use Topics    Alcohol use: No    Drug use: No     Review of Systems    Physical Exam     Initial Vitals [09/01/23 2227]   BP Pulse Resp Temp SpO2   132/84 76 18 98.8 °F (37.1 °C) 98 %      MAP       --         Physical Exam    Constitutional: He appears well-developed.   HENT:   Head: Normocephalic.   Right Ear: External ear normal.   Left Ear: External ear normal.    Shunt in place  Minor Abrasions present  No cranial deformities, trace swelling   Eyes: EOM are normal. Pupils are equal, round, and reactive to light.   Neck:  Neck supple.   Normal range of motion.  Cardiovascular:  Normal rate and regular rhythm.           No murmur heard.  Abdominal: Abdomen is soft. Bowel sounds are normal. There is no abdominal tenderness.   Musculoskeletal:         General: Normal range of motion.      Cervical back: Normal range of motion and neck supple.     Neurological: He is alert.   Skin: Skin is warm. Capillary refill takes less than 2 seconds. Rash noted.   Psychiatric: He has a normal mood and affect. His behavior is normal. Judgment and thought content normal.         ED Course   Procedures  Labs Reviewed   CBC W/ AUTO DIFFERENTIAL - Abnormal; Notable for the following components:       Result Value    RBC 3.20 (*)     Hemoglobin 10.7 (*)     Hematocrit 31.7 (*)     MCV 99 (*)     MCH 33.4 (*)     RDW 15.9 (*)     Eos # 0.8 (*)     Eosinophil % 10.2 (*)     All other components within normal limits   COMPREHENSIVE METABOLIC PANEL - Abnormal; Notable for the following components:    Potassium 5.7 (*)     CO2 20 (*)     Glucose 52 (*)     BUN 35 (*)     Creatinine 1.8 (*)     eGFR 36.4 (*)     All other components within normal limits   HIV 1 / 2 ANTIBODY   HEPATITIS C ANTIBODY   POCT GLUCOSE MONITORING CONTINUOUS          Imaging Results              X-Ray Shunt Series (In process)                      CT Cervical Spine Without Contrast (Final result)  Result time 09/02/23 00:57:06      Final result by Dhaval Jordan MD (09/02/23 00:57:06)                   Impression:      1.  No CT evidence of acute fracture or traumatic subluxation of the cervical spine.  Degenerative changes as above.    2.  Advanced emphysematous change of the lungs.      Electronically signed by: Dhaval Jordan MD  Date:    09/02/2023  Time:    00:57               Narrative:    EXAMINATION:  CT CERVICAL SPINE WITHOUT CONTRAST    CLINICAL HISTORY:  Neck trauma (Age >= 65y);    TECHNIQUE:  Low dose axial images, sagittal and coronal reformations were performed  though the cervical spine.  Contrast was not administered.    COMPARISON:  MRI cervical spine 04/21/2021    FINDINGS:  There is mild straightening of the normal cervical lordosis.  Vertebral body heights appear adequately maintained.  No acute displaced fracture identified.  There is multilevel degenerative discogenic change.  At the level of C4-C5, there is intervertebral disc space height loss and endplate degenerative change.  There is a posterior disc osteophyte complex and bilateral uncovertebral spurring with associated spinal canal stenosis and moderate bilateral neural foraminal narrowing.  Additional moderate bilateral neural foraminal narrowing noted at C5-C6.    The prevertebral soft tissues are not significantly thickened.  There is prominent atherosclerotic calcification of the carotid and vertebral artery vasculature.  The trachea is patent.  There are advanced emphysematous change of the visualized lung apices.  There is  shunt catheter tubing noted throughout the soft tissues of the right neck.                                       CT Head Without Contrast (Final result)  Result time 09/02/23 00:58:19      Final result by Dhaval Jordan MD (09/02/23 00:58:19)                   Impression:      No CT evidence of acute intracranial abnormality. Clinical correlation and further evaluation as warranted.    Right parietal approach ventriculoperitoneal shunt catheter in stable position.  Ventricular system appears similar to slightly decreased in overall caliber compared to prior study of 08/28/2023.      Electronically signed by: Dhaval Jordan MD  Date:    09/02/2023  Time:    00:58               Narrative:    EXAMINATION:  CT HEAD WITHOUT CONTRAST    CLINICAL HISTORY:  Head trauma, minor (Age >= 65y);    TECHNIQUE:  Low dose axial images were obtained through the head.  Coronal and sagittal reformations were also performed. Contrast was not administered.    COMPARISON:  CT head  08/28/2023    FINDINGS:  There is a right parietal approach ventriculoperitoneal shunt catheter in stable position terminating within the left lateral ventricle.  The ventricular system appears similar to slightly decreased in overall caliber compared to prior examination.  The 3rd ventricle measures on the order of 1.0 cm (previously 1.2 cm).  There is generalized cerebral volume loss.  There is stable patchy periventricular white matter hypoattenuation.  No CT evidence of acute intracranial hemorrhage or midline shift.  No definite extra-axial collections identified.  The mastoid air cells and paranasal sinuses are clear of acute process. The visualized bones of the calvarium demonstrate no acute osseous abnormality.                                       Medications   lactated ringers bolus 1,000 mL (has no administration in time range)     Medical Decision Making  86 y/o man presents to ED with head trauma around 5:00 pm today. Denies LOC but did take a nap afterwards. Caregiver c/o of some slurred speech after the event.  shut adjusted earlier in the week.     CT head and neck ruled out any acute intracranial processes. Xray  shunt pending. Patients labs grossly normal. CMP appears to be hemolyzed. EKG normal. Patients has a bit of LATRICE on CMP. Will give 1L bolus and draw BMP outpatient.     Dispo d/c home    Amount and/or Complexity of Data Reviewed  Independent Historian: caregiver  Labs: ordered.  Radiology: ordered.  ECG/medicine tests:  Decision-making details documented in ED Course.               ED Course as of 09/02/23 2019   Sat Sep 02, 2023   0031 EKG 12-lead  Independently interpreted by MD:  Rate 72, normal sinus rhythm. No STEMI/JERICHO. Similar when compared to prior dated 6/5/23.   [CH]   0045 POC glucose is 93 after PO apple juice [CH]   0230 Head CT, CT C-spine, and x-rays showed series without acute abnormalities. [CH]      ED Course User Index  [CH] Anjana Green MD                       Clinical Impression:   Final diagnoses:  [W19.XXXA] Fall  [S09.90XA] Injury of head, initial encounter (Primary)  [N17.9] LATRICE (acute kidney injury)        ED Disposition Condition    Discharge Stable          ED Prescriptions    None       Follow-up Information    None          Kobe Stark DO  Resident  09/02/23 2019

## 2023-09-02 NOTE — ED TRIAGE NOTES
Narciso Yanez, a 87 y.o. male presents to the ED w/ complaint of fall earlier afternoon. Pt Aox4, GCS 15, no neuro deficits noted, and pt denies any complaints at this time.     Triage note:  Chief Complaint   Patient presents with    Fall     Fell around 5:50, after leaning down to pick something up. No LOC. Family reports he was hard to arouse, pt GCS 15 at this time. Denies complaints. On eliquis.      Review of patient's allergies indicates:   Allergen Reactions    Demerol [meperidine] Nausea Only     Past Medical History:   Diagnosis Date    Actinic keratosis     Anxiety     B12 deficiency 12/8/2014    Dx 6/14    Cancer     skin    Cataract     Coronary artery disease     Diabetes mellitus type II     Diabetes mellitus with peripheral circulatory disorder 6/18/2014    ED (erectile dysfunction)     Hyperlipidemia     Kidney stone     Neurogenic claudication 4/4/2022    Peripheral vascular disease     Spondylosis 3/29/2019    Tobacco dependence     Urinary incontinence 5/4/2021    Patient identifiers for Narciso Yanez checked and correct.    LOC: The patient is awake, alert and aware of environment with an appropriate affect, the patient is oriented x 4 and speaking appropriately.    APPEARANCE: Patient resting comfortably and in no acute distress, patient is clean and well groomed, patient's clothing is properly fastened.    SKIN: The skin is warm and dry, color consistent with ethnicity, patient has normal skin turgor and moist mucus membranes, skin intact, no breakdown or bruising noted.    MUSCULOSKELETAL: Patient moving all extremities well, no obvious swelling or deformities noted.    RESPIRATORY: Airway is open and patent, respirations are spontaneous and even, patient has a normal effort and rate.    CARDIAC: Patient has a normal rate and rhythm, no periphreal edema noted, capillary refill < 3 seconds.    ABDOMEN: Soft and non tender to palpation, no distention noted. Patient denies any nausea,  vomiting, diarrhea, or constipation.     NEUROLOGIC: Eyes open spontaneously, PERRL, behavior appropriate to situation, follows commands, facial expression symmetrical, bilateral hand grasp equal and even, purposeful motor response noted, normal sensation in all extremities.     HEENT: No abnormalities noted. White sclera and pupils equal round and reactive to light. Denies headache, dizziness.     : Pt voids independently, denies dysuria, hematuria, frequency.

## 2023-09-02 NOTE — ED NOTES
Pt IV infiltrated and pt stated that he does not want another IV started at this time and he is ready to be d/c. MD notified.

## 2023-09-02 NOTE — ED NOTES
Assumed pt care at this time. The patient is awake, alert and cooperative with a calm affect, patient is aware of environment. Airway is open and patent, respirations are spontaneous, normal respiratory effort and rate noted. No apparent distress noted. Family at bedside. Pt states no needs at this time. Bed locked and in lowest position, call light within reach.

## 2023-09-02 NOTE — DISCHARGE INSTRUCTIONS
You were evaluated for a fall in the ED. We  did not see any signs of bleeding in the brain. You had low blood sugar we corrected with some apple juice. Please follow up with your primary care and other doctors within the next week to evaluate you for risk factors that may have contributed to your fall.   Additionally, your labs look like you were slightly dehydrated affecting her kidneys health.  Please be sure to drink plenty of fluids over the next few days and follow up with primary care doctor as soon as possible to have her labs rechecked to confirm that your kidney function has rebounded back to normal.  Please call them as soon as possible on Tuesday morning when the office opens.

## 2023-09-04 ENCOUNTER — NURSE TRIAGE (OUTPATIENT)
Dept: ADMINISTRATIVE | Facility: CLINIC | Age: 88
End: 2023-09-04
Payer: MEDICARE

## 2023-09-04 NOTE — TELEPHONE ENCOUNTER
Pt states that he called in to respond to text message that was sent. Denies any symptoms at this time. Advised to call back if assistance needed.  Reason for Disposition   General information question, no triage required and triager able to answer question    Protocols used: Information Only Call-A-AH

## 2023-09-05 LAB — POCT GLUCOSE: 93 MG/DL (ref 70–110)

## 2023-09-08 ENCOUNTER — PATIENT MESSAGE (OUTPATIENT)
Dept: NEUROSURGERY | Facility: CLINIC | Age: 88
End: 2023-09-08
Payer: MEDICARE

## 2023-09-11 ENCOUNTER — OFFICE VISIT (OUTPATIENT)
Dept: INFECTIOUS DISEASES | Facility: CLINIC | Age: 88
End: 2023-09-11
Payer: MEDICARE

## 2023-09-11 VITALS
TEMPERATURE: 99 F | DIASTOLIC BLOOD PRESSURE: 77 MMHG | BODY MASS INDEX: 20.36 KG/M2 | SYSTOLIC BLOOD PRESSURE: 134 MMHG | HEART RATE: 99 BPM | HEIGHT: 67 IN

## 2023-09-11 DIAGNOSIS — B36.9 CUTANEOUS FUNGAL INFECTION: Primary | ICD-10-CM

## 2023-09-11 PROCEDURE — 1101F PR PT FALLS ASSESS DOC 0-1 FALLS W/OUT INJ PAST YR: ICD-10-PCS | Mod: HCNC,CPTII,S$GLB, | Performed by: STUDENT IN AN ORGANIZED HEALTH CARE EDUCATION/TRAINING PROGRAM

## 2023-09-11 PROCEDURE — 99999 PR PBB SHADOW E&M-EST. PATIENT-LVL IV: CPT | Mod: PBBFAC,HCNC,, | Performed by: STUDENT IN AN ORGANIZED HEALTH CARE EDUCATION/TRAINING PROGRAM

## 2023-09-11 PROCEDURE — 3288F PR FALLS RISK ASSESSMENT DOCUMENTED: ICD-10-PCS | Mod: HCNC,CPTII,S$GLB, | Performed by: STUDENT IN AN ORGANIZED HEALTH CARE EDUCATION/TRAINING PROGRAM

## 2023-09-11 PROCEDURE — 1126F PR PAIN SEVERITY QUANTIFIED, NO PAIN PRESENT: ICD-10-PCS | Mod: HCNC,CPTII,S$GLB, | Performed by: STUDENT IN AN ORGANIZED HEALTH CARE EDUCATION/TRAINING PROGRAM

## 2023-09-11 PROCEDURE — 1159F PR MEDICATION LIST DOCUMENTED IN MEDICAL RECORD: ICD-10-PCS | Mod: HCNC,CPTII,S$GLB, | Performed by: STUDENT IN AN ORGANIZED HEALTH CARE EDUCATION/TRAINING PROGRAM

## 2023-09-11 PROCEDURE — 1126F AMNT PAIN NOTED NONE PRSNT: CPT | Mod: HCNC,CPTII,S$GLB, | Performed by: STUDENT IN AN ORGANIZED HEALTH CARE EDUCATION/TRAINING PROGRAM

## 2023-09-11 PROCEDURE — 3288F FALL RISK ASSESSMENT DOCD: CPT | Mod: HCNC,CPTII,S$GLB, | Performed by: STUDENT IN AN ORGANIZED HEALTH CARE EDUCATION/TRAINING PROGRAM

## 2023-09-11 PROCEDURE — 99203 PR OFFICE/OUTPT VISIT, NEW, LEVL III, 30-44 MIN: ICD-10-PCS | Mod: HCNC,S$GLB,, | Performed by: STUDENT IN AN ORGANIZED HEALTH CARE EDUCATION/TRAINING PROGRAM

## 2023-09-11 PROCEDURE — 1101F PT FALLS ASSESS-DOCD LE1/YR: CPT | Mod: HCNC,CPTII,S$GLB, | Performed by: STUDENT IN AN ORGANIZED HEALTH CARE EDUCATION/TRAINING PROGRAM

## 2023-09-11 PROCEDURE — 99203 OFFICE O/P NEW LOW 30 MIN: CPT | Mod: HCNC,S$GLB,, | Performed by: STUDENT IN AN ORGANIZED HEALTH CARE EDUCATION/TRAINING PROGRAM

## 2023-09-11 PROCEDURE — 1159F MED LIST DOCD IN RCRD: CPT | Mod: HCNC,CPTII,S$GLB, | Performed by: STUDENT IN AN ORGANIZED HEALTH CARE EDUCATION/TRAINING PROGRAM

## 2023-09-11 PROCEDURE — 99999 PR PBB SHADOW E&M-EST. PATIENT-LVL IV: ICD-10-PCS | Mod: PBBFAC,HCNC,, | Performed by: STUDENT IN AN ORGANIZED HEALTH CARE EDUCATION/TRAINING PROGRAM

## 2023-09-11 RX ORDER — HYDROCORTISONE 1 %
CREAM (GRAM) TOPICAL 2 TIMES DAILY
Qty: 20 G | Refills: 0 | Status: ON HOLD | OUTPATIENT
Start: 2023-09-11 | End: 2023-10-23 | Stop reason: HOSPADM

## 2023-09-11 RX ORDER — FLUCONAZOLE 100 MG/1
100 TABLET ORAL WEEKLY
Qty: 4 TABLET | Refills: 0 | Status: SHIPPED | OUTPATIENT
Start: 2023-09-11 | End: 2023-10-03

## 2023-09-11 RX ORDER — FLUCONAZOLE 100 MG/1
100 TABLET ORAL WEEKLY
Qty: 4 TABLET | Refills: 0 | Status: SHIPPED | OUTPATIENT
Start: 2023-09-11 | End: 2023-09-11

## 2023-09-11 RX ORDER — HYDROCORTISONE 1 %
CREAM (GRAM) TOPICAL 2 TIMES DAILY
Qty: 20 G | Refills: 0 | Status: SHIPPED | OUTPATIENT
Start: 2023-09-11 | End: 2023-09-11

## 2023-09-11 NOTE — PROGRESS NOTES
Infectious Disease Clinic Visit    Reason:    Referred by PCP for rash c/f infectious process    HPI:        88M with multiple co-morbidities and chronic smoker, with onychomycosis (follows with podaitry; on topical anti-fungal), and now with suspected tinea infection; and possible underlying new onset psoriasis per recent derm eval (skin biopsy 2wks ago pending; has not yet initiated steroids or immunomodulator tx).     TODAY (09/11), pt presents to clinic with daughter to help with interview and pt care. They endorse pt being treated for onychomycosis of toes by podiatry, and in the last 1-2 months, had developed painful, itchy rash at palms and soles of feet, which began to spread elsewhere; including groin and torso. Pt has tried topical antifungals w/o relief; and when tried on oral fluconazole 200mg once daily for 14d, experienced some relief and improvement, but sxs returned when course ends. To date, pt has completed 3 x 14d courses of oral fluconazole; each course  by 7d; last course ended 08/29. Pt has seen two different outside dermatologist, for second opinions; and both suspicious for psoriasis in conjunction with fungal skin infection. Skin biopsy obtained about 2 wks ago; pt to f/u with derm pending results. Dermatology has only initiated anti-fungal therapy to date.  -- Pt reports that his rash has worsened significantly in last few days, with worsening generalized itching and aching pains in feet b/L with edematous swelling (R > L). Pt reports h/o neuropathy in feet b/L. No open wounds, but dry flaking skin extensively noted.      Review of Systems   Constitutional:  Negative for chills, diaphoresis, fever, malaise/fatigue and weight loss.   HENT:  Negative for congestion, sinus pain and sore throat.    Eyes:  Negative for pain and discharge.   Respiratory:  Negative for cough, sputum production and shortness of breath.    Cardiovascular:  Negative for chest pain and leg swelling.    Gastrointestinal:  Negative for abdominal pain, diarrhea, nausea and vomiting.   Genitourinary:  Negative for dysuria and hematuria.   Musculoskeletal:  Positive for joint pain (foot / ankle b/L) and myalgias.   Skin:  Positive for itching and rash.        Yellow, thickened finger and toe nails. Dry flaky skin, no wounds or drainage.    Neurological:  Negative for focal weakness and headaches.   Endo/Heme/Allergies:  Negative for environmental allergies.   Psychiatric/Behavioral:  Negative for substance abuse. The patient is not nervous/anxious.          Physical Exam  Vitals reviewed.   Constitutional:       General: He is not in acute distress.     Appearance: Normal appearance. He is not ill-appearing, toxic-appearing or diaphoretic.   HENT:      Head: Normocephalic and atraumatic.      Mouth/Throat:      Mouth: Mucous membranes are moist.      Pharynx: Oropharynx is clear.   Eyes:      General: No scleral icterus.     Conjunctiva/sclera: Conjunctivae normal.   Cardiovascular:      Rate and Rhythm: Normal rate.      Heart sounds: Normal heart sounds.   Pulmonary:      Effort: Pulmonary effort is normal.      Breath sounds: Normal breath sounds.   Abdominal:      General: Abdomen is flat. Bowel sounds are normal.      Palpations: Abdomen is soft.      Tenderness: There is no abdominal tenderness.   Musculoskeletal:         General: Swelling present. No tenderness. Normal range of motion.      Cervical back: Normal range of motion. No tenderness.      Right lower leg: Edema present.      Left lower leg: Edema present.      Comments: See pics   Lymphadenopathy:      Cervical: No cervical adenopathy.   Skin:     General: Skin is warm and dry.      Coloration: Skin is not jaundiced.      Findings: Erythema and rash present.      Comments: See pics   Neurological:      Mental Status: He is alert and oriented to person, place, and time. Mental status is at baseline.   Psychiatric:         Behavior: Behavior normal.          Thought Content: Thought content normal.                                   Review of patient's allergies indicates:   Allergen Reactions    Demerol [meperidine] Nausea Only         Current Outpatient Medications:     ACCU-CHEK JOAQUIN PLUS METER Misc, Three times a day with meals, Disp: , Rfl: 0    acetaminophen (TYLENOL) 500 MG tablet, Take 1 tablet (500 mg total) by mouth every 6 (six) hours as needed for Pain., Disp: 13 tablet, Rfl: 0    alendronate (FOSAMAX) 70 MG tablet, TAKE 1 TABLET EVERY 7 DAYS ON TUESDAYS, Disp: 12 tablet, Rfl: 12    ascorbic acid, vitamin C, (VITAMIN C) 250 MG tablet, Take 1 tablet (250 mg total) by mouth once daily., Disp: 120 tablet, Rfl: 0    blood sugar diagnostic (TRUE METRIX GLUCOSE TEST STRIP) Strp, TID, Disp: 300 each, Rfl: 12    BOOSTRIX TDAP 2.5-8-5 Lf-mcg-Lf/0.5mL Syrg injection, , Disp: , Rfl:     ciclopirox (LOPROX) 0.77 % Crea, Apply topically 2 (two) times daily., Disp: 90 g, Rfl: 2    ciclopirox (PENLAC) 8 % Soln, Apply topically nightly., Disp: 6.6 mL, Rfl: 11    clopidogreL (PLAVIX) 75 mg tablet, TAKE 1 TABLET EVERY DAY, Disp: 90 tablet, Rfl: 1    clotrimazole-betamethasone 1-0.05% (LOTRISONE) cream, Apply topically 2 (two) times daily., Disp: 180 g, Rfl: 12    diazePAM (VALIUM) 5 MG tablet, TAKE 1 TABLET BY MOUTH EVERY 8 HOURS AS NEEDED. Strength: 5 mg, Disp: 270 tablet, Rfl: 5    docusate sodium (COLACE) 100 MG capsule, Take 1 capsule (100 mg total) by mouth 2 (two) times daily., Disp: 60 capsule, Rfl: 0    fluconazole (DIFLUCAN) 150 MG Tab, Take by mouth., Disp: , Rfl:     ketoconazole (NIZORAL) 2 % cream, Apply topically 2 (two) times daily., Disp: , Rfl:     lactulose (CHRONULAC) 20 gram/30 mL Soln, Take 45 mLs (30 g total) by mouth 3 (three) times daily as needed., Disp: 1500 mL, Rfl: 12    lancets (TRUEPLUS LANCETS) 33 gauge Misc, Use to test blood glucose three (3) times a day, discard lancet after each use, Disp: 300 each, Rfl: 3    magnesium sulfate in water  (MAGNESIUM SULFATE 2 GRAM/50 ML) 2 gram/50 mL PgBk, , Disp: , Rfl:     metFORMIN (GLUCOPHAGE-XR) 750 MG ER 24hr tablet, TAKE 1 TABLET TWICE DAILY WITH MEALS, Disp: 180 tablet, Rfl: 1    metoprolol succinate (TOPROL-XL) 25 MG 24 hr tablet, , Disp: , Rfl:     mometasone 0.1% (ELOCON) 0.1 % cream, Apply topically 2 (two) times daily., Disp: , Rfl:     OMNIPAQUE 300 300 mg iodine/mL injection, , Disp: , Rfl:     OMNIPAQUE 350 350 mg iodine/mL Soln injection, , Disp: , Rfl:     ondansetron (ZOFRAN-ODT) 4 MG TbDL, DISSOLVE 2 TABLETS (8 MG TOTAL) ON THE TONGUE EVERY 8 (EIGHT) HOURS AS NEEDED (NAUSEA)., Disp: 90 tablet, Rfl: 12    ondansetron (ZOFRAN-ODT) 8 MG TbDL, , Disp: , Rfl:     prochlorperazine (COMPAZINE) 5 MG tablet, , Disp: , Rfl:     rosuvastatin (CRESTOR) 20 MG tablet, TAKE 1 TABLET (20 MG TOTAL) BY MOUTH EVERY EVENING., Disp: 90 tablet, Rfl: 12    senna (SENOKOT) 8.6 mg tablet, Take 1 tablet by mouth once daily., Disp: , Rfl:     TRUE METRIX GLUCOSE TEST STRIP Strp, TEST BLOOD SUGAR THREE TIMES DAILY., Disp: 300 strip, Rfl: 12    aspirin (ECOTRIN) 81 MG EC tablet, Take 1 tablet (81 mg total) by mouth once daily., Disp: , Rfl: 0    fluconazole (DIFLUCAN) 100 MG tablet, Take 1 tablet (100 mg total) by mouth once a week. for 4 doses, Disp: 4 tablet, Rfl: 0    hydrocortisone 1 % cream, Apply topically 2 (two) times daily. for 5 days, Disp: 20 g, Rfl: 0    Past Medical History:   Diagnosis Date    Actinic keratosis     Anxiety     B12 deficiency 12/8/2014    Dx 6/14    Cancer     skin    Cataract     Coronary artery disease     Diabetes mellitus type II     Diabetes mellitus with peripheral circulatory disorder 6/18/2014    ED (erectile dysfunction)     Hyperlipidemia     Kidney stone     Neurogenic claudication 4/4/2022    Peripheral vascular disease     Spondylosis 3/29/2019    Tobacco dependence     Urinary incontinence 5/4/2021       Lab Results   Component Value Date    WBC 8.22 09/01/2023    CRP 1.9 07/11/2023  "   SEDRATE 37 (H) 07/11/2023    CREATININE 1.8 (H) 09/01/2023    AST 29 09/01/2023    ALT 12 09/01/2023    ALKPHOS 84 09/01/2023       Immunization History   Administered Date(s) Administered    COVID-19, MRNA, LN-S, PF (Pfizer) (Purple Cap) 01/10/2021, 01/31/2021, 10/21/2021    Influenza 10/11/2008, 01/06/2010, 09/11/2011, 10/13/2012    Influenza - High Dose - PF (65 years and older) 09/06/2015, 10/23/2016, 09/29/2017, 10/27/2018, 10/11/2019    Influenza - Quadrivalent - High Dose - PF (65 years and older) 09/24/2020, 11/01/2022    Influenza - Trivalent (ADULT) 09/11/2011    PPD Test 06/07/2021    Pneumococcal Conjugate - 13 Valent 05/10/2016    Tdap 03/23/2022         Reviewed available labs and imaging.     Vitals:    09/11/23 1432   BP: 134/77   BP Location: Left arm   Pulse: 99   Temp: 98.5 °F (36.9 °C)   TempSrc: Oral   Weight: Comment: pt in wheelchair   Height: 5' 7" (1.702 m)         Assessment:  Encounter Diagnoses   Name Primary?    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 Not yet on immuno-therapy for suspected psoriasis.   -- Crcl 28 (baseline 35), fluconazole renally dosed to 50%. Last EKG 09/01/23: Qtc 450    Plan:   Fluconazole 100mg once weekly for 4wks; with low-dose hydrocortisone cream for 3-5d to help reduce severe itching and inflammation of arms / hands / feet.   Pt to F/u with derm regarding skin biopsy results, and further management of rash as indicated.  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    Follow-up: 3-4wks, sooner if needed.    UPDATE 09/14:  Pt  family isrrael (daughter, zak) armida back as some form of Psoriasis.  Derm wants to see pt on Friday for an injection -- with tentative plans to start meds for psoriasis. (Taking into consideration of pts' CKD).    No orders of the defined types were placed in this encounter.

## 2023-09-12 RX ORDER — TERBINAFINE HYDROCHLORIDE 250 MG/1
125 TABLET ORAL DAILY
Qty: 7 TABLET | Refills: 0 | Status: CANCELLED | OUTPATIENT
Start: 2023-09-12 | End: 2023-09-26

## 2023-09-13 ENCOUNTER — PATIENT MESSAGE (OUTPATIENT)
Dept: INFECTIOUS DISEASES | Facility: CLINIC | Age: 88
End: 2023-09-13
Payer: MEDICARE

## 2023-09-15 ENCOUNTER — PATIENT OUTREACH (OUTPATIENT)
Dept: ADMINISTRATIVE | Facility: OTHER | Age: 88
End: 2023-09-15
Payer: MEDICARE

## 2023-09-15 ENCOUNTER — HOSPITAL ENCOUNTER (EMERGENCY)
Facility: HOSPITAL | Age: 88
Discharge: HOME | End: 2023-09-15
Attending: EMERGENCY MEDICINE
Payer: MEDICARE

## 2023-09-15 VITALS
TEMPERATURE: 98 F | DIASTOLIC BLOOD PRESSURE: 72 MMHG | HEART RATE: 98 BPM | RESPIRATION RATE: 20 BRPM | HEIGHT: 67 IN | SYSTOLIC BLOOD PRESSURE: 170 MMHG | WEIGHT: 130 LBS | BODY MASS INDEX: 20.4 KG/M2 | OXYGEN SATURATION: 95 %

## 2023-09-15 DIAGNOSIS — R21 RASH: ICD-10-CM

## 2023-09-15 DIAGNOSIS — R55 SYNCOPE: ICD-10-CM

## 2023-09-15 DIAGNOSIS — E86.0 DEHYDRATION: Primary | ICD-10-CM

## 2023-09-15 LAB
ALBUMIN SERPL BCP-MCNC: 3.6 G/DL (ref 3.5–5.2)
ALP SERPL-CCNC: 86 U/L (ref 55–135)
ALT SERPL W/O P-5'-P-CCNC: 23 U/L (ref 10–44)
ANION GAP SERPL CALC-SCNC: 12 MMOL/L (ref 8–16)
AST SERPL-CCNC: 29 U/L (ref 10–40)
BASOPHILS # BLD AUTO: 0.05 K/UL (ref 0–0.2)
BASOPHILS NFR BLD: 0.4 % (ref 0–1.9)
BILIRUB SERPL-MCNC: 0.3 MG/DL (ref 0.1–1)
BNP SERPL-MCNC: 74 PG/ML (ref 0–99)
BUN SERPL-MCNC: 20 MG/DL (ref 8–23)
CALCIUM SERPL-MCNC: 9.3 MG/DL (ref 8.7–10.5)
CHLORIDE SERPL-SCNC: 107 MMOL/L (ref 95–110)
CO2 SERPL-SCNC: 22 MMOL/L (ref 23–29)
CREAT SERPL-MCNC: 1.6 MG/DL (ref 0.5–1.4)
DIFFERENTIAL METHOD: ABNORMAL
EOSINOPHIL # BLD AUTO: 0.7 K/UL (ref 0–0.5)
EOSINOPHIL NFR BLD: 6.2 % (ref 0–8)
ERYTHROCYTE [DISTWIDTH] IN BLOOD BY AUTOMATED COUNT: 15.9 % (ref 11.5–14.5)
EST. GFR  (NO RACE VARIABLE): 42 ML/MIN/1.73 M^2
GLUCOSE SERPL-MCNC: 117 MG/DL (ref 70–110)
HCT VFR BLD AUTO: 33.4 % (ref 40–54)
HGB BLD-MCNC: 11.2 G/DL (ref 14–18)
IMM GRANULOCYTES # BLD AUTO: 0.05 K/UL (ref 0–0.04)
IMM GRANULOCYTES NFR BLD AUTO: 0.4 % (ref 0–0.5)
LACTATE SERPL-SCNC: 1.1 MMOL/L (ref 0.5–2.2)
LYMPHOCYTES # BLD AUTO: 1.8 K/UL (ref 1–4.8)
LYMPHOCYTES NFR BLD: 15.4 % (ref 18–48)
MAGNESIUM SERPL-MCNC: 1.7 MG/DL (ref 1.6–2.6)
MCH RBC QN AUTO: 31.8 PG (ref 27–31)
MCHC RBC AUTO-ENTMCNC: 33.5 G/DL (ref 32–36)
MCV RBC AUTO: 95 FL (ref 82–98)
MONOCYTES # BLD AUTO: 0.7 K/UL (ref 0.3–1)
MONOCYTES NFR BLD: 6 % (ref 4–15)
NEUTROPHILS # BLD AUTO: 8.4 K/UL (ref 1.8–7.7)
NEUTROPHILS NFR BLD: 71.6 % (ref 38–73)
NRBC BLD-RTO: 0 /100 WBC
PLATELET # BLD AUTO: 197 K/UL (ref 150–450)
PLATELET BLD QL SMEAR: ABNORMAL
PMV BLD AUTO: 10.2 FL (ref 9.2–12.9)
POTASSIUM SERPL-SCNC: 4.9 MMOL/L (ref 3.5–5.1)
PROT SERPL-MCNC: 7.9 G/DL (ref 6–8.4)
RBC # BLD AUTO: 3.52 M/UL (ref 4.6–6.2)
SODIUM SERPL-SCNC: 141 MMOL/L (ref 136–145)
TROPONIN I SERPL DL<=0.01 NG/ML-MCNC: <0.006 NG/ML (ref 0–0.03)
WBC # BLD AUTO: 11.73 K/UL (ref 3.9–12.7)

## 2023-09-15 PROCEDURE — 93010 EKG 12-LEAD: ICD-10-PCS | Mod: HCNC,,, | Performed by: INTERNAL MEDICINE

## 2023-09-15 PROCEDURE — 99285 EMERGENCY DEPT VISIT HI MDM: CPT | Mod: 25,HCNC

## 2023-09-15 PROCEDURE — 85025 COMPLETE CBC W/AUTO DIFF WBC: CPT | Mod: HCNC | Performed by: EMERGENCY MEDICINE

## 2023-09-15 PROCEDURE — 96374 THER/PROPH/DIAG INJ IV PUSH: CPT | Mod: HCNC

## 2023-09-15 PROCEDURE — 63600175 PHARM REV CODE 636 W HCPCS: Mod: HCNC | Performed by: EMERGENCY MEDICINE

## 2023-09-15 PROCEDURE — 96361 HYDRATE IV INFUSION ADD-ON: CPT | Mod: HCNC

## 2023-09-15 PROCEDURE — 93005 ELECTROCARDIOGRAM TRACING: CPT | Mod: HCNC

## 2023-09-15 PROCEDURE — 25000003 PHARM REV CODE 250: Mod: HCNC | Performed by: EMERGENCY MEDICINE

## 2023-09-15 PROCEDURE — 93010 ELECTROCARDIOGRAM REPORT: CPT | Mod: HCNC,,, | Performed by: INTERNAL MEDICINE

## 2023-09-15 PROCEDURE — 80053 COMPREHEN METABOLIC PANEL: CPT | Mod: HCNC | Performed by: EMERGENCY MEDICINE

## 2023-09-15 PROCEDURE — 83880 ASSAY OF NATRIURETIC PEPTIDE: CPT | Mod: HCNC | Performed by: EMERGENCY MEDICINE

## 2023-09-15 PROCEDURE — 84484 ASSAY OF TROPONIN QUANT: CPT | Mod: HCNC | Performed by: EMERGENCY MEDICINE

## 2023-09-15 PROCEDURE — 83605 ASSAY OF LACTIC ACID: CPT | Mod: HCNC | Performed by: EMERGENCY MEDICINE

## 2023-09-15 PROCEDURE — 83735 ASSAY OF MAGNESIUM: CPT | Mod: HCNC | Performed by: EMERGENCY MEDICINE

## 2023-09-15 RX ORDER — METHYLPREDNISOLONE SOD SUCC 125 MG
125 VIAL (EA) INJECTION
Status: COMPLETED | OUTPATIENT
Start: 2023-09-15 | End: 2023-09-15

## 2023-09-15 RX ORDER — MOXIFLOXACIN 5 MG/ML
1 SOLUTION/ DROPS OPHTHALMIC 3 TIMES DAILY
Qty: 3 ML | Refills: 0 | Status: SHIPPED | OUTPATIENT
Start: 2023-09-15 | End: 2023-09-22

## 2023-09-15 RX ORDER — PREDNISONE 10 MG/1
TABLET ORAL
Qty: 21 TABLET | Refills: 0 | Status: ON HOLD | OUTPATIENT
Start: 2023-09-15 | End: 2023-10-23 | Stop reason: HOSPADM

## 2023-09-15 RX ORDER — OLOPATADINE HYDROCHLORIDE 1 MG/ML
1 SOLUTION/ DROPS OPHTHALMIC 2 TIMES DAILY
Qty: 5 ML | Refills: 0 | Status: ON HOLD | OUTPATIENT
Start: 2023-09-15 | End: 2023-10-23 | Stop reason: HOSPADM

## 2023-09-15 RX ORDER — HYDROXYZINE HYDROCHLORIDE 10 MG/1
10 TABLET, FILM COATED ORAL
Status: COMPLETED | OUTPATIENT
Start: 2023-09-15 | End: 2023-09-15

## 2023-09-15 RX ADMIN — HYDROXYZINE HYDROCHLORIDE 10 MG: 10 TABLET ORAL at 10:09

## 2023-09-15 RX ADMIN — METHYLPREDNISOLONE SODIUM SUCCINATE 125 MG: 125 INJECTION, POWDER, FOR SOLUTION INTRAMUSCULAR; INTRAVENOUS at 10:09

## 2023-09-15 RX ADMIN — SODIUM CHLORIDE 1000 ML: 9 INJECTION, SOLUTION INTRAVENOUS at 09:09

## 2023-09-15 NOTE — ED PROVIDER NOTES
Encounter Date: 9/15/2023    SCRIBE #1 NOTE: I, Lindsay Peralta, am scribing for, and in the presence of,  Brendan Virk MD. I have scribed the following portions of the note - Other sections scribed: HPI, ROS, PE, EKG, MDM.       History     Chief Complaint   Patient presents with    Fall     Family called EMS after patient had unwitnessed fall out of recliner. Complaint of neck pain this morning. Pt is on blood thinners      Narciso Yanez is a 88 y.o. male, with a PMHx of CAD, DM (type 2), cervical strain, aortic atherosclerosis, PAD, COPD, AAA without rupture, spondylosis, HTN, and HLD and a PSHx of CABG, who presents to the ED with neck pain (resolved) this AM. Patient reports falling out of a recliner after bending to  something he dropped onto the floor this AM. Patient endorses head trauma but is unsure of syncope. Patient reports falling 2 weeks ago and states this is his 4th or 5th fall. Patient's daughter reports the patient started to experiencing dizziness and falls after taking diflucan. Patient endorses rash to bilateral foot and bilateral eye. Patient's daughter reports the patient visiting multiple specialists for the rash. She endorses the patient experiencing decreased sleep and decreased appetite. She reports the patient is at his mental baseline. Patient endorses Plavix and aspirin use. Denies chest pain, abdominal pain, palpitations, lightheadedness, or other associated symptoms.   Additional history is provided by independent historian: patient's daughter.      The history is provided by the patient and a relative. No  was used.     Review of patient's allergies indicates:   Allergen Reactions    Demerol [meperidine] Nausea Only     Past Medical History:   Diagnosis Date    Actinic keratosis     Anxiety     B12 deficiency 12/8/2014    Dx 6/14    Cancer     skin    Cataract     Coronary artery disease     Diabetes mellitus type II     Diabetes mellitus with  peripheral circulatory disorder 6/18/2014    ED (erectile dysfunction)     Hyperlipidemia     Kidney stone     Neurogenic claudication 4/4/2022    Peripheral vascular disease     Spondylosis 3/29/2019    Tobacco dependence     Urinary incontinence 5/4/2021     Past Surgical History:   Procedure Laterality Date    APPENDECTOMY      CARDIAC SURGERY  01/1999    CABG 4 vessels    CATARACT EXTRACTION      EPIDURAL STEROID INJECTION Right 8/26/2020    Procedure: Injection, Steroid, Epidural Transforaminal;  Surgeon: Wiley Crane Jr., MD;  Location: [facility] ENDO;  Service: Pain Management;  Laterality: Right;  Right L5 + S1 TF LEAH  Arrive @ 1115; ASA & Plavix last 8/18; Check BG; Rapid COVID test    EPIDURAL STEROID INJECTION Right 11/25/2020    Procedure: Injection, Steroid, Epidural Transformainal;  Surgeon: Wiley Crane Jr., MD;  Location: [facility] ENDO;  Service: Pain Management;  Laterality: Right;  Right L5 + S1 TF LEAH  Arrive @ 1245; ASA and Plavix last 11/17; Pre-DM; Needs MD Sign.    EPIDURAL STEROID INJECTION Right 2/19/2021    Procedure: Injection, Steroid, Epidural Transforaminal;  Surgeon: Wiley Crane Jr., MD;  Location: [facility] ENDO;  Service: Pain Management;  Laterality: Right;  Right L5 + S1 TF LEAH  Arrive @ 1115; ASA & Plavix last 2/11; Check BG; Needs Consent    EXTERNAL EAR SURGERY      EYE SURGERY      cataracts    ILIAC ARTERY STENT Bilateral 06/2003    also Right External Iliac stent     Family History   Problem Relation Age of Onset    Stroke Mother      Social History     Tobacco Use    Smoking status: Every Day     Current packs/day: 1.50     Average packs/day: 1.5 packs/day for 71.0 years (106.5 ttl pk-yrs)     Types: Cigarettes    Smokeless tobacco: Former   Substance Use Topics    Alcohol use: No    Drug use: No     Review of Systems   Constitutional: Negative.  Negative for fever.   Respiratory:  Negative for shortness of breath.    Cardiovascular:  Negative for chest pain and  palpitations.   Gastrointestinal:  Negative for abdominal pain.   Musculoskeletal:  Positive for neck pain (Resolved).   Skin:  Positive for rash.   Neurological:  Positive for dizziness. Negative for light-headedness.       Physical Exam     Initial Vitals [09/15/23 0732]   BP Pulse Resp Temp SpO2   (!) 174/74 108 20 97.7 °F (36.5 °C) 97 %      MAP       --         Physical Exam    Nursing note and vitals reviewed.  Constitutional: He appears well-developed and well-nourished. Cervical collar in place.   HENT:   Head: Atraumatic.   Dry tongue.    Eyes: EOM are normal. Pupils are equal, round, and reactive to light.   Erythema to eyelids with yellow purulent drainage to mediolateral campuses bilaterally.    Neck: Neck supple. No JVD present.   Normal range of motion.  Cardiovascular:  Regular rhythm, normal heart sounds and intact distal pulses.     Exam reveals no gallop and no friction rub.       No murmur heard.  Heart rate of 107.   Abdominal: Abdomen is soft. Bowel sounds are normal. He exhibits no distension. There is no abdominal tenderness.   Firm.    Musculoskeletal:         General: Normal range of motion.      Cervical back: Normal range of motion and neck supple.     Lymphadenopathy:     He has no cervical adenopathy.   Neurological: He is alert and oriented to person, place, and time. He has normal strength.   Skin: Skin is warm and dry.   Dry blood to the left dorsal hand.   Various ecchymosis to the bilateral arm.  Diffuse rash that is erythematous and blanches. Minimal rough texture. Morbiliform-like that convalesce.    Psychiatric: He has a normal mood and affect. Thought content normal.         ED Course   Procedures  Labs Reviewed   CBC W/ AUTO DIFFERENTIAL - Abnormal; Notable for the following components:       Result Value    RBC 3.52 (*)     Hemoglobin 11.2 (*)     Hematocrit 33.4 (*)     MCH 31.8 (*)     RDW 15.9 (*)     Gran # (ANC) 8.4 (*)     Immature Grans (Abs) 0.05 (*)     Eos # 0.7 (*)      Lymph % 15.4 (*)     All other components within normal limits   COMPREHENSIVE METABOLIC PANEL - Abnormal; Notable for the following components:    CO2 22 (*)     Glucose 117 (*)     Creatinine 1.6 (*)     eGFR 42 (*)     All other components within normal limits   LACTIC ACID, PLASMA   TROPONIN I   B-TYPE NATRIURETIC PEPTIDE   MAGNESIUM     EKG Readings: (Independently Interpreted)   Initial Reading: No STEMI. Rhythm: Sinus Tachycardia. Heart Rate: 104. Other Impression: Limited by Baseline Artifact       Imaging Results              CT Head Without Contrast (Final result)  Result time 09/15/23 09:16:38      Final result by Nigel Quinonez MD (09/15/23 09:16:38)                   Impression:      Numerous pre-existing findings as detailed above.    No CT evidence of acute intracranial abnormality.      Electronically signed by: Nigel Quinonez  Date:    09/15/2023  Time:    09:16               Narrative:    EXAMINATION:  CT HEAD WITHOUT CONTRAST    CLINICAL HISTORY:  Head trauma, minor (Age >= 65y);    TECHNIQUE:  Low dose axial images were obtained through the head.  Coronal and sagittal reformations were also performed. Contrast was not administered.    COMPARISON:  Noncontrast head CTs 09/01/2023 and 03/08/2021    FINDINGS:  Artifacts related to motion and/or beam hardening degrade portions of the scan.  Allowing for this, there is no CT evidence of hyperattenuating acute intracranial hemorrhage, discrete acute large vascular territory brain infarct, or new intracranial mass or mass effect.    There remains a right posterior parietal approach  shunt which terminates in the left lateral ventricle frontal horn.    No adverse interval change in ventricular size.  Allowing for the artifacts, no discontinuity is demonstrated in the intracranial or visualized extracranial portions of the  shunt.    Pre-existing cerebral and cerebellar parenchymal volume loss, with a proportionate degree of presumed ex vacuo  enlargement of the cisterns, fissures, sulci, and posterior fossa extra-axial spaces.  These findings remains similar to the comparison scan.    No depressed calvarial fracture.    Minimal mucosal disease scattered within some portions of the visualized portions of the paranasal sinuses.    Visualized portions of the orbits and skull base demonstrate no acute CT abnormality.     images demonstrate a straightened cervical lordosis and poorly visualized cervical degenerative changes.                                       CT Cervical Spine Without Contrast (Final result)  Result time 09/15/23 09:31:18      Final result by Nigel Quinonez MD (09/15/23 09:31:18)                   Impression:      Chronic degenerative changes.    No CT evidence of acute fracture deformity or acute traumatic vertebral malalignment in the cervical spine.      Electronically signed by: Nigel Quinonez  Date:    09/15/2023  Time:    09:31               Narrative:    EXAMINATION:  CT CERVICAL SPINE WITHOUT CONTRAST    CLINICAL HISTORY:  Neck trauma (Age >= 65y);    TECHNIQUE:  Low dose axial images, sagittal and coronal reformations were performed though the cervical spine.  Contrast was not administered.    COMPARISON:  Cervical spine CT 09/01/2023    FINDINGS:  There remain mild straightening of the cervical lordosis and multilevel degenerative changes.    The degenerative changes are most prominent at C4-5, where there is pre-existing disc space narrowing, anterior osteophytosis, bilateral uncovertebral hypertrophy, and a small posterior disc-osteophyte complex.  These changes cause mild encroachment upon the C4-5 central vertebral canal, but moderate right and moderate to severe left C4-5 neural foraminal stenosis.    There is also some bilateral uncovertebral hypertrophy at C5-6 and very minimally at C6-7 and C3-4.  Mild right and mild-to-moderate left C5-C6 neural foraminal stenosis.    Mild right C2-C3 facet arthropathy.    No  cervical prevertebral soft tissue swelling.    Strand-like intraluminal opacities in the visualized trachea likely represent mucus.    Scattered and intrathoracic, cervical, and intracranial atherosclerotic calcifications.    Visualized cervical portions of the right posterior parietal approach  shunt tubing demonstrate no discrete discontinuity.    Visualized apical portions of the lungs and pleura demonstrate no acute pleural abnormality.  There remains substantial bilateral apical pulmonary emphysema.  Pre-existing irregular biapical pleuroparenchymal opacities likely represent chronic fibronodular changes.                                       X-Ray Chest AP Portable (Final result)  Result time 09/15/23 08:33:31      Final result by Sorin Faria MD (09/15/23 08:33:31)                   Impression:      No acute chest disease identified.    Surgical changes.     shunt.      Electronically signed by: Sorin Faria MD  Date:    09/15/2023  Time:    08:33               Narrative:    EXAMINATION:  XR CHEST AP PORTABLE    CLINICAL HISTORY:  Syncope;    TECHNIQUE:  Single frontal view of the chest was performed.    COMPARISON:  06/14/2021.    FINDINGS:  Surgical changes are identified with sternal wires and mediastinal clips present.  There is a radiopaque catheter projecting over the right lower neck, thorax, and upper abdomen likely related to a ventriculoperitoneal shunt.  The patient is rotated.  Pulmonary vasculature is within normal limits.  The lungs are free of focal consolidations.  There is no evidence for pneumothorax or pleural effusions.  Bony structures are grossly intact.                                       Medications   sodium chloride 0.9% bolus 1,000 mL 1,000 mL (0 mLs Intravenous Stopped 9/15/23 1201)   methylPREDNISolone sodium succinate injection 125 mg (125 mg Intravenous Given 9/15/23 1000)   hydrOXYzine HCL tablet 10 mg (10 mg Oral Given 9/15/23 1000)     Medical Decision  Making  Amount and/or Complexity of Data Reviewed  External Data Reviewed: labs, radiology and notes.     Details: Internal medical records reviewed: Patient visited [facility] ED on 9/1/23 for head injury, slurred speech, and syncope following a fall. CT cervical spine performed: Advanced emphysematous change of the lungs. CT head performed: Right parietal approach ventriculoperitoneal shunt catheter in stable position.  Ventricular system appears similar to slightly decreased in overall caliber compared to prior study of 08/28/2023. EKG performed with no abnormalities noted.  Patient visited the ED on 9/7/17 for worsening left sided neck pain radiating to the left shoulder. EKG perfofrmed with 1st degree AV block noted.  External medical records reviewed: Patient visited PCP on 9/13/17 for pain and numbness of the right arm radiating tothe 3rd and 4th fingers and left sided neck pain. Patient was prescribed 5-325 mg norco every 6 hours PRN and 5 mg flexeril TID PRN.  Labs: ordered.  Radiology: ordered.    Risk  Prescription drug management.            Scribe Attestation:   Scribe #1: I performed the above scribed service and the documentation accurately describes the services I performed. I attest to the accuracy of the note.                      I, Brendan Cervantes, personally performed the services described in this documentation. All medical record entries made by the scribe were at my direction and in my presence. I have reviewed the chart and agree that the record reflects my personal performance and is accurate and complete.    Clinical Impression:   Final diagnoses:  [R55] Syncope  [E86.0] Dehydration (Primary)  [R21] Rash        ED Disposition Condition    Discharge Stable          ED Prescriptions       Medication Sig Dispense Start Date End Date Auth. Provider    predniSONE (DELTASONE) 10 MG tablet Take 4 tabs x 3 days, then take 2 tabs x 3 days, then take 1 tab x 3 days. 21 tablet  9/15/2023 -- Brendan Virk MD    olopatadine (PATANOL) 0.1 % ophthalmic solution Place 1 drop into both eyes 2 (two) times daily. 5 mL 9/15/2023 9/14/2024 Brendan Virk MD    moxifloxacin (VIGAMOX) 0.5 % ophthalmic solution Place 1 drop into both eyes 3 (three) times daily. for 7 days 3 mL 9/15/2023 9/22/2023 Brendan Virk MD          Follow-up Information       Follow up With Specialties Details Why Contact Info    Shayna Paige MD Dermatology Schedule an appointment as soon as possible for a visit   [address]  [address]  [place] [address] [phone]      Marcelino Del Toro MD Internal Medicine Schedule an appointment as soon as possible for a visit   [address] [facility] [address] [phone]               Brendan Virk MD  09/15/23 7352

## 2023-09-15 NOTE — PROGRESS NOTES
CHW - Initial Contact    This Community Health Worker completed the Social Determinant of Health questionnaire with patient  and daughter/caregiver at bedside  today.    Pt identified barriers of most importance are: has no needs at this time.   Referrals to community agencies completed with patient/caregiver consent outside of [facility] include: no: none at this time.  Referrals were put through [facility] - no: none at this time.  Support and Services: has support at this time.  Other information discussed the patient needs / wants help with: Completed SDOH with patient and daughter/caregiver at bedside today, pt has no needs at this time, will follow up in one week for possible case closure.   Follow up required: yes.  Follow-up Outreach - Due: 9/21/2023

## 2023-09-26 ENCOUNTER — HOSPITAL ENCOUNTER (OUTPATIENT)
Dept: RADIOLOGY | Facility: HOSPITAL | Age: 88
Discharge: HOME | End: 2023-09-26
Attending: PHYSICIAN ASSISTANT
Payer: MEDICARE

## 2023-09-26 ENCOUNTER — OFFICE VISIT (OUTPATIENT)
Dept: NEUROSURGERY | Facility: CLINIC | Age: 88
End: 2023-09-26
Payer: MEDICARE

## 2023-09-26 VITALS — WEIGHT: 130 LBS | HEIGHT: 67 IN | BODY MASS INDEX: 20.4 KG/M2

## 2023-09-26 DIAGNOSIS — G91.2 NORMAL PRESSURE HYDROCEPHALUS: ICD-10-CM

## 2023-09-26 DIAGNOSIS — Z98.2 VP (VENTRICULOPERITONEAL) SHUNT STATUS: ICD-10-CM

## 2023-09-26 DIAGNOSIS — G91.2 NORMAL PRESSURE HYDROCEPHALUS: Primary | ICD-10-CM

## 2023-09-26 PROCEDURE — 1160F RVW MEDS BY RX/DR IN RCRD: CPT | Mod: HCNC,CPTII,S$GLB, | Performed by: PHYSICIAN ASSISTANT

## 2023-09-26 PROCEDURE — 70450 CT HEAD/BRAIN W/O DYE: CPT | Mod: TC,HCNC

## 2023-09-26 PROCEDURE — 1126F AMNT PAIN NOTED NONE PRSNT: CPT | Mod: HCNC,CPTII,S$GLB, | Performed by: PHYSICIAN ASSISTANT

## 2023-09-26 PROCEDURE — 1159F MED LIST DOCD IN RCRD: CPT | Mod: HCNC,CPTII,S$GLB, | Performed by: PHYSICIAN ASSISTANT

## 2023-09-26 PROCEDURE — 99999 PR PBB SHADOW E&M-EST. PATIENT-LVL IV: ICD-10-PCS | Mod: PBBFAC,HCNC,, | Performed by: PHYSICIAN ASSISTANT

## 2023-09-26 PROCEDURE — 70450 CT HEAD/BRAIN W/O DYE: CPT | Mod: 26,HCNC,, | Performed by: RADIOLOGY

## 2023-09-26 PROCEDURE — 70250 XR SKULL SHUNT PLACEMENT 1 VIEW: ICD-10-PCS | Mod: 26,HCNC,, | Performed by: INTERNAL MEDICINE

## 2023-09-26 PROCEDURE — 1159F PR MEDICATION LIST DOCUMENTED IN MEDICAL RECORD: ICD-10-PCS | Mod: HCNC,CPTII,S$GLB, | Performed by: PHYSICIAN ASSISTANT

## 2023-09-26 PROCEDURE — 99213 PR OFFICE/OUTPT VISIT, EST, LEVL III, 20-29 MIN: ICD-10-PCS | Mod: HCNC,S$GLB,, | Performed by: PHYSICIAN ASSISTANT

## 2023-09-26 PROCEDURE — 99213 OFFICE O/P EST LOW 20 MIN: CPT | Mod: HCNC,S$GLB,, | Performed by: PHYSICIAN ASSISTANT

## 2023-09-26 PROCEDURE — 99999 PR PBB SHADOW E&M-EST. PATIENT-LVL IV: CPT | Mod: PBBFAC,HCNC,, | Performed by: PHYSICIAN ASSISTANT

## 2023-09-26 PROCEDURE — 70450 CT HEAD WITHOUT CONTRAST: ICD-10-PCS | Mod: 26,HCNC,, | Performed by: RADIOLOGY

## 2023-09-26 PROCEDURE — 70250 X-RAY EXAM OF SKULL: CPT | Mod: TC,HCNC

## 2023-09-26 PROCEDURE — 1160F PR REVIEW ALL MEDS BY PRESCRIBER/CLIN PHARMACIST DOCUMENTED: ICD-10-PCS | Mod: HCNC,CPTII,S$GLB, | Performed by: PHYSICIAN ASSISTANT

## 2023-09-26 PROCEDURE — 70250 X-RAY EXAM OF SKULL: CPT | Mod: 26,HCNC,, | Performed by: INTERNAL MEDICINE

## 2023-09-26 PROCEDURE — 1126F PR PAIN SEVERITY QUANTIFIED, NO PAIN PRESENT: ICD-10-PCS | Mod: HCNC,CPTII,S$GLB, | Performed by: PHYSICIAN ASSISTANT

## 2023-09-26 RX ORDER — DUPILUMAB 300 MG/2ML
INJECTION, SOLUTION SUBCUTANEOUS
Status: ON HOLD | COMMUNITY
Start: 2023-09-26 | End: 2023-10-23 | Stop reason: HOSPADM

## 2023-09-26 NOTE — PROGRESS NOTES
Neurosurgery  Established Patient    SUBJECTIVE:     History of Present Illness:  88-year-old male with history of NPH and  shunt.  At last visit patient was having increased difficulty with balance and falls.  Shunt was dialed from 160-140 mm of water.  Since that time patient has had continued falls without much improvement in balance.  He was seen in the emergency room on 09/01/2023 after a fall head CT and CT of the cervical spine were negative.  He is here today for follow-up with updated head CT.  They do not report any noticeable improvement in cognitive or urinary function either.  Wife reports most of his falls have occurred secondary to standing and leaning over forward to grab things.  Denies HA, blurred vision or diplopia, speech difficulty, weakness, numbness, seizures.      Review of patient's allergies indicates:   Allergen Reactions    Demerol [meperidine] Nausea Only       Current Outpatient Medications   Medication Sig Dispense Refill    ACCU-CHEK JOAQUIN PLUS METER Misc Three times a day with meals  0    acetaminophen (TYLENOL) 500 MG tablet Take 1 tablet (500 mg total) by mouth every 6 (six) hours as needed for Pain. 13 tablet 0    blood sugar diagnostic (TRUE METRIX GLUCOSE TEST STRIP) Strp  each 12    BOOSTRIX TDAP 2.5-8-5 Lf-mcg-Lf/0.5mL Syrg injection       DUPIXENT  mg/2 mL PnIj       ondansetron (ZOFRAN-ODT) 4 MG TbDL DISSOLVE 2 TABLETS (8 MG TOTAL) ON THE TONGUE EVERY 8 (EIGHT) HOURS AS NEEDED (NAUSEA). 90 tablet 12    alendronate (FOSAMAX) 70 MG tablet TAKE 1 TABLET EVERY 7 DAYS ON TUESDAYS (Patient not taking: Reported on 9/26/2023) 12 tablet 12    ascorbic acid, vitamin C, (VITAMIN C) 250 MG tablet Take 1 tablet (250 mg total) by mouth once daily. (Patient not taking: Reported on 9/26/2023) 120 tablet 0    aspirin (ECOTRIN) 81 MG EC tablet Take 1 tablet (81 mg total) by mouth once daily.  0    ciclopirox (LOPROX) 0.77 % Crea Apply topically 2 (two) times daily. (Patient  not taking: Reported on 9/26/2023) 90 g 2    ciclopirox (PENLAC) 8 % Soln Apply topically nightly. (Patient not taking: Reported on 9/26/2023) 6.6 mL 11    clopidogreL (PLAVIX) 75 mg tablet TAKE 1 TABLET EVERY DAY (Patient not taking: Reported on 9/26/2023) 90 tablet 1    clotrimazole-betamethasone 1-0.05% (LOTRISONE) cream Apply topically 2 (two) times daily. (Patient not taking: Reported on 9/26/2023) 180 g 12    diazePAM (VALIUM) 5 MG tablet TAKE 1 TABLET BY MOUTH EVERY 8 HOURS AS NEEDED. Strength: 5 mg (Patient not taking: Reported on 9/26/2023) 270 tablet 5    docusate sodium (COLACE) 100 MG capsule Take 1 capsule (100 mg total) by mouth 2 (two) times daily. (Patient not taking: Reported on 9/26/2023) 60 capsule 0    fluconazole (DIFLUCAN) 100 MG tablet Take 1 tablet (100 mg total) by mouth once a week. for 4 doses (Patient not taking: Reported on 9/26/2023) 4 tablet 0    fluconazole (DIFLUCAN) 150 MG Tab Take by mouth.      hydrocortisone 1 % cream Apply topically 2 (two) times daily. for 5 days 20 g 0    ketoconazole (NIZORAL) 2 % cream Apply topically 2 (two) times daily.      lactulose (CHRONULAC) 20 gram/30 mL Soln Take 45 mLs (30 g total) by mouth 3 (three) times daily as needed. (Patient not taking: Reported on 9/26/2023) 1500 mL 12    lancets (TRUEPLUS LANCETS) 33 gauge Misc Use to test blood glucose three (3) times a day, discard lancet after each use (Patient not taking: Reported on 9/26/2023) 300 each 3    magnesium sulfate in water (MAGNESIUM SULFATE 2 GRAM/50 ML) 2 gram/50 mL PgBk       metFORMIN (GLUCOPHAGE-XR) 750 MG ER 24hr tablet TAKE 1 TABLET TWICE DAILY WITH MEALS (Patient not taking: Reported on 9/26/2023) 180 tablet 1    metoprolol succinate (TOPROL-XL) 25 MG 24 hr tablet       mometasone 0.1% (ELOCON) 0.1 % cream Apply topically 2 (two) times daily.      olopatadine (PATANOL) 0.1 % ophthalmic solution Place 1 drop into both eyes 2 (two) times daily. (Patient not taking: Reported on  9/26/2023) 5 mL 0    OMNIPAQUE 300 300 mg iodine/mL injection       OMNIPAQUE 350 350 mg iodine/mL Soln injection       ondansetron (ZOFRAN-ODT) 8 MG TbDL       predniSONE (DELTASONE) 10 MG tablet Take 4 tabs x 3 days, then take 2 tabs x 3 days, then take 1 tab x 3 days. (Patient not taking: Reported on 9/26/2023) 21 tablet 0    prochlorperazine (COMPAZINE) 5 MG tablet       rosuvastatin (CRESTOR) 20 MG tablet TAKE 1 TABLET (20 MG TOTAL) BY MOUTH EVERY EVENING. (Patient not taking: Reported on 9/26/2023) 90 tablet 12    senna (SENOKOT) 8.6 mg tablet Take 1 tablet by mouth once daily. (Patient not taking: Reported on 9/26/2023)      TRUE METRIX GLUCOSE TEST STRIP Strp TEST BLOOD SUGAR THREE TIMES DAILY. (Patient not taking: Reported on 9/26/2023) 300 strip 12     No current facility-administered medications for this visit.       Past Medical History:   Diagnosis Date    Actinic keratosis     Anxiety     B12 deficiency 12/8/2014    Dx 6/14    Cancer     skin    Cataract     Coronary artery disease     Diabetes mellitus type II     Diabetes mellitus with peripheral circulatory disorder 6/18/2014    ED (erectile dysfunction)     Hyperlipidemia     Kidney stone     Neurogenic claudication 4/4/2022    Peripheral vascular disease     Spondylosis 3/29/2019    Tobacco dependence     Urinary incontinence 5/4/2021     Past Surgical History:   Procedure Laterality Date    APPENDECTOMY      CARDIAC SURGERY  01/1999    CABG 4 vessels    CATARACT EXTRACTION      EPIDURAL STEROID INJECTION Right 8/26/2020    Procedure: Injection, Steroid, Epidural Transforaminal;  Surgeon: Wiley Crane Jr., MD;  Location: [facility] ENDO;  Service: Pain Management;  Laterality: Right;  Right L5 + S1 TF LEAH  Arrive @ 1115; ASA & Plavix last 8/18; Check BG; Rapid COVID test    EPIDURAL STEROID INJECTION Right 11/25/2020    Procedure: Injection, Steroid, Epidural Transformainal;  Surgeon: iWley Crane Jr., MD;  Location: [facility] ENDO;  Service:  Pain Management;  Laterality: Right;  Right L5 + S1 TF LEAH  Arrive @ 1245; ASA and Plavix last 11/17; Pre-DM; Needs MD Sign.    EPIDURAL STEROID INJECTION Right 2/19/2021    Procedure: Injection, Steroid, Epidural Transforaminal;  Surgeon: Wiley Crane Jr., MD;  Location: [facility] ENDO;  Service: Pain Management;  Laterality: Right;  Right L5 + S1 TF LEAH  Arrive @ 1115; ASA & Plavix last 2/11; Check BG; Needs Consent    EXTERNAL EAR SURGERY      EYE SURGERY      cataracts    ILIAC ARTERY STENT Bilateral 06/2003    also Right External Iliac stent     Family History       Problem Relation (Age of Onset)    Stroke Mother          Social History     Socioeconomic History    Marital status:    Tobacco Use    Smoking status: Every Day     Current packs/day: 1.50     Average packs/day: 1.5 packs/day for 71.0 years (106.5 ttl pk-yrs)     Types: Cigarettes    Smokeless tobacco: Former   Substance and Sexual Activity    Alcohol use: No    Drug use: No    Sexual activity: Not Currently     Partners: Female   Social History Narrative    .  4 children.  Smokes 2 ppd.  Still drives trucks for entertainment industry.      Social Determinants of Health     Financial Resource Strain: Low Risk  (9/15/2023)    Overall Financial Resource Strain (CARDIA)     Difficulty of Paying Living Expenses: Not hard at all   Food Insecurity: No Food Insecurity (9/15/2023)    Hunger Vital Sign     Worried About Running Out of Food in the Last Year: Never true     Ran Out of Food in the Last Year: Never true   Transportation Needs: No Transportation Needs (9/15/2023)    PRAPARE - Transportation     Lack of Transportation (Medical): No     Lack of Transportation (Non-Medical): No   Physical Activity: Inactive (9/15/2023)    Exercise Vital Sign     Days of Exercise per Week: 0 days     Minutes of Exercise per Session: 0 min   Stress: No Stress Concern Present (9/15/2023)    [place] of [employer] - Occupational Stress  "Questionnaire     Feeling of Stress : Not at all   Social Connections: Moderately Isolated (9/15/2023)    Social Connection and Isolation Panel [NHANES]     Frequency of Communication with Friends and Family: More than three times a week     Frequency of Social Gatherings with Friends and Family: More than three times a week     Attends Gnosticism Services: 1 to 4 times per year     Active Member of Clubs or Organizations: No     Attends Club or Organization Meetings: Never     Marital Status:    Housing Stability: Low Risk  (9/15/2023)    Housing Stability Vital Sign     Unable to Pay for Housing in the Last Year: No     Number of Places Lived in the Last Year: 1     Unstable Housing in the Last Year: No       OBJECTIVE:     Vital Signs  Pain Score: 0-No pain  Height: 5' 7" (170.2 cm)  Weight: 59 kg (130 lb)  Body mass index is 20.36 kg/m².      Neurosurgery Physical Exam   General: no distress  Neurologic: Alert and oriented. Thought content appropriate.  Head: normocephalic. Shunt pumps/refills  GCS: Motor: 6/Verbal: 5/Eyes: 4 GCS Total: 15  Cranial nerves: face symmetric, tongue midline, pupils equal, round, reactive to light with accomodation, extraocular muscles intact  Sensory: response to light touch throughout  Motor Strength:generalized weakness, non focal      Diagnostic Results: personally reviewed  EXAMINATION:  CT HEAD WITHOUT CONTRAST     CLINICAL HISTORY:  nph,  shunt; (Idiopathic) normal pressure hydrocephalus     TECHNIQUE:  Low dose axial CT images obtained throughout the head without the use of intravenous contrast.  Axial, sagittal and coronal reconstructions were performed.     COMPARISON:  CT head 09/15/2023     FINDINGS:  Right parietal approach ventricular shunt in stable position with tip in the left lateral ventricle.  Ventricles stable in size without evidence of acute hydrocephalus.  Stable sulcal definition over the cerebral convexities.     The brain parenchyma appears " unchanged.  Patchy areas of hypoattenuation the supratentorial white matter, nonspecific but may reflect mild chronic microvascular ischemic change.  No new parenchymal mass effect, hemorrhage, edema or major vascular distribution infarct.     No extra-axial blood or fluid collections.     Prominent atherosclerotic vascular calcifications at the skull base.     No acute displaced calvarial fracture.  Mastoid air cells and paranasal sinuses are essentially clear.  Postoperative change of the globes.     Impression:     Ventricular shunt in place with stable size and configuration of the ventricles.     No acute intracranial process.     Electronically signed by resident: Pa Cho  Date:                                            09/26/2023  Time:                                           13:55     Electronically signed by: Emiliano Correa  Date:                                            09/26/2023  Time:                                           15:52    ASSESSMENT/PLAN:     88-year-old male with history of NPH and  shunt on 05/31/2021, patient without noticeable improvement status post last shunt adjustment.  Given ongoing balance difficulty and falls  shunt dialed from 140-120 mm of water today.  Will get a skull x-ray to confirm setting and see back in 1 month with a head CT.  If patient does not improvement, may need shunt tap to r/o proximal occlusion.  Encouraged to call the clinic with any questions or concerns in the meantime.      Alex Kathleen Jr. KARLEY  [facility]  Department of Neurosurgery     Note dictated with voice recognition software, please excuse any grammatical errors.

## 2023-10-09 ENCOUNTER — TELEPHONE (OUTPATIENT)
Dept: FAMILY MEDICINE | Facility: CLINIC | Age: 88
End: 2023-10-09
Payer: MEDICARE

## 2023-10-09 ENCOUNTER — HOSPITAL ENCOUNTER (OUTPATIENT)
Facility: HOSPITAL | Age: 88
Discharge: HOME HEALTH | End: 2023-10-11
Attending: EMERGENCY MEDICINE | Admitting: STUDENT IN AN ORGANIZED HEALTH CARE EDUCATION/TRAINING PROGRAM
Payer: MEDICARE

## 2023-10-09 ENCOUNTER — PATIENT OUTREACH (OUTPATIENT)
Dept: ADMINISTRATIVE | Facility: OTHER | Age: 88
End: 2023-10-09
Payer: MEDICARE

## 2023-10-09 DIAGNOSIS — R07.9 CHEST PAIN: ICD-10-CM

## 2023-10-09 DIAGNOSIS — B99.9 INFECTIOUS ENCEPHALOPATHY: ICD-10-CM

## 2023-10-09 DIAGNOSIS — R60.9 EDEMA: ICD-10-CM

## 2023-10-09 DIAGNOSIS — G93.49 INFECTIOUS ENCEPHALOPATHY: ICD-10-CM

## 2023-10-09 DIAGNOSIS — G93.41 ENCEPHALOPATHY, METABOLIC: Primary | ICD-10-CM

## 2023-10-09 PROBLEM — N39.0 UTI (URINARY TRACT INFECTION): Status: ACTIVE | Noted: 2023-10-09

## 2023-10-09 LAB
ALBUMIN SERPL BCP-MCNC: 3 G/DL (ref 3.5–5.2)
ALP SERPL-CCNC: 113 U/L (ref 55–135)
ALT SERPL W/O P-5'-P-CCNC: 27 U/L (ref 10–44)
ANION GAP SERPL CALC-SCNC: 13 MMOL/L (ref 8–16)
AST SERPL-CCNC: 29 U/L (ref 10–40)
BACTERIA #/AREA URNS HPF: ABNORMAL /HPF
BASOPHILS # BLD AUTO: 0.05 K/UL (ref 0–0.2)
BASOPHILS NFR BLD: 0.6 % (ref 0–1.9)
BILIRUB SERPL-MCNC: 0.2 MG/DL (ref 0.1–1)
BILIRUB UR QL STRIP: NEGATIVE
BNP SERPL-MCNC: 151 PG/ML (ref 0–99)
BUN SERPL-MCNC: 22 MG/DL (ref 8–23)
CALCIUM SERPL-MCNC: 8.1 MG/DL (ref 8.7–10.5)
CHLORIDE SERPL-SCNC: 107 MMOL/L (ref 95–110)
CLARITY UR: ABNORMAL
CO2 SERPL-SCNC: 19 MMOL/L (ref 23–29)
COLOR UR: YELLOW
CREAT SERPL-MCNC: 1.8 MG/DL (ref 0.5–1.4)
CTP QC/QA: YES
DIFFERENTIAL METHOD: ABNORMAL
EOSINOPHIL # BLD AUTO: 1.1 K/UL (ref 0–0.5)
EOSINOPHIL NFR BLD: 13.1 % (ref 0–8)
ERYTHROCYTE [DISTWIDTH] IN BLOOD BY AUTOMATED COUNT: 15.8 % (ref 11.5–14.5)
EST. GFR  (NO RACE VARIABLE): 36 ML/MIN/1.73 M^2
GLUCOSE SERPL-MCNC: 110 MG/DL (ref 70–110)
GLUCOSE UR QL STRIP: NEGATIVE
HCT VFR BLD AUTO: 29.5 % (ref 40–54)
HGB BLD-MCNC: 9.8 G/DL (ref 14–18)
HGB UR QL STRIP: NEGATIVE
HYALINE CASTS #/AREA URNS LPF: 0 /LPF
IMM GRANULOCYTES # BLD AUTO: 0.03 K/UL (ref 0–0.04)
IMM GRANULOCYTES NFR BLD AUTO: 0.3 % (ref 0–0.5)
INR PPP: 1 (ref 0.8–1.2)
KETONES UR QL STRIP: NEGATIVE
LACTATE SERPL-SCNC: 0.9 MMOL/L (ref 0.5–2.2)
LEUKOCYTE ESTERASE UR QL STRIP: ABNORMAL
LIPASE SERPL-CCNC: 12 U/L (ref 4–60)
LYMPHOCYTES # BLD AUTO: 2 K/UL (ref 1–4.8)
LYMPHOCYTES NFR BLD: 22.5 % (ref 18–48)
MAGNESIUM SERPL-MCNC: 2 MG/DL (ref 1.6–2.6)
MCH RBC QN AUTO: 31.3 PG (ref 27–31)
MCHC RBC AUTO-ENTMCNC: 33.2 G/DL (ref 32–36)
MCV RBC AUTO: 94 FL (ref 82–98)
MICROSCOPIC COMMENT: ABNORMAL
MONOCYTES # BLD AUTO: 1.1 K/UL (ref 0.3–1)
MONOCYTES NFR BLD: 12.2 % (ref 4–15)
NEUTROPHILS # BLD AUTO: 4.5 K/UL (ref 1.8–7.7)
NEUTROPHILS NFR BLD: 51.3 % (ref 38–73)
NITRITE UR QL STRIP: NEGATIVE
NRBC BLD-RTO: 0 /100 WBC
PH UR STRIP: 8 [PH] (ref 5–8)
PLATELET # BLD AUTO: 332 K/UL (ref 150–450)
PMV BLD AUTO: 9.4 FL (ref 9.2–12.9)
POCT GLUCOSE: 222 MG/DL (ref 70–110)
POTASSIUM SERPL-SCNC: 5.5 MMOL/L (ref 3.5–5.1)
PROT SERPL-MCNC: 7.4 G/DL (ref 6–8.4)
PROT UR QL STRIP: ABNORMAL
PROTHROMBIN TIME: 10.6 SEC (ref 9–12.5)
RBC # BLD AUTO: 3.13 M/UL (ref 4.6–6.2)
RBC #/AREA URNS HPF: 5 /HPF (ref 0–4)
SARS-COV-2 RDRP RESP QL NAA+PROBE: NEGATIVE
SODIUM SERPL-SCNC: 139 MMOL/L (ref 136–145)
SP GR UR STRIP: 1.01 (ref 1–1.03)
SQUAMOUS #/AREA URNS HPF: 5 /HPF
T4 FREE SERPL-MCNC: 0.89 NG/DL (ref 0.71–1.51)
TROPONIN I SERPL DL<=0.01 NG/ML-MCNC: 0.02 NG/ML (ref 0–0.03)
TSH SERPL DL<=0.005 MIU/L-ACNC: 4.18 UIU/ML (ref 0.4–4)
URN SPEC COLLECT METH UR: ABNORMAL
UROBILINOGEN UR STRIP-ACNC: NEGATIVE EU/DL
WBC # BLD AUTO: 8.72 K/UL (ref 3.9–12.7)
WBC #/AREA URNS HPF: >100 /HPF (ref 0–5)
WBC CLUMPS URNS QL MICRO: ABNORMAL

## 2023-10-09 PROCEDURE — 84439 ASSAY OF FREE THYROXINE: CPT | Mod: HCNC | Performed by: EMERGENCY MEDICINE

## 2023-10-09 PROCEDURE — 87186 SC STD MICRODIL/AGAR DIL: CPT | Mod: HCNC | Performed by: EMERGENCY MEDICINE

## 2023-10-09 PROCEDURE — 84484 ASSAY OF TROPONIN QUANT: CPT | Mod: HCNC | Performed by: EMERGENCY MEDICINE

## 2023-10-09 PROCEDURE — 83690 ASSAY OF LIPASE: CPT | Mod: HCNC | Performed by: EMERGENCY MEDICINE

## 2023-10-09 PROCEDURE — 93010 ELECTROCARDIOGRAM REPORT: CPT | Mod: HCNC,,, | Performed by: INTERNAL MEDICINE

## 2023-10-09 PROCEDURE — 83605 ASSAY OF LACTIC ACID: CPT | Mod: HCNC | Performed by: EMERGENCY MEDICINE

## 2023-10-09 PROCEDURE — 82962 GLUCOSE BLOOD TEST: CPT | Mod: HCNC

## 2023-10-09 PROCEDURE — 87086 URINE CULTURE/COLONY COUNT: CPT | Mod: HCNC | Performed by: EMERGENCY MEDICINE

## 2023-10-09 PROCEDURE — 81000 URINALYSIS NONAUTO W/SCOPE: CPT | Mod: HCNC | Performed by: EMERGENCY MEDICINE

## 2023-10-09 PROCEDURE — 93010 EKG 12-LEAD: ICD-10-PCS | Mod: HCNC,,, | Performed by: INTERNAL MEDICINE

## 2023-10-09 PROCEDURE — 84443 ASSAY THYROID STIM HORMONE: CPT | Mod: HCNC | Performed by: EMERGENCY MEDICINE

## 2023-10-09 PROCEDURE — 25000003 PHARM REV CODE 250: Mod: HCNC | Performed by: STUDENT IN AN ORGANIZED HEALTH CARE EDUCATION/TRAINING PROGRAM

## 2023-10-09 PROCEDURE — 85610 PROTHROMBIN TIME: CPT | Mod: HCNC | Performed by: EMERGENCY MEDICINE

## 2023-10-09 PROCEDURE — 25000003 PHARM REV CODE 250: Mod: HCNC | Performed by: EMERGENCY MEDICINE

## 2023-10-09 PROCEDURE — 96365 THER/PROPH/DIAG IV INF INIT: CPT | Mod: HCNC

## 2023-10-09 PROCEDURE — 87088 URINE BACTERIA CULTURE: CPT | Mod: HCNC | Performed by: EMERGENCY MEDICINE

## 2023-10-09 PROCEDURE — 83880 ASSAY OF NATRIURETIC PEPTIDE: CPT | Mod: HCNC | Performed by: EMERGENCY MEDICINE

## 2023-10-09 PROCEDURE — 80053 COMPREHEN METABOLIC PANEL: CPT | Mod: HCNC | Performed by: EMERGENCY MEDICINE

## 2023-10-09 PROCEDURE — 63600175 PHARM REV CODE 636 W HCPCS: Mod: HCNC | Performed by: EMERGENCY MEDICINE

## 2023-10-09 PROCEDURE — G0378 HOSPITAL OBSERVATION PER HR: HCPCS | Mod: HCNC

## 2023-10-09 PROCEDURE — 87077 CULTURE AEROBIC IDENTIFY: CPT | Mod: HCNC | Performed by: EMERGENCY MEDICINE

## 2023-10-09 PROCEDURE — 83735 ASSAY OF MAGNESIUM: CPT | Mod: HCNC | Performed by: EMERGENCY MEDICINE

## 2023-10-09 PROCEDURE — 96372 THER/PROPH/DIAG INJ SC/IM: CPT | Mod: 59 | Performed by: STUDENT IN AN ORGANIZED HEALTH CARE EDUCATION/TRAINING PROGRAM

## 2023-10-09 PROCEDURE — 96375 TX/PRO/DX INJ NEW DRUG ADDON: CPT | Mod: HCNC

## 2023-10-09 PROCEDURE — 87635 SARS-COV-2 COVID-19 AMP PRB: CPT | Mod: HCNC | Performed by: EMERGENCY MEDICINE

## 2023-10-09 PROCEDURE — 63600175 PHARM REV CODE 636 W HCPCS: Mod: HCNC | Performed by: STUDENT IN AN ORGANIZED HEALTH CARE EDUCATION/TRAINING PROGRAM

## 2023-10-09 PROCEDURE — 93005 ELECTROCARDIOGRAM TRACING: CPT | Mod: HCNC

## 2023-10-09 PROCEDURE — 85025 COMPLETE CBC W/AUTO DIFF WBC: CPT | Mod: HCNC | Performed by: EMERGENCY MEDICINE

## 2023-10-09 PROCEDURE — 99285 EMERGENCY DEPT VISIT HI MDM: CPT | Mod: 25,HCNC

## 2023-10-09 RX ORDER — MAG HYDROX/ALUMINUM HYD/SIMETH 200-200-20
30 SUSPENSION, ORAL (FINAL DOSE FORM) ORAL 4 TIMES DAILY PRN
Status: DISCONTINUED | OUTPATIENT
Start: 2023-10-09 | End: 2023-10-11 | Stop reason: HOSPADM

## 2023-10-09 RX ORDER — LANOLIN ALCOHOL/MO/W.PET/CERES
800 CREAM (GRAM) TOPICAL
Status: DISCONTINUED | OUTPATIENT
Start: 2023-10-09 | End: 2023-10-11 | Stop reason: HOSPADM

## 2023-10-09 RX ORDER — INSULIN ASPART 100 [IU]/ML
0-5 INJECTION, SOLUTION INTRAVENOUS; SUBCUTANEOUS
Status: DISCONTINUED | OUTPATIENT
Start: 2023-10-09 | End: 2023-10-11 | Stop reason: HOSPADM

## 2023-10-09 RX ORDER — ACETAMINOPHEN 325 MG/1
650 TABLET ORAL EVERY 4 HOURS PRN
Status: DISCONTINUED | OUTPATIENT
Start: 2023-10-09 | End: 2023-10-11 | Stop reason: HOSPADM

## 2023-10-09 RX ORDER — GLUCAGON 1 MG
1 KIT INJECTION
Status: DISCONTINUED | OUTPATIENT
Start: 2023-10-09 | End: 2023-10-11 | Stop reason: HOSPADM

## 2023-10-09 RX ORDER — SODIUM,POTASSIUM PHOSPHATES 280-250MG
2 POWDER IN PACKET (EA) ORAL
Status: DISCONTINUED | OUTPATIENT
Start: 2023-10-09 | End: 2023-10-11 | Stop reason: HOSPADM

## 2023-10-09 RX ORDER — SODIUM CHLORIDE 0.9 % (FLUSH) 0.9 %
10 SYRINGE (ML) INJECTION EVERY 12 HOURS PRN
Status: DISCONTINUED | OUTPATIENT
Start: 2023-10-09 | End: 2023-10-11 | Stop reason: HOSPADM

## 2023-10-09 RX ORDER — IBUPROFEN 200 MG
16 TABLET ORAL
Status: DISCONTINUED | OUTPATIENT
Start: 2023-10-09 | End: 2023-10-11 | Stop reason: HOSPADM

## 2023-10-09 RX ORDER — OXYCODONE HYDROCHLORIDE 5 MG/1
5 TABLET ORAL EVERY 6 HOURS PRN
Status: DISCONTINUED | OUTPATIENT
Start: 2023-10-09 | End: 2023-10-11 | Stop reason: HOSPADM

## 2023-10-09 RX ORDER — BISACODYL 10 MG
10 SUPPOSITORY, RECTAL RECTAL DAILY PRN
Status: DISCONTINUED | OUTPATIENT
Start: 2023-10-09 | End: 2023-10-11 | Stop reason: HOSPADM

## 2023-10-09 RX ORDER — PROCHLORPERAZINE EDISYLATE 5 MG/ML
5 INJECTION INTRAMUSCULAR; INTRAVENOUS EVERY 6 HOURS PRN
Status: DISCONTINUED | OUTPATIENT
Start: 2023-10-09 | End: 2023-10-11 | Stop reason: HOSPADM

## 2023-10-09 RX ORDER — ACETAMINOPHEN 325 MG/1
650 TABLET ORAL EVERY 8 HOURS PRN
Status: DISCONTINUED | OUTPATIENT
Start: 2023-10-09 | End: 2023-10-11 | Stop reason: HOSPADM

## 2023-10-09 RX ORDER — NALOXONE HCL 0.4 MG/ML
0.02 VIAL (ML) INJECTION
Status: DISCONTINUED | OUTPATIENT
Start: 2023-10-09 | End: 2023-10-11 | Stop reason: HOSPADM

## 2023-10-09 RX ORDER — ASPIRIN 81 MG/1
81 TABLET ORAL DAILY
Status: DISCONTINUED | OUTPATIENT
Start: 2023-10-10 | End: 2023-10-11 | Stop reason: HOSPADM

## 2023-10-09 RX ORDER — SIMETHICONE 80 MG
1 TABLET,CHEWABLE ORAL 4 TIMES DAILY PRN
Status: DISCONTINUED | OUTPATIENT
Start: 2023-10-09 | End: 2023-10-11 | Stop reason: HOSPADM

## 2023-10-09 RX ORDER — IPRATROPIUM BROMIDE AND ALBUTEROL SULFATE 2.5; .5 MG/3ML; MG/3ML
3 SOLUTION RESPIRATORY (INHALATION) EVERY 4 HOURS PRN
Status: DISCONTINUED | OUTPATIENT
Start: 2023-10-09 | End: 2023-10-11 | Stop reason: HOSPADM

## 2023-10-09 RX ORDER — CLOPIDOGREL BISULFATE 75 MG/1
75 TABLET ORAL DAILY
Status: DISCONTINUED | OUTPATIENT
Start: 2023-10-10 | End: 2023-10-11 | Stop reason: HOSPADM

## 2023-10-09 RX ORDER — POLYETHYLENE GLYCOL 3350 17 G/17G
17 POWDER, FOR SOLUTION ORAL DAILY
Status: DISCONTINUED | OUTPATIENT
Start: 2023-10-10 | End: 2023-10-11 | Stop reason: HOSPADM

## 2023-10-09 RX ORDER — ATORVASTATIN CALCIUM 40 MG/1
80 TABLET, FILM COATED ORAL DAILY
Status: DISCONTINUED | OUTPATIENT
Start: 2023-10-10 | End: 2023-10-11 | Stop reason: HOSPADM

## 2023-10-09 RX ORDER — TALC
6 POWDER (GRAM) TOPICAL NIGHTLY PRN
Status: DISCONTINUED | OUTPATIENT
Start: 2023-10-09 | End: 2023-10-11 | Stop reason: HOSPADM

## 2023-10-09 RX ORDER — FUROSEMIDE 10 MG/ML
40 INJECTION INTRAMUSCULAR; INTRAVENOUS
Status: COMPLETED | OUTPATIENT
Start: 2023-10-09 | End: 2023-10-09

## 2023-10-09 RX ORDER — ONDANSETRON 2 MG/ML
4 INJECTION INTRAMUSCULAR; INTRAVENOUS EVERY 8 HOURS PRN
Status: DISCONTINUED | OUTPATIENT
Start: 2023-10-09 | End: 2023-10-11 | Stop reason: HOSPADM

## 2023-10-09 RX ORDER — IBUPROFEN 200 MG
24 TABLET ORAL
Status: DISCONTINUED | OUTPATIENT
Start: 2023-10-09 | End: 2023-10-11 | Stop reason: HOSPADM

## 2023-10-09 RX ADMIN — CEFTRIAXONE 1 G: 1 INJECTION, POWDER, FOR SOLUTION INTRAMUSCULAR; INTRAVENOUS at 05:10

## 2023-10-09 RX ADMIN — OXYCODONE HYDROCHLORIDE 5 MG: 5 TABLET ORAL at 08:10

## 2023-10-09 RX ADMIN — INSULIN ASPART 2 UNITS: 100 INJECTION, SOLUTION INTRAVENOUS; SUBCUTANEOUS at 08:10

## 2023-10-09 RX ADMIN — FUROSEMIDE 40 MG: 10 INJECTION, SOLUTION INTRAVENOUS at 05:10

## 2023-10-09 RX ADMIN — SODIUM ZIRCONIUM CYCLOSILICATE 5 G: 5 POWDER, FOR SUSPENSION ORAL at 11:10

## 2023-10-09 NOTE — ED PROVIDER NOTES
Encounter Date: 10/9/2023    SCRIBE #1 NOTE: I, Eli Manzanares, am scribing for, and in the presence of,  Brendan Virk MD.       History     Chief Complaint   Patient presents with    Foot Pain     Pt BIB EMS c/o pain to heels of feet with swelling and rash on bilateral arms x4 days.     89 yo M with a PMHx of  CAD s/p CABG, DM, COPD, AAA without rupture, spondylosis, HTN, and HLD, presenting to the ED for a chronic, persistent rash from his bilateral feet radiating up to his neck since 6/2023. Also reports back and flank pain. Patient admits to self discontinuing all his home medications (including plavix) a couple of weeks ago.     Additional history is provided by independent historian, pt's daughter (Brenda Chandler #[phone]).   Since over the weekend, he had a change in mental status, along with worsening anasarca including to his abdomen developing overnight, which is atypical for him. Has some baseline bl leg swelling. She reports he has been hallucinating, calling friends and family members in the middle of the night claiming he is returning calls when they never called him. Daughter also notes urinary retention. She notes the rash has been ongoing for a while, had recently seen dermatology and biopsy done without any definitive diagnosis, but possibly eczema or psoriasis. He has been prescribed dupixent since and has had 2 injections. No hx of falls, trauma. No hx of dementia.     The history is provided by the patient and a significant other.     Review of patient's allergies indicates:   Allergen Reactions    Demerol [meperidine] Nausea Only     Past Medical History:   Diagnosis Date    Actinic keratosis     Anxiety     B12 deficiency 12/8/2014    Dx 6/14    Cancer     skin    Cataract     Coronary artery disease     Diabetes mellitus type II     Diabetes mellitus with peripheral circulatory disorder 6/18/2014    ED (erectile dysfunction)     Hyperlipidemia     Kidney stone     Neurogenic  claudication 4/4/2022    Peripheral vascular disease     Spondylosis 3/29/2019    Tobacco dependence     Urinary incontinence 5/4/2021     Past Surgical History:   Procedure Laterality Date    APPENDECTOMY      CARDIAC SURGERY  01/1999    CABG 4 vessels    CATARACT EXTRACTION      EPIDURAL STEROID INJECTION Right 8/26/2020    Procedure: Injection, Steroid, Epidural Transforaminal;  Surgeon: Wiley Crane Jr., MD;  Location: [facility] ENDO;  Service: Pain Management;  Laterality: Right;  Right L5 + S1 TF LEAH  Arrive @ 1115; ASA & Plavix last 8/18; Check BG; Rapid COVID test    EPIDURAL STEROID INJECTION Right 11/25/2020    Procedure: Injection, Steroid, Epidural Transformainal;  Surgeon: Wiley Crane Jr., MD;  Location: [facility] ENDO;  Service: Pain Management;  Laterality: Right;  Right L5 + S1 TF LEAH  Arrive @ 1245; ASA and Plavix last 11/17; Pre-DM; Needs MD Sign.    EPIDURAL STEROID INJECTION Right 2/19/2021    Procedure: Injection, Steroid, Epidural Transforaminal;  Surgeon: Wiley Crane Jr., MD;  Location: [facility] ENDO;  Service: Pain Management;  Laterality: Right;  Right L5 + S1 TF LEAH  Arrive @ 1115; ASA & Plavix last 2/11; Check BG; Needs Consent    EXTERNAL EAR SURGERY      EYE SURGERY      cataracts    ILIAC ARTERY STENT Bilateral 06/2003    also Right External Iliac stent     Family History   Problem Relation Age of Onset    Stroke Mother      Social History     Tobacco Use    Smoking status: Every Day     Current packs/day: 1.50     Average packs/day: 1.5 packs/day for 71.0 years (106.5 ttl pk-yrs)     Types: Cigarettes    Smokeless tobacco: Former   Substance Use Topics    Alcohol use: No    Drug use: No     Review of Systems   Constitutional:  Negative for chills and fever.   HENT:  Negative for congestion, rhinorrhea and sore throat.    Eyes:  Negative for visual disturbance.   Respiratory:  Negative for cough and shortness of breath.    Cardiovascular:  Positive for leg swelling. Negative  for chest pain.   Gastrointestinal:  Positive for abdominal distention. Negative for abdominal pain, diarrhea, nausea and vomiting.   Genitourinary:  Positive for flank pain. Negative for dysuria, frequency and hematuria.   Musculoskeletal:  Positive for back pain.   Skin:  Positive for rash.   Neurological:  Negative for dizziness, weakness and headaches.       Physical Exam     Initial Vitals [10/09/23 1312]   BP Pulse Resp Temp SpO2   130/70 100 16 98.2 °F (36.8 °C) 96 %      MAP       --         Physical Exam    Nursing note and vitals reviewed.  Constitutional: He appears well-developed and well-nourished.   HENT:   Head: Normocephalic and atraumatic.   Eyes: EOM are normal. Pupils are equal, round, and reactive to light.   Neck: Neck supple. No thyromegaly present. No JVD present.   Normal range of motion.  Cardiovascular:  Normal rate and regular rhythm.     Exam reveals no gallop and no friction rub.       No murmur heard.  Pulmonary/Chest: Breath sounds normal. No respiratory distress.   Abdominal: Abdomen is soft. Bowel sounds are normal. There is no abdominal tenderness.   Musculoskeletal:         General: Edema present. No tenderness. Normal range of motion.      Cervical back: Normal range of motion and neck supple.     Neurological: He is alert and oriented to person, place, and time. He has normal strength. GCS eye subscore is 4. GCS verbal subscore is 5. GCS motor subscore is 6.   Skin: Skin is warm. Capillary refill takes less than 2 seconds.   Diffuse dry, peeing skin. Diffuse erythema, lacy in parts, covalences to solid, not maculopapular, +smooth.   Psychiatric: He has a normal mood and affect. His behavior is normal. Thought content normal.         ED Course   Procedures  Labs Reviewed   CBC W/ AUTO DIFFERENTIAL - Abnormal; Notable for the following components:       Result Value    RBC 3.13 (*)     Hemoglobin 9.8 (*)     Hematocrit 29.5 (*)     MCH 31.3 (*)     RDW 15.8 (*)     Mono # 1.1 (*)      Eos # 1.1 (*)     Eosinophil % 13.1 (*)     All other components within normal limits   COMPREHENSIVE METABOLIC PANEL - Abnormal; Notable for the following components:    Potassium 5.5 (*)     CO2 19 (*)     Creatinine 1.8 (*)     Calcium 8.1 (*)     Albumin 3.0 (*)     eGFR 36 (*)     All other components within normal limits   URINALYSIS, REFLEX TO URINE CULTURE - Abnormal; Notable for the following components:    Appearance, UA Hazy (*)     Protein, UA 1+ (*)     Leukocytes, UA 3+ (*)     All other components within normal limits    Narrative:     Specimen Source->Urine   TSH - Abnormal; Notable for the following components:    TSH 4.178 (*)     All other components within normal limits   B-TYPE NATRIURETIC PEPTIDE - Abnormal; Notable for the following components:     (*)     All other components within normal limits   URINALYSIS MICROSCOPIC - Abnormal; Notable for the following components:    RBC, UA 5 (*)     WBC, UA >100 (*)     Bacteria Few (*)     All other components within normal limits    Narrative:     Specimen Source->Urine   LACTIC ACID, PLASMA   PROTIME-INR   TROPONIN I   MAGNESIUM   LIPASE   T4, FREE   SARS-COV-2 RDRP GENE   POCT GLUCOSE, HAND-HELD DEVICE          Imaging Results              CT Head Without Contrast (Final result)  Result time 10/09/23 16:51:59      Final result by Ezio Aparicio MD (10/09/23 16:51:59)                   Impression:      1. No acute intracranial process.  2. Involutional changes with chronic microvascular ischemic changes.  3. Shunt catheter on the right with stable ventricular size.      Electronically signed by: Ezio Aparicio  Date:    10/09/2023  Time:    16:51               Narrative:    EXAMINATION:  CT HEAD WITHOUT CONTRAST    CLINICAL HISTORY:  Mental status change, unknown cause;    TECHNIQUE:  Low dose axial CT images obtained throughout the head without intravenous contrast. Sagittal and coronal reconstructions were  performed.    COMPARISON:  09/26/2023    FINDINGS:  Intracranial compartment:    No midline shift or acute hydrocephalus.  No significant change in ventricular size.  No extra-axial blood or fluid collections.    Moderate involutional changes and chronic microvascular ischemic changes in the periventricular white matter.    Shunt catheter entering from a right posterior parietal approach with tip crossing the midline to the left lateral ventricle similar to the prior study.    No parenchymal mass, hemorrhage, edema or major vascular distribution infarct.    Skull/extracranial contents (limited evaluation): No fracture. Mastoid air cells and paranasal sinuses are essentially clear.                                       X-Ray Chest AP Portable (Final result)  Result time 10/09/23 15:53:06      Final result by Trini Alberto MD (10/09/23 15:53:06)                   Impression:      Diffuse increased interstitial lung markings, accentuated from the prior study, could be seen with increased pulmonary venous pressure.  Could be seen also within underlying inflammatory/infectious process.      Electronically signed by: Trini Alberto MD  Date:    10/09/2023  Time:    15:53               Narrative:    EXAMINATION:  XR CHEST AP PORTABLE    CLINICAL HISTORY:  EDEMA;    TECHNIQUE:  Single frontal view of the chest was performed.    COMPARISON:  09/15/2023    FINDINGS:  Sternotomy wires.    Ventriculoperitoneal shunt coursing over the right hemithorax.    The cardiac silhouette is normal in size, midline.    Mild nonspecific diffuse increased interstitial lung markings, accentuated from the prior study, this can be seen with mild increased pulmonary venous pressure.  Can also be seen with an underlying inflammatory process.  No focal airspace disease.    No pleural effusion.  No pneumothorax.    The osseous structures appear normal.                                       Medications   aspirin EC tablet 81 mg (81 mg  Oral Given 10/10/23 0915)   clopidogreL tablet 75 mg (75 mg Oral Given 10/10/23 0915)   atorvastatin tablet 80 mg (80 mg Oral Given 10/10/23 0915)   sodium chloride 0.9% flush 10 mL (has no administration in time range)   albuterol-ipratropium 2.5 mg-0.5 mg/3 mL nebulizer solution 3 mL (has no administration in time range)   melatonin tablet 6 mg (has no administration in time range)   ondansetron injection 4 mg (has no administration in time range)   prochlorperazine injection Soln 5 mg (has no administration in time range)   polyethylene glycol packet 17 g (17 g Oral Given 10/10/23 0915)   bisacodyL suppository 10 mg (has no administration in time range)   acetaminophen tablet 650 mg (has no administration in time range)   simethicone chewable tablet 80 mg (has no administration in time range)   aluminum-magnesium hydroxide-simethicone 200-200-20 mg/5 mL suspension 30 mL (has no administration in time range)   acetaminophen tablet 650 mg (has no administration in time range)   naloxone 0.4 mg/mL injection 0.02 mg (has no administration in time range)   potassium bicarbonate disintegrating tablet 50 mEq (has no administration in time range)   potassium bicarbonate disintegrating tablet 35 mEq (has no administration in time range)   potassium bicarbonate disintegrating tablet 60 mEq (has no administration in time range)   magnesium oxide tablet 800 mg (has no administration in time range)   magnesium oxide tablet 800 mg (has no administration in time range)   potassium, sodium phosphates 280-160-250 mg packet 2 packet (has no administration in time range)   potassium, sodium phosphates 280-160-250 mg packet 2 packet (has no administration in time range)   potassium, sodium phosphates 280-160-250 mg packet 2 packet (has no administration in time range)   glucose chewable tablet 16 g (has no administration in time range)   glucose chewable tablet 24 g (has no administration in time range)   glucagon (human  recombinant) injection 1 mg (has no administration in time range)   insulin aspart U-100 pen 0-5 Units (2 Units Subcutaneous Given 10/9/23 2041)   dextrose 10% bolus 125 mL 125 mL (has no administration in time range)   dextrose 10% bolus 250 mL 250 mL (has no administration in time range)   cefTRIAXone (ROCEPHIN) 2 g in dextrose 5 % in water (D5W) 100 mL IVPB (MB+) (has no administration in time range)   oxyCODONE immediate release tablet 5 mg (5 mg Oral Given 10/9/23 2037)   furosemide injection 40 mg (40 mg Intravenous Given 10/9/23 1732)   cefTRIAXone (ROCEPHIN) 1 g in dextrose 5 % in water (D5W) 100 mL IVPB (MB+) (0 g Intravenous Stopped 10/9/23 1802)   sodium zirconium cyclosilicate packet 5 g (5 g Oral Given 10/9/23 2347)   sodium zirconium cyclosilicate packet 10 g (10 g Oral Given 10/10/23 0601)     Medical Decision Making  Amount and/or Complexity of Data Reviewed  Labs: ordered. Decision-making details documented in ED Course.  Radiology: ordered. Decision-making details documented in ED Course.  ECG/medicine tests: ordered and independent interpretation performed. Decision-making details documented in ED Course.    Risk  Prescription drug management.    Pt sent in by family for confusion, insomnia, wandering, talking to dead relatives. No history of dementia or similar problems. Denies gradual decline. Pt lives with daughter so is seen on a daily basis. Pt also having diffuse rash for months. No with increasing edema. Work up shows UTI. Will Obs for infectious encephalopathy.         Scribe Attestation:   Scribe #1: I performed the above scribed service and the documentation accurately describes the services I performed. I attest to the accuracy of the note.                      I, Brendan Virk, personally performed the services described in this documentation. All medical record entries made by the scribe were at my direction and in my presence. I have reviewed the chart and agree that the record  reflects my personal performance and is accurate and complete.    Clinical Impression:   Final diagnoses:  [R60.9] Edema  [G93.49, B99.9] Infectious encephalopathy        ED Disposition Condition    Observation                 Brendan Virk MD  10/10/23 0891

## 2023-10-09 NOTE — PROGRESS NOTES
CHW - Case Closure    This Community Health Worker spoke to patient via telephone today.   Pt reported: Patient has no needs, nor request assistance.  Pt denied any additional needs at this time and agrees with episode closure at this time.  Provided patient with Community Health Worker's contact information and encouraged him to contact this Community Health Worker if additional needs arise.

## 2023-10-09 NOTE — ED TRIAGE NOTES
"Pt presents to ED via EMS with c/o rash to limbs and trunk x several months. Has been seen by dermatology and infectious disease for same. Recently had biopsy of rash, and was dx with psoriasis. Using topicals without relief. States can be itchy at times. Also having swelling to hands and feet for the same amount of time. Denies CP, SOB. States has been saying "goofy" things the last few days. Reports urinary frequency and urgency. Denies weakness, slurred speech, HA, or vision changes.   "

## 2023-10-09 NOTE — TELEPHONE ENCOUNTER
Spoke with daughter and Dr. Del Toro in regards to lift chair. Informed Dr. Del Toro that the patient is in the ER at the moment.

## 2023-10-10 PROBLEM — R21 RASH: Status: ACTIVE | Noted: 2023-10-10

## 2023-10-10 LAB
ALBUMIN SERPL BCP-MCNC: 2.9 G/DL (ref 3.5–5.2)
ALP SERPL-CCNC: 94 U/L (ref 55–135)
ALT SERPL W/O P-5'-P-CCNC: 26 U/L (ref 10–44)
ANION GAP SERPL CALC-SCNC: 11 MMOL/L (ref 8–16)
ANION GAP SERPL CALC-SCNC: 12 MMOL/L (ref 8–16)
AST SERPL-CCNC: 27 U/L (ref 10–40)
BASOPHILS # BLD AUTO: 0.08 K/UL (ref 0–0.2)
BASOPHILS NFR BLD: 0.9 % (ref 0–1.9)
BILIRUB DIRECT SERPL-MCNC: 0.1 MG/DL (ref 0.1–0.3)
BILIRUB SERPL-MCNC: 0.3 MG/DL (ref 0.1–1)
BUN SERPL-MCNC: 19 MG/DL (ref 8–23)
BUN SERPL-MCNC: 19 MG/DL (ref 8–23)
CALCIUM SERPL-MCNC: 7.9 MG/DL (ref 8.7–10.5)
CALCIUM SERPL-MCNC: 8 MG/DL (ref 8.7–10.5)
CHLORIDE SERPL-SCNC: 109 MMOL/L (ref 95–110)
CHLORIDE SERPL-SCNC: 109 MMOL/L (ref 95–110)
CO2 SERPL-SCNC: 19 MMOL/L (ref 23–29)
CO2 SERPL-SCNC: 21 MMOL/L (ref 23–29)
CREAT SERPL-MCNC: 1.7 MG/DL (ref 0.5–1.4)
CREAT SERPL-MCNC: 1.7 MG/DL (ref 0.5–1.4)
DIFFERENTIAL METHOD: ABNORMAL
EOSINOPHIL # BLD AUTO: 0.8 K/UL (ref 0–0.5)
EOSINOPHIL NFR BLD: 8.7 % (ref 0–8)
ERYTHROCYTE [DISTWIDTH] IN BLOOD BY AUTOMATED COUNT: 15.7 % (ref 11.5–14.5)
EST. GFR  (NO RACE VARIABLE): 39 ML/MIN/1.73 M^2
EST. GFR  (NO RACE VARIABLE): 39 ML/MIN/1.73 M^2
GLUCOSE SERPL-MCNC: 116 MG/DL (ref 70–110)
GLUCOSE SERPL-MCNC: 135 MG/DL (ref 70–110)
HCT VFR BLD AUTO: 30.6 % (ref 40–54)
HGB BLD-MCNC: 9.9 G/DL (ref 14–18)
IMM GRANULOCYTES # BLD AUTO: 0.03 K/UL (ref 0–0.04)
IMM GRANULOCYTES NFR BLD AUTO: 0.3 % (ref 0–0.5)
INR PPP: 1 (ref 0.8–1.2)
LYMPHOCYTES # BLD AUTO: 1.8 K/UL (ref 1–4.8)
LYMPHOCYTES NFR BLD: 19.1 % (ref 18–48)
MAGNESIUM SERPL-MCNC: 1.9 MG/DL (ref 1.6–2.6)
MCH RBC QN AUTO: 31.2 PG (ref 27–31)
MCHC RBC AUTO-ENTMCNC: 32.4 G/DL (ref 32–36)
MCV RBC AUTO: 97 FL (ref 82–98)
MONOCYTES # BLD AUTO: 1.2 K/UL (ref 0.3–1)
MONOCYTES NFR BLD: 13.2 % (ref 4–15)
NEUTROPHILS # BLD AUTO: 5.4 K/UL (ref 1.8–7.7)
NEUTROPHILS NFR BLD: 57.8 % (ref 38–73)
NRBC BLD-RTO: 0 /100 WBC
PHOSPHATE SERPL-MCNC: 5 MG/DL (ref 2.7–4.5)
PLATELET # BLD AUTO: 362 K/UL (ref 150–450)
PMV BLD AUTO: 9.6 FL (ref 9.2–12.9)
POCT GLUCOSE: 102 MG/DL (ref 70–110)
POCT GLUCOSE: 103 MG/DL (ref 70–110)
POCT GLUCOSE: 149 MG/DL (ref 70–110)
POCT GLUCOSE: 193 MG/DL (ref 70–110)
POTASSIUM SERPL-SCNC: 5.2 MMOL/L (ref 3.5–5.1)
POTASSIUM SERPL-SCNC: 5.3 MMOL/L (ref 3.5–5.1)
PROT SERPL-MCNC: 7.2 G/DL (ref 6–8.4)
PROTHROMBIN TIME: 10.9 SEC (ref 9–12.5)
RBC # BLD AUTO: 3.17 M/UL (ref 4.6–6.2)
SODIUM SERPL-SCNC: 140 MMOL/L (ref 136–145)
SODIUM SERPL-SCNC: 141 MMOL/L (ref 136–145)
WBC # BLD AUTO: 9.29 K/UL (ref 3.9–12.7)

## 2023-10-10 PROCEDURE — 97161 PT EVAL LOW COMPLEX 20 MIN: CPT | Mod: HCNC

## 2023-10-10 PROCEDURE — 80048 BASIC METABOLIC PNL TOTAL CA: CPT | Mod: 91,HCNC | Performed by: STUDENT IN AN ORGANIZED HEALTH CARE EDUCATION/TRAINING PROGRAM

## 2023-10-10 PROCEDURE — 25000003 PHARM REV CODE 250: Mod: HCNC | Performed by: HOSPITALIST

## 2023-10-10 PROCEDURE — 63600175 PHARM REV CODE 636 W HCPCS: Mod: HCNC | Performed by: STUDENT IN AN ORGANIZED HEALTH CARE EDUCATION/TRAINING PROGRAM

## 2023-10-10 PROCEDURE — 85025 COMPLETE CBC W/AUTO DIFF WBC: CPT | Mod: HCNC | Performed by: STUDENT IN AN ORGANIZED HEALTH CARE EDUCATION/TRAINING PROGRAM

## 2023-10-10 PROCEDURE — 96361 HYDRATE IV INFUSION ADD-ON: CPT

## 2023-10-10 PROCEDURE — 80076 HEPATIC FUNCTION PANEL: CPT | Mod: HCNC | Performed by: STUDENT IN AN ORGANIZED HEALTH CARE EDUCATION/TRAINING PROGRAM

## 2023-10-10 PROCEDURE — S4991 NICOTINE PATCH NONLEGEND: HCPCS | Mod: HCNC | Performed by: HOSPITALIST

## 2023-10-10 PROCEDURE — 36415 COLL VENOUS BLD VENIPUNCTURE: CPT | Mod: HCNC | Performed by: STUDENT IN AN ORGANIZED HEALTH CARE EDUCATION/TRAINING PROGRAM

## 2023-10-10 PROCEDURE — 25000003 PHARM REV CODE 250: Mod: HCNC | Performed by: STUDENT IN AN ORGANIZED HEALTH CARE EDUCATION/TRAINING PROGRAM

## 2023-10-10 PROCEDURE — 85610 PROTHROMBIN TIME: CPT | Mod: HCNC | Performed by: STUDENT IN AN ORGANIZED HEALTH CARE EDUCATION/TRAINING PROGRAM

## 2023-10-10 PROCEDURE — 84100 ASSAY OF PHOSPHORUS: CPT | Mod: HCNC | Performed by: STUDENT IN AN ORGANIZED HEALTH CARE EDUCATION/TRAINING PROGRAM

## 2023-10-10 PROCEDURE — G0378 HOSPITAL OBSERVATION PER HR: HCPCS | Mod: HCNC

## 2023-10-10 PROCEDURE — 96365 THER/PROPH/DIAG IV INF INIT: CPT | Mod: 59

## 2023-10-10 PROCEDURE — 80048 BASIC METABOLIC PNL TOTAL CA: CPT | Mod: HCNC | Performed by: STUDENT IN AN ORGANIZED HEALTH CARE EDUCATION/TRAINING PROGRAM

## 2023-10-10 PROCEDURE — 83735 ASSAY OF MAGNESIUM: CPT | Mod: HCNC | Performed by: STUDENT IN AN ORGANIZED HEALTH CARE EDUCATION/TRAINING PROGRAM

## 2023-10-10 PROCEDURE — 97165 OT EVAL LOW COMPLEX 30 MIN: CPT | Mod: HCNC

## 2023-10-10 RX ORDER — IBUPROFEN 200 MG
1 TABLET ORAL DAILY
Status: DISCONTINUED | OUTPATIENT
Start: 2023-10-10 | End: 2023-10-11 | Stop reason: HOSPADM

## 2023-10-10 RX ORDER — OLANZAPINE 10 MG/2ML
5 INJECTION, POWDER, FOR SOLUTION INTRAMUSCULAR EVERY 8 HOURS PRN
Status: DISCONTINUED | OUTPATIENT
Start: 2023-10-10 | End: 2023-10-11 | Stop reason: HOSPADM

## 2023-10-10 RX ORDER — SODIUM CHLORIDE 9 MG/ML
INJECTION, SOLUTION INTRAVENOUS CONTINUOUS
Status: ACTIVE | OUTPATIENT
Start: 2023-10-10 | End: 2023-10-11

## 2023-10-10 RX ADMIN — ATORVASTATIN CALCIUM 80 MG: 40 TABLET, FILM COATED ORAL at 09:10

## 2023-10-10 RX ADMIN — POLYETHYLENE GLYCOL 3350 17 G: 17 POWDER, FOR SOLUTION ORAL at 09:10

## 2023-10-10 RX ADMIN — CLOPIDOGREL BISULFATE 75 MG: 75 TABLET ORAL at 09:10

## 2023-10-10 RX ADMIN — CEFTRIAXONE 2 G: 2 INJECTION, POWDER, FOR SOLUTION INTRAMUSCULAR; INTRAVENOUS at 05:10

## 2023-10-10 RX ADMIN — Medication 1 PATCH: at 01:10

## 2023-10-10 RX ADMIN — ASPIRIN 81 MG: 81 TABLET, COATED ORAL at 09:10

## 2023-10-10 RX ADMIN — SODIUM CHLORIDE: 9 INJECTION, SOLUTION INTRAVENOUS at 02:10

## 2023-10-10 RX ADMIN — SODIUM ZIRCONIUM CYCLOSILICATE 10 G: 10 POWDER, FOR SUSPENSION ORAL at 06:10

## 2023-10-10 NOTE — PROGRESS NOTES
[facility] Medicine  Progress Note    Patient Name: Narciso Yanez  MRN: [id number]  Patient Class: OP- Observation   Admission Date: 10/9/2023  Length of Stay: 0 days  Attending Physician: Mukund Hester MD  Primary Care Provider: Marcelino Del Toro MD        Subjective:     Principal Problem:Encephalopathy, metabolic        HPI:  This is an 88-year-old male with a past medical history of CAD, COPD, NPH, hypertension, hyperlipidemia, CKD 3A, type 2 diabetes, peripheral artery disease, PMR, chronic back pain, tobacco use, who presents with altered mental status.    Patient presents for evaluation of altered mental status and confusion.  Per the patient's daughter, the patient has been more confused, and has been hallucinating, talking to himself and has been having frequent falls.  The patient developed a generalized rash that started after father's day.  His rash initially started on his hands, and spread to his back, feet, legs, and then up to his neck and involving his face at some point.  Family follow-up with Infectious Disease and Dermatology, with no official diagnosis given.  His rash has been progressively getting worse, and now started to developed nail changes.  Additionally, he has worsening lower extremity swelling and abdominal distention, related to volume overload.    In the ED, the patient was hemodynamically stable.  Labs were remarkable for an elevated creatinine (1.8-around baseline), hyperkalemia (5.5-slightly hemolyzed specimen), normocytic anemia (9.8-baseline 10-11), elevated BNP (151).  UA showed +3 leukocytes, 5 RBCs, > 100 WBC and few bacteria.  Chest x-ray showed  diffuse increased interstitial lung markings, accentuated from the prior study, could be seen with increased pulmonary venous pressure versus infectious/inflammatory process.  CT head showed no acute intracranial process with chronic microvascular ischemic changes and shunt catheter on the right with  stable ventricular size..  Patient was given Lasix 40 mg IV, ceftriaxone 1 g IV.  He was admitted for further management.      Overview/Hospital Course:  87 y/o male presents with acute encephalopathy thought to be secondary to UTI.  Started on Rocephin.      Interval History: very confused and restless.    Review of Systems   Unable to perform ROS: Mental status change     Objective:     Vital Signs (Most Recent):  Temp: 97.9 °F (36.6 °C) (10/10/23 1124)  Pulse: 105 (10/10/23 1124)  Resp: 14 (10/10/23 1124)  BP: 133/81 (10/10/23 1124)  SpO2: (!) 92 % (10/10/23 1124) Vital Signs (24h Range):  Temp:  [97.7 °F (36.5 °C)-98.8 °F (37.1 °C)] 97.9 °F (36.6 °C)  Pulse:  [] 105  Resp:  [14-20] 14  SpO2:  [92 %-99 %] 92 %  BP: (123-159)/(63-89) 133/81     Weight: 59 kg (130 lb)  Body mass index is 20.36 kg/m².    Intake/Output Summary (Last 24 hours) at 10/10/2023 1451  Last data filed at 10/9/2023 1601  Gross per 24 hour   Intake --   Output 109 ml   Net -109 ml         Physical Exam  Vitals and nursing note reviewed.   Constitutional:       Appearance: He is ill-appearing.   HENT:      Head: Normocephalic and atraumatic.      Nose: Nose normal.      Mouth/Throat:      Mouth: Mucous membranes are moist.   Eyes:      Extraocular Movements: Extraocular movements intact.   Cardiovascular:      Rate and Rhythm: Normal rate.      Pulses: Normal pulses.      Heart sounds: No murmur heard.  Pulmonary:      Effort: Pulmonary effort is normal. No respiratory distress.   Abdominal:      General: Abdomen is flat. There is distension.      Palpations: Abdomen is soft.   Musculoskeletal:      Right lower leg: Edema present.      Left lower leg: Edema present.   Skin:     Capillary Refill: Capillary refill takes less than 2 seconds.      Findings: Erythema and rash present.      Comments: Generalized erythematous desquamating rash, involving feet, legs, arms, hands and extending to abdomen and neck.    Neurological:      Mental  Status: He is alert. He is disoriented.   Psychiatric:         Attention and Perception: He is inattentive.         Mood and Affect: Mood is anxious.             Significant Labs: All pertinent labs within the past 24 hours have been reviewed.  BMP:   Recent Labs   Lab 10/10/23  0420   *      K 5.2*      CO2 21*   BUN 19   CREATININE 1.7*   CALCIUM 7.9*   MG 1.9     CBC:   Recent Labs   Lab 10/09/23  1449 10/10/23  0420   WBC 8.72 9.29   HGB 9.8* 9.9*   HCT 29.5* 30.6*    362       Significant Imaging: I have reviewed all pertinent imaging results/findings within the past 24 hours.      Assessment/Plan:      * Encephalopathy, metabolic  Likely in the setting of infection.  CT head is negative.   More confused and restless this morning.  Possibly developing hyperactive delirium.  Continue ABx's.  Delirium precautions.      Rash  The patient developed a generalized rash that started after father's day.  His rash initially started on his hands, and spread to his back, feet, legs, and then up to his neck and involving his face at some point.  Family follow-up with Infectious Disease and Dermatology, with no official diagnosis given.  His rash has been progressively getting worse, and now started to developed nail changes.  Continue to follow up with Dermatology.      UTI (urinary tract infection)  Urinalysis  Recent Labs   Lab 10/09/23  1559   COLORU Yellow   SPECGRAV 1.015   PHUR 8.0   PROTEINUA 1+*   BACTERIA Few*   NITRITE Negative   LEUKOCYTESUR 3+*   UROBILINOGEN Negative   HYALINECASTS 0   Prior cultures reviewed   Continue ceftriaxone and await final cultures and sensitivities.    HLD (hyperlipidemia)  Resume statin      Normal pressure hydrocephalus  CT head stable from previous imaging.      Fall  Recurrent falls at home per daughter.  PT/OT evaluation.      Essential hypertension  Continue current management.      Stage 3a chronic kidney disease  BMP  Lab Results   Component Value  Date     10/10/2023    K 5.2 (H) 10/10/2023     10/10/2023    CO2 21 (L) 10/10/2023    BUN 19 10/10/2023    CREATININE 1.7 (H) 10/10/2023    CALCIUM 7.9 (L) 10/10/2023    ANIONGAP 11 10/10/2023    EGFRNORACEVR 39 (A) 10/10/2023     Monitor       Abdominal aortic aneurysm (AAA) without rupture  No acute issues.      COPD (chronic obstructive pulmonary disease)  No acute issues.  P.r.n. DuoNebs.      PAD (peripheral artery disease)  Resume home medications       Diabetes mellitus with peripheral circulatory disorder  Glycemic protocol     Coronary artery disease involving coronary bypass graft of native heart without angina pectoris  Resume home medications        VTE Risk Mitigation (From admission, onward)         Ordered     IP VTE HIGH RISK PATIENT  Once         10/09/23 2012     Place sequential compression device  Until discontinued         10/09/23 2012                Discharge Planning   SUSIE:      Code Status: Full Code   Is the patient medically ready for discharge?:     Reason for patient still in hospital (select all that apply): Patient trending condition  Discharge Plan A: Home with family                  Mukund Hester MD  Department of Hospital Medicine   [facility] Surg

## 2023-10-10 NOTE — ASSESSMENT & PLAN NOTE
BMP  Lab Results   Component Value Date     10/10/2023    K 5.2 (H) 10/10/2023     10/10/2023    CO2 21 (L) 10/10/2023    BUN 19 10/10/2023    CREATININE 1.7 (H) 10/10/2023    CALCIUM 7.9 (L) 10/10/2023    ANIONGAP 11 10/10/2023    EGFRNORACEVR 39 (A) 10/10/2023     Monitor

## 2023-10-10 NOTE — CONSULTS
[facility] Surg  Wound Care    Patient Name:  Narciso Yanez   MRN:  [id number]  Date: 10/10/2023  Diagnosis: Encephalopathy, metabolic    History:     Past Medical History:   Diagnosis Date    Actinic keratosis     Anxiety     B12 deficiency 12/8/2014    Dx 6/14    Cancer     skin    Cataract     Coronary artery disease     Diabetes mellitus type II     Diabetes mellitus with peripheral circulatory disorder 6/18/2014    ED (erectile dysfunction)     Hyperlipidemia     Kidney stone     Neurogenic claudication 4/4/2022    Peripheral vascular disease     Spondylosis 3/29/2019    Tobacco dependence     Urinary incontinence 5/4/2021       Social History     Socioeconomic History    Marital status:    Tobacco Use    Smoking status: Every Day     Current packs/day: 1.50     Average packs/day: 1.5 packs/day for 71.0 years (106.5 ttl pk-yrs)     Types: Cigarettes    Smokeless tobacco: Former   Substance and Sexual Activity    Alcohol use: No    Drug use: No    Sexual activity: Not Currently     Partners: Female   Social History Narrative    .  4 children.  Smokes 2 ppd.  Still drives trucks for entertainment industry.      Social Determinants of Health     Financial Resource Strain: Low Risk  (9/15/2023)    Overall Financial Resource Strain (CARDIA)     Difficulty of Paying Living Expenses: Not hard at all   Food Insecurity: No Food Insecurity (9/15/2023)    Hunger Vital Sign     Worried About Running Out of Food in the Last Year: Never true     Ran Out of Food in the Last Year: Never true   Transportation Needs: No Transportation Needs (9/15/2023)    PRAPARE - Transportation     Lack of Transportation (Medical): No     Lack of Transportation (Non-Medical): No   Physical Activity: Inactive (9/15/2023)    Exercise Vital Sign     Days of Exercise per Week: 0 days     Minutes of Exercise per Session: 0 min   Stress: No Stress Concern Present (9/15/2023)    [employer] [employer] - Occupational  Stress Questionnaire     Feeling of Stress : Not at all   Social Connections: Moderately Isolated (9/15/2023)    Social Connection and Isolation Panel [NHANES]     Frequency of Communication with Friends and Family: More than three times a week     Frequency of Social Gatherings with Friends and Family: More than three times a week     Attends Orthodox Services: 1 to 4 times per year     Active Member of Clubs or Organizations: No     Attends Club or Organization Meetings: Never     Marital Status:    Housing Stability: Low Risk  (9/15/2023)    Housing Stability Vital Sign     Unable to Pay for Housing in the Last Year: No     Number of Places Lived in the Last Year: 1     Unstable Housing in the Last Year: No       Precautions:     Allergies as of 10/09/2023 - Reviewed 10/09/2023   Allergen Reaction Noted    Demerol [meperidine] Nausea Only 10/12/2012       [facility] Assessment Details/Treatment   Nursing consult for rash to trunk, back, legs and arms- altered skin integrity groin, sacrum  An 88 year old male admitted to OBS 10/9/23 from home with metabolic encephalopathy; UTI; HLD; normal pressure hydrocephalus; essential hypertension; Stage 3a CKD; AAA without rupture; COPD; PAD; DM with peripheral circulatory disorder; CAD involving coronary bypass graft  10/10 WBC 9.29 Hgb 9.9 Hct 30.6 Alb 2.9 Weight 130 lbs  7/11 A1C 6.1   On Isoflex mattress; Levon score 14; smoker; urinary incontinence  History of generalized rash that started after Father's Day- initially started on his hands an spread to back, feet, legs , and then up neck involving face- FU with ID and Derm- no official diagnosis  10/10 7:38 am 4 Eyes Skin Assessment- No Pressure Injuries present  Assessment:  Dry skin on arms and legs- red and peeling  States recently had a biopsy and was given a diagnosis, but doesn't remember name. Unsure of treatment plan.  Recommendations:  Rash to be managed by Medicine  If altered skin integrity/wound develops,  reconsult [facility] nurse.  Pressure Injury Prevention Interventions. Remedy moisturizer for dry skin. Applied to arms and legs.   Recommendations made to primary team. Orders placed.     10/10/2023

## 2023-10-10 NOTE — ASSESSMENT & PLAN NOTE
The patient developed a generalized rash that started after father's day.  His rash initially started on his hands, and spread to his back, feet, legs, and then up to his neck and involving his face at some point.  Family follow-up with Infectious Disease and Dermatology, with no official diagnosis given.  His rash has been progressively getting worse, and now started to developed nail changes.  Continue to follow up with Dermatology.

## 2023-10-10 NOTE — ASSESSMENT & PLAN NOTE
Urinalysis  Recent Labs   Lab 10/09/23  1559   COLORU Yellow   SPECGRAV 1.015   PHUR 8.0   PROTEINUA 1+*   BACTERIA Few*   NITRITE Negative   LEUKOCYTESUR 3+*   UROBILINOGEN Negative   HYALINECASTS 0   Prior cultures reviewed   Continue ceftriaxone and await final cultures and sensitivities.

## 2023-10-10 NOTE — PT/OT/SLP EVAL
"Physical Therapy Evaluation    Patient Name:  Narciso Yanez   MRN:  [id number]    Recommendations:     Discharge Recommendations: home health PT, home health OT (close supervision/assist PRN)   Discharge Equipment Recommendations: none   Barriers to discharge:  Pt is not functioning at baseline    Assessment:     Narciso Yanez is a 88 y.o. male admitted with a medical diagnosis of Encephalopathy, metabolic.  He presents with the following impairments/functional limitations: weakness, decreased safety awareness, impaired balance, impaired skin, impaired endurance, impaired self care skills, decreased coordination, decreased upper extremity function, impaired coordination, gait instability, decreased lower extremity function .    Rehab Prognosis: Fair; patient would benefit from acute skilled PT services to address these deficits and reach maximum level of function.    Recent Surgery: * No surgery found *      Plan:     During this hospitalization, patient to be seen  (3-5x/wk) to address the identified rehab impairments via gait training, therapeutic activities, therapeutic exercises and progress toward the following goals:    Plan of Care Expires:  10/24/23    Subjective     Chief Complaint: cold  Patient/Family Comments/goals: Pt asking therapist "How long is this test gonna take"  Pain/Comfort:  Pain Rating 1: 0/10    Patients cultural, spiritual, Roman Catholic conflicts given the current situation: no    Living Environment:  Pt lives with his daughter  Prior to admission, patients level of function was Modified Independent with ambulation.  Equipment used at home: wheelchair, walker, rolling, bedside commode, shower chair.  DME owned (not currently used): none.  Upon discharge, patient will have assistance from [facility]?.    Objective:     Communicated with ns prior to session.  Patient found HOB elevated with telemetry, bed alarm  upon PT entry to room.    General Precautions: Standard, fall  Orthopedic " "Precautions:N/A   Braces: N/A  Respiratory Status: Room air    Exams:  Cognitive Exam:  Patient is oriented to Personable to follow commands, pleasantly confused   Gross Motor Coordination:  impaired 2/2 weakness and tremors  Postural Exam:  Patient presented with the following abnormalities:    -       Rounded shoulders  -       Forward head  Sensation:    -       Intact  light/touch B LE"s  Skin Integrity/Edema:      -       Skin integrity:  Psoriasis  -       Edema: None noted   RLE ROM: WFL  RLE Strength: WFL  LLE ROM: WFL  LLE Strength: WFL  Fine motor coordination: impaired for rapid alt toe tap    Functional Mobility:  Bed Mobility:     Scooting: minimum assistance  Supine to Sit: minimum assistance  Sit to Supine: minimum assistance  Transfers:     Sit to Stand:  Min A with rolling walker  Gait: 3 sidesteps with RW and Min A for balance and walker management, posterior lean  Balance: FAir+/Fair sit,  Fair/Fair- stand      AM-PAC 6 CLICK MOBILITY  Total Score:18       Treatment & Education:  Eval only    Patient left HOB elevated with all lines intact, call button in reach, and bed alarm on.    GOALS:   Multidisciplinary Problems       Physical Therapy Goals          Problem: Physical Therapy    Goal Priority Disciplines Outcome Goal Variances Interventions   Physical Therapy Goal     PT, PT/OT Ongoing, Progressing     Description: Goals to be met by: 10/24/23     Patient will increase functional independence with mobility by performin. Supine to sit with Stand-by Assistance  2. Sit to stand transfer with Stand-by Assistance  3. Bed to chair transfer with Stand-by Assistance using Rolling Walker  4. Gait  x 15-20 feet with Contact Guard Assistance using Rolling Walker.   5. Wheelchair propulsion x25-50 feet with Stand-by Assistance    6. Lower extremity exercise program x10 reps per handout, with supervision                         History:     Past Medical History:   Diagnosis Date    Actinic " keratosis     Anxiety     B12 deficiency 12/8/2014    Dx 6/14    Cancer     skin    Cataract     Coronary artery disease     Diabetes mellitus type II     Diabetes mellitus with peripheral circulatory disorder 6/18/2014    ED (erectile dysfunction)     Hyperlipidemia     Kidney stone     Neurogenic claudication 4/4/2022    Peripheral vascular disease     Spondylosis 3/29/2019    Tobacco dependence     Urinary incontinence 5/4/2021       Past Surgical History:   Procedure Laterality Date    APPENDECTOMY      CARDIAC SURGERY  01/1999    CABG 4 vessels    CATARACT EXTRACTION      EPIDURAL STEROID INJECTION Right 8/26/2020    Procedure: Injection, Steroid, Epidural Transforaminal;  Surgeon: Wiley Crane Jr., MD;  Location: [facility] ENDO;  Service: Pain Management;  Laterality: Right;  Right L5 + S1 TF LEAH  Arrive @ 1115; ASA & Plavix last 8/18; Check BG; Rapid COVID test    EPIDURAL STEROID INJECTION Right 11/25/2020    Procedure: Injection, Steroid, Epidural Transformainal;  Surgeon: Wiley Crane Jr., MD;  Location: [facility] ENDO;  Service: Pain Management;  Laterality: Right;  Right L5 + S1 TF LEAH  Arrive @ 1245; ASA and Plavix last 11/17; Pre-DM; Needs MD Sign.    EPIDURAL STEROID INJECTION Right 2/19/2021    Procedure: Injection, Steroid, Epidural Transforaminal;  Surgeon: Wiley Crane Jr., MD;  Location: [facility] ENDO;  Service: Pain Management;  Laterality: Right;  Right L5 + S1 TF LEAH  Arrive @ 1115; ASA & Plavix last 2/11; Check BG; Needs Consent    EXTERNAL EAR SURGERY      EYE SURGERY      cataracts    ILIAC ARTERY STENT Bilateral 06/2003    also Right External Iliac stent       Time Tracking:     PT Received On: 10/10/23  PT Start Time: 1524     PT Stop Time: 1535  PT Total Time (min): 11 min     Billable Minutes: Evaluation 11m Co-evaluation with OT      10/10/2023

## 2023-10-10 NOTE — H&P
[facility] Emergency [facility]  [facility] Medicine  History & Physical    Patient Name: Narciso Yanez  MRN: [id number]  Patient Class: OP- Observation  Admission Date: 10/9/2023  Attending Physician: Kodak Rivera MD   Primary Care Provider: Marcelino Del Toro MD         Patient information was obtained from patient, relative(s) and ER records.     Subjective:     Principal Problem:Encephalopathy, metabolic    Chief Complaint:   Chief Complaint   Patient presents with    Foot Pain     Pt BIB EMS c/o pain to heels of feet with swelling and rash on bilateral arms x4 days.        HPI: This is an 88-year-old male with a past medical history of CAD, COPD, NPH, hypertension, hyperlipidemia, CKD 3A, type 2 diabetes, peripheral artery disease, PMR, chronic back pain, tobacco use, who presents with altered mental status.    Patient presents for evaluation of altered mental status and confusion.  Per the patient's daughter, the patient has been more confused, and has been hallucinating, talking to himself and has been having frequent falls.  The patient developed a generalized rash that started after father's day.  His rash initially started on his hands, and spread to his back, feet, legs, and then up to his neck and involving his face at some point.  Family follow-up with Infectious Disease and Dermatology, with no official diagnosis given.  His rash has been progressively getting worse, and now started to developed nail changes.  Additionally, he has worsening lower extremity swelling and abdominal distention, related to volume overload.    In the ED, the patient was hemodynamically stable.  Labs were remarkable for an elevated creatinine (1.8-around baseline), hyperkalemia (5.5-slightly hemolyzed specimen), normocytic anemia (9.8-baseline 10-11), elevated BNP (151).  UA showed +3 leukocytes, 5 RBCs, > 100 WBC and few bacteria.  Chest x-ray showed  diffuse increased interstitial lung markings, accentuated from the prior  study, could be seen with increased pulmonary venous pressure versus infectious/inflammatory process.  CT head showed no acute intracranial process with chronic microvascular ischemic changes and shunt catheter on the right with stable ventricular size..  Patient was given Lasix 40 mg IV, ceftriaxone 1 g IV.  He was admitted for further management.      Past Medical History:   Diagnosis Date    Actinic keratosis     Anxiety     B12 deficiency 12/8/2014    Dx 6/14    Cancer     skin    Cataract     Coronary artery disease     Diabetes mellitus type II     Diabetes mellitus with peripheral circulatory disorder 6/18/2014    ED (erectile dysfunction)     Hyperlipidemia     Kidney stone     Neurogenic claudication 4/4/2022    Peripheral vascular disease     Spondylosis 3/29/2019    Tobacco dependence     Urinary incontinence 5/4/2021       Past Surgical History:   Procedure Laterality Date    APPENDECTOMY      CARDIAC SURGERY  01/1999    CABG 4 vessels    CATARACT EXTRACTION      EPIDURAL STEROID INJECTION Right 8/26/2020    Procedure: Injection, Steroid, Epidural Transforaminal;  Surgeon: Wiley Crane Jr., MD;  Location: [facility] ENDO;  Service: Pain Management;  Laterality: Right;  Right L5 + S1 TF LEAH  Arrive @ 1115; ASA & Plavix last 8/18; Check BG; Rapid COVID test    EPIDURAL STEROID INJECTION Right 11/25/2020    Procedure: Injection, Steroid, Epidural Transformainal;  Surgeon: Wiley Crane Jr., MD;  Location: [facility] ENDO;  Service: Pain Management;  Laterality: Right;  Right L5 + S1 TF LEAH  Arrive @ 1245; ASA and Plavix last 11/17; Pre-DM; Needs MD Sign.    EPIDURAL STEROID INJECTION Right 2/19/2021    Procedure: Injection, Steroid, Epidural Transforaminal;  Surgeon: Wiley Crane Jr., MD;  Location: [facility] ENDO;  Service: Pain Management;  Laterality: Right;  Right L5 + S1 TF LEAH  Arrive @ 1115; ASA & Plavix last 2/11; Check BG; Needs Consent    EXTERNAL EAR SURGERY      EYE  SURGERY      cataracts    ILIAC ARTERY STENT Bilateral 06/2003    also Right External Iliac stent       Review of patient's allergies indicates:   Allergen Reactions    Demerol [meperidine] Nausea Only       No current facility-administered medications on file prior to encounter.     Current Outpatient Medications on File Prior to Encounter   Medication Sig    ACCU-CHEK JOAQUIN PLUS METER Misc Three times a day with meals    acetaminophen (TYLENOL) 500 MG tablet Take 1 tablet (500 mg total) by mouth every 6 (six) hours as needed for Pain.    alendronate (FOSAMAX) 70 MG tablet TAKE 1 TABLET EVERY 7 DAYS ON TUESDAYS (Patient not taking: Reported on 9/26/2023)    ascorbic acid, vitamin C, (VITAMIN C) 250 MG tablet Take 1 tablet (250 mg total) by mouth once daily. (Patient not taking: Reported on 9/26/2023)    aspirin (ECOTRIN) 81 MG EC tablet Take 1 tablet (81 mg total) by mouth once daily.    blood sugar diagnostic (TRUE METRIX GLUCOSE TEST STRIP) Strp TID    BOOSTRIX TDAP 2.5-8-5 Lf-mcg-Lf/0.5mL Syrg injection     ciclopirox (LOPROX) 0.77 % Crea Apply topically 2 (two) times daily. (Patient not taking: Reported on 9/26/2023)    ciclopirox (PENLAC) 8 % Soln Apply topically nightly. (Patient not taking: Reported on 9/26/2023)    clopidogreL (PLAVIX) 75 mg tablet TAKE 1 TABLET EVERY DAY (Patient not taking: Reported on 9/26/2023)    clotrimazole-betamethasone 1-0.05% (LOTRISONE) cream Apply topically 2 (two) times daily. (Patient not taking: Reported on 9/26/2023)    diazePAM (VALIUM) 5 MG tablet TAKE 1 TABLET BY MOUTH EVERY 8 HOURS AS NEEDED. Strength: 5 mg (Patient not taking: Reported on 9/26/2023)    docusate sodium (COLACE) 100 MG capsule Take 1 capsule (100 mg total) by mouth 2 (two) times daily. (Patient not taking: Reported on 9/26/2023)    DUPIXENT  mg/2 mL PnIj     fluconazole (DIFLUCAN) 150 MG Tab Take by mouth.    hydrocortisone 1 % cream Apply topically 2 (two) times daily. for 5 days     ketoconazole (NIZORAL) 2 % cream Apply topically 2 (two) times daily.    lactulose (CHRONULAC) 20 gram/30 mL Soln Take 45 mLs (30 g total) by mouth 3 (three) times daily as needed. (Patient not taking: Reported on 9/26/2023)    lancets (TRUEPLUS LANCETS) 33 gauge Misc Use to test blood glucose three (3) times a day, discard lancet after each use (Patient not taking: Reported on 9/26/2023)    magnesium sulfate in water (MAGNESIUM SULFATE 2 GRAM/50 ML) 2 gram/50 mL PgBk     metFORMIN (GLUCOPHAGE-XR) 750 MG ER 24hr tablet TAKE 1 TABLET TWICE DAILY WITH MEALS (Patient not taking: Reported on 9/26/2023)    metoprolol succinate (TOPROL-XL) 25 MG 24 hr tablet     mometasone 0.1% (ELOCON) 0.1 % cream Apply topically 2 (two) times daily.    olopatadine (PATANOL) 0.1 % ophthalmic solution Place 1 drop into both eyes 2 (two) times daily. (Patient not taking: Reported on 9/26/2023)    OMNIPAQUE 300 300 mg iodine/mL injection     OMNIPAQUE 350 350 mg iodine/mL Soln injection     ondansetron (ZOFRAN-ODT) 4 MG TbDL DISSOLVE 2 TABLETS (8 MG TOTAL) ON THE TONGUE EVERY 8 (EIGHT) HOURS AS NEEDED (NAUSEA).    ondansetron (ZOFRAN-ODT) 8 MG TbDL     predniSONE (DELTASONE) 10 MG tablet Take 4 tabs x 3 days, then take 2 tabs x 3 days, then take 1 tab x 3 days. (Patient not taking: Reported on 9/26/2023)    prochlorperazine (COMPAZINE) 5 MG tablet     rosuvastatin (CRESTOR) 20 MG tablet TAKE 1 TABLET (20 MG TOTAL) BY MOUTH EVERY EVENING. (Patient not taking: Reported on 9/26/2023)    senna (SENOKOT) 8.6 mg tablet Take 1 tablet by mouth once daily. (Patient not taking: Reported on 9/26/2023)    TRUE METRIX GLUCOSE TEST STRIP Strp TEST BLOOD SUGAR THREE TIMES DAILY. (Patient not taking: Reported on 9/26/2023)     Family History       Problem Relation (Age of Onset)    Stroke Mother          Tobacco Use    Smoking status: Every Day     Current packs/day: 1.50     Average packs/day: 1.5 packs/day for 71.0 years (106.5 ttl  pk-yrs)     Types: Cigarettes    Smokeless tobacco: Former   Substance and Sexual Activity    Alcohol use: No    Drug use: No    Sexual activity: Not Currently     Partners: Female     Review of Systems   Unable to perform ROS: Mental status change     Objective:     Vital Signs (Most Recent):  Temp: 97.7 °F (36.5 °C) (10/09/23 1905)  Pulse: 110 (10/09/23 1939)  Resp: 20 (10/09/23 1905)  BP: 132/87 (10/09/23 1934)  SpO2: 99 % (10/09/23 1939) Vital Signs (24h Range):  Temp:  [97.7 °F (36.5 °C)-98.2 °F (36.8 °C)] 97.7 °F (36.5 °C)  Pulse:  [] 110  Resp:  [16-20] 20  SpO2:  [95 %-99 %] 99 %  BP: (130-159)/(63-87) 132/87     Weight: 59 kg (130 lb)  Body mass index is 20.36 kg/m².     Physical Exam  Vitals and nursing note reviewed.   Constitutional:       General: He is in acute distress.      Appearance: He is ill-appearing.   HENT:      Head: Normocephalic and atraumatic.      Nose: Nose normal.      Mouth/Throat:      Mouth: Mucous membranes are moist.   Eyes:      Extraocular Movements: Extraocular movements intact.   Cardiovascular:      Rate and Rhythm: Normal rate.      Pulses: Normal pulses.      Heart sounds: No murmur heard.  Pulmonary:      Effort: Pulmonary effort is normal. No respiratory distress.   Abdominal:      General: Abdomen is flat. There is distension.      Palpations: Abdomen is soft.      Tenderness: There is abdominal tenderness ((mild tenderness)).   Musculoskeletal:      Right lower leg: Edema present.      Left lower leg: Edema present.   Skin:     Capillary Refill: Capillary refill takes less than 2 seconds.      Findings: Erythema and rash present.      Comments: Generalized erythematous desquamating rash, involving feet, legs, arms, hands and extending to abdomen and neck.    Neurological:      Mental Status: He is alert.      Comments: A&ox2.                 Significant Labs: All pertinent labs within the past 24 hours have been reviewed.    Significant Imaging: I have reviewed  all pertinent imaging results/findings within the past 24 hours.    Assessment/Plan:     * Encephalopathy, metabolic  Likely in the setting of infection.  CT head is negative.  Monitor mental status      UTI (urinary tract infection)  Urinalysis  Recent Labs   Lab 10/09/23  1559   COLORU Yellow   SPECGRAV 1.015   PHUR 8.0   PROTEINUA 1+*   BACTERIA Few*   NITRITE Negative   LEUKOCYTESUR 3+*   UROBILINOGEN Negative   HYALINECASTS 0   Prior cultures reviewed   Continue ceftriaxone    HLD (hyperlipidemia)  Resume statin      Normal pressure hydrocephalus  CT head without acute issues.      Essential hypertension  Monitor blood pressure      Stage 3a chronic kidney disease  BMP  Lab Results   Component Value Date     10/09/2023    K 5.5 (H) 10/09/2023     10/09/2023    CO2 19 (L) 10/09/2023    BUN 22 10/09/2023    CREATININE 1.8 (H) 10/09/2023    CALCIUM 8.1 (L) 10/09/2023    ANIONGAP 13 10/09/2023    EGFRNORACEVR 36 (A) 10/09/2023     Monitor       Abdominal aortic aneurysm (AAA) without rupture  No acute issues.      COPD (chronic obstructive pulmonary disease)  No acute issues.  P.r.n. DuoNebs.      PAD (peripheral artery disease)  Resume home medications       Diabetes mellitus with peripheral circulatory disorder  Glycemic protocol     Coronary artery disease involving coronary bypass graft of native heart without angina pectoris  Resume home medications        VTE Risk Mitigation (From admission, onward)         Ordered     IP VTE HIGH RISK PATIENT  Once         10/09/23 2012     Place sequential compression device  Until discontinued         10/09/23 2012                   On 10/09/2023, patient should be placed in hospital observation services under my care.        Scotty Blandon MD  Department of Hospital Medicine  [facility] - Emergency Dept

## 2023-10-10 NOTE — PLAN OF CARE
Case Management Assessment     PCP: Marcelino Del Toro  Pharmacy: [facility]    Patient Arrived From: home   Existing Help at Home: daughter    Barriers to Discharge: none    Discharge Plan:    A. Home with family   B. Home with family       10/09/23 2019   Discharge Planning   Assessment Type Discharge Planning Brief Assessment   Resource/Environmental Concerns none   Support Systems Children   Equipment Currently Used at Home wheelchair;walker, rolling;bedside commode;shower chair   Current Living Arrangements home   Patient/Family Anticipates Transition to home   Patient/Family Anticipated Services at Transition none   DME Needed Upon Discharge  none   Discharge Plan A Home with family   Discharge Plan B Home with family

## 2023-10-10 NOTE — ASSESSMENT & PLAN NOTE
BMP  Lab Results   Component Value Date     10/09/2023    K 5.5 (H) 10/09/2023     10/09/2023    CO2 19 (L) 10/09/2023    BUN 22 10/09/2023    CREATININE 1.8 (H) 10/09/2023    CALCIUM 8.1 (L) 10/09/2023    ANIONGAP 13 10/09/2023    EGFRNORACEVR 36 (A) 10/09/2023     Monitor

## 2023-10-10 NOTE — ASSESSMENT & PLAN NOTE
Likely in the setting of infection.  CT head is negative.   More confused and restless this morning.  Possibly developing hyperactive delirium.  Continue ABx's.  Delirium precautions.

## 2023-10-10 NOTE — HOSPITAL COURSE
87 y/o male presents with acute encephalopathy.  Hx of NPH.  Head CT unchanged from previous.  Abnormal UA, concerning for UTI.  Started on Rocephin.  Patient with recurrent falls at home.  PT/OT consulted.  Recommending HH upon discharge.  Some improvement in mental status with treatment of UTI.  Patient did have slight delirium during hospitalization.  He has remained afebrile and hemodynamically stable.  UTI secondary to Proteus and Enterococcus.  He will be discharged home on Augmentin.  Patient's main issue and complain is a diffuse pruritic rash that has been going on for 4 months.  Patient has been following with Dermatology.  Per family, symptoms worsening.  Scheduled to follow up with Dermatology later on this week.  Will also follow up with PCP.

## 2023-10-10 NOTE — ASSESSMENT & PLAN NOTE
Urinalysis  Recent Labs   Lab 10/09/23  1559   COLORU Yellow   SPECGRAV 1.015   PHUR 8.0   PROTEINUA 1+*   BACTERIA Few*   NITRITE Negative   LEUKOCYTESUR 3+*   UROBILINOGEN Negative   HYALINECASTS 0   Prior cultures reviewed   Continue ceftriaxone

## 2023-10-10 NOTE — HPI
This is an 88-year-old male with a past medical history of CAD, COPD, NPH, hypertension, hyperlipidemia, CKD 3A, type 2 diabetes, peripheral artery disease, PMR, chronic back pain, tobacco use, who presents with altered mental status.    Patient presents for evaluation of altered mental status and confusion.  Per the patient's daughter, the patient has been more confused, and has been hallucinating, talking to himself and has been having frequent falls.  The patient developed a generalized rash that started after father's day.  His rash initially started on his hands, and spread to his back, feet, legs, and then up to his neck and involving his face at some point.  Family follow-up with Infectious Disease and Dermatology, with no official diagnosis given.  His rash has been progressively getting worse, and now started to developed nail changes.  Additionally, he has worsening lower extremity swelling and abdominal distention, related to volume overload.    In the ED, the patient was hemodynamically stable.  Labs were remarkable for an elevated creatinine (1.8-around baseline), hyperkalemia (5.5-slightly hemolyzed specimen), normocytic anemia (9.8-baseline 10-11), elevated BNP (151).  UA showed +3 leukocytes, 5 RBCs, > 100 WBC and few bacteria.  Chest x-ray showed  diffuse increased interstitial lung markings, accentuated from the prior study, could be seen with increased pulmonary venous pressure versus infectious/inflammatory process.  CT head showed no acute intracranial process with chronic microvascular ischemic changes and shunt catheter on the right with stable ventricular size..  Patient was given Lasix 40 mg IV, ceftriaxone 1 g IV.  He was admitted for further management.

## 2023-10-10 NOTE — NURSING
Bed alarm going off. Patient found sitting on side of bed between bed rails. Assisted back to bed. Incontient of urine. Cleaned. Mepilex applied to sacrum. Bed alarm on.

## 2023-10-10 NOTE — SUBJECTIVE & OBJECTIVE
Interval History: very confused and restless.    Review of Systems   Unable to perform ROS: Mental status change     Objective:     Vital Signs (Most Recent):  Temp: 97.9 °F (36.6 °C) (10/10/23 1124)  Pulse: 105 (10/10/23 1124)  Resp: 14 (10/10/23 1124)  BP: 133/81 (10/10/23 1124)  SpO2: (!) 92 % (10/10/23 1124) Vital Signs (24h Range):  Temp:  [97.7 °F (36.5 °C)-98.8 °F (37.1 °C)] 97.9 °F (36.6 °C)  Pulse:  [] 105  Resp:  [14-20] 14  SpO2:  [92 %-99 %] 92 %  BP: (123-159)/(63-89) 133/81     Weight: 59 kg (130 lb)  Body mass index is 20.36 kg/m².    Intake/Output Summary (Last 24 hours) at 10/10/2023 1451  Last data filed at 10/9/2023 1601  Gross per 24 hour   Intake --   Output 109 ml   Net -109 ml         Physical Exam  Vitals and nursing note reviewed.   Constitutional:       Appearance: He is ill-appearing.   HENT:      Head: Normocephalic and atraumatic.      Nose: Nose normal.      Mouth/Throat:      Mouth: Mucous membranes are moist.   Eyes:      Extraocular Movements: Extraocular movements intact.   Cardiovascular:      Rate and Rhythm: Normal rate.      Pulses: Normal pulses.      Heart sounds: No murmur heard.  Pulmonary:      Effort: Pulmonary effort is normal. No respiratory distress.   Abdominal:      General: Abdomen is flat. There is distension.      Palpations: Abdomen is soft.   Musculoskeletal:      Right lower leg: Edema present.      Left lower leg: Edema present.   Skin:     Capillary Refill: Capillary refill takes less than 2 seconds.      Findings: Erythema and rash present.      Comments: Generalized erythematous desquamating rash, involving feet, legs, arms, hands and extending to abdomen and neck.    Neurological:      Mental Status: He is alert. He is disoriented.   Psychiatric:         Attention and Perception: He is inattentive.         Mood and Affect: Mood is anxious.             Significant Labs: All pertinent labs within the past 24 hours have been reviewed.  BMP:   Recent Labs    Lab 10/10/23  0420   *      K 5.2*      CO2 21*   BUN 19   CREATININE 1.7*   CALCIUM 7.9*   MG 1.9     CBC:   Recent Labs   Lab 10/09/23  1449 10/10/23  0420   WBC 8.72 9.29   HGB 9.8* 9.9*   HCT 29.5* 30.6*    362       Significant Imaging: I have reviewed all pertinent imaging results/findings within the past 24 hours.

## 2023-10-10 NOTE — NURSING
[facility] [place]  Nurses Note -- 4 Eyes      10/10/2023       Skin assessed on: Q Shift      [x] No Pressure Injuries Present    []Prevention Measures Documented    [] Yes LDA  for Pressure Injury Previously documented     [] Yes New Pressure Injury Discovered   [] LDA for New Pressure Injury Added      Attending RN:  Kylie Nunes, RN     Second RN:  Andie Cameron LPN

## 2023-10-10 NOTE — PT/OT/SLP EVAL
"Occupational Therapy   Evaluation    Name: Narciso Yanez  MRN: [id number]  Admitting Diagnosis: Encephalopathy, metabolic  Recent Surgery: * No surgery found *      Recommendations:     Discharge Recommendations: home health OT (w/ 24 hr supervision)  Discharge Equipment Recommendations:  none  Barriers to discharge:   (Pt will require 24 hr supervision as he is at risk for falls and readmission due to poor safety awareness, weakness and debility.)    Assessment:     Narciso Yanez is a 88 y.o. male with a medical diagnosis of Encephalopathy, metabolic.   Performance deficits affecting function: weakness, impaired endurance, impaired self care skills, impaired functional mobility, gait instability, impaired balance, decreased coordination, impaired cognition, decreased upper extremity function, decreased lower extremity function, decreased safety awareness, decreased ROM, impaired skin, edema.      Pt pleasant and willing to participate in session this date; pt w/ some confusion but was able to follow commands with increased time. Pt very tremulous and restless but was able to stand and laterally step to HOB w/ min A and use of RW. Pt w/o adverse affects to treatment and will continue to benefit from skilled acute services to maximize functional capacity.     Rehab Prognosis: Good; patient would benefit from acute skilled OT services to address these deficits and reach maximum level of function.       Plan:     Patient to be seen  (3-5x/wk) to address the above listed problems via self-care/home management, therapeutic activities, therapeutic exercises  Plan of Care Expires: 10/24/23  Plan of Care Reviewed with: patient    Subjective     Chief Complaint: "How long will this take."   Patient/Family Comments/goals: Agreeable to participate w/ redirection.     Occupational Profile: Pt somewhat a questionable historian.   Living Environment: Pt lives w/ dtr in [facility] with 0 ANNA.   Previous level of function: Pt reports " dtr was assisting w/ dressing and toielting PTA; pt reports he has been falling recently.   Roles and Routines: None stated   Equipment Used at Home: wheelchair, walker, rolling, bedside commode, shower chair  Assistance upon Discharge: dtr     Pain/Comfort:  Pain Rating 1: 0/10  Pain Rating Post-Intervention 1: 0/10    Patients cultural, spiritual, Roman Catholic conflicts given the current situation: no    Objective:     Communicated with: Nurse prior to session.  Patient found HOB elevated with telemetry, peripheral IV, bed alarm, pressure relief boots upon OT entry to room.    General Precautions: Standard, fall  Orthopedic Precautions: N/A  Braces: N/A  Respiratory Status: Room air    Occupational Performance:    Bed Mobility:    Patient completed Scooting hips to EOB with minimum assistance  Patient completed Supine to Sit with minimum assistance and HOB elevated   Patient completed Sit to Supine with minimum assistance    Functional Mobility/Transfers:  Patient completed Sit <> Stand Transfer x 1 trial from EOB with minimum assistance  with  rolling walker   Functional Mobility: Pt was able to laterally step to HOB w/ min A and use of RW; pt tremulous but no LOB occurred.     Activities of Daily Living:  Upper Body Dressing: minimum assistance for donning gown over back     Cognitive/Visual Perceptual:  Cognitive/Psychosocial Skills:     -       Oriented to: Person   -       Follows Commands/attention:Follows one-step commands  -       Communication: clear/fluent but delayed   -       Memory: Impaired LTM and Poor immediate recall  -       Safety awareness/insight to disability: impaired     Physical Exam:  Balance:    -       good sitting balance; fair - standing   Postural examination/scapula alignment:    -       Rounded shoulders  -       Forward head  Skin integrity: Erythema and rash present; generalized erythematous desquamating rash, involving feet, legs, arms, hands and extending to abdomen and  neck.  Edema:  Mild Bues   Sensation:    -       Intact  Upper Extremity Range of Motion:     -       Right Upper Extremity: WFL  -       Left Upper Extremity: WFL  Upper Extremity Strength:    -       Right Upper Extremity: WFL  -       Left Upper Extremity: WFL   Strength:    -       Right Upper Extremity: WFL  -       Left Upper Extremity: WFL    AMPAC 6 Click ADL:  AMPA Total Score: 9    Treatment & Education:  -Initial eval complete.   -Pt educated on role of OT and POC.   -Pt educated on safe functional mobility and ADL performance.   -Questions and concerns addressed within OT scope.     Patient left HOB elevated with all lines intact, call button in reach, bed alarm on, and pressure relief boot in place.     GOALS:   Multidisciplinary Problems       Occupational Therapy Goals          Problem: Occupational Therapy    Goal Priority Disciplines Outcome Interventions   Occupational Therapy Goal     OT, PT/OT     Description: Goals to be met by: 10/24/23     Patient will increase functional independence with ADLs by performing:    LE Dressing with Supervision.  Grooming while standing at sink with Supervision.  Toileting from toilet with Supervision for hygiene and clothing management.   Supine to sit with Supervision.  Step transfer with Supervision  Toilet transfer to toilet with Supervision.  Upper extremity exercise program x15 reps per handout, with supervision.                         History:     Past Medical History:   Diagnosis Date    Actinic keratosis     Anxiety     B12 deficiency 12/8/2014    Dx 6/14    Cancer     skin    Cataract     Coronary artery disease     Diabetes mellitus type II     Diabetes mellitus with peripheral circulatory disorder 6/18/2014    ED (erectile dysfunction)     Hyperlipidemia     Kidney stone     Neurogenic claudication 4/4/2022    Peripheral vascular disease     Spondylosis 3/29/2019    Tobacco dependence     Urinary incontinence 5/4/2021         Past Surgical  History:   Procedure Laterality Date    APPENDECTOMY      CARDIAC SURGERY  01/1999    CABG 4 vessels    CATARACT EXTRACTION      EPIDURAL STEROID INJECTION Right 8/26/2020    Procedure: Injection, Steroid, Epidural Transforaminal;  Surgeon: Wiley Crane Jr., MD;  Location: [facility] ENDO;  Service: Pain Management;  Laterality: Right;  Right L5 + S1 TF LEAH  Arrive @ 1115; ASA & Plavix last 8/18; Check BG; Rapid COVID test    EPIDURAL STEROID INJECTION Right 11/25/2020    Procedure: Injection, Steroid, Epidural Transformainal;  Surgeon: Wiley Crane Jr., MD;  Location: [facility] ENDO;  Service: Pain Management;  Laterality: Right;  Right L5 + S1 TF LEAH  Arrive @ 1245; ASA and Plavix last 11/17; Pre-DM; Needs MD Sign.    EPIDURAL STEROID INJECTION Right 2/19/2021    Procedure: Injection, Steroid, Epidural Transforaminal;  Surgeon: Wiley Crane Jr., MD;  Location: [facility] ENDO;  Service: Pain Management;  Laterality: Right;  Right L5 + S1 TF LEAH  Arrive @ 1115; ASA & Plavix last 2/11; Check BG; Needs Consent    EXTERNAL EAR SURGERY      EYE SURGERY      cataracts    ILIAC ARTERY STENT Bilateral 06/2003    also Right External Iliac stent       Time Tracking:     OT Date of Treatment: 10/10/23  OT Start Time: 1527  OT Stop Time: 1536  OT Total Time (min): 9 min    Billable Minutes:Evaluation 9  Total Time 9 (co-eval w/ PT)     10/10/2023

## 2023-10-10 NOTE — SUBJECTIVE & OBJECTIVE
Past Medical History:   Diagnosis Date    Actinic keratosis     Anxiety     B12 deficiency 12/8/2014    Dx 6/14    Cancer     skin    Cataract     Coronary artery disease     Diabetes mellitus type II     Diabetes mellitus with peripheral circulatory disorder 6/18/2014    ED (erectile dysfunction)     Hyperlipidemia     Kidney stone     Neurogenic claudication 4/4/2022    Peripheral vascular disease     Spondylosis 3/29/2019    Tobacco dependence     Urinary incontinence 5/4/2021       Past Surgical History:   Procedure Laterality Date    APPENDECTOMY      CARDIAC SURGERY  01/1999    CABG 4 vessels    CATARACT EXTRACTION      EPIDURAL STEROID INJECTION Right 8/26/2020    Procedure: Injection, Steroid, Epidural Transforaminal;  Surgeon: Wiley Crane Jr., MD;  Location: [facility] ENDO;  Service: Pain Management;  Laterality: Right;  Right L5 + S1 TF LEAH  Arrive @ 1115; ASA & Plavix last 8/18; Check BG; Rapid COVID test    EPIDURAL STEROID INJECTION Right 11/25/2020    Procedure: Injection, Steroid, Epidural Transformainal;  Surgeon: Wiley Crane Jr., MD;  Location: [facility] ENDO;  Service: Pain Management;  Laterality: Right;  Right L5 + S1 TF LEAH  Arrive @ 1245; ASA and Plavix last 11/17; Pre-DM; Needs MD Sign.    EPIDURAL STEROID INJECTION Right 2/19/2021    Procedure: Injection, Steroid, Epidural Transforaminal;  Surgeon: Wiley Crane Jr., MD;  Location: [facility] ENDO;  Service: Pain Management;  Laterality: Right;  Right L5 + S1 TF LEAH  Arrive @ 1115; ASA & Plavix last 2/11; Check BG; Needs Consent    EXTERNAL EAR SURGERY      EYE SURGERY      cataracts    ILIAC ARTERY STENT Bilateral 06/2003    also Right External Iliac stent       Review of patient's allergies indicates:   Allergen Reactions    Demerol [meperidine] Nausea Only       No current facility-administered medications on file prior to encounter.     Current Outpatient Medications on File Prior to Encounter   Medication Sig    ACCU-CHEK JOAQUIN  PLUS METER Misc Three times a day with meals    acetaminophen (TYLENOL) 500 MG tablet Take 1 tablet (500 mg total) by mouth every 6 (six) hours as needed for Pain.    alendronate (FOSAMAX) 70 MG tablet TAKE 1 TABLET EVERY 7 DAYS ON TUESDAYS (Patient not taking: Reported on 9/26/2023)    ascorbic acid, vitamin C, (VITAMIN C) 250 MG tablet Take 1 tablet (250 mg total) by mouth once daily. (Patient not taking: Reported on 9/26/2023)    aspirin (ECOTRIN) 81 MG EC tablet Take 1 tablet (81 mg total) by mouth once daily.    blood sugar diagnostic (TRUE METRIX GLUCOSE TEST STRIP) Strp TID    BOOSTRIX TDAP 2.5-8-5 Lf-mcg-Lf/0.5mL Syrg injection     ciclopirox (LOPROX) 0.77 % Crea Apply topically 2 (two) times daily. (Patient not taking: Reported on 9/26/2023)    ciclopirox (PENLAC) 8 % Soln Apply topically nightly. (Patient not taking: Reported on 9/26/2023)    clopidogreL (PLAVIX) 75 mg tablet TAKE 1 TABLET EVERY DAY (Patient not taking: Reported on 9/26/2023)    clotrimazole-betamethasone 1-0.05% (LOTRISONE) cream Apply topically 2 (two) times daily. (Patient not taking: Reported on 9/26/2023)    diazePAM (VALIUM) 5 MG tablet TAKE 1 TABLET BY MOUTH EVERY 8 HOURS AS NEEDED. Strength: 5 mg (Patient not taking: Reported on 9/26/2023)    docusate sodium (COLACE) 100 MG capsule Take 1 capsule (100 mg total) by mouth 2 (two) times daily. (Patient not taking: Reported on 9/26/2023)    DUPIXENT  mg/2 mL PnIj     fluconazole (DIFLUCAN) 150 MG Tab Take by mouth.    hydrocortisone 1 % cream Apply topically 2 (two) times daily. for 5 days    ketoconazole (NIZORAL) 2 % cream Apply topically 2 (two) times daily.    lactulose (CHRONULAC) 20 gram/30 mL Soln Take 45 mLs (30 g total) by mouth 3 (three) times daily as needed. (Patient not taking: Reported on 9/26/2023)    lancets (TRUEPLUS LANCETS) 33 gauge Misc Use to test blood glucose three (3) times a day, discard lancet after each use (Patient not taking: Reported on 9/26/2023)     magnesium sulfate in water (MAGNESIUM SULFATE 2 GRAM/50 ML) 2 gram/50 mL PgBk     metFORMIN (GLUCOPHAGE-XR) 750 MG ER 24hr tablet TAKE 1 TABLET TWICE DAILY WITH MEALS (Patient not taking: Reported on 9/26/2023)    metoprolol succinate (TOPROL-XL) 25 MG 24 hr tablet     mometasone 0.1% (ELOCON) 0.1 % cream Apply topically 2 (two) times daily.    olopatadine (PATANOL) 0.1 % ophthalmic solution Place 1 drop into both eyes 2 (two) times daily. (Patient not taking: Reported on 9/26/2023)    OMNIPAQUE 300 300 mg iodine/mL injection     OMNIPAQUE 350 350 mg iodine/mL Soln injection     ondansetron (ZOFRAN-ODT) 4 MG TbDL DISSOLVE 2 TABLETS (8 MG TOTAL) ON THE TONGUE EVERY 8 (EIGHT) HOURS AS NEEDED (NAUSEA).    ondansetron (ZOFRAN-ODT) 8 MG TbDL     predniSONE (DELTASONE) 10 MG tablet Take 4 tabs x 3 days, then take 2 tabs x 3 days, then take 1 tab x 3 days. (Patient not taking: Reported on 9/26/2023)    prochlorperazine (COMPAZINE) 5 MG tablet     rosuvastatin (CRESTOR) 20 MG tablet TAKE 1 TABLET (20 MG TOTAL) BY MOUTH EVERY EVENING. (Patient not taking: Reported on 9/26/2023)    senna (SENOKOT) 8.6 mg tablet Take 1 tablet by mouth once daily. (Patient not taking: Reported on 9/26/2023)    TRUE METRIX GLUCOSE TEST STRIP Strp TEST BLOOD SUGAR THREE TIMES DAILY. (Patient not taking: Reported on 9/26/2023)     Family History       Problem Relation (Age of Onset)    Stroke Mother          Tobacco Use    Smoking status: Every Day     Current packs/day: 1.50     Average packs/day: 1.5 packs/day for 71.0 years (106.5 ttl pk-yrs)     Types: Cigarettes    Smokeless tobacco: Former   Substance and Sexual Activity    Alcohol use: No    Drug use: No    Sexual activity: Not Currently     Partners: Female     Review of Systems   Unable to perform ROS: Mental status change     Objective:     Vital Signs (Most Recent):  Temp: 97.7 °F (36.5 °C) (10/09/23 1905)  Pulse: 110 (10/09/23 1939)  Resp: 20 (10/09/23 1905)  BP: 132/87 (10/09/23  1934)  SpO2: 99 % (10/09/23 1939) Vital Signs (24h Range):  Temp:  [97.7 °F (36.5 °C)-98.2 °F (36.8 °C)] 97.7 °F (36.5 °C)  Pulse:  [] 110  Resp:  [16-20] 20  SpO2:  [95 %-99 %] 99 %  BP: (130-159)/(63-87) 132/87     Weight: 59 kg (130 lb)  Body mass index is 20.36 kg/m².     Physical Exam  Vitals and nursing note reviewed.   Constitutional:       General: He is in acute distress.      Appearance: He is ill-appearing.   HENT:      Head: Normocephalic and atraumatic.      Nose: Nose normal.      Mouth/Throat:      Mouth: Mucous membranes are moist.   Eyes:      Extraocular Movements: Extraocular movements intact.   Cardiovascular:      Rate and Rhythm: Normal rate.      Pulses: Normal pulses.      Heart sounds: No murmur heard.  Pulmonary:      Effort: Pulmonary effort is normal. No respiratory distress.   Abdominal:      General: Abdomen is flat. There is distension.      Palpations: Abdomen is soft.      Tenderness: There is abdominal tenderness ((mild tenderness)).   Musculoskeletal:      Right lower leg: Edema present.      Left lower leg: Edema present.   Skin:     Capillary Refill: Capillary refill takes less than 2 seconds.      Findings: Erythema and rash present.      Comments: Generalized erythematous desquamating rash, involving feet, legs, arms, hands and extending to abdomen and neck.    Neurological:      Mental Status: He is alert.      Comments: A&ox2.                 Significant Labs: All pertinent labs within the past 24 hours have been reviewed.    Significant Imaging: I have reviewed all pertinent imaging results/findings within the past 24 hours.

## 2023-10-11 VITALS
TEMPERATURE: 98 F | DIASTOLIC BLOOD PRESSURE: 71 MMHG | HEIGHT: 67 IN | HEART RATE: 95 BPM | OXYGEN SATURATION: 95 % | SYSTOLIC BLOOD PRESSURE: 150 MMHG | WEIGHT: 137.13 LBS | BODY MASS INDEX: 21.52 KG/M2 | RESPIRATION RATE: 20 BRPM

## 2023-10-11 PROBLEM — G93.41 ENCEPHALOPATHY, METABOLIC: Status: RESOLVED | Noted: 2023-10-09 | Resolved: 2023-10-11

## 2023-10-11 LAB
POCT GLUCOSE: 119 MG/DL (ref 70–110)
POCT GLUCOSE: 136 MG/DL (ref 70–110)

## 2023-10-11 PROCEDURE — 25000003 PHARM REV CODE 250: Mod: HCNC | Performed by: STUDENT IN AN ORGANIZED HEALTH CARE EDUCATION/TRAINING PROGRAM

## 2023-10-11 PROCEDURE — 96375 TX/PRO/DX INJ NEW DRUG ADDON: CPT

## 2023-10-11 PROCEDURE — 63600175 PHARM REV CODE 636 W HCPCS: Mod: HCNC | Performed by: STUDENT IN AN ORGANIZED HEALTH CARE EDUCATION/TRAINING PROGRAM

## 2023-10-11 PROCEDURE — G0378 HOSPITAL OBSERVATION PER HR: HCPCS | Mod: HCNC

## 2023-10-11 RX ORDER — AMOXICILLIN AND CLAVULANATE POTASSIUM 500; 125 MG/1; MG/1
1 TABLET, FILM COATED ORAL 2 TIMES DAILY
Qty: 12 TABLET | Refills: 0 | Status: ON HOLD | OUTPATIENT
Start: 2023-10-11 | End: 2023-10-23 | Stop reason: HOSPADM

## 2023-10-11 RX ADMIN — CLOPIDOGREL BISULFATE 75 MG: 75 TABLET ORAL at 08:10

## 2023-10-11 RX ADMIN — ONDANSETRON 4 MG: 2 INJECTION INTRAMUSCULAR; INTRAVENOUS at 10:10

## 2023-10-11 RX ADMIN — ATORVASTATIN CALCIUM 80 MG: 40 TABLET, FILM COATED ORAL at 08:10

## 2023-10-11 RX ADMIN — ASPIRIN 81 MG: 81 TABLET, COATED ORAL at 08:10

## 2023-10-11 NOTE — DISCHARGE SUMMARY
[facility]  Discharge Summary      Patient Name: Narciso Yanez  MRN: [id number]  [facility]: [id number]  Patient Class: OP- Observation  Admission Date: 10/9/2023  Hospital Length of Stay: 0 days  Discharge Date and Time:  10/11/2023 12:49 PM  Attending Physician: Mukund Hester MD   Discharging Provider: Mukund Hester MD  Primary Care Provider: Marcelino Del Toro MD    Primary Care Team: Networked reference to record PCT     HPI:   This is an 88-year-old male with a past medical history of CAD, COPD, NPH, hypertension, hyperlipidemia, CKD 3A, type 2 diabetes, peripheral artery disease, PMR, chronic back pain, tobacco use, who presents with altered mental status.    Patient presents for evaluation of altered mental status and confusion.  Per the patient's daughter, the patient has been more confused, and has been hallucinating, talking to himself and has been having frequent falls.  The patient developed a generalized rash that started after father's day.  His rash initially started on his hands, and spread to his back, feet, legs, and then up to his neck and involving his face at some point.  Family follow-up with Infectious Disease and Dermatology, with no official diagnosis given.  His rash has been progressively getting worse, and now started to developed nail changes.  Additionally, he has worsening lower extremity swelling and abdominal distention, related to volume overload.    In the ED, the patient was hemodynamically stable.  Labs were remarkable for an elevated creatinine (1.8-around baseline), hyperkalemia (5.5-slightly hemolyzed specimen), normocytic anemia (9.8-baseline 10-11), elevated BNP (151).  UA showed +3 leukocytes, 5 RBCs, > 100 WBC and few bacteria.  Chest x-ray showed  diffuse increased interstitial lung markings, accentuated from the prior study, could be seen with increased pulmonary venous pressure versus infectious/inflammatory process.  CT head showed no  acute intracranial process with chronic microvascular ischemic changes and shunt catheter on the right with stable ventricular size..  Patient was given Lasix 40 mg IV, ceftriaxone 1 g IV.  He was admitted for further management.      * No surgery found *      Hospital Course:   89 y/o male presents with acute encephalopathy.  Hx of NPH.  Head CT unchanged from previous.  Abnormal UA, concerning for UTI.  Started on Rocephin.  Patient with recurrent falls at home.  PT/OT consulted.  Recommending HH upon discharge.  Some improvement in mental status with treatment of UTI.  Patient did have slight delirium during hospitalization.  He has remained afebrile and hemodynamically stable.  UTI secondary to Proteus and Enterococcus.  He will be discharged home on Augmentin.  Patient's main issue and complain is a diffuse pruritic rash that has been going on for 4 months.  Patient has been following with Dermatology.  Per family, symptoms worsening.  Scheduled to follow up with Dermatology later on this week.  Will also follow up with PCP.       Goals of Care Treatment Preferences:  Code Status: Full Code      Consults:     No new Assessment & Plan notes have been filed under this hospital service since the last note was generated.  Service: Hospital Medicine    Final Active Diagnoses:    Diagnosis Date Noted POA    Rash [R21] 10/10/2023 Yes    UTI (urinary tract infection) [N39.0] 10/09/2023 Yes    HLD (hyperlipidemia) [E78.5] 06/09/2021 Yes    Normal pressure hydrocephalus [G91.2] 05/19/2021 Yes    Fall [W19.XXXA] 03/08/2021 Yes    Stage 3a chronic kidney disease [N18.31] 06/09/2020 Yes    Essential hypertension [I10] 06/09/2020 Yes    Abdominal aortic aneurysm (AAA) without rupture [I71.40] 03/29/2019 Yes    COPD (chronic obstructive pulmonary disease) [J44.9] 03/29/2019 Yes    PAD (peripheral artery disease) [I73.9] 11/13/2017 Yes    Diabetes mellitus with peripheral circulatory disorder [E11.51] 06/18/2014 Yes     Coronary artery disease involving coronary bypass graft of native heart without angina pectoris [I25.810] 10/12/2012 Yes      Problems Resolved During this Admission:    Diagnosis Date Noted Date Resolved POA    PRINCIPAL PROBLEM:  Encephalopathy, metabolic [G93.41] 10/09/2023 10/11/2023 Yes       Discharged Condition: stable    [place] [facility] [place]:   Follow-up Information     Marcelino Del Toro MD Follow up in 1 week(s).    Specialty: Internal Medicine  Contact information:  [address] [place], [place] [address]  [place] [id number] [phone]             Follow up with Dermatology as already scheduled Follow up.           [facility] Follow up.    Why: Home Health- please call for questions or concerns regarding services  Contact information:  [address] [place] [id number] [phone]                     Patient Instructions:      Diet Cardiac     Notify your health care provider if you experience any of the following:  temperature >100.4     Notify your health care provider if you experience any of the following:  persistent nausea and vomiting or diarrhea     Notify your health care provider if you experience any of the following:  severe uncontrolled pain     Notify your health care provider if you experience any of the following:  difficulty breathing or increased cough     Notify your health care provider if you experience any of the following:  persistent dizziness, light-headedness, or visual disturbances     Notify your health care provider if you experience any of the following:  increased confusion or weakness     Activity as tolerated           Pending Diagnostic Studies:     None         Medications:  Reconciled Home Medications:      Medication List      START taking these medications    amoxicillin-clavulanate 500-125mg 500-125 mg Tab  Commonly known as: AUGMENTIN  Take 1 tablet (500 mg total) by mouth 2 (two) times daily. for 6 days        CHANGE how  you take these medications    OMNIPAQUE 300 300 mg iodine/mL injection  Generic drug: iohexoL  What changed: Another medication with the same name was removed. Continue taking this medication, and follow the directions you see here.     ondansetron 4 MG Tbdl  Commonly known as: ZOFRAN-ODT  DISSOLVE 2 TABLETS (8 MG TOTAL) ON THE TONGUE EVERY 8 (EIGHT) HOURS AS NEEDED (NAUSEA).  What changed: Another medication with the same name was removed. Continue taking this medication, and follow the directions you see here.        CONTINUE taking these medications    ACCU-CHEK JOAQUIN PLUS METER Misc  Generic drug: blood-glucose meter  Three times a day with meals     acetaminophen 500 MG tablet  Commonly known as: TYLENOL  Take 1 tablet (500 mg total) by mouth every 6 (six) hours as needed for Pain.     aspirin 81 MG EC tablet  Commonly known as: ECOTRIN  Take 1 tablet (81 mg total) by mouth once daily.     * blood sugar diagnostic Strp  Commonly known as: TRUE METRIX GLUCOSE TEST STRIP  TID     BOOSTRIX TDAP 2.5-8-5 Lf-mcg-Lf/0.5mL Syrg injection  Generic drug: diphth,pertus(acell),tetanus     DUPIXENT  mg/2 mL Pnij  Generic drug: dupilumab     fluconazole 150 MG Tab  Commonly known as: DIFLUCAN  Take by mouth.     hydrocortisone 1 % cream  Apply topically 2 (two) times daily. for 5 days     ketoconazole 2 % cream  Commonly known as: NIZORAL  Apply topically 2 (two) times daily.     magnesium sulfate in water 2 gram/50 mL Pgbk  Commonly known as: MAGNESIUM SULFATE 2 GRAM/50 ML     metoprolol succinate 25 MG 24 hr tablet  Commonly known as: TOPROL-XL     mometasone 0.1% 0.1 % cream  Commonly known as: ELOCON  Apply topically 2 (two) times daily.     prochlorperazine 5 MG tablet  Commonly known as: COMPAZINE         * This list has 1 medication(s) that are the same as other medications prescribed for you. Read the directions carefully, and ask your doctor or other care provider to review them with you.            ASK your  doctor about these medications    alendronate 70 MG tablet  Commonly known as: FOSAMAX  TAKE 1 TABLET EVERY 7 DAYS ON TUESDAYS     ascorbic acid (vitamin C) 250 MG tablet  Commonly known as: VITAMIN C  Take 1 tablet (250 mg total) by mouth once daily.     * TRUE METRIX GLUCOSE TEST STRIP Strp  Generic drug: blood sugar diagnostic  TEST BLOOD SUGAR THREE TIMES DAILY.     clopidogreL 75 mg tablet  Commonly known as: PLAVIX  TAKE 1 TABLET EVERY DAY     olopatadine 0.1 % ophthalmic solution  Commonly known as: PATANOL  Place 1 drop into both eyes 2 (two) times daily.     predniSONE 10 MG tablet  Commonly known as: DELTASONE  Take 4 tabs x 3 days, then take 2 tabs x 3 days, then take 1 tab x 3 days.     rosuvastatin 20 MG tablet  Commonly known as: CRESTOR  TAKE 1 TABLET (20 MG TOTAL) BY MOUTH EVERY EVENING.     senna 8.6 mg tablet  Commonly known as: SENOKOT  Take 1 tablet by mouth once daily.         * This list has 1 medication(s) that are the same as other medications prescribed for you. Read the directions carefully, and ask your doctor or other care provider to review them with you.                Indwelling Lines/Drains at time of discharge:   Lines/Drains/Airways     None                 Time spent on the discharge of patient: >30 minutes         Mukund Hester MD  Department of Hospital Medicine  [facility]

## 2023-10-11 NOTE — NURSING
Pt resting in bed asleep, no complaints of pain/discomfort. IV saline lock. Pt remains free from fall/injury. Vitals assessed. Accu checks w/no insulin coverage. Pt on room air; no distress noted. Telesitter remains at bedside. Safety measures maintained. Will cont to monitor

## 2023-10-11 NOTE — PLAN OF CARE
[facility]    HOME HEALTH ORDERS  FACE TO FACE ENCOUNTER    Patient Name: Narciso Yanez  YOB: 1935    PCP: Marcelino Del Toro MD   PCP Address: [address] / [place] [address]  PCP Phone Number: [phone]  PCP Fax: [phone]       Encounter Date: 10/11/2023    Admit to Home Health    Diagnoses:  Active Hospital Problems    Diagnosis  POA    Rash [R21]  Yes    UTI (urinary tract infection) [N39.0]  Yes    HLD (hyperlipidemia) [E78.5]  Yes    Normal pressure hydrocephalus [G91.2]  Yes    Fall [W19.XXXA]  Yes    Stage 3a chronic kidney disease [N18.31]  Yes    Essential hypertension [I10]  Yes    Abdominal aortic aneurysm (AAA) without rupture [I71.40]  Yes    COPD (chronic obstructive pulmonary disease) [J44.9]  Yes    PAD (peripheral artery disease) [I73.9]  Yes    Diabetes mellitus with peripheral circulatory disorder [E11.51]  Yes    Coronary artery disease involving coronary bypass graft of native heart without angina pectoris [I25.810]  Yes      Resolved Hospital Problems    Diagnosis Date Resolved POA    *Encephalopathy, metabolic [G93.41] 10/11/2023 Yes     Priority: 1 - High       Future Appointments   Date Time Provider Department Center   10/23/2023 10:00 AM Marcelino Del Toro MD [facility]   10/31/2023 12:30 PM [facility] OIC-CT1 500 LB LIMIT [facility]   10/31/2023  2:00 PM Alex Kathleen PA [facility] NEUROS8 [facility]      Follow-up Information       Marcelino Del Toro MD Follow up in 1 week(s).    Specialty: Internal Medicine  Contact information:  [address] [place] [id number] [phone]               Follow up with Dermatology as already scheduled Follow up.                               I have seen and examined this patient face to face today. My clinical findings that support the need for the home health skilled services and home bound status are the following:  Weakness/numbness causing balance and gait disturbance due to  Weakness/Debility making it taxing to leave home.    Allergies:  Review of patient's allergies indicates:   Allergen Reactions    Demerol [meperidine] Nausea Only       Diet: cardiac diet    Activities: activity as tolerated    Nursing:   SN to complete comprehensive assessment including routine vital signs. Instruct on disease process and s/s of complications to report to MD. Review/verify medication list sent home with the patient at time of discharge  and instruct patient/caregiver as needed. Frequency may be adjusted depending on start of care date.    Notify MD if SBP > 160 or < 90; DBP > 90 or < 50; HR > 120 or < 50; Temp > 101      CONSULTS:    Physical Therapy to evaluate and treat. Evaluate for home safety and equipment needs; Establish/upgrade home exercise program. Perform / instruct on therapeutic exercises, gait training, transfer training, and Range of Motion.  Occupational Therapy to evaluate and treat. Evaluate home environment for safety and equipment needs. Perform/Instruct on transfers, ADL training, ROM, and therapeutic exercises.   to evaluate for community resources/long-range planning.  Aide to provide assistance with personal care, ADLs, and vital signs.    Medications: Review discharge medications with patient and family and provide education.      Current Discharge Medication List        START taking these medications    Details   amoxicillin-clavulanate 500-125mg (AUGMENTIN) 500-125 mg Tab Take 1 tablet (500 mg total) by mouth 2 (two) times daily. for 6 days  Qty: 12 tablet, Refills: 0           CONTINUE these medications which have NOT CHANGED    Details   ACCU-CHEK JOAQUIN PLUS METER Misc Three times a day with meals  Refills: 0      acetaminophen (TYLENOL) 500 MG tablet Take 1 tablet (500 mg total) by mouth every 6 (six) hours as needed for Pain.  Qty: 13 tablet, Refills: 0      alendronate (FOSAMAX) 70 MG tablet TAKE 1 TABLET EVERY 7 DAYS ON TUESDAYS  Qty: 12 tablet,  Refills: 12    Associated Diagnoses: On corticosteroid therapy      ascorbic acid, vitamin C, (VITAMIN C) 250 MG tablet Take 1 tablet (250 mg total) by mouth once daily.  Qty: 120 tablet, Refills: 0    Associated Diagnoses: Iron deficiency anemia, unspecified iron deficiency anemia type      aspirin (ECOTRIN) 81 MG EC tablet Take 1 tablet (81 mg total) by mouth once daily.  Refills: 0      !! blood sugar diagnostic (TRUE METRIX GLUCOSE TEST STRIP) Strp TID  Qty: 300 each, Refills: 12    Associated Diagnoses: Uncontrolled type 2 diabetes mellitus with hyperglycemia      BOOSTRIX TDAP 2.5-8-5 Lf-mcg-Lf/0.5mL Syrg injection       clopidogreL (PLAVIX) 75 mg tablet TAKE 1 TABLET EVERY DAY  Qty: 90 tablet, Refills: 1      DUPIXENT  mg/2 mL PnIj       fluconazole (DIFLUCAN) 150 MG Tab Take by mouth.      hydrocortisone 1 % cream Apply topically 2 (two) times daily. for 5 days  Qty: 20 g, Refills: 0    Associated Diagnoses: Cutaneous fungal infection      ketoconazole (NIZORAL) 2 % cream Apply topically 2 (two) times daily.      magnesium sulfate in water (MAGNESIUM SULFATE 2 GRAM/50 ML) 2 gram/50 mL PgBk       metoprolol succinate (TOPROL-XL) 25 MG 24 hr tablet       mometasone 0.1% (ELOCON) 0.1 % cream Apply topically 2 (two) times daily.      olopatadine (PATANOL) 0.1 % ophthalmic solution Place 1 drop into both eyes 2 (two) times daily.  Qty: 5 mL, Refills: 0      OMNIPAQUE 300 300 mg iodine/mL injection       ondansetron (ZOFRAN-ODT) 4 MG TbDL DISSOLVE 2 TABLETS (8 MG TOTAL) ON THE TONGUE EVERY 8 (EIGHT) HOURS AS NEEDED (NAUSEA).  Qty: 90 tablet, Refills: 12      predniSONE (DELTASONE) 10 MG tablet Take 4 tabs x 3 days, then take 2 tabs x 3 days, then take 1 tab x 3 days.  Qty: 21 tablet, Refills: 0      prochlorperazine (COMPAZINE) 5 MG tablet       rosuvastatin (CRESTOR) 20 MG tablet TAKE 1 TABLET (20 MG TOTAL) BY MOUTH EVERY EVENING.  Qty: 90 tablet, Refills: 12      senna (SENOKOT) 8.6 mg tablet Take 1  tablet by mouth once daily.      !! TRUE METRIX GLUCOSE TEST STRIP Strp TEST BLOOD SUGAR THREE TIMES DAILY.  Qty: 300 strip, Refills: 12       !! - Potential duplicate medications found. Please discuss with provider.        STOP taking these medications       OMNIPAQUE 350 350 mg iodine/mL Soln injection Comments:   Reason for Stopping:               I certify that this patient is confined to his home and needs intermittent skilled nursing care, physical therapy, and occupational therapy.

## 2023-10-11 NOTE — PLAN OF CARE
C referral sent via [facility] to [facility]  for review.  TN indicated that the plan is for the pt to d/c to home on today.

## 2023-10-11 NOTE — PLAN OF CARE
10/11/23 1228   Final Note   Assessment Type Final Discharge Note   Anticipated Discharge Disposition Home   Hospital Resources/Appts/Education Provided Post-Acute resouces added to AVS   Post-Acute Status   Post-Acute Authorization Home Health   Home Health Status Set-up Complete/Auth obtained     Pts nurse Pierre notified that the pt can d/c from CM standpoint

## 2023-10-11 NOTE — NURSING
[facility] [place]  Nurses Note -- 4 Eyes      10/11/2023       Skin assessed on: Q Shift      [x] No Pressure Injuries Present    []Prevention Measures Documented    [] Yes LDA  for Pressure Injury Previously documented     [] Yes New Pressure Injury Discovered   [] LDA for New Pressure Injury Added      Attending RN:  Gabby Bailon RN     Second RN:  Mary MARES LPN

## 2023-10-12 ENCOUNTER — TELEPHONE (OUTPATIENT)
Dept: FAMILY MEDICINE | Facility: CLINIC | Age: 88
End: 2023-10-12
Payer: MEDICARE

## 2023-10-12 ENCOUNTER — PATIENT MESSAGE (OUTPATIENT)
Dept: FAMILY MEDICINE | Facility: CLINIC | Age: 88
End: 2023-10-12
Payer: MEDICARE

## 2023-10-12 LAB
BACTERIA UR CULT: ABNORMAL
BACTERIA UR CULT: ABNORMAL

## 2023-10-12 NOTE — TELEPHONE ENCOUNTER
----- Message from Mukund Hester MD sent at 10/12/2023  8:18 AM CDT -----  He saw the Dermatologist you referred him to.  They did a skin biopsy, but per family still no definite diagnosis.  Initially thought to be eczema, then possible psoriasis.  He was started on Dupixent and has gotten 2 doses, but rash continues to get worse.  ----- Message -----  From: Marcelino Del Toro MD  Sent: 10/11/2023   5:00 PM CDT  To: Mukund Hester MD    Thanks, I have tried to get more information from family as to what the differential diagnosis was from dermatology.  Did they by chance mention any new diagnoses?  Looks like he saw Dr. Nicole and in [facility] there is a diagnosis of tinea corporis but I did try to send him to another local dermatologist outside of [facility] and not quite sure if he made that appointment or got any further diagnostic input.  ----- Message -----  From: Mukund Hester MD  Sent: 10/11/2023  12:54 PM CDT  To: Marcelino Del Toro MD    This patient was admitted with UTI and treated with ABX's.  His main issue/complaint and family concern is this diffuse pruritic rash that is causing him significant overall decline.  He has apparently been seeing Dermatology and rash getting worse with no apparent diagnosis.  We don't have inpatient Dermatology consult, so could not really help him/family out.  Just wanted to make you aware.  thanks

## 2023-10-12 NOTE — TELEPHONE ENCOUNTER
Please advise,      Hi, my dad was admitted into the hospital on Monday evening (10/9).  He was discharged Wednesday (10/11), we feel prematurely.   He was given 1 treatment of Rocephin on Tues, then discharged yesterday with oral Amoxycillin.  He's still very much out of his head, which is very abnormal for him.  We're also finally seeing the results of the culture today.  Can you look at his labs please and advise your thoughts?  Urine Culture, Routine  View trends  Value  PROTEUS MIRABILIS  10,000 - 49,999 cfu/ml  Abnormal  Urine Culture, Routine  View trends  Value  ENTEROCOCCUS FAECALIS  > 100,000 cfu/ml

## 2023-10-14 ENCOUNTER — HOSPITAL ENCOUNTER (INPATIENT)
Facility: HOSPITAL | Age: 88
LOS: 8 days | Discharge: NURSING, REHAB OR LONG-TERM CARE | DRG: 071 | End: 2023-10-23
Attending: STUDENT IN AN ORGANIZED HEALTH CARE EDUCATION/TRAINING PROGRAM | Admitting: INTERNAL MEDICINE
Payer: MEDICARE

## 2023-10-14 DIAGNOSIS — Z72.0 TOBACCO ABUSE: Primary | ICD-10-CM

## 2023-10-14 DIAGNOSIS — I50.9 CHF (CONGESTIVE HEART FAILURE): ICD-10-CM

## 2023-10-14 DIAGNOSIS — R00.0 TACHYCARDIA: ICD-10-CM

## 2023-10-14 DIAGNOSIS — R07.9 CHEST PAIN: ICD-10-CM

## 2023-10-14 LAB
ALBUMIN SERPL BCP-MCNC: 2.9 G/DL (ref 3.5–5.2)
ALP SERPL-CCNC: 138 U/L (ref 55–135)
ALT SERPL W/O P-5'-P-CCNC: 28 U/L (ref 10–44)
ANION GAP SERPL CALC-SCNC: 12 MMOL/L (ref 8–16)
AST SERPL-CCNC: 33 U/L (ref 10–40)
BACTERIA #/AREA URNS HPF: NORMAL /HPF
BASOPHILS # BLD AUTO: 0.06 K/UL (ref 0–0.2)
BASOPHILS NFR BLD: 0.6 % (ref 0–1.9)
BILIRUB SERPL-MCNC: 0.2 MG/DL (ref 0.1–1)
BILIRUB UR QL STRIP: NEGATIVE
BNP SERPL-MCNC: 223 PG/ML (ref 0–99)
BUN SERPL-MCNC: 15 MG/DL (ref 8–23)
CALCIUM SERPL-MCNC: 8.6 MG/DL (ref 8.7–10.5)
CHLORIDE SERPL-SCNC: 110 MMOL/L (ref 95–110)
CK SERPL-CCNC: 250 U/L (ref 20–200)
CLARITY UR: CLEAR
CO2 SERPL-SCNC: 17 MMOL/L (ref 23–29)
COLOR UR: YELLOW
CREAT SERPL-MCNC: 1.3 MG/DL (ref 0.5–1.4)
DIFFERENTIAL METHOD: ABNORMAL
EOSINOPHIL # BLD AUTO: 1.3 K/UL (ref 0–0.5)
EOSINOPHIL NFR BLD: 13.6 % (ref 0–8)
ERYTHROCYTE [DISTWIDTH] IN BLOOD BY AUTOMATED COUNT: 15.7 % (ref 11.5–14.5)
EST. GFR  (NO RACE VARIABLE): 54 ML/MIN/1.73 M^2
GLUCOSE SERPL-MCNC: 130 MG/DL (ref 70–110)
GLUCOSE UR QL STRIP: NEGATIVE
HCT VFR BLD AUTO: 29.3 % (ref 40–54)
HGB BLD-MCNC: 9.7 G/DL (ref 14–18)
HGB UR QL STRIP: NEGATIVE
HYALINE CASTS #/AREA URNS LPF: 1 /LPF
IMM GRANULOCYTES # BLD AUTO: 0.07 K/UL (ref 0–0.04)
IMM GRANULOCYTES NFR BLD AUTO: 0.7 % (ref 0–0.5)
KETONES UR QL STRIP: NEGATIVE
LEUKOCYTE ESTERASE UR QL STRIP: NEGATIVE
LYMPHOCYTES # BLD AUTO: 2 K/UL (ref 1–4.8)
LYMPHOCYTES NFR BLD: 20.3 % (ref 18–48)
MAGNESIUM SERPL-MCNC: 1.9 MG/DL (ref 1.6–2.6)
MCH RBC QN AUTO: 31.6 PG (ref 27–31)
MCHC RBC AUTO-ENTMCNC: 33.1 G/DL (ref 32–36)
MCV RBC AUTO: 95 FL (ref 82–98)
MICROSCOPIC COMMENT: NORMAL
MONOCYTES # BLD AUTO: 0.8 K/UL (ref 0.3–1)
MONOCYTES NFR BLD: 8 % (ref 4–15)
NEUTROPHILS # BLD AUTO: 5.6 K/UL (ref 1.8–7.7)
NEUTROPHILS NFR BLD: 56.8 % (ref 38–73)
NITRITE UR QL STRIP: NEGATIVE
NRBC BLD-RTO: 0 /100 WBC
PH UR STRIP: 6 [PH] (ref 5–8)
PLATELET # BLD AUTO: 342 K/UL (ref 150–450)
PMV BLD AUTO: 9.3 FL (ref 9.2–12.9)
POTASSIUM SERPL-SCNC: 5 MMOL/L (ref 3.5–5.1)
PROT SERPL-MCNC: 7.5 G/DL (ref 6–8.4)
PROT UR QL STRIP: ABNORMAL
RBC # BLD AUTO: 3.07 M/UL (ref 4.6–6.2)
RBC #/AREA URNS HPF: 1 /HPF (ref 0–4)
SODIUM SERPL-SCNC: 139 MMOL/L (ref 136–145)
SP GR UR STRIP: 1.02 (ref 1–1.03)
TROPONIN I SERPL DL<=0.01 NG/ML-MCNC: 0.11 NG/ML (ref 0–0.03)
URN SPEC COLLECT METH UR: ABNORMAL
UROBILINOGEN UR STRIP-ACNC: NEGATIVE EU/DL
WBC # BLD AUTO: 9.81 K/UL (ref 3.9–12.7)
WBC #/AREA URNS HPF: 3 /HPF (ref 0–5)

## 2023-10-14 PROCEDURE — 83880 ASSAY OF NATRIURETIC PEPTIDE: CPT | Mod: HCNC

## 2023-10-14 PROCEDURE — 80053 COMPREHEN METABOLIC PANEL: CPT | Mod: HCNC

## 2023-10-14 PROCEDURE — 93005 ELECTROCARDIOGRAM TRACING: CPT | Mod: HCNC

## 2023-10-14 PROCEDURE — 96360 HYDRATION IV INFUSION INIT: CPT | Mod: HCNC

## 2023-10-14 PROCEDURE — 99285 EMERGENCY DEPT VISIT HI MDM: CPT | Mod: 25,HCNC

## 2023-10-14 PROCEDURE — 96361 HYDRATE IV INFUSION ADD-ON: CPT | Mod: HCNC

## 2023-10-14 PROCEDURE — 93010 ELECTROCARDIOGRAM REPORT: CPT | Mod: HCNC,,, | Performed by: INTERNAL MEDICINE

## 2023-10-14 PROCEDURE — 84484 ASSAY OF TROPONIN QUANT: CPT | Mod: HCNC

## 2023-10-14 PROCEDURE — 82550 ASSAY OF CK (CPK): CPT | Mod: HCNC

## 2023-10-14 PROCEDURE — 85025 COMPLETE CBC W/AUTO DIFF WBC: CPT | Mod: HCNC

## 2023-10-14 PROCEDURE — 25000003 PHARM REV CODE 250: Mod: HCNC | Performed by: STUDENT IN AN ORGANIZED HEALTH CARE EDUCATION/TRAINING PROGRAM

## 2023-10-14 PROCEDURE — 93010 EKG 12-LEAD: ICD-10-PCS | Mod: HCNC,,, | Performed by: INTERNAL MEDICINE

## 2023-10-14 PROCEDURE — 83735 ASSAY OF MAGNESIUM: CPT | Mod: HCNC

## 2023-10-14 PROCEDURE — 81000 URINALYSIS NONAUTO W/SCOPE: CPT | Mod: HCNC

## 2023-10-14 RX ORDER — FUROSEMIDE 10 MG/ML
40 INJECTION INTRAMUSCULAR; INTRAVENOUS
Status: COMPLETED | OUTPATIENT
Start: 2023-10-15 | End: 2023-10-15

## 2023-10-14 RX ADMIN — IOHEXOL 75 ML: 350 INJECTION, SOLUTION INTRAVENOUS at 11:10

## 2023-10-14 RX ADMIN — SODIUM CHLORIDE 500 ML: 9 INJECTION, SOLUTION INTRAVENOUS at 09:10

## 2023-10-15 PROBLEM — R79.89 ELEVATED TROPONIN: Status: ACTIVE | Noted: 2023-10-15

## 2023-10-15 PROBLEM — N40.0 ENLARGED PROSTATE: Status: ACTIVE | Noted: 2023-10-15

## 2023-10-15 PROBLEM — J84.9 CHRONIC INTERSTITIAL LUNG DISEASE: Status: ACTIVE | Noted: 2023-10-15

## 2023-10-15 PROBLEM — M62.82 RHABDOMYOLYSIS: Status: ACTIVE | Noted: 2023-10-15

## 2023-10-15 PROBLEM — R41.0 ACUTE DELIRIUM: Status: ACTIVE | Noted: 2023-10-15

## 2023-10-15 PROBLEM — E87.70 VOLUME OVERLOAD: Status: ACTIVE | Noted: 2023-10-15

## 2023-10-15 PROBLEM — K80.20 CHOLELITHIASIS: Status: ACTIVE | Noted: 2023-10-15

## 2023-10-15 PROBLEM — D64.9 NORMOCYTIC ANEMIA: Status: ACTIVE | Noted: 2023-10-15

## 2023-10-15 PROBLEM — K57.90 DIVERTICULOSIS: Status: ACTIVE | Noted: 2023-10-15

## 2023-10-15 LAB
ALBUMIN SERPL BCP-MCNC: 2.8 G/DL (ref 3.5–5.2)
ALP SERPL-CCNC: 109 U/L (ref 55–135)
ALT SERPL W/O P-5'-P-CCNC: 29 U/L (ref 10–44)
AMMONIA PLAS-SCNC: 21 UMOL/L (ref 10–50)
AMPHET+METHAMPHET UR QL: NEGATIVE
ANION GAP SERPL CALC-SCNC: 15 MMOL/L (ref 8–16)
APAP SERPL-MCNC: <3 UG/ML (ref 10–20)
AST SERPL-CCNC: 29 U/L (ref 10–40)
BARBITURATES UR QL SCN>200 NG/ML: NEGATIVE
BENZODIAZ UR QL SCN>200 NG/ML: ABNORMAL
BILIRUB SERPL-MCNC: 0.3 MG/DL (ref 0.1–1)
BSA FOR ECHO PROCEDURE: 1.67 M2
BUN SERPL-MCNC: 14 MG/DL (ref 8–23)
BZE UR QL SCN: NEGATIVE
CALCIUM SERPL-MCNC: 9 MG/DL (ref 8.7–10.5)
CANNABINOIDS UR QL SCN: NEGATIVE
CHLORIDE SERPL-SCNC: 109 MMOL/L (ref 95–110)
CK SERPL-CCNC: 254 U/L (ref 20–200)
CO2 SERPL-SCNC: 18 MMOL/L (ref 23–29)
CREAT SERPL-MCNC: 1.5 MG/DL (ref 0.5–1.4)
CREAT UR-MCNC: 72.4 MG/DL (ref 23–375)
CRP SERPL-MCNC: 96.6 MG/L (ref 0–8.2)
CV ECHO LV RWT: 0.46 CM
DOP CALC MV VTI: 19.1 CM
E WAVE DECELERATION TIME: 136.82 MSEC
E/A RATIO: 0.62
E/E' RATIO: 8.22 M/S
ECHO LV POSTERIOR WALL: 0.98 CM (ref 0.6–1.1)
ERYTHROCYTE [SEDIMENTATION RATE] IN BLOOD BY WESTERGREN METHOD: 97 MM/HR (ref 0–10)
EST. GFR  (NO RACE VARIABLE): 45 ML/MIN/1.73 M^2
ESTIMATED AVG GLUCOSE: 151 MG/DL (ref 68–131)
ETHANOL SERPL-MCNC: <10 MG/DL
FOLATE SERPL-MCNC: 30.5 NG/ML (ref 4–24)
FRACTIONAL SHORTENING: 26 % (ref 28–44)
GLUCOSE SERPL-MCNC: 115 MG/DL (ref 70–110)
HBA1C MFR BLD: 6.9 % (ref 4–5.6)
INTERVENTRICULAR SEPTUM: 0.79 CM (ref 0.6–1.1)
LA MAJOR: 3.58 CM
LEFT ATRIUM SIZE: 2.79 CM
LEFT INTERNAL DIMENSION IN SYSTOLE: 3.14 CM (ref 2.1–4)
LEFT VENTRICLE DIASTOLIC VOLUME INDEX: 47.97 ML/M2
LEFT VENTRICLE DIASTOLIC VOLUME: 80.59 ML
LEFT VENTRICLE MASS INDEX: 70 G/M2
LEFT VENTRICLE SYSTOLIC VOLUME INDEX: 23.2 ML/M2
LEFT VENTRICLE SYSTOLIC VOLUME: 38.98 ML
LEFT VENTRICULAR INTERNAL DIMENSION IN DIASTOLE: 4.25 CM (ref 3.5–6)
LEFT VENTRICULAR MASS: 118.25 G
LV LATERAL E/E' RATIO: 5.69 M/S
LV SEPTAL E/E' RATIO: 14.8 M/S
MAGNESIUM SERPL-MCNC: 1.9 MG/DL (ref 1.6–2.6)
METHADONE UR QL SCN>300 NG/ML: NEGATIVE
MV MEAN GRADIENT: 4 MMHG
MV PEAK A VEL: 1.19 M/S
MV PEAK E VEL: 0.74 M/S
MV PEAK GRADIENT: 7 MMHG
MV STENOSIS PRESSURE HALF TIME: 39.68 MS
MV VALVE AREA P 1/2 METHOD: 5.54 CM2
OPIATES UR QL SCN: NEGATIVE
PCP UR QL SCN>25 NG/ML: NEGATIVE
PHOSPHATE SERPL-MCNC: 4.8 MG/DL (ref 2.7–4.5)
PISA TR MAX VEL: 2.15 M/S
POCT GLUCOSE: 116 MG/DL (ref 70–110)
POCT GLUCOSE: 117 MG/DL (ref 70–110)
POCT GLUCOSE: 131 MG/DL (ref 70–110)
POCT GLUCOSE: 98 MG/DL (ref 70–110)
POTASSIUM SERPL-SCNC: 5 MMOL/L (ref 3.5–5.1)
PROT SERPL-MCNC: 7.5 G/DL (ref 6–8.4)
RA MAJOR: 3.79 CM
RA WIDTH: 3.8 CM
RIGHT VENTRICULAR END-DIASTOLIC DIMENSION: 4.38 CM
SODIUM SERPL-SCNC: 142 MMOL/L (ref 136–145)
T4 SERPL-MCNC: 4.9 UG/DL (ref 4.5–11.5)
TDI LATERAL: 0.13 M/S
TDI SEPTAL: 0.05 M/S
TDI: 0.09 M/S
TOXICOLOGY INFORMATION: ABNORMAL
TR MAX PG: 18 MMHG
TROPONIN I SERPL DL<=0.01 NG/ML-MCNC: 0.09 NG/ML (ref 0–0.03)
TSH SERPL DL<=0.005 MIU/L-ACNC: 3.95 UIU/ML (ref 0.4–4)
VIT B12 SERPL-MCNC: 882 PG/ML (ref 210–950)
Z-SCORE OF LEFT VENTRICULAR DIMENSION IN END DIASTOLE: -0.98
Z-SCORE OF LEFT VENTRICULAR DIMENSION IN END SYSTOLE: 0.62

## 2023-10-15 PROCEDURE — 80307 DRUG TEST PRSMV CHEM ANLYZR: CPT | Mod: HCNC

## 2023-10-15 PROCEDURE — 80053 COMPREHEN METABOLIC PANEL: CPT | Mod: HCNC | Performed by: INTERNAL MEDICINE

## 2023-10-15 PROCEDURE — 83036 HEMOGLOBIN GLYCOSYLATED A1C: CPT | Mod: HCNC | Performed by: INTERNAL MEDICINE

## 2023-10-15 PROCEDURE — 83735 ASSAY OF MAGNESIUM: CPT | Mod: HCNC | Performed by: INTERNAL MEDICINE

## 2023-10-15 PROCEDURE — 25500020 PHARM REV CODE 255: Mod: HCNC | Performed by: STUDENT IN AN ORGANIZED HEALTH CARE EDUCATION/TRAINING PROGRAM

## 2023-10-15 PROCEDURE — 82140 ASSAY OF AMMONIA: CPT | Mod: HCNC | Performed by: INTERNAL MEDICINE

## 2023-10-15 PROCEDURE — 82607 VITAMIN B-12: CPT | Mod: HCNC | Performed by: INTERNAL MEDICINE

## 2023-10-15 PROCEDURE — 63600175 PHARM REV CODE 636 W HCPCS: Mod: HCNC | Performed by: STUDENT IN AN ORGANIZED HEALTH CARE EDUCATION/TRAINING PROGRAM

## 2023-10-15 PROCEDURE — 80143 DRUG ASSAY ACETAMINOPHEN: CPT | Mod: HCNC

## 2023-10-15 PROCEDURE — 82550 ASSAY OF CK (CPK): CPT | Mod: HCNC | Performed by: INTERNAL MEDICINE

## 2023-10-15 PROCEDURE — 82746 ASSAY OF FOLIC ACID SERUM: CPT | Mod: HCNC | Performed by: INTERNAL MEDICINE

## 2023-10-15 PROCEDURE — 99223 1ST HOSP IP/OBS HIGH 75: CPT | Mod: HCNC,25,, | Performed by: INTERNAL MEDICINE

## 2023-10-15 PROCEDURE — 86592 SYPHILIS TEST NON-TREP QUAL: CPT | Mod: HCNC | Performed by: INTERNAL MEDICINE

## 2023-10-15 PROCEDURE — 99223 PR INITIAL HOSPITAL CARE,LEVL III: ICD-10-PCS | Mod: HCNC,25,, | Performed by: INTERNAL MEDICINE

## 2023-10-15 PROCEDURE — 85652 RBC SED RATE AUTOMATED: CPT | Mod: HCNC | Performed by: INTERNAL MEDICINE

## 2023-10-15 PROCEDURE — 21400001 HC TELEMETRY ROOM: Mod: HCNC

## 2023-10-15 PROCEDURE — 84100 ASSAY OF PHOSPHORUS: CPT | Mod: HCNC | Performed by: INTERNAL MEDICINE

## 2023-10-15 PROCEDURE — 84484 ASSAY OF TROPONIN QUANT: CPT | Mod: HCNC | Performed by: INTERNAL MEDICINE

## 2023-10-15 PROCEDURE — 63600175 PHARM REV CODE 636 W HCPCS: Mod: HCNC | Performed by: INTERNAL MEDICINE

## 2023-10-15 PROCEDURE — 82077 ASSAY SPEC XCP UR&BREATH IA: CPT | Mod: HCNC

## 2023-10-15 PROCEDURE — 86140 C-REACTIVE PROTEIN: CPT | Mod: HCNC | Performed by: INTERNAL MEDICINE

## 2023-10-15 PROCEDURE — 84436 ASSAY OF TOTAL THYROXINE: CPT | Mod: HCNC | Performed by: INTERNAL MEDICINE

## 2023-10-15 PROCEDURE — 25000003 PHARM REV CODE 250: Mod: HCNC | Performed by: INTERNAL MEDICINE

## 2023-10-15 PROCEDURE — 84443 ASSAY THYROID STIM HORMONE: CPT | Mod: HCNC | Performed by: INTERNAL MEDICINE

## 2023-10-15 RX ORDER — CLOPIDOGREL BISULFATE 75 MG/1
75 TABLET ORAL DAILY
Status: DISCONTINUED | OUTPATIENT
Start: 2023-10-15 | End: 2023-10-23 | Stop reason: HOSPADM

## 2023-10-15 RX ORDER — IBUPROFEN 200 MG
16 TABLET ORAL
Status: DISCONTINUED | OUTPATIENT
Start: 2023-10-15 | End: 2023-10-21

## 2023-10-15 RX ORDER — LANOLIN ALCOHOL/MO/W.PET/CERES
800 CREAM (GRAM) TOPICAL
Status: DISCONTINUED | OUTPATIENT
Start: 2023-10-15 | End: 2023-10-23 | Stop reason: HOSPADM

## 2023-10-15 RX ORDER — IBUPROFEN 200 MG
24 TABLET ORAL
Status: DISCONTINUED | OUTPATIENT
Start: 2023-10-15 | End: 2023-10-21

## 2023-10-15 RX ORDER — ASPIRIN 81 MG/1
81 TABLET ORAL DAILY
Status: DISCONTINUED | OUTPATIENT
Start: 2023-10-15 | End: 2023-10-23 | Stop reason: HOSPADM

## 2023-10-15 RX ORDER — NALOXONE HCL 0.4 MG/ML
0.02 VIAL (ML) INJECTION
Status: DISCONTINUED | OUTPATIENT
Start: 2023-10-15 | End: 2023-10-23 | Stop reason: HOSPADM

## 2023-10-15 RX ORDER — SODIUM,POTASSIUM PHOSPHATES 280-250MG
2 POWDER IN PACKET (EA) ORAL
Status: DISCONTINUED | OUTPATIENT
Start: 2023-10-15 | End: 2023-10-23 | Stop reason: HOSPADM

## 2023-10-15 RX ORDER — SODIUM CHLORIDE 0.9 % (FLUSH) 0.9 %
10 SYRINGE (ML) INJECTION EVERY 12 HOURS PRN
Status: DISCONTINUED | OUTPATIENT
Start: 2023-10-15 | End: 2023-10-23 | Stop reason: HOSPADM

## 2023-10-15 RX ORDER — GLUCAGON 1 MG
1 KIT INJECTION
Status: DISCONTINUED | OUTPATIENT
Start: 2023-10-15 | End: 2023-10-23 | Stop reason: HOSPADM

## 2023-10-15 RX ORDER — IBUPROFEN 200 MG
1 TABLET ORAL DAILY
Status: DISCONTINUED | OUTPATIENT
Start: 2023-10-16 | End: 2023-10-23 | Stop reason: HOSPADM

## 2023-10-15 RX ORDER — IBUPROFEN 200 MG
16 TABLET ORAL
Status: DISCONTINUED | OUTPATIENT
Start: 2023-10-15 | End: 2023-10-23 | Stop reason: HOSPADM

## 2023-10-15 RX ORDER — MAG HYDROX/ALUMINUM HYD/SIMETH 200-200-20
30 SUSPENSION, ORAL (FINAL DOSE FORM) ORAL 4 TIMES DAILY PRN
Status: DISCONTINUED | OUTPATIENT
Start: 2023-10-15 | End: 2023-10-23 | Stop reason: HOSPADM

## 2023-10-15 RX ORDER — GLUCAGON 1 MG
1 KIT INJECTION
Status: DISCONTINUED | OUTPATIENT
Start: 2023-10-15 | End: 2023-10-21

## 2023-10-15 RX ORDER — IBUPROFEN 200 MG
24 TABLET ORAL
Status: DISCONTINUED | OUTPATIENT
Start: 2023-10-15 | End: 2023-10-23 | Stop reason: HOSPADM

## 2023-10-15 RX ORDER — INSULIN ASPART 100 [IU]/ML
0-5 INJECTION, SOLUTION INTRAVENOUS; SUBCUTANEOUS
Status: DISCONTINUED | OUTPATIENT
Start: 2023-10-15 | End: 2023-10-21

## 2023-10-15 RX ORDER — METOPROLOL SUCCINATE 25 MG/1
25 TABLET, EXTENDED RELEASE ORAL DAILY
Status: DISCONTINUED | OUTPATIENT
Start: 2023-10-15 | End: 2023-10-23 | Stop reason: HOSPADM

## 2023-10-15 RX ORDER — ACETAMINOPHEN 500 MG
500 TABLET ORAL EVERY 6 HOURS PRN
Status: DISCONTINUED | OUTPATIENT
Start: 2023-10-15 | End: 2023-10-23 | Stop reason: HOSPADM

## 2023-10-15 RX ORDER — ENOXAPARIN SODIUM 100 MG/ML
40 INJECTION SUBCUTANEOUS EVERY 24 HOURS
Status: DISCONTINUED | OUTPATIENT
Start: 2023-10-15 | End: 2023-10-23 | Stop reason: HOSPADM

## 2023-10-15 RX ORDER — ONDANSETRON 2 MG/ML
4 INJECTION INTRAMUSCULAR; INTRAVENOUS EVERY 8 HOURS PRN
Status: DISCONTINUED | OUTPATIENT
Start: 2023-10-15 | End: 2023-10-23 | Stop reason: HOSPADM

## 2023-10-15 RX ADMIN — FUROSEMIDE 40 MG: 10 INJECTION, SOLUTION INTRAVENOUS at 12:10

## 2023-10-15 RX ADMIN — ASPIRIN 81 MG: 81 TABLET, COATED ORAL at 08:10

## 2023-10-15 RX ADMIN — METOPROLOL SUCCINATE 25 MG: 25 TABLET, EXTENDED RELEASE ORAL at 08:10

## 2023-10-15 RX ADMIN — ENOXAPARIN SODIUM 40 MG: 40 INJECTION SUBCUTANEOUS at 04:10

## 2023-10-15 RX ADMIN — CLOPIDOGREL BISULFATE 75 MG: 75 TABLET ORAL at 08:10

## 2023-10-15 NOTE — HPI
Mr Yanez is a 88-year-old male with previous history of coronary artery disease status post previous CABG, COPD, PAD s/p PTA bilat YUE and R EIA 6/2003,  hypertension, dyslipidemia, CKD 3A, type 2 diabetes mellitus, PUD, polymyalgia rheumatica, NPH status post  shunt placement in 2021, who was recently discharged from the hospital on 10/11/2023 after being admitted for increased confusion and altered mental status with some hallucinations and frequent falls.  He had an abnormal UA which resulted Proteus and Enterococcus and was subsequently discharged with Augmentin.  He does have diffusely pruritic rash involving multiple areas and has been evaluated by dermatologist as outpatient without confirmatory diagnosis.  As per family members and nursing staff the patient had been delirious for example, asking the nurse if she got her food during the game while in the hospital.    In the ER, his vitals are within normal limits, his CBC shows normal white count 9.8, hemoglobin 9.7 (normocytic), with previous baseline of 11-12 since 2022, platelets 342 K.  His BNP shows mild metabolic acidosis CO2 17, creatinine 1.3 which is better than his previous creatinine of 1.8 on 10/09/2023, mild hyperglycemia at 130.  His BNP is 223, and seems that his previous BNP on 10/09/2023 was 151 and prior to that in on 09/15/2023 was within normal limits.  His troponin is 0.107, .  His CT abdomen pelvis showed nonacute findings of cholelithiasis, vascular calcifications, diverticulosis, prostatomegaly, nonspecific infiltration of the left lateral subcutaneous fat tissue and small hiatal hernia.  His CT head is within normal limits.  His chest x-ray reveals chronic interstitial lung markings, right-sided ventricular shunt present.  In the ER, appears to have received 500 cc of normal saline as well as 40 mg of IV Lasix.  Admission has been requested for further evaluation and treatment.

## 2023-10-15 NOTE — H&P
[facility]  [facility]  History & Physical    Patient Name: Narciso Yanez  MRN: [id number]  Patient Class: IP- Inpatient  Admission Date: 10/14/2023  Attending Physician: Juanito Hilton MD   Primary Care Provider: Marcelino Del Toro MD         Patient information was obtained from patient, relative(s), past medical records and ER records.     Subjective:     Principal Problem:Acute delirium    Chief Complaint:   Chief Complaint   Patient presents with    Leg Swelling     Swelling that started today, reports seen and admitted to hospital for the same thing recently, dx with uti treated with abx, still taking abx. Per daughter pt. Also becoming delirious and decrease in urine output.         HPI: Mr Yanez is a 88-year-old male with previous history of coronary artery disease status post previous CABG, COPD, PAD s/p PTA bilat YUE and R EIA 6/2003,  hypertension, dyslipidemia, CKD 3A, type 2 diabetes mellitus, PUD, polymyalgia rheumatica, NPH status post  shunt placement in 2021, who was recently discharged from the hospital on 10/11/2023 after being admitted for increased confusion and altered mental status with some hallucinations and frequent falls.  He had an abnormal UA which resulted Proteus and Enterococcus and was subsequently discharged with Augmentin.  He does have diffusely pruritic rash involving multiple areas and has been evaluated by dermatologist as outpatient without confirmatory diagnosis.  As per family members and nursing staff the patient had been delirious for example, asking the nurse if she got her food during the game while in the hospital.    In the ER, his vitals are within normal limits, his CBC shows normal white count 9.8, hemoglobin 9.7 (normocytic), with previous baseline of 11-12 since 2022, platelets 342 K.  His BNP shows mild metabolic acidosis CO2 17, creatinine 1.3 which is better than his previous creatinine of 1.8 on 10/09/2023, mild hyperglycemia at  130.  His BNP is 223, and seems that his previous BNP on 10/09/2023 was 151 and prior to that in on 09/15/2023 was within normal limits.  His troponin is 0.107, .  His CT abdomen pelvis showed nonacute findings of cholelithiasis, vascular calcifications, diverticulosis, prostatomegaly, nonspecific infiltration of the left lateral subcutaneous fat tissue and small hiatal hernia.  His CT head is within normal limits.  His chest x-ray reveals chronic interstitial lung markings, right-sided ventricular shunt present.  In the ER, appears to have received 500 cc of normal saline as well as 40 mg of IV Lasix.  Admission has been requested for further evaluation and treatment.      Past Medical History:   Diagnosis Date    Actinic keratosis     Anxiety     B12 deficiency 12/8/2014    Dx 6/14    Cancer     skin    Cataract     Coronary artery disease     Diabetes mellitus type II     Diabetes mellitus with peripheral circulatory disorder 6/18/2014    ED (erectile dysfunction)     Hyperlipidemia     Kidney stone     Neurogenic claudication 4/4/2022    Peripheral vascular disease     Spondylosis 3/29/2019    Tobacco dependence     Urinary incontinence 5/4/2021       Past Surgical History:   Procedure Laterality Date    APPENDECTOMY      CARDIAC SURGERY  01/1999    CABG 4 vessels    CATARACT EXTRACTION      EPIDURAL STEROID INJECTION Right 8/26/2020    Procedure: Injection, Steroid, Epidural Transforaminal;  Surgeon: Wiley Crane Jr., MD;  Location: [facility];  Service: Pain Management;  Laterality: Right;  Right L5 + S1 TF LEAH  Arrive @ 1115; ASA & Plavix last 8/18; Check BG; Rapid COVID test    EPIDURAL STEROID INJECTION Right 11/25/2020    Procedure: Injection, Steroid, Epidural Transformainal;  Surgeon: Wiley Crane Jr., MD;  Location: [facility];  Service: Pain Management;  Laterality: Right;  Right L5 + S1 TF LEAH  Arrive @ 1245; ASA and Plavix last 11/17; Pre-DM; Needs MD Sign.     EPIDURAL STEROID INJECTION Right 2/19/2021    Procedure: Injection, Steroid, Epidural Transforaminal;  Surgeon: Wiley Crane Jr., MD;  Location: [facility];  Service: Pain Management;  Laterality: Right;  Right L5 + S1 TF LEAH  Arrive @ 1115; ASA & Plavix last 2/11; Check BG; Needs Consent    EXTERNAL EAR SURGERY      EYE SURGERY      cataracts    ILIAC ARTERY STENT Bilateral 06/2003    also Right External Iliac stent       Review of patient's allergies indicates:   Allergen Reactions    Demerol [meperidine] Nausea Only       No current facility-administered medications on file prior to encounter.     Current Outpatient Medications on File Prior to Encounter   Medication Sig    ACCU-CHEK JOAQUIN PLUS METER Misc Three times a day with meals    acetaminophen (TYLENOL) 500 MG tablet Take 1 tablet (500 mg total) by mouth every 6 (six) hours as needed for Pain.    alendronate (FOSAMAX) 70 MG tablet TAKE 1 TABLET EVERY 7 DAYS ON TUESDAYS (Patient not taking: Reported on 9/26/2023)    amoxicillin-clavulanate 500-125mg (AUGMENTIN) 500-125 mg Tab Take 1 tablet (500 mg total) by mouth 2 (two) times daily. for 6 days    ascorbic acid, vitamin C, (VITAMIN C) 250 MG tablet Take 1 tablet (250 mg total) by mouth once daily. (Patient not taking: Reported on 9/26/2023)    aspirin (ECOTRIN) 81 MG EC tablet Take 1 tablet (81 mg total) by mouth once daily.    blood sugar diagnostic (TRUE METRIX GLUCOSE TEST STRIP) Strp TID    BOOSTRIX TDAP 2.5-8-5 Lf-mcg-Lf/0.5mL Syrg injection     clopidogreL (PLAVIX) 75 mg tablet TAKE 1 TABLET EVERY DAY (Patient not taking: Reported on 9/26/2023)    DUPIXENT  mg/2 mL PnIj     fluconazole (DIFLUCAN) 150 MG Tab Take by mouth.    hydrocortisone 1 % cream Apply topically 2 (two) times daily. for 5 days    ketoconazole (NIZORAL) 2 % cream Apply topically 2 (two) times daily.    magnesium sulfate in water (MAGNESIUM SULFATE 2 GRAM/50 ML) 2 gram/50 mL PgBk     metoprolol  succinate (TOPROL-XL) 25 MG 24 hr tablet     mometasone 0.1% (ELOCON) 0.1 % cream Apply topically 2 (two) times daily.    olopatadine (PATANOL) 0.1 % ophthalmic solution Place 1 drop into both eyes 2 (two) times daily. (Patient not taking: Reported on 9/26/2023)    OMNIPAQUE 300 300 mg iodine/mL injection     ondansetron (ZOFRAN-ODT) 4 MG TbDL DISSOLVE 2 TABLETS (8 MG TOTAL) ON THE TONGUE EVERY 8 (EIGHT) HOURS AS NEEDED (NAUSEA).    predniSONE (DELTASONE) 10 MG tablet Take 4 tabs x 3 days, then take 2 tabs x 3 days, then take 1 tab x 3 days. (Patient not taking: Reported on 9/26/2023)    prochlorperazine (COMPAZINE) 5 MG tablet     rosuvastatin (CRESTOR) 20 MG tablet TAKE 1 TABLET (20 MG TOTAL) BY MOUTH EVERY EVENING. (Patient not taking: Reported on 9/26/2023)    senna (SENOKOT) 8.6 mg tablet Take 1 tablet by mouth once daily. (Patient not taking: Reported on 9/26/2023)    TRUE METRIX GLUCOSE TEST STRIP Strp TEST BLOOD SUGAR THREE TIMES DAILY. (Patient not taking: Reported on 9/26/2023)     Family History       Problem Relation (Age of Onset)    Stroke Mother          Tobacco Use    Smoking status: Every Day     Current packs/day: 1.50     Average packs/day: 1.5 packs/day for 71.0 years (106.5 ttl pk-yrs)     Types: Cigarettes    Smokeless tobacco: Former   Substance and Sexual Activity    Alcohol use: No    Drug use: No    Sexual activity: Not Currently     Partners: Female     Review of Systems  Unable to appropriately participate  Objective:     Vital Signs (Most Recent):  Temp: 97.5 °F (36.4 °C) (10/14/23 2008)  Pulse: 108 (10/15/23 0121)  Resp: (!) 23 (10/15/23 0121)  BP: (!) 144/95 (10/15/23 0121)  SpO2: 96 % (10/15/23 0121) Vital Signs (24h Range):  Temp:  [97.5 °F (36.4 °C)] 97.5 °F (36.4 °C)  Pulse:  [102-108] 108  Resp:  [18-32] 23  SpO2:  [96 %-98 %] 96 %  BP: (133-155)/(67-95) 144/95     Weight: 60.7 kg (133 lb 12.8 oz)  Body mass index is 20.96 kg/m².     Physical Exam   General: Alert  ,no apparent cardiorespiratory distress, laying comfortably  Eye: PERRL, normal conjunctiva  HEENT: Normocephalic, atraumatic, dry  oral mucosa  Neck: Supple.  Respiratory: Lungs CTA, equal breath sounds, symmetric expansion, no chest wall tenderness  Cardiovascular: Normal rate, regular rhythm, no appreciable murmurs rubs or gallops, +1 peripheral edema, good pulses and equal in all extremities.  Gastrointestinal: Soft, non-tender, non-distended, normal bowel sounds  Genitourinary: Deferred  Musculoskeletal and Neurologic:  During the evaluation he is alert and oriented, without apparent focal neurological deficit.  Integumentary-diffuse maculopapular rash involving bilateral upper and lower extremities, trunk, neck and face as well as nail involvement        Significant Labs: All pertinent labs within the past 24 hours have been reviewed.  BMP:   Recent Labs   Lab 10/14/23  2135   *      K 5.0      CO2 17*   BUN 15   CREATININE 1.3   CALCIUM 8.6*   MG 1.9     CBC:   Recent Labs   Lab 10/14/23  2135   WBC 9.81   HGB 9.7*   HCT 29.3*          Significant Imaging: I have reviewed all pertinent imaging results/findings within the past 24 hours.  I have reviewed and interpreted all pertinent imaging results/findings within the past 24 hours.    Assessment/Plan:     * Acute delirium  -similar to previous presentation when symptoms were attributable to acute UTI.    -symptoms continue despite treatment.    -will check ammonia, TFTs, B12/folate, RPR  -CT head without acute findings.    -neurology consult      Volume overload  -patient denies any chest pain, shortness of breath or other concerning symptoms of CHF exacerbation.    -does have pitting peripheral edema and has received IV Lasix.  -we will check echocardiogram given elevated troponin/BNP level given concerns of underlying autoimmune issue.    Rash  -patient reports having rash onset since father's day without previous history.     -reports that he has been to 3 dermatologists and Infectious Disease pruritic  -unclear about current regimen but reports that he has tried 10 different creams, and multiple oral medications.  -reports he has had skin biopsy about 6 weeks ago but has not received any results.    -currently, with concurrent muscle involvement and cardiac involvement with elevated troponin and BNP, differential would include autoimmune conditions such as dermatomyositis.  -We will check inflammatory markers  -would benefit from outpatient Rheumatology consult.      Rhabdomyolysis  -mild.  -differentials would include autoimmune myositis, statin induced myositis, and other etiologies.  -check inflammatory markers.  Hold statin.  -has LE edema, will hold fluids at this time.  -creatinine stable.      Elevated troponin  -denies chest pain   -status post previous CABG.    -cardiology consulted given her risk factors/history.      Coronary artery disease involving coronary bypass graft of native heart without angina pectoris  -continue aspirin, Plavix.    -statin on hold due to mild rhabdomyolysis      Chronic interstitial lung disease  -appears chronic.  Uncertain etiology.  -Could be related to underlying autoimmune process.  -recommend outpatient pulmonary follow-up.      Enlarged prostate  -outpatient Urology follow-up recommended.      Diverticulosis  -without diverticulitis.      Cholelithiasis  -without cholecystitis.      Normocytic anemia  -could be related to anemia of chronic inflammation.    -recommend outpatient primary care follow-up as well as GI follow-up.      Hyperlipidemia  -statin on hold due to mild rhabdomyolysis.      Essential hypertension  -continue metoprolol      Stage 3a chronic kidney disease  -baseline creatinine about 1.6-1.8 with today's creatinine 1.3.  Stable.  Avoid nephrotoxic agents.      Polymyalgia rheumatica  -by history, diagnosed in May 2019 treated with prednisone with appropriate response.   "No associated GCA.      COPD (chronic obstructive pulmonary disease)  -stable.  Continue as-needed DuoNebs.      PAD (peripheral artery disease)  -continue aspirin/Plavix  -s/p PTA bilat YUE and R EIA 6/2003      Diabetes mellitus with peripheral circulatory disorder  Patient's FSGs are uncontrolled due to hyperglycemia on current medication regimen.  Last A1c reviewed-   Lab Results   Component Value Date    HGBA1C 6.1 (H) 07/11/2023     Most recent fingerstick glucose reviewed- No results for input(s): "POCTGLUCOSE" in the last 24 hours.  Current correctional scale  Low  Maintain anti-hyperglycemic dose as follows-   Antihyperglycemics (From admission, onward)    Start     Stop Route Frequency Ordered    10/15/23 0251  insulin aspart U-100 pen 0-5 Units         -- SubQ Before meals & nightly PRN 10/15/23 0151        Hold Oral hypoglycemics while patient is in the hospital.    VTE Risk Mitigation (From admission, onward)         Ordered     enoxaparin injection 40 mg  Daily         10/15/23 0139     IP VTE HIGH RISK PATIENT  Once         10/15/23 0139     Place sequential compression device  Until discontinued         10/15/23 0139                 Juanito Hilton MD  Department of Hospital Medicine  [facility] - Emergency Dept  "

## 2023-10-15 NOTE — ASSESSMENT & PLAN NOTE
-baseline creatinine about 1.6-1.8 with today's creatinine 1.3.  Stable.  Avoid nephrotoxic agents.

## 2023-10-15 NOTE — CONSULTS
[facility] - Telemetry  Cardiology  Consult Note    Patient Name: Narciso Yanez  MRN: [id number]  Admission Date: 10/14/2023  Hospital Length of Stay: 0 days  Code Status: Full Code   Attending Provider: Mukund Hester MD   Consulting Provider: Wiley Vargas MD  Primary Care Physician: Marcelino Del Toro MD  Principal Problem:Acute delirium    Patient information was obtained from patient, relative(s) and ER records.     Inpatient consult to Cardiology  Consult performed by: Wiley Vargas MD  Consult ordered by: Juanito Hilton MD  Reason for consult: elev trop        Subjective:     Chief Complaint:  MS changes     HPI:   88-year-old male with previous history of coronary artery disease status post previous CABG, COPD, PAD s/p PTA bilat YUE and R EIA 6/2003,  hypertension, dyslipidemia, CKD 3A, type 2 diabetes mellitus, PUD, polymyalgia rheumatica, NPH status post  shunt placement in 2021, who was recently discharged from the hospital on 10/11/2023 after being admitted for increased confusion and altered mental status with some hallucinations and frequent falls.  He had an abnormal UA which resulted Proteus and Enterococcus and was subsequently discharged with Augmentin.  He does have diffusely pruritic rash involving multiple areas and has been evaluated by dermatologist as outpatient without confirmatory diagnosis.  As per family members and nursing staff the patient had been delirious for example, asking the nurse if she got her food during the game while in the hospital.  In the ER, his vitals are within normal limits, his CBC shows normal white count 9.8, hemoglobin 9.7 (normocytic), with previous baseline of 11-12 since 2022, platelets 342 K.  His BNP shows mild metabolic acidosis CO2 17, creatinine 1.3 which is better than his previous creatinine of 1.8 on 10/09/2023, mild hyperglycemia at 130.  His BNP is 223, and seems that his previous BNP on 10/09/2023 was 151 and prior to that in on  09/15/2023 was within normal limits.  His troponin is 0.107, .  His CT abdomen pelvis showed nonacute findings of cholelithiasis, vascular calcifications, diverticulosis, prostatomegaly, nonspecific infiltration of the left lateral subcutaneous fat tissue and small hiatal hernia.  His CT head is within normal limits.  His chest x-ray reveals chronic interstitial lung markings, right-sided ventricular shunt present.  In the ER, appears to have received 500 cc of normal saline as well as 40 mg of IV Lasix.  Admission has been requested for further evaluation and treatment.    Last seen by me 6/5/23.    Cardiology consulted for elev trop.    The patient is well known to me with a history of CAD in [place] D.  He presents with mental status changes in a somewhat chronic rash.  He denies any angina, dyspnea, palpitations, or syncope.  His daughter is at the bedside.  It seems his rash in mental status is the most pressing concern.  His troponin is minimally elevated on background of CKD.  EKGs unchanged.  Echo notes mild LV dysfunction.  The patient does have a history of coronary artery disease status post multivessel CABG with subsequent angiogram noting patent LIMA to LAD with occluded vein grafts x2.  Nuclear stress test in 2021 was normal.  At this point, I have recommended continued medical therapy of his CAD.  We can consider outpatient stress testing after he recovers from his current acute medical issues.      Sam note 6/5/23:  CARDIOVASCULAR HISTORY:   CAD/CABG          1/10/1999: CABG with LIMA-LAD, SVG-LCx, SVG-RCA ([facility] records)                                5/1999: Angio revealed Patent LIMA-LAD, SVGx2 occluded ([facility] records)     PAD s/p LE PTA (6/2003: PTA with bilat YUE stents and R EIA stent)                                7/29/20 AFRO with patent bilat YUE stents    Cardiovascular Testing:  Aortic US 5/26/23  Juxta/infrarenal aortic ectasia without evidence of AAA, maximum diameter 2.5 cm.     Aortic atherosclerosis.    Increased flow velocity across bilateral common iliac artery suggesting >50% aortoiliac stenosis.     LE art US/LACIE 5/26/23  Previously noted bilat YUE stents not visualized on the current exam.  Otherwise, No evidence of hemodynamically significant infrainguinal PAD bilaterally.  Predominantly biphasic waveforms throughout.  Mildly decreased LACIE bilaterally (0.8).  Similar findings noted on report 2/24/21.     Echo 4/3/21   The left ventricle is normal in size with concentric remodeling and normal systolic function.   The estimated ejection fraction is 55%.   Normal right ventricular size with normal right ventricular systolic function.     AFRO 7/29/20  Ao: 140/57/86  R CFA: 144/59/88  L CFA: 140/59/87  No evidence of gradient on CFA->Ao pullback across YUE stents bilaterally  Aorta: no aneurysm or stenosis  L Renal: patent  R Renal: patent  Right Leg:  YUE: patent stent without angiographic evidence of restenosis, pullback negative   EIA: patent  IIA: patent  CFA: MLI  PFA: patent  SFA: MLI, dist 30%  Pop: patent  KIRTI: MLI  TPT: MLI  PTA:MLI, mid vessel tapers, ?occluded at mid calf  Per: MLI  2-3 vessel runoff to Right foot  Left Leg:  YUE: patent stent without angiographic evidence of restenosis, pullback negative   EIA: patent   IIA: patent  CFA: patent  PFA: patent  SFA: MLI, dist 50%  Pop: patent  KIRTI: MLI  TPT: MLI  PTA: MLI, mid occlusion  Per: MLI  2 vessel runoff to Left foot  Hemostasis:  R rad vasband  Imp:  Limiting claudication with suggesting of ISR of aortoiliac stents  Patent YUE stents bilaterally (angiographically) without evidence of gradient on pullback  Mild dist SFA stenosis bilaterally  2-3V runoff to feet bilaterally  R rad vasband for hemostasis  Plan:  Cont med rx  Cont ASA/Plavix  Add Pletal  Smoking cessation  Exercise program  Home today  Follow up with Dr. Vargas as planned  Consider referral to ortho/spine or pain mgmt for eval of neurogenic  claudication     Ex MPI 12/20/17 (low workload, drop in BP (144->105 systolic) noted during ETT)  The patient exercised for 3.02 minutes on a Sixto protocol, corresponding to a functional capacity of 5 estimated METS, achieving a peak heart rate of 126 bpm, which is 90% of the age predicted maximum heart rate. -CP. At peak exercise, EKG revealed < 1mm of horizontal ST segment depression at a maximum heart rate of 126 bpm.   Nuclear Quantitative Functional Analysis:   LVEF: 61 %  LVED Volume: 59 ml  LVES Volume: 23 ml  Impression: NORMAL MYOCARDIAL PERFUSION  1. The perfusion scan is free of evidence for myocardial ischemia or injury.   2. Resting wall motion is physiologic.   3. Resting LV function is normal.   4. The ventricular volumes are normal at rest and stress.   5. The extracardiac distribution of radioactivity is normal.      [facility]:  1/10/1999: CABG with LIMA-LAD, SVG-LCx, SVG-RCA  5/1999: Angio revealed Patent LIMA-LAD, SVGx2 occluded  6/2003: PTA with bilat YUE stents and R EIA stent        ASSESSMENT:   # CAD s/p CABG, MPI/echo 12/2017 normal (although low exercise capacity and ?hypotensive response during TMET), asymptomatic.  # PAD s/p PTA bilat YUE and R EIA 6/2003.  Limiting R>L claudication.  Aortic/LE art US 6/2020 suggest significant bilat YUE ISR.  AFRO 7/29/20 with patent bilat YUE stents.  ?MSK complaint of distal R hamstrings tendon, persistent.  Aortic/LE art US 5/2023 neg.   # HTN, controlled  # HLP on crestor 20mg  # Tob abuse, still smoking and previously enrolled in the smoking cessation program.  Again encouraged to quit.  Patient appears on interested in stopping smoking.  # CKD3a  # DM  # aortic atherosclerosis (CT Chest 9/3/19)     PLAN:   Cont med rx  Cont ASA/Plavix  Refer back to PCO re: R leg MSK and freq urination complaints.  RTC 1 year with surveillance aortic US (June 2024).      Past Medical History:   Diagnosis Date    Actinic keratosis     Anxiety     B12  deficiency 12/8/2014    Dx 6/14    Cancer     skin    Cataract     Coronary artery disease     Diabetes mellitus type II     Diabetes mellitus with peripheral circulatory disorder 6/18/2014    ED (erectile dysfunction)     Hyperlipidemia     Kidney stone     Neurogenic claudication 4/4/2022    Normocytic anemia 10/15/2023    Peripheral vascular disease     Spondylosis 3/29/2019    Tobacco dependence     Urinary incontinence 5/4/2021       Past Surgical History:   Procedure Laterality Date    APPENDECTOMY      CARDIAC SURGERY  01/1999    CABG 4 vessels    CATARACT EXTRACTION      EPIDURAL STEROID INJECTION Right 8/26/2020    Procedure: Injection, Steroid, Epidural Transforaminal;  Surgeon: Wiley Crane Jr., MD;  Location: [facility] ENDO;  Service: Pain Management;  Laterality: Right;  Right L5 + S1 TF LEAH  Arrive @ 1115; ASA & Plavix last 8/18; Check BG; Rapid COVID test    EPIDURAL STEROID INJECTION Right 11/25/2020    Procedure: Injection, Steroid, Epidural Transformainal;  Surgeon: Wiley Crane Jr., MD;  Location: [facility] ENDO;  Service: Pain Management;  Laterality: Right;  Right L5 + S1 TF LEAH  Arrive @ 1245; ASA and Plavix last 11/17; Pre-DM; Needs MD Sign.    EPIDURAL STEROID INJECTION Right 2/19/2021    Procedure: Injection, Steroid, Epidural Transforaminal;  Surgeon: Wiley Crane Jr., MD;  Location: [facility] ENDO;  Service: Pain Management;  Laterality: Right;  Right L5 + S1 TF LEAH  Arrive @ 1115; ASA & Plavix last 2/11; Check BG; Needs Consent    EXTERNAL EAR SURGERY      EYE SURGERY      cataracts    ILIAC ARTERY STENT Bilateral 06/2003    also Right External Iliac stent       Review of patient's allergies indicates:   Allergen Reactions    Demerol [meperidine] Nausea Only       No current facility-administered medications on file prior to encounter.     Current Outpatient Medications on File Prior to Encounter   Medication Sig    ACCU-CHEK JOAQUIN PLUS METER Misc Three  times a day with meals    acetaminophen (TYLENOL) 500 MG tablet Take 1 tablet (500 mg total) by mouth every 6 (six) hours as needed for Pain.    alendronate (FOSAMAX) 70 MG tablet TAKE 1 TABLET EVERY 7 DAYS ON TUESDAYS (Patient not taking: Reported on 9/26/2023)    amoxicillin-clavulanate 500-125mg (AUGMENTIN) 500-125 mg Tab Take 1 tablet (500 mg total) by mouth 2 (two) times daily. for 6 days    ascorbic acid, vitamin C, (VITAMIN C) 250 MG tablet Take 1 tablet (250 mg total) by mouth once daily. (Patient not taking: Reported on 9/26/2023)    aspirin (ECOTRIN) 81 MG EC tablet Take 1 tablet (81 mg total) by mouth once daily.    blood sugar diagnostic (TRUE METRIX GLUCOSE TEST STRIP) Strp TID    BOOSTRIX TDAP 2.5-8-5 Lf-mcg-Lf/0.5mL Syrg injection     clopidogreL (PLAVIX) 75 mg tablet TAKE 1 TABLET EVERY DAY (Patient not taking: Reported on 9/26/2023)    DUPIXENT  mg/2 mL PnIj     fluconazole (DIFLUCAN) 150 MG Tab Take by mouth.    hydrocortisone 1 % cream Apply topically 2 (two) times daily. for 5 days    ketoconazole (NIZORAL) 2 % cream Apply topically 2 (two) times daily.    magnesium sulfate in water (MAGNESIUM SULFATE 2 GRAM/50 ML) 2 gram/50 mL PgBk     metoprolol succinate (TOPROL-XL) 25 MG 24 hr tablet     mometasone 0.1% (ELOCON) 0.1 % cream Apply topically 2 (two) times daily.    olopatadine (PATANOL) 0.1 % ophthalmic solution Place 1 drop into both eyes 2 (two) times daily. (Patient not taking: Reported on 9/26/2023)    OMNIPAQUE 300 300 mg iodine/mL injection     ondansetron (ZOFRAN-ODT) 4 MG TbDL DISSOLVE 2 TABLETS (8 MG TOTAL) ON THE TONGUE EVERY 8 (EIGHT) HOURS AS NEEDED (NAUSEA).    predniSONE (DELTASONE) 10 MG tablet Take 4 tabs x 3 days, then take 2 tabs x 3 days, then take 1 tab x 3 days. (Patient not taking: Reported on 9/26/2023)    prochlorperazine (COMPAZINE) 5 MG tablet     rosuvastatin (CRESTOR) 20 MG tablet TAKE 1 TABLET (20 MG TOTAL) BY MOUTH EVERY EVENING.  (Patient not taking: Reported on 9/26/2023)    senna (SENOKOT) 8.6 mg tablet Take 1 tablet by mouth once daily. (Patient not taking: Reported on 9/26/2023)    TRUE METRIX GLUCOSE TEST STRIP Strp TEST BLOOD SUGAR THREE TIMES DAILY. (Patient not taking: Reported on 9/26/2023)     Family History       Problem Relation (Age of Onset)    Stroke Mother          Tobacco Use    Smoking status: Every Day     Current packs/day: 1.50     Average packs/day: 1.5 packs/day for 71.0 years (106.5 ttl pk-yrs)     Types: Cigarettes    Smokeless tobacco: Former   Substance and Sexual Activity    Alcohol use: No    Drug use: No    Sexual activity: Not Currently     Partners: Female     Review of Systems   Constitutional: Negative for chills, diaphoresis, fever and malaise/fatigue.   HENT:  Negative for nosebleeds.    Eyes:  Negative for blurred vision and double vision.   Cardiovascular:  Negative for chest pain, claudication, cyanosis, dyspnea on exertion, leg swelling, orthopnea, palpitations, paroxysmal nocturnal dyspnea and syncope.   Respiratory:  Negative for cough, shortness of breath and wheezing.    Skin:  Positive for rash. Negative for dry skin and poor wound healing.   Musculoskeletal:  Negative for back pain, joint swelling and myalgias.   Gastrointestinal:  Negative for abdominal pain, nausea and vomiting.   Genitourinary:  Negative for hematuria.   Neurological:  Negative for dizziness, headaches, numbness, seizures and weakness.   Psychiatric/Behavioral:  Negative for altered mental status and depression.      Objective:     Vital Signs (Most Recent):  Temp: 97.1 °F (36.2 °C) (10/15/23 1144)  Pulse: 94 (10/15/23 1144)  Resp: 18 (10/15/23 1144)  BP: (!) 166/65 (10/15/23 1144)  SpO2: 98 % (10/15/23 1144) Vital Signs (24h Range):  Temp:  [97.1 °F (36.2 °C)-98.5 °F (36.9 °C)] 97.1 °F (36.2 °C)  Pulse:  [] 94  Resp:  [15-32] 18  SpO2:  [95 %-98 %] 98 %  BP: (133-166)/(65-95) 166/65     Weight: 59 kg (130  lb)  Body mass index is 20.36 kg/m².    SpO2: 98 %         Intake/Output Summary (Last 24 hours) at 10/15/2023 1300  Last data filed at 10/15/2023 0800  Gross per 24 hour   Intake 240 ml   Output --   Net 240 ml       Lines/Drains/Airways       Peripheral Intravenous Line  Duration                  Peripheral IV - Single Lumen 10/15/23 0055 20 G Left Antecubital <1 day                     Physical Exam  Constitutional:       General: He is not in acute distress.     Appearance: He is well-developed. He is not ill-appearing, toxic-appearing or diaphoretic.   HENT:      Head: Normocephalic and atraumatic.   Eyes:      General: No scleral icterus.     Extraocular Movements: Extraocular movements intact.      Conjunctiva/sclera: Conjunctivae normal.      Pupils: Pupils are equal, round, and reactive to light.   Neck:      Thyroid: No thyromegaly.      Vascular: No JVD.      Trachea: No tracheal deviation.   Cardiovascular:      Rate and Rhythm: Normal rate and regular rhythm.      Heart sounds: S1 normal and S2 normal. No murmur heard.     No friction rub. No gallop.   Pulmonary:      Effort: Pulmonary effort is normal. No respiratory distress.      Breath sounds: Normal breath sounds. No stridor. No wheezing, rhonchi or rales.   Chest:      Chest wall: No tenderness.   Abdominal:      General: There is no distension.      Palpations: Abdomen is soft.   Musculoskeletal:         General: No swelling or tenderness. Normal range of motion.      Cervical back: Normal range of motion and neck supple. No rigidity.      Right lower leg: No edema.      Left lower leg: No edema.   Skin:     General: Skin is warm and dry.      Coloration: Skin is not jaundiced.      Findings: Rash present.   Neurological:      General: No focal deficit present.      Mental Status: He is alert and oriented to person, place, and time.      Cranial Nerves: No cranial nerve deficit.   Psychiatric:         Mood and Affect: Mood normal.          Behavior: Behavior normal.          Current Medications:   aspirin  81 mg Oral Daily    clopidogreL  75 mg Oral Daily    enoxparin  40 mg Subcutaneous Daily    metoprolol succinate  25 mg Oral Daily       acetaminophen, aluminum-magnesium hydroxide-simethicone, dextrose 10%, dextrose 10%, dextrose 10%, dextrose 10%, glucagon (human recombinant), glucagon (human recombinant), glucose, glucose, glucose, glucose, insulin aspart U-100, magnesium oxide, magnesium oxide, naloxone, ondansetron, potassium bicarbonate, potassium bicarbonate, potassium, sodium phosphates, potassium, sodium phosphates, sodium chloride 0.9%    Laboratory (all labs reviewed):  CBC:  Recent Labs   Lab 09/01/23  2321 09/15/23  0906 10/09/23  1449 10/10/23  0420 10/14/23  2135   WBC 8.22 11.73 8.72 9.29 9.81   Hemoglobin 10.7 L 11.2 L 9.8 L 9.9 L 9.7 L   Hematocrit 31.7 L 33.4 L 29.5 L 30.6 L 29.3 L   Platelets 235 197 332 362 342       CHEMISTRIES:  Recent Labs   Lab 12/30/21  1229 02/28/22  0740 03/12/22  1052 03/23/22  0858 06/23/22  1210 03/09/23  0745 09/15/23  0906 10/09/23  1408 10/10/23  0019 10/10/23  0420 10/14/23  2135 10/15/23  0712   Glucose  --  124 H 147 H 128 H 81   < > 117 H 110 116 H 135 H 130 H 115 H   Sodium  --  138 137 136 140   < > 141 139 140 141 139 142   Potassium  --  4.9 4.9 4.7 4.2   < > 4.9 5.5 H 5.3 H 5.2 H 5.0 5.0   BUN  --  32 H 32 H 52 H 35 H   < > 20 22 19 19 15 14   Creatinine 1.2 1.5 H 1.3 1.3 0.9   < > 1.6 H 1.8 H 1.7 H 1.7 H 1.3 1.5 H   eGFR if non  55.2 A 42.1 A 50.1 A 50 A >60  --   --   --   --   --   --   --    eGFR  --   --   --   --   --    < > 42 A 36 A 39 A 39 A 54 A 45 A   Calcium  --  10.0 9.3 10.1 7.3 L   < > 9.3 8.1 L 8.0 L 7.9 L 8.6 L 9.0   Magnesium  --   --   --   --   --   --  1.7 2.0  --  1.9 1.9 1.9    < > = values in this interval not displayed.       CARDIAC BIOMARKERS:  Recent Labs   Lab 04/03/21  0156 07/11/23  1015 09/15/23  0906 10/09/23  1408 10/14/23  1381  10/15/23  0712   CPK  --  59  --   --  250 H 254 H   Troponin I 0.013  --  <0.006 0.017 0.107 H 0.085 H       COAGS:  Recent Labs   Lab 04/02/21  2247 05/20/21  1455 06/08/21  2102 10/09/23  1449 10/10/23  0420   INR 1.1 0.9 1.0 1.0 1.0       LIPIDS/LFTS:  Recent Labs   Lab 02/28/22  0740 03/23/22  0858 03/09/23  0745 07/11/23  1015 09/15/23  0906 10/09/23  1408 10/10/23  0420 10/14/23  2135 10/15/23  0712   Cholesterol 123  --  127  --   --   --   --   --   --    Triglycerides 96  --  85  --   --   --   --   --   --    HDL 49  --  50  --   --   --   --   --   --    LDL Cholesterol 54.8 L  --  60.0 L  --   --   --   --   --   --    Non-HDL Cholesterol 74  --  77  --   --   --   --   --   --    AST 25   < > 19   < > 29 29 27 33 29   ALT 22   < > 15   < > 23 27 26 28 29    < > = values in this interval not displayed.       BNP:  Recent Labs   Lab 04/03/21  0156 09/15/23  0906 10/09/23  1449 10/14/23  2135   BNP 47 74 151 H 223 H       TSH:  Recent Labs   Lab 03/09/23  0745 07/11/23  1015 10/09/23  1449 10/15/23  0711   TSH 5.502 H 5.113 H 4.178 H 3.946       Free T4:  Recent Labs   Lab 03/09/23  0745 07/11/23  1015 10/09/23  1449   Free T4 0.83 0.78 0.89       Diagnostic Results:  ECG (personally reviewed and interpreted tracing(s)):  10/14/23 2119 SR 98    Chest X-Ray (personally reviewed and interpreted image(s)): 10/14/23 NAD, prior sternotomy    Echo 10/15/23 (images personally reviewed and interpreted)    TDS.    Left Ventricle: The left ventricle is normal in size. Normal wall thickness. Mild global hypokinesis present. There is mildly reduced systolic function with a visually estimated ejection fraction of 45 - 50%. Grade I diastolic dysfunction.    Right Ventricle: Normal right ventricular cavity size. Wall thickness is normal. Right ventricle wall motion  is normal. Systolic function is normal.    Aortic US 5/26/23  Juxta/infrarenal aortic ectasia without evidence of AAA, maximum diameter 2.5 cm.    Aortic  atherosclerosis.    Increased flow velocity across bilateral common iliac artery suggesting >50% aortoiliac stenosis.     LE art US/LACIE 5/26/23  Previously noted bilat YUE stents not visualized on the current exam.  Otherwise, No evidence of hemodynamically significant infrainguinal PAD bilaterally.  Predominantly biphasic waveforms throughout.  Mildly decreased LACIE bilaterally (0.8).  Similar findings noted on report 2/24/21.     AFRO 7/29/20  Ao: 140/57/86  R CFA: 144/59/88  L CFA: 140/59/87  No evidence of gradient on CFA->Ao pullback across YUE stents bilaterally  Aorta: no aneurysm or stenosis  L Renal: patent  R Renal: patent  Right Leg:  YUE: patent stent without angiographic evidence of restenosis, pullback negative   EIA: patent  IIA: patent  CFA: MLI  PFA: patent  SFA: MLI, dist 30%  Pop: patent  KIRTI: MLI  TPT: MLI  PTA:MLI, mid vessel tapers, ?occluded at mid calf  Per: MLI  2-3 vessel runoff to Right foot  Left Leg:  YUE: patent stent without angiographic evidence of restenosis, pullback negative   EIA: patent   IIA: patent  CFA: patent  PFA: patent  SFA: MLI, dist 50%  Pop: patent  KIRTI: MLI  TPT: MLI  PTA: MLI, mid occlusion  Per: MLI  2 vessel runoff to Left foot  Hemostasis:  R rad vasband  Imp:  Limiting claudication with suggesting of ISR of aortoiliac stents  Patent YUE stents bilaterally (angiographically) without evidence of gradient on pullback  Mild dist SFA stenosis bilaterally  2-3V runoff to feet bilaterally  R rad vasband for hemostasis  Plan:  Cont med rx  Cont ASA/Plavix  Add Pletal  Smoking cessation  Exercise program  Home today  Follow up with Dr. Vargas as planned  Consider referral to ortho/spine or pain mgmt for eval of neurogenic claudication     Ex MPI 12/20/17 (low workload, drop in BP (144->105 systolic) noted during ETT)  The patient exercised for 3.02 minutes on a Sixto protocol, corresponding to a functional capacity of 5 estimated METS, achieving a peak heart rate of 126  bpm, which is 90% of the age predicted maximum heart rate. -CP. At peak exercise, EKG revealed < 1mm of horizontal ST segment depression at a maximum heart rate of 126 bpm.   Nuclear Quantitative Functional Analysis:   LVEF: 61 %  LVED Volume: 59 ml  LVES Volume: 23 ml  Impression: NORMAL MYOCARDIAL PERFUSION  1. The perfusion scan is free of evidence for myocardial ischemia or injury.   2. Resting wall motion is physiologic.   3. Resting LV function is normal.   4. The ventricular volumes are normal at rest and stress.   5. The extracardiac distribution of radioactivity is normal.      [facility]:  [facility]: CABG with LIMA-LAD, SVG-LCx, SVG-RCA  5/1999: Angio revealed Patent LIMA-LAD, SVGx2 occluded  6/2003: PTA with bilat YUE stents and R EIA stent           Assessment and Plan:     * Acute delirium  Mgmt per IM    Elevated troponin  No anginal sxs or CHF, doubt ACS  Consider outpat MPI when he recovers from acute delirium    Coronary artery disease involving coronary bypass graft of native heart without angina pectoris  As above  Cont med rx  Resume statin    Diabetes mellitus with peripheral circulatory disorder  Per IM    PAD (peripheral artery disease)  Cont med rx    Stage 3a chronic kidney disease  Monitor creat    Essential hypertension  Cont med rx    Hyperlipidemia  Resume statin        VTE Risk Mitigation (From admission, onward)         Ordered     enoxaparin injection 40 mg  Daily         10/15/23 0139     IP VTE HIGH RISK PATIENT  Once         10/15/23 0139     Place sequential compression device  Until discontinued         10/15/23 0139                Thank you for your consult. I will follow-up with patient. Please contact us if you have any additional questions.    Wiley Vargas MD  Cardiology   [facility] - Telemetry

## 2023-10-15 NOTE — PROGRESS NOTES
[facility] - [place]  Nurses Note -- 4 Eyes      10/15/2023       Skin assessed on: Q Shift      [x] No Pressure Injuries Present    []Prevention Measures Documented    [] Yes LDA  for Pressure Injury Previously documented     [] Yes New Pressure Injury Discovered   [] LDA for New Pressure Injury Added      Attending RN:  Indio Mccarty, RN     Second RN:  Lesley Delgadillo LPN

## 2023-10-15 NOTE — ASSESSMENT & PLAN NOTE
-patient reports having rash onset since father's day without previous history.    -reports that he has been to 3 dermatologists and Infectious Disease pruritic  -unclear about current regimen but reports that he has tried 10 different creams, and multiple oral medications.  -reports he has had skin biopsy about 6 weeks ago but has not received any results.    -currently, with concurrent muscle involvement and cardiac involvement with elevated troponin and BNP, differential would include autoimmune conditions such as dermatomyositis.  -We will check inflammatory markers  -would benefit from outpatient Rheumatology consult.

## 2023-10-15 NOTE — SUBJECTIVE & OBJECTIVE
Past Medical History:   Diagnosis Date    Actinic keratosis     Anxiety     B12 deficiency 12/8/2014    Dx 6/14    Cancer     skin    Cataract     Coronary artery disease     Diabetes mellitus type II     Diabetes mellitus with peripheral circulatory disorder 6/18/2014    ED (erectile dysfunction)     Hyperlipidemia     Kidney stone     Neurogenic claudication 4/4/2022    Normocytic anemia 10/15/2023    Peripheral vascular disease     Spondylosis 3/29/2019    Tobacco dependence     Urinary incontinence 5/4/2021       Past Surgical History:   Procedure Laterality Date    APPENDECTOMY      CARDIAC SURGERY  01/1999    CABG 4 vessels    CATARACT EXTRACTION      EPIDURAL STEROID INJECTION Right 8/26/2020    Procedure: Injection, Steroid, Epidural Transforaminal;  Surgeon: Wiley Crane Jr., MD;  Location: [facility] ENDO;  Service: Pain Management;  Laterality: Right;  Right L5 + S1 TF LEAH  Arrive @ 1115; ASA & Plavix last 8/18; Check BG; Rapid COVID test    EPIDURAL STEROID INJECTION Right 11/25/2020    Procedure: Injection, Steroid, Epidural Transformainal;  Surgeon: Wiley Crane Jr., MD;  Location: [facility] ENDO;  Service: Pain Management;  Laterality: Right;  Right L5 + S1 TF LEAH  Arrive @ 1245; ASA and Plavix last 11/17; Pre-DM; Needs MD Sign.    EPIDURAL STEROID INJECTION Right 2/19/2021    Procedure: Injection, Steroid, Epidural Transforaminal;  Surgeon: Wiley Crane Jr., MD;  Location: [facility] ENDO;  Service: Pain Management;  Laterality: Right;  Right L5 + S1 TF LEAH  Arrive @ 1115; ASA & Plavix last 2/11; Check BG; Needs Consent    EXTERNAL EAR SURGERY      EYE SURGERY      cataracts    ILIAC ARTERY STENT Bilateral 06/2003    also Right External Iliac stent       Review of patient's allergies indicates:   Allergen Reactions    Demerol [meperidine] Nausea Only       No current facility-administered medications on file prior to encounter.     Current Outpatient Medications on File Prior to Encounter    Medication Sig    ACCU-CHEK JOAQUIN PLUS METER Misc Three times a day with meals    acetaminophen (TYLENOL) 500 MG tablet Take 1 tablet (500 mg total) by mouth every 6 (six) hours as needed for Pain.    alendronate (FOSAMAX) 70 MG tablet TAKE 1 TABLET EVERY 7 DAYS ON TUESDAYS (Patient not taking: Reported on 9/26/2023)    amoxicillin-clavulanate 500-125mg (AUGMENTIN) 500-125 mg Tab Take 1 tablet (500 mg total) by mouth 2 (two) times daily. for 6 days    ascorbic acid, vitamin C, (VITAMIN C) 250 MG tablet Take 1 tablet (250 mg total) by mouth once daily. (Patient not taking: Reported on 9/26/2023)    aspirin (ECOTRIN) 81 MG EC tablet Take 1 tablet (81 mg total) by mouth once daily.    blood sugar diagnostic (TRUE METRIX GLUCOSE TEST STRIP) Strp TID    BOOSTRIX TDAP 2.5-8-5 Lf-mcg-Lf/0.5mL Syrg injection     clopidogreL (PLAVIX) 75 mg tablet TAKE 1 TABLET EVERY DAY (Patient not taking: Reported on 9/26/2023)    DUPIXENT  mg/2 mL PnIj     fluconazole (DIFLUCAN) 150 MG Tab Take by mouth.    hydrocortisone 1 % cream Apply topically 2 (two) times daily. for 5 days    ketoconazole (NIZORAL) 2 % cream Apply topically 2 (two) times daily.    magnesium sulfate in water (MAGNESIUM SULFATE 2 GRAM/50 ML) 2 gram/50 mL PgBk     metoprolol succinate (TOPROL-XL) 25 MG 24 hr tablet     mometasone 0.1% (ELOCON) 0.1 % cream Apply topically 2 (two) times daily.    olopatadine (PATANOL) 0.1 % ophthalmic solution Place 1 drop into both eyes 2 (two) times daily. (Patient not taking: Reported on 9/26/2023)    OMNIPAQUE 300 300 mg iodine/mL injection     ondansetron (ZOFRAN-ODT) 4 MG TbDL DISSOLVE 2 TABLETS (8 MG TOTAL) ON THE TONGUE EVERY 8 (EIGHT) HOURS AS NEEDED (NAUSEA).    predniSONE (DELTASONE) 10 MG tablet Take 4 tabs x 3 days, then take 2 tabs x 3 days, then take 1 tab x 3 days. (Patient not taking: Reported on 9/26/2023)    prochlorperazine (COMPAZINE) 5 MG tablet     rosuvastatin (CRESTOR) 20 MG tablet TAKE 1 TABLET (20 MG  TOTAL) BY MOUTH EVERY EVENING. (Patient not taking: Reported on 9/26/2023)    senna (SENOKOT) 8.6 mg tablet Take 1 tablet by mouth once daily. (Patient not taking: Reported on 9/26/2023)    TRUE METRIX GLUCOSE TEST STRIP Strp TEST BLOOD SUGAR THREE TIMES DAILY. (Patient not taking: Reported on 9/26/2023)     Family History       Problem Relation (Age of Onset)    Stroke Mother          Tobacco Use    Smoking status: Every Day     Current packs/day: 1.50     Average packs/day: 1.5 packs/day for 71.0 years (106.5 ttl pk-yrs)     Types: Cigarettes    Smokeless tobacco: Former   Substance and Sexual Activity    Alcohol use: No    Drug use: No    Sexual activity: Not Currently     Partners: Female     Review of Systems   Constitutional: Negative for chills, diaphoresis, fever and malaise/fatigue.   HENT:  Negative for nosebleeds.    Eyes:  Negative for blurred vision and double vision.   Cardiovascular:  Negative for chest pain, claudication, cyanosis, dyspnea on exertion, leg swelling, orthopnea, palpitations, paroxysmal nocturnal dyspnea and syncope.   Respiratory:  Negative for cough, shortness of breath and wheezing.    Skin:  Positive for rash. Negative for dry skin and poor wound healing.   Musculoskeletal:  Negative for back pain, joint swelling and myalgias.   Gastrointestinal:  Negative for abdominal pain, nausea and vomiting.   Genitourinary:  Negative for hematuria.   Neurological:  Negative for dizziness, headaches, numbness, seizures and weakness.   Psychiatric/Behavioral:  Negative for altered mental status and depression.      Objective:     Vital Signs (Most Recent):  Temp: 97.1 °F (36.2 °C) (10/15/23 1144)  Pulse: 94 (10/15/23 1144)  Resp: 18 (10/15/23 1144)  BP: (!) 166/65 (10/15/23 1144)  SpO2: 98 % (10/15/23 1144) Vital Signs (24h Range):  Temp:  [97.1 °F (36.2 °C)-98.5 °F (36.9 °C)] 97.1 °F (36.2 °C)  Pulse:  [] 94  Resp:  [15-32] 18  SpO2:  [95 %-98 %] 98 %  BP: (133-166)/(65-95) 166/65      Weight: 59 kg (130 lb)  Body mass index is 20.36 kg/m².    SpO2: 98 %         Intake/Output Summary (Last 24 hours) at 10/15/2023 1300  Last data filed at 10/15/2023 0800  Gross per 24 hour   Intake 240 ml   Output --   Net 240 ml       Lines/Drains/Airways       Peripheral Intravenous Line  Duration                  Peripheral IV - Single Lumen 10/15/23 0055 20 G Left Antecubital <1 day                     Physical Exam  Constitutional:       General: He is not in acute distress.     Appearance: He is well-developed. He is not ill-appearing, toxic-appearing or diaphoretic.   HENT:      Head: Normocephalic and atraumatic.   Eyes:      General: No scleral icterus.     Extraocular Movements: Extraocular movements intact.      Conjunctiva/sclera: Conjunctivae normal.      Pupils: Pupils are equal, round, and reactive to light.   Neck:      Thyroid: No thyromegaly.      Vascular: No JVD.      Trachea: No tracheal deviation.   Cardiovascular:      Rate and Rhythm: Normal rate and regular rhythm.      Heart sounds: S1 normal and S2 normal. No murmur heard.     No friction rub. No gallop.   Pulmonary:      Effort: Pulmonary effort is normal. No respiratory distress.      Breath sounds: Normal breath sounds. No stridor. No wheezing, rhonchi or rales.   Chest:      Chest wall: No tenderness.   Abdominal:      General: There is no distension.      Palpations: Abdomen is soft.   Musculoskeletal:         General: No swelling or tenderness. Normal range of motion.      Cervical back: Normal range of motion and neck supple. No rigidity.      Right lower leg: No edema.      Left lower leg: No edema.   Skin:     General: Skin is warm and dry.      Coloration: Skin is not jaundiced.      Findings: Rash present.   Neurological:      General: No focal deficit present.      Mental Status: He is alert and oriented to person, place, and time.      Cranial Nerves: No cranial nerve deficit.   Psychiatric:         Mood and Affect: Mood  normal.         Behavior: Behavior normal.          Current Medications:   aspirin  81 mg Oral Daily    clopidogreL  75 mg Oral Daily    enoxparin  40 mg Subcutaneous Daily    metoprolol succinate  25 mg Oral Daily       acetaminophen, aluminum-magnesium hydroxide-simethicone, dextrose 10%, dextrose 10%, dextrose 10%, dextrose 10%, glucagon (human recombinant), glucagon (human recombinant), glucose, glucose, glucose, glucose, insulin aspart U-100, magnesium oxide, magnesium oxide, naloxone, ondansetron, potassium bicarbonate, potassium bicarbonate, potassium, sodium phosphates, potassium, sodium phosphates, sodium chloride 0.9%    Laboratory (all labs reviewed):  CBC:  Recent Labs   Lab 09/01/23  2321 09/15/23  0906 10/09/23  1449 10/10/23  0420 10/14/23  2135   WBC 8.22 11.73 8.72 9.29 9.81   Hemoglobin 10.7 L 11.2 L 9.8 L 9.9 L 9.7 L   Hematocrit 31.7 L 33.4 L 29.5 L 30.6 L 29.3 L   Platelets 235 197 332 362 342       CHEMISTRIES:  Recent Labs   Lab 12/30/21  1229 02/28/22  0740 03/12/22  1052 03/23/22  0858 06/23/22  1210 03/09/23  0745 09/15/23  0906 10/09/23  1408 10/10/23  0019 10/10/23  0420 10/14/23  2135 10/15/23  0712   Glucose  --  124 H 147 H 128 H 81   < > 117 H 110 116 H 135 H 130 H 115 H   Sodium  --  138 137 136 140   < > 141 139 140 141 139 142   Potassium  --  4.9 4.9 4.7 4.2   < > 4.9 5.5 H 5.3 H 5.2 H 5.0 5.0   BUN  --  32 H 32 H 52 H 35 H   < > 20 22 19 19 15 14   Creatinine 1.2 1.5 H 1.3 1.3 0.9   < > 1.6 H 1.8 H 1.7 H 1.7 H 1.3 1.5 H   eGFR if non  55.2 A 42.1 A 50.1 A 50 A >60  --   --   --   --   --   --   --    eGFR  --   --   --   --   --    < > 42 A 36 A 39 A 39 A 54 A 45 A   Calcium  --  10.0 9.3 10.1 7.3 L   < > 9.3 8.1 L 8.0 L 7.9 L 8.6 L 9.0   Magnesium  --   --   --   --   --   --  1.7 2.0  --  1.9 1.9 1.9    < > = values in this interval not displayed.       CARDIAC BIOMARKERS:  Recent Labs   Lab 04/03/21  0156 07/11/23  1015 09/15/23  0906 10/09/23  1404  10/14/23  2135 10/15/23  0712   CPK  --  59  --   --  250 H 254 H   Troponin I 0.013  --  <0.006 0.017 0.107 H 0.085 H       COAGS:  Recent Labs   Lab 04/02/21  2247 05/20/21  1455 06/08/21  2102 10/09/23  1449 10/10/23  0420   INR 1.1 0.9 1.0 1.0 1.0       LIPIDS/LFTS:  Recent Labs   Lab 02/28/22  0740 03/23/22  0858 03/09/23  0745 07/11/23  1015 09/15/23  0906 10/09/23  1408 10/10/23  0420 10/14/23  2135 10/15/23  0712   Cholesterol 123  --  127  --   --   --   --   --   --    Triglycerides 96  --  85  --   --   --   --   --   --    HDL 49  --  50  --   --   --   --   --   --    LDL Cholesterol 54.8 L  --  60.0 L  --   --   --   --   --   --    Non-HDL Cholesterol 74  --  77  --   --   --   --   --   --    AST 25   < > 19   < > 29 29 27 33 29   ALT 22   < > 15   < > 23 27 26 28 29    < > = values in this interval not displayed.       BNP:  Recent Labs   Lab 04/03/21  0156 09/15/23  0906 10/09/23  1449 10/14/23  2135   BNP 47 74 151 H 223 H       TSH:  Recent Labs   Lab 03/09/23  0745 07/11/23  1015 10/09/23  1449 10/15/23  0711   TSH 5.502 H 5.113 H 4.178 H 3.946       Free T4:  Recent Labs   Lab 03/09/23  0745 07/11/23  1015 10/09/23  1449   Free T4 0.83 0.78 0.89       Diagnostic Results:  ECG (personally reviewed and interpreted tracing(s)):  10/14/23 2119 SR 98    Chest X-Ray (personally reviewed and interpreted image(s)): 10/14/23 NAD, prior sternotomy    Echo 10/15/23 (images personally reviewed and interpreted)    TDS.    Left Ventricle: The left ventricle is normal in size. Normal wall thickness. Mild global hypokinesis present. There is mildly reduced systolic function with a visually estimated ejection fraction of 45 - 50%. Grade I diastolic dysfunction.    Right Ventricle: Normal right ventricular cavity size. Wall thickness is normal. Right ventricle wall motion  is normal. Systolic function is normal.    Aortic US 5/26/23  Juxta/infrarenal aortic ectasia without evidence of AAA, maximum diameter 2.5  cm.    Aortic atherosclerosis.    Increased flow velocity across bilateral common iliac artery suggesting >50% aortoiliac stenosis.     LE art US/LACIE 5/26/23  Previously noted bilat YUE stents not visualized on the current exam.  Otherwise, No evidence of hemodynamically significant infrainguinal PAD bilaterally.  Predominantly biphasic waveforms throughout.  Mildly decreased LACIE bilaterally (0.8).  Similar findings noted on report 2/24/21.     AFRO 7/29/20  Ao: 140/57/86  R CFA: 144/59/88  L CFA: 140/59/87  No evidence of gradient on CFA->Ao pullback across YUE stents bilaterally  Aorta: no aneurysm or stenosis  L Renal: patent  R Renal: patent  Right Leg:  YUE: patent stent without angiographic evidence of restenosis, pullback negative   EIA: patent  IIA: patent  CFA: MLI  PFA: patent  SFA: MLI, dist 30%  Pop: patent  KIRTI: MLI  TPT: MLI  PTA:MLI, mid vessel tapers, ?occluded at mid calf  Per: MLI  2-3 vessel runoff to Right foot  Left Leg:  YUE: patent stent without angiographic evidence of restenosis, pullback negative   EIA: patent   IIA: patent  CFA: patent  PFA: patent  SFA: MLI, dist 50%  Pop: patent  KIRTI: MLI  TPT: MLI  PTA: MLI, mid occlusion  Per: MLI  2 vessel runoff to Left foot  Hemostasis:  R rad vasband  Imp:  Limiting claudication with suggesting of ISR of aortoiliac stents  Patent YUE stents bilaterally (angiographically) without evidence of gradient on pullback  Mild dist SFA stenosis bilaterally  2-3V runoff to feet bilaterally  R rad vasband for hemostasis  Plan:  Cont med rx  Cont ASA/Plavix  Add Pletal  Smoking cessation  Exercise program  Home today  Follow up with Dr. Vargas as planned  Consider referral to ortho/spine or pain mgmt for eval of neurogenic claudication     Ex MPI 12/20/17 (low workload, drop in BP (144->105 systolic) noted during ETT)  The patient exercised for 3.02 minutes on a Sixto protocol, corresponding to a functional capacity of 5 estimated METS, achieving a peak heart  rate of 126 bpm, which is 90% of the age predicted maximum heart rate. -CP. At peak exercise, EKG revealed < 1mm of horizontal ST segment depression at a maximum heart rate of 126 bpm.   Nuclear Quantitative Functional Analysis:   LVEF: 61 %  LVED Volume: 59 ml  LVES Volume: 23 ml  Impression: NORMAL MYOCARDIAL PERFUSION  1. The perfusion scan is free of evidence for myocardial ischemia or injury.   2. Resting wall motion is physiologic.   3. Resting LV function is normal.   4. The ventricular volumes are normal at rest and stress.   5. The extracardiac distribution of radioactivity is normal.      [facility] Records reviewed:  1/10/1999: CABG with LIMA-LAD, SVG-LCx, SVG-RCA  5/1999: Angio revealed Patent LIMA-LAD, SVGx2 occluded  6/2003: PTA with bilat YUE stents and R EIA stent

## 2023-10-15 NOTE — ASSESSMENT & PLAN NOTE
-appears chronic.  Uncertain etiology.  -Could be related to underlying autoimmune process.  -recommend outpatient pulmonary follow-up.

## 2023-10-15 NOTE — ASSESSMENT & PLAN NOTE
-could be related to anemia of chronic inflammation.    -recommend outpatient primary care follow-up as well as GI follow-up.

## 2023-10-15 NOTE — SUBJECTIVE & OBJECTIVE
Past Medical History:   Diagnosis Date    Actinic keratosis     Anxiety     B12 deficiency 12/8/2014    Dx 6/14    Cancer     skin    Cataract     Coronary artery disease     Diabetes mellitus type II     Diabetes mellitus with peripheral circulatory disorder 6/18/2014    ED (erectile dysfunction)     Hyperlipidemia     Kidney stone     Neurogenic claudication 4/4/2022    Peripheral vascular disease     Spondylosis 3/29/2019    Tobacco dependence     Urinary incontinence 5/4/2021       Past Surgical History:   Procedure Laterality Date    APPENDECTOMY      CARDIAC SURGERY  01/1999    CABG 4 vessels    CATARACT EXTRACTION      EPIDURAL STEROID INJECTION Right 8/26/2020    Procedure: Injection, Steroid, Epidural Transforaminal;  Surgeon: Wiley Crane Jr., MD;  Location: [facility] ENDO;  Service: Pain Management;  Laterality: Right;  Right L5 + S1 TF LEAH  Arrive @ 1115; ASA & Plavix last 8/18; Check BG; Rapid COVID test    EPIDURAL STEROID INJECTION Right 11/25/2020    Procedure: Injection, Steroid, Epidural Transformainal;  Surgeon: Wiley Crane Jr., MD;  Location: [facility] ENDO;  Service: Pain Management;  Laterality: Right;  Right L5 + S1 TF LEAH  Arrive @ 1245; ASA and Plavix last 11/17; Pre-DM; Needs MD Sign.    EPIDURAL STEROID INJECTION Right 2/19/2021    Procedure: Injection, Steroid, Epidural Transforaminal;  Surgeon: Wiley Crane Jr., MD;  Location: [facility] ENDO;  Service: Pain Management;  Laterality: Right;  Right L5 + S1 TF LEAH  Arrive @ 1115; ASA & Plavix last 2/11; Check BG; Needs Consent    EXTERNAL EAR SURGERY      EYE SURGERY      cataracts    ILIAC ARTERY STENT Bilateral 06/2003    also Right External Iliac stent       Review of patient's allergies indicates:   Allergen Reactions    Demerol [meperidine] Nausea Only       No current facility-administered medications on file prior to encounter.     Current Outpatient Medications on File Prior to Encounter   Medication Sig    ACCU-CHEK JOAQUIN  PLUS METER Misc Three times a day with meals    acetaminophen (TYLENOL) 500 MG tablet Take 1 tablet (500 mg total) by mouth every 6 (six) hours as needed for Pain.    alendronate (FOSAMAX) 70 MG tablet TAKE 1 TABLET EVERY 7 DAYS ON TUESDAYS (Patient not taking: Reported on 9/26/2023)    amoxicillin-clavulanate 500-125mg (AUGMENTIN) 500-125 mg Tab Take 1 tablet (500 mg total) by mouth 2 (two) times daily. for 6 days    ascorbic acid, vitamin C, (VITAMIN C) 250 MG tablet Take 1 tablet (250 mg total) by mouth once daily. (Patient not taking: Reported on 9/26/2023)    aspirin (ECOTRIN) 81 MG EC tablet Take 1 tablet (81 mg total) by mouth once daily.    blood sugar diagnostic (TRUE METRIX GLUCOSE TEST STRIP) Strp TID    BOOSTRIX TDAP 2.5-8-5 Lf-mcg-Lf/0.5mL Syrg injection     clopidogreL (PLAVIX) 75 mg tablet TAKE 1 TABLET EVERY DAY (Patient not taking: Reported on 9/26/2023)    DUPIXENT  mg/2 mL PnIj     fluconazole (DIFLUCAN) 150 MG Tab Take by mouth.    hydrocortisone 1 % cream Apply topically 2 (two) times daily. for 5 days    ketoconazole (NIZORAL) 2 % cream Apply topically 2 (two) times daily.    magnesium sulfate in water (MAGNESIUM SULFATE 2 GRAM/50 ML) 2 gram/50 mL PgBk     metoprolol succinate (TOPROL-XL) 25 MG 24 hr tablet     mometasone 0.1% (ELOCON) 0.1 % cream Apply topically 2 (two) times daily.    olopatadine (PATANOL) 0.1 % ophthalmic solution Place 1 drop into both eyes 2 (two) times daily. (Patient not taking: Reported on 9/26/2023)    OMNIPAQUE 300 300 mg iodine/mL injection     ondansetron (ZOFRAN-ODT) 4 MG TbDL DISSOLVE 2 TABLETS (8 MG TOTAL) ON THE TONGUE EVERY 8 (EIGHT) HOURS AS NEEDED (NAUSEA).    predniSONE (DELTASONE) 10 MG tablet Take 4 tabs x 3 days, then take 2 tabs x 3 days, then take 1 tab x 3 days. (Patient not taking: Reported on 9/26/2023)    prochlorperazine (COMPAZINE) 5 MG tablet     rosuvastatin (CRESTOR) 20 MG tablet TAKE 1 TABLET (20 MG TOTAL) BY MOUTH EVERY EVENING.  (Patient not taking: Reported on 9/26/2023)    senna (SENOKOT) 8.6 mg tablet Take 1 tablet by mouth once daily. (Patient not taking: Reported on 9/26/2023)    TRUE METRIX GLUCOSE TEST STRIP Strp TEST BLOOD SUGAR THREE TIMES DAILY. (Patient not taking: Reported on 9/26/2023)     Family History       Problem Relation (Age of Onset)    Stroke Mother          Tobacco Use    Smoking status: Every Day     Current packs/day: 1.50     Average packs/day: 1.5 packs/day for 71.0 years (106.5 ttl pk-yrs)     Types: Cigarettes    Smokeless tobacco: Former   Substance and Sexual Activity    Alcohol use: No    Drug use: No    Sexual activity: Not Currently     Partners: Female     Review of Systems  Unable to appropriately participate  Objective:     Vital Signs (Most Recent):  Temp: 97.5 °F (36.4 °C) (10/14/23 2008)  Pulse: 108 (10/15/23 0121)  Resp: (!) 23 (10/15/23 0121)  BP: (!) 144/95 (10/15/23 0121)  SpO2: 96 % (10/15/23 0121) Vital Signs (24h Range):  Temp:  [97.5 °F (36.4 °C)] 97.5 °F (36.4 °C)  Pulse:  [102-108] 108  Resp:  [18-32] 23  SpO2:  [96 %-98 %] 96 %  BP: (133-155)/(67-95) 144/95     Weight: 60.7 kg (133 lb 12.8 oz)  Body mass index is 20.96 kg/m².     Physical Exam   General: Alert ,no apparent cardiorespiratory distress, laying comfortably  Eye: PERRL, normal conjunctiva  HEENT: Normocephalic, atraumatic, dry  oral mucosa  Neck: Supple.  Respiratory: Lungs CTA, equal breath sounds, symmetric expansion, no chest wall tenderness  Cardiovascular: Normal rate, regular rhythm, no appreciable murmurs rubs or gallops, +1 peripheral edema, good pulses and equal in all extremities.  Gastrointestinal: Soft, non-tender, non-distended, normal bowel sounds  Genitourinary: Deferred  Musculoskeletal and Neurologic:  During the evaluation he is alert and oriented, without apparent focal neurological deficit.  Integumentary-diffuse maculopapular rash involving bilateral upper and lower extremities, trunk, neck and face as well  as nail involvement        Significant Labs: All pertinent labs within the past 24 hours have been reviewed.  BMP:   Recent Labs   Lab 10/14/23  2135   *      K 5.0      CO2 17*   BUN 15   CREATININE 1.3   CALCIUM 8.6*   MG 1.9     CBC:   Recent Labs   Lab 10/14/23  2135   WBC 9.81   HGB 9.7*   HCT 29.3*          Significant Imaging: I have reviewed all pertinent imaging results/findings within the past 24 hours.  I have reviewed and interpreted all pertinent imaging results/findings within the past 24 hours.

## 2023-10-15 NOTE — ASSESSMENT & PLAN NOTE
"Patient's FSGs are uncontrolled due to hyperglycemia on current medication regimen.  Last A1c reviewed-   Lab Results   Component Value Date    HGBA1C 6.1 (H) 07/11/2023     Most recent fingerstick glucose reviewed- No results for input(s): "POCTGLUCOSE" in the last 24 hours.  Current correctional scale  Low  Maintain anti-hyperglycemic dose as follows-   Antihyperglycemics (From admission, onward)    Start     Stop Route Frequency Ordered    10/15/23 0251  insulin aspart U-100 pen 0-5 Units         -- SubQ Before meals & nightly PRN 10/15/23 0151        Hold Oral hypoglycemics while patient is in the hospital.  "

## 2023-10-15 NOTE — ED PROVIDER NOTES
Encounter Date: 10/14/2023       History     Chief Complaint   Patient presents with    Leg Swelling     Swelling that started today, reports seen and admitted to hospital for the same thing recently, dx with uti treated with abx, still taking abx. Per daughter pt. Also becoming delirious and decrease in urine output.      Narciso Yanez is a 88 y.o. male with a PMH of CAD s/p CABG, COPD, T2DM, polymyalgia rheumatica, NPH s/p  shunt, B12 deficiency, nephrolithiasis, spondylosis c/b chronic back pain and neurogenic claudication, and bilateral lower extremity rash x4 months [place] [facility] with a chief complaint of altered mental status, bilateral lower extremity edema, abdominal distention, and decreased urine output per daughter at bedside.  Patient was recently admitted from 10/9-10/11 for similar symptoms, was diagnosed with UTI and discharged on Augmentin which he has been taking.  Daughter states that he has been confused, frequently thinking that he is somewhere else or saying odd things.  She says that he has been this way since his last hospitalization and that she does not feel that they were ready to be discharged.     The history is provided by the patient, a relative and medical records (patient's daughter). The history is limited by the condition of the patient. No  was used.     Review of patient's allergies indicates:   Allergen Reactions    Demerol [meperidine] Nausea Only     Past Medical History:   Diagnosis Date    Actinic keratosis     Anxiety     B12 deficiency 12/8/2014    Dx 6/14    Cancer     skin    Cataract     Coronary artery disease     Diabetes mellitus type II     Diabetes mellitus with peripheral circulatory disorder 6/18/2014    ED (erectile dysfunction)     Hyperlipidemia     Kidney stone     Neurogenic claudication 4/4/2022    Normocytic anemia 10/15/2023    Peripheral vascular disease     Spondylosis 3/29/2019    Tobacco dependence      Urinary incontinence 5/4/2021     Past Surgical History:   Procedure Laterality Date    APPENDECTOMY      CARDIAC SURGERY  01/1999    CABG 4 vessels    CATARACT EXTRACTION      EPIDURAL STEROID INJECTION Right 8/26/2020    Procedure: Injection, Steroid, Epidural Transforaminal;  Surgeon: Wiley Crane Jr., MD;  Location: [facility] ENDO;  Service: Pain Management;  Laterality: Right;  Right L5 + S1 TF LEAH  Arrive @ 1115; ASA & Plavix last 8/18; Check BG; Rapid COVID test    EPIDURAL STEROID INJECTION Right 11/25/2020    Procedure: Injection, Steroid, Epidural Transformainal;  Surgeon: Wiley Crane Jr., MD;  Location: [facility] ENDO;  Service: Pain Management;  Laterality: Right;  Right L5 + S1 TF LEAH  Arrive @ 1245; ASA and Plavix last 11/17; Pre-DM; Needs MD Sign.    EPIDURAL STEROID INJECTION Right 2/19/2021    Procedure: Injection, Steroid, Epidural Transforaminal;  Surgeon: Wiley Crane Jr., MD;  Location: [facility] ENDO;  Service: Pain Management;  Laterality: Right;  Right L5 + S1 TF LEAH  Arrive @ 1115; ASA & Plavix last 2/11; Check BG; Needs Consent    EXTERNAL EAR SURGERY      EYE SURGERY      cataracts    ILIAC ARTERY STENT Bilateral 06/2003    also Right External Iliac stent     Family History   Problem Relation Age of Onset    Stroke Mother      Social History     Tobacco Use    Smoking status: Every Day     Current packs/day: 1.50     Average packs/day: 1.5 packs/day for 71.0 years (106.5 ttl pk-yrs)     Types: Cigarettes    Smokeless tobacco: Former   Substance Use Topics    Alcohol use: No    Drug use: No     Review of Systems    Physical Exam     Initial Vitals [10/14/23 2008]   BP Pulse Resp Temp SpO2   133/68 102 20 97.5 °F (36.4 °C) 98 %      MAP       --         Physical Exam    Nursing note and vitals reviewed.  Constitutional: He appears well-developed and well-nourished. No distress.   Eyes: Pupils are equal, round, and reactive to light.   Neck: Neck supple.   Cardiovascular:  Regular  rhythm.   Tachycardia present.         Pulmonary/Chest: Breath sounds normal. No respiratory distress.   Abdominal: There is no abdominal tenderness.   Mildly distended abdomen There is no rebound and no guarding.   Musculoskeletal:         General: Edema (2+ pitting edema to bilateral lower extremities) present.      Cervical back: Neck supple.     Neurological: He is alert. GCS score is 15. GCS eye subscore is 4. GCS verbal subscore is 5. GCS motor subscore is 6.   Alert and oriented to name, place, and year.  Intermittently confused and believes Ant Lindsay is here   Skin: Skin is warm and dry. Capillary refill takes less than 2 seconds.   Diffuse erythematous papular rash over bilateral lower extremities with patches of confluence, blanchable, no skin sloughing, nontender, no involvement of oral mucosa.   Psychiatric:   Cooperative         ED Course   Procedures  Labs Reviewed   CBC W/ AUTO DIFFERENTIAL - Abnormal; Notable for the following components:       Result Value    RBC 3.07 (*)     Hemoglobin 9.7 (*)     Hematocrit 29.3 (*)     MCH 31.6 (*)     RDW 15.7 (*)     Immature Granulocytes 0.7 (*)     Immature Grans (Abs) 0.07 (*)     Eos # 1.3 (*)     Eosinophil % 13.6 (*)     All other components within normal limits   COMPREHENSIVE METABOLIC PANEL - Abnormal; Notable for the following components:    CO2 17 (*)     Glucose 130 (*)     Calcium 8.6 (*)     Albumin 2.9 (*)     Alkaline Phosphatase 138 (*)     eGFR 54 (*)     All other components within normal limits   URINALYSIS, REFLEX TO URINE CULTURE - Abnormal; Notable for the following components:    Protein, UA 1+ (*)     All other components within normal limits    Narrative:     Specimen Source->Urine   B-TYPE NATRIURETIC PEPTIDE - Abnormal; Notable for the following components:     (*)     All other components within normal limits   CK - Abnormal; Notable for the following components:     (*)     All other components within normal limits    TROPONIN I - Abnormal; Notable for the following components:    Troponin I 0.107 (*)     All other components within normal limits   ACETAMINOPHEN LEVEL - Abnormal; Notable for the following components:    Acetaminophen (Tylenol), Serum <3.0 (*)     All other components within normal limits   DRUG SCREEN PANEL, URINE EMERGENCY - Abnormal; Notable for the following components:    Benzodiazepines Presumptive Positive (*)     All other components within normal limits   CK   MAGNESIUM   MAGNESIUM   URINALYSIS MICROSCOPIC    Narrative:     Specimen Source->Urine   TROPONIN I   DRUG SCREEN PANEL, URINE EMERGENCY   ALCOHOL,MEDICAL (ETHANOL)   ACETAMINOPHEN LEVEL   ALCOHOL,MEDICAL (ETHANOL)   COMPREHENSIVE METABOLIC PANEL   MAGNESIUM   PHOSPHORUS   CK   TROPONIN I   HEMOGLOBIN A1C   SEDIMENTATION RATE   C-REACTIVE PROTEIN   TSH   T4   AMMONIA   FOLATE   VITAMIN B12   RPR   POCT GLUCOSE MONITORING CONTINUOUS          Imaging Results              CT Abdomen Pelvis With Contrast (Final result)  Result time 10/14/23 23:45:10      Final result by Gretel Mata MD (10/14/23 23:45:10)                   Impression:      Cholelithiasis without evidence of acute cholecystitis.    Advanced vascular calcification.  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    Colonic diverticulosis.    Prostatomegaly.    Nonspecific infiltration the subcutaneous fat lateral to the left hip.  Question any history of recent trauma or contusion.    Small hiatal hernia.      Electronically signed by: Gretel Mata  Date:    10/14/2023  Time:    23:45               Narrative:    EXAMINATION:  CT OF ABDOMEN PELVIS WITH    CLINICAL HISTORY:  Abdominal abscess/infection suspected;    TECHNIQUE:  5 mm enhanced axial images were obtained from the lung bases through the greater trochanters.  Seventy-five mL of Omnipaque 350 was injected.    COMPARISON:  06/23/2022    FINDINGS:  A ventricular shunt catheter is present.  The liver, spleen,  pancreas, and adrenal glands are unremarkable. The gallbladder contains a few calcified gallstones.    Bilateral renal cysts are present.    There is no definite evidence for abdominal adenopathy or ascites.    Advanced vascular calcifications are present.  There is ectasia of the infrarenal abdominal aorta measuring up to 2.6 cm.  There are bi iliac arterial stents.    There is colonic diverticulosis.  Is moderate colonic stool.  The prostate gland is enlarged measuring approximately 5.2 in width x 5.8 cm in AP dimensions (series 2 axial image 153).  Consider correlation with PSA.    There is infiltration in the subcutaneous fat lateral to the left hip.  There is no free fluid in the pelvis.    At the lung bases, there is a small hiatal hernia.  Fibrotic changes or chronic interstitial lung disease are seen.  Noted there is minimal dextroscoliosis.  Spondylitic changes are present.  There are median sternotomy changes.                                       CT Head Without Contrast (Final result)  Result time 10/14/23 23:16:02      Final result by Terry Clinton MD (10/14/23 23:16:02)                   Impression:      No acute abnormality.      Electronically signed by: Terry Clinton  Date:    10/14/2023  Time:    23:16               Narrative:    EXAMINATION:  CT HEAD WITHOUT CONTRAST    CLINICAL HISTORY:  Mental status change, unknown cause;    TECHNIQUE:  Low dose axial CT images obtained throughout the head without intravenous contrast. Sagittal and coronal reconstructions were performed.    COMPARISON:  None.    FINDINGS:  Intracranial compartment:    Ventricles and sulci are stable in size for age without evidence of hydrocephalus.   shunt tube from posterior parietal approach on the right appear stable.  Its tip resides just beyond the fornix in the left lateral ventricle frontal horn no extra-axial blood or fluid collections.    The brain parenchyma appears stable.  Stable low-density in the deep  white matter compatible with chronic small vessel changes.  No parenchymal mass, hemorrhage, edema or major vascular distribution infarct.    Skull/extracranial contents (limited evaluation): No fracture. Mastoid air cells and paranasal sinuses are essentially clear.  Ocular lens replacements are noted.                                       X-Ray Chest AP Portable (Final result)  Result time 10/14/23 22:28:10      Final result by Gretel Mata MD (10/14/23 22:28:10)                   Impression:      Above described.      Electronically signed by: Gretel Mata  Date:    10/14/2023  Time:    22:28               Narrative:    EXAMINATION:  AP PORTABLE CHEST    CLINICAL HISTORY:  tachycardia;    TECHNIQUE:  AP portable chest radiograph was submitted.    COMPARISON:  10/09/2023    FINDINGS:  Median sternotomy changes are present.  AP portable chest radiograph demonstrates a cardiac silhouette within normal limits.  There is tortuosity and ectasia of the infrarenal abdominal aorta.  There is no focal consolidation, pneumothorax, or large pleural effusion. There are chronic appearing interstitial lung markings.  Right-sided ventricular shunt is present.                                       Medications   acetaminophen tablet 500 mg (has no administration in time range)   aspirin EC tablet 81 mg (has no administration in time range)   clopidogreL tablet 75 mg (has no administration in time range)   metoprolol succinate (TOPROL-XL) 24 hr tablet 25 mg (has no administration in time range)   sodium chloride 0.9% flush 10 mL (has no administration in time range)   naloxone 0.4 mg/mL injection 0.02 mg (has no administration in time range)   glucose chewable tablet 16 g (has no administration in time range)   glucose chewable tablet 24 g (has no administration in time range)   glucagon (human recombinant) injection 1 mg (has no administration in time range)   enoxaparin injection 40 mg (has no administration in time  range)   potassium bicarbonate disintegrating tablet 50 mEq (has no administration in time range)   potassium bicarbonate disintegrating tablet 35 mEq (has no administration in time range)   magnesium oxide tablet 800 mg (has no administration in time range)   magnesium oxide tablet 800 mg (has no administration in time range)   potassium, sodium phosphates 280-160-250 mg packet 2 packet (has no administration in time range)   potassium, sodium phosphates 280-160-250 mg packet 2 packet (has no administration in time range)   ondansetron injection 4 mg (has no administration in time range)   aluminum-magnesium hydroxide-simethicone 200-200-20 mg/5 mL suspension 30 mL (has no administration in time range)   dextrose 10% bolus 125 mL 125 mL (has no administration in time range)   dextrose 10% bolus 250 mL 250 mL (has no administration in time range)   glucose chewable tablet 16 g (has no administration in time range)   glucose chewable tablet 24 g (has no administration in time range)   glucagon (human recombinant) injection 1 mg (has no administration in time range)   insulin aspart U-100 pen 0-5 Units (has no administration in time range)   dextrose 10% bolus 125 mL 125 mL (has no administration in time range)   dextrose 10% bolus 250 mL 250 mL (has no administration in time range)   sodium chloride 0.9% bolus 500 mL 500 mL (0 mLs Intravenous Stopped 10/15/23 0041)   iohexoL (OMNIPAQUE 350) injection 75 mL (75 mLs Intravenous Given 10/14/23 2302)   furosemide injection 40 mg (40 mg Intravenous Given 10/15/23 0056)     Medical Decision Making  , troponin 0.107.  Head CT is unchanged.  Patient's is saying unusual things, he is talking about how cool it is that Ant Lindsay is in the hallway here.     Patient will be admitted for new onset heart failure, elevated troponin, and altered mental status.   I discussed admission with hospital medicine who agreed to admit the patient for further evaluation and  management.     Amount and/or Complexity of Data Reviewed  Independent Historian:      Details: Patient's daughter   External Data Reviewed: labs and radiology.     Details: Reviewed labs from previous visit 10/9-10/11: elevated TSH, normal T4. Cr 1.7, appears to be pts baseline. UA with pyuria, culture + for proteus and enterococcus both sensitive to augmentin (which patient has been on since discharge). Borderline macrocytic anemia, hgb 9.9, MVC 97.     Reviewed prior imaging. Head CT from visit 10/9-10/11 did not show any acute abnormalities.   Labs: ordered. Decision-making details documented in ED Course.  Radiology: ordered. Decision-making details documented in ED Course.  ECG/medicine tests: ordered and independent interpretation performed. Decision-making details documented in ED Course.    Risk  Prescription drug management.  Decision regarding hospitalization.               ED Course as of 10/15/23 0550   Sat Oct 14, 2023   2125 EKG:  Rate 98, regular rhythm, sinus rhythm, intervals within normal limits, no ST elevations or depressions noted. [CC]   2236 X-Ray Chest AP Portable  FINDINGS:  Median sternotomy changes are present.  AP portable chest radiograph demonstrates a cardiac silhouette within normal limits.  There is tortuosity and ectasia of the infrarenal abdominal aorta.  There is no focal consolidation, pneumothorax, or large pleural effusion. There are chronic appearing interstitial lung markings.  Right-sided ventricular shunt is present.     Impression:     Above described. [CC]   5435 CT Abdomen Pelvis With Contrast  Impression:     Cholelithiasis without evidence of acute cholecystitis.     Advanced vascular calcification.  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     Colonic diverticulosis.     Prostatomegaly.     Nonspecific infiltration the subcutaneous fat lateral to the left hip.  Question any history of recent trauma or contusion.     Small hiatal hernia.    [CC]      ED  Course User Index  [CC] Julio Lo MD                    Clinical Impression:   Final diagnoses:  [R00.0] Tachycardia  [I50.9] CHF (congestive heart failure)        ED Disposition Condition    Admit Stable                Hailee Louise MD  Resident  10/15/23 0617

## 2023-10-15 NOTE — HPI
88-year-old male with previous history of coronary artery disease status post previous CABG, COPD, PAD s/p PTA bilat YUE and R EIA 6/2003,  hypertension, dyslipidemia, CKD 3A, type 2 diabetes mellitus, PUD, polymyalgia rheumatica, NPH status post  shunt placement in 2021, who was recently discharged from the hospital on 10/11/2023 after being admitted for increased confusion and altered mental status with some hallucinations and frequent falls.  He had an abnormal UA which resulted Proteus and Enterococcus and was subsequently discharged with Augmentin.  He does have diffusely pruritic rash involving multiple areas and has been evaluated by dermatologist as outpatient without confirmatory diagnosis.  As per family members and nursing staff the patient had been delirious for example, asking the nurse if she got her food during the game while in the hospital.  In the ER, his vitals are within normal limits, his CBC shows normal white count 9.8, hemoglobin 9.7 (normocytic), with previous baseline of 11-12 since 2022, platelets 342 K.  His BNP shows mild metabolic acidosis CO2 17, creatinine 1.3 which is better than his previous creatinine of 1.8 on 10/09/2023, mild hyperglycemia at 130.  His BNP is 223, and seems that his previous BNP on 10/09/2023 was 151 and prior to that in on 09/15/2023 was within normal limits.  His troponin is 0.107, .  His CT abdomen pelvis showed nonacute findings of cholelithiasis, vascular calcifications, diverticulosis, prostatomegaly, nonspecific infiltration of the left lateral subcutaneous fat tissue and small hiatal hernia.  His CT head is within normal limits.  His chest x-ray reveals chronic interstitial lung markings, right-sided ventricular shunt present.  In the ER, appears to have received 500 cc of normal saline as well as 40 mg of IV Lasix.  Admission has been requested for further evaluation and treatment.    Last seen by me 6/5/23.    Cardiology consulted for elev  trop.    The patient is well known to me with a history of CAD in PA D.  He presents with mental status changes in a somewhat chronic rash.  He denies any angina, dyspnea, palpitations, or syncope.  His daughter is at the bedside.  It seems his rash in mental status is the most pressing concern.  His troponin is minimally elevated on background of CKD.  EKGs unchanged.  Echo notes mild LV dysfunction.  The patient does have a history of coronary artery disease status post multivessel CABG with subsequent angiogram noting patent LIMA to LAD with occluded vein grafts x2.  Nuclear stress test in 2021 was normal.  At this point, I have recommended continued medical therapy of his CAD.  We can consider outpatient stress testing after he recovers from his current acute medical issues.      Sam note 6/5/23:  CARDIOVASCULAR HISTORY:   CAD/CABG          1/10/1999: CABG with LIMA-LAD, SVG-LCx, SVG-RCA ([facility] records)                                5/1999: Angio revealed Patent LIMA-LAD, SVGx2 occluded ([facility] records)     PAD s/p LE PTA (6/2003: PTA with bilat YUE stents and R EIA stent)                                7/29/20 AFRO with patent bilat YUE stents    Cardiovascular Testing:  Aortic US 5/26/23  Juxta/infrarenal aortic ectasia without evidence of AAA, maximum diameter 2.5 cm.    Aortic atherosclerosis.    Increased flow velocity across bilateral common iliac artery suggesting >50% aortoiliac stenosis.     LE art US/LACIE 5/26/23  Previously noted bilat YUE stents not visualized on the current exam.  Otherwise, No evidence of hemodynamically significant infrainguinal PAD bilaterally.  Predominantly biphasic waveforms throughout.  Mildly decreased LACIE bilaterally (0.8).  Similar findings noted on report 2/24/21.     Echo 4/3/21  The left ventricle is normal in size with concentric remodeling and normal systolic function.  The estimated ejection fraction is 55%.  Normal right ventricular size with normal right  ventricular systolic function.     AFRO 7/29/20  Ao: 140/57/86  R CFA: 144/59/88  L CFA: 140/59/87  No evidence of gradient on CFA->Ao pullback across YUE stents bilaterally  Aorta: no aneurysm or stenosis  L Renal: patent  R Renal: patent  Right Leg:  YUE: patent stent without angiographic evidence of restenosis, pullback negative   EIA: patent  IIA: patent  CFA: MLI  PFA: patent  SFA: MLI, dist 30%  Pop: patent  KIRTI: MLI  TPT: MLI  PTA:MLI, mid vessel tapers, ?occluded at mid calf  Per: MLI  2-3 vessel runoff to Right foot  Left Leg:  YUE: patent stent without angiographic evidence of restenosis, pullback negative   EIA: patent   IIA: patent  CFA: patent  PFA: patent  SFA: MLI, dist 50%  Pop: patent  KIRTI: MLI  TPT: MLI  PTA: MLI, mid occlusion  Per: MLI  2 vessel runoff to Left foot  Hemostasis:  R rad vasband  Imp:  Limiting claudication with suggesting of ISR of aortoiliac stents  Patent YUE stents bilaterally (angiographically) without evidence of gradient on pullback  Mild dist SFA stenosis bilaterally  2-3V runoff to feet bilaterally  R rad vasband for hemostasis  Plan:  Cont med rx  Cont ASA/Plavix  Add Pletal  Smoking cessation  Exercise program  Home today  Follow up with Dr. Vargas as planned  Consider referral to ortho/spine or pain mgmt for eval of neurogenic claudication     Ex MPI 12/20/17 (low workload, drop in BP (144->105 systolic) noted during ETT)  The patient exercised for 3.02 minutes on a Sixto protocol, corresponding to a functional capacity of 5 estimated METS, achieving a peak heart rate of 126 bpm, which is 90% of the age predicted maximum heart rate. -CP. At peak exercise, EKG revealed < 1mm of horizontal ST segment depression at a maximum heart rate of 126 bpm.   Nuclear Quantitative Functional Analysis:   LVEF: 61 %  LVED Volume: 59 ml  LVES Volume: 23 ml  Impression: NORMAL MYOCARDIAL PERFUSION  1. The perfusion scan is free of evidence for myocardial ischemia or injury.   2. Resting  wall motion is physiologic.   3. Resting LV function is normal.   4. The ventricular volumes are normal at rest and stress.   5. The extracardiac distribution of radioactivity is normal.      [facility]:  1/10/1999: CABG with LIMA-LAD, SVG-LCx, SVG-RCA  5/1999: Angio revealed Patent LIMA-LAD, SVGx2 occluded  6/2003: PTA with bilat YUE stents and R EIA stent        ASSESSMENT:   # CAD s/p CABG, MPI/echo 12/2017 normal (although low exercise capacity and ?hypotensive response during TMET), asymptomatic.  # PAD s/p PTA bilat YUE and R EIA 6/2003.  Limiting R>L claudication.  Aortic/LE art US 6/2020 suggest significant bilat YUE ISR.  AFRO 7/29/20 with patent bilat YUE stents.  ?MSK complaint of distal R hamstrings tendon, persistent.  Aortic/LE art US 5/2023 neg.   # HTN, controlled  # HLP on crestor 20mg  # Tob abuse, still smoking and previously enrolled in the smoking cessation program.  Again encouraged to quit.  Patient appears on interested in stopping smoking.  # CKD3a  # DM  # aortic atherosclerosis (CT Chest 9/3/19)     PLAN:   Cont med rx  Cont ASA/Plavix  Refer back to PCO re: R leg MSK and freq urination complaints.  RTC 1 year with surveillance aortic US (June 2024).

## 2023-10-15 NOTE — ASSESSMENT & PLAN NOTE
-denies chest pain   -status post previous CABG.    -cardiology consulted given her risk factors/history.

## 2023-10-15 NOTE — PLAN OF CARE
Case Management Assessment     PCP: Dr. Del Toro  Pharmacy: [facility]    Patient Arrived From: Home  Existing Help at Home: Reyna- daughter    Barriers to Discharge: None    Discharge Plan:    A. Home with family   B. Other- tbd      CM discussed discharge planning with pt's daughterBrenda. Pt's daughter stated pt's mental status has been declining. Pt has been mumbling, kicking and talking in his sleep. Also, talking to himself. Pt lives with his daughterReyna and pt's daughterNorah takes pt to medical appointments. Pt uses a wheelchair, shower chair, rolling walker and bedside commode at home.        10/15/23 1134   Discharge Assessment   Assessment Type Discharge Planning Assessment   Confirmed/corrected address, phone number and insurance Yes   Confirmed Demographics Correct on Facesheet   Source of Information family   When was your last doctors appointment? 09/26/23   Reason For Admission Acute delerium   People in Home child(isabella), adult   Facility Arrived From: Home   Do you expect to return to your current living situation? Yes   Do you have help at home or someone to help you manage your care at home? Yes   Who are your caregiver(s) and their phone number(s)? Rosalba- daughter   Prior to hospitilization cognitive status:   (decline in mental status)   Current cognitive status:   (decine in mental status, telesitter)   Walking or Climbing Stairs ambulation difficulty, requires equipment;stair climbing difficulty, requires equipment  (none)   Mobility Management walker, wc   Dressing/Bathing bathing difficulty, assistance 1 person   Dressing/Bathing Management grab bars, 1 person assist, shower   Equipment Currently Used at Home wheelchair;walker, rolling;shower chair;bedside commode   Readmission within 30 days? Yes   Patient currently being followed by outpatient case management? No   Do you currently have service(s) that help you manage your care at home? No   Do you take prescription medications?  Yes   Do you have prescription coverage? Yes   Coverage [employer]   Do you have any problems affording any of your prescribed medications? No   Is the patient taking medications as prescribed? yes   Who is going to help you get home at discharge? Brenda- daughter [phone]   How do you get to doctors appointments? family or friend will provide   Are you on dialysis? No   Do you take coumadin? No  (plavix)   DME Needed Upon Discharge  other (see comments)  (tbd)   Discharge Plan discussed with: Adult children   Transition of Care Barriers None   Discharge Plan A Home with family   Discharge Plan B Other  (tbd)   OTHER   Name(s) of People in Home Reyna- daughter

## 2023-10-15 NOTE — ASSESSMENT & PLAN NOTE
-similar to previous presentation when symptoms were attributable to acute UTI.    -symptoms continue despite treatment.    -will check ammonia, TFTs, B12/folate, RPR  -CT head without acute findings.    -neurology consult

## 2023-10-15 NOTE — ASSESSMENT & PLAN NOTE
-mild.  -differentials would include autoimmune myositis, statin induced myositis, and other etiologies.  -check inflammatory markers.  Hold statin.  -has LE edema, will hold fluids at this time.  -creatinine stable.

## 2023-10-15 NOTE — ASSESSMENT & PLAN NOTE
-patient denies any chest pain, shortness of breath or other concerning symptoms of CHF exacerbation.    -does have pitting peripheral edema and has received IV Lasix.  -we will check echocardiogram given elevated troponin/BNP level given concerns of underlying autoimmune issue.

## 2023-10-15 NOTE — ASSESSMENT & PLAN NOTE
-by history, diagnosed in May 2019 treated with prednisone with appropriate response.  No associated GCA.

## 2023-10-15 NOTE — CARE UPDATE
Patient seen and examined.  See H/P, treatment and plan per Dr Hilton.  89 y/o male presents with change in mental status.  I treated this patient last week for similar issues.  Patient diagnosed with UTI and treated with ABX's.  Patient has remained confused with no improvement with ABX's treatment.  Long discussion with patient's daughter.  Hx of NPH.  Symptoms greatly improved post shunt placement a couple of years ago.    Several weeks ago patient developed a pruritic and scaly rash that has greatly worsened over the past few weeks.  Patient has seen Dermatology and Infectious Disease.  Initially treated for possible fungal infection.  Skin biopsy done with no apparent diagnosis per family.  Patient has been started on Dupixent and has received 4 doses without any improvement and acute worsening of rash.  Patient's functional status has declined secondary to pruritus and pain from rash.  His mental status has now worsening with increasing confusion associated with apparent tremors.  Patient recently saw Neurosurgery and shunt is apparently working fine with no issues.  He presents afebrile with normal WBC.  Some elevation on Troponin and Cardiology consulted.  No chest pain.  Unsure etiology of symptoms.  Neurology consulted.  MRI of brain?  LP?  Will await Neurology input.

## 2023-10-16 ENCOUNTER — PATIENT MESSAGE (OUTPATIENT)
Dept: ADMINISTRATIVE | Facility: HOSPITAL | Age: 88
End: 2023-10-16
Payer: MEDICARE

## 2023-10-16 ENCOUNTER — PATIENT OUTREACH (OUTPATIENT)
Dept: ADMINISTRATIVE | Facility: HOSPITAL | Age: 88
End: 2023-10-16
Payer: MEDICARE

## 2023-10-16 PROBLEM — E87.70 VOLUME OVERLOAD: Status: RESOLVED | Noted: 2023-10-15 | Resolved: 2023-10-16

## 2023-10-16 PROBLEM — M62.82 RHABDOMYOLYSIS: Status: RESOLVED | Noted: 2023-10-15 | Resolved: 2023-10-16

## 2023-10-16 LAB
ALBUMIN SERPL BCP-MCNC: 2.7 G/DL (ref 3.5–5.2)
ALP SERPL-CCNC: 90 U/L (ref 55–135)
ALT SERPL W/O P-5'-P-CCNC: 29 U/L (ref 10–44)
ANION GAP SERPL CALC-SCNC: 14 MMOL/L (ref 8–16)
AST SERPL-CCNC: 31 U/L (ref 10–40)
BILIRUB SERPL-MCNC: 0.3 MG/DL (ref 0.1–1)
BUN SERPL-MCNC: 16 MG/DL (ref 8–23)
CALCIUM SERPL-MCNC: 8.7 MG/DL (ref 8.7–10.5)
CHLORIDE SERPL-SCNC: 109 MMOL/L (ref 95–110)
CO2 SERPL-SCNC: 17 MMOL/L (ref 23–29)
CREAT SERPL-MCNC: 1.5 MG/DL (ref 0.5–1.4)
EST. GFR  (NO RACE VARIABLE): 45 ML/MIN/1.73 M^2
GLUCOSE SERPL-MCNC: 125 MG/DL (ref 70–110)
MAGNESIUM SERPL-MCNC: 2.1 MG/DL (ref 1.6–2.6)
PHOSPHATE SERPL-MCNC: 4.6 MG/DL (ref 2.7–4.5)
POCT GLUCOSE: 110 MG/DL (ref 70–110)
POCT GLUCOSE: 114 MG/DL (ref 70–110)
POCT GLUCOSE: 133 MG/DL (ref 70–110)
POCT GLUCOSE: 150 MG/DL (ref 70–110)
POCT GLUCOSE: 251 MG/DL (ref 70–110)
POTASSIUM SERPL-SCNC: 4.8 MMOL/L (ref 3.5–5.1)
PROT SERPL-MCNC: 7 G/DL (ref 6–8.4)
RPR SER QL: NORMAL
SODIUM SERPL-SCNC: 140 MMOL/L (ref 136–145)

## 2023-10-16 PROCEDURE — 84100 ASSAY OF PHOSPHORUS: CPT | Mod: HCNC | Performed by: INTERNAL MEDICINE

## 2023-10-16 PROCEDURE — 25000003 PHARM REV CODE 250: Mod: HCNC | Performed by: INTERNAL MEDICINE

## 2023-10-16 PROCEDURE — S4991 NICOTINE PATCH NONLEGEND: HCPCS | Mod: HCNC | Performed by: HOSPITALIST

## 2023-10-16 PROCEDURE — 21400001 HC TELEMETRY ROOM: Mod: HCNC

## 2023-10-16 PROCEDURE — 63600175 PHARM REV CODE 636 W HCPCS: Mod: HCNC | Performed by: HOSPITALIST

## 2023-10-16 PROCEDURE — 99223 PR INITIAL HOSPITAL CARE,LEVL III: ICD-10-PCS | Mod: HCNC,,, | Performed by: STUDENT IN AN ORGANIZED HEALTH CARE EDUCATION/TRAINING PROGRAM

## 2023-10-16 PROCEDURE — 80053 COMPREHEN METABOLIC PANEL: CPT | Mod: HCNC | Performed by: INTERNAL MEDICINE

## 2023-10-16 PROCEDURE — 83735 ASSAY OF MAGNESIUM: CPT | Mod: HCNC | Performed by: INTERNAL MEDICINE

## 2023-10-16 PROCEDURE — 99223 1ST HOSP IP/OBS HIGH 75: CPT | Mod: HCNC,,, | Performed by: STUDENT IN AN ORGANIZED HEALTH CARE EDUCATION/TRAINING PROGRAM

## 2023-10-16 PROCEDURE — 63600175 PHARM REV CODE 636 W HCPCS: Mod: HCNC | Performed by: INTERNAL MEDICINE

## 2023-10-16 PROCEDURE — 36415 COLL VENOUS BLD VENIPUNCTURE: CPT | Mod: HCNC | Performed by: INTERNAL MEDICINE

## 2023-10-16 PROCEDURE — 99232 SBSQ HOSP IP/OBS MODERATE 35: CPT | Mod: HCNC,,, | Performed by: INTERNAL MEDICINE

## 2023-10-16 PROCEDURE — 99232 PR SUBSEQUENT HOSPITAL CARE,LEVL II: ICD-10-PCS | Mod: HCNC,,, | Performed by: INTERNAL MEDICINE

## 2023-10-16 PROCEDURE — 25000003 PHARM REV CODE 250: Mod: HCNC | Performed by: HOSPITALIST

## 2023-10-16 RX ORDER — OLANZAPINE 10 MG/2ML
5 INJECTION, POWDER, FOR SOLUTION INTRAMUSCULAR 2 TIMES DAILY PRN
Status: DISCONTINUED | OUTPATIENT
Start: 2023-10-16 | End: 2023-10-23 | Stop reason: HOSPADM

## 2023-10-16 RX ORDER — ATORVASTATIN CALCIUM 40 MG/1
40 TABLET, FILM COATED ORAL DAILY
Status: DISCONTINUED | OUTPATIENT
Start: 2023-10-17 | End: 2023-10-23 | Stop reason: HOSPADM

## 2023-10-16 RX ORDER — METHYLPREDNISOLONE SOD SUCC 125 MG
125 VIAL (EA) INJECTION EVERY 8 HOURS
Status: DISCONTINUED | OUTPATIENT
Start: 2023-10-16 | End: 2023-10-23

## 2023-10-16 RX ORDER — TALC
6 POWDER (GRAM) TOPICAL NIGHTLY
Status: DISCONTINUED | OUTPATIENT
Start: 2023-10-16 | End: 2023-10-23 | Stop reason: HOSPADM

## 2023-10-16 RX ADMIN — ASPIRIN 81 MG: 81 TABLET, COATED ORAL at 09:10

## 2023-10-16 RX ADMIN — Medication 1 PATCH: at 09:10

## 2023-10-16 RX ADMIN — ENOXAPARIN SODIUM 40 MG: 40 INJECTION SUBCUTANEOUS at 05:10

## 2023-10-16 RX ADMIN — MELATONIN TAB 3 MG 6 MG: 3 TAB at 09:10

## 2023-10-16 RX ADMIN — METHYLPREDNISOLONE SODIUM SUCCINATE 125 MG: 125 INJECTION, POWDER, FOR SOLUTION INTRAMUSCULAR; INTRAVENOUS at 05:10

## 2023-10-16 RX ADMIN — METOPROLOL SUCCINATE 25 MG: 25 TABLET, EXTENDED RELEASE ORAL at 09:10

## 2023-10-16 RX ADMIN — CLOPIDOGREL BISULFATE 75 MG: 75 TABLET ORAL at 09:10

## 2023-10-16 RX ADMIN — DEXTROSE 500 MG: 50 INJECTION, SOLUTION INTRAVENOUS at 09:10

## 2023-10-16 NOTE — ASSESSMENT & PLAN NOTE
Patient's FSGs are uncontrolled due to hyperglycemia on current medication regimen.  Last A1c reviewed-   Lab Results   Component Value Date    HGBA1C 6.9 (H) 10/15/2023     Most recent fingerstick glucose reviewed-   Recent Labs   Lab 10/15/23  1713 10/16/23  0035 10/16/23  0741   POCTGLUCOSE 116* 110 114*     Current correctional scale  Low  Maintain anti-hyperglycemic dose as follows-   Antihyperglycemics (From admission, onward)    Start     Stop Route Frequency Ordered    10/15/23 0251  insulin aspart U-100 pen 0-5 Units         -- SubQ Before meals & nightly PRN 10/15/23 0151        Hold Oral hypoglycemics while patient is in the hospital.

## 2023-10-16 NOTE — PROGRESS NOTES
[facility]  Nurses Note -- 4 Eyes      10/16/2023       Skin assessed on: Q Shift      [x] No Pressure Injuries Present    []Prevention Measures Documented    [] Yes LDA  for Pressure Injury Previously documented     [] Yes New Pressure Injury Discovered   [] LDA for New Pressure Injury Added      Attending RN:  Indio Mccarty RN     Second RN:  Luci Cummings RN

## 2023-10-16 NOTE — ASSESSMENT & PLAN NOTE
-baseline creatinine about 1.6-1.8.  Creat at baseline.  Stable.  Avoid nephrotoxic medications.

## 2023-10-16 NOTE — PLAN OF CARE
Problem: Adult Inpatient Plan of Care  Goal: Plan of Care Review  Outcome: Ongoing, Progressing  Goal: Patient-Specific Goal (Individualized)  Outcome: Ongoing, Progressing  Goal: Absence of Hospital-Acquired Illness or Injury  Outcome: Ongoing, Progressing  Goal: Optimal Comfort and Wellbeing  Outcome: Ongoing, Progressing  Goal: Readiness for Transition of Care  Outcome: Ongoing, Progressing     Problem: Diabetes Comorbidity  Goal: Blood Glucose Level Within Targeted Range  Outcome: Ongoing, Progressing     Problem: Fluid and Electrolyte Imbalance (Acute Kidney Injury/Impairment)  Goal: Fluid and Electrolyte Balance  Outcome: Ongoing, Progressing     Problem: Oral Intake Inadequate (Acute Kidney Injury/Impairment)  Goal: Optimal Nutrition Intake  Outcome: Ongoing, Progressing     Problem: Renal Function Impairment (Acute Kidney Injury/Impairment)  Goal: Effective Renal Function  Outcome: Ongoing, Progressing     Problem: Fall Injury Risk  Goal: Absence of Fall and Fall-Related Injury  Outcome: Ongoing, Progressing     Problem: Skin Injury Risk Increased  Goal: Skin Health and Integrity  Outcome: Ongoing, Progressing     Problem: Impaired Wound Healing  Goal: Optimal Wound Healing  Outcome: Ongoing, Progressing

## 2023-10-16 NOTE — PROGRESS NOTES
[facility] Medicine  Progress Note    Patient Name: Narciso Yanez  MRN: [id number]  Patient Class: IP- Inpatient   Admission Date: 10/14/2023  Length of Stay: 1 days  Attending Physician: Mukund Hester MD  Primary Care Provider: Marcelino Del Toro MD        Subjective:     Principal Problem:Acute delirium        HPI:  Mr Yanez is a 88-year-old male with previous history of coronary artery disease status post previous CABG, COPD, PAD s/p PTA bilat YUE and R EIA 6/2003,  hypertension, dyslipidemia, CKD 3A, type 2 diabetes mellitus, PUD, polymyalgia rheumatica, NPH status post  shunt placement in 2021, who was recently discharged from the hospital on 10/11/2023 after being admitted for increased confusion and altered mental status with some hallucinations and frequent falls.  He had an abnormal UA which resulted Proteus and Enterococcus and was subsequently discharged with Augmentin.  He does have diffusely pruritic rash involving multiple areas and has been evaluated by dermatologist as outpatient without confirmatory diagnosis.  As per family members and nursing staff the patient had been delirious for example, asking the nurse if she got her food during the game while in the hospital.    In the ER, his vitals are within normal limits, his CBC shows normal white count 9.8, hemoglobin 9.7 (normocytic), with previous baseline of 11-12 since 2022, platelets 342 K.  His BNP shows mild metabolic acidosis CO2 17, creatinine 1.3 which is better than his previous creatinine of 1.8 on 10/09/2023, mild hyperglycemia at 130.  His BNP is 223, and seems that his previous BNP on 10/09/2023 was 151 and prior to that in on 09/15/2023 was within normal limits.  His troponin is 0.107, .  His CT abdomen pelvis showed nonacute findings of cholelithiasis, vascular calcifications, diverticulosis, prostatomegaly, nonspecific infiltration of the left lateral subcutaneous fat tissue and small hiatal  hernia.  His CT head is within normal limits.  His chest x-ray reveals chronic interstitial lung markings, right-sided ventricular shunt present.  In the ER, appears to have received 500 cc of normal saline as well as 40 mg of IV Lasix.  Admission has been requested for further evaluation and treatment.      Overview/Hospital Course:  89 y/o male presents with change in mental status.  I treated this patient last week for similar issues.  Patient diagnosed with UTI and treated with ABX's.  Patient has remained confused with no improvement with ABX's treatment.  Neurology consulted.      Interval History: more cooperative, but still confused per daughter.    Review of Systems   HENT:  Negative for ear discharge and ear pain.    Eyes:  Negative for discharge and itching.   Endocrine: Negative for cold intolerance and heat intolerance.   Neurological:  Negative for seizures and syncope.     Objective:     Vital Signs (Most Recent):  Temp: 98.2 °F (36.8 °C) (10/16/23 1123)  Pulse: 85 (10/16/23 1123)  Resp: 18 (10/16/23 1123)  BP: (!) 110/56 (10/16/23 1123)  SpO2: 96 % (10/16/23 1123) Vital Signs (24h Range):  Temp:  [97.3 °F (36.3 °C)-98.5 °F (36.9 °C)] 98.2 °F (36.8 °C)  Pulse:  [] 85  Resp:  [18] 18  SpO2:  [93 %-96 %] 96 %  BP: (110-152)/(56-89) 110/56     Weight: 59 kg (130 lb)  Body mass index is 20.36 kg/m².    Intake/Output Summary (Last 24 hours) at 10/16/2023 1624  Last data filed at 10/16/2023 1201  Gross per 24 hour   Intake 720 ml   Output 400 ml   Net 320 ml         Physical Exam  Constitutional:       Appearance: He is ill-appearing. He is not diaphoretic.   HENT:      Head: Normocephalic and atraumatic.      Mouth/Throat:      Mouth: Mucous membranes are dry.      Pharynx: No oropharyngeal exudate or posterior oropharyngeal erythema.   Cardiovascular:      Rate and Rhythm: Normal rate and regular rhythm.   Pulmonary:      Effort: No respiratory distress.      Breath sounds: Normal breath sounds.    Abdominal:      General: Bowel sounds are normal.      Palpations: Abdomen is soft.   Musculoskeletal:         General: No deformity or signs of injury.   Skin:     Comments: Diffuse erythematous and pruritic rash to trunk and extremities   Neurological:      Mental Status: He is disoriented.      Comments: Awake and alert.  Answer simple questions.  Restless   Psychiatric:         Speech: Speech is delayed.         Behavior: Behavior is cooperative.             Significant Labs: All pertinent labs within the past 24 hours have been reviewed.  BMP:   Recent Labs   Lab 10/16/23  0609   *      K 4.8      CO2 17*   BUN 16   CREATININE 1.5*   CALCIUM 8.7   MG 2.1     CBC:   Recent Labs   Lab 10/14/23  2135   WBC 9.81   HGB 9.7*   HCT 29.3*          Significant Imaging: I have reviewed all pertinent imaging results/findings within the past 24 hours.      Assessment/Plan:      * Acute delirium  -similar to previous presentation when symptoms were attributable to acute UTI.    -symptoms continue despite treatment.    -CT head without acute findings.    -neurology consulted  Delirium precautions.  Start depacon at night.  Prn Zyprexa.      Rash  -patient reports having rash onset since father's day without previous history.    -reports that he has been to 3 dermatologists and Infectious Disease  -unclear about current regimen but reports that he has tried 10 different creams, and multiple oral medications.  -reports he has had skin biopsy about 6 weeks ago.  Not conclusive results.  Discussed with patient's Dermatologist.  Will try IV steroids.    Chronic interstitial lung disease  -appears chronic.  Uncertain etiology.  -Could be related to underlying autoimmune process.  -recommend outpatient pulmonary follow-up.      Enlarged prostate  -outpatient Urology follow-up recommended.      Diverticulosis  -without diverticulitis.      Cholelithiasis  -without cholecystitis.      Elevated  troponin  -denies chest pain   -status post previous CABG.    Cardiology consulted.      Normocytic anemia  -could be related to anemia of chronic inflammation.    -recommend outpatient primary care follow-up as well as GI follow-up.      Hyperlipidemia  -statin      Essential hypertension  -continue metoprolol      Stage 3a chronic kidney disease  -baseline creatinine about 1.6-1.8.  Creat at baseline.  Stable.  Avoid nephrotoxic medications.      Polymyalgia rheumatica  -by history, diagnosed in May 2019 treated with prednisone with appropriate response.  No associated GCA.      COPD (chronic obstructive pulmonary disease)  -stable.  Continue as-needed DuoNebs.      PAD (peripheral artery disease)  -continue aspirin/Plavix  -s/p PTA bilat YUE and R EIA 6/2003      Diabetes mellitus with peripheral circulatory disorder  Patient's FSGs are uncontrolled due to hyperglycemia on current medication regimen.  Last A1c reviewed-   Lab Results   Component Value Date    HGBA1C 6.9 (H) 10/15/2023     Most recent fingerstick glucose reviewed-   Recent Labs   Lab 10/15/23  1713 10/16/23  0035 10/16/23  0741   POCTGLUCOSE 116* 110 114*     Current correctional scale  Low  Maintain anti-hyperglycemic dose as follows-   Antihyperglycemics (From admission, onward)    Start     Stop Route Frequency Ordered    10/15/23 0251  insulin aspart U-100 pen 0-5 Units         -- SubQ Before meals & nightly PRN 10/15/23 0151        Hold Oral hypoglycemics while patient is in the hospital.    Coronary artery disease involving coronary bypass graft of native heart without angina pectoris  -continue aspirin, Plavix, statin  Elevated Troponin on presentation.  Cardiology consulted.  Consider outpat MPI when he recovers from acute delirium        VTE Risk Mitigation (From admission, onward)         Ordered     enoxaparin injection 40 mg  Daily         10/15/23 0139     IP VTE HIGH RISK PATIENT  Once         10/15/23 0139     Place sequential  compression device  Until discontinued         10/15/23 0139                Discharge Planning   SUSIE: 10/17/2023     Code Status: Full Code   Is the patient medically ready for discharge?:     Reason for patient still in hospital (select all that apply): Patient trending condition  Discharge Plan A: Home with family                  Mukund Hester MD  Department of [facility] Medicine   [facility]

## 2023-10-16 NOTE — SUBJECTIVE & OBJECTIVE
Past Medical History:   Diagnosis Date    Actinic keratosis     Anxiety     B12 deficiency 12/8/2014    Dx 6/14    Cancer     skin    Cataract     Coronary artery disease     Diabetes mellitus type II     Diabetes mellitus with peripheral circulatory disorder 6/18/2014    ED (erectile dysfunction)     Hyperlipidemia     Kidney stone     Neurogenic claudication 4/4/2022    Normocytic anemia 10/15/2023    Peripheral vascular disease     Spondylosis 3/29/2019    Tobacco dependence     Urinary incontinence 5/4/2021       Past Surgical History:   Procedure Laterality Date    APPENDECTOMY      CARDIAC SURGERY  01/1999    CABG 4 vessels    CATARACT EXTRACTION      EPIDURAL STEROID INJECTION Right 8/26/2020    Procedure: Injection, Steroid, Epidural Transforaminal;  Surgeon: Wiley Crane Jr., MD;  Location: [facility] ENDO;  Service: Pain Management;  Laterality: Right;  Right L5 + S1 TF LEAH  Arrive @ 1115; ASA & Plavix last 8/18; Check BG; Rapid COVID test    EPIDURAL STEROID INJECTION Right 11/25/2020    Procedure: Injection, Steroid, Epidural Transformainal;  Surgeon: Wiley Crane Jr., MD;  Location: [facility] ENDO;  Service: Pain Management;  Laterality: Right;  Right L5 + S1 TF LEAH  Arrive @ 1245; ASA and Plavix last 11/17; Pre-DM; Needs MD Sign.    EPIDURAL STEROID INJECTION Right 2/19/2021    Procedure: Injection, Steroid, Epidural Transforaminal;  Surgeon: Wiley Crane Jr., MD;  Location: [facility] ENDO;  Service: Pain Management;  Laterality: Right;  Right L5 + S1 TF ELAH  Arrive @ 1115; ASA & Plavix last 2/11; Check BG; Needs Consent    EXTERNAL EAR SURGERY      EYE SURGERY      cataracts    ILIAC ARTERY STENT Bilateral 06/2003    also Right External Iliac stent       Review of patient's allergies indicates:   Allergen Reactions    Demerol [meperidine] Nausea Only       Current Neurological Medications:     No current facility-administered medications on file prior to encounter.     Current Outpatient  Medications on File Prior to Encounter   Medication Sig    ACCU-CHEK JOAQUIN PLUS METER Misc Three times a day with meals    acetaminophen (TYLENOL) 500 MG tablet Take 1 tablet (500 mg total) by mouth every 6 (six) hours as needed for Pain.    alendronate (FOSAMAX) 70 MG tablet TAKE 1 TABLET EVERY 7 DAYS ON TUESDAYS (Patient not taking: Reported on 9/26/2023)    amoxicillin-clavulanate 500-125mg (AUGMENTIN) 500-125 mg Tab Take 1 tablet (500 mg total) by mouth 2 (two) times daily. for 6 days    ascorbic acid, vitamin C, (VITAMIN C) 250 MG tablet Take 1 tablet (250 mg total) by mouth once daily. (Patient not taking: Reported on 9/26/2023)    aspirin (ECOTRIN) 81 MG EC tablet Take 1 tablet (81 mg total) by mouth once daily.    blood sugar diagnostic (TRUE METRIX GLUCOSE TEST STRIP) Strp TID    BOOSTRIX TDAP 2.5-8-5 Lf-mcg-Lf/0.5mL Syrg injection     clopidogreL (PLAVIX) 75 mg tablet TAKE 1 TABLET EVERY DAY (Patient not taking: Reported on 9/26/2023)    DUPIXENT  mg/2 mL PnIj     fluconazole (DIFLUCAN) 150 MG Tab Take by mouth.    hydrocortisone 1 % cream Apply topically 2 (two) times daily. for 5 days    ketoconazole (NIZORAL) 2 % cream Apply topically 2 (two) times daily.    magnesium sulfate in water (MAGNESIUM SULFATE 2 GRAM/50 ML) 2 gram/50 mL PgBk     metoprolol succinate (TOPROL-XL) 25 MG 24 hr tablet     mometasone 0.1% (ELOCON) 0.1 % cream Apply topically 2 (two) times daily.    olopatadine (PATANOL) 0.1 % ophthalmic solution Place 1 drop into both eyes 2 (two) times daily. (Patient not taking: Reported on 9/26/2023)    OMNIPAQUE 300 300 mg iodine/mL injection     ondansetron (ZOFRAN-ODT) 4 MG TbDL DISSOLVE 2 TABLETS (8 MG TOTAL) ON THE TONGUE EVERY 8 (EIGHT) HOURS AS NEEDED (NAUSEA).    predniSONE (DELTASONE) 10 MG tablet Take 4 tabs x 3 days, then take 2 tabs x 3 days, then take 1 tab x 3 days. (Patient not taking: Reported on 9/26/2023)    prochlorperazine (COMPAZINE) 5 MG tablet     rosuvastatin  (CRESTOR) 20 MG tablet TAKE 1 TABLET (20 MG TOTAL) BY MOUTH EVERY EVENING. (Patient not taking: Reported on 9/26/2023)    senna (SENOKOT) 8.6 mg tablet Take 1 tablet by mouth once daily. (Patient not taking: Reported on 9/26/2023)    TRUE METRIX GLUCOSE TEST STRIP Strp TEST BLOOD SUGAR THREE TIMES DAILY. (Patient not taking: Reported on 9/26/2023)     Family History       Problem Relation (Age of Onset)    Stroke Mother          Tobacco Use    Smoking status: Every Day     Current packs/day: 1.50     Average packs/day: 1.5 packs/day for 71.0 years (106.5 ttl pk-yrs)     Types: Cigarettes    Smokeless tobacco: Former   Substance and Sexual Activity    Alcohol use: No    Drug use: No    Sexual activity: Not Currently     Partners: Female     Review of Systems   Constitutional:  Negative for chills and fever.   Respiratory:  Negative for shortness of breath.    Cardiovascular:  Negative for chest pain.   Neurological:  Negative for seizures, facial asymmetry, weakness and numbness.   Psychiatric/Behavioral:  Positive for confusion.      Objective:     Vital Signs (Most Recent):  Temp: 98.2 °F (36.8 °C) (10/16/23 1123)  Pulse: 85 (10/16/23 1123)  Resp: 18 (10/16/23 1123)  BP: (!) 110/56 (10/16/23 1123)  SpO2: 96 % (10/16/23 1123) Vital Signs (24h Range):  Temp:  [97.1 °F (36.2 °C)-98.5 °F (36.9 °C)] 98.2 °F (36.8 °C)  Pulse:  [] 85  Resp:  [18] 18  SpO2:  [93 %-98 %] 96 %  BP: (110-166)/(56-89) 110/56     Weight: 59 kg (130 lb)  Body mass index is 20.36 kg/m².     Physical Exam  HENT:      Head: Normocephalic.   Eyes:      Extraocular Movements: Extraocular movements intact and EOM normal.   Pulmonary:      Effort: No respiratory distress.   Neurological:      Mental Status: He is alert.   Psychiatric:         Speech: Speech normal.          NEUROLOGICAL EXAMINATION:     MENTAL STATUS   Oriented to person.   Oriented to place.   Oriented to time.   Attention: decreased. Concentration: decreased.   Speech: speech  "is normal   Normal comprehension.        Alert / following commands   No aphasia      CRANIAL NERVES     CN III, IV, VI   Extraocular motions are normal.   Ophthalmoparesis: none    CN VII   Facial expression full, symmetric.     MOTOR EXAM        No new focal motor deficits       SENSORY EXAM        No new focal sensory deficits       GAIT AND COORDINATION     Tremor   Resting tremor: absent       No asterixis        Significant Labs: Hemoglobin A1c:   Recent Labs   Lab 10/15/23  0711   HGBA1C 6.9*     Blood Culture: No results for input(s): "LABBLOO" in the last 48 hours.  BMP:   Recent Labs   Lab 10/14/23  2135 10/15/23  0712 10/16/23  0609   * 115* 125*    142 140   K 5.0 5.0 4.8    109 109   CO2 17* 18* 17*   BUN 15 14 16   CREATININE 1.3 1.5* 1.5*   CALCIUM 8.6* 9.0 8.7   MG 1.9 1.9 2.1     CBC:   Recent Labs   Lab 10/14/23  2135   WBC 9.81   HGB 9.7*   HCT 29.3*        CMP:   Recent Labs   Lab 10/14/23  2135 10/15/23  0712 10/16/23  0609   * 115* 125*    142 140   K 5.0 5.0 4.8    109 109   CO2 17* 18* 17*   BUN 15 14 16   CREATININE 1.3 1.5* 1.5*   CALCIUM 8.6* 9.0 8.7   MG 1.9 1.9 2.1   PROT 7.5 7.5 7.0   ALBUMIN 2.9* 2.8* 2.7*   BILITOT 0.2 0.3 0.3   ALKPHOS 138* 109 90   AST 33 29 31   ALT 28 29 29   ANIONGAP 12 15 14     CSF Culture: No results for input(s): "CSFCULTURE" in the last 48 hours.  CSF Studies: No results for input(s): "ALIQUT", "APPEARCSF", "COLORCSF", "CSFWBC", "CSFRBC", "GLUCCSF", "LDHCSF", "PROTEINCSF", "VDRLCSF" in the last 48 hours.  Inflammatory Markers:   Recent Labs   Lab 10/15/23  0711 10/15/23  0712   SEDRATE 97*  --    CRP  --  96.6*     POCT Glucose:   Recent Labs   Lab 10/15/23  1713 10/16/23  0035 10/16/23  0741   POCTGLUCOSE 116* 110 114*     Prealbumin: No results for input(s): "PREALBUMIN" in the last 48 hours.  Respiratory Culture: No results for input(s): "GSRESP", "RESPIRATORYC" in the last 48 hours.  Urine Culture: No results " "for input(s): "LABURIN" in the last 48 hours.  Urine Studies:   Recent Labs   Lab 10/14/23  2211   COLORU Yellow   APPEARANCEUA Clear   PHUR 6.0   SPECGRAV 1.020   PROTEINUA 1+*   GLUCUA Negative   KETONESU Negative   BILIRUBINUA Negative   OCCULTUA Negative   NITRITE Negative   UROBILINOGEN Negative   LEUKOCYTESUR Negative   RBCUA 1   WBCUA 3   BACTERIA Rare   HYALINECASTS 1     Recent Lab Results  (Last 5 results in the past 24 hours)        10/16/23  0741   10/16/23  0609   10/16/23  0035   10/15/23  1713   10/15/23  1149        Albumin   2.7             ALP   90             ALT   29             Anion Gap   14             AST   31             BILIRUBIN TOTAL   0.3  Comment: For infants and newborns, interpretation of results should be based  on gestational age, weight and in agreement with clinical  observations.    Premature Infant recommended reference ranges:  Up to 24 hours.............<8.0 mg/dL  Up to 48 hours............<12.0 mg/dL  3-5 days..................<15.0 mg/dL  6-29 days.................<15.0 mg/dL               BUN   16             Calcium   8.7             Chloride   109             CO2   17             Creatinine   1.5             eGFR   45             Glucose   125             Magnesium    2.1             Phosphorus Level   4.6             POCT Glucose 114     110   116   98       Potassium   4.8             PROTEIN TOTAL   7.0             Sodium   140                                  All pertinent lab results from the past 24 hours have been reviewed.  None    Significant Imaging: I have reviewed and interpreted all pertinent imaging results/findings within the past 24 hours.  "

## 2023-10-16 NOTE — ASSESSMENT & PLAN NOTE
Mr. Yanez 88-year-old male with previous history of CAD, CABG, COPD, PAD s/p PTA bilat YUE and R EIA 6/2003, HTN, HLD, DM, PUD, polymyalgia rheumatica, NPH status post  shunt placement in 2021 with admission concerns of encephalopathy. And neurology has been consulted for the same.    History from family / medical records review. Reported symptoms of disorientation, confusion, poor attention - concentration, with intermittent hallucinations over the last 1 week. Fluctuating pattern - with episodes of normal baseline. Positive h/o of UTI and recent discharge in the  last 2 days. As per HPI -  He had an abnormal UA which resulted Proteus and Enterococcus and was subsequently discharged with Augmentin.  He does have diffusely pruritic rash involving multiple areas and has been evaluated by dermatologist as outpatient without confirmatory diagnosis. Per family - despite discharge-Abx / symptoms of fluctuating encephalopathy persisted, hence presented again for admission. Otherwise no clinical events of seizures. No history of strokes / epilepsy / complex migraines and primary neuropsychiatric or neurodegenerative disorder.     In the ER, his vitals are within normal limits, his CBC shows normal white count 9.8, hemoglobin 9.7 (normocytic), with previous baseline of 11-12 since 2022, platelets 342 K.  His BNP shows mild metabolic acidosis CO2 17, creatinine 1.3 which is better than his previous creatinine of 1.8 on 10/09/2023, mild hyperglycemia at 130.  His BNP is 223, and seems that his previous BNP on 10/09/2023 was 151 and prior to that in on 09/15/2023 was within normal limits.  His troponin is 0.107, .  His CT abdomen pelvis showed nonacute findings of cholelithiasis, vascular calcifications, diverticulosis, prostatomegaly, nonspecific infiltration of the left lateral subcutaneous fat tissue and small hiatal hernia.  His CT head is within normal limits.  His chest x-ray reveals chronic interstitial lung  markings, right-sided ventricular shunt present.  In the ER, appears to have received 500 cc of normal saline as well as 40 mg of IV Lasix.  Admission has been requested for further evaluation and treatment.    Exam: Awake / alert / following commands / no aphasia - apraxia / poor attention-concentration / intact recent-remote memory / no focal motor or sensory deficits     Recs:   Encephalopathy - multifactorial - metabolic/recent infection triggers - with hypoactive delirium. Low suspicion for focal cerebrovascular ischemic event or epileptic or neuro inflammatory/infectious etiology.     No specific investigations need from neurostandpoint. Need no EEG/LP at this time. Can consider if any clinical suspicion. Can consider MRI tarah wo contrast as needed    -- Correct lytes as needed / control infection if any / avoid hypoxia or hypercapnia / avoid hypoglycemia   -- Correct any nutritional deficiencies / correct anemia / provide nutritional support as appropriate / ambulate when appropriate - PT/OT recs   -- delirium precautions     - Recommend PRN depacon 250 mg IV qam and 500 mg IV qhs for management of agitation and behavioral disturbances associated with delirium.  - Recommend Zyprexa 2.5 mg PO/IM q6h PRN severe, nonredirectable agitation.  Max dose is 30 mg daily from all sources.  Parenteral Zyprexa is not to be given within 1 hours of parenteral benzodiazepines including Ativan.    - Recommend standard delirium precautions:               - Avoid use of physical restraints and judiciously use chemical sedatives for management of agitation              - Avoid deliriogenic agents if possible including benzodiazepines, anticholinergics, and opiates              - Normalize patient's sleep-wake cycle if possible              - Environmental adjustments to include bright lighting and windows open during the day              - Early involvement in PT/OT              - Recommend physical sitter to be present at  bedside.

## 2023-10-16 NOTE — CONSULTS
[facility] - Telemetry  Neurology  Consult Note    Patient Name: Narciso Yanez  MRN: [id number]  Admission Date: 10/14/2023  Hospital Length of Stay: 1 days  Code Status: Full Code   Attending Provider: Mukund Hester MD   Consulting Provider: Lokesh Quezada MD  Primary Care Physician: Marcelino Del Toro MD  Principal Problem:Acute delirium    Inpatient consult to Neurology  Consult performed by: Lokesh Quezada MD  Consult ordered by: Mukund Hester MD         Subjective:     Chief Complaint:  Encephalopathy      HPI:   Mr. Yanez 88-year-old male with previous history of CAD, CABG, COPD, PAD s/p PTA bilat YUE and R EIA 6/2003, HTN, HLD, DM, PUD, polymyalgia rheumatica, NPH status post  shunt placement in 2021 with admission concerns of encephalopathy. And neurology has been consulted for the same.    History from family / medical records review. Reported symptoms of disorientation, confusion, poor attention - concentration, with intermittent hallucinations over the last 1 week. Fluctuating pattern - with episodes of normal baseline. Positive h/o of UTI and recent discharge in the  last 2 days. As per HPI -  He had an abnormal UA which resulted Proteus and Enterococcus and was subsequently discharged with Augmentin.  He does have diffusely pruritic rash involving multiple areas and has been evaluated by dermatologist as outpatient without confirmatory diagnosis. Per family - despite discharge-Abx / symptoms of fluctuating encephalopathy persisted, hence presented again for admission. Otherwise no clinical events of seizures. No history of strokes / epilepsy / complex migraines and primary neuropsychiatric or neurodegenerative disorder.     In the ER, his vitals are within normal limits, his CBC shows normal white count 9.8, hemoglobin 9.7 (normocytic), with previous baseline of 11-12 since 2022, platelets 342 K.  His BNP shows mild metabolic acidosis CO2 17, creatinine 1.3 which is better  than his previous creatinine of 1.8 on 10/09/2023, mild hyperglycemia at 130.  His BNP is 223, and seems that his previous BNP on 10/09/2023 was 151 and prior to that in on 09/15/2023 was within normal limits.  His troponin is 0.107, .  His CT abdomen pelvis showed nonacute findings of cholelithiasis, vascular calcifications, diverticulosis, prostatomegaly, nonspecific infiltration of the left lateral subcutaneous fat tissue and small hiatal hernia.  His CT head is within normal limits.  His chest x-ray reveals chronic interstitial lung markings, right-sided ventricular shunt present.  In the ER, appears to have received 500 cc of normal saline as well as 40 mg of IV Lasix.  Admission has been requested for further evaluation and treatment.       Past Medical History:   Diagnosis Date    Actinic keratosis     Anxiety     B12 deficiency 12/8/2014    Dx 6/14    Cancer     skin    Cataract     Coronary artery disease     Diabetes mellitus type II     Diabetes mellitus with peripheral circulatory disorder 6/18/2014    ED (erectile dysfunction)     Hyperlipidemia     Kidney stone     Neurogenic claudication 4/4/2022    Normocytic anemia 10/15/2023    Peripheral vascular disease     Spondylosis 3/29/2019    Tobacco dependence     Urinary incontinence 5/4/2021       Past Surgical History:   Procedure Laterality Date    APPENDECTOMY      CARDIAC SURGERY  01/1999    CABG 4 vessels    CATARACT EXTRACTION      EPIDURAL STEROID INJECTION Right 8/26/2020    Procedure: Injection, Steroid, Epidural Transforaminal;  Surgeon: Wiley Crane Jr., MD;  Location: [facility] ENDO;  Service: Pain Management;  Laterality: Right;  Right L5 + S1 TF LEAH  Arrive @ 1115; ASA & Plavix last 8/18; Check BG; Rapid COVID test    EPIDURAL STEROID INJECTION Right 11/25/2020    Procedure: Injection, Steroid, Epidural Transformainal;  Surgeon: Wiley Crane Jr., MD;  Location: [facility] ENDO;  Service: Pain Management;   Laterality: Right;  Right L5 + S1 TF LEAH  Arrive @ 1245; ASA and Plavix last 11/17; Pre-DM; Needs MD Sign.    EPIDURAL STEROID INJECTION Right 2/19/2021    Procedure: Injection, Steroid, Epidural Transforaminal;  Surgeon: Wiley Crane Jr., MD;  Location: [facility];  Service: Pain Management;  Laterality: Right;  Right L5 + S1 TF LEAH  Arrive @ 1115; ASA & Plavix last 2/11; Check BG; Needs Consent    EXTERNAL EAR SURGERY      EYE SURGERY      cataracts    ILIAC ARTERY STENT Bilateral 06/2003    also Right External Iliac stent       Review of patient's allergies indicates:   Allergen Reactions    Demerol [meperidine] Nausea Only       Current Neurological Medications:     No current facility-administered medications on file prior to encounter.     Current Outpatient Medications on File Prior to Encounter   Medication Sig    ACCU-CHEK JOAQUIN PLUS METER Misc Three times a day with meals    acetaminophen (TYLENOL) 500 MG tablet Take 1 tablet (500 mg total) by mouth every 6 (six) hours as needed for Pain.    alendronate (FOSAMAX) 70 MG tablet TAKE 1 TABLET EVERY 7 DAYS ON TUESDAYS (Patient not taking: Reported on 9/26/2023)    amoxicillin-clavulanate 500-125mg (AUGMENTIN) 500-125 mg Tab Take 1 tablet (500 mg total) by mouth 2 (two) times daily. for 6 days    ascorbic acid, vitamin C, (VITAMIN C) 250 MG tablet Take 1 tablet (250 mg total) by mouth once daily. (Patient not taking: Reported on 9/26/2023)    aspirin (ECOTRIN) 81 MG EC tablet Take 1 tablet (81 mg total) by mouth once daily.    blood sugar diagnostic (TRUE METRIX GLUCOSE TEST STRIP) Strp TID    BOOSTRIX TDAP 2.5-8-5 Lf-mcg-Lf/0.5mL Syrg injection     clopidogreL (PLAVIX) 75 mg tablet TAKE 1 TABLET EVERY DAY (Patient not taking: Reported on 9/26/2023)    DUPIXENT  mg/2 mL PnIj     fluconazole (DIFLUCAN) 150 MG Tab Take by mouth.    hydrocortisone 1 % cream Apply topically 2 (two) times daily. for 5 days    ketoconazole (NIZORAL) 2 %  cream Apply topically 2 (two) times daily.    magnesium sulfate in water (MAGNESIUM SULFATE 2 GRAM/50 ML) 2 gram/50 mL PgBk     metoprolol succinate (TOPROL-XL) 25 MG 24 hr tablet     mometasone 0.1% (ELOCON) 0.1 % cream Apply topically 2 (two) times daily.    olopatadine (PATANOL) 0.1 % ophthalmic solution Place 1 drop into both eyes 2 (two) times daily. (Patient not taking: Reported on 9/26/2023)    OMNIPAQUE 300 300 mg iodine/mL injection     ondansetron (ZOFRAN-ODT) 4 MG TbDL DISSOLVE 2 TABLETS (8 MG TOTAL) ON THE TONGUE EVERY 8 (EIGHT) HOURS AS NEEDED (NAUSEA).    predniSONE (DELTASONE) 10 MG tablet Take 4 tabs x 3 days, then take 2 tabs x 3 days, then take 1 tab x 3 days. (Patient not taking: Reported on 9/26/2023)    prochlorperazine (COMPAZINE) 5 MG tablet     rosuvastatin (CRESTOR) 20 MG tablet TAKE 1 TABLET (20 MG TOTAL) BY MOUTH EVERY EVENING. (Patient not taking: Reported on 9/26/2023)    senna (SENOKOT) 8.6 mg tablet Take 1 tablet by mouth once daily. (Patient not taking: Reported on 9/26/2023)    TRUE METRIX GLUCOSE TEST STRIP Strp TEST BLOOD SUGAR THREE TIMES DAILY. (Patient not taking: Reported on 9/26/2023)     Family History       Problem Relation (Age of Onset)    Stroke Mother          Tobacco Use    Smoking status: Every Day     Current packs/day: 1.50     Average packs/day: 1.5 packs/day for 71.0 years (106.5 ttl pk-yrs)     Types: Cigarettes    Smokeless tobacco: Former   Substance and Sexual Activity    Alcohol use: No    Drug use: No    Sexual activity: Not Currently     Partners: Female     Review of Systems   Constitutional:  Negative for chills and fever.   Respiratory:  Negative for shortness of breath.    Cardiovascular:  Negative for chest pain.   Neurological:  Negative for seizures, facial asymmetry, weakness and numbness.   Psychiatric/Behavioral:  Positive for confusion.      Objective:     Vital Signs (Most Recent):  Temp: 98.2 °F (36.8 °C) (10/16/23 1123)  Pulse:  "85 (10/16/23 1123)  Resp: 18 (10/16/23 1123)  BP: (!) 110/56 (10/16/23 1123)  SpO2: 96 % (10/16/23 1123) Vital Signs (24h Range):  Temp:  [97.1 °F (36.2 °C)-98.5 °F (36.9 °C)] 98.2 °F (36.8 °C)  Pulse:  [] 85  Resp:  [18] 18  SpO2:  [93 %-98 %] 96 %  BP: (110-166)/(56-89) 110/56     Weight: 59 kg (130 lb)  Body mass index is 20.36 kg/m².     Physical Exam  HENT:      Head: Normocephalic.   Eyes:      Extraocular Movements: Extraocular movements intact and EOM normal.   Pulmonary:      Effort: No respiratory distress.   Neurological:      Mental Status: He is alert.   Psychiatric:         Speech: Speech normal.          NEUROLOGICAL EXAMINATION:     MENTAL STATUS   Oriented to person.   Oriented to place.   Oriented to time.   Attention: decreased. Concentration: decreased.   Speech: speech is normal   Normal comprehension.        Alert / following commands   No aphasia      CRANIAL NERVES     CN III, IV, VI   Extraocular motions are normal.   Ophthalmoparesis: none    CN VII   Facial expression full, symmetric.     MOTOR EXAM        No new focal motor deficits       SENSORY EXAM        No new focal sensory deficits       GAIT AND COORDINATION     Tremor   Resting tremor: absent       No asterixis        Significant Labs: Hemoglobin A1c:   Recent Labs   Lab 10/15/23  0711   HGBA1C 6.9*     Blood Culture: No results for input(s): "LABBLOO" in the last 48 hours.  BMP:   Recent Labs   Lab 10/14/23  2135 10/15/23  0712 10/16/23  0609   * 115* 125*    142 140   K 5.0 5.0 4.8    109 109   CO2 17* 18* 17*   BUN 15 14 16   CREATININE 1.3 1.5* 1.5*   CALCIUM 8.6* 9.0 8.7   MG 1.9 1.9 2.1     CBC:   Recent Labs   Lab 10/14/23  2135   WBC 9.81   HGB 9.7*   HCT 29.3*        CMP:   Recent Labs   Lab 10/14/23  2135 10/15/23  0712 10/16/23  0609   * 115* 125*    142 140   K 5.0 5.0 4.8    109 109   CO2 17* 18* 17*   BUN 15 14 16   CREATININE 1.3 1.5* 1.5*   CALCIUM 8.6* 9.0 8.7 " "  MG 1.9 1.9 2.1   PROT 7.5 7.5 7.0   ALBUMIN 2.9* 2.8* 2.7*   BILITOT 0.2 0.3 0.3   ALKPHOS 138* 109 90   AST 33 29 31   ALT 28 29 29   ANIONGAP 12 15 14     CSF Culture: No results for input(s): "CSFCULTURE" in the last 48 hours.  CSF Studies: No results for input(s): "ALIQUT", "APPEARCSF", "COLORCSF", "CSFWBC", "CSFRBC", "GLUCCSF", "LDHCSF", "PROTEINCSF", "VDRLCSF" in the last 48 hours.  Inflammatory Markers:   Recent Labs   Lab 10/15/23  0711 10/15/23  0712   SEDRATE 97*  --    CRP  --  96.6*     POCT Glucose:   Recent Labs   Lab 10/15/23  1713 10/16/23  0035 10/16/23  0741   POCTGLUCOSE 116* 110 114*     Prealbumin: No results for input(s): "PREALBUMIN" in the last 48 hours.  Respiratory Culture: No results for input(s): "GSRESP", "RESPIRATORYC" in the last 48 hours.  Urine Culture: No results for input(s): "LABURIN" in the last 48 hours.  Urine Studies:   Recent Labs   Lab 10/14/23  2211   COLORU Yellow   APPEARANCEUA Clear   PHUR 6.0   SPECGRAV 1.020   PROTEINUA 1+*   GLUCUA Negative   KETONESU Negative   BILIRUBINUA Negative   OCCULTUA Negative   NITRITE Negative   UROBILINOGEN Negative   LEUKOCYTESUR Negative   RBCUA 1   WBCUA 3   BACTERIA Rare   HYALINECASTS 1     Recent Lab Results  (Last 5 results in the past 24 hours)        10/16/23  0741   10/16/23  0609   10/16/23  0035   10/15/23  1713   10/15/23  1149        Albumin   2.7             ALP   90             ALT   29             Anion Gap   14             AST   31             BILIRUBIN TOTAL   0.3  Comment: For infants and newborns, interpretation of results should be based  on gestational age, weight and in agreement with clinical  observations.    Premature Infant recommended reference ranges:  Up to 24 hours.............<8.0 mg/dL  Up to 48 hours............<12.0 mg/dL  3-5 days..................<15.0 mg/dL  6-29 days.................<15.0 mg/dL               BUN   16             Calcium   8.7             Chloride   109             CO2   17          "    Creatinine   1.5             eGFR   45             Glucose   125             Magnesium    2.1             Phosphorus Level   4.6             POCT Glucose 114     110   116   98       Potassium   4.8             PROTEIN TOTAL   7.0             Sodium   140                                  All pertinent lab results from the past 24 hours have been reviewed.  None    Significant Imaging: I have reviewed and interpreted all pertinent imaging results/findings within the past 24 hours.    Assessment and Plan:     * Acute delirium  Mr. Yanez 88-year-old male with previous history of CAD, CABG, COPD, PAD s/p PTA bilat YUE and R EIA 6/2003, HTN, HLD, DM, PUD, polymyalgia rheumatica, NPH status post  shunt placement in 2021 with admission concerns of encephalopathy. And neurology has been consulted for the same.    History from family / medical records review. Reported symptoms of disorientation, confusion, poor attention - concentration, with intermittent hallucinations over the last 1 week. Fluctuating pattern - with episodes of normal baseline. Positive h/o of UTI and recent discharge in the  last 2 days. As per HPI -  He had an abnormal UA which resulted Proteus and Enterococcus and was subsequently discharged with Augmentin.  He does have diffusely pruritic rash involving multiple areas and has been evaluated by dermatologist as outpatient without confirmatory diagnosis. Per family - despite discharge-Abx / symptoms of fluctuating encephalopathy persisted, hence presented again for admission. Otherwise no clinical events of seizures. No history of strokes / epilepsy / complex migraines and primary neuropsychiatric or neurodegenerative disorder.     In the ER, his vitals are within normal limits, his CBC shows normal white count 9.8, hemoglobin 9.7 (normocytic), with previous baseline of 11-12 since 2022, platelets 342 K.  His BNP shows mild metabolic acidosis CO2 17, creatinine 1.3 which is better than his  previous creatinine of 1.8 on 10/09/2023, mild hyperglycemia at 130.  His BNP is 223, and seems that his previous BNP on 10/09/2023 was 151 and prior to that in on 09/15/2023 was within normal limits.  His troponin is 0.107, .  His CT abdomen pelvis showed nonacute findings of cholelithiasis, vascular calcifications, diverticulosis, prostatomegaly, nonspecific infiltration of the left lateral subcutaneous fat tissue and small hiatal hernia.  His CT head is within normal limits.  His chest x-ray reveals chronic interstitial lung markings, right-sided ventricular shunt present.  In the ER, appears to have received 500 cc of normal saline as well as 40 mg of IV Lasix.  Admission has been requested for further evaluation and treatment.    Exam: Awake / alert / following commands / no aphasia - apraxia / poor attention-concentration / intact recent-remote memory / no focal motor or sensory deficits     Recs:   Encephalopathy - multifactorial - metabolic/recent infection triggers - with hypoactive delirium. Low suspicion for focal cerebrovascular ischemic event or epileptic or neuro inflammatory/infectious etiology.     No specific investigations need from neurostandpoint. Need no EEG/LP at this time. Can consider if any clinical suspicion. Can consider MRI tarah wo contrast as needed    -- Correct lytes as needed / control infection if any / avoid hypoxia or hypercapnia / avoid hypoglycemia   -- Correct any nutritional deficiencies / correct anemia / provide nutritional support as appropriate / ambulate when appropriate - PT/OT recs   -- delirium precautions     - Recommend PRN depacon 250 mg IV qam and 500 mg IV qhs for management of agitation and behavioral disturbances associated with delirium.  - Recommend Zyprexa 2.5 mg PO/IM q6h PRN severe, nonredirectable agitation.  Max dose is 30 mg daily from all sources.  Parenteral Zyprexa is not to be given within 1 hours of parenteral benzodiazepines including  Ativan.    - Recommend standard delirium precautions:               - Avoid use of physical restraints and judiciously use chemical sedatives for management of agitation              - Avoid deliriogenic agents if possible including benzodiazepines, anticholinergics, and opiates              - Normalize patient's sleep-wake cycle if possible              - Environmental adjustments to include bright lighting and windows open during the day              - Early involvement in PT/OT              - Recommend physical sitter to be present at bedside.             VTE Risk Mitigation (From admission, onward)         Ordered     enoxaparin injection 40 mg  Daily         10/15/23 0139     IP VTE HIGH RISK PATIENT  Once         10/15/23 0139     Place sequential compression device  Until discontinued         10/15/23 0139                Thank you for your consult.      Lokesh Quezada MD  Neurology  [facility] - Telemetry

## 2023-10-16 NOTE — HOSPITAL COURSE
89 y/o male presents with change in mental status.  I treated this patient last week for similar issues.  Patient diagnosed with UTI and treated with ABX's.  Patient has remained confused with no improvement with ABX's treatment.  Neurology consulted.  Changes in mental status secondary to hypoactive delirium.  Patient has been dealing with a generalized pruritic rash for several months causing significant decline in overall functional status.  Followed by Dermatology with no specific diagnosis.  Discussed with his Dermatologist and decided to start IV steroids.     Sed rate 100, , with normal WBC count and afebrile-likely autoimmune or inflammation not associated with infection  Rash appears to be improving with IV steroids.  Speaking fluently, and AO x4-will remove sitter.    Patient was seen in consultation by Hematology/Oncology services further evaluate for possible T-cell lymphoma.  Input appreciated.  Patient recommended to continue IV steroids as seems to be helping the rash as altered mental status.  Patient on Solu-Medrol 125 mg IV q.8 hours.  Patient recommended flow cytometry, check vitamin levels to check for any vitamin deficiencies.  Patient was started on prednisone 40 mg daily with tapering course.  He will follow up in outpatient dermatology clinic.  Patient metformin 500 mg p.o. b.i.d. resumed for his diabetes.  Patient was in no acute distress today.  Though stable for discharge to rehab.  He will follow with PCP in 1 week.  Follow up with outpatient dermatology clinic in 1-2 weeks or as scheduled after discharge from rehab.

## 2023-10-16 NOTE — ASSESSMENT & PLAN NOTE
-continue aspirin, Plavix, statin  Elevated Troponin on presentation.  Cardiology consulted.  Consider outpat MPI when he recovers from acute delirium

## 2023-10-16 NOTE — SUBJECTIVE & OBJECTIVE
Past Medical History:   Diagnosis Date    Actinic keratosis     Anxiety     B12 deficiency 12/8/2014    Dx 6/14    Cancer     skin    Cataract     Coronary artery disease     Diabetes mellitus type II     Diabetes mellitus with peripheral circulatory disorder 6/18/2014    ED (erectile dysfunction)     Hyperlipidemia     Kidney stone     Neurogenic claudication 4/4/2022    Normocytic anemia 10/15/2023    Peripheral vascular disease     Spondylosis 3/29/2019    Tobacco dependence     Urinary incontinence 5/4/2021       Past Surgical History:   Procedure Laterality Date    APPENDECTOMY      CARDIAC SURGERY  01/1999    CABG 4 vessels    CATARACT EXTRACTION      EPIDURAL STEROID INJECTION Right 8/26/2020    Procedure: Injection, Steroid, Epidural Transforaminal;  Surgeon: Wiley Crane Jr., MD;  Location: [facility] ENDO;  Service: Pain Management;  Laterality: Right;  Right L5 + S1 TF LEAH  Arrive @ 1115; ASA & Plavix last 8/18; Check BG; Rapid COVID test    EPIDURAL STEROID INJECTION Right 11/25/2020    Procedure: Injection, Steroid, Epidural Transformainal;  Surgeon: Wiley Crane Jr., MD;  Location: [facility] ENDO;  Service: Pain Management;  Laterality: Right;  Right L5 + S1 TF LEAH  Arrive @ 1245; ASA and Plavix last 11/17; Pre-DM; Needs MD Sign.    EPIDURAL STEROID INJECTION Right 2/19/2021    Procedure: Injection, Steroid, Epidural Transforaminal;  Surgeon: Wiley Crane Jr., MD;  Location: [facility] ENDO;  Service: Pain Management;  Laterality: Right;  Right L5 + S1 TF LEAH  Arrive @ 1115; ASA & Plavix last 2/11; Check BG; Needs Consent    EXTERNAL EAR SURGERY      EYE SURGERY      cataracts    ILIAC ARTERY STENT Bilateral 06/2003    also Right External Iliac stent       Review of patient's allergies indicates:   Allergen Reactions    Demerol [meperidine] Nausea Only       No current facility-administered medications on file prior to encounter.     Current Outpatient Medications on File Prior to Encounter    Medication Sig    ACCU-CHEK JOAQUIN PLUS METER Misc Three times a day with meals    acetaminophen (TYLENOL) 500 MG tablet Take 1 tablet (500 mg total) by mouth every 6 (six) hours as needed for Pain.    alendronate (FOSAMAX) 70 MG tablet TAKE 1 TABLET EVERY 7 DAYS ON TUESDAYS (Patient not taking: Reported on 9/26/2023)    amoxicillin-clavulanate 500-125mg (AUGMENTIN) 500-125 mg Tab Take 1 tablet (500 mg total) by mouth 2 (two) times daily. for 6 days    ascorbic acid, vitamin C, (VITAMIN C) 250 MG tablet Take 1 tablet (250 mg total) by mouth once daily. (Patient not taking: Reported on 9/26/2023)    aspirin (ECOTRIN) 81 MG EC tablet Take 1 tablet (81 mg total) by mouth once daily.    blood sugar diagnostic (TRUE METRIX GLUCOSE TEST STRIP) Strp TID    BOOSTRIX TDAP 2.5-8-5 Lf-mcg-Lf/0.5mL Syrg injection     clopidogreL (PLAVIX) 75 mg tablet TAKE 1 TABLET EVERY DAY (Patient not taking: Reported on 9/26/2023)    DUPIXENT  mg/2 mL PnIj     fluconazole (DIFLUCAN) 150 MG Tab Take by mouth.    hydrocortisone 1 % cream Apply topically 2 (two) times daily. for 5 days    ketoconazole (NIZORAL) 2 % cream Apply topically 2 (two) times daily.    magnesium sulfate in water (MAGNESIUM SULFATE 2 GRAM/50 ML) 2 gram/50 mL PgBk     metoprolol succinate (TOPROL-XL) 25 MG 24 hr tablet     mometasone 0.1% (ELOCON) 0.1 % cream Apply topically 2 (two) times daily.    olopatadine (PATANOL) 0.1 % ophthalmic solution Place 1 drop into both eyes 2 (two) times daily. (Patient not taking: Reported on 9/26/2023)    OMNIPAQUE 300 300 mg iodine/mL injection     ondansetron (ZOFRAN-ODT) 4 MG TbDL DISSOLVE 2 TABLETS (8 MG TOTAL) ON THE TONGUE EVERY 8 (EIGHT) HOURS AS NEEDED (NAUSEA).    predniSONE (DELTASONE) 10 MG tablet Take 4 tabs x 3 days, then take 2 tabs x 3 days, then take 1 tab x 3 days. (Patient not taking: Reported on 9/26/2023)    prochlorperazine (COMPAZINE) 5 MG tablet     rosuvastatin (CRESTOR) 20 MG tablet TAKE 1 TABLET (20 MG  TOTAL) BY MOUTH EVERY EVENING. (Patient not taking: Reported on 9/26/2023)    senna (SENOKOT) 8.6 mg tablet Take 1 tablet by mouth once daily. (Patient not taking: Reported on 9/26/2023)    TRUE METRIX GLUCOSE TEST STRIP Strp TEST BLOOD SUGAR THREE TIMES DAILY. (Patient not taking: Reported on 9/26/2023)     Family History       Problem Relation (Age of Onset)    Stroke Mother          Tobacco Use    Smoking status: Every Day     Current packs/day: 1.50     Average packs/day: 1.5 packs/day for 71.0 years (106.5 ttl pk-yrs)     Types: Cigarettes    Smokeless tobacco: Former   Substance and Sexual Activity    Alcohol use: No    Drug use: No    Sexual activity: Not Currently     Partners: Female     Review of Systems   Gastrointestinal:  Negative for melena.   Genitourinary:  Negative for hematuria.     Objective:     Vital Signs (Most Recent):  Temp: 98.4 °F (36.9 °C) (10/16/23 0740)  Pulse: 101 (10/16/23 0740)  Resp: 18 (10/16/23 0740)  BP: 122/76 (10/16/23 0740)  SpO2: (!) 93 % (10/16/23 0740) Vital Signs (24h Range):  Temp:  [97.1 °F (36.2 °C)-98.5 °F (36.9 °C)] 98.4 °F (36.9 °C)  Pulse:  [] 101  Resp:  [18] 18  SpO2:  [93 %-98 %] 93 %  BP: (122-166)/(61-89) 122/76     Weight: 59 kg (130 lb)  Body mass index is 20.36 kg/m².    SpO2: (!) 93 %         Intake/Output Summary (Last 24 hours) at 10/16/2023 0903  Last data filed at 10/16/2023 0218  Gross per 24 hour   Intake 600 ml   Output 300 ml   Net 300 ml         Lines/Drains/Airways       Peripheral Intravenous Line  Duration                  Peripheral IV - Single Lumen 10/15/23 0055 20 G Left Antecubital 1 day                   Exam unchanged vs 10/15/23  Physical Exam  Constitutional:       General: He is not in acute distress.     Appearance: He is well-developed. He is not ill-appearing, toxic-appearing or diaphoretic.   HENT:      Head: Normocephalic and atraumatic.   Eyes:      General: No scleral icterus.     Extraocular Movements: Extraocular  movements intact.      Conjunctiva/sclera: Conjunctivae normal.      Pupils: Pupils are equal, round, and reactive to light.   Neck:      Thyroid: No thyromegaly.      Vascular: No JVD.      Trachea: No tracheal deviation.   Cardiovascular:      Rate and Rhythm: Normal rate and regular rhythm.      Heart sounds: S1 normal and S2 normal. No murmur heard.     No friction rub. No gallop.   Pulmonary:      Effort: Pulmonary effort is normal. No respiratory distress.      Breath sounds: Normal breath sounds. No stridor. No wheezing, rhonchi or rales.   Chest:      Chest wall: No tenderness.   Abdominal:      General: There is no distension.      Palpations: Abdomen is soft.   Musculoskeletal:         General: No swelling or tenderness. Normal range of motion.      Cervical back: Normal range of motion and neck supple. No rigidity.      Right lower leg: No edema.      Left lower leg: No edema.   Skin:     General: Skin is warm and dry.      Coloration: Skin is not jaundiced.      Findings: Rash present.   Neurological:      General: No focal deficit present.      Mental Status: He is alert and oriented to person, place, and time.      Cranial Nerves: No cranial nerve deficit.   Psychiatric:         Mood and Affect: Mood normal.         Behavior: Behavior normal.          Current Medications:   aspirin  81 mg Oral Daily    clopidogreL  75 mg Oral Daily    enoxparin  40 mg Subcutaneous Daily    metoprolol succinate  25 mg Oral Daily    nicotine  1 patch Transdermal Daily       acetaminophen, aluminum-magnesium hydroxide-simethicone, dextrose 10%, dextrose 10%, dextrose 10%, dextrose 10%, glucagon (human recombinant), glucagon (human recombinant), glucose, glucose, glucose, glucose, insulin aspart U-100, magnesium oxide, magnesium oxide, naloxone, ondansetron, potassium bicarbonate, potassium bicarbonate, potassium, sodium phosphates, potassium, sodium phosphates, sodium chloride 0.9%    Laboratory (all labs  reviewed):  CBC:  Recent Labs   Lab 09/01/23  2321 09/15/23  0906 10/09/23  1449 10/10/23  0420 10/14/23  2135   WBC 8.22 11.73 8.72 9.29 9.81   Hemoglobin 10.7 L 11.2 L 9.8 L 9.9 L 9.7 L   Hematocrit 31.7 L 33.4 L 29.5 L 30.6 L 29.3 L   Platelets 235 197 332 362 342         CHEMISTRIES:  Recent Labs   Lab 12/30/21  1229 02/28/22  0740 03/12/22  1052 03/23/22  0858 06/23/22  1210 03/09/23  0745 10/09/23  1408 10/10/23  0019 10/10/23  0420 10/14/23  2135 10/15/23  0712 10/16/23  0609   Glucose  --  124 H 147 H 128 H 81   < > 110 116 H 135 H 130 H 115 H 125 H   Sodium  --  138 137 136 140   < > 139 140 141 139 142 140   Potassium  --  4.9 4.9 4.7 4.2   < > 5.5 H 5.3 H 5.2 H 5.0 5.0 4.8   BUN  --  32 H 32 H 52 H 35 H   < > 22 19 19 15 14 16   Creatinine 1.2 1.5 H 1.3 1.3 0.9   < > 1.8 H 1.7 H 1.7 H 1.3 1.5 H 1.5 H   eGFR if non  55.2 A 42.1 A 50.1 A 50 A >60  --   --   --   --   --   --   --    eGFR  --   --   --   --   --    < > 36 A 39 A 39 A 54 A 45 A 45 A   Calcium  --  10.0 9.3 10.1 7.3 L   < > 8.1 L 8.0 L 7.9 L 8.6 L 9.0 8.7   Magnesium  --   --   --   --   --    < > 2.0  --  1.9 1.9 1.9 2.1    < > = values in this interval not displayed.         CARDIAC BIOMARKERS:  Recent Labs   Lab 04/03/21  0156 07/11/23  1015 09/15/23  0906 10/09/23  1408 10/14/23  2135 10/15/23  0712   CPK  --  59  --   --  250 H 254 H   Troponin I 0.013  --  <0.006 0.017 0.107 H 0.085 H         COAGS:  Recent Labs   Lab 04/02/21  2247 05/20/21  1455 06/08/21  2102 10/09/23  1449 10/10/23  0420   INR 1.1 0.9 1.0 1.0 1.0         LIPIDS/LFTS:  Recent Labs   Lab 02/28/22  0740 03/23/22  0858 03/09/23  0745 07/11/23  1015 10/09/23  1408 10/10/23  0420 10/14/23  2135 10/15/23  0712 10/16/23  0609   Cholesterol 123  --  127  --   --   --   --   --   --    Triglycerides 96  --  85  --   --   --   --   --   --    HDL 49  --  50  --   --   --   --   --   --    LDL Cholesterol 54.8 L  --  60.0 L  --   --   --   --   --   --    Non-HDL  Cholesterol 74  --  77  --   --   --   --   --   --    AST 25   < > 19   < > 29 27 33 29 31   ALT 22   < > 15   < > 27 26 28 29 29    < > = values in this interval not displayed.         BNP:  Recent Labs   Lab 04/03/21  0156 09/15/23  0906 10/09/23  1449 10/14/23  2135   BNP 47 74 151 H 223 H         TSH:  Recent Labs   Lab 03/09/23  0745 07/11/23  1015 10/09/23  1449 10/15/23  0711   TSH 5.502 H 5.113 H 4.178 H 3.946         Free T4:  Recent Labs   Lab 03/09/23  0745 07/11/23  1015 10/09/23  1449   Free T4 0.83 0.78 0.89         Diagnostic Results:  ECG (personally reviewed and interpreted tracing(s)):  10/14/23 2119 SR 98    Chest X-Ray (personally reviewed and interpreted image(s)): 10/14/23 NAD, prior sternotomy    Echo 10/15/23 (images personally reviewed and interpreted)    TDS.    Left Ventricle: The left ventricle is normal in size. Normal wall thickness. Mild global hypokinesis present. There is mildly reduced systolic function with a visually estimated ejection fraction of 45 - 50%. Grade I diastolic dysfunction.    Right Ventricle: Normal right ventricular cavity size. Wall thickness is normal. Right ventricle wall motion  is normal. Systolic function is normal.    Aortic US 5/26/23  Juxta/infrarenal aortic ectasia without evidence of AAA, maximum diameter 2.5 cm.    Aortic atherosclerosis.    Increased flow velocity across bilateral common iliac artery suggesting >50% aortoiliac stenosis.     LE art US/LACIE 5/26/23  Previously noted bilat YUE stents not visualized on the current exam.  Otherwise, No evidence of hemodynamically significant infrainguinal PAD bilaterally.  Predominantly biphasic waveforms throughout.  Mildly decreased LACIE bilaterally (0.8).  Similar findings noted on report 2/24/21.     AFRO 7/29/20  Ao: 140/57/86  R CFA: 144/59/88  L CFA: 140/59/87  No evidence of gradient on CFA->Ao pullback across YUE stents bilaterally  Aorta: no aneurysm or stenosis  L Renal: patent  R Renal:  patent  Right Leg:  YUE: patent stent without angiographic evidence of restenosis, pullback negative   EIA: patent  IIA: patent  CFA: MLI  PFA: patent  SFA: MLI, dist 30%  Pop: patent  KIRTI: MLI  TPT: MLI  PTA:MLI, mid vessel tapers, ?occluded at mid calf  Per: MLI  2-3 vessel runoff to Right foot  Left Leg:  YUE: patent stent without angiographic evidence of restenosis, pullback negative   EIA: patent   IIA: patent  CFA: patent  PFA: patent  SFA: MLI, dist 50%  Pop: patent  KIRTI: MLI  TPT: MLI  PTA: MLI, mid occlusion  Per: MLI  2 vessel runoff to Left foot  Hemostasis:  R rad vasband  Imp:  Limiting claudication with suggesting of ISR of aortoiliac stents  Patent YUE stents bilaterally (angiographically) without evidence of gradient on pullback  Mild dist SFA stenosis bilaterally  2-3V runoff to feet bilaterally  R rad vasband for hemostasis  Plan:  Cont med rx  Cont ASA/Plavix  Add Pletal  Smoking cessation  Exercise program  Home today  Follow up with Dr. Vargas as planned  Consider referral to ortho/spine or pain mgmt for eval of neurogenic claudication     Ex MPI 12/20/17 (low workload, drop in BP (144->105 systolic) noted during ETT)  The patient exercised for 3.02 minutes on a Sixto protocol, corresponding to a functional capacity of 5 estimated METS, achieving a peak heart rate of 126 bpm, which is 90% of the age predicted maximum heart rate. -CP. At peak exercise, EKG revealed < 1mm of horizontal ST segment depression at a maximum heart rate of 126 bpm.   Nuclear Quantitative Functional Analysis:   LVEF: 61 %  LVED Volume: 59 ml  LVES Volume: 23 ml  Impression: NORMAL MYOCARDIAL PERFUSION  1. The perfusion scan is free of evidence for myocardial ischemia or injury.   2. Resting wall motion is physiologic.   3. Resting LV function is normal.   4. The ventricular volumes are normal at rest and stress.   5. The extracardiac distribution of radioactivity is normal.      [facility]:  1/10/1999: CABG  with LIMA-LAD, SVG-LCx, SVG-RCA  5/1999: Angio revealed Patent LIMA-LAD, SVGx2 occluded  6/2003: PTA with bilat YUE stents and R EIA stent

## 2023-10-16 NOTE — NURSING
[facility] [place]  Nurses Note -- 4 Eyes      10/16/2023       Skin assessed on: Q Shift      [x] No Pressure Injuries Present    [x]Prevention Measures Documented    [] Yes LDA  for Pressure Injury Previously documented     [] Yes New Pressure Injury Discovered   [] LDA for New Pressure Injury Added  Pt has blanchable redness all over due to his skin condition.      Attending RN:  Luci Cummings RN     Second RN:  ANUPAM Borjas

## 2023-10-16 NOTE — SUBJECTIVE & OBJECTIVE
Interval History: more cooperative, but still confused per daughter.    Review of Systems   HENT:  Negative for ear discharge and ear pain.    Eyes:  Negative for discharge and itching.   Endocrine: Negative for cold intolerance and heat intolerance.   Neurological:  Negative for seizures and syncope.     Objective:     Vital Signs (Most Recent):  Temp: 98.2 °F (36.8 °C) (10/16/23 1123)  Pulse: 85 (10/16/23 1123)  Resp: 18 (10/16/23 1123)  BP: (!) 110/56 (10/16/23 1123)  SpO2: 96 % (10/16/23 1123) Vital Signs (24h Range):  Temp:  [97.3 °F (36.3 °C)-98.5 °F (36.9 °C)] 98.2 °F (36.8 °C)  Pulse:  [] 85  Resp:  [18] 18  SpO2:  [93 %-96 %] 96 %  BP: (110-152)/(56-89) 110/56     Weight: 59 kg (130 lb)  Body mass index is 20.36 kg/m².    Intake/Output Summary (Last 24 hours) at 10/16/2023 1624  Last data filed at 10/16/2023 1201  Gross per 24 hour   Intake 720 ml   Output 400 ml   Net 320 ml         Physical Exam  Constitutional:       Appearance: He is ill-appearing. He is not diaphoretic.   HENT:      Head: Normocephalic and atraumatic.      Mouth/Throat:      Mouth: Mucous membranes are dry.      Pharynx: No oropharyngeal exudate or posterior oropharyngeal erythema.   Cardiovascular:      Rate and Rhythm: Normal rate and regular rhythm.   Pulmonary:      Effort: No respiratory distress.      Breath sounds: Normal breath sounds.   Abdominal:      General: Bowel sounds are normal.      Palpations: Abdomen is soft.   Musculoskeletal:         General: No deformity or signs of injury.   Skin:     Comments: Diffuse erythematous and pruritic rash to trunk and extremities   Neurological:      Mental Status: He is disoriented.      Comments: Awake and alert.  Answer simple questions.  Restless   Psychiatric:         Speech: Speech is delayed.         Behavior: Behavior is cooperative.             Significant Labs: All pertinent labs within the past 24 hours have been reviewed.  BMP:   Recent Labs   Lab 10/16/23  0609   GLU  125*      K 4.8      CO2 17*   BUN 16   CREATININE 1.5*   CALCIUM 8.7   MG 2.1     CBC:   Recent Labs   Lab 10/14/23  2135   WBC 9.81   HGB 9.7*   HCT 29.3*          Significant Imaging: I have reviewed all pertinent imaging results/findings within the past 24 hours.

## 2023-10-16 NOTE — PROGRESS NOTES
[facility]  [facility]  Progress Note    Patient Name: Narciso Yanez  MRN: [id number]  Admission Date: 10/14/2023  Hospital Length of Stay: 1 days  Code Status: Full Code   Attending Physician: Mukund Hester MD   Primary Care Physician: Marcelino Del Toro MD  Expected Discharge Date:   Principal Problem:Acute delirium    Subjective:     Interval Hx: no cp/sob, more alert today.    Tele:  (personally reviewed and interpreted)        Past Medical History:   Diagnosis Date    Actinic keratosis     Anxiety     B12 deficiency 12/8/2014    Dx 6/14    Cancer     skin    Cataract     Coronary artery disease     Diabetes mellitus type II     Diabetes mellitus with peripheral circulatory disorder 6/18/2014    ED (erectile dysfunction)     Hyperlipidemia     Kidney stone     Neurogenic claudication 4/4/2022    Normocytic anemia 10/15/2023    Peripheral vascular disease     Spondylosis 3/29/2019    Tobacco dependence     Urinary incontinence 5/4/2021       Past Surgical History:   Procedure Laterality Date    APPENDECTOMY      CARDIAC SURGERY  01/1999    CABG 4 vessels    CATARACT EXTRACTION      EPIDURAL STEROID INJECTION Right 8/26/2020    Procedure: Injection, Steroid, Epidural Transforaminal;  Surgeon: Wiley Crane Jr., MD;  Location: [facility];  Service: Pain Management;  Laterality: Right;  Right L5 + S1 TF LEAH  Arrive @ 1115; ASA & Plavix last 8/18; Check BG; Rapid COVID test    EPIDURAL STEROID INJECTION Right 11/25/2020    Procedure: Injection, Steroid, Epidural Transformainal;  Surgeon: Wiley Crane Jr., MD;  Location: [facility];  Service: Pain Management;  Laterality: Right;  Right L5 + S1 TF LEAH  Arrive @ 1245; ASA and Plavix last 11/17; Pre-DM; Needs MD Sign.    EPIDURAL STEROID INJECTION Right 2/19/2021    Procedure: Injection, Steroid, Epidural Transforaminal;  Surgeon: Wiley Crane Jr., MD;  Location: [facility] END[facility];  Service: Pain Management;   Laterality: Right;  Right L5 + S1 TF LEAH  Arrive @ 1115; ASA & Plavix last 2/11; Check BG; Needs Consent    EXTERNAL EAR SURGERY      EYE SURGERY      cataracts    ILIAC ARTERY STENT Bilateral 06/2003    also Right External Iliac stent       Review of patient's allergies indicates:   Allergen Reactions    Demerol [meperidine] Nausea Only       No current facility-administered medications on file prior to encounter.     Current Outpatient Medications on File Prior to Encounter   Medication Sig    ACCU-CHEK JOAQUIN PLUS METER Misc Three times a day with meals    acetaminophen (TYLENOL) 500 MG tablet Take 1 tablet (500 mg total) by mouth every 6 (six) hours as needed for Pain.    alendronate (FOSAMAX) 70 MG tablet TAKE 1 TABLET EVERY 7 DAYS ON TUESDAYS (Patient not taking: Reported on 9/26/2023)    amoxicillin-clavulanate 500-125mg (AUGMENTIN) 500-125 mg Tab Take 1 tablet (500 mg total) by mouth 2 (two) times daily. for 6 days    ascorbic acid, vitamin C, (VITAMIN C) 250 MG tablet Take 1 tablet (250 mg total) by mouth once daily. (Patient not taking: Reported on 9/26/2023)    aspirin (ECOTRIN) 81 MG EC tablet Take 1 tablet (81 mg total) by mouth once daily.    blood sugar diagnostic (TRUE METRIX GLUCOSE TEST STRIP) Strp TID    BOOSTRIX TDAP 2.5-8-5 Lf-mcg-Lf/0.5mL Syrg injection     clopidogreL (PLAVIX) 75 mg tablet TAKE 1 TABLET EVERY DAY (Patient not taking: Reported on 9/26/2023)    DUPIXENT  mg/2 mL PnIj     fluconazole (DIFLUCAN) 150 MG Tab Take by mouth.    hydrocortisone 1 % cream Apply topically 2 (two) times daily. for 5 days    ketoconazole (NIZORAL) 2 % cream Apply topically 2 (two) times daily.    magnesium sulfate in water (MAGNESIUM SULFATE 2 GRAM/50 ML) 2 gram/50 mL PgBk     metoprolol succinate (TOPROL-XL) 25 MG 24 hr tablet     mometasone 0.1% (ELOCON) 0.1 % cream Apply topically 2 (two) times daily.    olopatadine (PATANOL) 0.1 % ophthalmic solution Place 1 drop into both  eyes 2 (two) times daily. (Patient not taking: Reported on 9/26/2023)    OMNIPAQUE 300 300 mg iodine/mL injection     ondansetron (ZOFRAN-ODT) 4 MG TbDL DISSOLVE 2 TABLETS (8 MG TOTAL) ON THE TONGUE EVERY 8 (EIGHT) HOURS AS NEEDED (NAUSEA).    predniSONE (DELTASONE) 10 MG tablet Take 4 tabs x 3 days, then take 2 tabs x 3 days, then take 1 tab x 3 days. (Patient not taking: Reported on 9/26/2023)    prochlorperazine (COMPAZINE) 5 MG tablet     rosuvastatin (CRESTOR) 20 MG tablet TAKE 1 TABLET (20 MG TOTAL) BY MOUTH EVERY EVENING. (Patient not taking: Reported on 9/26/2023)    senna (SENOKOT) 8.6 mg tablet Take 1 tablet by mouth once daily. (Patient not taking: Reported on 9/26/2023)    TRUE METRIX GLUCOSE TEST STRIP Strp TEST BLOOD SUGAR THREE TIMES DAILY. (Patient not taking: Reported on 9/26/2023)     Family History       Problem Relation (Age of Onset)    Stroke Mother          Tobacco Use    Smoking status: Every Day     Current packs/day: 1.50     Average packs/day: 1.5 packs/day for 71.0 years (106.5 ttl pk-yrs)     Types: Cigarettes    Smokeless tobacco: Former   Substance and Sexual Activity    Alcohol use: No    Drug use: No    Sexual activity: Not Currently     Partners: Female     Review of Systems   Gastrointestinal:  Negative for melena.   Genitourinary:  Negative for hematuria.     Objective:     Vital Signs (Most Recent):  Temp: 98.4 °F (36.9 °C) (10/16/23 0740)  Pulse: 101 (10/16/23 0740)  Resp: 18 (10/16/23 0740)  BP: 122/76 (10/16/23 0740)  SpO2: (!) 93 % (10/16/23 0740) Vital Signs (24h Range):  Temp:  [97.1 °F (36.2 °C)-98.5 °F (36.9 °C)] 98.4 °F (36.9 °C)  Pulse:  [] 101  Resp:  [18] 18  SpO2:  [93 %-98 %] 93 %  BP: (122-166)/(61-89) 122/76     Weight: 59 kg (130 lb)  Body mass index is 20.36 kg/m².    SpO2: (!) 93 %         Intake/Output Summary (Last 24 hours) at 10/16/2023 0903  Last data filed at 10/16/2023 0218  Gross per 24 hour   Intake 600 ml   Output 300 ml   Net 300 ml          Lines/Drains/Airways       Peripheral Intravenous Line  Duration                  Peripheral IV - Single Lumen 10/15/23 0055 20 G Left Antecubital 1 day                   Exam unchanged vs 10/15/23  Physical Exam  Constitutional:       General: He is not in acute distress.     Appearance: He is well-developed. He is not ill-appearing, toxic-appearing or diaphoretic.   HENT:      Head: Normocephalic and atraumatic.   Eyes:      General: No scleral icterus.     Extraocular Movements: Extraocular movements intact.      Conjunctiva/sclera: Conjunctivae normal.      Pupils: Pupils are equal, round, and reactive to light.   Neck:      Thyroid: No thyromegaly.      Vascular: No JVD.      Trachea: No tracheal deviation.   Cardiovascular:      Rate and Rhythm: Normal rate and regular rhythm.      Heart sounds: S1 normal and S2 normal. No murmur heard.     No friction rub. No gallop.   Pulmonary:      Effort: Pulmonary effort is normal. No respiratory distress.      Breath sounds: Normal breath sounds. No stridor. No wheezing, rhonchi or rales.   Chest:      Chest wall: No tenderness.   Abdominal:      General: There is no distension.      Palpations: Abdomen is soft.   Musculoskeletal:         General: No swelling or tenderness. Normal range of motion.      Cervical back: Normal range of motion and neck supple. No rigidity.      Right lower leg: No edema.      Left lower leg: No edema.   Skin:     General: Skin is warm and dry.      Coloration: Skin is not jaundiced.      Findings: Rash present.   Neurological:      General: No focal deficit present.      Mental Status: He is alert and oriented to person, place, and time.      Cranial Nerves: No cranial nerve deficit.   Psychiatric:         Mood and Affect: Mood normal.         Behavior: Behavior normal.          Current Medications:   aspirin  81 mg Oral Daily    clopidogreL  75 mg Oral Daily    enoxparin  40 mg Subcutaneous Daily    metoprolol succinate  25 mg  Oral Daily    nicotine  1 patch Transdermal Daily       acetaminophen, aluminum-magnesium hydroxide-simethicone, dextrose 10%, dextrose 10%, dextrose 10%, dextrose 10%, glucagon (human recombinant), glucagon (human recombinant), glucose, glucose, glucose, glucose, insulin aspart U-100, magnesium oxide, magnesium oxide, naloxone, ondansetron, potassium bicarbonate, potassium bicarbonate, potassium, sodium phosphates, potassium, sodium phosphates, sodium chloride 0.9%    Laboratory (all labs reviewed):  CBC:  Recent Labs   Lab 09/01/23  2321 09/15/23  0906 10/09/23  1449 10/10/23  0420 10/14/23  2135   WBC 8.22 11.73 8.72 9.29 9.81   Hemoglobin 10.7 L 11.2 L 9.8 L 9.9 L 9.7 L   Hematocrit 31.7 L 33.4 L 29.5 L 30.6 L 29.3 L   Platelets 235 197 332 362 342         CHEMISTRIES:  Recent Labs   Lab 12/30/21  1229 02/28/22  0740 03/12/22  1052 03/23/22  0858 06/23/22  1210 03/09/23  0745 10/09/23  1408 10/10/23  0019 10/10/23  0420 10/14/23  2135 10/15/23  0712 10/16/23  0609   Glucose  --  124 H 147 H 128 H 81   < > 110 116 H 135 H 130 H 115 H 125 H   Sodium  --  138 137 136 140   < > 139 140 141 139 142 140   Potassium  --  4.9 4.9 4.7 4.2   < > 5.5 H 5.3 H 5.2 H 5.0 5.0 4.8   BUN  --  32 H 32 H 52 H 35 H   < > 22 19 19 15 14 16   Creatinine 1.2 1.5 H 1.3 1.3 0.9   < > 1.8 H 1.7 H 1.7 H 1.3 1.5 H 1.5 H   eGFR if non  55.2 A 42.1 A 50.1 A 50 A >60  --   --   --   --   --   --   --    eGFR  --   --   --   --   --    < > 36 A 39 A 39 A 54 A 45 A 45 A   Calcium  --  10.0 9.3 10.1 7.3 L   < > 8.1 L 8.0 L 7.9 L 8.6 L 9.0 8.7   Magnesium  --   --   --   --   --    < > 2.0  --  1.9 1.9 1.9 2.1    < > = values in this interval not displayed.         CARDIAC BIOMARKERS:  Recent Labs   Lab 04/03/21  0156 07/11/23  1015 09/15/23  0906 10/09/23  1408 10/14/23  2135 10/15/23  0712   CPK  --  59  --   --  250 H 254 H   Troponin I 0.013  --  <0.006 0.017 0.107 H 0.085 H         COAGS:  Recent Labs   Lab 04/02/21  9520  05/20/21  1455 06/08/21  2102 10/09/23  1449 10/10/23  0420   INR 1.1 0.9 1.0 1.0 1.0         LIPIDS/LFTS:  Recent Labs   Lab 02/28/22  0740 03/23/22  0858 03/09/23  0745 07/11/23  1015 10/09/23  1408 10/10/23  0420 10/14/23  2135 10/15/23  0712 10/16/23  0609   Cholesterol 123  --  127  --   --   --   --   --   --    Triglycerides 96  --  85  --   --   --   --   --   --    HDL 49  --  50  --   --   --   --   --   --    LDL Cholesterol 54.8 L  --  60.0 L  --   --   --   --   --   --    Non-HDL Cholesterol 74  --  77  --   --   --   --   --   --    AST 25   < > 19   < > 29 27 33 29 31   ALT 22   < > 15   < > 27 26 28 29 29    < > = values in this interval not displayed.         BNP:  Recent Labs   Lab 04/03/21  0156 09/15/23  0906 10/09/23  1449 10/14/23  2135   BNP 47 74 151 H 223 H         TSH:  Recent Labs   Lab 03/09/23  0745 07/11/23  1015 10/09/23  1449 10/15/23  0711   TSH 5.502 H 5.113 H 4.178 H 3.946         Free T4:  Recent Labs   Lab 03/09/23  0745 07/11/23  1015 10/09/23  1449   Free T4 0.83 0.78 0.89         Diagnostic Results:  ECG (personally reviewed and interpreted tracing(s)):  10/14/23 2119 SR 98    Chest X-Ray (personally reviewed and interpreted image(s)): 10/14/23 NAD, prior sternotomy    Echo 10/15/23 (images personally reviewed and interpreted)    TDS.    Left Ventricle: The left ventricle is normal in size. Normal wall thickness. Mild global hypokinesis present. There is mildly reduced systolic function with a visually estimated ejection fraction of 45 - 50%. Grade I diastolic dysfunction.    Right Ventricle: Normal right ventricular cavity size. Wall thickness is normal. Right ventricle wall motion  is normal. Systolic function is normal.    Aortic US 5/26/23  Juxta/infrarenal aortic ectasia without evidence of AAA, maximum diameter 2.5 cm.    Aortic atherosclerosis.    Increased flow velocity across bilateral common iliac artery suggesting >50% aortoiliac stenosis.     LE art US/LACIE  5/26/23  Previously noted bilat YUE stents not visualized on the current exam.  Otherwise, No evidence of hemodynamically significant infrainguinal PAD bilaterally.  Predominantly biphasic waveforms throughout.  Mildly decreased LACIE bilaterally (0.8).  Similar findings noted on report 2/24/21.     AFRO 7/29/20  Ao: 140/57/86  R CFA: 144/59/88  L CFA: 140/59/87  No evidence of gradient on CFA->Ao pullback across YUE stents bilaterally  Aorta: no aneurysm or stenosis  L Renal: patent  R Renal: patent  Right Leg:  YUE: patent stent without angiographic evidence of restenosis, pullback negative   EIA: patent  IIA: patent  CFA: MLI  PFA: patent  SFA: MLI, dist 30%  Pop: patent  KIRTI: MLI  TPT: MLI  PTA:MLI, mid vessel tapers, ?occluded at mid calf  Per: MLI  2-3 vessel runoff to Right foot  Left Leg:  YUE: patent stent without angiographic evidence of restenosis, pullback negative   EIA: patent   IIA: patent  CFA: patent  PFA: patent  SFA: MLI, dist 50%  Pop: patent  KIRTI: MLI  TPT: MLI  PTA: MLI, mid occlusion  Per: MLI  2 vessel runoff to Left foot  Hemostasis:  R rad vasband  Imp:  Limiting claudication with suggesting of ISR of aortoiliac stents  Patent YUE stents bilaterally (angiographically) without evidence of gradient on pullback  Mild dist SFA stenosis bilaterally  2-3V runoff to feet bilaterally  R rad vasband for hemostasis  Plan:  Cont med rx  Cont ASA/Plavix  Add Pletal  Smoking cessation  Exercise program  Home today  Follow up with Dr. Vargas as planned  Consider referral to ortho/spine or pain mgmt for eval of neurogenic claudication     Ex MPI 12/20/17 (low workload, drop in BP (144->105 systolic) noted during ETT)  The patient exercised for 3.02 minutes on a Sixto protocol, corresponding to a functional capacity of 5 estimated METS, achieving a peak heart rate of 126 bpm, which is 90% of the age predicted maximum heart rate. -CP. At peak exercise, EKG revealed < 1mm of horizontal ST segment depression  at a maximum heart rate of 126 bpm.   Nuclear Quantitative Functional Analysis:   LVEF: 61 %  LVED Volume: 59 ml  LVES Volume: 23 ml  Impression: NORMAL MYOCARDIAL PERFUSION  1. The perfusion scan is free of evidence for myocardial ischemia or injury.   2. Resting wall motion is physiologic.   3. Resting LV function is normal.   4. The ventricular volumes are normal at rest and stress.   5. The extracardiac distribution of radioactivity is normal.      [facility] Records reviewed:  1/10/1999: CABG with LIMA-LAD, SVG-LCx, SVG-RCA  5/1999: Angio revealed Patent LIMA-LAD, SVGx2 occluded  6/2003: PTA with bilat YUE stents and R EIA stent           Assessment and Plan:     * Acute delirium  Mgmt per IM    Elevated troponin  No anginal sxs or CHF, doubt ACS  Consider outpat MPI when he recovers from acute delirium    Coronary artery disease involving coronary bypass graft of native heart without angina pectoris  As above  Cont med rx  Resume statin    Diabetes mellitus with peripheral circulatory disorder  Per IM    PAD (peripheral artery disease)  Cont med rx    Stage 3a chronic kidney disease  Monitor creat    Essential hypertension  Cont med rx    Hyperlipidemia  Resume statin        VTE Risk Mitigation (From admission, onward)         Ordered     enoxaparin injection 40 mg  Daily         10/15/23 0139     IP VTE HIGH RISK PATIENT  Once         10/15/23 0139     Place sequential compression device  Until discontinued         10/15/23 0139              Cardiology will sign off, pls call with questions.    Wiley Vargas MD  Cardiology  [facility] [place] - Telemetry

## 2023-10-16 NOTE — HPI
Mr. Yanez 88-year-old male with previous history of CAD, CABG, COPD, PAD s/p PTA bilat YUE and R EIA 6/2003, HTN, HLD, DM, PUD, polymyalgia rheumatica, NPH status post  shunt placement in 2021 with admission concerns of encephalopathy. And neurology has been consulted for the same.    History from family / medical records review. Reported symptoms of disorientation, confusion, poor attention - concentration, with intermittent hallucinations over the last 1 week. Fluctuating pattern - with episodes of normal baseline. Positive h/o of UTI and recent discharge in the  last 2 days. As per HPI -  He had an abnormal UA which resulted Proteus and Enterococcus and was subsequently discharged with Augmentin.  He does have diffusely pruritic rash involving multiple areas and has been evaluated by dermatologist as outpatient without confirmatory diagnosis. Per family - despite discharge-Abx / symptoms of fluctuating encephalopathy persisted, hence presented again for admission. Otherwise no clinical events of seizures. No history of strokes / epilepsy / complex migraines and primary neuropsychiatric or neurodegenerative disorder.     In the ER, his vitals are within normal limits, his CBC shows normal white count 9.8, hemoglobin 9.7 (normocytic), with previous baseline of 11-12 since 2022, platelets 342 K.  His BNP shows mild metabolic acidosis CO2 17, creatinine 1.3 which is better than his previous creatinine of 1.8 on 10/09/2023, mild hyperglycemia at 130.  His BNP is 223, and seems that his previous BNP on 10/09/2023 was 151 and prior to that in on 09/15/2023 was within normal limits.  His troponin is 0.107, .  His CT abdomen pelvis showed nonacute findings of cholelithiasis, vascular calcifications, diverticulosis, prostatomegaly, nonspecific infiltration of the left lateral subcutaneous fat tissue and small hiatal hernia.  His CT head is within normal limits.  His chest x-ray reveals chronic interstitial lung  markings, right-sided ventricular shunt present.  In the ER, appears to have received 500 cc of normal saline as well as 40 mg of IV Lasix.  Admission has been requested for further evaluation and treatment.

## 2023-10-16 NOTE — ASSESSMENT & PLAN NOTE
-patient reports having rash onset since father's day without previous history.    -reports that he has been to 3 dermatologists and Infectious Disease  -unclear about current regimen but reports that he has tried 10 different creams, and multiple oral medications.  -reports he has had skin biopsy about 6 weeks ago.  Not conclusive results.  Discussed with patient's Dermatologist.  Will try IV steroids.

## 2023-10-16 NOTE — NURSING
Pt Alert to self, respirations even and unlabored, no acute distress, no complaints of pain, pt has been attempting to get out of bed. Tele sitter at the bedside, Safety precautions in place, call light in reach, will continue to monitor.

## 2023-10-16 NOTE — ASSESSMENT & PLAN NOTE
-similar to previous presentation when symptoms were attributable to acute UTI.    -symptoms continue despite treatment.    -CT head without acute findings.    -neurology consulted  Delirium precautions.  Start depacon at night.  Prn Zyprexa.

## 2023-10-17 LAB
ALBUMIN SERPL BCP-MCNC: 2.7 G/DL (ref 3.5–5.2)
ALP SERPL-CCNC: 92 U/L (ref 55–135)
ALT SERPL W/O P-5'-P-CCNC: 27 U/L (ref 10–44)
ANION GAP SERPL CALC-SCNC: 12 MMOL/L (ref 8–16)
AST SERPL-CCNC: 29 U/L (ref 10–40)
BASOPHILS # BLD AUTO: 0.01 K/UL (ref 0–0.2)
BASOPHILS NFR BLD: 0.2 % (ref 0–1.9)
BILIRUB SERPL-MCNC: 0.2 MG/DL (ref 0.1–1)
BUN SERPL-MCNC: 22 MG/DL (ref 8–23)
CALCIUM SERPL-MCNC: 8.8 MG/DL (ref 8.7–10.5)
CHLORIDE SERPL-SCNC: 104 MMOL/L (ref 95–110)
CO2 SERPL-SCNC: 20 MMOL/L (ref 23–29)
CREAT SERPL-MCNC: 1.5 MG/DL (ref 0.5–1.4)
DIFFERENTIAL METHOD: ABNORMAL
EOSINOPHIL # BLD AUTO: 0 K/UL (ref 0–0.5)
EOSINOPHIL NFR BLD: 0 % (ref 0–8)
ERYTHROCYTE [DISTWIDTH] IN BLOOD BY AUTOMATED COUNT: 15.9 % (ref 11.5–14.5)
EST. GFR  (NO RACE VARIABLE): 45 ML/MIN/1.73 M^2
GLUCOSE SERPL-MCNC: 168 MG/DL (ref 70–110)
HCT VFR BLD AUTO: 28.2 % (ref 40–54)
HGB BLD-MCNC: 9.1 G/DL (ref 14–18)
IMM GRANULOCYTES # BLD AUTO: 0.03 K/UL (ref 0–0.04)
IMM GRANULOCYTES NFR BLD AUTO: 0.5 % (ref 0–0.5)
LYMPHOCYTES # BLD AUTO: 1.3 K/UL (ref 1–4.8)
LYMPHOCYTES NFR BLD: 20 % (ref 18–48)
MAGNESIUM SERPL-MCNC: 2.3 MG/DL (ref 1.6–2.6)
MCH RBC QN AUTO: 31.3 PG (ref 27–31)
MCHC RBC AUTO-ENTMCNC: 32.3 G/DL (ref 32–36)
MCV RBC AUTO: 97 FL (ref 82–98)
MONOCYTES # BLD AUTO: 0.1 K/UL (ref 0.3–1)
MONOCYTES NFR BLD: 1.4 % (ref 4–15)
NEUTROPHILS # BLD AUTO: 5.1 K/UL (ref 1.8–7.7)
NEUTROPHILS NFR BLD: 77.9 % (ref 38–73)
NRBC BLD-RTO: 0 /100 WBC
PHOSPHATE SERPL-MCNC: 4.7 MG/DL (ref 2.7–4.5)
PLATELET # BLD AUTO: 328 K/UL (ref 150–450)
PMV BLD AUTO: 9.7 FL (ref 9.2–12.9)
POCT GLUCOSE: 180 MG/DL (ref 70–110)
POCT GLUCOSE: 227 MG/DL (ref 70–110)
POCT GLUCOSE: 300 MG/DL (ref 70–110)
POCT GLUCOSE: 311 MG/DL (ref 70–110)
POTASSIUM SERPL-SCNC: 5.5 MMOL/L (ref 3.5–5.1)
PROT SERPL-MCNC: 7.2 G/DL (ref 6–8.4)
RBC # BLD AUTO: 2.91 M/UL (ref 4.6–6.2)
SODIUM SERPL-SCNC: 136 MMOL/L (ref 136–145)
WBC # BLD AUTO: 6.55 K/UL (ref 3.9–12.7)

## 2023-10-17 PROCEDURE — 25000003 PHARM REV CODE 250: Mod: HCNC | Performed by: HOSPITALIST

## 2023-10-17 PROCEDURE — 21400001 HC TELEMETRY ROOM: Mod: HCNC

## 2023-10-17 PROCEDURE — 84100 ASSAY OF PHOSPHORUS: CPT | Mod: HCNC | Performed by: INTERNAL MEDICINE

## 2023-10-17 PROCEDURE — 97161 PT EVAL LOW COMPLEX 20 MIN: CPT | Mod: HCNC

## 2023-10-17 PROCEDURE — 80053 COMPREHEN METABOLIC PANEL: CPT | Mod: HCNC | Performed by: INTERNAL MEDICINE

## 2023-10-17 PROCEDURE — 63600175 PHARM REV CODE 636 W HCPCS: Mod: HCNC | Performed by: HOSPITALIST

## 2023-10-17 PROCEDURE — 36415 COLL VENOUS BLD VENIPUNCTURE: CPT | Mod: HCNC | Performed by: INTERNAL MEDICINE

## 2023-10-17 PROCEDURE — 97165 OT EVAL LOW COMPLEX 30 MIN: CPT | Mod: HCNC

## 2023-10-17 PROCEDURE — S4991 NICOTINE PATCH NONLEGEND: HCPCS | Mod: HCNC | Performed by: HOSPITALIST

## 2023-10-17 PROCEDURE — 97110 THERAPEUTIC EXERCISES: CPT | Mod: HCNC

## 2023-10-17 PROCEDURE — 97530 THERAPEUTIC ACTIVITIES: CPT | Mod: HCNC

## 2023-10-17 PROCEDURE — 25000003 PHARM REV CODE 250: Mod: HCNC | Performed by: INTERNAL MEDICINE

## 2023-10-17 PROCEDURE — 83735 ASSAY OF MAGNESIUM: CPT | Mod: HCNC | Performed by: INTERNAL MEDICINE

## 2023-10-17 PROCEDURE — 85025 COMPLETE CBC W/AUTO DIFF WBC: CPT | Mod: HCNC | Performed by: HOSPITALIST

## 2023-10-17 PROCEDURE — 63600175 PHARM REV CODE 636 W HCPCS: Mod: HCNC | Performed by: INTERNAL MEDICINE

## 2023-10-17 RX ORDER — DIVALPROEX SODIUM 125 MG/1
500 CAPSULE, COATED PELLETS ORAL NIGHTLY
Status: DISCONTINUED | OUTPATIENT
Start: 2023-10-17 | End: 2023-10-23 | Stop reason: HOSPADM

## 2023-10-17 RX ORDER — DIVALPROEX SODIUM 125 MG/1
250 CAPSULE, COATED PELLETS ORAL DAILY
Status: DISCONTINUED | OUTPATIENT
Start: 2023-10-17 | End: 2023-10-23 | Stop reason: HOSPADM

## 2023-10-17 RX ADMIN — ENOXAPARIN SODIUM 40 MG: 40 INJECTION SUBCUTANEOUS at 04:10

## 2023-10-17 RX ADMIN — DIVALPROEX SODIUM 250 MG: 125 CAPSULE, COATED PELLETS ORAL at 02:10

## 2023-10-17 RX ADMIN — INSULIN ASPART 4 UNITS: 100 INJECTION, SOLUTION INTRAVENOUS; SUBCUTANEOUS at 02:10

## 2023-10-17 RX ADMIN — METHYLPREDNISOLONE SODIUM SUCCINATE 125 MG: 125 INJECTION, POWDER, FOR SOLUTION INTRAMUSCULAR; INTRAVENOUS at 01:10

## 2023-10-17 RX ADMIN — METHYLPREDNISOLONE SODIUM SUCCINATE 125 MG: 125 INJECTION, POWDER, FOR SOLUTION INTRAMUSCULAR; INTRAVENOUS at 05:10

## 2023-10-17 RX ADMIN — Medication 1 PATCH: at 10:10

## 2023-10-17 RX ADMIN — MELATONIN TAB 3 MG 6 MG: 3 TAB at 10:10

## 2023-10-17 RX ADMIN — INSULIN ASPART 1 UNITS: 100 INJECTION, SOLUTION INTRAVENOUS; SUBCUTANEOUS at 10:10

## 2023-10-17 RX ADMIN — ASPIRIN 81 MG: 81 TABLET, COATED ORAL at 10:10

## 2023-10-17 RX ADMIN — METHYLPREDNISOLONE SODIUM SUCCINATE 125 MG: 125 INJECTION, POWDER, FOR SOLUTION INTRAMUSCULAR; INTRAVENOUS at 10:10

## 2023-10-17 RX ADMIN — ATORVASTATIN CALCIUM 40 MG: 40 TABLET, FILM COATED ORAL at 10:10

## 2023-10-17 RX ADMIN — DIVALPROEX SODIUM 500 MG: 125 CAPSULE ORAL at 10:10

## 2023-10-17 RX ADMIN — INSULIN ASPART 4 UNITS: 100 INJECTION, SOLUTION INTRAVENOUS; SUBCUTANEOUS at 04:10

## 2023-10-17 RX ADMIN — CLOPIDOGREL BISULFATE 75 MG: 75 TABLET ORAL at 10:10

## 2023-10-17 RX ADMIN — METOPROLOL SUCCINATE 25 MG: 25 TABLET, EXTENDED RELEASE ORAL at 10:10

## 2023-10-17 NOTE — ASSESSMENT & PLAN NOTE
-patient reports having rash onset since father's day without previous history.    -reports that he has been to 3 dermatologists and Infectious Disease  -reports he has had skin biopsy about 6 weeks ago.  Not conclusive results.  Currently being treated with Dupixent with no improvement of symptoms.  Discussed with patient's Dermatologist.  Will try IV steroids.

## 2023-10-17 NOTE — PROGRESS NOTES
[facility]  Nurses Note -- 4 Eyes      10/17/2023       Skin assessed on: Q Shift      [x] No Pressure Injuries Present    []Prevention Measures Documented    [] Yes LDA  for Pressure Injury Previously documented     [] Yes New Pressure Injury Discovered   [] LDA for New Pressure Injury Added      Attending RN:  Indio Mccarty RN     Second RN:  Sherry May RN

## 2023-10-17 NOTE — PLAN OF CARE
Problem: Physical Therapy  Goal: Physical Therapy Goal  Description: Goals to be met by: 10/31/23     Patient will increase functional independence with mobility by performin. Supine to sit with Modified [place]  2. Rolling to Left and Right with Modified [place]  3. Sit to stand transfer with Modified [place] using RW  4. Bed to chair transfer with Modified [place] using Rolling Walker  5. Gait x250 feet with Modified [place] using Rolling Walker   6. Wheelchair propulsion x250 feet with Modified [place] using bilateral upper extremities  7. Lower extremity exercise program 2 sets x15 reps per handout, with independence    Outcome: Ongoing, Progressing     Pt ambulated ~100 ft with min A-CGA using RW and chair to follow.

## 2023-10-17 NOTE — ASSESSMENT & PLAN NOTE
-similar to previous presentation when symptoms were attributable to acute UTI.    -symptoms continue despite treatment.    -CT head without acute findings.    -neurology consulted.  Symptoms probably related to hypoactive delirium.  Delirium precautions.   Start depakote with prn Zyprexa.  Patient not really sleeping much at night secondary to pruritus.  Hopefully steroids improve rash/pruritus and patient can start sleeping better at night.  This would hopefully improve delirium.

## 2023-10-17 NOTE — PLAN OF CARE
Problem: Occupational Therapy  Goal: Occupational Therapy Goal  Description: Goals to be met by: 10/31/23     Patient will increase functional independence with ADLs by performing:    UE Dressing with Modified [place].  LE Dressing with Stand-by Assistance.  Grooming while standing at sink with Supervision.  Toileting from bedside commode with Supervision for hygiene and clothing management.   Supine to sit with Modified [place].  Step transfer with Supervision  Toilet transfer to bedside commode with Supervision.  Upper extremity exercise program x15 reps per handout, with independence.    Outcome: Ongoing, Progressing     OT rec low intensity therapy with 24 hour caregiver supervision/assistance at d/c in order to increase safety and independence with ADLs and all aspects of functional mobility.

## 2023-10-17 NOTE — PT/OT/SLP EVAL
Physical Therapy Evaluation    Patient Name:  Narciso Yanez   MRN:  [id number]    Recommendations:     Discharge Recommendations: Low Intensity Therapy (Pt will need caregiver supervision and assistance.)   Discharge Equipment Recommendations: none   Barriers to discharge: None    Assessment:     Narciso Yanez is a 88 y.o. male admitted with a medical diagnosis of Acute delirium.  He presents with the following impairments/functional limitations: weakness, impaired endurance, impaired sensation, impaired self care skills, impaired functional mobility, gait instability, impaired balance, impaired cognition, decreased coordination, decreased upper extremity function, decreased lower extremity function, decreased safety awareness, pain, decreased ROM, impaired skin.    Rehab Prognosis: Fair/Fair+; patient would benefit from acute skilled PT services to address these deficits and reach maximum level of function.    Recent Surgery: * No surgery found *      Plan:     During this hospitalization, patient to be seen 5 x/week to address the identified rehab impairments via gait training, therapeutic activities, therapeutic exercises, wheelchair management/training and progress toward the following goals:    Plan of Care Expires:  10/31/23    Subjective     Chief Complaint: Pt reported feeling better today.  Patient/Family Comments/goals: Pt agreeable to therapy, reported that he has good days and bad days concerning his mobility and being able to ambulate around the house.    Pain/Comfort:  Pain Rating 1:  (Pt c/o BLE pain.)  Pain Addressed 1: Reposition, Distraction, Cessation of Activity      Living Environment:  Pt lives with dtr (works?) in a [facility] with no concerns at entry.   Prior to admission, patients level of function was ambulatory with RW.  Pt sleeps in a lift chair at home.  Equipment at home: walker, rolling, wheelchair, bedside commode, shower chair (lift chair).  Upon discharge, patient will have assistance  from 3 dtrs.  Pt reported that he also has a stepson.     Objective:     Patient found HOB elevated with peripheral IV, telemetry (male external catheter attached to wall suction), Avasys monitor, and hospital safety sitter upon PT entry to room.    General Precautions: Standard, fall, diabetic  Orthopedic Precautions:N/A   Braces: N/A  Respiratory Status: Room air    Exams:  Cognitive Exam:  Patient is oriented to Person, Place, and Time.  Pt able to follow simple commands.   Gross Motor Coordination:  WFL  Postural Exam:  Patient presented with the following abnormalities:    -       Forward head  Sensation:    -       Intact  light/touch BLE, reported generalized numbness to BLE  Skin Integrity/Edema:      -       Skin integrity: Pt with ongoing h/o skin condition, has been seen by dermatology as outpatient.  BUE skin is dry and flaky.  Skin condition minimal to BLE compared to BUE.   -       Edema: None noted BLE  BLE ROM: WFL except decreased ankle DF/PF  BLE Strength: WFL    Functional Mobility: Pt pleasantly confused, reported having good days and bad days at home.  Pt stated some balance issues lately causing him to fall at home.  Pt able to ambulate in the hallway with assistance, minimal unsteadiness and will need caregiver supervision and assistance when D/C'ed home.  PT will continue to assess and follow.   Bed Mobility:     Scooting: stand by assistance  Supine to Sit: stand by assistance  Transfers:     Sit to Stand:  contact guard assistance and minimum assistance with rolling walker x2 trials; Pt able to tolerate standing for placement of diaper.    Bed to Chair: contact guard assistance and minimum assistance with  rolling walker  using  Step Transfer  Gait: Pt ambulated ~100 ft with min A-CGA using RW and chair to follow.  Pt with minimal tremors during ambulation.  Pt with decreased heel>toe push off, decreased step length, and decreased dottie.  Pt with minimal unsteady gait.   Balance: Pt with  fair/fair- dynamic standing balance.       AM-PAC 6 CLICK MOBILITY  Total Score:19       Treatment & Education:  Pt educated on acute skilled PT services and goals.  No family present at this time.  Pt verbalized understanding.     Patient left up in chair reclined with BLE elevated on pillow with all lines intact, call button in reach, and hospital safety sitter and Avasys monitor present.  Tray table close by.  Male external catheter reattached.      GOALS:   Multidisciplinary Problems       Physical Therapy Goals          Problem: Physical Therapy    Goal Priority Disciplines Outcome Goal Variances Interventions   Physical Therapy Goal     PT, PT/OT Ongoing, Progressing     Description: Goals to be met by: 10/31/23     Patient will increase functional independence with mobility by performin. Supine to sit with Modified [place]  2. Rolling to Left and Right with Modified [place]  3. Sit to stand transfer with Modified [place] using RW  4. Bed to chair transfer with Modified [place] using Rolling Walker  5. Gait x250 feet with Modified [place] using Rolling Walker   6. Wheelchair propulsion x250 feet with Modified [place] using bilateral upper extremities  7. Lower extremity exercise program 2 sets x15 reps per handout, with independence                         History:     Past Medical History:   Diagnosis Date    Actinic keratosis     Anxiety     B12 deficiency 2014    Dx     Cancer     skin    Cataract     Coronary artery disease     Diabetes mellitus type II     Diabetes mellitus with peripheral circulatory disorder 2014    ED (erectile dysfunction)     Hyperlipidemia     Kidney stone     Neurogenic claudication 2022    Normocytic anemia 10/15/2023    Peripheral vascular disease     Spondylosis 3/29/2019    Tobacco dependence     Urinary incontinence 2021       Past Surgical History:   Procedure Laterality Date    APPENDECTOMY      CARDIAC SURGERY   01/1999    CABG 4 vessels    CATARACT EXTRACTION      EPIDURAL STEROID INJECTION Right 8/26/2020    Procedure: Injection, Steroid, Epidural Transforaminal;  Surgeon: Wiley Crane Jr., MD;  Location: [facility] ENDO;  Service: Pain Management;  Laterality: Right;  Right L5 + S1 TF LEAH  Arrive @ 1115; ASA & Plavix last 8/18; Check BG; Rapid COVID test    EPIDURAL STEROID INJECTION Right 11/25/2020    Procedure: Injection, Steroid, Epidural Transformainal;  Surgeon: Wiley Crane Jr., MD;  Location: [facility] ENDO;  Service: Pain Management;  Laterality: Right;  Right L5 + S1 TF LEAH  Arrive @ 1245; ASA and Plavix last 11/17; Pre-DM; Needs MD Sign.    EPIDURAL STEROID INJECTION Right 2/19/2021    Procedure: Injection, Steroid, Epidural Transforaminal;  Surgeon: Wliey Crane Jr., MD;  Location: [facility] ENDO;  Service: Pain Management;  Laterality: Right;  Right L5 + S1 TF LEAH  Arrive @ 1115; ASA & Plavix last 2/11; Check BG; Needs Consent    EXTERNAL EAR SURGERY      EYE SURGERY      cataracts    ILIAC ARTERY STENT Bilateral 06/2003    also Right External Iliac stent       Time Tracking:     PT Received On: 10/17/23  PT Start Time: 1005     PT Stop Time: 1031  PT Total Time (min): 26 min     Billable Minutes: Evaluation 18 min co-eval with OT and Therapeutic Activity 8 min      10/17/2023

## 2023-10-17 NOTE — SUBJECTIVE & OBJECTIVE
Interval History: seems better, but was still confused per daughter this morning.    Review of Systems   HENT:  Negative for ear discharge and ear pain.    Eyes:  Negative for discharge and itching.   Endocrine: Negative for cold intolerance and heat intolerance.   Neurological:  Negative for seizures and syncope.     Objective:     Vital Signs (Most Recent):  Temp: 98.4 °F (36.9 °C) (10/17/23 1123)  Pulse: 76 (10/17/23 1123)  Resp: 18 (10/17/23 1123)  BP: (!) 126/59 (10/17/23 1123)  SpO2: 98 % (10/17/23 1123) Vital Signs (24h Range):  Temp:  [97.4 °F (36.3 °C)-98.4 °F (36.9 °C)] 98.4 °F (36.9 °C)  Pulse:  [68-88] 76  Resp:  [18] 18  SpO2:  [95 %-98 %] 98 %  BP: (126-145)/(59-73) 126/59     Weight: 59 kg (130 lb)  Body mass index is 20.36 kg/m².    Intake/Output Summary (Last 24 hours) at 10/17/2023 1357  Last data filed at 10/17/2023 0936  Gross per 24 hour   Intake 529.16 ml   Output 400 ml   Net 129.16 ml           Physical Exam  Constitutional:       Appearance: He is ill-appearing. He is not diaphoretic.   HENT:      Head: Normocephalic and atraumatic.      Mouth/Throat:      Mouth: Mucous membranes are dry.      Pharynx: No oropharyngeal exudate or posterior oropharyngeal erythema.   Cardiovascular:      Rate and Rhythm: Normal rate and regular rhythm.   Pulmonary:      Effort: No respiratory distress.      Breath sounds: Normal breath sounds.   Abdominal:      General: Bowel sounds are normal.      Palpations: Abdomen is soft.   Musculoskeletal:         General: No deformity or signs of injury.   Skin:     Comments: Diffuse erythematous and pruritic rash to trunk and extremities   Neurological:      Mental Status: He is disoriented.      Comments: Awake and alert.  Less restless.  Answering simple questions and following commands.  Currently oriented.    Psychiatric:         Speech: Speech is delayed.         Behavior: Behavior is cooperative.             Significant Labs: All pertinent labs within the past  24 hours have been reviewed.  BMP:   Recent Labs   Lab 10/17/23  0623   *      K 5.5*      CO2 20*   BUN 22   CREATININE 1.5*   CALCIUM 8.8   MG 2.3       CBC:   Recent Labs   Lab 10/17/23  0623   WBC 6.55   HGB 9.1*   HCT 28.2*            Significant Imaging: I have reviewed all pertinent imaging results/findings within the past 24 hours.

## 2023-10-17 NOTE — NURSING
[facility] [facility] [place] 4 [place]          Skin assessed on: Q Shift      [x] No Pressure Injuries Present    [x]Prevention Measures Documented    [] Yes LDA  for Pressure Injury Previously documented     [] Yes New Pressure Injury Discovered   [] LDA for New Pressure Injury Added      Attending RN:  Sherry May RN     Second RN:  Indio CASPER RN

## 2023-10-17 NOTE — PROGRESS NOTES
Monitored this shift. Pt more calmer today conversing with sitter. Patient also ambulated in yao with PT/OT

## 2023-10-17 NOTE — PT/OT/SLP EVAL
Occupational Therapy   Evaluation    Name: Narciso Yanez  MRN: [id number]  Admitting Diagnosis: Acute delirium  Recent Surgery: * No surgery found *      Recommendations:     Discharge Recommendations: Low Intensity Therapy (Pt will need 24 hr caregiver supervision and assistance)  Discharge Equipment Recommendations:  none  Barriers to discharge:  None    Assessment:     Narciso Yanez is a 88 y.o. male with a medical diagnosis of Acute delirium. Performance deficits affecting function: weakness, impaired endurance, impaired cognition, decreased ROM, decreased coordination, impaired self care skills, impaired sensation, decreased upper extremity function, impaired skin, decreased lower extremity function, impaired functional mobility, gait instability, impaired balance, decreased safety awareness, pain.      Rehab Prognosis:  Fair/+ ; patient would benefit from acute skilled OT services to address these deficits and reach maximum level of function.       Plan:     Patient to be seen 5 x/week to address the above listed problems via self-care/home management, therapeutic activities, therapeutic exercises  Plan of Care Expires: 10/31/23  Plan of Care Reviewed with: patient    Subjective     Chief Complaint: getting old; chronic pain to B/L feet with ambulation   Patient/Family Comments/goals: having good days and bad days     Occupational Profile:  Living Environment: pt lives with his daughter in a [facility] with 0 ANNA. Bathroom set-up: walk-in shower with shower chair.   Previous level of function: pt uses a RW/rollator for ambulation; pt sleeps in a lift chair. Pt has assist with dressing and toileting by family. pt reports falls at home, especially when not using RW.   Equipment Used at Home: walker, rolling, wheelchair, bedside commode, shower chair, rollator (lift chair)  Assistance upon Discharge: 3 daughters, josiahon     Pain/Comfort:  Pain Rating 1:  (Pt c/o BLE pain)  Pain Addressed 1: Reposition, Distraction,  Cessation of Activity    Patients cultural, spiritual, Judaism conflicts given the current situation: no    Objective:     Patient found HOB elevated with peripheral IV, telemetry (avasys monitor, safety sitter, male external catheter connected to suction) upon OT entry to room.    General Precautions: Standard, fall, diabetic, hearing impaired  Orthopedic Precautions: N/A  Braces: N/A  Respiratory Status: Room air    Occupational Performance:    Bed Mobility:    Patient completed Scooting anteriorly with stand by assistance  Patient completed Supine to Sit with contact guard assistance, with side rail, and HOB elevated     Functional Mobility/Transfers:  Patient completed Sit <> Stand Transfer with CGA-MIN A  with  rolling walker   Patient completed Bed <> Chair Transfer using Step Transfer technique with CGA-MIN A with rolling walker  Functional Mobility: Gait belt donned prior to transfer for safety during mobility/transfers. Pt completed ~100 ft using RW with MIN-CGA with chair following. Verbal/tactile cueing for hand placement for safe transfers.  Please refer to PT note for gait assessment.     Activities of Daily Living:  Upper Body Dressing: minimum assistance to don back gown while seated unsupported at EOB   Lower Body Dressing: pt stood within RW with CGA while therapist donned brief total A       Cognitive/Visual Perceptual:  Cognitive/Psychosocial Skills:     -       Oriented to: Person, Place, and Time   -       Follows Commands/attention:follows simple commands  -       Communication: clear/fluent  -       Memory: Poor immediate recall  -       Safety awareness/insight to disability: impaired   -       Mood/Affect/Coping skills/emotional control: Cooperative and Pleasant  Visual/Perceptual:      -Intact  R/L discrimination      Physical Exam:  Balance:    -       seated: SBA/SUP; standing: CGA-MIN A with RW   Postural examination/scapula alignment:    -       Rounded shoulders  -       Forward  head  Skin integrity: Pt with ongoing h/o skin condition, has been seen by dermatology as outpatient.  BUE skin is dry and flaky.  Skin condition minimal to BLE compared to BUE  Edema:  no BUE edema noted   Sensation:    -       Intact  light/touch BUE; however, pt reports intermittent tingling to B/L digits   Dominant hand:    -       Right  Upper Extremity Range of Motion:     -       Right Upper Extremity: WFL  -       Left Upper Extremity: WFL  Upper Extremity Strength:    -       Right Upper Extremity: WFL  -       Left Upper Extremity: WFL   Strength:    -       Right Upper Extremity: WFL  -       Left Upper Extremity: WFL  Fine Motor Coordination:    -       Intact  Left hand, manipulation of objects and Right hand, manipulation of objects  Gross motor coordination:   WFL    AMPAC 6 Click ADL:  AMPAC Total Score: 18    Treatment & Education:  Pt educated on OT role/POC. No family present.   Importance of OOB activity with staff assistance. Encouraged OOB>chair daily   Seated in the chair, pt completed the following BUE AROM x10:   Shoulder flex/ext, forward punches, shoulder horizontal ab/dduction   Encouraged 3x/day 10 reps each daily   Safety during functional t/f and mobility   Multiple self-care tasks/functional mobility completed- assistance level noted above   All questions/concerns answered within OT scope of practice       Patient left  reclined in the chair with B/L heels offloaded by pillow and tray table in front of pt  with all lines intact, call button in reach, safety sitter and avasys monitor present, and all needs met/within reach; male external catheter re-connected to suction; lights on, blinds opened, tv on, door left open.     GOALS:   Multidisciplinary Problems       Occupational Therapy Goals          Problem: Occupational Therapy    Goal Priority Disciplines Outcome Interventions   Occupational Therapy Goal     OT, PT/OT Ongoing, Progressing    Description: Goals to be met by:  10/31/23     Patient will increase functional independence with ADLs by performing:    UE Dressing with Modified [place].  LE Dressing with Stand-by Assistance.  Grooming while standing at sink with Supervision.  Toileting from bedside commode with Supervision for hygiene and clothing management.   Supine to sit with Modified [place].  Step transfer with Supervision  Toilet transfer to bedside commode with Supervision.  Upper extremity exercise program x15 reps per handout, with independence.                         History:     Past Medical History:   Diagnosis Date    Actinic keratosis     Anxiety     B12 deficiency 12/8/2014    Dx 6/14    Cancer     skin    Cataract     Coronary artery disease     Diabetes mellitus type II     Diabetes mellitus with peripheral circulatory disorder 6/18/2014    ED (erectile dysfunction)     Hyperlipidemia     Kidney stone     Neurogenic claudication 4/4/2022    Normocytic anemia 10/15/2023    Peripheral vascular disease     Spondylosis 3/29/2019    Tobacco dependence     Urinary incontinence 5/4/2021         Past Surgical History:   Procedure Laterality Date    APPENDECTOMY      CARDIAC SURGERY  01/1999    CABG 4 vessels    CATARACT EXTRACTION      EPIDURAL STEROID INJECTION Right 8/26/2020    Procedure: Injection, Steroid, Epidural Transforaminal;  Surgeon: Wiley Crane Jr., MD;  Location: [facility] ENDO;  Service: Pain Management;  Laterality: Right;  Right L5 + S1 TF LEAH  Arrive @ 1115; ASA & Plavix last 8/18; Check BG; Rapid COVID test    EPIDURAL STEROID INJECTION Right 11/25/2020    Procedure: Injection, Steroid, Epidural Transformainal;  Surgeon: Wiley Crane Jr., MD;  Location: [facility] ENDO;  Service: Pain Management;  Laterality: Right;  Right L5 + S1 TF LEAH  Arrive @ 1245; ASA and Plavix last 11/17; Pre-DM; Needs MD Sign.    EPIDURAL STEROID INJECTION Right 2/19/2021    Procedure: Injection, Steroid, Epidural Transforaminal;  Surgeon: Wiley Crane  MD Meenu;  Location: [facility] ENDO;  Service: Pain Management;  Laterality: Right;  Right L5 + S1 TF LEAH  Arrive @ 1115; ASA & Plavix last 2/11; Check BG; Needs Consent    EXTERNAL EAR SURGERY      EYE SURGERY      cataracts    ILIAC ARTERY STENT Bilateral 06/2003    also Right External Iliac stent       Time Tracking:     OT Date of Treatment: 10/17/23  OT Start Time: 1004  OT Stop Time: 1029  OT Total Time (min): 25 min    Billable Minutes:Evaluation 17 min  Therapeutic Exercise 8 min  Total Time 25 min (co-eval with PT)    10/17/2023

## 2023-10-17 NOTE — ASSESSMENT & PLAN NOTE
Patient's FSGs are uncontrolled due to hyperglycemia on current medication regimen.  Last A1c reviewed-   Lab Results   Component Value Date    HGBA1C 6.9 (H) 10/15/2023     Most recent fingerstick glucose reviewed-   Recent Labs   Lab 10/16/23  1635 10/16/23  2035 10/17/23  0736 10/17/23  1124   POCTGLUCOSE 133* 251* 180* 300*     Current correctional scale  Low  Maintain anti-hyperglycemic dose as follows-   Antihyperglycemics (From admission, onward)    Start     Stop Route Frequency Ordered    10/15/23 0251  insulin aspart U-100 pen 0-5 Units         -- SubQ Before meals & nightly PRN 10/15/23 0151        Hold Oral hypoglycemics while patient is in the hospital.

## 2023-10-17 NOTE — PROGRESS NOTES
[facility] Medicine  Progress Note    Patient Name: Narciso Yanez  MRN: [id number]  Patient Class: IP- Inpatient   Admission Date: 10/14/2023  Length of Stay: 2 days  Attending Physician: Mukund Hester MD  Primary Care Provider: Marcelino Del Toro MD        Subjective:     Principal Problem:Acute delirium        HPI:  Mr Yanez is a 88-year-old male with previous history of coronary artery disease status post previous CABG, COPD, PAD s/p PTA bilat YUE and R EIA 6/2003,  hypertension, dyslipidemia, CKD 3A, type 2 diabetes mellitus, PUD, polymyalgia rheumatica, NPH status post  shunt placement in 2021, who was recently discharged from the hospital on 10/11/2023 after being admitted for increased confusion and altered mental status with some hallucinations and frequent falls.  He had an abnormal UA which resulted Proteus and Enterococcus and was subsequently discharged with Augmentin.  He does have diffusely pruritic rash involving multiple areas and has been evaluated by dermatologist as outpatient without confirmatory diagnosis.  As per family members and nursing staff the patient had been delirious for example, asking the nurse if she got her food during the game while in the hospital.    In the ER, his vitals are within normal limits, his CBC shows normal white count 9.8, hemoglobin 9.7 (normocytic), with previous baseline of 11-12 since 2022, platelets 342 K.  His BNP shows mild metabolic acidosis CO2 17, creatinine 1.3 which is better than his previous creatinine of 1.8 on 10/09/2023, mild hyperglycemia at 130.  His BNP is 223, and seems that his previous BNP on 10/09/2023 was 151 and prior to that in on 09/15/2023 was within normal limits.  His troponin is 0.107, .  His CT abdomen pelvis showed nonacute findings of cholelithiasis, vascular calcifications, diverticulosis, prostatomegaly, nonspecific infiltration of the left lateral subcutaneous fat tissue and small hiatal  hernia.  His CT head is within normal limits.  His chest x-ray reveals chronic interstitial lung markings, right-sided ventricular shunt present.  In the ER, appears to have received 500 cc of normal saline as well as 40 mg of IV Lasix.  Admission has been requested for further evaluation and treatment.      Overview/Hospital Course:  89 y/o male presents with change in mental status.  I treated this patient last week for similar issues.  Patient diagnosed with UTI and treated with ABX's.  Patient has remained confused with no improvement with ABX's treatment.  Neurology consulted.  Changes in mental status secondary to hypoactive delirium.  Patient has been dealing with a generalized pruritic rash for several months causing significant decline in overall functional status.  Followed by Dermatology with no specific diagnosis.  Discussed with his Dermatologist and decided to start IV steroids.      Interval History: seems better, but was still confused per daughter this morning.    Review of Systems   HENT:  Negative for ear discharge and ear pain.    Eyes:  Negative for discharge and itching.   Endocrine: Negative for cold intolerance and heat intolerance.   Neurological:  Negative for seizures and syncope.     Objective:     Vital Signs (Most Recent):  Temp: 98.4 °F (36.9 °C) (10/17/23 1123)  Pulse: 76 (10/17/23 1123)  Resp: 18 (10/17/23 1123)  BP: (!) 126/59 (10/17/23 1123)  SpO2: 98 % (10/17/23 1123) Vital Signs (24h Range):  Temp:  [97.4 °F (36.3 °C)-98.4 °F (36.9 °C)] 98.4 °F (36.9 °C)  Pulse:  [68-88] 76  Resp:  [18] 18  SpO2:  [95 %-98 %] 98 %  BP: (126-145)/(59-73) 126/59     Weight: 59 kg (130 lb)  Body mass index is 20.36 kg/m².    Intake/Output Summary (Last 24 hours) at 10/17/2023 1357  Last data filed at 10/17/2023 0936  Gross per 24 hour   Intake 529.16 ml   Output 400 ml   Net 129.16 ml           Physical Exam  Constitutional:       Appearance: He is ill-appearing. He is not diaphoretic.   HENT:       Head: Normocephalic and atraumatic.      Mouth/Throat:      Mouth: Mucous membranes are dry.      Pharynx: No oropharyngeal exudate or posterior oropharyngeal erythema.   Cardiovascular:      Rate and Rhythm: Normal rate and regular rhythm.   Pulmonary:      Effort: No respiratory distress.      Breath sounds: Normal breath sounds.   Abdominal:      General: Bowel sounds are normal.      Palpations: Abdomen is soft.   Musculoskeletal:         General: No deformity or signs of injury.   Skin:     Comments: Diffuse erythematous and pruritic rash to trunk and extremities   Neurological:      Mental Status: He is disoriented.      Comments: Awake and alert.  Less restless.  Answering simple questions and following commands.  Currently oriented.    Psychiatric:         Speech: Speech is delayed.         Behavior: Behavior is cooperative.             Significant Labs: All pertinent labs within the past 24 hours have been reviewed.  BMP:   Recent Labs   Lab 10/17/23  0623   *      K 5.5*      CO2 20*   BUN 22   CREATININE 1.5*   CALCIUM 8.8   MG 2.3       CBC:   Recent Labs   Lab 10/17/23  0623   WBC 6.55   HGB 9.1*   HCT 28.2*            Significant Imaging: I have reviewed all pertinent imaging results/findings within the past 24 hours.      Assessment/Plan:      * Acute delirium  -similar to previous presentation when symptoms were attributable to acute UTI.    -symptoms continue despite treatment.    -CT head without acute findings.    -neurology consulted.  Symptoms probably related to hypoactive delirium.  Delirium precautions.   Start depakote with prn Zyprexa.  Patient not really sleeping much at night secondary to pruritus.  Hopefully steroids improve rash/pruritus and patient can start sleeping better at night.  This would hopefully improve delirium.      Rash  -patient reports having rash onset since father's day without previous history.    -reports that he has been to 3 dermatologists  and Infectious Disease  -reports he has had skin biopsy about 6 weeks ago.  Not conclusive results.  Currently being treated with Dupixent with no improvement of symptoms.  Discussed with patient's Dermatologist.  Will try IV steroids.    Chronic interstitial lung disease  -appears chronic.  Uncertain etiology.  -Could be related to underlying autoimmune process.  -recommend outpatient pulmonary follow-up.      Enlarged prostate  -outpatient Urology follow-up recommended.      Diverticulosis  -without diverticulitis.      Cholelithiasis  -without cholecystitis.      Elevated troponin  -denies chest pain   -status post previous CABG.    Cardiology consulted.      Normocytic anemia  -could be related to anemia of chronic inflammation.    -recommend outpatient primary care follow-up as well as GI follow-up.      Hyperlipidemia  -statin      Essential hypertension  -continue metoprolol      Stage 3a chronic kidney disease  -baseline creatinine about 1.6-1.8.  Creat at baseline.  Stable.  Avoid nephrotoxic medications.      Polymyalgia rheumatica  -by history, diagnosed in May 2019 treated with prednisone with appropriate response.  No associated GCA.      COPD (chronic obstructive pulmonary disease)  -stable.  Continue as-needed DuoNebs.      PAD (peripheral artery disease)  -continue aspirin/Plavix  -s/p PTA bilat YUE and R EIA 6/2003      Diabetes mellitus with peripheral circulatory disorder  Patient's FSGs are uncontrolled due to hyperglycemia on current medication regimen.  Last A1c reviewed-   Lab Results   Component Value Date    HGBA1C 6.9 (H) 10/15/2023     Most recent fingerstick glucose reviewed-   Recent Labs   Lab 10/16/23  1635 10/16/23  2035 10/17/23  0736 10/17/23  1124   POCTGLUCOSE 133* 251* 180* 300*     Current correctional scale  Low  Maintain anti-hyperglycemic dose as follows-   Antihyperglycemics (From admission, onward)    Start     Stop Route Frequency Ordered    10/15/23 0251  insulin aspart  U-100 pen 0-5 Units         -- SubQ Before meals & nightly PRN 10/15/23 0151        Hold Oral hypoglycemics while patient is in the hospital.    Coronary artery disease involving coronary bypass graft of native heart without angina pectoris  -continue aspirin, Plavix, statin  Elevated Troponin on presentation.  Cardiology consulted.  Consider outpat MPI when he recovers from acute delirium        VTE Risk Mitigation (From admission, onward)         Ordered     enoxaparin injection 40 mg  Daily         10/15/23 0139     IP VTE HIGH RISK PATIENT  Once         10/15/23 0139     Place sequential compression device  Until discontinued         10/15/23 0139                Discharge Planning   SUSIE: 10/17/2023     Code Status: Full Code   Is the patient medically ready for discharge?:     Reason for patient still in hospital (select all that apply): Patient trending condition  Discharge Plan A: Home with family                  Mukund Hester MD  Department of Hospital Medicine   [facility] [place] [facility]

## 2023-10-18 LAB
ALBUMIN SERPL BCP-MCNC: 2.6 G/DL (ref 3.5–5.2)
ALP SERPL-CCNC: 155 U/L (ref 55–135)
ALT SERPL W/O P-5'-P-CCNC: 24 U/L (ref 10–44)
ANION GAP SERPL CALC-SCNC: 14 MMOL/L (ref 8–16)
AST SERPL-CCNC: 22 U/L (ref 10–40)
BASOPHILS # BLD AUTO: 0.02 K/UL (ref 0–0.2)
BASOPHILS NFR BLD: 0.1 % (ref 0–1.9)
BILIRUB SERPL-MCNC: 0.2 MG/DL (ref 0.1–1)
BUN SERPL-MCNC: 32 MG/DL (ref 8–23)
CALCIUM SERPL-MCNC: 8.8 MG/DL (ref 8.7–10.5)
CHLORIDE SERPL-SCNC: 102 MMOL/L (ref 95–110)
CO2 SERPL-SCNC: 18 MMOL/L (ref 23–29)
CREAT SERPL-MCNC: 1.8 MG/DL (ref 0.5–1.4)
DIFFERENTIAL METHOD: ABNORMAL
EOSINOPHIL # BLD AUTO: 0 K/UL (ref 0–0.5)
EOSINOPHIL NFR BLD: 0 % (ref 0–8)
ERYTHROCYTE [DISTWIDTH] IN BLOOD BY AUTOMATED COUNT: 15.8 % (ref 11.5–14.5)
EST. GFR  (NO RACE VARIABLE): 36 ML/MIN/1.73 M^2
GLUCOSE SERPL-MCNC: 365 MG/DL (ref 70–110)
GLUCOSE SERPL-MCNC: 433 MG/DL (ref 70–110)
HCT VFR BLD AUTO: 28.3 % (ref 40–54)
HGB BLD-MCNC: 9.4 G/DL (ref 14–18)
IMM GRANULOCYTES # BLD AUTO: 0.07 K/UL (ref 0–0.04)
IMM GRANULOCYTES NFR BLD AUTO: 0.5 % (ref 0–0.5)
LYMPHOCYTES # BLD AUTO: 1.5 K/UL (ref 1–4.8)
LYMPHOCYTES NFR BLD: 10 % (ref 18–48)
MAGNESIUM SERPL-MCNC: 2.1 MG/DL (ref 1.6–2.6)
MCH RBC QN AUTO: 32.2 PG (ref 27–31)
MCHC RBC AUTO-ENTMCNC: 33.2 G/DL (ref 32–36)
MCV RBC AUTO: 97 FL (ref 82–98)
MONOCYTES # BLD AUTO: 0.3 K/UL (ref 0.3–1)
MONOCYTES NFR BLD: 2 % (ref 4–15)
NEUTROPHILS # BLD AUTO: 12.8 K/UL (ref 1.8–7.7)
NEUTROPHILS NFR BLD: 87.4 % (ref 38–73)
NRBC BLD-RTO: 0 /100 WBC
PHOSPHATE SERPL-MCNC: 3.5 MG/DL (ref 2.7–4.5)
PLATELET # BLD AUTO: 347 K/UL (ref 150–450)
PMV BLD AUTO: 9.5 FL (ref 9.2–12.9)
POCT GLUCOSE: 168 MG/DL (ref 70–110)
POCT GLUCOSE: 255 MG/DL (ref 70–110)
POCT GLUCOSE: 374 MG/DL (ref 70–110)
POCT GLUCOSE: 398 MG/DL (ref 70–110)
POCT GLUCOSE: 439 MG/DL (ref 70–110)
POCT GLUCOSE: >500 MG/DL (ref 70–110)
POTASSIUM SERPL-SCNC: 4.7 MMOL/L (ref 3.5–5.1)
PROT SERPL-MCNC: 7.4 G/DL (ref 6–8.4)
RBC # BLD AUTO: 2.92 M/UL (ref 4.6–6.2)
SODIUM SERPL-SCNC: 134 MMOL/L (ref 136–145)
WBC # BLD AUTO: 14.64 K/UL (ref 3.9–12.7)

## 2023-10-18 PROCEDURE — 63600175 PHARM REV CODE 636 W HCPCS: Mod: HCNC | Performed by: INTERNAL MEDICINE

## 2023-10-18 PROCEDURE — 25000003 PHARM REV CODE 250: Mod: HCNC | Performed by: HOSPITALIST

## 2023-10-18 PROCEDURE — 21400001 HC TELEMETRY ROOM: Mod: HCNC

## 2023-10-18 PROCEDURE — 84100 ASSAY OF PHOSPHORUS: CPT | Mod: HCNC | Performed by: STUDENT IN AN ORGANIZED HEALTH CARE EDUCATION/TRAINING PROGRAM

## 2023-10-18 PROCEDURE — S4991 NICOTINE PATCH NONLEGEND: HCPCS | Mod: HCNC | Performed by: HOSPITALIST

## 2023-10-18 PROCEDURE — 97110 THERAPEUTIC EXERCISES: CPT | Mod: HCNC,CQ

## 2023-10-18 PROCEDURE — 63600175 PHARM REV CODE 636 W HCPCS: Mod: HCNC | Performed by: HOSPITALIST

## 2023-10-18 PROCEDURE — 36415 COLL VENOUS BLD VENIPUNCTURE: CPT | Mod: HCNC | Performed by: STUDENT IN AN ORGANIZED HEALTH CARE EDUCATION/TRAINING PROGRAM

## 2023-10-18 PROCEDURE — 97535 SELF CARE MNGMENT TRAINING: CPT | Mod: HCNC

## 2023-10-18 PROCEDURE — 97110 THERAPEUTIC EXERCISES: CPT | Mod: HCNC

## 2023-10-18 PROCEDURE — 82947 ASSAY GLUCOSE BLOOD QUANT: CPT | Mod: HCNC | Performed by: STUDENT IN AN ORGANIZED HEALTH CARE EDUCATION/TRAINING PROGRAM

## 2023-10-18 PROCEDURE — 97530 THERAPEUTIC ACTIVITIES: CPT | Mod: HCNC,CQ

## 2023-10-18 PROCEDURE — 80053 COMPREHEN METABOLIC PANEL: CPT | Mod: HCNC | Performed by: STUDENT IN AN ORGANIZED HEALTH CARE EDUCATION/TRAINING PROGRAM

## 2023-10-18 PROCEDURE — 83735 ASSAY OF MAGNESIUM: CPT | Mod: HCNC | Performed by: STUDENT IN AN ORGANIZED HEALTH CARE EDUCATION/TRAINING PROGRAM

## 2023-10-18 PROCEDURE — 85025 COMPLETE CBC W/AUTO DIFF WBC: CPT | Mod: HCNC | Performed by: STUDENT IN AN ORGANIZED HEALTH CARE EDUCATION/TRAINING PROGRAM

## 2023-10-18 PROCEDURE — 25000003 PHARM REV CODE 250: Mod: HCNC | Performed by: INTERNAL MEDICINE

## 2023-10-18 PROCEDURE — 97116 GAIT TRAINING THERAPY: CPT | Mod: HCNC,CQ

## 2023-10-18 PROCEDURE — 86592 SYPHILIS TEST NON-TREP QUAL: CPT | Mod: HCNC | Performed by: STUDENT IN AN ORGANIZED HEALTH CARE EDUCATION/TRAINING PROGRAM

## 2023-10-18 RX ORDER — INSULIN ASPART 100 [IU]/ML
5 INJECTION, SOLUTION INTRAVENOUS; SUBCUTANEOUS ONCE
Status: COMPLETED | OUTPATIENT
Start: 2023-10-18 | End: 2023-10-18

## 2023-10-18 RX ADMIN — Medication 1 PATCH: at 08:10

## 2023-10-18 RX ADMIN — METHYLPREDNISOLONE SODIUM SUCCINATE 125 MG: 125 INJECTION, POWDER, FOR SOLUTION INTRAMUSCULAR; INTRAVENOUS at 05:10

## 2023-10-18 RX ADMIN — ASPIRIN 81 MG: 81 TABLET, COATED ORAL at 08:10

## 2023-10-18 RX ADMIN — ENOXAPARIN SODIUM 40 MG: 40 INJECTION SUBCUTANEOUS at 05:10

## 2023-10-18 RX ADMIN — INSULIN ASPART 5 UNITS: 100 INJECTION, SOLUTION INTRAVENOUS; SUBCUTANEOUS at 05:10

## 2023-10-18 RX ADMIN — METOPROLOL SUCCINATE 25 MG: 25 TABLET, EXTENDED RELEASE ORAL at 08:10

## 2023-10-18 RX ADMIN — CLOPIDOGREL BISULFATE 75 MG: 75 TABLET ORAL at 08:10

## 2023-10-18 RX ADMIN — DIVALPROEX SODIUM 250 MG: 125 CAPSULE, COATED PELLETS ORAL at 08:10

## 2023-10-18 RX ADMIN — INSULIN ASPART 3 UNITS: 100 INJECTION, SOLUTION INTRAVENOUS; SUBCUTANEOUS at 09:10

## 2023-10-18 RX ADMIN — ATORVASTATIN CALCIUM 40 MG: 40 TABLET, FILM COATED ORAL at 08:10

## 2023-10-18 RX ADMIN — INSULIN ASPART 5 UNITS: 100 INJECTION, SOLUTION INTRAVENOUS; SUBCUTANEOUS at 09:10

## 2023-10-18 RX ADMIN — INSULIN ASPART 3 UNITS: 100 INJECTION, SOLUTION INTRAVENOUS; SUBCUTANEOUS at 08:10

## 2023-10-18 RX ADMIN — DIVALPROEX SODIUM 500 MG: 125 CAPSULE ORAL at 09:10

## 2023-10-18 RX ADMIN — MELATONIN TAB 3 MG 6 MG: 3 TAB at 09:10

## 2023-10-18 RX ADMIN — METHYLPREDNISOLONE SODIUM SUCCINATE 125 MG: 125 INJECTION, POWDER, FOR SOLUTION INTRAMUSCULAR; INTRAVENOUS at 01:10

## 2023-10-18 NOTE — PROGRESS NOTES
[facility] [place]  Nurses Note -- 4 Eyes      10/18/2023       Skin assessed on: Q Shift      [x] No Pressure Injuries Present    []Prevention Measures Documented    [] Yes LDA  for Pressure Injury Previously documented     [] Yes New Pressure Injury Discovered   [] LDA for New Pressure Injury Added      Attending RN:  Indio Mccarty RN     Second RN:  Luisa Link RN

## 2023-10-18 NOTE — PROGRESS NOTES
[facility] Medicine  Progress Note    Patient Name: Narciso Yanez  MRN: [id number]  Patient Class: IP- Inpatient   Admission Date: 10/14/2023  Length of Stay: 3 days  Attending Physician: Charly Garcia MD  Primary Care Provider: Marcelino Del Toro MD        Subjective:     Principal Problem:Acute delirium        HPI:  Mr Yanez is a 88-year-old male with previous history of coronary artery disease status post previous CABG, COPD, PAD s/p PTA bilat YUE and R EIA 6/2003,  hypertension, dyslipidemia, CKD 3A, type 2 diabetes mellitus, PUD, polymyalgia rheumatica, NPH status post  shunt placement in 2021, who was recently discharged from the hospital on 10/11/2023 after being admitted for increased confusion and altered mental status with some hallucinations and frequent falls.  He had an abnormal UA which resulted Proteus and Enterococcus and was subsequently discharged with Augmentin.  He does have diffusely pruritic rash involving multiple areas and has been evaluated by dermatologist as outpatient without confirmatory diagnosis.  As per family members and nursing staff the patient had been delirious for example, asking the nurse if she got her food during the game while in the hospital.    In the ER, his vitals are within normal limits, his CBC shows normal white count 9.8, hemoglobin 9.7 (normocytic), with previous baseline of 11-12 since 2022, platelets 342 K.  His BNP shows mild metabolic acidosis CO2 17, creatinine 1.3 which is better than his previous creatinine of 1.8 on 10/09/2023, mild hyperglycemia at 130.  His BNP is 223, and seems that his previous BNP on 10/09/2023 was 151 and prior to that in on 09/15/2023 was within normal limits.  His troponin is 0.107, .  His CT abdomen pelvis showed nonacute findings of cholelithiasis, vascular calcifications, diverticulosis, prostatomegaly, nonspecific infiltration of the left lateral subcutaneous fat tissue and small hiatal hernia.   His CT head is within normal limits.  His chest x-ray reveals chronic interstitial lung markings, right-sided ventricular shunt present.  In the ER, appears to have received 500 cc of normal saline as well as 40 mg of IV Lasix.  Admission has been requested for further evaluation and treatment.      Overview/Hospital Course:  89 y/o male presents with change in mental status.  I treated this patient last week for similar issues.  Patient diagnosed with UTI and treated with ABX's.  Patient has remained confused with no improvement with ABX's treatment.  Neurology consulted.  Changes in mental status secondary to hypoactive delirium.  Patient has been dealing with a generalized pruritic rash for several months causing significant decline in overall functional status.  Followed by Dermatology with no specific diagnosis.  Discussed with his Dermatologist and decided to start IV steroids.    Interval History:  NAEON.  No new issues.   Talking much clearer today. Conversing well.   All questions answered and updates on care given.       ROS:  General: Negative for fevers or chills.  Cardiac: Negative for chest pain or orthopnea   Pulmonary: Negative for dyspnea or wheezing.  GI: Negative for abdominal distention or pain     Vitals:    10/18/23 0457 10/18/23 0754 10/18/23 1149 10/18/23 1626   BP: 131/62 133/87 132/86 (!) 151/71   BP Location: Left arm Left arm Left arm Right arm   Patient Position: Lying Lying Lying Lying   Pulse: 88 94 84 89   Resp: 18 18 18    Temp: 97.4 °F (36.3 °C) 97.6 °F (36.4 °C) 97.8 °F (36.6 °C) 97.6 °F (36.4 °C)   TempSrc: Oral Oral Oral Oral   SpO2: (!) 94% 97% 97% 100%   Weight:       Height:              Body mass index is 20.36 kg/m².      PHYSICAL EXAM:  GENERAL APPEARANCE: alert and cooperative, and appears to be in no acute distress.  HEENT:     HEAD: NC/AT     EYES: PERRL, EOMI.  Vision is grossly intact.  NECK: Neck supple, non-tender without LAD, masses or thyromegaly.  CARDIAC: There  is no peripheral edema, cyanosis or pallor.   LUNGS: Clear to auscultation and percussion without rales or wheezing  ABDOMEN: Non-distended. No guarding.  MSK: No joint erythema or tenderness.   EXTREMITIES: No significant deformity or joint abnormality. No edema.   NEUROLOGICAL: CN II-XII grossly intact.   SKIN:   Diffuse rash covering majority of limbs, minor on torso  Desquamation of both palms and soles  Erythema on limbs   PSYCHIATRIC: Oriented. No tangential speech. No Hyperactive features.        Recent Results (from the past 24 hour(s))   POCT glucose    Collection Time: 10/17/23  7:41 PM   Result Value Ref Range    POCT Glucose 227 (H) 70 - 110 mg/dL   POCT glucose    Collection Time: 10/18/23  7:52 AM   Result Value Ref Range    POCT Glucose 255 (H) 70 - 110 mg/dL   Magnesium    Collection Time: 10/18/23 10:49 AM   Result Value Ref Range    Magnesium 2.1 1.6 - 2.6 mg/dL   Phosphorus    Collection Time: 10/18/23 10:49 AM   Result Value Ref Range    Phosphorus 3.5 2.7 - 4.5 mg/dL   Comprehensive Metabolic Panel    Collection Time: 10/18/23 10:49 AM   Result Value Ref Range    Sodium 134 (L) 136 - 145 mmol/L    Potassium 4.7 3.5 - 5.1 mmol/L    Chloride 102 95 - 110 mmol/L    CO2 18 (L) 23 - 29 mmol/L    Glucose 365 (H) 70 - 110 mg/dL    BUN 32 (H) 8 - 23 mg/dL    Creatinine 1.8 (H) 0.5 - 1.4 mg/dL    Calcium 8.8 8.7 - 10.5 mg/dL    Total Protein 7.4 6.0 - 8.4 g/dL    Albumin 2.6 (L) 3.5 - 5.2 g/dL    Total Bilirubin 0.2 0.1 - 1.0 mg/dL    Alkaline Phosphatase 155 (H) 55 - 135 U/L    AST 22 10 - 40 U/L    ALT 24 10 - 44 U/L    eGFR 36 (A) >60 mL/min/1.73 m^2    Anion Gap 14 8 - 16 mmol/L   CBC Auto Differential    Collection Time: 10/18/23 10:49 AM   Result Value Ref Range    WBC 14.64 (H) 3.90 - 12.70 K/uL    RBC 2.92 (L) 4.60 - 6.20 M/uL    Hemoglobin 9.4 (L) 14.0 - 18.0 g/dL    Hematocrit 28.3 (L) 40.0 - 54.0 %    MCV 97 82 - 98 fL    MCH 32.2 (H) 27.0 - 31.0 pg    MCHC 33.2 32.0 - 36.0 g/dL    RDW 15.8  (H) 11.5 - 14.5 %    Platelets 347 150 - 450 K/uL    MPV 9.5 9.2 - 12.9 fL    Immature Granulocytes 0.5 0.0 - 0.5 %    Gran # (ANC) 12.8 (H) 1.8 - 7.7 K/uL    Immature Grans (Abs) 0.07 (H) 0.00 - 0.04 K/uL    Lymph # 1.5 1.0 - 4.8 K/uL    Mono # 0.3 0.3 - 1.0 K/uL    Eos # 0.0 0.0 - 0.5 K/uL    Baso # 0.02 0.00 - 0.20 K/uL    nRBC 0 0 /100 WBC    Gran % 87.4 (H) 38.0 - 73.0 %    Lymph % 10.0 (L) 18.0 - 48.0 %    Mono % 2.0 (L) 4.0 - 15.0 %    Eosinophil % 0.0 0.0 - 8.0 %    Basophil % 0.1 0.0 - 1.9 %    Differential Method Automated    POCT glucose    Collection Time: 10/18/23 11:46 AM   Result Value Ref Range    POCT Glucose 398 (H) 70 - 110 mg/dL       Microbiology Results (last 7 days)       ** No results found for the last 168 hours. **             Imaging Results              CT Abdomen Pelvis With Contrast (Final result)  Result time 10/14/23 23:45:10      Final result by Gretel Mata MD (10/14/23 23:45:10)                   Impression:      Cholelithiasis without evidence of acute cholecystitis.    Advanced vascular calcification.  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    Colonic diverticulosis.    Prostatomegaly.    Nonspecific infiltration the subcutaneous fat lateral to the left hip.  Question any history of recent trauma or contusion.    Small hiatal hernia.      Electronically signed by: Gretel Mata  Date:    10/14/2023  Time:    23:45               Narrative:    EXAMINATION:  CT OF ABDOMEN PELVIS WITH    CLINICAL HISTORY:  Abdominal abscess/infection suspected;    TECHNIQUE:  5 mm enhanced axial images were obtained from the lung bases through the greater trochanters.  Seventy-five mL of Omnipaque 350 was injected.    COMPARISON:  06/23/2022    FINDINGS:  A ventricular shunt catheter is present.  The liver, spleen, pancreas, and adrenal glands are unremarkable. The gallbladder contains a few calcified gallstones.    Bilateral renal cysts are present.    There is no definite  evidence for abdominal adenopathy or ascites.    Advanced vascular calcifications are present.  There is ectasia of the infrarenal abdominal aorta measuring up to 2.6 cm.  There are bi iliac arterial stents.    There is colonic diverticulosis.  Is moderate colonic stool.  The prostate gland is enlarged measuring approximately 5.2 in width x 5.8 cm in AP dimensions (series 2 axial image 153).  Consider correlation with PSA.    There is infiltration in the subcutaneous fat lateral to the left hip.  There is no free fluid in the pelvis.    At the lung bases, there is a small hiatal hernia.  Fibrotic changes or chronic interstitial lung disease are seen.  Noted there is minimal dextroscoliosis.  Spondylitic changes are present.  There are median sternotomy changes.                                       CT Head Without Contrast (Final result)  Result time 10/14/23 23:16:02      Final result by Terry Clinton MD (10/14/23 23:16:02)                   Impression:      No acute abnormality.      Electronically signed by: Terry Clinton  Date:    10/14/2023  Time:    23:16               Narrative:    EXAMINATION:  CT HEAD WITHOUT CONTRAST    CLINICAL HISTORY:  Mental status change, unknown cause;    TECHNIQUE:  Low dose axial CT images obtained throughout the head without intravenous contrast. Sagittal and coronal reconstructions were performed.    COMPARISON:  None.    FINDINGS:  Intracranial compartment:    Ventricles and sulci are stable in size for age without evidence of hydrocephalus.   shunt tube from posterior parietal approach on the right appear stable.  Its tip resides just beyond the fornix in the left lateral ventricle frontal horn no extra-axial blood or fluid collections.    The brain parenchyma appears stable.  Stable low-density in the deep white matter compatible with chronic small vessel changes.  No parenchymal mass, hemorrhage, edema or major vascular distribution infarct.    Skull/extracranial  contents (limited evaluation): No fracture. Mastoid air cells and paranasal sinuses are essentially clear.  Ocular lens replacements are noted.                                       X-Ray Chest AP Portable (Final result)  Result time 10/14/23 22:28:10      Final result by Gretel Mata MD (10/14/23 22:28:10)                   Impression:      Above described.      Electronically signed by: Gretel Mata  Date:    10/14/2023  Time:    22:28               Narrative:    EXAMINATION:  AP PORTABLE CHEST    CLINICAL HISTORY:  tachycardia;    TECHNIQUE:  AP portable chest radiograph was submitted.    COMPARISON:  10/09/2023    FINDINGS:  Median sternotomy changes are present.  AP portable chest radiograph demonstrates a cardiac silhouette within normal limits.  There is tortuosity and ectasia of the infrarenal abdominal aorta.  There is no focal consolidation, pneumothorax, or large pleural effusion. There are chronic appearing interstitial lung markings.  Right-sided ventricular shunt is present.                                             Assessment/Plan:      * Acute delirium  -similar to previous presentation when symptoms were attributable to acute UTI.    -symptoms continue despite treatment.    -CT head without acute findings.    -neurology consulted.  Symptoms probably related to hypoactive delirium.  Delirium precautions.   Start depakote with prn Zyprexa.  Patient not really sleeping much at night secondary to pruritus.  Hopefully steroids improve rash/pruritus and patient can start sleeping better at night.  This would hopefully improve delirium.      Chronic interstitial lung disease  -appears chronic.  Uncertain etiology.  -Could be related to underlying autoimmune process.  -recommend outpatient pulmonary follow-up.      Enlarged prostate  -outpatient Urology follow-up recommended.      Diverticulosis  -without diverticulitis.      Cholelithiasis  -without cholecystitis.      Elevated  troponin  -denies chest pain   -status post previous CABG.    Cardiology consulted.      Normocytic anemia  -could be related to anemia of chronic inflammation.    -recommend outpatient primary care follow-up as well as GI follow-up.      Rash  -patient reports having rash onset since father's day without previous history.    -reports that he has been to 3 dermatologists and Infectious Disease  -reports he has had skin biopsy about 6 weeks ago.  Not conclusive results.  Currently being treated with Dupixent with no improvement of symptoms.  Discussed with patient's Dermatologist.  Will try IV steroids.    Hyperlipidemia  -statin      Essential hypertension  -continue metoprolol      Stage 3a chronic kidney disease  -baseline creatinine about 1.6-1.8.  Creat at baseline.  Stable.  Avoid nephrotoxic medications.      Polymyalgia rheumatica  -by history, diagnosed in May 2019 treated with prednisone with appropriate response.  No associated GCA.      COPD (chronic obstructive pulmonary disease)  -stable.  Continue as-needed DuoNebs.      PAD (peripheral artery disease)  -continue aspirin/Plavix  -s/p PTA bilat YUE and R EIA 6/2003      Diabetes mellitus with peripheral circulatory disorder  Patient's FSGs are uncontrolled due to hyperglycemia on current medication regimen.  Last A1c reviewed-   Lab Results   Component Value Date    HGBA1C 6.9 (H) 10/15/2023     Most recent fingerstick glucose reviewed-   Recent Labs   Lab 10/16/23  1635 10/16/23  2035 10/17/23  0736 10/17/23  1124   POCTGLUCOSE 133* 251* 180* 300*     Current correctional scale  Low  Maintain anti-hyperglycemic dose as follows-   Antihyperglycemics (From admission, onward)      Start     Stop Route Frequency Ordered    10/15/23 0251  insulin aspart U-100 pen 0-5 Units         -- SubQ Before meals & nightly PRN 10/15/23 0151          Hold Oral hypoglycemics while patient is in the hospital.    Coronary artery disease involving coronary bypass graft of  native heart without angina pectoris  -continue aspirin, Plavix, statin  Elevated Troponin on presentation.  Cardiology consulted.  Consider outpat MPI when he recovers from acute delirium        VTE Risk Mitigation (From admission, onward)           Ordered     enoxaparin injection 40 mg  Daily         10/15/23 0139     IP VTE HIGH RISK PATIENT  Once         10/15/23 0139     Place sequential compression device  Until discontinued         10/15/23 0139                    Discharge Planning   SUSIE: 10/17/2023     Code Status: Full Code   Is the patient medically ready for discharge?:     Reason for patient still in hospital (select all that apply): Patient trending condition  Discharge Plan A: Home with family   Discharge Delays: None known at this time              Charly Garcia MD  Department of Hospital Medicine   [facility] [place] [facility]

## 2023-10-18 NOTE — PT/OT/SLP PROGRESS
Occupational Therapy   Treatment    Name: Narciso Yanez  MRN: [id number]  Admitting Diagnosis:  Acute delirium     Diagnoses of Tachycardia, CHF (congestive heart failure), and Chest pain were pertinent to this visit.  Pre-op Diagnosis: * No surgery found * s/p      Recommendations:     Discharge Recommendations: Low Intensity Therapy (Pt will need caregiver supervision and assistance)  Discharge Equipment Recommendations:  none  Barriers to discharge:  None    Assessment:     Narciso Yanez is a 88 y.o. male with a medical diagnosis of Acute delirium.  He presents with Performance deficits affecting function are weakness, impaired endurance, impaired cognition, decreased ROM, decreased coordination, impaired self care skills, impaired sensation, decreased upper extremity function, impaired skin, decreased lower extremity function, impaired functional mobility, gait instability, impaired balance, decreased safety awareness, pain. Pt Ox 3, not situation. Ambulated Min A with RW in room from bed>sink>bs chair on opposite side of bed ~6' x 2 with minimal posterior LOB and shakiness.  Pt performed g/hygiene Min A standing with RW at sink for balance 2/2 min posterior LOB, unsteadiness and fall risk. Pt would need 24/7 caregiver assist, remains high fall risk and not safe to perform self care and fx ambulation without assist.      Rehab Prognosis:  Good; patient would benefit from acute skilled OT services to address these deficits and reach maximum level of function.       Plan:     Patient to be seen 5 x/week to address the above listed problems via self-care/home management, therapeutic activities, therapeutic exercises  Plan of Care Expires: 10/31/23  Plan of Care Reviewed with: patient    Subjective     Chief Complaint: none  Patient/Family Comments/goals: agreeable to OT  Pain/Comfort:  Pain Rating 1: 0/10  Pain Rating Post-Intervention 1: 0/10    Objective:     Communicated with: nurse prior to session.  Patient  "found HOB elevated with bed alarm, telemetry ([employer] and sitter) upon OT entry to room.    General Precautions: Standard, fall, diabetic    Orthopedic Precautions:N/A  Braces: N/A  Respiratory Status: Room air     Occupational Performance:     Bed Mobility:    Patient completed Scooting/Bridging with stand by assistance and vc for sequencing  Patient completed Supine to Sit with contact guard assistance and segmental rolling tech used with side rails, increased effort needed.      Functional Mobility/Transfers:  Patient completed Sit <> Stand Transfer with minimum assistance and increased effort, vc amd tc to increase trunk anterior WS, increased posterior ws once pushing up to stand  with  rolling walker   Patient completed Bed <> Chair Transfer using Step Transfer technique with minimum assistance with rolling walker and unsteady, posterior WS, minimal posterior LOB, sequencig cues needed  Functional Mobility: ambulated~ 6' x 2 with RW in room bed>sink>bs chair, minimal posterior LOB, WS, unsteady/shakiness.    Activities of Daily Living:  Grooming: minimum assistance standing at sink for balance, as pt washed hands with min sequencing cues and vc for task cessation      [facility] 6 Click ADL: 19    Treatment & Education:  Purpose of OT and POC explained to pt. Amd sitter.   Pt seen for self care and fx mobility retraining with adapted tech and modifications as stated above.  Facilitated anterior trunk ws to increased Indep and safety with sit<>stand. TC and vc given for "nose over toes" WS as visual cue while performed chair 1/2 sit<>stand and push up ex 10 x 3 sets with Min A for 2 sets and CGA 3rd set from BS chair.  Pt performer BUE AROM ex 10 x 1 set along with manual stretch and 3 sec active hold at end of range to strengthen scapula musculature 2/2 proximal weakness and stiffness when lifting arms overhead: shld flex/ext, abd/add, hor abd/add, int/ext rotation with fingers clasped behind head with CGA.  AROM x " 10 elbow flex/ext, sup/pro, wrist flex/ext with SBA, vc and demonstration seated in BS chair.  All questions/concerns addressed within scope.                                                                        Patient left up in chair with all lines intact, call button in reach, and sitterXu  present    GOALS:   Multidisciplinary Problems       Occupational Therapy Goals          Problem: Occupational Therapy    Goal Priority Disciplines Outcome Interventions   Occupational Therapy Goal     OT, PT/OT Ongoing, Progressing    Description: Goals to be met by: 10/31/23     Patient will increase functional independence with ADLs by performing:    UE Dressing with Modified [place].  LE Dressing with Stand-by Assistance.  Grooming while standing at sink with Supervision.  Toileting from bedside commode with Supervision for hygiene and clothing management.   Supine to sit with Modified [place].  Step transfer with Supervision  Toilet transfer to bedside commode with Supervision.  Upper extremity exercise program x15 reps per handout, with independence.                         Time Tracking:     OT Date of Treatment: 10/18/23  OT Start Time: 1415  OT Stop Time: 1440  OT Total Time (min): 25 min    Billable Minutes:Self Care/Home Management 10  Therapeutic Exercise 15  Total Time 25    OT/JACQUELINE: OT     Number of JACQUELINE visits since last OT visit: 0    10/18/2023

## 2023-10-18 NOTE — PT/OT/SLP PROGRESS
Physical Therapy Treatment    Patient Name:  Narciso Yanez   MRN:  [id number]    Recommendations:     Discharge Recommendations: Low Intensity Therapy (Pt will need caregiver supervision and assistance.)  Discharge Equipment Recommendations: none  Barriers to discharge: None    Assessment:     Narciso Yanez is a 88 y.o. male admitted with a medical diagnosis of Acute delirium.  He presents with the following impairments/functional limitations: weakness, impaired endurance, gait instability, impaired balance, decreased upper extremity function, decreased lower extremity function, decreased ROM, impaired functional mobility, decreased coordination, impaired self care skills .    Rehab Prognosis: Good; patient would benefit from acute skilled PT services to address these deficits and reach maximum level of function.    Recent Surgery: * No surgery found *      Plan:     During this hospitalization, patient to be seen 5 x/week to address the identified rehab impairments via gait training, therapeutic activities, therapeutic exercises, wheelchair management/training and progress toward the following goals:    Plan of Care Expires:  10/31/23    Subjective     Chief Complaint: weakness and easily fatigue   Patient/Family Comments/goals: pt is pleasant and agreeable to therapy. Pt O&A x 3 .   Pain/Comfort:  Pain Rating 1: 0/10  Pain Rating Post-Intervention 1: 0/10      Objective:     Communicated with OT  prior to session(pt just finished working with OT) .  Patient found up in chair with telemetry, peripheral IV, bed alarm, tele sitter  upon PT entry to room.     General Precautions: Standard, fall, HOB   Orthopedic Precautions: N/A  Braces: N/A  Respiratory Status: Room air   SpO2: 96%-98% on RA .  Functional Mobility:  Transfers:     Sit to Stand: x 3 trials from chair contact guard assistance with rolling walker. V/T cues for safety, proper technique , hand placement to push off and walker management.   Gait: pt  ambulated 80 ft and 50 ft with RW, CGA(chair followed). Noted with decreased dottie,decreased step length, decreased weight shifting ability ,min body tremors however no LOB.  Pt required 1 standing rest break 2* to fatigue and weakness . V/T cues for safety technique and walker management.   Balance: good in sitting, fair in standing       AM-PAC 6 CLICK MOBILITY  Turning over in bed (including adjusting bedclothes, sheets and blankets)?: 4  Sitting down on and standing up from a chair with arms (e.g., wheelchair, bedside commode, etc.): 3  Moving from lying on back to sitting on the side of the bed?: 3  Moving to and from a bed to a chair (including a wheelchair)?: 3  Need to walk in hospital room?: 3  Climbing 3-5 steps with a railing?: 3  Basic Mobility Total Score: 19       Treatment & Education:  Lower Extremity Exercises.    Patient educated on: Purpose and Benefits of Therapeutic Exercise(s).    Patient verbalized acceptance/understanding of instruction(s), expectation(s), and limitation(s) (for safety).  Patient performed: 2 sets of 10 reps (each) of B LE Ther Ex: AP, LAQ, HSC, Hip abd/add, Hip flexion while sitting up on chair .    Performed BLE x 20 reps in reclined chair.       Patient required rest breaks , verbal cues/tactile cues to ensure correct sequence, to maintain proper form, and to allow for self-correction.    Patient left up in reclined chair with BLE elevated , heels offloading, tray table in front all lines intact, call button in reach, bed alarm on, nurse [place]/PCT  notified, and [employer] present..    GOALS:   Multidisciplinary Problems       Physical Therapy Goals          Problem: Physical Therapy    Goal Priority Disciplines Outcome Goal Variances Interventions   Physical Therapy Goal     PT, PT/OT Ongoing, Progressing     Description: Goals to be met by: 10/31/23     Patient will increase functional independence with mobility by performin. Supine to sit with Modified  [place]  2. Rolling to Left and Right with Modified [place]  3. Sit to stand transfer with Modified [place] using RW  4. Bed to chair transfer with Modified [place] using Rolling Walker  5. Gait x250 feet with Modified [place] using Rolling Walker   6. Wheelchair propulsion x250 feet with Modified [place] using bilateral upper extremities  7. Lower extremity exercise program 2 sets x15 reps per handout, with independence                         Time Tracking:     PT Received On: 10/18/23  PT Start Time: 1455     PT Stop Time: 1533  PT Total Time (min): 38 min     Billable Minutes: Gait Training 16, Therapeutic Activity 8, and Therapeutic Exercise 14    Treatment Type: Treatment  PT/PTA: PTA     Number of PTA visits since last PT visit: 1     10/18/2023

## 2023-10-18 NOTE — PLAN OF CARE
"Pt stated " I want to go home." No new needs identified. CM will assist as needed.     10/18/23 1254   Discharge Reassessment   Assessment Type Discharge Planning Reassessment   Did the patient's condition or plan change since previous assessment? No   Discharge Plan discussed with: Patient   Discharge Plan A Home with family   Discharge Plan B Other  (tbd)   DME Needed Upon Discharge  other (see comments)  (tbd)   Transition of Care Barriers None   Why the patient remains in the hospital Requires continued medical care   Post-Acute Status   Coverage [employer] HMO   Discharge Delays None known at this time       "

## 2023-10-18 NOTE — NURSING
[facility], [facility]  Nurses Note -- 4 Eyes      10/18/2023       Skin assessed on: Q Shift      [x] No Pressure Injuries Present    [x]Prevention Measures Documented    [] Yes LDA  for Pressure Injury Previously documented     [] Yes New Pressure Injury Discovered   [] LDA for New Pressure Injury Added      Attending RN:  Pat Link RN     Second RN:  ANUPAM Borjas

## 2023-10-19 LAB
ALBUMIN SERPL BCP-MCNC: 2.3 G/DL (ref 3.5–5.2)
ALP SERPL-CCNC: 89 U/L (ref 55–135)
ALT SERPL W/O P-5'-P-CCNC: 21 U/L (ref 10–44)
ANION GAP SERPL CALC-SCNC: 11 MMOL/L (ref 8–16)
AST SERPL-CCNC: 18 U/L (ref 10–40)
BASOPHILS # BLD AUTO: 0.01 K/UL (ref 0–0.2)
BASOPHILS NFR BLD: 0.1 % (ref 0–1.9)
BILIRUB SERPL-MCNC: 0.1 MG/DL (ref 0.1–1)
BUN SERPL-MCNC: 31 MG/DL (ref 8–23)
CALCIUM SERPL-MCNC: 8.3 MG/DL (ref 8.7–10.5)
CHLORIDE SERPL-SCNC: 103 MMOL/L (ref 95–110)
CO2 SERPL-SCNC: 22 MMOL/L (ref 23–29)
CREAT SERPL-MCNC: 1.7 MG/DL (ref 0.5–1.4)
DIFFERENTIAL METHOD: ABNORMAL
EOSINOPHIL # BLD AUTO: 0 K/UL (ref 0–0.5)
EOSINOPHIL NFR BLD: 0 % (ref 0–8)
ERYTHROCYTE [DISTWIDTH] IN BLOOD BY AUTOMATED COUNT: 15.5 % (ref 11.5–14.5)
EST. GFR  (NO RACE VARIABLE): 39 ML/MIN/1.73 M^2
GLUCOSE SERPL-MCNC: 149 MG/DL (ref 70–110)
HCT VFR BLD AUTO: 26.8 % (ref 40–54)
HGB BLD-MCNC: 8.7 G/DL (ref 14–18)
IMM GRANULOCYTES # BLD AUTO: 0.12 K/UL (ref 0–0.04)
IMM GRANULOCYTES NFR BLD AUTO: 0.8 % (ref 0–0.5)
LYMPHOCYTES # BLD AUTO: 2.2 K/UL (ref 1–4.8)
LYMPHOCYTES NFR BLD: 15.2 % (ref 18–48)
MAGNESIUM SERPL-MCNC: 2.1 MG/DL (ref 1.6–2.6)
MCH RBC QN AUTO: 31.3 PG (ref 27–31)
MCHC RBC AUTO-ENTMCNC: 32.5 G/DL (ref 32–36)
MCV RBC AUTO: 96 FL (ref 82–98)
MONOCYTES # BLD AUTO: 0.6 K/UL (ref 0.3–1)
MONOCYTES NFR BLD: 4.3 % (ref 4–15)
NEUTROPHILS # BLD AUTO: 11.4 K/UL (ref 1.8–7.7)
NEUTROPHILS NFR BLD: 79.6 % (ref 38–73)
NRBC BLD-RTO: 0 /100 WBC
PHOSPHATE SERPL-MCNC: 3.6 MG/DL (ref 2.7–4.5)
PLATELET # BLD AUTO: 329 K/UL (ref 150–450)
PMV BLD AUTO: 9.6 FL (ref 9.2–12.9)
POCT GLUCOSE: 148 MG/DL (ref 70–110)
POCT GLUCOSE: 201 MG/DL (ref 70–110)
POCT GLUCOSE: 283 MG/DL (ref 70–110)
POCT GLUCOSE: 408 MG/DL (ref 70–110)
POTASSIUM SERPL-SCNC: 4.4 MMOL/L (ref 3.5–5.1)
PROT SERPL-MCNC: 6 G/DL (ref 6–8.4)
RBC # BLD AUTO: 2.78 M/UL (ref 4.6–6.2)
SODIUM SERPL-SCNC: 136 MMOL/L (ref 136–145)
WBC # BLD AUTO: 14.27 K/UL (ref 3.9–12.7)

## 2023-10-19 PROCEDURE — 97110 THERAPEUTIC EXERCISES: CPT | Mod: HCNC,CQ

## 2023-10-19 PROCEDURE — 83735 ASSAY OF MAGNESIUM: CPT | Mod: HCNC | Performed by: STUDENT IN AN ORGANIZED HEALTH CARE EDUCATION/TRAINING PROGRAM

## 2023-10-19 PROCEDURE — 85025 COMPLETE CBC W/AUTO DIFF WBC: CPT | Mod: HCNC | Performed by: STUDENT IN AN ORGANIZED HEALTH CARE EDUCATION/TRAINING PROGRAM

## 2023-10-19 PROCEDURE — 63600175 PHARM REV CODE 636 W HCPCS: Mod: HCNC | Performed by: HOSPITALIST

## 2023-10-19 PROCEDURE — 80053 COMPREHEN METABOLIC PANEL: CPT | Mod: HCNC | Performed by: STUDENT IN AN ORGANIZED HEALTH CARE EDUCATION/TRAINING PROGRAM

## 2023-10-19 PROCEDURE — 25000003 PHARM REV CODE 250: Mod: HCNC | Performed by: HOSPITALIST

## 2023-10-19 PROCEDURE — 63600175 PHARM REV CODE 636 W HCPCS: Mod: HCNC | Performed by: INTERNAL MEDICINE

## 2023-10-19 PROCEDURE — S4991 NICOTINE PATCH NONLEGEND: HCPCS | Mod: HCNC | Performed by: HOSPITALIST

## 2023-10-19 PROCEDURE — 36415 COLL VENOUS BLD VENIPUNCTURE: CPT | Mod: HCNC | Performed by: STUDENT IN AN ORGANIZED HEALTH CARE EDUCATION/TRAINING PROGRAM

## 2023-10-19 PROCEDURE — 21400001 HC TELEMETRY ROOM: Mod: HCNC

## 2023-10-19 PROCEDURE — 25000003 PHARM REV CODE 250: Mod: HCNC | Performed by: INTERNAL MEDICINE

## 2023-10-19 PROCEDURE — 84100 ASSAY OF PHOSPHORUS: CPT | Mod: HCNC | Performed by: STUDENT IN AN ORGANIZED HEALTH CARE EDUCATION/TRAINING PROGRAM

## 2023-10-19 PROCEDURE — 97116 GAIT TRAINING THERAPY: CPT | Mod: HCNC,CQ

## 2023-10-19 PROCEDURE — 97530 THERAPEUTIC ACTIVITIES: CPT | Mod: HCNC,CQ

## 2023-10-19 RX ADMIN — Medication 1 PATCH: at 08:10

## 2023-10-19 RX ADMIN — DIVALPROEX SODIUM 250 MG: 125 CAPSULE, COATED PELLETS ORAL at 08:10

## 2023-10-19 RX ADMIN — METOPROLOL SUCCINATE 25 MG: 25 TABLET, EXTENDED RELEASE ORAL at 08:10

## 2023-10-19 RX ADMIN — CLOPIDOGREL BISULFATE 75 MG: 75 TABLET ORAL at 08:10

## 2023-10-19 RX ADMIN — ENOXAPARIN SODIUM 40 MG: 40 INJECTION SUBCUTANEOUS at 04:10

## 2023-10-19 RX ADMIN — INSULIN ASPART 5 UNITS: 100 INJECTION, SOLUTION INTRAVENOUS; SUBCUTANEOUS at 05:10

## 2023-10-19 RX ADMIN — INSULIN ASPART 1 UNITS: 100 INJECTION, SOLUTION INTRAVENOUS; SUBCUTANEOUS at 10:10

## 2023-10-19 RX ADMIN — MELATONIN TAB 3 MG 6 MG: 3 TAB at 10:10

## 2023-10-19 RX ADMIN — METHYLPREDNISOLONE SODIUM SUCCINATE 125 MG: 125 INJECTION, POWDER, FOR SOLUTION INTRAMUSCULAR; INTRAVENOUS at 09:10

## 2023-10-19 RX ADMIN — ASPIRIN 81 MG: 81 TABLET, COATED ORAL at 08:10

## 2023-10-19 RX ADMIN — DIVALPROEX SODIUM 500 MG: 125 CAPSULE ORAL at 10:10

## 2023-10-19 RX ADMIN — INSULIN ASPART 2 UNITS: 100 INJECTION, SOLUTION INTRAVENOUS; SUBCUTANEOUS at 12:10

## 2023-10-19 RX ADMIN — ATORVASTATIN CALCIUM 40 MG: 40 TABLET, FILM COATED ORAL at 08:10

## 2023-10-19 NOTE — NURSING
[facility], [facility]  Nurses Note -- 4 Eyes      10/19/2023       Skin assessed on: Q Shift      [x] No Pressure Injuries Present    []Prevention Measures Documented  -several skin tears noted to bilateral extremities    [] Yes LDA  for Pressure Injury Previously documented     [] Yes New Pressure Injury Discovered   [] LDA for New Pressure Injury Added      Attending RN:  Rhiannon Roman LPN     Second RN:  Aurora BustamanteRN

## 2023-10-19 NOTE — NURSING
Serum glucose:433 asymptomatic. NAD noted. Dr. Hilton notified new order to give 5 units of novolog added to sliding scale and hold 2200 scheduled Solu-medrol 125mg IVP until BG less than 200. Will recheck in 2 hours per MD order.

## 2023-10-19 NOTE — PROGRESS NOTES
[facility] Medicine  Progress Note    Patient Name: Narciso Yanez  MRN: [id number]  Patient Class: IP- Inpatient   Admission Date: 10/14/2023  Length of Stay: 4 days  Attending Physician: Charly Garcia MD  Primary Care Provider: Marcelino Del Toro MD        Subjective:     Principal Problem:Acute delirium        HPI:  Mr Yanez is a 88-year-old male with previous history of coronary artery disease status post previous CABG, COPD, PAD s/p PTA bilat YUE and R EIA 6/2003,  hypertension, dyslipidemia, CKD 3A, type 2 diabetes mellitus, PUD, polymyalgia rheumatica, NPH status post  shunt placement in 2021, who was recently discharged from the hospital on 10/11/2023 after being admitted for increased confusion and altered mental status with some hallucinations and frequent falls.  He had an abnormal UA which resulted Proteus and Enterococcus and was subsequently discharged with Augmentin.  He does have diffusely pruritic rash involving multiple areas and has been evaluated by dermatologist as outpatient without confirmatory diagnosis.  As per family members and nursing staff the patient had been delirious for example, asking the nurse if she got her food during the game while in the hospital.    In the ER, his vitals are within normal limits, his CBC shows normal white count 9.8, hemoglobin 9.7 (normocytic), with previous baseline of 11-12 since 2022, platelets 342 K.  His BNP shows mild metabolic acidosis CO2 17, creatinine 1.3 which is better than his previous creatinine of 1.8 on 10/09/2023, mild hyperglycemia at 130.  His BNP is 223, and seems that his previous BNP on 10/09/2023 was 151 and prior to that in on 09/15/2023 was within normal limits.  His troponin is 0.107, .  His CT abdomen pelvis showed nonacute findings of cholelithiasis, vascular calcifications, diverticulosis, prostatomegaly, nonspecific infiltration of the left lateral subcutaneous fat tissue and small hiatal hernia.   His CT head is within normal limits.  His chest x-ray reveals chronic interstitial lung markings, right-sided ventricular shunt present.  In the ER, appears to have received 500 cc of normal saline as well as 40 mg of IV Lasix.  Admission has been requested for further evaluation and treatment.      Overview/Hospital Course:  89 y/o male presents with change in mental status.  I treated this patient last week for similar issues.  Patient diagnosed with UTI and treated with ABX's.  Patient has remained confused with no improvement with ABX's treatment.  Neurology consulted.  Changes in mental status secondary to hypoactive delirium.  Patient has been dealing with a generalized pruritic rash for several months causing significant decline in overall functional status.  Followed by Dermatology with no specific diagnosis.  Discussed with his Dermatologist and decided to start IV steroids.     Sed rate 100, , with normal WBC count and afebrile-likely autoimmune or inflammation not associated with infection  Rash appears to be improving with IV steroids.  Speaking fluently, and AO x4-will remove sitter.    Interval History:  NAEON.  No new issues.   Talking much clearer today. Conversing well.   All questions answered and updates on care given.       ROS:  General: Negative for fevers or chills.  Cardiac: Negative for chest pain or orthopnea   Pulmonary: Negative for dyspnea or wheezing.  GI: Negative for abdominal distention or pain     Vitals:    10/18/23 2350 10/19/23 0439 10/19/23 0732 10/19/23 1149   BP: (!) 136/57 127/66 136/63 (!) 141/62   BP Location: Right arm Right arm Right arm Left arm   Patient Position: Lying Lying Lying Sitting   Pulse: 82 76 88 79   Resp: 17 17 18    Temp: 97.4 °F (36.3 °C) 98.5 °F (36.9 °C) 97.8 °F (36.6 °C) 97.4 °F (36.3 °C)   TempSrc: Oral Oral Oral Axillary   SpO2: 95% (!) 94% 96% 99%   Weight:  59 kg (130 lb 1.1 oz)     Height:              Body mass index is 20.37  kg/m².      PHYSICAL EXAM:  GENERAL APPEARANCE: alert and cooperative, and appears to be in no acute distress.  HEENT:     HEAD: NC/AT     EYES: PERRL, EOMI.  Vision is grossly intact.  NECK: Neck supple, non-tender without LAD, masses or thyromegaly.  CARDIAC: There is no peripheral edema, cyanosis or pallor.   LUNGS: Clear to auscultation and percussion without rales or wheezing  ABDOMEN: Non-distended. No guarding.  MSK: No joint erythema or tenderness.   EXTREMITIES: No significant deformity or joint abnormality. No edema.   NEUROLOGICAL: CN II-XII grossly intact.   SKIN:   Diffuse rash covering majority of limbs, minor on torso  Desquamation of both palms and soles  Erythema on limbs   PSYCHIATRIC: Oriented. No tangential speech. No Hyperactive features.        Recent Results (from the past 24 hour(s))   POCT glucose    Collection Time: 10/18/23  4:31 PM   Result Value Ref Range    POCT Glucose 374 (H) 70 - 110 mg/dL   POCT glucose    Collection Time: 10/18/23  7:12 PM   Result Value Ref Range    POCT Glucose 439 (H) 70 - 110 mg/dL   POCT glucose    Collection Time: 10/18/23  7:22 PM   Result Value Ref Range    POCT Glucose >500 (HH) 70 - 110 mg/dL   Glucose, random    Collection Time: 10/18/23  7:40 PM   Result Value Ref Range    Glucose 433 (H) 70 - 110 mg/dL   POCT glucose    Collection Time: 10/18/23 11:48 PM   Result Value Ref Range    POCT Glucose 168 (H) 70 - 110 mg/dL   Magnesium    Collection Time: 10/19/23  3:32 AM   Result Value Ref Range    Magnesium 2.1 1.6 - 2.6 mg/dL   Phosphorus    Collection Time: 10/19/23  3:32 AM   Result Value Ref Range    Phosphorus 3.6 2.7 - 4.5 mg/dL   Comprehensive Metabolic Panel    Collection Time: 10/19/23  3:32 AM   Result Value Ref Range    Sodium 136 136 - 145 mmol/L    Potassium 4.4 3.5 - 5.1 mmol/L    Chloride 103 95 - 110 mmol/L    CO2 22 (L) 23 - 29 mmol/L    Glucose 149 (H) 70 - 110 mg/dL    BUN 31 (H) 8 - 23 mg/dL    Creatinine 1.7 (H) 0.5 - 1.4 mg/dL     Calcium 8.3 (L) 8.7 - 10.5 mg/dL    Total Protein 6.0 6.0 - 8.4 g/dL    Albumin 2.3 (L) 3.5 - 5.2 g/dL    Total Bilirubin 0.1 0.1 - 1.0 mg/dL    Alkaline Phosphatase 89 55 - 135 U/L    AST 18 10 - 40 U/L    ALT 21 10 - 44 U/L    eGFR 39 (A) >60 mL/min/1.73 m^2    Anion Gap 11 8 - 16 mmol/L   CBC Auto Differential    Collection Time: 10/19/23  3:32 AM   Result Value Ref Range    WBC 14.27 (H) 3.90 - 12.70 K/uL    RBC 2.78 (L) 4.60 - 6.20 M/uL    Hemoglobin 8.7 (L) 14.0 - 18.0 g/dL    Hematocrit 26.8 (L) 40.0 - 54.0 %    MCV 96 82 - 98 fL    MCH 31.3 (H) 27.0 - 31.0 pg    MCHC 32.5 32.0 - 36.0 g/dL    RDW 15.5 (H) 11.5 - 14.5 %    Platelets 329 150 - 450 K/uL    MPV 9.6 9.2 - 12.9 fL    Immature Granulocytes 0.8 (H) 0.0 - 0.5 %    Gran # (ANC) 11.4 (H) 1.8 - 7.7 K/uL    Immature Grans (Abs) 0.12 (H) 0.00 - 0.04 K/uL    Lymph # 2.2 1.0 - 4.8 K/uL    Mono # 0.6 0.3 - 1.0 K/uL    Eos # 0.0 0.0 - 0.5 K/uL    Baso # 0.01 0.00 - 0.20 K/uL    nRBC 0 0 /100 WBC    Gran % 79.6 (H) 38.0 - 73.0 %    Lymph % 15.2 (L) 18.0 - 48.0 %    Mono % 4.3 4.0 - 15.0 %    Eosinophil % 0.0 0.0 - 8.0 %    Basophil % 0.1 0.0 - 1.9 %    Differential Method Automated    POCT glucose    Collection Time: 10/19/23  8:02 AM   Result Value Ref Range    POCT Glucose 148 (H) 70 - 110 mg/dL   POCT glucose    Collection Time: 10/19/23 11:51 AM   Result Value Ref Range    POCT Glucose 201 (H) 70 - 110 mg/dL       Microbiology Results (last 7 days)       ** No results found for the last 168 hours. **             Imaging Results              CT Abdomen Pelvis With Contrast (Final result)  Result time 10/14/23 23:45:10      Final result by Gretel Mata MD (10/14/23 23:45:10)                   Impression:      Cholelithiasis without evidence of acute cholecystitis.    Advanced vascular calcification.  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    Colonic diverticulosis.    Prostatomegaly.    Nonspecific infiltration the subcutaneous fat lateral  to the left hip.  Question any history of recent trauma or contusion.    Small hiatal hernia.      Electronically signed by: Gretel Mata  Date:    10/14/2023  Time:    23:45               Narrative:    EXAMINATION:  CT OF ABDOMEN PELVIS WITH    CLINICAL HISTORY:  Abdominal abscess/infection suspected;    TECHNIQUE:  5 mm enhanced axial images were obtained from the lung bases through the greater trochanters.  Seventy-five mL of Omnipaque 350 was injected.    COMPARISON:  06/23/2022    FINDINGS:  A ventricular shunt catheter is present.  The liver, spleen, pancreas, and adrenal glands are unremarkable. The gallbladder contains a few calcified gallstones.    Bilateral renal cysts are present.    There is no definite evidence for abdominal adenopathy or ascites.    Advanced vascular calcifications are present.  There is ectasia of the infrarenal abdominal aorta measuring up to 2.6 cm.  There are bi iliac arterial stents.    There is colonic diverticulosis.  Is moderate colonic stool.  The prostate gland is enlarged measuring approximately 5.2 in width x 5.8 cm in AP dimensions (series 2 axial image 153).  Consider correlation with PSA.    There is infiltration in the subcutaneous fat lateral to the left hip.  There is no free fluid in the pelvis.    At the lung bases, there is a small hiatal hernia.  Fibrotic changes or chronic interstitial lung disease are seen.  Noted there is minimal dextroscoliosis.  Spondylitic changes are present.  There are median sternotomy changes.                                       CT Head Without Contrast (Final result)  Result time 10/14/23 23:16:02      Final result by Terry Clinton MD (10/14/23 23:16:02)                   Impression:      No acute abnormality.      Electronically signed by: Terry Clinton  Date:    10/14/2023  Time:    23:16               Narrative:    EXAMINATION:  CT HEAD WITHOUT CONTRAST    CLINICAL HISTORY:  Mental status change, unknown  cause;    TECHNIQUE:  Low dose axial CT images obtained throughout the head without intravenous contrast. Sagittal and coronal reconstructions were performed.    COMPARISON:  None.    FINDINGS:  Intracranial compartment:    Ventricles and sulci are stable in size for age without evidence of hydrocephalus.   shunt tube from posterior parietal approach on the right appear stable.  Its tip resides just beyond the fornix in the left lateral ventricle frontal horn no extra-axial blood or fluid collections.    The brain parenchyma appears stable.  Stable low-density in the deep white matter compatible with chronic small vessel changes.  No parenchymal mass, hemorrhage, edema or major vascular distribution infarct.    Skull/extracranial contents (limited evaluation): No fracture. Mastoid air cells and paranasal sinuses are essentially clear.  Ocular lens replacements are noted.                                       X-Ray Chest AP Portable (Final result)  Result time 10/14/23 22:28:10      Final result by Gretel Mata MD (10/14/23 22:28:10)                   Impression:      Above described.      Electronically signed by: Gretel Mata  Date:    10/14/2023  Time:    22:28               Narrative:    EXAMINATION:  AP PORTABLE CHEST    CLINICAL HISTORY:  tachycardia;    TECHNIQUE:  AP portable chest radiograph was submitted.    COMPARISON:  10/09/2023    FINDINGS:  Median sternotomy changes are present.  AP portable chest radiograph demonstrates a cardiac silhouette within normal limits.  There is tortuosity and ectasia of the infrarenal abdominal aorta.  There is no focal consolidation, pneumothorax, or large pleural effusion. There are chronic appearing interstitial lung markings.  Right-sided ventricular shunt is present.                                             Assessment/Plan:      * Acute delirium  -similar to previous presentation when symptoms were attributable to acute UTI.    -symptoms continue  despite treatment.    -CT head without acute findings.    -neurology consulted.  Symptoms probably related to hypoactive delirium.  Delirium precautions.   Start depakote with prn Zyprexa.  Patient not really sleeping much at night secondary to pruritus.  Hopefully steroids improve rash/pruritus and patient can start sleeping better at night.  This would hopefully improve delirium.      Chronic interstitial lung disease  -appears chronic.  Uncertain etiology.  -Could be related to underlying autoimmune process.  -recommend outpatient pulmonary follow-up.      Enlarged prostate  -outpatient Urology follow-up recommended.      Diverticulosis  -without diverticulitis.      Cholelithiasis  -without cholecystitis.      Elevated troponin  -denies chest pain   -status post previous CABG.    Cardiology consulted.      Normocytic anemia  -could be related to anemia of chronic inflammation.    -recommend outpatient primary care follow-up as well as GI follow-up.      Rash  -patient reports having rash onset since father's day without previous history.    -reports that he has been to 3 dermatologists and Infectious Disease  -reports he has had skin biopsy about 6 weeks ago.  Not conclusive results.  Currently being treated with Dupixent with no improvement of symptoms.  Discussed with patient's Dermatologist.  Will try IV steroids.    Hyperlipidemia  -statin      Essential hypertension  -continue metoprolol      Stage 3a chronic kidney disease  -baseline creatinine about 1.6-1.8.  Creat at baseline.  Stable.  Avoid nephrotoxic medications.      Polymyalgia rheumatica  -by history, diagnosed in May 2019 treated with prednisone with appropriate response.  No associated GCA.      COPD (chronic obstructive pulmonary disease)  -stable.  Continue as-needed DuoNebs.      PAD (peripheral artery disease)  -continue aspirin/Plavix  -s/p PTA bilat YUE and R EIA 6/2003      Diabetes mellitus with peripheral circulatory disorder  Patient's  FSGs are uncontrolled due to hyperglycemia on current medication regimen.  Last A1c reviewed-   Lab Results   Component Value Date    HGBA1C 6.9 (H) 10/15/2023     Most recent fingerstick glucose reviewed-   Recent Labs   Lab 10/16/23  1635 10/16/23  2035 10/17/23  0736 10/17/23  1124   POCTGLUCOSE 133* 251* 180* 300*     Current correctional scale  Low  Maintain anti-hyperglycemic dose as follows-   Antihyperglycemics (From admission, onward)      Start     Stop Route Frequency Ordered    10/15/23 0251  insulin aspart U-100 pen 0-5 Units         -- SubQ Before meals & nightly PRN 10/15/23 0151          Hold Oral hypoglycemics while patient is in the hospital.    Coronary artery disease involving coronary bypass graft of native heart without angina pectoris  -continue aspirin, Plavix, statin  Elevated Troponin on presentation.  Cardiology consulted.  Consider outpat MPI when he recovers from acute delirium        VTE Risk Mitigation (From admission, onward)           Ordered     enoxaparin injection 40 mg  Daily         10/15/23 0139     IP VTE HIGH RISK PATIENT  Once         10/15/23 0139     Place sequential compression device  Until discontinued         10/15/23 0139                    Discharge Planning   SUSIE: 10/17/2023     Code Status: Full Code   Is the patient medically ready for discharge?:     Reason for patient still in hospital (select all that apply): Patient trending condition  Discharge Plan A: Home with family   Discharge Delays: None known at this time              Charly Garcia MD  Department of Hospital Medicine   [facility] - [place] - Telemetry

## 2023-10-19 NOTE — PLAN OF CARE
Problem: Adult Inpatient Plan of Care  Goal: Plan of Care Review  Outcome: Ongoing, Progressing     Problem: Diabetes Comorbidity  Goal: Blood Glucose Level Within Targeted Range  Outcome: Ongoing, Progressing     Problem: Fluid and Electrolyte Imbalance (Acute Kidney Injury/Impairment)  Goal: Fluid and Electrolyte Balance  Outcome: Ongoing, Progressing     Problem: Fall Injury Risk  Goal: Absence of Fall and Fall-Related Injury  Outcome: Ongoing, Progressing     Problem: Impaired Wound Healing  Goal: Optimal Wound Healing  Outcome: Ongoing, Progressing

## 2023-10-19 NOTE — PT/OT/SLP PROGRESS
Physical Therapy Treatment    Patient Name:  Narciso Yanez   MRN:  [id number]    Recommendations:     Discharge Recommendations: Low Intensity Therapy (Pt will need caregiver supervision and assistance.)  Discharge Equipment Recommendations: none  Barriers to discharge: None    Assessment:     Narciso Yanez is a 88 y.o. male admitted with a medical diagnosis of Acute delirium.  He presents with the following impairments/functional limitations: weakness, impaired endurance, impaired self care skills, gait instability, impaired balance, decreased lower extremity function, impaired functional mobility, decreased ROM, pain, decreased safety awareness, decreased upper extremity function, impaired skin .    Rehab Prognosis: Good; patient would benefit from acute skilled PT services to address these deficits and reach maximum level of function.    Recent Surgery: * No surgery found *      Plan:     During this hospitalization, patient to be seen 5 x/week to address the identified rehab impairments via gait training, therapeutic activities, therapeutic exercises, wheelchair management/training and progress toward the following goals:    Plan of Care Expires:  10/31/23    Subjective     Chief Complaint: weakness   Patient/Family Comments/goals: pt is pleasant and agreeable to therapy   Pain/Comfort:  Pain Rating 1: 0/10  Pain Rating Post-Intervention 1: 0/10      Objective:     Communicated with nurse Jurado  prior to session.  Patient found HOB elevated with telemetry, peripheral IV, bed alarm, PureWick (pt found soiled with urine 2* to male purewick malfunction ) upon PT entry to room.     General Precautions: Standard, fall  Orthopedic Precautions: N/A  Braces: N/A  Respiratory Status: Room air     Functional Mobility:  Bed Mobility:     Rolling Left:  stand by assistance  Scooting: contact guard assistance  Supine to Sit: minimum assistance  Transfers:     Sit <> Stand:  2 trials from bed, 10 reps x 2 trials and 5  trials from chair  contact guard assistance with rolling walker.V/T cues for safety, proper technique and walker management.   Bed to Chair: contact guard assistance with  rolling walker  using  Step Transfer  Gait:  : pt ambulated 120 ft with RW, CGA(chair followed). Noted with decreased dottie,decreased step length, decreased weight shifting ability ,min body tremors however no LOB.  Pt required 1 standing rest break 2* to fatigue and weakness . V/T cues for safety technique and walker management.   Balance: good in sitting, fair in standing      AM-PAC 6 CLICK MOBILITY  Turning over in bed (including adjusting bedclothes, sheets and blankets)?: 4  Sitting down on and standing up from a chair with arms (e.g., wheelchair, bedside commode, etc.): 4  Moving from lying on back to sitting on the side of the bed?: 3  Moving to and from a bed to a chair (including a wheelchair)?: 3  Need to walk in hospital room?: 3  Climbing 3-5 steps with a railing?: 3  Basic Mobility Total Score: 20       Treatment & Education:     Lower Extremity Exercises.    Patient educated on: Purpose and Benefits of Therapeutic Exercise(s).    Patient verbalized acceptance/understanding of instruction(s), expectation(s), and limitation(s) (for safety).  Patient performed: 2 sets of 10 reps (each) of B LE Ther Ex: AP, LAQ, Hip abd/add, Hip flexion while sitting up on EOB.   Performed standing BLE x 10 reps x 2: heels raised, hip flexion with RW, CGA .      Patient required  verbal cues/tactile cues to ensure correct sequence, to maintain proper form, and to allow for self-correction.  Pt reported no complaints or problems with exercise activity.   Changed gown in sitting with set up assistance .   Patient left up in chair on green air cushion with all lines intact, call button in reach, and nurse notified..    GOALS:   Multidisciplinary Problems       Physical Therapy Goals          Problem: Physical Therapy    Goal Priority Disciplines Outcome  Goal Variances Interventions   Physical Therapy Goal     PT, PT/OT Ongoing, Progressing     Description: Goals to be met by: 10/31/23     Patient will increase functional independence with mobility by performin. Supine to sit with Modified [place]  2. Rolling to Left and Right with Modified [place]  3. Sit to stand transfer with Modified [place] using RW  4. Bed to chair transfer with Modified [place] using Rolling Walker  5. Gait x250 feet with Modified [place] using Rolling Walker   6. Wheelchair propulsion x250 feet with Modified [place] using bilateral upper extremities  7. Lower extremity exercise program 2 sets x15 reps per handout, with independence                         Time Tracking:     PT Received On: 10/19/23  PT Start Time: 1108     PT Stop Time: 1147  PT Total Time (min): 39 min     Billable Minutes: Gait Training 15, Therapeutic Activity 10, and Therapeutic Exercise 14    Treatment Type: Treatment  PT/PTA: PTA     Number of PTA visits since last PT visit: 2     10/19/2023

## 2023-10-19 NOTE — PT/OT/SLP PROGRESS
Occupational Therapy      Patient Name:  Narciso Yanez   MRN:  [id number]    Patient not seen today secondary to Nursing care. Will follow-up later as able.    10/19/2023

## 2023-10-19 NOTE — PLAN OF CARE
Problem: Adult Inpatient Plan of Care  Goal: Plan of Care Review  Outcome: Ongoing, Progressing  Goal: Patient-Specific Goal (Individualized)  Outcome: Ongoing, Progressing  Goal: Absence of Hospital-Acquired Illness or Injury  Outcome: Ongoing, Progressing  Intervention: Identify and Manage Fall Risk  Flowsheets (Taken 10/19/2023 0403)  Safety Promotion/Fall Prevention:   assistive device/personal item within reach   side rails raised x 2  Intervention: Prevent Skin Injury  Flowsheets (Taken 10/19/2023 0403)  Body Position:   position changed independently   position maintained  Skin Protection: incontinence pads utilized  Intervention: Prevent and Manage VTE (Venous Thromboembolism) Risk  Flowsheets (Taken 10/19/2023 0403)  Activity Management: Rolling - L1  Intervention: Prevent Infection  Flowsheets (Taken 10/19/2023 0403)  Infection Prevention: single patient room provided  Goal: Optimal Comfort and Wellbeing  Outcome: Ongoing, Progressing  Intervention: Monitor Pain and Promote Comfort  Flowsheets (Taken 10/19/2023 0403)  Pain Management Interventions:   quiet environment facilitated   relaxation techniques promoted  Intervention: Provide Person-Centered Care  Flowsheets (Taken 10/19/2023 0403)  Trust Relationship/Rapport:   care explained   choices provided   emotional support provided   empathic listening provided   questions answered  Goal: Readiness for Transition of Care  Outcome: Ongoing, Progressing     Problem: Diabetes Comorbidity  Goal: Blood Glucose Level Within Targeted Range  Outcome: Ongoing, Progressing  Intervention: Monitor and Manage Glycemia  Flowsheets (Taken 10/19/2023 0403)  Glycemic Management: blood glucose monitored     Problem: Fall Injury Risk  Goal: Absence of Fall and Fall-Related Injury  Outcome: Ongoing, Progressing  Intervention: Identify and Manage Contributors  Flowsheets (Taken 10/19/2023 0403)  Self-Care Promotion:   independence encouraged   BADL personal objects within  reach   BADL personal routines maintained  Medication Review/Management: medications reviewed  Intervention: Promote Injury-Free Environment  Flowsheets (Taken 10/19/2023 0403)  Safety Promotion/Fall Prevention:   assistive device/personal item within reach   side rails raised x 2     Problem: Skin Injury Risk Increased  Goal: Skin Health and Integrity  Outcome: Ongoing, Progressing  Intervention: Optimize Skin Protection  Flowsheets (Taken 10/19/2023 0403)  Pressure Reduction Techniques: frequent weight shift encouraged  Pressure Reduction Devices: pressure-redistributing mattress utilized  Skin Protection: incontinence pads utilized  Head of Bed (HOB) Positioning: HOB elevated

## 2023-10-20 ENCOUNTER — TELEPHONE (OUTPATIENT)
Dept: HEMATOLOGY/ONCOLOGY | Facility: CLINIC | Age: 88
End: 2023-10-20
Payer: MEDICARE

## 2023-10-20 ENCOUNTER — OUTPATIENT CASE MANAGEMENT (OUTPATIENT)
Dept: ADMINISTRATIVE | Facility: OTHER | Age: 88
End: 2023-10-20
Payer: MEDICARE

## 2023-10-20 LAB
ALBUMIN SERPL BCP-MCNC: 2.5 G/DL (ref 3.5–5.2)
ALP SERPL-CCNC: 84 U/L (ref 55–135)
ALT SERPL W/O P-5'-P-CCNC: 26 U/L (ref 10–44)
ANION GAP SERPL CALC-SCNC: 9 MMOL/L (ref 8–16)
AST SERPL-CCNC: 24 U/L (ref 10–40)
BASOPHILS # BLD AUTO: 0.02 K/UL (ref 0–0.2)
BASOPHILS # BLD AUTO: 0.02 K/UL (ref 0–0.2)
BASOPHILS NFR BLD: 0.1 % (ref 0–1.9)
BASOPHILS NFR BLD: 0.1 % (ref 0–1.9)
BILIRUB SERPL-MCNC: 0.2 MG/DL (ref 0.1–1)
BUN SERPL-MCNC: 33 MG/DL (ref 8–23)
CALCIUM SERPL-MCNC: 8.4 MG/DL (ref 8.7–10.5)
CHLORIDE SERPL-SCNC: 102 MMOL/L (ref 95–110)
CO2 SERPL-SCNC: 24 MMOL/L (ref 23–29)
CREAT SERPL-MCNC: 1.4 MG/DL (ref 0.5–1.4)
DIFFERENTIAL METHOD: ABNORMAL
DIFFERENTIAL METHOD: ABNORMAL
EOSINOPHIL # BLD AUTO: 0 K/UL (ref 0–0.5)
EOSINOPHIL # BLD AUTO: 0.1 K/UL (ref 0–0.5)
EOSINOPHIL NFR BLD: 0 % (ref 0–8)
EOSINOPHIL NFR BLD: 0.4 % (ref 0–8)
ERYTHROCYTE [DISTWIDTH] IN BLOOD BY AUTOMATED COUNT: 15.4 % (ref 11.5–14.5)
ERYTHROCYTE [DISTWIDTH] IN BLOOD BY AUTOMATED COUNT: 15.6 % (ref 11.5–14.5)
EST. GFR  (NO RACE VARIABLE): 49 ML/MIN/1.73 M^2
GLUCOSE SERPL-MCNC: 121 MG/DL (ref 70–110)
HCT VFR BLD AUTO: 28.9 % (ref 40–54)
HCT VFR BLD AUTO: 30.1 % (ref 40–54)
HGB BLD-MCNC: 9.6 G/DL (ref 14–18)
HGB BLD-MCNC: 9.7 G/DL (ref 14–18)
IMM GRANULOCYTES # BLD AUTO: 0.15 K/UL (ref 0–0.04)
IMM GRANULOCYTES # BLD AUTO: 0.15 K/UL (ref 0–0.04)
IMM GRANULOCYTES NFR BLD AUTO: 1.1 % (ref 0–0.5)
IMM GRANULOCYTES NFR BLD AUTO: 1.1 % (ref 0–0.5)
LYMPHOCYTES # BLD AUTO: 2.5 K/UL (ref 1–4.8)
LYMPHOCYTES # BLD AUTO: 5.4 K/UL (ref 1–4.8)
LYMPHOCYTES NFR BLD: 17.9 % (ref 18–48)
LYMPHOCYTES NFR BLD: 38.7 % (ref 18–48)
MAGNESIUM SERPL-MCNC: 2 MG/DL (ref 1.6–2.6)
MCH RBC QN AUTO: 30.8 PG (ref 27–31)
MCH RBC QN AUTO: 31.5 PG (ref 27–31)
MCHC RBC AUTO-ENTMCNC: 31.9 G/DL (ref 32–36)
MCHC RBC AUTO-ENTMCNC: 33.6 G/DL (ref 32–36)
MCV RBC AUTO: 94 FL (ref 82–98)
MCV RBC AUTO: 97 FL (ref 82–98)
MONOCYTES # BLD AUTO: 0.5 K/UL (ref 0.3–1)
MONOCYTES # BLD AUTO: 1.1 K/UL (ref 0.3–1)
MONOCYTES NFR BLD: 3.3 % (ref 4–15)
MONOCYTES NFR BLD: 7.7 % (ref 4–15)
NEUTROPHILS # BLD AUTO: 10.9 K/UL (ref 1.8–7.7)
NEUTROPHILS # BLD AUTO: 7.3 K/UL (ref 1.8–7.7)
NEUTROPHILS NFR BLD: 52 % (ref 38–73)
NEUTROPHILS NFR BLD: 77.6 % (ref 38–73)
NRBC BLD-RTO: 0 /100 WBC
NRBC BLD-RTO: 0 /100 WBC
PATH REV BLD -IMP: NORMAL
PATH REV BLD -IMP: NORMAL
PHOSPHATE SERPL-MCNC: 2.5 MG/DL (ref 2.7–4.5)
PLATELET # BLD AUTO: 321 K/UL (ref 150–450)
PLATELET # BLD AUTO: 379 K/UL (ref 150–450)
PMV BLD AUTO: 9.3 FL (ref 9.2–12.9)
PMV BLD AUTO: 9.8 FL (ref 9.2–12.9)
POCT GLUCOSE: 156 MG/DL (ref 70–110)
POCT GLUCOSE: 236 MG/DL (ref 70–110)
POCT GLUCOSE: 262 MG/DL (ref 70–110)
POCT GLUCOSE: 323 MG/DL (ref 70–110)
POCT GLUCOSE: 326 MG/DL (ref 70–110)
POTASSIUM SERPL-SCNC: 4.9 MMOL/L (ref 3.5–5.1)
PROT SERPL-MCNC: 6.5 G/DL (ref 6–8.4)
RBC # BLD AUTO: 3.08 M/UL (ref 4.6–6.2)
RBC # BLD AUTO: 3.12 M/UL (ref 4.6–6.2)
RPR SER QL: NORMAL
SODIUM SERPL-SCNC: 135 MMOL/L (ref 136–145)
WBC # BLD AUTO: 14.08 K/UL (ref 3.9–12.7)
WBC # BLD AUTO: 14.18 K/UL (ref 3.9–12.7)

## 2023-10-20 PROCEDURE — 84100 ASSAY OF PHOSPHORUS: CPT | Mod: HCNC | Performed by: STUDENT IN AN ORGANIZED HEALTH CARE EDUCATION/TRAINING PROGRAM

## 2023-10-20 PROCEDURE — 63600175 PHARM REV CODE 636 W HCPCS: Mod: HCNC | Performed by: INTERNAL MEDICINE

## 2023-10-20 PROCEDURE — 83735 ASSAY OF MAGNESIUM: CPT | Mod: HCNC | Performed by: STUDENT IN AN ORGANIZED HEALTH CARE EDUCATION/TRAINING PROGRAM

## 2023-10-20 PROCEDURE — 36415 COLL VENOUS BLD VENIPUNCTURE: CPT | Mod: HCNC | Performed by: STUDENT IN AN ORGANIZED HEALTH CARE EDUCATION/TRAINING PROGRAM

## 2023-10-20 PROCEDURE — 99234 PR OBSERV/HOSP SAME DATE,LEVL III: ICD-10-PCS | Mod: HCNC,,, | Performed by: STUDENT IN AN ORGANIZED HEALTH CARE EDUCATION/TRAINING PROGRAM

## 2023-10-20 PROCEDURE — 85060 BLOOD SMEAR INTERPRETATION: CPT | Mod: HCNC,,, | Performed by: PATHOLOGY

## 2023-10-20 PROCEDURE — 21400001 HC TELEMETRY ROOM: Mod: HCNC

## 2023-10-20 PROCEDURE — 85025 COMPLETE CBC W/AUTO DIFF WBC: CPT | Mod: HCNC | Performed by: STUDENT IN AN ORGANIZED HEALTH CARE EDUCATION/TRAINING PROGRAM

## 2023-10-20 PROCEDURE — 97110 THERAPEUTIC EXERCISES: CPT | Mod: HCNC,CQ

## 2023-10-20 PROCEDURE — 63600175 PHARM REV CODE 636 W HCPCS: Mod: HCNC | Performed by: HOSPITALIST

## 2023-10-20 PROCEDURE — 80053 COMPREHEN METABOLIC PANEL: CPT | Mod: HCNC | Performed by: STUDENT IN AN ORGANIZED HEALTH CARE EDUCATION/TRAINING PROGRAM

## 2023-10-20 PROCEDURE — 85060 PATHOLOGIST REVIEW: ICD-10-PCS | Mod: HCNC,,, | Performed by: PATHOLOGY

## 2023-10-20 PROCEDURE — 97116 GAIT TRAINING THERAPY: CPT | Mod: HCNC,CQ

## 2023-10-20 PROCEDURE — 25000003 PHARM REV CODE 250: Mod: HCNC | Performed by: INTERNAL MEDICINE

## 2023-10-20 PROCEDURE — S4991 NICOTINE PATCH NONLEGEND: HCPCS | Mod: HCNC | Performed by: HOSPITALIST

## 2023-10-20 PROCEDURE — 99234 HOSP IP/OBS SM DT SF/LOW 45: CPT | Mod: HCNC,,, | Performed by: STUDENT IN AN ORGANIZED HEALTH CARE EDUCATION/TRAINING PROGRAM

## 2023-10-20 PROCEDURE — 25000003 PHARM REV CODE 250: Mod: HCNC | Performed by: HOSPITALIST

## 2023-10-20 PROCEDURE — 85025 COMPLETE CBC W/AUTO DIFF WBC: CPT | Mod: 91,HCNC | Performed by: STUDENT IN AN ORGANIZED HEALTH CARE EDUCATION/TRAINING PROGRAM

## 2023-10-20 PROCEDURE — 97110 THERAPEUTIC EXERCISES: CPT | Mod: HCNC

## 2023-10-20 RX ADMIN — Medication 1 PATCH: at 09:10

## 2023-10-20 RX ADMIN — ATORVASTATIN CALCIUM 40 MG: 40 TABLET, FILM COATED ORAL at 09:10

## 2023-10-20 RX ADMIN — DIVALPROEX SODIUM 500 MG: 125 CAPSULE ORAL at 08:10

## 2023-10-20 RX ADMIN — METHYLPREDNISOLONE SODIUM SUCCINATE 125 MG: 125 INJECTION, POWDER, FOR SOLUTION INTRAMUSCULAR; INTRAVENOUS at 10:10

## 2023-10-20 RX ADMIN — CLOPIDOGREL BISULFATE 75 MG: 75 TABLET ORAL at 09:10

## 2023-10-20 RX ADMIN — ENOXAPARIN SODIUM 40 MG: 40 INJECTION SUBCUTANEOUS at 04:10

## 2023-10-20 RX ADMIN — METHYLPREDNISOLONE SODIUM SUCCINATE 125 MG: 125 INJECTION, POWDER, FOR SOLUTION INTRAMUSCULAR; INTRAVENOUS at 05:10

## 2023-10-20 RX ADMIN — DIVALPROEX SODIUM 250 MG: 125 CAPSULE, COATED PELLETS ORAL at 09:10

## 2023-10-20 RX ADMIN — INSULIN ASPART 4 UNITS: 100 INJECTION, SOLUTION INTRAVENOUS; SUBCUTANEOUS at 05:10

## 2023-10-20 RX ADMIN — MELATONIN TAB 3 MG 6 MG: 3 TAB at 08:10

## 2023-10-20 RX ADMIN — METOPROLOL SUCCINATE 25 MG: 25 TABLET, EXTENDED RELEASE ORAL at 09:10

## 2023-10-20 RX ADMIN — METHYLPREDNISOLONE SODIUM SUCCINATE 125 MG: 125 INJECTION, POWDER, FOR SOLUTION INTRAMUSCULAR; INTRAVENOUS at 02:10

## 2023-10-20 RX ADMIN — ASPIRIN 81 MG: 81 TABLET, COATED ORAL at 09:10

## 2023-10-20 RX ADMIN — INSULIN ASPART 2 UNITS: 100 INJECTION, SOLUTION INTRAVENOUS; SUBCUTANEOUS at 12:10

## 2023-10-20 NOTE — NURSING
[facility], [facility] [place]  Nurses Note -- 4 Eyes      10/20/2023       Skin assessed on: Q Shift      [] No Pressure Injuries Present    []Prevention Measures Documented    [x] Yes LDA  for Pressure Injury Previously documented     [] Yes New Pressure Injury Discovered   [] LDA for New Pressure Injury Added      Attending RN:  Angeli Hoffmann, RN     Second RN  Kya Calvin RN

## 2023-10-20 NOTE — PT/OT/SLP PROGRESS
Occupational Therapy   Treatment    Name: Narciso Yanez  MRN: [id number]  Admitting Diagnosis:  Acute delirium       Recommendations:     Discharge Recommendations: Low Intensity Therapy (Pt will need caregiver supervision and assistance)  Discharge Equipment Recommendations:  none  Barriers to discharge:  low BP and symptomatic today sitting EOB to 106/56    Assessment:     Narciso Yanez is a 88 y.o. male with a medical diagnosis of Acute delirium. Performance deficits affecting function are weakness, impaired endurance, decreased ROM, decreased coordination, decreased upper extremity function, impaired self care skills, impaired sensation, impaired skin, decreased lower extremity function, impaired functional mobility, gait instability, decreased safety awareness, impaired cardiopulmonary response to activity, impaired balance.     Rehab Prognosis:  Good; patient would benefit from acute skilled OT services to address these deficits and reach maximum level of function.       Plan:     Patient to be seen 5 x/week to address the above listed problems via self-care/home management, therapeutic activities, therapeutic exercises  Plan of Care Expires: 10/31/23  Plan of Care Reviewed with: patient    Subjective     Chief Complaint: dizzy, nauseous   Patient/Family Comments/goals: agreeable to try to get to the chair, then dizziness increased so OT assisted pt to supine to retrieve BP machine and monitor vitals   Pain/Comfort:  Pain Rating 1: 0/10    Objective:     Communicated with: nurseAngeli, prior to session.  Patient found HOB elevated with bed alarm, peripheral IV, telemetry (male external catheter connected to suction) upon OT entry to room.    General Precautions: Standard, fall, hearing impaired    Orthopedic Precautions:N/A  Braces: N/A  Respiratory Status: Room air     Occupational Performance:     Bed Mobility:    Patient completed Scooting anteriorly and laterally with stand by assistance  Patient  completed Supine to Sit with contact guard assistance, with side rail, and HOB elevated   Patient completed Sit to Supine with stand by assistance     Functional Mobility/Transfers:  Pt dizziness increased. Pt assisted to supine and after a couple min in supine BP: 106/56, spO2 94%. Pt remained dizzy at bed level. CNA present to check BG. Nurse notified who was going to address pt     Activities of Daily Living:  Upper Body Dressing: minimum assistance to change gowns while seated unsupported at EOB       [facility] 6 Click ADL: 18    Treatment & Education:  Pt re-educated on OT role/POC.   Importance of OOB activity with staff assistance.  Safety during functional t/f and mobility   Seated unsupported at EOB, pt completed the following BUE AROM with CGA-MIN A for dynamic sit balance:   X10 elbow flex/ext, x20 forward punches  X10 shoulder flex/ext at bed level   Encouraged BUE/BLE AROM HEP daily. Pt agreed.   Self-care tasks/functional mobility completed- assistance level noted above   All questions/concerns answered within OT scope of practice       Patient left HOB elevated with B/L heels offloaded by pillow with all lines intact, call button in reach, bed alarm on, nurse, Angeli, notified, CNA, Briana, present, and all needs met/within reach; male purewick connected to suction, lights on, blinds opened, door left open.     GOALS:   Multidisciplinary Problems       Occupational Therapy Goals          Problem: Occupational Therapy    Goal Priority Disciplines Outcome Interventions   Occupational Therapy Goal     OT, PT/OT Ongoing, Progressing    Description: Goals to be met by: 10/31/23     Patient will increase functional independence with ADLs by performing:    UE Dressing with Modified [place].  LE Dressing with Stand-by Assistance.  Grooming while standing at sink with Supervision.  Toileting from bedside commode with Supervision for hygiene and clothing management.   Supine to sit with Modified  [place].  Step transfer with Supervision  Toilet transfer to bedside commode with Supervision.  Upper extremity exercise program x15 reps per handout, with independence.                         Time Tracking:     OT Date of Treatment: 10/20/23  OT Start Time: 0957  OT Stop Time: 1014  OT Total Time (min): 17 min    Billable Minutes:Therapeutic Exercise 17 min     OT/JACQUELINE: OT     Number of JACQUELINE visits since last OT visit: 0    10/20/2023

## 2023-10-20 NOTE — PT/OT/SLP PROGRESS
Physical Therapy Treatment    Patient Name:  Narciso Yanez   MRN:  [id number]    Recommendations:     Discharge Recommendations: Low Intensity Therapy (Pt will need caregiver supervision and assistance.)  Discharge Equipment Recommendations: none  Barriers to discharge: None    Assessment:     Narciso Yanez is a 88 y.o. male admitted with a medical diagnosis of Acute delirium.  He presents with the following impairments/functional limitations: weakness, impaired endurance, impaired self care skills, impaired functional mobility, gait instability, impaired balance, decreased upper extremity function, decreased lower extremity function, decreased ROM, pain, decreased safety awareness, decreased coordination, impaired skin .    Rehab Prognosis: good ; patient would benefit from acute skilled PT services to address these deficits and reach maximum level of function.    Recent Surgery: * No surgery found *      Plan:     During this hospitalization, patient to be seen 5 x/week to address the identified rehab impairments via gait training, therapeutic activities, therapeutic exercises, wheelchair management/training and progress toward the following goals:    Plan of Care Expires:  10/31/23    Subjective     Chief Complaint: none   Patient/Family Comments/goals: feeling much better this PM   Pain/Comfort:  Pain Rating 1: 0/10  Pain Rating Post-Intervention 1: 0/10      Objective:     Communicated with nurse prior to session.  Patient found sitting edge of bed with telemetry, peripheral IV, bed alarm, PureWick upon PT entry to room.     General Precautions: Standard, fall, diabetic  Orthopedic Precautions: N/A  Braces: N/A  Respiratory Status: Room air     Functional Mobility:  Bed Mobility:     Rolling Left:  stand by assistance  Scooting: contact guard assistance  Supine to Sit: minimum assistance  Transfers:     Sit <> Stand:  2 trials from bed  contact guard assistance with rolling walker.V/T cues for safety, proper  technique and walker management.   Bed to Chair: contact guard assistance with  rolling walker  using  Step Transfer  Gait:  : pt ambulated 100 ft with RW, CGA(chair followed). Noted with decreased dottie,decreased step length, decreased weight shifting ability ,min body tremors , 1 LOB required min A to recover .  Pt required 1 standing rest break 2* to fatigue and weakness . V/T cues for safety technique and walker management.   Balance: good in sitting, fair in standing      AM-PAC 6 CLICK MOBILITY  Turning over in bed (including adjusting bedclothes, sheets and blankets)?: 4  Sitting down on and standing up from a chair with arms (e.g., wheelchair, bedside commode, etc.): 4  Moving from lying on back to sitting on the side of the bed?: 3  Moving to and from a bed to a chair (including a wheelchair)?: 3  Need to walk in hospital room?: 3  Climbing 3-5 steps with a railing?: 2  Basic Mobility Total Score: 19       Treatment & Education:  Lower Extremity Exercises.    Patient educated on: Purpose and Benefits of Therapeutic Exercise(s).    Patient verbalized acceptance/understanding of instruction(s), expectation(s), and limitation(s) (for safety).  Patient performed: 2 sets of 10 reps (each) of B LE Ther Ex: AP, LAQ, HCS , Hip flexion while sitting up on EOB.       Patient required verbal cues/tactile cues to ensure correct sequence, to maintain proper form, and to allow for self-correction.  Pt reported no complaints or problems with exercise activity.       Patient left up in chair on green air cushion, dinner tray table set up with all lines intact, call button in reach, and nurse Angeli  notified..    GOALS:   Multidisciplinary Problems       Physical Therapy Goals          Problem: Physical Therapy    Goal Priority Disciplines Outcome Goal Variances Interventions   Physical Therapy Goal     PT, PT/OT Ongoing, Progressing     Description: Goals to be met by: 10/31/23     Patient will increase functional  independence with mobility by performin. Supine to sit with Modified [place]  2. Rolling to Left and Right with Modified [place]  3. Sit to stand transfer with Modified [place] using RW  4. Bed to chair transfer with Modified [place] using Rolling Walker  5. Gait x250 feet with Modified [place] using Rolling Walker   6. Wheelchair propulsion x250 feet with Modified [place] using bilateral upper extremities  7. Lower extremity exercise program 2 sets x15 reps per handout, with independence                         Time Tracking:     PT Received On: 10/20/23  PT Start Time: 161     PT Stop Time: 1647  PT Total Time (min): 29 min     Billable Minutes: Gait Training 15 and Therapeutic Exercise 14    Treatment Type: Treatment  PT/PTA: PTA     Number of PTA visits since last PT visit: 3     10/20/2023

## 2023-10-20 NOTE — PLAN OF CARE
10/20/23 1207   Post-Acute Status   Post-Acute Authorization Placement   Post-Acute Placement Status Pending payor review/awaiting authorization (if required)   Coverage [facility]   Patient choice form signed by patient/caregiver List from System Post-Acute Care   Discharge Delays (!) Post-Acute Set-up   Discharge Plan   Discharge Plan A Skilled Nursing Facility   Discharge Plan B Home with family

## 2023-10-20 NOTE — CONSULTS
Hematology- Oncology Consult Note :     10/20/2023    Reason for consult: rash, concerns for T cell lymphoma    HPI    Pt is a 88-year-old male with extensive co morbidities: coronary artery disease status post previous CABG, COPD, PAD s/p PTA bilat YUE and R EIA 6/2003,  hypertension, dyslipidemia, CKD 3A, type 2 diabetes mellitus, PUD, polymyalgia rheumatica, NPH status post  shunt placement in 2021 who presented to ED for delirium.  Of note pt was recently discharged from the hospital on 10/11/2023 after being admitted for increased confusion and altered mental status with some hallucinations and frequent falls.  Pt has also diffuse rash.  CT abdomen pelvis showed nonacute findings of cholelithiasis, vascular calcifications, diverticulosis, prostatomegaly, nonspecific infiltration of the left lateral subcutaneous fat tissue and small hiatal hernia and CT head is within normal limits.  Decision was made to start steroids for rash and hematology service consulted due to concerns of T cell lymphoma.            Active Problem List with Overview Notes    Diagnosis Date Noted    Normocytic anemia 10/15/2023    Acute delirium 10/15/2023    Elevated troponin 10/15/2023    Cholelithiasis 10/15/2023    Diverticulosis 10/15/2023    Enlarged prostate 10/15/2023    Chronic interstitial lung disease 10/15/2023    Rash 10/10/2023    UTI (urinary tract infection) 10/09/2023    Cutaneous fungal infection 09/11/2023    Impaired functional mobility, balance, gait, and endurance 07/08/2022    Chronic bilateral low back pain with bilateral sciatica 07/08/2022    Encounter for staple removal 04/04/2022    Neurogenic claudication 04/04/2022    Hyperlipidemia 06/09/2021    Constipation 06/09/2021    Impaired mobility and ADLs 06/03/2021    NPH (normal pressure hydrocephalus) 05/31/2021    Preop testing 05/26/2021    Normal pressure hydrocephalus 05/19/2021    Urinary incontinence 05/04/2021    Syncope 04/02/2021    LATRICE (acute kidney  "injury) 04/02/2021    Tobacco abuse 04/02/2021    Hypoglycemia 03/10/2021    Fall 03/08/2021    Urinary urgency 02/09/2021    Decreased strength of lower extremity 09/25/2020    Decreased strength of trunk and back 09/25/2020    Decreased ROM of lumbar spine 09/25/2020    Decreased ROM of lower extremity 09/25/2020    Gait abnormality 09/25/2020    DDD (degenerative disc disease), lumbar 08/26/2020    Degenerative disc disease, lumbar 06/24/2020    Radiculopathy of lumbosacral region 06/24/2020    Stage 3a chronic kidney disease 06/09/2020    Essential hypertension 06/09/2020    Polymyalgia rheumatica 08/16/2019     Dx May 2019 with typical sx and ; sx responded appropriately to prednisone; no GCA sx      Pulmonary nodule 03/29/2019    COPD (chronic obstructive pulmonary disease) 03/29/2019    Kidney stones 03/29/2019    Abdominal aortic aneurysm (AAA) without rupture 03/29/2019    Spondylosis 03/29/2019    PAD (peripheral artery disease) 11/13/2017    Nicotine dependence, cigarettes, uncomplicated 11/13/2017    Aortic atherosclerosis 09/29/2017     "The aorta is prominent and tortuous" - Chest Xray 5-      Cervical strain     GERD (gastroesophageal reflux disease) 10/10/2016    Skin cancer 09/23/2016    Early satiety 09/23/2016    Unintentional weight loss 09/23/2016    B12 deficiency 12/08/2014     Dx 6/14      Diabetes mellitus with peripheral circulatory disorder 06/18/2014    Coronary artery disease involving coronary bypass graft of native heart without angina pectoris 10/12/2012    High-density lipoprotein deficiency 10/12/2012    Diabetes mellitus type II, uncontrolled 10/12/2012       Patient Active Problem List    Diagnosis Date Noted    Normocytic anemia 10/15/2023    Acute delirium 10/15/2023    Elevated troponin 10/15/2023    Cholelithiasis 10/15/2023    Diverticulosis 10/15/2023    Enlarged prostate 10/15/2023    Chronic interstitial lung disease 10/15/2023    Rash 10/10/2023    UTI " (urinary tract infection) 10/09/2023    Cutaneous fungal infection 09/11/2023    Impaired functional mobility, balance, gait, and endurance 07/08/2022    Chronic bilateral low back pain with bilateral sciatica 07/08/2022    Encounter for staple removal 04/04/2022    Neurogenic claudication 04/04/2022    Hyperlipidemia 06/09/2021    Constipation 06/09/2021    Impaired mobility and ADLs 06/03/2021    NPH (normal pressure hydrocephalus) 05/31/2021    Preop testing 05/26/2021    Normal pressure hydrocephalus 05/19/2021    Urinary incontinence 05/04/2021    Syncope 04/02/2021    LATRICE (acute kidney injury) 04/02/2021    Tobacco abuse 04/02/2021    Hypoglycemia 03/10/2021    Fall 03/08/2021    Urinary urgency 02/09/2021    Decreased strength of lower extremity 09/25/2020    Decreased strength of trunk and back 09/25/2020    Decreased ROM of lumbar spine 09/25/2020    Decreased ROM of lower extremity 09/25/2020    Gait abnormality 09/25/2020    DDD (degenerative disc disease), lumbar 08/26/2020    Degenerative disc disease, lumbar 06/24/2020    Radiculopathy of lumbosacral region 06/24/2020    Stage 3a chronic kidney disease 06/09/2020    Essential hypertension 06/09/2020    Polymyalgia rheumatica 08/16/2019    Pulmonary nodule 03/29/2019    COPD (chronic obstructive pulmonary disease) 03/29/2019    Kidney stones 03/29/2019    Abdominal aortic aneurysm (AAA) without rupture 03/29/2019    Spondylosis 03/29/2019    PAD (peripheral artery disease) 11/13/2017    Nicotine dependence, cigarettes, uncomplicated 11/13/2017    Aortic atherosclerosis 09/29/2017    Cervical strain     GERD (gastroesophageal reflux disease) 10/10/2016    Skin cancer 09/23/2016    Early satiety 09/23/2016    Unintentional weight loss 09/23/2016    B12 deficiency 12/08/2014    Diabetes mellitus with peripheral circulatory disorder 06/18/2014    Coronary artery disease involving coronary bypass graft of native heart without angina pectoris 10/12/2012     High-density lipoprotein deficiency 10/12/2012    Diabetes mellitus type II, uncontrolled 10/12/2012     Past Medical History:   Diagnosis Date    Actinic keratosis     Anxiety     B12 deficiency 12/8/2014    Dx 6/14    Cancer     skin    Cataract     Coronary artery disease     Diabetes mellitus type II     Diabetes mellitus with peripheral circulatory disorder 6/18/2014    ED (erectile dysfunction)     Hyperlipidemia     Kidney stone     Neurogenic claudication 4/4/2022    Normocytic anemia 10/15/2023    Peripheral vascular disease     Spondylosis 3/29/2019    Tobacco dependence     Urinary incontinence 5/4/2021      Past Surgical History:   Procedure Laterality Date    APPENDECTOMY      CARDIAC SURGERY  01/1999    CABG 4 vessels    CATARACT EXTRACTION      EPIDURAL STEROID INJECTION Right 8/26/2020    Procedure: Injection, Steroid, Epidural Transforaminal;  Surgeon: Wiley Crane Jr., MD;  Location: [facility] ENDO;  Service: Pain Management;  Laterality: Right;  Right L5 + S1 TF LEAH  Arrive @ 1115; ASA & Plavix last 8/18; Check BG; Rapid COVID test    EPIDURAL STEROID INJECTION Right 11/25/2020    Procedure: Injection, Steroid, Epidural Transformainal;  Surgeon: Wiley Crane Jr., MD;  Location: [facility] ENDO;  Service: Pain Management;  Laterality: Right;  Right L5 + S1 TF LEAH  Arrive @ 1245; ASA and Plavix last 11/17; Pre-DM; Needs MD Sign.    EPIDURAL STEROID INJECTION Right 2/19/2021    Procedure: Injection, Steroid, Epidural Transforaminal;  Surgeon: Wiley Crane Jr., MD;  Location: [facility] ENDO;  Service: Pain Management;  Laterality: Right;  Right L5 + S1 TF LEAH  Arrive @ 1115; ASA & Plavix last 2/11; Check BG; Needs Consent    EXTERNAL EAR SURGERY      EYE SURGERY      cataracts    ILIAC ARTERY STENT Bilateral 06/2003    also Right External Iliac stent      Medications Prior to Admission   Medication Sig Dispense Refill Last Dose    ACCU-CHEK JOAQUIN PLUS METER Misc Three times a day with meals  0      acetaminophen (TYLENOL) 500 MG tablet Take 1 tablet (500 mg total) by mouth every 6 (six) hours as needed for Pain. 13 tablet 0     alendronate (FOSAMAX) 70 MG tablet TAKE 1 TABLET EVERY 7 DAYS ON  (Patient not taking: Reported on 2023) 12 tablet 12     [] amoxicillin-clavulanate 500-125mg (AUGMENTIN) 500-125 mg Tab Take 1 tablet (500 mg total) by mouth 2 (two) times daily. for 6 days 12 tablet 0     ascorbic acid, vitamin C, (VITAMIN C) 250 MG tablet Take 1 tablet (250 mg total) by mouth once daily. (Patient not taking: Reported on 2023) 120 tablet 0     aspirin (ECOTRIN) 81 MG EC tablet Take 1 tablet (81 mg total) by mouth once daily.  0     blood sugar diagnostic (TRUE METRIX GLUCOSE TEST STRIP) Strp  each 12     BOOSTRIX TDAP 2.5-8-5 Lf-mcg-Lf/0.5mL Syrg injection        clopidogreL (PLAVIX) 75 mg tablet TAKE 1 TABLET EVERY DAY (Patient not taking: Reported on 2023) 90 tablet 1     DUPIXENT  mg/2 mL PnIj        fluconazole (DIFLUCAN) 150 MG Tab Take by mouth.       hydrocortisone 1 % cream Apply topically 2 (two) times daily. for 5 days 20 g 0     ketoconazole (NIZORAL) 2 % cream Apply topically 2 (two) times daily.       magnesium sulfate in water (MAGNESIUM SULFATE 2 GRAM/50 ML) 2 gram/50 mL PgBk        metoprolol succinate (TOPROL-XL) 25 MG 24 hr tablet        mometasone 0.1% (ELOCON) 0.1 % cream Apply topically 2 (two) times daily.       olopatadine (PATANOL) 0.1 % ophthalmic solution Place 1 drop into both eyes 2 (two) times daily. (Patient not taking: Reported on 2023) 5 mL 0     OMNIPAQUE 300 300 mg iodine/mL injection        ondansetron (ZOFRAN-ODT) 4 MG TbDL DISSOLVE 2 TABLETS (8 MG TOTAL) ON THE TONGUE EVERY 8 (EIGHT) HOURS AS NEEDED (NAUSEA). 90 tablet 12     predniSONE (DELTASONE) 10 MG tablet Take 4 tabs x 3 days, then take 2 tabs x 3 days, then take 1 tab x 3 days. (Patient not taking: Reported on 2023) 21 tablet 0     prochlorperazine  (COMPAZINE) 5 MG tablet        rosuvastatin (CRESTOR) 20 MG tablet TAKE 1 TABLET (20 MG TOTAL) BY MOUTH EVERY EVENING. (Patient not taking: Reported on 9/26/2023) 90 tablet 12     senna (SENOKOT) 8.6 mg tablet Take 1 tablet by mouth once daily. (Patient not taking: Reported on 9/26/2023)       TRUE METRIX GLUCOSE TEST STRIP Strp TEST BLOOD SUGAR THREE TIMES DAILY. (Patient not taking: Reported on 9/26/2023) 300 strip 12      Review of patient's allergies indicates:   Allergen Reactions    Demerol [meperidine] Nausea Only      Social History     Tobacco Use    Smoking status: Every Day     Current packs/day: 1.50     Average packs/day: 1.5 packs/day for 71.0 years (106.5 ttl pk-yrs)     Types: Cigarettes    Smokeless tobacco: Former   Substance Use Topics    Alcohol use: No      Family History   Problem Relation Age of Onset    Stroke Mother         Review of Systems :  Review of Systems   Constitutional:  Positive for malaise/fatigue. Negative for fever.        Diffuse rash   HENT:  Negative for hearing loss.    Eyes:  Negative for blurred vision.   Respiratory:  Negative for cough, sputum production and shortness of breath.    Cardiovascular:  Negative for chest pain and leg swelling.   Gastrointestinal:  Negative for abdominal pain, constipation and heartburn.   Genitourinary:  Negative for dysuria and hematuria.   Musculoskeletal:  Negative for joint pain and myalgias.   Skin:  Negative for itching and rash.   Neurological:  Negative for dizziness and focal weakness.   Endo/Heme/Allergies:  Does not bruise/bleed easily.   Psychiatric/Behavioral:  Negative for depression. The patient is not nervous/anxious.        Physical Exam :  Wt Readings from Last 3 Encounters:   10/19/23 59 kg (130 lb 1.1 oz)   10/11/23 62.2 kg (137 lb 2 oz)   09/26/23 59 kg (130 lb)     Temp Readings from Last 3 Encounters:   10/20/23 97.6 °F (36.4 °C) (Oral)   10/11/23 97.7 °F (36.5 °C) (Oral)   09/15/23 97.7 °F (36.5 °C) (Oral)     BP  Readings from Last 3 Encounters:   10/20/23 (!) 124/59   10/11/23 (!) 150/71   09/15/23 (!) 170/72     Pulse Readings from Last 3 Encounters:   10/20/23 76   10/11/23 95   09/15/23 98     Body mass index is 20.37 kg/m².    Physical Exam  Vitals reviewed.   HENT:      Head: Normocephalic and atraumatic.      Nose: Nose normal.      Mouth/Throat:      Mouth: Mucous membranes are moist.   Eyes:      Pupils: Pupils are equal, round, and reactive to light.   Cardiovascular:      Rate and Rhythm: Normal rate and regular rhythm.      Heart sounds: Normal heart sounds.   Pulmonary:      Effort: Pulmonary effort is normal.      Breath sounds: Normal breath sounds.   Abdominal:      General: Abdomen is flat.   Musculoskeletal:         General: Normal range of motion.      Cervical back: Normal range of motion and neck supple.   Skin:     Findings: Rash (diffuse desquamationg rash) present.   Neurological:      Mental Status: He is alert and oriented to person, place, and time.   Psychiatric:         Mood and Affect: Mood normal.         Behavior: Behavior normal.           Pertinent Diagnostic studies:    Recent Results (from the past 24 hour(s))   POCT glucose    Collection Time: 10/19/23  5:16 PM   Result Value Ref Range    POCT Glucose 408 (H) 70 - 110 mg/dL   POCT glucose    Collection Time: 10/19/23  8:57 PM   Result Value Ref Range    POCT Glucose 283 (H) 70 - 110 mg/dL   Magnesium    Collection Time: 10/20/23  6:22 AM   Result Value Ref Range    Magnesium 2.0 1.6 - 2.6 mg/dL   Phosphorus    Collection Time: 10/20/23  6:22 AM   Result Value Ref Range    Phosphorus 2.5 (L) 2.7 - 4.5 mg/dL   Comprehensive Metabolic Panel    Collection Time: 10/20/23  6:22 AM   Result Value Ref Range    Sodium 135 (L) 136 - 145 mmol/L    Potassium 4.9 3.5 - 5.1 mmol/L    Chloride 102 95 - 110 mmol/L    CO2 24 23 - 29 mmol/L    Glucose 121 (H) 70 - 110 mg/dL    BUN 33 (H) 8 - 23 mg/dL    Creatinine 1.4 0.5 - 1.4 mg/dL    Calcium 8.4 (L) 8.7  - 10.5 mg/dL    Total Protein 6.5 6.0 - 8.4 g/dL    Albumin 2.5 (L) 3.5 - 5.2 g/dL    Total Bilirubin 0.2 0.1 - 1.0 mg/dL    Alkaline Phosphatase 84 55 - 135 U/L    AST 24 10 - 40 U/L    ALT 26 10 - 44 U/L    eGFR 49 (A) >60 mL/min/1.73 m^2    Anion Gap 9 8 - 16 mmol/L   CBC Auto Differential    Collection Time: 10/20/23  6:22 AM   Result Value Ref Range    WBC 14.18 (H) 3.90 - 12.70 K/uL    RBC 3.12 (L) 4.60 - 6.20 M/uL    Hemoglobin 9.6 (L) 14.0 - 18.0 g/dL    Hematocrit 30.1 (L) 40.0 - 54.0 %    MCV 97 82 - 98 fL    MCH 30.8 27.0 - 31.0 pg    MCHC 31.9 (L) 32.0 - 36.0 g/dL    RDW 15.4 (H) 11.5 - 14.5 %    Platelets 379 150 - 450 K/uL    MPV 9.8 9.2 - 12.9 fL    Immature Granulocytes 1.1 (H) 0.0 - 0.5 %    Gran # (ANC) 7.3 1.8 - 7.7 K/uL    Immature Grans (Abs) 0.15 (H) 0.00 - 0.04 K/uL    Lymph # 5.4 (H) 1.0 - 4.8 K/uL    Mono # 1.1 (H) 0.3 - 1.0 K/uL    Eos # 0.1 0.0 - 0.5 K/uL    Baso # 0.02 0.00 - 0.20 K/uL    nRBC 0 0 /100 WBC    Gran % 52.0 38.0 - 73.0 %    Lymph % 38.7 18.0 - 48.0 %    Mono % 7.7 4.0 - 15.0 %    Eosinophil % 0.4 0.0 - 8.0 %    Basophil % 0.1 0.0 - 1.9 %    Differential Method Automated    POCT glucose    Collection Time: 10/20/23  7:31 AM   Result Value Ref Range    POCT Glucose 156 (H) 70 - 110 mg/dL   POCT glucose    Collection Time: 10/20/23 10:14 AM   Result Value Ref Range    POCT Glucose 262 (H) 70 - 110 mg/dL   POCT glucose    Collection Time: 10/20/23 11:34 AM   Result Value Ref Range    POCT Glucose 236 (H) 70 - 110 mg/dL       Assessment/Recs :     Rash  -patient reports having rash for past 2 months  -reports that he has been to a dermatologist but bx results inconclusive  -Primary team trying IV steroids, pt reports improvement in symptoms   -Recommend repeating skin bx and getting path rev of CBC for now  -Due to appearance appears to be possibly due to nutritional deficiency  -Will check additional vitamin labs in AM, if abnormalities seen on CBC smear consider flow  cytometry      Acute delirium  -CT head without acute findings.    -neurology consulted.   -on exam today pt feeling better and AAOx3 after steroids        Chronic interstitial lung disease  -appears chronic.  Uncertain etiology.  -cecilia with pulm follow up outpt        Normocytic anemia  -could be related to anemia of chronic inflammation anemia of chronic disease  -Path rev of CBC ordered          Time spent on case: 35 minutes        Thank you for this consult, please contact us for any questions or concerns.      Chanda Cosme MD   Hematology/oncology, [facility]

## 2023-10-20 NOTE — PLAN OF CARE
CM completed LOCET, faxed PASSR and waiting on 142.    Tamiko with [facility] has accepted pt. Will submit for auth.    Tamiko stated [facility] is not accepting pt's insurance.     EVANGELIST sent an e-mail to Stacy with [facility].    Stacy stated she will submit for auth. Stacy stated pt ambulated a 100 ft so it may be denied for SNF.    EVANGELIST notified pt's daughter, Brenda and informed her that [facility] doesn't accept pt's insurance but [facility] is willing to accept. EVANGELIST explained the above and pt's daughter is aware that home health is the next option.

## 2023-10-20 NOTE — NURSING
[facility]  Nurses Note -- 4 Eyes      10/19/2023       Skin assessed on: Q Shift      [] No Pressure Injuries Present    []Prevention Measures Documented    [x] Yes LDA  for Pressure Injury Previously documented     [] Yes New Pressure Injury Discovered   [] LDA for New Pressure Injury Added    Patient skin dry, scaly, and flaky. Skin tear to right forearm    Attending RN:  Dagmar Calvin RN     Second RN:  Rhiannon CASPER LPN

## 2023-10-20 NOTE — NURSING
Bedside Report given to night nurse ANUPAM Leavitt.  Walking rounds completed. Visualized and assessed patient NAD noted. Safety precautions maintained and call light within reach.     Chart check completed.

## 2023-10-20 NOTE — PLAN OF CARE
Problem: Adult Inpatient Plan of Care  Goal: Plan of Care Review  Flowsheets (Taken 10/20/2023 0256)  Plan of Care Reviewed With: patient     Problem: Diabetes Comorbidity  Goal: Blood Glucose Level Within Targeted Range  Intervention: Monitor and Manage Glycemia  Flowsheets (Taken 10/20/2023 0256)  Glycemic Management:   blood glucose monitored   supplemental insulin given     Problem: Skin Injury Risk Increased  Goal: Skin Health and Integrity  Intervention: Optimize Skin Protection  Flowsheets (Taken 10/20/2023 0256)  Pressure Reduction Techniques: frequent weight shift encouraged  Head of Bed (HOB) Positioning: HOB at 30-45 degrees

## 2023-10-20 NOTE — NURSING
Per handoff received from Rhiannon CASPER LPN. Patient care assumed. Patients overall condition assessed and patient appears to be in NAD with no complaints of pain. 20g YENY PIV is clean, dry, and intact. Call light in reach and patient instructed to inform the nurse if anything is needed. Patient stable and will continue to be monitored.

## 2023-10-20 NOTE — PROGRESS NOTES
[facility] Medicine  Progress Note      Admit Date: 10/14/2023  LOS: 5  Code Status: Full Code   TODAY'S Date 10/20/2023  : 1935  Age: 88 y.o.  Weight:   Wt Readings from Last 1 Encounters:   10/19/23 59 kg (130 lb 1.1 oz)     Sex: male  Bed: W338/W338 A:   MRN: [id number]  Attending Physician: Zeb Rudd MD         S: Spoke to Daughter about patient who believes patient is not sleeping well which is contributing to his delirium. His appetite has markedly improved.  Patient's daughter states that she desires patient to be tested for T-cell lymphoma.    O:  PHYSICAL EXAM  Vital Signs (Most Recent):  Temp: 97.7 °F (36.5 °C) (10/20/23 07)  Pulse: 87 (10/20/23 0916)  Resp: 18 (10/20/23 0741)  BP: 125/60 (10/20/23 0916)  SpO2: (!) 94 % (10/20/23 07) Vital Signs (24h Range):  Temp:  [97.4 °F (36.3 °C)-98.6 °F (37 °C)] 97.7 °F (36.5 °C)  Pulse:  [79-95] 87  Resp:  [18] 18  SpO2:  [93 %-99 %] 94 %  BP: (114-144)/(56-74) 125/60       Vital signs and nursing assessment noted: afebrile    GEN:   NAD, Alert, atraumatic, well appearing, nontoxic appearing    Physical Exam  Skin:     General: Skin is warm and dry.      Capillary Refill: Capillary refill takes less than 2 seconds.      Coloration: Skin is not ashen, cyanotic, jaundiced, mottled, pale or sallow.      Findings: Rash (covering majority of limbs, minor on torso) present. No abrasion, acne, burn, signs of injury or wound.      Comments: Desquamation of both palms and soles  Erythema on limbs   Flaky  Brittle finger nails   Psychiatric:         Attention and Perception: He does not perceive auditory or visual hallucinations.         Mood and Affect: Mood is not anxious, depressed or elated. Affect is not labile, blunt, flat, angry, tearful or inappropriate.         Behavior: Behavior is cooperative.      Comments: pleasant       HEENT:  PERRLA, EOMI, moist membranes, nl conjunctiva, no scleral icterus, no nystagmus,  no nodes/nodules, soft, supple, FROM, no trachial deviation  CV:   RRR no m/r/g, 2+ radial pulses, <2sec cap refill, no obvious JVD  RESP:  CTA B, no w/r/r, equal and bilateral chest rise, no respiratory distress  ABD:   soft, Nontender, Nondistended, +BS, no guarding/rebound  :   Deferred  BACK:  FROM, no midline tenderness, no paraspinal tenderness  EXT:   FROM, JOYCE x 4, no swelling, no edema, no calf tenderness, no bony tenderness, no warmth or redness, no crepitus, no obvious deformity  LYMPH:  no gross adenopathy  NEURO:  GCS 14, CN II-XII grossly intact, no obvious motor/sensory deficit, no tremor, negative Romberg,  nl gait/coordination          Current Facility-Administered Medications:     acetaminophen tablet 500 mg, 500 mg, Oral, Q6H PRN, Juanito Hilton MD    aluminum-magnesium hydroxide-simethicone 200-200-20 mg/5 mL suspension 30 mL, 30 mL, Oral, QID PRN, Juanito Hilton MD    aspirin EC tablet 81 mg, 81 mg, Oral, Daily, Juanito Hilton MD, 81 mg at 10/20/23 0916    atorvastatin tablet 40 mg, 40 mg, Oral, Daily, Mukund Hester MD, 40 mg at 10/20/23 0916    clopidogreL tablet 75 mg, 75 mg, Oral, Daily, Juanito Hilton MD, 75 mg at 10/20/23 0916    dextrose 10% bolus 125 mL 125 mL, 12.5 g, Intravenous, PRNLida Dhruv, MD    dextrose 10% bolus 125 mL 125 mL, 12.5 g, Intravenous, PRNLida Dhruv, MD    dextrose 10% bolus 250 mL 250 mL, 25 g, Intravenous, PRNLida Dhruv, MD    dextrose 10% bolus 250 mL 250 mL, 25 g, Intravenous, PRNLida Dhruv, MD    divalproex capsule 250 mg, 250 mg, Oral, Daily, Mukund Hester MD, 250 mg at 10/20/23 0916    divalproex capsule 500 mg, 500 mg, Oral, QHS, Mukund Hester MD, 500 mg at 10/19/23 2222    enoxaparin injection 40 mg, 40 mg, Subcutaneous, Daily, Juanito Hilton MD, 40 mg at 10/19/23 1616    glucagon (human recombinant) injection 1 mg, 1 mg, Intramuscular, PRN, Juanito Hilton MD    glucagon (human recombinant) injection 1 mg, 1 mg,  Intramuscular, PRLida BALES Dhruv, MD    glucose chewable tablet 16 g, 16 g, Oral, Lida VALENCIA Dhruv, MD    glucose chewable tablet 16 g, 16 g, Oral, Lida VALENCIA Dhruv, MD    glucose chewable tablet 24 g, 24 g, Oral, Lida VALENCIA Dhruv, MD    glucose chewable tablet 24 g, 24 g, Oral, Lida VALENCIA Dhruv, MD    insulin aspart U-100 pen 0-5 Units, 0-5 Units, Subcutaneous, QID (AC + HS) PRLida BALES Dhruv, MD, 1 Units at 10/19/23 2224    magnesium oxide tablet 800 mg, 800 mg, Oral, PRLida BALES Dhruv, MD    magnesium oxide tablet 800 mg, 800 mg, Oral, PRLida BALES Dhruv, MD    melatonin tablet 6 mg, 6 mg, Oral, Nightly, Mukund Hester MD, 6 mg at 10/19/23 2222    methylPREDNISolone sodium succinate injection 125 mg, 125 mg, Intravenous, Q8H, Mukund Hester MD, 125 mg at 10/20/23 0524    metoprolol succinate (TOPROL-XL) 24 hr tablet 25 mg, 25 mg, Oral, Daily, Juanito Hilton MD, 25 mg at 10/20/23 0916    naloxone 0.4 mg/mL injection 0.02 mg, 0.02 mg, Intravenous, PRNLida Dhruv, MD    nicotine 21 mg/24 hr 1 patch, 1 patch, Transdermal, Daily, Mukund Hester MD, 1 patch at 10/20/23 0916    OLANZapine injection 5 mg, 5 mg, Intramuscular, BID PRN, Mukund Hester MD    ondansetron injection 4 mg, 4 mg, Intravenous, Q8H PRNLida Dhruv, MD    potassium bicarbonate disintegrating tablet 35 mEq, 35 mEq, Oral, PRLida BALES Dhruv, MD    potassium bicarbonate disintegrating tablet 50 mEq, 50 mEq, Oral, PRLida BALES Dhruv, MD    potassium, sodium phosphates 280-160-250 mg packet 2 packet, 2 packet, Oral, PRLida BALES Dhruv, MD    potassium, sodium phosphates 280-160-250 mg packet 2 packet, 2 packet, Oral, PRNII, Juanito Hilton MD    sodium chloride 0.9% flush 10 mL, 10 mL, Intravenous, Q12H PRN, Juanito Hilton MD    Tests     Labs and imaging studies reviewed   Latest Reference Range & Units 10/20/23 06:22   WBC 3.90 - 12.70 K/uL 14.18 (H)   RBC 4.60 - 6.20 M/uL 3.12 (L)   Hemoglobin 14.0 -  18.0 g/dL 9.6 (L)   Hematocrit 40.0 - 54.0 % 30.1 (L)   MCV 82 - 98 fL 97   MCH 27.0 - 31.0 pg 30.8   MCHC 32.0 - 36.0 g/dL 31.9 (L)   RDW 11.5 - 14.5 % 15.4 (H)   Platelet Count 150 - 450 K/uL 379   MPV 9.2 - 12.9 fL 9.8   Gran % 38.0 - 73.0 % 52.0   Lymph % 18.0 - 48.0 % 38.7   Mono % 4.0 - 15.0 % 7.7   Eosinophil % 0.0 - 8.0 % 0.4   Basophil % 0.0 - 1.9 % 0.1   Immature Granulocytes 0.0 - 0.5 % 1.1 (H)   Gran # (ANC) 1.8 - 7.7 K/uL 7.3   Lymph # 1.0 - 4.8 K/uL 5.4 (H)   Mono # 0.3 - 1.0 K/uL 1.1 (H)   Eos # 0.0 - 0.5 K/uL 0.1   Baso # 0.00 - 0.20 K/uL 0.02   Immature Grans (Abs) 0.00 - 0.04 K/uL 0.15 (H)   nRBC 0 /100 WBC 0   Differential Method  Automated   Sodium 136 - 145 mmol/L 135 (L)   Potassium 3.5 - 5.1 mmol/L 4.9   Chloride 95 - 110 mmol/L 102   CO2 23 - 29 mmol/L 24   Anion Gap 8 - 16 mmol/L 9   BUN 8 - 23 mg/dL 33 (H)   Creatinine 0.5 - 1.4 mg/dL 1.4   eGFR >60 mL/min/1.73 m^2 49 !   Glucose 70 - 110 mg/dL 121 (H)   Calcium 8.7 - 10.5 mg/dL 8.4 (L)   Phosphorus Level 2.7 - 4.5 mg/dL 2.5 (L)   Magnesium  1.6 - 2.6 mg/dL 2.0   ALP 55 - 135 U/L 84   PROTEIN TOTAL 6.0 - 8.4 g/dL 6.5   Albumin 3.5 - 5.2 g/dL 2.5 (L)   BILIRUBIN TOTAL 0.1 - 1.0 mg/dL 0.2   AST 10 - 40 U/L 24   ALT 10 - 44 U/L 26     10/14/23  CT ABDOMEN PELVIS W WO CONTRAST  Impression:   Cholelithiasis without evidence of acute cholecystitis.   Advanced vascular calcification.  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 Colonic diverticulosis. Prostatomegaly.  Nonspecific infiltration the subcutaneous fat lateral to the left hip.  Question any history of recent trauma or contusion. Small hiatal hernia.    CT HEAD WITHOUT CONTRAST  Impression:  No acute abnormality.  ASSESSMENT/PLAN:        * Acute delirium (hypoactive)/ Encephalopathy - multifactorial - metabolic/recent infection triggers - with hypoactive delirium. Low suspicion for focal cerebrovascular ischemic event or epileptic or neuro inflammatory/infectious etiology.      No  specific investigations need from neurostandpoint.      -- Will check for nutritional deficiencies and correct as needed.   -- Delirium precautions   _ improve sleep hygiene  - PRN depacon 250 mg IV qam and 500 mg IV qhs for management of agitation and behavioral disturbances associated with delirium.  - Zyprexa 2.5 mg PO/IM q6h PRN severe, nonredirectable agitation.        Chronic interstitial lung disease  -appears chronic.  Uncertain etiology.  -Could be related to underlying autoimmune process.  -recommend outpatient pulmonary follow-up.        Enlarged prostate  -outpatient Urology follow-up recommended.        Diverticulosis  -without diverticulitis.        Cholelithiasis  -without cholecystitis.        Elevated troponin  -denies chest pain   -status post previous CABG.    Cardiology consulted.        Normocytic anemia  -could be related to anemia of chronic inflammation.    -recommend outpatient primary care follow-up as well as GI follow-up.        Rash   - possible secondary to nutritional deficits  -present for past 2 months with skin biopsy about 6 weeks ago.  -reports that he has been to a dermatologist but bx results inconclusive  -Improvement with steroids  -Hematology/Oncology consulted as patient concerned for Tcell lymphoma  -Will check additional vitamin labs in AM, if abnormalities seen on CBC smear consider flow cytometry       Hyperlipidemia  -statin        Essential hypertension  -continue metoprolol        Stage 3a chronic kidney disease  -baseline creatinine about 1.6-1.8.  Creat at baseline.  Stable.  Avoid nephrotoxic medications.        Polymyalgia rheumatica  -by history, diagnosed in May 2019 treated with prednisone with appropriate response.  No associated GCA.        COPD (chronic obstructive pulmonary disease)  -stable.  Continue as-needed DuoNebs.        PAD (peripheral artery disease)  -continue aspirin/Plavix  -s/p PTA bilat [facility] and R EIA 6/2003        Diabetes mellitus with  peripheral circulatory disorder   Continue insulin       Coronary artery disease involving coronary bypass graft of native heart without angina pectoris  -continue aspirin, Plavix, statin  Elevated Troponin on presentation.  Cardiology consulted.  Consider outpat MPI when he recovers from acute delirium      DVT prophylaxis lovenox  Expected discharge date: expected with 72 hours to Nursing home  Code Status: Full Code   Discharge Planning     Zeb Rudd MD  Department of Hospital Medicine   [facility]

## 2023-10-21 LAB
ALBUMIN SERPL BCP-MCNC: 2.6 G/DL (ref 3.5–5.2)
ALP SERPL-CCNC: 92 U/L (ref 55–135)
ALT SERPL W/O P-5'-P-CCNC: 23 U/L (ref 10–44)
ANION GAP SERPL CALC-SCNC: 12 MMOL/L (ref 8–16)
AST SERPL-CCNC: 22 U/L (ref 10–40)
BASOPHILS # BLD AUTO: 0.02 K/UL (ref 0–0.2)
BASOPHILS NFR BLD: 0.2 % (ref 0–1.9)
BILIRUB SERPL-MCNC: 0.2 MG/DL (ref 0.1–1)
BUN SERPL-MCNC: 37 MG/DL (ref 8–23)
CALCIUM SERPL-MCNC: 8.4 MG/DL (ref 8.7–10.5)
CHLORIDE SERPL-SCNC: 99 MMOL/L (ref 95–110)
CO2 SERPL-SCNC: 23 MMOL/L (ref 23–29)
CREAT SERPL-MCNC: 1.4 MG/DL (ref 0.5–1.4)
DIFFERENTIAL METHOD: ABNORMAL
EOSINOPHIL # BLD AUTO: 0 K/UL (ref 0–0.5)
EOSINOPHIL NFR BLD: 0 % (ref 0–8)
ERYTHROCYTE [DISTWIDTH] IN BLOOD BY AUTOMATED COUNT: 15.3 % (ref 11.5–14.5)
EST. GFR  (NO RACE VARIABLE): 49 ML/MIN/1.73 M^2
FOLATE SERPL-MCNC: 17.8 NG/ML (ref 4–24)
GLUCOSE SERPL-MCNC: 221 MG/DL (ref 70–110)
GLUCOSE SERPL-MCNC: 448 MG/DL (ref 70–110)
HCT VFR BLD AUTO: 29.3 % (ref 40–54)
HGB BLD-MCNC: 9.9 G/DL (ref 14–18)
IMM GRANULOCYTES # BLD AUTO: 0.17 K/UL (ref 0–0.04)
IMM GRANULOCYTES NFR BLD AUTO: 1.3 % (ref 0–0.5)
LYMPHOCYTES # BLD AUTO: 3.7 K/UL (ref 1–4.8)
LYMPHOCYTES NFR BLD: 27.9 % (ref 18–48)
MAGNESIUM SERPL-MCNC: 2.2 MG/DL (ref 1.6–2.6)
MCH RBC QN AUTO: 31.2 PG (ref 27–31)
MCHC RBC AUTO-ENTMCNC: 33.8 G/DL (ref 32–36)
MCV RBC AUTO: 92 FL (ref 82–98)
MONOCYTES # BLD AUTO: 0.7 K/UL (ref 0.3–1)
MONOCYTES NFR BLD: 5.2 % (ref 4–15)
NEUTROPHILS # BLD AUTO: 8.6 K/UL (ref 1.8–7.7)
NEUTROPHILS NFR BLD: 65.4 % (ref 38–73)
NRBC BLD-RTO: 0 /100 WBC
PHOSPHATE SERPL-MCNC: 3.7 MG/DL (ref 2.7–4.5)
PLATELET # BLD AUTO: 337 K/UL (ref 150–450)
PMV BLD AUTO: 9.3 FL (ref 9.2–12.9)
POCT GLUCOSE: 156 MG/DL (ref 70–110)
POCT GLUCOSE: 249 MG/DL (ref 70–110)
POCT GLUCOSE: 285 MG/DL (ref 70–110)
POCT GLUCOSE: 363 MG/DL (ref 70–110)
POCT GLUCOSE: 409 MG/DL (ref 70–110)
POCT GLUCOSE: 448 MG/DL (ref 70–110)
POTASSIUM SERPL-SCNC: 4.8 MMOL/L (ref 3.5–5.1)
PROT SERPL-MCNC: 6.8 G/DL (ref 6–8.4)
RBC # BLD AUTO: 3.17 M/UL (ref 4.6–6.2)
SODIUM SERPL-SCNC: 134 MMOL/L (ref 136–145)
WBC # BLD AUTO: 13.19 K/UL (ref 3.9–12.7)

## 2023-10-21 PROCEDURE — 84100 ASSAY OF PHOSPHORUS: CPT | Mod: HCNC | Performed by: STUDENT IN AN ORGANIZED HEALTH CARE EDUCATION/TRAINING PROGRAM

## 2023-10-21 PROCEDURE — 21400001 HC TELEMETRY ROOM: Mod: HCNC

## 2023-10-21 PROCEDURE — 82746 ASSAY OF FOLIC ACID SERUM: CPT | Mod: HCNC | Performed by: STUDENT IN AN ORGANIZED HEALTH CARE EDUCATION/TRAINING PROGRAM

## 2023-10-21 PROCEDURE — S4991 NICOTINE PATCH NONLEGEND: HCPCS | Mod: HCNC | Performed by: HOSPITALIST

## 2023-10-21 PROCEDURE — 97530 THERAPEUTIC ACTIVITIES: CPT | Mod: HCNC,CQ

## 2023-10-21 PROCEDURE — 25000003 PHARM REV CODE 250: Mod: HCNC | Performed by: HOSPITALIST

## 2023-10-21 PROCEDURE — 36415 COLL VENOUS BLD VENIPUNCTURE: CPT | Mod: HCNC | Performed by: STUDENT IN AN ORGANIZED HEALTH CARE EDUCATION/TRAINING PROGRAM

## 2023-10-21 PROCEDURE — 83735 ASSAY OF MAGNESIUM: CPT | Mod: HCNC | Performed by: STUDENT IN AN ORGANIZED HEALTH CARE EDUCATION/TRAINING PROGRAM

## 2023-10-21 PROCEDURE — 36415 COLL VENOUS BLD VENIPUNCTURE: CPT | Mod: HCNC | Performed by: INTERNAL MEDICINE

## 2023-10-21 PROCEDURE — 84207 ASSAY OF VITAMIN B-6: CPT | Mod: HCNC | Performed by: STUDENT IN AN ORGANIZED HEALTH CARE EDUCATION/TRAINING PROGRAM

## 2023-10-21 PROCEDURE — 83921 ORGANIC ACID SINGLE QUANT: CPT | Mod: HCNC | Performed by: STUDENT IN AN ORGANIZED HEALTH CARE EDUCATION/TRAINING PROGRAM

## 2023-10-21 PROCEDURE — 80053 COMPREHEN METABOLIC PANEL: CPT | Mod: HCNC | Performed by: STUDENT IN AN ORGANIZED HEALTH CARE EDUCATION/TRAINING PROGRAM

## 2023-10-21 PROCEDURE — 25000003 PHARM REV CODE 250: Mod: HCNC | Performed by: INTERNAL MEDICINE

## 2023-10-21 PROCEDURE — 63600175 PHARM REV CODE 636 W HCPCS: Mod: HCNC | Performed by: HOSPITALIST

## 2023-10-21 PROCEDURE — 85025 COMPLETE CBC W/AUTO DIFF WBC: CPT | Mod: HCNC | Performed by: STUDENT IN AN ORGANIZED HEALTH CARE EDUCATION/TRAINING PROGRAM

## 2023-10-21 PROCEDURE — 84630 ASSAY OF ZINC: CPT | Mod: HCNC | Performed by: STUDENT IN AN ORGANIZED HEALTH CARE EDUCATION/TRAINING PROGRAM

## 2023-10-21 PROCEDURE — 82525 ASSAY OF COPPER: CPT | Mod: HCNC | Performed by: STUDENT IN AN ORGANIZED HEALTH CARE EDUCATION/TRAINING PROGRAM

## 2023-10-21 PROCEDURE — 82947 ASSAY GLUCOSE BLOOD QUANT: CPT | Mod: HCNC | Performed by: INTERNAL MEDICINE

## 2023-10-21 PROCEDURE — 63600175 PHARM REV CODE 636 W HCPCS: Mod: HCNC | Performed by: INTERNAL MEDICINE

## 2023-10-21 RX ORDER — INSULIN ASPART 100 [IU]/ML
0-10 INJECTION, SOLUTION INTRAVENOUS; SUBCUTANEOUS
Status: DISCONTINUED | OUTPATIENT
Start: 2023-10-22 | End: 2023-10-23 | Stop reason: HOSPADM

## 2023-10-21 RX ADMIN — ATORVASTATIN CALCIUM 40 MG: 40 TABLET, FILM COATED ORAL at 09:10

## 2023-10-21 RX ADMIN — ASPIRIN 81 MG: 81 TABLET, COATED ORAL at 09:10

## 2023-10-21 RX ADMIN — CLOPIDOGREL BISULFATE 75 MG: 75 TABLET ORAL at 09:10

## 2023-10-21 RX ADMIN — METOPROLOL SUCCINATE 25 MG: 25 TABLET, EXTENDED RELEASE ORAL at 09:10

## 2023-10-21 RX ADMIN — INSULIN ASPART 3 UNITS: 100 INJECTION, SOLUTION INTRAVENOUS; SUBCUTANEOUS at 09:10

## 2023-10-21 RX ADMIN — Medication 1 PATCH: at 09:10

## 2023-10-21 RX ADMIN — DIVALPROEX SODIUM 500 MG: 125 CAPSULE ORAL at 09:10

## 2023-10-21 RX ADMIN — INSULIN ASPART 3 UNITS: 100 INJECTION, SOLUTION INTRAVENOUS; SUBCUTANEOUS at 05:10

## 2023-10-21 RX ADMIN — METHYLPREDNISOLONE SODIUM SUCCINATE 125 MG: 125 INJECTION, POWDER, FOR SOLUTION INTRAMUSCULAR; INTRAVENOUS at 02:10

## 2023-10-21 RX ADMIN — MELATONIN TAB 3 MG 6 MG: 3 TAB at 09:10

## 2023-10-21 RX ADMIN — INSULIN ASPART 2 UNITS: 100 INJECTION, SOLUTION INTRAVENOUS; SUBCUTANEOUS at 12:10

## 2023-10-21 RX ADMIN — ENOXAPARIN SODIUM 40 MG: 40 INJECTION SUBCUTANEOUS at 05:10

## 2023-10-21 RX ADMIN — DIVALPROEX SODIUM 250 MG: 125 CAPSULE, COATED PELLETS ORAL at 09:10

## 2023-10-21 RX ADMIN — METHYLPREDNISOLONE SODIUM SUCCINATE 125 MG: 125 INJECTION, POWDER, FOR SOLUTION INTRAMUSCULAR; INTRAVENOUS at 11:10

## 2023-10-21 RX ADMIN — METHYLPREDNISOLONE SODIUM SUCCINATE 125 MG: 125 INJECTION, POWDER, FOR SOLUTION INTRAMUSCULAR; INTRAVENOUS at 05:10

## 2023-10-21 NOTE — ASSESSMENT & PLAN NOTE
-baseline creatinine about 1.6-1.8.  Creat at baseline., down to creatinine of 1.4  Stable.  Avoid nephrotoxic medications.

## 2023-10-21 NOTE — PROGRESS NOTES
[facility] Medicine  Progress Note    Patient Name: Narciso Yanez  MRN: [id number]  Patient Class: IP- Inpatient   Admission Date: 10/14/2023  Length of Stay: 6 days  Attending Physician: Donnie Macedo MD  Primary Care Provider: Marcelino Del Toro MD        Subjective:     Principal Problem:Acute delirium        HPI:  Mr Yanez is a 88-year-old male with previous history of coronary artery disease status post previous CABG, COPD, PAD s/p PTA bilat YUE and R EIA 6/2003,  hypertension, dyslipidemia, CKD 3A, type 2 diabetes mellitus, PUD, polymyalgia rheumatica, NPH status post  shunt placement in 2021, who was recently discharged from the hospital on 10/11/2023 after being admitted for increased confusion and altered mental status with some hallucinations and frequent falls.  He had an abnormal UA which resulted Proteus and Enterococcus and was subsequently discharged with Augmentin.  He does have diffusely pruritic rash involving multiple areas and has been evaluated by dermatologist as outpatient without confirmatory diagnosis.  As per family members and nursing staff the patient had been delirious for example, asking the nurse if she got her food during the game while in the hospital.    In the ER, his vitals are within normal limits, his CBC shows normal white count 9.8, hemoglobin 9.7 (normocytic), with previous baseline of 11-12 since 2022, platelets 342 K.  His BNP shows mild metabolic acidosis CO2 17, creatinine 1.3 which is better than his previous creatinine of 1.8 on 10/09/2023, mild hyperglycemia at 130.  His BNP is 223, and seems that his previous BNP on 10/09/2023 was 151 and prior to that in on 09/15/2023 was within normal limits.  His troponin is 0.107, .  His CT abdomen pelvis showed nonacute findings of cholelithiasis, vascular calcifications, diverticulosis, prostatomegaly, nonspecific infiltration of the left lateral subcutaneous fat tissue and small hiatal  hernia.  His CT head is within normal limits.  His chest x-ray reveals chronic interstitial lung markings, right-sided ventricular shunt present.  In the ER, appears to have received 500 cc of normal saline as well as 40 mg of IV Lasix.  Admission has been requested for further evaluation and treatment.      Overview/Hospital Course:  89 y/o male presents with change in mental status.  I treated this patient last week for similar issues.  Patient diagnosed with UTI and treated with ABX's.  Patient has remained confused with no improvement with ABX's treatment.  Neurology consulted.  Changes in mental status secondary to hypoactive delirium.  Patient has been dealing with a generalized pruritic rash for several months causing significant decline in overall functional status.  Followed by Dermatology with no specific diagnosis.  Discussed with his Dermatologist and decided to start IV steroids.     Sed rate 100, , with normal WBC count and afebrile-likely autoimmune or inflammation not associated with infection  Rash appears to be improving with IV steroids.  Speaking fluently, and AO x4-will remove sitter.    Patient was seen in consultation by Hematology/Oncology services further evaluate for possible T-cell lymphoma.  Input appreciated.  Patient recommended to continue IV steroids as seems to be helping the rash as altered mental status.  Patient on Solu-Medrol 125 mg IV q.8 hours.  Patient recommended flow cytometry, check vitamin levels to check for any vitamin deficiencies      Interval History:  Patient seen today follow-up care.  Patient reports no chest pains or shortness of breath.  Patient was admitted to the hospital for concerns of altered mental status, worsening rash for the last 2 months.  Patient's CT head showed no acute changes.  CT abdomen and pelvis no acute changes.  Patient was seen by Dermatology.  For the rash, as skin biopsy done, inconclusive.  Improved with steroids in the past.   Patient started on high-dose Solu-Medrol this admission 125 mg IV q.8 hours with improvement of the rash and altered mental status.  Patient seen by Hematology-Oncology for concerns of possible need to rule out T-cell lymphoma.  Patient recommended to continue steroids for now, check flow cytometry, and vitamin levels to see if any vitamin deficiencies that may be contributing to skin rash.      Review of Systems   Constitutional: Negative.    HENT: Negative.     Eyes: Negative.    Respiratory: Negative.     Cardiovascular: Negative.    Gastrointestinal: Negative.    Endocrine: Negative.    Genitourinary: Negative.    Musculoskeletal: Negative.    Skin:  Positive for rash.   Allergic/Immunologic: Negative.    Neurological: Negative.    Hematological: Negative.    Psychiatric/Behavioral: Negative.       Objective:     Vital Signs (Most Recent):  Temp: 97.3 °F (36.3 °C) (10/21/23 0729)  Pulse: 73 (10/21/23 0729)  Resp: 18 (10/21/23 0729)  BP: (!) 129/59 (10/21/23 0729)  SpO2: 96 % (10/21/23 0729) Vital Signs (24h Range):  Temp:  [97.3 °F (36.3 °C)-98.6 °F (37 °C)] 97.3 °F (36.3 °C)  Pulse:  [73-87] 73  Resp:  [18-20] 18  SpO2:  [91 %-99 %] 96 %  BP: (124-166)/(59-86) 129/59     Weight: 59 kg (130 lb 1.1 oz)  Body mass index is 20.37 kg/m².    Intake/Output Summary (Last 24 hours) at 10/21/2023 0848  Last data filed at 10/20/2023 1715  Gross per 24 hour   Intake 480 ml   Output 1100 ml   Net -620 ml         Physical Exam  Vitals reviewed.   Constitutional:       Appearance: Normal appearance.   HENT:      Head: Normocephalic.      Right Ear: Tympanic membrane normal.      Left Ear: Tympanic membrane normal.      Nose: Nose normal.      Mouth/Throat:      Mouth: Mucous membranes are moist.      Pharynx: Oropharynx is clear.   Eyes:      Extraocular Movements: Extraocular movements intact.      Conjunctiva/sclera: Conjunctivae normal.      Pupils: Pupils are equal, round, and reactive to light.   Cardiovascular:       "Rate and Rhythm: Normal rate and regular rhythm.      Pulses: Normal pulses.      Heart sounds: Normal heart sounds.   Pulmonary:      Effort: Pulmonary effort is normal.      Breath sounds: Normal breath sounds.   Abdominal:      General: Abdomen is flat. Bowel sounds are normal.      Palpations: Abdomen is soft.   Musculoskeletal:         General: Normal range of motion.      Cervical back: Normal range of motion and neck supple.   Skin:     General: Skin is warm.      Capillary Refill: Capillary refill takes less than 2 seconds.      Findings: Rash present.   Neurological:      General: No focal deficit present.      Mental Status: He is alert and oriented to person, place, and time.      Cranial Nerves: No cranial nerve deficit.   Psychiatric:         Mood and Affect: Mood normal.             Significant Labs: All pertinent labs within the past 24 hours have been reviewed.  Blood Culture: No results for input(s): "LABBLOO" in the last 48 hours.  BMP:   Recent Labs   Lab 10/21/23  0411   *   *   K 4.8   CL 99   CO2 23   BUN 37*   CREATININE 1.4   CALCIUM 8.4*   MG 2.2     CBC:   Recent Labs   Lab 10/20/23  0622 10/20/23  1432 10/21/23  0411   WBC 14.18* 14.08* 13.19*   HGB 9.6* 9.7* 9.9*   HCT 30.1* 28.9* 29.3*    321 337     Urine Culture: No results for input(s): "LABURIN" in the last 48 hours.  Recent Lab Results  (Last 5 results in the past 24 hours)        10/21/23  0730   10/21/23  0411   10/20/23  2037   10/20/23  1611   10/20/23  1432        Albumin   2.6             ALP   92             ALT   23             Anion Gap   12             AST   22             Baso #   0.02       0.02       Basophil %   0.2       0.1       BILIRUBIN TOTAL   0.2  Comment: For infants and newborns, interpretation of results should be based  on gestational age, weight and in agreement with clinical  observations.    Premature Infant recommended reference ranges:  Up to 24 hours.............<8.0 mg/dL  Up to 48 " hours............<12.0 mg/dL  3-5 days..................<15.0 mg/dL  6-29 days.................<15.0 mg/dL               BUN   37             Calcium   8.4             Chloride   99             CO2   23             Creatinine   1.4             Differential Method   Automated       Automated       eGFR   49             Eos #   0.0       0.0       Eosinophil %   0.0       0.0       Glucose   221             Gran # (ANC)   8.6       10.9       Gran %   65.4       77.6       Hematocrit   29.3       28.9       Hemoglobin   9.9       9.7       Immature Grans (Abs)   0.17  Comment: Mild elevation in immature granulocytes is non specific and   can be seen in a variety of conditions including stress response,   acute inflammation, trauma and pregnancy. Correlation with other   laboratory and clinical findings is essential.         0.15  Comment: Mild elevation in immature granulocytes is non specific and   can be seen in a variety of conditions including stress response,   acute inflammation, trauma and pregnancy. Correlation with other   laboratory and clinical findings is essential.         Immature Granulocytes   1.3       1.1       Lymph #   3.7       2.5       Lymph %   27.9       17.9       Magnesium    2.2             MCH   31.2       31.5       MCHC   33.8       33.6       MCV   92       94       Mono #   0.7       0.5       Mono %   5.2       3.3       MPV   9.3       9.3       nRBC   0       0       Pathologist Review         Review required       Pathologist Review Peripheral Smear         REVIEWED  Comment:   Electronically reviewed and signed by:  Cyndy Lewis D.O.  Signed on 10/20/23 at 15:45  Platelets-within normal limits   Red blood cells - decreased, normocytic normochromic anemia with   anisopoikilocytosis   Rare nucleated red blood cell seen   White blood cells- leukocytosis with relative and absolute   neutrophilia, relative lymphopenia, relative monocytopenia   No morphologic abnormalities nor  blasts on scanning  Manual differential count:  Neutrophils - 78%, lymphocytes - 18%,   monocytes -3%, basophils -1%.         Phosphorus Level   3.7             Platelet Count   337       321       POCT Glucose 156     326   323         Potassium   4.8             PROTEIN TOTAL   6.8             RBC   3.17       3.08       RDW   15.3       15.6       Sodium   134             WBC   13.19       14.08                              Significant Imaging: I have reviewed all pertinent imaging results/findings within the past 24 hours.      Assessment/Plan:      * Acute delirium  -similar to previous presentation when symptoms were attributable to acute UTI.    -symptoms continue despite treatment.    -CT head without acute findings.    -neurology consulted.  Symptoms probably related to hypoactive delirium.  Delirium precautions.   Start depakote with prn Zyprexa.  Patient not really sleeping much at night secondary to pruritus.  Hopefully steroids improve rash/pruritus and patient can start sleeping better at night.  This would hopefully improve delirium.      Chronic interstitial lung disease  -appears chronic.  Uncertain etiology.  -Could be related to underlying autoimmune process.  -recommend outpatient pulmonary follow-up.      Enlarged prostate  -outpatient Urology follow-up recommended.      Diverticulosis  -without diverticulitis.      Cholelithiasis  -without cholecystitis.      Elevated troponin  -denies chest pain   -status post previous CABG.    -continue Toprol-XL, aspirin, Plavix, Lipitor.  Cardiology consulted.  Recommended outpatient nuclear medicine stress test when the patient is medically improved from delirium and rash      Normocytic anemia  -could be related to anemia of chronic inflammation.    -recommend outpatient primary care follow-up as well as GI follow-up.      Rash  -patient reports having rash onset since father's day without previous history.    -reports that he has been to 3 dermatologists  and Infectious Disease  -reports he has had skin biopsy about 6 weeks ago.  Not conclusive results.  Currently being treated with Dupixent with no improvement of symptoms.  Discussed with patient's Dermatologist.    Will try IV steroids.  Patient started on Solu-Medrol 125 mg q.8 hours, with some improvement  Hematology consult to evaluate for possible T-cell lymphoma, input appreciated.  Recommend flow cytometry, continue steroids, check vitamin levels.  Patient will need to follow outpatient dermatology clinic.    Hyperlipidemia  -statin      Essential hypertension  -continue Toprol-XL      Stage 3a chronic kidney disease  -baseline creatinine about 1.6-1.8.  Creat at baseline., down to creatinine of 1.4  Stable.  Avoid nephrotoxic medications.      Polymyalgia rheumatica  -by history, diagnosed in May 2019 treated with prednisone with appropriate response.  No associated GCA.      COPD (chronic obstructive pulmonary disease)  -stable.  Continue as-needed DuoNebs.      PAD (peripheral artery disease)  -continue aspirin/Plavix, Lipitor  -s/p PTA bilat YUE and R EIA 6/2003      Diabetes mellitus with peripheral circulatory disorder  Patient's FSGs are uncontrolled due to hyperglycemia on current medication regimen.  Last A1c reviewed-   Lab Results   Component Value Date    HGBA1C 6.9 (H) 10/15/2023     Most recent fingerstick glucose reviewed-   Recent Labs   Lab 10/20/23  1134 10/20/23  1611 10/20/23  2037 10/21/23  0730   POCTGLUCOSE 236* 323* 326* 156*     Current correctional scale  Low  Maintain anti-hyperglycemic dose as follows-   Antihyperglycemics (From admission, onward)    Start     Stop Route Frequency Ordered    10/15/23 0251  insulin aspart U-100 pen 0-5 Units         -- SubQ Before meals & nightly PRN 10/15/23 0151        Hold Oral hypoglycemics while patient is in the hospital.    Coronary artery disease involving coronary bypass graft of native heart without angina pectoris  -continue aspirin,  Plavix, statin  Elevated Troponin on presentation.  Cardiology consulted.  Consider outpat MPI when he recovers from acute delirium        VTE Risk Mitigation (From admission, onward)         Ordered     enoxaparin injection 40 mg  Daily         10/15/23 0139     IP VTE HIGH RISK PATIENT  Once         10/15/23 0139     Place sequential compression device  Until discontinued         10/15/23 0139                Discharge Planning   SUSIE: 10/17/2023     Code Status: Full Code   Is the patient medically ready for discharge?:     Reason for patient still in hospital (select all that apply): Patient unstable, Patient trending condition, Treatment and Consult recommendations  Discharge Plan A: Skilled Nursing Facility   Discharge Delays: (!) Post-Acute Set-up              Donnie Macedo MD  Department of Hospital Medicine   [facility]

## 2023-10-21 NOTE — NURSING
[facility]  Nurses Note -- 4 Eyes      10/21/2023       Skin assessed on: Q Shift      [x] No Pressure Injuries Present    []Prevention Measures Documented  Patient skin dry, scaly, and flaky with skin tears and scabs  [] Yes LDA  for Pressure Injury Previously documented     [] Yes New Pressure Injury Discovered   [] LDA for New Pressure Injury Added      Attending :  Marbella Bautista LPN     Second RN:  Angie Calderón RN

## 2023-10-21 NOTE — ASSESSMENT & PLAN NOTE
-denies chest pain   -status post previous CABG.    -continue Toprol-XL, aspirin, Plavix, Lipitor.  Cardiology consulted.  Recommended outpatient nuclear medicine stress test when the patient is medically improved from delirium and rash

## 2023-10-21 NOTE — SUBJECTIVE & OBJECTIVE
Interval History:  Patient seen today follow-up care.  Patient reports no chest pains or shortness of breath.  Patient was admitted to the hospital for concerns of altered mental status, worsening rash for the last 2 months.  Patient's CT head showed no acute changes.  CT abdomen and pelvis no acute changes.  Patient was seen by Dermatology.  For the rash, as skin biopsy done, inconclusive.  Improved with steroids in the past.  Patient started on high-dose Solu-Medrol this admission 125 mg IV q.8 hours with improvement of the rash and altered mental status.  Patient seen by Hematology-Oncology for concerns of possible need to rule out T-cell lymphoma.  Patient recommended to continue steroids for now, check flow cytometry, and vitamin levels to see if any vitamin deficiencies that may be contributing to skin rash.      Review of Systems   Constitutional: Negative.    HENT: Negative.     Eyes: Negative.    Respiratory: Negative.     Cardiovascular: Negative.    Gastrointestinal: Negative.    Endocrine: Negative.    Genitourinary: Negative.    Musculoskeletal: Negative.    Skin:  Positive for rash.   Allergic/Immunologic: Negative.    Neurological: Negative.    Hematological: Negative.    Psychiatric/Behavioral: Negative.       Objective:     Vital Signs (Most Recent):  Temp: 97.3 °F (36.3 °C) (10/21/23 0729)  Pulse: 73 (10/21/23 0729)  Resp: 18 (10/21/23 0729)  BP: (!) 129/59 (10/21/23 0729)  SpO2: 96 % (10/21/23 0729) Vital Signs (24h Range):  Temp:  [97.3 °F (36.3 °C)-98.6 °F (37 °C)] 97.3 °F (36.3 °C)  Pulse:  [73-87] 73  Resp:  [18-20] 18  SpO2:  [91 %-99 %] 96 %  BP: (124-166)/(59-86) 129/59     Weight: 59 kg (130 lb 1.1 oz)  Body mass index is 20.37 kg/m².    Intake/Output Summary (Last 24 hours) at 10/21/2023 0848  Last data filed at 10/20/2023 1715  Gross per 24 hour   Intake 480 ml   Output 1100 ml   Net -620 ml         Physical Exam  Vitals reviewed.   Constitutional:       Appearance: Normal appearance.  "  HENT:      Head: Normocephalic.      Right Ear: Tympanic membrane normal.      Left Ear: Tympanic membrane normal.      Nose: Nose normal.      Mouth/Throat:      Mouth: Mucous membranes are moist.      Pharynx: Oropharynx is clear.   Eyes:      Extraocular Movements: Extraocular movements intact.      Conjunctiva/sclera: Conjunctivae normal.      Pupils: Pupils are equal, round, and reactive to light.   Cardiovascular:      Rate and Rhythm: Normal rate and regular rhythm.      Pulses: Normal pulses.      Heart sounds: Normal heart sounds.   Pulmonary:      Effort: Pulmonary effort is normal.      Breath sounds: Normal breath sounds.   Abdominal:      General: Abdomen is flat. Bowel sounds are normal.      Palpations: Abdomen is soft.   Musculoskeletal:         General: Normal range of motion.      Cervical back: Normal range of motion and neck supple.   Skin:     General: Skin is warm.      Capillary Refill: Capillary refill takes less than 2 seconds.      Findings: Rash present.   Neurological:      General: No focal deficit present.      Mental Status: He is alert and oriented to person, place, and time.      Cranial Nerves: No cranial nerve deficit.   Psychiatric:         Mood and Affect: Mood normal.             Significant Labs: All pertinent labs within the past 24 hours have been reviewed.  Blood Culture: No results for input(s): "LABBLOO" in the last 48 hours.  BMP:   Recent Labs   Lab 10/21/23  0411   *   *   K 4.8   CL 99   CO2 23   BUN 37*   CREATININE 1.4   CALCIUM 8.4*   MG 2.2     CBC:   Recent Labs   Lab 10/20/23  0622 10/20/23  1432 10/21/23  0411   WBC 14.18* 14.08* 13.19*   HGB 9.6* 9.7* 9.9*   HCT 30.1* 28.9* 29.3*    321 337     Urine Culture: No results for input(s): "LABURIN" in the last 48 hours.  Recent Lab Results  (Last 5 results in the past 24 hours)        10/21/23  0730   10/21/23  0411   10/20/23  2037   10/20/23  1611   10/20/23  1432        Albumin   2.6         "     ALP   92             ALT   23             Anion Gap   12             AST   22             Baso #   0.02       0.02       Basophil %   0.2       0.1       BILIRUBIN TOTAL   0.2  Comment: For infants and newborns, interpretation of results should be based  on gestational age, weight and in agreement with clinical  observations.    Premature Infant recommended reference ranges:  Up to 24 hours.............<8.0 mg/dL  Up to 48 hours............<12.0 mg/dL  3-5 days..................<15.0 mg/dL  6-29 days.................<15.0 mg/dL               BUN   37             Calcium   8.4             Chloride   99             CO2   23             Creatinine   1.4             Differential Method   Automated       Automated       eGFR   49             Eos #   0.0       0.0       Eosinophil %   0.0       0.0       Glucose   221             Gran # (ANC)   8.6       10.9       Gran %   65.4       77.6       Hematocrit   29.3       28.9       Hemoglobin   9.9       9.7       Immature Grans (Abs)   0.17  Comment: Mild elevation in immature granulocytes is non specific and   can be seen in a variety of conditions including stress response,   acute inflammation, trauma and pregnancy. Correlation with other   laboratory and clinical findings is essential.         0.15  Comment: Mild elevation in immature granulocytes is non specific and   can be seen in a variety of conditions including stress response,   acute inflammation, trauma and pregnancy. Correlation with other   laboratory and clinical findings is essential.         Immature Granulocytes   1.3       1.1       Lymph #   3.7       2.5       Lymph %   27.9       17.9       Magnesium    2.2             MCH   31.2       31.5       MCHC   33.8       33.6       MCV   92       94       Mono #   0.7       0.5       Mono %   5.2       3.3       MPV   9.3       9.3       nRBC   0       0       Pathologist Review         Review required       Pathologist Review Peripheral Smear          REVIEWED  Comment:   Electronically reviewed and signed by:  Cyndy Lewis D.O.  Signed on 10/20/23 at 15:45  Platelets-within normal limits   Red blood cells - decreased, normocytic normochromic anemia with   anisopoikilocytosis   Rare nucleated red blood cell seen   White blood cells- leukocytosis with relative and absolute   neutrophilia, relative lymphopenia, relative monocytopenia   No morphologic abnormalities nor blasts on scanning  Manual differential count:  Neutrophils - 78%, lymphocytes - 18%,   monocytes -3%, basophils -1%.         Phosphorus Level   3.7             Platelet Count   337       321       POCT Glucose 156     326   323         Potassium   4.8             PROTEIN TOTAL   6.8             RBC   3.17       3.08       RDW   15.3       15.6       Sodium   134             WBC   13.19       14.08                              Significant Imaging: I have reviewed all pertinent imaging results/findings within the past 24 hours.

## 2023-10-21 NOTE — PT/OT/SLP PROGRESS
Physical Therapy Treatment    Patient Name:  Narciso Yanez   MRN:  [id number]    Recommendations:     Discharge Recommendations: Low Intensity Therapy  Discharge Equipment Recommendations: none  Barriers to discharge: None    Assessment:     Narciso Yanez is a 88 y.o. male admitted with a medical diagnosis of Acute delirium.  He presents with the following impairments/functional limitations: weakness, impaired endurance, decreased coordination, decreased lower extremity function, impaired functional mobility, gait instability, decreased safety awareness, impaired balance, impaired self care skills.    Rehab Prognosis: Good; patient would benefit from acute skilled PT services to address these deficits and reach maximum level of function.    Recent Surgery: * No surgery found *      Plan:     During this hospitalization, patient to be seen 5 x/week to address the identified rehab impairments via gait training, therapeutic activities, therapeutic exercises, wheelchair management/training and progress toward the following goals:    Plan of Care Expires:  10/31/23    Subjective     Chief Complaint: Pt with no complaints or concerns at this time.   Patient/Family Comments/goals: Pt agreeable to PT treatment.   Pain/Comfort:  Pain Rating 1: 0/10      Objective:     Patient found HOB elevated with telemetry, PureWick, bed alarm, peripheral IV upon PT entry to room.     General Precautions: Standard, fall, diabetic  Orthopedic Precautions: N/A  Braces: N/A  Respiratory Status: Room air     Functional Mobility:  Bed Mobility:     Supine to Sit: contact guard assistance  Sit to Supine: contact guard assistance  Transfers:     Sit to Stand:  contact guard assistance with rolling walker  Gait: Pt ambulated 40-50 ft with RW, CGA requiring moderate verbal cueing on proper RW usage to prevent LOB and reduce fall risk.   Balance: Pt with good sitting and fair standing balance.       AM-PAC 6 CLICK MOBILITY  Turning over in bed  (including adjusting bedclothes, sheets and blankets)?: 4  Sitting down on and standing up from a chair with arms (e.g., wheelchair, bedside commode, etc.): 4  Moving from lying on back to sitting on the side of the bed?: 3  Moving to and from a bed to a chair (including a wheelchair)?: 3  Need to walk in hospital room?: 3  Climbing 3-5 steps with a railing?: 2  Basic Mobility Total Score: 19       Treatment & Education:      Patient left HOB elevated with all lines intact and call button in reach..    GOALS:   Multidisciplinary Problems       Physical Therapy Goals          Problem: Physical Therapy    Goal Priority Disciplines Outcome Goal Variances Interventions   Physical Therapy Goal     PT, PT/OT Ongoing, Progressing     Description: Goals to be met by: 10/31/23     Patient will increase functional independence with mobility by performin. Supine to sit with Modified [place]  2. Rolling to Left and Right with Modified [place]  3. Sit to stand transfer with Modified [place] using RW  4. Bed to chair transfer with Modified [place] using Rolling Walker  5. Gait x250 feet with Modified [place] using Rolling Walker   6. Wheelchair propulsion x250 feet with Modified [place] using bilateral upper extremities  7. Lower extremity exercise program 2 sets x15 reps per handout, with independence                         Time Tracking:     PT Received On: 10/21/23  PT Start Time: 1022     PT Stop Time: 1042  PT Total Time (min): 20 min     Billable Minutes: Therapeutic Activity 20    Treatment Type: Treatment  PT/PTA: PTA     Number of PTA visits since last PT visit: 4     10/21/2023

## 2023-10-21 NOTE — ASSESSMENT & PLAN NOTE
-patient reports having rash onset since father's day without previous history.    -reports that he has been to 3 dermatologists and Infectious Disease  -reports he has had skin biopsy about 6 weeks ago.  Not conclusive results.  Currently being treated with Dupixent with no improvement of symptoms.  Discussed with patient's Dermatologist.    Will try IV steroids.  Patient started on Solu-Medrol 125 mg q.8 hours, with some improvement  Hematology consult to evaluate for possible T-cell lymphoma, input appreciated.  Recommend flow cytometry, continue steroids, check vitamin levels.  Patient will need to follow outpatient dermatology clinic.

## 2023-10-21 NOTE — NURSING
[facility], [facility]  Nurses Note -- 4 Eyes      10/21/2023       Skin assessed on: Q Shift      [] No Pressure Injuries Present    []Prevention Measures Documented    [x] Yes LDA  for Pressure Injury Previously documented     [] Yes New Pressure Injury Discovered   [] LDA for New Pressure Injury Added      Attending RN:  Angie Calderón RN     Second RN:  Angeli Segura RN

## 2023-10-22 LAB
ALBUMIN SERPL BCP-MCNC: 2.4 G/DL (ref 3.5–5.2)
ALP SERPL-CCNC: 87 U/L (ref 55–135)
ALT SERPL W/O P-5'-P-CCNC: 25 U/L (ref 10–44)
ANION GAP SERPL CALC-SCNC: 10 MMOL/L (ref 8–16)
AST SERPL-CCNC: 20 U/L (ref 10–40)
BASOPHILS # BLD AUTO: 0.02 K/UL (ref 0–0.2)
BASOPHILS NFR BLD: 0.2 % (ref 0–1.9)
BILIRUB SERPL-MCNC: 0.2 MG/DL (ref 0.1–1)
BUN SERPL-MCNC: 36 MG/DL (ref 8–23)
CALCIUM SERPL-MCNC: 8.1 MG/DL (ref 8.7–10.5)
CHLORIDE SERPL-SCNC: 100 MMOL/L (ref 95–110)
CO2 SERPL-SCNC: 23 MMOL/L (ref 23–29)
CREAT SERPL-MCNC: 1.4 MG/DL (ref 0.5–1.4)
DIFFERENTIAL METHOD: ABNORMAL
EOSINOPHIL # BLD AUTO: 0 K/UL (ref 0–0.5)
EOSINOPHIL NFR BLD: 0 % (ref 0–8)
ERYTHROCYTE [DISTWIDTH] IN BLOOD BY AUTOMATED COUNT: 15.1 % (ref 11.5–14.5)
EST. GFR  (NO RACE VARIABLE): 49 ML/MIN/1.73 M^2
GLUCOSE SERPL-MCNC: 296 MG/DL (ref 70–110)
HCT VFR BLD AUTO: 28.6 % (ref 40–54)
HGB BLD-MCNC: 9.5 G/DL (ref 14–18)
IMM GRANULOCYTES # BLD AUTO: 0.24 K/UL (ref 0–0.04)
IMM GRANULOCYTES NFR BLD AUTO: 2.6 % (ref 0–0.5)
LYMPHOCYTES # BLD AUTO: 1.6 K/UL (ref 1–4.8)
LYMPHOCYTES NFR BLD: 16.5 % (ref 18–48)
MAGNESIUM SERPL-MCNC: 2 MG/DL (ref 1.6–2.6)
MCH RBC QN AUTO: 30.6 PG (ref 27–31)
MCHC RBC AUTO-ENTMCNC: 33.2 G/DL (ref 32–36)
MCV RBC AUTO: 92 FL (ref 82–98)
MONOCYTES # BLD AUTO: 0.2 K/UL (ref 0.3–1)
MONOCYTES NFR BLD: 2.1 % (ref 4–15)
NEUTROPHILS # BLD AUTO: 7.4 K/UL (ref 1.8–7.7)
NEUTROPHILS NFR BLD: 78.6 % (ref 38–73)
NRBC BLD-RTO: 0 /100 WBC
PHOSPHATE SERPL-MCNC: 4.2 MG/DL (ref 2.7–4.5)
PLATELET # BLD AUTO: 343 K/UL (ref 150–450)
PMV BLD AUTO: 9.7 FL (ref 9.2–12.9)
POCT GLUCOSE: 252 MG/DL (ref 70–110)
POCT GLUCOSE: 292 MG/DL (ref 70–110)
POCT GLUCOSE: 360 MG/DL (ref 70–110)
POCT GLUCOSE: 363 MG/DL (ref 70–110)
POTASSIUM SERPL-SCNC: 4.9 MMOL/L (ref 3.5–5.1)
PROT SERPL-MCNC: 6.2 G/DL (ref 6–8.4)
RBC # BLD AUTO: 3.1 M/UL (ref 4.6–6.2)
SODIUM SERPL-SCNC: 133 MMOL/L (ref 136–145)
WBC # BLD AUTO: 9.4 K/UL (ref 3.9–12.7)

## 2023-10-22 PROCEDURE — 84100 ASSAY OF PHOSPHORUS: CPT | Mod: HCNC | Performed by: STUDENT IN AN ORGANIZED HEALTH CARE EDUCATION/TRAINING PROGRAM

## 2023-10-22 PROCEDURE — 25000003 PHARM REV CODE 250: Mod: HCNC | Performed by: HOSPITALIST

## 2023-10-22 PROCEDURE — 63600175 PHARM REV CODE 636 W HCPCS: Mod: HCNC | Performed by: INTERNAL MEDICINE

## 2023-10-22 PROCEDURE — 36415 COLL VENOUS BLD VENIPUNCTURE: CPT | Mod: HCNC | Performed by: STUDENT IN AN ORGANIZED HEALTH CARE EDUCATION/TRAINING PROGRAM

## 2023-10-22 PROCEDURE — 25000003 PHARM REV CODE 250: Mod: HCNC | Performed by: INTERNAL MEDICINE

## 2023-10-22 PROCEDURE — 63600175 PHARM REV CODE 636 W HCPCS: Mod: HCNC | Performed by: NURSE PRACTITIONER

## 2023-10-22 PROCEDURE — 36415 COLL VENOUS BLD VENIPUNCTURE: CPT | Mod: HCNC | Performed by: INTERNAL MEDICINE

## 2023-10-22 PROCEDURE — 83735 ASSAY OF MAGNESIUM: CPT | Mod: HCNC | Performed by: STUDENT IN AN ORGANIZED HEALTH CARE EDUCATION/TRAINING PROGRAM

## 2023-10-22 PROCEDURE — S4991 NICOTINE PATCH NONLEGEND: HCPCS | Mod: HCNC | Performed by: HOSPITALIST

## 2023-10-22 PROCEDURE — 85025 COMPLETE CBC W/AUTO DIFF WBC: CPT | Mod: HCNC | Performed by: STUDENT IN AN ORGANIZED HEALTH CARE EDUCATION/TRAINING PROGRAM

## 2023-10-22 PROCEDURE — 97530 THERAPEUTIC ACTIVITIES: CPT | Mod: HCNC,CQ

## 2023-10-22 PROCEDURE — 86592 SYPHILIS TEST NON-TREP QUAL: CPT | Mod: HCNC | Performed by: INTERNAL MEDICINE

## 2023-10-22 PROCEDURE — 63600175 PHARM REV CODE 636 W HCPCS: Mod: HCNC | Performed by: HOSPITALIST

## 2023-10-22 PROCEDURE — 21400001 HC TELEMETRY ROOM: Mod: HCNC

## 2023-10-22 PROCEDURE — 80053 COMPREHEN METABOLIC PANEL: CPT | Mod: HCNC | Performed by: STUDENT IN AN ORGANIZED HEALTH CARE EDUCATION/TRAINING PROGRAM

## 2023-10-22 RX ADMIN — INSULIN ASPART 6 UNITS: 100 INJECTION, SOLUTION INTRAVENOUS; SUBCUTANEOUS at 09:10

## 2023-10-22 RX ADMIN — INSULIN DETEMIR 8 UNITS: 100 INJECTION, SOLUTION SUBCUTANEOUS at 09:10

## 2023-10-22 RX ADMIN — INSULIN ASPART 6 UNITS: 100 INJECTION, SOLUTION INTRAVENOUS; SUBCUTANEOUS at 04:10

## 2023-10-22 RX ADMIN — INSULIN ASPART 10 UNITS: 100 INJECTION, SOLUTION INTRAVENOUS; SUBCUTANEOUS at 12:10

## 2023-10-22 RX ADMIN — METHYLPREDNISOLONE SODIUM SUCCINATE 125 MG: 125 INJECTION, POWDER, FOR SOLUTION INTRAMUSCULAR; INTRAVENOUS at 09:10

## 2023-10-22 RX ADMIN — DIVALPROEX SODIUM 500 MG: 125 CAPSULE ORAL at 09:10

## 2023-10-22 RX ADMIN — ASPIRIN 81 MG: 81 TABLET, COATED ORAL at 09:10

## 2023-10-22 RX ADMIN — METHYLPREDNISOLONE SODIUM SUCCINATE 125 MG: 125 INJECTION, POWDER, FOR SOLUTION INTRAMUSCULAR; INTRAVENOUS at 02:10

## 2023-10-22 RX ADMIN — MELATONIN TAB 3 MG 6 MG: 3 TAB at 09:10

## 2023-10-22 RX ADMIN — INSULIN ASPART 5 UNITS: 100 INJECTION, SOLUTION INTRAVENOUS; SUBCUTANEOUS at 09:10

## 2023-10-22 RX ADMIN — DIVALPROEX SODIUM 250 MG: 125 CAPSULE, COATED PELLETS ORAL at 09:10

## 2023-10-22 RX ADMIN — ATORVASTATIN CALCIUM 40 MG: 40 TABLET, FILM COATED ORAL at 09:10

## 2023-10-22 RX ADMIN — METOPROLOL SUCCINATE 25 MG: 25 TABLET, EXTENDED RELEASE ORAL at 09:10

## 2023-10-22 RX ADMIN — METHYLPREDNISOLONE SODIUM SUCCINATE 125 MG: 125 INJECTION, POWDER, FOR SOLUTION INTRAMUSCULAR; INTRAVENOUS at 05:10

## 2023-10-22 RX ADMIN — ENOXAPARIN SODIUM 40 MG: 40 INJECTION SUBCUTANEOUS at 04:10

## 2023-10-22 RX ADMIN — CLOPIDOGREL BISULFATE 75 MG: 75 TABLET ORAL at 09:10

## 2023-10-22 RX ADMIN — Medication 1 PATCH: at 09:10

## 2023-10-22 NOTE — ASSESSMENT & PLAN NOTE
-gradually improving.  Patient more alert and talkative.  -similar to previous presentation when symptoms were attributable to acute UTI.    -symptoms continue despite treatment.    -CT head without acute findings.    -neurology consulted.  Symptoms probably related to hypoactive delirium.  Delirium precautions.   Start depakote with prn Zyprexa.  Patient not really sleeping much at night secondary to pruritus.  Hopefully steroids improve rash/pruritus and patient can start sleeping better at night.  This would hopefully improve delirium.

## 2023-10-22 NOTE — SUBJECTIVE & OBJECTIVE
Interval History:  Patient seen today for follow up care.  He reports no chest pains or shortness of breath.  He is alert and talkative.  He feels that his generalized rash/desquamation is gradually improving.  Patient is on Solu-Medrol.  Patient followed by oncology need to rule out T-cell lymphoma.  Flow cytometry has been ordered.  Patient followed by a dermatologist in the outpatient clinic, with biopsy in the past that was inconclusive.  He has seen Infectious diseases in the past.  Discussed changing the patient to prednisone p.o. 40 mg daily with a tapering course of discharge.  Patient blood sugars greater than 250, we will start Levemir 8 units SC daily to go with his low-dose sliding scale.        Review of Systems   Constitutional: Negative.    HENT: Negative.     Eyes: Negative.    Respiratory: Negative.     Cardiovascular: Negative.    Gastrointestinal: Negative.    Endocrine: Negative.    Genitourinary: Negative.    Musculoskeletal: Negative.    Skin:  Positive for rash.   Allergic/Immunologic: Negative.    Neurological: Negative.    Hematological: Negative.    Psychiatric/Behavioral: Negative.       Objective:     Vital Signs (Most Recent):  Temp: 98.3 °F (36.8 °C) (10/22/23 0408)  Pulse: 76 (10/22/23 0408)  Resp: 18 (10/22/23 0408)  BP: (!) 142/67 (10/22/23 0408)  SpO2: 95 % (10/22/23 0408) Vital Signs (24h Range):  Temp:  [97.3 °F (36.3 °C)-98.5 °F (36.9 °C)] 98.3 °F (36.8 °C)  Pulse:  [64-81] 76  Resp:  [18] 18  SpO2:  [94 %-96 %] 95 %  BP: ()/(54-81) 142/67     Weight: 59 kg (130 lb 1.1 oz)  Body mass index is 20.37 kg/m².    Intake/Output Summary (Last 24 hours) at 10/22/2023 0737  Last data filed at 10/22/2023 0516  Gross per 24 hour   Intake 720 ml   Output 2050 ml   Net -1330 ml         Physical Exam  Vitals reviewed.   Constitutional:       Appearance: Normal appearance. He is normal weight.   HENT:      Head: Normocephalic and atraumatic.      Right Ear: Tympanic membrane normal.       "Left Ear: Tympanic membrane normal.      Nose: Nose normal.      Mouth/Throat:      Mouth: Mucous membranes are moist.      Pharynx: Oropharynx is clear.   Eyes:      Extraocular Movements: Extraocular movements intact.      Conjunctiva/sclera: Conjunctivae normal.      Pupils: Pupils are equal, round, and reactive to light.   Cardiovascular:      Rate and Rhythm: Normal rate and regular rhythm.      Pulses: Normal pulses.      Heart sounds: Normal heart sounds.   Pulmonary:      Effort: Pulmonary effort is normal.      Breath sounds: Normal breath sounds.   Abdominal:      General: Abdomen is flat. Bowel sounds are normal.      Palpations: Abdomen is soft.   Musculoskeletal:         General: Normal range of motion.      Cervical back: Normal range of motion and neck supple.   Skin:     General: Skin is warm.      Capillary Refill: Capillary refill takes less than 2 seconds.      Findings: Rash present.      Comments: Generalized rash with some desquamation involving his palms, arms and legs.  Gradually improving.   Neurological:      General: No focal deficit present.      Mental Status: He is alert and oriented to person, place, and time. Mental status is at baseline.   Psychiatric:         Mood and Affect: Mood normal.         Thought Content: Thought content normal.             Significant Labs: All pertinent labs within the past 24 hours have been reviewed.  Blood Culture: No results for input(s): "LABBLOO" in the last 48 hours.  BMP:   Recent Labs   Lab 10/22/23  0334   *   *   K 4.9      CO2 23   BUN 36*   CREATININE 1.4   CALCIUM 8.1*   MG 2.0     CBC:   Recent Labs   Lab 10/20/23  1432 10/21/23  0411 10/22/23  0334   WBC 14.08* 13.19* 9.40   HGB 9.7* 9.9* 9.5*   HCT 28.9* 29.3* 28.6*    337 343     Urine Culture: No results for input(s): "LABURIN" in the last 48 hours.    Significant Imaging: I have reviewed all pertinent imaging results/findings within the past 24 hours.  "

## 2023-10-22 NOTE — PLAN OF CARE
Problem: Adult Inpatient Plan of Care  Goal: Plan of Care Review  Outcome: Ongoing, Progressing  Flowsheets (Taken 10/22/2023 0550)  Plan of Care Reviewed With: patient  Goal: Patient-Specific Goal (Individualized)  Outcome: Ongoing, Progressing  Goal: Absence of Hospital-Acquired Illness or Injury  Outcome: Ongoing, Progressing  Goal: Readiness for Transition of Care  Outcome: Ongoing, Progressing     Problem: Diabetes Comorbidity  Goal: Blood Glucose Level Within Targeted Range  Outcome: Ongoing, Progressing  Intervention: Monitor and Manage Glycemia  Flowsheets (Taken 10/22/2023 0550)  Glycemic Management:   blood glucose monitored   supplemental insulin given     Problem: Fluid and Electrolyte Imbalance (Acute Kidney Injury/Impairment)  Goal: Fluid and Electrolyte Balance  Outcome: Ongoing, Progressing     Problem: Fall Injury Risk  Goal: Absence of Fall and Fall-Related Injury  Outcome: Ongoing, Progressing  Intervention: Promote Injury-Free Environment  Flowsheets (Taken 10/22/2023 0550)  Safety Promotion/Fall Prevention:   assistive device/personal item within reach   lighting adjusted   medications reviewed   side rails raised x 2   room near unit station     Problem: Skin Injury Risk Increased  Goal: Skin Health and Integrity  Outcome: Ongoing, Progressing  Intervention: Optimize Skin Protection  Flowsheets (Taken 10/22/2023 0550)  Pressure Reduction Devices: foam padding utilized  Skin Protection:   adhesive use limited   tubing/devices free from skin contact  Head of Bed (HOB) Positioning: HOB elevated     Problem: Impaired Wound Healing  Goal: Optimal Wound Healing  Outcome: Ongoing, Progressing

## 2023-10-22 NOTE — ASSESSMENT & PLAN NOTE
Patient's FSGs are uncontrolled due to hyperglycemia on current medication regimen.  Last A1c reviewed-   Lab Results   Component Value Date    HGBA1C 6.9 (H) 10/15/2023     Most recent fingerstick glucose reviewed-   Recent Labs   Lab 10/21/23  1650 10/21/23  2022 10/21/23  2110 10/21/23  2231   POCTGLUCOSE 285* 409* 448* 363*     Current correctional scale  Low  Maintain anti-hyperglycemic dose as follows-   Antihyperglycemics (From admission, onward)    Start     Stop Route Frequency Ordered    10/22/23 0900  insulin detemir U-100 (Levemir) pen 8 Units         -- SubQ Daily 10/22/23 0723    10/22/23 0645  insulin aspart U-100 pen 0-10 Units         -- SubQ Before meals & nightly 10/21/23 2234        Hold Oral hypoglycemics while patient is in the hospital.

## 2023-10-22 NOTE — PT/OT/SLP PROGRESS
Physical Therapy Treatment    Patient Name:  Narciso Yanez   MRN:  [id number]    Recommendations:     Discharge Recommendations: Low Intensity Therapy  Discharge Equipment Recommendations: none  Barriers to discharge: None    Assessment:     Narciso Yanez is a 88 y.o. male admitted with a medical diagnosis of Acute delirium.  He presents with the following impairments/functional limitations: weakness, impaired endurance, decreased coordination, impaired self care skills, decreased lower extremity function, impaired functional mobility, gait instability, decreased safety awareness, impaired balance.    Rehab Prognosis: Good; patient would benefit from acute skilled PT services to address these deficits and reach maximum level of function.    Recent Surgery: * No surgery found *      Plan:     During this hospitalization, patient to be seen 5 x/week to address the identified rehab impairments via gait training, therapeutic activities, therapeutic exercises, wheelchair management/training and progress toward the following goals:    Plan of Care Expires:  10/31/23    Subjective     Chief Complaint: Pt with no complaints or concerns at this time.   Patient/Family Comments/goals: Pt agreeable to PT treatment.   Pain/Comfort:  Pain Rating 1: 0/10      Objective:     Patient found HOB elevated with telemetry, peripheral IV, PureWick upon PT entry to room.     General Precautions: Standard, fall, diabetic  Orthopedic Precautions: N/A  Braces: N/A  Respiratory Status: Room air     Functional Mobility:  Bed Mobility:     Supine to Sit: stand by assistance  Sit to Supine: stand by assistance  Transfers:     Sit to Stand:  contact guard assistance with rolling walker  Gait: Pt ambulate 70-80 ft with RW, CGA requiring mild cueing on maintaining upright posture to prevent increased trunk flexion.   Balance: Pt with good sitting and fair standing balance.      AM-PAC 6 CLICK MOBILITY  Turning over in bed (including adjusting  bedclothes, sheets and blankets)?: 4  Sitting down on and standing up from a chair with arms (e.g., wheelchair, bedside commode, etc.): 4  Moving from lying on back to sitting on the side of the bed?: 4  Moving to and from a bed to a chair (including a wheelchair)?: 3  Need to walk in hospital room?: 3  Climbing 3-5 steps with a railing?: 2  Basic Mobility Total Score: 20       Treatment & Education:      Patient left HOB elevated with all lines intact, call button in reach, and bed alarm on..    GOALS:   Multidisciplinary Problems       Physical Therapy Goals          Problem: Physical Therapy    Goal Priority Disciplines Outcome Goal Variances Interventions   Physical Therapy Goal     PT, PT/OT Ongoing, Progressing     Description: Goals to be met by: 10/31/23     Patient will increase functional independence with mobility by performin. Supine to sit with Modified [place]  2. Rolling to Left and Right with Modified [place]  3. Sit to stand transfer with Modified [place] using RW  4. Bed to chair transfer with Modified [place] using Rolling Walker  5. Gait x250 feet with Modified [place] using Rolling Walker   6. Wheelchair propulsion x250 feet with Modified [place] using bilateral upper extremities  7. Lower extremity exercise program 2 sets x15 reps per handout, with independence                         Time Tracking:     PT Received On: 10/22/23  PT Start Time: 1204     PT Stop Time: 1222  PT Total Time (min): 18 min     Billable Minutes: Therapeutic Activity 18    Treatment Type: Treatment  PT/PTA: PTA     Number of PTA visits since last PT visit: 5     10/22/2023

## 2023-10-22 NOTE — NURSING
Blood glucose 409  Rechecked 448  PRN insulin 3 units given  Ordered random blood glucose  Awaiting result

## 2023-10-22 NOTE — NURSING
[facility], [facility]  Nurses Note -- 4 Eyes      10/22/2023       Skin assessed on: Q Shift      [x] No Pressure Injuries Present    []Prevention Measures Documented  Patient skin dry, flaky, scaly with skin tears and scabs  [] Yes LDA  for Pressure Injury Previously documented     [] Yes New Pressure Injury Discovered   [] LDA for New Pressure Injury Added      Attending :  Marbella Bautista LPN     Second RN:  Gilda Camarena RN

## 2023-10-22 NOTE — PROGRESS NOTES
[facility] Medicine  Progress Note    Patient Name: Narciso Yanez  MRN: [id number]  Patient Class: IP- Inpatient   Admission Date: 10/14/2023  Length of Stay: 7 days  Attending Physician: Donnie Macedo MD  Primary Care Provider: Marcelino Del Toro MD        Subjective:     Principal Problem:Acute delirium        HPI:  Mr Yanez is a 88-year-old male with previous history of coronary artery disease status post previous CABG, COPD, PAD s/p PTA bilat YUE and R EIA 6/2003,  hypertension, dyslipidemia, CKD 3A, type 2 diabetes mellitus, PUD, polymyalgia rheumatica, NPH status post  shunt placement in 2021, who was recently discharged from the hospital on 10/11/2023 after being admitted for increased confusion and altered mental status with some hallucinations and frequent falls.  He had an abnormal UA which resulted Proteus and Enterococcus and was subsequently discharged with Augmentin.  He does have diffusely pruritic rash involving multiple areas and has been evaluated by dermatologist as outpatient without confirmatory diagnosis.  As per family members and nursing staff the patient had been delirious for example, asking the nurse if she got her food during the game while in the hospital.    In the ER, his vitals are within normal limits, his CBC shows normal white count 9.8, hemoglobin 9.7 (normocytic), with previous baseline of 11-12 since 2022, platelets 342 K.  His BNP shows mild metabolic acidosis CO2 17, creatinine 1.3 which is better than his previous creatinine of 1.8 on 10/09/2023, mild hyperglycemia at 130.  His BNP is 223, and seems that his previous BNP on 10/09/2023 was 151 and prior to that in on 09/15/2023 was within normal limits.  His troponin is 0.107, .  His CT abdomen pelvis showed nonacute findings of cholelithiasis, vascular calcifications, diverticulosis, prostatomegaly, nonspecific infiltration of the left lateral subcutaneous fat tissue and small hiatal  hernia.  His CT head is within normal limits.  His chest x-ray reveals chronic interstitial lung markings, right-sided ventricular shunt present.  In the ER, appears to have received 500 cc of normal saline as well as 40 mg of IV Lasix.  Admission has been requested for further evaluation and treatment.      Overview/Hospital Course:  87 y/o male presents with change in mental status.  I treated this patient last week for similar issues.  Patient diagnosed with UTI and treated with ABX's.  Patient has remained confused with no improvement with ABX's treatment.  Neurology consulted.  Changes in mental status secondary to hypoactive delirium.  Patient has been dealing with a generalized pruritic rash for several months causing significant decline in overall functional status.  Followed by Dermatology with no specific diagnosis.  Discussed with his Dermatologist and decided to start IV steroids.     Sed rate 100, , with normal WBC count and afebrile-likely autoimmune or inflammation not associated with infection  Rash appears to be improving with IV steroids.  Speaking fluently, and AO x4-will remove sitter.    Patient was seen in consultation by Hematology/Oncology services further evaluate for possible T-cell lymphoma.  Input appreciated.  Patient recommended to continue IV steroids as seems to be helping the rash as altered mental status.  Patient on Solu-Medrol 125 mg IV q.8 hours.  Patient recommended flow cytometry, check vitamin levels to check for any vitamin deficiencies      Interval History:  Patient seen today for follow up care.  He reports no chest pains or shortness of breath.  He is alert and talkative.  He feels that his generalized rash/desquamation is gradually improving.  Patient is on Solu-Medrol.  Patient followed by oncology need to rule out T-cell lymphoma.  Flow cytometry has been ordered.  Patient followed by a dermatologist in the outpatient clinic, with biopsy in the past that was  inconclusive.  He has seen Infectious diseases in the past.  Discussed changing the patient to prednisone p.o. 40 mg daily with a tapering course of discharge.  Patient blood sugars greater than 250, we will start Levemir 8 units SC daily to go with his low-dose sliding scale.        Review of Systems   Constitutional: Negative.    HENT: Negative.     Eyes: Negative.    Respiratory: Negative.     Cardiovascular: Negative.    Gastrointestinal: Negative.    Endocrine: Negative.    Genitourinary: Negative.    Musculoskeletal: Negative.    Skin:  Positive for rash.   Allergic/Immunologic: Negative.    Neurological: Negative.    Hematological: Negative.    Psychiatric/Behavioral: Negative.       Objective:     Vital Signs (Most Recent):  Temp: 98.3 °F (36.8 °C) (10/22/23 0408)  Pulse: 76 (10/22/23 0408)  Resp: 18 (10/22/23 0408)  BP: (!) 142/67 (10/22/23 0408)  SpO2: 95 % (10/22/23 0408) Vital Signs (24h Range):  Temp:  [97.3 °F (36.3 °C)-98.5 °F (36.9 °C)] 98.3 °F (36.8 °C)  Pulse:  [64-81] 76  Resp:  [18] 18  SpO2:  [94 %-96 %] 95 %  BP: ()/(54-81) 142/67     Weight: 59 kg (130 lb 1.1 oz)  Body mass index is 20.37 kg/m².    Intake/Output Summary (Last 24 hours) at 10/22/2023 0724  Last data filed at 10/22/2023 0516  Gross per 24 hour   Intake 720 ml   Output 2050 ml   Net -1330 ml         Physical Exam  Vitals reviewed.   Constitutional:       Appearance: Normal appearance. He is normal weight.   HENT:      Head: Normocephalic and atraumatic.      Right Ear: Tympanic membrane normal.      Left Ear: Tympanic membrane normal.      Nose: Nose normal.      Mouth/Throat:      Mouth: Mucous membranes are moist.      Pharynx: Oropharynx is clear.   Eyes:      Extraocular Movements: Extraocular movements intact.      Conjunctiva/sclera: Conjunctivae normal.      Pupils: Pupils are equal, round, and reactive to light.   Cardiovascular:      Rate and Rhythm: Normal rate and regular rhythm.      Pulses: Normal pulses.       "Heart sounds: Normal heart sounds.   Pulmonary:      Effort: Pulmonary effort is normal.      Breath sounds: Normal breath sounds.   Abdominal:      General: Abdomen is flat. Bowel sounds are normal.      Palpations: Abdomen is soft.   Musculoskeletal:         General: Normal range of motion.      Cervical back: Normal range of motion and neck supple.   Skin:     General: Skin is warm.      Capillary Refill: Capillary refill takes less than 2 seconds.      Findings: Rash present.      Comments: Generalized rash with some desquamation involving his palms, arms and legs.  Gradually improving.   Neurological:      General: No focal deficit present.      Mental Status: He is alert and oriented to person, place, and time. Mental status is at baseline.   Psychiatric:         Mood and Affect: Mood normal.         Thought Content: Thought content normal.             Significant Labs: All pertinent labs within the past 24 hours have been reviewed.  Blood Culture: No results for input(s): "LABBLOO" in the last 48 hours.  BMP:   Recent Labs   Lab 10/22/23  0334   *   *   K 4.9      CO2 23   BUN 36*   CREATININE 1.4   CALCIUM 8.1*   MG 2.0     CBC:   Recent Labs   Lab 10/20/23  1432 10/21/23  0411 10/22/23  0334   WBC 14.08* 13.19* 9.40   HGB 9.7* 9.9* 9.5*   HCT 28.9* 29.3* 28.6*    337 343     Urine Culture: No results for input(s): "LABURIN" in the last 48 hours.    Significant Imaging: I have reviewed all pertinent imaging results/findings within the past 24 hours.      Assessment/Plan:      * Acute delirium  -gradually improving.  Patient more alert and talkative.  -similar to previous presentation when symptoms were attributable to acute UTI.    -symptoms continue despite treatment.    -CT head without acute findings.    -neurology consulted.  Symptoms probably related to hypoactive delirium.  Delirium precautions.   Start depakote with prn Zyprexa.  Patient not really sleeping much at night " secondary to pruritus.  Hopefully steroids improve rash/pruritus and patient can start sleeping better at night.  This would hopefully improve delirium.      Chronic interstitial lung disease  -appears chronic.  Uncertain etiology.  -Could be related to underlying autoimmune process.  -recommend outpatient pulmonary follow-up.      Enlarged prostate  -outpatient Urology follow-up recommended.      Diverticulosis  -without diverticulitis.      Cholelithiasis  -without cholecystitis.      Elevated troponin  -denies chest pain   -status post previous CABG.    -continue Toprol-XL, aspirin, Plavix, Lipitor.  Cardiology consulted.  Recommended outpatient nuclear medicine stress test when the patient is medically improved from delirium and rash      Normocytic anemia  -could be related to anemia of chronic inflammation.    -recommend outpatient primary care follow-up as well as GI follow-up.      Rash  -patient reports having rash onset since father's day without previous history.    -reports that he has been to 3 dermatologists and Infectious Disease  -reports he has had skin biopsy about 6 weeks ago.  Not conclusive results.  Currently being treated with Dupixent with no improvement of symptoms.  Discussed with patient's Dermatologist.    Will try IV steroids.  Patient started on Solu-Medrol 125 mg q.8 hours, with some improvement  Hematology consult to evaluate for possible T-cell lymphoma, input appreciated.  Recommend flow cytometry, continue steroids, check vitamin levels.  Patient will need to follow outpatient dermatology clinic.  -will change patient to prednisone 40 mg daily with a tapering course on discharge.    Hyperlipidemia  -statin      Essential hypertension  -continue Toprol-XL      Stage 3a chronic kidney disease  -baseline creatinine about 1.6-1.8.  Creat at baseline., down to creatinine of 1.4  Stable.  Avoid nephrotoxic medications.      Polymyalgia rheumatica  -by history, diagnosed in May 2019  treated with prednisone with appropriate response.  No associated GCA.      COPD (chronic obstructive pulmonary disease)  -stable.  Continue as-needed DuoNebs.      PAD (peripheral artery disease)  -continue aspirin/Plavix, Lipitor  -s/p PTA bilat YUE and R EIA 6/2003      Diabetes mellitus with peripheral circulatory disorder  Patient's FSGs are uncontrolled due to hyperglycemia on current medication regimen.  Last A1c reviewed-   Lab Results   Component Value Date    HGBA1C 6.9 (H) 10/15/2023     Most recent fingerstick glucose reviewed-   Recent Labs   Lab 10/21/23  1650 10/21/23  2022 10/21/23  2110 10/21/23  2231   POCTGLUCOSE 285* 409* 448* 363*     Current correctional scale  Low  Maintain anti-hyperglycemic dose as follows-   Antihyperglycemics (From admission, onward)    Start     Stop Route Frequency Ordered    10/22/23 0900  insulin detemir U-100 (Levemir) pen 8 Units         -- SubQ Daily 10/22/23 0723    10/22/23 0645  insulin aspart U-100 pen 0-10 Units         -- SubQ Before meals & nightly 10/21/23 2234        Hold Oral hypoglycemics while patient is in the hospital.    Coronary artery disease involving coronary bypass graft of native heart without angina pectoris  -continue aspirin, Plavix, statin  Elevated Troponin on presentation.  Cardiology consulted.  Consider outpat MPI when he recovers from acute delirium        VTE Risk Mitigation (From admission, onward)         Ordered     enoxaparin injection 40 mg  Daily         10/15/23 0139     IP VTE HIGH RISK PATIENT  Once         10/15/23 0139     Place sequential compression device  Until discontinued         10/15/23 0139                Discharge Planning   SUSIE: 10/17/2023     Code Status: Full Code   Is the patient medically ready for discharge?:     Reason for patient still in hospital (select all that apply): Patient trending condition, Treatment and Pending disposition  Discharge Plan A: Skilled Nursing Facility   Discharge Delays: (!)  Post-Acute Set-up              Donnie Macedo MD  Department of Hospital Medicine   [facility] - [place] - Telemetry

## 2023-10-22 NOTE — NURSING
[facility] - [place]  Nurses Note -- 4 Eyes    Received pt awake on bed, alert and oriented. On RA not in distress. On 24g on his R upper arm, flushed and intact noted. With bruised scattered all over. Skin tear noted. With blood glucose monitoring, prn insulin given. On condom cath. HOB elevated. Pt need attended. Bed in low position. Call light within reach. Will continue to monitor.  10/22/2023       Skin assessed on: Q Shift      [] No Pressure Injuries Present    []Prevention Measures Documented    [x] Yes LDA  for Pressure Injury Previously documented     [] Yes New Pressure Injury Discovered   [] LDA for New Pressure Injury Added      Attending RN:  Gilda Camarena RN     Second RN:  Suma Cabrera RN

## 2023-10-23 VITALS
DIASTOLIC BLOOD PRESSURE: 63 MMHG | TEMPERATURE: 98 F | WEIGHT: 130.06 LBS | RESPIRATION RATE: 18 BRPM | HEIGHT: 67 IN | BODY MASS INDEX: 20.41 KG/M2 | HEART RATE: 70 BPM | SYSTOLIC BLOOD PRESSURE: 140 MMHG | OXYGEN SATURATION: 99 %

## 2023-10-23 PROBLEM — R79.89 ELEVATED TROPONIN: Status: RESOLVED | Noted: 2023-10-15 | Resolved: 2023-10-23

## 2023-10-23 PROBLEM — R41.0 ACUTE DELIRIUM: Status: RESOLVED | Noted: 2023-10-15 | Resolved: 2023-10-23

## 2023-10-23 LAB
ALBUMIN SERPL BCP-MCNC: 2.4 G/DL (ref 3.5–5.2)
ALP SERPL-CCNC: 97 U/L (ref 55–135)
ALT SERPL W/O P-5'-P-CCNC: 24 U/L (ref 10–44)
ANION GAP SERPL CALC-SCNC: 9 MMOL/L (ref 8–16)
AST SERPL-CCNC: 17 U/L (ref 10–40)
BASOPHILS # BLD AUTO: 0.06 K/UL (ref 0–0.2)
BASOPHILS NFR BLD: 0.4 % (ref 0–1.9)
BILIRUB SERPL-MCNC: 0.2 MG/DL (ref 0.1–1)
BUN SERPL-MCNC: 35 MG/DL (ref 8–23)
CALCIUM SERPL-MCNC: 8.1 MG/DL (ref 8.7–10.5)
CHLORIDE SERPL-SCNC: 98 MMOL/L (ref 95–110)
CO2 SERPL-SCNC: 23 MMOL/L (ref 23–29)
CREAT SERPL-MCNC: 1.3 MG/DL (ref 0.5–1.4)
DIFFERENTIAL METHOD: ABNORMAL
EOSINOPHIL # BLD AUTO: 0 K/UL (ref 0–0.5)
EOSINOPHIL NFR BLD: 0 % (ref 0–8)
ERYTHROCYTE [DISTWIDTH] IN BLOOD BY AUTOMATED COUNT: 14.9 % (ref 11.5–14.5)
EST. GFR  (NO RACE VARIABLE): 54 ML/MIN/1.73 M^2
GLUCOSE SERPL-MCNC: 326 MG/DL (ref 70–110)
HCT VFR BLD AUTO: 28.9 % (ref 40–54)
HGB BLD-MCNC: 9.6 G/DL (ref 14–18)
IMM GRANULOCYTES # BLD AUTO: 0.32 K/UL (ref 0–0.04)
IMM GRANULOCYTES NFR BLD AUTO: 2.2 % (ref 0–0.5)
LYMPHOCYTES # BLD AUTO: 1.7 K/UL (ref 1–4.8)
LYMPHOCYTES NFR BLD: 11.7 % (ref 18–48)
MAGNESIUM SERPL-MCNC: 2.1 MG/DL (ref 1.6–2.6)
MCH RBC QN AUTO: 31.1 PG (ref 27–31)
MCHC RBC AUTO-ENTMCNC: 33.2 G/DL (ref 32–36)
MCV RBC AUTO: 94 FL (ref 82–98)
MONOCYTES # BLD AUTO: 0.3 K/UL (ref 0.3–1)
MONOCYTES NFR BLD: 2 % (ref 4–15)
NEUTROPHILS # BLD AUTO: 12 K/UL (ref 1.8–7.7)
NEUTROPHILS NFR BLD: 83.7 % (ref 38–73)
NRBC BLD-RTO: 0 /100 WBC
PHOSPHATE SERPL-MCNC: 3.7 MG/DL (ref 2.7–4.5)
PLATELET # BLD AUTO: 347 K/UL (ref 150–450)
PMV BLD AUTO: 9.9 FL (ref 9.2–12.9)
POCT GLUCOSE: 258 MG/DL (ref 70–110)
POCT GLUCOSE: 345 MG/DL (ref 70–110)
POTASSIUM SERPL-SCNC: 5 MMOL/L (ref 3.5–5.1)
PROT SERPL-MCNC: 5.9 G/DL (ref 6–8.4)
RBC # BLD AUTO: 3.09 M/UL (ref 4.6–6.2)
SARS-COV-2 RDRP RESP QL NAA+PROBE: NEGATIVE
SODIUM SERPL-SCNC: 130 MMOL/L (ref 136–145)
WBC # BLD AUTO: 14.3 K/UL (ref 3.9–12.7)

## 2023-10-23 PROCEDURE — 86580 TB INTRADERMAL TEST: CPT | Mod: HCNC | Performed by: INTERNAL MEDICINE

## 2023-10-23 PROCEDURE — 63600175 PHARM REV CODE 636 W HCPCS: Mod: HCNC | Performed by: INTERNAL MEDICINE

## 2023-10-23 PROCEDURE — 85025 COMPLETE CBC W/AUTO DIFF WBC: CPT | Mod: HCNC | Performed by: STUDENT IN AN ORGANIZED HEALTH CARE EDUCATION/TRAINING PROGRAM

## 2023-10-23 PROCEDURE — 63600175 PHARM REV CODE 636 W HCPCS: Mod: HCNC | Performed by: HOSPITALIST

## 2023-10-23 PROCEDURE — 36415 COLL VENOUS BLD VENIPUNCTURE: CPT | Mod: HCNC | Performed by: STUDENT IN AN ORGANIZED HEALTH CARE EDUCATION/TRAINING PROGRAM

## 2023-10-23 PROCEDURE — 25000003 PHARM REV CODE 250: Mod: HCNC | Performed by: INTERNAL MEDICINE

## 2023-10-23 PROCEDURE — U0002 COVID-19 LAB TEST NON-CDC: HCPCS | Mod: HCNC | Performed by: INTERNAL MEDICINE

## 2023-10-23 PROCEDURE — 97116 GAIT TRAINING THERAPY: CPT | Mod: HCNC

## 2023-10-23 PROCEDURE — 97535 SELF CARE MNGMENT TRAINING: CPT | Mod: HCNC

## 2023-10-23 PROCEDURE — 88189 PR  FLOWCYTOMETRY/READ, 16 & > MARKERS: ICD-10-PCS | Mod: HCNC,,, | Performed by: PATHOLOGY

## 2023-10-23 PROCEDURE — 88185 FLOWCYTOMETRY/TC ADD-ON: CPT | Mod: HCNC | Performed by: PATHOLOGY

## 2023-10-23 PROCEDURE — 30200315 PPD INTRADERMAL TEST REV CODE 302: Mod: HCNC | Performed by: INTERNAL MEDICINE

## 2023-10-23 PROCEDURE — 88184 FLOWCYTOMETRY/ TC 1 MARKER: CPT | Mod: HCNC | Performed by: PATHOLOGY

## 2023-10-23 PROCEDURE — S4991 NICOTINE PATCH NONLEGEND: HCPCS | Mod: HCNC | Performed by: HOSPITALIST

## 2023-10-23 PROCEDURE — 80053 COMPREHEN METABOLIC PANEL: CPT | Mod: HCNC | Performed by: STUDENT IN AN ORGANIZED HEALTH CARE EDUCATION/TRAINING PROGRAM

## 2023-10-23 PROCEDURE — 84100 ASSAY OF PHOSPHORUS: CPT | Mod: HCNC | Performed by: STUDENT IN AN ORGANIZED HEALTH CARE EDUCATION/TRAINING PROGRAM

## 2023-10-23 PROCEDURE — 83735 ASSAY OF MAGNESIUM: CPT | Mod: HCNC | Performed by: STUDENT IN AN ORGANIZED HEALTH CARE EDUCATION/TRAINING PROGRAM

## 2023-10-23 PROCEDURE — 97110 THERAPEUTIC EXERCISES: CPT | Mod: HCNC

## 2023-10-23 PROCEDURE — 88189 FLOWCYTOMETRY/READ 16 & >: CPT | Mod: HCNC,,, | Performed by: PATHOLOGY

## 2023-10-23 PROCEDURE — 25000003 PHARM REV CODE 250: Mod: HCNC | Performed by: HOSPITALIST

## 2023-10-23 RX ORDER — ATORVASTATIN CALCIUM 40 MG/1
40 TABLET, FILM COATED ORAL DAILY
Qty: 30 TABLET | Refills: 2
Start: 2023-10-24 | End: 2024-01-22

## 2023-10-23 RX ORDER — DIVALPROEX SODIUM 125 MG/1
500 CAPSULE, COATED PELLETS ORAL NIGHTLY
Qty: 120 CAPSULE | Refills: 2
Start: 2023-10-23 | End: 2023-11-28

## 2023-10-23 RX ORDER — METOPROLOL SUCCINATE 25 MG/1
25 TABLET, EXTENDED RELEASE ORAL DAILY
Qty: 30 TABLET | Refills: 2
Start: 2023-10-24 | End: 2024-01-16 | Stop reason: SDUPTHER

## 2023-10-23 RX ORDER — CLOPIDOGREL BISULFATE 75 MG/1
75 TABLET ORAL DAILY
Qty: 30 TABLET | Refills: 2
Start: 2023-10-23 | End: 2024-01-16 | Stop reason: SDUPTHER

## 2023-10-23 RX ORDER — PREDNISONE 20 MG/1
TABLET ORAL
Qty: 25 TABLET | Refills: 0
Start: 2023-10-24 | End: 2023-11-13

## 2023-10-23 RX ORDER — MOMETASONE FUROATE 1 MG/G
CREAM TOPICAL 2 TIMES DAILY
Start: 2023-10-23

## 2023-10-23 RX ORDER — METFORMIN HYDROCHLORIDE 500 MG/1
500 TABLET ORAL 2 TIMES DAILY WITH MEALS
Qty: 180 TABLET | Refills: 3
Start: 2023-10-23 | End: 2024-10-22

## 2023-10-23 RX ORDER — METFORMIN HYDROCHLORIDE 500 MG/1
500 TABLET ORAL 2 TIMES DAILY WITH MEALS
Status: DISCONTINUED | OUTPATIENT
Start: 2023-10-23 | End: 2023-10-23

## 2023-10-23 RX ORDER — INSULIN ASPART 100 [IU]/ML
0-10 INJECTION, SOLUTION INTRAVENOUS; SUBCUTANEOUS
Refills: 0
Start: 2023-10-23 | End: 2023-12-07

## 2023-10-23 RX ORDER — PREDNISONE 20 MG/1
40 TABLET ORAL DAILY
Status: DISCONTINUED | OUTPATIENT
Start: 2023-10-23 | End: 2023-10-23 | Stop reason: HOSPADM

## 2023-10-23 RX ORDER — IBUPROFEN 200 MG
1 TABLET ORAL DAILY
Start: 2023-10-23 | End: 2023-12-07

## 2023-10-23 RX ORDER — TALC
6 POWDER (GRAM) TOPICAL NIGHTLY
Refills: 0
Start: 2023-10-23 | End: 2023-12-07

## 2023-10-23 RX ORDER — DIVALPROEX SODIUM 125 MG/1
250 CAPSULE, COATED PELLETS ORAL DAILY
Qty: 60 CAPSULE | Refills: 2
Start: 2023-10-24 | End: 2023-11-28

## 2023-10-23 RX ORDER — METFORMIN HYDROCHLORIDE 500 MG/1
500 TABLET ORAL 2 TIMES DAILY WITH MEALS
Status: DISCONTINUED | OUTPATIENT
Start: 2023-10-23 | End: 2023-10-23 | Stop reason: HOSPADM

## 2023-10-23 RX ADMIN — DIVALPROEX SODIUM 250 MG: 125 CAPSULE, COATED PELLETS ORAL at 09:10

## 2023-10-23 RX ADMIN — CLOPIDOGREL BISULFATE 75 MG: 75 TABLET ORAL at 09:10

## 2023-10-23 RX ADMIN — INSULIN ASPART 8 UNITS: 100 INJECTION, SOLUTION INTRAVENOUS; SUBCUTANEOUS at 12:10

## 2023-10-23 RX ADMIN — ASPIRIN 81 MG: 81 TABLET, COATED ORAL at 09:10

## 2023-10-23 RX ADMIN — INSULIN DETEMIR 8 UNITS: 100 INJECTION, SOLUTION SUBCUTANEOUS at 09:10

## 2023-10-23 RX ADMIN — Medication 1 PATCH: at 09:10

## 2023-10-23 RX ADMIN — METOPROLOL SUCCINATE 25 MG: 25 TABLET, EXTENDED RELEASE ORAL at 09:10

## 2023-10-23 RX ADMIN — TUBERCULIN PURIFIED PROTEIN DERIVATIVE 5 UNITS: 5 INJECTION, SOLUTION INTRADERMAL at 12:10

## 2023-10-23 RX ADMIN — METHYLPREDNISOLONE SODIUM SUCCINATE 125 MG: 125 INJECTION, POWDER, FOR SOLUTION INTRAMUSCULAR; INTRAVENOUS at 06:10

## 2023-10-23 RX ADMIN — METFORMIN HYDROCHLORIDE 500 MG: 500 TABLET ORAL at 12:10

## 2023-10-23 RX ADMIN — PREDNISONE 40 MG: 20 TABLET ORAL at 09:10

## 2023-10-23 RX ADMIN — INSULIN ASPART 6 UNITS: 100 INJECTION, SOLUTION INTRAVENOUS; SUBCUTANEOUS at 09:10

## 2023-10-23 RX ADMIN — ATORVASTATIN CALCIUM 40 MG: 40 TABLET, FILM COATED ORAL at 09:10

## 2023-10-23 NOTE — ASSESSMENT & PLAN NOTE
-appears chronic.  Uncertain etiology.  -Could be related to underlying autoimmune process.  -recommend outpatient pulmonary follow-up.  May need referral from PCP

## 2023-10-23 NOTE — PLAN OF CARE
[facility]     Department of Hospital Medicine     [address] [place] [address]     [phone] after hours  [phone] fax       NURSING HOME ORDERS    10/23/2023    Admit to Nursing Home:     Skilled Bed                                                  Diagnoses:  Active Hospital Problems    Diagnosis  POA    Normocytic anemia [D64.9]  Yes    Cholelithiasis [K80.20]  Yes    Diverticulosis [K57.90]  Yes    Enlarged prostate [N40.0]  Yes    Chronic interstitial lung disease [J84.9]  Yes    Rash [R21]  Yes    Hyperlipidemia [E78.5]  Yes    Stage 3a chronic kidney disease [N18.31]  Yes    Essential hypertension [I10]  Yes    Polymyalgia rheumatica [M35.3]  Yes     Dx May 2019 with typical sx and ; sx responded appropriately to prednisone; no GCA sx      COPD (chronic obstructive pulmonary disease) [J44.9]  Yes    PAD (peripheral artery disease) [I73.9]  Yes    Diabetes mellitus with peripheral circulatory disorder [E11.51]  Yes    Coronary artery disease involving coronary bypass graft of native heart without angina pectoris [I25.810]  Yes      Resolved Hospital Problems    Diagnosis Date Resolved POA    *Acute delirium [R41.0] 10/23/2023 Yes    Volume overload [E87.70] 10/16/2023 Yes    Elevated troponin [R79.89] 10/23/2023 Yes    Rhabdomyolysis [M62.82] 10/16/2023 Yes       Patient is homebound due to:  Acute delirium    Allergies:  Review of patient's allergies indicates:   Allergen Reactions    Demerol [meperidine] Nausea Only       Vitals:       Every shift (Skilled Nursing patients)    Diet: Diabetic diet      Supplement:  1 can every three times a day with meals                         Type:  House            Acitivities:      - Up in a chair each morning as tolerated   - Ambulate with assistance to bathroom   - Scheduled walks once each shift (every 8 hours)   - May use walker, cane, or self-propelled wheelchair   - Weight bearing: as tolerated        LABS:  Per facility protocol     CMP, CBC each month for 3 months   Pre-albumin each month for 3 months   TSH every year       Nursing Precautions:     - Aspiration precautions:             - Total assistance with meals            -  Upright 90 degrees befor during and after meals             -  Suction at bedside          - Fall precautions per nursing home protocol   - Seizure precaution per snf protocol   - Decubitus precautions:        -  for positioning   - Pressure reducing foam mattress   - Turn patient every two hours. Use wedge pillows to anchor patient        CONSULTS:        Physical Therapy to evaluate and treat     Occupational Therapy to evaluate and treat     Speech Therapy  to evaluate and treat     Nutrition to evaluate and recommend diet     Psychiatry to evaluate and follow patients for delirium            DIABETES CARE:          Check blood sugar:        Fingerstick blood sugar AC and HS          Report CBG < 60 or > 400 to physician.                                          Insulin Sliding Scale          Glucose  Novolog Insulin Subcutaneous        0 - 60   Orange juice or glucose tablet, hold insulin      No insulin   201-250  2 units   251-300  4 units   301-350  6 units   351-400  8 units   >400   10 units then call physician      Medications: Discontinue all previous medication orders, if any. See new list below.          Medication List        START taking these medications      atorvastatin 40 MG tablet  Commonly known as: LIPITOR  Take 1 tablet (40 mg total) by mouth once daily.  Start taking on: October 24, 2023  Replaces: rosuvastatin 20 MG tablet     * divalproex 125 mg capsule  Commonly known as: DEPAKOTE SPRINKLE  Take 4 capsules (500 mg total) by mouth every evening.     * divalproex 125 mg capsule  Commonly known as: DEPAKOTE SPRINKLE  Take 2 capsules (250 mg total) by mouth once daily.  Start taking on: October 24, 2023     insulin aspart U-100 100  unit/mL (3 mL) Inpn pen  Commonly known as: NovoLOG  Inject 0-10 Units into the skin 4 (four) times daily before meals and nightly.     melatonin 3 mg tablet  Commonly known as: MELATIN  Take 2 tablets (6 mg total) by mouth nightly.     metFORMIN 500 MG tablet  Commonly known as: GLUCOPHAGE  Take 1 tablet (500 mg total) by mouth 2 (two) times daily with meals. HOLD FOR GLUCOSE LESS 100     nicotine 21 mg/24 hr  Commonly known as: NICODERM CQ  Place 1 patch onto the skin once daily.           * This list has 2 medication(s) that are the same as other medications prescribed for you. Read the directions carefully, and ask your doctor or other care provider to review them with you.                  clopidogreL 75 mg tablet  Commonly known as: PLAVIX  Take 1 tablet (75 mg total) by mouth once daily.  What changed: when to take this     metoprolol succinate 25 MG 24 hr tablet  Commonly known as: TOPROL-XL  Take 1 tablet (25 mg total) by mouth once daily.  Start taking on: October 24, 2023  What changed:   how much to take  how to take this  when to take this     mometasone 0.1% 0.1 % cream  Commonly known as: ELOCON  Apply topically 2 (two) times daily. Apply to arms ,hands, and legs BID  What changed: additional instructions     predniSONE 20 MG tablet  Commonly known as: DELTASONE  Take 2 tablets (40 mg total) by mouth once daily for 5 days, THEN 1.5 tablets (30 mg total) once daily for 5 days, THEN 1 tablet (20 mg total) once daily for 5 days, THEN 0.5 tablets (10 mg total) once daily for 5 days.  Start taking on: October 24, 2023  What changed:   medication strength  See the new instructions.            CONTINUE taking these medications      acetaminophen 500 MG tablet  Commonly known as: TYLENOL  Take 1 tablet (500 mg total) by mouth every 6 (six) hours as needed for Pain.     aspirin 81 MG EC tablet  Commonly known as: ECOTRIN  Take 1 tablet (81 mg total) by mouth once daily.                   _________________________________  Donnie Macedo MD  10/23/2023

## 2023-10-23 NOTE — ASSESSMENT & PLAN NOTE
-gradually improving.  Patient more alert and talkative.  -similar to previous presentation when symptoms were attributable to acute UTI.    -symptoms continue despite treatment.    -CT head without acute findings.    -neurology consulted.  Symptoms probably related to hypoactive delirium.  Delirium precautions.   Start depakote with prn Zyprexa.  Patient not really sleeping much at night secondary to pruritus.  Hopefully steroids improve rash/pruritus and patient can start sleeping better at night.  This would hopefully improve delirium.  -patient discharged to rehab

## 2023-10-23 NOTE — DISCHARGE SUMMARY
[facility] Medicine  Discharge Summary      Patient Name: Narciso Yanez  MRN: [id number]  ALESSANDRO: [id number]  Patient Class: IP- Inpatient  Admission Date: 10/14/2023  Hospital Length of Stay: 8 days  Discharge Date and Time:  10/23/2023 11:29 AM  Attending Physician: Donnie Macedo MD   Discharging Provider: Donnie Macedo MD  Primary Care Provider: Marcelino Del Toro MD    Primary Care Team: Networked reference to record PCT     HPI:   Mr Yanez is a 88-year-old male with previous history of coronary artery disease status post previous CABG, COPD, PAD s/p PTA bilat YUE and R EIA 6/2003,  hypertension, dyslipidemia, CKD 3A, type 2 diabetes mellitus, PUD, polymyalgia rheumatica, NPH status post  shunt placement in 2021, who was recently discharged from the hospital on 10/11/2023 after being admitted for increased confusion and altered mental status with some hallucinations and frequent falls.  He had an abnormal UA which resulted Proteus and Enterococcus and was subsequently discharged with Augmentin.  He does have diffusely pruritic rash involving multiple areas and has been evaluated by dermatologist as outpatient without confirmatory diagnosis.  As per family members and nursing staff the patient had been delirious for example, asking the nurse if she got her food during the game while in the hospital.    In the ER, his vitals are within normal limits, his CBC shows normal white count 9.8, hemoglobin 9.7 (normocytic), with previous baseline of 11-12 since 2022, platelets 342 K.  His BNP shows mild metabolic acidosis CO2 17, creatinine 1.3 which is better than his previous creatinine of 1.8 on 10/09/2023, mild hyperglycemia at 130.  His BNP is 223, and seems that his previous BNP on 10/09/2023 was 151 and prior to that in on 09/15/2023 was within normal limits.  His troponin is 0.107, .  His CT abdomen pelvis showed nonacute findings of cholelithiasis, vascular calcifications,  diverticulosis, prostatomegaly, nonspecific infiltration of the left lateral subcutaneous fat tissue and small hiatal hernia.  His CT head is within normal limits.  His chest x-ray reveals chronic interstitial lung markings, right-sided ventricular shunt present.  In the ER, appears to have received 500 cc of normal saline as well as 40 mg of IV Lasix.  Admission has been requested for further evaluation and treatment.      * No surgery found *      Hospital Course:   87 y/o male presents with change in mental status.  I treated this patient last week for similar issues.  Patient diagnosed with UTI and treated with ABX's.  Patient has remained confused with no improvement with ABX's treatment.  Neurology consulted.  Changes in mental status secondary to hypoactive delirium.  Patient has been dealing with a generalized pruritic rash for several months causing significant decline in overall functional status.  Followed by Dermatology with no specific diagnosis.  Discussed with his Dermatologist and decided to start IV steroids.     Sed rate 100, , with normal WBC count and afebrile-likely autoimmune or inflammation not associated with infection  Rash appears to be improving with IV steroids.  Speaking fluently, and AO x4-will remove sitter.    Patient was seen in consultation by Hematology/Oncology services further evaluate for possible T-cell lymphoma.  Input appreciated.  Patient recommended to continue IV steroids as seems to be helping the rash as altered mental status.  Patient on Solu-Medrol 125 mg IV q.8 hours.  Patient recommended flow cytometry, check vitamin levels to check for any vitamin deficiencies.  Patient was started on prednisone 40 mg daily with tapering course.  He will follow up in outpatient dermatology clinic.  Patient metformin 500 mg p.o. b.i.d. resumed for his diabetes.  Patient was in no acute distress today.  Though stable for discharge to rehab.  He will follow with PCP in 1 week.   Follow up with outpatient dermatology clinic in 1-2 weeks or as scheduled after discharge from rehab.       Goals of Care Treatment Preferences:  Code Status: Full Code      Consults:   Consults (From admission, onward)          Status Ordering Provider     Inpatient consult to Hematology/Oncology - [facility]  Once        Provider:  Chanda Cosme MD    Completed DEREK HEIN     Inpatient consult to Social Work  Once        Provider:  (Not yet assigned)    Completed DEREK HEIN     Inpatient consult to Neurology  Once        Provider:  Lokesh Quezada MD    Completed PRESTON MICHELE     Inpatient consult to Cardiology  Once        Provider:  Wiley Vargas MD    Completed EFRA RHOADESV            No new Assessment & Plan notes have been filed under this hospital service since the last note was generated.  Service: Hospital Medicine    Final Active Diagnoses:    Diagnosis Date Noted POA    Normocytic anemia [D64.9] 10/15/2023 Yes    Cholelithiasis [K80.20] 10/15/2023 Yes    Diverticulosis [K57.90] 10/15/2023 Yes    Enlarged prostate [N40.0] 10/15/2023 Yes    Chronic interstitial lung disease [J84.9] 10/15/2023 Yes    Rash [R21] 10/10/2023 Yes    Hyperlipidemia [E78.5] 06/09/2021 Yes    Stage 3a chronic kidney disease [N18.31] 06/09/2020 Yes    Essential hypertension [I10] 06/09/2020 Yes    Polymyalgia rheumatica [M35.3] 08/16/2019 Yes    COPD (chronic obstructive pulmonary disease) [J44.9] 03/29/2019 Yes    PAD (peripheral artery disease) [I73.9] 11/13/2017 Yes    Diabetes mellitus with peripheral circulatory disorder [E11.51] 06/18/2014 Yes    Coronary artery disease involving coronary bypass graft of native heart without angina pectoris [I25.810] 10/12/2012 Yes      Problems Resolved During this Admission:    Diagnosis Date Noted Date Resolved POA    PRINCIPAL PROBLEM:  Acute delirium [R41.0] 10/15/2023 10/23/2023 Yes    Volume overload [E87.70] 10/15/2023 10/16/2023 Yes    Elevated  "troponin [R79.89] 10/15/2023 10/23/2023 Yes    Rhabdomyolysis [M62.82] 10/15/2023 10/16/2023 Yes       Discharged Condition: good    Disposition: Rehab Facility    Follow Up:    Patient Instructions:      Ambulatory referral/consult to Smoking Cessation Program   Standing Status: Future   Referral Priority: Routine Referral Type: Consultation   Referral Reason: Specialty Services Required   Requested Specialty: CTTS   Number of Visits Requested: 1       Significant Diagnostic Studies: Labs:   BMP:   Recent Labs   Lab 10/21/23  2134 10/22/23  0334 10/23/23  0339   * 296* 326*   NA  --  133* 130*   K  --  4.9 5.0   CL  --  100 98   CO2  --  23 23   BUN  --  36* 35*   CREATININE  --  1.4 1.3   CALCIUM  --  8.1* 8.1*   MG  --  2.0 2.1   , CBC   Recent Labs   Lab 10/22/23  0334 10/23/23  0339   WBC 9.40 14.30*   HGB 9.5* 9.6*   HCT 28.6* 28.9*    347   , Troponin No results for input(s): "TROPONINI" in the last 168 hours. and A1C:   Recent Labs   Lab 07/11/23  1015 10/15/23  0711   HGBA1C 6.1* 6.9*     Microbiology:   Blood Culture   Lab Results   Component Value Date    LABBLOO No growth after 5 days. 07/01/2021    and Urine Culture    Lab Results   Component Value Date    LABURIN PROTEUS MIRABILIS  10,000 - 49,999 cfu/ml   (A) 10/09/2023    LABURIN ENTEROCOCCUS FAECALIS  > 100,000 cfu/ml   (A) 10/09/2023       Pending Diagnostic Studies:       Procedure Component Value Units Date/Time    Copper, serum [[id number]] Collected: 10/21/23 0411    Order Status: Sent Lab Status: In process Updated: 10/21/23 0417    Specimen: Blood     Flow Cytometry Analysis (Peripheral Blood) [[id number]] Collected: 10/23/23 0952    Order Status: Sent Lab Status: In process Updated: 10/23/23 0952    Specimen: Blood     Methylmalonic acid, serum [[id number]] Collected: 10/21/23 0411    Order Status: Sent Lab Status: In process Updated: 10/21/23 0417    Specimen: Blood     RPR [[id number]] Collected: 10/22/23 1210    Order " Status: Sent Lab Status: In process Updated: 10/22/23 1210    Specimen: Blood     Vitamin B6 [[id number]] Collected: 10/21/23 0411    Order Status: Sent Lab Status: In process Updated: 10/21/23 0422    Specimen: Blood     Zinc [[id number]] Collected: 10/21/23 0411    Order Status: Sent Lab Status: In process Updated: 10/21/23 0417    Specimen: Blood            Medications:  Reconciled Home Medications:      Medication List        START taking these medications      atorvastatin 40 MG tablet  Commonly known as: LIPITOR  Take 1 tablet (40 mg total) by mouth once daily.  Start taking on: October 24, 2023  Replaces: rosuvastatin 20 MG tablet     * divalproex 125 mg capsule  Commonly known as: DEPAKOTE SPRINKLE  Take 4 capsules (500 mg total) by mouth every evening.     * divalproex 125 mg capsule  Commonly known as: DEPAKOTE SPRINKLE  Take 2 capsules (250 mg total) by mouth once daily.  Start taking on: October 24, 2023     insulin aspart U-100 100 unit/mL (3 mL) Inpn pen  Commonly known as: NovoLOG  Inject 0-10 Units into the skin 4 (four) times daily before meals and nightly.     melatonin 3 mg tablet  Commonly known as: MELATIN  Take 2 tablets (6 mg total) by mouth nightly.     metFORMIN 500 MG tablet  Commonly known as: GLUCOPHAGE  Take 1 tablet (500 mg total) by mouth 2 (two) times daily with meals. HOLD FOR GLUCOSE LESS 100     nicotine 21 mg/24 hr  Commonly known as: NICODERM CQ  Place 1 patch onto the skin once daily.           * This list has 2 medication(s) that are the same as other medications prescribed for you. Read the directions carefully, and ask your doctor or other care provider to review them with you.                CHANGE how you take these medications           clopidogreL 75 mg tablet  Commonly known as: PLAVIX  Take 1 tablet (75 mg total) by mouth once daily.  What changed: when to take this     mometasone 0.1% 0.1 % cream  Commonly known as: ELOCON  Apply topically 2 (two) times daily. Apply to  arms ,hands, and legs BID  What changed: additional instructions     predniSONE 20 MG tablet  Commonly known as: DELTASONE  Take 2 tablets (40 mg total) by mouth once daily for 5 days, THEN 1.5 tablets (30 mg total) once daily for 5 days, THEN 1 tablet (20 mg total) once daily for 5 days, THEN 0.5 tablets (10 mg total) once daily for 5 days.  Start taking on: October 24, 2023  What changed:   medication strength  See the new instructions.            CONTINUE taking these medications      ACCU-CHEK JOAQUIN PLUS METER [facility]  Generic drug: blood-glucose meter  Three times a day with meals     acetaminophen 500 MG tablet  Commonly known as: TYLENOL  Take 1 tablet (500 mg total) by mouth every 6 (six) hours as needed for Pain.     aspirin 81 MG EC tablet  Commonly known as: ECOTRIN  Take 1 tablet (81 mg total) by mouth once daily.             metoprolol succinate 25 MG 24 hr tablet  Commonly known as: TOPROL-XL              Time spent on the discharge of patient: 37 minutes         Donnie Macedo MD  Department of Hospital Medicine  [facility] [place] [facility]

## 2023-10-23 NOTE — NURSING
[facility] [place]  Nurses Note -- 4 Eyes      10/23/2023       Skin assessed on: Q Shift      [x] No Pressure Injuries Present    []Prevention Measures Documented    [] Yes LDA  for Pressure Injury Previously documented     [] Yes New Pressure Injury Discovered   [] LDA for New Pressure Injury Added      Attending RN:  Keshia Forbes RN     Second RN:  Marbella Bautista LPN

## 2023-10-23 NOTE — PLAN OF CARE
Problem: Adult Inpatient Plan of Care  Goal: Plan of Care Review  10/23/2023 1711 by Yani Pedersen LPN  Outcome: Ongoing, Progressing  Flowsheets (Taken 10/23/2023 1711)  Plan of Care Reviewed With: patient  10/23/2023 1623 by Yani Pedersen LPN  Outcome: Ongoing, Progressing  Flowsheets (Taken 10/23/2023 1613)  Plan of Care Reviewed With: patient     Problem: Diabetes Comorbidity  Goal: Blood Glucose Level Within Targeted Range  Outcome: Ongoing, Progressing  Intervention: Monitor and Manage Glycemia  Flowsheets (Taken 10/23/2023 1711)  Glycemic Management:   blood glucose monitored   supplemental insulin given     Problem: Fluid and Electrolyte Imbalance (Acute Kidney Injury/Impairment)  Goal: Fluid and Electrolyte Balance  Outcome: Ongoing, Progressing

## 2023-10-23 NOTE — PLAN OF CARE
ADT 30 order placed for Van Transportation.  Requested  time:3:00 pm.  If transportation does not arrive at ETA time nurse will be instructed to follow protocol for transportation below:  How can I get in touch directly with dispatch, if needed?                 Non-emergent dispatch: [phone] option 6    +++NURSING:  If Van does not arrive at requested time please call the above Non Emergent Dispatcher.  If issue not resolved please escalate to your charge nurse for further instructions.

## 2023-10-23 NOTE — ASSESSMENT & PLAN NOTE
-patient reports having rash onset since father's day without previous history.    -reports that he has been to 3 dermatologists and Infectious Disease  -reports he has had skin biopsy about 6 weeks ago.  Not conclusive results.  Currently being treated with Dupixent with no improvement of symptoms.  Discussed with patient's Dermatologist.    Will try IV steroids.  Patient started on Solu-Medrol 125 mg q.8 hours, with some improvement  Hematology consult to evaluate for possible T-cell lymphoma, input appreciated.  Recommend flow cytometry, continue steroids, check vitamin levels.  Patient will need to follow outpatient dermatology clinic.  -discontinue IV Solu-Medrol  -will change patient to prednisone 40 mg daily with a tapering course on discharge.

## 2023-10-23 NOTE — ASSESSMENT & PLAN NOTE
-stable.  Continue as-needed DuoNebs.  -patient counseled to quit smoking completely due to adverse health risk  -patient started on Nicoderm 21 mg patch daily to assist with smoking cessation efforts.

## 2023-10-23 NOTE — ASSESSMENT & PLAN NOTE
Patient's FSGs are uncontrolled due to hyperglycemia on current medication regimen.  Last A1c reviewed-   Lab Results   Component Value Date    HGBA1C 6.9 (H) 10/15/2023     Most recent fingerstick glucose reviewed-   Recent Labs   Lab 10/22/23  0806 10/22/23  1232 10/22/23  1615 10/22/23  2126   POCTGLUCOSE 252* 360* 292* 363*     Current correctional scale  Low  Maintain anti-hyperglycemic dose as follows-   Antihyperglycemics (From admission, onward)    Start     Stop Route Frequency Ordered    10/22/23 0900  insulin detemir U-100 (Levemir) pen 8 Units         -- SubQ Daily 10/22/23 0723    10/22/23 0645  insulin aspart U-100 pen 0-10 Units         -- SubQ Before meals & nightly 10/21/23 2234        Hold Oral hypoglycemics while patient is in the hospital.  Discontinue IV Solu-Medrol  Change patient's prednisone 40 mg daily with a tapering course over the next few weeks.  Hopefully this will improve his blood glucose control.

## 2023-10-23 NOTE — PLAN OF CARE
Wheelchair transportation scheduled for 3:00 pm to [facility]. CM notified nurse, Yani and pt's daughter, Brenda that pt is ready for discharge from CM standpoint. All CM needs met.     10/23/23 6403   Final Note   Assessment Type Final Discharge Note   Anticipated Discharge Disposition SNF   What phone number can be called within the next 1-3 days to see how you are doing after discharge? [phone]   Hospital Resources/Appts/Education Provided Appointments scheduled and added to AVS   Post-Acute Status   Post-Acute Authorization Placement   Post-Acute Placement Status Set-up Complete/Auth obtained   Coverage [employer] HMO   Patient choice form signed by patient/caregiver List from System Post-Acute Care   Discharge Delays (!) [facility] Arranged Transportation

## 2023-10-23 NOTE — PT/OT/SLP PROGRESS
Occupational Therapy   Treatment    Name: Narciso Yanez  MRN: [id number]  Admitting Diagnosis:  Acute delirium       Recommendations:     Discharge Recommendations: Low Intensity Therapy  Discharge Equipment Recommendations:  none  Barriers to discharge:  None    Assessment:     Narciso Yanez is a 88 y.o. male with a medical diagnosis of Acute delirium.  He presents with in bed with HOB elevated. Performance deficits affecting function are impaired endurance, impaired functional mobility, gait instability, impaired balance, impaired self care skills, impaired skin.     Rehab Prognosis:  Good; patient would benefit from acute skilled OT services to address these deficits and reach maximum level of function.       Plan:     Patient to be seen 5 x/week to address the above listed problems via self-care/home management, therapeutic activities, therapeutic exercises  Plan of Care Expires: 10/31/23  Plan of Care Reviewed with: patient    Subjective     Chief Complaint: impaired balance   Patient/Family Comments/goals: he is agreeable to snf placement   Pain/Comfort:  Pain Rating 1: 0/10    Objective:     Communicated with: Yani DEGROOT, prior to session.  Patient found HOB elevated with peripheral IV, PureWick upon OT entry to room.    General Precautions: Standard, fall, diabetic    Orthopedic Precautions:N/A  Braces: N/A  Respiratory Status: Room air     Occupational Performance:     Bed Mobility:    Patient completed Rolling/Turning to Left with  modified independence  Patient completed Scooting/Bridging with modified independence  Patient completed Supine to Sit with modified independence     Functional Mobility/Transfers:  Patient completed Sit <> Stand Transfer with supervision  with  rolling walker   Patient completed Toilet Transfer Step Transfer technique with supervision with  rolling walker  Functional Mobility: Patient walked to bathroom to toilet x 10' with RW and then to chair with supervision.      Activities of Daily Living:  Upper Body Dressing: stand by assistance to pedro ch       [facility] 6 Click ADL: 18    Treatment & Education:  Occupational therapy educated patient re: standing balance and coordination to increase LB dressing standing stability with some loss of balance to left side noted with left sided reaching and with opposite arm and lower extremity reaching x 10x each side (alternating and unilateral sides)   Patient left up in chair with all lines intact, call button in reach, and RN  notified    GOALS:   Multidisciplinary Problems       Occupational Therapy Goals          Problem: Occupational Therapy    Goal Priority Disciplines Outcome Interventions   Occupational Therapy Goal     OT, PT/OT Ongoing, Progressing    Description: Goals to be met by: 10/31/23     Patient will increase functional independence with ADLs by performing:    UE Dressing with Modified [place].  LE Dressing with Stand-by Assistance.  Grooming while standing at sink with Supervision.  Toileting from bedside commode with Supervision for hygiene and clothing management.   Supine to sit with Modified [place].  Step transfer with Supervision  Toilet transfer to bedside commode with Supervision.  Upper extremity exercise program x15 reps per handout, with independence.                         Time Tracking:     OT Date of Treatment: 10/23/23  OT Start Time: 1405  OT Stop Time: 1435  OT Total Time (min): 30 min    Billable Minutes:Therapeutic Activity 15   Therapeutic Exercise 15     OT/JACQUELINE: OT     Number of JACQUELINE visits since last OT visit: 0    10/23/2023

## 2023-10-23 NOTE — SUBJECTIVE & OBJECTIVE
Interval History:  Patient seen today for follow-up care.  Patient reports no chest pains, shortness of breath, nausea, vomiting.  Patient's rash is gradually improved with high-dose steroids.  Discussed starting patient on prednisone 40 mg daily, with a tapering course over the next few weeks.  Discussed checking RPR.  He will need to follow outpatient dermatology clinic.  Also discharged home later today or tomorrow if patient continues to improve.  His blood sugars running elevated, hopefully will improve with discontinuing the IV Solu-Medrol.        Review of Systems   Constitutional: Negative.    HENT: Negative.     Eyes: Negative.    Respiratory: Negative.     Cardiovascular: Negative.    Gastrointestinal: Negative.    Endocrine: Negative.    Genitourinary: Negative.    Musculoskeletal: Negative.    Skin:  Positive for rash.   Allergic/Immunologic: Negative.    Neurological: Negative.    Hematological: Negative.    Psychiatric/Behavioral: Negative.       Objective:     Vital Signs (Most Recent):  Temp: 97.4 °F (36.3 °C) (10/23/23 0431)  Pulse: 72 (10/23/23 0431)  Resp: 18 (10/23/23 0431)  BP: (!) 142/65 (10/23/23 0431)  SpO2: 97 % (10/23/23 0431) Vital Signs (24h Range):  Temp:  [97.4 °F (36.3 °C)-98.1 °F (36.7 °C)] 97.4 °F (36.3 °C)  Pulse:  [68-82] 72  Resp:  [18-20] 18  SpO2:  [95 %-100 %] 97 %  BP: (113-157)/(57-70) 142/65     Weight: 59 kg (130 lb 1.1 oz)  Body mass index is 20.37 kg/m².    Intake/Output Summary (Last 24 hours) at 10/23/2023 0705  Last data filed at 10/23/2023 0612  Gross per 24 hour   Intake 360 ml   Output 1800 ml   Net -1440 ml         Physical Exam  Vitals reviewed.   Constitutional:       Appearance: Normal appearance. He is normal weight.   HENT:      Head: Normocephalic.      Right Ear: External ear normal.      Left Ear: External ear normal.      Nose: Nose normal.      Mouth/Throat:      Mouth: Mucous membranes are moist.      Pharynx: Oropharynx is clear.   Eyes:       "Extraocular Movements: Extraocular movements intact.      Conjunctiva/sclera: Conjunctivae normal.      Pupils: Pupils are equal, round, and reactive to light.   Cardiovascular:      Rate and Rhythm: Normal rate and regular rhythm.      Pulses: Normal pulses.      Heart sounds: Normal heart sounds.   Pulmonary:      Effort: Pulmonary effort is normal.      Breath sounds: Normal breath sounds.   Abdominal:      General: Abdomen is flat. Bowel sounds are normal.      Palpations: Abdomen is soft.   Musculoskeletal:         General: Normal range of motion.      Cervical back: Normal range of motion and neck supple.   Skin:     General: Skin is warm.      Capillary Refill: Capillary refill takes less than 2 seconds.   Neurological:      General: No focal deficit present.      Mental Status: He is alert and oriented to person, place, and time. Mental status is at baseline.      Cranial Nerves: No cranial nerve deficit.   Psychiatric:         Mood and Affect: Mood normal.         Thought Content: Thought content normal.         Judgment: Judgment normal.             Significant Labs: All pertinent labs within the past 24 hours have been reviewed.  Blood Culture: No results for input(s): "LABBLOO" in the last 48 hours.  BMP:   Recent Labs   Lab 10/23/23  0339   *   *   K 5.0   CL 98   CO2 23   BUN 35*   CREATININE 1.3   CALCIUM 8.1*   MG 2.1     CBC:   Recent Labs   Lab 10/22/23  0334 10/23/23  0339   WBC 9.40 14.30*   HGB 9.5* 9.6*   HCT 28.6* 28.9*    347     TSH:   Recent Labs   Lab 10/15/23  0711   TSH 3.946     Urine Culture: No results for input(s): "LABURIN" in the last 48 hours.    Significant Imaging: I have reviewed all pertinent imaging results/findings within the past 24 hours.  "

## 2023-10-23 NOTE — PLAN OF CARE
Problem: Physical Therapy  Goal: Physical Therapy Goal  Description: Goals to be met by: 10/31/23     Patient will increase functional independence with mobility by performin. Supine to sit with Modified [place]  2. Rolling to Left and Right with Modified [place]  3. Sit to stand transfer with Modified [place] using RW  4. Bed to chair transfer with Modified [place] using Rolling Walker  5. Gait x250 feet with Modified [place] using Rolling Walker   6. Wheelchair propulsion x250 feet with Modified [place] using bilateral upper extremities  7. Lower extremity exercise program 2 sets x15 reps per handout, with independence    Outcome: Ongoing, Progressing     Pt ambulated ~200 ft with CGA-SBA using RW.

## 2023-10-23 NOTE — PLAN OF CARE
Stacy with [employer] received auth . MD and pt's daughter, Brenda notified.      10/23/23 1018   Discharge Reassessment   Assessment Type Discharge Planning Reassessment   Did the patient's condition or plan change since previous assessment? Yes   Discharge Plan discussed with: Adult children   Discharge Plan A Skilled Nursing Facility   Discharge Plan B Home with family   DME Needed Upon Discharge  other (see comments)  (tbd)   Transition of Care Barriers None   Why the patient remains in the hospital Requires continued medical care   Post-Acute Status   Post-Acute Authorization Placement   Post-Acute Placement Status Set-up Complete/Auth obtained   Coverage [facility]   Patient choice form signed by patient/caregiver List from System Post-Acute Care   Discharge Delays None known at this time

## 2023-10-23 NOTE — ASSESSMENT & PLAN NOTE
-baseline creatinine about 1.6-1.8.  Creat at baseline., down to creatinine of 1.3  Stable.  Avoid nephrotoxic medications.

## 2023-10-23 NOTE — PROGRESS NOTES
[facility] Medicine  Progress Note    Patient Name: Narciso Yanez  MRN: [id number]  Patient Class: IP- Inpatient   Admission Date: 10/14/2023  Length of Stay: 8 days  Attending Physician: Donnie Macedo MD  Primary Care Provider: Marcelino Del Toro MD        Subjective:     Principal Problem:Acute delirium        HPI:  Mr Yanez is a 88-year-old male with previous history of coronary artery disease status post previous CABG, COPD, PAD s/p PTA bilat YUE and R EIA 6/2003,  hypertension, dyslipidemia, CKD 3A, type 2 diabetes mellitus, PUD, polymyalgia rheumatica, NPH status post  shunt placement in 2021, who was recently discharged from the hospital on 10/11/2023 after being admitted for increased confusion and altered mental status with some hallucinations and frequent falls.  He had an abnormal UA which resulted Proteus and Enterococcus and was subsequently discharged with Augmentin.  He does have diffusely pruritic rash involving multiple areas and has been evaluated by dermatologist as outpatient without confirmatory diagnosis.  As per family members and nursing staff the patient had been delirious for example, asking the nurse if she got her food during the game while in the hospital.    In the ER, his vitals are within normal limits, his CBC shows normal white count 9.8, hemoglobin 9.7 (normocytic), with previous baseline of 11-12 since 2022, platelets 342 K.  His BNP shows mild metabolic acidosis CO2 17, creatinine 1.3 which is better than his previous creatinine of 1.8 on 10/09/2023, mild hyperglycemia at 130.  His BNP is 223, and seems that his previous BNP on 10/09/2023 was 151 and prior to that in on 09/15/2023 was within normal limits.  His troponin is 0.107, .  His CT abdomen pelvis showed nonacute findings of cholelithiasis, vascular calcifications, diverticulosis, prostatomegaly, nonspecific infiltration of the left lateral subcutaneous fat tissue and small hiatal  hernia.  His CT head is within normal limits.  His chest x-ray reveals chronic interstitial lung markings, right-sided ventricular shunt present.  In the ER, appears to have received 500 cc of normal saline as well as 40 mg of IV Lasix.  Admission has been requested for further evaluation and treatment.      Overview/Hospital Course:  87 y/o male presents with change in mental status.  I treated this patient last week for similar issues.  Patient diagnosed with UTI and treated with ABX's.  Patient has remained confused with no improvement with ABX's treatment.  Neurology consulted.  Changes in mental status secondary to hypoactive delirium.  Patient has been dealing with a generalized pruritic rash for several months causing significant decline in overall functional status.  Followed by Dermatology with no specific diagnosis.  Discussed with his Dermatologist and decided to start IV steroids.     Sed rate 100, , with normal WBC count and afebrile-likely autoimmune or inflammation not associated with infection  Rash appears to be improving with IV steroids.  Speaking fluently, and AO x4-will remove sitter.    Patient was seen in consultation by Hematology/Oncology services further evaluate for possible T-cell lymphoma.  Input appreciated.  Patient recommended to continue IV steroids as seems to be helping the rash as altered mental status.  Patient on Solu-Medrol 125 mg IV q.8 hours.  Patient recommended flow cytometry, check vitamin levels to check for any vitamin deficiencies      Interval History:  Patient seen today for follow-up care.  Patient reports no chest pains, shortness of breath, nausea, vomiting.  Patient's rash is gradually improved with high-dose steroids.  Discussed starting patient on prednisone 40 mg daily, with a tapering course over the next few weeks.  Discussed checking RPR.  He will need to follow outpatient dermatology clinic.  Patient we discharged to [facility] today at [facility]  facility.  Patient to have a COVID test and PPD skin test applied.  Patient takes metformin 500 mg p.o. b.i.d. at home for diabetes.  Will resume that on discharge.  Patient recent creatinine normal at 1.3, hemoglobin 9.6.        Review of Systems   Constitutional: Negative.    HENT: Negative.     Eyes: Negative.    Respiratory: Negative.     Cardiovascular: Negative.    Gastrointestinal: Negative.    Endocrine: Negative.    Genitourinary: Negative.    Musculoskeletal: Negative.    Skin:  Positive for rash.   Allergic/Immunologic: Negative.    Neurological: Negative.    Hematological: Negative.    Psychiatric/Behavioral: Negative.       Objective:     Vital Signs (Most Recent):  Temp: 97.4 °F (36.3 °C) (10/23/23 0431)  Pulse: 72 (10/23/23 0431)  Resp: 18 (10/23/23 0431)  BP: (!) 142/65 (10/23/23 0431)  SpO2: 97 % (10/23/23 0431) Vital Signs (24h Range):  Temp:  [97.4 °F (36.3 °C)-98.1 °F (36.7 °C)] 97.4 °F (36.3 °C)  Pulse:  [68-82] 72  Resp:  [18-20] 18  SpO2:  [95 %-100 %] 97 %  BP: (113-157)/(57-70) 142/65     Weight: 59 kg (130 lb 1.1 oz)  Body mass index is 20.37 kg/m².    Intake/Output Summary (Last 24 hours) at 10/23/2023 0705  Last data filed at 10/23/2023 0612  Gross per 24 hour   Intake 360 ml   Output 1800 ml   Net -1440 ml         Physical Exam  Vitals reviewed.   Constitutional:       Appearance: Normal appearance. He is normal weight.   HENT:      Head: Normocephalic.      Right Ear: External ear normal.      Left Ear: External ear normal.      Nose: Nose normal.      Mouth/Throat:      Mouth: Mucous membranes are moist.      Pharynx: Oropharynx is clear.   Eyes:      Extraocular Movements: Extraocular movements intact.      Conjunctiva/sclera: Conjunctivae normal.      Pupils: Pupils are equal, round, and reactive to light.   Cardiovascular:      Rate and Rhythm: Normal rate and regular rhythm.      Pulses: Normal pulses.      Heart sounds: Normal heart sounds.   Pulmonary:      Effort: Pulmonary  "effort is normal.      Breath sounds: Normal breath sounds.   Abdominal:      General: Abdomen is flat. Bowel sounds are normal.      Palpations: Abdomen is soft.   Musculoskeletal:         General: Normal range of motion.      Cervical back: Normal range of motion and neck supple.   Skin:     General: Skin is warm.      Capillary Refill: Capillary refill takes less than 2 seconds.   Neurological:      General: No focal deficit present.      Mental Status: He is alert and oriented to person, place, and time. Mental status is at baseline.      Cranial Nerves: No cranial nerve deficit.   Psychiatric:         Mood and Affect: Mood normal.         Thought Content: Thought content normal.         Judgment: Judgment normal.             Significant Labs: All pertinent labs within the past 24 hours have been reviewed.  Blood Culture: No results for input(s): "LABBLOO" in the last 48 hours.  BMP:   Recent Labs   Lab 10/23/23  0339   *   *   K 5.0   CL 98   CO2 23   BUN 35*   CREATININE 1.3   CALCIUM 8.1*   MG 2.1     CBC:   Recent Labs   Lab 10/22/23  0334 10/23/23  0339   WBC 9.40 14.30*   HGB 9.5* 9.6*   HCT 28.6* 28.9*    347     TSH:   Recent Labs   Lab 10/15/23  0711   TSH 3.946     Urine Culture: No results for input(s): "LABURIN" in the last 48 hours.    Significant Imaging: I have reviewed all pertinent imaging results/findings within the past 24 hours.      Assessment/Plan:      * Acute delirium  -gradually improving.  Patient more alert and talkative.  -similar to previous presentation when symptoms were attributable to acute UTI.    -symptoms continue despite treatment.    -CT head without acute findings.    -neurology consulted.  Symptoms probably related to hypoactive delirium.  Delirium precautions.   Start depakote with prn Zyprexa.  Patient not really sleeping much at night secondary to pruritus.  Hopefully steroids improve rash/pruritus and patient can start sleeping better at night.  " This would hopefully improve delirium.      Chronic interstitial lung disease  -appears chronic.  Uncertain etiology.  -Could be related to underlying autoimmune process.  -recommend outpatient pulmonary follow-up.      Enlarged prostate  -outpatient Urology follow-up recommended.      Diverticulosis  -without diverticulitis.      Cholelithiasis  -without cholecystitis.      Elevated troponin  -denies chest pain   -status post previous CABG.    -continue Toprol-XL, aspirin, Plavix, Lipitor.  Cardiology consulted.  Recommended outpatient nuclear medicine stress test when the patient is medically improved from delirium and rash      Normocytic anemia  -could be related to anemia of chronic inflammation.    -recommend outpatient primary care follow-up as well as GI follow-up.      Rash  -patient reports having rash onset since father's day without previous history.    -reports that he has been to 3 dermatologists and Infectious Disease  -reports he has had skin biopsy about 6 weeks ago.  Not conclusive results.  Currently being treated with Dupixent with no improvement of symptoms.  Discussed with patient's Dermatologist.    Will try IV steroids.  Patient started on Solu-Medrol 125 mg q.8 hours, with some improvement  Hematology consult to evaluate for possible T-cell lymphoma, input appreciated.  Recommend flow cytometry, continue steroids, check vitamin levels.  Patient will need to follow outpatient dermatology clinic.  -discontinue IV Solu-Medrol  -will change patient to prednisone 40 mg daily with a tapering course on discharge.    Hyperlipidemia  -statin      Essential hypertension  -continue Toprol-XL      Stage 3a chronic kidney disease  -baseline creatinine about 1.6-1.8.  Creat at baseline., down to creatinine of 1.4  Stable.  Avoid nephrotoxic medications.      Polymyalgia rheumatica  -by history, diagnosed in May 2019 treated with prednisone with appropriate response.  No associated GCA.      COPD (chronic  obstructive pulmonary disease)  -stable.  Continue as-needed DuoNebs.      PAD (peripheral artery disease)  -continue aspirin/Plavix, Lipitor  -s/p PTA bilat YUE and R EIA 6/2003      Diabetes mellitus with peripheral circulatory disorder  Patient's FSGs are uncontrolled due to hyperglycemia on current medication regimen.  Last A1c reviewed-   Lab Results   Component Value Date    HGBA1C 6.9 (H) 10/15/2023     Most recent fingerstick glucose reviewed-   Recent Labs   Lab 10/22/23  0806 10/22/23  1232 10/22/23  1615 10/22/23  2126   POCTGLUCOSE 252* 360* 292* 363*     Current correctional scale  Low  Maintain anti-hyperglycemic dose as follows-   Antihyperglycemics (From admission, onward)      Start     Stop Route Frequency Ordered    10/22/23 0900  insulin detemir U-100 (Levemir) pen 8 Units         -- SubQ Daily 10/22/23 0723    10/22/23 0645  insulin aspart U-100 pen 0-10 Units         -- SubQ Before meals & nightly 10/21/23 2234          Hold Oral hypoglycemics while patient is in the hospital.  Discontinue IV Solu-Medrol  Change patient's prednisone 40 mg daily with a tapering course over the next few weeks.  Hopefully this will improve his blood glucose control.    Coronary artery disease involving coronary bypass graft of native heart without angina pectoris  -continue aspirin, Plavix, statin  Elevated Troponin on presentation.  Cardiology consulted.  Consider outpat MPI when he recovers from acute delirium        VTE Risk Mitigation (From admission, onward)           Ordered     enoxaparin injection 40 mg  Daily         10/15/23 0139     IP VTE HIGH RISK PATIENT  Once         10/15/23 0139     Place sequential compression device  Until discontinued         10/15/23 0139                    Discharge Planning   SUSIE: 10/17/2023     Code Status: Full Code   Is the patient medically ready for discharge?:     Reason for patient still in hospital (select all that apply): Laboratory test and Treatment  Discharge  Plan A: Skilled Nursing Facility   Discharge Delays: (!) Post-Acute Set-up              Donnie Macedo MD  Department of Hospital Medicine   [facility] [place] [facility]

## 2023-10-23 NOTE — PT/OT/SLP PROGRESS
Physical Therapy Treatment/6th visit    Patient Name:  Narciso Yanez   MRN:  [id number]    Recommendations:     Discharge Recommendations: Low Intensity Therapy with caregiver support  Discharge Equipment Recommendations: none  Barriers to discharge: None    Assessment:     Narciso Yanez is a 88 y.o. male admitted with a medical diagnosis of Acute delirium.  He presents with the following impairments/functional limitations: weakness, impaired endurance, decreased coordination, impaired self care skills, decreased lower extremity function, impaired functional mobility, gait instability, decreased safety awareness, impaired balance.    Rehab Prognosis: Good; patient would benefit from acute skilled PT services to address these deficits and reach maximum level of function.    Recent Surgery: * No surgery found *      Plan:     During this hospitalization, patient to be seen 3 x/week to address the identified rehab impairments via gait training, therapeutic activities, therapeutic exercises, wheelchair management/training and progress toward the following goals:    Plan of Care Expires:  10/31/23    Subjective     Chief Complaint: Pt reported some dizziness upon sitting, feeling a little groggy.   Patient/Family Comments/goals: Pt agreeable to therapy.   Pain/Comfort:  Pain Rating 1: 0/10      Objective:     Communicated with nurse Lomeli prior to session.  Patient found HOB elevated with peripheral IV, telemetry, PureWick, and bed alarm upon PT entry to room.     General Precautions: Standard, fall, diabetic, [place] (L ear hearing aid)  Orthopedic Precautions: N/A  Braces: N/A  Respiratory Status: Room air     Functional Mobility:  Pt with dry scaly B plantar feet, no c/o pain to B feet during ambulation today.  Pt feeling better and ready to get out of the hospital.    Bed Mobility:     Scooting: supervision and stand by assistance  Supine to Sit: supervision and stand by assistance  Transfers:     Sit to Stand: stand  by assistance and contact guard assistance with rolling walker  Bed to Chair: stand by assistance and contact guard assistance with  rolling walker  using  Step Transfer  Gait: Pt ambulated ~200 ft with CGA-SBA using RW.  Pt with decreased step length and dottie.   Balance: Pt with fair dynamic standing balance.       AM-PAC 6 CLICK MOBILITY  Turning over in bed (including adjusting bedclothes, sheets and blankets)?: 4  Sitting down on and standing up from a chair with arms (e.g., wheelchair, bedside commode, etc.): 3  Moving from lying on back to sitting on the side of the bed?: 4  Moving to and from a bed to a chair (including a wheelchair)?: 3  Need to walk in hospital room?: 3  Climbing 3-5 steps with a railing?: 3  Basic Mobility Total Score: 20       Treatment & Education:  BLE seated therex x15 reps: hip flex, pillow squeezes/GS, LAQ, and AP  BLE supine therex x15 reps: hip abd/add    Pt to call for nursing assistance with OOB activities while in the hospital.  Pt verbalized good understanding.     Patient left up in chair on seat cushion reclined with BLE elevated on pillow with all lines intact, call button in reach, and nurse Yani notified.  Purewick reattached.  Tray table in front and pt's door left opened.     GOALS:   Multidisciplinary Problems       Physical Therapy Goals          Problem: Physical Therapy    Goal Priority Disciplines Outcome Goal Variances Interventions   Physical Therapy Goal     PT, PT/OT Ongoing, Progressing     Description: Goals to be met by: 10/31/23     Patient will increase functional independence with mobility by performin. Supine to sit with Modified [place]  2. Rolling to Left and Right with Modified [place]  3. Sit to stand transfer with Modified [place] using RW  4. Bed to chair transfer with Modified [place] using Rolling Walker  5. Gait x250 feet with Modified [place] using Rolling Walker   6. Wheelchair propulsion x250 feet with  Modified [place] using bilateral upper extremities  7. Lower extremity exercise program 2 sets x15 reps per handout, with independence                         Time Tracking:     PT Received On: 10/23/23  PT Start Time: 1017     PT Stop Time: 1041  PT Total Time (min): 24 min     Billable Minutes: Gait Training 12 min and Therapeutic Exercise 12 min    Treatment Type: Treatment, 6th Visit  PT/PTA: PT     Number of PTA visits since last PT visit: 0     10/23/2023

## 2023-10-23 NOTE — CLINICAL REVIEW
PT/PTA met face to face to discuss patient's treatment plan and progress towards established goals.  Treatment will be modified as follows 3x/wk to meet stated goals.  Patient will be seen by physical therapist every sixth visit and minimally once per month.

## 2023-10-23 NOTE — ASSESSMENT & PLAN NOTE
-patient denies any chest pain, shortness of breath or other concerning symptoms of CHF exacerbation.    -does have pitting peripheral edema and has received IV Lasix.  -we will check echocardiogram given elevated troponin/BNP level given concerns of underlying autoimmune issue.  -follow-up in PCP office.  May need referral to outpatient cardiology clinic.

## 2023-10-24 ENCOUNTER — PATIENT MESSAGE (OUTPATIENT)
Dept: NEUROSURGERY | Facility: CLINIC | Age: 88
End: 2023-10-24
Payer: MEDICARE

## 2023-10-24 LAB — RPR SER QL: NORMAL

## 2023-10-25 LAB
COPPER SERPL-MCNC: 1126 UG/L (ref 665–1480)
FLOW CYTOMETRY ANTIBODIES ANALYZED - BLOOD: NORMAL
FLOW CYTOMETRY COMMENT - BLOOD: NORMAL
FLOW CYTOMETRY INTERPRETATION - BLOOD: NORMAL
ZINC SERPL-MCNC: 57 UG/DL (ref 60–130)

## 2023-10-26 LAB
METHYLMALONATE SERPL-SCNC: 0.48 UMOL/L
PYRIDOXAL SERPL-MCNC: 18 UG/L (ref 5–50)

## 2023-10-27 ENCOUNTER — TELEPHONE (OUTPATIENT)
Dept: HEMATOLOGY/ONCOLOGY | Facility: CLINIC | Age: 88
End: 2023-10-27
Payer: MEDICARE

## 2023-10-27 NOTE — TELEPHONE ENCOUNTER
This is a patient that I saw in the hospital recently and there was a concern for T cell lymphoma due to rash.  I just got some of his labs back and he is zinc and B12 deficient.  Flow did not confirm a lymphoma but did show a markedly increased CD4:CD8 ratio (25:1) with subset of T-cells that needs further follow up either with a hematologist of their choice or at [facility] if they want to stick with [facility]  I spoke with the daughter Etelvina who is his primary caretaker. They would like further work up at [facility] .  He is currently in [facility] and is expected to be out on Nov 13 th. Please schedule after that date. Thanks randell

## 2023-11-08 ENCOUNTER — TELEPHONE (OUTPATIENT)
Dept: HEMATOLOGY/ONCOLOGY | Facility: CLINIC | Age: 88
End: 2023-11-08
Payer: MEDICARE

## 2023-11-08 ENCOUNTER — PATIENT MESSAGE (OUTPATIENT)
Dept: FAMILY MEDICINE | Facility: CLINIC | Age: 88
End: 2023-11-08
Payer: MEDICARE

## 2023-11-08 ENCOUNTER — PATIENT MESSAGE (OUTPATIENT)
Dept: NEUROSURGERY | Facility: CLINIC | Age: 88
End: 2023-11-08
Payer: MEDICARE

## 2023-11-08 NOTE — TELEPHONE ENCOUNTER
Please advise,    This is Etelvina. My dad will be released from SNF on the 10th. I am bringing him to [facility] for another biopsy that day.  The entire time he's been at the SNF he has been urinating on himself multiple times a day and they refused to address it.  I know he was on meds previously for that.  Could you prescribe something for him to start taking? We also need followup appointment scheduled with you. [facility] suspects CTCL. [facility] has also set up oncology appointment for Dec 12.     Thanks,   Etelvina

## 2023-11-28 ENCOUNTER — OFFICE VISIT (OUTPATIENT)
Dept: NEUROSURGERY | Facility: CLINIC | Age: 88
End: 2023-11-28
Payer: MEDICARE

## 2023-11-28 ENCOUNTER — TELEPHONE (OUTPATIENT)
Dept: NEUROLOGY | Facility: CLINIC | Age: 88
End: 2023-11-28
Payer: MEDICARE

## 2023-11-28 ENCOUNTER — PATIENT MESSAGE (OUTPATIENT)
Dept: FAMILY MEDICINE | Facility: CLINIC | Age: 88
End: 2023-11-28
Payer: MEDICARE

## 2023-11-28 ENCOUNTER — HOSPITAL ENCOUNTER (OUTPATIENT)
Dept: RADIOLOGY | Facility: HOSPITAL | Age: 88
Discharge: HOME | End: 2023-11-28
Attending: PHYSICIAN ASSISTANT
Payer: MEDICARE

## 2023-11-28 DIAGNOSIS — R25.1 TREMOR, UNSPECIFIED: Primary | ICD-10-CM

## 2023-11-28 DIAGNOSIS — G91.2 NORMAL PRESSURE HYDROCEPHALUS: ICD-10-CM

## 2023-11-28 DIAGNOSIS — Z98.2 VP (VENTRICULOPERITONEAL) SHUNT STATUS: ICD-10-CM

## 2023-11-28 PROCEDURE — 70450 CT HEAD WITHOUT CONTRAST: ICD-10-PCS | Mod: 26,HCNC,, | Performed by: STUDENT IN AN ORGANIZED HEALTH CARE EDUCATION/TRAINING PROGRAM

## 2023-11-28 PROCEDURE — 70450 CT HEAD/BRAIN W/O DYE: CPT | Mod: 26,HCNC,, | Performed by: STUDENT IN AN ORGANIZED HEALTH CARE EDUCATION/TRAINING PROGRAM

## 2023-11-28 PROCEDURE — 70450 CT HEAD/BRAIN W/O DYE: CPT | Mod: TC,HCNC

## 2023-11-28 PROCEDURE — 99999 PR PBB SHADOW E&M-EST. PATIENT-LVL III: CPT | Mod: PBBFAC,HCNC,, | Performed by: PHYSICIAN ASSISTANT

## 2023-11-28 PROCEDURE — 99213 OFFICE O/P EST LOW 20 MIN: CPT | Mod: HCNC,S$GLB,, | Performed by: PHYSICIAN ASSISTANT

## 2023-11-28 RX ORDER — TRIAMCINOLONE ACETONIDE 1 MG/G
CREAM TOPICAL 2 TIMES DAILY
Status: ON HOLD | COMMUNITY
Start: 2023-10-13 | End: 2023-12-29

## 2023-11-28 RX ORDER — METHYLPREDNISOLONE SODIUM SUCCINATE 125 MG/2ML
INJECTION, POWDER, FOR SOLUTION INTRAMUSCULAR; INTRAVENOUS
COMMUNITY
Start: 2023-09-15 | End: 2023-12-07

## 2023-11-28 RX ORDER — PREDNISONE 10 MG/1
10 TABLET ORAL
Status: ON HOLD | COMMUNITY
Start: 2023-11-10 | End: 2023-12-29 | Stop reason: HOSPADM

## 2023-11-28 RX ORDER — DIVALPROEX SODIUM 125 MG/1
TABLET, DELAYED RELEASE ORAL
Status: ON HOLD | COMMUNITY
Start: 2023-11-10 | End: 2023-12-29

## 2023-11-28 NOTE — PROGRESS NOTES
Neurosurgery  Established Patient    SUBJECTIVE:     Interval History:  Here neurosurgical follow up.  Patient was admitted to the hospital last month for delirium secondary to UTI.  During that stay patient developed generalized intermittent tremor, he was started on Depakote which she was still on at this time.    Additionally patient has had issues with a rash that is on his hands in his feet, the rash is causing a significant amount of pain as well as cracking in the hands and feet that has made it difficult for him to walk.  He was also recently started on Rinvoq for this. They report he is scheduled to see heme/once as possible malignant etiology. Throughout all this patient has not been walking very much, and had just got discharged from a skilled nursing facility.  Given this difficult to evaluate whether his gait improved since shunt adjustment.  They report that memory and urinary function is stable.    Denies pain, paraesthesias, weakness in extremities.  Denies bowel/bladder dysfunction.  Denies clumsiness or loss of dexterity of hands or balance difficulty.     Denies HA, blurred vision or diplopia, speech difficulty, weakness, numbness, seizures, or balance difficulty.       Review of patient's allergies indicates:   Allergen Reactions    Demerol [meperidine] Nausea Only       Current Outpatient Medications   Medication Sig Dispense Refill    acetaminophen (TYLENOL) 500 MG tablet Take 1 tablet (500 mg total) by mouth every 6 (six) hours as needed for Pain. 13 tablet 0    aspirin (ECOTRIN) 81 MG EC tablet Take 1 tablet (81 mg total) by mouth once daily.  0    atorvastatin (LIPITOR) 40 MG tablet Take 1 tablet (40 mg total) by mouth once daily. 30 tablet 2    blood sugar diagnostic (TRUE METRIX GLUCOSE TEST STRIP) Strp  each 12    clopidogreL (PLAVIX) 75 mg tablet Take 1 tablet (75 mg total) by mouth once daily. 30 tablet 2    divalproex (DEPAKOTE) 125 MG EC tablet Take by mouth.      metFORMIN  (GLUCOPHAGE) 500 MG tablet Take 1 tablet (500 mg total) by mouth 2 (two) times daily with meals. HOLD FOR GLUCOSE LESS 100 180 tablet 3    mometasone 0.1% (ELOCON) 0.1 % cream Apply topically 2 (two) times daily. Apply to arms ,hands, and legs BID      predniSONE (DELTASONE) 10 MG tablet Take 10 mg by mouth.      triamcinolone acetonide 0.1% (KENALOG) 0.1 % cream Apply topically 2 (two) times daily.      insulin aspart U-100 (NOVOLOG) 100 unit/mL (3 mL) InPn pen Inject 0-10 Units into the skin 4 (four) times daily before meals and nightly. (Patient not taking: Reported on 11/28/2023)  0    melatonin (MELATIN) 3 mg tablet Take 2 tablets (6 mg total) by mouth nightly. (Patient not taking: Reported on 11/28/2023)  0    metoprolol succinate (TOPROL-XL) 25 MG 24 hr tablet Take 1 tablet (25 mg total) by mouth once daily. (Patient not taking: Reported on 11/28/2023) 30 tablet 2    nicotine (NICODERM CQ) 21 mg/24 hr Place 1 patch onto the skin once daily. (Patient not taking: Reported on 11/28/2023)      SOLU-MEDROL, PF, 125 mg/2 mL SolR        No current facility-administered medications for this visit.       Past Medical History:   Diagnosis Date    Actinic keratosis     Anxiety     B12 deficiency 12/8/2014    Dx 6/14    Cancer     skin    Cataract     Coronary artery disease     Diabetes mellitus type II     Diabetes mellitus with peripheral circulatory disorder 6/18/2014    ED (erectile dysfunction)     Hyperlipidemia     Kidney stone     Neurogenic claudication 4/4/2022    Normocytic anemia 10/15/2023    Peripheral vascular disease     Spondylosis 3/29/2019    Tobacco dependence     Urinary incontinence 5/4/2021     Past Surgical History:   Procedure Laterality Date    APPENDECTOMY      CARDIAC SURGERY  01/1999    CABG 4 vessels    CATARACT EXTRACTION      EPIDURAL STEROID INJECTION Right 8/26/2020    Procedure: Injection, Steroid, Epidural Transforaminal;  Surgeon: Wiley Crane Jr., MD;  Location: [facility];   Service: Pain Management;  Laterality: Right;  Right L5 + S1 TF LEAH  Arrive @ 1115; ASA & Plavix last 8/18; Check BG; Rapid COVID test    EPIDURAL STEROID INJECTION Right 11/25/2020    Procedure: Injection, Steroid, Epidural Transformainal;  Surgeon: Wiley Crane Jr., MD;  Location: [facility] END[facility];  Service: Pain Management;  Laterality: Right;  Right L5 + S1 TF LEAH  Arrive @ 1245; ASA and Plavix last 11/17; Pre-DM; Needs MD Sign.    EPIDURAL STEROID INJECTION Right 2/19/2021    Procedure: Injection, Steroid, Epidural Transforaminal;  Surgeon: Wiley Crane Jr., MD;  Location: [facility] ENDO;  Service: Pain Management;  Laterality: Right;  Right L5 + S1 TF LEAH  Arrive @ 1115; ASA & Plavix last 2/11; Check BG; Needs Consent    EXTERNAL EAR SURGERY      EYE SURGERY      cataracts    ILIAC ARTERY STENT Bilateral 06/2003    also Right External Iliac stent     Family History       Problem Relation (Age of Onset)    Stroke Mother          Social History     Socioeconomic History    Marital status:    Tobacco Use    Smoking status: Every Day     Current packs/day: 1.50     Average packs/day: 1.5 packs/day for 71.0 years (106.5 ttl pk-yrs)     Types: Cigarettes    Smokeless tobacco: Former   Substance and Sexual Activity    Alcohol use: No    Drug use: No    Sexual activity: Not Currently     Partners: Female   Social History Narrative    .  4 children.  Smokes 2 ppd.  Still drives trucks for entertainment industry.      Social Determinants of Health     Financial Resource Strain: Low Risk  (9/15/2023)    Overall Financial Resource Strain (CARDIA)     Difficulty of Paying Living Expenses: Not hard at all   Food Insecurity: No Food Insecurity (9/15/2023)    Hunger Vital Sign     Worried About Running Out of Food in the Last Year: Never true     Ran Out of Food in the Last Year: Never true   Transportation Needs: No Transportation Needs (9/15/2023)    PRAPARE - Transportation     Lack of Transportation  (Medical): No     Lack of Transportation (Non-Medical): No   Physical Activity: Inactive (9/15/2023)    Exercise Vital Sign     Days of Exercise per Week: 0 days     Minutes of Exercise per Session: 0 min   Stress: No Stress Concern Present (9/15/2023)    [place] [employer] - Occupational Stress Questionnaire     Feeling of Stress : Not at all   Social Connections: Moderately Isolated (9/15/2023)    Social Connection and Isolation Panel [NHANES]     Frequency of Communication with Friends and Family: More than three times a week     Frequency of Social Gatherings with Friends and Family: More than three times a week     Attends [employer] Services: 1 to 4 times per year     Active Member of Clubs or Organizations: No     Attends Club or Organization Meetings: Never     Marital Status:    Housing Stability: Low Risk  (9/15/2023)    Housing Stability Vital Sign     Unable to Pay for Housing in the Last Year: No     Number of Places Lived in the Last Year: 1     Unstable Housing in the Last Year: No       OBJECTIVE:         Neurosurgery Physical Exam       Diagnostic Results: personally reviewed  EXAMINATION:  CT HEAD WITHOUT CONTRAST     CLINICAL HISTORY:  NPH,  shunt; (Idiopathic) normal pressure hydrocephalus     TECHNIQUE:  Low dose axial images were obtained through the head.  Coronal and sagittal reformations were also performed. Contrast was not administered.     COMPARISON:  CT head without contrast 10/14/2023.     FINDINGS:  Right parietal coursing ventriculostomy shunt catheter with tip crossing midline and terminating in the left lateral ventricle.  Persistent enlargement of the ventricles, which are overall similar in size and configuration from comparison CT 10/14/2023.  For reference, the bifrontal measurement is 4.4 cm, and the 3rd ventricle measures approximately 7 mm.     Brain parenchyma appears unchanged.  Patchy confluent regions of subcortical and periventricular  hypoattenuation, overall nonspecific, but can be seen in setting of microvascular ischemic change.  No evidence for acute intracranial hemorrhage, edema, parenchymal mass, or major vascular territory infarct.     No calvarial or skull base fracture or osseous destructive lesion.     Paranasal sinuses are essentially clear.  Trace left mastoid effusion replacement.     Impression:     Right parietal coursing ventriculostomy shunt catheter with tip crossing midline and terminating in the left lateral ventricle.     Persistent enlargement of the ventricles, which are overall similar in size and configuration from comparison CT 10/14/2023.     No acute intracranial pathology.        Electronically signed by: dEis Silva  Date:                                            11/28/2023  Time:                                           15:07    ASSESSMENT/PLAN:     88-year-old male with history of NPH and  shunt on 05/31/2021, patient without noticeable improvement status post last shunt adjustment. Head CT shows stable size of ventricular system without acute intracranial abnormality.  Difficult to tell whether shunt adjustment had an impact or not given patient's recent medical issues and decreased ability to ambulate.  Patient is following with Dermatology, and scheduled to see Heme-Onc for evaluation cutaneous rash.  Referral placed for Neurology for evaluation of tremor.  We will plan on seeing him back in a couple of months in can reassess need for further adjustment of the shunt at that time.    Encouraged to call the clinic with any questions or concerns in the meantime.      Alex Kathleen Jr. KARLEY  [facility]  Department of Neurosurgery     Note dictated with voice recognition software, please excuse any grammatical errors.

## 2023-11-29 NOTE — TELEPHONE ENCOUNTER
----- Message from Hernandez Gutierres MA sent at 11/28/2023  4:41 PM CST -----  Regarding: REFERRAL FOR TREMORS  Good afternoon, there is a referral for Tremors in the system from ABDULKADIR Ordaz in Neurosurgery.  Could you contact his daughter to schedule when you get a moment. The best number is [phone], his daughter takes him to his appts. Thanks, EJ

## 2023-11-29 NOTE — TELEPHONE ENCOUNTER
----- Message from Hernandez Gutierres MA sent at 11/28/2023  4:41 PM CST -----  Regarding: REFERRAL FOR TREMORS  Good afternoon, there is a referral for Tremors in the system from ABDULKADIR Ordaz in Neurosurgery.  Could you contact his daughter to schedule when you get a moment. The best number is [phone], his daughter takes him to his appts. Thanks, EJ

## 2023-12-01 NOTE — TELEPHONE ENCOUNTER
"Spoke to pt daughter and they are looking to see a movement specialist in regards to tremors. 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 Daughter mentioned that father has a "Shunt" and that it needed to be adjusted by a provider working in Dr. Nugent's office but will not adjust it until pt see a tremor specialist.     An appt was made for pt.   "

## 2023-12-07 ENCOUNTER — TELEPHONE (OUTPATIENT)
Dept: FAMILY MEDICINE | Facility: CLINIC | Age: 88
End: 2023-12-07
Payer: MEDICARE

## 2023-12-07 ENCOUNTER — OFFICE VISIT (OUTPATIENT)
Dept: FAMILY MEDICINE | Facility: CLINIC | Age: 88
End: 2023-12-07
Payer: MEDICARE

## 2023-12-07 VITALS
HEIGHT: 67 IN | BODY MASS INDEX: 22.59 KG/M2 | SYSTOLIC BLOOD PRESSURE: 136 MMHG | DIASTOLIC BLOOD PRESSURE: 62 MMHG | TEMPERATURE: 98 F | OXYGEN SATURATION: 96 % | WEIGHT: 143.94 LBS | HEART RATE: 76 BPM

## 2023-12-07 DIAGNOSIS — L30.9 DERMATITIS: Primary | ICD-10-CM

## 2023-12-07 DIAGNOSIS — N18.31 STAGE 3A CHRONIC KIDNEY DISEASE: ICD-10-CM

## 2023-12-07 DIAGNOSIS — I10 ESSENTIAL HYPERTENSION: ICD-10-CM

## 2023-12-07 DIAGNOSIS — J44.9 CHRONIC OBSTRUCTIVE PULMONARY DISEASE, UNSPECIFIED COPD TYPE: ICD-10-CM

## 2023-12-07 DIAGNOSIS — D64.9 ANEMIA, UNSPECIFIED TYPE: ICD-10-CM

## 2023-12-07 DIAGNOSIS — R32 URINARY INCONTINENCE, UNSPECIFIED TYPE: ICD-10-CM

## 2023-12-07 DIAGNOSIS — M35.3 POLYMYALGIA RHEUMATICA: ICD-10-CM

## 2023-12-07 DIAGNOSIS — G95.19 OTHER VASCULAR MYELOPATHIES: ICD-10-CM

## 2023-12-07 DIAGNOSIS — G91.2 NPH (NORMAL PRESSURE HYDROCEPHALUS): ICD-10-CM

## 2023-12-07 DIAGNOSIS — E11.65 UNCONTROLLED TYPE 2 DIABETES MELLITUS WITH HYPERGLYCEMIA: ICD-10-CM

## 2023-12-07 PROCEDURE — 1126F AMNT PAIN NOTED NONE PRSNT: CPT | Mod: HCNC,CPTII,S$GLB, | Performed by: INTERNAL MEDICINE

## 2023-12-07 PROCEDURE — 3288F PR FALLS RISK ASSESSMENT DOCUMENTED: ICD-10-PCS | Mod: HCNC,CPTII,S$GLB, | Performed by: INTERNAL MEDICINE

## 2023-12-07 PROCEDURE — 1100F PR PT FALLS ASSESS DOC 2+ FALLS/FALL W/INJURY/YR: ICD-10-PCS | Mod: HCNC,CPTII,S$GLB, | Performed by: INTERNAL MEDICINE

## 2023-12-07 PROCEDURE — 1159F PR MEDICATION LIST DOCUMENTED IN MEDICAL RECORD: ICD-10-PCS | Mod: HCNC,CPTII,S$GLB, | Performed by: INTERNAL MEDICINE

## 2023-12-07 PROCEDURE — 1100F PTFALLS ASSESS-DOCD GE2>/YR: CPT | Mod: HCNC,CPTII,S$GLB, | Performed by: INTERNAL MEDICINE

## 2023-12-07 PROCEDURE — 99214 OFFICE O/P EST MOD 30 MIN: CPT | Mod: HCNC,S$GLB,, | Performed by: INTERNAL MEDICINE

## 2023-12-07 PROCEDURE — 99999 PR PBB SHADOW E&M-EST. PATIENT-LVL III: CPT | Mod: PBBFAC,HCNC,, | Performed by: INTERNAL MEDICINE

## 2023-12-07 PROCEDURE — 99214 PR OFFICE/OUTPT VISIT, EST, LEVL IV, 30-39 MIN: ICD-10-PCS | Mod: HCNC,S$GLB,, | Performed by: INTERNAL MEDICINE

## 2023-12-07 PROCEDURE — 3288F FALL RISK ASSESSMENT DOCD: CPT | Mod: HCNC,CPTII,S$GLB, | Performed by: INTERNAL MEDICINE

## 2023-12-07 PROCEDURE — 1159F MED LIST DOCD IN RCRD: CPT | Mod: HCNC,CPTII,S$GLB, | Performed by: INTERNAL MEDICINE

## 2023-12-07 PROCEDURE — 1126F PR PAIN SEVERITY QUANTIFIED, NO PAIN PRESENT: ICD-10-PCS | Mod: HCNC,CPTII,S$GLB, | Performed by: INTERNAL MEDICINE

## 2023-12-07 PROCEDURE — 99999 PR PBB SHADOW E&M-EST. PATIENT-LVL III: ICD-10-PCS | Mod: PBBFAC,HCNC,, | Performed by: INTERNAL MEDICINE

## 2023-12-07 NOTE — PROGRESS NOTES
Chief complaint  Follow-up on medical conditions     physical exam 3/23    Patient is an 88-year-old white male .  He is no plans to quit smoking.  Labs reviewed.  No interest in pursuing any further colon cancer screening.  Prior Here with his daughter ..  This year developed a diffuse itchy rash.  It started out as flaking peeling on the hands now involves the feet.  The cracks very painful on hands and feet.  Was so painful it made it difficult for him to walk.  He now has a flaky rash and itchy rash all over his body.  Daughter shows me a picture of his buttock and there is a very well circumcised pink flaky rash all around his buttock involving the entire crease.  It is now extending down the back of his leg.  He has various meds at home and was given 10 days of Diflucan about 20 days ago and it did seem to clear with that.  He was on Valium did not have any excessive Valium side effects we did discuss it looks like Diflucan could increase the potency of the Valium so watch for that are break the pill in half.  We had him hold the Crestor as well for 10 days and I  retreated him back In July.  Will also put in a referral to different dermatologist for 2nd opinion.  We gave him some Lotrisone cream assuming this could be some form of allergic reaction but also a distinct possibility of fungus and that will cover for both.  Will also recommend CeraVe anti-itch specifically for some specific treatment.  We reviewed labs and does not appears his any other immune system issue in his CK is normal arguing against any dermatomyositis type issue and he has no increase in pain or weakness.  No symptoms to suggest shunt dysfunction    Recently started on an injection for atopic dermatitis and really within the last week or so the diffuse rash has dramatically improved.  Hopefully he will continue the medication.  He does have follow-up with Oncology to make sure this was not a cutaneous lymphoma issue and encouraged them  to make sure they get the pathology skin biopsy reports from the dermatology office to bring to that oncology appointment.  Still with some cracking on his fingers but by review of pictures on his daughter's phone it is much improved.  He still has cracks on his heels but is not painful that it is affecting his walking.      Reviewed the interval neurosurgery appointment and because of his tremor and the inability to walk due to the cracks on the feet, his NPH status really could not be assessed.  Now today he is walking excellent and normal.  He does not have any urinary incontinence.  Even his tremor is much improved on current medications and he is feeling much better.    While on steroids is sugars were high as expected.  We discussed doing an A1c in 2-3 months to let it set allow back to normal.  His sugars at home are running     --- Message from Mukund Hester MD sent at 10/12/2023  8:18 AM CDT -----  He saw the Dermatologist you referred him to.  They did a skin biopsy, but per family still no definite diagnosis.  Initially thought to be eczema, then possible psoriasis.  He was started on Dupixent and has gotten 2 doses, but rash continues to get worse.   To see Heme -? CTCL    Phone call -Spoke to pts daughter. She stated rash is spreading. Pt has been seen by derm and given 3 different types of topical creams that daughter feels is not working. Daughter stated hands aren't as bad anymore but his feet are horrible. She stated pts feet are cracking and has bad odor. Rash is also on legs, backside, and back      Seen NS 11/28  Interval History:  Here neurosurgical follow up.  Patient was admitted to the hospital last month for delirium secondary to UTI.  During that stay patient developed generalized intermittent tremor, he was started on Depakote which she was still on at this time.    Additionally patient has had issues with a rash that is on his hands in his feet, the rash is causing a significant  amount of pain as well as cracking in the hands and feet that has made it difficult for him to walk.  He was also recently started on Rinvoq for this. They report he is scheduled to see heme/once as possible malignant etiology. Throughout all this patient has not been walking very much, and had just got discharged from a skilled nursing facility.  Given this difficult to evaluate whether his gait improved since shunt adjustment.  They report that memory and urinary function is stable.   Denies pain, paraesthesias, weakness in extremities.  Denies bowel/bladder dysfunction.  Denies clumsiness or loss of dexterity of hands or balance difficulty.    Denies HA, blurred vision or diplopia, speech difficulty, weakness, numbness, seizures, or balance difficulty.   ASSESSMENT/PLAN:    Head CT shows stable size of ventricular system without acute intracranial abnormality.  Difficult to tell whether shunt adjustment had an impact or not given patient's recent medical issues and decreased ability to ambulate.  Patient is following with Dermatology, and scheduled to see Heme-Onc for evaluation cutaneous rash.  Referral placed for Neurology for evaluation of tremor.  We will plan on seeing him back in a couple of months in can reassess need for further adjustment of the shunt at that time.    ROS:   CONST: weight stable. EYES: no vision change. ENT: no sore throat. CV: no chest pain w/ exertion. RESP: no shortness of breath. GI: no nausea, vomiting, diarrhea. No dysphagia. : no other urinary issues. MUSCULOSKELETAL: no new myalgias or arthralgias. SKIN: no new changes. NEURO: no focal deficits. PSYCH: no new issues. ENDOCRINE: no polyuria. HEME: no lymph nodes. ALLERGY: no general pruritis.    PAST MEDICAL HISTORY:                                                        1. Hyperlipidemia.                                                           2. Coronary disease, seen prior by Dr. Roy, 4-vessel bypass in 1999.   3.  Peripheral vascular disease, stented in both legs  and been on Plavix since.                                                                4. Kidney stones, last episode 2007.                                 5. Right renal cyst apparently.  Saw Dr. Labadie and then second opinion at [facility] by a Dr. Shipman, currently going to be followed.                      6. Appendectomy.                                                             7. Actinic keratosis, saw Dr. Ambriz.                                      8. Left facial numbness, probable TIA, admit [facility] 2007. Carotid ultrasound apparently clear  9. Cataracts  10. Skin cancer  11. HYPERTENSION  12.  Diabetes, uncontrolled, with circulatory disease  13.  Low HDL  14. Declines Colonoscopy  15. Pneumovax after 67 done, Prevnar 13 given   16.  B12 deficiency diagnosed     17    early satiety    18. NPH, shunt                                                                                      SOCIAL HISTORY:  He smokes 1 pack per day and does not drink.  He was about   52 years but now a .  Retired deputy in the court, 4          children.                                                                                                                                                 FAMILY HISTORY:  Father  at 63 of heart disease and stroke.  Mother       at 86, 4 brothers, 2 sisters living. Brother with strokes.            Labs, cardiac testing, interval clinic notes and MRIs reviewed      Vitals as above  Skin with some general dryness but much improved from prior.  Thick calluses and cracks on his hands are improving.  No signs of secondary infection.  His gait is normal.  Speech is normal and affect is normal for him.      Labs, x-rays and EKG reviewed                        Assessment and plan:    Diagnoses and all orders for this visit:    Dermatitis, being treated with a listed diagnosis  associated with a dermatology visit of atopic dermatitis.  Now on Dupixent with dramatic improvement.  Keep follow-up with dermatology and good to keep follow-up with Oncology as well and hopefully this is not a cutaneous lymphoma type issue    Uncontrolled type 2 diabetes mellitus with hyperglycemia, reassess A1c in 2-3 months    Urinary incontinence, unspecified type, chronic and stable with no worsening and no symptoms to suggest any worsening NPH    NPH (normal pressure hydrocephalus)    Essential hypertension, chronic and stable    Polymyalgia rheumatica, chronic and stable    Stage 3a chronic kidney disease, chronic and stable    Anemia, unspecified type    Chronic obstructive pulmonary disease, unspecified COPD type, no plans to quit smoking    Other vascular myelopathies, noted, apparently chronic and stable

## 2023-12-12 ENCOUNTER — LAB VISIT (OUTPATIENT)
Dept: LAB | Facility: HOSPITAL | Age: 88
End: 2023-12-12
Attending: INTERNAL MEDICINE
Payer: MEDICARE

## 2023-12-12 ENCOUNTER — OFFICE VISIT (OUTPATIENT)
Dept: HEMATOLOGY/ONCOLOGY | Facility: CLINIC | Age: 88
End: 2023-12-12
Payer: MEDICARE

## 2023-12-12 VITALS
WEIGHT: 142.19 LBS | SYSTOLIC BLOOD PRESSURE: 125 MMHG | HEART RATE: 80 BPM | HEIGHT: 67 IN | BODY MASS INDEX: 22.32 KG/M2 | TEMPERATURE: 98 F | OXYGEN SATURATION: 95 % | DIASTOLIC BLOOD PRESSURE: 60 MMHG

## 2023-12-12 DIAGNOSIS — C44.90 SKIN CANCER: ICD-10-CM

## 2023-12-12 DIAGNOSIS — R21 RASH: ICD-10-CM

## 2023-12-12 DIAGNOSIS — R21 RASH: Primary | ICD-10-CM

## 2023-12-12 LAB
BASOPHILS # BLD AUTO: 0.03 K/UL (ref 0–0.2)
BASOPHILS NFR BLD: 0.2 % (ref 0–1.9)
CRP SERPL-MCNC: 50.5 MG/L (ref 0–8.2)
DIFFERENTIAL METHOD: ABNORMAL
EOSINOPHIL # BLD AUTO: 0.4 K/UL (ref 0–0.5)
EOSINOPHIL NFR BLD: 3.1 % (ref 0–8)
ERYTHROCYTE [DISTWIDTH] IN BLOOD BY AUTOMATED COUNT: 17.4 % (ref 11.5–14.5)
ERYTHROCYTE [SEDIMENTATION RATE] IN BLOOD BY PHOTOMETRIC METHOD: 73 MM/HR (ref 0–23)
HCT VFR BLD AUTO: 29.5 % (ref 40–54)
HGB BLD-MCNC: 9.8 G/DL (ref 14–18)
IMM GRANULOCYTES # BLD AUTO: 0.13 K/UL (ref 0–0.04)
IMM GRANULOCYTES NFR BLD AUTO: 1 % (ref 0–0.5)
LDH SERPL L TO P-CCNC: 212 U/L (ref 110–260)
LYMPHOCYTES # BLD AUTO: 1.9 K/UL (ref 1–4.8)
LYMPHOCYTES NFR BLD: 14.4 % (ref 18–48)
MCH RBC QN AUTO: 30.7 PG (ref 27–31)
MCHC RBC AUTO-ENTMCNC: 33.2 G/DL (ref 32–36)
MCV RBC AUTO: 93 FL (ref 82–98)
MONOCYTES # BLD AUTO: 1.1 K/UL (ref 0.3–1)
MONOCYTES NFR BLD: 7.8 % (ref 4–15)
NEUTROPHILS # BLD AUTO: 9.8 K/UL (ref 1.8–7.7)
NEUTROPHILS NFR BLD: 73.5 % (ref 38–73)
NRBC BLD-RTO: 0 /100 WBC
PATH REV BLD -IMP: NORMAL
PATH REV BLD -IMP: NORMAL
PLATELET # BLD AUTO: 296 K/UL (ref 150–450)
PMV BLD AUTO: 9.3 FL (ref 9.2–12.9)
RBC # BLD AUTO: 3.19 M/UL (ref 4.6–6.2)
URATE SERPL-MCNC: 5.3 MG/DL (ref 3.4–7)
WBC # BLD AUTO: 13.39 K/UL (ref 3.9–12.7)

## 2023-12-12 PROCEDURE — 1101F PR PT FALLS ASSESS DOC 0-1 FALLS W/OUT INJ PAST YR: ICD-10-PCS | Mod: HCNC,CPTII,GC,S$GLB | Performed by: INTERNAL MEDICINE

## 2023-12-12 PROCEDURE — 85652 RBC SED RATE AUTOMATED: CPT | Mod: HCNC | Performed by: INTERNAL MEDICINE

## 2023-12-12 PROCEDURE — 36415 COLL VENOUS BLD VENIPUNCTURE: CPT | Mod: HCNC | Performed by: INTERNAL MEDICINE

## 2023-12-12 PROCEDURE — 84550 ASSAY OF BLOOD/URIC ACID: CPT | Mod: HCNC | Performed by: INTERNAL MEDICINE

## 2023-12-12 PROCEDURE — 82525 ASSAY OF COPPER: CPT | Mod: HCNC | Performed by: INTERNAL MEDICINE

## 2023-12-12 PROCEDURE — 1126F PR PAIN SEVERITY QUANTIFIED, NO PAIN PRESENT: ICD-10-PCS | Mod: HCNC,CPTII,GC,S$GLB | Performed by: INTERNAL MEDICINE

## 2023-12-12 PROCEDURE — 83615 LACTATE (LD) (LDH) ENZYME: CPT | Mod: HCNC | Performed by: INTERNAL MEDICINE

## 2023-12-12 PROCEDURE — 99215 OFFICE O/P EST HI 40 MIN: CPT | Mod: HCNC,GC,S$GLB, | Performed by: INTERNAL MEDICINE

## 2023-12-12 PROCEDURE — 1101F PT FALLS ASSESS-DOCD LE1/YR: CPT | Mod: HCNC,CPTII,GC,S$GLB | Performed by: INTERNAL MEDICINE

## 2023-12-12 PROCEDURE — 99999 PR PBB SHADOW E&M-EST. PATIENT-LVL III: ICD-10-PCS | Mod: PBBFAC,HCNC,GC, | Performed by: INTERNAL MEDICINE

## 2023-12-12 PROCEDURE — 3288F PR FALLS RISK ASSESSMENT DOCUMENTED: ICD-10-PCS | Mod: HCNC,CPTII,GC,S$GLB | Performed by: INTERNAL MEDICINE

## 2023-12-12 PROCEDURE — 86140 C-REACTIVE PROTEIN: CPT | Mod: HCNC | Performed by: INTERNAL MEDICINE

## 2023-12-12 PROCEDURE — 1126F AMNT PAIN NOTED NONE PRSNT: CPT | Mod: HCNC,CPTII,GC,S$GLB | Performed by: INTERNAL MEDICINE

## 2023-12-12 PROCEDURE — 99215 PR OFFICE/OUTPT VISIT, EST, LEVL V, 40-54 MIN: ICD-10-PCS | Mod: HCNC,GC,S$GLB, | Performed by: INTERNAL MEDICINE

## 2023-12-12 PROCEDURE — 85060 PATHOLOGIST REVIEW: ICD-10-PCS | Mod: HCNC,,, | Performed by: PATHOLOGY

## 2023-12-12 PROCEDURE — 85060 BLOOD SMEAR INTERPRETATION: CPT | Mod: HCNC,,, | Performed by: PATHOLOGY

## 2023-12-12 PROCEDURE — 85025 COMPLETE CBC W/AUTO DIFF WBC: CPT | Mod: HCNC | Performed by: INTERNAL MEDICINE

## 2023-12-12 PROCEDURE — 84590 ASSAY OF VITAMIN A: CPT | Mod: HCNC | Performed by: INTERNAL MEDICINE

## 2023-12-12 PROCEDURE — 3288F FALL RISK ASSESSMENT DOCD: CPT | Mod: HCNC,CPTII,GC,S$GLB | Performed by: INTERNAL MEDICINE

## 2023-12-12 PROCEDURE — 84630 ASSAY OF ZINC: CPT | Mod: HCNC | Performed by: INTERNAL MEDICINE

## 2023-12-12 PROCEDURE — 99999 PR PBB SHADOW E&M-EST. PATIENT-LVL III: CPT | Mod: PBBFAC,HCNC,GC, | Performed by: INTERNAL MEDICINE

## 2023-12-12 NOTE — PROGRESS NOTES
HEMATOLOGY FELLOW CLINIC    IDENTIFYING STATEMENT   Narciso Yanez (Narciso) is a 88 y.o. male who was referred for discussion of a possible T cell lymphoma.     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  Of note, patient was seen by Dr. Cosme at the [facility].  There was concern for nutritional versus lymphoproliferative disorder.  Nutritional evaluation showed low zinc 56.  Copper was normal.  Flow cytometry obtained  on peripheral blood showed relative neutrophilia, markedly increased CD4 to CD8 ratio (25:  1) with subset of T-cell with aberrant loss of CD7 expression.  No significant increase in circulating blasts noted.     Laboratory evaluation shows CBC has been relatively normal with occasional leukocytosis however with neutrophilic predominance, stable anemia with hemoglobin averaging 9-12 grams/dL.  Platelets within normal limits.  No blasts noted., serum creatinine averaging 1.3-1.8 and occasional hyperglycemia.  Chest CT obtained in 10/14/23 showed no evidence of hepatosplenomegaly.  Enlarged prostate.  Peripheral smear obtained on 10/20 showed leukocytosis with relative and absolute neutrophilia, relative lymphopenia and relative monocytopenia, no morphological abnormalities now blasts on scanning. Check zinc- low zinc. Normal copper.. Check for lumps and bumps, plaques and patches  Check HTLV, peripheral smear     Patient was started on prednisone 40 mg with heparin cause with improvement in skin rashes.  He was advised to follow up with Dermatolog      Past Medical History:   Diagnosis Date    Actinic keratosis     Anxiety     B12 deficiency 12/8/2014    Dx 6/14    Cancer     skin    Cataract     Coronary artery disease      Diabetes mellitus type II     Diabetes mellitus with peripheral circulatory disorder 6/18/2014    ED (erectile dysfunction)     Hyperlipidemia     Kidney stone     Neurogenic claudication 4/4/2022    Normocytic anemia 10/15/2023    Peripheral vascular disease     Spondylosis 3/29/2019    Tobacco dependence     Urinary incontinence 5/4/2021       Family History   Problem Relation Age of Onset    Stroke Mother        Social History     Socioeconomic History    Marital status:    Tobacco Use    Smoking status: Every Day     Current packs/day: 1.50     Average packs/day: 1.5 packs/day for 71.0 years (106.5 ttl pk-yrs)     Types: Cigarettes    Smokeless tobacco: Former   Substance and Sexual Activity    Alcohol use: No    Drug use: No    Sexual activity: Not Currently     Partners: Female   Social History Narrative    .  4 children.  Smokes 2 ppd.  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      Social Determinants of Health     Financial Resource Strain: Low Risk  (9/15/2023)    Overall Financial Resource Strain (CARDIA)     Difficulty of Paying Living Expenses: Not hard at all   Food Insecurity: No Food Insecurity (9/15/2023)    Hunger Vital Sign     Worried About Running Out of Food in the Last Year: Never true     Ran Out of Food in the Last Year: Never true   Transportation Needs: No Transportation Needs (9/15/2023)    PRAPARE - Transportation     Lack of Transportation (Medical): No     Lack of Transportation (Non-Medical): No   Physical Activity: Inactive (9/15/2023)    Exercise Vital Sign     Days of Exercise per Week: 0 days     Minutes of Exercise per Session: 0 min   Stress: No Stress Concern Present (9/15/2023)    [place] [employer] - Occupational Stress Questionnaire     Feeling of Stress : Not at all   Social Connections: Moderately Isolated (9/15/2023)    Social Connection and Isolation Panel [NHANES]     Frequency of Communication with Friends and Family: More  than three times a week     Frequency of Social Gatherings with Friends and Family: More than three times a week     Attends Taoism Services: 1 to 4 times per year     Active Member of Clubs or Organizations: No     Attends Club or Organization Meetings: Never     Marital Status:    Housing Stability: Low Risk  (9/15/2023)    Housing Stability Vital Sign     Unable to Pay for Housing in the Last Year: No     Number of Places Lived in the Last Year: 1     Unstable Housing in the Last Year: No         MEDICATIONS:     Current Outpatient Medications on File Prior to Visit   Medication Sig Dispense Refill    acetaminophen (TYLENOL) 500 MG tablet Take 1 tablet (500 mg total) by mouth every 6 (six) hours as needed for Pain. 13 tablet 0    aspirin (ECOTRIN) 81 MG EC tablet Take 1 tablet (81 mg total) by mouth once daily.  0    atorvastatin (LIPITOR) 40 MG tablet Take 1 tablet (40 mg total) by mouth once daily. 30 tablet 2    blood sugar diagnostic (TRUE METRIX GLUCOSE TEST STRIP) Strp  each 12    clopidogreL (PLAVIX) 75 mg tablet Take 1 tablet (75 mg total) by mouth once daily. 30 tablet 2    divalproex (DEPAKOTE) 125 MG EC tablet Take by mouth.      metFORMIN (GLUCOPHAGE) 500 MG tablet Take 1 tablet (500 mg total) by mouth 2 (two) times daily with meals. HOLD FOR GLUCOSE LESS 100 180 tablet 3    metoprolol succinate (TOPROL-XL) 25 MG 24 hr tablet Take 1 tablet (25 mg total) by mouth once daily. 30 tablet 2    mometasone 0.1% (ELOCON) 0.1 % cream Apply topically 2 (two) times daily. Apply to arms ,hands, and legs BID      predniSONE (DELTASONE) 10 MG tablet Take 10 mg by mouth.      triamcinolone acetonide 0.1% (KENALOG) 0.1 % cream Apply topically 2 (two) times daily.       No current facility-administered medications on file prior to visit.       ALLERGIES:   Review of patient's allergies indicates:   Allergen Reactions    Demerol [meperidine] Nausea Only        ROS:       Review of Systems  "  Constitutional:  Negative for appetite change and fatigue.   HENT:   Negative for hearing loss, lump/mass, nosebleeds and sore throat.    Eyes:  Negative for icterus.   Respiratory:  Negative for chest tightness and hemoptysis.    Cardiovascular:  Negative for chest pain.   Gastrointestinal:  Negative for abdominal distention, blood in stool, nausea and vomiting.   Genitourinary:  Negative for difficulty urinating, dysuria and frequency.    Musculoskeletal:  Negative for arthralgias and back pain.   Skin:  Negative for itching and rash.   Neurological:  Negative for dizziness, extremity weakness, headaches, light-headedness, seizures and speech difficulty.   Psychiatric/Behavioral:  Negative for confusion and suicidal ideas. The patient is not nervous/anxious.        PHYSICAL EXAM:  Vitals:    12/12/23 0959   BP: 125/60   Pulse: 80   Temp: 97.9 °F (36.6 °C)   TempSrc: Oral   SpO2: 95%   Weight: 64.5 kg (142 lb 3.2 oz)   Height: 5' 7" (1.702 m)   PainSc: 0-No pain       Physical Exam  Vitals reviewed.   Constitutional:       General: He is not in acute distress.     Appearance: He is not toxic-appearing.   HENT:      Head: Normocephalic and atraumatic.   Eyes:      General: No scleral icterus.     Conjunctiva/sclera: Conjunctivae normal.   Cardiovascular:      Rate and Rhythm: Normal rate.      Pulses: Normal pulses.   Pulmonary:      Effort: Pulmonary effort is normal. No respiratory distress.      Breath sounds: Normal breath sounds. No rales.   Abdominal:      General: Bowel sounds are normal. There is no distension.      Palpations: Abdomen is soft.      Tenderness: There is no abdominal tenderness.   Musculoskeletal:      Right lower leg: No edema.      Left lower leg: No edema.   Skin:     Coloration: Skin is not jaundiced.   Neurological:      Mental Status: He is alert and oriented to person, place, and time. Mental status is at baseline.   Psychiatric:         Mood and Affect: Mood normal.         " Behavior: Behavior normal.         LAB:   Results for orders placed or performed during the hospital encounter of 10/14/23   CBC auto differential   Result Value Ref Range    WBC 9.81 3.90 - 12.70 K/uL    RBC 3.07 (L) 4.60 - 6.20 M/uL    Hemoglobin 9.7 (L) 14.0 - 18.0 g/dL    Hematocrit 29.3 (L) 40.0 - 54.0 %    MCV 95 82 - 98 fL    MCH 31.6 (H) 27.0 - 31.0 pg    MCHC 33.1 32.0 - 36.0 g/dL    RDW 15.7 (H) 11.5 - 14.5 %    Platelets 342 150 - 450 K/uL    MPV 9.3 9.2 - 12.9 fL    Immature Granulocytes 0.7 (H) 0.0 - 0.5 %    Gran # (ANC) 5.6 1.8 - 7.7 K/uL    Immature Grans (Abs) 0.07 (H) 0.00 - 0.04 K/uL    Lymph # 2.0 1.0 - 4.8 K/uL    Mono # 0.8 0.3 - 1.0 K/uL    Eos # 1.3 (H) 0.0 - 0.5 K/uL    Baso # 0.06 0.00 - 0.20 K/uL    nRBC 0 0 /100 WBC    Gran % 56.8 38.0 - 73.0 %    Lymph % 20.3 18.0 - 48.0 %    Mono % 8.0 4.0 - 15.0 %    Eosinophil % 13.6 (H) 0.0 - 8.0 %    Basophil % 0.6 0.0 - 1.9 %    Differential Method Automated    Comprehensive metabolic panel   Result Value Ref Range    Sodium 139 136 - 145 mmol/L    Potassium 5.0 3.5 - 5.1 mmol/L    Chloride 110 95 - 110 mmol/L    CO2 17 (L) 23 - 29 mmol/L    Glucose 130 (H) 70 - 110 mg/dL    BUN 15 8 - 23 mg/dL    Creatinine 1.3 0.5 - 1.4 mg/dL    Calcium 8.6 (L) 8.7 - 10.5 mg/dL    Total Protein 7.5 6.0 - 8.4 g/dL    Albumin 2.9 (L) 3.5 - 5.2 g/dL    Total Bilirubin 0.2 0.1 - 1.0 mg/dL    Alkaline Phosphatase 138 (H) 55 - 135 U/L    AST 33 10 - 40 U/L    ALT 28 10 - 44 U/L    eGFR 54 (A) >60 mL/min/1.73 m^2    Anion Gap 12 8 - 16 mmol/L   Urinalysis, Reflex to Urine Culture Urine, Clean Catch    Specimen: Urine   Result Value Ref Range    Specimen UA Urine, Clean Catch     Color, UA Yellow Yellow, Straw, Caterina    Appearance, UA Clear Clear    pH, UA 6.0 5.0 - 8.0    Specific Gravity, UA 1.020 1.005 - 1.030    Protein, UA 1+ (A) Negative    Glucose, UA Negative Negative    Ketones, UA Negative Negative    Bilirubin (UA) Negative Negative    Occult Blood UA Negative  Negative    Nitrite, UA Negative Negative    Urobilinogen, UA Negative <2.0 EU/dL    Leukocytes, UA Negative Negative   Brain natriuretic peptide   Result Value Ref Range     (H) 0 - 99 pg/mL   CK   Result Value Ref Range     (H) 20 - 200 U/L   Magnesium   Result Value Ref Range    Magnesium 1.9 1.6 - 2.6 mg/dL   Urinalysis Microscopic   Result Value Ref Range    RBC, UA 1 0 - 4 /hpf    WBC, UA 3 0 - 5 /hpf    Bacteria Rare None-Occ /hpf    Hyaline Casts, UA 1 0-1/lpf /lpf    Microscopic Comment SEE COMMENT    Troponin I   Result Value Ref Range    Troponin I 0.107 (H) 0.000 - 0.026 ng/mL   Acetaminophen Level   Result Value Ref Range    Acetaminophen (Tylenol), Serum <3.0 (L) 10.0 - 20.0 ug/mL   Drug screen panel, in-house   Result Value Ref Range    Benzodiazepines Presumptive Positive (A) Negative    Methadone metabolites Negative Negative    Cocaine (Metab.) Negative Negative    Opiate Scrn, Ur Negative Negative    Barbiturate Screen, Ur Negative Negative    Amphetamine Screen, Ur Negative Negative    THC Negative Negative    Phencyclidine Negative Negative    Creatinine, Urine 72.4 23.0 - 375.0 mg/dL    Toxicology Information SEE COMMENT    Ethanol   Result Value Ref Range    Alcohol, Serum <10 <10 mg/dL   Comprehensive Metabolic Panel (CMP)   Result Value Ref Range    Sodium 142 136 - 145 mmol/L    Potassium 5.0 3.5 - 5.1 mmol/L    Chloride 109 95 - 110 mmol/L    CO2 18 (L) 23 - 29 mmol/L    Glucose 115 (H) 70 - 110 mg/dL    BUN 14 8 - 23 mg/dL    Creatinine 1.5 (H) 0.5 - 1.4 mg/dL    Calcium 9.0 8.7 - 10.5 mg/dL    Total Protein 7.5 6.0 - 8.4 g/dL    Albumin 2.8 (L) 3.5 - 5.2 g/dL    Total Bilirubin 0.3 0.1 - 1.0 mg/dL    Alkaline Phosphatase 109 55 - 135 U/L    AST 29 10 - 40 U/L    ALT 29 10 - 44 U/L    eGFR 45 (A) >60 mL/min/1.73 m^2    Anion Gap 15 8 - 16 mmol/L   Magnesium   Result Value Ref Range    Magnesium 1.9 1.6 - 2.6 mg/dL   Phosphorus   Result Value Ref Range    Phosphorus 4.8 (H)  2.7 - 4.5 mg/dL   CK   Result Value Ref Range     (H) 20 - 200 U/L   Troponin I   Result Value Ref Range    Troponin I 0.085 (H) 0.000 - 0.026 ng/mL   Hemoglobin A1c if not done in past 3 months   Result Value Ref Range    Hemoglobin A1C 6.9 (H) 4.0 - 5.6 %    Estimated Avg Glucose 151 (H) 68 - 131 mg/dL   Sedimentation rate   Result Value Ref Range    Sed Rate 97 (H) 0 - 10 mm/Hr   C-reactive protein   Result Value Ref Range    CRP 96.6 (H) 0.0 - 8.2 mg/L   TSH   Result Value Ref Range    TSH 3.946 0.400 - 4.000 uIU/mL   T4   Result Value Ref Range    T4, Total 4.9 4.5 - 11.5 ug/dL   Ammonia   Result Value Ref Range    Ammonia 21 10 - 50 umol/L   Folate   Result Value Ref Range    Folate 30.5 (H) 4.0 - 24.0 ng/mL   Vitamin B12   Result Value Ref Range    Vitamin B-12 882 210 - 950 pg/mL   RPR   Result Value Ref Range    RPR Non-reactive Non-reactive   Comprehensive Metabolic Panel (CMP)   Result Value Ref Range    Sodium 140 136 - 145 mmol/L    Potassium 4.8 3.5 - 5.1 mmol/L    Chloride 109 95 - 110 mmol/L    CO2 17 (L) 23 - 29 mmol/L    Glucose 125 (H) 70 - 110 mg/dL    BUN 16 8 - 23 mg/dL    Creatinine 1.5 (H) 0.5 - 1.4 mg/dL    Calcium 8.7 8.7 - 10.5 mg/dL    Total Protein 7.0 6.0 - 8.4 g/dL    Albumin 2.7 (L) 3.5 - 5.2 g/dL    Total Bilirubin 0.3 0.1 - 1.0 mg/dL    Alkaline Phosphatase 90 55 - 135 U/L    AST 31 10 - 40 U/L    ALT 29 10 - 44 U/L    eGFR 45 (A) >60 mL/min/1.73 m^2    Anion Gap 14 8 - 16 mmol/L   Magnesium   Result Value Ref Range    Magnesium 2.1 1.6 - 2.6 mg/dL   Phosphorus   Result Value Ref Range    Phosphorus 4.6 (H) 2.7 - 4.5 mg/dL   CBC auto differential   Result Value Ref Range    WBC 6.55 3.90 - 12.70 K/uL    RBC 2.91 (L) 4.60 - 6.20 M/uL    Hemoglobin 9.1 (L) 14.0 - 18.0 g/dL    Hematocrit 28.2 (L) 40.0 - 54.0 %    MCV 97 82 - 98 fL    MCH 31.3 (H) 27.0 - 31.0 pg    MCHC 32.3 32.0 - 36.0 g/dL    RDW 15.9 (H) 11.5 - 14.5 %    Platelets 328 150 - 450 K/uL    MPV 9.7 9.2 - 12.9 fL     Immature Granulocytes 0.5 0.0 - 0.5 %    Gran # (ANC) 5.1 1.8 - 7.7 K/uL    Immature Grans (Abs) 0.03 0.00 - 0.04 K/uL    Lymph # 1.3 1.0 - 4.8 K/uL    Mono # 0.1 (L) 0.3 - 1.0 K/uL    Eos # 0.0 0.0 - 0.5 K/uL    Baso # 0.01 0.00 - 0.20 K/uL    nRBC 0 0 /100 WBC    Gran % 77.9 (H) 38.0 - 73.0 %    Lymph % 20.0 18.0 - 48.0 %    Mono % 1.4 (L) 4.0 - 15.0 %    Eosinophil % 0.0 0.0 - 8.0 %    Basophil % 0.2 0.0 - 1.9 %    Differential Method Automated    Comprehensive Metabolic Panel (CMP)   Result Value Ref Range    Sodium 136 136 - 145 mmol/L    Potassium 5.5 (H) 3.5 - 5.1 mmol/L    Chloride 104 95 - 110 mmol/L    CO2 20 (L) 23 - 29 mmol/L    Glucose 168 (H) 70 - 110 mg/dL    BUN 22 8 - 23 mg/dL    Creatinine 1.5 (H) 0.5 - 1.4 mg/dL    Calcium 8.8 8.7 - 10.5 mg/dL    Total Protein 7.2 6.0 - 8.4 g/dL    Albumin 2.7 (L) 3.5 - 5.2 g/dL    Total Bilirubin 0.2 0.1 - 1.0 mg/dL    Alkaline Phosphatase 92 55 - 135 U/L    AST 29 10 - 40 U/L    ALT 27 10 - 44 U/L    eGFR 45 (A) >60 mL/min/1.73 m^2    Anion Gap 12 8 - 16 mmol/L   Magnesium   Result Value Ref Range    Magnesium 2.3 1.6 - 2.6 mg/dL   Phosphorus   Result Value Ref Range    Phosphorus 4.7 (H) 2.7 - 4.5 mg/dL   Magnesium   Result Value Ref Range    Magnesium 2.1 1.6 - 2.6 mg/dL   Phosphorus   Result Value Ref Range    Phosphorus 3.5 2.7 - 4.5 mg/dL   Comprehensive Metabolic Panel   Result Value Ref Range    Sodium 134 (L) 136 - 145 mmol/L    Potassium 4.7 3.5 - 5.1 mmol/L    Chloride 102 95 - 110 mmol/L    CO2 18 (L) 23 - 29 mmol/L    Glucose 365 (H) 70 - 110 mg/dL    BUN 32 (H) 8 - 23 mg/dL    Creatinine 1.8 (H) 0.5 - 1.4 mg/dL    Calcium 8.8 8.7 - 10.5 mg/dL    Total Protein 7.4 6.0 - 8.4 g/dL    Albumin 2.6 (L) 3.5 - 5.2 g/dL    Total Bilirubin 0.2 0.1 - 1.0 mg/dL    Alkaline Phosphatase 155 (H) 55 - 135 U/L    AST 22 10 - 40 U/L    ALT 24 10 - 44 U/L    eGFR 36 (A) >60 mL/min/1.73 m^2    Anion Gap 14 8 - 16 mmol/L   CBC Auto Differential   Result Value Ref Range     WBC 14.64 (H) 3.90 - 12.70 K/uL    RBC 2.92 (L) 4.60 - 6.20 M/uL    Hemoglobin 9.4 (L) 14.0 - 18.0 g/dL    Hematocrit 28.3 (L) 40.0 - 54.0 %    MCV 97 82 - 98 fL    MCH 32.2 (H) 27.0 - 31.0 pg    MCHC 33.2 32.0 - 36.0 g/dL    RDW 15.8 (H) 11.5 - 14.5 %    Platelets 347 150 - 450 K/uL    MPV 9.5 9.2 - 12.9 fL    Immature Granulocytes 0.5 0.0 - 0.5 %    Gran # (ANC) 12.8 (H) 1.8 - 7.7 K/uL    Immature Grans (Abs) 0.07 (H) 0.00 - 0.04 K/uL    Lymph # 1.5 1.0 - 4.8 K/uL    Mono # 0.3 0.3 - 1.0 K/uL    Eos # 0.0 0.0 - 0.5 K/uL    Baso # 0.02 0.00 - 0.20 K/uL    nRBC 0 0 /100 WBC    Gran % 87.4 (H) 38.0 - 73.0 %    Lymph % 10.0 (L) 18.0 - 48.0 %    Mono % 2.0 (L) 4.0 - 15.0 %    Eosinophil % 0.0 0.0 - 8.0 %    Basophil % 0.1 0.0 - 1.9 %    Differential Method Automated    RPR   Result Value Ref Range    RPR Non-reactive Non-reactive   Glucose, random   Result Value Ref Range    Glucose 433 (H) 70 - 110 mg/dL   Magnesium   Result Value Ref Range    Magnesium 2.1 1.6 - 2.6 mg/dL   Phosphorus   Result Value Ref Range    Phosphorus 3.6 2.7 - 4.5 mg/dL   Comprehensive Metabolic Panel   Result Value Ref Range    Sodium 136 136 - 145 mmol/L    Potassium 4.4 3.5 - 5.1 mmol/L    Chloride 103 95 - 110 mmol/L    CO2 22 (L) 23 - 29 mmol/L    Glucose 149 (H) 70 - 110 mg/dL    BUN 31 (H) 8 - 23 mg/dL    Creatinine 1.7 (H) 0.5 - 1.4 mg/dL    Calcium 8.3 (L) 8.7 - 10.5 mg/dL    Total Protein 6.0 6.0 - 8.4 g/dL    Albumin 2.3 (L) 3.5 - 5.2 g/dL    Total Bilirubin 0.1 0.1 - 1.0 mg/dL    Alkaline Phosphatase 89 55 - 135 U/L    AST 18 10 - 40 U/L    ALT 21 10 - 44 U/L    eGFR 39 (A) >60 mL/min/1.73 m^2    Anion Gap 11 8 - 16 mmol/L   CBC Auto Differential   Result Value Ref Range    WBC 14.27 (H) 3.90 - 12.70 K/uL    RBC 2.78 (L) 4.60 - 6.20 M/uL    Hemoglobin 8.7 (L) 14.0 - 18.0 g/dL    Hematocrit 26.8 (L) 40.0 - 54.0 %    MCV 96 82 - 98 fL    MCH 31.3 (H) 27.0 - 31.0 pg    MCHC 32.5 32.0 - 36.0 g/dL    RDW 15.5 (H) 11.5 - 14.5 %     Platelets 329 150 - 450 K/uL    MPV 9.6 9.2 - 12.9 fL    Immature Granulocytes 0.8 (H) 0.0 - 0.5 %    Gran # (ANC) 11.4 (H) 1.8 - 7.7 K/uL    Immature Grans (Abs) 0.12 (H) 0.00 - 0.04 K/uL    Lymph # 2.2 1.0 - 4.8 K/uL    Mono # 0.6 0.3 - 1.0 K/uL    Eos # 0.0 0.0 - 0.5 K/uL    Baso # 0.01 0.00 - 0.20 K/uL    nRBC 0 0 /100 WBC    Gran % 79.6 (H) 38.0 - 73.0 %    Lymph % 15.2 (L) 18.0 - 48.0 %    Mono % 4.3 4.0 - 15.0 %    Eosinophil % 0.0 0.0 - 8.0 %    Basophil % 0.1 0.0 - 1.9 %    Differential Method Automated    Magnesium   Result Value Ref Range    Magnesium 2.0 1.6 - 2.6 mg/dL   Phosphorus   Result Value Ref Range    Phosphorus 2.5 (L) 2.7 - 4.5 mg/dL   Comprehensive Metabolic Panel   Result Value Ref Range    Sodium 135 (L) 136 - 145 mmol/L    Potassium 4.9 3.5 - 5.1 mmol/L    Chloride 102 95 - 110 mmol/L    CO2 24 23 - 29 mmol/L    Glucose 121 (H) 70 - 110 mg/dL    BUN 33 (H) 8 - 23 mg/dL    Creatinine 1.4 0.5 - 1.4 mg/dL    Calcium 8.4 (L) 8.7 - 10.5 mg/dL    Total Protein 6.5 6.0 - 8.4 g/dL    Albumin 2.5 (L) 3.5 - 5.2 g/dL    Total Bilirubin 0.2 0.1 - 1.0 mg/dL    Alkaline Phosphatase 84 55 - 135 U/L    AST 24 10 - 40 U/L    ALT 26 10 - 44 U/L    eGFR 49 (A) >60 mL/min/1.73 m^2    Anion Gap 9 8 - 16 mmol/L   CBC Auto Differential   Result Value Ref Range    WBC 14.18 (H) 3.90 - 12.70 K/uL    RBC 3.12 (L) 4.60 - 6.20 M/uL    Hemoglobin 9.6 (L) 14.0 - 18.0 g/dL    Hematocrit 30.1 (L) 40.0 - 54.0 %    MCV 97 82 - 98 fL    MCH 30.8 27.0 - 31.0 pg    MCHC 31.9 (L) 32.0 - 36.0 g/dL    RDW 15.4 (H) 11.5 - 14.5 %    Platelets 379 150 - 450 K/uL    MPV 9.8 9.2 - 12.9 fL    Immature Granulocytes 1.1 (H) 0.0 - 0.5 %    Gran # (ANC) 7.3 1.8 - 7.7 K/uL    Immature Grans (Abs) 0.15 (H) 0.00 - 0.04 K/uL    Lymph # 5.4 (H) 1.0 - 4.8 K/uL    Mono # 1.1 (H) 0.3 - 1.0 K/uL    Eos # 0.1 0.0 - 0.5 K/uL    Baso # 0.02 0.00 - 0.20 K/uL    nRBC 0 0 /100 WBC    Gran % 52.0 38.0 - 73.0 %    Lymph % 38.7 18.0 - 48.0 %    Mono % 7.7  4.0 - 15.0 %    Eosinophil % 0.4 0.0 - 8.0 %    Basophil % 0.1 0.0 - 1.9 %    Differential Method Automated    Pathologist Interpretation Differential   Result Value Ref Range    Pathologist Review Review required    CBC Auto Differential   Result Value Ref Range    WBC 14.08 (H) 3.90 - 12.70 K/uL    RBC 3.08 (L) 4.60 - 6.20 M/uL    Hemoglobin 9.7 (L) 14.0 - 18.0 g/dL    Hematocrit 28.9 (L) 40.0 - 54.0 %    MCV 94 82 - 98 fL    MCH 31.5 (H) 27.0 - 31.0 pg    MCHC 33.6 32.0 - 36.0 g/dL    RDW 15.6 (H) 11.5 - 14.5 %    Platelets 321 150 - 450 K/uL    MPV 9.3 9.2 - 12.9 fL    Immature Granulocytes 1.1 (H) 0.0 - 0.5 %    Gran # (ANC) 10.9 (H) 1.8 - 7.7 K/uL    Immature Grans (Abs) 0.15 (H) 0.00 - 0.04 K/uL    Lymph # 2.5 1.0 - 4.8 K/uL    Mono # 0.5 0.3 - 1.0 K/uL    Eos # 0.0 0.0 - 0.5 K/uL    Baso # 0.02 0.00 - 0.20 K/uL    nRBC 0 0 /100 WBC    Gran % 77.6 (H) 38.0 - 73.0 %    Lymph % 17.9 (L) 18.0 - 48.0 %    Mono % 3.3 (L) 4.0 - 15.0 %    Eosinophil % 0.0 0.0 - 8.0 %    Basophil % 0.1 0.0 - 1.9 %    Differential Method Automated    Pathologist Review   Result Value Ref Range    Pathologist Review Peripheral Smear REVIEWED    Magnesium   Result Value Ref Range    Magnesium 2.2 1.6 - 2.6 mg/dL   Phosphorus   Result Value Ref Range    Phosphorus 3.7 2.7 - 4.5 mg/dL   Comprehensive Metabolic Panel   Result Value Ref Range    Sodium 134 (L) 136 - 145 mmol/L    Potassium 4.8 3.5 - 5.1 mmol/L    Chloride 99 95 - 110 mmol/L    CO2 23 23 - 29 mmol/L    Glucose 221 (H) 70 - 110 mg/dL    BUN 37 (H) 8 - 23 mg/dL    Creatinine 1.4 0.5 - 1.4 mg/dL    Calcium 8.4 (L) 8.7 - 10.5 mg/dL    Total Protein 6.8 6.0 - 8.4 g/dL    Albumin 2.6 (L) 3.5 - 5.2 g/dL    Total Bilirubin 0.2 0.1 - 1.0 mg/dL    Alkaline Phosphatase 92 55 - 135 U/L    AST 22 10 - 40 U/L    ALT 23 10 - 44 U/L    eGFR 49 (A) >60 mL/min/1.73 m^2    Anion Gap 12 8 - 16 mmol/L   CBC Auto Differential   Result Value Ref Range    WBC 13.19 (H) 3.90 - 12.70 K/uL    RBC 3.17  (L) 4.60 - 6.20 M/uL    Hemoglobin 9.9 (L) 14.0 - 18.0 g/dL    Hematocrit 29.3 (L) 40.0 - 54.0 %    MCV 92 82 - 98 fL    MCH 31.2 (H) 27.0 - 31.0 pg    MCHC 33.8 32.0 - 36.0 g/dL    RDW 15.3 (H) 11.5 - 14.5 %    Platelets 337 150 - 450 K/uL    MPV 9.3 9.2 - 12.9 fL    Immature Granulocytes 1.3 (H) 0.0 - 0.5 %    Gran # (ANC) 8.6 (H) 1.8 - 7.7 K/uL    Immature Grans (Abs) 0.17 (H) 0.00 - 0.04 K/uL    Lymph # 3.7 1.0 - 4.8 K/uL    Mono # 0.7 0.3 - 1.0 K/uL    Eos # 0.0 0.0 - 0.5 K/uL    Baso # 0.02 0.00 - 0.20 K/uL    nRBC 0 0 /100 WBC    Gran % 65.4 38.0 - 73.0 %    Lymph % 27.9 18.0 - 48.0 %    Mono % 5.2 4.0 - 15.0 %    Eosinophil % 0.0 0.0 - 8.0 %    Basophil % 0.2 0.0 - 1.9 %    Differential Method Automated    Copper, serum   Result Value Ref Range    Copper 1126 665 - 1480 ug/L   Zinc   Result Value Ref Range    Zinc 57 (L) 60 - 130 ug/dL   Vitamin B6   Result Value Ref Range    Vitamin B6 18 5 - 50 ug/L   Methylmalonic acid, serum   Result Value Ref Range    Methlymalonic Acid 0.48 (H) <0.40 umol/L   Folate   Result Value Ref Range    Folate 17.8 4.0 - 24.0 ng/mL   Glucose, random   Result Value Ref Range    Glucose 448 (H) 70 - 110 mg/dL   Magnesium   Result Value Ref Range    Magnesium 2.0 1.6 - 2.6 mg/dL   Phosphorus   Result Value Ref Range    Phosphorus 4.2 2.7 - 4.5 mg/dL   Comprehensive Metabolic Panel   Result Value Ref Range    Sodium 133 (L) 136 - 145 mmol/L    Potassium 4.9 3.5 - 5.1 mmol/L    Chloride 100 95 - 110 mmol/L    CO2 23 23 - 29 mmol/L    Glucose 296 (H) 70 - 110 mg/dL    BUN 36 (H) 8 - 23 mg/dL    Creatinine 1.4 0.5 - 1.4 mg/dL    Calcium 8.1 (L) 8.7 - 10.5 mg/dL    Total Protein 6.2 6.0 - 8.4 g/dL    Albumin 2.4 (L) 3.5 - 5.2 g/dL    Total Bilirubin 0.2 0.1 - 1.0 mg/dL    Alkaline Phosphatase 87 55 - 135 U/L    AST 20 10 - 40 U/L    ALT 25 10 - 44 U/L    eGFR 49 (A) >60 mL/min/1.73 m^2    Anion Gap 10 8 - 16 mmol/L   CBC Auto Differential   Result Value Ref Range    WBC 9.40 3.90 -  12.70 K/uL    RBC 3.10 (L) 4.60 - 6.20 M/uL    Hemoglobin 9.5 (L) 14.0 - 18.0 g/dL    Hematocrit 28.6 (L) 40.0 - 54.0 %    MCV 92 82 - 98 fL    MCH 30.6 27.0 - 31.0 pg    MCHC 33.2 32.0 - 36.0 g/dL    RDW 15.1 (H) 11.5 - 14.5 %    Platelets 343 150 - 450 K/uL    MPV 9.7 9.2 - 12.9 fL    Immature Granulocytes 2.6 (H) 0.0 - 0.5 %    Gran # (ANC) 7.4 1.8 - 7.7 K/uL    Immature Grans (Abs) 0.24 (H) 0.00 - 0.04 K/uL    Lymph # 1.6 1.0 - 4.8 K/uL    Mono # 0.2 (L) 0.3 - 1.0 K/uL    Eos # 0.0 0.0 - 0.5 K/uL    Baso # 0.02 0.00 - 0.20 K/uL    nRBC 0 0 /100 WBC    Gran % 78.6 (H) 38.0 - 73.0 %    Lymph % 16.5 (L) 18.0 - 48.0 %    Mono % 2.1 (L) 4.0 - 15.0 %    Eosinophil % 0.0 0.0 - 8.0 %    Basophil % 0.2 0.0 - 1.9 %    Differential Method Automated    RPR   Result Value Ref Range    RPR Non-reactive Non-reactive   Magnesium   Result Value Ref Range    Magnesium 2.1 1.6 - 2.6 mg/dL   Phosphorus   Result Value Ref Range    Phosphorus 3.7 2.7 - 4.5 mg/dL   Comprehensive Metabolic Panel   Result Value Ref Range    Sodium 130 (L) 136 - 145 mmol/L    Potassium 5.0 3.5 - 5.1 mmol/L    Chloride 98 95 - 110 mmol/L    CO2 23 23 - 29 mmol/L    Glucose 326 (H) 70 - 110 mg/dL    BUN 35 (H) 8 - 23 mg/dL    Creatinine 1.3 0.5 - 1.4 mg/dL    Calcium 8.1 (L) 8.7 - 10.5 mg/dL    Total Protein 5.9 (L) 6.0 - 8.4 g/dL    Albumin 2.4 (L) 3.5 - 5.2 g/dL    Total Bilirubin 0.2 0.1 - 1.0 mg/dL    Alkaline Phosphatase 97 55 - 135 U/L    AST 17 10 - 40 U/L    ALT 24 10 - 44 U/L    eGFR 54 (A) >60 mL/min/1.73 m^2    Anion Gap 9 8 - 16 mmol/L   CBC Auto Differential   Result Value Ref Range    WBC 14.30 (H) 3.90 - 12.70 K/uL    RBC 3.09 (L) 4.60 - 6.20 M/uL    Hemoglobin 9.6 (L) 14.0 - 18.0 g/dL    Hematocrit 28.9 (L) 40.0 - 54.0 %    MCV 94 82 - 98 fL    MCH 31.1 (H) 27.0 - 31.0 pg    MCHC 33.2 32.0 - 36.0 g/dL    RDW 14.9 (H) 11.5 - 14.5 %    Platelets 347 150 - 450 K/uL    MPV 9.9 9.2 - 12.9 fL    Immature Granulocytes 2.2 (H) 0.0 - 0.5 %    Gran #  (ANC) 12.0 (H) 1.8 - 7.7 K/uL    Immature Grans (Abs) 0.32 (H) 0.00 - 0.04 K/uL    Lymph # 1.7 1.0 - 4.8 K/uL    Mono # 0.3 0.3 - 1.0 K/uL    Eos # 0.0 0.0 - 0.5 K/uL    Baso # 0.06 0.00 - 0.20 K/uL    nRBC 0 0 /100 WBC    Gran % 83.7 (H) 38.0 - 73.0 %    Lymph % 11.7 (L) 18.0 - 48.0 %    Mono % 2.0 (L) 4.0 - 15.0 %    Eosinophil % 0.0 0.0 - 8.0 %    Basophil % 0.4 0.0 - 1.9 %    Differential Method Automated    Flow Cytometry Analysis (Peripheral Blood)   Result Value Ref Range    Blood Interpretation       PERIPHERAL BLOOD, FLOW CYTOMETRY:        -  Relative neutrophilia         -  No monoclonal B-cell population         -  Markedly increased CD4:CD8 ratio (25:1) with subset of T-cells with aberrant loss of CD7 expression        -  No significant increase in circulating blasts    COMMENT: In the context of a markedly increased CD4:CD8 ratio (25:1) with clinical presentation of a generalized pruritic rash for several months accompanied by a progressive decline in overall functional status, hem/onc consultation with appropriate   work-up for a possible cutaneous versus systemic T-cell lymphoproliferative disorder is required.    Please correlate the immunophenotyping results with clinical findings for final diagnosis.      Blood Comment       ATYPICAL T-CELLS IDENTIFIED (1.9% of total analyzed events; 6.5% of total events in lymphocyte gate)    Forward scatter: Low  Side scatter: Low    Positive: CD45, CD2, CD3, CD4, TCR alpha/beta  Negative: TCR gamma/delta, CD5, CD7, CD8, CD30, CD16, CD56, CD57, , CD19, CD20, CD10, sKappa, sLambda, CD34, CD13    The following additional cell populations are identified:  80.3% Granulocytes  1.8% Monocytes  0.9% Mature B cells: No light chain restriction or significant co-expression of CD5 or CD10 detected.  0.6% NK cells and/or T-LGLs  0.1% Blasts    Viability by 7-AAD: 99.7%    The remaining events analyzed represent nonviable cells, non-hematolymphoid cells, doublets, and  debris.      Blood Antibodies Analyzed       All analyzed: CD2, CD3, CD4, CD5, CD7, CD8, CD10, CD13, CD16, CD19, CD20, CD30, CD34, CD56, CD57, , KAPPA, LAMBDA, TCR a/b, TCR d/g, CD45, and 7AAD.   COVID-19 Rapid Screening   Result Value Ref Range    SARS-CoV-2 RNA, Amplification, Qual Negative Negative   Echo   Result Value Ref Range    BSA 1.67 m2    LVIDd 4.25 3.5 - 6.0 cm    LV Systolic Volume 38.98 mL    LV Systolic Volume Index 23.2 mL/m2    LVIDs 3.14 2.1 - 4.0 cm    LV Diastolic Volume 80.59 mL    LV Diastolic Volume Index 47.97 mL/m2    IVS 0.79 0.6 - 1.1 cm    FS 26 (A) 28 - 44 %    Left Ventricle Relative Wall Thickness 0.46 cm    Posterior Wall 0.98 0.6 - 1.1 cm    LV mass 118.25 g    LV Mass Index 70 g/m2    MV Peak E Dilip 0.74 m/s    TDI LATERAL 0.13 m/s    TDI SEPTAL 0.05 m/s    E/E' ratio 8.22 m/s    MV Peak A Dilip 1.19 m/s    TR Max Dilip 2.15 m/s    E/A ratio 0.62     E wave deceleration time 136.82 msec    LV SEPTAL E/E' RATIO 14.80 m/s    LV LATERAL E/E' RATIO 5.69 m/s    LA size 2.79 cm    Left Atrium Major Axis 3.58 cm    RVDD 4.38 cm    RA Major Axis 3.79 cm    MV mean gradient 4 mmHg    MV peak gradient 7 mmHg    MV stenosis pressure 1/2 time 39.68 ms    MV valve area p 1/2 method 5.54 cm2    MV VTI 19.1 cm    Triscuspid Valve Regurgitation Peak Gradient 18 mmHg    Mean e' 0.09 m/s    ZLVIDS 0.62     ZLVIDD -0.98     RA Width 3.8 cm   POCT glucose   Result Value Ref Range    POCT Glucose 131 (H) 70 - 110 mg/dL   POCT glucose   Result Value Ref Range    POCT Glucose 117 (H) 70 - 110 mg/dL   POCT glucose   Result Value Ref Range    POCT Glucose 98 70 - 110 mg/dL   POCT glucose   Result Value Ref Range    POCT Glucose 116 (H) 70 - 110 mg/dL   POCT glucose   Result Value Ref Range    POCT Glucose 110 70 - 110 mg/dL   POCT glucose   Result Value Ref Range    POCT Glucose 114 (H) 70 - 110 mg/dL   POCT glucose   Result Value Ref Range    POCT Glucose 150 (H) 70 - 110 mg/dL   POCT glucose   Result  Value Ref Range    POCT Glucose 133 (H) 70 - 110 mg/dL   POCT glucose   Result Value Ref Range    POCT Glucose 251 (H) 70 - 110 mg/dL   POCT glucose   Result Value Ref Range    POCT Glucose 180 (H) 70 - 110 mg/dL   POCT glucose   Result Value Ref Range    POCT Glucose 300 (H) 70 - 110 mg/dL   POCT glucose   Result Value Ref Range    POCT Glucose 311 (H) 70 - 110 mg/dL   POCT glucose   Result Value Ref Range    POCT Glucose 227 (H) 70 - 110 mg/dL   POCT glucose   Result Value Ref Range    POCT Glucose 255 (H) 70 - 110 mg/dL   POCT glucose   Result Value Ref Range    POCT Glucose 398 (H) 70 - 110 mg/dL   POCT glucose   Result Value Ref Range    POCT Glucose 374 (H) 70 - 110 mg/dL   POCT glucose   Result Value Ref Range    POCT Glucose 439 (H) 70 - 110 mg/dL   POCT glucose   Result Value Ref Range    POCT Glucose >500 (HH) 70 - 110 mg/dL   POCT glucose   Result Value Ref Range    POCT Glucose 168 (H) 70 - 110 mg/dL   POCT glucose   Result Value Ref Range    POCT Glucose 148 (H) 70 - 110 mg/dL   POCT glucose   Result Value Ref Range    POCT Glucose 201 (H) 70 - 110 mg/dL   POCT glucose   Result Value Ref Range    POCT Glucose 408 (H) 70 - 110 mg/dL   POCT glucose   Result Value Ref Range    POCT Glucose 283 (H) 70 - 110 mg/dL   POCT glucose   Result Value Ref Range    POCT Glucose 156 (H) 70 - 110 mg/dL   POCT glucose   Result Value Ref Range    POCT Glucose 262 (H) 70 - 110 mg/dL   POCT glucose   Result Value Ref Range    POCT Glucose 236 (H) 70 - 110 mg/dL   POCT glucose   Result Value Ref Range    POCT Glucose 323 (H) 70 - 110 mg/dL   POCT glucose   Result Value Ref Range    POCT Glucose 326 (H) 70 - 110 mg/dL   POCT glucose   Result Value Ref Range    POCT Glucose 156 (H) 70 - 110 mg/dL   POCT glucose   Result Value Ref Range    POCT Glucose 249 (H) 70 - 110 mg/dL   POCT glucose   Result Value Ref Range    POCT Glucose 285 (H) 70 - 110 mg/dL   POCT glucose   Result Value Ref Range    POCT Glucose 409 (H) 70 - 110  mg/dL   POCT glucose   Result Value Ref Range    POCT Glucose 448 (H) 70 - 110 mg/dL   POCT glucose   Result Value Ref Range    POCT Glucose 363 (H) 70 - 110 mg/dL   POCT glucose   Result Value Ref Range    POCT Glucose 252 (H) 70 - 110 mg/dL   POCT glucose   Result Value Ref Range    POCT Glucose 360 (H) 70 - 110 mg/dL   POCT glucose   Result Value Ref Range    POCT Glucose 292 (H) 70 - 110 mg/dL   POCT glucose   Result Value Ref Range    POCT Glucose 363 (H) 70 - 110 mg/dL   POCT glucose   Result Value Ref Range    POCT Glucose 258 (H) 70 - 110 mg/dL   POCT glucose   Result Value Ref Range    POCT Glucose 345 (H) 70 - 110 mg/dL       PROBLEMS ASSESSED THIS VISIT:    1. Rash    2.      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  He does have some skin lesions of unclear etiology.  He has been evaluated by dermatology in the past however biopsy has been obtained - rewiewed bio  Endorses the presence of B symptoms.  Prior imaging showed no evidence of hepatosplenomegaly or aberrant LAD. He has been on steroid cream and Rinvoq which has improved his symptoms. He does have flaky skin however no erythematous areas. Concern that a biopsy at this time may not be revealing as he symptoms have been tempered.    Other considerations include vitamins and micronutrient deficiency. He  is     -- we will get a CBC, CMP, zinc, copper, smear, flow-cytometry, vitamin B12, serum retinol, uric acid, LDH, peripheral smear  --Advised to hold off steroid  -- Will get a PET/CT in 4 -6 weeks  -- refer to dermatology for skin punch biopsy.  -- if all this oral revealing, we will obtain a bone marrow biopsy however given his age, PS and comorbidities, unclear if he is willing to proceed with therapy confirmed.          Discussed with Dr. Moi Ribeiro  MD Griffin  [id number]  Hematology/Oncology Fellow      BMT Chart Routing      Follow up with physician    Follow up with ELIZABETH    Provider visit type    Infusion scheduling note    Injection scheduling note    Labs CBC, CMP and LDH   Scheduling:  Preferred lab:  Lab interval:  do cbc, cmp,copper, ldh,copper, retinol, zinc,uric acid, esr, crp, peripheral smear, flow cytometry - today   Imaging PET scan   Schedule PET in 6 weeks   Pharmacy appointment    Other referrals

## 2023-12-13 ENCOUNTER — OFFICE VISIT (OUTPATIENT)
Dept: UROLOGY | Facility: CLINIC | Age: 88
End: 2023-12-13
Payer: MEDICARE

## 2023-12-13 DIAGNOSIS — N13.8 BPH WITH URINARY OBSTRUCTION: Primary | ICD-10-CM

## 2023-12-13 DIAGNOSIS — N40.1 BPH WITH URINARY OBSTRUCTION: Primary | ICD-10-CM

## 2023-12-13 DIAGNOSIS — N39.41 URGENCY INCONTINENCE: ICD-10-CM

## 2023-12-13 DIAGNOSIS — R35.0 URINARY FREQUENCY: ICD-10-CM

## 2023-12-13 PROCEDURE — 99999 PR PBB SHADOW E&M-EST. PATIENT-LVL III: CPT | Mod: PBBFAC,HCNC,, | Performed by: UROLOGY

## 2023-12-13 PROCEDURE — 1160F PR REVIEW ALL MEDS BY PRESCRIBER/CLIN PHARMACIST DOCUMENTED: ICD-10-PCS | Mod: HCNC,CPTII,S$GLB, | Performed by: UROLOGY

## 2023-12-13 PROCEDURE — 1159F PR MEDICATION LIST DOCUMENTED IN MEDICAL RECORD: ICD-10-PCS | Mod: HCNC,CPTII,S$GLB, | Performed by: UROLOGY

## 2023-12-13 PROCEDURE — 1160F RVW MEDS BY RX/DR IN RCRD: CPT | Mod: HCNC,CPTII,S$GLB, | Performed by: UROLOGY

## 2023-12-13 PROCEDURE — 99999 PR PBB SHADOW E&M-EST. PATIENT-LVL III: ICD-10-PCS | Mod: PBBFAC,HCNC,, | Performed by: UROLOGY

## 2023-12-13 PROCEDURE — 99214 OFFICE O/P EST MOD 30 MIN: CPT | Mod: HCNC,S$GLB,, | Performed by: UROLOGY

## 2023-12-13 PROCEDURE — 1159F MED LIST DOCD IN RCRD: CPT | Mod: HCNC,CPTII,S$GLB, | Performed by: UROLOGY

## 2023-12-13 PROCEDURE — 99214 PR OFFICE/OUTPT VISIT, EST, LEVL IV, 30-39 MIN: ICD-10-PCS | Mod: HCNC,S$GLB,, | Performed by: UROLOGY

## 2023-12-13 RX ORDER — TAMSULOSIN HYDROCHLORIDE 0.4 MG/1
0.4 CAPSULE ORAL DAILY
Qty: 30 CAPSULE | Refills: 11 | Status: SHIPPED | OUTPATIENT
Start: 2023-12-13 | End: 2024-12-07

## 2023-12-13 NOTE — PROGRESS NOTES
Subjective:       Patient ID: Narciso Yanez is a 88 y.o. male The patient's last visit with me was on 7/20/2021.   Chief Complaint:   No chief complaint on file.      Benign Prostatic Hypertrophy  Patient complains of lower urinary tract symptoms. He reports frequency, nocturia one time a night and urgency. The past few months he has noted urgency incontinence.  He denies straining. Patient states symptoms are of moderate severity. Onset of symptoms was several years ago and was gradual in onset. He has no personal history and no family history of prostate cancer. He reports a history of no complicating symptoms. He denies flank pain, gross hematuria, kidney stones and recurrent UTI. He is currently taking Flomax and Oxybutynin XL 10 mg.    3/17/2021  He was increased to 10 mg Ditropan in February 2021.  It helped minimally and Dr. Del Toro increased him to 15 mg earlier today.  He seems to be tolerating the dry mouth.  His main complaint is urgency and urgency incontinence.  This is compounded by the fact that he has difficulty ambulating due to spinal issues.  He has issues stumbling.    6/22/2021  He has had issues with urinary retention and UTI. He is here for a voiding trial.  He is currently taking Flomax and not taking Oxybutynin.    7/20/2021  He is voiding well.  He continues to take Flomax.    12/13/2023  He is back after about a year and a half.  He has had a lot of dermatologic issues and neurologic issues.  He is currently not taking Flomax or Oxybutynin.  He has had issues with urgency incontinence.   He has been limiting his fluid intake.      Kidney Stones  He has had several endoscopic procedures for stones.  His last ureteroscopy was in 2007.  He denies hematuria or flank pain.      ACTIVE MEDICAL ISSUES:  Patient Active Problem List   Diagnosis    Coronary artery disease involving coronary bypass graft of native heart without angina pectoris    High-density lipoprotein deficiency    Diabetes  mellitus type II, uncontrolled    Diabetes mellitus with peripheral circulatory disorder    B12 deficiency    Skin cancer    Early satiety    Unintentional weight loss    GERD (gastroesophageal reflux disease)    Cervical strain    Aortic atherosclerosis    PAD (peripheral artery disease)    Nicotine dependence, cigarettes, uncomplicated    Pulmonary nodule    COPD (chronic obstructive pulmonary disease)    Kidney stones    Abdominal aortic aneurysm (AAA) without rupture    Spondylosis    Polymyalgia rheumatica    Stage 3a chronic kidney disease    Essential hypertension    Degenerative disc disease, lumbar    Radiculopathy of lumbosacral region    DDD (degenerative disc disease), lumbar    Decreased strength of lower extremity    Decreased strength of trunk and back    Decreased ROM of lumbar spine    Decreased ROM of lower extremity    Gait abnormality    Urinary urgency    Fall    Hypoglycemia    Syncope    LATRICE (acute kidney injury)    Tobacco abuse    Urinary incontinence    Normal pressure hydrocephalus    Preop testing    NPH (normal pressure hydrocephalus)    Impaired mobility and ADLs    Hyperlipidemia    Constipation    Encounter for staple removal    Neurogenic claudication    Impaired functional mobility, balance, gait, and endurance    Chronic bilateral low back pain with bilateral sciatica    Cutaneous fungal infection    UTI (urinary tract infection)    Rash    Normocytic anemia    Cholelithiasis    Diverticulosis    Enlarged prostate    Chronic interstitial lung disease    Other vascular myelopathies       PAST MEDICAL HISTORY  Past Medical History:   Diagnosis Date    Actinic keratosis     Anxiety     B12 deficiency 12/8/2014    Dx 6/14    Cancer     skin    Cataract     Coronary artery disease     Diabetes mellitus type II     Diabetes mellitus with peripheral circulatory disorder 6/18/2014    ED (erectile dysfunction)     Hyperlipidemia     Kidney stone     Neurogenic claudication 4/4/2022     Normocytic anemia 10/15/2023    Peripheral vascular disease     Spondylosis 3/29/2019    Tobacco dependence     Urinary incontinence 5/4/2021       PAST SURGICAL HISTORY:  Past Surgical History:   Procedure Laterality Date    APPENDECTOMY      CARDIAC SURGERY  01/1999    CABG 4 vessels    CATARACT EXTRACTION      EPIDURAL STEROID INJECTION Right 8/26/2020    Procedure: Injection, Steroid, Epidural Transforaminal;  Surgeon: Wiley Crane Jr., MD;  Location: [facility] ENDO;  Service: Pain Management;  Laterality: Right;  Right L5 + S1 TF LEAH  Arrive @ 1115; ASA & Plavix last 8/18; Check BG; Rapid COVID test    EPIDURAL STEROID INJECTION Right 11/25/2020    Procedure: Injection, Steroid, Epidural Transformainal;  Surgeon: Wiley Crane Jr., MD;  Location: [facility] ENDO;  Service: Pain Management;  Laterality: Right;  Right L5 + S1 TF LEAH  Arrive @ 1245; ASA and Plavix last 11/17; Pre-DM; Needs MD Sign.    EPIDURAL STEROID INJECTION Right 2/19/2021    Procedure: Injection, Steroid, Epidural Transforaminal;  Surgeon: Wiley Crane Jr., MD;  Location: [facility] ENDO;  Service: Pain Management;  Laterality: Right;  Right L5 + S1 TF LEAH  Arrive @ 1115; ASA & Plavix last 2/11; Check BG; Needs Consent    EXTERNAL EAR SURGERY      EYE SURGERY      cataracts    ILIAC ARTERY STENT Bilateral 06/2003    also Right External Iliac stent       SOCIAL HISTORY:  Social History     Tobacco Use    Smoking status: Every Day     Current packs/day: 1.50     Average packs/day: 1.5 packs/day for 71.0 years (106.5 ttl pk-yrs)     Types: Cigarettes    Smokeless tobacco: Former   Substance Use Topics    Alcohol use: No    Drug use: No       FAMILY HISTORY:  Family History   Problem Relation Age of Onset    Stroke Mother        ALLERGIES AND MEDICATIONS: updated and reviewed.  Review of patient's allergies indicates:   Allergen Reactions    Demerol [meperidine] Nausea Only     Current Outpatient Medications   Medication Sig    acetaminophen  (TYLENOL) 500 MG tablet Take 1 tablet (500 mg total) by mouth every 6 (six) hours as needed for Pain.    aspirin (ECOTRIN) 81 MG EC tablet Take 1 tablet (81 mg total) by mouth once daily.    atorvastatin (LIPITOR) 40 MG tablet Take 1 tablet (40 mg total) by mouth once daily.    blood sugar diagnostic (TRUE METRIX GLUCOSE TEST STRIP) Strp TID    clopidogreL (PLAVIX) 75 mg tablet Take 1 tablet (75 mg total) by mouth once daily.    divalproex (DEPAKOTE) 125 MG EC tablet Take by mouth.    metFORMIN (GLUCOPHAGE) 500 MG tablet Take 1 tablet (500 mg total) by mouth 2 (two) times daily with meals. HOLD FOR GLUCOSE LESS 100    metoprolol succinate (TOPROL-XL) 25 MG 24 hr tablet Take 1 tablet (25 mg total) by mouth once daily.    mometasone 0.1% (ELOCON) 0.1 % cream Apply topically 2 (two) times daily. Apply to arms ,hands, and legs BID    predniSONE (DELTASONE) 10 MG tablet Take 10 mg by mouth.    tamsulosin (FLOMAX) 0.4 mg Cap Take 1 capsule (0.4 mg total) by mouth once daily.    triamcinolone acetonide 0.1% (KENALOG) 0.1 % cream Apply topically 2 (two) times daily.     No current facility-administered medications for this visit.       Review of Systems   Constitutional:  Negative for activity change, fatigue, fever and unexpected weight change.   HENT:  Negative for congestion.    Eyes:  Negative for redness.   Respiratory:  Negative for chest tightness and shortness of breath.    Cardiovascular:  Negative for chest pain and leg swelling.   Gastrointestinal:  Negative for abdominal pain, constipation, diarrhea, nausea and vomiting.   Genitourinary:  Negative for dysuria, flank pain, frequency, hematuria, penile pain, penile swelling, scrotal swelling, testicular pain and urgency.   Musculoskeletal:  Negative for arthralgias and back pain.   Neurological:  Negative for dizziness and light-headedness.   Psychiatric/Behavioral:  Negative for behavioral problems and confusion. The patient is not nervous/anxious.    All other  systems reviewed and are negative.      Objective:      There were no vitals filed for this visit.    Physical Exam  Vitals and nursing note reviewed.   Constitutional:       Appearance: He is well-developed.   HENT:      Head: Normocephalic.   Eyes:      Conjunctiva/sclera: Conjunctivae normal.   Neck:      Thyroid: No thyromegaly.      Trachea: No tracheal deviation.   Cardiovascular:      Rate and Rhythm: Normal rate.      Heart sounds: Normal heart sounds.   Pulmonary:      Effort: Pulmonary effort is normal. No respiratory distress.      Breath sounds: Normal breath sounds. No wheezing.   Abdominal:      General: Bowel sounds are normal.      Palpations: Abdomen is soft.      Tenderness: There is no abdominal tenderness. There is no rebound.      Hernia: No hernia is present.   Musculoskeletal:         General: No tenderness. Normal range of motion.      Cervical back: Normal range of motion and neck supple.   Lymphadenopathy:      Cervical: No cervical adenopathy.   Skin:     General: Skin is warm and dry.      Findings: No erythema or rash.   Neurological:      Mental Status: He is alert and oriented to person, place, and time.   Psychiatric:         Behavior: Behavior normal.         Thought Content: Thought content normal.         Judgment: Judgment normal.         Urine dipstick shows negative for all components.  Micro exam: negative for WBC's or RBC's.    CT Abdomen Pelvis With Contrast  Order: [id number]  Status: Final result       Visible to patient: Yes (seen)       Next appt: Today at 04:00 PM in Urology (Checo Marks MD)    0 Result Notes  Details    Reading Physician Reading Date Result Priority   Gretel Mata MD  [phone] 10/14/2023 STAT     Narrative & Impression  EXAMINATION:  CT OF ABDOMEN PELVIS WITH     CLINICAL HISTORY:  Abdominal abscess/infection suspected;     TECHNIQUE:  5 mm enhanced axial images were obtained from the lung bases through the greater trochanters.   Seventy-five mL of Omnipaque 350 was injected.     COMPARISON:  06/23/2022     FINDINGS:  A ventricular shunt catheter is present.  The liver, spleen, pancreas, and adrenal glands are unremarkable. The gallbladder contains a few calcified gallstones.     Bilateral renal cysts are present.     There is no definite evidence for abdominal adenopathy or ascites.     Advanced vascular calcifications are present.  There is ectasia of the infrarenal abdominal aorta measuring up to 2.6 cm.  There are bi iliac arterial stents.     There is colonic diverticulosis.  Is moderate colonic stool.  The prostate gland is enlarged measuring approximately 5.2 in width x 5.8 cm in AP dimensions (series 2 axial image 153).  Consider correlation with PSA.     There is infiltration in the subcutaneous fat lateral to the left hip.  There is no free fluid in the pelvis.     At the lung bases, there is a small hiatal hernia.  Fibrotic changes or chronic interstitial lung disease are seen.  Noted there is minimal dextroscoliosis.  Spondylitic changes are present.  There are median sternotomy changes.     Impression:     Cholelithiasis without evidence of acute cholecystitis.     Advanced vascular calcification.  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     Colonic diverticulosis.     Prostatomegaly.     Nonspecific infiltration the subcutaneous fat lateral to the left hip.  Question any history of recent trauma or contusion.     Small hiatal hernia.        Electronically signed by: Gretel Mata  Date:                                            10/14/2023  Time:                                           23:45       Assessment:       1. BPH with urinary obstruction    2. Urgency incontinence    3. Urinary frequency            Plan:         1. BPH with urinary obstruction    - tamsulosin (FLOMAX) 0.4 mg Cap; Take 1 capsule (0.4 mg total) by mouth once daily.  Dispense: 30 capsule; Refill: 11    2. Urgency incontinence    - US  Retroperitoneal Complete; Future    3. Urinary frequency    - US Retroperitoneal Complete; Future           Follow up in about 6 weeks (around 1/24/2024) for Follow up Established, Review X-ray.

## 2023-12-15 LAB
COPPER SERPL-MCNC: 921 UG/L (ref 665–1480)
ZINC SERPL-MCNC: 93 UG/DL (ref 60–130)

## 2023-12-18 LAB — VIT A SERPL-MCNC: 72 UG/DL (ref 38–106)

## 2023-12-20 ENCOUNTER — EXTERNAL HOME HEALTH (OUTPATIENT)
Dept: HOME HEALTH SERVICES | Facility: HOSPITAL | Age: 88
End: 2023-12-20
Payer: MEDICARE

## 2023-12-20 ENCOUNTER — HOSPITAL ENCOUNTER (INPATIENT)
Facility: HOSPITAL | Age: 88
LOS: 9 days | Discharge: NURSING, REHAB OR LONG-TERM CARE | DRG: 640 | End: 2023-12-29
Attending: STUDENT IN AN ORGANIZED HEALTH CARE EDUCATION/TRAINING PROGRAM | Admitting: INTERNAL MEDICINE
Payer: MEDICARE

## 2023-12-20 DIAGNOSIS — N30.00 ACUTE CYSTITIS WITHOUT HEMATURIA: ICD-10-CM

## 2023-12-20 DIAGNOSIS — R41.82 ALTERED MENTAL STATUS: ICD-10-CM

## 2023-12-20 DIAGNOSIS — E87.5 HYPERKALEMIA: ICD-10-CM

## 2023-12-20 DIAGNOSIS — R21 RASH: Primary | ICD-10-CM

## 2023-12-20 PROBLEM — G93.41 ENCEPHALOPATHY, METABOLIC: Status: ACTIVE | Noted: 2023-12-20

## 2023-12-20 PROBLEM — L30.9 DERMATITIS: Status: ACTIVE | Noted: 2023-12-20

## 2023-12-20 LAB
ALBUMIN SERPL BCP-MCNC: 3.2 G/DL (ref 3.5–5.2)
ALLENS TEST: ABNORMAL
ALP SERPL-CCNC: 88 U/L (ref 55–135)
ALT SERPL W/O P-5'-P-CCNC: 26 U/L (ref 10–44)
ANION GAP SERPL CALC-SCNC: 11 MMOL/L (ref 8–16)
ANION GAP SERPL CALC-SCNC: 12 MMOL/L (ref 8–16)
AST SERPL-CCNC: 28 U/L (ref 10–40)
BACTERIA #/AREA URNS HPF: ABNORMAL /HPF
BASOPHILS # BLD AUTO: 0.06 K/UL (ref 0–0.2)
BASOPHILS NFR BLD: 0.7 % (ref 0–1.9)
BILIRUB SERPL-MCNC: 0.2 MG/DL (ref 0.1–1)
BILIRUB UR QL STRIP: NEGATIVE
BNP SERPL-MCNC: 399 PG/ML (ref 0–99)
BUN SERPL-MCNC: 29 MG/DL (ref 8–23)
BUN SERPL-MCNC: 31 MG/DL (ref 8–23)
CALCIUM SERPL-MCNC: 8.7 MG/DL (ref 8.7–10.5)
CALCIUM SERPL-MCNC: 8.8 MG/DL (ref 8.7–10.5)
CHLORIDE SERPL-SCNC: 107 MMOL/L (ref 95–110)
CHLORIDE SERPL-SCNC: 109 MMOL/L (ref 95–110)
CLARITY UR: ABNORMAL
CO2 SERPL-SCNC: 20 MMOL/L (ref 23–29)
CO2 SERPL-SCNC: 20 MMOL/L (ref 23–29)
COLOR UR: YELLOW
CREAT SERPL-MCNC: 2.5 MG/DL (ref 0.5–1.4)
CREAT SERPL-MCNC: 2.5 MG/DL (ref 0.5–1.4)
DELSYS: ABNORMAL
DIFFERENTIAL METHOD BLD: ABNORMAL
EOSINOPHIL # BLD AUTO: 1 K/UL (ref 0–0.5)
EOSINOPHIL NFR BLD: 11.2 % (ref 0–8)
ERYTHROCYTE [DISTWIDTH] IN BLOOD BY AUTOMATED COUNT: 17.8 % (ref 11.5–14.5)
EST. GFR  (NO RACE VARIABLE): 24 ML/MIN/1.73 M^2
EST. GFR  (NO RACE VARIABLE): 24 ML/MIN/1.73 M^2
GLUCOSE SERPL-MCNC: 108 MG/DL (ref 70–110)
GLUCOSE SERPL-MCNC: 94 MG/DL (ref 70–110)
GLUCOSE UR QL STRIP: NEGATIVE
HCO3 UR-SCNC: 23 MMOL/L (ref 24–28)
HCT VFR BLD AUTO: 31.4 % (ref 40–54)
HGB BLD-MCNC: 10.2 G/DL (ref 14–18)
HGB UR QL STRIP: NEGATIVE
HYALINE CASTS #/AREA URNS LPF: 15 /LPF
IMM GRANULOCYTES # BLD AUTO: 0.09 K/UL (ref 0–0.04)
IMM GRANULOCYTES NFR BLD AUTO: 1.1 % (ref 0–0.5)
KETONES UR QL STRIP: NEGATIVE
LACTATE SERPL-SCNC: 1.3 MMOL/L (ref 0.5–2.2)
LEUKOCYTE ESTERASE UR QL STRIP: ABNORMAL
LYMPHOCYTES # BLD AUTO: 2.9 K/UL (ref 1–4.8)
LYMPHOCYTES NFR BLD: 33.9 % (ref 18–48)
MCH RBC QN AUTO: 31 PG (ref 27–31)
MCHC RBC AUTO-ENTMCNC: 32.5 G/DL (ref 32–36)
MCV RBC AUTO: 95 FL (ref 82–98)
MICROSCOPIC COMMENT: ABNORMAL
MONOCYTES # BLD AUTO: 0.6 K/UL (ref 0.3–1)
MONOCYTES NFR BLD: 7.1 % (ref 4–15)
NEUTROPHILS # BLD AUTO: 3.9 K/UL (ref 1.8–7.7)
NEUTROPHILS NFR BLD: 46 % (ref 38–73)
NITRITE UR QL STRIP: NEGATIVE
NRBC BLD-RTO: 0 /100 WBC
PCO2 BLDA: 45.1 MMHG (ref 35–45)
PH SMN: 7.32 [PH] (ref 7.35–7.45)
PH UR STRIP: 6 [PH] (ref 5–8)
PLATELET # BLD AUTO: 303 K/UL (ref 150–450)
PMV BLD AUTO: 9.4 FL (ref 9.2–12.9)
PO2 BLDA: 18 MMHG (ref 40–60)
POC BE: -3 MMOL/L
POC SATURATED O2: 24 % (ref 95–100)
POC TCO2: 24 MMOL/L (ref 24–29)
POCT GLUCOSE: 105 MG/DL (ref 70–110)
POCT GLUCOSE: 130 MG/DL (ref 70–110)
POTASSIUM SERPL-SCNC: 6.2 MMOL/L (ref 3.5–5.1)
POTASSIUM SERPL-SCNC: 6.9 MMOL/L (ref 3.5–5.1)
PROT SERPL-MCNC: 8.1 G/DL (ref 6–8.4)
PROT UR QL STRIP: ABNORMAL
RBC # BLD AUTO: 3.29 M/UL (ref 4.6–6.2)
RBC #/AREA URNS HPF: 4 /HPF (ref 0–4)
SAMPLE: ABNORMAL
SITE: ABNORMAL
SODIUM SERPL-SCNC: 139 MMOL/L (ref 136–145)
SODIUM SERPL-SCNC: 140 MMOL/L (ref 136–145)
SP GR UR STRIP: 1.01 (ref 1–1.03)
TROPONIN I SERPL DL<=0.01 NG/ML-MCNC: 0.05 NG/ML (ref 0–0.03)
URN SPEC COLLECT METH UR: ABNORMAL
UROBILINOGEN UR STRIP-ACNC: NEGATIVE EU/DL
WBC # BLD AUTO: 8.5 K/UL (ref 3.9–12.7)
WBC #/AREA URNS HPF: >100 /HPF (ref 0–5)
WBC CLUMPS URNS QL MICRO: ABNORMAL

## 2023-12-20 PROCEDURE — 93010 ELECTROCARDIOGRAM REPORT: CPT | Mod: HCNC,,, | Performed by: INTERNAL MEDICINE

## 2023-12-20 PROCEDURE — 12000002 HC ACUTE/MED SURGE SEMI-PRIVATE ROOM: Mod: HCNC

## 2023-12-20 PROCEDURE — 63600175 PHARM REV CODE 636 W HCPCS: Mod: HCNC | Performed by: HOSPITALIST

## 2023-12-20 PROCEDURE — 93005 ELECTROCARDIOGRAM TRACING: CPT | Mod: HCNC

## 2023-12-20 PROCEDURE — 94644 CONT INHLJ TX 1ST HOUR: CPT | Mod: HCNC

## 2023-12-20 PROCEDURE — 25000242 PHARM REV CODE 250 ALT 637 W/ HCPCS: Mod: HCNC | Performed by: STUDENT IN AN ORGANIZED HEALTH CARE EDUCATION/TRAINING PROGRAM

## 2023-12-20 PROCEDURE — 82480 ASSAY SERUM CHOLINESTERASE: CPT | Mod: HCNC | Performed by: NURSE PRACTITIONER

## 2023-12-20 PROCEDURE — 80048 BASIC METABOLIC PNL TOTAL CA: CPT | Mod: HCNC,XB | Performed by: STUDENT IN AN ORGANIZED HEALTH CARE EDUCATION/TRAINING PROGRAM

## 2023-12-20 PROCEDURE — 84484 ASSAY OF TROPONIN QUANT: CPT | Mod: HCNC | Performed by: NURSE PRACTITIONER

## 2023-12-20 PROCEDURE — 25000003 PHARM REV CODE 250: Mod: HCNC | Performed by: STUDENT IN AN ORGANIZED HEALTH CARE EDUCATION/TRAINING PROGRAM

## 2023-12-20 PROCEDURE — 83605 ASSAY OF LACTIC ACID: CPT | Mod: HCNC | Performed by: NURSE PRACTITIONER

## 2023-12-20 PROCEDURE — 99900035 HC TECH TIME PER 15 MIN (STAT): Mod: HCNC

## 2023-12-20 PROCEDURE — 87088 URINE BACTERIA CULTURE: CPT | Mod: HCNC | Performed by: NURSE PRACTITIONER

## 2023-12-20 PROCEDURE — 82803 BLOOD GASES ANY COMBINATION: CPT | Mod: HCNC

## 2023-12-20 PROCEDURE — 83880 ASSAY OF NATRIURETIC PEPTIDE: CPT | Mod: HCNC | Performed by: NURSE PRACTITIONER

## 2023-12-20 PROCEDURE — 63600175 PHARM REV CODE 636 W HCPCS: Mod: HCNC | Performed by: STUDENT IN AN ORGANIZED HEALTH CARE EDUCATION/TRAINING PROGRAM

## 2023-12-20 PROCEDURE — 87040 BLOOD CULTURE FOR BACTERIA: CPT | Mod: HCNC | Performed by: STUDENT IN AN ORGANIZED HEALTH CARE EDUCATION/TRAINING PROGRAM

## 2023-12-20 PROCEDURE — 87147 CULTURE TYPE IMMUNOLOGIC: CPT | Mod: 59,HCNC | Performed by: NURSE PRACTITIONER

## 2023-12-20 PROCEDURE — 96365 THER/PROPH/DIAG IV INF INIT: CPT | Mod: HCNC

## 2023-12-20 PROCEDURE — 96375 TX/PRO/DX INJ NEW DRUG ADDON: CPT | Mod: HCNC

## 2023-12-20 PROCEDURE — 82638 ASSAY OF DIBUCAINE NUMBER: CPT | Mod: HCNC | Performed by: NURSE PRACTITIONER

## 2023-12-20 PROCEDURE — 94761 N-INVAS EAR/PLS OXIMETRY MLT: CPT | Mod: HCNC,XB

## 2023-12-20 PROCEDURE — 99285 EMERGENCY DEPT VISIT HI MDM: CPT | Mod: 25,HCNC

## 2023-12-20 PROCEDURE — S4991 NICOTINE PATCH NONLEGEND: HCPCS | Mod: HCNC | Performed by: STUDENT IN AN ORGANIZED HEALTH CARE EDUCATION/TRAINING PROGRAM

## 2023-12-20 PROCEDURE — 87086 URINE CULTURE/COLONY COUNT: CPT | Mod: HCNC | Performed by: NURSE PRACTITIONER

## 2023-12-20 PROCEDURE — 80053 COMPREHEN METABOLIC PANEL: CPT | Mod: HCNC | Performed by: NURSE PRACTITIONER

## 2023-12-20 PROCEDURE — 85025 COMPLETE CBC W/AUTO DIFF WBC: CPT | Mod: HCNC | Performed by: NURSE PRACTITIONER

## 2023-12-20 PROCEDURE — 25000003 PHARM REV CODE 250: Mod: HCNC | Performed by: HOSPITALIST

## 2023-12-20 PROCEDURE — 81000 URINALYSIS NONAUTO W/SCOPE: CPT | Mod: HCNC | Performed by: NURSE PRACTITIONER

## 2023-12-20 RX ORDER — IBUPROFEN 200 MG
1 TABLET ORAL
Status: COMPLETED | OUTPATIENT
Start: 2023-12-20 | End: 2023-12-21

## 2023-12-20 RX ORDER — ALBUTEROL SULFATE 0.83 MG/ML
15 SOLUTION RESPIRATORY (INHALATION)
Status: COMPLETED | OUTPATIENT
Start: 2023-12-20 | End: 2023-12-20

## 2023-12-20 RX ORDER — ONDANSETRON HYDROCHLORIDE 2 MG/ML
4 INJECTION, SOLUTION INTRAVENOUS EVERY 8 HOURS PRN
Status: DISCONTINUED | OUTPATIENT
Start: 2023-12-20 | End: 2023-12-29 | Stop reason: HOSPADM

## 2023-12-20 RX ORDER — DIVALPROEX SODIUM 250 MG/1
500 TABLET, DELAYED RELEASE ORAL NIGHTLY
Status: DISCONTINUED | OUTPATIENT
Start: 2023-12-20 | End: 2023-12-29 | Stop reason: HOSPADM

## 2023-12-20 RX ORDER — CALCIUM GLUCONATE 20 MG/ML
1 INJECTION, SOLUTION INTRAVENOUS
Status: COMPLETED | OUTPATIENT
Start: 2023-12-20 | End: 2023-12-20

## 2023-12-20 RX ORDER — ATORVASTATIN CALCIUM 40 MG/1
40 TABLET, FILM COATED ORAL DAILY
Status: DISCONTINUED | OUTPATIENT
Start: 2023-12-21 | End: 2023-12-29 | Stop reason: HOSPADM

## 2023-12-20 RX ORDER — TAMSULOSIN HYDROCHLORIDE 0.4 MG/1
0.4 CAPSULE ORAL DAILY
Status: DISCONTINUED | OUTPATIENT
Start: 2023-12-21 | End: 2023-12-29 | Stop reason: HOSPADM

## 2023-12-20 RX ORDER — GLUCAGON 1 MG
1 KIT INJECTION
Status: DISCONTINUED | OUTPATIENT
Start: 2023-12-20 | End: 2023-12-29 | Stop reason: HOSPADM

## 2023-12-20 RX ORDER — ASPIRIN 81 MG/1
81 TABLET ORAL DAILY
Status: DISCONTINUED | OUTPATIENT
Start: 2023-12-21 | End: 2023-12-29 | Stop reason: HOSPADM

## 2023-12-20 RX ORDER — TALC
6 POWDER (GRAM) TOPICAL NIGHTLY PRN
Status: DISCONTINUED | OUTPATIENT
Start: 2023-12-20 | End: 2023-12-29 | Stop reason: HOSPADM

## 2023-12-20 RX ORDER — ONDANSETRON HYDROCHLORIDE 2 MG/ML
4 INJECTION, SOLUTION INTRAVENOUS ONCE
Status: COMPLETED | OUTPATIENT
Start: 2023-12-20 | End: 2023-12-20

## 2023-12-20 RX ORDER — METOPROLOL SUCCINATE 25 MG/1
25 TABLET, EXTENDED RELEASE ORAL DAILY
Status: DISCONTINUED | OUTPATIENT
Start: 2023-12-21 | End: 2023-12-29 | Stop reason: HOSPADM

## 2023-12-20 RX ORDER — FUROSEMIDE 10 MG/ML
80 INJECTION INTRAMUSCULAR; INTRAVENOUS
Status: COMPLETED | OUTPATIENT
Start: 2023-12-20 | End: 2023-12-20

## 2023-12-20 RX ORDER — IBUPROFEN 200 MG
16 TABLET ORAL
Status: DISCONTINUED | OUTPATIENT
Start: 2023-12-20 | End: 2023-12-29 | Stop reason: HOSPADM

## 2023-12-20 RX ORDER — SODIUM CHLORIDE 9 MG/ML
INJECTION, SOLUTION INTRAVENOUS CONTINUOUS
Status: ACTIVE | OUTPATIENT
Start: 2023-12-20 | End: 2023-12-21

## 2023-12-20 RX ORDER — DIVALPROEX SODIUM 250 MG/1
250 TABLET, DELAYED RELEASE ORAL DAILY
Status: DISCONTINUED | OUTPATIENT
Start: 2023-12-21 | End: 2023-12-29 | Stop reason: HOSPADM

## 2023-12-20 RX ORDER — SODIUM CHLORIDE 0.9 % (FLUSH) 0.9 %
10 SYRINGE (ML) INJECTION
Status: DISCONTINUED | OUTPATIENT
Start: 2023-12-20 | End: 2023-12-29 | Stop reason: HOSPADM

## 2023-12-20 RX ORDER — IPRATROPIUM BROMIDE AND ALBUTEROL SULFATE 2.5; .5 MG/3ML; MG/3ML
3 SOLUTION RESPIRATORY (INHALATION) EVERY 4 HOURS PRN
Status: DISCONTINUED | OUTPATIENT
Start: 2023-12-20 | End: 2023-12-29 | Stop reason: HOSPADM

## 2023-12-20 RX ORDER — MORPHINE SULFATE 4 MG/ML
2 INJECTION, SOLUTION INTRAMUSCULAR; INTRAVENOUS
Status: COMPLETED | OUTPATIENT
Start: 2023-12-20 | End: 2023-12-20

## 2023-12-20 RX ORDER — HALOPERIDOL 5 MG/ML
2.5 INJECTION INTRAMUSCULAR ONCE
Status: COMPLETED | OUTPATIENT
Start: 2023-12-20 | End: 2023-12-20

## 2023-12-20 RX ORDER — FAMOTIDINE 10 MG/ML
20 INJECTION INTRAVENOUS DAILY
Status: DISCONTINUED | OUTPATIENT
Start: 2023-12-21 | End: 2023-12-22

## 2023-12-20 RX ORDER — LORAZEPAM 2 MG/ML
1 INJECTION INTRAMUSCULAR
Status: COMPLETED | OUTPATIENT
Start: 2023-12-20 | End: 2023-12-20

## 2023-12-20 RX ORDER — CLOPIDOGREL BISULFATE 75 MG/1
75 TABLET ORAL DAILY
Status: DISCONTINUED | OUTPATIENT
Start: 2023-12-21 | End: 2023-12-29 | Stop reason: HOSPADM

## 2023-12-20 RX ORDER — INSULIN ASPART 100 [IU]/ML
0-5 INJECTION, SOLUTION INTRAVENOUS; SUBCUTANEOUS
Status: DISCONTINUED | OUTPATIENT
Start: 2023-12-20 | End: 2023-12-29 | Stop reason: HOSPADM

## 2023-12-20 RX ORDER — ACETAMINOPHEN 325 MG/1
650 TABLET ORAL EVERY 4 HOURS PRN
Status: DISCONTINUED | OUTPATIENT
Start: 2023-12-20 | End: 2023-12-29 | Stop reason: HOSPADM

## 2023-12-20 RX ORDER — PROCHLORPERAZINE EDISYLATE 5 MG/ML
5 INJECTION INTRAMUSCULAR; INTRAVENOUS EVERY 6 HOURS PRN
Status: DISCONTINUED | OUTPATIENT
Start: 2023-12-20 | End: 2023-12-29 | Stop reason: HOSPADM

## 2023-12-20 RX ORDER — IBUPROFEN 200 MG
24 TABLET ORAL
Status: DISCONTINUED | OUTPATIENT
Start: 2023-12-20 | End: 2023-12-29 | Stop reason: HOSPADM

## 2023-12-20 RX ADMIN — DEXTROSE MONOHYDRATE 250 ML: 100 INJECTION, SOLUTION INTRAVENOUS at 08:12

## 2023-12-20 RX ADMIN — DIVALPROEX SODIUM 500 MG: 250 TABLET, DELAYED RELEASE ORAL at 09:12

## 2023-12-20 RX ADMIN — VANCOMYCIN HYDROCHLORIDE 1000 MG: 1 INJECTION, POWDER, LYOPHILIZED, FOR SOLUTION INTRAVENOUS at 10:12

## 2023-12-20 RX ADMIN — SODIUM ZIRCONIUM CYCLOSILICATE 10 G: 10 POWDER, FOR SUSPENSION ORAL at 07:12

## 2023-12-20 RX ADMIN — FUROSEMIDE 80 MG: 10 INJECTION, SOLUTION INTRAVENOUS at 07:12

## 2023-12-20 RX ADMIN — ONDANSETRON 4 MG: 2 INJECTION INTRAMUSCULAR; INTRAVENOUS at 07:12

## 2023-12-20 RX ADMIN — INSULIN HUMAN 5 UNITS: 100 INJECTION, SOLUTION PARENTERAL at 08:12

## 2023-12-20 RX ADMIN — CEFTRIAXONE 1 G: 1 INJECTION, POWDER, FOR SOLUTION INTRAMUSCULAR; INTRAVENOUS at 06:12

## 2023-12-20 RX ADMIN — HALOPERIDOL LACTATE 2.5 MG: 5 INJECTION, SOLUTION INTRAMUSCULAR at 10:12

## 2023-12-20 RX ADMIN — SODIUM CHLORIDE: 9 INJECTION, SOLUTION INTRAVENOUS at 10:12

## 2023-12-20 RX ADMIN — MORPHINE SULFATE 2 MG: 4 INJECTION, SOLUTION INTRAMUSCULAR; INTRAVENOUS at 06:12

## 2023-12-20 RX ADMIN — Medication 1 PATCH: at 10:12

## 2023-12-20 RX ADMIN — ALBUTEROL SULFATE 15 MG: 2.5 SOLUTION RESPIRATORY (INHALATION) at 08:12

## 2023-12-20 RX ADMIN — LORAZEPAM 1 MG: 2 INJECTION INTRAMUSCULAR; INTRAVENOUS at 09:12

## 2023-12-20 RX ADMIN — CALCIUM GLUCONATE 1 G: 20 INJECTION, SOLUTION INTRAVENOUS at 07:12

## 2023-12-21 LAB
ALBUMIN SERPL BCP-MCNC: 2.9 G/DL (ref 3.5–5.2)
ALP SERPL-CCNC: 75 U/L (ref 55–135)
ALT SERPL W/O P-5'-P-CCNC: 23 U/L (ref 10–44)
ANION GAP SERPL CALC-SCNC: 14 MMOL/L (ref 8–16)
AST SERPL-CCNC: 24 U/L (ref 10–40)
BASOPHILS # BLD AUTO: 0.05 K/UL (ref 0–0.2)
BASOPHILS NFR BLD: 0.4 % (ref 0–1.9)
BILIRUB SERPL-MCNC: 0.2 MG/DL (ref 0.1–1)
BUN SERPL-MCNC: 31 MG/DL (ref 8–23)
CALCIUM SERPL-MCNC: 8.2 MG/DL (ref 8.7–10.5)
CHLORIDE SERPL-SCNC: 105 MMOL/L (ref 95–110)
CO2 SERPL-SCNC: 20 MMOL/L (ref 23–29)
CREAT SERPL-MCNC: 2.5 MG/DL (ref 0.5–1.4)
DIFFERENTIAL METHOD BLD: ABNORMAL
EOSINOPHIL # BLD AUTO: 0.5 K/UL (ref 0–0.5)
EOSINOPHIL NFR BLD: 4.4 % (ref 0–8)
ERYTHROCYTE [DISTWIDTH] IN BLOOD BY AUTOMATED COUNT: 17.5 % (ref 11.5–14.5)
EST. GFR  (NO RACE VARIABLE): 24 ML/MIN/1.73 M^2
GLUCOSE SERPL-MCNC: 100 MG/DL (ref 70–110)
HCT VFR BLD AUTO: 29.3 % (ref 40–54)
HGB BLD-MCNC: 9.1 G/DL (ref 14–18)
IMM GRANULOCYTES # BLD AUTO: 0.08 K/UL (ref 0–0.04)
IMM GRANULOCYTES NFR BLD AUTO: 0.7 % (ref 0–0.5)
LYMPHOCYTES # BLD AUTO: 2.9 K/UL (ref 1–4.8)
LYMPHOCYTES NFR BLD: 24 % (ref 18–48)
MAGNESIUM SERPL-MCNC: 2 MG/DL (ref 1.6–2.6)
MCH RBC QN AUTO: 29.9 PG (ref 27–31)
MCHC RBC AUTO-ENTMCNC: 31.1 G/DL (ref 32–36)
MCV RBC AUTO: 96 FL (ref 82–98)
MONOCYTES # BLD AUTO: 1.2 K/UL (ref 0.3–1)
MONOCYTES NFR BLD: 9.7 % (ref 4–15)
NEUTROPHILS # BLD AUTO: 7.3 K/UL (ref 1.8–7.7)
NEUTROPHILS NFR BLD: 60.8 % (ref 38–73)
NRBC BLD-RTO: 0 /100 WBC
PHOSPHATE SERPL-MCNC: 5.2 MG/DL (ref 2.7–4.5)
PLATELET # BLD AUTO: 255 K/UL (ref 150–450)
PMV BLD AUTO: 9.6 FL (ref 9.2–12.9)
POCT GLUCOSE: 118 MG/DL (ref 70–110)
POCT GLUCOSE: 118 MG/DL (ref 70–110)
POCT GLUCOSE: 120 MG/DL (ref 70–110)
POCT GLUCOSE: 138 MG/DL (ref 70–110)
POCT GLUCOSE: 147 MG/DL (ref 70–110)
POCT GLUCOSE: 440 MG/DL (ref 70–110)
POTASSIUM SERPL-SCNC: 5 MMOL/L (ref 3.5–5.1)
PROT SERPL-MCNC: 7.2 G/DL (ref 6–8.4)
RBC # BLD AUTO: 3.04 M/UL (ref 4.6–6.2)
SODIUM SERPL-SCNC: 139 MMOL/L (ref 136–145)
VANCOMYCIN SERPL-MCNC: 12.2 UG/ML
WBC # BLD AUTO: 11.91 K/UL (ref 3.9–12.7)

## 2023-12-21 PROCEDURE — 80053 COMPREHEN METABOLIC PANEL: CPT | Mod: HCNC | Performed by: STUDENT IN AN ORGANIZED HEALTH CARE EDUCATION/TRAINING PROGRAM

## 2023-12-21 PROCEDURE — 25000003 PHARM REV CODE 250: Mod: HCNC | Performed by: STUDENT IN AN ORGANIZED HEALTH CARE EDUCATION/TRAINING PROGRAM

## 2023-12-21 PROCEDURE — 25000242 PHARM REV CODE 250 ALT 637 W/ HCPCS: Mod: HCNC | Performed by: STUDENT IN AN ORGANIZED HEALTH CARE EDUCATION/TRAINING PROGRAM

## 2023-12-21 PROCEDURE — 84100 ASSAY OF PHOSPHORUS: CPT | Mod: HCNC | Performed by: STUDENT IN AN ORGANIZED HEALTH CARE EDUCATION/TRAINING PROGRAM

## 2023-12-21 PROCEDURE — 20000000 HC ICU ROOM: Mod: HCNC

## 2023-12-21 PROCEDURE — 36415 COLL VENOUS BLD VENIPUNCTURE: CPT | Mod: HCNC | Performed by: HOSPITALIST

## 2023-12-21 PROCEDURE — 80202 ASSAY OF VANCOMYCIN: CPT | Mod: HCNC | Performed by: HOSPITALIST

## 2023-12-21 PROCEDURE — 83735 ASSAY OF MAGNESIUM: CPT | Mod: HCNC | Performed by: STUDENT IN AN ORGANIZED HEALTH CARE EDUCATION/TRAINING PROGRAM

## 2023-12-21 PROCEDURE — 27000221 HC OXYGEN, UP TO 24 HOURS: Mod: HCNC

## 2023-12-21 PROCEDURE — 94640 AIRWAY INHALATION TREATMENT: CPT | Mod: HCNC

## 2023-12-21 PROCEDURE — 85025 COMPLETE CBC W/AUTO DIFF WBC: CPT | Mod: HCNC | Performed by: STUDENT IN AN ORGANIZED HEALTH CARE EDUCATION/TRAINING PROGRAM

## 2023-12-21 PROCEDURE — 63600175 PHARM REV CODE 636 W HCPCS: Mod: HCNC | Performed by: STUDENT IN AN ORGANIZED HEALTH CARE EDUCATION/TRAINING PROGRAM

## 2023-12-21 PROCEDURE — 94761 N-INVAS EAR/PLS OXIMETRY MLT: CPT | Mod: HCNC

## 2023-12-21 RX ORDER — MUPIROCIN 20 MG/G
OINTMENT TOPICAL 2 TIMES DAILY
Status: DISPENSED | OUTPATIENT
Start: 2023-12-21 | End: 2023-12-26

## 2023-12-21 RX ORDER — ALBUTEROL SULFATE 0.83 MG/ML
10 SOLUTION RESPIRATORY (INHALATION) ONCE
Status: COMPLETED | OUTPATIENT
Start: 2023-12-21 | End: 2023-12-21

## 2023-12-21 RX ORDER — HALOPERIDOL 5 MG/ML
5 INJECTION INTRAMUSCULAR EVERY 6 HOURS PRN
Status: DISCONTINUED | OUTPATIENT
Start: 2023-12-21 | End: 2023-12-29 | Stop reason: HOSPADM

## 2023-12-21 RX ADMIN — VANCOMYCIN HYDROCHLORIDE 750 MG: 750 INJECTION, POWDER, LYOPHILIZED, FOR SOLUTION INTRAVENOUS at 02:12

## 2023-12-21 RX ADMIN — DIVALPROEX SODIUM 500 MG: 250 TABLET, DELAYED RELEASE ORAL at 08:12

## 2023-12-21 RX ADMIN — INSULIN HUMAN 6.44 UNITS: 100 INJECTION, SOLUTION PARENTERAL at 02:12

## 2023-12-21 RX ADMIN — MUPIROCIN: 20 OINTMENT TOPICAL at 08:12

## 2023-12-21 RX ADMIN — CEFTRIAXONE 2 G: 2 INJECTION, POWDER, FOR SOLUTION INTRAMUSCULAR; INTRAVENOUS at 08:12

## 2023-12-21 RX ADMIN — FAMOTIDINE 20 MG: 10 INJECTION INTRAVENOUS at 08:12

## 2023-12-21 RX ADMIN — ALBUTEROL SULFATE 10 MG: 2.5 SOLUTION RESPIRATORY (INHALATION) at 01:12

## 2023-12-21 RX ADMIN — HALOPERIDOL LACTATE 5 MG: 5 INJECTION, SOLUTION INTRAMUSCULAR at 02:12

## 2023-12-21 RX ADMIN — DIVALPROEX SODIUM 250 MG: 250 TABLET, DELAYED RELEASE ORAL at 01:12

## 2023-12-21 RX ADMIN — Medication 6 MG: at 08:12

## 2023-12-21 RX ADMIN — DEXTROSE MONOHYDRATE 500 ML: 100 INJECTION, SOLUTION INTRAVENOUS at 02:12

## 2023-12-21 NOTE — ASSESSMENT & PLAN NOTE
This patient has hyperkalemia which is uncontrolled. We will monitor for arrhythmias with EKG or continuous telemetry. We will treat the hyperkalemia with Potassium Binders, Calcium gluconate, IV insulin and dextrose, Nebulized albuterol sulfate, and Furosemide. The likely etiology of the hyperkalemia is LATRICE.  The patients latest potassium has been reviewed and the results are listed below  Recent Labs   Lab 12/21/23  0543   K 5.0       - Improved

## 2023-12-21 NOTE — PLAN OF CARE
Case Management Assessment    PCP: Marcelino Del Toro MD    Pharmacy: [facility]    Patient Arrived From: Home  Existing Help at Home: Daughter    Barriers to Discharge: No barriers to discharge. Patient lives with daughterFrank stated that patient was alert and oriented at home but not in the hospital. [facility] had last visit last week. Patient has equipment at home.     Discharge Plan:    A. Home   B. Home       12/21/23 1435   Discharge Assessment   Assessment Type Discharge Planning Assessment   Confirmed/corrected address, phone number and insurance Yes   Confirmed Demographics Correct on Facesheet   Source of Information family   Communicated SUSIE with patient/caregiver Date not available/Unable to determine   Reason For Admission Hyperkalemia   People in Home child(isabella), adult   Do you expect to return to your current living situation? Yes   Do you have help at home or someone to help you manage your care at home? Yes   Who are your caregiver(s) and their phone number(s)? Reyna Brambila  Daughter  [phone]   Prior to hospitilization cognitive status: Alert/Oriented   Current cognitive status: Unable to Assess   Equipment Currently Used at Home walker, rolling;wheelchair;bedside commode;shower chair;rollator   Readmission within 30 days? No   Patient currently being followed by outpatient case management? No   Do you currently have service(s) that help you manage your care at home? No   Do you take prescription medications? Yes   Do you have prescription coverage? Yes   Coverage [employer]   Do you have any problems affording any of your prescribed medications? No   Is the patient taking medications as prescribed? yes   Who is going to help you get home at discharge? Reyna Brambila  Daughter  [phone]   How do you get to doctors appointments? family or friend will provide   Are you on dialysis? No   Do you take coumadin? No    Discharge Plan A Home Health   Discharge Plan B Home with family   Discharge Plan discussed with: Adult children   Transition of Care Barriers None   OTHER   Name(s) of People in Home Reyna Brambila  Daughter  [phone]

## 2023-12-21 NOTE — ED NOTES
"Pt w/ a history of CTCL, off of his medications x 1 week as instructed by Oncologist, presents today w/ c/o painful red flaky rash to bilat hands and bilat feet.  Daughter who lives w/ pt also states pt has been easily confused and is having visual hallucinations which she states "happens when the rash gets bad".  Pt presents AAOx3, agitated stating he does not want to be here and is refusing monitoring, IV or lab draw.  Resp even and unlabored, skin warm and dry w/ rash.  Daughter at bedside, tearful about pt refusing care.  Awaiting MD to talk with patient.   HOB elevated, SR up x 2, call bell within reach.    "

## 2023-12-21 NOTE — HOSPITAL COURSE
The ED, the patient was hemodynamically stable.  Labs were remarkable for hyperkalemia (6.9), normocytic anemia (10.2), elevated creatinine (2.5-baseline of 1.3), elevated BNP (399), elevated troponin (0.053). VBG showed a pH of 7.31, pCO2 of 45.1. UA showed +3 leukocytes, > 100 WBCs, moderate bacteria.  EKG showed showed peaked t-waves. Chest x-ray showed no acute process.  Patient was given insulin/D10, albuterol, calcium gluconate, Lasix 80 mg IV, morphine 2 mg IV, Zofran 4 mg IV. Patient was admitted for further management.     Admitted with acute encephalopathy, agitation and hyperkalemia. Also found to have UTI. Hyperkalemia improved, encephalopathy slowly improving. On IV antibiotics. More alert and awake, still very encephlopathic but redirectable. PT/OT consulted. Neurology consulted for evaluation of recent tremors, confusion.     Patient mental status appears to have improved back to baseline. Speaking fluently. Knows month and year. Answering questions appropriately and using dates in speech. Cardiology recs for short run of Vtach were that episode likely stimulated from infection.  To follow up outpatient with Cardiology and continue beta-blocker.  UTI treated with ceftriaxone for 7 day course.  Patient transferred [facility] for evaluation of rash.  Patient underwent punch biopsy with Dermatology on 12/25.  Skin wound care ordered per Dermatology recommendations.  Patient will need outpatient follow-up with Dermatology.  Placement to skilled nursing facility pending. Patient deemed ready for discharge. Plan discussed with pt, who was agreeable and amenable; medications were discussed and reviewed, outpatient follow-up arranged, ER precautions were given, all questions were answered to the pt's satisfaction, and Narcisolisa Yanez  was subsequently discharged.

## 2023-12-21 NOTE — ED NOTES
Dr Hill speaking w/ pt and daughter at bedside. Pt is now calm and cooperative and agreeing all care.

## 2023-12-21 NOTE — PROGRESS NOTES
VANCOMYCIN DOSING BY PHARMACY DISCONTINUATION NOTE    Narciso Yanez is a 88 y.o. male who had been consulted for vancomycin dosing.    The pharmacy consult for vancomycin dosing has been discontinued.     Vancomycin Dosing by Pharmacy Consult will sign-off. Please reconsult if necessary. Thank you for allowing us to participate in this patient's care.       Dorothy Cabrera, PharmD  [phone]

## 2023-12-21 NOTE — PROGRESS NOTES
"Pharmacokinetic Initial Assessment: IV Vancomycin    Assessment/Plan:    Initiate intravenous vancomycin with loading dose of 1000 mg once with subsequent doses when random concentrations are less than 20 mcg/mL  Desired empiric serum trough concentration is 10 to 20 mcg/mL  Draw vancomycin random level on 12/21 at 1000.  Pharmacy will continue to follow and monitor vancomycin.      Please contact pharmacy at extension [phone] with any questions regarding this assessment.     Thank you for the consult,   Ezio Cabrera       Patient brief summary:  Narciso Yanez is a 88 y.o. male initiated on antimicrobial therapy with IV Vancomycin for treatment of suspected urinary tract infection    Drug Allergies:   Review of patient's allergies indicates:   Allergen Reactions    Demerol [meperidine] Nausea Only       Actual Body Weight:   64.4 kg    Renal Function:   Estimated Creatinine Clearance: 19.3 mL/min (A) (based on SCr of 2.5 mg/dL (H)).,     Dialysis Method (if applicable):  N/A    CBC (last 72 hours):  Recent Labs   Lab Result Units 12/20/23  1815   WBC K/uL 8.50   Hemoglobin g/dL 10.2*   Hematocrit % 31.4*   Platelets K/uL 303   Gran % % 46.0   Lymph % % 33.9   Mono % % 7.1   Eosinophil % % 11.2*   Basophil % % 0.7   Differential Method  Automated       Metabolic Panel (last 72 hours):  Recent Labs   Lab Result Units 12/20/23  1704 12/20/23  1815 12/20/23  2227   Sodium mmol/L  --  139 140   Potassium mmol/L  --  6.9* 6.2*   Chloride mmol/L  --  107 109   CO2 mmol/L  --  20* 20*   Glucose mg/dL  --  94 108   Glucose, UA  Negative  --   --    BUN mg/dL  --  31* 29*   Creatinine mg/dL  --  2.5* 2.5*   Albumin g/dL  --  3.2*  --    Total Bilirubin mg/dL  --  0.2  --    Alkaline Phosphatase U/L  --  88  --    AST U/L  --  28  --    ALT U/L  --  26  --        Drug levels (last 3 results):  No results for input(s): "VANCOMYCINRA", "VANCORANDOM", "VANCOMYCINPE", "VANCOPEAK", "VANCOMYCINTR", "VANCOTROUGH" in the last " 72 hours.    Microbiologic Results:  Microbiology Results (last 7 days)       Procedure Component Value Units Date/Time    Blood Culture #1 **CANNOT BE ORDERED STAT** [[id number]] Collected: 12/20/23 1825    Order Status: Sent Specimen: Blood from Peripheral, Forearm, Left Updated: 12/20/23 1840    Blood Culture #2 **CANNOT BE ORDERED STAT** [[id number]] Collected: 12/20/23 1830    Order Status: Sent Specimen: Blood from Peripheral, Antecubital, Left Updated: 12/20/23 1840    Urine culture [[id number]] Collected: 12/20/23 1704    Order Status: No result Specimen: Urine Updated: 12/20/23 1815

## 2023-12-21 NOTE — PROGRESS NOTES
Pharmacokinetic Assessment Follow Up: IV Vancomycin    Vancomycin serum concentration assessment(s):    The random level was drawn correctly and can be used to guide therapy at this time. The measurement is within the desired definitive target range of 10 to 20 mcg/mL.    Vancomycin Regimen Plan:    Give 750 mg today.  Re-dose when the random level is less than 20 mcg/mL, next level to be drawn at 0300 on 12/22/2023    Drug levels (last 3 results):  Recent Labs   Lab Result Units 12/21/23  1010   Vancomycin, Random ug/mL 12.2       Pharmacy will continue to follow and monitor vancomycin.    Please contact pharmacy at extension [phone] for questions regarding this assessment.    Thank you for the consult,   Hamzah Bouren Jr       Patient brief summary:  Narciso Yanez is a 88 y.o. male initiated on antimicrobial therapy with IV Vancomycin for treatment of urinary tract infection    Drug Allergies:   Review of patient's allergies indicates:   Allergen Reactions    Demerol [meperidine] Nausea Only       Actual Body Weight:   64.4 kg    Renal Function:   Estimated Creatinine Clearance: 19.3 mL/min (A) (based on SCr of 2.5 mg/dL (H)).,     Dialysis Method (if applicable):  N/A    CBC (last 72 hours):  Recent Labs   Lab Result Units 12/20/23  1815 12/21/23  0543   WBC K/uL 8.50 11.91   Hemoglobin g/dL 10.2* 9.1*   Hematocrit % 31.4* 29.3*   Platelets K/uL 303 255   Gran % % 46.0 60.8   Lymph % % 33.9 24.0   Mono % % 7.1 9.7   Eosinophil % % 11.2* 4.4   Basophil % % 0.7 0.4   Differential Method  Automated Automated       Metabolic Panel (last 72 hours):  Recent Labs   Lab Result Units 12/20/23  1704 12/20/23  1815 12/20/23  2227 12/21/23  0543   Sodium mmol/L  --  139 140 139   Potassium mmol/L  --  6.9* 6.2* 5.0   Chloride mmol/L  --  107 109 105   CO2 mmol/L  --  20* 20* 20*   Glucose mg/dL  --  94 108 100   Glucose, UA  Negative  --   --   --    BUN mg/dL  --  31* 29* 31*   Creatinine mg/dL  --  2.5* 2.5* 2.5*    Albumin g/dL  --  3.2*  --  2.9*   Total Bilirubin mg/dL  --  0.2  --  0.2   Alkaline Phosphatase U/L  --  88  --  75   AST U/L  --  28  --  24   ALT U/L  --  26  --  23   Magnesium mg/dL  --   --   --  2.0   Phosphorus mg/dL  --   --   --  5.2*       Vancomycin Administrations:  vancomycin given in the last 96 hours                     vancomycin (VANCOCIN) 1,000 mg in dextrose 5 % (D5W) 250 mL IVPB (Vial-Mate) (mg) 1,000 mg New Bag 12/20/23 2207                    Microbiologic Results:  Microbiology Results (last 7 days)       Procedure Component Value Units Date/Time    Urine culture [[id number]]  (Abnormal) Collected: 12/20/23 1704    Order Status: Completed Specimen: Urine Updated: 12/21/23 1101     Urine Culture, Routine STREPTOCOCCUS AGALACTIAE (GROUP B)  > 100,000 cfu/ml  Beta-hemolytic streptococci are routinely susceptible to   penicillins,cephalosporins and carbapenems.      Narrative:      Specimen Source->Urine    Blood Culture #1 **CANNOT BE ORDERED STAT** [[id number]] Collected: 12/20/23 1825    Order Status: Completed Specimen: Blood from Peripheral, Forearm, Left Updated: 12/21/23 0312     Blood Culture, Routine No Growth to date    Blood Culture #2 **CANNOT BE ORDERED STAT** [[id number]] Collected: 12/20/23 1830    Order Status: Completed Specimen: Blood from Peripheral, Antecubital, Left Updated: 12/21/23 0312     Blood Culture, Routine No Growth to date

## 2023-12-21 NOTE — ASSESSMENT & PLAN NOTE
- Suspect related to UTI, possible medications but do not suspect being taken off of Renvoq contributing. Has completed prednisone weeks ago so do not suspect this is contributing.   - CTH with no acute findings, no focal deficits and appears to be improving throughout the day  - continue with treatment of UTI and delirium precautions

## 2023-12-21 NOTE — ASSESSMENT & PLAN NOTE
Patient with acute kidney injury/acute renal failure likely due to pre-renal azotemia due to IVVD LATRICE is currently worsening. Baseline creatinine  1.3  - Labs reviewed- Renal function/electrolytes with Estimated Creatinine Clearance: 19.3 mL/min (A) (based on SCr of 2.5 mg/dL (H)). according to latest data. Monitor urine output and serial BMP and adjust therapy as needed. Avoid nephrotoxins and renally dose meds for GFR listed above.

## 2023-12-21 NOTE — H&P
"[facility]  [facility] Medicine  History & Physical    Patient Name: Narciso Yanez  MRN: [id number]  Patient Class: IP- Inpatient  Admission Date: 12/20/2023  Attending Physician: Sarai Hassan MD   Primary Care Provider: Marcelino Del Toro MD         Patient information was obtained from patient and ER records.     Subjective:     Principal Problem:Hyperkalemia    Chief Complaint:   Chief Complaint   Patient presents with    Altered Mental Status     Pt to ER with reports of increased AMS worsening over the past week. Family reports talking to people that's not there. 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 Family reports pt is eating and drinking well.         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 This medication has been on hold for a few days pending a biopsy, however, patient developed worsening hallucinations and mental status changes for several days. Patient denies having any complaints.     The ED, the patient was hemodynamically stable.  Labs were remarkable for hyperkalemia (6.9), normocytic anemia (10.2), elevated creatinine (2.5-baseline of 1.3), elevated BNP (399), elevated troponin (0.053). VBG showed a pH of 7.31, pCO2 of 45.1. UA showed +3 leukocytes, > 100 WBCs, moderate bacteria.  EKG showed showed peaked t-waves. Chest x-ray showed no acute process.  Patient was given insulin/D10, albuterol, calcium gluconate, Lasix 80 mg IV, morphine 2 mg IV, Zofran 4 mg IV. Patient was admitted for further management.     Past Medical History:   Diagnosis Date    Actinic keratosis     Anxiety  "    B12 deficiency 12/8/2014    Dx 6/14    Cancer     skin    Cataract     Coronary artery disease     Diabetes mellitus type II     Diabetes mellitus with peripheral circulatory disorder 6/18/2014    ED (erectile dysfunction)     Hyperlipidemia     Kidney stone     Neurogenic claudication 4/4/2022    Normocytic anemia 10/15/2023    Peripheral vascular disease     Spondylosis 3/29/2019    Tobacco dependence     Urinary incontinence 5/4/2021       Past Surgical History:   Procedure Laterality Date    APPENDECTOMY      CARDIAC SURGERY  01/1999    CABG 4 vessels    CATARACT EXTRACTION      EPIDURAL STEROID INJECTION Right 8/26/2020    Procedure: Injection, Steroid, Epidural Transforaminal;  Surgeon: Wiley Crane Jr., MD;  Location: [facility] ENDO;  Service: Pain Management;  Laterality: Right;  Right L5 + S1 TF LEAH  Arrive @ 1115; ASA & Plavix last 8/18; Check BG; Rapid COVID test    EPIDURAL STEROID INJECTION Right 11/25/2020    Procedure: Injection, Steroid, Epidural Transformainal;  Surgeon: Wiley Crane Jr., MD;  Location: [facility] ENDO;  Service: Pain Management;  Laterality: Right;  Right L5 + S1 TF LEAH  Arrive @ 1245; ASA and Plavix last 11/17; Pre-DM; Needs MD Sign.    EPIDURAL STEROID INJECTION Right 2/19/2021    Procedure: Injection, Steroid, Epidural Transforaminal;  Surgeon: Wiley Crane Jr., MD;  Location: [facility] ENDO;  Service: Pain Management;  Laterality: Right;  Right L5 + S1 TF LEAH  Arrive @ 1115; ASA & Plavix last 2/11; Check BG; Needs Consent    EXTERNAL EAR SURGERY      EYE SURGERY      cataracts    ILIAC ARTERY STENT Bilateral 06/2003    also Right External Iliac stent       Review of patient's allergies indicates:   Allergen Reactions    Demerol [meperidine] Nausea Only       No current facility-administered medications on file prior to encounter.     Current Outpatient Medications on File Prior to Encounter   Medication Sig    acetaminophen (TYLENOL) 500 MG tablet Take 1 tablet (500 mg  total) by mouth every 6 (six) hours as needed for Pain.    aspirin (ECOTRIN) 81 MG EC tablet Take 1 tablet (81 mg total) by mouth once daily.    atorvastatin (LIPITOR) 40 MG tablet Take 1 tablet (40 mg total) by mouth once daily.    blood sugar diagnostic (TRUE METRIX GLUCOSE TEST STRIP) Strp TID    clopidogreL (PLAVIX) 75 mg tablet Take 1 tablet (75 mg total) by mouth once daily.    divalproex (DEPAKOTE) 125 MG EC tablet Take by mouth.    metFORMIN (GLUCOPHAGE) 500 MG tablet Take 1 tablet (500 mg total) by mouth 2 (two) times daily with meals. HOLD FOR GLUCOSE LESS 100    metoprolol succinate (TOPROL-XL) 25 MG 24 hr tablet Take 1 tablet (25 mg total) by mouth once daily.    mometasone 0.1% (ELOCON) 0.1 % cream Apply topically 2 (two) times daily. Apply to arms ,hands, and legs BID    predniSONE (DELTASONE) 10 MG tablet Take 10 mg by mouth.    tamsulosin (FLOMAX) 0.4 mg Cap Take 1 capsule (0.4 mg total) by mouth once daily.    triamcinolone acetonide 0.1% (KENALOG) 0.1 % cream Apply topically 2 (two) times daily.     Family History       Problem Relation (Age of Onset)    Stroke Mother          Tobacco Use    Smoking status: Every Day     Current packs/day: 1.50     Average packs/day: 1.5 packs/day for 71.0 years (106.5 ttl pk-yrs)     Types: Cigarettes    Smokeless tobacco: Former   Substance and Sexual Activity    Alcohol use: No    Drug use: No    Sexual activity: Not Currently     Partners: Female     Review of Systems   Unable to perform ROS: Mental status change     Objective:     Vital Signs (Most Recent):  Temp: 97.8 °F (36.6 °C) (12/20/23 1903)  Pulse: 86 (12/20/23 2011)  Resp: 15 (12/20/23 2011)  BP: (!) 151/70 (12/20/23 1903)  SpO2: (!) 93 % (12/20/23 2011) Vital Signs (24h Range):  Temp:  [97.8 °F (36.6 °C)-98.2 °F (36.8 °C)] 97.8 °F (36.6 °C)  Pulse:  [76-86] 86  Resp:  [15-18] 15  SpO2:  [93 %-98 %] 93 %  BP: (142-151)/(60-70) 151/70     Weight: 64.4 kg (142 lb)  Body mass index is 22.24 kg/m².      Physical Exam  Vitals and nursing note reviewed.   Constitutional:       General: He is not in acute distress.     Appearance: Normal appearance. He is not ill-appearing.   HENT:      Head: Normocephalic and atraumatic.      Nose: Nose normal.      Mouth/Throat:      Mouth: Mucous membranes are moist.   Eyes:      Extraocular Movements: Extraocular movements intact.   Cardiovascular:      Rate and Rhythm: Normal rate.      Pulses: Normal pulses.      Heart sounds: No murmur heard.  Pulmonary:      Effort: Pulmonary effort is normal. No respiratory distress.   Abdominal:      General: Abdomen is flat.      Palpations: Abdomen is soft.      Tenderness: There is no abdominal tenderness.   Musculoskeletal:      Right lower leg: Edema present.      Left lower leg: Edema present.   Skin:     General: Skin is warm.      Capillary Refill: Capillary refill takes less than 2 seconds.      Comments: Generalized rash involving upper and lower extremities.    Neurological:      Mental Status: He is alert and oriented to person, place, and time.      Comments: Oriented but unable to answer questions appropriately. No focal deficits appreciated                 Significant Labs: All pertinent labs within the past 24 hours have been reviewed.    Significant Imaging: I have reviewed all pertinent imaging results/findings within the past 24 hours.  Assessment/Plan:     * Hyperkalemia  This patient has hyperkalemia which is uncontrolled. We will monitor for arrhythmias with EKG or continuous telemetry. We will treat the hyperkalemia with Potassium Binders, Calcium gluconate, IV insulin and dextrose, Nebulized albuterol sulfate, and Furosemide. The likely etiology of the hyperkalemia is LATRICE.  The patients latest potassium has been reviewed and the results are listed below  Recent Labs   Lab 12/20/23  1815   K 6.9*             Dermatitis  Resume T cell lymphoma. Needs outpatient follow up with dermatology       Encephalopathy,  metabolic  Likely in the setting of UTI, possibly related to medications      UTI (urinary tract infection)  Urinalysis  Recent Labs   Lab 12/20/23  1704   COLORU Yellow   SPECGRAV 1.015   PHUR 6.0   PROTEINUA 1+*   BACTERIA Moderate*   NITRITE Negative   LEUKOCYTESUR 3+*   UROBILINOGEN Negative   HYALINECASTS 15*     Previous cultures grew proteus and enterococcus     Continue ceftriaxone and vancomycin for now   F/u cultures       Chronic bilateral low back pain with bilateral sciatica  History noted. No acute issues       Hyperlipidemia  Resume statin      Normal pressure hydrocephalus  Repeat CT head obtained.  No acute issues.    LATRICE (acute kidney injury)  Patient with acute kidney injury/acute renal failure likely due to pre-renal azotemia due to IVVD LATRICE is currently worsening. Baseline creatinine  1.3  - Labs reviewed- Renal function/electrolytes with Estimated Creatinine Clearance: 19.3 mL/min (A) (based on SCr of 2.5 mg/dL (H)). according to latest data. Monitor urine output and serial BMP and adjust therapy as needed. Avoid nephrotoxins and renally dose meds for GFR listed above.    Essential hypertension  Chronic, controlled. Latest blood pressure and vitals reviewed-     Temp:  [97.8 °F (36.6 °C)-98.2 °F (36.8 °C)]   Pulse:  [76-86]   Resp:  [15-18]   BP: (142-151)/(60-70)   SpO2:  [93 %-98 %] .   Home meds for hypertension were reviewed and noted below.   Hypertension Medications               metoprolol succinate (TOPROL-XL) 25 MG 24 hr tablet Take 1 tablet (25 mg total) by mouth once daily.            While in the hospital, will manage blood pressure as follows; Continue home antihypertensive regimen    Will utilize p.r.n. blood pressure medication only if patient's blood pressure greater than 180/110 and he develops symptoms such as worsening chest pain or shortness of breath.    COPD (chronic obstructive pulmonary disease)  Patient's COPD is controlled currently.  Patient is currently off COPD  Pathway.  ANNIE Franz     PAD (peripheral artery disease)  History noted.  No acute issues.      Diabetes mellitus with peripheral circulatory disorder  Patient's FSGs are controlled on current medication regimen.  Last A1c reviewed-   Lab Results   Component Value Date    HGBA1C 6.9 (H) 10/15/2023     Most recent fingerstick glucose reviewed-   Recent Labs   Lab 12/20/23 2023   POCTGLUCOSE 105     Current correctional scale  Low  Maintain anti-hyperglycemic dose as follows-   Antihyperglycemics (From admission, onward)      None          Hold Oral hypoglycemics while patient is in the hospital.    Coronary artery disease involving coronary bypass graft of native heart without angina pectoris  No acute issues. Resume home medications       VTE Risk Mitigation (From admission, onward)           Ordered     IP VTE HIGH RISK PATIENT  Once         12/20/23 2011     Place sequential compression device  Until discontinued         12/20/23 2011                   Critical care time spent on the evaluation and treatment of severe organ dysfunction, review of pertinent labs and imaging studies, discussions with consulting providers and discussions with patient/family: 45 minutes.    AdmissionCare    Guideline: Renal Failure (Acute) - INPT, Inpatient    Based on the indications selected for the patient, the bed status of Admit to Inpatient was determined to be MET    The following indications were selected as present at the time of evaluation of the patient:      Acute renal failure (stage 3 acute kidney injury), as indicated by 1 or more of the following:   -     - Reduction of more than 75% in estimated glomerular filtration rate from baselineeGFR - Adult Calculator   Acute kidney injury (eg, rise in serum creatinine, reduction in estimated glomerular filtration rate from baseline) requiring inpatient care, as indicated by 1 or more of the following:   -     - Electrolyte abnormality that is severe (eg, hyperkalemia with  severe ECG findings) or persists despite observation care    AdmissionCare documentation entered by: LORIE Du    [facility] [employer], 27th edition, Copyright © 2023 [facility] [employer], [facility] All Rights Reserved.  [id number]    Scotty Blandon MD  Department of Hospital Medicine  [facility] - Emergency Dept

## 2023-12-21 NOTE — PROGRESS NOTES
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 This medication has been on hold for a few days pending a biopsy, however, patient developed worsening hallucinations and mental status changes for several days. Patient denies having any complaints.     The ED, the patient was hemodynamically stable.  Labs were remarkable for hyperkalemia (6.9), normocytic anemia (10.2), elevated creatinine (2.5-baseline of 1.3), elevated BNP (399), elevated troponin (0.053). VBG showed a pH of 7.31, pCO2 of 45.1. UA showed +3 leukocytes, > 100 WBCs, moderate bacteria.  EKG showed showed peaked t-waves. Chest x-ray showed no acute process.  Patient was given insulin/D10, albuterol, calcium gluconate, Lasix 80 mg IV, morphine 2 mg IV, Zofran 4 mg IV. Patient was admitted for further management.     Overview/Hospital Course:  Admitted with acute encephalopathy, agitation and hyperkalemia. Also found to have UTI. Hyperkalemia improved, encephalopathy slowly improving. On IV antibiotics.     Interval History: no acute issues, still somewhat encephalopathic but now alert and oriented to self and date of birth. In 2 point restraints, slowly trying to see if able to release.     Spoke with daughter and she gave recent history of  skin rash and work up for possible CTCL.     Review of Systems  Objective:     Vital Signs (Most Recent):  Temp: 97.2 °F (36.2 °C) (12/21/23 1100)  Pulse: 99 (12/21/23 1100)  Resp: (!) 36 (12/21/23 1100)  BP: (!) 122/58 (12/21/23 1100)  SpO2: 95 % (12/21/23 1100) Vital Signs (24h Range):  Temp:  [92.6 °F (33.7 °C)-98.2 °F (36.8 °C)] 97.2 °F (36.2 °C)  Pulse:  [75-99] 99  Resp:  [13-36] 36  SpO2:  [92 %-99 %] 95 %  BP: ()/(52-88) 122/58     Weight: 64.4 kg (141 lb 15.6 oz)  Body mass index is 22.24 kg/m².    Intake/Output Summary (Last 24 hours) at 12/21/2023 1518  Last data filed at 12/21/2023 0755  Gross per 24 hour   Intake 650 ml   Output 1700 ml   Net -1050 ml         Physical Exam  Vitals and nursing note reviewed.   Constitutional:       General: He is not in acute distress.     Appearance: Normal appearance. He is not ill-appearing.   HENT:      Head: Normocephalic and atraumatic.      Nose: Nose normal.      Mouth/Throat:      Mouth: Mucous membranes are moist.   Cardiovascular:      Rate and Rhythm: Normal rate.      Pulses: Normal pulses.      Heart sounds: No murmur heard.  Pulmonary:      Effort: Pulmonary effort is normal. No respiratory distress.   Abdominal:      General: Abdomen is flat.      Palpations: Abdomen is soft.      Tenderness: There is no abdominal tenderness.   Skin:     General: Skin is warm.      Capillary Refill: Capillary refill takes less than 2 seconds.      Comments: Generalized rash involving upper and lower extremities.    Neurological:      Mental Status: He is alert and oriented to person, place, and time.      Comments: Alert and oriented to self, date of birth. Improving somewhat throughout day             Significant Labs: All pertinent labs within the past 24 hours have been reviewed.    Significant Imaging: I have reviewed all pertinent imaging results/findings within the past 24 hours.    Assessment/Plan:      * Hyperkalemia  This patient has hyperkalemia which is  uncontrolled. We will monitor for arrhythmias with EKG or continuous telemetry. We will treat the hyperkalemia with Potassium Binders, Calcium gluconate, IV insulin and dextrose, Nebulized albuterol sulfate, and Furosemide. The likely etiology of the hyperkalemia is LATRICE.  The patients latest potassium has been reviewed and the results are listed below  Recent Labs   Lab 12/21/23  0543   K 5.0       - Improved        Dermatitis  Presumed T cell lymphoma. Needs outpatient follow up with dermatology   - Follows with Dr. Davidson and has biopsies but unrevealing  - was on Renvoq and prednisone but completed course of prednisone and was taken off Renvoq on 12/12/23.   - doubt taking off of Renvoq is contributing to mental status changes      Encephalopathy, metabolic  - Suspect related to UTI, possible medications but do not suspect being taken off of Renvoq contributing. Has completed prednisone weeks ago so do not suspect this is contributing.   - CTH with no acute findings, no focal deficits and appears to be improving throughout the day  - continue with treatment of UTI and delirium precautions      UTI (urinary tract infection)  Urinalysis  Recent Labs   Lab 12/20/23  1704   COLORU Yellow   SPECGRAV 1.015   PHUR 6.0   PROTEINUA 1+*   BACTERIA Moderate*   NITRITE Negative   LEUKOCYTESUR 3+*   UROBILINOGEN Negative   HYALINECASTS 15*       Previous cultures grew proteus and enterococcus     Continue ceftriaxone and vancomycin for now   F/u cultures       Chronic bilateral low back pain with bilateral sciatica  History noted. No acute issues       Hyperlipidemia  Resume statin      Normal pressure hydrocephalus  Repeat CT head obtained.  No acute issues.    LATRICE (acute kidney injury)  Patient with acute kidney injury/acute renal failure likely due to pre-renal azotemia due to IVVD LATRICE is currently worsening. Baseline creatinine  1.3  - Labs reviewed- Renal function/electrolytes with Estimated Creatinine Clearance: 19.3  mL/min (A) (based on SCr of 2.5 mg/dL (H)). according to latest data. Monitor urine output and serial BMP and adjust therapy as needed. Avoid nephrotoxins and renally dose meds for GFR listed above.  - Cr relatively stable as yesterday but still above usual baseline    Essential hypertension  Chronic, controlled. Latest blood pressure and vitals reviewed-     Temp:  [92.6 °F (33.7 °C)-98.2 °F (36.8 °C)]   Pulse:  [75-99]   Resp:  [13-36]   BP: ()/(52-88)   SpO2:  [92 %-99 %] .   Home meds for hypertension were reviewed and noted below.   Hypertension Medications               metoprolol succinate (TOPROL-XL) 25 MG 24 hr tablet Take 1 tablet (25 mg total) by mouth once daily.            While in the hospital, will manage blood pressure as follows; Continue home antihypertensive regimen    Will utilize p.r.n. blood pressure medication only if patient's blood pressure greater than 180/110 and he develops symptoms such as worsening chest pain or shortness of breath.    COPD (chronic obstructive pulmonary disease)  Patient's COPD is controlled currently.  Patient is currently off COPD Pathway.  PRN Duonebs     PAD (peripheral artery disease)  History noted.  No acute issues.      Diabetes mellitus with peripheral circulatory disorder  Patient's FSGs are controlled on current medication regimen.  Last A1c reviewed-   Lab Results   Component Value Date    HGBA1C 6.9 (H) 10/15/2023     Most recent fingerstick glucose reviewed-   Recent Labs   Lab 12/21/23  0209 12/21/23  0245 12/21/23  0438 12/21/23  1154   POCTGLUCOSE 120* 440* 118* 118*       Current correctional scale  Low  Maintain anti-hyperglycemic dose as follows-   Antihyperglycemics (From admission, onward)      Start     Stop Route Frequency Ordered    12/20/23 2152  insulin aspart U-100 pen 0-5 Units         -- SubQ Before meals & nightly PRN 12/20/23 2052          Hold Oral hypoglycemics while patient is in the hospital.    Coronary artery disease  involving coronary bypass graft of native heart without angina pectoris  No acute issues. Resume home medications       VTE Risk Mitigation (From admission, onward)           Ordered     IP VTE HIGH RISK PATIENT  Once         12/20/23 2011     Place sequential compression device  Until discontinued         12/20/23 2011                    Discharge Planning   SUSIE:      Code Status: DNR   Is the patient medically ready for discharge?:     Reason for patient still in hospital (select all that apply): Treatment  Discharge Plan A: Home Health            Critical care time spent on the evaluation and treatment of severe organ dysfunction, review of pertinent labs and imaging studies, discussions with consulting providers and discussions with patient/family: 15 minutes.      Kodak Rivera MD  Department of Hospital Medicine   [facility] - Intensive Care

## 2023-12-21 NOTE — ED NOTES
Pt in bed with two point restraints. Pt is unable to follow commands and non verbal. MD aware. Pt not tolerating po med. Padding placed on rails due to skin breakdown. External urinary cathter in place.

## 2023-12-21 NOTE — ED NOTES
Pt  having visual hallucinating, very agitated and restless, pulling at medical equipment. Unable to re-direct. GCS 14, speech, clear but non-seneschal Dr Hill notified, Daughter at bedside

## 2023-12-21 NOTE — ASSESSMENT & PLAN NOTE
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 Needs outpatient follow up with dermatology   - Follows with Dr. Davidson and has biopsies but unrevealing  - was on Renvoq and prednisone but completed course of prednisone and was taken off Renvoq on 12/12/23.   - doubt taking off of Renvoq is contributing to mental status changes

## 2023-12-21 NOTE — SUBJECTIVE & OBJECTIVE
Interval History: no acute issues, still somewhat encephalopathic but now alert and oriented to self and date of birth. In 2 point restraints, slowly trying to see if able to release.     Spoke with daughter and she gave recent history of skin rash and work up for possible CTCL.     Review of Systems  Objective:     Vital Signs (Most Recent):  Temp: 97.2 °F (36.2 °C) (12/21/23 1100)  Pulse: 99 (12/21/23 1100)  Resp: (!) 36 (12/21/23 1100)  BP: (!) 122/58 (12/21/23 1100)  SpO2: 95 % (12/21/23 1100) Vital Signs (24h Range):  Temp:  [92.6 °F (33.7 °C)-98.2 °F (36.8 °C)] 97.2 °F (36.2 °C)  Pulse:  [75-99] 99  Resp:  [13-36] 36  SpO2:  [92 %-99 %] 95 %  BP: ()/(52-88) 122/58     Weight: 64.4 kg (141 lb 15.6 oz)  Body mass index is 22.24 kg/m².    Intake/Output Summary (Last 24 hours) at 12/21/2023 1518  Last data filed at 12/21/2023 0755  Gross per 24 hour   Intake 650 ml   Output 1700 ml   Net -1050 ml         Physical Exam  Vitals and nursing note reviewed.   Constitutional:       General: He is not in acute distress.     Appearance: Normal appearance. He is not ill-appearing.   HENT:      Head: Normocephalic and atraumatic.      Nose: Nose normal.      Mouth/Throat:      Mouth: Mucous membranes are moist.   Cardiovascular:      Rate and Rhythm: Normal rate.      Pulses: Normal pulses.      Heart sounds: No murmur heard.  Pulmonary:      Effort: Pulmonary effort is normal. No respiratory distress.   Abdominal:      General: Abdomen is flat.      Palpations: Abdomen is soft.      Tenderness: There is no abdominal tenderness.   Skin:     General: Skin is warm.      Capillary Refill: Capillary refill takes less than 2 seconds.      Comments: Generalized rash involving upper and lower extremities.    Neurological:      Mental Status: He is alert and oriented to person, place, and time.      Comments: Alert and oriented to self, date of birth. Improving somewhat throughout day             Significant Labs: All  pertinent labs within the past 24 hours have been reviewed.    Significant Imaging: I have reviewed all pertinent imaging results/findings within the past 24 hours.

## 2023-12-21 NOTE — ADMISSIONCARE
AdmissionCare    Guideline: Renal Failure (Acute) - INPT, Inpatient    Based on the indications selected for the patient, the bed status of Admit to Inpatient was determined to be MET    The following indications were selected as present at the time of evaluation of the patient:      Acute renal failure (stage 3 acute kidney injury), as indicated by 1 or more of the following:   -     - Reduction of more than 75% in estimated glomerular filtration rate from baselineeGFR - Adult Calculator   Acute kidney injury (eg, rise in serum creatinine, reduction in estimated glomerular filtration rate from baseline) requiring inpatient care, as indicated by 1 or more of the following:   -     - Electrolyte abnormality that is severe (eg, hyperkalemia with severe ECG findings) or persists despite observation care    AdmissionCare documentation entered by: LORIE Du    [facility], 27th edition, Copyright © 2023 [facility], [facility] All Rights Reserved.  [id number]:00

## 2023-12-21 NOTE — ASSESSMENT & PLAN NOTE
Patient's FSGs are controlled on current medication regimen.  Last A1c reviewed-   Lab Results   Component Value Date    HGBA1C 6.9 (H) 10/15/2023     Most recent fingerstick glucose reviewed-   Recent Labs   Lab 12/20/23 2023   POCTGLUCOSE 105     Current correctional scale  Low  Maintain anti-hyperglycemic dose as follows-   Antihyperglycemics (From admission, onward)      None          Hold Oral hypoglycemics while patient is in the hospital.

## 2023-12-21 NOTE — ASSESSMENT & PLAN NOTE
Chronic, controlled. Latest blood pressure and vitals reviewed-     Temp:  [97.8 °F (36.6 °C)-98.2 °F (36.8 °C)]   Pulse:  [76-86]   Resp:  [15-18]   BP: (142-151)/(60-70)   SpO2:  [93 %-98 %] .   Home meds for hypertension were reviewed and noted below.   Hypertension Medications               metoprolol succinate (TOPROL-XL) 25 MG 24 hr tablet Take 1 tablet (25 mg total) by mouth once daily.            While in the hospital, will manage blood pressure as follows; Continue home antihypertensive regimen    Will utilize p.r.n. blood pressure medication only if patient's blood pressure greater than 180/110 and he develops symptoms such as worsening chest pain or shortness of breath.

## 2023-12-21 NOTE — ASSESSMENT & PLAN NOTE
Urinalysis  Recent Labs   Lab 12/20/23  1704   COLORU Yellow   SPECGRAV 1.015   PHUR 6.0   PROTEINUA 1+*   BACTERIA Moderate*   NITRITE Negative   LEUKOCYTESUR 3+*   UROBILINOGEN Negative   HYALINECASTS 15*       Previous cultures grew proteus and enterococcus     Continue ceftriaxone and vancomycin for now   F/u cultures

## 2023-12-21 NOTE — ASSESSMENT & PLAN NOTE
Chronic, controlled. Latest blood pressure and vitals reviewed-     Temp:  [92.6 °F (33.7 °C)-98.2 °F (36.8 °C)]   Pulse:  [75-99]   Resp:  [13-36]   BP: ()/(52-88)   SpO2:  [92 %-99 %] .   Home meds for hypertension were reviewed and noted below.   Hypertension Medications               metoprolol succinate (TOPROL-XL) 25 MG 24 hr tablet Take 1 tablet (25 mg total) by mouth once daily.            While in the hospital, will manage blood pressure as follows; Continue home antihypertensive regimen    Will utilize p.r.n. blood pressure medication only if patient's blood pressure greater than 180/110 and he develops symptoms such as worsening chest pain or shortness of breath.

## 2023-12-21 NOTE — ASSESSMENT & PLAN NOTE
This patient has hyperkalemia which is uncontrolled. We will monitor for arrhythmias with EKG or continuous telemetry. We will treat the hyperkalemia with Potassium Binders, Calcium gluconate, IV insulin and dextrose, Nebulized albuterol sulfate, and Furosemide. The likely etiology of the hyperkalemia is LATRICE.  The patients latest potassium has been reviewed and the results are listed below  Recent Labs   Lab 12/20/23  1815   K 6.9*

## 2023-12-21 NOTE — ED PROVIDER NOTES
"Encounter Date: 12/20/2023       History     Chief Complaint   Patient presents with    Altered Mental Status     Pt to ER with reports of increased AMS worsening over the past week. Family reports talking to people that's not there. 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 Family reports pt is eating and drinking well.      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  Per the family, the patient has been having hallucinations at home, has been increasingly more paranoid and is not taking care of himself.  The patient was on Rinvoq for a cutaneous rash that was thought to be lymphoma in etiology which initially had improved his symptoms however he recently stopped this medication and the above symptoms have returned.  No trauma or falls.  Patient denies any complaints other than the rash.      Review of patient's allergies indicates:   Allergen Reactions    Demerol [meperidine] Nausea Only     Past Medical History:   Diagnosis Date    Actinic keratosis     Anxiety     B12 deficiency 12/8/2014    Dx 6/14    Cancer     skin    Cataract     Coronary artery disease     Diabetes mellitus type II     Diabetes mellitus with peripheral circulatory disorder 6/18/2014    ED (erectile dysfunction)     Hyperlipidemia     Kidney stone     Neurogenic claudication 4/4/2022    Normocytic anemia 10/15/2023    Peripheral vascular disease     Spondylosis 3/29/2019    Tobacco dependence     Urinary incontinence 5/4/2021     Past Surgical History:   Procedure Laterality Date    APPENDECTOMY      CARDIAC SURGERY  01/1999    CABG 4 vessels    CATARACT EXTRACTION      EPIDURAL STEROID INJECTION Right 8/26/2020    Procedure: Injection, Steroid, Epidural Transforaminal;  Surgeon: Wiley Crane Jr., MD;  Location: [facility];  Service: Pain Management;  Laterality: Right;  Right L5 + S1 TF LEAH  Arrive @ 1115; ASA & Plavix last " 8/18; Check BG; Rapid COVID test    EPIDURAL STEROID INJECTION Right 11/25/2020    Procedure: Injection, Steroid, Epidural Transformainal;  Surgeon: Wiley Crane Jr., MD;  Location: [facility] ENDO;  Service: Pain Management;  Laterality: Right;  Right L5 + S1 TF LEAH  Arrive @ 1245; ASA and Plavix last 11/17; Pre-DM; Needs MD Sign.    EPIDURAL STEROID INJECTION Right 2/19/2021    Procedure: Injection, Steroid, Epidural Transforaminal;  Surgeon: Wiley Crane Jr., MD;  Location: [facility] ENDO;  Service: Pain Management;  Laterality: Right;  Right L5 + S1 TF LEAH  Arrive @ 1115; ASA & Plavix last 2/11; Check BG; Needs Consent    EXTERNAL EAR SURGERY      EYE SURGERY      cataracts    ILIAC ARTERY STENT Bilateral 06/2003    also Right External Iliac stent     Family History   Problem Relation Age of Onset    Stroke Mother      Social History     Tobacco Use    Smoking status: Every Day     Current packs/day: 1.50     Average packs/day: 1.5 packs/day for 71.0 years (106.5 ttl pk-yrs)     Types: Cigarettes    Smokeless tobacco: Former   Substance Use Topics    Alcohol use: No    Drug use: No     Review of Systems    Physical Exam     Initial Vitals [12/20/23 1535]   BP Pulse Resp Temp SpO2   (!) 142/60 78 18 98.2 °F (36.8 °C) 97 %      MAP       --         Physical Exam    Nursing note and vitals reviewed.  Constitutional:   Mildly agitated, no acute distress   HENT:   Head: Normocephalic and atraumatic.   Eyes: EOM are normal. Pupils are equal, round, and reactive to light.   Neck: Neck supple.   Normal range of motion.  Cardiovascular:  Normal rate and regular rhythm.           Pulmonary/Chest: Breath sounds normal. No respiratory distress.   Abdominal: Abdomen is soft. There is no abdominal tenderness.   Musculoskeletal:         General: Edema present. No tenderness. Normal range of motion.      Cervical back: Normal range of motion and neck supple.      Comments: 1+ pitting edema bilaterally to lower extremities      Neurological: He is alert and oriented to person, place, and time. GCS score is 15. GCS eye subscore is 4. GCS verbal subscore is 5. GCS motor subscore is 6.   Skin: Skin is warm and dry. Capillary refill takes less than 2 seconds.   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          ED Course   Procedures  Labs Reviewed   URINALYSIS, REFLEX TO URINE CULTURE - Abnormal; Notable for the following components:       Result Value    Appearance, UA Hazy (*)     Protein, UA 1+ (*)     Leukocytes, UA 3+ (*)     All other components within normal limits    Narrative:     Specimen Source->Urine   CBC W/ AUTO DIFFERENTIAL - Abnormal; Notable for the following components:    RBC 3.29 (*)     Hemoglobin 10.2 (*)     Hematocrit 31.4 (*)     RDW 17.8 (*)     Immature Granulocytes 1.1 (*)     Immature Grans (Abs) 0.09 (*)     Eos # 1.0 (*)     Eosinophil % 11.2 (*)     All other components within normal limits   COMPREHENSIVE METABOLIC PANEL - Abnormal; Notable for the following components:    Potassium 6.9 (*)     CO2 20 (*)     BUN 31 (*)     Creatinine 2.5 (*)     Albumin 3.2 (*)     eGFR 24 (*)     All other components within normal limits    Narrative:       K  critical result(s) called and verbal readback obtained from JOLENE ZEE 12/20/23 @ 07:15 PM  by DANE 12/20/2023 19:15   TROPONIN I - Abnormal; Notable for the following components:    Troponin I 0.053 (*)     All other components within normal limits   B-TYPE NATRIURETIC PEPTIDE - Abnormal; Notable for the following components:     (*)     All other components within normal limits   URINALYSIS MICROSCOPIC - Abnormal; Notable for the following components:    WBC, UA >100 (*)     Bacteria Moderate (*)     Hyaline Casts, UA 15 (*)     All other components within normal limits    Narrative:     Specimen Source->Urine   ISTAT PROCEDURE - Abnormal; Notable for the following  components:    POC PH 7.315 (*)     POC PCO2 45.1 (*)     POC PO2 18 (*)     POC HCO3 23.0 (*)     POC BE -3 (*)     All other components within normal limits   CULTURE, BLOOD   CULTURE, BLOOD   CULTURE, URINE   LACTIC ACID, PLASMA   PSUEDOCHOLINESTERASE DIBUCAINE INHIB   BASIC METABOLIC PANEL   POCT GLUCOSE   POCT GLUCOSE MONITORING CONTINUOUS   POCT GLUCOSE MONITORING CONTINUOUS   POCT GLUCOSE MONITORING CONTINUOUS          Imaging Results              CT Head Without Contrast (Final result)  Result time 12/20/23 21:13:53      Final result by Jairo Sullivan MD (12/20/23 21:13:53)                   Impression:      Stable examination.  Additional evaluation, as clinically warranted.      Electronically signed by: Jairo Sullivan MD  Date:    12/20/2023  Time:    21:13               Narrative:    EXAMINATION:  CT HEAD WITHOUT CONTRAST    CLINICAL HISTORY:  Mental status change, unknown cause.    TECHNIQUE:  Low dose axial images were obtained through the head.  Coronal and sagittal reformations were also performed. Contrast was not administered.    COMPARISON:  11/28/2023.    10/14/2023.    FINDINGS:  There is stable appearance of the right parietal ventriculostomy shunt catheter with its tip terminating in the midline body of the left lateral ventricle.  The configuration of the ventricular system is unchanged with mild prominence of the ventricular system.  There is no evidence of worsening hydrocephalus.    There is no evidence of intracranial hemorrhage.  The gray-white differentiation is maintained.  There are hypodensities within the periventricular and subcortical white matter.  The gray-white differentiation is maintained.  There is no dense vessel sign.  There is no evidence of mass effect.                                       X-Ray Chest AP Portable (Final result)  Result time 12/20/23 17:13:38      Final result by Marcelino Daley MD (12/20/23 17:13:38)                   Impression:      No acute  abnormality.      Electronically signed by: Marcelino Dlaey  Date:    12/20/2023  Time:    17:13               Narrative:    EXAMINATION:  XR CHEST AP PORTABLE    CLINICAL HISTORY:  Altered mental status, unspecified    TECHNIQUE:  Single frontal view of the chest was performed.    COMPARISON:  Chest radiograph 10/14/2023    FINDINGS:  Lines and tubes: Partially imaged ventriculoperitoneal shunt catheter.    Heart and mediastinum: Unchanged, again noting postoperative changes from CABG.    Pleura: No pleural effusion or pneumothorax.    Lungs: Lungs are well inflated. No focal consolidations or evidence of pulmonary edema.    Soft tissue/bone: Postoperative changes from median sternotomy.                                       Medications   nicotine 21 mg/24 hr 1 patch (has no administration in time range)   sodium chloride 0.9% flush 10 mL (has no administration in time range)   acetaminophen tablet 650 mg (has no administration in time range)   famotidine (PF) injection 20 mg (has no administration in time range)   ondansetron injection 4 mg (has no administration in time range)   prochlorperazine injection Soln 5 mg (has no administration in time range)   melatonin tablet 6 mg (has no administration in time range)   albuterol-ipratropium 2.5 mg-0.5 mg/3 mL nebulizer solution 3 mL (has no administration in time range)   aspirin EC tablet 81 mg (has no administration in time range)   atorvastatin tablet 40 mg (has no administration in time range)   clopidogreL tablet 75 mg (has no administration in time range)   divalproex EC tablet 250 mg (has no administration in time range)   divalproex EC tablet 500 mg (500 mg Oral Given 12/20/23 9901)   metoprolol succinate (TOPROL-XL) 24 hr tablet 25 mg (has no administration in time range)   tamsulosin 24 hr capsule 0.4 mg (has no administration in time range)   vancomycin - pharmacy to dose (has no administration in time range)   glucose chewable tablet 16 g (has no  administration in time range)   glucose chewable tablet 24 g (has no administration in time range)   glucagon (human recombinant) injection 1 mg (has no administration in time range)   insulin aspart U-100 pen 0-5 Units (has no administration in time range)   dextrose 10% bolus 125 mL 125 mL (has no administration in time range)   dextrose 10% bolus 250 mL 250 mL (has no administration in time range)   vancomycin (VANCOCIN) 1,000 mg in dextrose 5 % (D5W) 250 mL IVPB (Vial-Mate) (1,000 mg Intravenous New Bag 12/20/23 2207)   0.9%  NaCl infusion (has no administration in time range)   cefTRIAXone (ROCEPHIN) 1 g in dextrose 5 % in water (D5W) 100 mL IVPB (MB+) (0 g Intravenous Stopped 12/20/23 1942)   morphine injection 2 mg (2 mg Intravenous Given 12/20/23 1846)   ondansetron injection 4 mg (4 mg Intravenous Given 12/20/23 1942)   albuterol nebulizer solution 15 mg (15 mg Nebulization Given 12/20/23 2011)   calcium gluconate 1 g in NS IVPB (premixed) (0 g Intravenous Stopped 12/20/23 2049)   insulin regular injection 5 Units 0.05 mL (5 Units Intravenous Given 12/20/23 2044)   dextrose 10% bolus 250 mL 250 mL (0 mLs Intravenous Stopped 12/20/23 2159)   sodium zirconium cyclosilicate packet 10 g (10 g Oral Given 12/20/23 1949)   furosemide injection 80 mg (80 mg Intravenous Given 12/20/23 1943)   LORazepam injection 1 mg (1 mg Intravenous Given 12/20/23 2158)   haloperidol lactate injection 2.5 mg (2.5 mg Intravenous Given 12/20/23 2233)     Medical Decision Making  Hemodynamically stable. Afebrile. Phonating and protecting the airway spontaneously. No clinical evidence for cardiovascular instability or impending airway compromise. Examination as above. Additional historians include family at bedside. Prior medical records reviewed. Per ED course. Current co-morbidities considered that will impact clinical decision making include as above.    Plan:  Labs, blood cx, urine cx. Pt refused head CT. He is AOX3. Will plan for  admission due the aforementioned.       Amount and/or Complexity of Data Reviewed  Labs: ordered.  Radiology:  Decision-making details documented in ED Course.    Risk  OTC drugs.  Prescription drug management.  Decision regarding hospitalization.               ED Course as of 12/20/23 2245   Wed Dec 20, 2023   1721 Recent hematology/oncology near reviewed.  Recent concern for possible cutaneous T-cell lymphoma and recently started on Rivoq which has improved his symptoms.  Patient is undergoing outpatient workup with Dermatology as well as to obtain bone marrow biopsy. [BG]   1825 X-Ray Chest AP Portable [BG]   1825 Patient's family is very upset, we had a very long discussion about the patient with nursing staff at bedside.  She said it has been very difficult to manage her father at home especially with the medication being terminated and the symptoms returning.  She says this is exactly what he looks like the last time he needed to be admitted to the hospital. [BG]   1938 Reassessed, patient mentating without complaints.  I informed him of the laboratory results.  Patient will be admitted for further evaluation. [BG]   2012 Critical care time spent on the evaluation and treatment of severe organ dysfunction, review of pertinent labs and imaging studies, discussions with consulting providers and discussions with patient/family: 120 minutes.   [BG]      ED Course User Index  [BG] Jose Hill MD          Patient became agitated during evaluation ruled out his IVs.  In order to facilitate management for his critical illness, Ativan as well as Haldol were provided.  Patient will be admitted to the intensive care unit for further evaluation.                 Clinical Impression:  Final diagnoses:  [R41.82] Altered mental status  [E87.5] Hyperkalemia          ED Disposition Condition    Admit                 Jose Hill MD  12/20/23 2245

## 2023-12-21 NOTE — SUBJECTIVE & OBJECTIVE
Past Medical History:   Diagnosis Date    Actinic keratosis     Anxiety     B12 deficiency 12/8/2014    Dx 6/14    Cancer     skin    Cataract     Coronary artery disease     Diabetes mellitus type II     Diabetes mellitus with peripheral circulatory disorder 6/18/2014    ED (erectile dysfunction)     Hyperlipidemia     Kidney stone     Neurogenic claudication 4/4/2022    Normocytic anemia 10/15/2023    Peripheral vascular disease     Spondylosis 3/29/2019    Tobacco dependence     Urinary incontinence 5/4/2021       Past Surgical History:   Procedure Laterality Date    APPENDECTOMY      CARDIAC SURGERY  01/1999    CABG 4 vessels    CATARACT EXTRACTION      EPIDURAL STEROID INJECTION Right 8/26/2020    Procedure: Injection, Steroid, Epidural Transforaminal;  Surgeon: Wiley Crane Jr., MD;  Location: [facility] ENDO;  Service: Pain Management;  Laterality: Right;  Right L5 + S1 TF LEAH  Arrive @ 1115; ASA & Plavix last 8/18; Check BG; Rapid COVID test    EPIDURAL STEROID INJECTION Right 11/25/2020    Procedure: Injection, Steroid, Epidural Transformainal;  Surgeon: Wiley Crane Jr., MD;  Location: [facility] ENDO;  Service: Pain Management;  Laterality: Right;  Right L5 + S1 TF LEAH  Arrive @ 1245; ASA and Plavix last 11/17; Pre-DM; Needs MD Sign.    EPIDURAL STEROID INJECTION Right 2/19/2021    Procedure: Injection, Steroid, Epidural Transforaminal;  Surgeon: Wiley Crane Jr., MD;  Location: [facility] ENDO;  Service: Pain Management;  Laterality: Right;  Right L5 + S1 TF LEAH  Arrive @ 1115; ASA & Plavix last 2/11; Check BG; Needs Consent    EXTERNAL EAR SURGERY      EYE SURGERY      cataracts    ILIAC ARTERY STENT Bilateral 06/2003    also Right External Iliac stent       Review of patient's allergies indicates:   Allergen Reactions    Demerol [meperidine] Nausea Only       No current facility-administered medications on file prior to encounter.     Current Outpatient Medications on File Prior to Encounter    Medication Sig    acetaminophen (TYLENOL) 500 MG tablet Take 1 tablet (500 mg total) by mouth every 6 (six) hours as needed for Pain.    aspirin (ECOTRIN) 81 MG EC tablet Take 1 tablet (81 mg total) by mouth once daily.    atorvastatin (LIPITOR) 40 MG tablet Take 1 tablet (40 mg total) by mouth once daily.    blood sugar diagnostic (TRUE METRIX GLUCOSE TEST STRIP) Strp TID    clopidogreL (PLAVIX) 75 mg tablet Take 1 tablet (75 mg total) by mouth once daily.    divalproex (DEPAKOTE) 125 MG EC tablet Take by mouth.    metFORMIN (GLUCOPHAGE) 500 MG tablet Take 1 tablet (500 mg total) by mouth 2 (two) times daily with meals. HOLD FOR GLUCOSE LESS 100    metoprolol succinate (TOPROL-XL) 25 MG 24 hr tablet Take 1 tablet (25 mg total) by mouth once daily.    mometasone 0.1% (ELOCON) 0.1 % cream Apply topically 2 (two) times daily. Apply to arms ,hands, and legs BID    predniSONE (DELTASONE) 10 MG tablet Take 10 mg by mouth.    tamsulosin (FLOMAX) 0.4 mg Cap Take 1 capsule (0.4 mg total) by mouth once daily.    triamcinolone acetonide 0.1% (KENALOG) 0.1 % cream Apply topically 2 (two) times daily.     Family History       Problem Relation (Age of Onset)    Stroke Mother          Tobacco Use    Smoking status: Every Day     Current packs/day: 1.50     Average packs/day: 1.5 packs/day for 71.0 years (106.5 ttl pk-yrs)     Types: Cigarettes    Smokeless tobacco: Former   Substance and Sexual Activity    Alcohol use: No    Drug use: No    Sexual activity: Not Currently     Partners: Female     Review of Systems   Unable to perform ROS: Mental status change     Objective:     Vital Signs (Most Recent):  Temp: 97.8 °F (36.6 °C) (12/20/23 1903)  Pulse: 86 (12/20/23 2011)  Resp: 15 (12/20/23 2011)  BP: (!) 151/70 (12/20/23 1903)  SpO2: (!) 93 % (12/20/23 2011) Vital Signs (24h Range):  Temp:  [97.8 °F (36.6 °C)-98.2 °F (36.8 °C)] 97.8 °F (36.6 °C)  Pulse:  [76-86] 86  Resp:  [15-18] 15  SpO2:  [93 %-98 %] 93 %  BP:  (142-151)/(60-70) 151/70     Weight: 64.4 kg (142 lb)  Body mass index is 22.24 kg/m².     Physical Exam  Vitals and nursing note reviewed.   Constitutional:       General: He is not in acute distress.     Appearance: Normal appearance. He is not ill-appearing.   HENT:      Head: Normocephalic and atraumatic.      Nose: Nose normal.      Mouth/Throat:      Mouth: Mucous membranes are moist.   Eyes:      Extraocular Movements: Extraocular movements intact.   Cardiovascular:      Rate and Rhythm: Normal rate.      Pulses: Normal pulses.      Heart sounds: No murmur heard.  Pulmonary:      Effort: Pulmonary effort is normal. No respiratory distress.   Abdominal:      General: Abdomen is flat.      Palpations: Abdomen is soft.      Tenderness: There is no abdominal tenderness.   Musculoskeletal:      Right lower leg: Edema present.      Left lower leg: Edema present.   Skin:     General: Skin is warm.      Capillary Refill: Capillary refill takes less than 2 seconds.      Comments: Generalized rash involving upper and lower extremities.    Neurological:      Mental Status: He is alert and oriented to person, place, and time.      Comments: Oriented but unable to answer questions appropriately. No focal deficits appreciated                 Significant Labs: All pertinent labs within the past 24 hours have been reviewed.    Significant Imaging: I have reviewed all pertinent imaging results/findings within the past 24 hours.

## 2023-12-21 NOTE — ED NOTES
Pt restless, not following commands, trying to get out of bed, swinging at hospital staff.  Unable to redirect and trying to pull out IV's Dr Hill notified, Haldol will be given as ordered.

## 2023-12-21 NOTE — HPI
This is an 88-year-old male with a past medical history of CAD, COPD (not on O2), hypertension, hyperlipidemia, CKD 3, type 2 diabetes, peripheral artery disease, NPH, PMR, 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 This medication has been on hold for a few days pending a biopsy, however, patient developed worsening hallucinations and mental status changes for several days. Patient denies having any complaints.

## 2023-12-21 NOTE — ASSESSMENT & PLAN NOTE
Patient's FSGs are controlled on current medication regimen.  Last A1c reviewed-   Lab Results   Component Value Date    HGBA1C 6.9 (H) 10/15/2023     Most recent fingerstick glucose reviewed-   Recent Labs   Lab 12/21/23  0209 12/21/23  0245 12/21/23  0438 12/21/23  1154   POCTGLUCOSE 120* 440* 118* 118*       Current correctional scale  Low  Maintain anti-hyperglycemic dose as follows-   Antihyperglycemics (From admission, onward)    Start     Stop Route Frequency Ordered    12/20/23 2152  insulin aspart U-100 pen 0-5 Units         -- SubQ Before meals & nightly PRN 12/20/23 2052        Hold Oral hypoglycemics while patient is in the hospital.

## 2023-12-21 NOTE — ED NOTES
Pt still restless and pulling on medical equipment.  O2 sat 89% room air.  O2 per NC placed at 2L to get O2 sat to 94%

## 2023-12-21 NOTE — PROGRESS NOTES
87 yo male active smoker w/ PMHx CAD, COPD not on home O2, HTN, DM, CKD, HL, PVD, NPH. 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     Now noted to have UTI w/ LATRICE on CKD and hyperkalemia.     Cr 1.3 > 2.5  K 6.9 w/ EKG changes.     CXR:  - No acute process.     S/p acute correction meds, now admitted to the ICU for observation.   HD stable.     7.32/45 VBG    Now on NS @125ml/hr  F/up UOP.     Trop mild +ve, likely demand ischemia.   BNP 300s    LA 1.3    S/p blood and urine cx, pt is on ceftriaxone and vanco.   Would request Pharmacy to renally dose pt.     SCDs for DVT prophy.   Nicotine patch for withdrawal sympoms.   Sugar control.     CT head in the ED was negative for acute findings.     Detailed H&P noted in the chart.   Plan as per bedside team.   Please call us for further assistance.

## 2023-12-21 NOTE — ASSESSMENT & PLAN NOTE
Patient with acute kidney injury/acute renal failure likely due to pre-renal azotemia due to IVVD LATRICE is currently worsening. Baseline creatinine  1.3  - Labs reviewed- Renal function/electrolytes with Estimated Creatinine Clearance: 19.3 mL/min (A) (based on SCr of 2.5 mg/dL (H)). according to latest data. Monitor urine output and serial BMP and adjust therapy as needed. Avoid nephrotoxins and renally dose meds for GFR listed above.  - Cr relatively stable as yesterday but still above usual baseline

## 2023-12-22 PROBLEM — Z71.89 GOALS OF CARE, COUNSELING/DISCUSSION: Status: ACTIVE | Noted: 2021-05-26

## 2023-12-22 LAB
ANION GAP SERPL CALC-SCNC: 15 MMOL/L (ref 8–16)
BACTERIA UR CULT: ABNORMAL
BASOPHILS # BLD AUTO: 0.05 K/UL (ref 0–0.2)
BASOPHILS NFR BLD: 0.6 % (ref 0–1.9)
BUN SERPL-MCNC: 26 MG/DL (ref 8–23)
CALCIUM SERPL-MCNC: 8.3 MG/DL (ref 8.7–10.5)
CHLORIDE SERPL-SCNC: 108 MMOL/L (ref 95–110)
CHOLINESTERASE DIBUCAINE/CHE CFR SERPL: 80 %
CHOLINESTERASE SERPL-CCNC: 1398 U/L (ref 2900–7100)
CO2 SERPL-SCNC: 20 MMOL/L (ref 23–29)
CREAT SERPL-MCNC: 2 MG/DL (ref 0.5–1.4)
DIFFERENTIAL METHOD BLD: ABNORMAL
EOSINOPHIL # BLD AUTO: 0.7 K/UL (ref 0–0.5)
EOSINOPHIL NFR BLD: 8.5 % (ref 0–8)
ERYTHROCYTE [DISTWIDTH] IN BLOOD BY AUTOMATED COUNT: 17.7 % (ref 11.5–14.5)
EST. GFR  (NO RACE VARIABLE): 32 ML/MIN/1.73 M^2
GLUCOSE SERPL-MCNC: 123 MG/DL (ref 70–110)
HCT VFR BLD AUTO: 27.5 % (ref 40–54)
HGB BLD-MCNC: 8.9 G/DL (ref 14–18)
IMM GRANULOCYTES # BLD AUTO: 0.06 K/UL (ref 0–0.04)
IMM GRANULOCYTES NFR BLD AUTO: 0.7 % (ref 0–0.5)
INFLUENZA A, MOLECULAR: NEGATIVE
INFLUENZA B, MOLECULAR: NEGATIVE
LYMPHOCYTES # BLD AUTO: 1.9 K/UL (ref 1–4.8)
LYMPHOCYTES NFR BLD: 22.4 % (ref 18–48)
MCH RBC QN AUTO: 30.7 PG (ref 27–31)
MCHC RBC AUTO-ENTMCNC: 32.4 G/DL (ref 32–36)
MCV RBC AUTO: 95 FL (ref 82–98)
MONOCYTES # BLD AUTO: 0.9 K/UL (ref 0.3–1)
MONOCYTES NFR BLD: 10 % (ref 4–15)
NEUTROPHILS # BLD AUTO: 5 K/UL (ref 1.8–7.7)
NEUTROPHILS NFR BLD: 57.8 % (ref 38–73)
NRBC BLD-RTO: 0 /100 WBC
PLATELET # BLD AUTO: 301 K/UL (ref 150–450)
PMV BLD AUTO: 9.2 FL (ref 9.2–12.9)
POCT GLUCOSE: 120 MG/DL (ref 70–110)
POCT GLUCOSE: 122 MG/DL (ref 70–110)
POCT GLUCOSE: 130 MG/DL (ref 70–110)
POCT GLUCOSE: 95 MG/DL (ref 70–110)
POTASSIUM SERPL-SCNC: 4.5 MMOL/L (ref 3.5–5.1)
RBC # BLD AUTO: 2.9 M/UL (ref 4.6–6.2)
SARS-COV-2 RDRP RESP QL NAA+PROBE: NEGATIVE
SODIUM SERPL-SCNC: 143 MMOL/L (ref 136–145)
SPECIMEN SOURCE: NORMAL
WBC # BLD AUTO: 8.57 K/UL (ref 3.9–12.7)

## 2023-12-22 PROCEDURE — 87502 INFLUENZA DNA AMP PROBE: CPT | Mod: HCNC | Performed by: STUDENT IN AN ORGANIZED HEALTH CARE EDUCATION/TRAINING PROGRAM

## 2023-12-22 PROCEDURE — 97161 PT EVAL LOW COMPLEX 20 MIN: CPT | Mod: HCNC | Performed by: PHYSICAL THERAPIST

## 2023-12-22 PROCEDURE — 82607 VITAMIN B-12: CPT | Mod: HCNC | Performed by: STUDENT IN AN ORGANIZED HEALTH CARE EDUCATION/TRAINING PROGRAM

## 2023-12-22 PROCEDURE — 36415 COLL VENOUS BLD VENIPUNCTURE: CPT | Mod: HCNC | Performed by: STUDENT IN AN ORGANIZED HEALTH CARE EDUCATION/TRAINING PROGRAM

## 2023-12-22 PROCEDURE — 25000003 PHARM REV CODE 250: Mod: HCNC | Performed by: STUDENT IN AN ORGANIZED HEALTH CARE EDUCATION/TRAINING PROGRAM

## 2023-12-22 PROCEDURE — 99900035 HC TECH TIME PER 15 MIN (STAT): Mod: HCNC

## 2023-12-22 PROCEDURE — 11000001 HC ACUTE MED/SURG PRIVATE ROOM: Mod: HCNC

## 2023-12-22 PROCEDURE — U0002 COVID-19 LAB TEST NON-CDC: HCPCS | Mod: HCNC | Performed by: STUDENT IN AN ORGANIZED HEALTH CARE EDUCATION/TRAINING PROGRAM

## 2023-12-22 PROCEDURE — 94761 N-INVAS EAR/PLS OXIMETRY MLT: CPT | Mod: HCNC

## 2023-12-22 PROCEDURE — 99223 1ST HOSP IP/OBS HIGH 75: CPT | Mod: HCNC,,, | Performed by: STUDENT IN AN ORGANIZED HEALTH CARE EDUCATION/TRAINING PROGRAM

## 2023-12-22 PROCEDURE — 99223 1ST HOSP IP/OBS HIGH 75: CPT | Mod: HCNC,25,, | Performed by: REGISTERED NURSE

## 2023-12-22 PROCEDURE — 99497 ADVNCD CARE PLAN 30 MIN: CPT | Mod: HCNC,25,, | Performed by: REGISTERED NURSE

## 2023-12-22 PROCEDURE — 25000003 PHARM REV CODE 250: Mod: HCNC | Performed by: ANESTHESIOLOGY

## 2023-12-22 PROCEDURE — 97165 OT EVAL LOW COMPLEX 30 MIN: CPT | Mod: HCNC

## 2023-12-22 PROCEDURE — 80048 BASIC METABOLIC PNL TOTAL CA: CPT | Mod: HCNC | Performed by: STUDENT IN AN ORGANIZED HEALTH CARE EDUCATION/TRAINING PROGRAM

## 2023-12-22 PROCEDURE — 85025 COMPLETE CBC W/AUTO DIFF WBC: CPT | Mod: HCNC | Performed by: STUDENT IN AN ORGANIZED HEALTH CARE EDUCATION/TRAINING PROGRAM

## 2023-12-22 PROCEDURE — 63600175 PHARM REV CODE 636 W HCPCS: Mod: HCNC | Performed by: STUDENT IN AN ORGANIZED HEALTH CARE EDUCATION/TRAINING PROGRAM

## 2023-12-22 RX ORDER — FAMOTIDINE 20 MG/1
20 TABLET, FILM COATED ORAL DAILY
Status: DISCONTINUED | OUTPATIENT
Start: 2023-12-22 | End: 2023-12-29 | Stop reason: HOSPADM

## 2023-12-22 RX ORDER — LORAZEPAM 0.5 MG/1
0.5 TABLET ORAL ONCE
Status: COMPLETED | OUTPATIENT
Start: 2023-12-22 | End: 2023-12-22

## 2023-12-22 RX ORDER — IBUPROFEN 200 MG
1 TABLET ORAL DAILY
Status: DISCONTINUED | OUTPATIENT
Start: 2023-12-23 | End: 2023-12-29 | Stop reason: HOSPADM

## 2023-12-22 RX ADMIN — ASPIRIN 81 MG: 81 TABLET, COATED ORAL at 09:12

## 2023-12-22 RX ADMIN — ATORVASTATIN CALCIUM 40 MG: 40 TABLET, FILM COATED ORAL at 09:12

## 2023-12-22 RX ADMIN — HALOPERIDOL LACTATE 5 MG: 5 INJECTION, SOLUTION INTRAMUSCULAR at 01:12

## 2023-12-22 RX ADMIN — Medication 6 MG: at 08:12

## 2023-12-22 RX ADMIN — CLOPIDOGREL BISULFATE 75 MG: 75 TABLET ORAL at 09:12

## 2023-12-22 RX ADMIN — MUPIROCIN: 20 OINTMENT TOPICAL at 10:12

## 2023-12-22 RX ADMIN — METOPROLOL SUCCINATE 25 MG: 25 TABLET, EXTENDED RELEASE ORAL at 09:12

## 2023-12-22 RX ADMIN — DIVALPROEX SODIUM 250 MG: 250 TABLET, DELAYED RELEASE ORAL at 09:12

## 2023-12-22 RX ADMIN — TAMSULOSIN HYDROCHLORIDE 0.4 MG: 0.4 CAPSULE ORAL at 09:12

## 2023-12-22 RX ADMIN — DIVALPROEX SODIUM 500 MG: 250 TABLET, DELAYED RELEASE ORAL at 08:12

## 2023-12-22 RX ADMIN — FAMOTIDINE 20 MG: 20 TABLET ORAL at 09:12

## 2023-12-22 RX ADMIN — MUPIROCIN: 20 OINTMENT TOPICAL at 08:12

## 2023-12-22 RX ADMIN — LORAZEPAM 0.5 MG: 0.5 TABLET ORAL at 07:12

## 2023-12-22 RX ADMIN — CEFTRIAXONE 2 G: 2 INJECTION, POWDER, FOR SOLUTION INTRAMUSCULAR; INTRAVENOUS at 09:12

## 2023-12-22 NOTE — NURSING
[facility] [place]  Nurses Note -- 4 Eyes      12/21/2023       Skin assessed on: Q Shift      [x] No Pressure Injuries Present    [x]Prevention Measures Documented    [] Yes LDA  for Pressure Injury Previously documented     [] Yes New Pressure Injury Discovered   [] LDA for New Pressure Injury Added      Attending RN:  Alicia Berry RN     Second RN:  ANUPAM Christensen

## 2023-12-22 NOTE — PT/OT/SLP EVAL
"Occupational Therapy Evaluation     Name: Narciso Yanez  MRN: [id number]  Admitting Diagnosis: Encephalopathy, metabolic  Recent Surgery: * No surgery found *      Recommendations:     Discharge Recommendations: Moderate Intensity Therapy  Level of Assistance Recommended: 24 hours significant assistance  Discharge Equipment Recommendations: to be determined by next level of care  Barriers to discharge: Other (Comment)    Assessment:     Narciso Yanez is a 88 y.o. male with a medical diagnosis of Encephalopathy, metabolic. He presents with performance deficits affecting function including weakness, impaired endurance, impaired self care skills, impaired functional mobility, gait instability, impaired balance, impaired cognition, decreased coordination, decreased upper extremity function, decreased lower extremity function, decreased safety awareness, decreased ROM. Patient sitting EOB with RN and PCT when occupational therapy and PT arrived and oriented x 2 to person and place only and could name birth date . He has reddened rash on bottom, but no c/o itching.     Rehab Prognosis: Fair;  patient would benefit from acute OT services to address these deficits and reach maximum level of function.    Plan:     Patient to be seen 5 x/week to address the above listed problems via self-care/home management, therapeutic activities, therapeutic exercises  Plan of Care Expires: 01/05/24  Plan of Care Reviewed with: patient    Subjective     Chief Complaint: rash   Patient Comments/Goals: wanted to go home to "take care of 10 y/o grandson at 10 pm tonight"  Pain/Comfort:  Pain Rating 1: 0/10    Patients cultural, spiritual, [employer] conflicts given the current situation: no    Social History:  Living Environment: Patient lives with their daughter in a single story home with number of outside stair(s): 1   Prior Level of Function: Prior to admission, patient requires assistance with ADLs including transportation   Roles and " Routines: Patient was not driving and not working prior to admission.  Equipment Used at Home: walker, rolling, shower chair, wheelchair, bedside commode, other (see comments) (lift device)  DME owned (not currently used): none  Assistance Upon Discharge: family    Objective:     Communicated with RNAna Lilia, prior to session. Patient found sitting edge of bed with blood pressure cuff, walls catheter, peripheral IV, pulse ox (continuous), telemetry, restraints upon OT entry to room.    General Precautions: Standard, fall   Orthopedic Precautions: N/A   Braces: N/A    Respiratory Status: Room air    Occupational Performance    Gait belt applied - Yes    Bed Mobility:   Scooting anteriorly to EOB to have both feet planted on floor: maximal assistance and of 2 persons    Functional Mobility/Transfers:  Sit <> Stand Transfer with maximal assistance and 2  persons with rolling walker  Bed <> Chair Transfer using Step Transfer technique with maximal assistance and 2  persons with rolling walker  Functional Mobility: Patient took   steps with PT and occupational therapy from bed to chair with max assist x 2 people for safety due to tremors, weakness in lower extremity and disorientation to situation.     Activities of Daily Living:  Upper Body Dressing: total assistance to don outer gown     Cognitive/Visual Perceptual:  Cognitive/Psychosocial Skills:    -     Oriented to: Person and Place  -     Follows Commands/attention: Inattentive, Easily distracted, and Follows one-step commands  -     Communication: clear/fluent  -     Memory: Impaired STM, Impaired LTM, and Poor immediate recall  -     Safety awareness/insight to disability: impaired  -     Mood/Affect/Coping skills/emotional control: Agitated and disoriented due to wanting to return home   Visual/Perceptual: intact     Physical Exam:  Balance:    -     Sitting: supervision  -     Standing: moderate assistance  Postural examination/scapula alignment:    -       No  postural abnormalities identified  Skin integrity: reddened rash on buttocks and B lower extremity   Edema:  None noted  Sensation:    -       Intact  Motor Planning: Intact  Dominant hand: Right  Upper Extremity Range of Motion:     -       Right Upper Extremity: WNL  -       Left Upper Extremity: WNL  Upper Extremity Strength:    -       Right Upper Extremity: WNL  -       Left Upper Extremity: WNL   Strength:    -       Right Upper Extremity: WNL  -       Left Upper Extremity: WNL  Fine Motor Coordination:    -       Intact  Gross motor coordination:   WFL    AMPAC 6 Click ADL:  AMPAC Total Score: 12    Treatment & Education:  Patient educated on role of OT, POC, and goals for therapy  Patient educated on importance of OOB activities with staff member assistance and sitting OOB majority of the day  Patient educated re: safety during t/fs including pushing off surfaces with both arms.     Patient clear to stand pivot transfer with RN/PCT, assist x1-2 .    Patient left up in chair with all lines intact, call button in reach, RN notified, and restraint cuffs  .    GOALS:   Multidisciplinary Problems       Occupational Therapy Goals          Problem: Occupational Therapy    Goal Priority Disciplines Outcome Interventions   Occupational Therapy Goal     OT, PT/OT Ongoing, Progressing    Description: Goals to be met by: 01/05/24     Patient will increase functional independence with ADLs by performing:    UE Dressing with [place].  LE Dressing with Supervision.  Grooming while seated at sink with Set-up Assistance.  Toileting from bedside commode with Supervision for hygiene and clothing management.   Sitting in chair x 30-60 minutes with Supervision.  Toilet transfer to bedside commode with Supervision.  Upper extremity exercise program x10 reps per handout, with supervision.                         History:     Past Medical History:   Diagnosis Date    Actinic keratosis     Anxiety     B12 deficiency  12/8/2014    Dx 6/14    Cancer     skin    Cataract     Coronary artery disease     Diabetes mellitus type II     Diabetes mellitus with peripheral circulatory disorder 6/18/2014    ED (erectile dysfunction)     Hyperlipidemia     Kidney stone     Neurogenic claudication 4/4/2022    Normocytic anemia 10/15/2023    Peripheral vascular disease     Spondylosis 3/29/2019    Tobacco dependence     Urinary incontinence 5/4/2021         Past Surgical History:   Procedure Laterality Date    APPENDECTOMY      CARDIAC SURGERY  01/1999    CABG 4 vessels    CATARACT EXTRACTION      EPIDURAL STEROID INJECTION Right 8/26/2020    Procedure: Injection, Steroid, Epidural Transforaminal;  Surgeon: Wiley Crane Jr., MD;  Location: [facility] ENDO;  Service: Pain Management;  Laterality: Right;  Right L5 + S1 TF LEAH  Arrive @ 1115; ASA & Plavix last 8/18; Check BG; Rapid COVID test    EPIDURAL STEROID INJECTION Right 11/25/2020    Procedure: Injection, Steroid, Epidural Transformainal;  Surgeon: Wiley Crane Jr., MD;  Location: [facility] ENDO;  Service: Pain Management;  Laterality: Right;  Right L5 + S1 TF LEAH  Arrive @ 1245; ASA and Plavix last 11/17; Pre-DM; Needs MD Sign.    EPIDURAL STEROID INJECTION Right 2/19/2021    Procedure: Injection, Steroid, Epidural Transforaminal;  Surgeon: Wiley Crane Jr., MD;  Location: [facility] ENDO;  Service: Pain Management;  Laterality: Right;  Right L5 + S1 TF LEAH  Arrive @ 1115; ASA & Plavix last 2/11; Check BG; Needs Consent    EXTERNAL EAR SURGERY      EYE SURGERY      cataracts    ILIAC ARTERY STENT Bilateral 06/2003    also Right External Iliac stent       Time Tracking:     OT Date of Treatment: 12/22/23  OT Start Time: 0938  OT Stop Time: 0953  OT Total Time (min): 15 min    Billable Minutes: Evaluation 15 ; co-eval with PT     12/22/2023

## 2023-12-22 NOTE — ASSESSMENT & PLAN NOTE
- pt with history of hospital/UTI associated delirium; current delirium began prior to admission so seems more related to UTI   - daughter shares increase in episodes of delirium in the past  months  - baseline pt is very oriented and good historian per daughter, prior to 6 months ago pt was independent and driving  - recent baseline pt is typically fully oriented, able to perform most ADL's with limited assistance or set up, no major ambulatory issues    - continued management per hospital primary

## 2023-12-22 NOTE — CONSULTS
[facility] - Intensive Care  Neurology  Consult Note    Patient Name: Narciso Yanez  MRN: [id number]  Admission Date: 12/20/2023  Hospital Length of Stay: 2 days  Code Status: DNR   Attending Provider: Kodak Rivera MD   Consulting Provider: Lokesh Quezada MD  Primary Care Physician: Marcelino Del Toro MD  Principal Problem:Encephalopathy, metabolic    Inpatient consult to Neurology  Consult performed by: Lokesh Quezada MD  Consult ordered by: Kdoak Rivera MD         Subjective:     Chief Complaint:  Encephalopathy      HPI:   Mr. 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 And neurology has been consulted for the same.    History from medical records review / from the daughter. Recent admission in Oct 2023 - for encephalopathy / hallucinations / agitations - with management in the spectrum of hyperactive delirium / UTI related encephalopathy. However despite the discharge - had intermittent episodes of hyperactive agitative encephalopathy / with worsening episodes over the time and hence the re-admission. Daughter reported of ongoing evaluation for T-cell cutaneous lymphoma / and worsening encephalopathy correlating to worsening skin lesions. Fluctuating pattern - with no clinical concerns for any seizure or focal motor or sensory or cranial nerve deficits. Denied any background of primary dementia syndromes. No history of strokes / seizures or complex migraines. Background includes tremors - tremolousness in extremities - with action component greater than rest / intermittent basis - with no clear primary tremors physiologies.      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 This medication has been on hold for a few days  pending a biopsy, however, patient developed worsening hallucinations and mental status changes for several days. Patient denies having any complaints. The ED, the patient was hemodynamically stable.  Labs were remarkable for hyperkalemia (6.9), normocytic anemia (10.2), elevated creatinine (2.5-baseline of 1.3), elevated BNP (399), elevated troponin (0.053). VBG showed a pH of 7.31, pCO2 of 45.1. UA showed +3 leukocytes, > 100 WBCs, moderate bacteria.  EKG showed showed peaked t-waves. Chest x-ray showed no acute process.  Patient was given insulin/D10, albuterol, calcium gluconate, Lasix 80 mg IV, morphine 2 mg IV, Zofran 4 mg IV. Patient was admitted for further management.     Currently on Abx / LATRICE management - Patient awake / alert / following simple commands / oriented to self - not to time / no insight / agitated - with no focal motor or sensory or cranial nerve deficits.      Past Medical History:   Diagnosis Date    Actinic keratosis     Anxiety     B12 deficiency 12/8/2014    Dx 6/14    Cancer     skin    Cataract     Coronary artery disease     Diabetes mellitus type II     Diabetes mellitus with peripheral circulatory disorder 6/18/2014    ED (erectile dysfunction)     Hyperlipidemia     Kidney stone     Neurogenic claudication 4/4/2022    Normocytic anemia 10/15/2023    Peripheral vascular disease     Spondylosis 3/29/2019    Tobacco dependence     Urinary incontinence 5/4/2021       Past Surgical History:   Procedure Laterality Date    APPENDECTOMY      CARDIAC SURGERY  01/1999    CABG 4 vessels    CATARACT EXTRACTION      EPIDURAL STEROID INJECTION Right 8/26/2020    Procedure: Injection, Steroid, Epidural Transforaminal;  Surgeon: Wiley Crane Jr., MD;  Location: [facility];  Service: Pain Management;  Laterality: Right;  Right L5 + S1 TF LEAH  Arrive @ 1115; ASA & Plavix last 8/18; Check BG; Rapid COVID test    EPIDURAL STEROID INJECTION Right 11/25/2020    Procedure: Injection, Steroid,  Epidural Transformainal;  Surgeon: Wiley Crane Jr., MD;  Location: [facility] ENDO;  Service: Pain Management;  Laterality: Right;  Right L5 + S1 TF LEAH  Arrive @ 1245; ASA and Plavix last 11/17; Pre-DM; Needs MD Sign.    EPIDURAL STEROID INJECTION Right 2/19/2021    Procedure: Injection, Steroid, Epidural Transforaminal;  Surgeon: Wiley Crane Jr., MD;  Location: [facility] ENDO;  Service: Pain Management;  Laterality: Right;  Right L5 + S1 TF LEAH  Arrive @ 1115; ASA & Plavix last 2/11; Check BG; Needs Consent    EXTERNAL EAR SURGERY      EYE SURGERY      cataracts    ILIAC ARTERY STENT Bilateral 06/2003    also Right External Iliac stent       Review of patient's allergies indicates:   Allergen Reactions    Demerol [meperidine] Nausea Only       Current Neurological Medications:     No current facility-administered medications on file prior to encounter.     Current Outpatient Medications on File Prior to Encounter   Medication Sig    acetaminophen (TYLENOL) 500 MG tablet Take 1 tablet (500 mg total) by mouth every 6 (six) hours as needed for Pain.    aspirin (ECOTRIN) 81 MG EC tablet Take 1 tablet (81 mg total) by mouth once daily.    atorvastatin (LIPITOR) 40 MG tablet Take 1 tablet (40 mg total) by mouth once daily.    blood sugar diagnostic (TRUE METRIX GLUCOSE TEST STRIP) Strp TID    clopidogreL (PLAVIX) 75 mg tablet Take 1 tablet (75 mg total) by mouth once daily.    divalproex (DEPAKOTE) 125 MG EC tablet Take by mouth.    metFORMIN (GLUCOPHAGE) 500 MG tablet Take 1 tablet (500 mg total) by mouth 2 (two) times daily with meals. HOLD FOR GLUCOSE LESS 100    metoprolol succinate (TOPROL-XL) 25 MG 24 hr tablet Take 1 tablet (25 mg total) by mouth once daily.    mometasone 0.1% (ELOCON) 0.1 % cream Apply topically 2 (two) times daily. Apply to arms ,hands, and legs BID    predniSONE (DELTASONE) 10 MG tablet Take 10 mg by mouth.    tamsulosin (FLOMAX) 0.4 mg Cap Take 1 capsule (0.4 mg total) by mouth once  daily.    triamcinolone acetonide 0.1% (KENALOG) 0.1 % cream Apply topically 2 (two) times daily.     Family History       Problem Relation (Age of Onset)    Stroke Mother          Tobacco Use    Smoking status: Every Day     Current packs/day: 1.50     Average packs/day: 1.5 packs/day for 71.0 years (106.5 ttl pk-yrs)     Types: Cigarettes    Smokeless tobacco: Former   Substance and Sexual Activity    Alcohol use: No    Drug use: No    Sexual activity: Not Currently     Partners: Female     Review of Systems   Constitutional:  Negative for fever.   Respiratory:  Negative for shortness of breath.    Cardiovascular:  Negative for chest pain.   Neurological:  Negative for seizures, facial asymmetry and weakness.   Psychiatric/Behavioral:  Positive for agitation, confusion and decreased concentration.      Objective:     Vital Signs (Most Recent):  Temp: 97.5 °F (36.4 °C) (12/22/23 1200)  Pulse: 103 (12/22/23 1310)  Resp: 20 (12/22/23 1310)  BP: (!) 130/94 (12/22/23 1310)  SpO2: 97 % (12/22/23 1310) Vital Signs (24h Range):  Temp:  [96.6 °F (35.9 °C)-97.9 °F (36.6 °C)] 97.5 °F (36.4 °C)  Pulse:  [] 103  Resp:  [16-40] 20  SpO2:  [91 %-98 %] 97 %  BP: (116-172)/(58-94) 130/94     Weight: 62.8 kg (138 lb 7.2 oz)  Body mass index is 21.68 kg/m².     Physical Exam  HENT:      Head: Normocephalic and atraumatic.   Eyes:      Extraocular Movements: Extraocular movements intact and EOM normal.   Pulmonary:      Effort: No respiratory distress.   Psychiatric:         Speech: Speech normal.          NEUROLOGICAL EXAMINATION:     MENTAL STATUS   Disoriented to person.   Oriented to place.   Oriented to time.   Attention: decreased. Concentration: decreased.   Speech: speech is normal   Level of consciousness: alert    CRANIAL NERVES     CN III, IV, VI   Extraocular motions are normal.   Nystagmus: none   Ophthalmoparesis: none    CN VII   Facial expression full, symmetric.     MOTOR EXAM        No new focal motor deficits  "      SENSORY EXAM        No new focal sensory deficits       GAIT AND COORDINATION     Tremor   Resting tremor: present  Intention tremor: present      Significant Labs: Hemoglobin A1c: No results for input(s): "HGBA1C" in the last 720 hours.  Blood Culture:   Recent Labs   Lab 12/20/23 1825 12/20/23  1830   LABBLOO No Growth to date  No Growth to date No Growth to date  No Growth to date     BMP:   Recent Labs   Lab 12/20/23 1815 12/20/23 2227 12/21/23  0543   GLU 94 108 100    140 139   K 6.9* 6.2* 5.0    109 105   CO2 20* 20* 20*   BUN 31* 29* 31*   CREATININE 2.5* 2.5* 2.5*   CALCIUM 8.7 8.8 8.2*   MG  --   --  2.0     CBC:   Recent Labs   Lab 12/20/23 1815 12/21/23  0543   WBC 8.50 11.91   HGB 10.2* 9.1*   HCT 31.4* 29.3*    255     CMP:   Recent Labs   Lab 12/20/23 1815 12/20/23 2227 12/21/23  0543   GLU 94 108 100    140 139   K 6.9* 6.2* 5.0    109 105   CO2 20* 20* 20*   BUN 31* 29* 31*   CREATININE 2.5* 2.5* 2.5*   CALCIUM 8.7 8.8 8.2*   MG  --   --  2.0   PROT 8.1  --  7.2   ALBUMIN 3.2*  --  2.9*   BILITOT 0.2  --  0.2   ALKPHOS 88  --  75   AST 28  --  24   ALT 26  --  23   ANIONGAP 12 11 14     CSF Culture: No results for input(s): "CSFCULTURE" in the last 48 hours.  CSF Studies: No results for input(s): "ALIQUT", "APPEARCSF", "COLORCSF", "CSFWBC", "CSFRBC", "GLUCCSF", "LDHCSF", "PROTEINCSF", "VDRLCSF" in the last 48 hours.  Inflammatory Markers: No results for input(s): "SEDRATE", "CRP", "PROCAL" in the last 48 hours.  POCT Glucose:   Recent Labs   Lab 12/22/23  0022 12/22/23  0709 12/22/23  1107   POCTGLUCOSE 95 122* 130*     Prealbumin: No results for input(s): "PREALBUMIN" in the last 48 hours.  Respiratory Culture: No results for input(s): "GSRESP", "RESPIRATORYC" in the last 48 hours.  Urine Culture:   Recent Labs   Lab 12/20/23  1704   LABURIN STREPTOCOCCUS AGALACTIAE (GROUP B)  > 100,000 cfu/ml  Beta-hemolytic streptococci are routinely susceptible to "   penicillins,cephalosporins and carbapenems.  *     Urine Studies:   Recent Labs   Lab 12/20/23  1704   COLORU Yellow   APPEARANCEUA Hazy*   PHUR 6.0   SPECGRAV 1.015   PROTEINUA 1+*   GLUCUA Negative   KETONESU Negative   BILIRUBINUA Negative   OCCULTUA Negative   NITRITE Negative   UROBILINOGEN Negative   LEUKOCYTESUR 3+*   RBCUA 4   WBCUA >100*   BACTERIA Moderate*   HYALINECASTS 15*     Recent Lab Results         12/22/23  1107   12/22/23  0709   12/22/23  0022   12/21/23  1711        POCT Glucose 130   122   95   147             All pertinent lab results from the past 24 hours have been reviewed.    Significant Imaging: I have reviewed and interpreted all pertinent imaging results/findings within the past 24 hours.  Assessment and Plan:     * Encephalopathy, metabolic  Mr. 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 And neurology has been consulted for the same.    History from medical records review / from the daughter. Recent admission in Oct 2023 - for encephalopathy / hallucinations / agitations - with management in the spectrum of hyperactive delirium / UTI related encephalopathy. However despite the discharge - had intermittent episodes of hyperactive agitative encephalopathy / with worsening episodes over the time and hence the re-admission. Daughter reported of ongoing evaluation for T-cell cutaneous lymphoma / and worsening encephalopathy correlating to worsening skin lesions. Fluctuating pattern - with no clinical concerns for any seizure or focal motor or sensory or cranial nerve deficits. Denied any background of primary dementia syndromes. No history of strokes / seizures or complex migraines. Background includes tremors - tremolousness in extremities - with action component greater than rest / intermittent basis - with no  clear primary tremors physiologies.      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 This medication has been on hold for a few days pending a biopsy, however, patient developed worsening hallucinations and mental status changes for several days. Patient denies having any complaints. The ED, the patient was hemodynamically stable.  Labs were remarkable for hyperkalemia (6.9), normocytic anemia (10.2), elevated creatinine (2.5-baseline of 1.3), elevated BNP (399), elevated troponin (0.053). VBG showed a pH of 7.31, pCO2 of 45.1. UA showed +3 leukocytes, > 100 WBCs, moderate bacteria.  EKG showed showed peaked t-waves. Chest x-ray showed no acute process.  Patient was given insulin/D10, albuterol, calcium gluconate, Lasix 80 mg IV, morphine 2 mg IV, Zofran 4 mg IV. Patient was admitted for further management.     Currently on Abx / LATRICE management - Patient awake / alert / following simple commands / oriented to self - not to time / no insight / agitated - with no focal motor or sensory or cranial nerve deficits.     Recs:   Suspect symptomatology within the spectrum of metabolic / infectious encephalopathy + related hyperactive delirium - with confounders of under/undiagnosed dementia syndromes. Low suspicion for focal cerebrovascular ischemic event or epileptic or neuro inflammatory etiology. No obvious concerns for rapidly progressive dementia syndromes. Tremors - unclear on the etiology / possible mycolonic patterns - metabolic related vs toxic / no obvious primary neurodegenerative etiology     Consider OP comprehensive neuropsychology evaluation for any dementia / post acute management of encephalopathy   Can consider MRI tarah wo contrast / if able on in patient basis / - if persisting encephalopathy, wo improvement. Need no EEG at this moment     Can consider labs if persisting encephalopathy   ---- Vitamin studies:  thiamine (B1), riboflavin (B2), niacin (B3), pyridoxine (B6), folate (B9), and cobalamin (B12) / Vit E  ---- Others: heavy metals / TSH    Encephalopathy management   -- Correct lytes as needed / control infection / avoid hypoxia or hypercapnia / avoid hypoglycemia   -- Correct any nutritional deficiencies / correct anemia / provide nutritional support as appropriate / ambulate when appropriate - PT/OT recs   -- delirium precautions        - On depacon 250 mg IV qam and 500 mg IV qhs for management of agitation and behavioral disturbances associated with delirium - can consider down titration for avoidance of tremors related to depakote      - Recommend Zyprexa 2.5 mg PO/IM q6h PRN severe, nonredirectable agitation.  Max dose is 30 mg daily from all sources.  Parenteral Zyprexa is not to be given within 1 hours of parenteral benzodiazepines including Ativan.    - Recommend standard delirium precautions:               - Avoid use of physical restraints and judiciously use chemical sedatives for management of agitation              - Avoid deliriogenic agents if possible including benzodiazepines, anticholinergics, and opiates              - Normalize patient's sleep-wake cycle if possible              - Environmental adjustments to include bright lighting and windows open during the day              - Early involvement in PT/OT              - Recommend physical sitter to be present at bedside.            VTE Risk Mitigation (From admission, onward)           Ordered     IP VTE HIGH RISK PATIENT  Once         12/20/23 2011     Place sequential compression device  Until discontinued         12/20/23 2011                    Thank you for your consult.     Lokesh Quezada MD  Neurology  [facility] - Intensive Care

## 2023-12-22 NOTE — PLAN OF CARE
Problem: Occupational Therapy  Goal: Occupational Therapy Goal  Description: Goals to be met by: 01/05/24     Patient will increase functional independence with ADLs by performing:    UE Dressing with Rock.  LE Dressing with Supervision.  Grooming while seated at sink with Set-up Assistance.  Toileting from bedside commode with Supervision for hygiene and clothing management.   Sitting in chair x 30-60 minutes with Supervision.  Toilet transfer to bedside commode with Supervision.  Upper extremity exercise program x10 reps per handout, with supervision.    Outcome: Ongoing, Progressing   Patient recommended for moderate intensity therapy level and has DME at home. Occupational therapy to see 5x/wk if remains in acute care.

## 2023-12-22 NOTE — PROGRESS NOTES
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 This medication has been on hold for a few days pending a biopsy, however, patient developed worsening hallucinations and mental status changes for several days. Patient denies having any complaints.     The ED, the patient was hemodynamically stable.  Labs were remarkable for hyperkalemia (6.9), normocytic anemia (10.2), elevated creatinine (2.5-baseline of 1.3), elevated BNP (399), elevated troponin (0.053). VBG showed a pH of 7.31, pCO2 of 45.1. UA showed +3 leukocytes, > 100 WBCs, moderate bacteria.  EKG showed showed peaked t-waves. Chest x-ray showed no acute process.  Patient was given insulin/D10, albuterol, calcium gluconate, Lasix 80 mg IV, morphine 2 mg IV, Zofran 4 mg IV. Patient was admitted for further management.     Overview/Hospital Course:  Admitted with acute encephalopathy, agitation and hyperkalemia. Also found to have UTI. Hyperkalemia improved, encephalopathy slowly improving. On IV antibiotics. More alert and awake, still very encephlopathic but redirectable. PT/OT consulted. Neurology consulted for evaluation of recent tremors, confusion.     Interval History: no acute issues, more alert and awake but still disoriented.  Thinks his grandsons are with him and he needs to leave. Daughter updated at bedside    Review of Systems  Objective:     Vital Signs (Most Recent):  Temp: 97.5 °F (36.4 °C) (12/22/23 1200)  Pulse: 107 (12/22/23 1200)  Resp: (!) 36 (12/22/23 1200)  BP: (!) 125/93 (12/22/23 1200)  SpO2: 97 % (12/22/23 1200) Vital Signs (24h Range):  Temp:  [96.6 °F (35.9 °C)-97.9 °F (36.6 °C)] 97.5 °F (36.4 °C)  Pulse:  [] 107  Resp:  [16-40] 36  SpO2:  [91 %-98 %] 97 %  BP: (116-172)/(58-94) 125/93     Weight: 62.8 kg (138 lb 7.2 oz)  Body mass index is 21.68 kg/m².    Intake/Output Summary (Last 24 hours) at 12/22/2023 1310  Last data filed at 12/22/2023 1200  Gross per 24 hour   Intake 870 ml   Output 1160 ml   Net -290 ml           Physical Exam  Vitals and nursing note reviewed.   Constitutional:       General: He is not in acute distress.     Appearance: Normal appearance. He is not ill-appearing.   HENT:      Head: Normocephalic and atraumatic.      Nose: Nose normal.      Mouth/Throat:      Mouth: Mucous membranes are moist.   Cardiovascular:      Rate and Rhythm: Normal rate.      Pulses: Normal pulses.      Heart sounds: No murmur heard.  Pulmonary:      Effort: Pulmonary effort is normal. No respiratory distress.   Abdominal:      General: Abdomen is flat.      Palpations: Abdomen is soft.      Tenderness: There is no abdominal tenderness.   Skin:     General: Skin is warm.      Capillary Refill: Capillary refill takes less than 2 seconds.      Comments: Generalized rash involving upper and lower extremities.    Neurological:      Mental Status: He is alert.      Comments: Alert and oriented to self, date of birth. Disoriented to situation. Confused about situation, thinks he needs to take care of his grandsons             Significant Labs: All pertinent labs within the past 24 hours have been reviewed.    Significant Imaging: I have reviewed all pertinent imaging results/findings within the past 24  "hours.    Assessment/Plan:      * Encephalopathy, metabolic  - Suspect related to UTI, possible medications but do not suspect being taken off of Renvoq contributing. Has completed prednisone weeks ago so do not suspect this is contributing.   - CTH with no acute findings, no focal deficits. Patient with recent onset of tremors in the last few months and weakness.  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 Needs outpatient follow up with dermatology   - Follows with Dr. Davidson and has biopsies but unrevealing  - was on Renvoq and prednisone but completed course of prednisone and was taken off Renvoq on 12/12/23.   - doubt taking off of Renvoq is contributing to mental status changes      Hyperkalemia  This patient has hyperkalemia which is uncontrolled. We will monitor for arrhythmias with EKG or continuous telemetry. We will treat the hyperkalemia with Potassium Binders, Calcium gluconate, IV insulin and dextrose, Nebulized albuterol sulfate, and Furosemide. The likely etiology of the hyperkalemia is LATRICE.  The patients latest potassium has been reviewed and the results are listed below  Recent Labs   Lab 12/21/23  0543   K 5.0       - Improved        UTI (urinary tract infection)  Urinalysis  No results for input(s): "COLORU", "CLARITYU", "SPECGRAV", "PHUR", "PROTEINUA", "GLUCOSEU", "BILIRUBINCON", "BLOODU", "WBCU", "RBCU", "BACTERIA", "MUCUS", "NITRITE", "LEUKOCYTESUR", "UROBILINOGEN", "HYALINECASTS" in the last 24 hours.    Previous cultures grew proteus and enterococcus. Now GBS on culture    Continue ceftriaxone. Stop vancomycin.        Chronic bilateral low back pain with bilateral sciatica  History noted. No acute issues       Hyperlipidemia  Resume statin      Normal pressure hydrocephalus  Repeat CT head obtained.  No acute issues.    LATRICE (acute kidney injury)  Patient with acute kidney injury/acute renal failure likely due to " pre-renal azotemia due to IVVD LATRICE is currently worsening. Baseline creatinine  1.3  - Labs reviewed- Renal function/electrolytes with Estimated Creatinine Clearance: 18.8 mL/min (A) (based on SCr of 2.5 mg/dL (H)). according to latest data. Monitor urine output and serial BMP and adjust therapy as needed. Avoid nephrotoxins and renally dose meds for GFR listed above.  - Cr relatively stable as yesterday but still above usual baseline  - recheck in AM    Essential hypertension  Chronic, controlled. Latest blood pressure and vitals reviewed-     Temp:  [92.6 °F (33.7 °C)-98.2 °F (36.8 °C)]   Pulse:  [75-99]   Resp:  [13-36]   BP: ()/(52-88)   SpO2:  [92 %-99 %] .   Home meds for hypertension were reviewed and noted below.   Hypertension Medications               metoprolol succinate (TOPROL-XL) 25 MG 24 hr tablet Take 1 tablet (25 mg total) by mouth once daily.            While in the hospital, will manage blood pressure as follows; Continue home antihypertensive regimen    Will utilize p.r.n. blood pressure medication only if patient's blood pressure greater than 180/110 and he develops symptoms such as worsening chest pain or shortness of breath.    COPD (chronic obstructive pulmonary disease)  Patient's COPD is controlled currently.  Patient is currently off COPD Pathway.  THIN Maria M     PAD (peripheral artery disease)  History noted.  No acute issues.      Diabetes mellitus with peripheral circulatory disorder  Patient's FSGs are controlled on current medication regimen.  Last A1c reviewed-   Lab Results   Component Value Date    HGBA1C 6.9 (H) 10/15/2023     Most recent fingerstick glucose reviewed-   Recent Labs   Lab 12/21/23  0209 12/21/23  0245 12/21/23  0438 12/21/23  1154   POCTGLUCOSE 120* 440* 118* 118*       Current correctional scale  Low  Maintain anti-hyperglycemic dose as follows-   Antihyperglycemics (From admission, onward)      Start     Stop Route Frequency Ordered    12/20/23 6504   insulin aspart U-100 pen 0-5 Units         -- SubQ Before meals & nightly PRN 12/20/23 2052          Hold Oral hypoglycemics while patient is in the hospital.    Coronary artery disease involving coronary bypass graft of native heart without angina pectoris  No acute issues. Resume home medications       VTE Risk Mitigation (From admission, onward)           Ordered     IP VTE HIGH RISK PATIENT  Once         12/20/23 2011     Place sequential compression device  Until discontinued         12/20/23 2011                    Discharge Planning   SUSIE:      Code Status: DNR   Is the patient medically ready for discharge?:     Reason for patient still in hospital (select all that apply): Treatment  Discharge Plan A: Home Health            Critical care time spent on the evaluation and treatment of severe organ dysfunction, review of pertinent labs and imaging studies, discussions with consulting providers and discussions with patient/family: 15 minutes.      Kodak Rivera MD  Department of Hospital Medicine   [facility] - [place] - Intensive Care

## 2023-12-22 NOTE — HPI
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 This medication has been on hold for a few days pending a biopsy, however, patient developed worsening hallucinations and mental status changes for several days. Patient denies having any complaints. "  "

## 2023-12-22 NOTE — PLAN OF CARE
YANELI followed up with patient's daughters, Brenda and Reyna, to update on therapy's recommendation for SNF.  Both daughters reported that patient had been doing well prior to this admit.  They would like to wait to see if patient improves.  He has been disoriented during this hospital stay.  Patient was on service with [facility] prior to admit.

## 2023-12-22 NOTE — PLAN OF CARE
Pt free from fall/injury, BUE restraints in place, turns independently. CBG WNL. Remains confused, has not slept entire shift.     Problem: Adult Inpatient Plan of Care  Goal: Plan of Care Review  Outcome: Ongoing, Progressing     Problem: Fall Injury Risk  Goal: Absence of Fall and Fall-Related Injury  Outcome: Ongoing, Progressing     Problem: Restraint, Nonbehavioral (Nonviolent)  Goal: Absence of Harm or Injury  Outcome: Ongoing, Progressing     Problem: Diabetes Comorbidity  Goal: Blood Glucose Level Within Targeted Range  Outcome: Ongoing, Progressing     Problem: Skin Injury Risk Increased  Goal: Skin Health and Integrity  Outcome: Ongoing, Progressing

## 2023-12-22 NOTE — PROGRESS NOTES
Pharmacist Intervention IV to PO Note    Narciso Yanze is a 88 y.o. male being treated with IV medication famotidine    Patient Data:    Vital Signs (Most Recent):  Temp: 97.9 °F (36.6 °C) (12/22/23 0701)  Pulse: 108 (12/22/23 0701)  Resp: (!) 26 (12/22/23 0701)  BP: (!) 130/59 (12/22/23 0701)  SpO2: 96 % (12/22/23 0701) Vital Signs (72h Range):  Temp:  [92.6 °F (33.7 °C)-98.2 °F (36.8 °C)]   Pulse:  []   Resp:  [13-40]   BP: ()/(52-94)   SpO2:  [91 %-99 %]      CBC:  Recent Labs   Lab 12/20/23 1815 12/21/23  0543   WBC 8.50 11.91   RBC 3.29* 3.04*   HGB 10.2* 9.1*   HCT 31.4* 29.3*    255   MCV 95 96   MCH 31.0 29.9   MCHC 32.5 31.1*     CMP:     Recent Labs   Lab 12/20/23  1815 12/20/23  2227 12/21/23  0543   GLU 94 108 100   CALCIUM 8.7 8.8 8.2*   ALBUMIN 3.2*  --  2.9*   PROT 8.1  --  7.2    140 139   K 6.9* 6.2* 5.0   CO2 20* 20* 20*    109 105   BUN 31* 29* 31*   CREATININE 2.5* 2.5* 2.5*   ALKPHOS 88  --  75   ALT 26  --  23   AST 28  --  24   BILITOT 0.2  --  0.2       Dietary Orders:  Diet Orders            Diet Adult Regular (IDDSI Level 7): Regular starting at 12/20 1836            Based on the following criteria, this patient qualifies for intravenous to oral conversion:  [x] The patients gastrointestinal tract is functioning (tolerating medications via oral or enteral route for 24 hours and tolerating food or enteral feeds for 24 hours).  [x] The patient is hemodynamically stable for 24 hours (heart rate <100 beats per minute, systolic blood pressure >99 mm Hg, and respiratory rate <20 breaths per minute).  [x] The patient shows clinical improvement (afebrile for at least 24 hours and white blood cell count downtrending or normalized). Additionally, the patient must be non-neutropenic (absolute neutrophil count >500 cells/mm3).  [x] For antimicrobials, the patient has received IV therapy for at least 24 hours.    IV medication famotidine 20 mg IV daily will be changed  to famotidine 20 mg PO daily    Pharmacist's Name: Hamzah Bourne Jr  Pharmacist's Extension: [phone]

## 2023-12-22 NOTE — ASSESSMENT & PLAN NOTE
Mr. 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 And neurology has been consulted for the same.    History from medical records review / from the daughter. Recent admission in Oct 2023 - for encephalopathy / hallucinations / agitations - with management in the spectrum of hyperactive delirium / UTI related encephalopathy. However despite the discharge - had intermittent episodes of hyperactive agitative encephalopathy / with worsening episodes over the time and hence the re-admission. Daughter reported of ongoing evaluation for T-cell cutaneous lymphoma / and worsening encephalopathy correlating to worsening skin lesions. Fluctuating pattern - with no clinical concerns for any seizure or focal motor or sensory or cranial nerve deficits. Denied any background of primary dementia syndromes. No history of strokes / seizures or complex migraines. Background includes tremors - tremolousness in extremities - with action component greater than rest / intermittent basis - with no clear primary tremors physiologies.      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 This medication has been on hold for a few days pending a biopsy, however, patient developed worsening hallucinations and mental status changes for several days. Patient denies having any complaints. The ED, the patient was hemodynamically stable.  Labs were remarkable for hyperkalemia (6.9), normocytic anemia (10.2), elevated creatinine (2.5-baseline of 1.3), elevated BNP (399), elevated troponin (0.053). VBG showed a pH of 7.31, pCO2 of 45.1. UA showed +3 leukocytes, > 100 WBCs, moderate bacteria.  EKG showed showed peaked t-waves. Chest x-ray showed no acute process.  Patient was given  insulin/D10, albuterol, calcium gluconate, Lasix 80 mg IV, morphine 2 mg IV, Zofran 4 mg IV. Patient was admitted for further management.     Currently on Abx / LATRICE management - Patient awake / alert / following simple commands / oriented to self - not to time / no insight / agitated - with no focal motor or sensory or cranial nerve deficits.     Recs:   Suspect symptomatology within the spectrum of metabolic / infectious encephalopathy + related hyperactive delirium - with confounders of under/undiagnosed dementia syndromes. Low suspicion for focal cerebrovascular ischemic event or epileptic or neuro inflammatory etiology. No obvious concerns for rapidly progressive dementia syndromes. Tremors - unclear on the etiology / possible mycolonic patterns - metabolic related vs toxic / no obvious primary neurodegenerative etiology     Consider OP comprehensive neuropsychology evaluation for any dementia / post acute management of encephalopathy   Can consider MRI tarah wo contrast / if able on in patient basis / - if persisting encephalopathy, wo improvement. Need no EEG at this moment     Can consider labs if persisting encephalopathy   ---- Vitamin studies: thiamine (B1), riboflavin (B2), niacin (B3), pyridoxine (B6), folate (B9), and cobalamin (B12) / Vit E  ---- Others: heavy metals / TSH    Encephalopathy management   -- Correct lytes as needed / control infection / avoid hypoxia or hypercapnia / avoid hypoglycemia   -- Correct any nutritional deficiencies / correct anemia / provide nutritional support as appropriate / ambulate when appropriate - PT/OT recs   -- delirium precautions        - On depacon 250 mg IV qam and 500 mg IV qhs for management of agitation and behavioral disturbances associated with delirium - can consider down titration for avoidance of tremors related to depakote      - Recommend Zyprexa 2.5 mg PO/IM q6h PRN severe, nonredirectable agitation.  Max dose is 30 mg daily from all sources.   Parenteral Zyprexa is not to be given within 1 hours of parenteral benzodiazepines including Ativan.    - Recommend standard delirium precautions:               - Avoid use of physical restraints and judiciously use chemical sedatives for management of agitation              - Avoid deliriogenic agents if possible including benzodiazepines, anticholinergics, and opiates              - Normalize patient's sleep-wake cycle if possible              - Environmental adjustments to include bright lighting and windows open during the day              - Early involvement in PT/OT              - Recommend physical sitter to be present at bedside.

## 2023-12-22 NOTE — SUBJECTIVE & OBJECTIVE
Interval History: no acute issues, more alert and awake but still disoriented. Thinks his grandsons are with him and he needs to leave. Daughter updated at bedside    Review of Systems  Objective:     Vital Signs (Most Recent):  Temp: 97.5 °F (36.4 °C) (12/22/23 1200)  Pulse: 107 (12/22/23 1200)  Resp: (!) 36 (12/22/23 1200)  BP: (!) 125/93 (12/22/23 1200)  SpO2: 97 % (12/22/23 1200) Vital Signs (24h Range):  Temp:  [96.6 °F (35.9 °C)-97.9 °F (36.6 °C)] 97.5 °F (36.4 °C)  Pulse:  [] 107  Resp:  [16-40] 36  SpO2:  [91 %-98 %] 97 %  BP: (116-172)/(58-94) 125/93     Weight: 62.8 kg (138 lb 7.2 oz)  Body mass index is 21.68 kg/m².    Intake/Output Summary (Last 24 hours) at 12/22/2023 1310  Last data filed at 12/22/2023 1200  Gross per 24 hour   Intake 870 ml   Output 1160 ml   Net -290 ml           Physical Exam  Vitals and nursing note reviewed.   Constitutional:       General: He is not in acute distress.     Appearance: Normal appearance. He is not ill-appearing.   HENT:      Head: Normocephalic and atraumatic.      Nose: Nose normal.      Mouth/Throat:      Mouth: Mucous membranes are moist.   Cardiovascular:      Rate and Rhythm: Normal rate.      Pulses: Normal pulses.      Heart sounds: No murmur heard.  Pulmonary:      Effort: Pulmonary effort is normal. No respiratory distress.   Abdominal:      General: Abdomen is flat.      Palpations: Abdomen is soft.      Tenderness: There is no abdominal tenderness.   Skin:     General: Skin is warm.      Capillary Refill: Capillary refill takes less than 2 seconds.      Comments: Generalized rash involving upper and lower extremities.    Neurological:      Mental Status: He is alert.      Comments: Alert and oriented to self, date of birth. Disoriented to situation. Confused about situation, thinks he needs to take care of his grandsons             Significant Labs: All pertinent labs within the past 24 hours have been reviewed.    Significant Imaging: I have  reviewed all pertinent imaging results/findings within the past 24 hours.

## 2023-12-22 NOTE — HPI
Mr. 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 And neurology has been consulted for the same.    History from medical records review / from the daughter. Recent admission in Oct 2023 - for encephalopathy / hallucinations / agitations - with management in the spectrum of hyperactive delirium / UTI related encephalopathy. However despite the discharge - had intermittent episodes of hyperactive agitative encephalopathy / with worsening episodes over the time and hence the re-admission. Daughter reported of ongoing evaluation for T-cell cutaneous lymphoma / and worsening encephalopathy correlating to worsening skin lesions. Fluctuating pattern - with no clinical concerns for any seizure or focal motor or sensory or cranial nerve deficits. Denied any background of primary dementia syndromes. No history of strokes / seizures or complex migraines. Background includes tremors - tremolousness in extremities - with action component greater than rest / intermittent basis - with no clear primary tremors physiologies.      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 This medication has been on hold for a few days pending a biopsy, however, patient developed worsening hallucinations and mental status changes for several days. Patient denies having any complaints. The ED, the patient was hemodynamically stable.  Labs were remarkable for hyperkalemia (6.9), normocytic anemia (10.2), elevated creatinine (2.5-baseline of 1.3), elevated BNP (399), elevated troponin (0.053). VBG showed a pH of 7.31, pCO2 of 45.1. UA showed +3 leukocytes, > 100 WBCs, moderate bacteria.  EKG showed showed peaked t-waves. Chest x-ray showed no acute process.  Patient was given  insulin/D10, albuterol, calcium gluconate, Lasix 80 mg IV, morphine 2 mg IV, Zofran 4 mg IV. Patient was admitted for further management.     Currently on Abx / LATRICE management - Patient awake / alert / following simple commands / oriented to self - not to time / no insight / agitated - with no focal motor or sensory or cranial nerve deficits.

## 2023-12-22 NOTE — ASSESSMENT & PLAN NOTE
- chronic history   - well controlled at baseline, not on home O2   - discussed trajectory of life limiting condition with family   - continued management per hospital primary

## 2023-12-22 NOTE — SUBJECTIVE & OBJECTIVE
Past Medical History:   Diagnosis Date    Actinic keratosis     Anxiety     B12 deficiency 12/8/2014    Dx 6/14    Cancer     skin    Cataract     Coronary artery disease     Diabetes mellitus type II     Diabetes mellitus with peripheral circulatory disorder 6/18/2014    ED (erectile dysfunction)     Hyperlipidemia     Kidney stone     Neurogenic claudication 4/4/2022    Normocytic anemia 10/15/2023    Peripheral vascular disease     Spondylosis 3/29/2019    Tobacco dependence     Urinary incontinence 5/4/2021     Past Surgical History:   Procedure Laterality Date    APPENDECTOMY      CARDIAC SURGERY  01/1999    CABG 4 vessels    CATARACT EXTRACTION      EPIDURAL STEROID INJECTION Right 8/26/2020    Procedure: Injection, Steroid, Epidural Transforaminal;  Surgeon: Wiley Crane Jr., MD;  Location: [facility] ENDO;  Service: Pain Management;  Laterality: Right;  Right L5 + S1 TF LEAH  Arrive @ 1115; ASA & Plavix last 8/18; Check BG; Rapid COVID test    EPIDURAL STEROID INJECTION Right 11/25/2020    Procedure: Injection, Steroid, Epidural Transformainal;  Surgeon: Wiley Crane Jr., MD;  Location: [facility] ENDO;  Service: Pain Management;  Laterality: Right;  Right L5 + S1 TF LEAH  Arrive @ 1245; ASA and Plavix last 11/17; Pre-DM; Needs MD Sign.    EPIDURAL STEROID INJECTION Right 2/19/2021    Procedure: Injection, Steroid, Epidural Transforaminal;  Surgeon: Wiley Crane Jr., MD;  Location: [facility] ENDO;  Service: Pain Management;  Laterality: Right;  Right L5 + S1 TF LEAH  Arrive @ 1115; ASA & Plavix last 2/11; Check BG; Needs Consent    EXTERNAL EAR SURGERY      EYE SURGERY      cataracts    ILIAC ARTERY STENT Bilateral 06/2003    also Right External Iliac stent     Review of patient's allergies indicates:   Allergen Reactions    Demerol [meperidine] Nausea Only     Medications:  Continuous Infusions:  Scheduled Meds:   aspirin  81 mg Oral Daily    atorvastatin  40 mg Oral Daily    cefTRIAXone (ROCEPHIN) IVPB  2  g Intravenous Q24H    clopidogreL  75 mg Oral Daily    divalproex  250 mg Oral Daily    divalproex  500 mg Oral QHS    famotidine  20 mg Oral Daily    metoprolol succinate  25 mg Oral Daily    mupirocin   Nasal BID    tamsulosin  0.4 mg Oral Daily     PRN Meds:acetaminophen, albuterol-ipratropium, dextrose 10%, dextrose 10%, glucagon (human recombinant), glucose, glucose, haloperidol lactate, insulin aspart U-100, melatonin, ondansetron, prochlorperazine, sodium chloride 0.9%    Family History       Problem Relation (Age of Onset)    Stroke Mother          Tobacco Use    Smoking status: Every Day     Current packs/day: 1.50     Average packs/day: 1.5 packs/day for 71.0 years (106.5 ttl pk-yrs)     Types: Cigarettes    Smokeless tobacco: Former   Substance and Sexual Activity    Alcohol use: No    Drug use: No    Sexual activity: Not Currently     Partners: Female     Review of Systems   Unable to perform ROS: Mental status change     Objective:     Vital Signs (Most Recent):  Temp: 97.9 °F (36.6 °C) (12/22/23 0701)  Pulse: 109 (12/22/23 0815)  Resp: (!) 23 (12/22/23 0815)  BP: 132/85 (12/22/23 0815)  SpO2: 97 % (12/22/23 0815) Vital Signs (24h Range):  Temp:  [96.6 °F (35.9 °C)-97.9 °F (36.6 °C)] 97.9 °F (36.6 °C)  Pulse:  [] 109  Resp:  [16-40] 23  SpO2:  [91 %-98 %] 97 %  BP: (122-172)/(58-94) 132/85     Weight: 62.8 kg (138 lb 7.2 oz)  Body mass index is 21.68 kg/m².     Physical Exam  Vitals and nursing note reviewed.   Constitutional:       General: He is not in acute distress.     Appearance: He is ill-appearing.   HENT:      Head: Normocephalic and atraumatic.   Pulmonary:      Effort: Pulmonary effort is normal. No respiratory distress.   Musculoskeletal:      Right lower leg: No edema.      Left lower leg: No edema.   Neurological:      Mental Status: He is alert. He is disoriented.      Motor: Tremor present.      Comments: Not at baseline (per daughter)        Advance Care Planning   Advance  "Directives:   Living Will: No    LaPOST: No    Do Not Resuscitate Status: No    Medical Power of : No (daughters are legal surrogate decision makers)      Decision Making:  Family answered questions and Patient unable to communicate due to disease severity/cognitive impairment  Goals of Care: The family endorses that what is most important right now is to focus on spending time at home, avoiding the hospital, remaining as independent as possible, symptom/pain control, and comfort and QOL     Accordingly, we have decided that the best plan to meet the patient's goals includes continuing with treatment.      Significant Labs: All pertinent labs within the past 24 hours have been reviewed.  CBC:   Recent Labs   Lab 12/21/23  0543   WBC 11.91   HGB 9.1*   HCT 29.3*   MCV 96        BMP:  No results for input(s): "GLU", "NA", "K", "CL", "CO2", "BUN", "CREATININE", "CALCIUM", "MG" in the last 24 hours.  LFT:  Lab Results   Component Value Date    AST 24 12/21/2023    ALKPHOS 75 12/21/2023    BILITOT 0.2 12/21/2023     Albumin:   Albumin   Date Value Ref Range Status   12/21/2023 2.9 (L) 3.5 - 5.2 g/dL Final     Protein:   Total Protein   Date Value Ref Range Status   12/21/2023 7.2 6.0 - 8.4 g/dL Final     Lactic acid:   Lab Results   Component Value Date    LACTATE 1.3 12/20/2023    LACTATE 0.9 10/09/2023     Significant Imaging: I have reviewed all pertinent imaging results/findings within the past 24 hours.    "

## 2023-12-22 NOTE — ASSESSMENT & PLAN NOTE
12/22/2023 - Consult   - consult received; interval chart reviewed in detail; discussed pt with MDT during ICU rounds; pt with ongoing delirium, oriented to self only   - met with patient at bedside; introduction to palliative medicine team and role in current care and admission; pt with ongoing delirium, oriented to self only, does not have capacity for GOC/ACP discussion at this time   - called pt's daughter, Brenda, to learn more about pt and for GOC/ACP discussion   - learned more about pt outside of current admission; pt is not currently at baseline mental or physical status/abilities (see encephalopathy); Pt has 3 daughter (Brenda, Norah, Reyna) and 1 legally adopted son (Rich who is currently incarcerated); per Brenda, Brenda and Norah share POA (has durable POA documents) which they had to obtain to assist with pt's finances, confirms document includes medical decisions, however all 3 daughters usually discuss/share in medical decisions; Pt lives with youngest daughter, Reyna   - discussed with Brenda my recommendation during rounds for Neuro eval while admitted due significant mental status change currently, increase in delirium episodes, tremors (pt currently on Depakote to treat) which began in the last few months, to confirm shunt is operations, rule out dementia and or parkinson's; she shares pt has been eval'ed in the past at time of NPH diagnosis but is concerned with increased symptoms in the past 6 months, agreeable and appreciative of this plan   - reviewed prior discussion 12/21 with Dr. Rivera (primary) regarding code status; confirms wished for DNR; discussed [facility] document with plan to complete prior to discharge   - discussed elderly illness/hospital delirium   - Brenda with good insight into pt's overall care and conditions   - Brenda is currently at home with the flu and unable to visit for pt's safety, Norah is driving in from [place] today to visit pt   - emotional support provided   -  Allowed time for questions/concerns; all addressed; expressed availability of myself/palliative team for additional questions/concerns

## 2023-12-22 NOTE — PLAN OF CARE
Problem: Physical Therapy  Goal: Physical Therapy Goal  Description: Goals to be met by: 24     Patient will increase functional independence with mobility by performin. Supine to sit with Modified [place]  2. Sit to supine with Modified [place]  3. Rolling to Left and Right with Modified [place].  4. Sit to stand transfer with Contact Guard Assistance  5. Gait  x 100 feet with Contact Guard Assistance using Rolling Walker.     Outcome: Ongoing, Progressing     PT evaluation completed today. Pt presents with nsg staff attempting to sit pt up in bed. Pt with limited orientation and hallucinations. Pt transfers sit<>stand w/ RW and mod Ax1. Pt requires RW and mod Ax1 to maintain balance in standing. Pt fatigued quickly within 1 min of standing and required max Ax1 to be guided down to sitting in chair. Pt with mod irritability/ agitation and anxious to d/c home. Pt would benefit from moderate intensity therapy upon d/c. DME recs to be made at next level of care.

## 2023-12-22 NOTE — ASSESSMENT & PLAN NOTE
- pt with history of UTI's, has in the past had delirium associated with UTIs consistent with current presentation   - daughter shared urinary incontinence since 10/23 admission, pt combats incontinence with decreased PO fluids to avoid accidents   - IV abx per hospital primary; continued management per primary

## 2023-12-22 NOTE — ASSESSMENT & PLAN NOTE
- chronic dermatitis; daughter feels flares of dermatitis drastically affect other areas of pt's health and mental status as well   - has required frequent steroids and takes renvoke   - continued management per hospital primary

## 2023-12-22 NOTE — EICU
Intervention Initiated From:  Bedside    Chin intervened regarding:  Other    Nurse Notified:  Yes    Doctor Notified:  Yes    Comments: bedside nurse called to get order renewal for restraaint. Informed Dr. Malloy.    · Sepsis most likely secondary to UTI,  infected penile prosthesis  · Sepsis now improving  · Continue with broad-spectrum and antibiotics as per ID, continue gentle hydration  · Two episodes of postop fever, continue same managed and monitor vital signs  · Follow-up urine/blood culture  · Status post penile prosthesis removal

## 2023-12-22 NOTE — PLAN OF CARE
Patient remains confused , pt remains in restraints due to pulling on lines and continuously trying to get out of bed.   Patient able to feed self somewhat but refuses to eat when fed by nurse.   Up to chair by PT/OT, pt requires a lot of assistance, not able to stand alone.   Transfer orders given, await room assignment with sitter.

## 2023-12-22 NOTE — CONSULTS
[facility] - Intensive Care  Palliative Medicine  Consult Note    Patient Name: Narciso Yanez  MRN: [id number]  Admission Date: 12/20/2023  Hospital Length of Stay: 2 days  Code Status: DNR   Attending Provider: Kodak Rivera MD  Consulting Provider: Eli Yip NP  Primary Care Physician: Marcelino Del Toro MD  Principal Problem:Hyperkalemia    Patient information was obtained from relative(s), past medical records, ER records, and primary team.      Inpatient consult to Palliative Care  Consult performed by: Eli Yip NP  Consult ordered by: Kodak Rivera MD  Reason for consult: goals of care and advanced care planning      Assessment/Plan:   Advance Care Planning     Palliative Care  Goals of care, counseling/discussion  12/22/2023 - Consult   - consult received; interval chart reviewed in detail; discussed pt with MDT during ICU rounds; pt with ongoing delirium, oriented to self only   - met with patient at bedside; introduction to palliative medicine team and role in current care and admission; pt with ongoing delirium, oriented to self only, does not have capacity for GOC/ACP discussion at this time   - called pt's daughter, Brenda, to learn more about pt and for GOC/ACP discussion   - learned more about pt outside of current admission; pt is not currently at baseline mental or physical status/abilities (see encephalopathy); Pt has 3 daughter (Brenda, Norah, Reyna) and 1 legally adopted son (Rich who is currently incarcerated); per Brenda Gutierrez and Norah share POA (has durable POA documents) which they had to obtain to assist with pt's finances, confirms document includes medical decisions, however all 3 daughters usually discuss/share in medical decisions; Pt lives with youngest daughter, Reyna   - discussed with Brenda my recommendation during rounds for Neuro eval while admitted due significant mental status change currently, increase in delirium episodes, tremors (pt currently  on Depakote to treat) which began in the last few months, to confirm shunt is operations, rule out dementia and or parkinson's; she shares pt has been eval'ed in the past at time of NPH diagnosis but is concerned with increased symptoms in the past 6 months, agreeable and appreciative of this plan   - reviewed prior discussion 12/21 with Dr. Rivera (primary) regarding code status; confirms wished for DNR; discussed [facility] document with plan to complete prior to discharge   - discussed elderly illness/hospital delirium   - Brenda with good insight into pt's overall care and conditions   - Brenda is currently at home with the flu and unable to visit for pt's safety, Norah is driving in from [place] today to visit pt   - emotional support provided   - Allowed time for questions/concerns; all addressed; expressed availability of myself/palliative team for additional questions/concerns     Neuro  Encephalopathy, metabolic  - pt with history of hospital/UTI associated delirium; current delirium began prior to admission so seems more related to UTI   - daughter shares increase in episodes of delirium in the past  months  - baseline pt is very oriented and good historian per daughter, prior to 6 months ago pt was independent and driving  - recent baseline pt is typically fully oriented, able to perform most ADL's with limited assistance or set up, no major ambulatory issues    - continued management per hospital primary     Normal pressure hydrocephalus  - chronic condition noted  - diagnosed after prior fall; has shunt     Derm  Dermatitis  - chronic dermatitis; daughter feels flares of dermatitis drastically affect other areas of pt's health and mental status as well   - has required frequent steroids and takes renvoke   - continued management per hospital primary     Pulmonary  COPD (chronic obstructive pulmonary disease)  - chronic history   - well controlled at baseline, not on home O2   - discussed trajectory of life  "limiting condition with family   - continued management per hospital primary     Cardiac/Vascular  Coronary artery disease involving coronary bypass graft of native heart without angina pectoris  - chronic history noted     Renal/  UTI (urinary tract infection)  - pt with history of UTI's, has in the past had delirium associated with UTIs consistent with current presentation   - daughter shared urinary incontinence since 10/23 admission, pt combats incontinence with decreased PO fluids to avoid accidents   - IV abx per hospital primary; continued management per primary     Thank you for your consult. I will follow-up with patient. Please contact us if you have any additional questions.    Total visit time: 86 minutes    70 min visit time including: face to face time in discussion of symptom assessment, and exploring options and burdens of offered treatments.  This also includes non-face to face time preparing to see the patient including chart review, obtaining and/or reviewing separately obtained history, documenting clinical information in the electronic or other health record, independently interpreting results and communicating results to the patient/family/caregiver, family discussions by phone if not able to be present, coordination of care with other specialists, and discharge planning.     16 min ACP time spent: goals of care, advanced care planning, emotional support, formulating and communicating prognosis, exploring burden/ benefit of various approaches of treatment.     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 This medication has been on hold for a few days pending a biopsy, however, patient developed worsening hallucinations and mental status changes for several days. Patient denies having any complaints. "    Hospital Course:  No notes on file    Past Medical History:   Diagnosis Date    Actinic keratosis     Anxiety     B12 deficiency 12/8/2014    Dx 6/14    Cancer     skin    Cataract     Coronary artery disease     Diabetes mellitus type II     Diabetes mellitus with peripheral circulatory disorder 6/18/2014    ED (erectile dysfunction)     Hyperlipidemia     Kidney stone     Neurogenic claudication 4/4/2022    Normocytic anemia 10/15/2023    Peripheral vascular disease     Spondylosis 3/29/2019    Tobacco dependence     Urinary incontinence 5/4/2021     Past Surgical History:   Procedure Laterality Date    APPENDECTOMY      CARDIAC SURGERY  01/1999    CABG 4 vessels    CATARACT EXTRACTION      EPIDURAL STEROID INJECTION Right 8/26/2020    Procedure: Injection, Steroid, Epidural Transforaminal;  Surgeon: Wiley Crane Jr., MD;  Location: [facility] ENDO;  Service: Pain Management;  Laterality: Right;  Right L5 + S1 TF LEAH  Arrive @ 1115; ASA & Plavix last 8/18; Check BG; Rapid COVID test    EPIDURAL STEROID INJECTION Right 11/25/2020    Procedure: Injection, Steroid, Epidural Transformainal;  Surgeon: Wiley Crane Jr., MD;  Location: [facility] ENDO;  Service: Pain Management;  Laterality: Right;  Right L5 + S1 TF LEAH  Arrive @ 1245; ASA and Plavix last 11/17; Pre-DM; Needs MD Sign.    EPIDURAL STEROID INJECTION Right 2/19/2021    Procedure: Injection, Steroid, Epidural Transforaminal;  Surgeon: Wiley Crane Jr., MD;  Location: [facility] ENDO;  Service: Pain Management;  Laterality: Right;  Right L5 + S1 TF LEAH  Arrive @ 1115; ASA & Plavix last 2/11; Check BG; Needs Consent    " EXTERNAL EAR SURGERY      EYE SURGERY      cataracts    ILIAC ARTERY STENT Bilateral 06/2003    also Right External Iliac stent     Review of patient's allergies indicates:   Allergen Reactions    Demerol [meperidine] Nausea Only     Medications:  Continuous Infusions:  Scheduled Meds:   aspirin  81 mg Oral Daily    atorvastatin  40 mg Oral Daily    cefTRIAXone (ROCEPHIN) IVPB  2 g Intravenous Q24H    clopidogreL  75 mg Oral Daily    divalproex  250 mg Oral Daily    divalproex  500 mg Oral QHS    famotidine  20 mg Oral Daily    metoprolol succinate  25 mg Oral Daily    mupirocin   Nasal BID    tamsulosin  0.4 mg Oral Daily     PRN Meds:acetaminophen, albuterol-ipratropium, dextrose 10%, dextrose 10%, glucagon (human recombinant), glucose, glucose, haloperidol lactate, insulin aspart U-100, melatonin, ondansetron, prochlorperazine, sodium chloride 0.9%    Family History       Problem Relation (Age of Onset)    Stroke Mother          Tobacco Use    Smoking status: Every Day     Current packs/day: 1.50     Average packs/day: 1.5 packs/day for 71.0 years (106.5 ttl pk-yrs)     Types: Cigarettes    Smokeless tobacco: Former   Substance and Sexual Activity    Alcohol use: No    Drug use: No    Sexual activity: Not Currently     Partners: Female     Review of Systems   Unable to perform ROS: Mental status change     Objective:     Vital Signs (Most Recent):  Temp: 97.9 °F (36.6 °C) (12/22/23 0701)  Pulse: 109 (12/22/23 0815)  Resp: (!) 23 (12/22/23 0815)  BP: 132/85 (12/22/23 0815)  SpO2: 97 % (12/22/23 0815) Vital Signs (24h Range):  Temp:  [96.6 °F (35.9 °C)-97.9 °F (36.6 °C)] 97.9 °F (36.6 °C)  Pulse:  [] 109  Resp:  [16-40] 23  SpO2:  [91 %-98 %] 97 %  BP: (122-172)/(58-94) 132/85     Weight: 62.8 kg (138 lb 7.2 oz)  Body mass index is 21.68 kg/m².     Physical Exam  Vitals and nursing note reviewed.   Constitutional:       General: He is not in acute distress.     Appearance: He is  "ill-appearing.   HENT:      Head: Normocephalic and atraumatic.   Pulmonary:      Effort: Pulmonary effort is normal. No respiratory distress.   Musculoskeletal:      Right lower leg: No edema.      Left lower leg: No edema.   Neurological:      Mental Status: He is alert. He is disoriented.      Motor: Tremor present.      Comments: Not at baseline (per daughter)        Advance Care Planning  Advance Directives:   Living Will: No    LaPOST: No    Do Not Resuscitate Status: No    Medical Power of : No (daughters are legal surrogate decision makers)      Decision Making:  Family answered questions and Patient unable to communicate due to disease severity/cognitive impairment  Goals of Care: The family endorses that what is most important right now is to focus on spending time at home, avoiding the hospital, remaining as independent as possible, symptom/pain control, and comfort and QOL     Accordingly, we have decided that the best plan to meet the patient's goals includes continuing with treatment.      Significant Labs: All pertinent labs within the past 24 hours have been reviewed.  CBC:   Recent Labs   Lab 12/21/23  0543   WBC 11.91   HGB 9.1*   HCT 29.3*   MCV 96        BMP:  No results for input(s): "GLU", "NA", "K", "CL", "CO2", "BUN", "CREATININE", "CALCIUM", "MG" in the last 24 hours.  LFT:  Lab Results   Component Value Date    AST 24 12/21/2023    ALKPHOS 75 12/21/2023    BILITOT 0.2 12/21/2023     Albumin:   Albumin   Date Value Ref Range Status   12/21/2023 2.9 (L) 3.5 - 5.2 g/dL Final     Protein:   Total Protein   Date Value Ref Range Status   12/21/2023 7.2 6.0 - 8.4 g/dL Final     Lactic acid:   Lab Results   Component Value Date    LACTATE 1.3 12/20/2023    LACTATE 0.9 10/09/2023     Significant Imaging: I have reviewed all pertinent imaging results/findings within the past 24 hours.    I spent a total of 86 minutes on the day of the visit. This includes face to face time in " discussion of goals of care, symptom assessment, coordination of care and emotional support.  This also includes non-face to face time preparing to see the patient (eg, review of tests/imaging), obtaining and/or reviewing separately obtained history, documenting clinical information in the electronic or other health record, independently interpreting results and communicating results to the patient/family/caregiver, or care coordinator.    Eli Yip NP  Palliative Medicine  [facility]

## 2023-12-22 NOTE — ASSESSMENT & PLAN NOTE
- Suspect related to UTI, possible medications but do not suspect being taken off of Renvoq contributing. Has completed prednisone weeks ago so do not suspect this is contributing.   - CTH with no acute findings, no focal deficits. Patient with recent onset of tremors in the last few months and weakness.  - Treating UTI at this time   - delirium precautions  - Neurology consulted

## 2023-12-22 NOTE — PT/OT/SLP EVAL
Physical Therapy Evaluation    Patient Name:  Narciso Yanez   MRN:  [id number]    Recommendations:     Discharge Recommendations: Moderate Intensity Therapy   Discharge Equipment Recommendations: other (see comments) (TBD at next level of care)   Barriers to discharge:  trending medical condition, pt at inc risk for falls at this time    Assessment:     Narciso Yanez is a 88 y.o. male admitted with a medical diagnosis of Hyperkalemia.  He presents with the following impairments/functional limitations: weakness, impaired endurance, impaired self care skills, impaired functional mobility, gait instability, impaired balance, impaired cognition, decreased upper extremity function, decreased lower extremity function, decreased safety awareness, impaired cardiopulmonary response to activity Pt presents with nsg staff attempting to sit pt up in bed. Pt with limited orientation and hallucinations. Pt transfers sit<>stand w/ RW and mod Ax1. Pt requires RW and mod Ax1 to maintain balance in standing. Pt fatigued quickly within 1 min of standing and required max Ax1 to be guided down to sitting in chair. Pt with mod irritability/ agitation and anxious to d/c home.    Rehab Prognosis: Fair; patient would benefit from acute skilled PT services to address these deficits and reach maximum level of function.    Recent Surgery: * No surgery found *      Plan:     During this hospitalization, patient to be seen 5 x/week to address the identified rehab impairments via gait training, therapeutic activities, therapeutic exercises, neuromuscular re-education, wheelchair management/training and progress toward the following goals:    Plan of Care Expires:  01/05/24    Subjective     Chief Complaint: wants to get dressed/ go home  Patient/Family Comments/goals: willing to get up to chair  Pain/Comfort:  Pain Rating 1: 0/10  Pain Rating Post-Intervention 1: 0/10    Patients cultural, spiritual, Muslim conflicts given the current  situation: no    Living Environment:  Pt lives with his daughter in a [facility] w/ 1 ANNA  Prior to admission, patients level of function was sleeping in lift chair, mostly uses rollator for amb will have daughter wheel him in w/c for longer distances.  Equipment used at home: walker, rolling, rollator, shower chair, wheelchair, bedside commode, lift device.  DME owned (not currently used): none.  Upon discharge, patient will have assistance from his daughter.    Objective:     Communicated with Ana Lilia smith prior to session.  Patient found sitting edge of bed with blood pressure cuff, walls catheter, peripheral IV, pulse ox (continuous), telemetry, restraints  upon PT entry to room.    General Precautions: Standard, fall  Orthopedic Precautions:N/A   Braces: N/A  Respiratory Status: Room air    Exams:  Cognitive Exam:  Patient is oriented to Person and Place  RLE ROM: WFL  RLE Strength: Deficits: grossly 3+/5  LLE ROM: WFL  LLE Strength: Deficits: grossly 3+/5    Functional Mobility:  Bed Mobility:     Rolling Left:  minimum assistance  Scooting: minimum assistance  Supine to Sit: minimum assistance  Transfers:     Bed to Chair: moderate assistance and maximal assistance with  rolling walker  using  Step Transfer  Gait: Pt took 3 steps w/ RW and max Ax1, fatigued quickly  Balance: fair in sitting, poor in standing      AM-PAC 6 CLICK MOBILITY  Total Score:14       Treatment & Education:  Pt educated on fall prevention strategies and activity mod    Patient left up in chair with all lines intact, call button in reach, and nsg notified    GOALS:   Multidisciplinary Problems       Physical Therapy Goals          Problem: Physical Therapy    Goal Priority Disciplines Outcome Goal Variances Interventions   Physical Therapy Goal     PT, PT/OT Ongoing, Progressing     Description: Goals to be met by: 24     Patient will increase functional independence with mobility by performin. Supine to sit with Modified  [place]  2. Sit to supine with Modified [place]  3. Rolling to Left and Right with Modified [place].  4. Sit to stand transfer with Contact Guard Assistance  5. Gait  x 100 feet with Contact Guard Assistance using Rolling Walker.                          History:     Past Medical History:   Diagnosis Date    Actinic keratosis     Anxiety     B12 deficiency 12/8/2014    Dx 6/14    Cancer     skin    Cataract     Coronary artery disease     Diabetes mellitus type II     Diabetes mellitus with peripheral circulatory disorder 6/18/2014    ED (erectile dysfunction)     Hyperlipidemia     Kidney stone     Neurogenic claudication 4/4/2022    Normocytic anemia 10/15/2023    Peripheral vascular disease     Spondylosis 3/29/2019    Tobacco dependence     Urinary incontinence 5/4/2021       Past Surgical History:   Procedure Laterality Date    APPENDECTOMY      CARDIAC SURGERY  01/1999    CABG 4 vessels    CATARACT EXTRACTION      EPIDURAL STEROID INJECTION Right 8/26/2020    Procedure: Injection, Steroid, Epidural Transforaminal;  Surgeon: Wiley Crane Jr., MD;  Location: [facility] ENDO;  Service: Pain Management;  Laterality: Right;  Right L5 + S1 TF LEAH  Arrive @ 1115; ASA & Plavix last 8/18; Check BG; Rapid COVID test    EPIDURAL STEROID INJECTION Right 11/25/2020    Procedure: Injection, Steroid, Epidural Transformainal;  Surgeon: Wiley Crane Jr., MD;  Location: [facility] ENDO;  Service: Pain Management;  Laterality: Right;  Right L5 + S1 TF LEAH  Arrive @ 1245; ASA and Plavix last 11/17; Pre-DM; Needs MD Sign.    EPIDURAL STEROID INJECTION Right 2/19/2021    Procedure: Injection, Steroid, Epidural Transforaminal;  Surgeon: Wiley Crane Jr., MD;  Location: [facility] ENDO;  Service: Pain Management;  Laterality: Right;  Right L5 + S1 TF LEAH  Arrive @ 1115; ASA & Plavix last 2/11; Check BG; Needs Consent    EXTERNAL EAR SURGERY      EYE SURGERY      cataracts    ILIAC ARTERY STENT Bilateral 06/2003     also Right External Iliac stent       Time Tracking:     PT Received On: 12/22/23  PT Start Time: 0938     PT Stop Time: 0953  PT Total Time (min): 15 min     Billable Minutes: Evaluation 15    (Co-treat w/ OT)  12/22/2023

## 2023-12-22 NOTE — ASSESSMENT & PLAN NOTE
"Urinalysis  No results for input(s): "COLORU", "CLARITYU", "SPECGRAV", "PHUR", "PROTEINUA", "GLUCOSEU", "BILIRUBINCON", "BLOODU", "WBCU", "RBCU", "BACTERIA", "MUCUS", "NITRITE", "LEUKOCYTESUR", "UROBILINOGEN", "HYALINECASTS" in the last 24 hours.    Previous cultures grew proteus and enterococcus. Now GBS on culture    Continue ceftriaxone. Stop vancomycin.      "

## 2023-12-22 NOTE — ASSESSMENT & PLAN NOTE
Patient with acute kidney injury/acute renal failure likely due to pre-renal azotemia due to IVVD LATRICE is currently worsening. Baseline creatinine  1.3  - Labs reviewed- Renal function/electrolytes with Estimated Creatinine Clearance: 18.8 mL/min (A) (based on SCr of 2.5 mg/dL (H)). according to latest data. Monitor urine output and serial BMP and adjust therapy as needed. Avoid nephrotoxins and renally dose meds for GFR listed above.  - Cr relatively stable as yesterday but still above usual baseline  - recheck in AM

## 2023-12-22 NOTE — SUBJECTIVE & OBJECTIVE
Past Medical History:   Diagnosis Date    Actinic keratosis     Anxiety     B12 deficiency 12/8/2014    Dx 6/14    Cancer     skin    Cataract     Coronary artery disease     Diabetes mellitus type II     Diabetes mellitus with peripheral circulatory disorder 6/18/2014    ED (erectile dysfunction)     Hyperlipidemia     Kidney stone     Neurogenic claudication 4/4/2022    Normocytic anemia 10/15/2023    Peripheral vascular disease     Spondylosis 3/29/2019    Tobacco dependence     Urinary incontinence 5/4/2021       Past Surgical History:   Procedure Laterality Date    APPENDECTOMY      CARDIAC SURGERY  01/1999    CABG 4 vessels    CATARACT EXTRACTION      EPIDURAL STEROID INJECTION Right 8/26/2020    Procedure: Injection, Steroid, Epidural Transforaminal;  Surgeon: Wiley Crane Jr., MD;  Location: [facility] ENDO;  Service: Pain Management;  Laterality: Right;  Right L5 + S1 TF LEAH  Arrive @ 1115; ASA & Plavix last 8/18; Check BG; Rapid COVID test    EPIDURAL STEROID INJECTION Right 11/25/2020    Procedure: Injection, Steroid, Epidural Transformainal;  Surgeon: Wiley Crane Jr., MD;  Location: [facility] ENDO;  Service: Pain Management;  Laterality: Right;  Right L5 + S1 TF LEAH  Arrive @ 1245; ASA and Plavix last 11/17; Pre-DM; Needs MD Sign.    EPIDURAL STEROID INJECTION Right 2/19/2021    Procedure: Injection, Steroid, Epidural Transforaminal;  Surgeon: Wiley Crane Jr., MD;  Location: [facility] ENDO;  Service: Pain Management;  Laterality: Right;  Right L5 + S1 TF LEAH  Arrive @ 1115; ASA & Plavix last 2/11; Check BG; Needs Consent    EXTERNAL EAR SURGERY      EYE SURGERY      cataracts    ILIAC ARTERY STENT Bilateral 06/2003    also Right External Iliac stent       Review of patient's allergies indicates:   Allergen Reactions    Demerol [meperidine] Nausea Only       Current Neurological Medications:     No current facility-administered medications on file prior to encounter.     Current Outpatient  Medications on File Prior to Encounter   Medication Sig    acetaminophen (TYLENOL) 500 MG tablet Take 1 tablet (500 mg total) by mouth every 6 (six) hours as needed for Pain.    aspirin (ECOTRIN) 81 MG EC tablet Take 1 tablet (81 mg total) by mouth once daily.    atorvastatin (LIPITOR) 40 MG tablet Take 1 tablet (40 mg total) by mouth once daily.    blood sugar diagnostic (TRUE METRIX GLUCOSE TEST STRIP) Strp TID    clopidogreL (PLAVIX) 75 mg tablet Take 1 tablet (75 mg total) by mouth once daily.    divalproex (DEPAKOTE) 125 MG EC tablet Take by mouth.    metFORMIN (GLUCOPHAGE) 500 MG tablet Take 1 tablet (500 mg total) by mouth 2 (two) times daily with meals. HOLD FOR GLUCOSE LESS 100    metoprolol succinate (TOPROL-XL) 25 MG 24 hr tablet Take 1 tablet (25 mg total) by mouth once daily.    mometasone 0.1% (ELOCON) 0.1 % cream Apply topically 2 (two) times daily. Apply to arms ,hands, and legs BID    predniSONE (DELTASONE) 10 MG tablet Take 10 mg by mouth.    tamsulosin (FLOMAX) 0.4 mg Cap Take 1 capsule (0.4 mg total) by mouth once daily.    triamcinolone acetonide 0.1% (KENALOG) 0.1 % cream Apply topically 2 (two) times daily.     Family History       Problem Relation (Age of Onset)    Stroke Mother          Tobacco Use    Smoking status: Every Day     Current packs/day: 1.50     Average packs/day: 1.5 packs/day for 71.0 years (106.5 ttl pk-yrs)     Types: Cigarettes    Smokeless tobacco: Former   Substance and Sexual Activity    Alcohol use: No    Drug use: No    Sexual activity: Not Currently     Partners: Female     Review of Systems   Constitutional:  Negative for fever.   Respiratory:  Negative for shortness of breath.    Cardiovascular:  Negative for chest pain.   Neurological:  Negative for seizures, facial asymmetry and weakness.   Psychiatric/Behavioral:  Positive for agitation, confusion and decreased concentration.      Objective:     Vital Signs (Most Recent):  Temp: 97.5 °F (36.4 °C) (12/22/23  "1200)  Pulse: 103 (12/22/23 1310)  Resp: 20 (12/22/23 1310)  BP: (!) 130/94 (12/22/23 1310)  SpO2: 97 % (12/22/23 1310) Vital Signs (24h Range):  Temp:  [96.6 °F (35.9 °C)-97.9 °F (36.6 °C)] 97.5 °F (36.4 °C)  Pulse:  [] 103  Resp:  [16-40] 20  SpO2:  [91 %-98 %] 97 %  BP: (116-172)/(58-94) 130/94     Weight: 62.8 kg (138 lb 7.2 oz)  Body mass index is 21.68 kg/m².     Physical Exam  HENT:      Head: Normocephalic and atraumatic.   Eyes:      Extraocular Movements: Extraocular movements intact and EOM normal.   Pulmonary:      Effort: No respiratory distress.   Psychiatric:         Speech: Speech normal.          NEUROLOGICAL EXAMINATION:     MENTAL STATUS   Disoriented to person.   Oriented to place.   Oriented to time.   Attention: decreased. Concentration: decreased.   Speech: speech is normal   Level of consciousness: alert    CRANIAL NERVES     CN III, IV, VI   Extraocular motions are normal.   Nystagmus: none   Ophthalmoparesis: none    CN VII   Facial expression full, symmetric.     MOTOR EXAM        No new focal motor deficits       SENSORY EXAM        No new focal sensory deficits       GAIT AND COORDINATION     Tremor   Resting tremor: present  Intention tremor: present      Significant Labs: Hemoglobin A1c: No results for input(s): "HGBA1C" in the last 720 hours.  Blood Culture:   Recent Labs   Lab 12/20/23  1825 12/20/23  1830   LABBLOO No Growth to date  No Growth to date No Growth to date  No Growth to date     BMP:   Recent Labs   Lab 12/20/23 1815 12/20/23 2227 12/21/23  0543   GLU 94 108 100    140 139   K 6.9* 6.2* 5.0    109 105   CO2 20* 20* 20*   BUN 31* 29* 31*   CREATININE 2.5* 2.5* 2.5*   CALCIUM 8.7 8.8 8.2*   MG  --   --  2.0     CBC:   Recent Labs   Lab 12/20/23 1815 12/21/23  0543   WBC 8.50 11.91   HGB 10.2* 9.1*   HCT 31.4* 29.3*    255     CMP:   Recent Labs   Lab 12/20/23  1815 12/20/23  2227 12/21/23  0543   GLU 94 108 100    140 139   K 6.9* 6.2* " "5.0    109 105   CO2 20* 20* 20*   BUN 31* 29* 31*   CREATININE 2.5* 2.5* 2.5*   CALCIUM 8.7 8.8 8.2*   MG  --   --  2.0   PROT 8.1  --  7.2   ALBUMIN 3.2*  --  2.9*   BILITOT 0.2  --  0.2   ALKPHOS 88  --  75   AST 28  --  24   ALT 26  --  23   ANIONGAP 12 11 14     CSF Culture: No results for input(s): "CSFCULTURE" in the last 48 hours.  CSF Studies: No results for input(s): "ALIQUT", "APPEARCSF", "COLORCSF", "CSFWBC", "CSFRBC", "GLUCCSF", "LDHCSF", "PROTEINCSF", "VDRLCSF" in the last 48 hours.  Inflammatory Markers: No results for input(s): "SEDRATE", "CRP", "PROCAL" in the last 48 hours.  POCT Glucose:   Recent Labs   Lab 12/22/23  0022 12/22/23  0709 12/22/23  1107   POCTGLUCOSE 95 122* 130*     Prealbumin: No results for input(s): "PREALBUMIN" in the last 48 hours.  Respiratory Culture: No results for input(s): "GSRESP", "RESPIRATORYC" in the last 48 hours.  Urine Culture:   Recent Labs   Lab 12/20/23  1704   LABURIN STREPTOCOCCUS AGALACTIAE (GROUP B)  > 100,000 cfu/ml  Beta-hemolytic streptococci are routinely susceptible to   penicillins,cephalosporins and carbapenems.  *     Urine Studies:   Recent Labs   Lab 12/20/23  1704   COLORU Yellow   APPEARANCEUA Hazy*   PHUR 6.0   SPECGRAV 1.015   PROTEINUA 1+*   GLUCUA Negative   KETONESU Negative   BILIRUBINUA Negative   OCCULTUA Negative   NITRITE Negative   UROBILINOGEN Negative   LEUKOCYTESUR 3+*   RBCUA 4   WBCUA >100*   BACTERIA Moderate*   HYALINECASTS 15*     Recent Lab Results         12/22/23  1107   12/22/23  0709   12/22/23  0022   12/21/23  1711        POCT Glucose 130   122   95   147             All pertinent lab results from the past 24 hours have been reviewed.    Significant Imaging: I have reviewed and interpreted all pertinent imaging results/findings within the past 24 hours.  "

## 2023-12-22 NOTE — CLINICAL REVIEW
IP Sepsis Screen (most recent)       Sepsis Screen (IP) - 12/22/23 1250       Is the patient's history or complaint suggestive of a possible infection? Yes  -LW    Are there at least two of the following signs and symptoms present? Yes  -LW    Sepsis signs/symptoms - Tachycardia Tachycardia     >90  -LW    Sepsis signs/symptoms - Tachypnea Tachypnea     >20  -LW    Are any of the following organ dysfunction criteria present and not considered to be due to a chronic condition? Yes  -LW    Organ Dysfunction Criteria Creatinine > 2.0  -LW    Initiate Sepsis Protocol No  -LW    Reason sepsis not considered Other - Required comments   on abx -LW              User Key  (r) = Recorded By, (t) = Taken By, (c) = Cosigned By      Initials Name    Yecenia Lincoln RN

## 2023-12-23 LAB
ANION GAP SERPL CALC-SCNC: 13 MMOL/L (ref 8–16)
BASOPHILS # BLD AUTO: 0.03 K/UL (ref 0–0.2)
BASOPHILS NFR BLD: 0.4 % (ref 0–1.9)
BUN SERPL-MCNC: 26 MG/DL (ref 8–23)
CALCIUM SERPL-MCNC: 8.5 MG/DL (ref 8.7–10.5)
CHLORIDE SERPL-SCNC: 111 MMOL/L (ref 95–110)
CO2 SERPL-SCNC: 20 MMOL/L (ref 23–29)
CREAT SERPL-MCNC: 2.1 MG/DL (ref 0.5–1.4)
DIFFERENTIAL METHOD BLD: ABNORMAL
EOSINOPHIL # BLD AUTO: 0.6 K/UL (ref 0–0.5)
EOSINOPHIL NFR BLD: 7.9 % (ref 0–8)
ERYTHROCYTE [DISTWIDTH] IN BLOOD BY AUTOMATED COUNT: 17.3 % (ref 11.5–14.5)
EST. GFR  (NO RACE VARIABLE): 30 ML/MIN/1.73 M^2
GLUCOSE SERPL-MCNC: 119 MG/DL (ref 70–110)
HCT VFR BLD AUTO: 30.4 % (ref 40–54)
HGB BLD-MCNC: 9.7 G/DL (ref 14–18)
IMM GRANULOCYTES # BLD AUTO: 0.04 K/UL (ref 0–0.04)
IMM GRANULOCYTES NFR BLD AUTO: 0.5 % (ref 0–0.5)
LYMPHOCYTES # BLD AUTO: 1.9 K/UL (ref 1–4.8)
LYMPHOCYTES NFR BLD: 24.1 % (ref 18–48)
MCH RBC QN AUTO: 29.8 PG (ref 27–31)
MCHC RBC AUTO-ENTMCNC: 31.9 G/DL (ref 32–36)
MCV RBC AUTO: 94 FL (ref 82–98)
MONOCYTES # BLD AUTO: 0.8 K/UL (ref 0.3–1)
MONOCYTES NFR BLD: 10.1 % (ref 4–15)
NEUTROPHILS # BLD AUTO: 4.5 K/UL (ref 1.8–7.7)
NEUTROPHILS NFR BLD: 57 % (ref 38–73)
NRBC BLD-RTO: 0 /100 WBC
PLATELET # BLD AUTO: 302 K/UL (ref 150–450)
PMV BLD AUTO: 9 FL (ref 9.2–12.9)
POCT GLUCOSE: 119 MG/DL (ref 70–110)
POCT GLUCOSE: 143 MG/DL (ref 70–110)
POCT GLUCOSE: 217 MG/DL (ref 70–110)
POTASSIUM SERPL-SCNC: 4.5 MMOL/L (ref 3.5–5.1)
RBC # BLD AUTO: 3.25 M/UL (ref 4.6–6.2)
SODIUM SERPL-SCNC: 144 MMOL/L (ref 136–145)
VIT B12 SERPL-MCNC: 601 PG/ML (ref 210–950)
WBC # BLD AUTO: 7.96 K/UL (ref 3.9–12.7)

## 2023-12-23 PROCEDURE — 85025 COMPLETE CBC W/AUTO DIFF WBC: CPT | Mod: HCNC | Performed by: STUDENT IN AN ORGANIZED HEALTH CARE EDUCATION/TRAINING PROGRAM

## 2023-12-23 PROCEDURE — 63600175 PHARM REV CODE 636 W HCPCS: Mod: HCNC | Performed by: STUDENT IN AN ORGANIZED HEALTH CARE EDUCATION/TRAINING PROGRAM

## 2023-12-23 PROCEDURE — 25000003 PHARM REV CODE 250: Mod: HCNC | Performed by: STUDENT IN AN ORGANIZED HEALTH CARE EDUCATION/TRAINING PROGRAM

## 2023-12-23 PROCEDURE — 80048 BASIC METABOLIC PNL TOTAL CA: CPT | Mod: HCNC | Performed by: STUDENT IN AN ORGANIZED HEALTH CARE EDUCATION/TRAINING PROGRAM

## 2023-12-23 PROCEDURE — S4991 NICOTINE PATCH NONLEGEND: HCPCS | Mod: HCNC | Performed by: STUDENT IN AN ORGANIZED HEALTH CARE EDUCATION/TRAINING PROGRAM

## 2023-12-23 PROCEDURE — 36415 COLL VENOUS BLD VENIPUNCTURE: CPT | Mod: HCNC | Performed by: STUDENT IN AN ORGANIZED HEALTH CARE EDUCATION/TRAINING PROGRAM

## 2023-12-23 PROCEDURE — 11000001 HC ACUTE MED/SURG PRIVATE ROOM: Mod: HCNC

## 2023-12-23 PROCEDURE — 94761 N-INVAS EAR/PLS OXIMETRY MLT: CPT | Mod: HCNC

## 2023-12-23 RX ORDER — HYDROCODONE BITARTRATE AND ACETAMINOPHEN 5; 325 MG/1; MG/1
1 TABLET ORAL EVERY 6 HOURS PRN
Status: DISCONTINUED | OUTPATIENT
Start: 2023-12-23 | End: 2023-12-26

## 2023-12-23 RX ADMIN — CLOPIDOGREL BISULFATE 75 MG: 75 TABLET ORAL at 09:12

## 2023-12-23 RX ADMIN — Medication 1 PATCH: at 09:12

## 2023-12-23 RX ADMIN — CEFTRIAXONE 2 G: 2 INJECTION, POWDER, FOR SOLUTION INTRAMUSCULAR; INTRAVENOUS at 08:12

## 2023-12-23 RX ADMIN — ACETAMINOPHEN 650 MG: 325 TABLET ORAL at 10:12

## 2023-12-23 RX ADMIN — ATORVASTATIN CALCIUM 40 MG: 40 TABLET, FILM COATED ORAL at 09:12

## 2023-12-23 RX ADMIN — INSULIN ASPART 2 UNITS: 100 INJECTION, SOLUTION INTRAVENOUS; SUBCUTANEOUS at 05:12

## 2023-12-23 RX ADMIN — FAMOTIDINE 20 MG: 20 TABLET ORAL at 09:12

## 2023-12-23 RX ADMIN — TAMSULOSIN HYDROCHLORIDE 0.4 MG: 0.4 CAPSULE ORAL at 09:12

## 2023-12-23 RX ADMIN — METOPROLOL SUCCINATE 25 MG: 25 TABLET, EXTENDED RELEASE ORAL at 08:12

## 2023-12-23 RX ADMIN — MUPIROCIN: 20 OINTMENT TOPICAL at 09:12

## 2023-12-23 RX ADMIN — DIVALPROEX SODIUM 250 MG: 250 TABLET, DELAYED RELEASE ORAL at 09:12

## 2023-12-23 RX ADMIN — HYDROCODONE BITARTRATE AND ACETAMINOPHEN 1 TABLET: 5; 325 TABLET ORAL at 11:12

## 2023-12-23 RX ADMIN — ASPIRIN 81 MG: 81 TABLET, COATED ORAL at 08:12

## 2023-12-23 RX ADMIN — DIVALPROEX SODIUM 500 MG: 250 TABLET, DELAYED RELEASE ORAL at 09:12

## 2023-12-23 NOTE — PROGRESS NOTES
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 This medication has been on hold for a few days pending a biopsy, however, patient developed worsening hallucinations and mental status changes for several days. Patient denies having any complaints.     The ED, the patient was hemodynamically stable.  Labs were remarkable for hyperkalemia (6.9), normocytic anemia (10.2), elevated creatinine (2.5-baseline of 1.3), elevated BNP (399), elevated troponin (0.053). VBG showed a pH of 7.31, pCO2 of 45.1. UA showed +3 leukocytes, > 100 WBCs, moderate bacteria.  EKG showed showed peaked t-waves. Chest x-ray showed no acute process.  Patient was given insulin/D10, albuterol, calcium gluconate, Lasix 80 mg IV, morphine 2 mg IV, Zofran 4 mg IV. Patient was admitted for further management.     Overview/Hospital Course:  Admitted with acute encephalopathy, agitation and hyperkalemia. Also found to have UTI. Hyperkalemia improved, encephalopathy slowly improving. On IV antibiotics. More alert and awake, still very encephlopathic but redirectable. PT/OT consulted. Neurology consulted for evaluation of recent tremors, confusion.     Interval History: Pt a&ox3 states he does not remember the previous day.  Discussed plan to continue abx. Also spoke to pts daughter with update given. Questions answered.    Review of Systems  Objective:     Vital Signs (Most Recent):  Temp: 98 °F (36.7 °C) (12/23/23 0400)  Pulse: 105 (12/23/23 1000)  Resp: 16 (12/23/23 1115)  BP: (!) 111/56 (12/23/23 1000)  SpO2: (!) 94 % (12/23/23 1000) Vital Signs (24h Range):  Temp:  [97.5 °F (36.4 °C)-98.2 °F (36.8 °C)] 98 °F (36.7 °C)  Pulse:  [] 105  Resp:  [13-44] 16  SpO2:  [88 %-97 %] 94 %  BP: ()/() 111/56     Weight: 62.8 kg (138 lb 7.2 oz)  Body mass index is 21.68 kg/m².    Intake/Output Summary (Last 24 hours) at 12/23/2023 1118  Last data filed at 12/23/2023 1000  Gross per 24 hour   Intake 650.9 ml   Output 670 ml   Net -19.1 ml         Physical Exam  Vitals reviewed.   Constitutional:       General: He is not in acute distress.     Appearance: He is ill-appearing (chronic).   Cardiovascular:      Rate and Rhythm: Normal rate and regular rhythm.   Pulmonary:      Effort: Pulmonary effort is normal. No respiratory distress (on RA).   Abdominal:      General: There is no distension.      Palpations: Abdomen is soft.      Tenderness: There is no abdominal tenderness. There is no guarding or rebound.   Musculoskeletal:         General: No swelling or tenderness.      Cervical back: Neck supple. No rigidity.   Skin:     General: Skin is warm and dry.   Neurological:      General: No focal deficit present.      Mental Status: He is alert and oriented to person, place, and time.             Significant Labs: All pertinent labs within the past 24 hours have been reviewed.    Significant Imaging: I have reviewed all pertinent imaging results/findings within the past 24 hours.    Assessment/Plan:      * Encephalopathy, metabolic  - Suspect related to UTI, possible medications but do not suspect being taken off of Renvoq contributing. Has completed prednisone weeks ago so do not suspect this is contributing.   - CTH with no acute  "findings, no focal deficits. Patient with recent onset of tremors in the last few months and weakness.  - Treating UTI at this time   - delirium precautions  - Neurology consulted     -pt now a&ox3 much improved. 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 Needs outpatient follow up with dermatology   - Follows with Dr. Davidson and has biopsies but unrevealing  - was on Renvoq and prednisone but completed course of prednisone and was taken off Renvoq on 12/12/23.   - doubt taking off of Renvoq is contributing to mental status changes      Hyperkalemia  This patient has hyperkalemia which is uncontrolled. We will monitor for arrhythmias with EKG or continuous telemetry. We will treat the hyperkalemia with Potassium Binders, Calcium gluconate, IV insulin and dextrose, Nebulized albuterol sulfate, and Furosemide. The likely etiology of the hyperkalemia is LATRICE.  The patients latest potassium has been reviewed and the results are listed below  Recent Labs   Lab 12/23/23  0425   K 4.5       - Improved        UTI (urinary tract infection)  Urinalysis  No results for input(s): "COLORU", "CLARITYU", "SPECGRAV", "PHUR", "PROTEINUA", "GLUCOSEU", "BILIRUBINCON", "BLOODU", "WBCU", "RBCU", "BACTERIA", "MUCUS", "NITRITE", "LEUKOCYTESUR", "UROBILINOGEN", "HYALINECASTS" in the last 24 hours.    Previous cultures grew proteus and enterococcus. Now GBS on culture    Continue ceftriaxone. Stop vancomycin.        Chronic bilateral low back pain with bilateral sciatica  History noted. No acute issues       Hyperlipidemia  Resume statin      Goals of care, counseling/discussion  Advance Care Planning  Palliative care following         Normal pressure hydrocephalus  Repeat CT head obtained.  No acute issues.    LATRICE (acute kidney injury)  Patient with acute kidney injury/acute renal failure likely due to pre-renal azotemia due to IVVD LATRICE is currently worsening. Baseline creatinine  " 1.3  - Labs reviewed- Renal function/electrolytes with Estimated Creatinine Clearance: 18.8 mL/min (A) (based on SCr of 2.5 mg/dL (H)). according to latest data. Monitor urine output and serial BMP and adjust therapy as needed. Avoid nephrotoxins and renally dose meds for GFR listed above.  - Cr relatively stable as yesterday but still above usual baseline  - recheck in AM    Essential hypertension  Chronic, controlled. Latest blood pressure and vitals reviewed-     Temp:  [97.5 °F (36.4 °C)-98.2 °F (36.8 °C)]   Pulse:  []   Resp:  [13-44]   BP: ()/()   SpO2:  [88 %-97 %] .   Home meds for hypertension were reviewed and noted below.   Hypertension Medications               metoprolol succinate (TOPROL-XL) 25 MG 24 hr tablet Take 1 tablet (25 mg total) by mouth once daily.            While in the hospital, will manage blood pressure as follows; Continue home antihypertensive regimen    Will utilize p.r.n. blood pressure medication only if patient's blood pressure greater than 180/110 and he develops symptoms such as worsening chest pain or shortness of breath.    COPD (chronic obstructive pulmonary disease)  Patient's COPD is controlled currently.  Patient is currently off COPD Pathway.  PRN Maria M     PAD (peripheral artery disease)  History noted.  No acute issues.      Diabetes mellitus with peripheral circulatory disorder  Patient's FSGs are controlled on current medication regimen.  Last A1c reviewed-   Lab Results   Component Value Date    HGBA1C 6.9 (H) 10/15/2023     Most recent fingerstick glucose reviewed-   Recent Labs   Lab 12/22/23  1638 12/23/23  0902   POCTGLUCOSE 120* 119*       Current correctional scale  Low  Maintain anti-hyperglycemic dose as follows-   Antihyperglycemics (From admission, onward)      Start     Stop Route Frequency Ordered    12/20/23 2152  insulin aspart U-100 pen 0-5 Units         -- SubQ Before meals & nightly PRN 12/20/23 2052          Hold Oral  hypoglycemics while patient is in the hospital.    Coronary artery disease involving coronary bypass graft of native heart without angina pectoris  No acute issues. Resume home medications       VTE Risk Mitigation (From admission, onward)           Ordered     IP VTE HIGH RISK PATIENT  Once         12/20/23 2011     Place sequential compression device  Until discontinued         12/20/23 2011                    Discharge Planning   SUSIE:      Code Status: DNR   Is the patient medically ready for discharge?:     Reason for patient still in hospital (select all that apply): Treatment, Consult recommendations, and PT / OT recommendations  Discharge Plan A: Home Health              Terry Villanueva III, MD  Department of Hospital Medicine   [facility] - [place] - Intensive Care

## 2023-12-23 NOTE — CARE UPDATE
"[facility] - [place] - Intensive Care  ICU Shift Summary  Date: 12/23/2023      Prehospitalization: Home  Admit Date / LOS : 12/20/2023/ 3 days    Diagnosis: Encephalopathy, metabolic    Consults:        Active: Neuro, OT, Palliative, and PT       Needed: N/A     Code Status: DNR   Advanced Directive: <no information>    LDA:  Lines/Drains/Airways       Drain  Duration                  Urethral Catheter 12/21/23 0754 2 days              Peripheral Intravenous Line  Duration                  Peripheral IV - Single Lumen 12/20/23 1830 20 G Left Antecubital 2 days                  Central Lines/Site/Justification:Patient Does Not Have Central Line  Urinary Cath/Order/Justification:Critically Ill in the ICU and requiring intensive monitoring    Vasopressors/Infusions:        GOALS: Volume/ Hemodynamic: N/A                     RASS: 0  alert and calm    Pain Management: PO       Pain Controlled: yes     Rhythm: NSR    Respiratory Device: Room Air                      Most Recent SBT/ SAT: N/A       MOVE Screen: PASS  ICU Liberation: not applicable    VTE Prophylaxis: Mechanical  Mobility: Bedrest  Stress Ulcer Prophylaxis: Yes    Isolation: No active isolations    Dietary:   Current Diet Order   Procedures    Diet Adult Regular (IDDSI Level 7)      Tolerance: yes  Advancement: @ goal    I & O (24h):    Intake/Output Summary (Last 24 hours) at 12/23/2023 1729  Last data filed at 12/23/2023 1408  Gross per 24 hour   Intake 1070.9 ml   Output 670 ml   Net 400.9 ml        Restraints: No    Significant Dates:  Post Op Date: N/A  Rescue Date: N/A  Imaging/ Diagnostics: N/A    Noteworthy Labs:  see below    COVID Test: (--)  CBC/Anemia Labs: Coags:    Recent Labs   Lab 12/22/23  1603 12/23/23  0425   WBC 8.57 7.96   HGB 8.9* 9.7*   HCT 27.5* 30.4*    302   MCV 95 94   RDW 17.7* 17.3*    No results for input(s): "PT", "INR", "APTT" in the last 168 hours.     Chemistries:   Recent Labs   Lab 12/20/23  1815 12/20/23 2227 " 12/21/23  0543 12/22/23  1603 12/23/23  0425      < > 139 143 144   K 6.9*   < > 5.0 4.5 4.5      < > 105 108 111*   CO2 20*   < > 20* 20* 20*   BUN 31*   < > 31* 26* 26*   CREATININE 2.5*   < > 2.5* 2.0* 2.1*   CALCIUM 8.7   < > 8.2* 8.3* 8.5*   PROT 8.1  --  7.2  --   --    BILITOT 0.2  --  0.2  --   --    ALKPHOS 88  --  75  --   --    ALT 26  --  23  --   --    AST 28  --  24  --   --    MG  --   --  2.0  --   --    PHOS  --   --  5.2*  --   --     < > = values in this interval not displayed.        Cardiac Enzymes: Ejection Fractions:    Recent Labs     12/20/23  1815   TROPONINI 0.053*    EF   Date Value Ref Range Status   04/03/2021 55 % Final        POCT Glucose: HbA1c:    Recent Labs   Lab 12/22/23  1638 12/23/23  0902 12/23/23  1115   POCTGLUCOSE 120* 119* 143*    Hemoglobin A1C   Date Value Ref Range Status   10/15/2023 6.9 (H) 4.0 - 5.6 % Final     Comment:     ADA Screening Guidelines:  5.7-6.4%  Consistent with prediabetes  >or=6.5%  Consistent with diabetes    High levels of fetal hemoglobin interfere with the HbA1C  assay. Heterozygous hemoglobin variants (HbS, HgC, etc)do  not significantly interfere with this assay.   However, presence of multiple variants may affect accuracy.             ICU LOS 2d 12h  Level of Care: OK to Transfer    Chart Check: 12 HR Done  Shift Summary/Plan for the shift: see care plan note

## 2023-12-23 NOTE — ASSESSMENT & PLAN NOTE
- Suspect related to UTI, possible medications but do not suspect being taken off of Renvoq contributing. Has completed prednisone weeks ago so do not suspect this is contributing.   - CTH with no acute findings, no focal deficits. Patient with recent onset of tremors in the last few months and weakness.  - Treating UTI at this time   - delirium precautions  - Neurology consulted     -pt now a&ox3 much improved. Plan to move to the floor today with delirium precautions

## 2023-12-23 NOTE — ASSESSMENT & PLAN NOTE
This patient has hyperkalemia which is uncontrolled. We will monitor for arrhythmias with EKG or continuous telemetry. We will treat the hyperkalemia with Potassium Binders, Calcium gluconate, IV insulin and dextrose, Nebulized albuterol sulfate, and Furosemide. The likely etiology of the hyperkalemia is LATRICE.  The patients latest potassium has been reviewed and the results are listed below  Recent Labs   Lab 12/23/23  0425   K 4.5       - Improved

## 2023-12-23 NOTE — EICU
Intervention Initiated From:  Bedside    Chin intervened regarding:  Medication    Nurse Notified:  Yes    Doctor Notified:  Yes    Comments: bedside nurse called to get order for a sleep med. Pt has not sleep in 3 days. He has gotten Melatonin and Haldol , which were ineffective. Nurse suggested Ativan. Informed Dr. Wheeler.

## 2023-12-23 NOTE — ASSESSMENT & PLAN NOTE
Patient's FSGs are controlled on current medication regimen.  Last A1c reviewed-   Lab Results   Component Value Date    HGBA1C 6.9 (H) 10/15/2023     Most recent fingerstick glucose reviewed-   Recent Labs   Lab 12/22/23  1638 12/23/23  0902   POCTGLUCOSE 120* 119*       Current correctional scale  Low  Maintain anti-hyperglycemic dose as follows-   Antihyperglycemics (From admission, onward)    Start     Stop Route Frequency Ordered    12/20/23 2152  insulin aspart U-100 pen 0-5 Units         -- SubQ Before meals & nightly PRN 12/20/23 2052        Hold Oral hypoglycemics while patient is in the hospital.

## 2023-12-23 NOTE — NURSING
Pt has not slept in 3 days, becoming agitated, moving all over bed although in restraints, causing bruising to arms. Gave melatonin and Haldol last night and today with no effect. Notified eICU, requesting ativan. Awaiting orders

## 2023-12-23 NOTE — NURSING
[facility] - [place]  Nurses Note -- 4 Eyes      12/23/2023       Skin assessed on: Q Shift      [x] No Pressure Injuries Present    [x]Prevention Measures Documented    [] Yes LDA  for Pressure Injury Previously documented     [] Yes New Pressure Injury Discovered   [] LDA for New Pressure Injury Added      Attending RN:  Ana Lilia Mclean, RN     Second RN:  Alicia Berry RN

## 2023-12-23 NOTE — PLAN OF CARE
Pt free from fall/injury, BUE restraints in place, turns independently. CBG WNL. Remains confused, pt managed to get some sleep this shift.      Problem: Adult Inpatient Plan of Care  Goal: Plan of Care Review  Outcome: Ongoing, Progressing     Problem: Fall Injury Risk  Goal: Absence of Fall and Fall-Related Injury  Outcome: Ongoing, Progressing     Problem: Restraint, Nonbehavioral (Nonviolent)  Goal: Absence of Harm or Injury  Outcome: Ongoing, Progressing     Problem: Fluid and Electrolyte Imbalance (Acute Kidney Injury/Impairment)  Goal: Fluid and Electrolyte Balance  Outcome: Ongoing, Progressing

## 2023-12-23 NOTE — SUBJECTIVE & OBJECTIVE
Interval History: Pt a&ox3 states he does not remember the previous day. Discussed plan to continue abx. Also spoke to pts daughter with update given. Questions answered.    Review of Systems  Objective:     Vital Signs (Most Recent):  Temp: 98 °F (36.7 °C) (12/23/23 0400)  Pulse: 105 (12/23/23 1000)  Resp: 16 (12/23/23 1115)  BP: (!) 111/56 (12/23/23 1000)  SpO2: (!) 94 % (12/23/23 1000) Vital Signs (24h Range):  Temp:  [97.5 °F (36.4 °C)-98.2 °F (36.8 °C)] 98 °F (36.7 °C)  Pulse:  [] 105  Resp:  [13-44] 16  SpO2:  [88 %-97 %] 94 %  BP: ()/() 111/56     Weight: 62.8 kg (138 lb 7.2 oz)  Body mass index is 21.68 kg/m².    Intake/Output Summary (Last 24 hours) at 12/23/2023 1118  Last data filed at 12/23/2023 1000  Gross per 24 hour   Intake 650.9 ml   Output 670 ml   Net -19.1 ml         Physical Exam  Vitals reviewed.   Constitutional:       General: He is not in acute distress.     Appearance: He is ill-appearing (chronic).   Cardiovascular:      Rate and Rhythm: Normal rate and regular rhythm.   Pulmonary:      Effort: Pulmonary effort is normal. No respiratory distress (on RA).   Abdominal:      General: There is no distension.      Palpations: Abdomen is soft.      Tenderness: There is no abdominal tenderness. There is no guarding or rebound.   Musculoskeletal:         General: No swelling or tenderness.      Cervical back: Neck supple. No rigidity.   Skin:     General: Skin is warm and dry.   Neurological:      General: No focal deficit present.      Mental Status: He is alert and oriented to person, place, and time.             Significant Labs: All pertinent labs within the past 24 hours have been reviewed.    Significant Imaging: I have reviewed all pertinent imaging results/findings within the past 24 hours.

## 2023-12-23 NOTE — PLAN OF CARE
Patient more oriented today, restraints removed without difficulty.   Complains of bad foot pain--medicated with Norco, with relief unless moved.    Patient able to feed himself dinner, with minor assist.    Stopped trying to get out of bed today.   Daughter visits, updated by Dr Villanueva

## 2023-12-23 NOTE — ASSESSMENT & PLAN NOTE
Chronic, controlled. Latest blood pressure and vitals reviewed-     Temp:  [97.5 °F (36.4 °C)-98.2 °F (36.8 °C)]   Pulse:  []   Resp:  [13-44]   BP: ()/()   SpO2:  [88 %-97 %] .   Home meds for hypertension were reviewed and noted below.   Hypertension Medications               metoprolol succinate (TOPROL-XL) 25 MG 24 hr tablet Take 1 tablet (25 mg total) by mouth once daily.            While in the hospital, will manage blood pressure as follows; Continue home antihypertensive regimen    Will utilize p.r.n. blood pressure medication only if patient's blood pressure greater than 180/110 and he develops symptoms such as worsening chest pain or shortness of breath.

## 2023-12-23 NOTE — EICU
Intervention Initiated From:  bedside    Chin intervened regarding:  Other    Nurse Notified:  Yes    Doctor Notified:  Yes    Comments: bedside nurse called to get order for restraints. Informed Dr. Hobbs.

## 2023-12-23 NOTE — NURSING
[facility], [facility]  Nurses Note -- 4 Eyes      12/22/2023       Skin assessed on: Q Shift      [x] No Pressure Injuries Present    [x]Prevention Measures Documented    [] Yes LDA  for Pressure Injury Previously documented     [] Yes New Pressure Injury Discovered   [] LDA for New Pressure Injury Added      Attending RN:  Alicia Berry RN     Second RN:  Momo RN

## 2023-12-23 NOTE — NURSING
[facility] - [place]  Nurses Note -- 4 Eyes      12/22/2023       Skin assessed on: Q Shift      [x] No Pressure Injuries Present    [x]Prevention Measures Documented    [] Yes LDA  for Pressure Injury Previously documented     [] Yes New Pressure Injury Discovered   [] LDA for New Pressure Injury Added      Attending RN:  Ana Lilia Mclean, RN     Second RN:  Alicia Berry RN

## 2023-12-24 PROBLEM — I47.29 NSVT (NONSUSTAINED VENTRICULAR TACHYCARDIA): Status: ACTIVE | Noted: 2023-12-24

## 2023-12-24 LAB
ALBUMIN SERPL BCP-MCNC: 2.5 G/DL (ref 3.5–5.2)
ALP SERPL-CCNC: 78 U/L (ref 55–135)
ALT SERPL W/O P-5'-P-CCNC: 33 U/L (ref 10–44)
ANION GAP SERPL CALC-SCNC: 11 MMOL/L (ref 8–16)
ANION GAP SERPL CALC-SCNC: 12 MMOL/L (ref 8–16)
ANION GAP SERPL CALC-SCNC: 17 MMOL/L (ref 8–16)
AST SERPL-CCNC: 93 U/L (ref 10–40)
BACTERIA BLD CULT: NORMAL
BACTERIA BLD CULT: NORMAL
BILIRUB SERPL-MCNC: 0.2 MG/DL (ref 0.1–1)
BUN SERPL-MCNC: 21 MG/DL (ref 6–30)
BUN SERPL-MCNC: 21 MG/DL (ref 8–23)
BUN SERPL-MCNC: 25 MG/DL (ref 8–23)
CALCIUM SERPL-MCNC: 7.9 MG/DL (ref 8.7–10.5)
CALCIUM SERPL-MCNC: 8 MG/DL (ref 8.7–10.5)
CHLORIDE SERPL-SCNC: 106 MMOL/L (ref 95–110)
CHLORIDE SERPL-SCNC: 107 MMOL/L (ref 95–110)
CHLORIDE SERPL-SCNC: 107 MMOL/L (ref 95–110)
CO2 SERPL-SCNC: 17 MMOL/L (ref 23–29)
CO2 SERPL-SCNC: 18 MMOL/L (ref 23–29)
CREAT SERPL-MCNC: 2 MG/DL (ref 0.5–1.4)
CREAT SERPL-MCNC: 2 MG/DL (ref 0.5–1.4)
CREAT SERPL-MCNC: 2.1 MG/DL (ref 0.5–1.4)
EST. GFR  (NO RACE VARIABLE): 30 ML/MIN/1.73 M^2
EST. GFR  (NO RACE VARIABLE): 32 ML/MIN/1.73 M^2
GLUCOSE SERPL-MCNC: 104 MG/DL (ref 70–110)
GLUCOSE SERPL-MCNC: 143 MG/DL (ref 70–110)
GLUCOSE SERPL-MCNC: 196 MG/DL (ref 70–110)
HCT VFR BLD CALC: 28 %PCV (ref 36–54)
MAGNESIUM SERPL-MCNC: 2.2 MG/DL (ref 1.6–2.6)
PHOSPHATE SERPL-MCNC: 4.2 MG/DL (ref 2.7–4.5)
POC IONIZED CALCIUM: 1.11 MMOL/L (ref 1.06–1.42)
POC TCO2 (MEASURED): 20 MMOL/L (ref 23–27)
POCT GLUCOSE: 101 MG/DL (ref 70–110)
POCT GLUCOSE: 116 MG/DL (ref 70–110)
POCT GLUCOSE: 120 MG/DL (ref 70–110)
POCT GLUCOSE: 161 MG/DL (ref 70–110)
POCT GLUCOSE: 248 MG/DL (ref 70–110)
POTASSIUM BLD-SCNC: 4.4 MMOL/L (ref 3.5–5.1)
POTASSIUM SERPL-SCNC: 5 MMOL/L (ref 3.5–5.1)
POTASSIUM SERPL-SCNC: 8.6 MMOL/L (ref 3.5–5.1)
PROT SERPL-MCNC: 6.4 G/DL (ref 6–8.4)
SAMPLE: ABNORMAL
SITE: ABNORMAL
SODIUM BLD-SCNC: 138 MMOL/L (ref 136–145)
SODIUM SERPL-SCNC: 135 MMOL/L (ref 136–145)
SODIUM SERPL-SCNC: 137 MMOL/L (ref 136–145)

## 2023-12-24 PROCEDURE — 80053 COMPREHEN METABOLIC PANEL: CPT | Mod: HCNC | Performed by: STUDENT IN AN ORGANIZED HEALTH CARE EDUCATION/TRAINING PROGRAM

## 2023-12-24 PROCEDURE — 99223 1ST HOSP IP/OBS HIGH 75: CPT | Mod: HCNC,,, | Performed by: INTERNAL MEDICINE

## 2023-12-24 PROCEDURE — 82330 ASSAY OF CALCIUM: CPT | Mod: HCNC

## 2023-12-24 PROCEDURE — 84100 ASSAY OF PHOSPHORUS: CPT | Mod: HCNC | Performed by: STUDENT IN AN ORGANIZED HEALTH CARE EDUCATION/TRAINING PROGRAM

## 2023-12-24 PROCEDURE — 84295 ASSAY OF SERUM SODIUM: CPT | Mod: HCNC

## 2023-12-24 PROCEDURE — 11000001 HC ACUTE MED/SURG PRIVATE ROOM: Mod: HCNC

## 2023-12-24 PROCEDURE — 25000003 PHARM REV CODE 250: Mod: HCNC | Performed by: STUDENT IN AN ORGANIZED HEALTH CARE EDUCATION/TRAINING PROGRAM

## 2023-12-24 PROCEDURE — 85014 HEMATOCRIT: CPT | Mod: HCNC

## 2023-12-24 PROCEDURE — 63600175 PHARM REV CODE 636 W HCPCS: Mod: HCNC | Performed by: STUDENT IN AN ORGANIZED HEALTH CARE EDUCATION/TRAINING PROGRAM

## 2023-12-24 PROCEDURE — 36415 COLL VENOUS BLD VENIPUNCTURE: CPT | Mod: HCNC | Performed by: STUDENT IN AN ORGANIZED HEALTH CARE EDUCATION/TRAINING PROGRAM

## 2023-12-24 PROCEDURE — 82565 ASSAY OF CREATININE: CPT | Mod: HCNC

## 2023-12-24 PROCEDURE — 25000242 PHARM REV CODE 250 ALT 637 W/ HCPCS: Mod: HCNC | Performed by: STUDENT IN AN ORGANIZED HEALTH CARE EDUCATION/TRAINING PROGRAM

## 2023-12-24 PROCEDURE — 83735 ASSAY OF MAGNESIUM: CPT | Mod: HCNC | Performed by: STUDENT IN AN ORGANIZED HEALTH CARE EDUCATION/TRAINING PROGRAM

## 2023-12-24 PROCEDURE — 84132 ASSAY OF SERUM POTASSIUM: CPT | Mod: HCNC

## 2023-12-24 PROCEDURE — 36600 WITHDRAWAL OF ARTERIAL BLOOD: CPT | Mod: HCNC

## 2023-12-24 PROCEDURE — 80048 BASIC METABOLIC PNL TOTAL CA: CPT | Mod: HCNC,XB | Performed by: STUDENT IN AN ORGANIZED HEALTH CARE EDUCATION/TRAINING PROGRAM

## 2023-12-24 PROCEDURE — S4991 NICOTINE PATCH NONLEGEND: HCPCS | Mod: HCNC | Performed by: STUDENT IN AN ORGANIZED HEALTH CARE EDUCATION/TRAINING PROGRAM

## 2023-12-24 PROCEDURE — 99900035 HC TECH TIME PER 15 MIN (STAT): Mod: HCNC

## 2023-12-24 RX ORDER — ALBUTEROL SULFATE 0.83 MG/ML
15 SOLUTION RESPIRATORY (INHALATION) ONCE
Status: DISCONTINUED | OUTPATIENT
Start: 2023-12-24 | End: 2023-12-24

## 2023-12-24 RX ORDER — METHYLPREDNISOLONE SOD SUCC 125 MG
125 VIAL (EA) INJECTION ONCE
Status: COMPLETED | OUTPATIENT
Start: 2023-12-24 | End: 2023-12-24

## 2023-12-24 RX ORDER — METHYLPREDNISOLONE ACETATE 40 MG/ML
40 INJECTION, SUSPENSION INTRA-ARTICULAR; INTRALESIONAL; INTRAMUSCULAR; SOFT TISSUE 2 TIMES DAILY
Status: DISCONTINUED | OUTPATIENT
Start: 2023-12-25 | End: 2023-12-24

## 2023-12-24 RX ORDER — CEFDINIR 300 MG/1
300 CAPSULE ORAL 2 TIMES DAILY
Qty: 14 CAPSULE | Refills: 0 | Status: SHIPPED | OUTPATIENT
Start: 2023-12-24 | End: 2023-12-29 | Stop reason: HOSPADM

## 2023-12-24 RX ORDER — ALBUTEROL SULFATE 2.5 MG/.5ML
2.5 SOLUTION RESPIRATORY (INHALATION) ONCE
Status: DISCONTINUED | OUTPATIENT
Start: 2023-12-24 | End: 2023-12-25

## 2023-12-24 RX ORDER — METHYLPREDNISOLONE SOD SUCC 125 MG
125 VIAL (EA) INJECTION ONCE
Status: DISCONTINUED | OUTPATIENT
Start: 2023-12-24 | End: 2023-12-24

## 2023-12-24 RX ORDER — CALCIUM GLUCONATE 20 MG/ML
1 INJECTION, SOLUTION INTRAVENOUS
Status: DISCONTINUED | OUTPATIENT
Start: 2023-12-24 | End: 2023-12-24

## 2023-12-24 RX ORDER — ALBUTEROL SULFATE 1.25 MG/3ML
2.5 SOLUTION RESPIRATORY (INHALATION) ONCE
Status: DISCONTINUED | OUTPATIENT
Start: 2023-12-24 | End: 2023-12-24

## 2023-12-24 RX ADMIN — DIVALPROEX SODIUM 500 MG: 250 TABLET, DELAYED RELEASE ORAL at 09:12

## 2023-12-24 RX ADMIN — TAMSULOSIN HYDROCHLORIDE 0.4 MG: 0.4 CAPSULE ORAL at 09:12

## 2023-12-24 RX ADMIN — METOPROLOL SUCCINATE 25 MG: 25 TABLET, EXTENDED RELEASE ORAL at 09:12

## 2023-12-24 RX ADMIN — MUPIROCIN: 20 OINTMENT TOPICAL at 09:12

## 2023-12-24 RX ADMIN — HYDROCODONE BITARTRATE AND ACETAMINOPHEN 1 TABLET: 5; 325 TABLET ORAL at 06:12

## 2023-12-24 RX ADMIN — ASPIRIN 81 MG: 81 TABLET, COATED ORAL at 09:12

## 2023-12-24 RX ADMIN — METHYLPREDNISOLONE SODIUM SUCCINATE 125 MG: 125 INJECTION, POWDER, FOR SOLUTION INTRAMUSCULAR; INTRAVENOUS at 06:12

## 2023-12-24 RX ADMIN — INSULIN ASPART 2 UNITS: 100 INJECTION, SOLUTION INTRAVENOUS; SUBCUTANEOUS at 12:12

## 2023-12-24 RX ADMIN — ATORVASTATIN CALCIUM 40 MG: 40 TABLET, FILM COATED ORAL at 09:12

## 2023-12-24 RX ADMIN — CEFTRIAXONE 2 G: 2 INJECTION, POWDER, FOR SOLUTION INTRAMUSCULAR; INTRAVENOUS at 09:12

## 2023-12-24 RX ADMIN — FAMOTIDINE 20 MG: 20 TABLET ORAL at 09:12

## 2023-12-24 RX ADMIN — Medication 1 PATCH: at 09:12

## 2023-12-24 RX ADMIN — CLOPIDOGREL BISULFATE 75 MG: 75 TABLET ORAL at 09:12

## 2023-12-24 RX ADMIN — DIVALPROEX SODIUM 250 MG: 250 TABLET, DELAYED RELEASE ORAL at 09:12

## 2023-12-24 NOTE — PLAN OF CARE
Problem: Adult Inpatient Plan of Care  Goal: Plan of Care Review  Outcome: Ongoing, Progressing  Flowsheets (Taken 12/24/2023 0611)  Plan of Care Reviewed With: patient  Goal: Optimal Comfort and Wellbeing  Outcome: Ongoing, Progressing  Intervention: Monitor Pain and Promote Comfort  Flowsheets (Taken 12/24/2023 0611)  Pain Management Interventions:   position adjusted   pillow support provided   quiet environment facilitated     Problem: Fall Injury Risk  Goal: Absence of Fall and Fall-Related Injury  Outcome: Ongoing, Progressing     Problem: Restraint, Nonbehavioral (Nonviolent)  Goal: Absence of Harm or Injury  Outcome: Ongoing, Progressing     Problem: Diabetes Comorbidity  Goal: Blood Glucose Level Within Targeted Range  Outcome: Ongoing, Progressing  Intervention: Monitor and Manage Glycemia  Flowsheets (Taken 12/24/2023 0611)  Glycemic Management: blood glucose monitored     Problem: Oral Intake Inadequate (Acute Kidney Injury/Impairment)  Goal: Optimal Nutrition Intake  Outcome: Ongoing, Progressing     Problem: Impaired Wound Healing  Goal: Optimal Wound Healing  Outcome: Ongoing, Progressing  Intervention: Promote Wound Healing  Flowsheets (Taken 12/24/2023 0611)  Pain Management Interventions:   position adjusted   pillow support provided   quiet environment facilitated

## 2023-12-24 NOTE — ASSESSMENT & PLAN NOTE
18 beat VT this AM asymptomatic. Known CAD with normal EF. Possibly triggered by infection. Continue BB. DNR - patient wishes conservative Rx for CAD/arrhythmia - ok for d/c

## 2023-12-24 NOTE — PLAN OF CARE
Problem: Adult Inpatient Plan of Care  Goal: Plan of Care Review  Outcome: Ongoing, Progressing  Goal: Patient-Specific Goal (Individualized)  Outcome: Ongoing, Progressing  Goal: Absence of Hospital-Acquired Illness or Injury  Outcome: Ongoing, Progressing  Goal: Optimal Comfort and Wellbeing  Outcome: Ongoing, Progressing  Goal: Readiness for Transition of Care  Outcome: Ongoing, Progressing     Problem: Fall Injury Risk  Goal: Absence of Fall and Fall-Related Injury  Outcome: Ongoing, Progressing     Problem: Restraint, Nonbehavioral (Nonviolent)  Goal: Absence of Harm or Injury  Outcome: Ongoing, Progressing     Problem: Diabetes Comorbidity  Goal: Blood Glucose Level Within Targeted Range  Outcome: Ongoing, Progressing     Problem: Fluid and Electrolyte Imbalance (Acute Kidney Injury/Impairment)  Goal: Fluid and Electrolyte Balance  Outcome: Ongoing, Progressing     Problem: Oral Intake Inadequate (Acute Kidney Injury/Impairment)  Goal: Optimal Nutrition Intake  Outcome: Ongoing, Progressing     Problem: Renal Function Impairment (Acute Kidney Injury/Impairment)  Goal: Effective Renal Function  Outcome: Ongoing, Progressing     Problem: Impaired Wound Healing  Goal: Optimal Wound Healing  Outcome: Ongoing, Progressing     Problem: Skin Injury Risk Increased  Goal: Skin Health and Integrity  Outcome: Ongoing, Progressing     Problem: Infection  Goal: Absence of Infection Signs and Symptoms  Outcome: Ongoing, Progressing     Problem: Coping Ineffective  Goal: Effective Coping  Outcome: Ongoing, Progressing

## 2023-12-24 NOTE — PROGRESS NOTES
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 This medication has been on hold for a few days pending a biopsy, however, patient developed worsening hallucinations and mental status changes for several days. Patient denies having any complaints.     The ED, the patient was hemodynamically stable.  Labs were remarkable for hyperkalemia (6.9), normocytic anemia (10.2), elevated creatinine (2.5-baseline of 1.3), elevated BNP (399), elevated troponin (0.053). VBG showed a pH of 7.31, pCO2 of 45.1. UA showed +3 leukocytes, > 100 WBCs, moderate bacteria.  EKG showed showed peaked t-waves. Chest x-ray showed no acute process.  Patient was given insulin/D10, albuterol, calcium gluconate, Lasix 80 mg IV, morphine 2 mg IV, Zofran 4 mg IV. Patient was admitted for further management.     Overview/Hospital Course:  Admitted with acute encephalopathy, agitation and hyperkalemia. Also found to have UTI. Hyperkalemia improved, encephalopathy slowly improving. On IV antibiotics. More alert and awake, still very encephlopathic but redirectable. PT/OT consulted. Neurology consulted for evaluation of recent tremors, confusion.     Patient mental status appears back to baseline. Speaking fluently. Knows month and  year. Answering questions appropriately and using dates in speech.. Called daughter will get him home for landy with home health, pending any cardiology recs for short run of Vtach, likely stimulated from infection. Will give Cefdinir for UTI.  F/u with PCP and Cardiology outpt.     ROS:  General: Negative for fevers or chills.  Cardiac: Negative for chest pain or orthopnea   Pulmonary: Negative for dyspnea or wheezing.  GI: Negative for abdominal distention or pain     Vitals:    12/24/23 0525 12/24/23 0719 12/24/23 0930 12/24/23 1125   BP: (!) 126/57 117/63 117/63 (!) 101/52   BP Location:  Left arm  Left arm   Patient Position:  Lying  Lying   Pulse: 87 79 79 70   Resp: 19 20  16   Temp: 97.6 °F (36.4 °C) 97.8 °F (36.6 °C)  98 °F (36.7 °C)   TempSrc: Oral Oral  Oral   SpO2: 96% 95%  96%   Weight:       Height:              Body mass index is 21.68 kg/m².      PHYSICAL EXAM:  GENERAL APPEARANCE: alert and cooperative, and appears to be in no acute distress.  HEENT:     HEAD: NC/AT     EYES: PERRL, EOMI.  Vision is grossly intact.  Poor hearing   NECK: Neck supple, non-tender without LAD, masses or thyromegaly.  CARDIAC: There is no peripheral edema, cyanosis or pallor.   LUNGS: Clear to auscultation and percussion without rales or wheezing  ABDOMEN: Non-distended. No guarding.  MSK: No joint erythema or tenderness.   EXTREMITIES: No significant deformity or joint abnormality. No edema.   NEUROLOGICAL: CN II-XII grossly intact.   SKIN: diffuse rash/skin pealing on limbs, skin break down  PSYCHIATRIC: Oriented. No tangential speech. No Hyperactive features.        Recent Results (from the past 24 hour(s))   POCT glucose    Collection Time: 12/23/23  5:40 PM   Result Value Ref Range    POCT Glucose 217 (H) 70 - 110 mg/dL   POCT glucose    Collection Time: 12/23/23  9:17 PM   Result Value Ref Range    POCT Glucose 116 (H) 70 - 110 mg/dL   POCT glucose    Collection Time: 12/24/23  7:17 AM   Result Value Ref Range     POCT Glucose 101 70 - 110 mg/dL   POCT glucose    Collection Time: 12/24/23 11:26 AM   Result Value Ref Range    POCT Glucose 248 (H) 70 - 110 mg/dL       Microbiology Results (last 7 days)       Procedure Component Value Units Date/Time    Blood Culture #2 **CANNOT BE ORDERED STAT** [[id number]] Collected: 12/20/23 1830    Order Status: Completed Specimen: Blood from Peripheral, Antecubital, Left Updated: 12/23/23 2103     Blood Culture, Routine No Growth to date      No Growth to date      No Growth to date      No Growth to date    Blood Culture #1 **CANNOT BE ORDERED STAT** [[id number]] Collected: 12/20/23 1825    Order Status: Completed Specimen: Blood from Peripheral, Forearm, Left Updated: 12/23/23 2103     Blood Culture, Routine No Growth to date      No Growth to date      No Growth to date      No Growth to date    Influenza A & B by Molecular [[id number]] Collected: 12/22/23 1411    Order Status: Completed Specimen: Nasopharyngeal Swab Updated: 12/22/23 1502     Influenza A, Molecular Negative     Influenza B, Molecular Negative     Flu A & B Source Nasal swab    Urine culture [[id number]]  (Abnormal) Collected: 12/20/23 1704    Order Status: Completed Specimen: Urine Updated: 12/22/23 0816     Urine Culture, Routine STREPTOCOCCUS AGALACTIAE (GROUP B)  > 100,000 cfu/ml  Beta-hemolytic streptococci are routinely susceptible to   penicillins,cephalosporins and carbapenems.      Narrative:      Specimen Source->Urine             Imaging Results              CT Head Without Contrast (Final result)  Result time 12/20/23 21:13:53      Final result by Jairo Sullivan MD (12/20/23 21:13:53)                   Impression:      Stable examination.  Additional evaluation, as clinically warranted.      Electronically signed by: Jairo Sullivan MD  Date:    12/20/2023  Time:    21:13               Narrative:    EXAMINATION:  CT HEAD WITHOUT CONTRAST    CLINICAL HISTORY:  Mental status change, unknown  cause.    TECHNIQUE:  Low dose axial images were obtained through the head.  Coronal and sagittal reformations were also performed. Contrast was not administered.    COMPARISON:  11/28/2023.    10/14/2023.    FINDINGS:  There is stable appearance of the right parietal ventriculostomy shunt catheter with its tip terminating in the midline body of the left lateral ventricle.  The configuration of the ventricular system is unchanged with mild prominence of the ventricular system.  There is no evidence of worsening hydrocephalus.    There is no evidence of intracranial hemorrhage.  The gray-white differentiation is maintained.  There are hypodensities within the periventricular and subcortical white matter.  The gray-white differentiation is maintained.  There is no dense vessel sign.  There is no evidence of mass effect.                                       X-Ray Chest AP Portable (Final result)  Result time 12/20/23 17:13:38      Final result by Marcelino Daley MD (12/20/23 17:13:38)                   Impression:      No acute abnormality.      Electronically signed by: Marcelino Daley  Date:    12/20/2023  Time:    17:13               Narrative:    EXAMINATION:  XR CHEST AP PORTABLE    CLINICAL HISTORY:  Altered mental status, unspecified    TECHNIQUE:  Single frontal view of the chest was performed.    COMPARISON:  Chest radiograph 10/14/2023    FINDINGS:  Lines and tubes: Partially imaged ventriculoperitoneal shunt catheter.    Heart and mediastinum: Unchanged, again noting postoperative changes from CABG.    Pleura: No pleural effusion or pneumothorax.    Lungs: Lungs are well inflated. No focal consolidations or evidence of pulmonary edema.    Soft tissue/bone: Postoperative changes from median sternotomy.                                          Assessment/Plan:      * Encephalopathy, metabolic  - Suspect related to UTI, possible medications but do not suspect being taken off of Renvoq contributing. Has completed  "prednisone weeks ago so do not suspect this is contributing.   - CTH with no acute findings, no focal deficits. Patient with recent onset of tremors in the last few months and weakness.  - Treating UTI at this time   - delirium precautions  - Neurology consulted     -pt now a&ox3 much improved. 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 Needs outpatient follow up with dermatology   - Follows with Dr. Davidson and has biopsies but unrevealing  - was on Renvoq and prednisone but completed course of prednisone and was taken off Renvoq on 12/12/23.   - doubt taking off of Renvoq is contributing to mental status changes      Hyperkalemia  This patient has hyperkalemia which is uncontrolled. We will monitor for arrhythmias with EKG or continuous telemetry. We will treat the hyperkalemia with Potassium Binders, Calcium gluconate, IV insulin and dextrose, Nebulized albuterol sulfate, and Furosemide. The likely etiology of the hyperkalemia is LATRICE.  The patients latest potassium has been reviewed and the results are listed below  Recent Labs   Lab 12/23/23  0425   K 4.5       - Improved        UTI (urinary tract infection)  Urinalysis  No results for input(s): "COLORU", "CLARITYU", "SPECGRAV", "PHUR", "PROTEINUA", "GLUCOSEU", "BILIRUBINCON", "BLOODU", "WBCU", "RBCU", "BACTERIA", "MUCUS", "NITRITE", "LEUKOCYTESUR", "UROBILINOGEN", "HYALINECASTS" in the last 24 hours.    Previous cultures grew proteus and enterococcus. Now GBS on culture    Continue ceftriaxone. Stop vancomycin.        Chronic bilateral low back pain with bilateral sciatica  History noted. No acute issues       Hyperlipidemia  Resume statin      Goals of care, counseling/discussion  Advance Care Planning  Palliative care following         Normal pressure hydrocephalus  Repeat CT head obtained.  No acute issues.    LATRICE (acute kidney injury)  Patient with acute kidney injury/acute renal failure likely due " to pre-renal azotemia due to IVVD LATRICE is currently worsening. Baseline creatinine  1.3  - Labs reviewed- Renal function/electrolytes with Estimated Creatinine Clearance: 18.8 mL/min (A) (based on SCr of 2.5 mg/dL (H)). according to latest data. Monitor urine output and serial BMP and adjust therapy as needed. Avoid nephrotoxins and renally dose meds for GFR listed above.  - Cr relatively stable as yesterday but still above usual baseline  - recheck in AM    Essential hypertension  Chronic, controlled. Latest blood pressure and vitals reviewed-     Temp:  [97.5 °F (36.4 °C)-98.2 °F (36.8 °C)]   Pulse:  []   Resp:  [13-44]   BP: ()/()   SpO2:  [88 %-97 %] .   Home meds for hypertension were reviewed and noted below.   Hypertension Medications               metoprolol succinate (TOPROL-XL) 25 MG 24 hr tablet Take 1 tablet (25 mg total) by mouth once daily.            While in the hospital, will manage blood pressure as follows; Continue home antihypertensive regimen    Will utilize p.r.n. blood pressure medication only if patient's blood pressure greater than 180/110 and he develops symptoms such as worsening chest pain or shortness of breath.    COPD (chronic obstructive pulmonary disease)  Patient's COPD is controlled currently.  Patient is currently off COPD Pathway.  PRN Maria M     PAD (peripheral artery disease)  History noted.  No acute issues.      Diabetes mellitus with peripheral circulatory disorder  Patient's FSGs are controlled on current medication regimen.  Last A1c reviewed-   Lab Results   Component Value Date    HGBA1C 6.9 (H) 10/15/2023     Most recent fingerstick glucose reviewed-   Recent Labs   Lab 12/22/23  1638 12/23/23  0902   POCTGLUCOSE 120* 119*       Current correctional scale  Low  Maintain anti-hyperglycemic dose as follows-   Antihyperglycemics (From admission, onward)      Start     Stop Route Frequency Ordered    12/20/23 6110  insulin aspart U-100 pen 0-5 Units          -- SubQ Before meals & nightly PRN 12/20/23 2052          Hold Oral hypoglycemics while patient is in the hospital.    Coronary artery disease involving coronary bypass graft of native heart without angina pectoris  No acute issues. Resume home medications       VTE Risk Mitigation (From admission, onward)           Ordered     IP VTE HIGH RISK PATIENT  Once         12/20/23 2011     Place sequential compression device  Until discontinued         12/20/23 2011                    Discharge Planning   SUSIE:      Code Status: DNR   Is the patient medically ready for discharge?:     Reason for patient still in hospital (select all that apply): Treatment, Consult recommendations, and PT / OT recommendations  Discharge Plan A: Home Health              Charly Garcia MD  Department of Hospital Medicine   [facility] - Intensive Care

## 2023-12-24 NOTE — NURSING
[facility]  Nurses Note -- 4 Eyes    Received pt from ICU,on RA not in distress. Vital signs taken and recorded. With skin tear noted on R lower arm, L upper arm and R leg with foam dressing noted. With skin tear non blanchable redness noted on his sacral part, foam dressing applied. With walls cath. On 20g L AC, saline locked noted. Alert and oriented to himself, place but disoriented to time. Pt transferred to chavez without hearing aid. HOB elevated. Bed in low position. With telesitter at the bedside. Call light within reach.  12/23/2023       Skin assessed on: Q Shift      [x] No Pressure Injuries Present    []Prevention Measures Documented    [] Yes LDA  for Pressure Injury Previously documented     [] Yes New Pressure Injury Discovered   [] LDA for New Pressure Injury Added      Attending RN:  Gilda Camarena RN     Second RN:  ANUPAM Bay

## 2023-12-24 NOTE — CONSULTS
[facility] Surg  Cardiology  Consult Note    Patient Name: Narciso Yanez  MRN: [id number]  Admission Date: 12/20/2023  Hospital Length of Stay: 4 days  Code Status: DNR   Attending Provider: Charly Garcia MD   Consulting Provider: Samuel Calderon MD  Primary Care Physician: Marcelino Del Toro MD  Principal Problem:Encephalopathy, metabolic    Patient information was obtained from patient and ER records.     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 This medication has been on hold for a few days pending a biopsy, however, patient developed worsening hallucinations and mental status changes for several days. Patient denies having any complaints.      The ED, the patient was hemodynamically stable.  Labs were remarkable for hyperkalemia (6.9), normocytic anemia (10.2), elevated creatinine (2.5-baseline of 1.3), elevated BNP (399), elevated troponin (0.053). VBG showed a pH of 7.31, pCO2 of 45.1. UA showed +3 leukocytes, > 100 WBCs, moderate bacteria.  EKG showed showed peaked t-waves. Chest x-ray showed no acute process.  Patient was given insulin/D10, albuterol, calcium gluconate, Lasix 80 mg IV, morphine 2 mg IV, Zofran 4 mg IV. Patient was admitted for further management.      Overview/Hospital Course:  Admitted with acute encephalopathy, agitation and hyperkalemia. Also found to have UTI. Hyperkalemia improved, encephalopathy slowly improving. On IV antibiotics.  More alert and awake, still very encephlopathic but redirectable. PT/OT consulted. Neurology consulted for evaluation of recent tremors, confusion.      Patient mental status appears back to baseline. Speaking fluently. Knows month and year. Answering questions appropriately and using dates in speech.. Called daughter will get him home for landy with home health, pending any cardiology recs for short run of Vtach, likely stimulated from infection. Will give Cefdinir for UTI.  F/u with PCP and Cardiology outpt.     Had 18 beat VT on telemetry - asymptomatic  Denies CP or SOB  EKG NSR ok  CAD/CABG 1999    Troponin 0.05  Cr 2.1  DNR    Echo 10/15/23    TDS.    Left Ventricle: The left ventricle is normal in size. Normal wall thickness. Mild global hypokinesis present. There is mildly reduced systolic function with a visually estimated ejection fraction of 45 - 50%. Grade I diastolic dysfunction.    Right Ventricle: Normal right ventricular cavity size. Wall thickness is normal. Right ventricle wall motion  is normal. Systolic function is normal.     Followed by Dr Vargas  # CAD s/p CABG, MPI/echo 12/2017 normal (although low exercise capacity and ?hypotensive response during TMET), asymptomatic.  # PAD s/p PTA bilat YUE and R EIA 6/2003.  Limiting R>L claudication.  Aortic/LE art US 6/2020 suggest significant bilat YUE ISR.  AFRO 7/29/20 with patent bilat YUE stents.  ?MSK complaint of distal R hamstrings tendon, persistent.  Aortic/LE art US 5/2023 neg.   # HTN, controlled  # HLP on crestor 20mg  # Tob abuse, still smoking and previously enrolled in the smoking cessation program.  Again encouraged to quit.  Patient appears on interested in stopping smoking.  # CKD3a  # DM  # aortic atherosclerosis (CT Chest 9/3/19)       Cardiovascular Testing:  Aortic US 5/26/23  Juxta/infrarenal aortic ectasia without evidence of AAA, maximum diameter 2.5 cm.    Aortic atherosclerosis.    Increased flow velocity across  bilateral common iliac artery suggesting >50% aortoiliac stenosis.     LE art US/LACIE 5/26/23  Previously noted bilat YUE stents not visualized on the current exam.  Otherwise, No evidence of hemodynamically significant infrainguinal PAD bilaterally.  Predominantly biphasic waveforms throughout.  Mildly decreased LACIE bilaterally (0.8).  Similar findings noted on report 2/24/21.     Echo 4/3/21  The left ventricle is normal in size with concentric remodeling and normal systolic function.  The estimated ejection fraction is 55%.  Normal right ventricular size with normal right ventricular systolic function.     AFRO 7/29/20  Ao: 140/57/86  R CFA: 144/59/88  L CFA: 140/59/87  No evidence of gradient on CFA->Ao pullback across YUE stents bilaterally  Aorta: no aneurysm or stenosis  L Renal: patent  R Renal: patent  Right Leg:  YUE: patent stent without angiographic evidence of restenosis, pullback negative   EIA: patent  IIA: patent  CFA: MLI  PFA: patent  SFA: MLI, dist 30%  Pop: patent  KIRTI: MLI  TPT: MLI  PTA:MLI, mid vessel tapers, ?occluded at mid calf  Per: MLI  2-3 vessel runoff to Right foot  Left Leg:  YUE: patent stent without angiographic evidence of restenosis, pullback negative   EIA: patent   IIA: patent  CFA: patent  PFA: patent  SFA: MLI, dist 50%  Pop: patent  KIRTI: MLI  TPT: MLI  PTA: MLI, mid occlusion  Per: MLI  2 vessel runoff to Left foot  Hemostasis:  R rad vasband  Imp:  Limiting claudication with suggesting of ISR of aortoiliac stents  Patent YUE stents bilaterally (angiographically) without evidence of gradient on pullback  Mild dist SFA stenosis bilaterally  2-3V runoff to feet bilaterally  R rad vasband for hemostasis  Plan:  Cont med rx  Cont ASA/Plavix  Add Pletal  Smoking cessation  Exercise program  Home today  Follow up with Dr. Vargas as planned  Consider referral to ortho/spine or pain mgmt for eval of neurogenic claudication     Ex MPI 12/20/17 (low workload, drop in BP (144->105  systolic) noted during ETT)  The patient exercised for 3.02 minutes on a Sixto protocol, corresponding to a functional capacity of 5 estimated METS, achieving a peak heart rate of 126 bpm, which is 90% of the age predicted maximum heart rate. -CP. At peak exercise, EKG revealed < 1mm of horizontal ST segment depression at a maximum heart rate of 126 bpm.   Nuclear Quantitative Functional Analysis:   LVEF: 61 %  LVED Volume: 59 ml  LVES Volume: 23 ml  Impression: NORMAL MYOCARDIAL PERFUSION  1. The perfusion scan is free of evidence for myocardial ischemia or injury.   2. Resting wall motion is physiologic.   3. Resting LV function is normal.   4. The ventricular volumes are normal at rest and stress.   5. The extracardiac distribution of radioactivity is normal.      [facility] Records reviewed:  1/10/1999: CABG with LIMA-LAD, SVG-LCx, SVG-RCA  5/1999: Angio revealed Patent LIMA-LAD, SVGx2 occluded  6/2003: PTA with bilat YUE stents and R EIA stent      Past Medical History:   Diagnosis Date    Actinic keratosis     Anxiety     B12 deficiency 12/8/2014    Dx 6/14    Cancer     skin    Cataract     Coronary artery disease     Diabetes mellitus type II     Diabetes mellitus with peripheral circulatory disorder 6/18/2014    ED (erectile dysfunction)     Hyperlipidemia     Kidney stone     Neurogenic claudication 4/4/2022    Normocytic anemia 10/15/2023    Peripheral vascular disease     Spondylosis 3/29/2019    Tobacco dependence     Urinary incontinence 5/4/2021       Past Surgical History:   Procedure Laterality Date    APPENDECTOMY      CARDIAC SURGERY  01/1999    CABG 4 vessels    CATARACT EXTRACTION      EPIDURAL STEROID INJECTION Right 8/26/2020    Procedure: Injection, Steroid, Epidural Transforaminal;  Surgeon: Wiley Crane Jr., MD;  Location: [facility];  Service: Pain Management;  Laterality: Right;  Right L5 + S1 TF LEAH  Arrive @ 1115; ASA & Plavix last 8/18; Check BG; Rapid COVID test    EPIDURAL STEROID  INJECTION Right 11/25/2020    Procedure: Injection, Steroid, Epidural Transformainal;  Surgeon: Wiley Crane Jr., MD;  Location: [facility] ENDO;  Service: Pain Management;  Laterality: Right;  Right L5 + S1 TF LEAH  Arrive @ 1245; ASA and Plavix last 11/17; Pre-DM; Needs MD Sign.    EPIDURAL STEROID INJECTION Right 2/19/2021    Procedure: Injection, Steroid, Epidural Transforaminal;  Surgeon: Wiley Crane Jr., MD;  Location: [facility] ENDO;  Service: Pain Management;  Laterality: Right;  Right L5 + S1 TF LEAH  Arrive @ 1115; ASA & Plavix last 2/11; Check BG; Needs Consent    EXTERNAL EAR SURGERY      EYE SURGERY      cataracts    ILIAC ARTERY STENT Bilateral 06/2003    also Right External Iliac stent       Review of patient's allergies indicates:   Allergen Reactions    Demerol [meperidine] Nausea Only       No current facility-administered medications on file prior to encounter.     Current Outpatient Medications on File Prior to Encounter   Medication Sig    acetaminophen (TYLENOL) 500 MG tablet Take 1 tablet (500 mg total) by mouth every 6 (six) hours as needed for Pain.    aspirin (ECOTRIN) 81 MG EC tablet Take 1 tablet (81 mg total) by mouth once daily.    atorvastatin (LIPITOR) 40 MG tablet Take 1 tablet (40 mg total) by mouth once daily.    blood sugar diagnostic (TRUE METRIX GLUCOSE TEST STRIP) Strp TID    clopidogreL (PLAVIX) 75 mg tablet Take 1 tablet (75 mg total) by mouth once daily.    divalproex (DEPAKOTE) 125 MG EC tablet Take by mouth.    metFORMIN (GLUCOPHAGE) 500 MG tablet Take 1 tablet (500 mg total) by mouth 2 (two) times daily with meals. HOLD FOR GLUCOSE LESS 100    metoprolol succinate (TOPROL-XL) 25 MG 24 hr tablet Take 1 tablet (25 mg total) by mouth once daily.    mometasone 0.1% (ELOCON) 0.1 % cream Apply topically 2 (two) times daily. Apply to arms ,hands, and legs BID    predniSONE (DELTASONE) 10 MG tablet Take 10 mg by mouth.    tamsulosin (FLOMAX) 0.4 mg Cap Take 1 capsule (0.4 mg  total) by mouth once daily.    triamcinolone acetonide 0.1% (KENALOG) 0.1 % cream Apply topically 2 (two) times daily.     Family History       Problem Relation (Age of Onset)    Stroke Mother          Tobacco Use    Smoking status: Every Day     Current packs/day: 1.50     Average packs/day: 1.5 packs/day for 71.0 years (106.5 ttl pk-yrs)     Types: Cigarettes    Smokeless tobacco: Former   Substance and Sexual Activity    Alcohol use: No    Drug use: No    Sexual activity: Not Currently     Partners: Female     Review of Systems   Constitutional: Negative for decreased appetite.   HENT:  Negative for ear discharge.    Eyes:  Negative for blurred vision.   Endocrine: Negative for polyphagia.   Skin:  Negative for nail changes.   Genitourinary:  Negative for bladder incontinence.   Neurological:  Negative for aphonia.   Psychiatric/Behavioral:  Negative for hallucinations.    Allergic/Immunologic: Negative for hives.     Objective:     Vital Signs (Most Recent):  Temp: 98 °F (36.7 °C) (12/24/23 1125)  Pulse: 70 (12/24/23 1125)  Resp: 16 (12/24/23 1125)  BP: (!) 101/52 (12/24/23 1125)  SpO2: 96 % (12/24/23 1125) Vital Signs (24h Range):  Temp:  [97.4 °F (36.3 °C)-98.2 °F (36.8 °C)] 98 °F (36.7 °C)  Pulse:  [67-92] 70  Resp:  [11-24] 16  SpO2:  [90 %-99 %] 96 %  BP: ()/(50-80) 101/52     Weight: 62.8 kg (138 lb 7.2 oz)  Body mass index is 21.68 kg/m².    SpO2: 96 %         Intake/Output Summary (Last 24 hours) at 12/24/2023 1310  Last data filed at 12/24/2023 1214  Gross per 24 hour   Intake 575.91 ml   Output 750 ml   Net -174.09 ml       Lines/Drains/Airways       Drain  Duration                  Urethral Catheter 12/21/23 0754 3 days              Peripheral Intravenous Line  Duration                  Peripheral IV - Single Lumen 12/20/23 1830 20 G Left Antecubital 3 days                     Physical Exam  Constitutional:       Appearance: He is well-developed.   HENT:      Head: Normocephalic and atraumatic.    Eyes:      Conjunctiva/sclera: Conjunctivae normal.      Pupils: Pupils are equal, round, and reactive to light.   Cardiovascular:      Rate and Rhythm: Normal rate.      Pulses: Intact distal pulses.      Heart sounds: Normal heart sounds.   Pulmonary:      Effort: Pulmonary effort is normal.      Breath sounds: Normal breath sounds.   Abdominal:      General: Bowel sounds are normal.      Palpations: Abdomen is soft.   Musculoskeletal:         General: Normal range of motion.      Cervical back: Normal range of motion and neck supple.   Skin:     General: Skin is warm and dry.   Neurological:      Mental Status: He is alert and oriented to person, place, and time.          Significant Labs: All pertinent lab results from the last 24 hours have been reviewed.    Significant Imaging: Echocardiogram: 2D echo with color flow doppler:   Results for orders placed or performed during the hospital encounter of 12/20/17   2D Echo w/ Color Flow Doppler   Result Value Ref Range    EF + QEF 55 55 - 65    Diastolic Dysfunction No     Est. PA Systolic Pressure 26.62     Pericardial Effusion NONE     Mitral Valve Mobility NORMAL     Tricuspid Valve Regurgitation TRIVIAL     Narrative    Date of Procedure: 12/20/2017        TEST DESCRIPTION   Technical Quality: This is a technically adequate study.     General: There is frequent ectopy.     Aorta: The aortic root is normal in size, measuring 2.9 cm at sinotubular junction and 3.4 cm at Sinuses of Valsalva. The proximal ascending aorta is normal in size, measuring 3.9 cm across.     Left Atrium: The left atrial volume index is normal, measuring 25.73 cc/m2.     Left Ventricle: The left ventricle is normal in size, with an end-diastolic diameter of 2.4 cm, and an end-systolic diameter of 1.8 cm. LV wall thickness is normal, with the septum measuring 1.2 cm and the posterior wall measuring 1.3 cm across. Relative   wall thickness was increased at 1.08, and the LV mass index was  52.1 g/m2 consistent with concentric remodeling. There are no regional wall motion abnormalities. Left ventricular systolic function appears normal. Visually estimated ejection fraction is   55-60%. The LV Doppler derived stroke volume equals 100.0 ccs.     Diastolic indices: E wave velocity 0.8 m/s, E/A ratio 0.9,  msec., E/e' ratio(avg) 12. Diastolic function is normal.     Right Atrium: The right atrium is normal in size, measuring 4.5 cm in length and 3.7 cm in width in the apical view.     Right Ventricle: The right ventricle is normal in size measuring 4.1 cm at the base in the apical right ventricle-focused view. Global right ventricular systolic function appears normal. Tricuspid annular plane systolic excursion (TAPSE) is 2.1 cm.   Tissue Doppler-derived tricuspid annular peak systolic velocity (S prime) is 8.3 cm/s. The estimated PA systolic pressure is 27 mmHg.     Aortic Valve:  The aortic valve is moderately sclerotic with normal leaflet mobility. The aortic valve is tri-leaflet in structure. The peak gradient obtained across the aortic valve is 6 mmHg, with a mean gradient of 4 mmHg. Using a left ventricular   outflow tract diameter of 2.1 cm, a left ventricular outflow tract velocity time integral of 30 cm, and a peak instantaneous transvalvular velocity time integral of 30 cm, the calculated aortic valve area is 3.34 cm2(AVAi is 1.85 cm2/m2).     Mitral Valve:  The mitral valve is normal in structure with normal leaflet mobility.     Tricuspid Valve:  The tricuspid valve is normal in structure with normal leaflet mobility. There is trivial tricuspid regurgitation.     Pulmonary Valve:  The pulmonic valve is not well seen.     Pericardium: There is no evidence of pericardial effusion.      IVC: IVC is normal in size and collapses > 50% with a sniff, suggesting normal right atrial pressure of 3 mmHg.     Intracavitary: There is no evidence of intracavity mass, thrombi, or vegetation.          CONCLUSIONS     1 - Normal left ventricular systolic function (EF 55-60%).     2 - No wall motion abnormalities.     3 - Concentric remodeling.     4 - Trivial tricuspid regurgitation.     5 - The estimated PA systolic pressure is 27 mmHg.             This document has been electronically    SIGNED BY: Wiley Vargas MD On: 12/20/2017 10:08     Assessment and Plan:     NSVT (nonsustained ventricular tachycardia)  18 beat VT this AM asymptomatic. Known CAD with normal EF. Possibly triggered by infection. Continue BB. DNR - patient wishes conservative Rx for CAD/arrhythmia - ok for d/c    Dermatitis  Per primary    UTI (urinary tract infection)  Per primary    Diabetes mellitus with peripheral circulatory disorder  Per primary    Coronary artery disease involving coronary bypass graft of native heart without angina pectoris  CABG 1999. Denies CP. Mild troponin elevation likely demand ischemia. Ok for out patient stress test if desired        VTE Risk Mitigation (From admission, onward)           Ordered     IP VTE HIGH RISK PATIENT  Once         12/20/23 2011     Place sequential compression device  Until discontinued         12/20/23 2011                    Thank you for your consult. I will sign off. Please contact us if you have any additional questions.    Samuel Calderon MD  Cardiology   [facility] - Med Surg

## 2023-12-24 NOTE — CARE UPDATE
"  Daughter here this afternoon, able to fill-in on story.    Patient was on steroids (when I put him on them in October), which improved to some degree, and I referred him to a dermatologist.  The dermatologist transitioned him to Rinvoq, and his skin had "completely cleared up."   About 11 days ago, the oncologist told him to stop Rinvoq so they can get a better biopsy, now patient has diffuse rash, erythema and desquamation of his limbs.   She has been unable to get ahold of them to schedule a biopsy  They are asking to restart steroids and rinvoq eventually (we do not have on our formulary)    At 88, with his symptoms completely controlled by Rinvoq, would discuss continual treatment vs invasive measures.    Will send secure chat with oncologist on-call dermatologist at [facility] to discuss.    ADDEMDUM: derm said, for consult, I would need to transfer him to [facility], so I put in tranfer ticket to get skin biopsy.             Charly Garcia MD  Board-Certified Internal Medicine Attending  Section Head of Hospital Medicine        "

## 2023-12-24 NOTE — DISCHARGE SUMMARY
[facility] Medicine  Discharge Summary      Patient Name: Narciso Yanez  MRN: [id number]  ALESSANDRO: [id number]  Patient Class: IP- Inpatient  Admission Date: 12/20/2023  Hospital Length of Stay: 4 days  Discharge Date and Time:  12/24/2023 12:37 PM  Attending Physician: Charly Garcia MD   Discharging Provider: Charly Garcia MD  Primary Care Provider: Marcelino Del Toro MD    Primary Care Team: Networked reference to record PCT     HPI:   This is an 88-year-old male with a past medical history of CAD, COPD (not on O2), hypertension, hyperlipidemia, CKD 3, type 2 diabetes, peripheral artery disease, NPH, PMR, 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 This medication has been on hold for a few days pending a biopsy, however, patient developed worsening hallucinations and mental status changes for several days. Patient denies having any complaints.     The ED, the patient was hemodynamically stable.  Labs were remarkable for hyperkalemia (6.9), normocytic anemia (10.2), elevated creatinine (2.5-baseline of 1.3), elevated BNP (399), elevated troponin (0.053). VBG showed a pH of 7.31, pCO2 of 45.1. UA showed +3 leukocytes, > 100 WBCs, moderate bacteria.  EKG showed showed peaked t-waves. Chest x-ray showed no acute process.  Patient was given insulin/D10, albuterol, calcium gluconate, Lasix 80 mg IV, morphine 2 mg IV, Zofran 4 mg IV. Patient was admitted for further management.     * No surgery found *      Hospital Course:   Admitted with acute encephalopathy, agitation and hyperkalemia. Also found to have UTI. Hyperkalemia improved, encephalopathy slowly improving. On IV antibiotics. More alert and awake, still very encephlopathic but redirectable. PT/OT consulted. Neurology consulted for evaluation  of recent tremors, confusion.     Patient mental status appears back to baseline. Speaking fluently. Knows month and year. Answering questions appropriately and using dates in speech.. Called daughter will get him home for landy with home health, pending any cardiology recs for short run of Vtach, likely stimulated from infection. Will give Cefdinir for UTI.  F/u with PCP and Cardiology outpt.      Goals of Care Treatment Preferences:  Code Status: DNR          What is most important right now is to focus on spending time at home, avoiding the hospital, remaining as independent as possible, symptom/pain control, comfort and QOL .  Accordingly, we have decided that the best plan to meet the patient's goals includes continuing with treatment.      Consults:   Consults (From admission, onward)          Status Ordering Provider     Inpatient consult to Cardiology  Once        Provider:  (Not yet assigned)    Acknowledged JOSE URIOSTEGUI     IP consult to case management  Once        Provider:  (Not yet assigned)    Acknowledged SYLVIA HOFF III     Inpatient consult to Neurology  Once        Provider:  (Not yet assigned)    Completed TIAGO QUINTANA     Inpatient consult to Palliative Care  Once        Provider:  (Not yet assigned)    Completed TIAGO QUINTANA            No new Assessment & Plan notes have been filed under this hospital service since the last note was generated.  Service: Hospital Medicine    Final Active Diagnoses:    Diagnosis Date Noted POA    PRINCIPAL PROBLEM:  Encephalopathy, metabolic [G93.41] 12/20/2023 Yes    Hyperkalemia [E87.5] 12/20/2023 Yes    Dermatitis [L30.9] 12/20/2023 Yes    UTI (urinary tract infection) [N39.0] 10/09/2023 Yes    Chronic bilateral low back pain with bilateral sciatica [M54.42, M54.41, G89.29] 07/08/2022 Yes    Hyperlipidemia [E78.5] 06/09/2021 Yes    Goals of care, counseling/discussion [Z71.89] 05/26/2021 Not Applicable    Normal pressure hydrocephalus [G91.2]  05/19/2021 Yes    LATRICE (acute kidney injury) [N17.9] 04/02/2021 Yes    Essential hypertension [I10] 06/09/2020 Yes    COPD (chronic obstructive pulmonary disease) [J44.9] 03/29/2019 Yes    PAD (peripheral artery disease) [I73.9] 11/13/2017 Yes    Diabetes mellitus with peripheral circulatory disorder [E11.51] 06/18/2014 Yes    Coronary artery disease involving coronary bypass graft of native heart without angina pectoris [I25.810] 10/12/2012 Yes      Problems Resolved During this Admission:       Discharged Condition: fair    Disposition: home HH    Follow Up:    Patient Instructions:      Ambulatory referral/consult to Cardiology   Standing Status: Future   Referral Priority: Routine Referral Type: Consultation   Referral Reason: Specialty Services Required   Requested Specialty: Cardiology   Number of Visits Requested: 1     Ambulatory referral/consult to Internal Medicine   Standing Status: Future   Referral Priority: Routine Referral Type: Consultation   Referral Reason: Specialty Services Required   Requested Specialty: Internal Medicine   Number of Visits Requested: 1     Ambulatory referral/consult to Dermatology   Standing Status: Future   Referral Priority: Routine Referral Type: Consultation   Referral Reason: Specialty Services Required   Requested Specialty: Dermatology   Number of Visits Requested: 1       Significant Diagnostic Studies:     Recent Results (from the past 100 hour(s))   Urinalysis, Reflex to Urine Culture Urine, Clean Catch    Collection Time: 12/20/23  5:04 PM    Specimen: Urine   Result Value Ref Range    Specimen UA Urine, Clean Catch     Color, UA Yellow Yellow, Straw, Caterina    Appearance, UA Hazy (A) Clear    pH, UA 6.0 5.0 - 8.0    Specific Gravity, UA 1.015 1.005 - 1.030    Protein, UA 1+ (A) Negative    Glucose, UA Negative Negative    Ketones, UA Negative Negative    Bilirubin (UA) Negative Negative    Occult Blood UA Negative Negative    Nitrite, UA Negative Negative     Urobilinogen, UA Negative <2.0 EU/dL    Leukocytes, UA 3+ (A) Negative   Psuedocholinesterase dibucane inhibitor    Collection Time: 12/20/23  5:04 PM   Result Value Ref Range    Pseudocholinesterase 1398 (L) 2900 - 7100 U/L    Dibucaune 80 >=80 %   Urinalysis Microscopic    Collection Time: 12/20/23  5:04 PM   Result Value Ref Range    RBC, UA 4 0 - 4 /hpf    WBC, UA >100 (H) 0 - 5 /hpf    WBC Clumps, UA Rare None-Rare    Bacteria Moderate (A) None-Occ /hpf    Hyaline Casts, UA 15 (A) 0-1/lpf /lpf    Microscopic Comment SEE COMMENT    Urine culture    Collection Time: 12/20/23  5:04 PM    Specimen: Urine   Result Value Ref Range    Urine Culture, Routine (A)      STREPTOCOCCUS AGALACTIAE (GROUP B)  > 100,000 cfu/ml  Beta-hemolytic streptococci are routinely susceptible to   penicillins,cephalosporins and carbapenems.     CBC auto differential    Collection Time: 12/20/23  6:15 PM   Result Value Ref Range    WBC 8.50 3.90 - 12.70 K/uL    RBC 3.29 (L) 4.60 - 6.20 M/uL    Hemoglobin 10.2 (L) 14.0 - 18.0 g/dL    Hematocrit 31.4 (L) 40.0 - 54.0 %    MCV 95 82 - 98 fL    MCH 31.0 27.0 - 31.0 pg    MCHC 32.5 32.0 - 36.0 g/dL    RDW 17.8 (H) 11.5 - 14.5 %    Platelets 303 150 - 450 K/uL    MPV 9.4 9.2 - 12.9 fL    Immature Granulocytes 1.1 (H) 0.0 - 0.5 %    Gran # (ANC) 3.9 1.8 - 7.7 K/uL    Immature Grans (Abs) 0.09 (H) 0.00 - 0.04 K/uL    Lymph # 2.9 1.0 - 4.8 K/uL    Mono # 0.6 0.3 - 1.0 K/uL    Eos # 1.0 (H) 0.0 - 0.5 K/uL    Baso # 0.06 0.00 - 0.20 K/uL    nRBC 0 0 /100 WBC    Gran % 46.0 38.0 - 73.0 %    Lymph % 33.9 18.0 - 48.0 %    Mono % 7.1 4.0 - 15.0 %    Eosinophil % 11.2 (H) 0.0 - 8.0 %    Basophil % 0.7 0.0 - 1.9 %    Differential Method Automated    Comprehensive metabolic panel    Collection Time: 12/20/23  6:15 PM   Result Value Ref Range    Sodium 139 136 - 145 mmol/L    Potassium 6.9 (HH) 3.5 - 5.1 mmol/L    Chloride 107 95 - 110 mmol/L    CO2 20 (L) 23 - 29 mmol/L    Glucose 94 70 - 110 mg/dL    BUN 31  (H) 8 - 23 mg/dL    Creatinine 2.5 (H) 0.5 - 1.4 mg/dL    Calcium 8.7 8.7 - 10.5 mg/dL    Total Protein 8.1 6.0 - 8.4 g/dL    Albumin 3.2 (L) 3.5 - 5.2 g/dL    Total Bilirubin 0.2 0.1 - 1.0 mg/dL    Alkaline Phosphatase 88 55 - 135 U/L    AST 28 10 - 40 U/L    ALT 26 10 - 44 U/L    eGFR 24 (A) >60 mL/min/1.73 m^2    Anion Gap 12 8 - 16 mmol/L   Lactic acid, plasma    Collection Time: 12/20/23  6:15 PM   Result Value Ref Range    Lactate (Lactic Acid) 1.3 0.5 - 2.2 mmol/L   Troponin I    Collection Time: 12/20/23  6:15 PM   Result Value Ref Range    Troponin I 0.053 (H) 0.000 - 0.026 ng/mL   Brain natriuretic peptide    Collection Time: 12/20/23  6:15 PM   Result Value Ref Range     (H) 0 - 99 pg/mL   Blood Culture #1 **CANNOT BE ORDERED STAT**    Collection Time: 12/20/23  6:25 PM    Specimen: Peripheral, Forearm, Left; Blood   Result Value Ref Range    Blood Culture, Routine No Growth to date     Blood Culture, Routine No Growth to date     Blood Culture, Routine No Growth to date     Blood Culture, Routine No Growth to date    ISTAT PROCEDURE    Collection Time: 12/20/23  6:29 PM   Result Value Ref Range    POC PH 7.315 (L) 7.35 - 7.45    POC PCO2 45.1 (H) 35 - 45 mmHg    POC PO2 18 (LL) 40 - 60 mmHg    POC HCO3 23.0 (L) 24 - 28 mmol/L    POC BE -3 (L) -2 to 2 mmol/L    POC SATURATED O2 24 95 - 100 %    POC TCO2 24 24 - 29 mmol/L    Sample VENOUS     Site Other     Allens Test N/A     DelSys Room Air    Blood Culture #2 **CANNOT BE ORDERED STAT**    Collection Time: 12/20/23  6:30 PM    Specimen: Peripheral, Antecubital, Left; Blood   Result Value Ref Range    Blood Culture, Routine No Growth to date     Blood Culture, Routine No Growth to date     Blood Culture, Routine No Growth to date     Blood Culture, Routine No Growth to date    POCT glucose    Collection Time: 12/20/23  8:23 PM   Result Value Ref Range    POCT Glucose 105 70 - 110 mg/dL   Basic metabolic panel    Collection Time: 12/20/23 10:27 PM    Result Value Ref Range    Sodium 140 136 - 145 mmol/L    Potassium 6.2 (H) 3.5 - 5.1 mmol/L    Chloride 109 95 - 110 mmol/L    CO2 20 (L) 23 - 29 mmol/L    Glucose 108 70 - 110 mg/dL    BUN 29 (H) 8 - 23 mg/dL    Creatinine 2.5 (H) 0.5 - 1.4 mg/dL    Calcium 8.8 8.7 - 10.5 mg/dL    Anion Gap 11 8 - 16 mmol/L    eGFR 24 (A) >60 mL/min/1.73 m^2   POCT glucose    Collection Time: 12/20/23 10:37 PM   Result Value Ref Range    POCT Glucose 130 (H) 70 - 110 mg/dL   POCT glucose    Collection Time: 12/21/23  2:09 AM   Result Value Ref Range    POCT Glucose 120 (H) 70 - 110 mg/dL   POCT glucose    Collection Time: 12/21/23  2:45 AM   Result Value Ref Range    POCT Glucose 440 (H) 70 - 110 mg/dL   POCT glucose    Collection Time: 12/21/23  4:38 AM   Result Value Ref Range    POCT Glucose 118 (H) 70 - 110 mg/dL   Phosphorus    Collection Time: 12/21/23  5:43 AM   Result Value Ref Range    Phosphorus 5.2 (H) 2.7 - 4.5 mg/dL   Magnesium    Collection Time: 12/21/23  5:43 AM   Result Value Ref Range    Magnesium 2.0 1.6 - 2.6 mg/dL   Comprehensive Metabolic Panel    Collection Time: 12/21/23  5:43 AM   Result Value Ref Range    Sodium 139 136 - 145 mmol/L    Potassium 5.0 3.5 - 5.1 mmol/L    Chloride 105 95 - 110 mmol/L    CO2 20 (L) 23 - 29 mmol/L    Glucose 100 70 - 110 mg/dL    BUN 31 (H) 8 - 23 mg/dL    Creatinine 2.5 (H) 0.5 - 1.4 mg/dL    Calcium 8.2 (L) 8.7 - 10.5 mg/dL    Total Protein 7.2 6.0 - 8.4 g/dL    Albumin 2.9 (L) 3.5 - 5.2 g/dL    Total Bilirubin 0.2 0.1 - 1.0 mg/dL    Alkaline Phosphatase 75 55 - 135 U/L    AST 24 10 - 40 U/L    ALT 23 10 - 44 U/L    eGFR 24 (A) >60 mL/min/1.73 m^2    Anion Gap 14 8 - 16 mmol/L   CBC Auto Differential    Collection Time: 12/21/23  5:43 AM   Result Value Ref Range    WBC 11.91 3.90 - 12.70 K/uL    RBC 3.04 (L) 4.60 - 6.20 M/uL    Hemoglobin 9.1 (L) 14.0 - 18.0 g/dL    Hematocrit 29.3 (L) 40.0 - 54.0 %    MCV 96 82 - 98 fL    MCH 29.9 27.0 - 31.0 pg    MCHC 31.1 (L) 32.0 -  36.0 g/dL    RDW 17.5 (H) 11.5 - 14.5 %    Platelets 255 150 - 450 K/uL    MPV 9.6 9.2 - 12.9 fL    Immature Granulocytes 0.7 (H) 0.0 - 0.5 %    Gran # (ANC) 7.3 1.8 - 7.7 K/uL    Immature Grans (Abs) 0.08 (H) 0.00 - 0.04 K/uL    Lymph # 2.9 1.0 - 4.8 K/uL    Mono # 1.2 (H) 0.3 - 1.0 K/uL    Eos # 0.5 0.0 - 0.5 K/uL    Baso # 0.05 0.00 - 0.20 K/uL    nRBC 0 0 /100 WBC    Gran % 60.8 38.0 - 73.0 %    Lymph % 24.0 18.0 - 48.0 %    Mono % 9.7 4.0 - 15.0 %    Eosinophil % 4.4 0.0 - 8.0 %    Basophil % 0.4 0.0 - 1.9 %    Differential Method Automated    POCT glucose    Collection Time: 12/21/23  8:54 AM   Result Value Ref Range    POCT Glucose 138 (H) 70 - 110 mg/dL   Vancomycin, random    Collection Time: 12/21/23 10:10 AM   Result Value Ref Range    Vancomycin, Random 12.2 Not established ug/mL   POCT glucose    Collection Time: 12/21/23 11:54 AM   Result Value Ref Range    POCT Glucose 118 (H) 70 - 110 mg/dL   POCT glucose    Collection Time: 12/21/23  5:11 PM   Result Value Ref Range    POCT Glucose 147 (H) 70 - 110 mg/dL   POCT glucose    Collection Time: 12/22/23 12:22 AM   Result Value Ref Range    POCT Glucose 95 70 - 110 mg/dL   POCT glucose    Collection Time: 12/22/23  7:09 AM   Result Value Ref Range    POCT Glucose 122 (H) 70 - 110 mg/dL   POCT glucose    Collection Time: 12/22/23 11:07 AM   Result Value Ref Range    POCT Glucose 130 (H) 70 - 110 mg/dL   Influenza A & B by Molecular    Collection Time: 12/22/23  2:11 PM    Specimen: Nasopharyngeal Swab   Result Value Ref Range    Influenza A, Molecular Negative Negative    Influenza B, Molecular Negative Negative    Flu A & B Source Nasal swab    COVID-19 Rapid Screening    Collection Time: 12/22/23  2:12 PM   Result Value Ref Range    SARS-CoV-2 RNA, Amplification, Qual Negative Negative   Vitamin B12    Collection Time: 12/22/23  4:03 PM   Result Value Ref Range    Vitamin B-12 601 210 - 950 pg/mL   Basic metabolic panel    Collection Time: 12/22/23  4:03  PM   Result Value Ref Range    Sodium 143 136 - 145 mmol/L    Potassium 4.5 3.5 - 5.1 mmol/L    Chloride 108 95 - 110 mmol/L    CO2 20 (L) 23 - 29 mmol/L    Glucose 123 (H) 70 - 110 mg/dL    BUN 26 (H) 8 - 23 mg/dL    Creatinine 2.0 (H) 0.5 - 1.4 mg/dL    Calcium 8.3 (L) 8.7 - 10.5 mg/dL    Anion Gap 15 8 - 16 mmol/L    eGFR 32 (A) >60 mL/min/1.73 m^2   CBC auto differential    Collection Time: 12/22/23  4:03 PM   Result Value Ref Range    WBC 8.57 3.90 - 12.70 K/uL    RBC 2.90 (L) 4.60 - 6.20 M/uL    Hemoglobin 8.9 (L) 14.0 - 18.0 g/dL    Hematocrit 27.5 (L) 40.0 - 54.0 %    MCV 95 82 - 98 fL    MCH 30.7 27.0 - 31.0 pg    MCHC 32.4 32.0 - 36.0 g/dL    RDW 17.7 (H) 11.5 - 14.5 %    Platelets 301 150 - 450 K/uL    MPV 9.2 9.2 - 12.9 fL    Immature Granulocytes 0.7 (H) 0.0 - 0.5 %    Gran # (ANC) 5.0 1.8 - 7.7 K/uL    Immature Grans (Abs) 0.06 (H) 0.00 - 0.04 K/uL    Lymph # 1.9 1.0 - 4.8 K/uL    Mono # 0.9 0.3 - 1.0 K/uL    Eos # 0.7 (H) 0.0 - 0.5 K/uL    Baso # 0.05 0.00 - 0.20 K/uL    nRBC 0 0 /100 WBC    Gran % 57.8 38.0 - 73.0 %    Lymph % 22.4 18.0 - 48.0 %    Mono % 10.0 4.0 - 15.0 %    Eosinophil % 8.5 (H) 0.0 - 8.0 %    Basophil % 0.6 0.0 - 1.9 %    Differential Method Automated    POCT glucose    Collection Time: 12/22/23  4:38 PM   Result Value Ref Range    POCT Glucose 120 (H) 70 - 110 mg/dL   Basic Metabolic Panel    Collection Time: 12/23/23  4:25 AM   Result Value Ref Range    Sodium 144 136 - 145 mmol/L    Potassium 4.5 3.5 - 5.1 mmol/L    Chloride 111 (H) 95 - 110 mmol/L    CO2 20 (L) 23 - 29 mmol/L    Glucose 119 (H) 70 - 110 mg/dL    BUN 26 (H) 8 - 23 mg/dL    Creatinine 2.1 (H) 0.5 - 1.4 mg/dL    Calcium 8.5 (L) 8.7 - 10.5 mg/dL    Anion Gap 13 8 - 16 mmol/L    eGFR 30 (A) >60 mL/min/1.73 m^2   CBC auto differential    Collection Time: 12/23/23  4:25 AM   Result Value Ref Range    WBC 7.96 3.90 - 12.70 K/uL    RBC 3.25 (L) 4.60 - 6.20 M/uL    Hemoglobin 9.7 (L) 14.0 - 18.0 g/dL    Hematocrit 30.4  (L) 40.0 - 54.0 %    MCV 94 82 - 98 fL    MCH 29.8 27.0 - 31.0 pg    MCHC 31.9 (L) 32.0 - 36.0 g/dL    RDW 17.3 (H) 11.5 - 14.5 %    Platelets 302 150 - 450 K/uL    MPV 9.0 (L) 9.2 - 12.9 fL    Immature Granulocytes 0.5 0.0 - 0.5 %    Gran # (ANC) 4.5 1.8 - 7.7 K/uL    Immature Grans (Abs) 0.04 0.00 - 0.04 K/uL    Lymph # 1.9 1.0 - 4.8 K/uL    Mono # 0.8 0.3 - 1.0 K/uL    Eos # 0.6 (H) 0.0 - 0.5 K/uL    Baso # 0.03 0.00 - 0.20 K/uL    nRBC 0 0 /100 WBC    Gran % 57.0 38.0 - 73.0 %    Lymph % 24.1 18.0 - 48.0 %    Mono % 10.1 4.0 - 15.0 %    Eosinophil % 7.9 0.0 - 8.0 %    Basophil % 0.4 0.0 - 1.9 %    Differential Method Automated    POCT glucose    Collection Time: 12/23/23  9:02 AM   Result Value Ref Range    POCT Glucose 119 (H) 70 - 110 mg/dL   POCT glucose    Collection Time: 12/23/23 11:15 AM   Result Value Ref Range    POCT Glucose 143 (H) 70 - 110 mg/dL   POCT glucose    Collection Time: 12/23/23  5:40 PM   Result Value Ref Range    POCT Glucose 217 (H) 70 - 110 mg/dL   POCT glucose    Collection Time: 12/23/23  9:17 PM   Result Value Ref Range    POCT Glucose 116 (H) 70 - 110 mg/dL   POCT glucose    Collection Time: 12/24/23  7:17 AM   Result Value Ref Range    POCT Glucose 101 70 - 110 mg/dL   POCT glucose    Collection Time: 12/24/23 11:26 AM   Result Value Ref Range    POCT Glucose 248 (H) 70 - 110 mg/dL       Microbiology Results (last 7 days)       Procedure Component Value Units Date/Time    Blood Culture #2 **CANNOT BE ORDERED STAT** [[id number]] Collected: 12/20/23 1830    Order Status: Completed Specimen: Blood from Peripheral, Antecubital, Left Updated: 12/23/23 2103     Blood Culture, Routine No Growth to date      No Growth to date      No Growth to date      No Growth to date    Blood Culture #1 **CANNOT BE ORDERED STAT** [[id number]] Collected: 12/20/23 1825    Order Status: Completed Specimen: Blood from Peripheral, Forearm, Left Updated: 12/23/23 2103     Blood Culture, Routine No Growth to  date      No Growth to date      No Growth to date      No Growth to date    Influenza A & B by Molecular [[id number]] Collected: 12/22/23 1411    Order Status: Completed Specimen: Nasopharyngeal Swab Updated: 12/22/23 1502     Influenza A, Molecular Negative     Influenza B, Molecular Negative     Flu A & B Source Nasal swab    Urine culture [[id number]]  (Abnormal) Collected: 12/20/23 1704    Order Status: Completed Specimen: Urine Updated: 12/22/23 0816     Urine Culture, Routine STREPTOCOCCUS AGALACTIAE (GROUP B)  > 100,000 cfu/ml  Beta-hemolytic streptococci are routinely susceptible to   penicillins,cephalosporins and carbapenems.      Narrative:      Specimen Source->Urine            Imaging Results              CT Head Without Contrast (Final result)  Result time 12/20/23 21:13:53      Final result by Jairo Sullivan MD (12/20/23 21:13:53)                   Impression:      Stable examination.  Additional evaluation, as clinically warranted.      Electronically signed by: Jairo Sullivan MD  Date:    12/20/2023  Time:    21:13               Narrative:    EXAMINATION:  CT HEAD WITHOUT CONTRAST    CLINICAL HISTORY:  Mental status change, unknown cause.    TECHNIQUE:  Low dose axial images were obtained through the head.  Coronal and sagittal reformations were also performed. Contrast was not administered.    COMPARISON:  11/28/2023.    10/14/2023.    FINDINGS:  There is stable appearance of the right parietal ventriculostomy shunt catheter with its tip terminating in the midline body of the left lateral ventricle.  The configuration of the ventricular system is unchanged with mild prominence of the ventricular system.  There is no evidence of worsening hydrocephalus.    There is no evidence of intracranial hemorrhage.  The gray-white differentiation is maintained.  There are hypodensities within the periventricular and subcortical white matter.  The gray-white differentiation is maintained.  There is no dense  vessel sign.  There is no evidence of mass effect.                                       X-Ray Chest AP Portable (Final result)  Result time 12/20/23 17:13:38      Final result by Marcelino Daley MD (12/20/23 17:13:38)                   Impression:      No acute abnormality.      Electronically signed by: Marcelino Daley  Date:    12/20/2023  Time:    17:13               Narrative:    EXAMINATION:  XR CHEST AP PORTABLE    CLINICAL HISTORY:  Altered mental status, unspecified    TECHNIQUE:  Single frontal view of the chest was performed.    COMPARISON:  Chest radiograph 10/14/2023    FINDINGS:  Lines and tubes: Partially imaged ventriculoperitoneal shunt catheter.    Heart and mediastinum: Unchanged, again noting postoperative changes from CABG.    Pleura: No pleural effusion or pneumothorax.    Lungs: Lungs are well inflated. No focal consolidations or evidence of pulmonary edema.    Soft tissue/bone: Postoperative changes from median sternotomy.                                          Pending Diagnostic Studies:       Procedure Component Value Units Date/Time    Comprehensive Metabolic Panel [[id number]]     Order Status: Sent Lab Status: No result     Specimen: Blood     Magnesium [[id number]]     Order Status: Sent Lab Status: No result     Specimen: Blood     Phosphorus [[id number]]     Order Status: Sent Lab Status: No result     Specimen: Blood            Medications:  Reconciled Home Medications:      Medication List        START taking these medications      cefdinir 300 MG capsule  Commonly known as: OMNICEF  Take 1 capsule (300 mg total) by mouth 2 (two) times daily. for 7 days            CONTINUE taking these medications      acetaminophen 500 MG tablet  Commonly known as: TYLENOL  Take 1 tablet (500 mg total) by mouth every 6 (six) hours as needed for Pain.     aspirin 81 MG EC tablet  Commonly known as: ECOTRIN  Take 1 tablet (81 mg total) by mouth once daily.     atorvastatin 40 MG tablet  Commonly known  as: LIPITOR  Take 1 tablet (40 mg total) by mouth once daily.     blood sugar diagnostic Strp  Commonly known as: TRUE METRIX GLUCOSE TEST STRIP  TID     clopidogreL 75 mg tablet  Commonly known as: PLAVIX  Take 1 tablet (75 mg total) by mouth once daily.     divalproex 125 MG EC tablet  Commonly known as: DEPAKOTE  Take by mouth.     metFORMIN 500 MG tablet  Commonly known as: GLUCOPHAGE  Take 1 tablet (500 mg total) by mouth 2 (two) times daily with meals. HOLD FOR GLUCOSE LESS 100     metoprolol succinate 25 MG 24 hr tablet  Commonly known as: TOPROL-XL  Take 1 tablet (25 mg total) by mouth once daily.     mometasone 0.1% 0.1 % cream  Commonly known as: ELOCON  Apply topically 2 (two) times daily. Apply to arms ,hands, and legs BID     predniSONE 10 MG tablet  Commonly known as: DELTASONE  Take 10 mg by mouth.     tamsulosin 0.4 mg Cap  Commonly known as: FLOMAX  Take 1 capsule (0.4 mg total) by mouth once daily.     triamcinolone acetonide 0.1% 0.1 % cream  Commonly known as: KENALOG  Apply topically 2 (two) times daily.              Indwelling Lines/Drains at time of discharge:   Lines/Drains/Airways       Drain  Duration                  Urethral Catheter 12/21/23 0754 3 days                    Time spent on the discharge of patient: 35 minutes         Charly Garcia MD  Department of Hospital Medicine  [facility]

## 2023-12-24 NOTE — PLAN OF CARE
[facility]      HOME HEALTH ORDERS  FACE TO FACE ENCOUNTER    Patient Name: Narciso Yanez  YOB: 1935    PCP: Marcelino Del Toro MD   PCP Address: [address] / [place]  PCP Phone Number: [phone]  PCP Fax: [phone]    Encounter Date: 12/20/23    Admit to Home Health    Diagnoses:  Active Hospital Problems    Diagnosis  POA    *Encephalopathy, metabolic [G93.41]  Yes    Hyperkalemia [E87.5]  Yes    Dermatitis [L30.9]  Yes    UTI (urinary tract infection) [N39.0]  Yes    Chronic bilateral low back pain with bilateral sciatica [M54.42, M54.41, G89.29]  Yes    Hyperlipidemia [E78.5]  Yes    Goals of care, counseling/discussion [Z71.89]  Not Applicable    Normal pressure hydrocephalus [G91.2]  Yes    LATRICE (acute kidney injury) [N17.9]  Yes    Essential hypertension [I10]  Yes    COPD (chronic obstructive pulmonary disease) [J44.9]  Yes    PAD (peripheral artery disease) [I73.9]  Yes    Diabetes mellitus with peripheral circulatory disorder [E11.51]  Yes    Coronary artery disease involving coronary bypass graft of native heart without angina pectoris [I25.810]  Yes      Resolved Hospital Problems   No resolved problems to display.       Follow Up Appointments:  Future Appointments   Date Time Provider Department Center   1/22/2024  8:30 AM [facility] PET CT LIMIT 500 LBS [facility] PET CT [facility]   1/24/2024  1:30 PM [facility] US ROOM 3 [facility] ULSOUND [facility]   1/24/2024  3:30 PM Checo Marks MD [facility] URO [facility]   1/30/2024  3:00 PM Alex Kathleen PA [facility] NEUROS[address]   2/29/2024  4:00 PM Junie Teran MD [facility] NEURO[address]       Allergies:  Review of patient's allergies indicates:   Allergen Reactions    Demerol [meperidine] Nausea Only       Medications: Review discharge medications with patient and family and provide education.    Current Facility-Administered Medications   Medication Dose Route Frequency Provider Last Rate Last Admin     acetaminophen tablet 650 mg  650 mg Oral Q4H PRN Kodak Rivera MD   650 mg at 12/23/23 1046    albuterol-ipratropium 2.5 mg-0.5 mg/3 mL nebulizer solution 3 mL  3 mL Nebulization Q4H PRN Kodak Rivera MD        aspirin EC tablet 81 mg  81 mg Oral Daily Kdoak Rivera MD   81 mg at 12/24/23 0930    atorvastatin tablet 40 mg  40 mg Oral Daily Kodak Rivera MD   40 mg at 12/24/23 0930    cefTRIAXone (ROCEPHIN) 2 g in dextrose 5 % in water (D5W) 100 mL IVPB (MB+)  2 g Intravenous Q24H Kodak Rivera MD   Stopped at 12/24/23 1000    clopidogreL tablet 75 mg  75 mg Oral Daily Kodak Rivera MD   75 mg at 12/24/23 0930    dextrose 10% bolus 125 mL 125 mL  12.5 g Intravenous PRN Kodak Rivera MD        dextrose 10% bolus 250 mL 250 mL  25 g Intravenous PRN Kodak Rivera MD        divalproex EC tablet 250 mg  250 mg Oral Daily Kodak Rivera MD   250 mg at 12/24/23 0930    divalproex EC tablet 500 mg  500 mg Oral QHS Kodak Rivera MD   500 mg at 12/23/23 2104    famotidine tablet 20 mg  20 mg Oral Daily Kodak Rivera MD   20 mg at 12/24/23 0930    glucagon (human recombinant) injection 1 mg  1 mg Intramuscular PRN Kodak Rivera MD        glucose chewable tablet 16 g  16 g Oral PRN Kodak Rivera MD        glucose chewable tablet 24 g  24 g Oral PRN Kodak Rivera MD        haloperidol lactate injection 5 mg  5 mg Intravenous Q6H PRN Kodak Rivera MD   5 mg at 12/22/23 1356    HYDROcodone-acetaminophen 5-325 mg per tablet 1 tablet  1 tablet Oral Q6H PRN Terry Villanueva III, MD   1 tablet at 12/23/23 1115    insulin aspart U-100 pen 0-5 Units  0-5 Units Subcutaneous QID (AC + HS) PRN Kodak Rivera MD   2 Units at 12/23/23 1747    melatonin tablet 6 mg  6 mg Oral Nightly PRN Kodak Rivera MD   6 mg at 12/22/23 2005    metoprolol succinate (TOPROL-XL) 24 hr tablet 25 mg  25 mg Oral Daily Kodak Rivera MD   25 mg at 12/24/23 0930    mupirocin 2 % ointment   Nasal BID Kodak Rivera  MD VINAY   Given at 12/24/23 0930    nicotine 21 mg/24 hr 1 patch  1 patch Transdermal Daily Kodak Rivera MD   1 patch at 12/24/23 0930    ondansetron injection 4 mg  4 mg Intravenous Q8H PRN Kodak Rivera MD        prochlorperazine injection Soln 5 mg  5 mg Intravenous Q6H PRN Kodak Rivera MD        sodium chloride 0.9% flush 10 mL  10 mL Intravenous PRN Kodak Rivera MD        tamsulosin 24 hr capsule 0.4 mg  0.4 mg Oral Daily Kodak Rivera MD   0.4 mg at 12/24/23 0930     Current Discharge Medication List        START taking these medications    Details   cefdinir (OMNICEF) 300 MG capsule Take 1 capsule (300 mg total) by mouth 2 (two) times daily. for 7 days  Qty: 14 capsule, Refills: 0           CONTINUE these medications which have NOT CHANGED    Details   acetaminophen (TYLENOL) 500 MG tablet Take 1 tablet (500 mg total) by mouth every 6 (six) hours as needed for Pain.  Qty: 13 tablet, Refills: 0      aspirin (ECOTRIN) 81 MG EC tablet Take 1 tablet (81 mg total) by mouth once daily.  Refills: 0      atorvastatin (LIPITOR) 40 MG tablet Take 1 tablet (40 mg total) by mouth once daily.  Qty: 30 tablet, Refills: 2      blood sugar diagnostic (TRUE METRIX GLUCOSE TEST STRIP) Strp TID  Qty: 300 each, Refills: 12    Associated Diagnoses: Uncontrolled type 2 diabetes mellitus with hyperglycemia      clopidogreL (PLAVIX) 75 mg tablet Take 1 tablet (75 mg total) by mouth once daily.  Qty: 30 tablet, Refills: 2      divalproex (DEPAKOTE) 125 MG EC tablet Take by mouth.      metFORMIN (GLUCOPHAGE) 500 MG tablet Take 1 tablet (500 mg total) by mouth 2 (two) times daily with meals. HOLD FOR GLUCOSE LESS 100  Qty: 180 tablet, Refills: 3      metoprolol succinate (TOPROL-XL) 25 MG 24 hr tablet Take 1 tablet (25 mg total) by mouth once daily.  Qty: 30 tablet, Refills: 2    Comments: .      mometasone 0.1% (ELOCON) 0.1 % cream Apply topically 2 (two) times daily. Apply to arms ,hands, and legs BID      predniSONE  (DELTASONE) 10 MG tablet Take 10 mg by mouth.      tamsulosin (FLOMAX) 0.4 mg Cap Take 1 capsule (0.4 mg total) by mouth once daily.  Qty: 30 capsule, Refills: 11    Associated Diagnoses: BPH with urinary obstruction      triamcinolone acetonide 0.1% (KENALOG) 0.1 % cream Apply topically 2 (two) times daily.               I have seen and examined this patient within the last 30 days. My clinical findings that support the need for the home health skilled services and home bound status are the following:no   Weakness/numbness causing balance and gait disturbance due to Weakness/Debility and Anemia making it taxing to leave home.  Requiring assistive device to leave home due to unsteady gait caused by  Infection and Weakness/Debility.     Diet:   cardiac diet and diabetic diet 2000 calorie    Labs:  na    Referrals/ Consults  Physical Therapy to evaluate and treat. Evaluate for home safety and equipment needs; Establish/upgrade home exercise program. Perform / instruct on therapeutic exercises, gait training, transfer training, and Range of Motion.  Occupational Therapy to evaluate and treat. Evaluate home environment for safety and equipment needs. Perform/Instruct on transfers, ADL training, ROM, and therapeutic exercises.   to evaluate for community resources/long-range planning.  Aide to provide assistance with personal care, ADLs, and vital signs.    Activities:   activity as tolerated    Nursing:   Agency to admit patient within 24 hours of hospital discharge unless specified on physician order or at patient request    SN to complete comprehensive assessment including routine vital signs. Instruct on disease process and s/s of complications to report to MD. Review/verify medication list sent home with the patient at time of discharge  and instruct patient/caregiver as needed. Frequency may be adjusted depending on start of care date.     Skilled nurse to perform up to 3 visits PRN for symptoms  related to diagnosis    Notify MD if SBP > 160 or < 90; DBP > 90 or < 50; HR > 120 or < 50; Temp > 101; O2 < 88%;     Ok to schedule additional visits based on staff availability and patient request on consecutive days within the home health episode.    When multiple disciplines ordered:    Start of Care occurs on Sunday - Wednesday schedule remaining discipline evaluations as ordered on separate consecutive days following the start of care.    Thursday SOC -schedule subsequent evaluations Friday and Monday the following week.     Friday - Saturday SOC - schedule subsequent discipline evaluations on consecutive days starting Monday of the following week.    For all post-discharge communication, lab results, vitals, and subsequent orders- please contact patient's PCP.  If unable to get ahold of PCP, and question is about blood pressure, diuresis, or arrhythmia attempt to contact cardiologist.  If unable to get ahold of PCP, and question is about dialysis, please attempt to contact nephrologist.  If unable to get ahold of PCP, and question is about antibiotics or wound care, please contact infectious disease doctor.  If unable to get ahold of PCP, and question is about oxygen titration, CPAP or BIPAP, please contact pulmonologist.   If unable to get ahold of PCP or the above physicians in a reasonable time, please contact  on-call for clarification.    Home Health Aide:  Nursing Twice weekly  Physical Therapy Three times weekly  Occupational Therapy Three times weekly  Medical Social Work Weekly  Home Health Aide Twice weekly    Wound Care Orders  Yes, skin break down/pealing     I certify that this patient is confined to her home and needs intermittent skilled nursing care, physical therapy, and occupational therapy.        Charly Garcia MD  Internal Medicine Staff

## 2023-12-24 NOTE — PLAN OF CARE
All needs are met. SW notified nurse Sayda via secure message that patient is clear for discharge from case management standpoint.     12/24/23 1413   Final Note   Assessment Type Final Discharge Note   Anticipated Discharge Disposition Home   What phone number can be called within the next 1-3 days to see how you are doing after discharge? [phone]   Hospital Resources/Appts/Education Provided Appointments scheduled and added to AVS   Post-Acute Status   Discharge Delays None known at this time

## 2023-12-24 NOTE — NURSING
[facility]  Nurses Note -- 4 Eyes      12/23/2023       Skin assessed on: Q Shift      [x] No Pressure Injuries Present    [x]Prevention Measures Documented    [] Yes LDA  for Pressure Injury Previously documented     [] Yes New Pressure Injury Discovered   [] LDA for New Pressure Injury Added      Attending RN:  Natasha De La Vega RN     Second RN:  Ana Lilia Mclean RN

## 2023-12-24 NOTE — HPI
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 This medication has been on hold for a few days pending a biopsy, however, patient developed worsening hallucinations and mental status changes for several days. Patient denies having any complaints.      The ED, the patient was hemodynamically stable.  Labs were remarkable for hyperkalemia (6.9), normocytic anemia (10.2), elevated creatinine (2.5-baseline of 1.3), elevated BNP (399), elevated troponin (0.053). VBG showed a pH of 7.31, pCO2 of 45.1. UA showed +3 leukocytes, > 100 WBCs, moderate bacteria.  EKG showed showed peaked t-waves. Chest x-ray showed no acute process.  Patient was given insulin/D10, albuterol, calcium gluconate, Lasix 80 mg IV, morphine 2 mg IV, Zofran 4 mg IV. Patient was admitted for further management.      Overview/Hospital Course:  Admitted with acute encephalopathy, agitation and hyperkalemia. Also found to have UTI. Hyperkalemia improved, encephalopathy slowly improving. On IV antibiotics. More alert and awake, still very encephlopathic but redirectable. PT/OT consulted. Neurology consulted for evaluation of recent tremors, confusion.      Patient mental status appears back to baseline. Speaking fluently. Knows month and year. Answering questions appropriately and using dates in speech.. Called daughter will get him home for landy with home health, pending any cardiology recs for short run of Vtach, likely stimulated from infection. Will give Cefdinir for UTI.  F/u with PCP and Cardiology outpt.     Had 18 beat VT on telemetry - asymptomatic  Denies CP or  SOB  EKG NSR ok  CAD/CABG 1999    Troponin 0.05  Cr 2.1  DNR    Echo 10/15/23    TDS.    Left Ventricle: The left ventricle is normal in size. Normal wall thickness. Mild global hypokinesis present. There is mildly reduced systolic function with a visually estimated ejection fraction of 45 - 50%. Grade I diastolic dysfunction.    Right Ventricle: Normal right ventricular cavity size. Wall thickness is normal. Right ventricle wall motion  is normal. Systolic function is normal.     Followed by Dr Vargas  # CAD s/p CABG, MPI/echo 12/2017 normal (although low exercise capacity and ?hypotensive response during TMET), asymptomatic.  # PAD s/p PTA bilat YUE and R EIA 6/2003.  Limiting R>L claudication.  Aortic/LE art US 6/2020 suggest significant bilat YUE ISR.  AFRO 7/29/20 with patent bilat YUE stents.  ?MSK complaint of distal R hamstrings tendon, persistent.  Aortic/LE art US 5/2023 neg.   # HTN, controlled  # HLP on crestor 20mg  # Tob abuse, still smoking and previously enrolled in the smoking cessation program.  Again encouraged to quit.  Patient appears on interested in stopping smoking.  # CKD3a  # DM  # aortic atherosclerosis (CT Chest 9/3/19)       Cardiovascular Testing:  Aortic US 5/26/23  Juxta/infrarenal aortic ectasia without evidence of AAA, maximum diameter 2.5 cm.    Aortic atherosclerosis.    Increased flow velocity across bilateral common iliac artery suggesting >50% aortoiliac stenosis.     LE art US/LACIE 5/26/23  Previously noted bilat YUE stents not visualized on the current exam.  Otherwise, No evidence of hemodynamically significant infrainguinal PAD bilaterally.  Predominantly biphasic waveforms throughout.  Mildly decreased LACIE bilaterally (0.8).  Similar findings noted on report 2/24/21.     Echo 4/3/21  The left ventricle is normal in size with concentric remodeling and normal systolic function.  The estimated ejection fraction is 55%.  Normal right ventricular size with normal right  ventricular systolic function.     AFRO 7/29/20  Ao: 140/57/86  R CFA: 144/59/88  L CFA: 140/59/87  No evidence of gradient on CFA->Ao pullback across YUE stents bilaterally  Aorta: no aneurysm or stenosis  L Renal: patent  R Renal: patent  Right Leg:  YUE: patent stent without angiographic evidence of restenosis, pullback negative   EIA: patent  IIA: patent  CFA: MLI  PFA: patent  SFA: MLI, dist 30%  Pop: patent  KIRTI: MLI  TPT: MLI  PTA:MLI, mid vessel tapers, ?occluded at mid calf  Per: MLI  2-3 vessel runoff to Right foot  Left Leg:  YUE: patent stent without angiographic evidence of restenosis, pullback negative   EIA: patent   IIA: patent  CFA: patent  PFA: patent  SFA: MLI, dist 50%  Pop: patent  KIRTI: MLI  TPT: MLI  PTA: MLI, mid occlusion  Per: MLI  2 vessel runoff to Left foot  Hemostasis:  R rad vasband  Imp:  Limiting claudication with suggesting of ISR of aortoiliac stents  Patent YUE stents bilaterally (angiographically) without evidence of gradient on pullback  Mild dist SFA stenosis bilaterally  2-3V runoff to feet bilaterally  R rad vasband for hemostasis  Plan:  Cont med rx  Cont ASA/Plavix  Add Pletal  Smoking cessation  Exercise program  Home today  Follow up with Dr. Vargas as planned  Consider referral to ortho/spine or pain mgmt for eval of neurogenic claudication     Ex MPI 12/20/17 (low workload, drop in BP (144->105 systolic) noted during ETT)  The patient exercised for 3.02 minutes on a Sixto protocol, corresponding to a functional capacity of 5 estimated METS, achieving a peak heart rate of 126 bpm, which is 90% of the age predicted maximum heart rate. -CP. At peak exercise, EKG revealed < 1mm of horizontal ST segment depression at a maximum heart rate of 126 bpm.   Nuclear Quantitative Functional Analysis:   LVEF: 61 %  LVED Volume: 59 ml  LVES Volume: 23 ml  Impression: NORMAL MYOCARDIAL PERFUSION  1. The perfusion scan is free of evidence for myocardial ischemia or injury.   2. Resting  wall motion is physiologic.   3. Resting LV function is normal.   4. The ventricular volumes are normal at rest and stress.   5. The extracardiac distribution of radioactivity is normal.      [facility] Records reviewed:  1/10/1999: CABG with LIMA-LAD, SVG-LCx, SVG-RCA  5/1999: Angio revealed Patent LIMA-LAD, SVGx2 occluded  6/2003: PTA with bilat YUE stents and R EIA stent

## 2023-12-24 NOTE — SIGNIFICANT EVENT
Although patient's mental status appears to be returning to normal  Patient's K came back at 8.6, likely cause of short run of Vtach.  Does not appear to be hemolized.  Oddly Na has changed significantly as well 144->135 overnight  Repeat with POC CHEM8  EKG  Calcium gluconate x3  High dose albuterol to shift  Low bicarb, will give bicarb infusion, will help with K  Lokelma 10 mg x 3 ordered      ADDENDUM: Repeat ISTAT CHEM 8 showed K at 4.4 prior to any meds given- 8.6 appears erroneous.         Charly Garcia MD  Board-Certified Internal Medicine Attending  Section Head of [facility]      Critical Care Time: 35 minutes      Critical care was time spent personally by me on the following activities: evaluating this patient's organ dysfunction, development of treatment plan, discussing treatment plan with patient or surrogate and bedside caregivers, discussions with consultants, evaluation of patient's response to treatment, examination of patient, ordering and performing treatments and interventions, ordering and review of laboratory studies, ordering and review of radiographic studies, re-evaluation of patient's condition. This critical care time did not overlap with that of any other provider or involve time for any separately billed procedures    Diagnosis requiring critical care: HyperK

## 2023-12-24 NOTE — ASSESSMENT & PLAN NOTE
CABG 1999. Denies CP. Mild troponin elevation likely demand ischemia. Ok for out patient stress test if desired

## 2023-12-24 NOTE — SUBJECTIVE & OBJECTIVE
Past Medical History:   Diagnosis Date    Actinic keratosis     Anxiety     B12 deficiency 12/8/2014    Dx 6/14    Cancer     skin    Cataract     Coronary artery disease     Diabetes mellitus type II     Diabetes mellitus with peripheral circulatory disorder 6/18/2014    ED (erectile dysfunction)     Hyperlipidemia     Kidney stone     Neurogenic claudication 4/4/2022    Normocytic anemia 10/15/2023    Peripheral vascular disease     Spondylosis 3/29/2019    Tobacco dependence     Urinary incontinence 5/4/2021       Past Surgical History:   Procedure Laterality Date    APPENDECTOMY      CARDIAC SURGERY  01/1999    CABG 4 vessels    CATARACT EXTRACTION      EPIDURAL STEROID INJECTION Right 8/26/2020    Procedure: Injection, Steroid, Epidural Transforaminal;  Surgeon: Wiley Crane Jr., MD;  Location: [facility] ENDO;  Service: Pain Management;  Laterality: Right;  Right L5 + S1 TF LEAH  Arrive @ 1115; ASA & Plavix last 8/18; Check BG; Rapid COVID test    EPIDURAL STEROID INJECTION Right 11/25/2020    Procedure: Injection, Steroid, Epidural Transformainal;  Surgeon: Wiley Crane Jr., MD;  Location: [facility] ENDO;  Service: Pain Management;  Laterality: Right;  Right L5 + S1 TF LEAH  Arrive @ 1245; ASA and Plavix last 11/17; Pre-DM; Needs MD Sign.    EPIDURAL STEROID INJECTION Right 2/19/2021    Procedure: Injection, Steroid, Epidural Transforaminal;  Surgeon: Wiley Crane Jr., MD;  Location: [facility] ENDO;  Service: Pain Management;  Laterality: Right;  Right L5 + S1 TF LEAH  Arrive @ 1115; ASA & Plavix last 2/11; Check BG; Needs Consent    EXTERNAL EAR SURGERY      EYE SURGERY      cataracts    ILIAC ARTERY STENT Bilateral 06/2003    also Right External Iliac stent       Review of patient's allergies indicates:   Allergen Reactions    Demerol [meperidine] Nausea Only       No current facility-administered medications on file prior to encounter.     Current Outpatient Medications on File Prior to Encounter    Medication Sig    acetaminophen (TYLENOL) 500 MG tablet Take 1 tablet (500 mg total) by mouth every 6 (six) hours as needed for Pain.    aspirin (ECOTRIN) 81 MG EC tablet Take 1 tablet (81 mg total) by mouth once daily.    atorvastatin (LIPITOR) 40 MG tablet Take 1 tablet (40 mg total) by mouth once daily.    blood sugar diagnostic (TRUE METRIX GLUCOSE TEST STRIP) Strp TID    clopidogreL (PLAVIX) 75 mg tablet Take 1 tablet (75 mg total) by mouth once daily.    divalproex (DEPAKOTE) 125 MG EC tablet Take by mouth.    metFORMIN (GLUCOPHAGE) 500 MG tablet Take 1 tablet (500 mg total) by mouth 2 (two) times daily with meals. HOLD FOR GLUCOSE LESS 100    metoprolol succinate (TOPROL-XL) 25 MG 24 hr tablet Take 1 tablet (25 mg total) by mouth once daily.    mometasone 0.1% (ELOCON) 0.1 % cream Apply topically 2 (two) times daily. Apply to arms ,hands, and legs BID    predniSONE (DELTASONE) 10 MG tablet Take 10 mg by mouth.    tamsulosin (FLOMAX) 0.4 mg Cap Take 1 capsule (0.4 mg total) by mouth once daily.    triamcinolone acetonide 0.1% (KENALOG) 0.1 % cream Apply topically 2 (two) times daily.     Family History       Problem Relation (Age of Onset)    Stroke Mother          Tobacco Use    Smoking status: Every Day     Current packs/day: 1.50     Average packs/day: 1.5 packs/day for 71.0 years (106.5 ttl pk-yrs)     Types: Cigarettes    Smokeless tobacco: Former   Substance and Sexual Activity    Alcohol use: No    Drug use: No    Sexual activity: Not Currently     Partners: Female     Review of Systems   Constitutional: Negative for decreased appetite.   HENT:  Negative for ear discharge.    Eyes:  Negative for blurred vision.   Endocrine: Negative for polyphagia.   Skin:  Negative for nail changes.   Genitourinary:  Negative for bladder incontinence.   Neurological:  Negative for aphonia.   Psychiatric/Behavioral:  Negative for hallucinations.    Allergic/Immunologic: Negative for hives.     Objective:     Vital  Signs (Most Recent):  Temp: 98 °F (36.7 °C) (12/24/23 1125)  Pulse: 70 (12/24/23 1125)  Resp: 16 (12/24/23 1125)  BP: (!) 101/52 (12/24/23 1125)  SpO2: 96 % (12/24/23 1125) Vital Signs (24h Range):  Temp:  [97.4 °F (36.3 °C)-98.2 °F (36.8 °C)] 98 °F (36.7 °C)  Pulse:  [67-92] 70  Resp:  [11-24] 16  SpO2:  [90 %-99 %] 96 %  BP: ()/(50-80) 101/52     Weight: 62.8 kg (138 lb 7.2 oz)  Body mass index is 21.68 kg/m².    SpO2: 96 %         Intake/Output Summary (Last 24 hours) at 12/24/2023 1310  Last data filed at 12/24/2023 1214  Gross per 24 hour   Intake 575.91 ml   Output 750 ml   Net -174.09 ml       Lines/Drains/Airways       Drain  Duration                  Urethral Catheter 12/21/23 0754 3 days              Peripheral Intravenous Line  Duration                  Peripheral IV - Single Lumen 12/20/23 1830 20 G Left Antecubital 3 days                     Physical Exam  Constitutional:       Appearance: He is well-developed.   HENT:      Head: Normocephalic and atraumatic.   Eyes:      Conjunctiva/sclera: Conjunctivae normal.      Pupils: Pupils are equal, round, and reactive to light.   Cardiovascular:      Rate and Rhythm: Normal rate.      Pulses: Intact distal pulses.      Heart sounds: Normal heart sounds.   Pulmonary:      Effort: Pulmonary effort is normal.      Breath sounds: Normal breath sounds.   Abdominal:      General: Bowel sounds are normal.      Palpations: Abdomen is soft.   Musculoskeletal:         General: Normal range of motion.      Cervical back: Normal range of motion and neck supple.   Skin:     General: Skin is warm and dry.   Neurological:      Mental Status: He is alert and oriented to person, place, and time.          Significant Labs: All pertinent lab results from the last 24 hours have been reviewed.    Significant Imaging: Echocardiogram: 2D echo with color flow doppler:   Results for orders placed or performed during the hospital encounter of 12/20/17   2D Echo w/ Color Flow  Doppler   Result Value Ref Range    EF + QEF 55 55 - 65    Diastolic Dysfunction No     Est. PA Systolic Pressure 26.62     Pericardial Effusion NONE     Mitral Valve Mobility NORMAL     Tricuspid Valve Regurgitation TRIVIAL     Narrative    Date of Procedure: 12/20/2017        TEST DESCRIPTION   Technical Quality: This is a technically adequate study.     General: There is frequent ectopy.     Aorta: The aortic root is normal in size, measuring 2.9 cm at sinotubular junction and 3.4 cm at Sinuses of Valsalva. The proximal ascending aorta is normal in size, measuring 3.9 cm across.     Left Atrium: The left atrial volume index is normal, measuring 25.73 cc/m2.     Left Ventricle: The left ventricle is normal in size, with an end-diastolic diameter of 2.4 cm, and an end-systolic diameter of 1.8 cm. LV wall thickness is normal, with the septum measuring 1.2 cm and the posterior wall measuring 1.3 cm across. Relative   wall thickness was increased at 1.08, and the LV mass index was 52.1 g/m2 consistent with concentric remodeling. There are no regional wall motion abnormalities. Left ventricular systolic function appears normal. Visually estimated ejection fraction is   55-60%. The LV Doppler derived stroke volume equals 100.0 ccs.     Diastolic indices: E wave velocity 0.8 m/s, E/A ratio 0.9,  msec., E/e' ratio(avg) 12. Diastolic function is normal.     Right Atrium: The right atrium is normal in size, measuring 4.5 cm in length and 3.7 cm in width in the apical view.     Right Ventricle: The right ventricle is normal in size measuring 4.1 cm at the base in the apical right ventricle-focused view. Global right ventricular systolic function appears normal. Tricuspid annular plane systolic excursion (TAPSE) is 2.1 cm.   Tissue Doppler-derived tricuspid annular peak systolic velocity (S prime) is 8.3 cm/s. The estimated PA systolic pressure is 27 mmHg.     Aortic Valve:  The aortic valve is moderately sclerotic with  normal leaflet mobility. The aortic valve is tri-leaflet in structure. The peak gradient obtained across the aortic valve is 6 mmHg, with a mean gradient of 4 mmHg. Using a left ventricular   outflow tract diameter of 2.1 cm, a left ventricular outflow tract velocity time integral of 30 cm, and a peak instantaneous transvalvular velocity time integral of 30 cm, the calculated aortic valve area is 3.34 cm2(AVAi is 1.85 cm2/m2).     Mitral Valve:  The mitral valve is normal in structure with normal leaflet mobility.     Tricuspid Valve:  The tricuspid valve is normal in structure with normal leaflet mobility. There is trivial tricuspid regurgitation.     Pulmonary Valve:  The pulmonic valve is not well seen.     Pericardium: There is no evidence of pericardial effusion.      IVC: IVC is normal in size and collapses > 50% with a sniff, suggesting normal right atrial pressure of 3 mmHg.     Intracavitary: There is no evidence of intracavity mass, thrombi, or vegetation.         CONCLUSIONS     1 - Normal left ventricular systolic function (EF 55-60%).     2 - No wall motion abnormalities.     3 - Concentric remodeling.     4 - Trivial tricuspid regurgitation.     5 - The estimated PA systolic pressure is 27 mmHg.             This document has been electronically    SIGNED BY: Wiley Vargas MD On: 12/20/2017 10:08

## 2023-12-25 LAB
ALBUMIN SERPL BCP-MCNC: 2.6 G/DL (ref 3.5–5.2)
ALP SERPL-CCNC: 63 U/L (ref 55–135)
ALT SERPL W/O P-5'-P-CCNC: 26 U/L (ref 10–44)
ANION GAP SERPL CALC-SCNC: 12 MMOL/L (ref 8–16)
AST SERPL-CCNC: 34 U/L (ref 10–40)
BILIRUB SERPL-MCNC: 0.2 MG/DL (ref 0.1–1)
BUN SERPL-MCNC: 33 MG/DL (ref 8–23)
CALCIUM SERPL-MCNC: 8.1 MG/DL (ref 8.7–10.5)
CHLORIDE SERPL-SCNC: 107 MMOL/L (ref 95–110)
CO2 SERPL-SCNC: 19 MMOL/L (ref 23–29)
CREAT SERPL-MCNC: 1.7 MG/DL (ref 0.5–1.4)
ERYTHROCYTE [DISTWIDTH] IN BLOOD BY AUTOMATED COUNT: 18.6 % (ref 11.5–14.5)
EST. GFR  (NO RACE VARIABLE): 38.8 ML/MIN/1.73 M^2
GLUCOSE SERPL-MCNC: 127 MG/DL (ref 70–110)
HCT VFR BLD AUTO: 28.9 % (ref 40–54)
HGB BLD-MCNC: 9.1 G/DL (ref 14–18)
MAGNESIUM SERPL-MCNC: 2.1 MG/DL (ref 1.6–2.6)
MCH RBC QN AUTO: 30.7 PG (ref 27–31)
MCHC RBC AUTO-ENTMCNC: 31.5 G/DL (ref 32–36)
MCV RBC AUTO: 98 FL (ref 82–98)
PHOSPHATE SERPL-MCNC: 3.9 MG/DL (ref 2.7–4.5)
PLATELET # BLD AUTO: 228 K/UL (ref 150–450)
PMV BLD AUTO: 9.9 FL (ref 9.2–12.9)
POCT GLUCOSE: 155 MG/DL (ref 70–110)
POCT GLUCOSE: 307 MG/DL (ref 70–110)
POTASSIUM SERPL-SCNC: 5.1 MMOL/L (ref 3.5–5.1)
PROT SERPL-MCNC: 6.4 G/DL (ref 6–8.4)
RBC # BLD AUTO: 2.96 M/UL (ref 4.6–6.2)
SODIUM SERPL-SCNC: 138 MMOL/L (ref 136–145)
WBC # BLD AUTO: 9.82 K/UL (ref 3.9–12.7)

## 2023-12-25 PROCEDURE — 36415 COLL VENOUS BLD VENIPUNCTURE: CPT | Mod: HCNC | Performed by: STUDENT IN AN ORGANIZED HEALTH CARE EDUCATION/TRAINING PROGRAM

## 2023-12-25 PROCEDURE — 63600175 PHARM REV CODE 636 W HCPCS: Mod: HCNC | Performed by: STUDENT IN AN ORGANIZED HEALTH CARE EDUCATION/TRAINING PROGRAM

## 2023-12-25 PROCEDURE — 0HBBXZX EXCISION OF RIGHT UPPER ARM SKIN, EXTERNAL APPROACH, DIAGNOSTIC: ICD-10-PCS | Performed by: DERMATOLOGY

## 2023-12-25 PROCEDURE — 25000003 PHARM REV CODE 250: Mod: HCNC | Performed by: STUDENT IN AN ORGANIZED HEALTH CARE EDUCATION/TRAINING PROGRAM

## 2023-12-25 PROCEDURE — 88341 IMHCHEM/IMCYTCHM EA ADD ANTB: CPT | Mod: HCNC | Performed by: PATHOLOGY

## 2023-12-25 PROCEDURE — 85027 COMPLETE CBC AUTOMATED: CPT | Mod: HCNC | Performed by: STUDENT IN AN ORGANIZED HEALTH CARE EDUCATION/TRAINING PROGRAM

## 2023-12-25 PROCEDURE — 80053 COMPREHEN METABOLIC PANEL: CPT | Mod: HCNC | Performed by: STUDENT IN AN ORGANIZED HEALTH CARE EDUCATION/TRAINING PROGRAM

## 2023-12-25 PROCEDURE — S4991 NICOTINE PATCH NONLEGEND: HCPCS | Mod: HCNC | Performed by: STUDENT IN AN ORGANIZED HEALTH CARE EDUCATION/TRAINING PROGRAM

## 2023-12-25 PROCEDURE — 88312 SPECIAL STAINS GROUP 1: CPT | Mod: HCNC | Performed by: PATHOLOGY

## 2023-12-25 PROCEDURE — 88305 TISSUE EXAM BY PATHOLOGIST: CPT | Mod: HCNC | Performed by: PATHOLOGY

## 2023-12-25 PROCEDURE — 88312 SPECIAL STAINS GROUP 1: CPT | Mod: 26,HCNC,, | Performed by: PATHOLOGY

## 2023-12-25 PROCEDURE — 88342 IMHCHEM/IMCYTCHM 1ST ANTB: CPT | Mod: 91,HCNC | Performed by: PATHOLOGY

## 2023-12-25 PROCEDURE — 88342 IMHCHEM/IMCYTCHM 1ST ANTB: CPT | Mod: 26,HCNC,, | Performed by: PATHOLOGY

## 2023-12-25 PROCEDURE — 88341 IMHCHEM/IMCYTCHM EA ADD ANTB: CPT | Mod: 59,HCNC | Performed by: PATHOLOGY

## 2023-12-25 PROCEDURE — 83735 ASSAY OF MAGNESIUM: CPT | Mod: HCNC | Performed by: STUDENT IN AN ORGANIZED HEALTH CARE EDUCATION/TRAINING PROGRAM

## 2023-12-25 PROCEDURE — 84100 ASSAY OF PHOSPHORUS: CPT | Mod: HCNC | Performed by: STUDENT IN AN ORGANIZED HEALTH CARE EDUCATION/TRAINING PROGRAM

## 2023-12-25 PROCEDURE — 11000001 HC ACUTE MED/SURG PRIVATE ROOM: Mod: HCNC

## 2023-12-25 PROCEDURE — 88342 IMHCHEM/IMCYTCHM 1ST ANTB: CPT | Mod: HCNC | Performed by: PATHOLOGY

## 2023-12-25 PROCEDURE — 88341 IMHCHEM/IMCYTCHM EA ADD ANTB: CPT | Mod: 26,HCNC,, | Performed by: PATHOLOGY

## 2023-12-25 PROCEDURE — 88305 TISSUE EXAM BY PATHOLOGIST: CPT | Mod: 26,HCNC,, | Performed by: PATHOLOGY

## 2023-12-25 RX ORDER — TRIAMCINOLONE ACETONIDE 1 MG/G
OINTMENT TOPICAL 2 TIMES DAILY
Status: DISCONTINUED | OUTPATIENT
Start: 2023-12-25 | End: 2023-12-29 | Stop reason: HOSPADM

## 2023-12-25 RX ADMIN — ASPIRIN 81 MG: 81 TABLET, COATED ORAL at 10:12

## 2023-12-25 RX ADMIN — TAMSULOSIN HYDROCHLORIDE 0.4 MG: 0.4 CAPSULE ORAL at 10:12

## 2023-12-25 RX ADMIN — Medication 6 MG: at 08:12

## 2023-12-25 RX ADMIN — METOPROLOL SUCCINATE 25 MG: 25 TABLET, EXTENDED RELEASE ORAL at 10:12

## 2023-12-25 RX ADMIN — CLOPIDOGREL BISULFATE 75 MG: 75 TABLET ORAL at 10:12

## 2023-12-25 RX ADMIN — FAMOTIDINE 20 MG: 20 TABLET ORAL at 10:12

## 2023-12-25 RX ADMIN — Medication 1 PATCH: at 10:12

## 2023-12-25 RX ADMIN — MUPIROCIN: 20 OINTMENT TOPICAL at 08:12

## 2023-12-25 RX ADMIN — DIVALPROEX SODIUM 500 MG: 250 TABLET, DELAYED RELEASE ORAL at 08:12

## 2023-12-25 RX ADMIN — METHYLPREDNISOLONE SODIUM SUCCINATE 40 MG: 40 INJECTION, POWDER, FOR SOLUTION INTRAMUSCULAR; INTRAVENOUS at 08:12

## 2023-12-25 RX ADMIN — ATORVASTATIN CALCIUM 40 MG: 40 TABLET, FILM COATED ORAL at 10:12

## 2023-12-25 RX ADMIN — CEFTRIAXONE 2 G: 2 INJECTION, POWDER, FOR SOLUTION INTRAMUSCULAR; INTRAVENOUS at 03:12

## 2023-12-25 RX ADMIN — METHYLPREDNISOLONE SODIUM SUCCINATE 40 MG: 40 INJECTION, POWDER, FOR SOLUTION INTRAMUSCULAR; INTRAVENOUS at 03:12

## 2023-12-25 RX ADMIN — DIVALPROEX SODIUM 250 MG: 250 TABLET, DELAYED RELEASE ORAL at 10:12

## 2023-12-25 RX ADMIN — TRIAMCINOLONE ACETONIDE: 1 OINTMENT TOPICAL at 08:12

## 2023-12-25 RX ADMIN — INSULIN ASPART 2 UNITS: 100 INJECTION, SOLUTION INTRAVENOUS; SUBCUTANEOUS at 09:12

## 2023-12-25 RX ADMIN — HYDROCODONE BITARTRATE AND ACETAMINOPHEN 1 TABLET: 5; 325 TABLET ORAL at 09:12

## 2023-12-25 NOTE — HPI
Narciso Yanez is an 88-year-old male with a past medical history of CAD, COPD (not on O2), hypertension, hyperlipidemia, CKD 3, type 2 diabetes, peripheral artery disease, NPH, PMR,  chronic back pain, tobacco use who presents with altered mental status (found to be encephalopathic with UTI, now on IV antibiotics.)  Patient transferred from [facility] for dermatology evalution for skin biopsy to assess for cutaneous T cell lymphoma.    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 He had been receiving rinvoq with some improvement. At prior hospital, medicine was told to hold steroids and rinvoq by oncology to allow skin to flare in order to obtain skin biopsy. Medications were stopped 11 days ago.  He was last admitted in November for AMS when a caldwell work up of his rash was started by heme/onc. Vitamin deficiency at the time had normal levels of zinc, copper, B12, Vitamin A. He had gene rearrangement studies and noted to have increased  CD4:CD8 ratio 25:1 and loss of CD7. I suspect from blood work heme/onc again suggested pursuing evaluation of possible CTCL with outpatient dermatology follow up, but it seems like follow up was never made. At the time of that admission was started on IV steroids which helped and discharged on prednisone 40 daily. He is a poor historian but mentions a Dr. Garcia and does think he has been to a dermatologist and notes he has had 3+ skin biopsies performed - none of which are in his records here and there are no dermatology notes.    Patient reports rash started before Father's Day in June. Started on his arms. Endorses thick scaly areas. Patient otherwise asymtpomatic - he denies itch or skin pain. He denies rash in sun protected areas including chest/trunk or buttocks. He does not use lotions or emollients. He does not recall what type of soap or body wash he uses. On review of medications patient denies taking any blood pressure medications. He denies  changes to medications or over the counter supplements. Per chart review patients home meds include: aspirin, atorvastatin, clopidogrel, divalproex, metformin, metoprolol, tamsulosin.

## 2023-12-25 NOTE — NURSING
Patient left the unit via stretcher with the EMS going to [facility] at room 609 for skin biopsy  Awake, alert, and oriented  No distress noted  With IV line in placed  With indwelling walls catheter in situ

## 2023-12-25 NOTE — ASSESSMENT & PLAN NOTE
History noted.  No acute issues.     I have reviewed the patient's controlled substance prescription history, as maintained in the [place] prescription monitoring program, so that the prescriptions(s) for a controlled substance can be given.   Last Date Reviewed:   08/30/23    Sundeep Cooper PA-C

## 2023-12-25 NOTE — ASSESSMENT & PLAN NOTE
Chronic, controlled. Latest blood pressure and vitals reviewed-     Temp:  [96.2 °F (35.7 °C)-98.2 °F (36.8 °C)]   Pulse:  []   Resp:  [15-19]   BP: (113-142)/(56-71)   SpO2:  [93 %-96 %] .   Home meds for hypertension were reviewed and noted below.   Hypertension Medications               metoprolol succinate (TOPROL-XL) 25 MG 24 hr tablet Take 1 tablet (25 mg total) by mouth once daily.            While in the hospital, will manage blood pressure as follows; Continue home antihypertensive regimen    Will utilize p.r.n. blood pressure medication only if patient's blood pressure greater than 180/110 and he develops symptoms such as worsening chest pain or shortness of breath.

## 2023-12-25 NOTE — PLAN OF CARE
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     Patient presents as a transfer to [facility] for dermatology evaluation. During my brief bedside interview, he is awake but confused. Not in distress. Dermatitis in all extremities with upper>lower. Will place dermatology consult. 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    Daily progress note is to follow by the primary team later this morning.        Gerald Del Cid DO  Staff Physician, Hospital Medicine.

## 2023-12-25 NOTE — ASSESSMENT & PLAN NOTE
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 Needs outpatient follow up with dermatology   - Follows with Dr. Davidson and has biopsies but unrevealing  - Was on Renvoq and prednisone but completed course of prednisone and was taken off Renvoq on 12/12/23.   - Doubt taking off of Renvoq is contributing to mental status changes  - Dermatology consulted  Low suspicion for CTCL given clinical exam and CTCL typically presents with erythematous/atrophic itchy scaly plaques in non-sun exposed areas. CTCL is a diagnosis that does take numerous biopsies to diagnose so will performed additional H&E biopsy. Can also consider contact dermatitis - pt does have irritant dermatitis to EKG leads as noted by well demarcated squares from prior adhesive. Otherwise patient generally very xerotic elsewhere on body.    -- s/p punch biopsy on 12/25, f/u pathology   -- Triamcinolone 0.1% ointment twice daily   - Dermatology follow up outpatient  - Recommend daily emollients with cetaphil or cerave, can also add on lac hydrin 12% lotion when discharged

## 2023-12-25 NOTE — PLAN OF CARE
Problem: Adult Inpatient Plan of Care  Goal: Plan of Care Review  Outcome: Ongoing, Not Progressing  Goal: Patient-Specific Goal (Individualized)  Outcome: Ongoing, Not Progressing  Goal: Absence of Hospital-Acquired Illness or Injury  Outcome: Ongoing, Not Progressing  Goal: Optimal Comfort and Wellbeing  Outcome: Ongoing, Not Progressing  Goal: Readiness for Transition of Care  Outcome: Ongoing, Not Progressing     Problem: Fall Injury Risk  Goal: Absence of Fall and Fall-Related Injury  Outcome: Ongoing, Not Progressing     Problem: Restraint, Nonbehavioral (Nonviolent)  Goal: Absence of Harm or Injury  Outcome: Ongoing, Not Progressing     Problem: Diabetes Comorbidity  Goal: Blood Glucose Level Within Targeted Range  Outcome: Ongoing, Not Progressing     Problem: Fluid and Electrolyte Imbalance (Acute Kidney Injury/Impairment)  Goal: Fluid and Electrolyte Balance  Outcome: Ongoing, Not Progressing     Problem: Oral Intake Inadequate (Acute Kidney Injury/Impairment)  Goal: Optimal Nutrition Intake  Outcome: Ongoing, Not Progressing     Problem: Renal Function Impairment (Acute Kidney Injury/Impairment)  Goal: Effective Renal Function  Outcome: Ongoing, Not Progressing     Problem: Impaired Wound Healing  Goal: Optimal Wound Healing  Outcome: Ongoing, Not Progressing     Problem: Skin Injury Risk Increased  Goal: Skin Health and Integrity  Outcome: Ongoing, Not Progressing     Problem: Infection  Goal: Absence of Infection Signs and Symptoms  Outcome: Ongoing, Not Progressing     Problem: Coping Ineffective  Goal: Effective Coping  Outcome: Ongoing, Not Progressing

## 2023-12-25 NOTE — ASSESSMENT & PLAN NOTE
87 yo male with numerous comorbidities presents as transfer for skin biopsy to evaluated for CTCL. On exam patient has markedly photodistrubuted hyperkeratotic rash. Suspect this is more phototoxic drug eruption. Patient's medications that could contribute to photodrug include his prn tylenol, atorvastatin. Can also consider contact dermatitis - pt does have irritant dermatitis to EKG leads as noted by well demarcated squares from prior adhesive. Otherwise patient generally very xerotic elsewhere on body. Recommend daily emollients with cetaphil or cerave, can also add on lac hydrin 12% lotion when discharged. Discussed gnetle skin care with patient including lukewarm showers, applying emollient immediately after showering.Low suspicion for CTCL given clinical exam and CTCL typically presents with erythematous/atrophic itchy scaly plaques in non-sun exposed areas. CTCL is a diagnosis that does take numerous biopsies to diagnose so will perform additional H&E biopsy today. Recommend patient establish with outpatient dermatology clinic and to obtain records from prior treatment as patient notes he has had at least 3 skin biopsies. Patient should also be considered for patch testing. Overall patient is asymptomatic so recommend triamcinolone ointment. Recommend holding rinvoq until evaluated by dermatology as outpatient.  - Follow up right arm biopsy H&E  - Start triamcinolone 0.1% ointment twice daily- pt thave 454 g jar at bedside (ensure ointment > cream as ointment is more potent)  - Please place dermatology referral upon discharge  - Gentle skin care precautions educated to patient    Punch biopsy procedure note:  Punch biopsy performed after verbal consent obtained. Area (RIGHT arm) marked and prepped with alcohol. Approximately 1cc of 1% lidocaine with epinephrine injected. 4 mm disposable punch used to remove lesion. Hemostasis obtained and biopsy site closed with gel foam. Wound care instructions reviewed with  patient and handout given.

## 2023-12-25 NOTE — ASSESSMENT & PLAN NOTE
- UCx 12/20 with Strept agalactiae ([facility] )  - Complicated given walls  - Continue CTX x10 days, sot 12/20

## 2023-12-25 NOTE — ASSESSMENT & PLAN NOTE
Patient's FSGs are controlled on current medication regimen.  Last A1c reviewed-   Lab Results   Component Value Date    HGBA1C 6.9 (H) 10/15/2023     Most recent fingerstick glucose reviewed-   Recent Labs   Lab 12/24/23  1625 12/24/23  1944 12/25/23  0701   POCTGLUCOSE 120* 161* 155*       Current correctional scale  Low  Maintain anti-hyperglycemic dose as follows-   Antihyperglycemics (From admission, onward)    Start     Stop Route Frequency Ordered    12/20/23 2152  insulin aspart U-100 pen 0-5 Units         -- SubQ Before meals & nightly PRN 12/20/23 2052        Hold Oral hypoglycemics while patient is in the hospital.

## 2023-12-25 NOTE — NURSING
[facility] [place]  Nurses Note -- 4 Eyes      12/24/2023       Skin assessed on: Q Shift      [x] No Pressure Injuries Present    [x]Prevention Measures Documented    [] Yes LDA  for Pressure Injury Previously documented     [] Yes New Pressure Injury Discovered   [] LDA for New Pressure Injury Added      Attending RN:  Shayna Mullins RN     Second RN:  ANUPAM Morales

## 2023-12-25 NOTE — NURSING
Nurses Note -- 4 Eyes      12/25/2023   6:00 AM      Skin assessed during: Admit      [] No Altered Skin Integrity Present    []Prevention Measures Documented      [x] Yes- Altered Skin Integrity Present or Discovered   [x] LDA Added if Not in Epic (Describe Wound)   [x] New Altered Skin Integrity was Present on Admit and Documented in LDA   [] Wound Image Taken    Wound Care Consulted? Yes    Attending Nurse:  Ariana Harper RN/Staff Member:   Helene

## 2023-12-25 NOTE — CONSULTS
[facility] - Med Surg  Dermatology  Consult Note    Patient Name: Narciso Yanez  MRN: [id number]  Admission Date: 12/20/2023  Hospital Length of Stay: 5 days  Attending Physician: Dixon Ledbetter MD  Primary Care Provider: Marcelino Del Toro MD     Inpatient consult to Dermatology  Consult performed by: Junie Dash MD  Consult ordered by: Gerald Del Cid DO  Reason for consult: biopsy to r/o CTCL        Subjective:     Principal Problem:Encephalopathy, metabolic    HPI:  Narciso Yanez is an 88-year-old male with a past medical history of CAD, COPD (not on O2), hypertension, hyperlipidemia, CKD 3, type 2 diabetes, peripheral artery disease, NPH, PMR,  chronic back pain, tobacco use who presents with altered mental status (found to be encephalopathic with UTI, now on IV antibiotics.)  Patient transferred from [facility] for dermatology evalution for skin biopsy to assess for cutaneous T cell lymphoma.    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 He had been receiving rinvoq with some improvement. At prior hospital, medicine was told to hold steroids and rinvoq by oncology to allow skin to flare in order to obtain skin biopsy. Medications were stopped 11 days ago.  He was last admitted in November for AMS when a caldwell work up of his rash was started by heme/onc. Vitamin deficiency at the time had normal levels of zinc, copper, B12, Vitamin A. He had gene rearrangement studies and noted to have increased  CD4:CD8 ratio 25:1 and loss of CD7. I suspect from blood work heme/onc again suggested pursuing evaluation of possible CTCL with outpatient dermatology follow up, but it seems like follow up was never made. At the time of that admission was started on IV steroids which helped and discharged on prednisone 40 daily. He is a poor historian but mentions a Dr. Garcia and does think he has been to a dermatologist and notes he has had 3+ skin biopsies performed - none of which are in his  records here and there are no dermatology notes.    Patient reports rash started before Father's Day in June. Started on his arms. Endorses thick scaly areas. Patient otherwise asymtpomatic - he denies itch or skin pain. He denies rash in sun protected areas including chest/trunk or buttocks. He does not use lotions or emollients. He does not recall what type of soap or body wash he uses. On review of medications patient denies taking any blood pressure medications. He denies changes to medications or over the counter supplements. Per chart review patients home meds include: aspirin, atorvastatin, clopidogrel, divalproex, metformin, metoprolol, tamsulosin.      Past Medical History:   Diagnosis Date    Actinic keratosis     Anxiety     B12 deficiency 12/8/2014    Dx 6/14    Cancer     skin    Cataract     Coronary artery disease     Diabetes mellitus type II     Diabetes mellitus with peripheral circulatory disorder 6/18/2014    ED (erectile dysfunction)     Hyperlipidemia     Kidney stone     Neurogenic claudication 4/4/2022    Normocytic anemia 10/15/2023    Peripheral vascular disease     Spondylosis 3/29/2019    Tobacco dependence     Urinary incontinence 5/4/2021       Past Surgical History:   Procedure Laterality Date    APPENDECTOMY      CARDIAC SURGERY  01/1999    CABG 4 vessels    CATARACT EXTRACTION      EPIDURAL STEROID INJECTION Right 8/26/2020    Procedure: Injection, Steroid, Epidural Transforaminal;  Surgeon: Wiley Crane Jr., MD;  Location: [facility] system [facility];  Service: Pain Management;  Laterality: Right;  Right L5 + S1 TF LEAH  Arrive @ 1115; ASA & Plavix last 8/18; Check BG; Rapid COVID test    EPIDURAL STEROID INJECTION Right 11/25/2020    Procedure: Injection, Steroid, Epidural Transformainal;  Surgeon: Wiley Crane Jr., MD;  Location: Health system [facility];  Service: Pain Management;  Laterality: Right;  Right L5 + S1 TF LEAH  Arrive @ 1245; ASA and Plavix last 11/17; Pre-DM; Needs MD Sign.    EPIDURAL  STEROID INJECTION Right 2/19/2021    Procedure: Injection, Steroid, Epidural Transforaminal;  Surgeon: Wiley Crane Jr., MD;  Location: [facility];  Service: Pain Management;  Laterality: Right;  Right L5 + S1 TF LEAH  Arrive @ 1115; ASA & Plavix last 2/11; Check BG; Needs Consent    EXTERNAL EAR SURGERY      EYE SURGERY      cataracts    ILIAC ARTERY STENT Bilateral 06/2003    also Right External Iliac stent     Family History       Problem Relation (Age of Onset)    Stroke Mother          Tobacco Use    Smoking status: Every Day     Current packs/day: 1.50     Average packs/day: 1.5 packs/day for 71.0 years (106.5 ttl pk-yrs)     Types: Cigarettes    Smokeless tobacco: Former   Substance and Sexual Activity    Alcohol use: No    Drug use: No    Sexual activity: Not Currently     Partners: Female       Review of patient's allergies indicates:   Allergen Reactions    Demerol [meperidine] Nausea Only       Medications:  Continuous Infusions:  Scheduled Meds:   aspirin  81 mg Oral Daily    atorvastatin  40 mg Oral Daily    cefTRIAXone (ROCEPHIN) IVPB  2 g Intravenous Q24H    clopidogreL  75 mg Oral Daily    divalproex  250 mg Oral Daily    divalproex  500 mg Oral QHS    famotidine  20 mg Oral Daily    methylPREDNISolone sodium succinate  40 mg Intravenous BID    metoprolol succinate  25 mg Oral Daily    mupirocin   Nasal BID    nicotine  1 patch Transdermal Daily    tamsulosin  0.4 mg Oral Daily     PRN Meds:acetaminophen, albuterol-ipratropium, dextrose 10%, dextrose 10%, glucagon (human recombinant), glucose, glucose, haloperidol lactate, HYDROcodone-acetaminophen, insulin aspart U-100, melatonin, ondansetron, prochlorperazine, sodium chloride 0.9%    Review of Systems   Constitutional:  Negative for fever.   HENT:  Negative for mouth sores.    Respiratory:  Negative for shortness of breath.    Gastrointestinal:  Positive for nausea. Negative for vomiting.   Genitourinary:  Negative for genital sores.    Musculoskeletal:  Negative for arthralgias.   Skin:  Positive for itching and rash.     Objective:     Vital Signs (Most Recent):  Temp: 98 °F (36.7 °C) (12/25/23 1211)  Pulse: 96 (12/25/23 1211)  Resp: 15 (12/25/23 0832)  BP: 135/64 (12/25/23 1211)  SpO2: (!) 94 % (12/25/23 1211) Vital Signs (24h Range):  Temp:  [96.2 °F (35.7 °C)-98.2 °F (36.8 °C)] 98 °F (36.7 °C)  Pulse:  [] 96  Resp:  [15-19] 15  SpO2:  [93 %-96 %] 94 %  BP: (113-142)/(56-71) 135/64     Weight: 62.8 kg (138 lb 7.2 oz)  Body mass index is 21.68 kg/m².     Physical Exam   Constitutional: He appears well-developed and well-nourished. No distress.   Neurological: He is alert and oriented to person, place, and time. He is not disoriented.   Psychiatric: He has a normal mood and affect.   Skin:   Areas Examined (abnormalities noted in diagram):   Scalp / Hair Palpated and Inspected  Head / Face Inspection Performed  Neck Inspection Performed  Chest / Axilla Inspection Performed  Abdomen Inspection Performed  Genitals / Buttocks / Groin Inspection Performed  Back Inspection Performed  RUE Inspected  LUE Inspection Performed  RLE Inspected  LLE Inspection Performed  Nails and Digits Inspection Performed                            Significant Labs: All pertinent labs within the past 24 hours have been reviewed.    Significant Imaging: I have reviewed all pertinent imaging results/findings within the past 24 hours.      TCR Rearrangement study:    Blood Interpretation PERIPHERAL BLOOD, FLOW CYTOMETRY:        -  Relative neutrophilia        -  No monoclonal B-cell population        -  Markedly increased CD4:CD8 ratio (25:1) with subset of T-cells with aberrant loss of CD7 expression        -  No significant increase in circulating blasts    COMMENT: In the context of a markedly increased CD4:CD8 ratio (25:1) with clinical presentation of a generalized pruritic rash for several months accompanied by a progressive decline in overall functional  status, hem/onc consultation with appropriate  work-up for a possible cutaneous versus systemic T-cell lymphoproliferative disorder is required.    Please correlate the immunophenotyping results with clinical findings for final diagnosis.   Comment: Interp By Estela Roman D.O., Signed on 10/25/2023 at 20:25   Blood Comment ATYPICAL T-CELLS IDENTIFIED (1.9% of total analyzed events; 6.5% of total events in lymphocyte gate)    Forward scatter: Low  Side scatter: Low    Positive: CD45, CD2, CD3, CD4, TCR alpha/beta  Negative: TCR gamma/delta, CD5, CD7, CD8, CD30, CD16, CD56, CD57, , CD19, CD20, CD10, sKappa, sLambda, CD34, CD13    The following additional cell populations are identified:  80.3% Granulocytes  1.8% Monocytes  0.9% Mature B cells: No light chain restriction or significant co-expression of CD5 or CD10 detected.  0.6% NK cells and/or T-LGLs  0.1% Blasts    Viability by 7-AAD: 99.7%    The remaining events analyzed represent nonviable cells, non-hematolymphoid cells, doublets, and debris.   Blood Antibodies Analyzed All analyzed: CD2, CD3, CD4, CD5, CD7, CD8, CD10, CD13, CD16, CD19, CD20, CD30, CD34, CD56, CD57, , KAPPA, LAMBDA, TCR a/b, TCR d/g, CD45, and 7AAD.           Assessment/Plan:     Derm  Dermatitis, unspecified  89 yo male with numerous comorbidities presents as transfer for skin biopsy to evaluated for CTCL. On exam patient has markedly photodistrubuted hyperkeratotic rash. Suspect this is more phototoxic drug eruption. Patient's medications that could contribute to photodrug include his prn tylenol, atorvastatin. Can also consider contact dermatitis - pt does have irritant dermatitis to EKG leads as noted by well demarcated squares from prior adhesive. Otherwise patient generally very xerotic elsewhere on body. Recommend daily emollients with cetaphil or cerave, can also add on lac hydrin 12% lotion when discharged. Discussed gnetle skin care with patient including lukewarm showers,  applying emollient immediately after showering.Low suspicion for CTCL given clinical exam and CTCL typically presents with erythematous/atrophic itchy scaly plaques in non-sun exposed areas. CTCL is a diagnosis that does take numerous biopsies to diagnose so will perform additional H&E biopsy today. Recommend patient establish with outpatient dermatology clinic and to obtain records from prior treatment as patient notes he has had at least 3 skin biopsies. Patient should also be considered for patch testing. Overall patient is asymptomatic so recommend triamcinolone ointment. Recommend holding rinvoq until evaluated by dermatology as outpatient.  - Follow up right arm biopsy H&E  - Start triamcinolone 0.1% ointment twice daily- pt thave 454 g jar at bedside (ensure ointment > cream as ointment is more potent)  - Please place dermatology referral upon discharge  - Gentle skin care precautions educated to patient    Punch biopsy procedure note:  Punch biopsy performed after verbal consent obtained. Area (RIGHT arm) marked and prepped with alcohol. Approximately 1cc of 1% lidocaine with epinephrine injected. 4 mm disposable punch used to remove lesion. Hemostasis obtained and biopsy site closed with gel foam. Wound care instructions reviewed with patient and handout given.            Thank you for your consult. I will follow up with patient regarding biopsy results. Please contact us if you have any additional questions.    Junie Dash MD  Dermatology  [facility] - Med Surg

## 2023-12-25 NOTE — ASSESSMENT & PLAN NOTE
18 beat VT this AM asymptomatic on 12/24 at [facility] and cardiology consulted. Possibly triggered by infection  - Cardiology consulted at [facility]  - Known CAD with normal EF  - Continue B  - DNR - patient wishes conservative Rx for CAD/arrhythmia

## 2023-12-25 NOTE — ASSESSMENT & PLAN NOTE
Suspect related to UTI, possible medications but do not suspect being taken off of Renvoq contributing. Has completed prednisone weeks ago so do not suspect this is contributing.     - CTH with no acute findings, no focal deficits. Patient with recent onset of tremors in the last few months and weakness.  - Treating UTI as below  - Delirium precautions  - Neurology consulted at [facility] on 12/22  Suspect symptomatology within the spectrum of metabolic / infectious encephalopathy + related hyperactive delirium - with confounders of under/undiagnosed dementia syndromes   -- Continue Depacon 250 mg qam and 500 mg qhs for management of agitation and delirium   -- PRN Zyprexa 2.5 mg PO/IM q6h PRN severe, nonredirectable agitation

## 2023-12-25 NOTE — PROGRESS NOTES
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 This medication has been on hold for a few days pending a biopsy, however, patient developed worsening hallucinations and mental status changes for several days. Patient denies having any complaints.     The ED, the patient was hemodynamically stable.  Labs were remarkable for hyperkalemia (6.9), normocytic anemia (10.2), elevated creatinine (2.5-baseline of 1.3), elevated BNP (399), elevated troponin (0.053). VBG showed a pH of 7.31, pCO2 of 45.1. UA showed +3 leukocytes, > 100 WBCs, moderate bacteria.  EKG showed showed peaked t-waves. Chest x-ray showed no acute process.  Patient was given insulin/D10, albuterol, calcium gluconate, Lasix 80 mg IV, morphine 2 mg IV, Zofran 4 mg IV. Patient was admitted for further management.     Overview/Hospital Course:  Admitted with acute encephalopathy, agitation and hyperkalemia. Also found to have UTI. Hyperkalemia improved, encephalopathy slowly improving. On IV antibiotics. More alert and awake, still very encephlopathic but redirectable. PT/OT consulted. Neurology consulted for evaluation of recent tremors, confusion.     Patient mental status appears back to baseline. Speaking fluently. Knows month and  year. Answering questions appropriately and using dates in speech.. Called daughter will get him home for [place] with home health, pending any cardiology recs for short run of Vtach, likely stimulated from infection. Will give Cefdinir for UTI.  F/u with PCP and Cardiology outpt.     Interval History:   Patient transferred from [facility] [place]. Patient with no complaints. Dermatology consulted. Continue CTX for UTI.      Review of Systems   Constitutional:  Negative for activity change, appetite change, chills, diaphoresis, fatigue and fever.   HENT:  Negative for rhinorrhea and sore throat.    Respiratory:  Negative for cough, chest tightness and shortness of breath.    Cardiovascular:  Negative for chest pain and palpitations.   Gastrointestinal:  Negative for abdominal pain, constipation, diarrhea and nausea.   Endocrine: Negative for cold intolerance.   Genitourinary:  Negative for decreased urine volume and dysuria.   Musculoskeletal:  Negative for arthralgias and myalgias.   Skin:  Negative for rash and wound.   Neurological:  Negative for dizziness, weakness, numbness and headaches.   Psychiatric/Behavioral:  Negative for agitation, behavioral problems and confusion.      Objective:     Vital Signs (Most Recent):  Temp: 98 °F (36.7 °C) (12/25/23 1211)  Pulse: 96 (12/25/23 1211)  Resp: 15 (12/25/23 0832)  BP: 135/64 (12/25/23 1211)  SpO2: (!) 94 % (12/25/23 1211) Vital Signs (24h Range):  Temp:  [96.2 °F (35.7 °C)-98.2 °F (36.8 °C)] 98 °F (36.7 °C)  Pulse:  [] 96  Resp:  [15-19] 15  SpO2:  [93 %-96 %] 94 %  BP: (113-142)/(56-71) 135/64     Weight: 62.8 kg (138 lb 7.2 oz)  Body mass index is 21.68 kg/m².    Intake/Output Summary (Last 24 hours) at 12/25/2023 1431  Last data filed at 12/25/2023 0000  Gross per 24 hour   Intake --   Output 700 ml   Net -700 ml         Physical Exam  Constitutional:       Appearance: Normal appearance. He is normal weight. He is ill-appearing (chronic).   HENT:      Head:  Normocephalic and atraumatic.      Mouth/Throat:      Mouth: Mucous membranes are moist.   Eyes:      Extraocular Movements: Extraocular movements intact.      Conjunctiva/sclera: Conjunctivae normal.   Cardiovascular:      Rate and Rhythm: Normal rate and regular rhythm.      Heart sounds: No murmur heard.  Pulmonary:      Effort: Pulmonary effort is normal. No respiratory distress.      Breath sounds: Normal breath sounds. No wheezing or rales.   Abdominal:      General: Abdomen is flat. There is no distension.      Palpations: Abdomen is soft.      Tenderness: There is no abdominal tenderness. There is no guarding.   Musculoskeletal:         General: No swelling or tenderness.   Skin:     Findings: Rash (arms, chest, legs) present.   Neurological:      General: No focal deficit present.      Mental Status: He is alert and oriented to person, place, and time. Mental status is at baseline.   Psychiatric:         Mood and Affect: Mood normal.         Behavior: Behavior normal.             Significant Labs: All pertinent labs within the past 24 hours have been reviewed.  CBC:   Recent Labs   Lab 12/24/23  1522 12/25/23  0842   WBC  --  9.82   HGB  --  9.1*   HCT 28* 28.9*   PLT  --  228     CMP:   Recent Labs   Lab 12/24/23  1330 12/24/23  1910 12/25/23  0842   * 137 138   K 8.6* 5.0 5.1    107 107   CO2 17* 18* 19*   * 143* 127*   BUN 21 25* 33*   CREATININE 2.1* 2.0* 1.7*   CALCIUM 8.0* 7.9* 8.1*   PROT 6.4  --  6.4   ALBUMIN 2.5*  --  2.6*   BILITOT 0.2  --  0.2   ALKPHOS 78  --  63   AST 93*  --  34   ALT 33  --  26   ANIONGAP 11 12 12       Significant Imaging: I have reviewed all pertinent imaging results/findings within the past 24 hours.    Assessment/Plan:      * Encephalopathy, metabolic  Suspect related to UTI, possible medications but do not suspect being taken off of Renvoq contributing. Has completed prednisone weeks ago so do not suspect this is contributing.     - CTH with no acute  findings, no focal deficits. Patient with recent onset of tremors in the last few months and weakness.  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 Needs outpatient follow up with dermatology   - Follows with Dr. Davidson and has biopsies but unrevealing  - Was on Renvoq and prednisone but completed course of prednisone and was taken off Renvoq on 12/12/23.   - Doubt taking off of Renvoq is contributing to mental status changes  - Dermatology consulted  Low suspicion for CTCL given clinical exam and CTCL typically presents with erythematous/atrophic itchy scaly plaques in non-sun exposed areas. CTCL is a diagnosis that does take numerous biopsies to diagnose so will performed additional H&E biopsy. Can also consider contact dermatitis - pt does have irritant dermatitis to EKG leads as noted by well demarcated squares from prior adhesive. Otherwise patient generally very xerotic elsewhere on body.    -- s/p punch biopsy on 12/25, f/u pathology   -- Triamcinolone 0.1% ointment twice daily   - Dermatology follow up outpatient  - Recommend daily emollients with cetaphil or cerave, can also add on lac hydrin 12% lotion when discharged       UTI (urinary tract infection)  - UCx 12/20 with Strept agalactiae (GroupB )  - Complicated given walls  - Continue CTX x10 days, sot 12/20        NSVT (nonsustained ventricular tachycardia)  18 beat VT this AM asymptomatic on 12/24 at [facility] and cardiology consulted. Possibly triggered by infection  - Cardiology consulted at [facility]  - Known CAD  with normal EF  - Continue B  - DNR - patient wishes conservative Rx for CAD/arrhythmia    LATRICE (acute kidney injury)  Patient with acute kidney injury/acute renal failure likely due to pre-renal azotemia due to IVVD LATRICE is currently worsening. Baseline creatinine  1.3  - Labs reviewed- Renal function/electrolytes with Estimated Creatinine Clearance: 27.7 mL/min (A) (based on SCr of 1.7 mg/dL (H)). according to latest data.    - Baseline 1.3  - Monitor urine output and serial BMP and adjust therapy as needed  - Avoid nephrotoxins and renally dose meds for GFR listed above    Hyperkalemia  This patient has hyperkalemia which is uncontrolled. We will monitor for arrhythmias with EKG or continuous telemetry. We will treat the hyperkalemia with Potassium Binders, Calcium gluconate, IV insulin and dextrose, Nebulized albuterol sulfate, and Furosemide. The likely etiology of the hyperkalemia is LATRICE.  The patients latest potassium has been reviewed and the results are listed below  Recent Labs   Lab 12/25/23  0842   K 5.1       - Improved        Chronic bilateral low back pain with bilateral sciatica  History noted. No acute issues       Hyperlipidemia  Home atorvastatin    Goals of care, counseling/discussion  Advance Care Planning  - Palliative care consulted at [facility] on 12/22  - DNR         Normal pressure hydrocephalus  Repeat CT head obtained with stable imaging on 12/20    Essential hypertension  Chronic, controlled. Latest blood pressure and vitals reviewed-     Temp:  [96.2 °F (35.7 °C)-98.2 °F (36.8 °C)]   Pulse:  []   Resp:  [15-19]   BP: (113-142)/(56-71)   SpO2:  [93 %-96 %] .   Home meds for hypertension were reviewed and noted below.   Hypertension Medications               metoprolol succinate (TOPROL-XL) 25 MG 24 hr tablet Take 1 tablet (25 mg total) by mouth once daily.            While in the hospital, will manage blood pressure as follows; Continue home antihypertensive regimen    Will utilize  p.r.n. blood pressure medication only if patient's blood pressure greater than 180/110 and he develops symptoms such as worsening chest pain or shortness of breath.    COPD (chronic obstructive pulmonary disease)  Patient's COPD is controlled currently.  Patient is currently off COPD Pathway.  PRN Maria M     PAD (peripheral artery disease)  History noted.  No acute issues.      Diabetes mellitus with peripheral circulatory disorder  Patient's FSGs are controlled on current medication regimen.  Last A1c reviewed-   Lab Results   Component Value Date    HGBA1C 6.9 (H) 10/15/2023     Most recent fingerstick glucose reviewed-   Recent Labs   Lab 12/24/23  1625 12/24/23  1944 12/25/23  0701   POCTGLUCOSE 120* 161* 155*       Current correctional scale  Low  Maintain anti-hyperglycemic dose as follows-   Antihyperglycemics (From admission, onward)      Start     Stop Route Frequency Ordered    12/20/23 2152  insulin aspart U-100 pen 0-5 Units         -- SubQ Before meals & nightly PRN 12/20/23 2052          Hold Oral hypoglycemics while patient is in the hospital.    Coronary artery disease involving coronary bypass graft of native heart without angina pectoris  CABG 1999. Denies CP. Mild troponin elevation likely demand ischemia. Ok for out patient stress test if desired       VTE Risk Mitigation (From admission, onward)           Ordered     IP VTE HIGH RISK PATIENT  Once         12/20/23 2011     Place sequential compression device  Until discontinued         12/20/23 2011                    Discharge Planning   SUSIE: 12/24/2023     Code Status: DNR   Is the patient medically ready for discharge?: No    Reason for patient still in hospital (select all that apply): Patient trending condition, Laboratory test, Treatment, Consult recommendations, PT / OT recommendations, and Pending disposition  Discharge Plan A: Home Health   Discharge Delays: None known at this time              Dixon Ledbetter MD  Department of  [facility] Medicine   [facility] Surg

## 2023-12-25 NOTE — ASSESSMENT & PLAN NOTE
Advance Care Planning   - Palliative care consulted at [facility] on 12/22  - DNR

## 2023-12-25 NOTE — SUBJECTIVE & OBJECTIVE
Interval History:   Patient transferred from [facility] [place]. Patient with no complaints. Dermatology consulted. Continue CTX for UTI.      Review of Systems   Constitutional:  Negative for activity change, appetite change, chills, diaphoresis, fatigue and fever.   HENT:  Negative for rhinorrhea and sore throat.    Respiratory:  Negative for cough, chest tightness and shortness of breath.    Cardiovascular:  Negative for chest pain and palpitations.   Gastrointestinal:  Negative for abdominal pain, constipation, diarrhea and nausea.   Endocrine: Negative for cold intolerance.   Genitourinary:  Negative for decreased urine volume and dysuria.   Musculoskeletal:  Negative for arthralgias and myalgias.   Skin:  Negative for rash and wound.   Neurological:  Negative for dizziness, weakness, numbness and headaches.   Psychiatric/Behavioral:  Negative for agitation, behavioral problems and confusion.      Objective:     Vital Signs (Most Recent):  Temp: 98 °F (36.7 °C) (12/25/23 1211)  Pulse: 96 (12/25/23 1211)  Resp: 15 (12/25/23 0832)  BP: 135/64 (12/25/23 1211)  SpO2: (!) 94 % (12/25/23 1211) Vital Signs (24h Range):  Temp:  [96.2 °F (35.7 °C)-98.2 °F (36.8 °C)] 98 °F (36.7 °C)  Pulse:  [] 96  Resp:  [15-19] 15  SpO2:  [93 %-96 %] 94 %  BP: (113-142)/(56-71) 135/64     Weight: 62.8 kg (138 lb 7.2 oz)  Body mass index is 21.68 kg/m².    Intake/Output Summary (Last 24 hours) at 12/25/2023 1431  Last data filed at 12/25/2023 0000  Gross per 24 hour   Intake --   Output 700 ml   Net -700 ml         Physical Exam  Constitutional:       Appearance: Normal appearance. He is normal weight. He is ill-appearing (chronic).   HENT:      Head: Normocephalic and atraumatic.      Mouth/Throat:      Mouth: Mucous membranes are moist.   Eyes:      Extraocular Movements: Extraocular movements intact.      Conjunctiva/sclera: Conjunctivae normal.   Cardiovascular:      Rate and Rhythm: Normal rate and regular rhythm.      Heart sounds:  No murmur heard.  Pulmonary:      Effort: Pulmonary effort is normal. No respiratory distress.      Breath sounds: Normal breath sounds. No wheezing or rales.   Abdominal:      General: Abdomen is flat. There is no distension.      Palpations: Abdomen is soft.      Tenderness: There is no abdominal tenderness. There is no guarding.   Musculoskeletal:         General: No swelling or tenderness.   Skin:     Findings: Rash (arms, chest, legs) present.   Neurological:      General: No focal deficit present.      Mental Status: He is alert and oriented to person, place, and time. Mental status is at baseline.   Psychiatric:         Mood and Affect: Mood normal.         Behavior: Behavior normal.             Significant Labs: All pertinent labs within the past 24 hours have been reviewed.  CBC:   Recent Labs   Lab 12/24/23  1522 12/25/23  0842   WBC  --  9.82   HGB  --  9.1*   HCT 28* 28.9*   PLT  --  228     CMP:   Recent Labs   Lab 12/24/23  1330 12/24/23  1910 12/25/23  0842   * 137 138   K 8.6* 5.0 5.1    107 107   CO2 17* 18* 19*   * 143* 127*   BUN 21 25* 33*   CREATININE 2.1* 2.0* 1.7*   CALCIUM 8.0* 7.9* 8.1*   PROT 6.4  --  6.4   ALBUMIN 2.5*  --  2.6*   BILITOT 0.2  --  0.2   ALKPHOS 78  --  63   AST 93*  --  34   ALT 33  --  26   ANIONGAP 11 12 12       Significant Imaging: I have reviewed all pertinent imaging results/findings within the past 24 hours.

## 2023-12-25 NOTE — ASSESSMENT & PLAN NOTE
This patient has hyperkalemia which is uncontrolled. We will monitor for arrhythmias with EKG or continuous telemetry. We will treat the hyperkalemia with Potassium Binders, Calcium gluconate, IV insulin and dextrose, Nebulized albuterol sulfate, and Furosemide. The likely etiology of the hyperkalemia is LATRICE.  The patients latest potassium has been reviewed and the results are listed below  Recent Labs   Lab 12/25/23  0842   K 5.1       - Improved

## 2023-12-25 NOTE — PROVIDER TRANSFER
Outside Transfer Acceptance Note / Regional Referral Center    Referring facility: [facility]   Referring provider: JOSE M PETERSON BENJAMIN L. BERGERON, CYNTHIA K O'NEAL, JOSE PETERSON  Accepting facility: OUTSIDE FACILITY  [facility]  OUTSIDE FACILITY  [facility]  Accepting provider: MANN ARORA MADHUMATI G.  Admitting provider: Dr. 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 This medication has been on hold for a few days pending a biopsy, however, patient developed worsening hallucinations and mental status changes for several days. Patient denies having any complaints.      The ED, the patient was hemodynamically stable.  Labs were remarkable for hyperkalemia (6.9), normocytic anemia (10.2), elevated creatinine (2.5-baseline of 1.3), elevated BNP (399), elevated troponin (0.053). VBG showed a pH of 7.31, pCO2 of 45.1. UA showed +3 leukocytes, > 100 WBCs, moderate bacteria.  EKG showed showed peaked t-waves. Chest x-ray showed no acute process.  Patient was given insulin/D10, albuterol, calcium gluconate, Lasix 80 mg IV, morphine 2 mg IV,  Zofran 4 mg IV.     Admitted with acute encephalopathy, agitation and hyperkalemia. Also found to have UTI. Hyperkalemia improved, encephalopathy slowly improving. On IV antibiotics. PT/OT consulted. Neurology consulted for evaluation of recent tremors, confusion which as now resolved. Cardioogy consulted for a short run of balta Garcia for discharge.    Transfer request for dermatology evaluation for skin biopsy as patient has desquamating rash after stopping his rinvoq 11 days ago and has been unable to get a biopsy done as outpatient.        Objective     Vitals: Temp: 98.2 °F (36.8 °C) (12/24/23 2322)  Pulse: 84 (12/24/23 2322)  Resp: 18 (12/24/23 2322)  BP: 122/67 (12/24/23 2322)  SpO2: (!) 93 % (12/24/23 2322)  Recent Labs: All pertinent labs within the past 24 hours have been reviewed.  CBC:   Recent Labs   Lab 12/23/23  0425 12/24/23  1522   WBC 7.96  --    HGB 9.7*  --    HCT 30.4* 28*     --      CMP:   Recent Labs   Lab 12/23/23  0425 12/24/23  1330 12/24/23  1910    135* 137   K 4.5 8.6* 5.0   * 107 107   CO2 20* 17* 18*   * 196* 143*   BUN 26* 21 25*   CREATININE 2.1* 2.1* 2.0*   CALCIUM 8.5* 8.0* 7.9*   PROT  --  6.4  --    ALBUMIN  --  2.5*  --    BILITOT  --  0.2  --    ALKPHOS  --  78  --    AST  --  93*  --    ALT  --  33  --    ANIONGAP 13 11 12     Recent imaging:   Imaging Results              CT Head Without Contrast (Final result)  Result time 12/20/23 21:13:53      Final result by Jairo Sullivan MD (12/20/23 21:13:53)                   Impression:      Stable examination.  Additional evaluation, as clinically warranted.      Electronically signed by: Jairo Sullivan MD  Date:    12/20/2023  Time:    21:13               Narrative:    EXAMINATION:  CT HEAD WITHOUT CONTRAST    CLINICAL HISTORY:  Mental status change, unknown cause.    TECHNIQUE:  Low dose axial images were obtained through the head.  Coronal and sagittal reformations were also performed. Contrast was not  administered.    COMPARISON:  11/28/2023.    10/14/2023.    FINDINGS:  There is stable appearance of the right parietal ventriculostomy shunt catheter with its tip terminating in the midline body of the left lateral ventricle.  The configuration of the ventricular system is unchanged with mild prominence of the ventricular system.  There is no evidence of worsening hydrocephalus.    There is no evidence of intracranial hemorrhage.  The gray-white differentiation is maintained.  There are hypodensities within the periventricular and subcortical white matter.  The gray-white differentiation is maintained.  There is no dense vessel sign.  There is no evidence of mass effect.                                       X-Ray Chest AP Portable (Final result)  Result time 12/20/23 17:13:38      Final result by Marcelino Daley MD (12/20/23 17:13:38)                   Impression:      No acute abnormality.      Electronically signed by: Marcelino Daley  Date:    12/20/2023  Time:    17:13               Narrative:    EXAMINATION:  XR CHEST AP PORTABLE    CLINICAL HISTORY:  Altered mental status, unspecified    TECHNIQUE:  Single frontal view of the chest was performed.    COMPARISON:  Chest radiograph 10/14/2023    FINDINGS:  Lines and tubes: Partially imaged ventriculoperitoneal shunt catheter.    Heart and mediastinum: Unchanged, again noting postoperative changes from CABG.    Pleura: No pleural effusion or pneumothorax.    Lungs: Lungs are well inflated. No focal consolidations or evidence of pulmonary edema.    Soft tissue/bone: Postoperative changes from median sternotomy.                                       Airway:     Vent settings:         IV access:        Peripheral IV - Single Lumen 12/24/23 1841 22 G Posterior;Proximal;Right Forearm (Active)   Site Assessment Clean;Dry;Intact;No redness;No swelling 12/25/23 0000   Extremity Assessment Distal to IV No abnormal discoloration;No redness;No swelling;No warmth 12/25/23 0000    Line Status Saline locked 12/25/23 0000   Dressing Status Clean;Dry;Intact 12/25/23 0000   Dressing Intervention Integrity maintained 12/25/23 0000   Dressing Change Due 12/28/23 12/25/23 0000   Site Change Due 12/28/23 12/24/23 2000   Reason Not Rotated Not due 12/25/23 0000     Infusions: None  Allergies:   Review of patient's allergies indicates:   Allergen Reactions    Demerol [meperidine] Nausea Only      NPO: No    Anticoagulation:   Anticoagulants       None             Instructions      David adam-  Admit to Hospital Medicine  Upon patient arrival to floor, please send SecureChat to [facility] or call extension [phone] (if no answer, do NOT leave a callback number after the beep, rather please send a SecureChat to [facility]), for Hospital Medicine admit team assignment and for additional admit orders for the patient.  Do not page the attending physician associated with the patient on arrival (this physician may not be on duty at the time of arrival).  Rather, always send a SecureChat to [facility] or call [phone] to reach the triage physician for orders and team assignment.    Consult dermatology for skin biopsy - patient can return to  after biopsy is done

## 2023-12-25 NOTE — NURSING
Patient is for transfer to [facility] going to room 609 for skin biopsy and report given to Ms. Alejandre.

## 2023-12-25 NOTE — SUBJECTIVE & OBJECTIVE
Past Medical History:   Diagnosis Date    Actinic keratosis     Anxiety     B12 deficiency 12/8/2014    Dx 6/14    Cancer     skin    Cataract     Coronary artery disease     Diabetes mellitus type II     Diabetes mellitus with peripheral circulatory disorder 6/18/2014    ED (erectile dysfunction)     Hyperlipidemia     Kidney stone     Neurogenic claudication 4/4/2022    Normocytic anemia 10/15/2023    Peripheral vascular disease     Spondylosis 3/29/2019    Tobacco dependence     Urinary incontinence 5/4/2021       Past Surgical History:   Procedure Laterality Date    APPENDECTOMY      CARDIAC SURGERY  01/1999    CABG 4 vessels    CATARACT EXTRACTION      EPIDURAL STEROID INJECTION Right 8/26/2020    Procedure: Injection, Steroid, Epidural Transforaminal;  Surgeon: Wiley Crane Jr., MD;  Location: [facility] ENDO;  Service: Pain Management;  Laterality: Right;  Right L5 + S1 TF LEAH  Arrive @ 1115; ASA & Plavix last 8/18; Check BG; Rapid COVID test    EPIDURAL STEROID INJECTION Right 11/25/2020    Procedure: Injection, Steroid, Epidural Transformainal;  Surgeon: Wiley Crane Jr., MD;  Location: [facility] ENDO;  Service: Pain Management;  Laterality: Right;  Right L5 + S1 TF LEAH  Arrive @ 1245; ASA and Plavix last 11/17; Pre-DM; Needs MD Sign.    EPIDURAL STEROID INJECTION Right 2/19/2021    Procedure: Injection, Steroid, Epidural Transforaminal;  Surgeon: Wiley Crane Jr., MD;  Location: [facility] ENDO;  Service: Pain Management;  Laterality: Right;  Right L5 + S1 TF LEAH  Arrive @ 1115; ASA & Plavix last 2/11; Check BG; Needs Consent    EXTERNAL EAR SURGERY      EYE SURGERY      cataracts    ILIAC ARTERY STENT Bilateral 06/2003    also Right External Iliac stent     Family History       Problem Relation (Age of Onset)    Stroke Mother          Tobacco Use    Smoking status: Every Day     Current packs/day: 1.50     Average packs/day: 1.5 packs/day for 71.0 years (106.5 ttl pk-yrs)     Types: Cigarettes     Smokeless tobacco: Former   Substance and Sexual Activity    Alcohol use: No    Drug use: No    Sexual activity: Not Currently     Partners: Female       Review of patient's allergies indicates:   Allergen Reactions    Demerol [meperidine] Nausea Only       Medications:  Continuous Infusions:  Scheduled Meds:   aspirin  81 mg Oral Daily    atorvastatin  40 mg Oral Daily    cefTRIAXone (ROCEPHIN) IVPB  2 g Intravenous Q24H    clopidogreL  75 mg Oral Daily    divalproex  250 mg Oral Daily    divalproex  500 mg Oral QHS    famotidine  20 mg Oral Daily    methylPREDNISolone sodium succinate  40 mg Intravenous BID    metoprolol succinate  25 mg Oral Daily    mupirocin   Nasal BID    nicotine  1 patch Transdermal Daily    tamsulosin  0.4 mg Oral Daily     PRN Meds:acetaminophen, albuterol-ipratropium, dextrose 10%, dextrose 10%, glucagon (human recombinant), glucose, glucose, haloperidol lactate, HYDROcodone-acetaminophen, insulin aspart U-100, melatonin, ondansetron, prochlorperazine, sodium chloride 0.9%    Review of Systems   Constitutional:  Negative for fever.   HENT:  Negative for mouth sores.    Respiratory:  Negative for shortness of breath.    Gastrointestinal:  Positive for nausea. Negative for vomiting.   Genitourinary:  Negative for genital sores.   Musculoskeletal:  Negative for arthralgias.   Skin:  Positive for itching and rash.     Objective:     Vital Signs (Most Recent):  Temp: 98 °F (36.7 °C) (12/25/23 1211)  Pulse: 96 (12/25/23 1211)  Resp: 15 (12/25/23 0832)  BP: 135/64 (12/25/23 1211)  SpO2: (!) 94 % (12/25/23 1211) Vital Signs (24h Range):  Temp:  [96.2 °F (35.7 °C)-98.2 °F (36.8 °C)] 98 °F (36.7 °C)  Pulse:  [] 96  Resp:  [15-19] 15  SpO2:  [93 %-96 %] 94 %  BP: (113-142)/(56-71) 135/64     Weight: 62.8 kg (138 lb 7.2 oz)  Body mass index is 21.68 kg/m².     Physical Exam   Constitutional: He appears well-developed and well-nourished. No distress.   Neurological: He is alert and oriented to  person, place, and time. He is not disoriented.   Psychiatric: He has a normal mood and affect.   Skin:   Areas Examined (abnormalities noted in diagram):   Scalp / Hair Palpated and Inspected  Head / Face Inspection Performed  Neck Inspection Performed  Chest / Axilla Inspection Performed  Abdomen Inspection Performed  Genitals / Buttocks / Groin Inspection Performed  Back Inspection Performed  RUE Inspected  LUE Inspection Performed  RLE Inspected  LLE Inspection Performed  Nails and Digits Inspection Performed                            Significant Labs: All pertinent labs within the past 24 hours have been reviewed.    Significant Imaging: I have reviewed all pertinent imaging results/findings within the past 24 hours.      TCR Rearrangement study:    Blood Interpretation PERIPHERAL BLOOD, FLOW CYTOMETRY:        -  Relative neutrophilia        -  No monoclonal B-cell population        -  Markedly increased CD4:CD8 ratio (25:1) with subset of T-cells with aberrant loss of CD7 expression        -  No significant increase in circulating blasts    COMMENT: In the context of a markedly increased CD4:CD8 ratio (25:1) with clinical presentation of a generalized pruritic rash for several months accompanied by a progressive decline in overall functional status, hem/onc consultation with appropriate  work-up for a possible cutaneous versus systemic T-cell lymphoproliferative disorder is required.    Please correlate the immunophenotyping results with clinical findings for final diagnosis.   Comment: Interp By Estela Roman D.O., Signed on 10/25/2023 at 20:25   Blood Comment ATYPICAL T-CELLS IDENTIFIED (1.9% of total analyzed events; 6.5% of total events in lymphocyte gate)    Forward scatter: Low  Side scatter: Low    Positive: CD45, CD2, CD3, CD4, TCR alpha/beta  Negative: TCR gamma/delta, CD5, CD7, CD8, CD30, CD16, CD56, CD57, , CD19, CD20, CD10, sKappa, sLambda, CD34, CD13    The following additional cell  populations are identified:  80.3% Granulocytes  1.8% Monocytes  0.9% Mature B cells: No light chain restriction or significant co-expression of CD5 or CD10 detected.  0.6% NK cells and/or T-LGLs  0.1% Blasts    Viability by 7-AAD: 99.7%    The remaining events analyzed represent nonviable cells, non-hematolymphoid cells, doublets, and debris.   Blood Antibodies Analyzed All analyzed: CD2, CD3, CD4, CD5, CD7, CD8, CD10, CD13, CD16, CD19, CD20, CD30, CD34, CD56, CD57, , KAPPA, LAMBDA, TCR a/b, TCR d/g, CD45, and 7AAD.

## 2023-12-25 NOTE — ASSESSMENT & PLAN NOTE
Patient with acute kidney injury/acute renal failure likely due to pre-renal azotemia due to IVVD LATRICE is currently worsening. Baseline creatinine  1.3  - Labs reviewed- Renal function/electrolytes with Estimated Creatinine Clearance: 27.7 mL/min (A) (based on SCr of 1.7 mg/dL (H)). according to latest data.    - Baseline 1.3  - Monitor urine output and serial BMP and adjust therapy as needed  - Avoid nephrotoxins and renally dose meds for GFR listed above

## 2023-12-26 LAB
ALBUMIN SERPL BCP-MCNC: 2.4 G/DL (ref 3.5–5.2)
ALP SERPL-CCNC: 59 U/L (ref 55–135)
ALT SERPL W/O P-5'-P-CCNC: 24 U/L (ref 10–44)
ANION GAP SERPL CALC-SCNC: 10 MMOL/L (ref 8–16)
AST SERPL-CCNC: 32 U/L (ref 10–40)
BILIRUB SERPL-MCNC: 0.2 MG/DL (ref 0.1–1)
BUN SERPL-MCNC: 36 MG/DL (ref 8–23)
CALCIUM SERPL-MCNC: 7.9 MG/DL (ref 8.7–10.5)
CHLORIDE SERPL-SCNC: 108 MMOL/L (ref 95–110)
CO2 SERPL-SCNC: 21 MMOL/L (ref 23–29)
CREAT SERPL-MCNC: 1.7 MG/DL (ref 0.5–1.4)
ERYTHROCYTE [DISTWIDTH] IN BLOOD BY AUTOMATED COUNT: 18.3 % (ref 11.5–14.5)
EST. GFR  (NO RACE VARIABLE): 38.8 ML/MIN/1.73 M^2
GLUCOSE SERPL-MCNC: 165 MG/DL (ref 70–110)
HCT VFR BLD AUTO: 28.1 % (ref 40–54)
HGB BLD-MCNC: 9 G/DL (ref 14–18)
MAGNESIUM SERPL-MCNC: 2 MG/DL (ref 1.6–2.6)
MCH RBC QN AUTO: 30.9 PG (ref 27–31)
MCHC RBC AUTO-ENTMCNC: 32 G/DL (ref 32–36)
MCV RBC AUTO: 97 FL (ref 82–98)
PHOSPHATE SERPL-MCNC: 4 MG/DL (ref 2.7–4.5)
PLATELET # BLD AUTO: 332 K/UL (ref 150–450)
PLATELET BLD QL SMEAR: NORMAL
PMV BLD AUTO: 10.5 FL (ref 9.2–12.9)
POCT GLUCOSE: 144 MG/DL (ref 70–110)
POCT GLUCOSE: 168 MG/DL (ref 70–110)
POCT GLUCOSE: 190 MG/DL (ref 70–110)
POCT GLUCOSE: 203 MG/DL (ref 70–110)
POTASSIUM SERPL-SCNC: 4.3 MMOL/L (ref 3.5–5.1)
PROT SERPL-MCNC: 5.8 G/DL (ref 6–8.4)
RBC # BLD AUTO: 2.91 M/UL (ref 4.6–6.2)
SARS-COV-2 RNA RESP QL NAA+PROBE: NOT DETECTED
SODIUM SERPL-SCNC: 139 MMOL/L (ref 136–145)
WBC # BLD AUTO: 10.36 K/UL (ref 3.9–12.7)

## 2023-12-26 PROCEDURE — 83735 ASSAY OF MAGNESIUM: CPT | Mod: HCNC | Performed by: STUDENT IN AN ORGANIZED HEALTH CARE EDUCATION/TRAINING PROGRAM

## 2023-12-26 PROCEDURE — 25000003 PHARM REV CODE 250: Mod: HCNC | Performed by: STUDENT IN AN ORGANIZED HEALTH CARE EDUCATION/TRAINING PROGRAM

## 2023-12-26 PROCEDURE — 97165 OT EVAL LOW COMPLEX 30 MIN: CPT | Mod: HCNC

## 2023-12-26 PROCEDURE — 30200315 PPD INTRADERMAL TEST REV CODE 302: Mod: HCNC | Performed by: STUDENT IN AN ORGANIZED HEALTH CARE EDUCATION/TRAINING PROGRAM

## 2023-12-26 PROCEDURE — 97162 PT EVAL MOD COMPLEX 30 MIN: CPT | Mod: HCNC

## 2023-12-26 PROCEDURE — S4991 NICOTINE PATCH NONLEGEND: HCPCS | Mod: HCNC | Performed by: STUDENT IN AN ORGANIZED HEALTH CARE EDUCATION/TRAINING PROGRAM

## 2023-12-26 PROCEDURE — 36415 COLL VENOUS BLD VENIPUNCTURE: CPT | Mod: HCNC | Performed by: STUDENT IN AN ORGANIZED HEALTH CARE EDUCATION/TRAINING PROGRAM

## 2023-12-26 PROCEDURE — 97535 SELF CARE MNGMENT TRAINING: CPT | Mod: HCNC

## 2023-12-26 PROCEDURE — 86580 TB INTRADERMAL TEST: CPT | Mod: HCNC | Performed by: STUDENT IN AN ORGANIZED HEALTH CARE EDUCATION/TRAINING PROGRAM

## 2023-12-26 PROCEDURE — 85027 COMPLETE CBC AUTOMATED: CPT | Mod: HCNC | Performed by: STUDENT IN AN ORGANIZED HEALTH CARE EDUCATION/TRAINING PROGRAM

## 2023-12-26 PROCEDURE — 11000001 HC ACUTE MED/SURG PRIVATE ROOM: Mod: HCNC

## 2023-12-26 PROCEDURE — 87635 SARS-COV-2 COVID-19 AMP PRB: CPT | Mod: HCNC | Performed by: STUDENT IN AN ORGANIZED HEALTH CARE EDUCATION/TRAINING PROGRAM

## 2023-12-26 PROCEDURE — 97116 GAIT TRAINING THERAPY: CPT | Mod: HCNC

## 2023-12-26 PROCEDURE — 84100 ASSAY OF PHOSPHORUS: CPT | Mod: HCNC | Performed by: STUDENT IN AN ORGANIZED HEALTH CARE EDUCATION/TRAINING PROGRAM

## 2023-12-26 PROCEDURE — 80053 COMPREHEN METABOLIC PANEL: CPT | Mod: HCNC | Performed by: STUDENT IN AN ORGANIZED HEALTH CARE EDUCATION/TRAINING PROGRAM

## 2023-12-26 PROCEDURE — 63600175 PHARM REV CODE 636 W HCPCS: Mod: HCNC | Performed by: STUDENT IN AN ORGANIZED HEALTH CARE EDUCATION/TRAINING PROGRAM

## 2023-12-26 RX ORDER — HYDROCODONE BITARTRATE AND ACETAMINOPHEN 5; 325 MG/1; MG/1
1 TABLET ORAL EVERY 6 HOURS PRN
Status: DISCONTINUED | OUTPATIENT
Start: 2023-12-26 | End: 2023-12-29 | Stop reason: HOSPADM

## 2023-12-26 RX ORDER — OXYCODONE AND ACETAMINOPHEN 10; 325 MG/1; MG/1
1 TABLET ORAL EVERY 6 HOURS PRN
Status: DISCONTINUED | OUTPATIENT
Start: 2023-12-26 | End: 2023-12-29 | Stop reason: HOSPADM

## 2023-12-26 RX ORDER — NALOXONE HCL 0.4 MG/ML
0.4 VIAL (ML) INJECTION
Status: DISCONTINUED | OUTPATIENT
Start: 2023-12-26 | End: 2023-12-29 | Stop reason: HOSPADM

## 2023-12-26 RX ADMIN — ASPIRIN 81 MG: 81 TABLET, COATED ORAL at 09:12

## 2023-12-26 RX ADMIN — TRIAMCINOLONE ACETONIDE: 1 OINTMENT TOPICAL at 09:12

## 2023-12-26 RX ADMIN — HYDROCODONE BITARTRATE AND ACETAMINOPHEN 1 TABLET: 5; 325 TABLET ORAL at 05:12

## 2023-12-26 RX ADMIN — FAMOTIDINE 20 MG: 20 TABLET ORAL at 09:12

## 2023-12-26 RX ADMIN — CLOPIDOGREL BISULFATE 75 MG: 75 TABLET ORAL at 09:12

## 2023-12-26 RX ADMIN — DIVALPROEX SODIUM 500 MG: 250 TABLET, DELAYED RELEASE ORAL at 09:12

## 2023-12-26 RX ADMIN — DIVALPROEX SODIUM 250 MG: 250 TABLET, DELAYED RELEASE ORAL at 09:12

## 2023-12-26 RX ADMIN — HYDROCODONE BITARTRATE AND ACETAMINOPHEN 1 TABLET: 5; 325 TABLET ORAL at 11:12

## 2023-12-26 RX ADMIN — Medication 1 PATCH: at 09:12

## 2023-12-26 RX ADMIN — Medication 6 MG: at 09:12

## 2023-12-26 RX ADMIN — TAMSULOSIN HYDROCHLORIDE 0.4 MG: 0.4 CAPSULE ORAL at 09:12

## 2023-12-26 RX ADMIN — CEFTRIAXONE 2 G: 2 INJECTION, POWDER, FOR SOLUTION INTRAMUSCULAR; INTRAVENOUS at 09:12

## 2023-12-26 RX ADMIN — ATORVASTATIN CALCIUM 40 MG: 40 TABLET, FILM COATED ORAL at 09:12

## 2023-12-26 RX ADMIN — TUBERCULIN PURIFIED PROTEIN DERIVATIVE 5 UNITS: 5 INJECTION, SOLUTION INTRADERMAL at 01:12

## 2023-12-26 RX ADMIN — METOPROLOL SUCCINATE 25 MG: 25 TABLET, EXTENDED RELEASE ORAL at 09:12

## 2023-12-26 RX ADMIN — ACETAMINOPHEN 650 MG: 325 TABLET ORAL at 09:12

## 2023-12-26 RX ADMIN — OXYCODONE AND ACETAMINOPHEN 1 TABLET: 10; 325 TABLET ORAL at 09:12

## 2023-12-26 RX ADMIN — ACETAMINOPHEN 650 MG: 325 TABLET ORAL at 05:12

## 2023-12-26 RX ADMIN — OXYCODONE AND ACETAMINOPHEN 1 TABLET: 10; 325 TABLET ORAL at 04:12

## 2023-12-26 NOTE — PLAN OF CARE
Problem: Occupational Therapy  Goal: Occupational Therapy Goal  Description: Goals to be met by: 01/26/2024     Patient will increase functional independence with ADLs by performing:    UE Dressing with Modified [place].  LE Dressing with Modified [place].  Grooming while standing at sink with Modified [place].  Toileting from bedside commode with Modified [place] for hygiene and clothing management.   Step transfer with Modified [place]  Toilet transfer to bedside commode with Modified [place].      Outcome: Ongoing, Progressing     OT eval complete & goals established.

## 2023-12-26 NOTE — PLAN OF CARE
Problem: Physical Therapy  Goal: Physical Therapy Goal  Description: Goals to be met by: 24     Patient will increase functional independence with mobility by performin. Supine to sit with stand by assisatnce   2. Sit to supine with stand by assistance   3. Sit to stand transfer with Contact Guard Assistance  4. Bed to chair transfer with contact guard assistance using rolling walker   4. Gait  x 50 feet with Contact Guard Assistance using Rolling Walker.     2023 1352 by Tayler Foy, PT  Outcome: Ongoing, Progressing  2023 1351 by Tayler Foy, PT  Outcome: Ongoing, Progressing     Pt evaluated and appropriate goals established.

## 2023-12-26 NOTE — SUBJECTIVE & OBJECTIVE
Interval History:   No events overnight. Patient with no new complaints. Biopsy pending. CM working on placement.      Review of Systems   Constitutional:  Negative for activity change, appetite change, chills, diaphoresis, fatigue and fever.   HENT:  Negative for rhinorrhea and sore throat.    Respiratory:  Negative for cough, chest tightness and shortness of breath.    Cardiovascular:  Negative for chest pain and palpitations.   Gastrointestinal:  Negative for abdominal pain, constipation, diarrhea and nausea.   Endocrine: Negative for cold intolerance.   Genitourinary:  Negative for decreased urine volume and dysuria.   Musculoskeletal:  Positive for myalgias. Negative for arthralgias.   Skin:  Negative for rash and wound.   Neurological:  Negative for dizziness, weakness, numbness and headaches.   Psychiatric/Behavioral:  Negative for agitation, behavioral problems and confusion.      Objective:     Vital Signs (Most Recent):  Temp: 97.5 °F (36.4 °C) (12/26/23 1533)  Pulse: 69 (12/26/23 1533)  Resp: 18 (12/26/23 1533)  BP: 126/74 (12/26/23 1533)  SpO2: 98 % (12/26/23 1533) Vital Signs (24h Range):  Temp:  [97.3 °F (36.3 °C)-98 °F (36.7 °C)] 97.5 °F (36.4 °C)  Pulse:  [] 69  Resp:  [18] 18  SpO2:  [95 %-98 %] 98 %  BP: (125-157)/(63-74) 126/74     Weight: 62.8 kg (138 lb 7.2 oz)  Body mass index is 21.68 kg/m².    Intake/Output Summary (Last 24 hours) at 12/26/2023 1536  Last data filed at 12/26/2023 1120  Gross per 24 hour   Intake 480 ml   Output 950 ml   Net -470 ml         Physical Exam  Constitutional:       Appearance: Normal appearance. He is normal weight. He is ill-appearing (chronic).   HENT:      Head: Normocephalic and atraumatic.      Mouth/Throat:      Mouth: Mucous membranes are moist.   Eyes:      Extraocular Movements: Extraocular movements intact.      Conjunctiva/sclera: Conjunctivae normal.   Cardiovascular:      Rate and Rhythm: Normal rate and regular rhythm.      Heart sounds: No  murmur heard.  Pulmonary:      Effort: Pulmonary effort is normal. No respiratory distress.      Breath sounds: Normal breath sounds. No wheezing or rales.   Abdominal:      General: Abdomen is flat. There is no distension.      Palpations: Abdomen is soft.      Tenderness: There is no abdominal tenderness. There is no guarding.   Musculoskeletal:         General: No swelling or tenderness.   Skin:     Findings: Rash (arms, chest, legs) present.   Neurological:      General: No focal deficit present.      Mental Status: He is alert and oriented to person, place, and time. Mental status is at baseline.   Psychiatric:         Mood and Affect: Mood normal.         Behavior: Behavior normal.             Significant Labs: All pertinent labs within the past 24 hours have been reviewed.  CBC:   Recent Labs   Lab 12/25/23  0842 12/26/23  0433   WBC 9.82 10.36   HGB 9.1* 9.0*   HCT 28.9* 28.1*    332     CMP:   Recent Labs   Lab 12/24/23  1910 12/25/23  0842 12/26/23  0609    138 139   K 5.0 5.1 4.3    107 108   CO2 18* 19* 21*   * 127* 165*   BUN 25* 33* 36*   CREATININE 2.0* 1.7* 1.7*   CALCIUM 7.9* 8.1* 7.9*   PROT  --  6.4 5.8*   ALBUMIN  --  2.6* 2.4*   BILITOT  --  0.2 0.2   ALKPHOS  --  63 59   AST  --  34 32   ALT  --  26 24   ANIONGAP 12 12 10       Significant Imaging: I have reviewed all pertinent imaging results/findings within the past 24 hours.

## 2023-12-26 NOTE — PT/OT/SLP EVAL
Physical Therapy Evaluation and Treatment     Patient Name:  Narciso Yanez   MRN:  [id number]    Recommendations:     Discharge Recommendations: Moderate Intensity Therapy   Discharge Equipment Recommendations: none   Barriers to discharge: Decreased caregiver support at current level of function     Assessment:     Narciso Yanez is a 88 y.o. male admitted with a medical diagnosis of Encephalopathy, metabolic.  He presents with the following impairments/functional limitations: weakness, impaired endurance, impaired self care skills, impaired functional mobility, gait instability, impaired balance, impaired skin. Pt tolerated activity with evidence of weakness and deconditioning. OOB mobility primarily limited by excessive posterior lean. Pt is very motivated to participate with therapy. Patient currently demonstrates a need for moderate intensity therapy on a daily basis post acute secondary to a decline in functional status due to disease . Pt would continue to benefit from acute skilled therapy intervention to address deficits and progress toward prior level of function.       Rehab Prognosis: Good; patient would benefit from acute skilled PT services to address these deficits and reach maximum level of function.    Recent Surgery: * No surgery found *      Plan:     During this hospitalization, patient to be seen 4 x/week to address the identified rehab impairments via gait training, therapeutic activities, therapeutic exercises, neuromuscular re-education and progress toward the following goals:    Plan of Care Expires:  01/05/24    Subjective     Chief Complaint: very motivated to get out of bed   Patient/Family Comments/goals: to get better   Pain/Comfort:  Pain Rating 1: 0/10  Pain Rating Post-Intervention 1: 0/10    Patients cultural, spiritual, Buddhist conflicts given the current situation: no    Living Environment:  Pt lives with his daughter and other family members in a [facility] with threshold ANNA.    Prior to admission, patients level of function was able to ambulate with use of his rollator. He also sometimes uses a wheelchair at community level.  Equipment used at home: walker, rolling, shower chair, wheelchair, bedside commode (lift chair).  DME owned (not currently used): none.  Upon discharge, patient will have assistance from family.    Objective:     Communicated with RN prior to session.  Patient found HOB elevated with walls catheter  upon PT entry to room.    General Precautions: Standard, fall  Orthopedic Precautions:N/A   Braces: N/A  Respiratory Status: Room air    Exams:  Cognitive Exam:  Patient is AAOx4, followed all commands, communicates clearly and fluently  Gross Motor Coordination:  WFL  Skin Integrity/Edema:      -       skin breakdown on all extremities with flaking of skin   RUE ROM: WFL  RUE Strength: WFL  LUE ROM: WFL  LUE Strength: WFL  RLE ROM: WFL  RLE Strength: grossly 4/5   LLE ROM: WFL  LLE Strength: grossly 4/5     Functional Mobility:  Bed Mobility:     Supine to Sit: minimum assistance  Transfers:     Sit to Stand: 2x from EOB with moderate assistance with use of RW (1x with posterior LOB to return to sitting) 3x from chair with minimum assistance with use of arm rests and RW   Gait: Pt ambulated 5 ft from bed to chair with RW and moderate assistance. Pt demo'd small step size, decreased foot clearance, narrow ANTOINETTE, excessive posterior lean. Facilitation provided for lateral weight shift to promote step initiation. Cuing provided for forward gaze and upright posture.       AM-PAC 6 CLICK MOBILITY  Total Score:14       Treatment & Education:  Pt practiced static standing in front of chair with tactile and verbal cuing for increased anterior weight shift. Pt progressed to lateral weight shift with minimum assistance and RW.   Pt educated on seated exercises to perform 20x, 3x/day. Exercises included bilateral LAQ, marching, ankle DF/PF  Pt educated on role of PT/POC. Pt  verbalized understanding.   Pt encouraged to only perform OOB mobility with assistance from nursing/therapy. Pt agreeable.       Patient left up in chair with all lines intact, call button in reach, RN notified, and [employer]  confirmed visual  .    GOALS:   Multidisciplinary Problems       Physical Therapy Goals          Problem: Physical Therapy    Goal Priority Disciplines Outcome Goal Variances Interventions   Physical Therapy Goal     PT, PT/OT Ongoing, Progressing     Description: Goals to be met by: 24     Patient will increase functional independence with mobility by performin. Supine to sit with stand by assisatnce   2. Sit to supine with stand by assistance   3. Sit to stand transfer with Contact Guard Assistance  4. Bed to chair transfer with contact guard assistance using rolling walker   4. Gait  x 50 feet with Contact Guard Assistance using Rolling Walker.                          History:     Past Medical History:   Diagnosis Date    Actinic keratosis     Anxiety     B12 deficiency 2014    Dx     Cancer     skin    Cataract     Coronary artery disease     Diabetes mellitus type II     Diabetes mellitus with peripheral circulatory disorder 2014    ED (erectile dysfunction)     Hyperlipidemia     Kidney stone     Neurogenic claudication 2022    Normocytic anemia 10/15/2023    Peripheral vascular disease     Spondylosis 3/29/2019    Tobacco dependence     Urinary incontinence 2021       Past Surgical History:   Procedure Laterality Date    APPENDECTOMY      CARDIAC SURGERY  1999    CABG 4 vessels    CATARACT EXTRACTION      EPIDURAL STEROID INJECTION Right 2020    Procedure: Injection, Steroid, Epidural Transforaminal;  Surgeon: Wiley Crane Jr., MD;  Location: [facility];  Service: Pain Management;  Laterality: Right;  Right L5 + S1 TF LEAH  Arrive @ 1115; ASA & Plavix last ; Check BG; Rapid COVID test    EPIDURAL STEROID INJECTION Right 2020     Procedure: Injection, Steroid, Epidural Transformainal;  Surgeon: Wiley Crane Jr., MD;  Location: [facility] ENDO;  Service: Pain Management;  Laterality: Right;  Right L5 + S1 TF LEAH  Arrive @ 1245; ASA and Plavix last 11/17; Pre-DM; Needs MD Sign.    EPIDURAL STEROID INJECTION Right 2/19/2021    Procedure: Injection, Steroid, Epidural Transforaminal;  Surgeon: Wiley Crane Jr., MD;  Location: [facility] ENDO;  Service: Pain Management;  Laterality: Right;  Right L5 + S1 TF LEAH  Arrive @ 1115; ASA & Plavix last 2/11; Check BG; Needs Consent    EXTERNAL EAR SURGERY      EYE SURGERY      cataracts    ILIAC ARTERY STENT Bilateral 06/2003    also Right External Iliac stent       Time Tracking:     PT Received On: 12/26/23  PT Start Time: 1309     PT Stop Time: 1334  PT Total Time (min): 25 min     Billable Minutes: Evaluation 10 mins  and Gait Training 15 mins       12/26/2023

## 2023-12-26 NOTE — PT/OT/SLP EVAL
"Occupational Therapy   Evaluation    Name: Narciso Yanez  MRN: [id number]  Admitting Diagnosis: Encephalopathy, metabolic  Recent Surgery: * No surgery found *      Recommendations:     Discharge Recommendations: Moderate Intensity Therapy  Discharge Equipment Recommendations:  none  Barriers to discharge:  None    Assessment:     Narciso Yanez is a 88 y.o. male with a medical diagnosis of Encephalopathy, metabolic.  He presents with the following performance deficits affecting function: weakness, impaired self care skills, impaired functional mobility, impaired balance, impaired skin, gait instability. Pt agreeable to therapy and tolerated fairly well. Pt is limited in ADLs, functional mobility, and functional transfers and is currently not performing tasks at PLOF. Pt would continue to benefit from skilled OT services to maximize functional independence with ADLs and functional mobility, reduce caregiver burden, and facilitate safe discharge in the least restrictive environment.    Rehab Prognosis: Good; patient would benefit from acute skilled OT services to address these deficits and reach maximum level of function.       Plan:     Patient to be seen 4 x/week to address the above listed problems via self-care/home management, therapeutic activities, therapeutic exercises  Plan of Care Expires: 01/26/24  Plan of Care Reviewed with: patient    Subjective     Chief Complaint: "the back of my knee is sore"  Patient/Family Comments/goals: return to [facility]    Occupational Profile:  Living Environment: pt lives with daughter in a [facility] with 3 ANNA. Bathroom: walk-in shower with grab bars  Previous level of function: Mod I with ADLs & functional mobility  Equipment Used at Home: walker, rolling, shower chair, wheelchair, bedside commode, rollator  Assistance upon Discharge: limited assistance at home because his daughter works    Pain/Comfort:  Pain Rating 1: 0/10    Patients cultural, spiritual, Shinto conflicts " given the current situation: no    Objective:     Communicated with: RN prior to session.  Patient found up in chair with walls catheter upon OT entry to room.    General Precautions: Standard, fall  Orthopedic Precautions: N/A  Braces: N/A  Respiratory Status: Room air    Occupational Performance:    Bed Mobility:    Did not assess, pt found in chair.    Functional Mobility/Transfers:  Patient completed Sit <> Stand Transfer with contact guard assistance  with  rolling walker   Patient completed Bed <> Chair Transfer using Step Transfer technique with contact guard assistance with rolling walker  Functional Mobility: pt is unable to ambulate long distance 2/2 LE weakness & balancing issues    Activities of Daily Living:  Lower Body Dressing: supervision to dof L sock while seated in chair with HOB elevated    Cognitive/Visual Perceptual:  Cognitive/Psychosocial Skills:     -       Oriented to: Person, Place, Time, and Situation     Physical Exam:  Upper Extremity Range of Motion:     -       Right Upper Extremity: WFL  -       Left Upper Extremity: WFL  Upper Extremity Strength:    -       Right Upper Extremity: WFL  -       Left Upper Extremity: WFL   Strength:    -       Right Upper Extremity: WFL  -       Left Upper Extremity: WFL  Fine Motor Coordination:    -       Intact  Left hand thumb/finger opposition skills and Right hand thumb/finger opposition skills  Gross motor coordination:   WFL    AMPAC 6 Click ADL:  AMPAC Total Score: 15    Treatment & Education:  -Education on task modification to maximize safety and (I) during ADLs and mobility  -Education on importance of OOB activity to improve overall activity tolerance and promote recovery  -Pt educated to call for assistance and to transfer with hospital staff only  -Provided education regarding role of OT & POC recommendations with pt verbalizing understanding. Pt had no further questions & when asked whether there were any concerns pt reported none.      Patient left up in chair with all lines intact, call button in reach, and RN notified    GOALS:   Multidisciplinary Problems       Occupational Therapy Goals          Problem: Occupational Therapy    Goal Priority Disciplines Outcome Interventions   Occupational Therapy Goal     OT, PT/OT Ongoing, Progressing    Description: Goals to be met by: 01/26/2024     Patient will increase functional independence with ADLs by performing:    UE Dressing with Modified [place].  LE Dressing with Modified [place].  Grooming while standing at sink with Modified [place].  Toileting from bedside commode with Modified [place] for hygiene and clothing management.   Step transfer with Modified [place]  Toilet transfer to bedside commode with Modified [place].                           History:     Past Medical History:   Diagnosis Date    Actinic keratosis     Anxiety     B12 deficiency 12/8/2014    Dx 6/14    Cancer     skin    Cataract     Coronary artery disease     Diabetes mellitus type II     Diabetes mellitus with peripheral circulatory disorder 6/18/2014    ED (erectile dysfunction)     Hyperlipidemia     Kidney stone     Neurogenic claudication 4/4/2022    Normocytic anemia 10/15/2023    Peripheral vascular disease     Spondylosis 3/29/2019    Tobacco dependence     Urinary incontinence 5/4/2021         Past Surgical History:   Procedure Laterality Date    APPENDECTOMY      CARDIAC SURGERY  01/1999    CABG 4 vessels    CATARACT EXTRACTION      EPIDURAL STEROID INJECTION Right 8/26/2020    Procedure: Injection, Steroid, Epidural Transforaminal;  Surgeon: Wiley Crane Jr., MD;  Location: [facility];  Service: Pain Management;  Laterality: Right;  Right L5 + S1 TF LEAH  Arrive @ 1115; ASA & Plavix last 8/18; Check BG; Rapid COVID test    EPIDURAL STEROID INJECTION Right 11/25/2020    Procedure: Injection, Steroid, Epidural Transformainal;  Surgeon: Wiley Crane Jr., MD;  Location:  [facility];  Service: Pain Management;  Laterality: Right;  Right L5 + S1 TF LEAH  Arrive @ 1245; ASA and Plavix last 11/17; Pre-DM; Needs MD Sign.    EPIDURAL STEROID INJECTION Right 2/19/2021    Procedure: Injection, Steroid, Epidural Transforaminal;  Surgeon: Wiley Crane Jr., MD;  Location: [facility];  Service: Pain Management;  Laterality: Right;  Right L5 + S1 TF LEAH  Arrive @ 1115; ASA & Plavix last 2/11; Check BG; Needs Consent    EXTERNAL EAR SURGERY      EYE SURGERY      cataracts    ILIAC ARTERY STENT Bilateral 06/2003    also Right External Iliac stent       Time Tracking:     OT Date of Treatment: 12/26/23  OT Start Time: 1449  OT Stop Time: 1507  OT Total Time (min): 18 min    Billable Minutes:Evaluation 10  Self Care/Home Management 8    12/26/2023

## 2023-12-26 NOTE — PROGRESS NOTES
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 This medication has been on hold for a few days pending a biopsy, however, patient developed worsening hallucinations and mental status changes for several days. Patient denies having any complaints.     The ED, the patient was hemodynamically stable.  Labs were remarkable for hyperkalemia (6.9), normocytic anemia (10.2), elevated creatinine (2.5-baseline of 1.3), elevated BNP (399), elevated troponin (0.053). VBG showed a pH of 7.31, pCO2 of 45.1. UA showed +3 leukocytes, > 100 WBCs, moderate bacteria.  EKG showed showed peaked t-waves. Chest x-ray showed no acute process.  Patient was given insulin/D10, albuterol, calcium gluconate, Lasix 80 mg IV, morphine 2 mg IV, Zofran 4 mg IV. Patient was admitted for further management.     Overview/Hospital Course:  Admitted with acute encephalopathy, agitation and hyperkalemia. Also found to have UTI. Hyperkalemia improved, encephalopathy slowly improving. On IV antibiotics. More alert and awake, still very encephlopathic but redirectable. PT/OT consulted. Neurology consulted for evaluation of recent tremors, confusion.     Patient mental status appears back to baseline. Speaking fluently. Knows month and  year. Answering questions appropriately and using dates in speech.. Called daughter will get him home for landy with home health, pending any cardiology recs for short run of Vtach, likely stimulated from infection. Will give Cefdinir for UTI.  F/u with PCP and Cardiology outpt.     Interval History:   No events overnight. Patient with no new complaints. Biopsy pending. CM working on placement.      Review of Systems   Constitutional:  Negative for activity change, appetite change, chills, diaphoresis, fatigue and fever.   HENT:  Negative for rhinorrhea and sore throat.    Respiratory:  Negative for cough, chest tightness and shortness of breath.    Cardiovascular:  Negative for chest pain and palpitations.   Gastrointestinal:  Negative for abdominal pain, constipation, diarrhea and nausea.   Endocrine: Negative for cold intolerance.   Genitourinary:  Negative for decreased urine volume and dysuria.   Musculoskeletal:  Positive for myalgias. Negative for arthralgias.   Skin:  Negative for rash and wound.   Neurological:  Negative for dizziness, weakness, numbness and headaches.   Psychiatric/Behavioral:  Negative for agitation, behavioral problems and confusion.      Objective:     Vital Signs (Most Recent):  Temp: 97.5 °F (36.4 °C) (12/26/23 1533)  Pulse: 69 (12/26/23 1533)  Resp: 18 (12/26/23 1533)  BP: 126/74 (12/26/23 1533)  SpO2: 98 % (12/26/23 1533) Vital Signs (24h Range):  Temp:  [97.3 °F (36.3 °C)-98 °F (36.7 °C)] 97.5 °F (36.4 °C)  Pulse:  [] 69  Resp:  [18] 18  SpO2:  [95 %-98 %] 98 %  BP: (125-157)/(63-74) 126/74     Weight: 62.8 kg (138 lb 7.2 oz)  Body mass index is 21.68 kg/m².    Intake/Output Summary (Last 24 hours) at 12/26/2023 1536  Last data filed at 12/26/2023 1120  Gross per 24 hour   Intake 480 ml   Output 950 ml   Net -470 ml         Physical Exam  Constitutional:       Appearance: Normal appearance. He is normal weight. He is ill-appearing (chronic).   HENT:      Head:  Normocephalic and atraumatic.      Mouth/Throat:      Mouth: Mucous membranes are moist.   Eyes:      Extraocular Movements: Extraocular movements intact.      Conjunctiva/sclera: Conjunctivae normal.   Cardiovascular:      Rate and Rhythm: Normal rate and regular rhythm.      Heart sounds: No murmur heard.  Pulmonary:      Effort: Pulmonary effort is normal. No respiratory distress.      Breath sounds: Normal breath sounds. No wheezing or rales.   Abdominal:      General: Abdomen is flat. There is no distension.      Palpations: Abdomen is soft.      Tenderness: There is no abdominal tenderness. There is no guarding.   Musculoskeletal:         General: No swelling or tenderness.   Skin:     Findings: Rash (arms, chest, legs) present.   Neurological:      General: No focal deficit present.      Mental Status: He is alert and oriented to person, place, and time. Mental status is at baseline.   Psychiatric:         Mood and Affect: Mood normal.         Behavior: Behavior normal.             Significant Labs: All pertinent labs within the past 24 hours have been reviewed.  CBC:   Recent Labs   Lab 12/25/23  0842 12/26/23  0433   WBC 9.82 10.36   HGB 9.1* 9.0*   HCT 28.9* 28.1*    332     CMP:   Recent Labs   Lab 12/24/23  1910 12/25/23  0842 12/26/23  0609    138 139   K 5.0 5.1 4.3    107 108   CO2 18* 19* 21*   * 127* 165*   BUN 25* 33* 36*   CREATININE 2.0* 1.7* 1.7*   CALCIUM 7.9* 8.1* 7.9*   PROT  --  6.4 5.8*   ALBUMIN  --  2.6* 2.4*   BILITOT  --  0.2 0.2   ALKPHOS  --  63 59   AST  --  34 32   ALT  --  26 24   ANIONGAP 12 12 10       Significant Imaging: I have reviewed all pertinent imaging results/findings within the past 24 hours.    Assessment/Plan:      * Encephalopathy, metabolic  Suspect related to UTI, possible medications but do not suspect being taken off of Renvoq contributing. Has completed prednisone weeks ago so do not suspect this is contributing.     - CTH with no acute  findings, no focal deficits. Patient with recent onset of tremors in the last few months and weakness.  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 Needs outpatient follow up with dermatology   - Follows with Dr. Davidson and has biopsies but unrevealing  - Was on Renvoq and prednisone but completed course of prednisone and was taken off Renvoq on 12/12/23.   - Doubt taking off of Renvoq is contributing to mental status changes  - Dermatology consulted  Low suspicion for CTCL given clinical exam and CTCL typically presents with erythematous/atrophic itchy scaly plaques in non-sun exposed areas. CTCL is a diagnosis that does take numerous biopsies to diagnose so will performed additional H&E biopsy. Can also consider contact dermatitis - pt does have irritant dermatitis to EKG leads as noted by well demarcated squares from prior adhesive. Otherwise patient generally very xerotic elsewhere on body.    -- s/p punch biopsy on 12/25, f/u pathology   -- Triamcinolone 0.1% ointment twice daily   - Dermatology follow up outpatient  - Recommend daily emollients with cetaphil or cerave, can also add on lac hydrin 12% lotion when discharged       UTI (urinary tract infection)  - UCx 12/20 with Strept agalactiae (Group[facility] )  - Complicated given walls  - Continue CTX x10 days, sot 12/20        NSVT (nonsustained ventricular tachycardia)  18 beat VT this AM asymptomatic on 12/24 at [facility] and cardiology consulted. Possibly triggered by infection  - Cardiology consulted at [facility]  - Known CAD  with normal EF  - Continue B  - DNR - patient wishes conservative Rx for CAD/arrhythmia    LATRICE (acute kidney injury)  Patient with acute kidney injury/acute renal failure likely due to pre-renal azotemia due to IVVD LATRICE is currently worsening. Baseline creatinine  1.3  - Labs reviewed- Renal function/electrolytes with Estimated Creatinine Clearance: 27.7 mL/min (A) (based on SCr of 1.7 mg/dL (H)). according to latest data.    - Baseline 1.3  - Monitor urine output and serial BMP and adjust therapy as needed  - Avoid nephrotoxins and renally dose meds for GFR listed above    Hyperkalemia  This patient has hyperkalemia which is uncontrolled. We will monitor for arrhythmias with EKG or continuous telemetry. We will treat the hyperkalemia with Potassium Binders, Calcium gluconate, IV insulin and dextrose, Nebulized albuterol sulfate, and Furosemide. The likely etiology of the hyperkalemia is LATRICE.  The patients latest potassium has been reviewed and the results are listed below  Recent Labs   Lab 12/25/23  0842   K 5.1       - Improved        Chronic bilateral low back pain with bilateral sciatica  History noted. No acute issues       Hyperlipidemia  Home atorvastatin    Goals of care, counseling/discussion  Advance Care Planning  - Palliative care consulted at [facility] on 12/22  - DNR         Normal pressure hydrocephalus  Repeat CT head obtained with stable imaging on 12/20    Essential hypertension  Chronic, controlled. Latest blood pressure and vitals reviewed-     Temp:  [96.2 °F (35.7 °C)-98.2 °F (36.8 °C)]   Pulse:  []   Resp:  [15-19]   BP: (113-142)/(56-71)   SpO2:  [93 %-96 %] .   Home meds for hypertension were reviewed and noted below.   Hypertension Medications               metoprolol succinate (TOPROL-XL) 25 MG 24 hr tablet Take 1 tablet (25 mg total) by mouth once daily.            While in the hospital, will manage blood pressure as follows; Continue home antihypertensive regimen    Will utilize  p.r.n. blood pressure medication only if patient's blood pressure greater than 180/110 and he develops symptoms such as worsening chest pain or shortness of breath.    COPD (chronic obstructive pulmonary disease)  Patient's COPD is controlled currently.  Patient is currently off COPD Pathway.  PRN Maria M     PAD (peripheral artery disease)  History noted.  No acute issues.      Diabetes mellitus with peripheral circulatory disorder  Patient's FSGs are controlled on current medication regimen.  Last A1c reviewed-   Lab Results   Component Value Date    HGBA1C 6.9 (H) 10/15/2023     Most recent fingerstick glucose reviewed-   Recent Labs   Lab 12/24/23  1625 12/24/23  1944 12/25/23  0701   POCTGLUCOSE 120* 161* 155*       Current correctional scale  Low  Maintain anti-hyperglycemic dose as follows-   Antihyperglycemics (From admission, onward)      Start     Stop Route Frequency Ordered    12/20/23 2152  insulin aspart U-100 pen 0-5 Units         -- SubQ Before meals & nightly PRN 12/20/23 2052          Hold Oral hypoglycemics while patient is in the hospital.    Coronary artery disease involving coronary bypass graft of native heart without angina pectoris  CABG 1999. Denies CP. Mild troponin elevation likely demand ischemia. Ok for out patient stress test if desired       VTE Risk Mitigation (From admission, onward)           Ordered     IP VTE HIGH RISK PATIENT  Once         12/20/23 2011     Place sequential compression device  Until discontinued         12/20/23 2011                    Discharge Planning   SUSIE: 12/27/2023     Code Status: DNR   Is the patient medically ready for discharge?: No    Reason for patient still in hospital (select all that apply): Patient trending condition, Laboratory test, PT / OT recommendations, and Pending disposition  Discharge Plan A: Home Health   Discharge Delays: None known at this time              Dixon Ledbetter MD  Department of Hospital Medicine   [facility]  Surg

## 2023-12-26 NOTE — PLAN OF CARE
APPOINTMENT:    Patient Appointment(s) scheduled with Radha Jerome NP  Wednesday Magdi 3, 2024 3:00 PM

## 2023-12-27 PROBLEM — R33.9 URINARY RETENTION: Status: ACTIVE | Noted: 2023-12-27

## 2023-12-27 LAB
ALBUMIN SERPL BCP-MCNC: 2.3 G/DL (ref 3.5–5.2)
ALP SERPL-CCNC: 70 U/L (ref 55–135)
ALT SERPL W/O P-5'-P-CCNC: 23 U/L (ref 10–44)
ANION GAP SERPL CALC-SCNC: 10 MMOL/L (ref 8–16)
AST SERPL-CCNC: 26 U/L (ref 10–40)
BILIRUB SERPL-MCNC: 0.1 MG/DL (ref 0.1–1)
BUN SERPL-MCNC: 30 MG/DL (ref 8–23)
CALCIUM SERPL-MCNC: 8.1 MG/DL (ref 8.7–10.5)
CHLORIDE SERPL-SCNC: 106 MMOL/L (ref 95–110)
CO2 SERPL-SCNC: 23 MMOL/L (ref 23–29)
CREAT SERPL-MCNC: 2 MG/DL (ref 0.5–1.4)
ERYTHROCYTE [DISTWIDTH] IN BLOOD BY AUTOMATED COUNT: 18.2 % (ref 11.5–14.5)
EST. GFR  (NO RACE VARIABLE): 31.9 ML/MIN/1.73 M^2
GLUCOSE SERPL-MCNC: 140 MG/DL (ref 70–110)
HCT VFR BLD AUTO: 25 % (ref 40–54)
HGB BLD-MCNC: 8.5 G/DL (ref 14–18)
MAGNESIUM SERPL-MCNC: 1.8 MG/DL (ref 1.6–2.6)
MCH RBC QN AUTO: 31.3 PG (ref 27–31)
MCHC RBC AUTO-ENTMCNC: 34 G/DL (ref 32–36)
MCV RBC AUTO: 92 FL (ref 82–98)
PHOSPHATE SERPL-MCNC: 3.4 MG/DL (ref 2.7–4.5)
PLATELET # BLD AUTO: 273 K/UL (ref 150–450)
PMV BLD AUTO: 8.8 FL (ref 9.2–12.9)
POCT GLUCOSE: 119 MG/DL (ref 70–110)
POCT GLUCOSE: 173 MG/DL (ref 70–110)
POCT GLUCOSE: 178 MG/DL (ref 70–110)
POCT GLUCOSE: 293 MG/DL (ref 70–110)
POTASSIUM SERPL-SCNC: 4.3 MMOL/L (ref 3.5–5.1)
PROT SERPL-MCNC: 5.6 G/DL (ref 6–8.4)
RBC # BLD AUTO: 2.72 M/UL (ref 4.6–6.2)
SODIUM SERPL-SCNC: 139 MMOL/L (ref 136–145)
WBC # BLD AUTO: 11.48 K/UL (ref 3.9–12.7)

## 2023-12-27 PROCEDURE — 11000001 HC ACUTE MED/SURG PRIVATE ROOM: Mod: HCNC

## 2023-12-27 PROCEDURE — 84100 ASSAY OF PHOSPHORUS: CPT | Mod: HCNC | Performed by: STUDENT IN AN ORGANIZED HEALTH CARE EDUCATION/TRAINING PROGRAM

## 2023-12-27 PROCEDURE — 83735 ASSAY OF MAGNESIUM: CPT | Mod: HCNC | Performed by: STUDENT IN AN ORGANIZED HEALTH CARE EDUCATION/TRAINING PROGRAM

## 2023-12-27 PROCEDURE — 36415 COLL VENOUS BLD VENIPUNCTURE: CPT | Mod: HCNC | Performed by: STUDENT IN AN ORGANIZED HEALTH CARE EDUCATION/TRAINING PROGRAM

## 2023-12-27 PROCEDURE — 25000003 PHARM REV CODE 250: Mod: HCNC | Performed by: STUDENT IN AN ORGANIZED HEALTH CARE EDUCATION/TRAINING PROGRAM

## 2023-12-27 PROCEDURE — S4991 NICOTINE PATCH NONLEGEND: HCPCS | Mod: HCNC | Performed by: STUDENT IN AN ORGANIZED HEALTH CARE EDUCATION/TRAINING PROGRAM

## 2023-12-27 PROCEDURE — 63600175 PHARM REV CODE 636 W HCPCS: Mod: HCNC | Performed by: STUDENT IN AN ORGANIZED HEALTH CARE EDUCATION/TRAINING PROGRAM

## 2023-12-27 PROCEDURE — 97116 GAIT TRAINING THERAPY: CPT | Mod: HCNC

## 2023-12-27 PROCEDURE — 80053 COMPREHEN METABOLIC PANEL: CPT | Mod: HCNC | Performed by: STUDENT IN AN ORGANIZED HEALTH CARE EDUCATION/TRAINING PROGRAM

## 2023-12-27 PROCEDURE — 85027 COMPLETE CBC AUTOMATED: CPT | Mod: HCNC | Performed by: STUDENT IN AN ORGANIZED HEALTH CARE EDUCATION/TRAINING PROGRAM

## 2023-12-27 RX ADMIN — FAMOTIDINE 20 MG: 20 TABLET ORAL at 08:12

## 2023-12-27 RX ADMIN — OXYCODONE AND ACETAMINOPHEN 1 TABLET: 10; 325 TABLET ORAL at 02:12

## 2023-12-27 RX ADMIN — CEFTRIAXONE 2 G: 2 INJECTION, POWDER, FOR SOLUTION INTRAMUSCULAR; INTRAVENOUS at 08:12

## 2023-12-27 RX ADMIN — TRIAMCINOLONE ACETONIDE: 1 OINTMENT TOPICAL at 08:12

## 2023-12-27 RX ADMIN — TAMSULOSIN HYDROCHLORIDE 0.4 MG: 0.4 CAPSULE ORAL at 08:12

## 2023-12-27 RX ADMIN — ATORVASTATIN CALCIUM 40 MG: 40 TABLET, FILM COATED ORAL at 08:12

## 2023-12-27 RX ADMIN — DIVALPROEX SODIUM 250 MG: 250 TABLET, DELAYED RELEASE ORAL at 08:12

## 2023-12-27 RX ADMIN — METOPROLOL SUCCINATE 25 MG: 25 TABLET, EXTENDED RELEASE ORAL at 08:12

## 2023-12-27 RX ADMIN — PROCHLORPERAZINE EDISYLATE 5 MG: 5 INJECTION INTRAMUSCULAR; INTRAVENOUS at 09:12

## 2023-12-27 RX ADMIN — ASPIRIN 81 MG: 81 TABLET, COATED ORAL at 08:12

## 2023-12-27 RX ADMIN — DIVALPROEX SODIUM 500 MG: 250 TABLET, DELAYED RELEASE ORAL at 08:12

## 2023-12-27 RX ADMIN — CLOPIDOGREL BISULFATE 75 MG: 75 TABLET ORAL at 08:12

## 2023-12-27 RX ADMIN — OXYCODONE AND ACETAMINOPHEN 1 TABLET: 10; 325 TABLET ORAL at 11:12

## 2023-12-27 RX ADMIN — Medication 6 MG: at 08:12

## 2023-12-27 RX ADMIN — Medication 1 PATCH: at 08:12

## 2023-12-27 NOTE — PLAN OF CARE
Sw left message to compete locet, will follow. Sw completed, uploaded pasrr to CP and will follow with 142 to CP.

## 2023-12-27 NOTE — SUBJECTIVE & OBJECTIVE
Interval History:   No events overnight. Patient with no complaints. Pending placement.       Review of Systems   Constitutional:  Negative for activity change, appetite change, chills, diaphoresis, fatigue and fever.   HENT:  Negative for rhinorrhea and sore throat.    Respiratory:  Negative for cough, chest tightness and shortness of breath.    Cardiovascular:  Negative for chest pain and palpitations.   Gastrointestinal:  Negative for abdominal pain, constipation, diarrhea and nausea.   Endocrine: Negative for cold intolerance.   Genitourinary:  Negative for decreased urine volume and dysuria.   Musculoskeletal:  Negative for arthralgias and myalgias.   Skin:  Negative for rash and wound.   Neurological:  Negative for dizziness, weakness, numbness and headaches.   Psychiatric/Behavioral:  Negative for agitation, behavioral problems and confusion.      Objective:     Vital Signs (Most Recent):  Temp: 97 °F (36.1 °C) (12/27/23 1531)  Pulse: 63 (12/27/23 1531)  Resp: 16 (12/27/23 1531)  BP: (!) 141/67 (12/27/23 1531)  SpO2: 100 % (12/27/23 1531) Vital Signs (24h Range):  Temp:  [96.3 °F (35.7 °C)-97.1 °F (36.2 °C)] 97 °F (36.1 °C)  Pulse:  [58-83] 63  Resp:  [16-18] 16  SpO2:  [96 %-100 %] 100 %  BP: (132-149)/(63-75) 141/67     Weight: 62.8 kg (138 lb 7.2 oz)  Body mass index is 21.68 kg/m².    Intake/Output Summary (Last 24 hours) at 12/27/2023 1639  Last data filed at 12/27/2023 0846  Gross per 24 hour   Intake 960 ml   Output 850 ml   Net 110 ml         Physical Exam  Constitutional:       Appearance: Normal appearance. He is normal weight. He is ill-appearing (chronic).   HENT:      Head: Normocephalic and atraumatic.      Mouth/Throat:      Mouth: Mucous membranes are moist.   Eyes:      Extraocular Movements: Extraocular movements intact.      Conjunctiva/sclera: Conjunctivae normal.   Cardiovascular:      Rate and Rhythm: Normal rate and regular rhythm.      Heart sounds: No murmur heard.  Pulmonary:       Effort: Pulmonary effort is normal. No respiratory distress.      Breath sounds: Normal breath sounds. No wheezing or rales.   Abdominal:      General: Abdomen is flat. There is no distension.      Palpations: Abdomen is soft.      Tenderness: There is no abdominal tenderness. There is no guarding.   Musculoskeletal:         General: No swelling or tenderness.   Skin:     Findings: Rash (arms, chest, legs) present.   Neurological:      General: No focal deficit present.      Mental Status: He is alert and oriented to person, place, and time. Mental status is at baseline.   Psychiatric:         Mood and Affect: Mood normal.         Behavior: Behavior normal.             Significant Labs: All pertinent labs within the past 24 hours have been reviewed.  CBC:   Recent Labs   Lab 12/26/23  0433 12/27/23  0401   WBC 10.36 11.48   HGB 9.0* 8.5*   HCT 28.1* 25.0*    273     CMP:   Recent Labs   Lab 12/26/23  0609 12/27/23  0401    139   K 4.3 4.3    106   CO2 21* 23   * 140*   BUN 36* 30*   CREATININE 1.7* 2.0*   CALCIUM 7.9* 8.1*   PROT 5.8* 5.6*   ALBUMIN 2.4* 2.3*   BILITOT 0.2 0.1   ALKPHOS 59 70   AST 32 26   ALT 24 23   ANIONGAP 10 10       Significant Imaging: I have reviewed all pertinent imaging results/findings within the past 24 hours.

## 2023-12-27 NOTE — PROGRESS NOTES
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 This medication has been on hold for a few days pending a biopsy, however, patient developed worsening hallucinations and mental status changes for several days. Patient denies having any complaints.     The ED, the patient was hemodynamically stable.  Labs were remarkable for hyperkalemia (6.9), normocytic anemia (10.2), elevated creatinine (2.5-baseline of 1.3), elevated BNP (399), elevated troponin (0.053). VBG showed a pH of 7.31, pCO2 of 45.1. UA showed +3 leukocytes, > 100 WBCs, moderate bacteria.  EKG showed showed peaked t-waves. Chest x-ray showed no acute process.  Patient was given insulin/D10, albuterol, calcium gluconate, Lasix 80 mg IV, morphine 2 mg IV, Zofran 4 mg IV. Patient was admitted for further management.     Overview/Hospital Course:  Admitted with acute encephalopathy, agitation and hyperkalemia. Also found to have UTI. Hyperkalemia improved, encephalopathy slowly improving. On IV antibiotics. More alert and awake, still very encephlopathic but redirectable. PT/OT consulted. Neurology consulted for evaluation of recent tremors, confusion.     Patient mental status appears to have improved back to baseline. Speaking fluently.  Knows month and year. Answering questions appropriately and using dates in speech. Cardiology recs for short run of Vtach were that episode likely stimulated from infection.  To follow up outpatient with Cardiology and continue beta-blocker.  UTI treated with ceftriaxone for 7 day course.  Patient transferred to [facility] for evaluation of rash.  Patient underwent punch biopsy with Dermatology on 12/25.  Skin wound care ordered per Dermatology recommendations.  Patient will need outpatient follow-up with Dermatology.  Placement to skilled nursing facility pending.    Interval History:   No events overnight. Patient with no complaints. Pending placement.       Review of Systems   Constitutional:  Negative for activity change, appetite change, chills, diaphoresis, fatigue and fever.   HENT:  Negative for rhinorrhea and sore throat.    Respiratory:  Negative for cough, chest tightness and shortness of breath.    Cardiovascular:  Negative for chest pain and palpitations.   Gastrointestinal:  Negative for abdominal pain, constipation, diarrhea and nausea.   Endocrine: Negative for cold intolerance.   Genitourinary:  Negative for decreased urine volume and dysuria.   Musculoskeletal:  Negative for arthralgias and myalgias.   Skin:  Negative for rash and wound.   Neurological:  Negative for dizziness, weakness, numbness and headaches.   Psychiatric/Behavioral:  Negative for agitation, behavioral problems and confusion.      Objective:     Vital Signs (Most Recent):  Temp: 97 °F (36.1 °C) (12/27/23 1531)  Pulse: 63 (12/27/23 1531)  Resp: 16 (12/27/23 1531)  BP: (!) 141/67 (12/27/23 1531)  SpO2: 100 % (12/27/23 1531) Vital Signs (24h Range):  Temp:  [96.3 °F (35.7 °C)-97.1 °F (36.2 °C)] 97 °F (36.1 °C)  Pulse:  [58-83] 63  Resp:  [16-18] 16  SpO2:  [96 %-100 %] 100 %  BP: (132-149)/(63-75) 141/67     Weight: 62.8 kg (138 lb 7.2 oz)  Body mass index is 21.68 kg/m².    Intake/Output Summary (Last 24 hours) at  12/27/2023 1639  Last data filed at 12/27/2023 0846  Gross per 24 hour   Intake 960 ml   Output 850 ml   Net 110 ml         Physical Exam  Constitutional:       Appearance: Normal appearance. He is normal weight. He is ill-appearing (chronic).   HENT:      Head: Normocephalic and atraumatic.      Mouth/Throat:      Mouth: Mucous membranes are moist.   Eyes:      Extraocular Movements: Extraocular movements intact.      Conjunctiva/sclera: Conjunctivae normal.   Cardiovascular:      Rate and Rhythm: Normal rate and regular rhythm.      Heart sounds: No murmur heard.  Pulmonary:      Effort: Pulmonary effort is normal. No respiratory distress.      Breath sounds: Normal breath sounds. No wheezing or rales.   Abdominal:      General: Abdomen is flat. There is no distension.      Palpations: Abdomen is soft.      Tenderness: There is no abdominal tenderness. There is no guarding.   Musculoskeletal:         General: No swelling or tenderness.   Skin:     Findings: Rash (arms, chest, legs) present.   Neurological:      General: No focal deficit present.      Mental Status: He is alert and oriented to person, place, and time. Mental status is at baseline.   Psychiatric:         Mood and Affect: Mood normal.         Behavior: Behavior normal.             Significant Labs: All pertinent labs within the past 24 hours have been reviewed.  CBC:   Recent Labs   Lab 12/26/23  0433 12/27/23  0401   WBC 10.36 11.48   HGB 9.0* 8.5*   HCT 28.1* 25.0*    273     CMP:   Recent Labs   Lab 12/26/23  0609 12/27/23  0401    139   K 4.3 4.3    106   CO2 21* 23   * 140*   BUN 36* 30*   CREATININE 1.7* 2.0*   CALCIUM 7.9* 8.1*   PROT 5.8* 5.6*   ALBUMIN 2.4* 2.3*   BILITOT 0.2 0.1   ALKPHOS 59 70   AST 32 26   ALT 24 23   ANIONGAP 10 10       Significant Imaging: I have reviewed all pertinent imaging results/findings within the past 24 hours.    Assessment/Plan:      * Encephalopathy, metabolic  Suspect related to  UTI, possible medications but do not suspect being taken off of Renvoq contributing. Has completed prednisone weeks ago so do not suspect this is contributing.     - CTH with no acute findings, no focal deficits. Patient with recent onset of tremors in the last few months and weakness.  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 Needs outpatient follow up with dermatology   - Follows with Dr. Davidson and has biopsies but unrevealing  - Was on Renvoq and prednisone but completed course of prednisone and was taken off Renvoq on 12/12/23.   - Doubt taking off of Renvoq is contributing to mental status changes  - Dermatology consulted  Low suspicion for CTCL given clinical exam and CTCL typically presents with erythematous/atrophic itchy scaly plaques in non-sun exposed areas. CTCL is a diagnosis that does take numerous biopsies to diagnose so will performed additional H&E biopsy. Can also consider contact dermatitis - pt does have irritant dermatitis to EKG leads as noted by well demarcated squares from prior adhesive. Otherwise patient generally very xerotic elsewhere on body.    -- s/p punch biopsy on 12/25, f/u pathology   -- Triamcinolone 0.1% ointment twice daily   - Dermatology follow up outpatient  - Recommend daily emollients with cetaphil or cerave, can also add on lac hydrin 12% lotion when discharged       UTI (urinary tract infection)  - UCx 12/20 with Strept agalactiae (GroupB )  - Complicated given walls  - Continue CTX x10 days, sot 12/20        NSVT (nonsustained  ventricular tachycardia)  18 beat VT this AM asymptomatic on 12/24 at [facility] and cardiology consulted. Possibly triggered by infection  - Cardiology consulted at [facility]  - Known CAD with normal EF  - Continue B  - DNR - patient wishes conservative Rx for CAD/arrhythmia    LATRICE (acute kidney injury)  Patient with acute kidney injury/acute renal failure likely due to pre-renal azotemia due to IVVD LATRICE is currently worsening. Baseline creatinine  1.3  - Labs reviewed- Renal function/electrolytes with Estimated Creatinine Clearance: 27.7 mL/min (A) (based on SCr of 1.7 mg/dL (H)). according to latest data.    - Baseline 1.3  - Monitor urine output and serial BMP and adjust therapy as needed  - Avoid nephrotoxins and renally dose meds for GFR listed above    Urinary retention  - Walls placed at OSH for rentention  - Continue Flomax  - Consider voiding trial prior to discharge  - Urology follow up outpatient if discharged with walls      Hyperkalemia  This patient has hyperkalemia which is uncontrolled. We will monitor for arrhythmias with EKG or continuous telemetry. We will treat the hyperkalemia with Potassium Binders, Calcium gluconate, IV insulin and dextrose, Nebulized albuterol sulfate, and Furosemide. The likely etiology of the hyperkalemia is LATRICE.  The patients latest potassium has been reviewed and the results are listed below  Recent Labs   Lab 12/25/23  0842   K 5.1       - Improved        Chronic bilateral low back pain with bilateral sciatica  History noted. No acute issues       Hyperlipidemia  Home atorvastatin    Goals of care, counseling/discussion  Advance Care Planning  - Palliative care consulted at [facility] on 12/22  - DNR         Normal pressure hydrocephalus  Repeat CT head obtained with stable imaging on 12/20    Essential hypertension  Chronic, controlled. Latest blood pressure and vitals reviewed-     Temp:  [96.2 °F (35.7 °C)-98.2 °F (36.8 °C)]   Pulse:  []   Resp:  [15-19]   BP:  (113-142)/(56-71)   SpO2:  [93 %-96 %] .   Home meds for hypertension were reviewed and noted below.   Hypertension Medications               metoprolol succinate (TOPROL-XL) 25 MG 24 hr tablet Take 1 tablet (25 mg total) by mouth once daily.            While in the hospital, will manage blood pressure as follows; Continue home antihypertensive regimen    Will utilize p.r.n. blood pressure medication only if patient's blood pressure greater than 180/110 and he develops symptoms such as worsening chest pain or shortness of breath.    COPD (chronic obstructive pulmonary disease)  Patient's COPD is controlled currently.  Patient is currently off COPD Pathway.  PRN Duonebs     PAD (peripheral artery disease)  History noted.  No acute issues.      Diabetes mellitus with peripheral circulatory disorder  Patient's FSGs are controlled on current medication regimen.  Last A1c reviewed-   Lab Results   Component Value Date    HGBA1C 6.9 (H) 10/15/2023     Most recent fingerstick glucose reviewed-   Recent Labs   Lab 12/24/23  1625 12/24/23  1944 12/25/23  0701   POCTGLUCOSE 120* 161* 155*       Current correctional scale  Low  Maintain anti-hyperglycemic dose as follows-   Antihyperglycemics (From admission, onward)      Start     Stop Route Frequency Ordered    12/20/23 2152  insulin aspart U-100 pen 0-5 Units         -- SubQ Before meals & nightly PRN 12/20/23 2052          Hold Oral hypoglycemics while patient is in the hospital.    Coronary artery disease involving coronary bypass graft of native heart without angina pectoris  CABG 1999. Denies CP. Mild troponin elevation likely demand ischemia. Ok for out patient stress test if desired       VTE Risk Mitigation (From admission, onward)           Ordered     IP VTE HIGH RISK PATIENT  Once         12/20/23 2011     Place sequential compression device  Until discontinued         12/20/23 2011                    Discharge Planning   SUSIE: 12/27/2023     Code Status: DNR   Is  the patient medically ready for discharge?: Yes    Reason for patient still in hospital (select all that apply): Patient trending condition, Treatment, and Pending disposition  Discharge Plan A: Skilled Nursing Facility   Discharge Delays: None known at this time              Dixon Ledbetter MD  Department of Hospital Medicine   [facility]

## 2023-12-27 NOTE — ASSESSMENT & PLAN NOTE
- Walls placed at OSH for rentention  - Continue Flomax  - Consider voiding trial prior to discharge  - Urology follow up outpatient if discharged with walls

## 2023-12-27 NOTE — PLAN OF CARE
[facility]  Discharge Reassessment    Primary Care Provider: Marcelino Del Toro MD    Expected Discharge Date: 12/27/2023    SW assigned to patient's care team.  Pt medically ready for d/c  SW spoke to pt's family and provided update on referrals. The following facilities were unable to accept:  [facility] and [facility].  Pt's family not interested in [facility] at this time. Family stated if none of the other [facility] facilities are able to accept they will be willing to place patient at a facility on the [facility].      Waiting for responses from the following facilities:  [facility], [facility], [facility]    2:29 PM  Insurance auth submitted.  Reference # [id number].    Reassessment (most recent)       Discharge Reassessment - 12/27/23 1016          Discharge Reassessment    Assessment Type Discharge Planning Reassessment     Did the patient's condition or plan change since previous assessment? No     Discharge Plan discussed with: Adult children   [phone]    Communicated SUSIE with patient/caregiver Yes     Discharge Plan A Skilled Nursing Facility     Discharge Plan B Home;Home with family;Home Health     DME Needed Upon Discharge  none     Transition of Care Barriers None        Post-Acute Status    Post-Acute Authorization Placement     Post-Acute Placement Status Referrals Sent     Coverage [employer] Medicare     Discharge Delays None known at this time                   Silvana Youngblood LMSW  Part-Time-  [facility]  Ext. [phone]

## 2023-12-27 NOTE — PT/OT/SLP PROGRESS
Physical Therapy   Progress Note    Patient Name:  Narciso Yanez  MRN: [id number]    Admit Date: 12/20/2023  Admitting Diagnosis:  Encephalopathy, metabolic  Length of Stay: 7 days  Recent Surgery: * No surgery found *      Recommendations:     Discharge Recommendations: Moderate intensity therapy  Equipment recommendations: none  Barriers to discharge: Increased level of skilled assistance required    Assessment:     Narciso Yanez is a 88 y.o. male admitted to [facility] on 12/20/2023 with medical diagnosis of Encephalopathy, metabolic. Pt presents with weakness, impaired endurance, impaired self care skills, impaired functional mobility, gait instability, impaired balance, decreased coordination, decreased safety awareness, impaired skin. Pt is progressing towards goals, but has not yet reached prior level of function.     Pt agreeable to therapy session. Able to stand with assistance and ambulate with RW to recliner. Pt then performed seated therex in recliner. Will continue to progress pt as tolerated.    Narciso Yanez would benefit from continued acute PT intervention to improve quality of life, focus on recovery of impairments, provide patient/caregiver education, reduce fall risk, and maximize (I) and safety with functional mobility. Once medically stable, recommending pt discharge to moderate intensity therapy.      Rehab Prognosis: Good    Plan:     During this hospitalization, patient to be seen 4 x/week to address the identified rehab impairments via gait training, therapeutic activities, therapeutic exercises, neuromuscular re-education and progress towards stated goals.     Plan of Care Expires:  01/05/24  Plan of Care reviewed with: patient    This plan of care has been discussed with the patient/caregiver, who was included in its development and is in agreement with the identified goals and treatment plan.     Subjective     Communicated with RN prior to session.  Patient found supine upon PT entry to  "room, agreeable to therapy session. Pt alone during session.    Patient/Family Comments/goals: "I am ready for you little kong"    Pain/Comfort:  Pain Rating 1: 0/10  Pain Rating Post-Intervention 1: 0/10    Patients cultural, spiritual, Synagogue conflicts given the current situation: None identified     Objective:     Patient found with: walls catheter    General Precautions: Standard, fall   Orthopedic Precautions:N/A   Braces:     Oxygen Device: room air    Cognition:  Pt is Alert during session.    Therapist provided skilled verbal and tactile cueing to facilitate the following functional mobility tasks. Listed tasks are focused on recovery of impairments and improving pt's independence and efficiency with bed mobility, transfers and ambulation as able.     Bed Mobility:  Supine > Sit: Stand-by Assistance    Transfers:   Sit <> Stand Transfer: Moderate Assistance from eob with RW AD   Posterior lean but able to correct with assistance                 Gait:  Distance: 8 ft  Assistance level: Contact Guard Assistance  Assistive Device: rolling walker  Gait Assessment: decreased step length , decreased dottie, decreased gait speed, and narrow base of support    Balance:  Dynamic Sitting: GOOD: Maintains balance through MODERATE excursions of active trunk movement  Standing:  Static: POOR+: Needs MINIMAL assist to maintain   Dynamic: FAIR: Needs CONTACT GUARD during gait    Outcome Measure: AM-PAC 6 CLICK MOBILITY  Total Score:16     Patient/Caregiver Education and Additional Therapeutic Activities/Exercises   X10 LAQ BLE  X10 seated alcides SUAREZ    Provided pt/caregiver education regarding:   PT POC and goals for therapy   Safety with mobility and fall risk   Safe management of AD as needed   Energy conservation techniques   Instruction on use of call button and importance of calling nursing staff for assistance with mobility     Patient/caregiver able to verbalize understanding; will follow-up with pt/caregiver " during current admit for additional questions/concerns within scope of practice.     White board updated.     Patient left up in chair with all lines intact, call button in reach, and telesitter in view .    Goals:     Multidisciplinary Problems       Physical Therapy Goals          Problem: Physical Therapy    Goal Priority Disciplines Outcome Goal Variances Interventions   Physical Therapy Goal     PT, PT/OT Ongoing, Progressing     Description: Goals to be met by: 24     Patient will increase functional independence with mobility by performin. Supine to sit with stand by assisatnce   2. Sit to supine with stand by assistance   3. Sit to stand transfer with Contact Guard Assistance  4. Bed to chair transfer with contact guard assistance using rolling walker   4. Gait  x 50 feet with Contact Guard Assistance using Rolling Walker.                          Time Tracking:       PT Received On: 23  PT Start Time: 1338     PT Stop Time: 1356  PT Total Time (min): 18 min     Billable Minutes: Gait Training 18    2023

## 2023-12-28 LAB
ALBUMIN SERPL BCP-MCNC: 2.5 G/DL (ref 3.5–5.2)
ALP SERPL-CCNC: 63 U/L (ref 55–135)
ALT SERPL W/O P-5'-P-CCNC: 24 U/L (ref 10–44)
ANION GAP SERPL CALC-SCNC: 9 MMOL/L (ref 8–16)
AST SERPL-CCNC: 24 U/L (ref 10–40)
BILIRUB SERPL-MCNC: 0.2 MG/DL (ref 0.1–1)
BUN SERPL-MCNC: 26 MG/DL (ref 8–23)
CALCIUM SERPL-MCNC: 8.3 MG/DL (ref 8.7–10.5)
CHLORIDE SERPL-SCNC: 105 MMOL/L (ref 95–110)
CO2 SERPL-SCNC: 24 MMOL/L (ref 23–29)
CREAT SERPL-MCNC: 1.2 MG/DL (ref 0.5–1.4)
ERYTHROCYTE [DISTWIDTH] IN BLOOD BY AUTOMATED COUNT: 18.3 % (ref 11.5–14.5)
EST. GFR  (NO RACE VARIABLE): 58.9 ML/MIN/1.73 M^2
GLUCOSE SERPL-MCNC: 102 MG/DL (ref 70–110)
HCT VFR BLD AUTO: 28.6 % (ref 40–54)
HGB BLD-MCNC: 9.7 G/DL (ref 14–18)
INFLUENZA A, MOLECULAR: NOT DETECTED
INFLUENZA B, MOLECULAR: NOT DETECTED
MAGNESIUM SERPL-MCNC: 1.8 MG/DL (ref 1.6–2.6)
MCH RBC QN AUTO: 30.7 PG (ref 27–31)
MCHC RBC AUTO-ENTMCNC: 33.9 G/DL (ref 32–36)
MCV RBC AUTO: 91 FL (ref 82–98)
PHOSPHATE SERPL-MCNC: 3.3 MG/DL (ref 2.7–4.5)
PLATELET # BLD AUTO: 303 K/UL (ref 150–450)
PMV BLD AUTO: 9.2 FL (ref 9.2–12.9)
POCT GLUCOSE: 151 MG/DL (ref 70–110)
POCT GLUCOSE: 157 MG/DL (ref 70–110)
POCT GLUCOSE: 173 MG/DL (ref 70–110)
POCT GLUCOSE: 97 MG/DL (ref 70–110)
POTASSIUM SERPL-SCNC: 4.7 MMOL/L (ref 3.5–5.1)
PROT SERPL-MCNC: 5.9 G/DL (ref 6–8.4)
RBC # BLD AUTO: 3.16 M/UL (ref 4.6–6.2)
RSV AG BY MOLECULAR METHOD: NOT DETECTED
SARS-COV-2 RNA RESP QL NAA+PROBE: NOT DETECTED
SODIUM SERPL-SCNC: 138 MMOL/L (ref 136–145)
WBC # BLD AUTO: 9.95 K/UL (ref 3.9–12.7)

## 2023-12-28 PROCEDURE — 25000003 PHARM REV CODE 250: Mod: HCNC | Performed by: STUDENT IN AN ORGANIZED HEALTH CARE EDUCATION/TRAINING PROGRAM

## 2023-12-28 PROCEDURE — 36415 COLL VENOUS BLD VENIPUNCTURE: CPT | Mod: HCNC | Performed by: STUDENT IN AN ORGANIZED HEALTH CARE EDUCATION/TRAINING PROGRAM

## 2023-12-28 PROCEDURE — S4991 NICOTINE PATCH NONLEGEND: HCPCS | Mod: HCNC | Performed by: STUDENT IN AN ORGANIZED HEALTH CARE EDUCATION/TRAINING PROGRAM

## 2023-12-28 PROCEDURE — 80053 COMPREHEN METABOLIC PANEL: CPT | Mod: HCNC | Performed by: STUDENT IN AN ORGANIZED HEALTH CARE EDUCATION/TRAINING PROGRAM

## 2023-12-28 PROCEDURE — 83735 ASSAY OF MAGNESIUM: CPT | Mod: HCNC | Performed by: STUDENT IN AN ORGANIZED HEALTH CARE EDUCATION/TRAINING PROGRAM

## 2023-12-28 PROCEDURE — 97116 GAIT TRAINING THERAPY: CPT | Mod: HCNC,CQ

## 2023-12-28 PROCEDURE — 84100 ASSAY OF PHOSPHORUS: CPT | Mod: HCNC | Performed by: STUDENT IN AN ORGANIZED HEALTH CARE EDUCATION/TRAINING PROGRAM

## 2023-12-28 PROCEDURE — 85027 COMPLETE CBC AUTOMATED: CPT | Mod: HCNC | Performed by: STUDENT IN AN ORGANIZED HEALTH CARE EDUCATION/TRAINING PROGRAM

## 2023-12-28 PROCEDURE — 11000001 HC ACUTE MED/SURG PRIVATE ROOM: Mod: HCNC

## 2023-12-28 PROCEDURE — 0241U SARS-COV2 (COVID) WITH FLU/RSV BY PCR: CPT | Mod: HCNC | Performed by: STUDENT IN AN ORGANIZED HEALTH CARE EDUCATION/TRAINING PROGRAM

## 2023-12-28 RX ADMIN — TAMSULOSIN HYDROCHLORIDE 0.4 MG: 0.4 CAPSULE ORAL at 08:12

## 2023-12-28 RX ADMIN — TRIAMCINOLONE ACETONIDE: 1 OINTMENT TOPICAL at 02:12

## 2023-12-28 RX ADMIN — TRIAMCINOLONE ACETONIDE: 1 OINTMENT TOPICAL at 08:12

## 2023-12-28 RX ADMIN — DIVALPROEX SODIUM 500 MG: 250 TABLET, DELAYED RELEASE ORAL at 08:12

## 2023-12-28 RX ADMIN — Medication 6 MG: at 11:12

## 2023-12-28 RX ADMIN — ATORVASTATIN CALCIUM 40 MG: 40 TABLET, FILM COATED ORAL at 08:12

## 2023-12-28 RX ADMIN — METOPROLOL SUCCINATE 25 MG: 25 TABLET, EXTENDED RELEASE ORAL at 08:12

## 2023-12-28 RX ADMIN — FAMOTIDINE 20 MG: 20 TABLET ORAL at 08:12

## 2023-12-28 RX ADMIN — Medication 1 PATCH: at 08:12

## 2023-12-28 RX ADMIN — ASPIRIN 81 MG: 81 TABLET, COATED ORAL at 08:12

## 2023-12-28 RX ADMIN — CLOPIDOGREL BISULFATE 75 MG: 75 TABLET ORAL at 08:12

## 2023-12-28 RX ADMIN — DIVALPROEX SODIUM 250 MG: 250 TABLET, DELAYED RELEASE ORAL at 08:12

## 2023-12-28 RX ADMIN — HYDROCODONE BITARTRATE AND ACETAMINOPHEN 1 TABLET: 5; 325 TABLET ORAL at 08:12

## 2023-12-28 NOTE — CONSULTS
Patient consult for wound care , reason a full body rash. Dermatology will follow this patient. Wound care signing off please reconsult if need.

## 2023-12-28 NOTE — PT/OT/SLP PROGRESS
"Physical Therapy Treatment    Patient Name:  Narciso Yanez   MRN:  [id number]    Recommendations:     Discharge Recommendations: Moderate Intensity Therapy  Discharge Equipment Recommendations: none  Barriers to discharge: Decreased caregiver support    Assessment:     Narciso Yanez is a 88 y.o. male admitted with a medical diagnosis of Encephalopathy, metabolic.  He presents with the following impairments/functional limitations: weakness, impaired endurance, gait instability, decreased safety awareness, decreased coordination.     Rehab Prognosis: Fair; patient would benefit from acute skilled PT services to address these deficits and reach maximum level of function.    Recent Surgery: * No surgery found *      Plan:     During this hospitalization, patient to be seen 4 x/week to address the identified rehab impairments via gait training, therapeutic activities, therapeutic exercises, neuromuscular re-education and progress toward the following goals:    Plan of Care Expires:  01/05/24    Subjective     Chief Complaint: "I need water"  Patient/Family Comments/goals: none verbalized  Pain/Comfort:  Pain Rating 1: 0/10      Objective:     Communicated with RN prior to session.  Patient found HOB elevated with walls catheter upon PT entry to room.     General Precautions: Standard, fall  Orthopedic Precautions: N/A  Braces: N/A  Respiratory Status: Room air     Functional Mobility:  Bed Mobility:     Supine to Sit: stand by assistance  Transfers:     Sit to Stand:  stand by assistance with rolling walker  Gait: 24 feet with RW CGA decreased dottie, decreased step length on LLE      AM-PAC 6 CLICK MOBILITY  Turning over in bed (including adjusting bedclothes, sheets and blankets)?: 3  Sitting down on and standing up from a chair with arms (e.g., wheelchair, bedside commode, etc.): 3  Moving from lying on back to sitting on the side of the bed?: 3  Moving to and from a bed to a chair (including a wheelchair)?: " 3  Need to walk in hospital room?: 3  Climbing 3-5 steps with a railing?: 2  Basic Mobility Total Score: 17       Treatment & Education:  Pt education provided on safety, calling for assistance, and role of PT.   Bedside table in front of patient and area set up for function, convenience, and safety. RN aware of patient's mobility needs and status. Questions/concerns addressed within PTA scope of practice; patient  with no further questions. Time was provided for active listening, discussion of health disposition, and discussion of safe discharge.    Patient left up in chair with all lines intact and call button in reach..    GOALS:   Multidisciplinary Problems       Physical Therapy Goals          Problem: Physical Therapy    Goal Priority Disciplines Outcome Goal Variances Interventions   Physical Therapy Goal     PT, PT/OT Ongoing, Progressing     Description: Goals to be met by: 24     Patient will increase functional independence with mobility by performin. Supine to sit with stand by assisatnce   2. Sit to supine with stand by assistance   3. Sit to stand transfer with Contact Guard Assistance  4. Bed to chair transfer with contact guard assistance using rolling walker   4. Gait  x 50 feet with Contact Guard Assistance using Rolling Walker.                          Time Tracking:     PT Received On: 23  PT Start Time: 1021     PT Stop Time: 1044  PT Total Time (min): 23 min     Billable Minutes: Gait Training 23    Treatment Type: Treatment  PT/PTA: PTA     Number of PTA visits since last PT visit: 2023

## 2023-12-28 NOTE — PLAN OF CARE
Problem: Adult Inpatient Plan of Care  Goal: Plan of Care Review  Outcome: Ongoing, Progressing  Goal: Patient-Specific Goal (Individualized)  Outcome: Ongoing, Progressing  Goal: Absence of Hospital-Acquired Illness or Injury  Outcome: Ongoing, Progressing  Goal: Optimal Comfort and Wellbeing  Outcome: Ongoing, Progressing  Goal: Readiness for Transition of Care  Outcome: Ongoing, Progressing     Problem: Fall Injury Risk  Goal: Absence of Fall and Fall-Related Injury  Outcome: Ongoing, Progressing     Problem: Restraint, Nonbehavioral (Nonviolent)  Goal: Absence of Harm or Injury  Outcome: Ongoing, Progressing     Problem: Diabetes Comorbidity  Goal: Blood Glucose Level Within Targeted Range  Outcome: Ongoing, Progressing     Problem: Fluid and Electrolyte Imbalance (Acute Kidney Injury/Impairment)  Goal: Fluid and Electrolyte Balance  Outcome: Ongoing, Progressing     Problem: Oral Intake Inadequate (Acute Kidney Injury/Impairment)  Goal: Optimal Nutrition Intake  Outcome: Ongoing, Progressing     Problem: Renal Function Impairment (Acute Kidney Injury/Impairment)  Goal: Effective Renal Function  Outcome: Ongoing, Progressing     Problem: Impaired Wound Healing  Goal: Optimal Wound Healing  Outcome: Ongoing, Progressing     Problem: Skin Injury Risk Increased  Goal: Skin Health and Integrity  Outcome: Ongoing, Progressing     Problem: Infection  Goal: Absence of Infection Signs and Symptoms  Outcome: Ongoing, Progressing     Problem: Coping Ineffective  Goal: Effective Coping  Outcome: Ongoing, Progressing     Problem: Pain Acute  Goal: Acceptable Pain Control and Functional Ability  Outcome: Ongoing, Progressing

## 2023-12-28 NOTE — SUBJECTIVE & OBJECTIVE
Interval History: No acute events.   Pending placement    Review of Systems  Objective:     Vital Signs (Most Recent):  Temp: 97.5 °F (36.4 °C) (12/28/23 0725)  Pulse: 73 (12/28/23 0725)  Resp: 18 (12/28/23 0725)  BP: (!) 143/69 (12/28/23 0725)  SpO2: 97 % (12/28/23 0725) Vital Signs (24h Range):  Temp:  [96.1 °F (35.6 °C)-97.5 °F (36.4 °C)] 97.5 °F (36.4 °C)  Pulse:  [63-84] 73  Resp:  [16-18] 18  SpO2:  [96 %-100 %] 97 %  BP: (122-143)/(61-69) 143/69     Weight: 67.4 kg (148 lb 9.4 oz)  Body mass index is 23.27 kg/m².    Intake/Output Summary (Last 24 hours) at 12/28/2023 1511  Last data filed at 12/27/2023 1755  Gross per 24 hour   Intake --   Output 775 ml   Net -775 ml         Physical Exam  Constitutional:       Appearance: Normal appearance. He is normal weight. He is ill-appearing (chronic).   HENT:      Head: Normocephalic and atraumatic.      Mouth/Throat:      Mouth: Mucous membranes are moist.   Eyes:      Extraocular Movements: Extraocular movements intact.      Conjunctiva/sclera: Conjunctivae normal.   Cardiovascular:      Rate and Rhythm: Normal rate and regular rhythm.      Heart sounds: No murmur heard.  Pulmonary:      Effort: Pulmonary effort is normal. No respiratory distress.      Breath sounds: Normal breath sounds. No wheezing or rales.   Abdominal:      General: Abdomen is flat. There is no distension.      Palpations: Abdomen is soft.      Tenderness: There is no abdominal tenderness. There is no guarding.   Musculoskeletal:         General: No swelling or tenderness.   Skin:     Findings: Rash (arms, chest, legs) present.   Neurological:      General: No focal deficit present.      Mental Status: He is alert and oriented to person, place, and time. Mental status is at baseline.   Psychiatric:         Mood and Affect: Mood normal.         Behavior: Behavior normal.             Significant Labs: All pertinent labs within the past 24 hours have been reviewed.    Significant Imaging: I have  reviewed all pertinent imaging results/findings within the past 24 hours.

## 2023-12-28 NOTE — PLAN OF CARE
Went over plan of care - all questions answered. No falls or injuries. Secure chat w attending about med pt didn't want to take last night. No complaints . Will continue to monitor

## 2023-12-28 NOTE — PROGRESS NOTES
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 This medication has been on hold for a few days pending a biopsy, however, patient developed worsening hallucinations and mental status changes for several days. Patient denies having any complaints.     The ED, the patient was hemodynamically stable.  Labs were remarkable for hyperkalemia (6.9), normocytic anemia (10.2), elevated creatinine (2.5-baseline of 1.3), elevated BNP (399), elevated troponin (0.053). VBG showed a pH of 7.31, pCO2 of 45.1. UA showed +3 leukocytes, > 100 WBCs, moderate bacteria.  EKG showed showed peaked t-waves. Chest x-ray showed no acute process.  Patient was given insulin/D10, albuterol, calcium gluconate, Lasix 80 mg IV, morphine 2 mg IV, Zofran 4 mg IV. Patient was admitted for further management.     Overview/Hospital Course:  Admitted with acute encephalopathy, agitation and hyperkalemia. Also found to have UTI. Hyperkalemia improved, encephalopathy slowly improving. On IV antibiotics. More alert and awake, still very encephlopathic but redirectable. PT/OT consulted. Neurology consulted for evaluation of recent tremors, confusion.     Patient mental status appears to have improved back to baseline. Speaking  fluently. Knows month and year. Answering questions appropriately and using dates in speech. Cardiology recs for short run of Vtach were that episode likely stimulated from infection.  To follow up outpatient with Cardiology and continue beta-blocker.  UTI treated with ceftriaxone for 7 day course.  Patient transferred to [facility] for evaluation of rash.  Patient underwent punch biopsy with Dermatology on 12/25.  Skin wound care ordered per Dermatology recommendations.  Patient will need outpatient follow-up with Dermatology.  Placement to skilled nursing facility pending.    Interval History: No acute events.   Pending placement    Review of Systems  Objective:     Vital Signs (Most Recent):  Temp: 97.5 °F (36.4 °C) (12/28/23 0725)  Pulse: 73 (12/28/23 0725)  Resp: 18 (12/28/23 0725)  BP: (!) 143/69 (12/28/23 0725)  SpO2: 97 % (12/28/23 0725) Vital Signs (24h Range):  Temp:  [96.1 °F (35.6 °C)-97.5 °F (36.4 °C)] 97.5 °F (36.4 °C)  Pulse:  [63-84] 73  Resp:  [16-18] 18  SpO2:  [96 %-100 %] 97 %  BP: (122-143)/(61-69) 143/69     Weight: 67.4 kg (148 lb 9.4 oz)  Body mass index is 23.27 kg/m².    Intake/Output Summary (Last 24 hours) at 12/28/2023 1511  Last data filed at 12/27/2023 1755  Gross per 24 hour   Intake --   Output 775 ml   Net -775 ml         Physical Exam  Constitutional:       Appearance: Normal appearance. He is normal weight. He is ill-appearing (chronic).   HENT:      Head: Normocephalic and atraumatic.      Mouth/Throat:      Mouth: Mucous membranes are moist.   Eyes:      Extraocular Movements: Extraocular movements intact.      Conjunctiva/sclera: Conjunctivae normal.   Cardiovascular:      Rate and Rhythm: Normal rate and regular rhythm.      Heart sounds: No murmur heard.  Pulmonary:      Effort: Pulmonary effort is normal. No respiratory distress.      Breath sounds: Normal breath sounds. No wheezing or rales.   Abdominal:      General: Abdomen is flat. There is no distension.       Palpations: Abdomen is soft.      Tenderness: There is no abdominal tenderness. There is no guarding.   Musculoskeletal:         General: No swelling or tenderness.   Skin:     Findings: Rash (arms, chest, legs) present.   Neurological:      General: No focal deficit present.      Mental Status: He is alert and oriented to person, place, and time. Mental status is at baseline.   Psychiatric:         Mood and Affect: Mood normal.         Behavior: Behavior normal.             Significant Labs: All pertinent labs within the past 24 hours have been reviewed.    Significant Imaging: I have reviewed all pertinent imaging results/findings within the past 24 hours.    Assessment/Plan:      * Encephalopathy, metabolic  Suspect related to UTI, possible medications but do not suspect being taken off of Renvoq contributing. Has completed prednisone weeks ago so do not suspect this is contributing.     - CTH with no acute findings, no focal deficits. Patient with recent onset of tremors in the last few months and weakness.  - Treating UTI as below  - Delirium precautions  - Neurology consulted at [facility] on 12/22  Suspect symptomatology within the spectrum of metabolic / infectious encephalopathy + related hyperactive delirium - with confounders of under/undiagnosed dementia syndromes   -- Continue Depacon 250 mg qam and 500 mg qhs for management of agitation and delirium   -- PRN Zyprexa 2.5 mg PO/IM q6h PRN severe, nonredirectable agitation     Urinary retention  - Walls placed at OSH for rentention  - Continue Flomax  - Consider voiding trial prior to discharge  - Urology follow up outpatient if discharged with walls      NSVT (nonsustained ventricular tachycardia)  18 beat VT this AM asymptomatic on 12/24 at [facility] and cardiology consulted. 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 Needs outpatient follow up with dermatology   - Follows with Dr. Davidson and has biopsies but unrevealing  - Was on Renvoq and prednisone but completed course of prednisone and was taken off Renvoq on 12/12/23.   - Doubt taking off of Renvoq is contributing to mental status changes  - Dermatology consulted  Low suspicion for CTCL given clinical exam and CTCL typically presents with erythematous/atrophic itchy scaly plaques in non-sun exposed areas. CTCL is a diagnosis that does take numerous biopsies to diagnose so will performed additional H&E biopsy. Can also consider contact dermatitis - pt does have irritant dermatitis to EKG leads as noted by well demarcated squares from prior adhesive. Otherwise patient generally very xerotic elsewhere on body.    -- s/p punch biopsy on 12/25, f/u pathology   -- Triamcinolone 0.1% ointment twice daily   - Dermatology follow up outpatient  - Recommend daily emollients with cetaphil or cerave, can also add on lac hydrin 12% lotion when discharged       Hyperkalemia  This patient has hyperkalemia which is uncontrolled. We will monitor for arrhythmias with EKG or continuous telemetry. We will treat the hyperkalemia with Potassium Binders, Calcium gluconate, IV insulin and dextrose, Nebulized albuterol sulfate, and Furosemide. The likely etiology of the hyperkalemia is LATRICE.  The patients latest potassium has been reviewed and the results are listed below  Recent Labs   Lab 12/25/23  0842   K 5.1       - Improved        UTI (urinary tract infection)  - UCx 12/20 with Strept agalactiae ([facility] )  - Complicated given walls  - compelted CTX         Chronic bilateral low back pain with bilateral sciatica  History noted. No acute issues       Hyperlipidemia  Home atorvastatin    Goals of care, counseling/discussion  Advance Care Planning  - Palliative care consulted at [facility] on 12/22  - DNR         Normal pressure  hydrocephalus  Repeat CT head obtained with stable imaging on 12/20    LATRICE (acute kidney injury)  Patient with acute kidney injury/acute renal failure likely due to pre-renal azotemia due to IVVD LATRICE is currently worsening. Baseline creatinine  1.3  - Labs reviewed- Renal function/electrolytes with Estimated Creatinine Clearance: 27.7 mL/min (A) (based on SCr of 1.7 mg/dL (H)). according to latest data.    - Baseline 1.3  - Monitor urine output and serial BMP and adjust therapy as needed  - Avoid nephrotoxins and renally dose meds for GFR listed above    Essential hypertension  Chronic, controlled. Latest blood pressure and vitals reviewed-     Temp:  [96.2 °F (35.7 °C)-98.2 °F (36.8 °C)]   Pulse:  []   Resp:  [15-19]   BP: (113-142)/(56-71)   SpO2:  [93 %-96 %] .   Home meds for hypertension were reviewed and noted below.   Hypertension Medications               metoprolol succinate (TOPROL-XL) 25 MG 24 hr tablet Take 1 tablet (25 mg total) by mouth once daily.            While in the hospital, will manage blood pressure as follows; Continue home antihypertensive regimen    Will utilize p.r.n. blood pressure medication only if patient's blood pressure greater than 180/110 and he develops symptoms such as worsening chest pain or shortness of breath.    COPD (chronic obstructive pulmonary disease)  Patient's COPD is controlled currently.  Patient is currently off COPD Pathway.  PRN Duonebs     PAD (peripheral artery disease)  History noted.  No acute issues.      Diabetes mellitus with peripheral circulatory disorder  Patient's FSGs are controlled on current medication regimen.  Last A1c reviewed-   Lab Results   Component Value Date    HGBA1C 6.9 (H) 10/15/2023     Most recent fingerstick glucose reviewed-   Recent Labs   Lab 12/24/23  1625 12/24/23  1944 12/25/23  0701   POCTGLUCOSE 120* 161* 155*       Current correctional scale  Low  Maintain anti-hyperglycemic dose as follows-   Antihyperglycemics (From  admission, onward)      Start     Stop Route Frequency Ordered    12/20/23 2152  insulin aspart U-100 pen 0-5 Units         -- SubQ Before meals & nightly PRN 12/20/23 2052          Hold Oral hypoglycemics while patient is in the hospital.    Coronary artery disease involving coronary bypass graft of native heart without angina pectoris  CABG 1999. Denies CP. Mild troponin elevation likely demand ischemia. Ok for out patient stress test if desired       VTE Risk Mitigation (From admission, onward)           Ordered     IP VTE HIGH RISK PATIENT  Once         12/20/23 2011     Place sequential compression device  Until discontinued         12/20/23 2011                    Discharge Planning   SUSIE: 12/28/2023     Code Status: DNR   Is the patient medically ready for discharge?: Yes    Reason for patient still in hospital (select all that apply): Pending disposition  Discharge Plan A: Skilled Nursing Facility   Discharge Delays: None known at this time        Nain Russell MD  Department of Hospital Medicine   [facility] Surg

## 2023-12-28 NOTE — PLAN OF CARE
SW in communication with pt's daughter, Reyna [phone]; would like [facility] for SNF placement.  SW spoke to Yancy sorensen/ Esequiel Tamiment [phone]; will need flu and RSV tests.      Insurance auth received.      Discharge Plan A and Plan B have been determined by review of patient's clinical status, future medical and therapeutic needs, and coverage/benefits for post-acute care in coordination with multidisciplinary team members.    2:39 PM  SW spoke to Yancy sorensen/ Esequiel Degroot [phone] in regards to possible admit today.  Yancy advised SW she still need to reach out to family to sign paperwork and most likely will not be able to admit until tomorrow.  Medical Team notified.       12/28/23 1251   Post-Acute Status   Post-Acute Authorization Placement   Post-Acute Placement Status Pending post-acute provider review/more information requested   Coverage [employer]   Discharge Delays None known at this time   Discharge Plan   Discharge Plan A Skilled Nursing Facility   Discharge Plan B Home;Home with family;Home Health     Silvana Youngblood LMSW  Part-Time-  [facility]  Ext. [phone]

## 2023-12-29 VITALS
HEART RATE: 70 BPM | DIASTOLIC BLOOD PRESSURE: 62 MMHG | HEIGHT: 67 IN | TEMPERATURE: 98 F | WEIGHT: 148.56 LBS | OXYGEN SATURATION: 97 % | BODY MASS INDEX: 23.32 KG/M2 | SYSTOLIC BLOOD PRESSURE: 125 MMHG | RESPIRATION RATE: 18 BRPM

## 2023-12-29 LAB
ALBUMIN SERPL BCP-MCNC: 2.5 G/DL (ref 3.5–5.2)
ALP SERPL-CCNC: 68 U/L (ref 55–135)
ALT SERPL W/O P-5'-P-CCNC: 24 U/L (ref 10–44)
ANION GAP SERPL CALC-SCNC: 9 MMOL/L (ref 8–16)
AST SERPL-CCNC: 24 U/L (ref 10–40)
BILIRUB SERPL-MCNC: 0.2 MG/DL (ref 0.1–1)
BUN SERPL-MCNC: 26 MG/DL (ref 8–23)
CALCIUM SERPL-MCNC: 8.7 MG/DL (ref 8.7–10.5)
CHLORIDE SERPL-SCNC: 105 MMOL/L (ref 95–110)
CO2 SERPL-SCNC: 23 MMOL/L (ref 23–29)
CREAT SERPL-MCNC: 1.2 MG/DL (ref 0.5–1.4)
ERYTHROCYTE [DISTWIDTH] IN BLOOD BY AUTOMATED COUNT: 18.4 % (ref 11.5–14.5)
EST. GFR  (NO RACE VARIABLE): 58.9 ML/MIN/1.73 M^2
GLUCOSE SERPL-MCNC: 106 MG/DL (ref 70–110)
HCT VFR BLD AUTO: 28.8 % (ref 40–54)
HGB BLD-MCNC: 9.4 G/DL (ref 14–18)
MAGNESIUM SERPL-MCNC: 1.8 MG/DL (ref 1.6–2.6)
MCH RBC QN AUTO: 31.4 PG (ref 27–31)
MCHC RBC AUTO-ENTMCNC: 32.6 G/DL (ref 32–36)
MCV RBC AUTO: 96 FL (ref 82–98)
PHOSPHATE SERPL-MCNC: 3.5 MG/DL (ref 2.7–4.5)
PLATELET # BLD AUTO: 335 K/UL (ref 150–450)
PMV BLD AUTO: 9.4 FL (ref 9.2–12.9)
POCT GLUCOSE: 156 MG/DL (ref 70–110)
POTASSIUM SERPL-SCNC: 4.1 MMOL/L (ref 3.5–5.1)
PROT SERPL-MCNC: 5.8 G/DL (ref 6–8.4)
RBC # BLD AUTO: 2.99 M/UL (ref 4.6–6.2)
SODIUM SERPL-SCNC: 137 MMOL/L (ref 136–145)
TB INDURATION 48 - 72 HR READ: 0 MM
WBC # BLD AUTO: 9.27 K/UL (ref 3.9–12.7)

## 2023-12-29 PROCEDURE — 97116 GAIT TRAINING THERAPY: CPT | Mod: HCNC

## 2023-12-29 PROCEDURE — 84100 ASSAY OF PHOSPHORUS: CPT | Mod: HCNC | Performed by: STUDENT IN AN ORGANIZED HEALTH CARE EDUCATION/TRAINING PROGRAM

## 2023-12-29 PROCEDURE — 97530 THERAPEUTIC ACTIVITIES: CPT | Mod: HCNC

## 2023-12-29 PROCEDURE — 36415 COLL VENOUS BLD VENIPUNCTURE: CPT | Mod: HCNC | Performed by: STUDENT IN AN ORGANIZED HEALTH CARE EDUCATION/TRAINING PROGRAM

## 2023-12-29 PROCEDURE — 80053 COMPREHEN METABOLIC PANEL: CPT | Mod: HCNC | Performed by: STUDENT IN AN ORGANIZED HEALTH CARE EDUCATION/TRAINING PROGRAM

## 2023-12-29 PROCEDURE — 25000003 PHARM REV CODE 250: Mod: HCNC | Performed by: STUDENT IN AN ORGANIZED HEALTH CARE EDUCATION/TRAINING PROGRAM

## 2023-12-29 PROCEDURE — 83735 ASSAY OF MAGNESIUM: CPT | Mod: HCNC | Performed by: STUDENT IN AN ORGANIZED HEALTH CARE EDUCATION/TRAINING PROGRAM

## 2023-12-29 PROCEDURE — S4991 NICOTINE PATCH NONLEGEND: HCPCS | Mod: HCNC | Performed by: STUDENT IN AN ORGANIZED HEALTH CARE EDUCATION/TRAINING PROGRAM

## 2023-12-29 PROCEDURE — 85027 COMPLETE CBC AUTOMATED: CPT | Mod: HCNC | Performed by: STUDENT IN AN ORGANIZED HEALTH CARE EDUCATION/TRAINING PROGRAM

## 2023-12-29 RX ORDER — DIVALPROEX SODIUM 125 MG/1
TABLET, DELAYED RELEASE ORAL
Start: 2023-12-29

## 2023-12-29 RX ORDER — TRIAMCINOLONE ACETONIDE 1 MG/G
CREAM TOPICAL 2 TIMES DAILY
Start: 2023-12-29

## 2023-12-29 RX ADMIN — OXYCODONE AND ACETAMINOPHEN 1 TABLET: 10; 325 TABLET ORAL at 01:12

## 2023-12-29 RX ADMIN — DIVALPROEX SODIUM 250 MG: 250 TABLET, DELAYED RELEASE ORAL at 09:12

## 2023-12-29 RX ADMIN — Medication 1 PATCH: at 09:12

## 2023-12-29 RX ADMIN — METOPROLOL SUCCINATE 25 MG: 25 TABLET, EXTENDED RELEASE ORAL at 09:12

## 2023-12-29 RX ADMIN — ATORVASTATIN CALCIUM 40 MG: 40 TABLET, FILM COATED ORAL at 09:12

## 2023-12-29 RX ADMIN — FAMOTIDINE 20 MG: 20 TABLET ORAL at 09:12

## 2023-12-29 RX ADMIN — CLOPIDOGREL BISULFATE 75 MG: 75 TABLET ORAL at 09:12

## 2023-12-29 RX ADMIN — TAMSULOSIN HYDROCHLORIDE 0.4 MG: 0.4 CAPSULE ORAL at 09:12

## 2023-12-29 RX ADMIN — TRIAMCINOLONE ACETONIDE: 1 OINTMENT TOPICAL at 09:12

## 2023-12-29 RX ADMIN — ASPIRIN 81 MG: 81 TABLET, COATED ORAL at 09:12

## 2023-12-29 NOTE — PLAN OF CARE
Family is wanting [facility] provided dgjun Almazan with contact for [facility] but informed if they do not have a bed today, [facility]

## 2023-12-29 NOTE — PT/OT/SLP PROGRESS
Physical Therapy Treatment    Patient Name:  Narciso Yanez   MRN:  [id number]    Recommendations:     Discharge Recommendations: Moderate Intensity Therapy  Discharge Equipment Recommendations: none  Barriers to discharge:  requires increased level of assistance    Assessment:     Narciso Yanez is a 88 y.o. male admitted with a medical diagnosis of Encephalopathy, metabolic.  He presents with the following impairments/functional limitations: weakness, impaired endurance, impaired functional mobility, gait instability, impaired balance, decreased safety awareness. Patient tolerated PT session fairly well. He ambulated 40ft and 30ft with RW and contact guard assistance. He had 1 loss of balance and required minimal assistance to regain upright posture. He is okay to ambulate with 1 person assistance and RW while in the hospital.     Rehab Prognosis: Good; patient would benefit from acute skilled PT services to address these deficits and reach maximum level of function.    Recent Surgery: * No surgery found *      Plan:     During this hospitalization, patient to be seen 4 x/week to address the identified rehab impairments via gait training, therapeutic activities, therapeutic exercises, neuromuscular re-education and progress toward the following goals:    Plan of Care Expires:  01/05/24    Subjective     Chief Complaint: Right heel pain.   Patient/Family Comments/goals: To get stronger and go back home.   Pain/Comfort:  Pain Rating 1:  (did not rate with number)  Location - Side 1: Right  Location 1: heel  Pain Addressed 1: Reposition, Distraction      Objective:     Communicated with RN prior to session.  Patient found HOB elevated with walls catheter, Avasys upon PT entry to room.     General Precautions: Standard, fall  Orthopedic Precautions: N/A  Braces: N/A  Respiratory Status: Room air     Functional Mobility:  Bed Mobility:     Scooting: contact guard assistance  Supine to Sit: contact guard  assistance  Transfers:     Sit to Stand:  minimum assistance with rolling walker x1 from bed and x1 from bedside chair and contact guard assistance x1 from bedside chair   Gait: Patient ambulated 40ft and 30ft with seated rest break between trials, Rolling Walker and contact guard assistance using swing-through gait. Patient demonstrated decreased dottie and decreased step length during gait due to impaired balance, pain, and decreased strength. He had 1 loss of balance and required minimal assistance to regain upright posture.      AM-PAC 6 CLICK MOBILITY  Turning over in bed (including adjusting bedclothes, sheets and blankets)?: 4  Sitting down on and standing up from a chair with arms (e.g., wheelchair, bedside commode, etc.): 3  Moving from lying on back to sitting on the side of the bed?: 4  Moving to and from a bed to a chair (including a wheelchair)?: 3  Need to walk in hospital room?: 3  Climbing 3-5 steps with a railing?: 2  Basic Mobility Total Score: 19       Treatment & Education:  Patient educated in:  -PT role and POC  -safety with transfers including hand placement  -gait sequencing and RW management  -OOB activity to maximize recovery including ambulating with nursing staff assistance and RW while in the hospital     Patient left up in chair with Avasys, all lines intact, call button in reach, and RN notified.    GOALS:   Multidisciplinary Problems       Physical Therapy Goals          Problem: Physical Therapy    Goal Priority Disciplines Outcome Goal Variances Interventions   Physical Therapy Goal     PT, PT/OT Ongoing, Progressing     Description: Goals to be met by: 24     Patient will increase functional independence with mobility by performin. Supine to sit with stand by assisatnce   2. Sit to supine with stand by assistance   3. Sit to stand transfer with Contact Guard Assistance  4. Bed to chair transfer with contact guard assistance using rolling walker   4. Gait  x 50 feet with  Contact Guard Assistance using Rolling Walker.                          Time Tracking:     PT Received On: 12/29/23  PT Start Time: 1027     PT Stop Time: 1054  PT Total Time (min): 27 min     Billable Minutes: Gait Training 17 and Therapeutic Activity 10    Treatment Type: Treatment  PT/PTA: PT     Number of PTA visits since last PT visit: 0     12/29/2023

## 2023-12-29 NOTE — PLAN OF CARE
[facility] Med Surg  Discharge Final Note    Primary Care Provider: Marcelino Del Toro MD    Expected Discharge Date: 12/29/2023    Final Discharge Note (most recent)       Final Note - 12/29/23 0922          Final Note    Assessment Type Final Discharge Note     Anticipated Discharge Disposition Skilled Nursing Facility     Hospital Resources/Appts/Education Provided Appointments scheduled and added to AVS;Post-Acute resouces added to AVS        Post-Acute Status    Post-Acute Authorization Placement     Post-Acute Placement Status Set-up Complete/Auth obtained     Discharge Delays None known at this time                     Important Message from Medicare  Important Message from Medicare regarding Discharge Appeal Rights: Given to patient/caregiver, Explained to patient/caregiver, Signed/date by patient/caregiver     Date IMM was signed: 12/26/23  Time IMM was signed: 1018    Contact Info       Samuel Calderon MD   Specialty: Cardiology    [address]  [address]  [place] [address]   Phone: [phone]       Next Steps: Follow up    Instructions: Please call to schedule follow-up appointment to be seen within 2 weeks.    Samuel Calderon MD   Specialty: Cardiology    [address] [place] [address]   Phone: [phone]       Next Steps: Follow up    Marcelino Del Toro MD   Specialty: Internal Medicine   Relationship: PCP - General    [address] [place] [address]   Phone: [phone]       Next Steps: Follow up    Instructions: Please call to schedule follow-up appointment to be seen with 1 week.

## 2023-12-29 NOTE — PLAN OF CARE
NURSING HOME ORDERS    12/29/2023  [facility] MED SURG  [address] [facility] [address]  Dept: [phone]  Loc: [phone]     Admit to Nursing Home:  Home or Self Care    Diagnoses:  Active Hospital Problems    Diagnosis  POA    *Encephalopathy, metabolic [G93.41]  Yes    Urinary retention [R33.9]  Yes    NSVT (nonsustained ventricular tachycardia) [I47.29]  No    Hyperkalemia [E87.5]  Yes    Dermatitis, unspecified [L30.9]  Yes    UTI (urinary tract infection) [N39.0]  Yes    Chronic bilateral low back pain with bilateral sciatica [M54.42, M54.41, G89.29]  Yes    Hyperlipidemia [E78.5]  Yes    Goals of care, counseling/discussion [Z71.89]  Not Applicable    Normal pressure hydrocephalus [G91.2]  Yes    LATRICE (acute kidney injury) [N17.9]  Yes    Essential hypertension [I10]  Yes    COPD (chronic obstructive pulmonary disease) [J44.9]  Yes    PAD (peripheral artery disease) [I73.9]  Yes    Diabetes mellitus with peripheral circulatory disorder [E11.51]  Yes    Coronary artery disease involving coronary bypass graft of native heart without angina pectoris [I25.810]  Yes      Resolved Hospital Problems   No resolved problems to display.       Patient is homebound due to:  Encephalopathy, metabolic    Allergies:  Review of patient's allergies indicates:   Allergen Reactions    Demerol [meperidine] Nausea Only       Vitals:  Routine    Diet: regular diet    Activities:   Activity as tolerated    Goals of Care Treatment Preferences:  Code Status: DNR          What is most important right now is to focus on spending time at home, avoiding the hospital, remaining as independent as possible, symptom/pain control, comfort and QOL .  Accordingly, we have decided that the best plan to meet the patient's goals includes continuing with treatment.      Labs:  Per facility protocol    Nursing Precautions:  Fall and Pressure ulcer prevention    Consults:   PT to evaluate and treat- 5  times a week and OT to evaluate and treat- 5 times a week     Miscellaneous Care: Routine Skin for Bedridden Patients:  Apply moisture barrier cream to all                   Diabetes Care:  SN to perform and educate Diabetic management with blood glucose monitoring:      Medications: Discontinue all previous medication orders, if any. See new list below.     Medication List        CHANGE how you take these medications      divalproex 125 MG EC tablet  Commonly known as: DEPAKOTE  Take 250 mg daily and 500 mg nightly  What changed:   how to take this  additional instructions     triamcinolone acetonide 0.1% 0.1 % cream  Commonly known as: KENALOG  Apply topically 2 (two) times daily. Apply to rash  What changed: additional instructions            CONTINUE taking these medications      acetaminophen 500 MG tablet  Commonly known as: TYLENOL  Take 1 tablet (500 mg total) by mouth every 6 (six) hours as needed for Pain.     aspirin 81 MG EC tablet  Commonly known as: ECOTRIN  Take 1 tablet (81 mg total) by mouth once daily.     atorvastatin 40 MG tablet  Commonly known as: LIPITOR  Take 1 tablet (40 mg total) by mouth once daily.     blood sugar diagnostic Strp  Commonly known as: TRUE METRIX GLUCOSE TEST STRIP  TID     clopidogreL 75 mg tablet  Commonly known as: PLAVIX  Take 1 tablet (75 mg total) by mouth once daily.     metFORMIN 500 MG tablet  Commonly known as: GLUCOPHAGE  Take 1 tablet (500 mg total) by mouth 2 (two) times daily with meals. HOLD FOR GLUCOSE LESS 100     metoprolol succinate 25 MG 24 hr tablet  Commonly known as: TOPROL-XL  Take 1 tablet (25 mg total) by mouth once daily.     mometasone 0.1% 0.1 % cream  Commonly known as: ELOCON  Apply topically 2 (two) times daily. Apply to arms ,hands, and legs BID     tamsulosin 0.4 mg Cap  Commonly known as: FLOMAX  Take 1 capsule (0.4 mg total) by mouth once daily.            STOP taking these medications      predniSONE 10 MG tablet  Commonly known as:  DELTASONE                Immunizations Administered as of 12/29/2023       Name Date Dose VIS Date Route Exp Date    COVID-19, MRNA, LN-S, PF (Pfizer) (Purple Cap) 10/21/2021 -- -- Intramuscular --    Site: Left arm     : [employer]     Lot: [id number]     External: MyChart Entered     COVID-19, MRNA, LN-S, PF (Pfizer) (Purple Cap) 1/31/2021  8:09 AM 0.3 mL 12/12/2020 Intramuscular 5/31/2021    Site: Left deltoid     Given By: Carlito Muniz LPN     : [employer]     Lot: [id number]     COVID-19, MRNA, LN-S, PF (Pfizer) (Purple Cap) 1/10/2021  8:06 AM 0.3 mL 12/12/2020 Intramuscular 3/31/2021    Site: Left deltoid     Given By: Neelam Rivera, RN     : [employer]     Lot: [id number]             This patient has had both covid vaccinations    Some patients may experience side effects after vaccination.  These may include fever, headache, muscle or joint aches.  Most symptoms resolve with 24-48 hours and do not require urgent medical evaluation unless they persist for more than 72 hours or symptoms are concerning for an unrelated medical condition.          _________________________________  Nain Russell MD  12/29/2023

## 2023-12-29 NOTE — PLAN OF CARE
Carolann spoke with Yancy at [facility] at , provided Covid, PPD, FLU/RSV SARS results, provided auth number. Waiting on SNF orders, Yancy is contacting family for paperwork. Carolann to follow.

## 2023-12-29 NOTE — NURSING
Patient discharged to [facility]. Report called to [phone]. IV discontinued with catheter tip intact. Patient transported off unit via stretcher by Dung with walls intact.

## 2023-12-30 NOTE — DISCHARGE SUMMARY
[facility]  Discharge Summary      Patient Name: Narciso Yanez  MRN: [id number]  ALESSANDRO: [id number]  Patient Class: IP- Inpatient  Admission Date: 12/20/2023  Hospital Length of Stay: 9 days  Discharge Date and Time: 12/29/23  Attending Physician: No att. 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 This medication has been on hold for a few days pending a biopsy, however, patient developed worsening hallucinations and mental status changes for several days. Patient denies having any complaints.     * No surgery found *      Hospital Course:   The ED, the patient was hemodynamically stable.  Labs were remarkable for hyperkalemia (6.9), normocytic anemia (10.2), elevated creatinine (2.5-baseline of 1.3), elevated BNP (399), elevated troponin (0.053). VBG showed a pH of 7.31, pCO2 of 45.1. UA showed +3 leukocytes, > 100 WBCs, moderate bacteria.  EKG showed showed peaked t-waves. Chest x-ray showed no acute process.  Patient was given insulin/D10, albuterol, calcium gluconate, Lasix 80 mg IV, morphine 2 mg IV, Zofran 4 mg IV. Patient was admitted for further management.     Admitted with acute encephalopathy, agitation and hyperkalemia. Also found to have UTI. Hyperkalemia improved, encephalopathy slowly improving. On IV antibiotics. More alert and awake, still very encephlopathic but redirectable.  PT/OT consulted. Neurology consulted for evaluation of recent tremors, confusion.     Patient mental status appears to have improved back to baseline. Speaking fluently. Knows month and year. Answering questions appropriately and using dates in speech. Cardiology recs for short run of Vtach were that episode likely stimulated from infection.  To follow up outpatient with Cardiology and continue beta-blocker.  UTI treated with ceftriaxone for 7 day course.  Patient transferred to [facility] for evaluation of rash.  Patient underwent punch biopsy with Dermatology on 12/25.  Skin wound care ordered per Dermatology recommendations.  Patient will need outpatient follow-up with Dermatology.  Placement to skilled nursing facility pending. Patient deemed ready for discharge. Plan discussed with pt, who was agreeable and amenable; medications were discussed and reviewed, outpatient follow-up arranged, ER precautions were given, all questions were answered to the pt's satisfaction, and Narciso Yanez  was subsequently discharged.       Goals of Care Treatment Preferences:  Code Status: DNR          What is most important right now is to focus on spending time at home, avoiding the hospital, remaining as independent as possible, symptom/pain control, comfort and QOL .  Accordingly, we have decided that the best plan to meet the patient's goals includes continuing with treatment.      Consults:   Consults (From admission, onward)          Status Ordering Provider     Inpatient consult to Midline team  Once        Provider:  (Not yet assigned)    Completed EIMLY FRANCO     Inpatient consult to Dermatology  Once        Provider:  (Not yet assigned)    Completed MACARIO UREÑA     Inpatient consult to Cardiology  Once        Provider:  (Not yet assigned)    Completed JOSE URIOSTEGUI consult to case management  Once        Provider:  (Not yet assigned)    Completed SYLVIA HOFF III     Inpatient consult to  Neurology  Once        Provider:  (Not yet assigned)    Completed TIAGO QUINTANA     Inpatient consult to Palliative Care  Once        Provider:  (Not yet assigned)    Completed TIAGO QUINTANA            No new Assessment & Plan notes have been filed under this hospital service since the last note was generated.  Service: Hospital Medicine    Final Active Diagnoses:    Diagnosis Date Noted POA    PRINCIPAL PROBLEM:  Encephalopathy, metabolic [G93.41] 12/20/2023 Yes    Urinary retention [R33.9] 12/27/2023 Yes    NSVT (nonsustained ventricular tachycardia) [I47.29] 12/24/2023 No    Hyperkalemia [E87.5] 12/20/2023 Yes    Dermatitis, unspecified [L30.9] 12/20/2023 Yes    UTI (urinary tract infection) [N39.0] 10/09/2023 Yes    Chronic bilateral low back pain with bilateral sciatica [M54.42, M54.41, G89.29] 07/08/2022 Yes    Hyperlipidemia [E78.5] 06/09/2021 Yes    Goals of care, counseling/discussion [Z71.89] 05/26/2021 Not Applicable    Normal pressure hydrocephalus [G91.2] 05/19/2021 Yes    LATRICE (acute kidney injury) [N17.9] 04/02/2021 Yes    Essential hypertension [I10] 06/09/2020 Yes    COPD (chronic obstructive pulmonary disease) [J44.9] 03/29/2019 Yes    PAD (peripheral artery disease) [I73.9] 11/13/2017 Yes    Diabetes mellitus with peripheral circulatory disorder [E11.51] 06/18/2014 Yes    Coronary artery disease involving coronary bypass graft of native heart without angina pectoris [I25.810] 10/12/2012 Yes      Problems Resolved During this Admission:       Discharged Condition: good    Disposition: Skilled Nursing Facility    Follow Up:   Follow-up Information       Samuel Calderon MD Follow up.    Specialty: Cardiology  Why: Please call to schedule follow-up appointment to be seen within 2 weeks.  Contact information:  [address]  [address]  [place] [id number] [phone]               Samuel Calderon MD .    Specialty: Cardiology  Contact information:  [address] [place] [address]  [place]  [address]  [phone]               Marcelino Del Toro MD Follow up.    Specialty: Internal Medicine  Why: Please call to schedule follow-up appointment to be seen with 1 week.  Contact information:  [place] [id number] [phone]                           Patient Instructions:      Ambulatory referral/consult to Cardiology   Standing Status: Future   Referral Priority: Routine Referral Type: Consultation   Referral Reason: Specialty Services Required   Requested Specialty: Cardiology   Number of Visits Requested: 1     Ambulatory referral/consult to Internal Medicine   Standing Status: Future   Referral Priority: Routine Referral Type: Consultation   Referral Reason: Specialty Services Required   Requested Specialty: Internal Medicine   Number of Visits Requested: 1     Ambulatory referral/consult to Dermatology   Standing Status: Future   Referral Priority: Routine Referral Type: Consultation   Referral Reason: Specialty Services Required   Requested Specialty: Dermatology   Number of Visits Requested: 1       Significant Diagnostic Studies: N/A    Pending Diagnostic Studies:       Procedure Component Value Units Date/Time    EKG 12-lead [[id number]]     Order Status: Sent Lab Status: No result     Specimen to Pathology, Surgery Dermatology and skin neoplasms [[id number]] Collected: 12/25/23 1309    Order Status: Sent Lab Status: In process Updated: 12/26/23 1244    Specimen: Tissue            Medications:  Reconciled Home Medications:      Medication List        CHANGE how you take these medications      divalproex 125 MG EC tablet  Commonly known as: DEPAKOTE  Take 250 mg daily and 500 mg nightly  What changed:   how to take this  additional instructions     triamcinolone acetonide 0.1% 0.1 % cream  Commonly known as: KENALOG  Apply topically 2 (two) times daily. Apply to rash  What changed: additional instructions            CONTINUE taking these medications      acetaminophen 500 MG  tablet  Commonly known as: TYLENOL  Take 1 tablet (500 mg total) by mouth every 6 (six) hours as needed for Pain.     aspirin 81 MG EC tablet  Commonly known as: ECOTRIN  Take 1 tablet (81 mg total) by mouth once daily.     atorvastatin 40 MG tablet  Commonly known as: LIPITOR  Take 1 tablet (40 mg total) by mouth once daily.     blood sugar diagnostic Strp  Commonly known as: TRUE METRIX GLUCOSE TEST STRIP  TID     clopidogreL 75 mg tablet  Commonly known as: PLAVIX  Take 1 tablet (75 mg total) by mouth once daily.     metFORMIN 500 MG tablet  Commonly known as: GLUCOPHAGE  Take 1 tablet (500 mg total) by mouth 2 (two) times daily with meals. HOLD FOR GLUCOSE LESS 100     metoprolol succinate 25 MG 24 hr tablet  Commonly known as: TOPROL-XL  Take 1 tablet (25 mg total) by mouth once daily.     mometasone 0.1% 0.1 % cream  Commonly known as: ELOCON  Apply topically 2 (two) times daily. Apply to arms ,hands, and legs BID     tamsulosin 0.4 mg Cap  Commonly known as: FLOMAX  Take 1 capsule (0.4 mg total) by mouth once daily.            STOP taking these medications      predniSONE 10 MG tablet  Commonly known as: DELTASONE              Indwelling Lines/Drains at time of discharge:   Lines/Drains/Airways       Drain  Duration                  Urethral Catheter 12/21/23 0754 9 days                    Time spent on the discharge of patient: 35 minutes         Nain Russell MD  Department of Hospital Medicine  [facility]

## 2024-01-01 ENCOUNTER — PATIENT MESSAGE (OUTPATIENT)
Dept: FAMILY MEDICINE | Facility: CLINIC | Age: 89
End: 2024-01-01
Payer: MEDICARE

## 2024-01-01 DIAGNOSIS — R30.0 DYSURIA: Primary | ICD-10-CM

## 2024-01-02 DIAGNOSIS — E11.65 UNCONTROLLED TYPE 2 DIABETES MELLITUS WITH HYPERGLYCEMIA: ICD-10-CM

## 2024-01-02 NOTE — TELEPHONE ENCOUNTER
No care due was identified.  [facility] Embedded Care Due Messages. Reference number: [id number].   1/02/2024 10:40:20 AM CST

## 2024-01-05 ENCOUNTER — PATIENT MESSAGE (OUTPATIENT)
Dept: HEMATOLOGY/ONCOLOGY | Facility: CLINIC | Age: 89
End: 2024-01-05
Payer: MEDICARE

## 2024-01-12 ENCOUNTER — TELEPHONE (OUTPATIENT)
Dept: NEUROLOGY | Facility: CLINIC | Age: 89
End: 2024-01-12
Payer: MEDICARE

## 2024-01-12 NOTE — TELEPHONE ENCOUNTER
Spoke to pt to help reschedule 2/29/24 since provider will be out of clinic. A VM was left to call back

## 2024-01-16 ENCOUNTER — PATIENT MESSAGE (OUTPATIENT)
Dept: FAMILY MEDICINE | Facility: CLINIC | Age: 89
End: 2024-01-16
Payer: MEDICARE

## 2024-01-16 ENCOUNTER — TELEPHONE (OUTPATIENT)
Dept: PODIATRY | Facility: CLINIC | Age: 89
End: 2024-01-16
Payer: MEDICARE

## 2024-01-16 NOTE — TELEPHONE ENCOUNTER
No care due was identified.  [facility] Embedded Care Due Messages. Reference number: [id number].   1/16/2024 4:24:26 PM CST

## 2024-01-17 ENCOUNTER — PATIENT MESSAGE (OUTPATIENT)
Dept: PODIATRY | Facility: CLINIC | Age: 89
End: 2024-01-17
Payer: MEDICARE

## 2024-01-17 ENCOUNTER — PATIENT OUTREACH (OUTPATIENT)
Dept: ADMINISTRATIVE | Facility: OTHER | Age: 89
End: 2024-01-17
Payer: MEDICARE

## 2024-01-17 ENCOUNTER — HOSPITAL ENCOUNTER (INPATIENT)
Facility: HOSPITAL | Age: 89
LOS: 4 days | Discharge: HOSPICE | DRG: 193 | End: 2024-01-21
Attending: STUDENT IN AN ORGANIZED HEALTH CARE EDUCATION/TRAINING PROGRAM | Admitting: INTERNAL MEDICINE
Payer: MEDICARE

## 2024-01-17 DIAGNOSIS — N17.9 AKI (ACUTE KIDNEY INJURY): ICD-10-CM

## 2024-01-17 DIAGNOSIS — T68.XXXA HYPOTHERMIA, INITIAL ENCOUNTER: Primary | ICD-10-CM

## 2024-01-17 DIAGNOSIS — E87.5 HYPERKALEMIA: ICD-10-CM

## 2024-01-17 DIAGNOSIS — R41.82 AMS (ALTERED MENTAL STATUS): ICD-10-CM

## 2024-01-17 PROBLEM — I95.9 HYPOTENSION: Status: ACTIVE | Noted: 2024-01-17

## 2024-01-17 PROBLEM — J18.9 PNEUMONIA: Status: ACTIVE | Noted: 2024-01-17

## 2024-01-17 LAB
ALBUMIN SERPL BCP-MCNC: 2.6 G/DL (ref 3.5–5.2)
ALLENS TEST: ABNORMAL
ALP SERPL-CCNC: 135 U/L (ref 55–135)
ALT SERPL W/O P-5'-P-CCNC: 28 U/L (ref 10–44)
ANION GAP SERPL CALC-SCNC: 12 MMOL/L (ref 8–16)
ANION GAP SERPL CALC-SCNC: 9 MMOL/L (ref 8–16)
ANION GAP SERPL CALC-SCNC: 9 MMOL/L (ref 8–16)
AST SERPL-CCNC: 28 U/L (ref 10–40)
BASOPHILS # BLD AUTO: 0.03 K/UL (ref 0–0.2)
BASOPHILS NFR BLD: 0.6 % (ref 0–1.9)
BILIRUB SERPL-MCNC: 0.2 MG/DL (ref 0.1–1)
BILIRUB UR QL STRIP: NEGATIVE
BNP SERPL-MCNC: 522 PG/ML (ref 0–99)
BUN SERPL-MCNC: 35 MG/DL (ref 6–30)
BUN SERPL-MCNC: 35 MG/DL (ref 8–23)
BUN SERPL-MCNC: 37 MG/DL (ref 8–23)
CALCIUM SERPL-MCNC: 8 MG/DL (ref 8.7–10.5)
CALCIUM SERPL-MCNC: 8.3 MG/DL (ref 8.7–10.5)
CHLORIDE SERPL-SCNC: 111 MMOL/L (ref 95–110)
CHLORIDE SERPL-SCNC: 111 MMOL/L (ref 95–110)
CHLORIDE SERPL-SCNC: 112 MMOL/L (ref 95–110)
CLARITY UR: CLEAR
CO2 SERPL-SCNC: 20 MMOL/L (ref 23–29)
CO2 SERPL-SCNC: 21 MMOL/L (ref 23–29)
COLOR UR: YELLOW
CORTIS SERPL-MCNC: 12.4 UG/DL
CREAT SERPL-MCNC: 1.4 MG/DL (ref 0.5–1.4)
CREAT SERPL-MCNC: 1.6 MG/DL (ref 0.5–1.4)
CREAT SERPL-MCNC: 1.6 MG/DL (ref 0.5–1.4)
DIFFERENTIAL METHOD BLD: ABNORMAL
EOSINOPHIL # BLD AUTO: 0.4 K/UL (ref 0–0.5)
EOSINOPHIL NFR BLD: 8.9 % (ref 0–8)
ERYTHROCYTE [DISTWIDTH] IN BLOOD BY AUTOMATED COUNT: 18.9 % (ref 11.5–14.5)
EST. GFR  (NO RACE VARIABLE): 42 ML/MIN/1.73 M^2
EST. GFR  (NO RACE VARIABLE): 49 ML/MIN/1.73 M^2
GLUCOSE SERPL-MCNC: 38 MG/DL (ref 70–110)
GLUCOSE SERPL-MCNC: 91 MG/DL (ref 70–110)
GLUCOSE SERPL-MCNC: 91 MG/DL (ref 70–110)
GLUCOSE UR QL STRIP: NEGATIVE
HCT VFR BLD AUTO: 44.3 % (ref 40–54)
HCT VFR BLD CALC: 32 %PCV (ref 36–54)
HGB BLD-MCNC: 14.2 G/DL (ref 14–18)
HGB UR QL STRIP: NEGATIVE
IMM GRANULOCYTES # BLD AUTO: 0.02 K/UL (ref 0–0.04)
IMM GRANULOCYTES NFR BLD AUTO: 0.4 % (ref 0–0.5)
KETONES UR QL STRIP: NEGATIVE
LEUKOCYTE ESTERASE UR QL STRIP: NEGATIVE
LIPASE SERPL-CCNC: 17 U/L (ref 4–60)
LYMPHOCYTES # BLD AUTO: 0.8 K/UL (ref 1–4.8)
LYMPHOCYTES NFR BLD: 16.3 % (ref 18–48)
MAGNESIUM SERPL-MCNC: 2 MG/DL (ref 1.6–2.6)
MCH RBC QN AUTO: 29.7 PG (ref 27–31)
MCHC RBC AUTO-ENTMCNC: 32.1 G/DL (ref 32–36)
MCV RBC AUTO: 93 FL (ref 82–98)
MONOCYTES # BLD AUTO: 0.4 K/UL (ref 0.3–1)
MONOCYTES NFR BLD: 7.4 % (ref 4–15)
NEUTROPHILS # BLD AUTO: 3.1 K/UL (ref 1.8–7.7)
NEUTROPHILS NFR BLD: 66.4 % (ref 38–73)
NITRITE UR QL STRIP: NEGATIVE
NRBC BLD-RTO: 0 /100 WBC
PH UR STRIP: 7 [PH] (ref 5–8)
PHOSPHATE SERPL-MCNC: 4.4 MG/DL (ref 2.7–4.5)
PLATELET # BLD AUTO: 149 K/UL (ref 150–450)
PMV BLD AUTO: 9.7 FL (ref 9.2–12.9)
POC IONIZED CALCIUM: 1.18 MMOL/L (ref 1.06–1.42)
POC TCO2 (MEASURED): 24 MMOL/L (ref 23–29)
POCT GLUCOSE: 117 MG/DL (ref 70–110)
POCT GLUCOSE: 147 MG/DL (ref 70–110)
POCT GLUCOSE: 58 MG/DL (ref 70–110)
POCT GLUCOSE: 96 MG/DL (ref 70–110)
POTASSIUM BLD-SCNC: 5.8 MMOL/L (ref 3.5–5.1)
POTASSIUM SERPL-SCNC: 4.9 MMOL/L (ref 3.5–5.1)
POTASSIUM SERPL-SCNC: 5.9 MMOL/L (ref 3.5–5.1)
PROCALCITONIN SERPL IA-MCNC: 0.11 NG/ML
PROT SERPL-MCNC: 6.5 G/DL (ref 6–8.4)
PROT UR QL STRIP: ABNORMAL
RBC # BLD AUTO: 4.78 M/UL (ref 4.6–6.2)
SAMPLE: ABNORMAL
SITE: ABNORMAL
SODIUM BLD-SCNC: 140 MMOL/L (ref 136–145)
SODIUM SERPL-SCNC: 141 MMOL/L (ref 136–145)
SODIUM SERPL-SCNC: 141 MMOL/L (ref 136–145)
SP GR UR STRIP: 1.01 (ref 1–1.03)
T4 FREE SERPL-MCNC: 0.69 NG/DL (ref 0.71–1.51)
TROPONIN I SERPL DL<=0.01 NG/ML-MCNC: <0.006 NG/ML (ref 0–0.03)
TSH SERPL DL<=0.005 MIU/L-ACNC: 8.13 UIU/ML (ref 0.4–4)
URN SPEC COLLECT METH UR: ABNORMAL
UROBILINOGEN UR STRIP-ACNC: NEGATIVE EU/DL
WBC # BLD AUTO: 4.73 K/UL (ref 3.9–12.7)

## 2024-01-17 PROCEDURE — 93005 ELECTROCARDIOGRAM TRACING: CPT | Mod: HCNC

## 2024-01-17 PROCEDURE — 84132 ASSAY OF SERUM POTASSIUM: CPT | Mod: HCNC

## 2024-01-17 PROCEDURE — 84100 ASSAY OF PHOSPHORUS: CPT | Mod: HCNC

## 2024-01-17 PROCEDURE — 84439 ASSAY OF FREE THYROXINE: CPT | Mod: HCNC | Performed by: STUDENT IN AN ORGANIZED HEALTH CARE EDUCATION/TRAINING PROGRAM

## 2024-01-17 PROCEDURE — 63600175 PHARM REV CODE 636 W HCPCS: Mod: HCNC | Performed by: INTERNAL MEDICINE

## 2024-01-17 PROCEDURE — 99900035 HC TECH TIME PER 15 MIN (STAT): Mod: HCNC

## 2024-01-17 PROCEDURE — 84295 ASSAY OF SERUM SODIUM: CPT | Mod: HCNC

## 2024-01-17 PROCEDURE — 84443 ASSAY THYROID STIM HORMONE: CPT | Mod: HCNC | Performed by: STUDENT IN AN ORGANIZED HEALTH CARE EDUCATION/TRAINING PROGRAM

## 2024-01-17 PROCEDURE — 84484 ASSAY OF TROPONIN QUANT: CPT | Mod: HCNC

## 2024-01-17 PROCEDURE — 82565 ASSAY OF CREATININE: CPT | Mod: HCNC

## 2024-01-17 PROCEDURE — 25000003 PHARM REV CODE 250: Mod: HCNC | Performed by: INTERNAL MEDICINE

## 2024-01-17 PROCEDURE — 84145 PROCALCITONIN (PCT): CPT | Mod: HCNC | Performed by: INTERNAL MEDICINE

## 2024-01-17 PROCEDURE — 85014 HEMATOCRIT: CPT | Mod: HCNC

## 2024-01-17 PROCEDURE — 83036 HEMOGLOBIN GLYCOSYLATED A1C: CPT | Mod: HCNC | Performed by: INTERNAL MEDICINE

## 2024-01-17 PROCEDURE — 80048 BASIC METABOLIC PNL TOTAL CA: CPT | Mod: HCNC,XB | Performed by: STUDENT IN AN ORGANIZED HEALTH CARE EDUCATION/TRAINING PROGRAM

## 2024-01-17 PROCEDURE — 82330 ASSAY OF CALCIUM: CPT | Mod: HCNC

## 2024-01-17 PROCEDURE — 85025 COMPLETE CBC W/AUTO DIFF WBC: CPT | Mod: HCNC

## 2024-01-17 PROCEDURE — 83690 ASSAY OF LIPASE: CPT | Mod: HCNC | Performed by: STUDENT IN AN ORGANIZED HEALTH CARE EDUCATION/TRAINING PROGRAM

## 2024-01-17 PROCEDURE — 25000003 PHARM REV CODE 250: Mod: HCNC

## 2024-01-17 PROCEDURE — 63600175 PHARM REV CODE 636 W HCPCS: Mod: HCNC | Performed by: STUDENT IN AN ORGANIZED HEALTH CARE EDUCATION/TRAINING PROGRAM

## 2024-01-17 PROCEDURE — 25500020 PHARM REV CODE 255: Mod: HCNC | Performed by: STUDENT IN AN ORGANIZED HEALTH CARE EDUCATION/TRAINING PROGRAM

## 2024-01-17 PROCEDURE — 93010 ELECTROCARDIOGRAM REPORT: CPT | Mod: HCNC,,, | Performed by: INTERNAL MEDICINE

## 2024-01-17 PROCEDURE — 83880 ASSAY OF NATRIURETIC PEPTIDE: CPT | Mod: HCNC

## 2024-01-17 PROCEDURE — 20000000 HC ICU ROOM: Mod: HCNC

## 2024-01-17 PROCEDURE — 25000003 PHARM REV CODE 250: Mod: HCNC | Performed by: STUDENT IN AN ORGANIZED HEALTH CARE EDUCATION/TRAINING PROGRAM

## 2024-01-17 PROCEDURE — 82533 TOTAL CORTISOL: CPT | Mod: HCNC | Performed by: STUDENT IN AN ORGANIZED HEALTH CARE EDUCATION/TRAINING PROGRAM

## 2024-01-17 PROCEDURE — 83735 ASSAY OF MAGNESIUM: CPT | Mod: HCNC

## 2024-01-17 PROCEDURE — 80053 COMPREHEN METABOLIC PANEL: CPT | Mod: HCNC

## 2024-01-17 PROCEDURE — 87040 BLOOD CULTURE FOR BACTERIA: CPT | Mod: 59,HCNC | Performed by: INTERNAL MEDICINE

## 2024-01-17 PROCEDURE — 81003 URINALYSIS AUTO W/O SCOPE: CPT | Mod: HCNC | Performed by: STUDENT IN AN ORGANIZED HEALTH CARE EDUCATION/TRAINING PROGRAM

## 2024-01-17 RX ORDER — GLUCAGON 1 MG
1 KIT INJECTION
Status: DISCONTINUED | OUTPATIENT
Start: 2024-01-17 | End: 2024-01-21 | Stop reason: HOSPADM

## 2024-01-17 RX ORDER — IBUPROFEN 200 MG
1 TABLET ORAL DAILY
Status: DISCONTINUED | OUTPATIENT
Start: 2024-01-18 | End: 2024-01-21 | Stop reason: HOSPADM

## 2024-01-17 RX ORDER — METOPROLOL SUCCINATE 25 MG/1
25 TABLET, EXTENDED RELEASE ORAL DAILY
Qty: 30 TABLET | Refills: 2
Start: 2024-01-17 | End: 2024-04-16

## 2024-01-17 RX ORDER — TALC
6 POWDER (GRAM) TOPICAL ONCE
Status: COMPLETED | OUTPATIENT
Start: 2024-01-18 | End: 2024-01-17

## 2024-01-17 RX ORDER — ATORVASTATIN CALCIUM 40 MG/1
40 TABLET, FILM COATED ORAL DAILY
Qty: 90 TABLET | Refills: 3 | Status: ON HOLD | OUTPATIENT
Start: 2024-01-17 | End: 2024-01-21 | Stop reason: HOSPADM

## 2024-01-17 RX ORDER — CLOPIDOGREL BISULFATE 75 MG/1
75 TABLET ORAL DAILY
Qty: 30 TABLET | Refills: 2
Start: 2024-01-17 | End: 2024-04-16

## 2024-01-17 RX ORDER — NOREPINEPHRINE BITARTRATE/D5W 4MG/250ML
PLASTIC BAG, INJECTION (ML) INTRAVENOUS
Status: COMPLETED
Start: 2024-01-17 | End: 2024-01-17

## 2024-01-17 RX ORDER — SODIUM CHLORIDE 0.9 % (FLUSH) 0.9 %
10 SYRINGE (ML) INJECTION
Status: DISCONTINUED | OUTPATIENT
Start: 2024-01-17 | End: 2024-01-21 | Stop reason: HOSPADM

## 2024-01-17 RX ORDER — FAMOTIDINE 20 MG/50ML
20 INJECTION, SOLUTION INTRAVENOUS DAILY
Status: DISCONTINUED | OUTPATIENT
Start: 2024-01-18 | End: 2024-01-19

## 2024-01-17 RX ORDER — CALCIUM GLUCONATE 20 MG/ML
1 INJECTION, SOLUTION INTRAVENOUS ONCE
Status: COMPLETED | OUTPATIENT
Start: 2024-01-17 | End: 2024-01-17

## 2024-01-17 RX ORDER — NOREPINEPHRINE BITARTRATE/D5W 4MG/250ML
0-3 PLASTIC BAG, INJECTION (ML) INTRAVENOUS CONTINUOUS
Status: DISCONTINUED | OUTPATIENT
Start: 2024-01-17 | End: 2024-01-18

## 2024-01-17 RX ADMIN — DEXTROSE MONOHYDRATE 250 ML: 100 INJECTION, SOLUTION INTRAVENOUS at 04:01

## 2024-01-17 RX ADMIN — SODIUM CHLORIDE, POTASSIUM CHLORIDE, SODIUM LACTATE AND CALCIUM CHLORIDE 500 ML: 600; 310; 30; 20 INJECTION, SOLUTION INTRAVENOUS at 07:01

## 2024-01-17 RX ADMIN — SODIUM ZIRCONIUM CYCLOSILICATE 10 G: 10 POWDER, FOR SUSPENSION ORAL at 04:01

## 2024-01-17 RX ADMIN — NOREPINEPHRINE BITARTRATE 0.04 MCG/KG/MIN: 4 INJECTION, SOLUTION INTRAVENOUS at 07:01

## 2024-01-17 RX ADMIN — IOHEXOL 75 ML: 350 INJECTION, SOLUTION INTRAVENOUS at 03:01

## 2024-01-17 RX ADMIN — Medication 6 MG: at 11:01

## 2024-01-17 RX ADMIN — NOREPINEPHRINE BITARTRATE 0.02 MCG/KG/MIN: 4 INJECTION, SOLUTION INTRAVENOUS at 06:01

## 2024-01-17 RX ADMIN — PIPERACILLIN AND TAZOBACTAM 4.5 G: 4; .5 INJECTION, POWDER, LYOPHILIZED, FOR SOLUTION INTRAVENOUS; PARENTERAL at 07:01

## 2024-01-17 RX ADMIN — CALCIUM GLUCONATE 1 G: 20 INJECTION, SOLUTION INTRAVENOUS at 07:01

## 2024-01-17 RX ADMIN — DEXTROSE MONOHYDRATE 250 ML: 100 INJECTION, SOLUTION INTRAVENOUS at 07:01

## 2024-01-17 RX ADMIN — VANCOMYCIN HYDROCHLORIDE 1250 MG: 1.25 INJECTION, POWDER, LYOPHILIZED, FOR SOLUTION INTRAVENOUS at 08:01

## 2024-01-17 RX ADMIN — INSULIN HUMAN 5 UNITS: 100 INJECTION, SOLUTION PARENTERAL at 04:01

## 2024-01-17 NOTE — Clinical Note
Diagnosis: AMS (altered mental status) [1048458]   Future Attending Provider: TIAGO QUINTANA [[id number]]   Bed request comments: floor 4   Reason for IP Medical Treatment  (Clinical interventions that can only be accomplished in the IP setting? ) :: hypothermia ams   I certify that Inpatient services for greater than or equal to 2 midnights are medically necessary:: Yes   Plans for Post-Acute care--if anticipated (pick the single best option):: A. No post acute care anticipated at this time

## 2024-01-17 NOTE — ED PROVIDER NOTES
Encounter Date: 1/17/2024    SCRIBE #1 NOTE: I, Dean Gironuyen, am scribing for, and in the presence of,  Jomar Fink MD.       History     Chief Complaint   Patient presents with    Altered Mental Status     Pt BIB daughter for evaluation of AMS since Sunday. She states that pt has not been himself.      Fall     Pt's daughter also reports that he fell last night and hit his head. Bruising noted above left eyebrow. She denies LOC, but endorses blood thinner use.      88 y.o. 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 Patient is currently reporting of pain to bilateral hands and a bruise to the L forehead. History provided by independent historian, patient's daughter reports the patient is more altered than normal. She reports having a video camera in the patient's room and reports that the patient would doze off and talk to family members who have already passed. Reports that this is typical when he has a UTI or hyperkalemia. She reports that the patient was recently admitted to [facility] for a UTI and hyperkalemia 12/2023. She further reports of an unwitnessed fall last night, noting of bruising to the L forehead. She also reports of a rash to bilateral hands and back. States that the patient is being followed by dermatology, Dr. Davidson.     The history is provided by the patient and a caregiver. No  was used.     Review of patient's allergies indicates:   Allergen Reactions    Demerol [meperidine] Nausea Only     Past Medical History:   Diagnosis Date    Actinic keratosis     Anxiety     B12 deficiency 12/8/2014    Dx 6/14    Cancer     skin    Cataract     Coronary artery disease     Diabetes mellitus type II     Diabetes mellitus with peripheral circulatory  disorder 6/18/2014    ED (erectile dysfunction)     Hyperlipidemia     Kidney stone     Neurogenic claudication 4/4/2022    Normocytic anemia 10/15/2023    Peripheral vascular disease     Spondylosis 3/29/2019    Tobacco dependence     Urinary incontinence 5/4/2021     Past Surgical History:   Procedure Laterality Date    APPENDECTOMY      CARDIAC SURGERY  01/1999    CABG 4 vessels    CATARACT EXTRACTION      EPIDURAL STEROID INJECTION Right 8/26/2020    Procedure: Injection, Steroid, Epidural Transforaminal;  Surgeon: Wiley Crane Jr., MD;  Location: [facility] ENDO;  Service: Pain Management;  Laterality: Right;  Right L5 + S1 TF LEAH  Arrive @ 1115; ASA & Plavix last 8/18; Check BG; Rapid COVID test    EPIDURAL STEROID INJECTION Right 11/25/2020    Procedure: Injection, Steroid, Epidural Transformainal;  Surgeon: Wiley Crane Jr., MD;  Location: [facility] ENDO;  Service: Pain Management;  Laterality: Right;  Right L5 + S1 TF LEAH  Arrive @ 1245; ASA and Plavix last 11/17; Pre-DM; Needs MD Sign.    EPIDURAL STEROID INJECTION Right 2/19/2021    Procedure: Injection, Steroid, Epidural Transforaminal;  Surgeon: Wiley Crane Jr., MD;  Location: [facility] ENDO;  Service: Pain Management;  Laterality: Right;  Right L5 + S1 TF LEAH  Arrive @ 1115; ASA & Plavix last 2/11; Check BG; Needs Consent    EXTERNAL EAR SURGERY      EYE SURGERY      cataracts    ILIAC ARTERY STENT Bilateral 06/2003    also Right External Iliac stent     Family History   Problem Relation Age of Onset    Stroke Mother      Social History     Tobacco Use    Smoking status: Every Day     Current packs/day: 1.50     Average packs/day: 1.5 packs/day for 71.0 years (106.5 ttl pk-yrs)     Types: Cigarettes    Smokeless tobacco: Former   Substance Use Topics    Alcohol use: No    Drug use: No     Review of Systems    Physical Exam     Initial Vitals [01/17/24 1315]   BP Pulse Resp Temp SpO2   136/63 71 20 (S) (!) 90.3 °F (32.4 °C) 95 %      MAP       --          Physical Exam    Nursing note and vitals reviewed.  Constitutional: He appears well-developed. He is not diaphoretic. No distress.   HENT:   Head: Normocephalic and atraumatic.   Eyes: Conjunctivae and EOM are normal. Pupils are equal, round, and reactive to light.   Neck: Neck supple.   Normal range of motion.  Cardiovascular:  Normal rate, regular rhythm, normal heart sounds and intact distal pulses.           No murmur heard.  Pulmonary/Chest: No respiratory distress. He has no wheezes. He has no rhonchi. He has no rales.   Abdominal:   Soft but distended abdomen with diffuse tender to palpation.  Voluntary guarding There is no rebound.   Musculoskeletal:         General: No tenderness or edema.      Cervical back: Normal range of motion and neck supple.     Neurological: He is alert and oriented to person, place, and time. GCS eye subscore is 4. GCS verbal subscore is 5. GCS motor subscore is 6.   Skin: Skin is warm and dry. Capillary refill takes less than 2 seconds.         ED Course   Procedures  Labs Reviewed   CBC W/ AUTO DIFFERENTIAL - Abnormal; Notable for the following components:       Result Value    RDW 18.9 (*)     Platelets 149 (*)     Lymph # 0.8 (*)     Lymph % 16.3 (*)     Eosinophil % 8.9 (*)     All other components within normal limits   COMPREHENSIVE METABOLIC PANEL - Abnormal; Notable for the following components:    Potassium 5.9 (*)     Chloride 111 (*)     CO2 21 (*)     BUN 37 (*)     Creatinine 1.6 (*)     Calcium 8.3 (*)     Albumin 2.6 (*)     eGFR 42 (*)     All other components within normal limits   B-TYPE NATRIURETIC PEPTIDE - Abnormal; Notable for the following components:     (*)     All other components within normal limits   URINALYSIS, REFLEX TO URINE CULTURE - Abnormal; Notable for the following components:    Protein, UA Trace (*)     All other components within normal limits    Narrative:     Specimen Source->Urine   TSH - Abnormal; Notable for the following  components:    TSH 8.132 (*)     All other components within normal limits   BASIC METABOLIC PANEL - Abnormal; Notable for the following components:    Chloride 112 (*)     CO2 20 (*)     Glucose 38 (*)     BUN 35 (*)     Calcium 8.0 (*)     eGFR 49 (*)     All other components within normal limits    Narrative:     GLUCOSE critical result(s) called and verbal readback obtained from   JERMAINE STEVENS  by LB1 01/17/2024 18:38   T4, FREE - Abnormal; Notable for the following components:    Free T4 0.69 (*)     All other components within normal limits   ISTAT PROCEDURE - Abnormal; Notable for the following components:    POC BUN 35 (*)     POC Creatinine 1.6 (*)     POC Potassium 5.8 (*)     POC Chloride 111 (*)     POC Hematocrit 32 (*)     All other components within normal limits   POCT GLUCOSE - Abnormal; Notable for the following components:    POCT Glucose 58 (*)     All other components within normal limits   PHOSPHORUS   MAGNESIUM   TROPONIN I   LIPASE   HEMOGLOBIN A1C   POCT GLUCOSE   POCT GLUCOSE MONITORING CONTINUOUS   ISTAT CHEM8   POCT GLUCOSE MONITORING CONTINUOUS          Imaging Results              CT Head Without Contrast (Final result)  Result time 01/17/24 16:27:41      Final result by Jairo Sullivan MD (01/17/24 16:27:41)                   Impression:      1.  No acute intracranial process.    2.  Stable appearance of right parietal approach ventriculostomy shunt catheter.  No evidence of hydrocephalus or extra-axial fluid collection.      Electronically signed by: Jairo Sullivan MD  Date:    01/17/2024  Time:    16:27               Narrative:    EXAMINATION:  CT HEAD WITHOUT CONTRAST    CLINICAL HISTORY:  Head trauma, minor (Age >= 65y);    TECHNIQUE:  Low dose axial images were obtained through the head.  Coronal and sagittal reformations were also performed. Contrast was not administered.    COMPARISON:  CT head dated 12/20/2023.    CT head dated 10/14/2023.    FINDINGS:  here is stable right  parietal ventriculostomy shunt catheter with the tip terminating within the body of the left lateral ventricle.  The configuration of the ventricular system is unchanged with no evidence of hydrocephalus.    There is no evidence of intracranial hemorrhage.  There are no extra-axial fluid collections.  There are extensive hypodensities within the periventricular and subcortical white matter.  The gray-white differentiation is maintained.  There is no dense vessel sign.  There is no evidence of mass effect.                                       CT Cervical Spine Without Contrast (Final result)  Result time 01/17/24 16:41:51      Final result by Jairo Sullivan MD (01/17/24 16:41:51)                   Impression:      No evidence of acute fracture or traumatic malalignment of the cervical spine.    Multilevel degenerative changes in the cervical spine.    Chronic interstitial lung disease in the lung apices.      Electronically signed by: Jairo Sullivan MD  Date:    01/17/2024  Time:    16:41               Narrative:    EXAMINATION:  CT CERVICAL SPINE WITHOUT CONTRAST    CLINICAL HISTORY:  Neck trauma (Age >= 65y);    TECHNIQUE:  Low dose axial images, sagittal and coronal reformations were performed though the cervical spine.  Contrast was not administered.    COMPARISON:  09/15/2023.    FINDINGS:  The craniocervical junction is intact.  The predental space is maintained.  No prevertebral soft tissue swelling is identified.    There is straightening of normal cervical lordosis.  The vertebral body heights are maintained.  There is hypertrophy of the posterior elements.  The C1 ring is intact.  There is intervertebral disc space narrowing.  There is variable spinal canal and neural foraminal narrowing.    There is a partially visualized right ventriculostomy shunt catheter.  There is no evidence of discontinuity of the catheter.  There are calcifications of the neck vessels.    There are fibrotic changes in changes with  chronic interstitial lung disease in the lung apices.  No apical pneumothorax.                                       CT Abdomen Pelvis With IV Contrast NO Oral Contrast (Final result)  Result time 01/17/24 16:21:00      Final result by Zeferino Hopkins MD (01/17/24 16:21:00)                   Impression:      1. Findings suggesting hepatic steatosis, correlation with LFTs recommended.  2. Moderate stool in the colon noting colonic diverticulosis without diverticulitis.  3. Prostatomegaly, correlation with PSA recommended.  4. Cholelithiasis without secondary findings to suggest acute cholecystitis.  5. Cystic structure within the right kidney, may reflect adjacent cysts or septated cyst.  Nonemergent ultrasound recommended for further evaluation and follow-up.  6. Please see above for several additional findings.      Electronically signed by: Zeferino Hopkins MD  Date:    01/17/2024  Time:    16:21               Narrative:    EXAMINATION:  CT ABDOMEN PELVIS WITH IV CONTRAST    CLINICAL HISTORY:  Bowel obstruction suspected;    TECHNIQUE:  Low dose axial images, sagittal and coronal reformations were obtained from the lung bases to the pubic symphysis following the IV administration of 75 mL of Omnipaque 350 .  Oral contrast was not given.    COMPARISON:  CT 10/14/2023    FINDINGS:  Images of the lower thorax are remarkable for bilateral dependent atelectasis noting motion artifact limits evaluation.  There is a nodular focus within the right lower lobe measuring 6-7 mm, unchanged since examination 03/31/2021.  There is scattered interlobular septal thickening suggesting edema.    Allowing for motion artifact, the liver is somewhat hypoattenuating, possibly reflecting steatosis, correlation with LFTs recommended.  The spleen and right adrenal gland are unremarkable.  There is nonspecific thickening of the left adrenal gland.  There is atrophic change of the pancreas without pancreatic ductal dilation.  There is  cholelithiasis without secondary findings to suggest acute cholecystitis.  The portal vein, splenic vein, SMV, celiac axis and SMA all are patent.  No significant abdominal lymphadenopathy.    The kidneys enhance symmetrically.  There is renal vascular calcification.  There are subcentimeter low attenuating lesions within the kidneys bilaterally, too small for characterization.  There is a cyst within the interpolar region of the left kidney measuring 1.2 cm.  There is a septated cyst versus adjacent cysts within the lower pole of the right kidney, in conglomerate measure up to 4.6 cm.  There is calcification along a cyst wall or septation within the structure.  The bilateral ureters are unremarkable without calculi seen.  The urinary bladder is unremarkable.  The prostate is enlarged.    There are a few scattered colonic diverticula without inflammation to suggest diverticulitis.  The terminal ileum is unremarkable.  The appendix is not confidently identified, no pericecal inflammation.  The small bowel is grossly unremarkable.  There are a few scattered shotty periaortic, pericaval, and mesenteric lymph nodes.  There is atherosclerotic plaque and calcification throughout the aorta and its branches.  There is an aorto bi-iliac stent graft, patent.  There are bilateral fat containing inguinal hernias.    There is osteopenia.  There are degenerative changes of the spine.  No significant inguinal lymphadenopathy.  There is a shunt catheter along the right peritoneal wall.                                       X-Ray Chest AP Portable (Final result)  Result time 01/17/24 14:06:10   Procedure changed from X-Ray Chest PA And Lateral     Final result by Wiley Palacio MD (01/17/24 14:06:10)                   Impression:      See above      Electronically signed by: Wiley Palacio MD  Date:    01/17/2024  Time:    14:06               Narrative:    EXAMINATION:  XR CHEST AP PORTABLE    CLINICAL HISTORY:  AMS;  Altered mental status, unspecified    TECHNIQUE:  Single frontal view of the chest was performed.    COMPARISON:  12/20/2023    FINDINGS:  Cardiac size is slightly larger than on recent exam.  Wire sutures seen in the sternum.  There appears to be some diffuse interstitial change in both lung fields that is new since previous radiographs this could represent a little cardiac failure or diffuse developing infiltrate.                                       Medications   NORepinephrine 4 mg in dextrose 5% 250 mL infusion (premix) (has no administration in time range)   glucagon (human recombinant) injection 1 mg (has no administration in time range)   dextrose 10% bolus 125 mL 125 mL (has no administration in time range)   dextrose 10% bolus 250 mL 250 mL (has no administration in time range)   dextrose 10% bolus 250 mL 250 mL (0 mLs Intravenous Stopped 1/17/24 1735)   insulin regular injection 5 Units 0.05 mL (5 Units Intravenous Given 1/17/24 1627)   sodium zirconium cyclosilicate packet 10 g (10 g Oral Given 1/17/24 1627)   iohexoL (OMNIPAQUE 350) injection 75 mL (75 mLs Intravenous Given 1/17/24 1531)     Medical Decision Making  Differential diagnoses considered includes UTI, electrolyte abnormality, hyponatremia, hyperkalemia, LATRICE, pneumonia, ICH/SH, hypothyroidism    See ED course for additional information    Amount and/or Complexity of Data Reviewed  Independent Historian: caregiver     Details: See HPI.   External Data Reviewed: notes.     Details: Patient was admitted to [facility] on 12/20/23 - 12/29/23. Hospital course is as follows at time of discharge:  The ED, the patient was hemodynamically stable.  Labs were remarkable for hyperkalemia (6.9), normocytic anemia (10.2), elevated creatinine (2.5-baseline of 1.3), elevated BNP (399), elevated troponin (0.053). VBG showed a pH of 7.31, pCO2 of 45.1. UA showed +3 leukocytes, > 100 WBCs, moderate bacteria.  EKG showed showed peaked t-waves. Chest x-ray  showed no acute process.  Patient was given insulin/D10, albuterol, calcium gluconate, Lasix 80 mg IV, morphine 2 mg IV, Zofran 4 mg IV. Patient was admitted for further management.      Admitted with acute encephalopathy, agitation and hyperkalemia. Also found to have UTI. Hyperkalemia improved, encephalopathy slowly improving. On IV antibiotics. More alert and awake, still very encephlopathic but redirectable. PT/OT consulted. Neurology consulted for evaluation of recent tremors, confusion.      Patient mental status appears to have improved back to baseline. Speaking fluently. Knows month and year. Answering questions appropriately and using dates in speech. Cardiology recs for short run of Vtach were that episode likely stimulated from infection.  To follow up outpatient with Cardiology and continue beta-blocker.  UTI treated with ceftriaxone for 7 day course.  Patient transferred to [facility] for evaluation of rash.  Patient underwent punch biopsy with Dermatology on 12/25.  Skin wound care ordered per Dermatology recommendations.  Patient will need outpatient follow-up with Dermatology.  Placement to skilled nursing facility pending. Patient deemed ready for discharge. Plan discussed with pt, who was agreeable and amenable; medications were discussed and reviewed, outpatient follow-up arranged, ER precautions were given, all questions were answered to the pt's satisfaction, and Narciso Yanez  was subsequently discharged.    Labs: ordered. Decision-making details documented in ED Course.  Radiology: ordered and independent interpretation performed. Decision-making details documented in ED Course.  ECG/medicine tests: ordered and independent interpretation performed. Decision-making details documented in ED Course.    Risk  OTC drugs.  Prescription drug management.  Decision regarding hospitalization.  Diagnosis or treatment significantly limited by social determinants of health.            Scribe  Attestation:   Scribe #1: I performed the above scribed service and the documentation accurately describes the services I performed. I attest to the accuracy of the note.        ED Course as of 01/17/24 1850   Wed Jan 17, 2024   1443 EKG 12-lead  EKG independently interpreted by me shows normal sinus rhythm at a rate of 67, no STEMI, normal intervals, overall unremarkable [BD]   1444 X-Ray Chest AP Portable  No focal pneumonia, possible developing edema [BD]   1649 Patient's temperature is 90.2°. Will place on linda hugger [BD]   1702 CT Abdomen Pelvis With IV Contrast NO Oral Contrast  Moderate constipation but no acute surgical process. Renal cyst that needs outpatient follow-up [BD]   1703 CT Head Without Contrast  CT Head unremarkable without evidence of large-vessel infarct or intracranial hemorrhage   [BD]   1703 CT Cervical Spine Without Contrast  No acute fracture or dislocation [BD]   1703 POCT Glucose: 96  Normal [BD]   1703 CBC auto differential(!)  Mild thrombocytopenia without leukocytosis or anemia [BD]   1703 Comprehensive metabolic panel(!)  Mild LATRICE with creatinine 1.6, up from baseline of 1.1.  Hyperkalemia 5.9.  Patient is already being shifted with insulin and Lokelma [BD]   1704 Lipase: 17  Inconsistent pancreatitis [BD]   1704 Magnesium : 2.0  Normal [BD]   1704 Troponin I: <0.006  ACS unlikely [BD]   1704 BNP(!): 522  Concerning for possible CHF given questionable edema on exam [BD]   1704 Urinalysis, Reflex to Urine Culture Urine, Catheterized(!)  UA unremarkable without evidence of infection or hematuria   [BD]   1806 Discussed the case with Hospital Medicine who will admit the patient to their service for hypothermia, LATRICE, and hyperkalemia.  Repeat BNP still pending.    Procedure: Critical Care  Please put in 30 minutes of critical care due to patient having a high risk of neurological failure.        [BD]      ED Course User Index  [BD] Jomar Fink MD                           I, Jomar  DREA Fink MD, personally performed the services described in this documentation. All medical record entries made by the scribe were at my direction and in my presence. I have reviewed the chart and agree that the record reflects my personal performance and is accurate and complete.              Clinical Impression:  Final diagnoses:  [R41.82] AMS (altered mental status)  [T68.XXXA] Hypothermia, initial encounter (Primary)  [N17.9] LATRICE (acute kidney injury)  [E87.5] Hyperkalemia          ED Disposition Condition    Admit Stable                Jomar Fink MD  01/17/24 8280

## 2024-01-17 NOTE — PROGRESS NOTES
CHW - Initial Contact    This Community Health Worker updated and verified the Social Determinant of Health questionnaire with patient  and relative/caregiver at bedside   today.    Pt identified barriers of most importance are: food insecurities.   Referrals to community agencies completed with patient/caregiver consent outside of [facility] include: yes: [email or web address].  Referrals were put through [facility] - yes: Patient services.  Support and Services: has support with daughters/caregivers.  Other information discussed the patient needs / wants help with: Updated and verified SDOH with patient and relative/caregiver at bedside today. Pt has food insecurities. Will follow up in one week to check status of referrals and pt's future needs.   Follow up required: yes.  Follow-up Outreach - Due: 1/23/2024

## 2024-01-18 PROBLEM — G93.41 ACUTE METABOLIC ENCEPHALOPATHY: Status: ACTIVE | Noted: 2024-01-17

## 2024-01-18 PROBLEM — R57.9 SHOCK: Status: ACTIVE | Noted: 2024-01-17

## 2024-01-18 LAB
ALBUMIN SERPL BCP-MCNC: 2.6 G/DL (ref 3.5–5.2)
ALP SERPL-CCNC: 96 U/L (ref 55–135)
ALT SERPL W/O P-5'-P-CCNC: 24 U/L (ref 10–44)
ANION GAP SERPL CALC-SCNC: 9 MMOL/L (ref 8–16)
AST SERPL-CCNC: 28 U/L (ref 10–40)
BASOPHILS # BLD AUTO: 0.03 K/UL (ref 0–0.2)
BASOPHILS # BLD AUTO: 0.04 K/UL (ref 0–0.2)
BASOPHILS NFR BLD: 0.4 % (ref 0–1.9)
BASOPHILS NFR BLD: 0.5 % (ref 0–1.9)
BILIRUB SERPL-MCNC: 0.2 MG/DL (ref 0.1–1)
BUN SERPL-MCNC: 32 MG/DL (ref 8–23)
CALCIUM SERPL-MCNC: 8.5 MG/DL (ref 8.7–10.5)
CHLORIDE SERPL-SCNC: 110 MMOL/L (ref 95–110)
CO2 SERPL-SCNC: 22 MMOL/L (ref 23–29)
CREAT SERPL-MCNC: 1.6 MG/DL (ref 0.5–1.4)
DIFFERENTIAL METHOD BLD: ABNORMAL
DIFFERENTIAL METHOD BLD: ABNORMAL
EOSINOPHIL # BLD AUTO: 0.4 K/UL (ref 0–0.5)
EOSINOPHIL # BLD AUTO: 0.4 K/UL (ref 0–0.5)
EOSINOPHIL NFR BLD: 4.5 % (ref 0–8)
EOSINOPHIL NFR BLD: 5.1 % (ref 0–8)
ERYTHROCYTE [DISTWIDTH] IN BLOOD BY AUTOMATED COUNT: 19.3 % (ref 11.5–14.5)
ERYTHROCYTE [DISTWIDTH] IN BLOOD BY AUTOMATED COUNT: 19.3 % (ref 11.5–14.5)
EST. GFR  (NO RACE VARIABLE): 42 ML/MIN/1.73 M^2
ESTIMATED AVG GLUCOSE: 160 MG/DL (ref 68–131)
GLUCOSE SERPL-MCNC: 58 MG/DL (ref 70–110)
HBA1C MFR BLD: 7.2 % (ref 4–5.6)
HCT VFR BLD AUTO: 22 % (ref 40–54)
HCT VFR BLD AUTO: 22.7 % (ref 40–54)
HGB BLD-MCNC: 7 G/DL (ref 14–18)
HGB BLD-MCNC: 7.3 G/DL (ref 14–18)
IMM GRANULOCYTES # BLD AUTO: 0.05 K/UL (ref 0–0.04)
IMM GRANULOCYTES # BLD AUTO: 0.06 K/UL (ref 0–0.04)
IMM GRANULOCYTES NFR BLD AUTO: 0.6 % (ref 0–0.5)
IMM GRANULOCYTES NFR BLD AUTO: 0.8 % (ref 0–0.5)
LACTATE SERPL-SCNC: 1.2 MMOL/L (ref 0.5–2.2)
LYMPHOCYTES # BLD AUTO: 1.2 K/UL (ref 1–4.8)
LYMPHOCYTES # BLD AUTO: 1.3 K/UL (ref 1–4.8)
LYMPHOCYTES NFR BLD: 15.3 % (ref 18–48)
LYMPHOCYTES NFR BLD: 16.1 % (ref 18–48)
MAGNESIUM SERPL-MCNC: 1.7 MG/DL (ref 1.6–2.6)
MCH RBC QN AUTO: 30.2 PG (ref 27–31)
MCH RBC QN AUTO: 30.3 PG (ref 27–31)
MCHC RBC AUTO-ENTMCNC: 31.8 G/DL (ref 32–36)
MCHC RBC AUTO-ENTMCNC: 32.2 G/DL (ref 32–36)
MCV RBC AUTO: 94 FL (ref 82–98)
MCV RBC AUTO: 95 FL (ref 82–98)
MONOCYTES # BLD AUTO: 0.7 K/UL (ref 0.3–1)
MONOCYTES # BLD AUTO: 0.9 K/UL (ref 0.3–1)
MONOCYTES NFR BLD: 12 % (ref 4–15)
MONOCYTES NFR BLD: 9.1 % (ref 4–15)
NEUTROPHILS # BLD AUTO: 5.2 K/UL (ref 1.8–7.7)
NEUTROPHILS # BLD AUTO: 5.5 K/UL (ref 1.8–7.7)
NEUTROPHILS NFR BLD: 66.9 % (ref 38–73)
NEUTROPHILS NFR BLD: 68.7 % (ref 38–73)
NRBC BLD-RTO: 0 /100 WBC
NRBC BLD-RTO: 0 /100 WBC
PLATELET # BLD AUTO: 246 K/UL (ref 150–450)
PLATELET # BLD AUTO: 247 K/UL (ref 150–450)
PMV BLD AUTO: 10 FL (ref 9.2–12.9)
PMV BLD AUTO: 9.8 FL (ref 9.2–12.9)
POCT GLUCOSE: 117 MG/DL (ref 70–110)
POCT GLUCOSE: 139 MG/DL (ref 70–110)
POCT GLUCOSE: 64 MG/DL (ref 70–110)
POCT GLUCOSE: 77 MG/DL (ref 70–110)
POCT GLUCOSE: 89 MG/DL (ref 70–110)
POCT GLUCOSE: 99 MG/DL (ref 70–110)
POTASSIUM SERPL-SCNC: 5.2 MMOL/L (ref 3.5–5.1)
PROT SERPL-MCNC: 6 G/DL (ref 6–8.4)
RBC # BLD AUTO: 2.31 M/UL (ref 4.6–6.2)
RBC # BLD AUTO: 2.42 M/UL (ref 4.6–6.2)
SODIUM SERPL-SCNC: 141 MMOL/L (ref 136–145)
WBC # BLD AUTO: 7.73 K/UL (ref 3.9–12.7)
WBC # BLD AUTO: 8.01 K/UL (ref 3.9–12.7)

## 2024-01-18 PROCEDURE — 36415 COLL VENOUS BLD VENIPUNCTURE: CPT | Mod: HCNC,XB | Performed by: STUDENT IN AN ORGANIZED HEALTH CARE EDUCATION/TRAINING PROGRAM

## 2024-01-18 PROCEDURE — 63600175 PHARM REV CODE 636 W HCPCS: Mod: JZ,JG,HCNC | Performed by: INTERNAL MEDICINE

## 2024-01-18 PROCEDURE — 83735 ASSAY OF MAGNESIUM: CPT | Mod: HCNC | Performed by: INTERNAL MEDICINE

## 2024-01-18 PROCEDURE — 11000001 HC ACUTE MED/SURG PRIVATE ROOM: Mod: HCNC

## 2024-01-18 PROCEDURE — 25000003 PHARM REV CODE 250: Mod: HCNC | Performed by: INTERNAL MEDICINE

## 2024-01-18 PROCEDURE — 85025 COMPLETE CBC W/AUTO DIFF WBC: CPT | Mod: 91,HCNC | Performed by: STUDENT IN AN ORGANIZED HEALTH CARE EDUCATION/TRAINING PROGRAM

## 2024-01-18 PROCEDURE — 99223 1ST HOSP IP/OBS HIGH 75: CPT | Mod: HCNC,,, | Performed by: REGISTERED NURSE

## 2024-01-18 PROCEDURE — 99497 ADVNCD CARE PLAN 30 MIN: CPT | Mod: HCNC,25,, | Performed by: REGISTERED NURSE

## 2024-01-18 PROCEDURE — 36415 COLL VENOUS BLD VENIPUNCTURE: CPT | Mod: HCNC | Performed by: INTERNAL MEDICINE

## 2024-01-18 PROCEDURE — 63600175 PHARM REV CODE 636 W HCPCS: Mod: HCNC | Performed by: STUDENT IN AN ORGANIZED HEALTH CARE EDUCATION/TRAINING PROGRAM

## 2024-01-18 PROCEDURE — 80053 COMPREHEN METABOLIC PANEL: CPT | Mod: HCNC | Performed by: INTERNAL MEDICINE

## 2024-01-18 PROCEDURE — 25000003 PHARM REV CODE 250: Mod: HCNC | Performed by: STUDENT IN AN ORGANIZED HEALTH CARE EDUCATION/TRAINING PROGRAM

## 2024-01-18 PROCEDURE — 83605 ASSAY OF LACTIC ACID: CPT | Mod: HCNC | Performed by: INTERNAL MEDICINE

## 2024-01-18 PROCEDURE — 63600175 PHARM REV CODE 636 W HCPCS: Mod: HCNC | Performed by: INTERNAL MEDICINE

## 2024-01-18 PROCEDURE — S0028 INJECTION, FAMOTIDINE, 20 MG: HCPCS | Mod: HCNC | Performed by: INTERNAL MEDICINE

## 2024-01-18 PROCEDURE — 85025 COMPLETE CBC W/AUTO DIFF WBC: CPT | Mod: HCNC | Performed by: INTERNAL MEDICINE

## 2024-01-18 PROCEDURE — S4991 NICOTINE PATCH NONLEGEND: HCPCS | Mod: HCNC | Performed by: INTERNAL MEDICINE

## 2024-01-18 RX ORDER — TAMSULOSIN HYDROCHLORIDE 0.4 MG/1
0.4 CAPSULE ORAL DAILY
Status: DISCONTINUED | OUTPATIENT
Start: 2024-01-18 | End: 2024-01-21 | Stop reason: HOSPADM

## 2024-01-18 RX ORDER — CLOPIDOGREL BISULFATE 75 MG/1
75 TABLET ORAL DAILY
Status: DISCONTINUED | OUTPATIENT
Start: 2024-01-18 | End: 2024-01-21 | Stop reason: HOSPADM

## 2024-01-18 RX ORDER — DIVALPROEX SODIUM 250 MG/1
250 TABLET, DELAYED RELEASE ORAL EVERY 12 HOURS
Status: DISCONTINUED | OUTPATIENT
Start: 2024-01-18 | End: 2024-01-18

## 2024-01-18 RX ORDER — MUPIROCIN 20 MG/G
OINTMENT TOPICAL 2 TIMES DAILY
Status: DISCONTINUED | OUTPATIENT
Start: 2024-01-18 | End: 2024-01-21 | Stop reason: HOSPADM

## 2024-01-18 RX ORDER — OLANZAPINE 10 MG/2ML
2.5 INJECTION, POWDER, FOR SOLUTION INTRAMUSCULAR ONCE
Status: COMPLETED | OUTPATIENT
Start: 2024-01-18 | End: 2024-01-18

## 2024-01-18 RX ORDER — TRIAMCINOLONE ACETONIDE 1 MG/G
CREAM TOPICAL 2 TIMES DAILY
Status: DISCONTINUED | OUTPATIENT
Start: 2024-01-18 | End: 2024-01-21 | Stop reason: HOSPADM

## 2024-01-18 RX ORDER — METOPROLOL SUCCINATE 25 MG/1
25 TABLET, EXTENDED RELEASE ORAL DAILY
Status: DISCONTINUED | OUTPATIENT
Start: 2024-01-18 | End: 2024-01-21 | Stop reason: HOSPADM

## 2024-01-18 RX ORDER — LEVOTHYROXINE SODIUM 20 UG/ML
25 INJECTION, SOLUTION INTRAVENOUS DAILY
Status: DISCONTINUED | OUTPATIENT
Start: 2024-01-18 | End: 2024-01-21 | Stop reason: HOSPADM

## 2024-01-18 RX ADMIN — MUPIROCIN: 20 OINTMENT TOPICAL at 10:01

## 2024-01-18 RX ADMIN — VANCOMYCIN HYDROCHLORIDE 1000 MG: 1 INJECTION, POWDER, LYOPHILIZED, FOR SOLUTION INTRAVENOUS at 09:01

## 2024-01-18 RX ADMIN — PIPERACILLIN AND TAZOBACTAM 4.5 G: 4; .5 INJECTION, POWDER, LYOPHILIZED, FOR SOLUTION INTRAVENOUS; PARENTERAL at 03:01

## 2024-01-18 RX ADMIN — DEXTROSE MONOHYDRATE 250 MG: 50 INJECTION, SOLUTION INTRAVENOUS at 02:01

## 2024-01-18 RX ADMIN — OLANZAPINE 2.5 MG: 10 INJECTION, POWDER, FOR SOLUTION INTRAMUSCULAR at 02:01

## 2024-01-18 RX ADMIN — FAMOTIDINE 20 MG: 20 INJECTION, SOLUTION INTRAVENOUS at 09:01

## 2024-01-18 RX ADMIN — PIPERACILLIN AND TAZOBACTAM 4.5 G: 4; .5 INJECTION, POWDER, LYOPHILIZED, FOR SOLUTION INTRAVENOUS; PARENTERAL at 09:01

## 2024-01-18 RX ADMIN — Medication 1 PATCH: at 09:01

## 2024-01-18 RX ADMIN — DEXTROSE MONOHYDRATE 125 ML: 100 INJECTION, SOLUTION INTRAVENOUS at 02:01

## 2024-01-18 RX ADMIN — LEVOTHYROXINE SODIUM 25 MCG: 20 INJECTION, SOLUTION INTRAVENOUS at 10:01

## 2024-01-18 RX ADMIN — PIPERACILLIN AND TAZOBACTAM 4.5 G: 4; .5 INJECTION, POWDER, LYOPHILIZED, FOR SOLUTION INTRAVENOUS; PARENTERAL at 12:01

## 2024-01-18 RX ADMIN — MUPIROCIN: 20 OINTMENT TOPICAL at 09:01

## 2024-01-18 RX ADMIN — TRIAMCINOLONE ACETONIDE: 1 CREAM TOPICAL at 10:01

## 2024-01-18 NOTE — EICU
EICU BRIEF ADMIT NOTE:    HISTORY:  Sepsis, Altered mental status Please refer to H/P and ER notes for detail    CAMERA ASSESSMENT: Two way audiovisual assessment was done: Yes    Telemetry was reviewed. Medical records including notes, labs and imaging were reviewed.Yes    DISCUSSED with bedside nurse.No    ASSESSMENT AND PLAN:    # Septic shock: IV fluids, antibiotics, Monitor lactate levels. Follow cultures  # Altered mental status: CT head with no acute changes  # LATRICE with hyperkalemia: IV fluids, avoid nephrotoxic meds  # Hypoglycemia: Monitor accuchecks, Dextrose      BEST PRACTICES REVIEW:    INTUBATED: NO  GLYCEMIN CONTROL:  Diabetes: NO  STRESS ULCER PROPHYLAXIS: H2 antagonist   DVT PROPHYLAXIS: Mechanical, due to ongoing/suspected/increased risk of bleeding     Thank You for allowing EICU to participate in the care of the patient. Please call as needed      Mau Louis MD  EICU  Critical Care Medicine

## 2024-01-18 NOTE — ASSESSMENT & PLAN NOTE
Patient with known CAD, which is controlled Will continue Plavix and Statin and monitor for S/Sx of angina/ACS. Continue to monitor on telemetry.

## 2024-01-18 NOTE — SUBJECTIVE & OBJECTIVE
Past Medical History:   Diagnosis Date    Actinic keratosis     Anxiety     B12 deficiency 12/8/2014    Dx 6/14    Cancer     skin    Cataract     Coronary artery disease     Diabetes mellitus type II     Diabetes mellitus with peripheral circulatory disorder 6/18/2014    ED (erectile dysfunction)     Hyperlipidemia     Kidney stone     Neurogenic claudication 4/4/2022    Normocytic anemia 10/15/2023    Peripheral vascular disease     Spondylosis 3/29/2019    Tobacco dependence     Urinary incontinence 5/4/2021       Past Surgical History:   Procedure Laterality Date    APPENDECTOMY      CARDIAC SURGERY  01/1999    CABG 4 vessels    CATARACT EXTRACTION      EPIDURAL STEROID INJECTION Right 8/26/2020    Procedure: Injection, Steroid, Epidural Transforaminal;  Surgeon: Wiley Crane Jr., MD;  Location: [facility] ENDO;  Service: Pain Management;  Laterality: Right;  Right L5 + S1 TF LEAH  Arrive @ 1115; ASA & Plavix last 8/18; Check BG; Rapid COVID test    EPIDURAL STEROID INJECTION Right 11/25/2020    Procedure: Injection, Steroid, Epidural Transformainal;  Surgeon: Wiley Crane Jr., MD;  Location: [facility] ENDO;  Service: Pain Management;  Laterality: Right;  Right L5 + S1 TF LEAH  Arrive @ 1245; ASA and Plavix last 11/17; Pre-DM; Needs MD Sign.    EPIDURAL STEROID INJECTION Right 2/19/2021    Procedure: Injection, Steroid, Epidural Transforaminal;  Surgeon: Wiley Crane Jr., MD;  Location: [facility] ENDO;  Service: Pain Management;  Laterality: Right;  Right L5 + S1 TF LEAH  Arrive @ 1115; ASA & Plavix last 2/11; Check BG; Needs Consent    EXTERNAL EAR SURGERY      EYE SURGERY      cataracts    ILIAC ARTERY STENT Bilateral 06/2003    also Right External Iliac stent       Review of patient's allergies indicates:   Allergen Reactions    Demerol [meperidine] Nausea Only       No current facility-administered medications on file prior to encounter.     Current Outpatient Medications on File Prior to Encounter    Medication Sig    acetaminophen (TYLENOL) 500 MG tablet Take 1 tablet (500 mg total) by mouth every 6 (six) hours as needed for Pain.    aspirin (ECOTRIN) 81 MG EC tablet Take 1 tablet (81 mg total) by mouth once daily.    atorvastatin (LIPITOR) 40 MG tablet Take 1 tablet (40 mg total) by mouth once daily.    atorvastatin (LIPITOR) 40 MG tablet Take 1 tablet (40 mg total) by mouth once daily.    blood sugar diagnostic (TRUE METRIX GLUCOSE TEST STRIP) Strp TID    clopidogreL (PLAVIX) 75 mg tablet Take 1 tablet (75 mg total) by mouth once daily.    divalproex (DEPAKOTE) 125 MG EC tablet Take 250 mg daily and 500 mg nightly    metFORMIN (GLUCOPHAGE) 500 MG tablet Take 1 tablet (500 mg total) by mouth 2 (two) times daily with meals. HOLD FOR GLUCOSE LESS 100    metoprolol succinate (TOPROL-XL) 25 MG 24 hr tablet Take 1 tablet (25 mg total) by mouth once daily.    mometasone 0.1% (ELOCON) 0.1 % cream Apply topically 2 (two) times daily. Apply to arms ,hands, and legs BID    tamsulosin (FLOMAX) 0.4 mg Cap Take 1 capsule (0.4 mg total) by mouth once daily.    triamcinolone acetonide 0.1% (KENALOG) 0.1 % cream Apply topically 2 (two) times daily. Apply to rash     Family History       Problem Relation (Age of Onset)    Stroke Mother          Tobacco Use    Smoking status: Every Day     Current packs/day: 1.50     Average packs/day: 1.5 packs/day for 71.0 years (106.5 ttl pk-yrs)     Types: Cigarettes    Smokeless tobacco: Former   Substance and Sexual Activity    Alcohol use: No    Drug use: No    Sexual activity: Not Currently     Partners: Female     Review of Systems   Unable to perform ROS: Mental status change   Respiratory:  Negative for shortness of breath.    Cardiovascular:  Negative for chest pain.     Objective:     Vital Signs (Most Recent):  Temp: (!) 91.8 °F (33.2 °C) (01/17/24 1751)  Pulse: 82 (01/17/24 1902)  Resp: (!) 30 (01/17/24 1902)  BP: (!) 92/53 (01/17/24 1902)  SpO2: (!) 93 % (01/17/24 1902) Vital  Signs (24h Range):  Temp:  [90.2 °F (32.3 °C)-91.8 °F (33.2 °C)] 91.8 °F (33.2 °C)  Pulse:  [64-82] 82  Resp:  [19-30] 30  SpO2:  [86 %-98 %] 93 %  BP: ()/(46-70) 92/53     Weight: 67.4 kg (148 lb 9.4 oz)  Body mass index is 23.27 kg/m².     Physical Exam  Constitutional:       General: He is not in acute distress.     Appearance: He is ill-appearing.      Comments: Elderly    HENT:      Head: Normocephalic.      Nose: Nose normal.   Eyes:      Extraocular Movements: Extraocular movements intact.   Cardiovascular:      Rate and Rhythm: Normal rate.   Pulmonary:      Effort: No respiratory distress.      Breath sounds: No wheezing or rales.   Abdominal:      Tenderness: There is no abdominal tenderness.   Musculoskeletal:         General: No swelling.      Cervical back: Normal range of motion.   Skin:     General: Skin is warm.      Capillary Refill: Capillary refill takes less than 2 seconds.   Neurological:      Mental Status: He is alert. He is disoriented.   Psychiatric:      Comments: Confused                 Significant Labs: All pertinent labs within the past 24 hours have been reviewed.  BMP:   Recent Labs   Lab 01/17/24  1427 01/17/24  1800   GLU 91 38*    141   K 5.9* 4.9   * 112*   CO2 21* 20*   BUN 37* 35*   CREATININE 1.6* 1.4   CALCIUM 8.3* 8.0*   MG 2.0  --      CBC:   Recent Labs   Lab 01/17/24  1427 01/17/24  1436   WBC 4.73  --    HGB 14.2  --    HCT 44.3 32*   *  --      CMP:   Recent Labs   Lab 01/17/24  1427 01/17/24  1800    141   K 5.9* 4.9   * 112*   CO2 21* 20*   GLU 91 38*   BUN 37* 35*   CREATININE 1.6* 1.4   CALCIUM 8.3* 8.0*   PROT 6.5  --    ALBUMIN 2.6*  --    BILITOT 0.2  --    ALKPHOS 135  --    AST 28  --    ALT 28  --    ANIONGAP 9 9     Lipase:   Recent Labs   Lab 01/17/24  1427   LIPASE 17     Troponin:   Recent Labs   Lab 01/17/24  1427   TROPONINI <0.006     TSH:   Recent Labs   Lab 01/17/24  1314   TSH 8.132*     Urine Studies:   Recent  Labs   Lab 01/17/24  1601   COLORU Yellow   APPEARANCEUA Clear   PHUR 7.0   SPECGRAV 1.015   PROTEINUA Trace*   GLUCUA Negative   KETONESU Negative   BILIRUBINUA Negative   OCCULTUA Negative   NITRITE Negative   UROBILINOGEN Negative   LEUKOCYTESUR Negative       Significant Imaging: I have reviewed all pertinent imaging results/findings within the past 24 hours.

## 2024-01-18 NOTE — PROGRESS NOTES
[facility]  Progress Note    Patient Name: Narciso Yanez  MRN: [id number]  Patient Class: IP- Inpatient   Admission Date: 1/17/2024  Length of Stay: 1 days  Attending Physician: Terry Villanueva III, MD  Primary Care Provider: Marcelino Del Toro MD        Subjective:     Principal Problem:Acute metabolic encephalopathy        HPI:  88 years old male with past medical history significant for CAD, COPD (not on O2), hypertension, hyperlipidemia, CKD 3, type 2 diabetes, peripheral artery disease, NPH, PMR, cutaneous T-cell lymphoma, chronic back pain, tobacco use who was brought in by his daughter due to altered mental status.  Patient was alert and lying comfortably in the bed however confused so was not able to provide complete information however, patient denied chest pain shortness of breath abdominal pain.  History is provided by patient's daughter at bedside who reported that patient has been confused since yesterday and was not doing good for which she brought him to the hospital.  Lab work showed creatinine 1.6 and potassium 5.9.  Patient received hyperkalemia treatment.  Later in ER, patient became hypotensive for which started on Levophed.  Chest x-ray showed findings suspicious of diffuse developing infiltrate.  CT head and C-spine negative for any acute abnormality.  CT A&P showed hepatic steatosis however negative for any acute abnormality.  Patient was found to be hypothermic with temperature of 90.2° F and placed on [place] Hugger.  In the ER, patient became hypoglycemic with glucose of 38 for which received hypoglycemia protocol and recheck glucose came 58.    Overview/Hospital Course:  No notes on file    Interval History: Pt awakes and states his name otherwise does not answer appropriately.     Review of Systems  Objective:     Vital Signs (Most Recent):  Temp: 99.1 °F (37.3 °C) (01/18/24 1000)  Pulse: (!) 116 (01/18/24 1000)  Resp: (!) 32 (01/18/24 1000)  BP: (!)  168/71 (01/18/24 1000)  SpO2: (!) 93 % (01/18/24 1000) Vital Signs (24h Range):  Temp:  [90.2 °F (32.3 °C)-99.1 °F (37.3 °C)] 99.1 °F (37.3 °C)  Pulse:  [] 116  Resp:  [18-32] 32  SpO2:  [86 %-99 %] 93 %  BP: ()/(46-81) 168/71     Weight: 70.1 kg (154 lb 8.7 oz)  Body mass index is 24.2 kg/m².    Intake/Output Summary (Last 24 hours) at 1/18/2024 1015  Last data filed at 1/18/2024 0924  Gross per 24 hour   Intake 1574.14 ml   Output --   Net 1574.14 ml         Physical Exam      Constitutional:       General: He is not in acute distress.     Appearance: He is ill-appearing.      Comments: Elderly    HENT:      Head: Normocephalic.      Nose: Nose normal.   Eyes:      Extraocular Movements: Extraocular movements intact.   Cardiovascular:      Rate and Rhythm: regular tachycardia   Pulmonary:      Effort: No respiratory distress.   Abdominal:      Tenderness: There is no abdominal tenderness.   Musculoskeletal:         General: No swelling.      Cervical back: Normal range of motion.   Skin:     General: Skin is warm.      Capillary Refill: Capillary refill takes less than 2 seconds.   Neurological:      Mental Status: He is alert. He is disoriented.   Psychiatric:      Comments: Confused    Significant Labs: All pertinent labs within the past 24 hours have been reviewed.    Significant Imaging: I have reviewed all pertinent imaging results/findings within the past 24 hours.    Assessment/Plan:      * Acute metabolic encephalopathy  Fall precautions   Neuro checks       Shock  Of unclear etiology  Started on IV Levophed, but quickly weaned off.   Will give IV  cc bolus however would be cautious with IV fluids due to history of CHF; currently patient is not in fluid overload  TSH 8.1 and free T4 0.69; cortisol appropriate  Patient is hypotensive, hypothermic with high TSH; will start on iv levothyroxine while unable to take po  Will start empiric IV antibiotics with septic workup including blood  cultures. BC NGTD. Possibly d/c abx if remain negative      Pneumonia  Will get sputum culture, blood cultures, procalcitonin  Will start IV vancomycin and Zosyn      Hyperkalemia  This patient has hyperkalemia which is uncontrolled. We will monitor for arrhythmias with EKG or continuous telemetry. We will treat the hyperkalemia with Potassium Binders, Calcium gluconate, and IV insulin and dextrose. The likely etiology of the hyperkalemia is LATRICE.  The patients latest potassium has been reviewed and the results are listed below  Recent Labs   Lab 01/18/24  0139   K 5.2*               LATRICE (acute kidney injury)  Patient with acute kidney injury/acute renal failure likely due to pre-renal azotemia due to dehydration LATRICE is currently stable. Baseline creatinine  1.2  - Labs reviewed- Renal function/electrolytes with Estimated Creatinine Clearance: 31 mL/min (A) (based on SCr of 1.6 mg/dL (H)). according to latest data. Monitor urine output and serial BMP and adjust therapy as needed. Avoid nephrotoxins and renally dose meds for GFR listed above.    Hypoglycemia  Could be related to IV insulin regular which patient received in ER for hyperkalemia treatment  Hypoglycemia protocol  Accu-Cheks  -improved. Continue to monitor.       Skin cancer        Diabetes mellitus with peripheral circulatory disorder  Patient's FSGs are controlled on current medication regimen.  Last A1c reviewed-   Lab Results   Component Value Date    HGBA1C 6.9 (H) 10/15/2023     Most recent fingerstick glucose reviewed-   Recent Labs   Lab 01/17/24  2140 01/18/24  0211 01/18/24  0232 01/18/24  0951   POCTGLUCOSE 117* 64* 117* 77     Current correctional scale  Low  Maintain anti-hyperglycemic dose as follows-   Antihyperglycemics (From admission, onward)      None          Hold Oral hypoglycemics while patient is in the hospital.    Coronary artery disease involving coronary bypass graft of native heart without angina pectoris  Patient with known  CAD, which is controlled Will continue Plavix and Statin and monitor for S/Sx of angina/ACS. Continue to monitor on telemetry.       VTE Risk Mitigation (From admission, onward)           Ordered     IP VTE HIGH RISK PATIENT  Once         01/17/24 1911     Place sequential compression device  Until discontinued         01/17/24 1911                    Discharge Planning   SUSIE: 1/20/2024     Code Status: DNR   Is the patient medically ready for discharge?:     Reason for patient still in hospital (select all that apply): Treatment and Consult recommendations                 Terry Villanueva III, MD  Department of Hospital Medicine   [facility] - Intensive Care

## 2024-01-18 NOTE — ASSESSMENT & PLAN NOTE
This patient has hyperkalemia which is uncontrolled. We will monitor for arrhythmias with EKG or continuous telemetry. We will treat the hyperkalemia with Potassium Binders, Calcium gluconate, and IV insulin and dextrose. The likely etiology of the hyperkalemia is LATRIEC.  The patients latest potassium has been reviewed and the results are listed below  Recent Labs   Lab 01/17/24  1800   K 4.9

## 2024-01-18 NOTE — H&P
[facility] Medicine  History & Physical    Patient Name: Narciso Yanez  MRN: [id number]  Patient Class: IP- Inpatient  Admission Date: 1/17/2024  Attending Physician: Kodak Rivera MD   Primary Care Provider: Marcelino Del Toro MD         Patient information was obtained from patient, patient's daughter at bedside and ER records.     Subjective:     Principal Problem:Altered mental status    Chief Complaint:   Chief Complaint   Patient presents with    Altered Mental Status     Pt BIB daughter for evaluation of AMS since Sunday. She states that pt has not been himself.      Fall     Pt's daughter also reports that he fell last night and hit his head. Bruising noted above left eyebrow. She denies LOC, but endorses blood thinner use.         HPI: 88 years old male with past medical history significant for CAD, COPD (not on O2), hypertension, hyperlipidemia, CKD 3, type 2 diabetes, peripheral artery disease, NPH, PMR, cutaneous T-cell lymphoma, chronic back pain, tobacco use who was brought in by his daughter due to altered mental status.  Patient was alert and lying comfortably in the bed however confused so was not able to provide complete information however, patient denied chest pain shortness of breath abdominal pain.  History is provided by patient's daughter at bedside who reported that patient has been confused since yesterday and was not doing good for which she brought him to the hospital.  Lab work showed creatinine 1.6 and potassium 5.9.  Patient received hyperkalemia treatment.  Later in ER, patient became hypotensive for which started on Levophed.  Chest x-ray showed findings suspicious of diffuse developing infiltrate.  CT head and C-spine negative for any acute abnormality.  CT A&P showed hepatic steatosis however negative for any acute abnormality.  Patient was found to be hypothermic with temperature of 90.2° F and placed on Nj Hugger.  In the ER, patient became  hypoglycemic with glucose of 38 for which received hypoglycemia protocol and recheck glucose came 58.    Past Medical History:   Diagnosis Date    Actinic keratosis     Anxiety     B12 deficiency 12/8/2014    Dx 6/14    Cancer     skin    Cataract     Coronary artery disease     Diabetes mellitus type II     Diabetes mellitus with peripheral circulatory disorder 6/18/2014    ED (erectile dysfunction)     Hyperlipidemia     Kidney stone     Neurogenic claudication 4/4/2022    Normocytic anemia 10/15/2023    Peripheral vascular disease     Spondylosis 3/29/2019    Tobacco dependence     Urinary incontinence 5/4/2021       Past Surgical History:   Procedure Laterality Date    APPENDECTOMY      CARDIAC SURGERY  01/1999    CABG 4 vessels    CATARACT EXTRACTION      EPIDURAL STEROID INJECTION Right 8/26/2020    Procedure: Injection, Steroid, Epidural Transforaminal;  Surgeon: Wiley Crane Jr., MD;  Location: [facility] ENDO;  Service: Pain Management;  Laterality: Right;  Right L5 + S1 TF LEAH  Arrive @ 1115; ASA & Plavix last 8/18; Check BG; Rapid COVID test    EPIDURAL STEROID INJECTION Right 11/25/2020    Procedure: Injection, Steroid, Epidural Transformainal;  Surgeon: Wiley Crane Jr., MD;  Location: [facility] ENDO;  Service: Pain Management;  Laterality: Right;  Right L5 + S1 TF LEAH  Arrive @ 1245; ASA and Plavix last 11/17; Pre-DM; Needs MD Sign.    EPIDURAL STEROID INJECTION Right 2/19/2021    Procedure: Injection, Steroid, Epidural Transforaminal;  Surgeon: Wiley Crane Jr., MD;  Location: [facility] ENDO;  Service: Pain Management;  Laterality: Right;  Right L5 + S1 TF LEAH  Arrive @ 1115; ASA & Plavix last 2/11; Check BG; Needs Consent    EXTERNAL EAR SURGERY      EYE SURGERY      cataracts    ILIAC ARTERY STENT Bilateral 06/2003    also Right External Iliac stent       Review of patient's allergies indicates:   Allergen Reactions    Demerol [meperidine] Nausea Only       No current facility-administered  medications on file prior to encounter.     Current Outpatient Medications on File Prior to Encounter   Medication Sig    acetaminophen (TYLENOL) 500 MG tablet Take 1 tablet (500 mg total) by mouth every 6 (six) hours as needed for Pain.    aspirin (ECOTRIN) 81 MG EC tablet Take 1 tablet (81 mg total) by mouth once daily.    atorvastatin (LIPITOR) 40 MG tablet Take 1 tablet (40 mg total) by mouth once daily.    atorvastatin (LIPITOR) 40 MG tablet Take 1 tablet (40 mg total) by mouth once daily.    blood sugar diagnostic (TRUE METRIX GLUCOSE TEST STRIP) Strp TID    clopidogreL (PLAVIX) 75 mg tablet Take 1 tablet (75 mg total) by mouth once daily.    divalproex (DEPAKOTE) 125 MG EC tablet Take 250 mg daily and 500 mg nightly    metFORMIN (GLUCOPHAGE) 500 MG tablet Take 1 tablet (500 mg total) by mouth 2 (two) times daily with meals. HOLD FOR GLUCOSE LESS 100    metoprolol succinate (TOPROL-XL) 25 MG 24 hr tablet Take 1 tablet (25 mg total) by mouth once daily.    mometasone 0.1% (ELOCON) 0.1 % cream Apply topically 2 (two) times daily. Apply to arms ,hands, and legs BID    tamsulosin (FLOMAX) 0.4 mg Cap Take 1 capsule (0.4 mg total) by mouth once daily.    triamcinolone acetonide 0.1% (KENALOG) 0.1 % cream Apply topically 2 (two) times daily. Apply to rash     Family History       Problem Relation (Age of Onset)    Stroke Mother          Tobacco Use    Smoking status: Every Day     Current packs/day: 1.50     Average packs/day: 1.5 packs/day for 71.0 years (106.5 ttl pk-yrs)     Types: Cigarettes    Smokeless tobacco: Former   Substance and Sexual Activity    Alcohol use: No    Drug use: No    Sexual activity: Not Currently     Partners: Female     Review of Systems   Unable to perform ROS: Mental status change   Respiratory:  Negative for shortness of breath.    Cardiovascular:  Negative for chest pain.     Objective:     Vital Signs (Most Recent):  Temp: (!) 91.8 °F (33.2 °C) (01/17/24 1751)  Pulse: 82 (01/17/24  1902)  Resp: (!) 30 (01/17/24 1902)  BP: (!) 92/53 (01/17/24 1902)  SpO2: (!) 93 % (01/17/24 1902) Vital Signs (24h Range):  Temp:  [90.2 °F (32.3 °C)-91.8 °F (33.2 °C)] 91.8 °F (33.2 °C)  Pulse:  [64-82] 82  Resp:  [19-30] 30  SpO2:  [86 %-98 %] 93 %  BP: ()/(46-70) 92/53     Weight: 67.4 kg (148 lb 9.4 oz)  Body mass index is 23.27 kg/m².     Physical Exam  Constitutional:       General: He is not in acute distress.     Appearance: He is ill-appearing.      Comments: Elderly    HENT:      Head: Normocephalic.      Nose: Nose normal.   Eyes:      Extraocular Movements: Extraocular movements intact.   Cardiovascular:      Rate and Rhythm: Normal rate.   Pulmonary:      Effort: No respiratory distress.      Breath sounds: No wheezing or rales.   Abdominal:      Tenderness: There is no abdominal tenderness.   Musculoskeletal:         General: No swelling.      Cervical back: Normal range of motion.   Skin:     General: Skin is warm.      Capillary Refill: Capillary refill takes less than 2 seconds.   Neurological:      Mental Status: He is alert. He is disoriented.   Psychiatric:      Comments: Confused                 Significant Labs: All pertinent labs within the past 24 hours have been reviewed.  BMP:   Recent Labs   Lab 01/17/24  1427 01/17/24  1800   GLU 91 38*    141   K 5.9* 4.9   * 112*   CO2 21* 20*   BUN 37* 35*   CREATININE 1.6* 1.4   CALCIUM 8.3* 8.0*   MG 2.0  --      CBC:   Recent Labs   Lab 01/17/24  1427 01/17/24  1436   WBC 4.73  --    HGB 14.2  --    HCT 44.3 32*   *  --      CMP:   Recent Labs   Lab 01/17/24  1427 01/17/24  1800    141   K 5.9* 4.9   * 112*   CO2 21* 20*   GLU 91 38*   BUN 37* 35*   CREATININE 1.6* 1.4   CALCIUM 8.3* 8.0*   PROT 6.5  --    ALBUMIN 2.6*  --    BILITOT 0.2  --    ALKPHOS 135  --    AST 28  --    ALT 28  --    ANIONGAP 9 9     Lipase:   Recent Labs   Lab 01/17/24  1427   LIPASE 17     Troponin:   Recent Labs   Lab 01/17/24  1427    TROPONINI <0.006     TSH:   Recent Labs   Lab 01/17/24  1314   TSH 8.132*     Urine Studies:   Recent Labs   Lab 01/17/24  1601   COLORU Yellow   APPEARANCEUA Clear   PHUR 7.0   SPECGRAV 1.015   PROTEINUA Trace*   GLUCUA Negative   KETONESU Negative   BILIRUBINUA Negative   OCCULTUA Negative   NITRITE Negative   UROBILINOGEN Negative   LEUKOCYTESUR Negative       Significant Imaging: I have reviewed all pertinent imaging results/findings within the past 24 hours.  Assessment/Plan:     * Altered mental status  Fall precautions   Neuro checks       Hypotension  Of unclear etiology  Started on IV Levophed currently  Will give IV  cc bolus however would be cautious with IV fluids due to history of CHF; currently patient is not in fluid overload  TSH 8.1 and free T4 0.69; will get cortisol  Patient is hypotensive, hypothermic with high TSH; will reassess if patient needs IV levothyroxine/hydrocortisone if patient continues to be hypotensive or have to increase Levophed  Will start empiric IV antibiotics with septic workup including blood cultures      Pneumonia  Will get sputum culture, blood cultures, procalcitonin  Will start IV vancomycin and Zosyn      Hyperkalemia  This patient has hyperkalemia which is uncontrolled. We will monitor for arrhythmias with EKG or continuous telemetry. We will treat the hyperkalemia with Potassium Binders, Calcium gluconate, and IV insulin and dextrose. The likely etiology of the hyperkalemia is LATRICE.  The patients latest potassium has been reviewed and the results are listed below  Recent Labs   Lab 01/17/24  1800   K 4.9             LATRICE (acute kidney injury)  Patient with acute kidney injury/acute renal failure likely due to pre-renal azotemia due to dehydration LATRICE is currently stable. Baseline creatinine  1.2  - Labs reviewed- Renal function/electrolytes with Estimated Creatinine Clearance: 35.4 mL/min (based on SCr of 1.4 mg/dL). according to latest data. Monitor urine  output and serial BMP and adjust therapy as needed. Avoid nephrotoxins and renally dose meds for GFR listed above.    Hypoglycemia  Could be related to IV insulin regular which patient received in ER for hyperkalemia treatment  Hypoglycemia protocol  Accu-Cheks        VTE Risk Mitigation (From admission, onward)           Ordered     IP VTE HIGH RISK PATIENT  Once         01/17/24 1911     Place sequential compression device  Until discontinued         01/17/24 1911                               AdmissionCare    Guideline: Delirium, Inpatient    Based on the indications selected for the patient, the bed status of Admit to Inpatient was determined to be MET    The following indications were selected as present at the time of evaluation of the patient:      - Delirium of uncertain etiology that has not responded to appropriate treatment in emergency department or urgent care setting    AdmissionCare documentation entered by: Maria Luisa Hurt    [facility] [employer], 27th edition, Copyright © 2023 [facility] [employer], [employer] All Rights Reserved.  [id number]:15:16-06:00    Sung Ca MD  Department of Hospital Medicine  [facility] - Emergency Dept

## 2024-01-18 NOTE — PROGRESS NOTES
Pharmacokinetic Initial Assessment: IV Vancomycin    Assessment/Plan:    Initiate intravenous vancomycin with loading dose of 1250 mg once followed by a maintenance dose of vancomycin 1000mg IV every 24 hours  Desired empiric serum trough concentration is 10 to 20 mcg/mL  Draw vancomycin trough level 60 min prior to third dose on 1/19 at approximately 2000  Pharmacy will continue to follow and monitor vancomycin.      Please contact pharmacy at extension [phone] with any questions regarding this assessment.     Thank you for the consult,   Ezio Cabrera       Patient brief summary:  Narciso Yanez is a 88 y.o. male initiated on antimicrobial therapy with IV Vancomycin for treatment of suspected lower respiratory infection    Drug Allergies:   Review of patient's allergies indicates:   Allergen Reactions    Demerol [meperidine] Nausea Only       Actual Body Weight:   70.1 kg    Renal Function:   Estimated Creatinine Clearance: 35.4 mL/min (based on SCr of 1.4 mg/dL).,     Dialysis Method (if applicable):  N/A    CBC (last 72 hours):  Recent Labs   Lab Result Units 01/17/24  1427   WBC K/uL 4.73   Hemoglobin g/dL 14.2   Hematocrit % 44.3   Platelets K/uL 149*   Gran % % 66.4   Lymph % % 16.3*   Mono % % 7.4   Eosinophil % % 8.9*   Basophil % % 0.6   Differential Method  Automated       Metabolic Panel (last 72 hours):  Recent Labs   Lab Result Units 01/17/24  1427 01/17/24  1601 01/17/24  1800   Sodium mmol/L 141  --  141   Potassium mmol/L 5.9*  --  4.9   Chloride mmol/L 111*  --  112*   CO2 mmol/L 21*  --  20*   Glucose mg/dL 91  --  38*   Glucose, UA   --  Negative  --    BUN mg/dL 37*  --  35*   Creatinine mg/dL 1.6*  --  1.4   Albumin g/dL 2.6*  --   --    Total Bilirubin mg/dL 0.2  --   --    Alkaline Phosphatase U/L 135  --   --    AST U/L 28  --   --    ALT U/L 28  --   --    Magnesium mg/dL 2.0  --   --    Phosphorus mg/dL 4.4  --   --        Drug levels (last 3 results):  No results for input(s):  ""VANCOMYCINRA", "VANCORANDOM", "VANCOMYCINPE", "VANCOPEAK", "VANCOMYCINTR", "VANCOTROUGH" in the last 72 hours.    Microbiologic Results:  Microbiology Results (last 7 days)       Procedure Component Value Units Date/Time    Blood culture [[id number]] Collected: 01/17/24 1947    Order Status: Sent Specimen: Blood from Peripheral, Hand, Right Updated: 01/17/24 1952    Blood culture [[id number]] Collected: 01/17/24 1935    Order Status: Sent Specimen: Blood from Peripheral, Upper Arm, Left Updated: 01/17/24 1939    Culture, Respiratory with Gram Stain [[id number]]     Order Status: No result Specimen: Respiratory from Sputum, Expectorated             "

## 2024-01-18 NOTE — ASSESSMENT & PLAN NOTE
Could be related to IV insulin regular which patient received in ER for hyperkalemia treatment  Hypoglycemia protocol  Accu-Cheks

## 2024-01-18 NOTE — NURSING
[facility]  Nurses Note -- 4 Eyes      1/18/2024       Skin assessed on: Admit      [x] No Pressure Injuries Present    [x]Prevention Measures Documented    [] Yes LDA  for Pressure Injury Previously documented     [] Yes New Pressure Injury Discovered   [] LDA for New Pressure Injury Added      Attending RN:  Abbey Polanco RN     Second RN:  taco goldsmith

## 2024-01-18 NOTE — ASSESSMENT & PLAN NOTE
Patient with acute kidney injury/acute renal failure likely due to pre-renal azotemia due to dehydration LATRICE is currently stable. Baseline creatinine  1.2  - Labs reviewed- Renal function/electrolytes with Estimated Creatinine Clearance: 31 mL/min (A) (based on SCr of 1.6 mg/dL (H)). according to latest data. Monitor urine output and serial BMP and adjust therapy as needed. Avoid nephrotoxins and renally dose meds for GFR listed above.

## 2024-01-18 NOTE — ASSESSMENT & PLAN NOTE
Could be related to IV insulin regular which patient received in ER for hyperkalemia treatment  Hypoglycemia protocol  Accu-Cheks  -improved. Continue to monitor.

## 2024-01-18 NOTE — ASSESSMENT & PLAN NOTE
Patient's FSGs are controlled on current medication regimen.  Last A1c reviewed-   Lab Results   Component Value Date    HGBA1C 6.9 (H) 10/15/2023     Most recent fingerstick glucose reviewed-   Recent Labs   Lab 01/17/24  2140 01/18/24  0211 01/18/24  0232 01/18/24  0951   POCTGLUCOSE 117* 64* 117* 77     Current correctional scale  Low  Maintain anti-hyperglycemic dose as follows-   Antihyperglycemics (From admission, onward)      None          Hold Oral hypoglycemics while patient is in the hospital.

## 2024-01-18 NOTE — PLAN OF CARE
To patient's room this am to complete DC needs assessment.  Patient unable to participate.  No family members at bedside.  CM to follow up at a later time.

## 2024-01-18 NOTE — SUBJECTIVE & OBJECTIVE
Interval History: Pt awakes and states his name otherwise does not answer appropriately.     Review of Systems  Objective:     Vital Signs (Most Recent):  Temp: 99.1 °F (37.3 °C) (01/18/24 1000)  Pulse: (!) 116 (01/18/24 1000)  Resp: (!) 32 (01/18/24 1000)  BP: (!) 168/71 (01/18/24 1000)  SpO2: (!) 93 % (01/18/24 1000) Vital Signs (24h Range):  Temp:  [90.2 °F (32.3 °C)-99.1 °F (37.3 °C)] 99.1 °F (37.3 °C)  Pulse:  [] 116  Resp:  [18-32] 32  SpO2:  [86 %-99 %] 93 %  BP: ()/(46-81) 168/71     Weight: 70.1 kg (154 lb 8.7 oz)  Body mass index is 24.2 kg/m².    Intake/Output Summary (Last 24 hours) at 1/18/2024 1015  Last data filed at 1/18/2024 0924  Gross per 24 hour   Intake 1574.14 ml   Output --   Net 1574.14 ml         Physical Exam      Constitutional:       General: He is not in acute distress.     Appearance: He is ill-appearing.      Comments: Elderly    HENT:      Head: Normocephalic.      Nose: Nose normal.   Eyes:      Extraocular Movements: Extraocular movements intact.   Cardiovascular:      Rate and Rhythm: regular tachycardia   Pulmonary:      Effort: No respiratory distress.   Abdominal:      Tenderness: There is no abdominal tenderness.   Musculoskeletal:         General: No swelling.      Cervical back: Normal range of motion.   Skin:     General: Skin is warm.      Capillary Refill: Capillary refill takes less than 2 seconds.   Neurological:      Mental Status: He is alert. He is disoriented.   Psychiatric:      Comments: Confused    Significant Labs: All pertinent labs within the past 24 hours have been reviewed.    Significant Imaging: I have reviewed all pertinent imaging results/findings within the past 24 hours.

## 2024-01-18 NOTE — ED NOTES
"Pt presents with daughter. Per daughter pt has been confused, "out of his mind" since Sunday. She noticed today that he seemed to be more lethargic and difficult to arouse. Pt is observed with snoring respirations when he is awake his speech is garbled and he is disorientated . Daughter is concerned for UTI or hyperkalemia   "

## 2024-01-18 NOTE — HOSPITAL COURSE
88M with pmh of CAD, COPD (not on O2), hypertension, hyperlipidemia, CKD 3, type 2 diabetes, peripheral artery disease, NPH, PMR, cutaneous T-cell lymphoma, chronic back pain, tobacco use who was brought in by his daughter due mental status change from baseline. Lab work showed creatinine 1.6 and potassium 5.9.  Patient received hyperkalemia treatment.  Later in ER, patient became hypotensive for which started on Levophed.  Chest x-ray showed findings suspicious of diffuse developing infiltrate.  CT head and C-spine negative for any acute abnormality.  CT A&P showed hepatic steatosis however negative for any acute abnormality.  Patient was found to be hypothermic with temperature of 90.2° F and placed on Nj Hugger.  In the ER, patient became hypoglycemic with glucose of 38 for which received hypoglycemia protocol and recheck glucose came 58. BG has now improved. Was only requiring levophed for short time. Pt remains confused, but is oriented to self. Continuing abx pending BC. Stable to step down to the floor.   Patient is not much responsive,per  palliative family initially did  not wish Hospice,consulted ST,PT,OT,started on IVF.  Patient is DNR.  Ongoing talking with daughters regarding hospice,they were  talking with each other,  Plan for inpatient Hospice,SW is following,family finally agree.  Patient was discharged to inpatient Hospice.

## 2024-01-18 NOTE — ASSESSMENT & PLAN NOTE
Patient with acute kidney injury/acute renal failure likely due to pre-renal azotemia due to dehydration LATRICE is currently stable. Baseline creatinine  1.2  - Labs reviewed- Renal function/electrolytes with Estimated Creatinine Clearance: 35.4 mL/min (based on SCr of 1.4 mg/dL). according to latest data. Monitor urine output and serial BMP and adjust therapy as needed. Avoid nephrotoxins and renally dose meds for GFR listed above.

## 2024-01-18 NOTE — ADMISSIONCARE
AdmissionCare    Guideline: Delirium, Inpatient    Based on the indications selected for the patient, the bed status of Admit to Inpatient was determined to be MET    The following indications were selected as present at the time of evaluation of the patient:      - Delirium of uncertain etiology that has not responded to appropriate treatment in emergency department or urgent care setting    AdmissionCare documentation entered by: Maria Luisa Hurt    [facility], 27th edition, Copyright © 2023 [facility] [employer], [facility] All Rights Reserved.  [id number]:15:16-06:00

## 2024-01-18 NOTE — HPI
88 years old male with past medical history significant for CAD, COPD (not on O2), hypertension, hyperlipidemia, CKD 3, type 2 diabetes, peripheral artery disease, NPH, PMR, cutaneous T-cell lymphoma, chronic back pain, tobacco use who was brought in by his daughter due to altered mental status.  Patient was alert and lying comfortably in the bed however confused so was not able to provide complete information however, patient denied chest pain shortness of breath abdominal pain.  History is provided by patient's daughter at bedside who reported that patient has been confused since yesterday and was not doing good for which she brought him to the hospital.  Lab work showed creatinine 1.6 and potassium 5.9.  Patient received hyperkalemia treatment.  Later in ER, patient became hypotensive for which started on Levophed.  Chest x-ray showed findings suspicious of diffuse developing infiltrate.  CT head and C-spine negative for any acute abnormality.  CT A&P showed hepatic steatosis however negative for any acute abnormality.  Patient was found to be hypothermic with temperature of 90.2° F and placed on Nj Hugger.  In the ER, patient became hypoglycemic with glucose of 38 for which received hypoglycemia protocol and recheck glucose came 58.

## 2024-01-18 NOTE — PROGRESS NOTES
Vancomycin consult follow-up:    Patient reviewed, renal function stable, no new levels, continue current therapy; Next levels due: trough due 1/19/2024 at 2000

## 2024-01-18 NOTE — ASSESSMENT & PLAN NOTE
Of unclear etiology  Started on IV Levophed, but quickly weaned off.   Will give IV  cc bolus however would be cautious with IV fluids due to history of CHF; currently patient is not in fluid overload  TSH 8.1 and free T4 0.69; cortisol appropriate  Patient is hypotensive, hypothermic with high TSH; will start on iv levothyroxine while unable to take po  Will start empiric IV antibiotics with septic workup including blood cultures. BC NGTD. Possibly d/c abx if remain negative

## 2024-01-18 NOTE — CONSULTS
Palliative Medicine Consult   Date: 1/18/2024     Palliative Medicine team consulted for continuity of care, goals of care discussion, and advanced care planning.     - pt known to myself and palliative medicine team from previous admission   - spoke with daughter/primary care giver, Reyna, by phone   - Reyna with good overall insight into pt's condition   - plan to meet 1/19 in person to discuss future care options (plan for discussion of home hospice) given ongoing episodes of AMS/delirium with ongoing inpatient efforts and very limited time home following SNF (less than a week) without additional decline     Full consult note to follow.     Eli Yip, NP   Palliative Medicine   [facility]

## 2024-01-18 NOTE — ASSESSMENT & PLAN NOTE
Of unclear etiology  Started on IV Levophed currently  Will give IV  cc bolus however would be cautious with IV fluids due to history of CHF; currently patient is not in fluid overload  TSH 8.1 and free T4 0.69; will get cortisol  Patient is hypotensive, hypothermic with high TSH; will reassess if patient needs IV levothyroxine/hydrocortisone if patient continues to be hypotensive or have to increase Levophed  Will start empiric IV antibiotics with septic workup including blood cultures

## 2024-01-18 NOTE — ASSESSMENT & PLAN NOTE
This patient has hyperkalemia which is uncontrolled. We will monitor for arrhythmias with EKG or continuous telemetry. We will treat the hyperkalemia with Potassium Binders, Calcium gluconate, and IV insulin and dextrose. The likely etiology of the hyperkalemia is LATRICE.  The patients latest potassium has been reviewed and the results are listed below  Recent Labs   Lab 01/18/24  0139   K 5.2*

## 2024-01-19 LAB
POCT GLUCOSE: 106 MG/DL (ref 70–110)
POCT GLUCOSE: 114 MG/DL (ref 70–110)
POCT GLUCOSE: 124 MG/DL (ref 70–110)
POCT GLUCOSE: 130 MG/DL (ref 70–110)
POCT GLUCOSE: 133 MG/DL (ref 70–110)
POCT GLUCOSE: 143 MG/DL (ref 70–110)
POCT GLUCOSE: 156 MG/DL (ref 70–110)
VANCOMYCIN TROUGH SERPL-MCNC: 15.8 UG/ML (ref 10–22)

## 2024-01-19 PROCEDURE — 99233 SBSQ HOSP IP/OBS HIGH 50: CPT | Mod: HCNC,,, | Performed by: REGISTERED NURSE

## 2024-01-19 PROCEDURE — 63600175 PHARM REV CODE 636 W HCPCS: Mod: HCNC | Performed by: INTERNAL MEDICINE

## 2024-01-19 PROCEDURE — 11000001 HC ACUTE MED/SURG PRIVATE ROOM: Mod: HCNC

## 2024-01-19 PROCEDURE — 99497 ADVNCD CARE PLAN 30 MIN: CPT | Mod: HCNC,,, | Performed by: REGISTERED NURSE

## 2024-01-19 PROCEDURE — 97162 PT EVAL MOD COMPLEX 30 MIN: CPT | Mod: HCNC

## 2024-01-19 PROCEDURE — 94640 AIRWAY INHALATION TREATMENT: CPT | Mod: HCNC

## 2024-01-19 PROCEDURE — 94761 N-INVAS EAR/PLS OXIMETRY MLT: CPT | Mod: HCNC

## 2024-01-19 PROCEDURE — 97530 THERAPEUTIC ACTIVITIES: CPT | Mod: HCNC

## 2024-01-19 PROCEDURE — 25000242 PHARM REV CODE 250 ALT 637 W/ HCPCS: Mod: HCNC | Performed by: HOSPITALIST

## 2024-01-19 PROCEDURE — 80202 ASSAY OF VANCOMYCIN: CPT | Mod: HCNC | Performed by: STUDENT IN AN ORGANIZED HEALTH CARE EDUCATION/TRAINING PROGRAM

## 2024-01-19 PROCEDURE — 63600175 PHARM REV CODE 636 W HCPCS: Mod: HCNC | Performed by: HOSPITALIST

## 2024-01-19 PROCEDURE — 25000003 PHARM REV CODE 250: Mod: HCNC | Performed by: STUDENT IN AN ORGANIZED HEALTH CARE EDUCATION/TRAINING PROGRAM

## 2024-01-19 PROCEDURE — 97165 OT EVAL LOW COMPLEX 30 MIN: CPT | Mod: HCNC

## 2024-01-19 PROCEDURE — S4991 NICOTINE PATCH NONLEGEND: HCPCS | Mod: HCNC | Performed by: INTERNAL MEDICINE

## 2024-01-19 PROCEDURE — 36415 COLL VENOUS BLD VENIPUNCTURE: CPT | Mod: HCNC | Performed by: STUDENT IN AN ORGANIZED HEALTH CARE EDUCATION/TRAINING PROGRAM

## 2024-01-19 PROCEDURE — 97535 SELF CARE MNGMENT TRAINING: CPT | Mod: HCNC

## 2024-01-19 PROCEDURE — 92610 EVALUATE SWALLOWING FUNCTION: CPT | Mod: HCNC

## 2024-01-19 PROCEDURE — 63600175 PHARM REV CODE 636 W HCPCS: Mod: HCNC | Performed by: STUDENT IN AN ORGANIZED HEALTH CARE EDUCATION/TRAINING PROGRAM

## 2024-01-19 PROCEDURE — 25000003 PHARM REV CODE 250: Mod: HCNC | Performed by: INTERNAL MEDICINE

## 2024-01-19 PROCEDURE — 25000003 PHARM REV CODE 250: Mod: HCNC | Performed by: HOSPITALIST

## 2024-01-19 RX ORDER — MORPHINE SULFATE 4 MG/ML
2 INJECTION, SOLUTION INTRAMUSCULAR; INTRAVENOUS EVERY 4 HOURS PRN
Status: DISCONTINUED | OUTPATIENT
Start: 2024-01-19 | End: 2024-01-21 | Stop reason: HOSPADM

## 2024-01-19 RX ORDER — IPRATROPIUM BROMIDE AND ALBUTEROL SULFATE 2.5; .5 MG/3ML; MG/3ML
3 SOLUTION RESPIRATORY (INHALATION)
Status: DISCONTINUED | OUTPATIENT
Start: 2024-01-19 | End: 2024-01-21 | Stop reason: HOSPADM

## 2024-01-19 RX ORDER — DEXTROSE MONOHYDRATE AND SODIUM CHLORIDE 5; .9 G/100ML; G/100ML
INJECTION, SOLUTION INTRAVENOUS CONTINUOUS
Status: DISCONTINUED | OUTPATIENT
Start: 2024-01-19 | End: 2024-01-20

## 2024-01-19 RX ORDER — FAMOTIDINE 10 MG/ML
20 INJECTION INTRAVENOUS DAILY
Status: DISCONTINUED | OUTPATIENT
Start: 2024-01-19 | End: 2024-01-21 | Stop reason: HOSPADM

## 2024-01-19 RX ADMIN — DEXTROSE MONOHYDRATE 250 MG: 50 INJECTION, SOLUTION INTRAVENOUS at 03:01

## 2024-01-19 RX ADMIN — PIPERACILLIN AND TAZOBACTAM 4.5 G: 4; .5 INJECTION, POWDER, LYOPHILIZED, FOR SOLUTION INTRAVENOUS; PARENTERAL at 11:01

## 2024-01-19 RX ADMIN — FAMOTIDINE 20 MG: 10 INJECTION INTRAVENOUS at 08:01

## 2024-01-19 RX ADMIN — LEVOTHYROXINE SODIUM 25 MCG: 20 INJECTION, SOLUTION INTRAVENOUS at 08:01

## 2024-01-19 RX ADMIN — DEXTROSE AND SODIUM CHLORIDE: 5; 900 INJECTION, SOLUTION INTRAVENOUS at 08:01

## 2024-01-19 RX ADMIN — MORPHINE SULFATE 2 MG: 4 INJECTION, SOLUTION INTRAMUSCULAR; INTRAVENOUS at 10:01

## 2024-01-19 RX ADMIN — DEXTROSE AND SODIUM CHLORIDE: 5; 900 INJECTION, SOLUTION INTRAVENOUS at 09:01

## 2024-01-19 RX ADMIN — PIPERACILLIN AND TAZOBACTAM 4.5 G: 4; .5 INJECTION, POWDER, LYOPHILIZED, FOR SOLUTION INTRAVENOUS; PARENTERAL at 09:01

## 2024-01-19 RX ADMIN — TRIAMCINOLONE ACETONIDE: 1 CREAM TOPICAL at 10:01

## 2024-01-19 RX ADMIN — MUPIROCIN: 20 OINTMENT TOPICAL at 08:01

## 2024-01-19 RX ADMIN — PIPERACILLIN AND TAZOBACTAM 4.5 G: 4; .5 INJECTION, POWDER, LYOPHILIZED, FOR SOLUTION INTRAVENOUS; PARENTERAL at 03:01

## 2024-01-19 RX ADMIN — TRIAMCINOLONE ACETONIDE: 1 CREAM TOPICAL at 08:01

## 2024-01-19 RX ADMIN — IPRATROPIUM BROMIDE AND ALBUTEROL SULFATE 3 ML: 2.5; .5 SOLUTION RESPIRATORY (INHALATION) at 02:01

## 2024-01-19 RX ADMIN — MORPHINE SULFATE 2 MG: 4 INJECTION, SOLUTION INTRAMUSCULAR; INTRAVENOUS at 02:01

## 2024-01-19 RX ADMIN — VANCOMYCIN HYDROCHLORIDE 1000 MG: 1 INJECTION, POWDER, LYOPHILIZED, FOR SOLUTION INTRAVENOUS at 10:01

## 2024-01-19 RX ADMIN — Medication 1 PATCH: at 08:01

## 2024-01-19 RX ADMIN — MUPIROCIN: 20 OINTMENT TOPICAL at 10:01

## 2024-01-19 NOTE — NURSING
[facility] [facility] [place] 4 [place]      1/19/2024     Redness noted around sacral area, no breakdown noted.     Skin assessed on: Q Shift      [x] No Pressure Injuries Present    [x]Prevention Measures Documented    [] Yes LDA  for Pressure Injury Previously documented     [] Yes New Pressure Injury Discovered   [] LDA for New Pressure Injury Added      Attending RN:  Mary Bourne LPN     Second RN:  LILY Mcgee

## 2024-01-19 NOTE — PT/OT/SLP EVAL
Speech Language Pathology Evaluation  Bedside Swallow    Patient Name:  Narciso Yanez   MRN:  [id number]  Admitting Diagnosis: Acute metabolic encephalopathy    Recommendations:                 General Recommendations:  Dysphagia therapy  Diet recommendations: NPO except ice for stimulation and oral hygiene.  Aspiration Precautions:  good oral care, do not use lemon swabs    General Precautions: Standard,    Communication strategies:   calm tone    Assessment:     Narciso Yanez is a 88 y.o. male with dx acute metabolic encephalopathy he presents with level of alertness which renders him inappropriate for po intake.     History:     Past Medical History:   Diagnosis Date    Actinic keratosis     Anxiety     B12 deficiency 12/8/2014    Dx 6/14    Cancer     skin    Cataract     Coronary artery disease     Diabetes mellitus type II     Diabetes mellitus with peripheral circulatory disorder 6/18/2014    ED (erectile dysfunction)     Hyperlipidemia     Kidney stone     Neurogenic claudication 4/4/2022    Normocytic anemia 10/15/2023    Peripheral vascular disease     Pneumonia 1/17/2024    Spondylosis 3/29/2019    Tobacco dependence     Urinary incontinence 5/4/2021       Past Surgical History:   Procedure Laterality Date    APPENDECTOMY      CARDIAC SURGERY  01/1999    CABG 4 vessels    CATARACT EXTRACTION      EPIDURAL STEROID INJECTION Right 8/26/2020    Procedure: Injection, Steroid, Epidural Transforaminal;  Surgeon: Wiley Crane Jr., MD;  Location: [facility] ENDO;  Service: Pain Management;  Laterality: Right;  Right L5 + S1 TF LEAH  Arrive @ 1115; ASA & Plavix last 8/18; Check BG; Rapid COVID test    EPIDURAL STEROID INJECTION Right 11/25/2020    Procedure: Injection, Steroid, Epidural Transformainal;  Surgeon: Wiley Crane Jr., MD;  Location: [facility] ENDO;  Service: Pain Management;  Laterality: Right;  Right L5 + S1 TF LEAH  Arrive @ 1245; ASA and Plavix last 11/17; Pre-DM; Needs MD Sign.    EPIDURAL  "STEROID INJECTION Right 2/19/2021    Procedure: Injection, Steroid, Epidural Transforaminal;  Surgeon: Wiley Crane Jr., MD;  Location: [facility];  Service: Pain Management;  Laterality: Right;  Right L5 + S1 TF LEAH  Arrive @ 1115; ASA & Plavix last 2/11; Check BG; Needs Consent    EXTERNAL EAR SURGERY      EYE SURGERY      cataracts    ILIAC ARTERY STENT Bilateral 06/2003    also Right External Iliac stent       Social History: Patient lives with daughter    CTA: 1. Findings suggesting hepatic steatosis, correlation with LFTs recommended.2. Moderate stool in the colon noting colonic diverticulosis without diverticulitis. 3. Prostatomegaly, correlation with PSA recommended. 4. Cholelithiasis without secondary findings to suggest acute cholecystitis. 5. Cystic structure within the right kidney, may reflect adjacent cysts or septated cyst.  Nonemergent ultrasound recommended for further evaluation and follow-up. 6. Please see above for several additional findings.    Prior diet: per daughter unrestricted    Subjective   Pt's two daughter and great grandson present at bedside, daughter who is primary caregiver reporting "he eats everything and anything" and reporting significant change in mental status as compared to baseline. She reports at baseline he is conversant.   Patient goals: safe nutrition.     Pain/Comfort:  Pain Rating 1: 0/10    Respiratory Status: Nasal cannula, flow 2 L/min    Objective:     Oral Musculature Evaluation  Oral Musculature: unable to assess due to poor participation/comprehension  Dentition: edentulous (has dentures at bedside, no placed for eval)  Secretion Management: other (see comments) (dry today however family reporting drooling yesterday)  Mucosal Quality: dry  Oral Labial Strength and Mobility: impaired seal  Voice Prior to PO Intake: clear breath sounds, patient did not vocalize  Oral Musculature Comments: grossly symemtrical, open mouth posture    Bedside Swallow Eval: "   Consistencies Assessed:  Ice x2    Oral Phase:   Variable attention to bolus  Anterior loss of bolus x1    Pharyngeal Phase:   Lack of initiation of pharyngeal phase of swallow    Compensatory Strategies  None    Treatment: please note silent aspiration cannot be r/o at bedside.   ST spoke with MD regarding possibility of temporary means of nutrition MD reporting patient is not a good candidate for any temporary or long term means of alternate nutrition. Family education provided including ST POC to include ongoing assessment of swallow.     Goals:   Multidisciplinary Problems       SLP Goals          Problem: SLP    Goal Priority Disciplines Outcome   SLP Goal    High SLP    Description: STG  Pt will participate in ongoing assessment of swallow.                        Plan:     Patient to be seen:  3 x/week, 5 x/week (as appropriate for ongoing eval)   Plan of Care expires:   1/26/23  Plan of Care reviewed with:  family   SLP Follow-Up:  Yes       Discharge recommendations:    TBD  Barriers to Discharge:  Level of Skilled Assistance Needed .    Time Tracking:     SLP Treatment Date:   01/19/24  Speech Start Time:  1154  Speech Stop Time:  1217     Speech Total Time (min):  23 min    Billable Minutes: Eval Swallow and Oral Function 8 and Self Care/Home Management Training 15    01/19/2024

## 2024-01-19 NOTE — SUBJECTIVE & OBJECTIVE
Past Medical History:   Diagnosis Date    Actinic keratosis     Anxiety     B12 deficiency 12/8/2014    Dx 6/14    Cancer     skin    Cataract     Coronary artery disease     Diabetes mellitus type II     Diabetes mellitus with peripheral circulatory disorder 6/18/2014    ED (erectile dysfunction)     Hyperlipidemia     Kidney stone     Neurogenic claudication 4/4/2022    Normocytic anemia 10/15/2023    Peripheral vascular disease     Pneumonia 1/17/2024    Spondylosis 3/29/2019    Tobacco dependence     Urinary incontinence 5/4/2021       Past Surgical History:   Procedure Laterality Date    APPENDECTOMY      CARDIAC SURGERY  01/1999    CABG 4 vessels    CATARACT EXTRACTION      EPIDURAL STEROID INJECTION Right 8/26/2020    Procedure: Injection, Steroid, Epidural Transforaminal;  Surgeon: Wiley Crane Jr., MD;  Location: [facility] ENDO;  Service: Pain Management;  Laterality: Right;  Right L5 + S1 TF LEAH  Arrive @ 1115; ASA & Plavix last 8/18; Check BG; Rapid COVID test    EPIDURAL STEROID INJECTION Right 11/25/2020    Procedure: Injection, Steroid, Epidural Transformainal;  Surgeon: Wiley Crane Jr., MD;  Location: [facility] ENDO;  Service: Pain Management;  Laterality: Right;  Right L5 + S1 TF LEAH  Arrive @ 1245; ASA and Plavix last 11/17; Pre-DM; Needs MD Sign.    EPIDURAL STEROID INJECTION Right 2/19/2021    Procedure: Injection, Steroid, Epidural Transforaminal;  Surgeon: Wiley Crane Jr., MD;  Location: [facility] ENDO;  Service: Pain Management;  Laterality: Right;  Right L5 + S1 TF LEAH  Arrive @ 1115; ASA & Plavix last 2/11; Check BG; Needs Consent    EXTERNAL EAR SURGERY      EYE SURGERY      cataracts    ILIAC ARTERY STENT Bilateral 06/2003    also Right External Iliac stent       Review of patient's allergies indicates:   Allergen Reactions    Demerol [meperidine] Nausea Only       Medications:  Continuous Infusions:   dextrose 5 % and 0.9 % NaCl 75 mL/hr at 01/19/24 0848     Scheduled Meds:    albuterol-ipratropium  3 mL Nebulization Q6H WAKE    clopidogreL  75 mg Oral Daily    famotidine (PF)  20 mg Intravenous Daily    levothyroxine  25 mcg Intravenous Daily    metoprolol succinate  25 mg Oral Daily    mupirocin   Nasal BID    nicotine  1 patch Transdermal Daily    piperacillin-tazobactam (Zosyn) IV (PEDS and ADULTS) (extended infusion is not appropriate)  4.5 g Intravenous Q8H    tamsulosin  0.4 mg Oral Daily    triamcinolone acetonide 0.1%   Topical (Top) BID    valproate sodium (DEPACON) IVPB  250 mg Intravenous Q12H    vancomycin (VANCOCIN) IV (PEDS and ADULTS)  1,000 mg Intravenous Q24H     PRN Meds:dextrose 10%, dextrose 10%, glucagon (human recombinant), morphine, sodium chloride 0.9%, Pharmacy to dose Vancomycin consult **AND** vancomycin - pharmacy to dose    Family History       Problem Relation (Age of Onset)    Stroke Mother          Tobacco Use    Smoking status: Every Day     Current packs/day: 1.50     Average packs/day: 1.5 packs/day for 71.0 years (106.5 ttl pk-yrs)     Types: Cigarettes    Smokeless tobacco: Former   Substance and Sexual Activity    Alcohol use: No    Drug use: No    Sexual activity: Not Currently     Partners: Female       Review of Systems   Unable to perform ROS: Mental status change     Objective:     Vital Signs (Most Recent):  Temp: 97.5 °F (36.4 °C) (01/19/24 1155)  Pulse: 93 (01/19/24 1155)  Resp: 19 (01/19/24 1155)  BP: 118/74 (01/19/24 1155)  SpO2: 95 % (01/19/24 1155) Vital Signs (24h Range):  Temp:  [97.4 °F (36.3 °C)-98.4 °F (36.9 °C)] 97.5 °F (36.4 °C)  Pulse:  [] 93  Resp:  [17-28] 19  SpO2:  [92 %-95 %] 95 %  BP: (103-134)/(54-74) 118/74     Weight: 70.1 kg (154 lb 8.7 oz)  Body mass index is 24.2 kg/m².       Physical Exam  Vitals and nursing note reviewed.   Constitutional:       General: He is not in acute distress.     Appearance: He is normal weight. He is ill-appearing.      Comments: Restlessness, occasional moaning, tremors    HENT:       "Head: Normocephalic and atraumatic.   Pulmonary:      Effort: Pulmonary effort is normal. No respiratory distress.      Comments: Cough   Musculoskeletal:      Right lower leg: No edema.      Left lower leg: No edema.   Neurological:      Mental Status: He is lethargic and disoriented.   Psychiatric:         Behavior: Behavior is uncooperative.        Advance Care Planning   Advance Directives:   Living Will: No    LaPOST: No (needs LAPOST prior to discharge)    Do Not Resuscitate Status: Yes    Medical Power of : Yes (daughters Norah and Etelvina share POA, per discussion with Brenda last admission; plan to ask family to bring in copy for EMR)      Decision Making:  Family answered questions  Goals of Care: What is most important right now is to focus on spending time at home, avoiding the hospital, remaining as independent as possible, symptom/pain control, comfort and QOL . Accordingly, we have decided that the best plan to meet the patient's goals includes continuing with treatment.     Significant Labs: All pertinent labs within the past 24 hours have been reviewed.  CBC:   Recent Labs   Lab 01/18/24  1042   WBC 7.73   HGB 7.3*   HCT 22.7*   MCV 94          BMP:  No results for input(s): "GLU", "NA", "K", "CL", "CO2", "BUN", "CREATININE", "CALCIUM", "MG" in the last 24 hours.  LFT:  Lab Results   Component Value Date    AST 28 01/18/2024    ALKPHOS 96 01/18/2024    BILITOT 0.2 01/18/2024     Albumin:   Albumin   Date Value Ref Range Status   01/18/2024 2.6 (L) 3.5 - 5.2 g/dL Final     Protein:   Total Protein   Date Value Ref Range Status   01/18/2024 6.0 6.0 - 8.4 g/dL Final     Lactic acid:   Lab Results   Component Value Date    LACTATE 1.2 01/18/2024    LACTATE 1.3 12/20/2023     Significant Imaging: I have reviewed all pertinent imaging results/findings within the past 24 hours.  "

## 2024-01-19 NOTE — NURSING
[facility] MEWS TRIGGER FOLLOW UP       MEWS Monitoring, Score is: 4  Indication for review: LOC    Bedside Nurse, Mary contacted, no concerns verbalized at this time, instructed to call [phone] for further concerns or assistance.    Per primary RN, pt more alert and following commands. Flowsheets updated per primary RN. MEWS now 2.

## 2024-01-19 NOTE — SUBJECTIVE & OBJECTIVE
Past Medical History:   Diagnosis Date    Actinic keratosis     Anxiety     B12 deficiency 12/8/2014    Dx 6/14    Cancer     skin    Cataract     Coronary artery disease     Diabetes mellitus type II     Diabetes mellitus with peripheral circulatory disorder 6/18/2014    ED (erectile dysfunction)     Hyperlipidemia     Kidney stone     Neurogenic claudication 4/4/2022    Normocytic anemia 10/15/2023    Peripheral vascular disease     Pneumonia 1/17/2024    Spondylosis 3/29/2019    Tobacco dependence     Urinary incontinence 5/4/2021       Past Surgical History:   Procedure Laterality Date    APPENDECTOMY      CARDIAC SURGERY  01/1999    CABG 4 vessels    CATARACT EXTRACTION      EPIDURAL STEROID INJECTION Right 8/26/2020    Procedure: Injection, Steroid, Epidural Transforaminal;  Surgeon: Wiley Crane Jr., MD;  Location: [facility] ENDO;  Service: Pain Management;  Laterality: Right;  Right L5 + S1 TF LEAH  Arrive @ 1115; ASA & Plavix last 8/18; Check BG; Rapid COVID test    EPIDURAL STEROID INJECTION Right 11/25/2020    Procedure: Injection, Steroid, Epidural Transformainal;  Surgeon: Wiley Crane Jr., MD;  Location: [facility] ENDO;  Service: Pain Management;  Laterality: Right;  Right L5 + S1 TF LEAH  Arrive @ 1245; ASA and Plavix last 11/17; Pre-DM; Needs MD Sign.    EPIDURAL STEROID INJECTION Right 2/19/2021    Procedure: Injection, Steroid, Epidural Transforaminal;  Surgeon: Wiley Crane Jr., MD;  Location: [facility] ENDO;  Service: Pain Management;  Laterality: Right;  Right L5 + S1 TF LEAH  Arrive @ 1115; ASA & Plavix last 2/11; Check BG; Needs Consent    EXTERNAL EAR SURGERY      EYE SURGERY      cataracts    ILIAC ARTERY STENT Bilateral 06/2003    also Right External Iliac stent       Review of patient's allergies indicates:   Allergen Reactions    Demerol [meperidine] Nausea Only       Medications:  Continuous Infusions:   dextrose 5 % and 0.9 % NaCl 75 mL/hr at 01/19/24 0848     Scheduled Meds:    albuterol-ipratropium  3 mL Nebulization Q6H WAKE    clopidogreL  75 mg Oral Daily    famotidine (PF)  20 mg Intravenous Daily    levothyroxine  25 mcg Intravenous Daily    metoprolol succinate  25 mg Oral Daily    mupirocin   Nasal BID    nicotine  1 patch Transdermal Daily    piperacillin-tazobactam (Zosyn) IV (PEDS and ADULTS) (extended infusion is not appropriate)  4.5 g Intravenous Q8H    tamsulosin  0.4 mg Oral Daily    triamcinolone acetonide 0.1%   Topical (Top) BID    valproate sodium (DEPACON) IVPB  250 mg Intravenous Q12H    vancomycin (VANCOCIN) IV (PEDS and ADULTS)  1,000 mg Intravenous Q24H     PRN Meds:dextrose 10%, dextrose 10%, glucagon (human recombinant), morphine, sodium chloride 0.9%, Pharmacy to dose Vancomycin consult **AND** vancomycin - pharmacy to dose    Family History       Problem Relation (Age of Onset)    Stroke Mother          Tobacco Use    Smoking status: Every Day     Current packs/day: 1.50     Average packs/day: 1.5 packs/day for 71.0 years (106.5 ttl pk-yrs)     Types: Cigarettes    Smokeless tobacco: Former   Substance and Sexual Activity    Alcohol use: No    Drug use: No    Sexual activity: Not Currently     Partners: Female       Review of Systems   Unable to perform ROS: Mental status change     Objective:     Vital Signs (Most Recent):  Temp: 97.5 °F (36.4 °C) (01/19/24 1155)  Pulse: 93 (01/19/24 1155)  Resp: 19 (01/19/24 1155)  BP: 118/74 (01/19/24 1155)  SpO2: 95 % (01/19/24 1155) Vital Signs (24h Range):  Temp:  [97.4 °F (36.3 °C)-98.6 °F (37 °C)] 97.5 °F (36.4 °C)  Pulse:  [] 93  Resp:  [17-28] 19  SpO2:  [92 %-95 %] 95 %  BP: (103-134)/(54-85) 118/74     Weight: 70.1 kg (154 lb 8.7 oz)  Body mass index is 24.2 kg/m².       Physical Exam  Vitals and nursing note reviewed.   Constitutional:       General: He is not in acute distress.     Appearance: He is normal weight. He is ill-appearing.      Comments: Restlessness, occasional moaning, tremors    HENT:       "Head: Normocephalic and atraumatic.   Pulmonary:      Effort: Pulmonary effort is normal. No respiratory distress.   Musculoskeletal:      Right lower leg: No edema.      Left lower leg: No edema.   Neurological:      Mental Status: He is lethargic and disoriented.   Psychiatric:         Behavior: Behavior is uncooperative.        Advance Care Planning   Advance Directives:   Living Will: No    LaPOST: No (needs LAPOST prior to discharge)    Do Not Resuscitate Status: Yes    Medical Power of : Yes (daughters Norah and Etelvina share POA, per discussion with Brenda last admission; plan to ask family to bring in copy for EMR)      Decision Making:  Family answered questions  Goals of Care: What is most important right now is to focus on spending time at home, avoiding the hospital, remaining as independent as possible, symptom/pain control, comfort and QOL . Accordingly, we have decided that the best plan to meet the patient's goals includes continuing with treatment.     Significant Labs: All pertinent labs within the past 24 hours have been reviewed.  CBC:   Recent Labs   Lab 01/18/24  1042   WBC 7.73   HGB 7.3*   HCT 22.7*   MCV 94        BMP:  No results for input(s): "GLU", "NA", "K", "CL", "CO2", "BUN", "CREATININE", "CALCIUM", "MG" in the last 24 hours.  LFT:  Lab Results   Component Value Date    AST 28 01/18/2024    ALKPHOS 96 01/18/2024    BILITOT 0.2 01/18/2024     Albumin:   Albumin   Date Value Ref Range Status   01/18/2024 2.6 (L) 3.5 - 5.2 g/dL Final     Protein:   Total Protein   Date Value Ref Range Status   01/18/2024 6.0 6.0 - 8.4 g/dL Final     Lactic acid:   Lab Results   Component Value Date    LACTATE 1.2 01/18/2024    LACTATE 1.3 12/20/2023       Significant Imaging: I have reviewed all pertinent imaging results/findings within the past 24 hours.    "

## 2024-01-19 NOTE — ASSESSMENT & PLAN NOTE
1/18/2023 - Consult   - interval chart reviewed; pt discussed with MDT during ICU rounds   - pt unable to participate in GOC/ACP discussion due to AMS (see encephalopathy)   - call placed with pt's daughter, Reyna, who is pt's primary care giver who he lives with; they live locally in [facility]  - Reyna provided update on pt since last admission (see baseline info in encephalopathy section)   0 this is pt's 5th hospital admission since 10/23; shared concern for this and trajectory of elderly pts with frequent infections and hospitalizations, especially given pt's poor tolerance of infection/hospitalization experiencing significant delirium with this and prior admission   - discussed plan to discuss further in person [place] with her or her sisters GOC for future care   - confirms continued wished for DNR; needs LAPOST    - emotional support provided; questions answered   - MDT updated

## 2024-01-19 NOTE — ASSESSMENT & PLAN NOTE
1/19/2024  - interval chart reviewed; discussed pt with hospital primary (hospital primary recommending hospice as well and pt now NPO for safety)   - met with pt and daughter, Norah, at bedside; pt continues to lack mental status for GOC/ACP dsicussion, similar presentation from 1/18, but improvement in tremors following transition to IV depakote from home PO regimen as he has been unable to take   - Norah is one of pt's 3 daughters, and one of 2 POAs; Brenda and Norah share POA; pt lives with daughter Reyna   - discussed pt's current status with Norah; GOC for continued treatment she is hopeful that pt will have improved mental status with continued treatment as he has had mental status changes in the past with infections   - discussed trajectory of frequent infections and frequent hospitalizations in the elderly  - GOC not currently for hospice; did have in depth conversation of hospice and philosophy of care, which she seems to have good insight into but is not yet ready for   -  [facility] also for further information of pt's skin cancer; they have still not received bx results from 12/25 bx and wish to discuss that as well as move forward with PET scan that was planned for 1/22 but understand it will likely need rescheduling due to admission   - they are open to further discussion of home palliative care with a provider that could later provide home hospice if/when aligns with GOC; GOC for pt to avoid SNF/NH in the future   - emotional support provided; answered questions   - updated MDT and shared Norah's request for visit with hospital primary to discuss further treatment plan     1/18/2024 - Consult   - interval chart reviewed; pt discussed with MDT during ICU rounds   - pt unable to participate in GOC/ACP discussion due to AMS (see encephalopathy)   - call placed with pt's daughter, Reyna, who is pt's primary care giver who he lives with; they live locally in [facility]  - Reyna provided update on pt  since last admission (see baseline info in encephalopathy section)   0 this is pt's 5th hospital admission since 10/23; shared concern for this and trajectory of elderly pts with frequent infections and hospitalizations, especially given pt's poor tolerance of infection/hospitalization experiencing significant delirium with this and prior admission   - discussed plan to discuss further in person [place] with her or her sisters [facility] for future care   - confirms continued wished for DNR; needs LAPOST    - emotional support provided; questions answered   - MDT updated

## 2024-01-19 NOTE — PT/OT/SLP EVAL
Physical Therapy Evaluation    Patient Name:  Narciso Yanez   MRN:  [id number]    Recommendations:     Discharge Recommendations: Moderate Intensity Therapy   Discharge Equipment Recommendations: none   Barriers to discharge home:  Pt with confusion and decreased functional mobility/ambulation at this time.    Assessment:     Narciso Yanez is a 88 y.o. male admitted with a medical diagnosis of Acute metabolic encephalopathy.  He presents with the following impairments/functional limitations: weakness, impaired endurance, impaired self care skills, impaired functional mobility, gait instability, impaired balance, impaired cognition, decreased coordination, decreased upper extremity function, decreased lower extremity function, decreased safety awareness, pain, decreased ROM, impaired joint extensibility.    Rehab Prognosis: Fair; patient would benefit from acute skilled PT services to address these deficits and reach maximum level of function.    Recent Surgery: * No surgery found *      Plan:     During this hospitalization, patient to be seen 5 x/week to address the identified rehab impairments via gait training, therapeutic activities, therapeutic exercises and progress toward the following goals:    Plan of Care Expires:  02/02/24    Subjective     Chief Complaint: Pt mostly nonverbal, did not verbalized any complaints.  Patient/Family Comments/goals: Family reported change in mental status started Wednesday morning 1/17/24.   Pain/Comfort:  Pain Rating 1:  (Pt was sensitive to touch with BLE.)  Pain Addressed 1: Reposition, Distraction, Cessation of Activity      Living Environment:  Pt lives with dtr (works?) in a [facility] with no concerns at entry.  Pt recently D/C'ed from SNF.   Prior to admission, patients level of function was mod I with ambulation using RW.  Pt sleeps in a lift chair.  Equipment at home: walker, rolling, wheelchair, bedside commode, shower chair (lift chair; family can borrow a hospital  "bed).  Upon discharge, patient will have assistance from 3 dtrs.    Objective:     Patient found HOB elevated with bed alarm, peripheral IV, telemetry, PureWick, pressure relief boots  upon PT entry to room.    General Precautions: Standard, fall, diabetic  Orthopedic Precautions:N/A   Braces: N/A  Respiratory Status: Room air    Exams:  Cognitive Exam:  Patient is oriented to Person.  Gross Motor Coordination:  impaired  Postural Exam:  Patient presented with the following abnormalities:    -       Rounded shoulders  -       Forward head  Skin Integrity/Edema:      -       Skin integrity: mepilex foam dressings to L calf  -       Edema: None noted BLE  BLE ROM: PROM WFL except decreased knee extension and increased hip adduction  BLE Strength: Pt unable to participate to formally perform MMT.     Functional Mobility:  spO2 on RA ~92% and HR ~88 bpm.  Pt with body tremors, briefly opened his eyes.  Once sitting EOB, pt able to maintain fair static sit balance.  Pt stated his last name and would occasionally say "ok", otherwise mostly nonverbal at this time.  Family present in room.  PT will continue to assess.   Bed Mobility:     Scooting: dependence and of 2 persons  Bridging: dependence and of 2 persons  Supine to Sit: dependence and of 2 persons  Sit to Supine: dependence and of 2 persons  Balance: Pt with fair static sit balance.      AM-PAC 6 CLICK MOBILITY  Total Score:8       Treatment & Education:  PROM BLE seated in all available planes    Family educated on acute skilled PT services and goals, verbalized good understanding.     Patient left HOB elevated and pillow between LE with all lines intact, call button in reach, bed alarm on, nurse Kiki notified, and 3 family members present.  B pressure relief boots reapplied.     GOALS:   Multidisciplinary Problems       Physical Therapy Goals          Problem: Physical Therapy    Goal Priority Disciplines Outcome Goal Variances Interventions   Physical Therapy " Goal     PT, PT/OT Ongoing, Progressing     Description: Goals to be met by: 24     Patient will increase functional independence with mobility by performin. Supine to sit with Moderate Assistance  2. Sit to supine with Moderate Assistance  3. Rolling to Left and Right with Moderate Assistance  4. Sitting at edge of bed x30 minutes with Stand-by Assistance  5. Lower extremity exercise program x10 reps per handout, with assistance as needed                         History:     Past Medical History:   Diagnosis Date    Actinic keratosis     Anxiety     B12 deficiency 2014    Dx     Cancer     skin    Cataract     Coronary artery disease     Diabetes mellitus type II     Diabetes mellitus with peripheral circulatory disorder 2014    ED (erectile dysfunction)     Hyperlipidemia     Kidney stone     Neurogenic claudication 2022    Normocytic anemia 10/15/2023    Peripheral vascular disease     Pneumonia 2024    Spondylosis 3/29/2019    Tobacco dependence     Urinary incontinence 2021       Past Surgical History:   Procedure Laterality Date    APPENDECTOMY      CARDIAC SURGERY  1999    CABG 4 vessels    CATARACT EXTRACTION      EPIDURAL STEROID INJECTION Right 2020    Procedure: Injection, Steroid, Epidural Transforaminal;  Surgeon: Wiley Crane Jr., MD;  Location: [facility];  Service: Pain Management;  Laterality: Right;  Right L5 + S1 TF LEAH  Arrive @ 1115; ASA & Plavix last ; Check BG; Rapid COVID test    EPIDURAL STEROID INJECTION Right 2020    Procedure: Injection, Steroid, Epidural Transformainal;  Surgeon: Wiley Crane Jr., MD;  Location: [facility] ENDO;  Service: Pain Management;  Laterality: Right;  Right L5 + S1 TF LEAH  Arrive @ 1245; ASA and Plavix last ; Pre-DM; Needs MD Sign.    EPIDURAL STEROID INJECTION Right 2021    Procedure: Injection, Steroid, Epidural Transforaminal;  Surgeon: Wiley Crane Jr., MD;  Location: [facility];   Service: Pain Management;  Laterality: Right;  Right L5 + S1 TF LEAH  Arrive @ 1115; ASA & Plavix last 2/11; Check BG; Needs Consent    EXTERNAL EAR SURGERY      EYE SURGERY      cataracts    ILIAC ARTERY STENT Bilateral 06/2003    also Right External Iliac stent       Time Tracking:     PT Received On: 01/19/24  PT Start Time: 1508     PT Stop Time: 1529  PT Total Time (min): 21 min     Billable Minutes: Evaluation 11 min co-eval with OT and Therapeutic Activity 10 min      01/19/2024

## 2024-01-19 NOTE — HPI
"From H&P: " 88 years old male with past medical history significant for CAD, COPD (not on O2), hypertension, hyperlipidemia, CKD 3, type 2 diabetes, peripheral artery disease, NPH, PMR, cutaneous T-cell lymphoma, chronic back pain, tobacco use who was brought in by his daughter due to altered mental status.  Patient was alert and lying comfortably in the bed however confused so was not able to provide complete information however, patient denied chest pain shortness of breath abdominal pain.  History is provided by patient's daughter at bedside who reported that patient has been confused since yesterday and was not doing good for which she brought him to the hospital.  Lab work showed creatinine 1.6 and potassium 5.9.  Patient received hyperkalemia treatment.  Later in ER, patient became hypotensive for which started on Levophed.  Chest x-ray showed findings suspicious of diffuse developing infiltrate.  CT head and C-spine negative for any acute abnormality.  CT A&P showed hepatic steatosis however negative for any acute abnormality.  Patient was found to be hypothermic with temperature of 90.2° F and placed on Nj Hugger.  In the ER, patient became hypoglycemic with glucose of 38 for which received hypoglycemia protocol and recheck glucose came 58 "     Palliative medicine consulted for continuity of care, goals of care discussion, and advance care planning; for details of visit, see advance care planning section of plan.     "

## 2024-01-19 NOTE — NURSING
Bed bath and linen changed. Skin assessed, mepilex changed and replaced. IV antibiotics infusing. Tele monitor #[id number]. Heel protector placed on pt. SCDs removed. Pt repositioned frequently with assist x2; pt remained free from fall/injury. Purewick in place. Accu checks with no insulin coverage. Pt is NPO. Pt response to voice. No signs of pain/discomfort. Safety measures maintained. Will cont to monitor

## 2024-01-19 NOTE — PLAN OF CARE
Initial eval completed; mental status is not appropriate for po intake. ST RECS: NPO except ice during oral care for stimulation. Please don't use lemon swabs- as they contribute to dry mucosa. ST will follow for ongoing assessment. Norma Malik, NADEEM-SLP

## 2024-01-19 NOTE — PLAN OF CARE
Problem: Physical Therapy  Goal: Physical Therapy Goal  Description: Goals to be met by: 24     Patient will increase functional independence with mobility by performin. Supine to sit with Moderate Assistance  2. Sit to supine with Moderate Assistance  3. Rolling to Left and Right with Moderate Assistance  4. Sitting at edge of bed x30 minutes with Stand-by Assistance  5. Lower extremity exercise program x10 reps per handout, with assistance as needed    Outcome: Ongoing, Progressing     Pt was dep of 2 persons for bed mobility.

## 2024-01-19 NOTE — PROGRESS NOTES
[facility] Surg  Palliative Medicine  Progress Note    Patient Name: Narciso Yanez  MRN: [id number]  Admission Date: 1/17/2024  Hospital Length of Stay: 2 days  Code Status: DNR   Attending Provider: Juwan Cuevas, *  Consulting Provider: Eli Yip NP  Primary Care Physician: Marcelino Del Toro MD  Principal Problem:Acute metabolic encephalopathy    Patient information was obtained from relative(s) and primary team.      Assessment/Plan:   Advance Care Planning     Palliative Care  Goals of care, counseling/discussion  1/19/2024  - interval chart reviewed; discussed pt with hospital primary (hospital primary recommending hospice as well and pt now NPO for safety)   - met with pt and daughter, Norah, at bedside; pt continues to lack mental status for GOC/ACP dsicussion, similar presentation from 1/18, but improvement in tremors following transition to IV depakote from home PO regimen as he has been unable to take   - Norah is one of pt's 3 daughters, and one of 2 POAs; Brenda and Norah share POA; pt lives with daughter Reyna   - discussed pt's current status with Norah; GOC for continued treatment she is hopeful that pt will have improved mental status with continued treatment as he has had mental status changes in the past with infections   - discussed trajectory of frequent infections and frequent hospitalizations in the elderly  - GOC not currently for hospice; did have in depth conversation of hospice and philosophy of care, which she seems to have good insight into but is not yet ready for   -  [facility] also for further information of pt's skin cancer; they have still not received bx results from 12/25 bx and wish to discuss that as well as move forward with PET scan that was planned for 1/22 but understand it will likely need rescheduling due to admission   - they are open to further discussion of home palliative care with a provider that could later provide home hospice if/when  aligns with GOC; GOC for pt to avoid SNF/NH in the future   - emotional support provided; answered questions   - updated MDT and shared Norah's request for visit with hospital primary to discuss further treatment plan     1/18/2024 - Consult   - interval chart reviewed; pt discussed with MDT during ICU rounds   - pt unable to participate in GOC/ACP discussion due to AMS (see encephalopathy)   - call placed with pt's daughter, Reyna, who is pt's primary care giver who he lives with; they live locally in [facility]  - Reyna provided update on pt since last admission (see baseline info in encephalopathy section)   0 this is pt's 5th hospital admission since 10/23; shared concern for this and trajectory of elderly pts with frequent infections and hospitalizations, especially given pt's poor tolerance of infection/hospitalization experiencing significant delirium with this and prior admission   - discussed plan to discuss further in person [place] with her or her sisters GOC for future care   - confirms continued wished for DNR; needs LAPOST    - emotional support provided; questions answered   - MDT updated     Neuro  * Acute metabolic encephalopathy  - pt minimally responsive, restless, does not answer questions   - current NPO for safety due to mental status, not able to safely participate in SLP eval   - hx of NPH but recent history is concerning for dementia as well, but at the very least pt is very sensitive to hospital delirium/delirium associated with infections   - was in similar mental state upon last admission, but did advance to SNF; discharged last Tuesday from SNF and per multiple family members was with full mental capacity and ambulating then through Friday and Saturday, even visiting an OTB facility Friday and Saturday which is one of his favorite outings; decline started Sunday, sought ED care 1/17 when pt became physically debilitated unable to transfer   - currently with PPS score <30% and hospice  appropriate if mental status does not improve   - if/when able to tolerate PO would benefit from Seroquel 50mg HS for treatment of delirium     NPH (normal pressure hydrocephalus)  - chronic condition with history of shunt   - noted; continued management per hospital primary     Pulmonary  Pneumonia  - noted; could correlate with AMS as pt previously has had significant AMS with other infections (UTI last admission)   - continued management per hospital primary     Chronic interstitial lung disease  - also with COPD; not on home O2   - contributes to overall appropriateness for hospice if/when aligns with GOC  - continued management per hospital primary     COPD (chronic obstructive pulmonary disease)  - chronic condition noted; not on home O2   - continued management per hospital primary     Renal/  Stage 3a chronic kidney disease  - chronic condition noted; currently with LATRICE and hyperkalemia   - continued management per hospital primary     Oncology  Skin cancer  - [place] [facility] had biopsy approx 1 month ago per daughter that they are still awaiting results for (inprocess per EMR) and a PET scan that was scheduled for 1/22 that they are upset wont be able to take place due to admission   - [facility] for finding out more about skin cancer diagnosis and treatment prior to being open to further discussions of hospice   - notified CM about scheduled outpatient appt Monday that will need to be rescheduled   - contributes to overall appropriateness for hospice if/when aligns to care if pt does not pursue aggressive treatment or if not a candidate (which candidacy does not seem likely at current status)    - continued management per outpatient onc team     I will follow-up with patient. Please contact us if you have any additional questions.    Total visit time: 55 minutes    35 min visit time including: face to face time in discussion of symptom assessment, and exploring options and burdens of offered  "treatments.  This also includes non-face to face time preparing to see the patient including chart review, obtaining and/or reviewing separately obtained history, documenting clinical information in the electronic or other health record, independently interpreting results and communicating results to the patient/family/caregiver, family discussions by phone if not able to be present, coordination of care with other specialists, and discharge planning.     20 min ACP time spent: goals of care, advanced care planning, emotional support, formulating and communicating prognosis, exploring burden/ benefit of various approaches of treatment.     Eli Yip NP  Palliative Medicine  [facility]      Subjective:     Chief Complaint:   Chief Complaint   Patient presents with    Altered Mental Status     Pt BIB daughter for evaluation of AMS since Sunday. She states that pt has not been himself.      Fall     Pt's daughter also reports that he fell last night and hit his head. Bruising noted above left eyebrow. She denies LOC, but endorses blood thinner use.        HPI:   From H&P: " 88 years old male with past medical history significant for CAD, COPD (not on O2), hypertension, hyperlipidemia, CKD 3, type 2 diabetes, peripheral artery disease, NPH, PMR, cutaneous T-cell lymphoma, chronic back pain, tobacco use who was brought in by his daughter due to altered mental status.  Patient was alert and lying comfortably in the bed however confused so was not able to provide complete information however, patient denied chest pain shortness of breath abdominal pain.  History is provided by patient's daughter at bedside who reported that patient has been confused since yesterday and was not doing good for which she brought him to the hospital.  Lab work showed creatinine 1.6 and potassium 5.9.  Patient received hyperkalemia treatment.  Later in ER, patient became hypotensive for which started on " "Levophed.  Chest x-ray showed findings suspicious of diffuse developing infiltrate.  CT head and C-spine negative for any acute abnormality.  CT A&P showed hepatic steatosis however negative for any acute abnormality.  Patient was found to be hypothermic with temperature of 90.2° F and placed on Nj Hugger.  In the ER, patient became hypoglycemic with glucose of 38 for which received hypoglycemia protocol and recheck glucose came 58 "     Palliative medicine consulted for continuity of care, goals of care discussion, and advance care planning; for details of visit, see advance care planning section of plan.       Hospital Course:  No notes on file    Past Medical History:   Diagnosis Date    Actinic keratosis     Anxiety     B12 deficiency 12/8/2014    Dx 6/14    Cancer     skin    Cataract     Coronary artery disease     Diabetes mellitus type II     Diabetes mellitus with peripheral circulatory disorder 6/18/2014    ED (erectile dysfunction)     Hyperlipidemia     Kidney stone     Neurogenic claudication 4/4/2022    Normocytic anemia 10/15/2023    Peripheral vascular disease     Pneumonia 1/17/2024    Spondylosis 3/29/2019    Tobacco dependence     Urinary incontinence 5/4/2021       Past Surgical History:   Procedure Laterality Date    APPENDECTOMY      CARDIAC SURGERY  01/1999    CABG 4 vessels    CATARACT EXTRACTION      EPIDURAL STEROID INJECTION Right 8/26/2020    Procedure: Injection, Steroid, Epidural Transforaminal;  Surgeon: Wiley Crane Jr., MD;  Location: [facility] ENDO;  Service: Pain Management;  Laterality: Right;  Right L5 + S1 TF LEAH  Arrive @ 1115; ASA & Plavix last 8/18; Check BG; Rapid COVID test    EPIDURAL STEROID INJECTION Right 11/25/2020    Procedure: Injection, Steroid, Epidural Transformainal;  Surgeon: Wiley Crane Jr., MD;  Location: [facility] ENDO;  Service: Pain Management;  Laterality: Right;  Right L5 + S1 TF LEAH  Arrive @ 1245; ASA and Plavix last 11/17; Pre-DM; Needs MD Sign. "    EPIDURAL STEROID INJECTION Right 2/19/2021    Procedure: Injection, Steroid, Epidural Transforaminal;  Surgeon: Wiley Crane Jr., MD;  Location: [facility];  Service: Pain Management;  Laterality: Right;  Right L5 + S1 TF LEAH  Arrive @ 1115; ASA & Plavix last 2/11; Check BG; Needs Consent    EXTERNAL EAR SURGERY      EYE SURGERY      cataracts    ILIAC ARTERY STENT Bilateral 06/2003    also Right External Iliac stent       Review of patient's allergies indicates:   Allergen Reactions    Demerol [meperidine] Nausea Only       Medications:  Continuous Infusions:   dextrose 5 % and 0.9 % NaCl 75 mL/hr at 01/19/24 0848     Scheduled Meds:   albuterol-ipratropium  3 mL Nebulization Q6H WAKE    clopidogreL  75 mg Oral Daily    famotidine (PF)  20 mg Intravenous Daily    levothyroxine  25 mcg Intravenous Daily    metoprolol succinate  25 mg Oral Daily    mupirocin   Nasal BID    nicotine  1 patch Transdermal Daily    piperacillin-tazobactam (Zosyn) IV (PEDS and ADULTS) (extended infusion is not appropriate)  4.5 g Intravenous Q8H    tamsulosin  0.4 mg Oral Daily    triamcinolone acetonide 0.1%   Topical (Top) BID    valproate sodium (DEPACON) IVPB  250 mg Intravenous Q12H    vancomycin (VANCOCIN) IV (PEDS and ADULTS)  1,000 mg Intravenous Q24H     PRN Meds:dextrose 10%, dextrose 10%, glucagon (human recombinant), morphine, sodium chloride 0.9%, Pharmacy to dose Vancomycin consult **AND** vancomycin - pharmacy to dose    Family History       Problem Relation (Age of Onset)    Stroke Mother          Tobacco Use    Smoking status: Every Day     Current packs/day: 1.50     Average packs/day: 1.5 packs/day for 71.0 years (106.5 ttl pk-yrs)     Types: Cigarettes    Smokeless tobacco: Former   Substance and Sexual Activity    Alcohol use: No    Drug use: No    Sexual activity: Not Currently     Partners: Female       Review of Systems   Unable to perform ROS: Mental status change     Objective:     Vital Signs (Most  "Recent):  Temp: 97.5 °F (36.4 °C) (01/19/24 1155)  Pulse: 93 (01/19/24 1155)  Resp: 19 (01/19/24 1155)  BP: 118/74 (01/19/24 1155)  SpO2: 95 % (01/19/24 1155) Vital Signs (24h Range):  Temp:  [97.4 °F (36.3 °C)-98.4 °F (36.9 °C)] 97.5 °F (36.4 °C)  Pulse:  [] 93  Resp:  [17-28] 19  SpO2:  [92 %-95 %] 95 %  BP: (103-134)/(54-74) 118/74     Weight: 70.1 kg (154 lb 8.7 oz)  Body mass index is 24.2 kg/m².       Physical Exam  Vitals and nursing note reviewed.   Constitutional:       General: He is not in acute distress.     Appearance: He is normal weight. He is ill-appearing.      Comments: Restlessness, occasional moaning, tremors    HENT:      Head: Normocephalic and atraumatic.   Pulmonary:      Effort: Pulmonary effort is normal. No respiratory distress.      Comments: Cough   Musculoskeletal:      Right lower leg: No edema.      Left lower leg: No edema.   Neurological:      Mental Status: He is lethargic and disoriented.   Psychiatric:         Behavior: Behavior is uncooperative.        Advance Care Planning  Advance Directives:   Living Will: No    LaPOST: No (needs LAPOST prior to discharge)    Do Not Resuscitate Status: Yes    Medical Power of : Yes (daughters Norah and Etelvina share POA, per discussion with Brenda last admission; plan to ask family to bring in copy for EMR)      Decision Making:  Family answered questions  Goals of Care: What is most important right now is to focus on spending time at home, avoiding the hospital, remaining as independent as possible, symptom/pain control, comfort and QOL . Accordingly, we have decided that the best plan to meet the patient's goals includes continuing with treatment.     Significant Labs: All pertinent labs within the past 24 hours have been reviewed.  CBC:   Recent Labs   Lab 01/18/24  1042   WBC 7.73   HGB 7.3*   HCT 22.7*   MCV 94          BMP:  No results for input(s): "GLU", "NA", "K", "CL", "CO2", "BUN", "CREATININE", "CALCIUM", " ""MG" in the last 24 hours.  LFT:  Lab Results   Component Value Date    AST 28 01/18/2024    ALKPHOS 96 01/18/2024    BILITOT 0.2 01/18/2024     Albumin:   Albumin   Date Value Ref Range Status   01/18/2024 2.6 (L) 3.5 - 5.2 g/dL Final     Protein:   Total Protein   Date Value Ref Range Status   01/18/2024 6.0 6.0 - 8.4 g/dL Final     Lactic acid:   Lab Results   Component Value Date    LACTATE 1.2 01/18/2024    LACTATE 1.3 12/20/2023     Significant Imaging: I have reviewed all pertinent imaging results/findings within the past 24 hours.    Eli Yip NP  Palliative Medicine  [facility]                "

## 2024-01-19 NOTE — PT/OT/SLP EVAL
"Occupational Therapy   Evaluation    Name: Narciso Yanez  MRN: [id number]  Admitting Diagnosis: Acute metabolic encephalopathy  Recent Surgery: * No surgery found *      Recommendations:     Discharge Recommendations: Moderate Intensity Therapy  Discharge Equipment Recommendations:  to be determined by next level of care  Barriers to discharge:   (Pt requires increased level of assistance for ADLs and functional mobility; non-ambulatory at this time 2* weakness and AMS.)    Assessment:     Narciso Yanez is a 88 y.o. male with a medical diagnosis of Acute metabolic encephalopathy.  Performance deficits affecting function: impaired endurance, weakness, impaired self care skills, impaired functional mobility, impaired balance, decreased coordination, impaired cognition, decreased upper extremity function, decreased lower extremity function, decreased safety awareness, decreased ROM, impaired skin, edema.      Pt lethargic and required continuous stimulation throughout eval this date, minimally verbal and dysarthric but able to answer "yes/no" questions and state "okay" appropriately majority of session. Pt w/ rigidity and resisting some movement, requiring total A x 2 persons to transition to EOB. Pt was able to maintain static sitting balance w/ close CGA-min A but observed w/ increase in fatigue. Pt will continue to benefit from skilled acute OT services to maximize functional capacity.     Rehab Prognosis: Fair+; patient would benefit from acute skilled OT services to address these deficits and reach maximum level of function.       Plan:     Patient to be seen  (3-5x/wk) to address the above listed problems via self-care/home management, therapeutic activities, therapeutic exercises  Plan of Care Expires: 02/02/24  Plan of Care Reviewed with: patient, family    Subjective     Chief Complaint: wincing in pain when touching B feet   Patient/Family Comments/goals: family is concerned about pt's mentation as they " report his presentation this date is not his baseline      Occupational Profile: Per family, pt was just recently d/c from SNF; for the first 2 days, pt was ambulatory w/ and w/o his RW but family noticed a sudden change in cognition and functional mobility.   Living Environment: Pt lives with his daughter in a H with 0 ANNA. Bathroom set-up: walk-in shower with shower chair.    Previous level of function: Pt was ambulatory w/ use of a RW; family provided supervision for toileting and bathing.   Roles and Routines: none stated   Equipment Used at Home: walker, rolling, wheelchair, bedside commode, nebulizer, rollator, shower chair  Assistance upon Discharge: 3 daughters     Pain/Comfort:  Pain Rating 1:  (Pt grimacing when touching BLEs.)  Location - Side 1: Bilateral  Location 1: foot  Pain Addressed 1: Reposition, Distraction, Cessation of Activity    Patients cultural, spiritual, Mandaeism conflicts given the current situation: no    Objective:     Communicated with: Nurse prior to session.  Patient found HOB elevated with telemetry, peripheral IV, bed alarm, pressure relief boots upon OT entry to room.    General Precautions: Standard, fall, NPO  Orthopedic Precautions: N/A  Braces: N/A  Respiratory Status: Room air    Occupational Performance:    Bed Mobility:  Pt w/ eyes closed and head forward, requiring continuous stimulation for keeping eyes open. Pt w/ fair sitting balance w/ time, requiring close min A-CGA for safety.   Patient completed Rolling/Turning to Right with total assistance and 2 persons  Patient completed Scooting/Bridging with total assistance and 2 persons  Patient completed Supine to Sit w/ HOB elevated with total assistance and 2 persons  Patient completed Sit to Supine with total assistance and 2 persons      Activities of Daily Living:  Upper Body Dressing: total assistance for donning gown over back   Lower Body Dressing: total assistance for donning socks for BLEs     Cognitive/Visual  Perceptual:  Cognitive/Psychosocial Skills:     -       Oriented to: Person   -       Follows Commands/attention:Follows one-step commands  -       Communication: dysarthria  -       Memory: unable to formally assess  -       Safety awareness/insight to disability: impaired     Physical Exam:  Balance:    -       fair sitting balance w/ time and v/t cueing   Postural examination/scapula alignment:    -       Rounded shoulders  -       Forward head  -       Resting Tremors   Skin integrity:  h/o skin condition, has been seen by dermatology as outpatient.  BUE skin is dry and flaky.  Skin condition minimal to BLE compared to BUE   Edema:  no BUE edema   Upper Extremity Range of Motion:     -       Right Upper Extremity: partial shoulder and elbow flexion; rigidity noted   -       Left Upper Extremity: partial shoulder and elbow flexion; rigidity noted   Upper Extremity Strength:    -       Right Upper Extremity: unable to formally assess  -       Left Upper Extremity:  unable to formally assess   Strength:    -       Right Upper Extremity:  fair  -       Left Upper Extremity:  fair     AMPAC 6 Click ADL:  AMPAC Total Score: 9    Treatment & Education:  Pt educated on OT role/POC. No family present.   Safety during functional t/f and mobility   Multiple self-care tasks/functional mobility completed- assistance level noted above   Educated family on importance of providing consistent stimulation to prevent delirium.    All questions/concerns answered within OT scope of practice   Patient left HOB elevated with all lines intact, call button in reach, bed alarm on, and BLE pressure relief boots on place.     GOALS:   Multidisciplinary Problems       Occupational Therapy Goals          Problem: Occupational Therapy    Goal Priority Disciplines Outcome Interventions   Occupational Therapy Goal     OT, PT/OT Ongoing, Progressing    Description: Goals to be met by: 2/2/24     Patient will increase functional independence  with ADLs by performing:    Feeding with Stand-by Assistance.  Grooming while EOB with Stand-by Assistance.  Sitting at edge of bed x30 minutes with Stand-by Assistance.  Rolling to Bilateral with Minimal Assistance.   Supine to sit with Minimal Assistance.  Step transfer: To be assessed   Toilet transfer; To be assessed   Upper extremity exercise program x15 reps per handout, with assistance as needed.                         History:     Past Medical History:   Diagnosis Date    Actinic keratosis     Anxiety     B12 deficiency 12/8/2014    Dx 6/14    Cancer     skin    Cataract     Coronary artery disease     Diabetes mellitus type II     Diabetes mellitus with peripheral circulatory disorder 6/18/2014    ED (erectile dysfunction)     Hyperlipidemia     Kidney stone     Neurogenic claudication 4/4/2022    Normocytic anemia 10/15/2023    Peripheral vascular disease     Pneumonia 1/17/2024    Spondylosis 3/29/2019    Tobacco dependence     Urinary incontinence 5/4/2021         Past Surgical History:   Procedure Laterality Date    APPENDECTOMY      CARDIAC SURGERY  01/1999    CABG 4 vessels    CATARACT EXTRACTION      EPIDURAL STEROID INJECTION Right 8/26/2020    Procedure: Injection, Steroid, Epidural Transforaminal;  Surgeon: Wiley Crane Jr., MD;  Location: [facility] ENDO;  Service: Pain Management;  Laterality: Right;  Right L5 + S1 TF LEAH  Arrive @ 1115; ASA & Plavix last 8/18; Check BG; Rapid COVID test    EPIDURAL STEROID INJECTION Right 11/25/2020    Procedure: Injection, Steroid, Epidural Transformainal;  Surgeon: Wiley Crane Jr., MD;  Location: [facility] ENDO;  Service: Pain Management;  Laterality: Right;  Right L5 + S1 TF LEAH  Arrive @ 1245; ASA and Plavix last 11/17; Pre-DM; Needs MD Sign.    EPIDURAL STEROID INJECTION Right 2/19/2021    Procedure: Injection, Steroid, Epidural Transforaminal;  Surgeon: Wiley Crane Jr., MD;  Location: [facility] ENDO;  Service: Pain Management;  Laterality: Right;   Right L5 + S1 TF LEAH  Arrive @ 1115; ASA & Plavix last 2/11; Check BG; Needs Consent    EXTERNAL EAR SURGERY      EYE SURGERY      cataracts    ILIAC ARTERY STENT Bilateral 06/2003    also Right External Iliac stent       Time Tracking:     OT Date of Treatment: 01/19/24  OT Start Time: 1511  OT Stop Time: 1534  OT Total Time (min): 23 min    Billable Minutes:Evaluation 15  Therapeutic Activity 8  Total Time 23 (co-eval w/ PT)     1/19/2024

## 2024-01-19 NOTE — CONSULTS
[facility] Surg  Palliative Medicine  Consult Note    Patient Name: Narciso Yanez  MRN: [id number]  Admission Date: 1/17/2024  Hospital Length of Stay: 2 days  Code Status: DNR   Attending Provider: Juwan Cuevas, *  Consulting Provider: Eli Yip NP  Primary Care Physician: Marcelino Del Toro MD  Principal Problem:Acute metabolic encephalopathy    Patient information was obtained from relative(s), past medical records, ER records, and primary team.      Consults  Assessment/Plan:   Advance Care Planning     Palliative Care  Goals of care, counseling/discussion  1/18/2023 - Consult   - interval chart reviewed; pt discussed with MDT during ICU rounds   - pt unable to participate in GOC/ACP discussion due to AMS (see encephalopathy)   - call placed with pt's daughter, Reyna, who is pt's primary care giver who he lives with; they live locally in [facility]  - Reyna provided update on pt since last admission (see baseline info in encephalopathy section)   0 this is pt's 5th hospital admission since 10/23; shared concern for this and trajectory of elderly pts with frequent infections and hospitalizations, especially given pt's poor tolerance of infection/hospitalization experiencing significant delirium with this and prior admission   - discussed plan to discuss further in person [place] with her or her sisters GOC for future care   - confirms continued wished for DNR; needs LAPOST    - emotional support provided; questions answered   - MDT updated     Neuro  * Acute metabolic encephalopathy  - pt minimally responsive, restless, does not answer questions   - current NPO for safety due to mental status, not able to safely participate in SLP eval   - hx of NPH but recent history is concerning for dementia as well, but at the very least pt is very sensitive to hospital delirium/delirium associated with infections   - was in similar mental state upon last admission, but did advance to SNF; discharged last  Tuesday from SNF and per multiple family members was with full mental capacity and ambulating then through Friday and Saturday, even visiting an OTB facility Friday and Saturday which is one of his favorite outings; decline started Sunday, sought ED care 1/17 when pt became physically debilitated unable to transfer   - currently with PPS score <30% and hospice appropriate if mental status does not improve   - if/when able to tolerate PO would benefit from Seroquel 50mg HS for treatment of delirium     NPH (normal pressure hydrocephalus)  - chronic condition with history of shunt   - noted; continued management per hospital primary     Pulmonary  Pneumonia  - noted; could correlate with AMS as pt previously has had significant AMS with other infections (UTI last admission)   - continued management per hospital primary     Chronic interstitial lung disease  - also with COPD; not on home O2   - contributes to overall appropriateness for hospice if/when aligns with GOC  - continued management per hospital primary     COPD (chronic obstructive pulmonary disease)  - chronic condition noted; not on home O2   - continued management per hospital primary     Cardiac/Vascular  Shock  - initially required ICU care and pressor; no longer requiring with plans to transfer to floor     Renal/  Stage 3a chronic kidney disease  - chronic condition noted; currently with [facility]RICE   - continued management per hospital primary     Oncology  Skin cancer  - pt under care of oncology at [facility]  - contributes to overall appropriateness for hospice if/when aligns to care if pt does not pursue aggressive treatment or if not a candidate (which candidacy does not seem likely at current status)    - continued management per outpatient onc team     Thank you for your consult. I will follow-up with patient. Please contact us if you have any additional questions.    Total visit time: 86 minutes    70 min visit time including: face to face time in  "discussion of symptom assessment, and exploring options and burdens of offered treatments.  This also includes non-face to face time preparing to see the patient including chart review, obtaining and/or reviewing separately obtained history, documenting clinical information in the electronic or other health record, independently interpreting results and communicating results to the patient/family/caregiver, family discussions by phone if not able to be present, coordination of care with other specialists, and discharge planning.     16 min ACP time spent: goals of care, advanced care planning, emotional support, formulating and communicating prognosis, exploring burden/ benefit of various approaches of treatment.     Eli Yip NP  Palliative Medicine  [facility]      Subjective:     HPI:   From H&P: " 88 years old male with past medical history significant for CAD, COPD (not on O2), hypertension, hyperlipidemia, CKD 3, type 2 diabetes, peripheral artery disease, NPH, PMR, cutaneous T-cell lymphoma, chronic back pain, tobacco use who was brought in by his daughter due to altered mental status.  Patient was alert and lying comfortably in the bed however confused so was not able to provide complete information however, patient denied chest pain shortness of breath abdominal pain.  History is provided by patient's daughter at bedside who reported that patient has been confused since yesterday and was not doing good for which she brought him to the hospital.  Lab work showed creatinine 1.6 and potassium 5.9.  Patient received hyperkalemia treatment.  Later in ER, patient became hypotensive for which started on Levophed.  Chest x-ray showed findings suspicious of diffuse developing infiltrate.  CT head and C-spine negative for any acute abnormality.  CT A&P showed hepatic steatosis however negative for any acute abnormality.  Patient was found to be hypothermic with temperature of 90.2° F " "and placed on Nj Hugger.  In the ER, patient became hypoglycemic with glucose of 38 for which received hypoglycemia protocol and recheck glucose came 58 "     Palliative medicine consulted for continuity of care, goals of care discussion, and advance care planning; for details of visit, see advance care planning section of plan.       Hospital Course:  No notes on file    Past Medical History:   Diagnosis Date    Actinic keratosis     Anxiety     B12 deficiency 12/8/2014    Dx 6/14    Cancer     skin    Cataract     Coronary artery disease     Diabetes mellitus type II     Diabetes mellitus with peripheral circulatory disorder 6/18/2014    ED (erectile dysfunction)     Hyperlipidemia     Kidney stone     Neurogenic claudication 4/4/2022    Normocytic anemia 10/15/2023    Peripheral vascular disease     Pneumonia 1/17/2024    Spondylosis 3/29/2019    Tobacco dependence     Urinary incontinence 5/4/2021       Past Surgical History:   Procedure Laterality Date    APPENDECTOMY      CARDIAC SURGERY  01/1999    CABG 4 vessels    CATARACT EXTRACTION      EPIDURAL STEROID INJECTION Right 8/26/2020    Procedure: Injection, Steroid, Epidural Transforaminal;  Surgeon: Wiley Crane Jr., MD;  Location: [facility] ENDO;  Service: Pain Management;  Laterality: Right;  Right L5 + S1 TF LEAH  Arrive @ 1115; ASA & Plavix last 8/18; Check BG; Rapid COVID test    EPIDURAL STEROID INJECTION Right 11/25/2020    Procedure: Injection, Steroid, Epidural Transformainal;  Surgeon: Wiley Crane Jr., MD;  Location: [facility] ENDO;  Service: Pain Management;  Laterality: Right;  Right L5 + S1 TF LEAH  Arrive @ 1245; ASA and Plavix last 11/17; Pre-DM; Needs MD Sign.    EPIDURAL STEROID INJECTION Right 2/19/2021    Procedure: Injection, Steroid, Epidural Transforaminal;  Surgeon: Wiley Crane Jr., MD;  Location: [facility] ENDO;  Service: Pain Management;  Laterality: Right;  Right L5 + S1 TF LEAH  Arrive @ 1115; ASA & Plavix last 2/11; Check " BG; Needs Consent    EXTERNAL EAR SURGERY      EYE SURGERY      cataracts    ILIAC ARTERY STENT Bilateral 06/2003    also Right External Iliac stent       Review of patient's allergies indicates:   Allergen Reactions    Demerol [meperidine] Nausea Only       Medications:  Continuous Infusions:   dextrose 5 % and 0.9 % NaCl 75 mL/hr at 01/19/24 0848     Scheduled Meds:   albuterol-ipratropium  3 mL Nebulization Q6H WAKE    clopidogreL  75 mg Oral Daily    famotidine (PF)  20 mg Intravenous Daily    levothyroxine  25 mcg Intravenous Daily    metoprolol succinate  25 mg Oral Daily    mupirocin   Nasal BID    nicotine  1 patch Transdermal Daily    piperacillin-tazobactam (Zosyn) IV (PEDS and ADULTS) (extended infusion is not appropriate)  4.5 g Intravenous Q8H    tamsulosin  0.4 mg Oral Daily    triamcinolone acetonide 0.1%   Topical (Top) BID    valproate sodium (DEPACON) IVPB  250 mg Intravenous Q12H    vancomycin (VANCOCIN) IV (PEDS and ADULTS)  1,000 mg Intravenous Q24H     PRN Meds:dextrose 10%, dextrose 10%, glucagon (human recombinant), morphine, sodium chloride 0.9%, Pharmacy to dose Vancomycin consult **AND** vancomycin - pharmacy to dose    Family History       Problem Relation (Age of Onset)    Stroke Mother          Tobacco Use    Smoking status: Every Day     Current packs/day: 1.50     Average packs/day: 1.5 packs/day for 71.0 years (106.5 ttl pk-yrs)     Types: Cigarettes    Smokeless tobacco: Former   Substance and Sexual Activity    Alcohol use: No    Drug use: No    Sexual activity: Not Currently     Partners: Female       Review of Systems   Unable to perform ROS: Mental status change     Objective:     Vital Signs (Most Recent):  Temp: 97.5 °F (36.4 °C) (01/19/24 1155)  Pulse: 93 (01/19/24 1155)  Resp: 19 (01/19/24 1155)  BP: 118/74 (01/19/24 1155)  SpO2: 95 % (01/19/24 1155) Vital Signs (24h Range):  Temp:  [97.4 °F (36.3 °C)-98.6 °F (37 °C)] 97.5 °F (36.4 °C)  Pulse:  [] 93  Resp:  [17-28]  "19  SpO2:  [92 %-95 %] 95 %  BP: (103-134)/(54-85) 118/74     Weight: 70.1 kg (154 lb 8.7 oz)  Body mass index is 24.2 kg/m².       Physical Exam  Vitals and nursing note reviewed.   Constitutional:       General: He is not in acute distress.     Appearance: He is normal weight. He is ill-appearing.      Comments: Restlessness, occasional moaning, tremors    HENT:      Head: Normocephalic and atraumatic.   Pulmonary:      Effort: Pulmonary effort is normal. No respiratory distress.   Musculoskeletal:      Right lower leg: No edema.      Left lower leg: No edema.   Neurological:      Mental Status: He is lethargic and disoriented.   Psychiatric:         Behavior: Behavior is uncooperative.        Advance Care Planning  Advance Directives:   Living Will: No    LaPOST: No (needs LAPOST prior to discharge)    Do Not Resuscitate Status: Yes    Medical Power of : Yes (daughters Norah and Etelvina share POA, per discussion with Brenda last admission; plan to ask family to bring in copy for EMR)      Decision Making:  Family answered questions  Goals of Care: What is most important right now is to focus on spending time at home, avoiding the hospital, remaining as independent as possible, symptom/pain control, comfort and QOL . Accordingly, we have decided that the best plan to meet the patient's goals includes continuing with treatment.     Significant Labs: All pertinent labs within the past 24 hours have been reviewed.  CBC:   Recent Labs   Lab 01/18/24  1042   WBC 7.73   HGB 7.3*   HCT 22.7*   MCV 94        BMP:  No results for input(s): "GLU", "NA", "K", "CL", "CO2", "BUN", "CREATININE", "CALCIUM", "MG" in the last 24 hours.  LFT:  Lab Results   Component Value Date    AST 28 01/18/2024    ALKPHOS 96 01/18/2024    BILITOT 0.2 01/18/2024     Albumin:   Albumin   Date Value Ref Range Status   01/18/2024 2.6 (L) 3.5 - 5.2 g/dL Final     Protein:   Total Protein   Date Value Ref Range Status   01/18/2024 6.0 " 6.0 - 8.4 g/dL Final     Lactic acid:   Lab Results   Component Value Date    LACTATE 1.2 01/18/2024    LACTATE 1.3 12/20/2023       Significant Imaging: I have reviewed all pertinent imaging results/findings within the past 24 hours.      I spent a total of 86 minutes on the day of the visit. This includes face to face time in discussion of goals of care, symptom assessment, coordination of care and emotional support.  This also includes non-face to face time preparing to see the patient (eg, review of tests/imaging), obtaining and/or reviewing separately obtained history, documenting clinical information in the electronic or other health record, independently interpreting results and communicating results to the patient/family/caregiver, or care coordinator.    Eli Yip NP  Palliative Medicine  [facility]

## 2024-01-19 NOTE — PLAN OF CARE
Case Management Assessment     PCP: Marcelino Del Toro MD    Pharmacy: [facility]    Patient Arrived From: home  Existing Help at Home: daughter Reyna    Barriers to Discharge: requires medical care    Discharge Plan:    A. Home with family and home health   B. Home with family      Previously independent then became weak and confused.  DME:  cane, rolling walekr, nebulizer and glucometerPlaincReyna molina (Daughter) [phone] (Mobile)   [place] will continue to assess for discharge needs.       01/19/24 1520   Discharge Assessment   Assessment Type Discharge Planning Assessment   Confirmed/corrected address, phone number and insurance Yes   Confirmed Demographics Correct on Facesheet   Source of Information patient   Communicated SUSIE with patient/caregiver Date not available/Unable to determine   Reason For Admission hyperkalemia and disorientation   People in Home child(isabella), adult   Do you expect to return to your current living situation? Yes   Do you have help at home or someone to help you manage your care at home? Yes   Who are your caregiver(s) and their phone number(s)? Reyna Brambila (Daughter) [phone] (Mobile)   Prior to hospitilization cognitive status: Alert/Oriented   Current cognitive status: Unable to Assess   Equipment Currently Used at Home walker, rolling;cane, straight;nebulizer;rollator;glucometer   Readmission within 30 days? Yes   Patient currently being followed by outpatient case management? Unable to determine (comments)   Do you currently have service(s) that help you manage your care at home? No   Do you take prescription medications? Yes   Do you have prescription coverage? Yes   Coverage HU[employer] MANAGED MEDICARE - HU[employer] MEDICARE HMO - CAPITATED   Do you have any problems affording any of your prescribed medications? No   Who is going to help you get home at discharge? Reyna Brambila (Daughter) [phone] (Mobile)   How do you get to doctors appointments? family or friend will  provide   Are you on dialysis? No   Do you take coumadin? No   Discharge Plan A Home with family;Home Health   Discharge Plan B Home with family;Home Health   DME Needed Upon Discharge  none   Discharge Plan discussed with: Adult children   Transition of Care Barriers None   OTHER   Name(s) of People in Home Reyna Brambila (Daughter) [phone] (Mobile)      \

## 2024-01-19 NOTE — NURSING
Patient arrived to floor via stretcher via transporter from ICU.  Patient transferred to bed via x2 person assist.  [id number].  Patient was oriented to room, information on whiteboard, and medication regimen.  Bed low, adequate lighting provided, side rails x2 up, call bell within reach.  Admission assessment completed.  IVF started.  VSS. Patient denied having any acute distress azt this time.  None observed.  Will continue to monitor and follow treatment plan.  Family at bedside.

## 2024-01-19 NOTE — ASSESSMENT & PLAN NOTE
Fall precautions   Neuro checks     Patient is not much responsive,pare palliative family does not wish Hospice,consulted ST,PT,OT,started on IVF.

## 2024-01-19 NOTE — ASSESSMENT & PLAN NOTE
- pt minimally responsive, restless, does not answer questions   - current NPO for safety due to mental status, not able to safely participate in SLP eval   - hx of NPH but recent history is concerning for dementia as well, but at the very least pt is very sensitive to hospital delirium/delirium associated with infections   - was in similar mental state upon last admission, but did advance to SNF; discharged last Tuesday from SNF and per multiple family members was with full mental capacity and ambulating then through Friday and Saturday, even visiting an OTB facility Friday and Saturday which is one of his favorite outings; decline started Sunday, sought ED care 1/17 when pt became physically debilitated unable to transfer   - currently with PPS score <30% and hospice appropriate if mental status does not improve   - if/when able to tolerate PO would benefit from Seroquel 50mg HS for treatment of delirium

## 2024-01-19 NOTE — NURSING
[facility]  Nurses Note -- 4 Eyes      1/19/2024       Skin assessed on: Q Shift      [x] No Pressure Injuries Present    [x]Prevention Measures Documented    [] Yes LDA  for Pressure Injury Previously documented     [] Yes New Pressure Injury Discovered   [] LDA for New Pressure Injury Added      Attending RN:  Kylie Nunes, RN     Second RN:  MIKAYLA Hernández

## 2024-01-19 NOTE — ASSESSMENT & PLAN NOTE
- pt under care of oncology at [facility] [place]; had biopsy approx 1 month ago per daughter that they are still awaiting results for (inprocess per EMR) and a PET scan that was scheduled for 1/22 that they are upset wont be able to take place due to admission   - [facility] for finding out more about skin cancer diagnosis and treatment prior to being open to further discussions of hospice   - notified CM about scheduled outpatient appt Monday that will need to be rescheduled   - contributes to overall appropriateness for hospice if/when aligns to care if pt does not pursue aggressive treatment or if not a candidate (which candidacy does not seem likely at current status)    - continued management per outpatient onc team

## 2024-01-19 NOTE — ASSESSMENT & PLAN NOTE
- also with COPD; not on home O2   - contributes to overall appropriateness for hospice if/when aligns with GOC  - continued management per hospital primary

## 2024-01-19 NOTE — PROGRESS NOTES
[facility]  Progress Note    Patient Name: Narciso Yanez  MRN: [id number]  Patient Class: IP- Inpatient   Admission Date: 1/17/2024  Length of Stay: 2 days  Attending Physician: Juwan Cuevas, *  Primary Care Provider: Marcelino Del Toro MD        Subjective:     Principal Problem:Acute metabolic encephalopathy        HPI:  88 years old male with past medical history significant for CAD, COPD (not on O2), hypertension, hyperlipidemia, CKD 3, type 2 diabetes, peripheral artery disease, NPH, PMR, cutaneous T-cell lymphoma, chronic back pain, tobacco use who was brought in by his daughter due to altered mental status.  Patient was alert and lying comfortably in the bed however confused so was not able to provide complete information however, patient denied chest pain shortness of breath abdominal pain.  History is provided by patient's daughter at bedside who reported that patient has been confused since yesterday and was not doing good for which she brought him to the hospital.  Lab work showed creatinine 1.6 and potassium 5.9.  Patient received hyperkalemia treatment.  Later in ER, patient became hypotensive for which started on Levophed.  Chest x-ray showed findings suspicious of diffuse developing infiltrate.  CT head and C-spine negative for any acute abnormality.  CT A&P showed hepatic steatosis however negative for any acute abnormality.  Patient was found to be hypothermic with temperature of 90.2° F and placed on Nj Hugger.  In the ER, patient became hypoglycemic with glucose of 38 for which received hypoglycemia protocol and recheck glucose came 58.    Overview/Hospital Course:  88M with pmh of CAD, COPD (not on O2), hypertension, hyperlipidemia, CKD 3, type 2 diabetes, peripheral artery disease, NPH, PMR, cutaneous T-cell lymphoma, chronic back pain, tobacco use who was brought in by his daughter due mental status change from baseline. Lab work showed creatinine 1.6  and potassium 5.9.  Patient received hyperkalemia treatment.  Later in ER, patient became hypotensive for which started on Levophed.  Chest x-ray showed findings suspicious of diffuse developing infiltrate.  CT head and C-spine negative for any acute abnormality.  CT A&P showed hepatic steatosis however negative for any acute abnormality.  Patient was found to be hypothermic with temperature of 90.2° F and placed on Nj Hugger.  In the ER, patient became hypoglycemic with glucose of 38 for which received hypoglycemia protocol and recheck glucose came 58. BG has now improved. Was only requiring levophed for short time. Pt remains confused, but is oriented to self. Continuing abx pending BC. Stable to step down to the floor.   Patient is not much responsive,pare palliative family does not wish Hospice,consulted ST,PT,OT,started on IVF.  Patient is DNR.    Interval History: Pt. Is not responding.    Review of Systems  Objective:     Vital Signs (Most Recent):  Temp: 97.5 °F (36.4 °C) (01/19/24 1155)  Pulse: 93 (01/19/24 1155)  Resp: 19 (01/19/24 1155)  BP: 118/74 (01/19/24 1155)  SpO2: 95 % (01/19/24 1155) Vital Signs (24h Range):  Temp:  [97.4 °F (36.3 °C)-98.6 °F (37 °C)] 97.5 °F (36.4 °C)  Pulse:  [] 93  Resp:  [17-28] 19  SpO2:  [92 %-95 %] 95 %  BP: (103-134)/(54-85) 118/74     Weight: 70.1 kg (154 lb 8.7 oz)  Body mass index is 24.2 kg/m².    Intake/Output Summary (Last 24 hours) at 1/19/2024 1201  Last data filed at 1/19/2024 0800  Gross per 24 hour   Intake 106.48 ml   Output 675 ml   Net -568.52 ml           Physical Exam      Constitutional:       General: He is not in acute distress.     Appearance: He is ill-appearing.      Comments: Elderly    HENT:      Head: Normocephalic.      Nose: Nose normal.   Eyes:      Extraocular Movements: Extraocular movements intact.   Cardiovascular:      Rate and Rhythm: regular tachycardia   Pulmonary:      Effort: No respiratory distress.   Abdominal:       Tenderness: There is no abdominal tenderness.   Musculoskeletal:         General: No swelling.      Cervical back: Normal range of motion.   Skin:     General: Skin is warm.      Capillary Refill: Capillary refill takes less than 2 seconds.   Neurological:      Mental Status: He is alert. He is disoriented.   Psychiatric:      Comments: Confused    Significant Labs: All pertinent labs within the past 24 hours have been reviewed.    Significant Imaging: I have reviewed all pertinent imaging results/findings within the past 24 hours.    Assessment/Plan:      * Acute metabolic encephalopathy  Fall precautions   Neuro checks     Patient is not much responsive,pare palliative family does not wish Hospice,consulted ST,PT,OT,started on IVF.    Shock  Of unclear etiology  Started on IV Levophed, but quickly weaned off.   Will give IV  cc bolus however would be cautious with IV fluids due to history of CHF; currently patient is not in fluid overload  TSH 8.1 and free T4 0.69; cortisol appropriate  Patient is hypotensive, hypothermic with high TSH; will start on iv levothyroxine while unable to take po  Will start empiric IV antibiotics with septic workup including blood cultures. BC NGTD. Possibly d/c abx if remain negative      Pneumonia  Will get sputum culture, blood cultures, procalcitonin  Will start IV vancomycin and Zosyn      Hyperkalemia  This patient has hyperkalemia which is uncontrolled. We will monitor for arrhythmias with EKG or continuous telemetry. We will treat the hyperkalemia with Potassium Binders, Calcium gluconate, and IV insulin and dextrose. The likely etiology of the hyperkalemia is LATRICE.  The patients latest potassium has been reviewed and the results are listed below  Recent Labs   Lab 01/18/24  0139   K 5.2*               NPH (normal pressure hydrocephalus)   Per record.    LATRICE (acute kidney injury)  Patient with acute kidney injury/acute renal failure likely due to pre-renal azotemia due to  dehydration LATRICE is currently stable. Baseline creatinine  1.2  - Labs reviewed- Renal function/electrolytes with Estimated Creatinine Clearance: 31 mL/min (A) (based on SCr of 1.6 mg/dL (H)). according to latest data. Monitor urine output and serial BMP and adjust therapy as needed. Avoid nephrotoxins and renally dose meds for GFR listed above.    Hypoglycemia  Could be related to IV insulin regular which patient received in ER for hyperkalemia treatment  Hypoglycemia protocol  Accu-Cheks  -improved. Continue to monitor.       Stage 3a chronic kidney disease  Creatine stable for now. BMP reviewed- noted Estimated Creatinine Clearance: 31 mL/min (A) (based on SCr of 1.6 mg/dL (H)). according to latest data. Based on current GFR, CKD stage is stage 3 - GFR 30-59.  Monitor UOP and serial BMP and adjust therapy as needed. Renally dose meds. Avoid nephrotoxic medications and procedures.    COPD (chronic obstructive pulmonary disease)  Will monitor, with prn nebulizer.y.     Aortic atherosclerosis  On medical treatments.      Skin cancer        Diabetes mellitus with peripheral circulatory disorder  Patient's FSGs are controlled on current medication regimen.  Last A1c reviewed-   Lab Results   Component Value Date    HGBA1C 6.9 (H) 10/15/2023     Most recent fingerstick glucose reviewed-   Recent Labs   Lab 01/17/24  2140 01/18/24  0211 01/18/24  0232 01/18/24  0951   POCTGLUCOSE 117* 64* 117* 77     Current correctional scale  Low  Maintain anti-hyperglycemic dose as follows-   Antihyperglycemics (From admission, onward)      None          Hold Oral hypoglycemics while patient is in the hospital.    Coronary artery disease involving coronary bypass graft of native heart without angina pectoris  Patient with known CAD, which is controlled Will continue Plavix and Statin and monitor for S/Sx of angina/ACS. Continue to monitor on telemetry.       VTE Risk Mitigation (From admission, onward)           Ordered     IP VTE  HIGH RISK PATIENT  Once         01/17/24 1911     Place sequential compression device  Until discontinued         01/17/24 1911                    Discharge Planning   SUSIE: 1/20/2024     Code Status: DNR   Is the patient medically ready for discharge?:     Reason for patient still in hospital (select all that apply): Patient trending condition                     Juwan Cuevas MD  Department of Hospital Medicine   [facility]

## 2024-01-19 NOTE — ASSESSMENT & PLAN NOTE
Creatine stable for now. BMP reviewed- noted Estimated Creatinine Clearance: 31 mL/min (A) (based on SCr of 1.6 mg/dL (H)). according to latest data. Based on current GFR, CKD stage is stage 3 - GFR 30-59.  Monitor UOP and serial BMP and adjust therapy as needed. Renally dose meds. Avoid nephrotoxic medications and procedures.

## 2024-01-19 NOTE — PLAN OF CARE
Problem: Occupational Therapy  Goal: Occupational Therapy Goal  Description: Goals to be met by: 2/2/24     Patient will increase functional independence with ADLs by performing:    Feeding with Stand-by Assistance.  Grooming while EOB with Stand-by Assistance.  Sitting at edge of bed x30 minutes with Stand-by Assistance.  Rolling to Bilateral with Minimal Assistance.   Supine to sit with Minimal Assistance.  Step transfer: To be assessed   Toilet transfer; To be assessed   Upper extremity exercise program x15 reps per handout, with assistance as needed.    Outcome: Ongoing, Progressing

## 2024-01-19 NOTE — SUBJECTIVE & OBJECTIVE
Interval History: Pt. Is not responding.    Review of Systems  Objective:     Vital Signs (Most Recent):  Temp: 97.5 °F (36.4 °C) (01/19/24 1155)  Pulse: 93 (01/19/24 1155)  Resp: 19 (01/19/24 1155)  BP: 118/74 (01/19/24 1155)  SpO2: 95 % (01/19/24 1155) Vital Signs (24h Range):  Temp:  [97.4 °F (36.3 °C)-98.6 °F (37 °C)] 97.5 °F (36.4 °C)  Pulse:  [] 93  Resp:  [17-28] 19  SpO2:  [92 %-95 %] 95 %  BP: (103-134)/(54-85) 118/74     Weight: 70.1 kg (154 lb 8.7 oz)  Body mass index is 24.2 kg/m².    Intake/Output Summary (Last 24 hours) at 1/19/2024 1201  Last data filed at 1/19/2024 0800  Gross per 24 hour   Intake 106.48 ml   Output 675 ml   Net -568.52 ml           Physical Exam      Constitutional:       General: He is not in acute distress.     Appearance: He is ill-appearing.      Comments: Elderly    HENT:      Head: Normocephalic.      Nose: Nose normal.   Eyes:      Extraocular Movements: Extraocular movements intact.   Cardiovascular:      Rate and Rhythm: regular tachycardia   Pulmonary:      Effort: No respiratory distress.   Abdominal:      Tenderness: There is no abdominal tenderness.   Musculoskeletal:         General: No swelling.      Cervical back: Normal range of motion.   Skin:     General: Skin is warm.      Capillary Refill: Capillary refill takes less than 2 seconds.   Neurological:      Mental Status: He is alert. He is disoriented.   Psychiatric:      Comments: Confused    Significant Labs: All pertinent labs within the past 24 hours have been reviewed.    Significant Imaging: I have reviewed all pertinent imaging results/findings within the past 24 hours.

## 2024-01-19 NOTE — ASSESSMENT & PLAN NOTE
- noted; could correlate with AMS as pt previously has had significant AMS with other infections (UTI last admission)   - continued management per hospital primary

## 2024-01-19 NOTE — ASSESSMENT & PLAN NOTE
- pt under care of oncology at [facility] [place]  - contributes to overall appropriateness for hospice if/when aligns to care if pt does not pursue aggressive treatment or if not a candidate (which candidacy does not seem likely at current status)    - continued management per outpatient onc team

## 2024-01-19 NOTE — ASSESSMENT & PLAN NOTE
- chronic condition noted; currently with LATRICE and hyperkalemia   - continued management per hospital primary

## 2024-01-20 PROBLEM — E03.9 HYPOTHYROID: Status: ACTIVE | Noted: 2024-01-20

## 2024-01-20 LAB
ALBUMIN SERPL BCP-MCNC: 2.5 G/DL (ref 3.5–5.2)
ALP SERPL-CCNC: 67 U/L (ref 55–135)
ALT SERPL W/O P-5'-P-CCNC: 26 U/L (ref 10–44)
ANION GAP SERPL CALC-SCNC: 10 MMOL/L (ref 8–16)
ANION GAP SERPL CALC-SCNC: 11 MMOL/L (ref 8–16)
ANISOCYTOSIS BLD QL SMEAR: SLIGHT
AST SERPL-CCNC: 41 U/L (ref 10–40)
BASOPHILS # BLD AUTO: 0.03 K/UL (ref 0–0.2)
BASOPHILS NFR BLD: 0.4 % (ref 0–1.9)
BILIRUB SERPL-MCNC: 0.3 MG/DL (ref 0.1–1)
BUN SERPL-MCNC: 16 MG/DL (ref 8–23)
BUN SERPL-MCNC: 17 MG/DL (ref 8–23)
BURR CELLS BLD QL SMEAR: ABNORMAL
CALCIUM SERPL-MCNC: 8.1 MG/DL (ref 8.7–10.5)
CALCIUM SERPL-MCNC: 8.2 MG/DL (ref 8.7–10.5)
CHLORIDE SERPL-SCNC: 114 MMOL/L (ref 95–110)
CHLORIDE SERPL-SCNC: 116 MMOL/L (ref 95–110)
CO2 SERPL-SCNC: 16 MMOL/L (ref 23–29)
CO2 SERPL-SCNC: 18 MMOL/L (ref 23–29)
CREAT SERPL-MCNC: 1.6 MG/DL (ref 0.5–1.4)
CREAT SERPL-MCNC: 1.6 MG/DL (ref 0.5–1.4)
DIFFERENTIAL METHOD BLD: ABNORMAL
EOSINOPHIL # BLD AUTO: 2.1 K/UL (ref 0–0.5)
EOSINOPHIL NFR BLD: 25.1 % (ref 0–8)
ERYTHROCYTE [DISTWIDTH] IN BLOOD BY AUTOMATED COUNT: 19.2 % (ref 11.5–14.5)
EST. GFR  (NO RACE VARIABLE): 42 ML/MIN/1.73 M^2
EST. GFR  (NO RACE VARIABLE): 42 ML/MIN/1.73 M^2
GLUCOSE SERPL-MCNC: 102 MG/DL (ref 70–110)
GLUCOSE SERPL-MCNC: 123 MG/DL (ref 70–110)
HCT VFR BLD AUTO: 25.5 % (ref 40–54)
HGB BLD-MCNC: 7.9 G/DL (ref 14–18)
IMM GRANULOCYTES # BLD AUTO: 0.04 K/UL (ref 0–0.04)
IMM GRANULOCYTES NFR BLD AUTO: 0.5 % (ref 0–0.5)
LYMPHOCYTES # BLD AUTO: 1.4 K/UL (ref 1–4.8)
LYMPHOCYTES NFR BLD: 17 % (ref 18–48)
MCH RBC QN AUTO: 29.5 PG (ref 27–31)
MCHC RBC AUTO-ENTMCNC: 31 G/DL (ref 32–36)
MCV RBC AUTO: 95 FL (ref 82–98)
MONOCYTES # BLD AUTO: 1 K/UL (ref 0.3–1)
MONOCYTES NFR BLD: 11.8 % (ref 4–15)
NEUTROPHILS # BLD AUTO: 3.8 K/UL (ref 1.8–7.7)
NEUTROPHILS NFR BLD: 45.2 % (ref 38–73)
NRBC BLD-RTO: 0 /100 WBC
PLATELET # BLD AUTO: 281 K/UL (ref 150–450)
PLATELET BLD QL SMEAR: ABNORMAL
PMV BLD AUTO: 9.9 FL (ref 9.2–12.9)
POCT GLUCOSE: 106 MG/DL (ref 70–110)
POCT GLUCOSE: 108 MG/DL (ref 70–110)
POCT GLUCOSE: 111 MG/DL (ref 70–110)
POCT GLUCOSE: 122 MG/DL (ref 70–110)
POIKILOCYTOSIS BLD QL SMEAR: SLIGHT
POTASSIUM SERPL-SCNC: 4 MMOL/L (ref 3.5–5.1)
POTASSIUM SERPL-SCNC: 4.2 MMOL/L (ref 3.5–5.1)
PROT SERPL-MCNC: 6.5 G/DL (ref 6–8.4)
RBC # BLD AUTO: 2.68 M/UL (ref 4.6–6.2)
SODIUM SERPL-SCNC: 142 MMOL/L (ref 136–145)
SODIUM SERPL-SCNC: 143 MMOL/L (ref 136–145)
T4 FREE SERPL-MCNC: 0.79 NG/DL (ref 0.71–1.51)
TARGETS BLD QL SMEAR: ABNORMAL
TSH SERPL DL<=0.005 MIU/L-ACNC: 7.85 UIU/ML (ref 0.4–4)
WBC # BLD AUTO: 8.36 K/UL (ref 3.9–12.7)

## 2024-01-20 PROCEDURE — 85025 COMPLETE CBC W/AUTO DIFF WBC: CPT | Mod: HCNC | Performed by: HOSPITALIST

## 2024-01-20 PROCEDURE — 80053 COMPREHEN METABOLIC PANEL: CPT | Mod: HCNC | Performed by: HOSPITALIST

## 2024-01-20 PROCEDURE — 84439 ASSAY OF FREE THYROXINE: CPT | Mod: HCNC | Performed by: HOSPITALIST

## 2024-01-20 PROCEDURE — 94761 N-INVAS EAR/PLS OXIMETRY MLT: CPT | Mod: HCNC

## 2024-01-20 PROCEDURE — S5010 5% DEXTROSE AND 0.45% SALINE: HCPCS | Mod: HCNC | Performed by: HOSPITALIST

## 2024-01-20 PROCEDURE — 36415 COLL VENOUS BLD VENIPUNCTURE: CPT | Mod: HCNC | Performed by: HOSPITALIST

## 2024-01-20 PROCEDURE — 94640 AIRWAY INHALATION TREATMENT: CPT | Mod: HCNC

## 2024-01-20 PROCEDURE — 63600175 PHARM REV CODE 636 W HCPCS: Mod: JZ,JG,HCNC | Performed by: HOSPITALIST

## 2024-01-20 PROCEDURE — 11000001 HC ACUTE MED/SURG PRIVATE ROOM: Mod: HCNC

## 2024-01-20 PROCEDURE — 25000003 PHARM REV CODE 250: Mod: HCNC | Performed by: STUDENT IN AN ORGANIZED HEALTH CARE EDUCATION/TRAINING PROGRAM

## 2024-01-20 PROCEDURE — S4991 NICOTINE PATCH NONLEGEND: HCPCS | Mod: HCNC | Performed by: INTERNAL MEDICINE

## 2024-01-20 PROCEDURE — 63600175 PHARM REV CODE 636 W HCPCS: Mod: HCNC | Performed by: INTERNAL MEDICINE

## 2024-01-20 PROCEDURE — 25000003 PHARM REV CODE 250: Mod: HCNC | Performed by: INTERNAL MEDICINE

## 2024-01-20 PROCEDURE — 25000242 PHARM REV CODE 250 ALT 637 W/ HCPCS: Mod: HCNC | Performed by: HOSPITALIST

## 2024-01-20 PROCEDURE — 25000003 PHARM REV CODE 250: Mod: HCNC | Performed by: HOSPITALIST

## 2024-01-20 PROCEDURE — 80048 BASIC METABOLIC PNL TOTAL CA: CPT | Mod: HCNC | Performed by: HOSPITALIST

## 2024-01-20 PROCEDURE — 84443 ASSAY THYROID STIM HORMONE: CPT | Mod: HCNC | Performed by: HOSPITALIST

## 2024-01-20 PROCEDURE — 63600175 PHARM REV CODE 636 W HCPCS: Mod: HCNC | Performed by: STUDENT IN AN ORGANIZED HEALTH CARE EDUCATION/TRAINING PROGRAM

## 2024-01-20 RX ORDER — DEXTROSE MONOHYDRATE AND SODIUM CHLORIDE 5; .45 G/100ML; G/100ML
INJECTION, SOLUTION INTRAVENOUS CONTINUOUS
Status: DISCONTINUED | OUTPATIENT
Start: 2024-01-20 | End: 2024-01-21

## 2024-01-20 RX ORDER — DIPHENHYDRAMINE HYDROCHLORIDE 50 MG/ML
25 INJECTION INTRAMUSCULAR; INTRAVENOUS EVERY 6 HOURS PRN
Status: DISCONTINUED | OUTPATIENT
Start: 2024-01-20 | End: 2024-01-21 | Stop reason: HOSPADM

## 2024-01-20 RX ADMIN — FAMOTIDINE 20 MG: 10 INJECTION INTRAVENOUS at 09:01

## 2024-01-20 RX ADMIN — PIPERACILLIN AND TAZOBACTAM 4.5 G: 4; .5 INJECTION, POWDER, LYOPHILIZED, FOR SOLUTION INTRAVENOUS; PARENTERAL at 11:01

## 2024-01-20 RX ADMIN — IPRATROPIUM BROMIDE AND ALBUTEROL SULFATE 3 ML: 2.5; .5 SOLUTION RESPIRATORY (INHALATION) at 08:01

## 2024-01-20 RX ADMIN — MUPIROCIN: 20 OINTMENT TOPICAL at 09:01

## 2024-01-20 RX ADMIN — PIPERACILLIN AND TAZOBACTAM 4.5 G: 4; .5 INJECTION, POWDER, LYOPHILIZED, FOR SOLUTION INTRAVENOUS; PARENTERAL at 04:01

## 2024-01-20 RX ADMIN — DIPHENHYDRAMINE HYDROCHLORIDE 25 MG: 50 INJECTION, SOLUTION INTRAMUSCULAR; INTRAVENOUS at 09:01

## 2024-01-20 RX ADMIN — LEVOTHYROXINE SODIUM 25 MCG: 20 INJECTION, SOLUTION INTRAVENOUS at 09:01

## 2024-01-20 RX ADMIN — DEXTROSE MONOHYDRATE 250 MG: 50 INJECTION, SOLUTION INTRAVENOUS at 02:01

## 2024-01-20 RX ADMIN — DEXTROSE AND SODIUM CHLORIDE: 5; 450 INJECTION, SOLUTION INTRAVENOUS at 09:01

## 2024-01-20 RX ADMIN — IPRATROPIUM BROMIDE AND ALBUTEROL SULFATE 3 ML: 2.5; .5 SOLUTION RESPIRATORY (INHALATION) at 02:01

## 2024-01-20 RX ADMIN — DEXTROSE AND SODIUM CHLORIDE: 5; 450 INJECTION, SOLUTION INTRAVENOUS at 11:01

## 2024-01-20 RX ADMIN — MORPHINE SULFATE 2 MG: 4 INJECTION, SOLUTION INTRAMUSCULAR; INTRAVENOUS at 03:01

## 2024-01-20 RX ADMIN — TRIAMCINOLONE ACETONIDE: 1 CREAM TOPICAL at 09:01

## 2024-01-20 RX ADMIN — DIPHENHYDRAMINE HYDROCHLORIDE 25 MG: 50 INJECTION, SOLUTION INTRAMUSCULAR; INTRAVENOUS at 06:01

## 2024-01-20 RX ADMIN — VANCOMYCIN HYDROCHLORIDE 1000 MG: 1 INJECTION, POWDER, LYOPHILIZED, FOR SOLUTION INTRAVENOUS at 09:01

## 2024-01-20 RX ADMIN — MORPHINE SULFATE 2 MG: 4 INJECTION, SOLUTION INTRAMUSCULAR; INTRAVENOUS at 09:01

## 2024-01-20 RX ADMIN — PIPERACILLIN AND TAZOBACTAM 4.5 G: 4; .5 INJECTION, POWDER, LYOPHILIZED, FOR SOLUTION INTRAVENOUS; PARENTERAL at 09:01

## 2024-01-20 RX ADMIN — DEXTROSE MONOHYDRATE 250 MG: 50 INJECTION, SOLUTION INTRAVENOUS at 03:01

## 2024-01-20 RX ADMIN — Medication 1 PATCH: at 09:01

## 2024-01-20 NOTE — PLAN OF CARE
01/20/24 1555   Post-Acute Status   Post-Acute Authorization Hospice   Hospice Status Referrals Sent     Consult received for inpatient hospice, referral sent in [facility] to [facility].     Patient daughter has declined to select a preferred provider and elects placement with the first accepting in network provider that is available to provide services as ordered by the physician.

## 2024-01-20 NOTE — PROGRESS NOTES
Vancomycin Consult Follow Up:  Patient reviewed, no changes in renal function. No new labs. Continue current therapy. Vancomycin trough due 1/21 @ 21:00

## 2024-01-20 NOTE — ASSESSMENT & PLAN NOTE
Fall precautions   Neuro checks     Patient is not much responsive,pare palliative family does not wish Hospice,consulted ST,PT,OT,started on IVF.  Ongoing talking with daughters regarding hospice,they are talking with each other,

## 2024-01-20 NOTE — SUBJECTIVE & OBJECTIVE
Interval History: Pt. Is not responding.    Review of Systems  Objective:     Vital Signs (Most Recent):  Temp: 97.5 °F (36.4 °C) (01/19/24 2041)  Pulse: 84 (01/20/24 0828)  Resp: 18 (01/20/24 0909)  BP: 111/66 (01/20/24 0828)  SpO2: 95 % (01/20/24 0828) Vital Signs (24h Range):  Temp:  [96 °F (35.6 °C)-97.5 °F (36.4 °C)] 97.5 °F (36.4 °C)  Pulse:  [77-93] 84  Resp:  [18-20] 18  SpO2:  [93 %-96 %] 95 %  BP: (111-152)/() 111/66     Weight: 70.1 kg (154 lb 8.7 oz)  Body mass index is 24.2 kg/m².    Intake/Output Summary (Last 24 hours) at 1/20/2024 1027  Last data filed at 1/19/2024 1903  Gross per 24 hour   Intake 0 ml   Output 600 ml   Net -600 ml           Physical Exam      Constitutional:       General: He is not in acute distress.     Appearance: He is ill-appearing.      Comments: Elderly    HENT:      Head: Normocephalic.      Nose: Nose normal.   Eyes:      Extraocular Movements: Extraocular movements intact.   Cardiovascular:      Rate and Rhythm: regular tachycardia   Pulmonary:      Effort: No respiratory distress.   Abdominal:      Tenderness: There is no abdominal tenderness.   Musculoskeletal:         General: No swelling.      Cervical back: Normal range of motion.   Skin:     General: Skin is warm.      Capillary Refill: Capillary refill takes less than 2 seconds.   Neurological:      Mental Status: He is alert. He is disoriented.   Psychiatric:      Comments: Confused    Significant Labs: All pertinent labs within the past 24 hours have been reviewed.    Significant Imaging: I have reviewed all pertinent imaging results/findings within the past 24 hours.

## 2024-01-20 NOTE — NURSING
Cont IVF w/ antibiotics infusing. Tele #[id number]. Heel protector boots in place. Pt repositioned with assist x2, remains free from fall/injury. Vitals assessed. Accu checks w/ no insulin coverage. Pt on room; no distress noted. Prn pain medication given for discomfort/pain. New IV started 22g L FA. Safety measures maintained. Will cont to monitor

## 2024-01-20 NOTE — PT/OT/SLP PROGRESS
Speech Language Pathology      Narciso Yanez  MRN: [id number]    Patient not seen today secondary to Unarousable. ST will follow-up .      Sunitha Crump, NADEEM-SLP

## 2024-01-20 NOTE — PROGRESS NOTES
Pharmacokinetic Assessment Follow Up: IV Vancomycin    Vancomycin serum concentration assessment(s):    The trough level was drawn correctly and can be used to guide therapy at this time. The measurement is within the desired definitive target range of 10 to 20 mcg/mL.    Vancomycin Regimen Plan:    Continue regimen to Vancomycin 1000 mg IV every 24 hours with next serum trough concentration measured at 21:00 prior to 3rd dose on 1/21/24.    Drug levels (last 3 results):  Recent Labs   Lab Result Units 01/19/24 2001   Vancomycin-Trough ug/mL 15.8       Pharmacy will continue to follow and monitor vancomycin.    Please contact pharmacy at extension [phone] for questions regarding this assessment.    Thank you for the consult,   Sho Cabrera       Patient brief summary:  Narciso Yanez is a 88 y.o. male initiated on antimicrobial therapy with IV Vancomycin for treatment of  pneumonia.      Drug Allergies:   Review of patient's allergies indicates:   Allergen Reactions    Demerol [meperidine] Nausea Only       Actual Body Weight:   70.1 kg    Renal Function:   Estimated Creatinine Clearance: 31 mL/min (A) (based on SCr of 1.6 mg/dL (H)).,     Dialysis Method (if applicable):  N/A    CBC (last 72 hours):  Recent Labs   Lab Result Units 01/17/24  1427 01/17/24  1934 01/18/24  0139 01/18/24  1042   WBC K/uL 4.73  --  8.01 7.73   Hemoglobin g/dL 14.2  --  7.0* 7.3*   Hemoglobin A1C %  --  7.2*  --   --    Hematocrit % 44.3  --  22.0* 22.7*   Platelets K/uL 149*  --  247 246   Gran % % 66.4  --  68.7 66.9   Lymph % % 16.3*  --  16.1* 15.3*   Mono % % 7.4  --  9.1 12.0   Eosinophil % % 8.9*  --  5.1 4.5   Basophil % % 0.6  --  0.4 0.5   Differential Method  Automated  --  Automated Automated       Metabolic Panel (last 72 hours):  Recent Labs   Lab Result Units 01/17/24  1427 01/17/24  1601 01/17/24  1800 01/18/24  0139   Sodium mmol/L 141  --  141 141   Potassium mmol/L 5.9*  --  4.9 5.2*   Chloride mmol/L 111*  --  112*  110   CO2 mmol/L 21*  --  20* 22*   Glucose mg/dL 91  --  38* 58*   Glucose, UA   --  Negative  --   --    BUN mg/dL 37*  --  35* 32*   Creatinine mg/dL 1.6*  --  1.4 1.6*   Albumin g/dL 2.6*  --   --  2.6*   Total Bilirubin mg/dL 0.2  --   --  0.2   Alkaline Phosphatase U/L 135  --   --  96   AST U/L 28  --   --  28   ALT U/L 28  --   --  24   Magnesium mg/dL 2.0  --   --  1.7   Phosphorus mg/dL 4.4  --   --   --        Vancomycin Administrations:  vancomycin given in the last 96 hours                     vancomycin (VANCOCIN) 1,000 mg in dextrose 5 % (D5W) 250 mL IVPB (Vial-Mate) (mg) 1,000 mg New Bag 01/18/24 2108    vancomycin 1,250 mg in dextrose 5 % (D5W) 250 mL IVPB (Vial-Mate) ()  Restarted 01/17/24 2132     1,250 mg New Bag  2042                    Microbiologic Results:  Microbiology Results (last 7 days)       Procedure Component Value Units Date/Time    Blood culture [[id number]] Collected: 01/17/24 1935    Order Status: Completed Specimen: Blood from Peripheral, Upper Arm, Left Updated: 01/19/24 2103     Blood Culture, Routine No Growth to date      No Growth to date      No Growth to date    Blood culture [[id number]] Collected: 01/17/24 1947    Order Status: Completed Specimen: Blood from Peripheral, Hand, Right Updated: 01/19/24 2103     Blood Culture, Routine No Growth to date      No Growth to date      No Growth to date    Culture, Respiratory with Gram Stain [[id number]]     Order Status: No result Specimen: Respiratory from Sputum, Expectorated

## 2024-01-20 NOTE — NURSING
pt is cool to touch, unable to get a oral/axillary temp; notified ABDULKADIR Dominguez, new order placed for warming blanket. Will cont to monitor

## 2024-01-20 NOTE — NURSING
[facility] [place]  Nurses Note -- 4 Eyes      1/20/2024       Skin assessed on: Q Shift      [x] No Pressure Injuries Present    []Prevention Measures Documented    [] Yes LDA  for Pressure Injury Previously documented     [] Yes New Pressure Injury Discovered   [] LDA for New Pressure Injury Added      Attending RN:  Kylie Nunes RN     Second RN:  MIKAYLA Hernández

## 2024-01-20 NOTE — PROGRESS NOTES
[facility] Medicine  Progress Note    Patient Name: Narciso Yanez  MRN: [id number]  Patient Class: IP- Inpatient   Admission Date: 1/17/2024  Length of Stay: 3 days  Attending Physician: Juwan Cuevas, *  Primary Care Provider: Marcelino Del Toro MD        Subjective:     Principal Problem:Acute metabolic encephalopathy        HPI:  88 years old male with past medical history significant for CAD, COPD (not on O2), hypertension, hyperlipidemia, CKD 3, type 2 diabetes, peripheral artery disease, NPH, PMR, cutaneous T-cell lymphoma, chronic back pain, tobacco use who was brought in by his daughter due to altered mental status.  Patient was alert and lying comfortably in the bed however confused so was not able to provide complete information however, patient denied chest pain shortness of breath abdominal pain.  History is provided by patient's daughter at bedside who reported that patient has been confused since yesterday and was not doing good for which she brought him to the hospital.  Lab work showed creatinine 1.6 and potassium 5.9.  Patient received hyperkalemia treatment.  Later in ER, patient became hypotensive for which started on Levophed.  Chest x-ray showed findings suspicious of diffuse developing infiltrate.  CT head and C-spine negative for any acute abnormality.  CT A&P showed hepatic steatosis however negative for any acute abnormality.  Patient was found to be hypothermic with temperature of 90.2° F and placed on Nj Hugger.  In the ER, patient became hypoglycemic with glucose of 38 for which received hypoglycemia protocol and recheck glucose came 58.    Overview/Hospital Course:  88M with pmh of CAD, COPD (not on O2), hypertension, hyperlipidemia, CKD 3, type 2 diabetes, peripheral artery disease, NPH, PMR, cutaneous T-cell lymphoma, chronic back pain, tobacco use who was brought in by his daughter due mental status change from baseline. Lab work showed creatinine 1.6  and potassium 5.9.  Patient received hyperkalemia treatment.  Later in ER, patient became hypotensive for which started on Levophed.  Chest x-ray showed findings suspicious of diffuse developing infiltrate.  CT head and C-spine negative for any acute abnormality.  CT A&P showed hepatic steatosis however negative for any acute abnormality.  Patient was found to be hypothermic with temperature of 90.2° F and placed on Nj Hugger.  In the ER, patient became hypoglycemic with glucose of 38 for which received hypoglycemia protocol and recheck glucose came 58. BG has now improved. Was only requiring levophed for short time. Pt remains confused, but is oriented to self. Continuing abx pending BC. Stable to step down to the floor.   Patient is not much responsive,per  palliative family does not wish Hospice,consulted ST,PT,OT,started on IVF.  Patient is DNR.  Ongoing talking with daughters regarding hospice,they are talking with each other,    Interval History: Pt. Is not responding.    Review of Systems  Objective:     Vital Signs (Most Recent):  Temp: 97.5 °F (36.4 °C) (01/19/24 2041)  Pulse: 84 (01/20/24 0828)  Resp: 18 (01/20/24 0909)  BP: 111/66 (01/20/24 0828)  SpO2: 95 % (01/20/24 0828) Vital Signs (24h Range):  Temp:  [96 °F (35.6 °C)-97.5 °F (36.4 °C)] 97.5 °F (36.4 °C)  Pulse:  [77-93] 84  Resp:  [18-20] 18  SpO2:  [93 %-96 %] 95 %  BP: (111-152)/() 111/66     Weight: 70.1 kg (154 lb 8.7 oz)  Body mass index is 24.2 kg/m².    Intake/Output Summary (Last 24 hours) at 1/20/2024 1027  Last data filed at 1/19/2024 1903  Gross per 24 hour   Intake 0 ml   Output 600 ml   Net -600 ml           Physical Exam      Constitutional:       General: He is not in acute distress.     Appearance: He is ill-appearing.      Comments: Elderly    HENT:      Head: Normocephalic.      Nose: Nose normal.   Eyes:      Extraocular Movements: Extraocular movements intact.   Cardiovascular:      Rate and Rhythm: regular tachycardia    Pulmonary:      Effort: No respiratory distress.   Abdominal:      Tenderness: There is no abdominal tenderness.   Musculoskeletal:         General: No swelling.      Cervical back: Normal range of motion.   Skin:     General: Skin is warm.      Capillary Refill: Capillary refill takes less than 2 seconds.   Neurological:      Mental Status: He is alert. He is disoriented.   Psychiatric:      Comments: Confused    Significant Labs: All pertinent labs within the past 24 hours have been reviewed.    Significant Imaging: I have reviewed all pertinent imaging results/findings within the past 24 hours.    Assessment/Plan:      * Acute metabolic encephalopathy  Fall precautions   Neuro checks     Patient is not much responsive,pare palliative family does not wish Hospice,consulted ST,PT,OT,started on IVF.  Ongoing talking with daughters regarding hospice,they are talking with each other,    Hypothyroid  On IV synthroid, TSH and free T 4 is improving.      Shock  Of unclear etiology  Started on IV Levophed, but quickly weaned off.   Will give IV  cc bolus however would be cautious with IV fluids due to history of CHF; currently patient is not in fluid overload  TSH 8.1 and free T4 0.69; cortisol appropriate  Patient is hypotensive, hypothermic with high TSH; will start on iv levothyroxine while unable to take po  Will start empiric IV antibiotics with septic workup including blood cultures. BC NGTD. Possibly d/c abx if remain negative      Pneumonia  Will get sputum culture, blood cultures, procalcitonin  Will start IV vancomycin and Zosyn      Hyperkalemia  This patient has hyperkalemia which is uncontrolled. We will monitor for arrhythmias with EKG or continuous telemetry. We will treat the hyperkalemia with Potassium Binders, Calcium gluconate, and IV insulin and dextrose. The likely etiology of the hyperkalemia is LATRICE.  The patients latest potassium has been reviewed and the results are listed below  Recent  Labs   Lab 01/18/24  0139   K 5.2*               NPH (normal pressure hydrocephalus)   Per record.    LATRICE (acute kidney injury)  Patient with acute kidney injury/acute renal failure likely due to pre-renal azotemia due to dehydration LATRICE is currently stable. Baseline creatinine  1.2  - Labs reviewed- Renal function/electrolytes with Estimated Creatinine Clearance: 31 mL/min (A) (based on SCr of 1.6 mg/dL (H)). according to latest data. Monitor urine output and serial BMP and adjust therapy as needed. Avoid nephrotoxins and renally dose meds for GFR listed above.    Hypoglycemia  Could be related to IV insulin regular which patient received in ER for hyperkalemia treatment  Hypoglycemia protocol  Accu-Cheks  -improved. Continue to monitor.       Stage 3a chronic kidney disease  Creatine stable for now. BMP reviewed- noted Estimated Creatinine Clearance: 31 mL/min (A) (based on SCr of 1.6 mg/dL (H)). according to latest data. Based on current GFR, CKD stage is stage 3 - GFR 30-59.  Monitor UOP and serial BMP and adjust therapy as needed. Renally dose meds. Avoid nephrotoxic medications and procedures.    COPD (chronic obstructive pulmonary disease)  Will monitor, with prn nebulizer.y.     Aortic atherosclerosis  On medical treatments.      Skin cancer        Diabetes mellitus with peripheral circulatory disorder  Patient's FSGs are controlled on current medication regimen.  Last A1c reviewed-   Lab Results   Component Value Date    HGBA1C 6.9 (H) 10/15/2023     Most recent fingerstick glucose reviewed-   Recent Labs   Lab 01/17/24  2140 01/18/24  0211 01/18/24  0232 01/18/24  0951   POCTGLUCOSE 117* 64* 117* 77     Current correctional scale  Low  Maintain anti-hyperglycemic dose as follows-   Antihyperglycemics (From admission, onward)      None          Hold Oral hypoglycemics while patient is in the hospital.    Coronary artery disease involving coronary bypass graft of native heart without angina pectoris  Patient  with known CAD, which is controlled Will continue Plavix and Statin and monitor for S/Sx of angina/ACS. Continue to monitor on telemetry.       VTE Risk Mitigation (From admission, onward)           Ordered     IP VTE HIGH RISK PATIENT  Once         01/17/24 1911     Place sequential compression device  Until discontinued         01/17/24 1911                    Discharge Planning   SUSIE: 1/20/2024     Code Status: DNR   Is the patient medically ready for discharge?:     Reason for patient still in hospital (select all that apply): Patient trending condition  Discharge Plan A: Home with family, Home Health                  Juwan Cuevas MD  Department of Hospital Medicine   [facility] - [facility]

## 2024-01-21 VITALS
BODY MASS INDEX: 24.26 KG/M2 | DIASTOLIC BLOOD PRESSURE: 57 MMHG | TEMPERATURE: 96 F | SYSTOLIC BLOOD PRESSURE: 120 MMHG | OXYGEN SATURATION: 92 % | RESPIRATION RATE: 18 BRPM | HEART RATE: 84 BPM | WEIGHT: 154.56 LBS | HEIGHT: 67 IN

## 2024-01-21 LAB
ANION GAP SERPL CALC-SCNC: 8 MMOL/L (ref 8–16)
BACTERIA BLD CULT: NORMAL
BACTERIA BLD CULT: NORMAL
BASOPHILS # BLD AUTO: 0.02 K/UL (ref 0–0.2)
BASOPHILS NFR BLD: 0.2 % (ref 0–1.9)
BUN SERPL-MCNC: 12 MG/DL (ref 8–23)
CALCIUM SERPL-MCNC: 8.1 MG/DL (ref 8.7–10.5)
CHLORIDE SERPL-SCNC: 116 MMOL/L (ref 95–110)
CO2 SERPL-SCNC: 16 MMOL/L (ref 23–29)
CREAT SERPL-MCNC: 1.4 MG/DL (ref 0.5–1.4)
DIFFERENTIAL METHOD BLD: ABNORMAL
EOSINOPHIL # BLD AUTO: 2 K/UL (ref 0–0.5)
EOSINOPHIL NFR BLD: 22 % (ref 0–8)
ERYTHROCYTE [DISTWIDTH] IN BLOOD BY AUTOMATED COUNT: 19 % (ref 11.5–14.5)
EST. GFR  (NO RACE VARIABLE): 49 ML/MIN/1.73 M^2
GLUCOSE SERPL-MCNC: 103 MG/DL (ref 70–110)
HCT VFR BLD AUTO: 23.1 % (ref 40–54)
HGB BLD-MCNC: 7.2 G/DL (ref 14–18)
IMM GRANULOCYTES # BLD AUTO: 0.04 K/UL (ref 0–0.04)
IMM GRANULOCYTES NFR BLD AUTO: 0.4 % (ref 0–0.5)
LYMPHOCYTES # BLD AUTO: 1.3 K/UL (ref 1–4.8)
LYMPHOCYTES NFR BLD: 14 % (ref 18–48)
MCH RBC QN AUTO: 29.6 PG (ref 27–31)
MCHC RBC AUTO-ENTMCNC: 31.2 G/DL (ref 32–36)
MCV RBC AUTO: 95 FL (ref 82–98)
MONOCYTES # BLD AUTO: 0.9 K/UL (ref 0.3–1)
MONOCYTES NFR BLD: 9.6 % (ref 4–15)
NEUTROPHILS # BLD AUTO: 4.9 K/UL (ref 1.8–7.7)
NEUTROPHILS NFR BLD: 53.8 % (ref 38–73)
NRBC BLD-RTO: 0 /100 WBC
PLATELET # BLD AUTO: 220 K/UL (ref 150–450)
PMV BLD AUTO: 9.5 FL (ref 9.2–12.9)
POCT GLUCOSE: 111 MG/DL (ref 70–110)
POCT GLUCOSE: 119 MG/DL (ref 70–110)
POCT GLUCOSE: 135 MG/DL (ref 70–110)
POCT GLUCOSE: 185 MG/DL (ref 70–110)
POTASSIUM SERPL-SCNC: 4.1 MMOL/L (ref 3.5–5.1)
RBC # BLD AUTO: 2.43 M/UL (ref 4.6–6.2)
SARS-COV-2 RDRP RESP QL NAA+PROBE: NEGATIVE
SODIUM SERPL-SCNC: 140 MMOL/L (ref 136–145)
WBC # BLD AUTO: 9.06 K/UL (ref 3.9–12.7)

## 2024-01-21 PROCEDURE — 25000003 PHARM REV CODE 250: Mod: HCNC | Performed by: INTERNAL MEDICINE

## 2024-01-21 PROCEDURE — 25000242 PHARM REV CODE 250 ALT 637 W/ HCPCS: Mod: HCNC | Performed by: HOSPITALIST

## 2024-01-21 PROCEDURE — 36415 COLL VENOUS BLD VENIPUNCTURE: CPT | Mod: HCNC | Performed by: HOSPITALIST

## 2024-01-21 PROCEDURE — S4991 NICOTINE PATCH NONLEGEND: HCPCS | Mod: HCNC | Performed by: INTERNAL MEDICINE

## 2024-01-21 PROCEDURE — 94761 N-INVAS EAR/PLS OXIMETRY MLT: CPT | Mod: HCNC

## 2024-01-21 PROCEDURE — 25000003 PHARM REV CODE 250: Mod: HCNC | Performed by: HOSPITALIST

## 2024-01-21 PROCEDURE — U0002 COVID-19 LAB TEST NON-CDC: HCPCS | Mod: HCNC | Performed by: HOSPITALIST

## 2024-01-21 PROCEDURE — 92526 ORAL FUNCTION THERAPY: CPT | Mod: HCNC

## 2024-01-21 PROCEDURE — 63600175 PHARM REV CODE 636 W HCPCS: Mod: HCNC | Performed by: HOSPITALIST

## 2024-01-21 PROCEDURE — 63600175 PHARM REV CODE 636 W HCPCS: Mod: HCNC | Performed by: INTERNAL MEDICINE

## 2024-01-21 PROCEDURE — 94640 AIRWAY INHALATION TREATMENT: CPT | Mod: HCNC

## 2024-01-21 PROCEDURE — 85025 COMPLETE CBC W/AUTO DIFF WBC: CPT | Mod: HCNC | Performed by: HOSPITALIST

## 2024-01-21 PROCEDURE — 97535 SELF CARE MNGMENT TRAINING: CPT | Mod: HCNC

## 2024-01-21 PROCEDURE — 80048 BASIC METABOLIC PNL TOTAL CA: CPT | Mod: HCNC | Performed by: HOSPITALIST

## 2024-01-21 PROCEDURE — 25000003 PHARM REV CODE 250: Mod: HCNC | Performed by: STUDENT IN AN ORGANIZED HEALTH CARE EDUCATION/TRAINING PROGRAM

## 2024-01-21 RX ORDER — DEXTROSE MONOHYDRATE 50 MG/ML
INJECTION, SOLUTION INTRAVENOUS CONTINUOUS
Status: DISCONTINUED | OUTPATIENT
Start: 2024-01-21 | End: 2024-01-21 | Stop reason: HOSPADM

## 2024-01-21 RX ORDER — LEVOTHYROXINE SODIUM 50 UG/1
50 TABLET ORAL
Qty: 30 TABLET | Refills: 11
Start: 2024-01-21 | End: 2025-01-20

## 2024-01-21 RX ORDER — AMOXICILLIN AND CLAVULANATE POTASSIUM 875; 125 MG/1; MG/1
1 TABLET, FILM COATED ORAL 2 TIMES DAILY
Start: 2024-01-21 | End: 2024-01-28

## 2024-01-21 RX ADMIN — DEXTROSE MONOHYDRATE: 50 INJECTION, SOLUTION INTRAVENOUS at 12:01

## 2024-01-21 RX ADMIN — MORPHINE SULFATE 2 MG: 4 INJECTION, SOLUTION INTRAMUSCULAR; INTRAVENOUS at 12:01

## 2024-01-21 RX ADMIN — Medication 1 PATCH: at 09:01

## 2024-01-21 RX ADMIN — LEVOTHYROXINE SODIUM 25 MCG: 20 INJECTION, SOLUTION INTRAVENOUS at 08:01

## 2024-01-21 RX ADMIN — DEXTROSE MONOHYDRATE 250 MG: 50 INJECTION, SOLUTION INTRAVENOUS at 04:01

## 2024-01-21 RX ADMIN — PIPERACILLIN AND TAZOBACTAM 4.5 G: 4; .5 INJECTION, POWDER, LYOPHILIZED, FOR SOLUTION INTRAVENOUS; PARENTERAL at 05:01

## 2024-01-21 RX ADMIN — IPRATROPIUM BROMIDE AND ALBUTEROL SULFATE 3 ML: 2.5; .5 SOLUTION RESPIRATORY (INHALATION) at 01:01

## 2024-01-21 RX ADMIN — PIPERACILLIN AND TAZOBACTAM 4.5 G: 4; .5 INJECTION, POWDER, LYOPHILIZED, FOR SOLUTION INTRAVENOUS; PARENTERAL at 12:01

## 2024-01-21 RX ADMIN — DIPHENHYDRAMINE HYDROCHLORIDE 25 MG: 50 INJECTION, SOLUTION INTRAMUSCULAR; INTRAVENOUS at 01:01

## 2024-01-21 RX ADMIN — DEXTROSE MONOHYDRATE 250 MG: 50 INJECTION, SOLUTION INTRAVENOUS at 03:01

## 2024-01-21 RX ADMIN — FAMOTIDINE 20 MG: 10 INJECTION INTRAVENOUS at 08:01

## 2024-01-21 RX ADMIN — IPRATROPIUM BROMIDE AND ALBUTEROL SULFATE 3 ML: 2.5; .5 SOLUTION RESPIRATORY (INHALATION) at 08:01

## 2024-01-21 RX ADMIN — MUPIROCIN: 20 OINTMENT TOPICAL at 08:01

## 2024-01-21 RX ADMIN — TRIAMCINOLONE ACETONIDE: 1 CREAM TOPICAL at 09:01

## 2024-01-21 NOTE — SUBJECTIVE & OBJECTIVE
Interval History: Pt. Is not responding.    Review of Systems  Objective:     Vital Signs (Most Recent):  Temp: 96 °F (35.6 °C) (01/21/24 0753)  Pulse: 90 (01/21/24 0811)  Resp: 20 (01/21/24 0811)  BP: 129/64 (01/21/24 0753)  SpO2: (!) 93 % (01/21/24 0811) Vital Signs (24h Range):  Temp:  [94 °F (34.4 °C)-97 °F (36.1 °C)] 96 °F (35.6 °C)  Pulse:  [] 90  Resp:  [17-20] 20  SpO2:  [91 %-97 %] 93 %  BP: (113-136)/(56-83) 129/64     Weight: 70.1 kg (154 lb 8.7 oz)  Body mass index is 24.2 kg/m².    Intake/Output Summary (Last 24 hours) at 1/21/2024 1057  Last data filed at 1/21/2024 0915  Gross per 24 hour   Intake 0 ml   Output 300 ml   Net -300 ml           Physical Exam      Constitutional:       General: He is not in acute distress.     Appearance: He is ill-appearing.      Comments: Elderly    HENT:      Head: Normocephalic.      Nose: Nose normal.   Eyes:      Extraocular Movements: Extraocular movements intact.   Cardiovascular:      Rate and Rhythm: regular tachycardia   Pulmonary:      Effort: No respiratory distress.   Abdominal:      Tenderness: There is no abdominal tenderness.   Musculoskeletal:         General: No swelling.      Cervical back: Normal range of motion.   Skin:     General: Skin is warm.      Capillary Refill: Capillary refill takes less than 2 seconds.   Neurological:      Mental Status: He is alert. He is disoriented.   Psychiatric:      Comments: Confused    Significant Labs: All pertinent labs within the past 24 hours have been reviewed.    Significant Imaging: I have reviewed all pertinent imaging results/findings within the past 24 hours.

## 2024-01-21 NOTE — NURSING
[facility]  Nurses Note -- 4 Eyes      1/21/2024       Skin assessed on: Q Shift      [] No Pressure Injuries Present    []Prevention Measures Documented    [x] Yes LDA  for Pressure Injury Previously documented     [] Yes New Pressure Injury Discovered   [] LDA for New Pressure Injury Added    Right outer ankle and heel redness notes  Attending RN:  Daisha Warren, RN     Second RN:  Mary

## 2024-01-21 NOTE — PROGRESS NOTES
[facility] Medicine  Progress Note    Patient Name: Narciso Yanez  MRN: [id number]  Patient Class: IP- Inpatient   Admission Date: 1/17/2024  Length of Stay: 4 days  Attending Physician: Juwan Cuevas, *  Primary Care Provider: Marcelino Del Toro MD        Subjective:     Principal Problem:Acute metabolic encephalopathy        HPI:  88 years old male with past medical history significant for CAD, COPD (not on O2), hypertension, hyperlipidemia, CKD 3, type 2 diabetes, peripheral artery disease, NPH, PMR, cutaneous T-cell lymphoma, chronic back pain, tobacco use who was brought in by his daughter due to altered mental status.  Patient was alert and lying comfortably in the bed however confused so was not able to provide complete information however, patient denied chest pain shortness of breath abdominal pain.  History is provided by patient's daughter at bedside who reported that patient has been confused since yesterday and was not doing good for which she brought him to the hospital.  Lab work showed creatinine 1.6 and potassium 5.9.  Patient received hyperkalemia treatment.  Later in ER, patient became hypotensive for which started on Levophed.  Chest x-ray showed findings suspicious of diffuse developing infiltrate.  CT head and C-spine negative for any acute abnormality.  CT A&P showed hepatic steatosis however negative for any acute abnormality.  Patient was found to be hypothermic with temperature of 90.2° F and placed on Nj Hugger.  In the ER, patient became hypoglycemic with glucose of 38 for which received hypoglycemia protocol and recheck glucose came 58.    Overview/Hospital Course:  88M with pmh of CAD, COPD (not on O2), hypertension, hyperlipidemia, CKD 3, type 2 diabetes, peripheral artery disease, NPH, PMR, cutaneous T-cell lymphoma, chronic back pain, tobacco use who was brought in by his daughter due mental status change from baseline. Lab work showed creatinine 1.6  and potassium 5.9.  Patient received hyperkalemia treatment.  Later in ER, patient became hypotensive for which started on Levophed.  Chest x-ray showed findings suspicious of diffuse developing infiltrate.  CT head and C-spine negative for any acute abnormality.  CT A&P showed hepatic steatosis however negative for any acute abnormality.  Patient was found to be hypothermic with temperature of 90.2° F and placed on Nj Hugger.  In the ER, patient became hypoglycemic with glucose of 38 for which received hypoglycemia protocol and recheck glucose came 58. BG has now improved. Was only requiring levophed for short time. Pt remains confused, but is oriented to self. Continuing abx pending BC. Stable to step down to the floor.   Patient is not much responsive,per  palliative family does not wish Hospice,consulted ST,PT,OT,started on IVF.  Patient is DNR.  Ongoing talking with daughters regarding hospice,they are talking with each other,  Plan for inpatient Hospice,SW is following,family agree.    Interval History: Pt. Is not responding.    Review of Systems  Objective:     Vital Signs (Most Recent):  Temp: 96 °F (35.6 °C) (01/21/24 0753)  Pulse: 90 (01/21/24 0811)  Resp: 20 (01/21/24 0811)  BP: 129/64 (01/21/24 0753)  SpO2: (!) 93 % (01/21/24 0811) Vital Signs (24h Range):  Temp:  [94 °F (34.4 °C)-97 °F (36.1 °C)] 96 °F (35.6 °C)  Pulse:  [] 90  Resp:  [17-20] 20  SpO2:  [91 %-97 %] 93 %  BP: (113-136)/(56-83) 129/64     Weight: 70.1 kg (154 lb 8.7 oz)  Body mass index is 24.2 kg/m².    Intake/Output Summary (Last 24 hours) at 1/21/2024 1057  Last data filed at 1/21/2024 0915  Gross per 24 hour   Intake 0 ml   Output 300 ml   Net -300 ml           Physical Exam      Constitutional:       General: He is not in acute distress.     Appearance: He is ill-appearing.      Comments: Elderly    HENT:      Head: Normocephalic.      Nose: Nose normal.   Eyes:      Extraocular Movements: Extraocular movements intact.    Cardiovascular:      Rate and Rhythm: regular tachycardia   Pulmonary:      Effort: No respiratory distress.   Abdominal:      Tenderness: There is no abdominal tenderness.   Musculoskeletal:         General: No swelling.      Cervical back: Normal range of motion.   Skin:     General: Skin is warm.      Capillary Refill: Capillary refill takes less than 2 seconds.   Neurological:      Mental Status: He is alert. He is disoriented.   Psychiatric:      Comments: Confused    Significant Labs: All pertinent labs within the past 24 hours have been reviewed.    Significant Imaging: I have reviewed all pertinent imaging results/findings within the past 24 hours.    Assessment/Plan:      * Acute metabolic encephalopathy  Fall precautions   Neuro checks     Patient is not much responsive,pare palliative family does not wish Hospice,consulted ST,PT,OT,started on IVF.  Ongoing talking with daughters regarding hospice,they are talking with each other,  Plan for inpatient Hospice,SW is following,family agree.      Hypothyroid  On IV synthroid, TSH and free T 4 is improving.      Shock  Of unclear etiology  Started on IV Levophed, but quickly weaned off.   Will give IV  cc bolus however would be cautious with IV fluids due to history of CHF; currently patient is not in fluid overload  TSH 8.1 and free T4 0.69; cortisol appropriate  Patient is hypotensive, hypothermic with high TSH; will start on iv levothyroxine while unable to take po  Will start empiric IV antibiotics with septic workup including blood cultures. BC NGTD. Possibly d/c abx if remain negative      Pneumonia  Will get sputum culture, blood cultures, procalcitonin  Will start IV vancomycin and Zosyn      Hyperkalemia  This patient has hyperkalemia which is uncontrolled. We will monitor for arrhythmias with EKG or continuous telemetry. We will treat the hyperkalemia with Potassium Binders, Calcium gluconate, and IV insulin and dextrose. The likely etiology  of the hyperkalemia is LATRICE.  The patients latest potassium has been reviewed and the results are listed below  Recent Labs   Lab 01/18/24  0139   K 5.2*               NPH (normal pressure hydrocephalus)   Per record.    LATRICE (acute kidney injury)  Patient with acute kidney injury/acute renal failure likely due to pre-renal azotemia due to dehydration LATRICE is currently stable. Baseline creatinine  1.2  - Labs reviewed- Renal function/electrolytes with Estimated Creatinine Clearance: 31 mL/min (A) (based on SCr of 1.6 mg/dL (H)). according to latest data. Monitor urine output and serial BMP and adjust therapy as needed. Avoid nephrotoxins and renally dose meds for GFR listed above.    Hypoglycemia  Could be related to IV insulin regular which patient received in ER for hyperkalemia treatment  Hypoglycemia protocol  Accu-Cheks  -improved. Continue to monitor.       Stage 3a chronic kidney disease  Creatine stable for now. BMP reviewed- noted Estimated Creatinine Clearance: 31 mL/min (A) (based on SCr of 1.6 mg/dL (H)). according to latest data. Based on current GFR, CKD stage is stage 3 - GFR 30-59.  Monitor UOP and serial BMP and adjust therapy as needed. Renally dose meds. Avoid nephrotoxic medications and procedures.    COPD (chronic obstructive pulmonary disease)  Will monitor, with prn nebulizer.y.     Aortic atherosclerosis  On medical treatments.      Skin cancer        Diabetes mellitus with peripheral circulatory disorder  Patient's FSGs are controlled on current medication regimen.  Last A1c reviewed-   Lab Results   Component Value Date    HGBA1C 6.9 (H) 10/15/2023     Most recent fingerstick glucose reviewed-   Recent Labs   Lab 01/17/24  2140 01/18/24  0211 01/18/24  0232 01/18/24  0951   POCTGLUCOSE 117* 64* 117* 77     Current correctional scale  Low  Maintain anti-hyperglycemic dose as follows-   Antihyperglycemics (From admission, onward)      None          Hold Oral hypoglycemics while patient is in the  hospital.    Coronary artery disease involving coronary bypass graft of native heart without angina pectoris  Patient with known CAD, which is controlled Will continue Plavix and Statin and monitor for S/Sx of angina/ACS. Continue to monitor on telemetry.       VTE Risk Mitigation (From admission, onward)           Ordered     IP VTE HIGH RISK PATIENT  Once         01/17/24 1911     Place sequential compression device  Until discontinued         01/17/24 1911                    Discharge Planning   SUSIE: 1/20/2024     Code Status: DNR   Is the patient medically ready for discharge?:     Reason for patient still in hospital (select all that apply): Patient trending condition  Discharge Plan A: Home with family, Home Health                  Juwan Cuevas MD  Department of Hospital Medicine   [facility] [place] - [facility]

## 2024-01-21 NOTE — PLAN OF CARE
"[facility]  Department of Hospital Medicine  [address] [place] [address]  [phone] after hours  [phone] fax    HOSPICE  ORDERS    01/21/2024    Admit to Hospice:  Inpatient Service   Diagnoses:   Active Hospital Problems    Diagnosis  POA    *Acute metabolic encephalopathy [G93.41]  Yes    Hypothyroid [E03.9]  Yes    Pneumonia [J18.9]  Yes    Shock [R57.9]  Yes    Hyperkalemia [E87.5]  Yes    Chronic interstitial lung disease [J84.9]  Yes    NPH (normal pressure hydrocephalus) [G91.2]  Yes    Goals of care, counseling/discussion [Z71.89]  Not Applicable    LATRICE (acute kidney injury) [N17.9]  Yes    Hypoglycemia [E16.2]  Yes    Stage 3a chronic kidney disease [N18.31]  Yes    COPD (chronic obstructive pulmonary disease) [J44.9]  Yes    Aortic atherosclerosis [I70.0]  Yes     "The aorta is prominent and tortuous" - Chest Xray 5-      Skin cancer [C44.90]  Yes    Diabetes mellitus with peripheral circulatory disorder [E11.51]  Yes    Coronary artery disease involving coronary bypass graft of native heart without angina pectoris [I25.810]  Yes      Resolved Hospital Problems   No resolved problems to display.       Hospice Qualifying Diagnoses: dementia,       Patient has a life expectancy < 6 months due to:      Vital Signs: Routine per Hospice Protocol.    Code Status: DNR     Allergies:   Review of patient's allergies indicates:   Allergen Reactions    Demerol [meperidine] Nausea Only       Diet: pleasure diet,aspiration precaution     Activities: As tolerated    Goals of Care Treatment Preferences:  Code Status: DNR          What is most important right now is to focus on spending time at home, avoiding the hospital, remaining as independent as possible, symptom/pain control, comfort and QOL .  Accordingly, we have decided that the best plan to meet the patient's goals includes continuing with treatment.      Nursing: Per Hospice Routine. "         Routine Skin for Bedridden Patients: Apply moisture barrier cream to all skin folds and   wet areas in perineal area daily and after baths and all bowel movements.        Oxygen: 2 liter NC  O 2    Other Miscellaneous Care:         Medications:          Medication List        START taking these medications      amoxicillin-clavulanate 875-125mg 875-125 mg per tablet  Commonly known as: AUGMENTIN  Take 1 tablet by mouth 2 (two) times daily. for 7 days until 1.28.24     levothyroxine 50 MCG tablet  Commonly known as: SYNTHROID  Take 1 tablet (50 mcg total) by mouth before breakfast.            CHANGE how you take these medications      atorvastatin 40 MG tablet  Commonly known as: LIPITOR  Take 1 tablet (40 mg total) by mouth once daily.  What changed: Another medication with the same name was removed. Continue taking this medication, and follow the directions you see here.            CONTINUE taking these medications      acetaminophen 500 MG tablet  Commonly known as: TYLENOL  Take 1 tablet (500 mg total) by mouth every 6 (six) hours as needed for Pain.     aspirin 81 MG EC tablet  Commonly known as: ECOTRIN  Take 1 tablet (81 mg total) by mouth once daily.     blood sugar diagnostic Strp  Commonly known as: TRUE METRIX GLUCOSE TEST STRIP  TID     clopidogreL 75 mg tablet  Commonly known as: PLAVIX  Take 1 tablet (75 mg total) by mouth once daily.     divalproex 125 MG EC tablet  Commonly known as: DEPAKOTE  Take 250 mg daily and 500 mg nightly     metFORMIN 500 MG tablet  Commonly known as: GLUCOPHAGE  Take 1 tablet (500 mg total) by mouth 2 (two) times daily with meals. HOLD FOR GLUCOSE LESS 100     metoprolol succinate 25 MG 24 hr tablet  Commonly known as: TOPROL-XL  Take 1 tablet (25 mg total) by mouth once daily.     mometasone 0.1% 0.1 % cream  Commonly known as: ELOCON  Apply topically 2 (two) times daily. Apply to arms ,hands, and legs BID     tamsulosin 0.4 mg Cap  Commonly known as: FLOMAX  Take 1  capsule (0.4 mg total) by mouth once daily.     triamcinolone acetonide 0.1% 0.1 % cream  Commonly known as: KENALOG  Apply topically 2 (two) times daily. Apply to rash                    Future Orders:  Hospice Medical Director may dictate new orders for comfortable care measures & sign death certificate.        _________________________________  Juwan Cuevas MD  01/21/2024

## 2024-01-21 NOTE — DISCHARGE SUMMARY
[facility] Medicine  Discharge Summary      Patient Name: Narciso Yanez  MRN: [id number]  ALESSANDRO: [id number]  Patient Class: IP- Inpatient  Admission Date: 1/17/2024  Hospital Length of Stay: 4 days  Discharge Date and Time:  01/21/2024 12:17 PM  Attending Physician: Juwan Cuevas, *   Discharging Provider: Juwan Cuevas MD  Primary Care Provider: Marcelino Del Toro MD    Primary Care Team: Networked reference to record PCT     HPI:   88 years old male with past medical history significant for CAD, COPD (not on O2), hypertension, hyperlipidemia, CKD 3, type 2 diabetes, peripheral artery disease, NPH, PMR, cutaneous T-cell lymphoma, chronic back pain, tobacco use who was brought in by his daughter due to altered mental status.  Patient was alert and lying comfortably in the bed however confused so was not able to provide complete information however, patient denied chest pain shortness of breath abdominal pain.  History is provided by patient's daughter at bedside who reported that patient has been confused since yesterday and was not doing good for which she brought him to the hospital.  Lab work showed creatinine 1.6 and potassium 5.9.  Patient received hyperkalemia treatment.  Later in ER, patient became hypotensive for which started on Levophed.  Chest x-ray showed findings suspicious of diffuse developing infiltrate.  CT head and C-spine negative for any acute abnormality.  CT A&P showed hepatic steatosis however negative for any acute abnormality.  Patient was found to be hypothermic with temperature of 90.2° F and placed on Nj Hugger.  In the ER, patient became hypoglycemic with glucose of 38 for which received hypoglycemia protocol and recheck glucose came 58.    * No surgery found *      Hospital Course:   88M with pmh of CAD, COPD (not on O2), hypertension, hyperlipidemia, CKD 3, type 2 diabetes, peripheral artery disease, NPH, PMR, cutaneous T-cell lymphoma, chronic  back pain, tobacco use who was brought in by his daughter due mental status change from baseline. Lab work showed creatinine 1.6 and potassium 5.9.  Patient received hyperkalemia treatment.  Later in ER, patient became hypotensive for which started on Levophed.  Chest x-ray showed findings suspicious of diffuse developing infiltrate.  CT head and C-spine negative for any acute abnormality.  CT A&P showed hepatic steatosis however negative for any acute abnormality.  Patient was found to be hypothermic with temperature of 90.2° F and placed on Nj Hugger.  In the ER, patient became hypoglycemic with glucose of 38 for which received hypoglycemia protocol and recheck glucose came 58. BG has now improved. Was only requiring levophed for short time. Pt remains confused, but is oriented to self. Continuing abx pending BC. Stable to step down to the floor.   Patient is not much responsive,per  palliative family initially did  not wish Hospice,consulted ST,PT,OT,started on IVF.  Patient is DNR.  Ongoing talking with daughters regarding hospice,they were  talking with each other,  Plan for inpatient Hospice,SW is following,family finally agree.  Patient was discharged to inpatient Hospice.     Goals of Care Treatment Preferences:  Code Status: DNR          What is most important right now is to focus on spending time at home, avoiding the hospital, remaining as independent as possible, symptom/pain control, comfort and QOL .  Accordingly, we have decided that the best plan to meet the patient's goals includes continuing with treatment.      Consults:   Consults (From admission, onward)          Status Ordering Provider     Inpatient consult to Social Work  Once        Provider:  (Not yet assigned)    Acknowledged GAYLE MCGUIRE consult to case management  Once        Provider:  (Not yet assigned)    Completed SYLVIA HOFF III     Inpatient consult to Palliative Care  Once        Provider:  (Not yet  assigned)    Completed SYLVIA HOFF III     Pharmacy to dose Vancomycin consult  Once        Provider:  (Not yet assigned)   See [facility] for full Linked Orders Report.    Acknowledged BRANDY MARIN            No new Assessment & Plan notes have been filed under this hospital service since the last note was generated.  Service: Hospital Medicine    Final Active Diagnoses:    Diagnosis Date Noted POA    PRINCIPAL PROBLEM:  Acute metabolic encephalopathy [G93.41] 01/17/2024 Yes    Hypothyroid [E03.9] 01/20/2024 Yes    Pneumonia [J18.9] 01/17/2024 Yes    Shock [R57.9] 01/17/2024 Yes    Hyperkalemia [E87.5] 12/20/2023 Yes    Chronic interstitial lung disease [J84.9] 10/15/2023 Yes    NPH (normal pressure hydrocephalus) [G91.2] 05/31/2021 Yes    Goals of care, counseling/discussion [Z71.89] 05/26/2021 Not Applicable    LATRICE (acute kidney injury) [N17.9] 04/02/2021 Yes    Hypoglycemia [E16.2] 03/10/2021 Yes    Stage 3a chronic kidney disease [N18.31] 06/09/2020 Yes    COPD (chronic obstructive pulmonary disease) [J44.9] 03/29/2019 Yes    Aortic atherosclerosis [I70.0] 09/29/2017 Yes    Skin cancer [C44.90] 09/23/2016 Yes    Diabetes mellitus with peripheral circulatory disorder [E11.51] 06/18/2014 Yes    Coronary artery disease involving coronary bypass graft of native heart without angina pectoris [I25.810] 10/12/2012 Yes      Problems Resolved During this Admission:       Discharged Condition: stable    Disposition: Hospice/Medical Facility    Follow Up:   Follow-up Information       Marcelino Del Toro MD Follow up.    Specialty: Internal Medicine  Contact information:  [address] [address] [phone]               Marcelino Del Toro MD Follow up in 1 week(s).    Specialty: Internal Medicine  Contact information:  [address] [address] [phone]                           Patient Instructions:      Reason for not Ordering Smoking Cessation Referral     Order Specific  Question Answer Comments   Reason for not ordering: Patient refused      Reason for not Prescribing Nicotine Replacement     Order Specific Question Answer Comments   Reason for not Prescribing: Patient refused        Significant Diagnostic Studies: Labs: BMP:   Recent Labs   Lab 01/20/24  0554 01/20/24  1105 01/21/24  0609    123* 103    143 140   K 4.2 4.0 4.1   * 116* 116*   CO2 18* 16* 16*   BUN 17 16 12   CREATININE 1.6* 1.6* 1.4   CALCIUM 8.2* 8.1* 8.1*   , CMP   Recent Labs   Lab 01/20/24  0554 01/20/24  1105 01/21/24  0609    143 140   K 4.2 4.0 4.1   * 116* 116*   CO2 18* 16* 16*    123* 103   BUN 17 16 12   CREATININE 1.6* 1.6* 1.4   CALCIUM 8.2* 8.1* 8.1*   PROT 6.5  --   --    ALBUMIN 2.5*  --   --    BILITOT 0.3  --   --    ALKPHOS 67  --   --    AST 41*  --   --    ALT 26  --   --    ANIONGAP 10 11 8   , and CBC   Recent Labs   Lab 01/20/24  0554 01/21/24  0609   WBC 8.36 9.06   HGB 7.9* 7.2*   HCT 25.5* 23.1*    220     Microbiology: Blood Culture   Lab Results   Component Value Date    LABBLOO No Growth to date 01/17/2024    LABBLOO No Growth to date 01/17/2024    LABBLOO No Growth to date 01/17/2024    LABBLOO No Growth to date 01/17/2024     Radiology: X-Ray: CXR: X-Ray Chest 1 View (CXR): No results found for this visit on 01/17/24. and X-Ray Chest PA and Lateral (CXR): No results found for this visit on 01/17/24.    Pending Diagnostic Studies:       None           Medications:  Reconciled Home Medications:      Medication List        START taking these medications      amoxicillin-clavulanate 875-125mg 875-125 mg per tablet  Commonly known as: AUGMENTIN  Take 1 tablet by mouth 2 (two) times daily. for 7 days     levothyroxine 50 MCG tablet  Commonly known as: SYNTHROID  Take 1 tablet (50 mcg total) by mouth before breakfast.            CHANGE how you take these medications      atorvastatin 40 MG tablet  Commonly known as: LIPITOR  Take 1 tablet (40 mg  total) by mouth once daily.  What changed: Another medication with the same name was removed. Continue taking this medication, and follow the directions you see here.            CONTINUE taking these medications      acetaminophen 500 MG tablet  Commonly known as: TYLENOL  Take 1 tablet (500 mg total) by mouth every 6 (six) hours as needed for Pain.     aspirin 81 MG EC tablet  Commonly known as: ECOTRIN  Take 1 tablet (81 mg total) by mouth once daily.     blood sugar diagnostic Strp  Commonly known as: TRUE METRIX GLUCOSE TEST STRIP  TID     clopidogreL 75 mg tablet  Commonly known as: PLAVIX  Take 1 tablet (75 mg total) by mouth once daily.     divalproex 125 MG EC tablet  Commonly known as: DEPAKOTE  Take 250 mg daily and 500 mg nightly     metFORMIN 500 MG tablet  Commonly known as: GLUCOPHAGE  Take 1 tablet (500 mg total) by mouth 2 (two) times daily with meals. HOLD FOR GLUCOSE LESS 100     metoprolol succinate 25 MG 24 hr tablet  Commonly known as: TOPROL-XL  Take 1 tablet (25 mg total) by mouth once daily.     mometasone 0.1% 0.1 % cream  Commonly known as: ELOCON  Apply topically 2 (two) times daily. Apply to arms ,hands, and legs BID     tamsulosin 0.4 mg Cap  Commonly known as: FLOMAX  Take 1 capsule (0.4 mg total) by mouth once daily.     triamcinolone acetonide 0.1% 0.1 % cream  Commonly known as: KENALOG  Apply topically 2 (two) times daily. Apply to rash              Indwelling Lines/Drains at time of discharge:   Lines/Drains/Airways       None                   Time spent on the discharge of patient:  over 30  minutes         Juwan Cuevas MD  Department of Hospital Medicine  [facility]

## 2024-01-21 NOTE — PT/OT/SLP PROGRESS
"Speech Language Pathology Treatment    Patient Name:  Narciso Yanez   MRN:  [id number]  Admitting Diagnosis: Acute metabolic encephalopathy    Recommendations:                 General Recommendations:  Dysphagia therapy  Diet recommendations:  NPO, Liquid Diet Level: NPO (except ice when Pt awake, alert, upright, accepting of PO)   Aspiration Precautions: Frequent oral care and Standard aspiration precautions   General Precautions: Standard,    Communication strategies:  provide increased time to answer and calm tone    Assessment:     Narciso Yanez is a 88 y.o. male with dx acute metabolic encephalopathy, he presents with fluctuating and overall low level of alertness which renders him inappropriate for po intake outside of ice chips when he is awake, alert, and sitting upright.     Subjective   Supportive and attentive family at [facility] report Pt with increased alertness for a short window of time this morning for about 1.5 hours.  During this window of increased alertness, family report that Pt, with their assistance, tolerated several ice chips and ~2-4 oz of mildly thick 'smoothie' brought in by family, without overt difficulty.  Family reports it took Pt ~35 min to consume the ~2-4 oz of smoothie.  Pt with fluctuating level of alertness during ST session.  SLP returned at ~1400 to see if Pt was alert, for additional treatment during possible window of alertness, however Pt was somnolent at that time.   " 7" , "feet" Pt sated when asked about pain.  RN notified and made plan to provide pain meds.   Patient goals: unable to obtain this date    Pain/Comfort:  Pain Rating 1: 7/10 (Bilat feet, RN notified)      Objective:   Pt with open mouth posture, mouth breathing and dry oral cavity noted upon SLP arrival. Pt repositioned for PO with help of RN at [facility]. Pt with fluctuating level of alertness during session, and poor overall attention to bolus, even when relatively alert for brief period.  Provided max tactile and " verbal cues to maintain alertness and focus attention on bolus presented, Pt tolerated ice chips x 2 without overt s/s aspiration at b/s. Delayed swallow initiation (greater than 1 minute x 1; greater than 30 sec x 1) noted for both trials.  No further PO trials presented 2/2 level of cueing required for alertness and attention to task.       Has the patient been evaluated by SLP for swallowing?   Yes  Keep patient NPO? Yes (except ice chips with trained family members, when Pt is awake,alert, seated upright and accepting of PO)     SLP provided skilled education related to self-mgmt and home care for dysphagia topics including- role of ST, ST POC, s/s aspiration, risk of aspiration and sequelae, swallow safety precautions, dysphagia in context of comfort care.  One of Pt's family members has personal hx with aspiration and related education topics with good background knowledge. Family at b/s responded to all prompts aimed to check understanding of topics covered with 100% acc IND.  Oral toothette swabs provided at b/s with verbal instructions for oral care and comfort; Pt communcation board updated.         Goals:   Multidisciplinary Problems       SLP Goals          Problem: SLP    Goal Priority Disciplines Outcome   SLP Goal    High SLP Ongoing, Progressing   Description: STG  Pt will participate in ongoing assessment of swallow.                        Plan:     Patient to be seen:   (3-5x/week)   Plan of Care expires:     Plan of Care reviewed with:  patient, family   SLP Follow-Up:  Yes       Discharge recommendations:   (TBD goals for care)   Barriers to Discharge:   none per ST    Time Tracking:     SLP Treatment Date:   01/21/24  Speech Start Time:  1155  Speech Stop Time:  1237     Speech Total Time (min):  42 min    Billable Minutes: Treatment Swallowing Dysfunction 12 min, Self Care/Home Management Training 30 min, and Total Time 42 min    01/21/2024

## 2024-01-21 NOTE — PLAN OF CARE
SLP follow- up with Pt at b/s.  Provided max constant tactile and verbal cueing, Pt tolerated ice chips x 2 without overt s/s aspiration at b/s. Pt not appropriate for PO diet at this time 2/2 poor overall alertness and attention to bolus.  Dysphagia education reinforced with supportive attentive family at b/s. Family at /s trained on providing ice chips to Pt when Pt is awake, alert, sitting upright and accepting of PO. Continue ST POC.

## 2024-01-21 NOTE — NURSING
Discharged to [facility] with ambulance by stretcher.  Patient appears comfortable.  Chest exp full, even.  Breaths unlabored.  No acute distress.  Daughters at the bedside.  Tele monitor removed and sent to tele

## 2024-01-21 NOTE — PLAN OF CARE
Case Management Final Discharge Note      Discharge Disposition: [facility]    New DME ordered / company name: n/a    Relevant SDOH / Transition of Care Barriers:  none    Primary Caretaker and contact information: see demographics    Scheduled followup appointment: n/a    Referrals placed: n/a    Transportation:   ADT 30 order placed for Stretcher Transportation.  Requested  time 1730:  If transportation does not arrive at ETA time nurse will be instructed to follow protocol for transportation below:  How can I get in touch directly with dispatch, if needed?                 Non-emergent (stretcher): [phone]  option 6     ++NURSING:  If Stretcher does not arrive at requested time please call the above Non Emergent Dispatcher.  If issue not resolved please escalate to your charge nurse for further instructions.      Patient and family educated on discharge services and updated on DC plan. Bedside RN notified, patient clear to discharge from Case Management Perspective.

## 2024-01-21 NOTE — ASSESSMENT & PLAN NOTE
Fall precautions   Neuro checks     Patient is not much responsive,pare palliative family does not wish Hospice,consulted ST,PT,OT,started on IVF.  Ongoing talking with daughters regarding hospice,they are talking with each other,  Plan for inpatient Hospice,SW is following,family agree.

## 2024-01-21 NOTE — NURSING
Called report to Anabelle at inpatient hospice.  Nurse stated to leave the IV in on transport to facility.  Family at the bedside.

## 2024-01-22 ENCOUNTER — HOSPITAL ENCOUNTER (OUTPATIENT)
Dept: RADIOLOGY | Facility: HOSPITAL | Age: 89
Discharge: HOME | End: 2024-01-22
Attending: INTERNAL MEDICINE
Payer: MEDICARE

## 2024-01-25 LAB
FINAL PATHOLOGIC DIAGNOSIS: NORMAL
GROSS: NORMAL
Lab: NORMAL

## 2024-01-26 NOTE — PHYSICIAN QUERY
"PT Name: Narciso Yanez  MR #: [id number]    DOCUMENTATION CLARIFICATION   CDS: Yudith Elaine RN      Contact information:[email or web address]  This form is a permanent document in the medical record.     Query Date: January 26, 2024    By submitting this query, we are merely seeking further clarification of documentation.  Please utilize your independent clinical judgment when addressing the question(s) below.    The medical record contains the following:  Pathology Findings Location in Medical Record   Skin biopsy to evaluate for CTCL.     Suspect this is more phototoxic drug eruption.     Can also consider contact dermatitis    Low suspicion for CTCL given clinical exam  12/25 Dermatology Consult   Final Pathologic Diagnosis:  Interpretation MCR  A. Right arm, [id number], 12/25/2023: Spongiotic dermatitis with intracorneal pustules, patchy aggregates of lymphocytes, and  dermal hemorrhage, see comment    COMMENT  PAS stain is negative for fungal organisms. The patient's flow cytometry studies showing atypical CD45/CD2/CD3/CD4/TCR alpha/beta cells noted. The specimen contains patchy superficial dermal aggregates of small T-lymphocytes that show focal exocytosis and express CD3, CD5, and CD7 (diminished), with a slight CD4 predominance over CD8. The immunophenotype of the lymphocytes in this skin biopsy differs from that detected on flow cytometry.     Overall, given cytomorphologic and immunophenotypic features,  the findings in this specimen are favored to represent reactive dermatitic changes, including an irritated lesion of dermatitis or possibly a medication reaction. However, given the patient's flow cytometry results, close clinical follow-up and possibly  repeated skin biopsies over time if process persists may be advisable.     12/25/23 Tissue Specimen to Pathology       Please further clarify the "Biopsy to Evaluate for CTCL":    [  ] T-Cell cutaneous lymphoma ruled out, contact dermatitis " only     [  ] T-Cell cutaneous lymphoma ruled out, adverse medication reaction     [ x ] T-Cell cutaneous lymphoma unable to be ruled out at time of discharge     [  ] Other (please specify): ___________     [  ] Clinically Undetermined     Please document in your progress notes daily for the duration of treatment until resolved and include in your discharge summary.    Form No. [id number]

## 2024-02-05 ENCOUNTER — PATIENT OUTREACH (OUTPATIENT)
Dept: ADMINISTRATIVE | Facility: OTHER | Age: 89
End: 2024-02-05

## 2024-03-07 ENCOUNTER — TELEPHONE (OUTPATIENT)
Dept: NEUROLOGY | Facility: CLINIC | Age: 89
End: 2024-03-07
Payer: MEDICARE

## 2024-03-07 NOTE — TELEPHONE ENCOUNTER
----- Message from Yaya Leonard sent at 3/7/2024  4:41 PM CST -----  Regarding: Appointment  Contact: [phone]  Patient's daughter Etelvina calling requesting a callback from nurse or provider in regards to appointment that was scheduled today. She said he passed on 01-22-24 so she don't know why he have an appointment.. Please call back as soon as possible.

## 2024-03-08 NOTE — TELEPHONE ENCOUNTER
Returned call to cheryl Gutierrez, who believed her father was still scheduled for an nathalia't despite the fact he .  Offered condolences and apologized for any distress caused by viewing future nathalia'ts.    Did note that future nathalia't in neurology was canceled.

## 2024-06-19 NOTE — ASSESSMENT & PLAN NOTE
Patient's FSGs are uncontrolled due to hyperglycemia on current medication regimen.  Last A1c reviewed-   Lab Results   Component Value Date    HGBA1C 6.9 (H) 10/15/2023     Most recent fingerstick glucose reviewed-   Recent Labs   Lab 10/20/23  1134 10/20/23  1611 10/20/23  2037 10/21/23  0730   POCTGLUCOSE 236* 323* 326* 156*     Current correctional scale  Low  Maintain anti-hyperglycemic dose as follows-   Antihyperglycemics (From admission, onward)    Start     Stop Route Frequency Ordered    10/15/23 0251  insulin aspart U-100 pen 0-5 Units         -- SubQ Before meals & nightly PRN 10/15/23 0151        Hold Oral hypoglycemics while patient is in the hospital.

## (undated) DEVICE — WIRE GUIDE SAFE-T-J .035 260CM

## (undated) DEVICE — BIT DRILL PERFORATOR DISP 14MM

## (undated) DEVICE — SUT SILK 2-0 SH 18IN BLACK

## (undated) DEVICE — TROCAR ENDOPATH XCEL 15MM 10CM

## (undated) DEVICE — CATH EXPO MPA2 6F

## (undated) DEVICE — CARTRIDGE OIL

## (undated) DEVICE — SEE MEDLINE ITEM 157131

## (undated) DEVICE — ELECTRODE REM PLYHSV RETURN 9

## (undated) DEVICE — TUBE FRAZIER 5MM 2FT SOFT TIP

## (undated) DEVICE — KIT GLIDESHEATH SLEND 6FR 10CM

## (undated) DEVICE — PACK CATH LAB

## (undated) DEVICE — SUT CTD VICRYL 0 UND BR

## (undated) DEVICE — STAPLER SKIN PROXIMATE WIDE

## (undated) DEVICE — SHEATH DESTINATION 6F X 45CM

## (undated) DEVICE — SEE MEDLINE ITEM 156905

## (undated) DEVICE — DRAPE INCISE IOBAN 2 23X17IN

## (undated) DEVICE — TUBING INSUFFLATION HEATED

## (undated) DEVICE — SUT SILK BLK BR. 2 2-60

## (undated) DEVICE — PAD RADI FEMORAL

## (undated) DEVICE — PASSER CATH SHORT 55 CM

## (undated) DEVICE — TRAY LUMBAR PUNCTURE ADULT

## (undated) DEVICE — DIFFUSER

## (undated) DEVICE — KIT HAND CONTROL HIGH PRESSUR

## (undated) DEVICE — HEMOSTAT VASC BAND REG 24CM

## (undated) DEVICE — KIT ANTIFOG

## (undated) DEVICE — ELECTRODE BLADE INSULATED 1 IN

## (undated) DEVICE — SUT VICRYL PLUS 3-0 SH 18IN

## (undated) DEVICE — KIT MANIFOLD LOW PRESS TUBING

## (undated) DEVICE — GUIDEWIRE STF .035X260CM ANG

## (undated) DEVICE — CLOSURE SKIN STERI STRIP 1/4X3

## (undated) DEVICE — SUT VICRYL PLUS 0 CT1 18IN

## (undated) DEVICE — DRESSING TELFA N ADH 3X8

## (undated) DEVICE — DRESSING TELFA STRL 4X3 LF

## (undated) DEVICE — KIT SYR REUSABLE

## (undated) DEVICE — GUIDEWIRE TORQUE 3CM/260CML

## (undated) DEVICE — CATH SEEKER .035IN 150CM

## (undated) DEVICE — TROCAR ENDOPATH XCEL 5MM 7.5CM

## (undated) DEVICE — VISIPAQUE 320 200ML +PK

## (undated) DEVICE — NDL HYPO REG 25G X 1 1/2

## (undated) DEVICE — STYLET AXIEM 23CM SINGLE COIL

## (undated) DEVICE — MARKER SKIN STND TIP BLUE BARR

## (undated) DEVICE — TAPE SURG MEDIPORE 6X72IN

## (undated) DEVICE — SPRAY MASTISOL

## (undated) DEVICE — SUT MONOCRYL 4-0 PS-1 UND

## (undated) DEVICE — SHEET EENT SPLIT

## (undated) DEVICE — CATH PIG IMPULSE 6FR 125CM

## (undated) DEVICE — TRACKER PATIENT NON INVASIVE

## (undated) DEVICE — ADHESIVE DERMABOND ADVANCED

## (undated) DEVICE — SUT 2-0 12-18IN SILK

## (undated) DEVICE — DRESSING LEUKOPLAST FLEX 1X3IN

## (undated) DEVICE — SUT 4/0 18IN NUROLON BLK B

## (undated) DEVICE — CORD BIPOLAR 12 FOOT

## (undated) DEVICE — GLOVE BIOGEL SKINSENSE PI 6.5

## (undated) DEVICE — SUT GUT PL. 4-0 27 FS-2

## (undated) DEVICE — DURAPREP SURG SCRUB 26ML